# Patient Record
Sex: MALE | Race: BLACK OR AFRICAN AMERICAN | NOT HISPANIC OR LATINO | Employment: FULL TIME | URBAN - METROPOLITAN AREA
[De-identification: names, ages, dates, MRNs, and addresses within clinical notes are randomized per-mention and may not be internally consistent; named-entity substitution may affect disease eponyms.]

---

## 2023-09-19 ENCOUNTER — LAB (OUTPATIENT)
Dept: LAB | Facility: LAB | Age: 63
End: 2023-09-19
Payer: COMMERCIAL

## 2023-09-19 DIAGNOSIS — Z00.00 ANNUAL PHYSICAL EXAM: ICD-10-CM

## 2023-09-19 LAB
ALANINE AMINOTRANSFERASE (SGPT) (U/L) IN SER/PLAS: 10 U/L (ref 10–52)
ANION GAP IN SER/PLAS: 12 MMOL/L (ref 10–20)
CALCIDIOL (25 OH VITAMIN D3) (NG/ML) IN SER/PLAS: 12 NG/ML
CALCIUM (MG/DL) IN SER/PLAS: 9 MG/DL (ref 8.6–10.6)
CARBON DIOXIDE, TOTAL (MMOL/L) IN SER/PLAS: 25 MMOL/L (ref 21–32)
CHLORIDE (MMOL/L) IN SER/PLAS: 109 MMOL/L (ref 98–107)
CHOLESTEROL (MG/DL) IN SER/PLAS: 105 MG/DL (ref 0–199)
CHOLESTEROL IN HDL (MG/DL) IN SER/PLAS: 38.7 MG/DL
CHOLESTEROL/HDL RATIO: 2.7
COBALAMIN (VITAMIN B12) (PG/ML) IN SER/PLAS: 474 PG/ML (ref 211–911)
CREATININE (MG/DL) IN SER/PLAS: 1.05 MG/DL (ref 0.5–1.3)
ERYTHROCYTE DISTRIBUTION WIDTH (RATIO) BY AUTOMATED COUNT: 17.5 % (ref 11.5–14.5)
ERYTHROCYTE MEAN CORPUSCULAR HEMOGLOBIN CONCENTRATION (G/DL) BY AUTOMATED: 32.6 G/DL (ref 32–36)
ERYTHROCYTE MEAN CORPUSCULAR VOLUME (FL) BY AUTOMATED COUNT: 83 FL (ref 80–100)
ERYTHROCYTES (10*6/UL) IN BLOOD BY AUTOMATED COUNT: 2.77 X10E12/L (ref 4.5–5.9)
GFR MALE: 78 ML/MIN/1.73M2
GLUCOSE (MG/DL) IN SER/PLAS: 108 MG/DL (ref 74–99)
HEMATOCRIT (%) IN BLOOD BY AUTOMATED COUNT: 23 % (ref 41–52)
HEMOGLOBIN (G/DL) IN BLOOD: 7.5 G/DL (ref 13.5–17.5)
LDL: 56 MG/DL (ref 0–99)
LEUKOCYTES (10*3/UL) IN BLOOD BY AUTOMATED COUNT: 2.9 X10E9/L (ref 4.4–11.3)
NRBC (PER 100 WBCS) BY AUTOMATED COUNT: 6.9 /100 WBC (ref 0–0)
PLATELETS (10*3/UL) IN BLOOD AUTOMATED COUNT: 149 X10E9/L (ref 150–450)
POTASSIUM (MMOL/L) IN SER/PLAS: 3.8 MMOL/L (ref 3.5–5.3)
PROSTATE SPECIFIC AG (NG/ML) IN SER/PLAS: 6.34 NG/ML (ref 0–4)
SODIUM (MMOL/L) IN SER/PLAS: 142 MMOL/L (ref 136–145)
THYROTROPIN (MIU/L) IN SER/PLAS BY DETECTION LIMIT <= 0.05 MIU/L: 1.86 MIU/L (ref 0.44–3.98)
TRIGLYCERIDE (MG/DL) IN SER/PLAS: 53 MG/DL (ref 0–149)
UREA NITROGEN (MG/DL) IN SER/PLAS: 14 MG/DL (ref 6–23)
VLDL: 11 MG/DL (ref 0–40)

## 2023-09-19 PROCEDURE — 36415 COLL VENOUS BLD VENIPUNCTURE: CPT

## 2023-09-19 PROCEDURE — 81001 URINALYSIS AUTO W/SCOPE: CPT

## 2023-09-19 PROCEDURE — 84443 ASSAY THYROID STIM HORMONE: CPT

## 2023-09-19 PROCEDURE — 82607 VITAMIN B-12: CPT

## 2023-09-19 PROCEDURE — 80061 LIPID PANEL: CPT

## 2023-09-19 PROCEDURE — 84460 ALANINE AMINO (ALT) (SGPT): CPT

## 2023-09-19 PROCEDURE — 80048 BASIC METABOLIC PNL TOTAL CA: CPT

## 2023-09-19 PROCEDURE — 84153 ASSAY OF PSA TOTAL: CPT

## 2023-09-19 PROCEDURE — 85027 COMPLETE CBC AUTOMATED: CPT

## 2023-09-19 PROCEDURE — 82306 VITAMIN D 25 HYDROXY: CPT

## 2023-09-20 LAB
APPEARANCE, URINE: CLEAR
BILIRUBIN, URINE: NEGATIVE
BLOOD, URINE: ABNORMAL
COLOR, URINE: YELLOW
GLUCOSE, URINE: NEGATIVE MG/DL
KETONES, URINE: NEGATIVE MG/DL
LEUKOCYTE ESTERASE, URINE: NEGATIVE
MUCUS, URINE: NORMAL /LPF
NITRITE, URINE: NEGATIVE
PH, URINE: 5 (ref 5–8)
PROTEIN, URINE: NEGATIVE MG/DL
RBC, URINE: 2 /HPF (ref 0–5)
SPECIFIC GRAVITY, URINE: 1.02 (ref 1–1.03)
UROBILINOGEN, URINE: 2 MG/DL (ref 0–1.9)
WBC, URINE: <1 /HPF (ref 0–5)

## 2023-09-22 RX ORDER — NIFEDIPINE 60 MG/1
60 TABLET, EXTENDED RELEASE ORAL
COMMUNITY
Start: 2022-09-06 | End: 2023-09-25 | Stop reason: SDUPTHER

## 2023-09-22 RX ORDER — TAMSULOSIN HYDROCHLORIDE 0.4 MG/1
0.4 CAPSULE ORAL
COMMUNITY
Start: 2023-06-08 | End: 2024-04-23 | Stop reason: ALTCHOICE

## 2023-09-22 RX ORDER — LISINOPRIL 20 MG/1
1 TABLET ORAL DAILY
COMMUNITY
Start: 2020-11-17 | End: 2024-04-23 | Stop reason: ALTCHOICE

## 2023-09-25 ENCOUNTER — OFFICE VISIT (OUTPATIENT)
Dept: PRIMARY CARE | Facility: CLINIC | Age: 63
End: 2023-09-25
Payer: COMMERCIAL

## 2023-09-25 VITALS
HEART RATE: 82 BPM | SYSTOLIC BLOOD PRESSURE: 136 MMHG | HEIGHT: 66 IN | DIASTOLIC BLOOD PRESSURE: 80 MMHG | BODY MASS INDEX: 27.93 KG/M2 | OXYGEN SATURATION: 98 % | RESPIRATION RATE: 17 BRPM | TEMPERATURE: 98.2 F | WEIGHT: 173.8 LBS

## 2023-09-25 DIAGNOSIS — D64.9 ANEMIA, UNSPECIFIED TYPE: Primary | ICD-10-CM

## 2023-09-25 PROCEDURE — 1126F AMNT PAIN NOTED NONE PRSNT: CPT | Performed by: INTERNAL MEDICINE

## 2023-09-25 PROCEDURE — 99397 PER PM REEVAL EST PAT 65+ YR: CPT | Performed by: INTERNAL MEDICINE

## 2023-09-25 PROCEDURE — 1036F TOBACCO NON-USER: CPT | Performed by: INTERNAL MEDICINE

## 2023-09-25 RX ORDER — NIFEDIPINE 60 MG/1
60 TABLET, FILM COATED, EXTENDED RELEASE ORAL
Qty: 90 TABLET | Refills: 1 | Status: SHIPPED | OUTPATIENT
Start: 2023-09-25 | End: 2024-01-15 | Stop reason: SDUPTHER

## 2023-09-25 ASSESSMENT — ENCOUNTER SYMPTOMS
LOSS OF SENSATION IN FEET: 0
DEPRESSION: 0
OCCASIONAL FEELINGS OF UNSTEADINESS: 0

## 2023-09-25 ASSESSMENT — PAIN SCALES - GENERAL: PAINLEVEL: 0-NO PAIN

## 2023-09-25 NOTE — PROGRESS NOTES
"Subjective   Patient ID: Brandt Merritt is a 63 y.o. male who presents for Annual Exam and Med Refill.  Patient comes in for a physical examination, doing well over - all with no particular complaints.   Also in for laboratory review and health maintenance update.  Updating family history as well.      Med Refill        Review of Systems   All other systems reviewed and are negative.      Objective   Physical Exam  Constitutional:       Appearance: Normal appearance.   HENT:      Head: Normocephalic and atraumatic.      Nose: Nose normal.   Cardiovascular:      Rate and Rhythm: Normal rate and regular rhythm.   Abdominal:      General: Abdomen is flat.      Palpations: Abdomen is soft.   Musculoskeletal:         General: Normal range of motion.      Cervical back: Normal range of motion.   Skin:     General: Skin is warm and dry.   Neurological:      Mental Status: He is alert.       /80 (BP Location: Right arm)   Pulse 82   Temp 36.8 °C (98.2 °F)   Resp 17   Ht 1.676 m (5' 6\")   Wt 78.8 kg (173 lb 12.8 oz)   SpO2 98%   BMI 28.05 kg/m²         Assessment/Plan   Problem List Items Addressed This Visit    None  ASSESSMENT AND PLAN: Patient on examination is in fair health except for medical conditions discussed above, obtained screening blood tests to screen for high cholesterol, diabetes, thyroid, Ekg if above 50 years old or earlier if with cardiac history. Patient should be taking enough calcium in a balanced diet  Weight control especially if BMI is above ideal range. For Female Patients Only:  Mammogram yearly and PAP test with gynecology unless s/p Total hysterectomy  Bone density test at age 60 and above and every two years after that. Preventive Medicine: colon cancer screening by age 45 if no family history as well PSA @ 50 , balanced diet, and exercise as discussed. Seat belt use for injury prevention, living will. Substance use and /or tobacco use counseled when applicable. Alcohol use " discussed, use designated . Immunizations TD age 50 and every 10 years. Pneumovax and shingles vaccine counseled. Yearly flu vaccine unless contraindicated. More than 50% of office visit time spent counseling the patient, questions were answered. If problems arise, patient is to call or come back in. It is understood that the responsibility of healthcare is shared by the patient by following a healthy lifestyle and following the plan above as discussed. Advised complete physical examination every year. Pending additional results, may need to come for a return office visit for follow-up.       Anemia: Will refer Hematology.

## 2023-10-05 PROBLEM — E66.811 OBESITY, CLASS I, BMI 30-34.9: Status: ACTIVE | Noted: 2021-06-30

## 2023-10-05 PROBLEM — R94.31 ABNORMAL EKG: Status: ACTIVE | Noted: 2020-12-11

## 2023-10-05 PROBLEM — I10 HTN (HYPERTENSION): Status: ACTIVE | Noted: 2023-10-05

## 2023-10-05 PROBLEM — I21.3 STEMI (ST ELEVATION MYOCARDIAL INFARCTION) (MULTI): Status: ACTIVE | Noted: 2020-11-11

## 2023-10-05 PROBLEM — E87.6 HYPOKALEMIA: Status: ACTIVE | Noted: 2023-10-05

## 2023-10-05 PROBLEM — R07.9 CHEST PAIN: Status: ACTIVE | Noted: 2021-06-30

## 2023-10-05 PROBLEM — I42.9 MYOCARDIOPATHY (MULTI): Status: ACTIVE | Noted: 2020-12-11

## 2023-10-05 PROBLEM — R97.20 ELEVATED PSA: Status: ACTIVE | Noted: 2022-09-14

## 2023-10-05 PROBLEM — E66.9 OBESITY, CLASS I, BMI 30-34.9: Status: ACTIVE | Noted: 2021-06-30

## 2023-10-06 ENCOUNTER — LAB (OUTPATIENT)
Dept: LAB | Facility: LAB | Age: 63
End: 2023-10-06
Payer: COMMERCIAL

## 2023-10-06 ENCOUNTER — OFFICE VISIT (OUTPATIENT)
Dept: HEMATOLOGY/ONCOLOGY | Facility: CLINIC | Age: 63
End: 2023-10-06
Payer: COMMERCIAL

## 2023-10-06 VITALS
SYSTOLIC BLOOD PRESSURE: 167 MMHG | RESPIRATION RATE: 18 BRPM | DIASTOLIC BLOOD PRESSURE: 95 MMHG | HEART RATE: 87 BPM | WEIGHT: 170.64 LBS | BODY MASS INDEX: 27.54 KG/M2 | TEMPERATURE: 97.9 F

## 2023-10-06 DIAGNOSIS — D64.9 ANEMIA, UNSPECIFIED TYPE: ICD-10-CM

## 2023-10-06 DIAGNOSIS — E55.9 VITAMIN D3 DEFICIENCY: Primary | ICD-10-CM

## 2023-10-06 LAB
ALBUMIN SERPL BCP-MCNC: 4.4 G/DL (ref 3.4–5)
ALP SERPL-CCNC: 74 U/L (ref 33–136)
ALT SERPL W P-5'-P-CCNC: 10 U/L (ref 10–52)
ANION GAP SERPL CALC-SCNC: 13 MMOL/L (ref 10–20)
AST SERPL W P-5'-P-CCNC: 23 U/L (ref 9–39)
BASOPHILS # BLD AUTO: 0.03 X10*3/UL (ref 0–0.1)
BASOPHILS NFR BLD AUTO: 1 %
BILIRUB SERPL-MCNC: 2.3 MG/DL (ref 0–1.2)
BUN SERPL-MCNC: 12 MG/DL (ref 6–23)
BURR CELLS BLD QL SMEAR: NORMAL
CALCIUM SERPL-MCNC: 8.9 MG/DL (ref 8.6–10.6)
CHLORIDE SERPL-SCNC: 109 MMOL/L (ref 98–107)
CO2 SERPL-SCNC: 25 MMOL/L (ref 21–32)
CREAT SERPL-MCNC: 0.88 MG/DL (ref 0.5–1.3)
DACRYOCYTES BLD QL SMEAR: NORMAL
EOSINOPHIL # BLD AUTO: 0.02 X10*3/UL (ref 0–0.7)
EOSINOPHIL NFR BLD AUTO: 0.6 %
ERYTHROCYTE [DISTWIDTH] IN BLOOD BY AUTOMATED COUNT: 18.2 % (ref 11.5–14.5)
GFR SERPL CREATININE-BSD FRML MDRD: >90 ML/MIN/1.73M*2
GIANT PLATELETS BLD QL SMEAR: NORMAL
GLUCOSE SERPL-MCNC: 97 MG/DL (ref 74–99)
HCT VFR BLD AUTO: 22.9 % (ref 41–52)
HGB BLD-MCNC: 7.6 G/DL (ref 13.5–17.5)
HGB RETIC QN: 27 PG (ref 28–38)
HYPOCHROMIA BLD QL SMEAR: NORMAL
IMM GRANULOCYTES # BLD AUTO: 0.03 X10*3/UL (ref 0–0.7)
IMM GRANULOCYTES NFR BLD AUTO: 1 % (ref 0–0.9)
IMMATURE RETIC FRACTION: 47.3 %
LDH SERPL L TO P-CCNC: 257 U/L (ref 84–246)
LYMPHOCYTES # BLD AUTO: 1.78 X10*3/UL (ref 1.2–4.8)
LYMPHOCYTES NFR BLD AUTO: 57.2 %
MCH RBC QN AUTO: 27.5 PG (ref 26–34)
MCHC RBC AUTO-ENTMCNC: 33.2 G/DL (ref 32–36)
MCV RBC AUTO: 83 FL (ref 80–100)
MONOCYTES # BLD AUTO: 0.08 X10*3/UL (ref 0.1–1)
MONOCYTES NFR BLD AUTO: 2.6 %
NEUTROPHILS # BLD AUTO: 1.17 X10*3/UL (ref 1.2–7.7)
NEUTROPHILS NFR BLD AUTO: 37.6 %
NRBC BLD-RTO: ABNORMAL /100{WBCS}
PLATELET # BLD AUTO: 177 X10*3/UL (ref 150–450)
PMV BLD AUTO: 12.7 FL (ref 7.5–11.5)
POLYCHROMASIA BLD QL SMEAR: NORMAL
POTASSIUM SERPL-SCNC: 3.7 MMOL/L (ref 3.5–5.3)
PROT SERPL-MCNC: 7.4 G/DL (ref 6.4–8.2)
PROT SERPL-MCNC: 7.4 G/DL (ref 6.4–8.2)
RBC # BLD AUTO: 2.76 X10*6/UL (ref 4.5–5.9)
RBC MORPH BLD: NORMAL
RETICS #: 0.05 X10*6/UL (ref 0.02–0.12)
RETICS/RBC NFR AUTO: 1.8 % (ref 0.5–2)
SCHISTOCYTES BLD QL SMEAR: NORMAL
SODIUM SERPL-SCNC: 143 MMOL/L (ref 136–145)
TARGETS BLD QL SMEAR: NORMAL
WBC # BLD AUTO: 3.1 X10*3/UL (ref 4.4–11.3)

## 2023-10-06 PROCEDURE — 83521 IG LIGHT CHAINS FREE EACH: CPT

## 2023-10-06 PROCEDURE — 1159F MED LIST DOCD IN RCRD: CPT | Performed by: NURSE PRACTITIONER

## 2023-10-06 PROCEDURE — 3077F SYST BP >= 140 MM HG: CPT | Performed by: NURSE PRACTITIONER

## 2023-10-06 PROCEDURE — 83615 LACTATE (LD) (LDH) ENZYME: CPT

## 2023-10-06 PROCEDURE — 84165 PROTEIN E-PHORESIS SERUM: CPT | Performed by: NURSE PRACTITIONER

## 2023-10-06 PROCEDURE — 85025 COMPLETE CBC W/AUTO DIFF WBC: CPT

## 2023-10-06 PROCEDURE — 84165 PROTEIN E-PHORESIS SERUM: CPT

## 2023-10-06 PROCEDURE — 1036F TOBACCO NON-USER: CPT | Performed by: NURSE PRACTITIONER

## 2023-10-06 PROCEDURE — 80053 COMPREHEN METABOLIC PANEL: CPT

## 2023-10-06 PROCEDURE — 82232 ASSAY OF BETA-2 PROTEIN: CPT

## 2023-10-06 PROCEDURE — 3080F DIAST BP >= 90 MM HG: CPT | Performed by: NURSE PRACTITIONER

## 2023-10-06 PROCEDURE — 86334 IMMUNOFIX E-PHORESIS SERUM: CPT

## 2023-10-06 PROCEDURE — 36415 COLL VENOUS BLD VENIPUNCTURE: CPT

## 2023-10-06 PROCEDURE — 99205 OFFICE O/P NEW HI 60 MIN: CPT | Performed by: NURSE PRACTITIONER

## 2023-10-06 PROCEDURE — 1126F AMNT PAIN NOTED NONE PRSNT: CPT | Performed by: NURSE PRACTITIONER

## 2023-10-06 PROCEDURE — 99215 OFFICE O/P EST HI 40 MIN: CPT | Performed by: NURSE PRACTITIONER

## 2023-10-06 PROCEDURE — 86320 SERUM IMMUNOELECTROPHORESIS: CPT | Performed by: NURSE PRACTITIONER

## 2023-10-06 PROCEDURE — 85045 AUTOMATED RETICULOCYTE COUNT: CPT

## 2023-10-06 ASSESSMENT — ENCOUNTER SYMPTOMS
SHORTNESS OF BREATH: 1
FATIGUE: 1

## 2023-10-06 ASSESSMENT — PAIN SCALES - GENERAL: PAINLEVEL: 0-NO PAIN

## 2023-10-06 NOTE — PROGRESS NOTES
Patient ID: Brandt Merritt is a 63 y.o. male.  Referring Physician: Bill Richmond MD  [address] [facility], [address]  [place] [id number]  Primary Care Provider: Bill Richmond MD  Visit Type: Follow Up      Subjective    Location:  [facility]  Initial consult: 10/6/2023  Reason: Anemia/Pancytopenia    Patient is a 64 yo  man with a PMH of elevated PSA with negative biopsy, HTN, Cardiomyopathy, treated for Hep C with Harvoni, hypokalemia and abnormal EKG and was referred to benign hematology for consultation for pancytopenia.     Review of labs note a decrease in WBC, Hemoglobin, and Platelets that were normal last year. Please review labs noted below.     Today, patient presents for initial consultation. Denies abnormal weight loss, night sweats, fever. Patient endorses fatigue and PRICE. Denies chills, sob, cp, n/v/d, n/t. No PICA. Denies any abnormal bleeding or bruising. No recurrent infections or lymphadenopathy. No joint/body pain. No known blood disorders in family. No joint pain. Has had surgery in past w/o issue. Never had blood/blood products. Denies NSAID use.     PMHx:  Active Ambulatory Problems    Abnormal EKG         Date Noted: 2020      Chest pain         Date Noted: 2021      Chronic hepatitis C virus infection - treated with Harvoni (CMS/HCC)         Date Noted: 2005      Crushing injury of right hand         Date Noted: 2015      Elevated PSA         Date Noted: 2022      HTN (hypertension)         Date Noted: 10/05/2023      Hypokalemia         Date Noted: 10/05/2023      Myocardiopathy (CMS/HCC)         Date Noted: 2020  PSHx:  Past Surgical History:  2013: COLONOSCOPY      Comment:  Complete Colonoscopy  10/07/2015: OTHER SURGICAL HISTORY      Comment:  Surgery Testis Reduction Of Torsion Of Testis Right     FHx: Review of patient's family history indicates:  Mother  of a stroke at 69, Father living  at 88, brother living with prostate cancer, sister has some type of liver disease. 2 children both healthy    Social Hx: , lives with wife, works at [employer] and planning to retire soon, former smoker x 15 years, less that 1 ppd. Quit over 20 years ago.  He  reports current alcohol use. Also reports using marijuana 3 x per week on average.     Medications and allergies reviewed in EMR.    ROS:  10 point review of systems negative except as state in HPI.    Vitals & Statistics:  Objective  BSA: 1.9 meters squared  BP (!) 167/95   Pulse 87   Temp 36.6 °C (97.9 °F)   Resp 18   Wt 77.4 kg (170 lb 10.2 oz)   BMI 27.54 kg/m²     Results:  WBC       Date/Time                Value               Ref Range           Status                09/19/2023 04:46 PM      2.9 (L)             4.4 - 11.3 x10*     Final                 06/06/2020 08:27 AM      8.0                 4.4 - 11.3 x10*     Final                 04/16/2019 04:01 PM      8.0                 4.4 - 11.3 x10*     Final            ----------  RBC       Date                     Value               Ref Range           Status                09/19/2023               2.77 (L)            4.50 - 5.90 x1*     Final                 06/06/2020               5.08                4.50 - 5.90 x1*     Final                 04/16/2019               5.47                4.50 - 5.90 x1*     Final            ----------  Hemoglobin       Date                     Value               Ref Range           Status                09/19/2023               7.5 (L)             13.5 - 17.5 g/*     Final                 06/06/2020               13.8                13.5 - 17.5 g/*     Final                 04/16/2019               14.2                13.5 - 17.5 g/*     Final            ----------  Hematocrit       Date                     Value               Ref Range           Status                09/19/2023               23.0 (L)            41.0 - 52.0 %       Final                  "06/06/2020               39.6 (L)            41.0 - 52.0 %       Final                 04/16/2019               41.9                41.0 - 52.0 %       Final            ----------  MCV       Date/Time                Value               Ref Range           Status                09/19/2023 04:46 PM      83                  80 - 100 fL         Final                 06/06/2020 08:27 AM      78 (L)              80 - 100 fL         Final                 04/16/2019 04:01 PM      77 (L)              80 - 100 fL         Final            ----------  MCHC       Date/Time                Value               Ref Range           Status                09/19/2023 04:46 PM      32.6                32.0 - 36.0 g/*     Final                 06/06/2020 08:27 AM      34.8                32.0 - 36.0 g/*     Final                 04/16/2019 04:01 PM      33.9                32.0 - 36.0 g/*     Final            ----------  RDW       Date/Time                Value               Ref Range           Status                09/19/2023 04:46 PM      17.5 (H)            11.5 - 14.5 %       Final                 06/06/2020 08:27 AM      13.5                11.5 - 14.5 %       Final                 04/16/2019 04:01 PM      14.1                11.5 - 14.5 %       Final            ----------  Platelets       Date/Time                Value               Ref Range           Status                09/19/2023 04:46 PM      149 (L)             150 - 450 x10E*     Final                 06/06/2020 08:27 AM      421                 150 - 450 x10E*     Final                 04/16/2019 04:01 PM      435                 150 - 450 x10E*     Final            ----------  No results found for: \"MPV\"  Neutrophils Absolute       Date/Time                Value               Ref Range           Status                06/06/2020 08:27 AM      2.87                1.20 - 7.70 x1*     Final                 04/16/2019 04:01 PM      2.94                1.20 - 7.70 x1*     Final  " "               05/17/2018 04:01 PM      3.68                1.20 - 7.70 x1*     Final            ----------  No results found for: \"IGABSOL\"  Lymphocytes Absolute       Date/Time                Value               Ref Range           Status                06/06/2020 08:27 AM      3.84                1.20 - 4.80 x1*     Final                 04/16/2019 04:01 PM      4.03                1.20 - 4.80 x1*     Final                 05/17/2018 04:01 PM      2.47                1.20 - 4.80 x1*     Final            ----------  Monocytes Absolute       Date/Time                Value               Ref Range           Status                06/06/2020 08:27 AM      0.76                0.10 - 1.00 x1*     Final                 04/16/2019 04:01 PM      0.71                0.10 - 1.00 x1*     Final                 05/17/2018 04:01 PM      1.01 (H)            0.10 - 1.00 x1*     Final            ----------  Eosinophils Absolute       Date/Time                Value               Ref Range           Status                06/06/2020 08:27 AM      0.40                0.00 - 0.70 x1*     Final                 04/16/2019 04:01 PM      0.28                0.00 - 0.70 x1*     Final                 05/17/2018 04:01 PM      0.15                0.00 - 0.70 x1*     Final            ----------  Basophils Absolute       Date/Time                Value               Ref Range           Status                06/06/2020 08:27 AM      0.07                0.00 - 0.10 x1*     Final                 04/16/2019 04:01 PM      0.06                0.00 - 0.10 x1*     Final                 05/17/2018 04:01 PM      0.06                0.00 - 0.10 x1*     Final            ----------    Labs:  Lab Results       Component                Value               Date                       WBC                      2.9 (L)             09/19/2023                 RBC                      2.77 (L)            09/19/2023                 HGB                      7.5 (L)         " "    09/19/2023                 HCT                      23.0 (L)            09/19/2023                 MCV                      83                  09/19/2023                 PLT                      149 (L)             09/19/2023            No results found for: \"RETICCTPCT\"   Lab Results       Component                Value               Date                       CREATININE               1.05                09/19/2023                 BUN                      14                  09/19/2023                 NA                       142                 09/19/2023                 K                        3.8                 09/19/2023                 CL                       109 (H)             09/19/2023                 CO2                      25                  09/19/2023             Lab Results       Component                Value               Date                       ALT                      10                  09/19/2023             Lab Results       Component                Value               Date                       TSH                      1.86                09/19/2023            No results found for: \"IRON\", \"TIBC\", \"FERRITIN\"   Lab Results       Component                Value               Date                       [id number]               474                 09/19/2023               Assessment:      Rosanne M Casal, APRN-CNP            Review of Systems   Constitutional:  Positive for fatigue.   Respiratory:  Positive for shortness of breath.         Objective   BSA: 1.9 meters squared  BP (!) 167/95   Pulse 87   Temp 36.6 °C (97.9 °F)   Resp 18   Wt 77.4 kg (170 lb 10.2 oz)   BMI 27.54 kg/m²       Family History   Problem Relation Name Age of Onset    Other (diabetes mellitus) Mother       Oncology History    No history exists.       Brandt BRADLEY Merritt  reports that he has quit smoking. His smoking use included cigarettes. He has never used smokeless tobacco.  He  reports current alcohol " use.  He  has no history on file for drug use.    Physical Exam  HENT:      Head: Normocephalic.      Nose: Nose normal.      Mouth/Throat:      Mouth: Mucous membranes are moist.   Eyes:      Pupils: Pupils are equal, round, and reactive to light.   Cardiovascular:      Rate and Rhythm: Normal rate and regular rhythm.      Pulses: Normal pulses.      Heart sounds: Normal heart sounds.   Pulmonary:      Effort: Pulmonary effort is normal.      Breath sounds: Normal breath sounds.   Abdominal:      General: Bowel sounds are normal.      Palpations: Abdomen is soft.   Musculoskeletal:         General: Normal range of motion.   Skin:     General: Skin is warm and dry.   Neurological:      General: No focal deficit present.      Mental Status: He is alert and oriented to person, place, and time.   Psychiatric:         Mood and Affect: Mood normal.         Behavior: Behavior normal.         WBC   Date/Time Value Ref Range Status   09/19/2023 04:46 PM 2.9 (L) 4.4 - 11.3 x10E9/L Final   06/06/2020 08:27 AM 8.0 4.4 - 11.3 x10E9/L Final   04/16/2019 04:01 PM 8.0 4.4 - 11.3 x10E9/L Final     nRBC   Date Value Ref Range Status   09/19/2023 6.9 0.0 - 0.0 /100 WBC Final   06/06/2020 0.0 0.0 - 0.0 /100 WBC Final   04/16/2019 0.0 0.0 - 0.0 /100 WBC Final     RBC   Date Value Ref Range Status   09/19/2023 2.77 (L) 4.50 - 5.90 x10E12/L Final   06/06/2020 5.08 4.50 - 5.90 x10E12/L Final   04/16/2019 5.47 4.50 - 5.90 x10E12/L Final     Hemoglobin   Date Value Ref Range Status   09/19/2023 7.5 (L) 13.5 - 17.5 g/dL Final   06/06/2020 13.8 13.5 - 17.5 g/dL Final   04/16/2019 14.2 13.5 - 17.5 g/dL Final     Hematocrit   Date Value Ref Range Status   09/19/2023 23.0 (L) 41.0 - 52.0 % Final   06/06/2020 39.6 (L) 41.0 - 52.0 % Final   04/16/2019 41.9 41.0 - 52.0 % Final     MCV   Date/Time Value Ref Range Status   09/19/2023 04:46 PM 83 80 - 100 fL Final   06/06/2020 08:27 AM 78 (L) 80 - 100 fL Final   04/16/2019 04:01 PM 77 (L) 80 - 100 fL  "Final     No results found for: \"MCH\"  MCHC   Date/Time Value Ref Range Status   09/19/2023 04:46 PM 32.6 32.0 - 36.0 g/dL Final   06/06/2020 08:27 AM 34.8 32.0 - 36.0 g/dL Final   04/16/2019 04:01 PM 33.9 32.0 - 36.0 g/dL Final     RDW   Date/Time Value Ref Range Status   09/19/2023 04:46 PM 17.5 (H) 11.5 - 14.5 % Final   06/06/2020 08:27 AM 13.5 11.5 - 14.5 % Final   04/16/2019 04:01 PM 14.1 11.5 - 14.5 % Final     Platelets   Date/Time Value Ref Range Status   09/19/2023 04:46  (L) 150 - 450 x10E9/L Final   06/06/2020 08:27  150 - 450 x10E9/L Final   04/16/2019 04:01  150 - 450 x10E9/L Final     No results found for: \"MPV\"  Neutrophils %   Date/Time Value Ref Range Status   06/06/2020 08:27 AM 36.1 40.0 - 80.0 % Final   04/16/2019 04:01 PM 36.7 40.0 - 80.0 % Final   05/17/2018 04:01 PM 49.8 40.0 - 80.0 % Final     Immature Granulocytes %, Automated   Date/Time Value Ref Range Status   06/06/2020 08:27 AM 0.3 0.0 - 0.9 % Final     Comment:      Immature Granulocyte Count (IG) includes promyelocytes,    myelocytes and metamyelocytes but does not include bands.   Percent differential counts (%) should be interpreted in the   context of the absolute cell counts (cells/L).     04/16/2019 04:01 PM 0.2 0.0 - 0.9 % Final     Comment:      Percent differential counts (%) should be interpreted in the   context of the absolute cell counts (cells/L).     05/17/2018 04:01 PM 0.3 0.0 - 0.9 % Final     Comment:      Percent differential counts (%) should be interpreted in the   context of the absolute cell counts (cells/L).       Lymphocytes %   Date/Time Value Ref Range Status   06/06/2020 08:27 AM 48.2 13.0 - 44.0 % Final   04/16/2019 04:01 PM 50.1 13.0 - 44.0 % Final   05/17/2018 04:01 PM 33.4 13.0 - 44.0 % Final     Monocytes %   Date/Time Value Ref Range Status   06/06/2020 08:27 AM 9.5 2.0 - 10.0 % Final   04/16/2019 04:01 PM 8.8 2.0 - 10.0 % Final   05/17/2018 04:01 PM 13.7 (H) 2.0 - 10.0 % Final " "    Eosinophils %   Date/Time Value Ref Range Status   06/06/2020 08:27 AM 5.0 0.0 - 6.0 % Final   04/16/2019 04:01 PM 3.5 0.0 - 6.0 % Final   05/17/2018 04:01 PM 2.0 0.0 - 6.0 % Final     Basophils %   Date/Time Value Ref Range Status   06/06/2020 08:27 AM 0.9 0.0 - 2.0 % Final   04/16/2019 04:01 PM 0.7 0.0 - 2.0 % Final   05/17/2018 04:01 PM 0.8 0.0 - 2.0 % Final     Neutrophils Absolute   Date/Time Value Ref Range Status   06/06/2020 08:27 AM 2.87 1.20 - 7.70 x10E9/L Final   04/16/2019 04:01 PM 2.94 1.20 - 7.70 x10E9/L Final   05/17/2018 04:01 PM 3.68 1.20 - 7.70 x10E9/L Final     No results found for: \"IGABSOL\"  Lymphocytes Absolute   Date/Time Value Ref Range Status   06/06/2020 08:27 AM 3.84 1.20 - 4.80 x10E9/L Final   04/16/2019 04:01 PM 4.03 1.20 - 4.80 x10E9/L Final   05/17/2018 04:01 PM 2.47 1.20 - 4.80 x10E9/L Final     Monocytes Absolute   Date/Time Value Ref Range Status   06/06/2020 08:27 AM 0.76 0.10 - 1.00 x10E9/L Final   04/16/2019 04:01 PM 0.71 0.10 - 1.00 x10E9/L Final   05/17/2018 04:01 PM 1.01 (H) 0.10 - 1.00 x10E9/L Final     Eosinophils Absolute   Date/Time Value Ref Range Status   06/06/2020 08:27 AM 0.40 0.00 - 0.70 x10E9/L Final   04/16/2019 04:01 PM 0.28 0.00 - 0.70 x10E9/L Final   05/17/2018 04:01 PM 0.15 0.00 - 0.70 x10E9/L Final     Basophils Absolute   Date/Time Value Ref Range Status   06/06/2020 08:27 AM 0.07 0.00 - 0.10 x10E9/L Final   04/16/2019 04:01 PM 0.06 0.00 - 0.10 x10E9/L Final   05/17/2018 04:01 PM 0.06 0.00 - 0.10 x10E9/L Final       Assessment/Plan      62 yo  man with a PMH of elevated PSA with negative biopsy, HTN, Cardiomyopathy, treated for Hep C with Harvoni, hypokalemia and abnormal EKG and was referred to benign hematology for consultation for pancytopenia.     The patient is in agreement with the following plan   1. Labs today: CBC/diff, CMP, retics, iron studies, B12, folate, EPO level, TSH.  viral titers. MGUS labs and a few other labs.   2. Will " order vitamin D supplementation 50,000 units weekly x 8 weeks  3. GI consult for anemia workup (upper and lower GI)  2. We will schedule a phone visit in 1 week to discuss results and make further recommendations.   I had an extensive discussion with the patient regarding the diagnosis and discussed the plan of therapy, including general considerations regarding side effects and outcomes. Pt understood and gave appropriate teach back about the plan of care. All questions were answered to the patient's satisfaction. The patient is instructed to contact us at any time if questions or problems arise. Thank you for the opportunity to participate in the care of this very pleasant patient.        Rosanne M Casal, DNP. APN-BC, AOCNP

## 2023-10-07 LAB — B2 MICROGLOB SERPL-MCNC: 1.9 MG/L (ref 0.7–2.2)

## 2023-10-08 LAB
KAPPA LC SERPL-MCNC: 2.74 MG/DL (ref 0.33–1.94)
KAPPA LC/LAMBDA SER: 1.4 {RATIO} (ref 0.26–1.65)
LAMBDA LC SERPL-MCNC: 1.96 MG/DL (ref 0.57–2.63)

## 2023-10-12 ENCOUNTER — APPOINTMENT (OUTPATIENT)
Dept: HEMATOLOGY/ONCOLOGY | Facility: CLINIC | Age: 63
End: 2023-10-12
Payer: COMMERCIAL

## 2023-10-13 ENCOUNTER — OFFICE VISIT (OUTPATIENT)
Dept: GASTROENTEROLOGY | Facility: CLINIC | Age: 63
End: 2023-10-13
Payer: COMMERCIAL

## 2023-10-13 ENCOUNTER — LAB (OUTPATIENT)
Dept: LAB | Facility: LAB | Age: 63
End: 2023-10-13
Payer: COMMERCIAL

## 2023-10-13 VITALS
HEIGHT: 66 IN | BODY MASS INDEX: 27.48 KG/M2 | WEIGHT: 171 LBS | DIASTOLIC BLOOD PRESSURE: 88 MMHG | HEART RATE: 84 BPM | TEMPERATURE: 98.1 F | SYSTOLIC BLOOD PRESSURE: 146 MMHG

## 2023-10-13 DIAGNOSIS — D64.9 ANEMIA, UNSPECIFIED TYPE: Primary | ICD-10-CM

## 2023-10-13 DIAGNOSIS — D63.8 ANEMIA IN OTHER CHRONIC DISEASES CLASSIFIED ELSEWHERE: ICD-10-CM

## 2023-10-13 DIAGNOSIS — B18.2 CHRONIC HEPATITIS C WITHOUT HEPATIC COMA (MULTI): ICD-10-CM

## 2023-10-13 DIAGNOSIS — D64.9 ANEMIA, UNSPECIFIED TYPE: ICD-10-CM

## 2023-10-13 PROCEDURE — 86258 DGP ANTIBODY EACH IG CLASS: CPT

## 2023-10-13 PROCEDURE — 1159F MED LIST DOCD IN RCRD: CPT | Performed by: INTERNAL MEDICINE

## 2023-10-13 PROCEDURE — 3079F DIAST BP 80-89 MM HG: CPT | Performed by: INTERNAL MEDICINE

## 2023-10-13 PROCEDURE — 86364 TISS TRNSGLTMNASE EA IG CLAS: CPT

## 2023-10-13 PROCEDURE — 99213 OFFICE O/P EST LOW 20 MIN: CPT | Performed by: INTERNAL MEDICINE

## 2023-10-13 PROCEDURE — 1036F TOBACCO NON-USER: CPT | Performed by: INTERNAL MEDICINE

## 2023-10-13 PROCEDURE — 3077F SYST BP >= 140 MM HG: CPT | Performed by: INTERNAL MEDICINE

## 2023-10-13 PROCEDURE — 36415 COLL VENOUS BLD VENIPUNCTURE: CPT

## 2023-10-13 PROCEDURE — 1126F AMNT PAIN NOTED NONE PRSNT: CPT | Performed by: INTERNAL MEDICINE

## 2023-10-13 RX ORDER — CHOLECALCIFEROL (VITAMIN D3) 1250 MCG
50000 TABLET ORAL
Qty: 4 TABLET | Refills: 1 | Status: SHIPPED | OUTPATIENT
Start: 2023-10-13 | End: 2023-12-02

## 2023-10-13 RX ORDER — ERGOCALCIFEROL 1.25 MG/1
1.25 CAPSULE ORAL
COMMUNITY
End: 2024-04-23 | Stop reason: ALTCHOICE

## 2023-10-13 ASSESSMENT — PAIN SCALES - GENERAL: PAINLEVEL: 0-NO PAIN

## 2023-10-13 NOTE — PROGRESS NOTES
"Subjective   Patient ID: Brandt Merritt is a 63 y.o. male with past medical history of elevated PSA with negative biopsy, HTN, cardiomyopathy, treated for Hep C with Harvonia who presents for New Patient Visit (Patient is new) and Anemia.    /88 (BP Location: Left arm, Patient Position: Sitting)   Pulse 84   Temp 36.7 °C (98.1 °F)   Ht 1.676 m (5' 6\")   Wt 77.6 kg (171 lb)   BMI 27.60 kg/m²     Patient is here for initial visit due to normocytic anemia, most recent Hgb 7.6 (baseline appears to be 13-14). Was recently seen by Heme/Onc on 10/6/23 for panyctopenia. Iron studies were ordered as well as MGUS labs, workup still pending. Denies blood in stool or black stool, no hematemesis or coffee ground emesis. Denies abdominal pain. No history of recent or long term NSAID use. Patient denies any intolerance to wheat or wheat products. Last colonoscopy 2021 showed non bleeding internal hemorrhoids otherwise unremarkable, has not had previous EGD performed.  Denies family history of colon cancer.     Anemia      Review of Systems  12 Point ROS negative outside of symptoms stated above in HPI    Past Medical History:   Diagnosis Date    Personal history of other diseases of the circulatory system     History of hypertension       Past Surgical History:   Procedure Laterality Date    COLONOSCOPY  06/13/2013    Complete Colonoscopy    OTHER SURGICAL HISTORY  10/07/2015    Surgery Testis Reduction Of Torsion Of Testis Right       No relevant family history has been documented for this patient.     reports that he has quit smoking. His smoking use included cigarettes. He has never used smokeless tobacco. He reports current alcohol use.    Allergies   Allergen Reactions    Thiazides Other     Recurrent Significant hypokalemia            Objective       Current Outpatient Medications:     NIFEdipine ER (NIFEdipine XL) 60 mg 24 hr tablet, Take 1 tablet (60 mg) by mouth once daily., Disp: 90 tablet, Rfl: 1    " cholecalciferol (Vitamin D3) 1,250 mcg (50,000 unit) tablet, Take 1 tablet (50,000 Units) by mouth 1 (one) time per week for 8 doses., Disp: 4 tablet, Rfl: 1    ergocalciferol (Vitamin D-2) 1.25 MG (22106 UT) capsule, Take 1 capsule (1,250 mcg) by mouth 1 (one) time per week., Disp: , Rfl:     lisinopril 20 mg tablet, Take 1 tablet (20 mg) by mouth once daily., Disp: , Rfl:     tamsulosin (Flomax) 0.4 mg 24 hr capsule, Take 1 capsule (0.4 mg) by mouth., Disp: , Rfl:     Physical Exam  Constitutional:       Appearance: Normal appearance. He is normal weight.   Cardiovascular:      Rate and Rhythm: Normal rate and regular rhythm.   Pulmonary:      Effort: Pulmonary effort is normal.      Breath sounds: Normal breath sounds.   Abdominal:      General: Bowel sounds are normal. There is no distension.      Palpations: Abdomen is soft.      Tenderness: There is no abdominal tenderness.      Hernia: No hernia is present.      Comments: Liver palpable with insipiration    Skin:     General: Skin is warm and dry.   Neurological:      General: No focal deficit present.      Mental Status: He is alert and oriented to person, place, and time. Mental status is at baseline.   Psychiatric:         Mood and Affect: Mood normal.         Behavior: Behavior normal.         Assessment/Plan      Normocytic anemia, most recent Hgb 7.6 on 10/6/23 (baseline appears to be 13-14). Most recent colonoscopy by [facility] Dr. Adrián Mcdonough on 11/29/21 showed non-bleeding internal hemorrhoids otherwise unremarkable, recommended repeat in 10 years. No previous EGD.  Palpable liver on exam, history of previously treated Hep C   Rule out celiac disease, MGUS workup pending per Heme/Onc     Plan:   - EGD with biopsy to rule out upper GI bleeding   - Check celiac serology   - Liver US ordered d/t history of Hep C and palpable liver   - MGUS workup pending   - Continue to follow with Heme/Onc     Follow up in 2 months     Plan discussed with Dr. Rivera,  who is in agreement     Kandy Mo, DO   PGY-2 Internal Medicine

## 2023-10-13 NOTE — PATIENT INSTRUCTIONS
It was a pleasure meeting you today!     Please call to schedule the liver ultrasound. We ordered additional blood work to test for celiac disease, please stop by the lab at your earliest convenience. We will work on getting you scheduled for an EGD.     Follow up in 2 months

## 2023-10-13 NOTE — H&P (VIEW-ONLY)
"Subjective   Patient ID: Brandt Merritt is a 63 y.o. male with past medical history of elevated PSA with negative biopsy, HTN, cardiomyopathy, treated for Hep C with Harvonia who presents for New Patient Visit (Patient is new) and Anemia.    /88 (BP Location: Left arm, Patient Position: Sitting)   Pulse 84   Temp 36.7 °C (98.1 °F)   Ht 1.676 m (5' 6\")   Wt 77.6 kg (171 lb)   BMI 27.60 kg/m²     Patient is here for initial visit due to normocytic anemia, most recent Hgb 7.6 (baseline appears to be 13-14). Was recently seen by Heme/Onc on 10/6/23 for panyctopenia. Iron studies were ordered as well as MGUS labs, workup still pending. Denies blood in stool or black stool, no hematemesis or coffee ground emesis. Denies abdominal pain. No history of recent or long term NSAID use. Patient denies any intolerance to wheat or wheat products. Last colonoscopy 2021 showed non bleeding internal hemorrhoids otherwise unremarkable, has not had previous EGD performed.  Denies family history of colon cancer.     Anemia      Review of Systems  12 Point ROS negative outside of symptoms stated above in HPI    Past Medical History:   Diagnosis Date    Personal history of other diseases of the circulatory system     History of hypertension       Past Surgical History:   Procedure Laterality Date    COLONOSCOPY  06/13/2013    Complete Colonoscopy    OTHER SURGICAL HISTORY  10/07/2015    Surgery Testis Reduction Of Torsion Of Testis Right       No relevant family history has been documented for this patient.     reports that he has quit smoking. His smoking use included cigarettes. He has never used smokeless tobacco. He reports current alcohol use.    Allergies   Allergen Reactions    Thiazides Other     Recurrent Significant hypokalemia            Objective       Current Outpatient Medications:     NIFEdipine ER (NIFEdipine XL) 60 mg 24 hr tablet, Take 1 tablet (60 mg) by mouth once daily., Disp: 90 tablet, Rfl: 1    " cholecalciferol (Vitamin D3) 1,250 mcg (50,000 unit) tablet, Take 1 tablet (50,000 Units) by mouth 1 (one) time per week for 8 doses., Disp: 4 tablet, Rfl: 1    ergocalciferol (Vitamin D-2) 1.25 MG (54777 UT) capsule, Take 1 capsule (1,250 mcg) by mouth 1 (one) time per week., Disp: , Rfl:     lisinopril 20 mg tablet, Take 1 tablet (20 mg) by mouth once daily., Disp: , Rfl:     tamsulosin (Flomax) 0.4 mg 24 hr capsule, Take 1 capsule (0.4 mg) by mouth., Disp: , Rfl:     Physical Exam  Constitutional:       Appearance: Normal appearance. He is normal weight.   Cardiovascular:      Rate and Rhythm: Normal rate and regular rhythm.   Pulmonary:      Effort: Pulmonary effort is normal.      Breath sounds: Normal breath sounds.   Abdominal:      General: Bowel sounds are normal. There is no distension.      Palpations: Abdomen is soft.      Tenderness: There is no abdominal tenderness.      Hernia: No hernia is present.      Comments: Liver palpable with insipiration    Skin:     General: Skin is warm and dry.   Neurological:      General: No focal deficit present.      Mental Status: He is alert and oriented to person, place, and time. Mental status is at baseline.   Psychiatric:         Mood and Affect: Mood normal.         Behavior: Behavior normal.         Assessment/Plan      Normocytic anemia, most recent Hgb 7.6 on 10/6/23 (baseline appears to be 13-14). Most recent colonoscopy by [facility] Dr. Adrián Mcdonough on 11/29/21 showed non-bleeding internal hemorrhoids otherwise unremarkable, recommended repeat in 10 years. No previous EGD.  Palpable liver on exam, history of previously treated Hep C   Rule out celiac disease, MGUS workup pending per Heme/Onc     Plan:   - EGD with biopsy to rule out upper GI bleeding   - Check celiac serology   - Liver US ordered d/t history of Hep C and palpable liver   - MGUS workup pending   - Continue to follow with Heme/Onc     Follow up in 2 months     Plan discussed with Dr. Rivera,  who is in agreement     Kandy Mo, DO   PGY-2 Internal Medicine

## 2023-10-14 LAB
ALBUMIN: 4.2 G/DL (ref 3.4–5)
ALPHA 1 GLOBULIN: 0.3 G/DL (ref 0.2–0.6)
ALPHA 2 GLOBULIN: 0.7 G/DL (ref 0.4–1.1)
BETA GLOBULIN: 0.7 G/DL (ref 0.5–1.2)
GAMMA GLOBULIN: 1.5 G/DL (ref 0.5–1.4)
GLIADIN PEPTIDE IGA SER IA-ACNC: <1 U/ML
GLIADIN PEPTIDE IGG SER IA-ACNC: <1 U/ML
IMMUNOFIXATION COMMENT: ABNORMAL
PATH REVIEW - SERUM IMMUNOFIXATION: ABNORMAL
PATH REVIEW-SERUM PROTEIN ELECTROPHORESIS: ABNORMAL
PROTEIN ELECTROPHORESIS COMMENT: ABNORMAL
TTG IGA SER IA-ACNC: 1 U/ML
TTG IGG SER IA-ACNC: <1 U/ML

## 2023-10-15 NOTE — PROGRESS NOTES
I saw and evaluated the patient. I personally obtained the key and critical portions of the history and physical exam or was physically present for key and critical portions performed by the resident-Dr Gaitan . I reviewed the resident/fellow's documentation and discussed the patient with the resident/fellow. I agree with the resident/fellow's medical decision making as documented in the note.  On exam  Liver-palpable.  Normocytic anemia.  No evidence of GI bleeding.  No previous EGD.  Previous colonoscopy 2021.  History of chronic hepatitis C S/P treatment with Harvoni    will check celiac serology.  will schedule EGD  Liver sonogram  Lab to rule out MGUS as ordered by heme-onc    Sloan Rivera MD

## 2023-10-18 ENCOUNTER — HOSPITAL ENCOUNTER (OUTPATIENT)
Dept: RADIOLOGY | Facility: CLINIC | Age: 63
Discharge: HOME | End: 2023-10-18
Payer: COMMERCIAL

## 2023-10-18 DIAGNOSIS — B18.2 CHRONIC HEPATITIS C WITHOUT HEPATIC COMA (MULTI): ICD-10-CM

## 2023-10-18 PROCEDURE — 76705 ECHO EXAM OF ABDOMEN: CPT

## 2023-10-18 PROCEDURE — 76705 ECHO EXAM OF ABDOMEN: CPT | Performed by: RADIOLOGY

## 2023-10-31 ENCOUNTER — HOSPITAL ENCOUNTER (OUTPATIENT)
Dept: GASTROENTEROLOGY | Facility: HOSPITAL | Age: 63
Setting detail: OUTPATIENT SURGERY
Discharge: HOME | End: 2023-10-31
Payer: COMMERCIAL

## 2023-10-31 VITALS
WEIGHT: 175 LBS | TEMPERATURE: 99.3 F | DIASTOLIC BLOOD PRESSURE: 92 MMHG | RESPIRATION RATE: 15 BRPM | SYSTOLIC BLOOD PRESSURE: 132 MMHG | BODY MASS INDEX: 28.12 KG/M2 | HEIGHT: 66 IN | HEART RATE: 76 BPM | OXYGEN SATURATION: 97 %

## 2023-10-31 DIAGNOSIS — D64.9 ANEMIA, UNSPECIFIED TYPE: Primary | ICD-10-CM

## 2023-10-31 PROCEDURE — 7100000010 HC PHASE TWO TIME - EACH INCREMENTAL 1 MINUTE: Performed by: INTERNAL MEDICINE

## 2023-10-31 PROCEDURE — 88305 TISSUE EXAM BY PATHOLOGIST: CPT | Mod: TC,SUR | Performed by: INTERNAL MEDICINE

## 2023-10-31 PROCEDURE — 99152 MOD SED SAME PHYS/QHP 5/>YRS: CPT | Performed by: INTERNAL MEDICINE

## 2023-10-31 PROCEDURE — 43239 EGD BIOPSY SINGLE/MULTIPLE: CPT | Performed by: INTERNAL MEDICINE

## 2023-10-31 PROCEDURE — 3700000012 HC SEDATION LEVEL 5+ TIME - INITIAL 15 MINUTES 5/> YEARS: Performed by: INTERNAL MEDICINE

## 2023-10-31 PROCEDURE — 7100000009 HC PHASE TWO TIME - INITIAL BASE CHARGE: Performed by: INTERNAL MEDICINE

## 2023-10-31 PROCEDURE — 2500000004 HC RX 250 GENERAL PHARMACY W/ HCPCS (ALT 636 FOR OP/ED): Performed by: INTERNAL MEDICINE

## 2023-10-31 PROCEDURE — 88305 TISSUE EXAM BY PATHOLOGIST: CPT | Performed by: STUDENT IN AN ORGANIZED HEALTH CARE EDUCATION/TRAINING PROGRAM

## 2023-10-31 RX ORDER — SODIUM CHLORIDE, SODIUM LACTATE, POTASSIUM CHLORIDE, CALCIUM CHLORIDE 600; 310; 30; 20 MG/100ML; MG/100ML; MG/100ML; MG/100ML
20 INJECTION, SOLUTION INTRAVENOUS CONTINUOUS
Status: DISCONTINUED | OUTPATIENT
Start: 2023-10-31 | End: 2023-11-01 | Stop reason: HOSPADM

## 2023-10-31 RX ORDER — FENTANYL CITRATE 50 UG/ML
INJECTION, SOLUTION INTRAMUSCULAR; INTRAVENOUS AS NEEDED
Status: COMPLETED | OUTPATIENT
Start: 2023-10-31 | End: 2023-10-31

## 2023-10-31 RX ORDER — MIDAZOLAM HYDROCHLORIDE 1 MG/ML
INJECTION, SOLUTION INTRAMUSCULAR; INTRAVENOUS AS NEEDED
Status: COMPLETED | OUTPATIENT
Start: 2023-10-31 | End: 2023-10-31

## 2023-10-31 RX ADMIN — MIDAZOLAM HYDROCHLORIDE 2 MG: 1 INJECTION, SOLUTION INTRAMUSCULAR; INTRAVENOUS at 16:43

## 2023-10-31 RX ADMIN — FENTANYL CITRATE 50 MCG: 50 INJECTION, SOLUTION INTRAMUSCULAR; INTRAVENOUS at 16:43

## 2023-10-31 RX ADMIN — FENTANYL CITRATE 25 MCG: 50 INJECTION, SOLUTION INTRAMUSCULAR; INTRAVENOUS at 16:56

## 2023-10-31 RX ADMIN — MIDAZOLAM HYDROCHLORIDE 2 MG: 1 INJECTION, SOLUTION INTRAMUSCULAR; INTRAVENOUS at 16:41

## 2023-10-31 RX ADMIN — FENTANYL CITRATE 50 MCG: 50 INJECTION, SOLUTION INTRAMUSCULAR; INTRAVENOUS at 16:41

## 2023-10-31 RX ADMIN — MIDAZOLAM HYDROCHLORIDE 1 MG: 1 INJECTION, SOLUTION INTRAMUSCULAR; INTRAVENOUS at 16:47

## 2023-10-31 ASSESSMENT — PAIN SCALES - GENERAL
PAINLEVEL_OUTOF10: 0 - NO PAIN

## 2023-10-31 ASSESSMENT — PAIN - FUNCTIONAL ASSESSMENT
PAIN_FUNCTIONAL_ASSESSMENT: 0-10

## 2023-10-31 ASSESSMENT — COLUMBIA-SUICIDE SEVERITY RATING SCALE - C-SSRS
1. IN THE PAST MONTH, HAVE YOU WISHED YOU WERE DEAD OR WISHED YOU COULD GO TO SLEEP AND NOT WAKE UP?: NO
2. HAVE YOU ACTUALLY HAD ANY THOUGHTS OF KILLING YOURSELF?: NO

## 2023-10-31 NOTE — PRE-SEDATION DOCUMENTATION
Patient: Brandt Merritt  MRN: [id number]    Pre-sedation Evaluation:  Sedation necessary for: Analgesia  Requesting service: GI    History of Present Illness: Patient is a 64 yo M with new onset anemia. Hb 7.6 form baseline 12-14. Patient denies bleeding, melena, hematemesis, hematochezia. Last colonoscopy in 2021 without polyps.     Past Medical History:   Diagnosis Date    Hypertension     Personal history of other diseases of the circulatory system     History of hypertension       Principle problems:  Patient Active Problem List    Diagnosis Date Noted    HTN (hypertension) 10/05/2023    Hypokalemia 10/05/2023    Elevated PSA 09/14/2022    Chest pain 06/30/2021    Obesity, Class I, BMI 30-34.9 06/30/2021    Abnormal EKG 12/11/2020    Myocardiopathy (CMS/[facility]) 12/11/2020    STEMI (ST elevation myocardial infarction) (CMS/[facility]) 11/11/2020    Crushing injury of right hand 11/05/2015    Chronic hepatitis C virus infection (CMS/[facility]) 04/13/2005     Allergies:  Allergies   Allergen Reactions    Thiazides Other     Recurrent Significant hypokalemia     PTA/Current Medications:  (Not in a hospital admission)    Current Outpatient Medications   Medication Sig Dispense Refill    cholecalciferol (Vitamin D3) 1,250 mcg (50,000 unit) tablet Take 1 tablet (50,000 Units) by mouth 1 (one) time per week for 8 doses. 4 tablet 1    ergocalciferol (Vitamin D-2) 1.25 MG (47091 UT) capsule Take 1 capsule (1,250 mcg) by mouth 1 (one) time per week.      lisinopril 20 mg tablet Take 1 tablet (20 mg) by mouth once daily.      NIFEdipine ER (NIFEdipine XL) 60 mg 24 hr tablet Take 1 tablet (60 mg) by mouth once daily. 90 tablet 1    tamsulosin (Flomax) 0.4 mg 24 hr capsule Take 1 capsule (0.4 mg) by mouth.       No current facility-administered medications for this encounter.     Past Surgical History:   has a past surgical history that includes Colonoscopy (06/13/2013) and Other surgical history (10/07/2015).    Recent sedation/surgery (24  hours) No    Review of Systems:  Please check all that apply: No significant medical history        NPO guidelines met: Yes    Physical Exam    Airway  Mallampati: II     Cardiovascular - normal exam     Dental - normal exam     Pulmonary - normal exam         Plan    ASA 2     Moderate       Proceed with colonoscopy.

## 2023-11-10 LAB
LABORATORY COMMENT REPORT: NORMAL
PATH REPORT.FINAL DX SPEC: NORMAL
PATH REPORT.GROSS SPEC: NORMAL
PATH REPORT.TOTAL CANCER: NORMAL

## 2023-12-15 ENCOUNTER — LAB (OUTPATIENT)
Dept: LAB | Facility: LAB | Age: 63
End: 2023-12-15
Payer: COMMERCIAL

## 2023-12-15 ENCOUNTER — OFFICE VISIT (OUTPATIENT)
Dept: GASTROENTEROLOGY | Facility: CLINIC | Age: 63
End: 2023-12-15
Payer: COMMERCIAL

## 2023-12-15 VITALS
WEIGHT: 171 LBS | HEART RATE: 84 BPM | DIASTOLIC BLOOD PRESSURE: 84 MMHG | SYSTOLIC BLOOD PRESSURE: 137 MMHG | BODY MASS INDEX: 27.6 KG/M2

## 2023-12-15 DIAGNOSIS — D64.9 ANEMIA, NORMOCYTIC NORMOCHROMIC: ICD-10-CM

## 2023-12-15 DIAGNOSIS — D61.818 PANCYTOPENIA (MULTI): Primary | ICD-10-CM

## 2023-12-15 LAB
BASOPHILS # BLD MANUAL: 0 X10*3/UL (ref 0–0.1)
BASOPHILS NFR BLD MANUAL: 0 %
EOSINOPHIL # BLD MANUAL: 0 X10*3/UL (ref 0–0.7)
EOSINOPHIL NFR BLD MANUAL: 0 %
ERYTHROCYTE [DISTWIDTH] IN BLOOD BY AUTOMATED COUNT: 18.1 % (ref 11.5–14.5)
HCT VFR BLD AUTO: 23.4 % (ref 41–52)
HGB BLD-MCNC: 7.6 G/DL (ref 13.5–17.5)
HYPOCHROMIA BLD QL SMEAR: ABNORMAL
IMM GRANULOCYTES # BLD AUTO: 0.01 X10*3/UL (ref 0–0.7)
IMM GRANULOCYTES NFR BLD AUTO: 0.3 % (ref 0–0.9)
LYMPHOCYTES # BLD MANUAL: 1.73 X10*3/UL (ref 1.2–4.8)
LYMPHOCYTES NFR BLD MANUAL: 54.2 %
MCH RBC QN AUTO: 25.6 PG (ref 26–34)
MCHC RBC AUTO-ENTMCNC: 32.5 G/DL (ref 32–36)
MCV RBC AUTO: 79 FL (ref 80–100)
MONOCYTES # BLD MANUAL: 0.03 X10*3/UL (ref 0.1–1)
MONOCYTES NFR BLD MANUAL: 0.9 %
NEUTS SEG # BLD MANUAL: 1.14 X10*3/UL (ref 1.2–7)
NEUTS SEG NFR BLD MANUAL: 35.6 %
NRBC BLD-RTO: 5 /100 WBCS (ref 0–0)
PLATELET # BLD AUTO: 207 X10*3/UL (ref 150–450)
POLYCHROMASIA BLD QL SMEAR: ABNORMAL
RBC # BLD AUTO: 2.97 X10*6/UL (ref 4.5–5.9)
RBC MORPH BLD: ABNORMAL
TARGETS BLD QL SMEAR: ABNORMAL
TOTAL CELLS COUNTED BLD: 118
VARIANT LYMPHS # BLD MANUAL: 0.3 X10*3/UL (ref 0–0.5)
VARIANT LYMPHS NFR BLD: 9.3 %
WBC # BLD AUTO: 3.2 X10*3/UL (ref 4.4–11.3)

## 2023-12-15 PROCEDURE — 3075F SYST BP GE 130 - 139MM HG: CPT | Performed by: INTERNAL MEDICINE

## 2023-12-15 PROCEDURE — 85007 BL SMEAR W/DIFF WBC COUNT: CPT

## 2023-12-15 PROCEDURE — 99213 OFFICE O/P EST LOW 20 MIN: CPT | Performed by: INTERNAL MEDICINE

## 2023-12-15 PROCEDURE — 85027 COMPLETE CBC AUTOMATED: CPT

## 2023-12-15 PROCEDURE — 36415 COLL VENOUS BLD VENIPUNCTURE: CPT

## 2023-12-15 PROCEDURE — 1159F MED LIST DOCD IN RCRD: CPT | Performed by: INTERNAL MEDICINE

## 2023-12-15 PROCEDURE — 1126F AMNT PAIN NOTED NONE PRSNT: CPT | Performed by: INTERNAL MEDICINE

## 2023-12-15 PROCEDURE — 3079F DIAST BP 80-89 MM HG: CPT | Performed by: INTERNAL MEDICINE

## 2023-12-15 PROCEDURE — 1036F TOBACCO NON-USER: CPT | Performed by: INTERNAL MEDICINE

## 2023-12-15 ASSESSMENT — ENCOUNTER SYMPTOMS
CARDIOVASCULAR NEGATIVE: 1
RESPIRATORY NEGATIVE: 1
GASTROINTESTINAL NEGATIVE: 1
FATIGUE: 1

## 2023-12-15 NOTE — PATIENT INSTRUCTIONS
Will check CBC.  Follow-up with heme-onc.  Continue follow-up with PMD.  Follow with GI in 6-month

## 2023-12-15 NOTE — PROGRESS NOTES
Chief Complaint: Brandt Merritt is a 63 y.o. male who presents for Follow-up.  HPI  Presents to the clinic today for a follow up visit. He states that he has not had any new issues since he was last seen. He continues to deal with the chronic fatigue and exertional shortness of breath that he has previously dealt with.     Denies chest pain/pressure, abdominal pain, nausea, vomiting, fever, chills.     Review of Systems   Constitutional:  Positive for fatigue.   Respiratory: Negative.     Cardiovascular: Negative.    Gastrointestinal: Negative.      12 Point ROS negative outside of symptoms stated above in HPI    Past Medical History:   Diagnosis Date    Hypertension     Personal history of other diseases of the circulatory system     History of hypertension       Past Surgical History:   Procedure Laterality Date    COLONOSCOPY  06/13/2013    Complete Colonoscopy    OTHER SURGICAL HISTORY  10/07/2015    Surgery Testis Reduction Of Torsion Of Testis Right       No relevant family history has been documented for this patient.     reports that he has quit smoking. His smoking use included cigarettes. He has never used smokeless tobacco. He reports that he does not currently use alcohol. He reports current drug use. Drug: Marijuana.    Allergies   Allergen Reactions    Thiazides Other     Recurrent Significant hypokalemia       Imaging  No results found.      Laboratory  No results found for this or any previous visit (from the past 96 hour(s)).     Objective       Current Outpatient Medications:     ergocalciferol (Vitamin D-2) 1.25 MG (73023 UT) capsule, Take 1 capsule (1,250 mcg) by mouth 1 (one) time per week., Disp: , Rfl:     lisinopril 20 mg tablet, Take 1 tablet (20 mg) by mouth once daily., Disp: , Rfl:     NIFEdipine ER (NIFEdipine XL) 60 mg 24 hr tablet, Take 1 tablet (60 mg) by mouth once daily., Disp: 90 tablet, Rfl: 1    tamsulosin (Flomax) 0.4 mg 24 hr capsule, Take 1 capsule (0.4 mg) by mouth., Disp: ,  Rfl:     Last Recorded Vitals  There were no vitals taken for this visit.    Physical Exam  Cardiovascular:      Rate and Rhythm: Normal rate and regular rhythm.      Pulses: Normal pulses.      Heart sounds: Normal heart sounds.   Pulmonary:      Effort: Pulmonary effort is normal.      Breath sounds: Normal breath sounds.   Abdominal:      General: Abdomen is flat. Bowel sounds are normal.      Palpations: Abdomen is soft.      Comments: Possible lipoma in RLQ   Skin:     Capillary Refill: Capillary refill takes 2 to 3 seconds.   Neurological:      General: No focal deficit present.      Mental Status: He is alert and oriented to person, place, and time.   Psychiatric:         Mood and Affect: Mood normal.         Behavior: Behavior normal.         Assessment/Plan      # Normocytic Anemia  # Pancytopenia   - EGD w/ Biopsy: negative for any explanation of new bleed  - Colonoscopy (11/19/2021): non-bleeding internal hemorrhoids, otherwise unremarkable, repeat in 10 years (2031)  - Celiac Serology (-)  - Liver U/S: unremarkable  [ ] Repeat CBC   [ ] Follow up with Oncology for current workup in progress    I saw and evaluated the patient. I personally obtained the key and critical portions of the history and physical exam or was physically present for key and critical portions performed by the resident-Dr Acevedo. I reviewed the resident's documentation and discussed the patient with the resident. I agree with the resident's medical decision making as documented in the note.      Will check CBC.  Follow-up with heme-onc.  Continue follow-up with PMD.  Follow with GI in 6-month

## 2024-01-15 DIAGNOSIS — D64.9 ANEMIA, UNSPECIFIED TYPE: ICD-10-CM

## 2024-01-16 RX ORDER — NIFEDIPINE 60 MG/1
60 TABLET, FILM COATED, EXTENDED RELEASE ORAL
Qty: 90 TABLET | Refills: 1 | Status: SHIPPED | OUTPATIENT
Start: 2024-01-16 | End: 2024-02-05 | Stop reason: WASHOUT

## 2024-02-05 ENCOUNTER — OFFICE VISIT (OUTPATIENT)
Dept: HEMATOLOGY/ONCOLOGY | Facility: CLINIC | Age: 64
End: 2024-02-05
Payer: COMMERCIAL

## 2024-02-05 ENCOUNTER — LAB (OUTPATIENT)
Dept: LAB | Facility: CLINIC | Age: 64
End: 2024-02-05
Payer: COMMERCIAL

## 2024-02-05 VITALS
HEART RATE: 84 BPM | RESPIRATION RATE: 18 BRPM | TEMPERATURE: 97.5 F | DIASTOLIC BLOOD PRESSURE: 75 MMHG | WEIGHT: 164.68 LBS | BODY MASS INDEX: 26.58 KG/M2 | SYSTOLIC BLOOD PRESSURE: 127 MMHG | OXYGEN SATURATION: 97 %

## 2024-02-05 DIAGNOSIS — D72.819 LEUKOPENIA, UNSPECIFIED TYPE: ICD-10-CM

## 2024-02-05 DIAGNOSIS — D64.9 ANEMIA, UNSPECIFIED TYPE: Primary | ICD-10-CM

## 2024-02-05 DIAGNOSIS — D64.9 ANEMIA, UNSPECIFIED TYPE: ICD-10-CM

## 2024-02-05 LAB
ALBUMIN SERPL BCP-MCNC: 4.4 G/DL (ref 3.4–5)
ALP SERPL-CCNC: 63 U/L (ref 33–136)
ALT SERPL W P-5'-P-CCNC: 9 U/L (ref 10–52)
ANION GAP SERPL CALC-SCNC: 12 MMOL/L (ref 10–20)
AST SERPL W P-5'-P-CCNC: 22 U/L (ref 9–39)
BASOPHILS # BLD AUTO: 0.01 X10*3/UL (ref 0–0.1)
BASOPHILS NFR BLD AUTO: 0.3 %
BILIRUB SERPL-MCNC: 2.5 MG/DL (ref 0–1.2)
BUN SERPL-MCNC: 18 MG/DL (ref 6–23)
CALCIUM SERPL-MCNC: 9.1 MG/DL (ref 8.6–10.6)
CHLORIDE SERPL-SCNC: 107 MMOL/L (ref 98–107)
CO2 SERPL-SCNC: 26 MMOL/L (ref 21–32)
CREAT SERPL-MCNC: 1.06 MG/DL (ref 0.5–1.3)
CRP SERPL-MCNC: 0.15 MG/DL
DAT-POLYSPECIFIC: NORMAL
EGFRCR SERPLBLD CKD-EPI 2021: 77 ML/MIN/1.73M*2
EOSINOPHIL # BLD AUTO: 0.01 X10*3/UL (ref 0–0.7)
EOSINOPHIL NFR BLD AUTO: 0.3 %
ERYTHROCYTE [DISTWIDTH] IN BLOOD BY AUTOMATED COUNT: 20.4 % (ref 11.5–14.5)
ERYTHROCYTE [SEDIMENTATION RATE] IN BLOOD BY WESTERGREN METHOD: <1 MM/H (ref 0–20)
FERRITIN SERPL-MCNC: 131 NG/ML (ref 20–300)
GIANT PLATELETS BLD QL SMEAR: NORMAL
GLUCOSE SERPL-MCNC: 105 MG/DL (ref 74–99)
HBV CORE IGM SER QL: NONREACTIVE
HBV SURFACE AG SERPL QL IA: NONREACTIVE
HCT VFR BLD AUTO: 24.7 % (ref 41–52)
HCV AB SER QL: REACTIVE
HGB BLD-MCNC: 8 G/DL (ref 13.5–17.5)
HIV 1+2 AB+HIV1 P24 AG SERPL QL IA: NONREACTIVE
IMM GRANULOCYTES # BLD AUTO: 0.02 X10*3/UL (ref 0–0.7)
IMM GRANULOCYTES NFR BLD AUTO: 0.6 % (ref 0–0.9)
IRON SATN MFR SERPL: 66 % (ref 25–45)
IRON SERPL-MCNC: 189 UG/DL (ref 35–150)
LDH SERPL L TO P-CCNC: 276 U/L (ref 84–246)
LYMPHOCYTES # BLD AUTO: 1.95 X10*3/UL (ref 1.2–4.8)
LYMPHOCYTES NFR BLD AUTO: 62.9 %
MCH RBC QN AUTO: 26.2 PG (ref 26–34)
MCHC RBC AUTO-ENTMCNC: 32.4 G/DL (ref 32–36)
MCV RBC AUTO: 81 FL (ref 80–100)
MONOCYTES # BLD AUTO: 0.21 X10*3/UL (ref 0.1–1)
MONOCYTES NFR BLD AUTO: 6.8 %
NEUTROPHILS # BLD AUTO: 0.9 X10*3/UL (ref 1.2–7.7)
NEUTROPHILS NFR BLD AUTO: 29.1 %
NRBC BLD-RTO: ABNORMAL /100{WBCS}
OVALOCYTES BLD QL SMEAR: NORMAL
PLATELET # BLD AUTO: 195 X10*3/UL (ref 150–450)
POLYCHROMASIA BLD QL SMEAR: NORMAL
POTASSIUM SERPL-SCNC: 4 MMOL/L (ref 3.5–5.3)
PROT SERPL-MCNC: 7.4 G/DL (ref 6.4–8.2)
RBC # BLD AUTO: 3.05 X10*6/UL (ref 4.5–5.9)
RBC MORPH BLD: NORMAL
RHEUMATOID FACT SER NEPH-ACNC: <10 IU/ML (ref 0–15)
SCHISTOCYTES BLD QL SMEAR: NORMAL
SODIUM SERPL-SCNC: 141 MMOL/L (ref 136–145)
TARGETS BLD QL SMEAR: NORMAL
TIBC SERPL-MCNC: 285 UG/DL (ref 240–445)
TSH SERPL-ACNC: 1.83 MIU/L (ref 0.44–3.98)
UIBC SERPL-MCNC: 96 UG/DL (ref 110–370)
WBC # BLD AUTO: 3.1 X10*3/UL (ref 4.4–11.3)

## 2024-02-05 PROCEDURE — 86880 COOMBS TEST DIRECT: CPT

## 2024-02-05 PROCEDURE — 83615 LACTATE (LD) (LDH) ENZYME: CPT

## 2024-02-05 PROCEDURE — 1036F TOBACCO NON-USER: CPT | Performed by: PHYSICIAN ASSISTANT

## 2024-02-05 PROCEDURE — 99214 OFFICE O/P EST MOD 30 MIN: CPT | Mod: 25 | Performed by: PHYSICIAN ASSISTANT

## 2024-02-05 PROCEDURE — 99214 OFFICE O/P EST MOD 30 MIN: CPT | Performed by: PHYSICIAN ASSISTANT

## 2024-02-05 PROCEDURE — 87340 HEPATITIS B SURFACE AG IA: CPT

## 2024-02-05 PROCEDURE — 85025 COMPLETE CBC W/AUTO DIFF WBC: CPT

## 2024-02-05 PROCEDURE — 84075 ASSAY ALKALINE PHOSPHATASE: CPT

## 2024-02-05 PROCEDURE — 86140 C-REACTIVE PROTEIN: CPT

## 2024-02-05 PROCEDURE — 82525 ASSAY OF COPPER: CPT

## 2024-02-05 PROCEDURE — 83550 IRON BINDING TEST: CPT

## 2024-02-05 PROCEDURE — 82668 ASSAY OF ERYTHROPOIETIN: CPT

## 2024-02-05 PROCEDURE — 86803 HEPATITIS C AB TEST: CPT

## 2024-02-05 PROCEDURE — 36415 COLL VENOUS BLD VENIPUNCTURE: CPT

## 2024-02-05 PROCEDURE — 83010 ASSAY OF HAPTOGLOBIN QUANT: CPT

## 2024-02-05 PROCEDURE — 1126F AMNT PAIN NOTED NONE PRSNT: CPT | Performed by: PHYSICIAN ASSISTANT

## 2024-02-05 PROCEDURE — 3074F SYST BP LT 130 MM HG: CPT | Performed by: PHYSICIAN ASSISTANT

## 2024-02-05 PROCEDURE — 1159F MED LIST DOCD IN RCRD: CPT | Performed by: PHYSICIAN ASSISTANT

## 2024-02-05 PROCEDURE — 3078F DIAST BP <80 MM HG: CPT | Performed by: PHYSICIAN ASSISTANT

## 2024-02-05 PROCEDURE — 86705 HEP B CORE ANTIBODY IGM: CPT

## 2024-02-05 PROCEDURE — 82728 ASSAY OF FERRITIN: CPT

## 2024-02-05 PROCEDURE — 86038 ANTINUCLEAR ANTIBODIES: CPT

## 2024-02-05 PROCEDURE — 86235 NUCLEAR ANTIGEN ANTIBODY: CPT

## 2024-02-05 PROCEDURE — 87389 HIV-1 AG W/HIV-1&-2 AB AG IA: CPT

## 2024-02-05 PROCEDURE — 85652 RBC SED RATE AUTOMATED: CPT

## 2024-02-05 PROCEDURE — 87522 HEPATITIS C REVRS TRNSCRPJ: CPT

## 2024-02-05 PROCEDURE — 86431 RHEUMATOID FACTOR QUANT: CPT

## 2024-02-05 PROCEDURE — 1160F RVW MEDS BY RX/DR IN RCRD: CPT | Performed by: PHYSICIAN ASSISTANT

## 2024-02-05 PROCEDURE — 84443 ASSAY THYROID STIM HORMONE: CPT

## 2024-02-05 ASSESSMENT — ENCOUNTER SYMPTOMS
OCCASIONAL FEELINGS OF UNSTEADINESS: 0
LOSS OF SENSATION IN FEET: 0
DEPRESSION: 0

## 2024-02-05 ASSESSMENT — PAIN SCALES - GENERAL: PAINLEVEL: 0-NO PAIN

## 2024-02-05 NOTE — PROGRESS NOTES
Patient ID: Brandt Merritt is a 64 y.o. male.  Referring Physician: Abundio Rust PA-C  [address] [place] [address]  Primary Care Provider: Bill Richmond MD  Visit Type: Follow Up    Location: [facility]  Diagnosis/Reason: Anemia & Leukopenia    Interval Hx  2/5/24  Brandt Merritt is a 64 y.o. male following up today for anemia & leukopenia     NOTE:  2/5/24 - Patient is primarily followed by my colleague R. Casal, DNP but is following up w/ me today. His last visit w/ R. Casal was 10/6/23 where R. Casal ordered vitamin D supplementation at 50,000 units PO weekly x 8 weeks, consultation w/ GI for anemia workup . Patient subsequently has consultation w/ Dr. Khan in GI 10/13/23 and subsequently had EGD that was negative for new bleed, last colonoscopy 11/19/21 showed non-bleeding internal hemorrhoids  Patient has a medical hx significant for chronic hepatitis C infection    Today he c/o BRBPR, intermittent SOB w/ exertion, fatigue, intermittent fatigue, previously noted night sweats,     Patient denies weight loss, abnormal bruising and bleeding, hematuria, blood in stool, dark/black stools, epistaxis, oral/gingival bleeding, lymphadenopathy, recurrent infections, recurrent fevers, night sweats, early satiety, abdominal pain, bone pain, chest pain, palpitations, SOB, PRICE, fatigue, dizziness, lightheadedness, PICA.    Relevant Hx  10/6/23 - Last Visit w/ R. Casal, DNP  Location:  [facility]  Initial consult: 10/6/2023  Reason: Anemia/Pancytopenia     Patient is a 64 yo  man with a PMH of elevated PSA with negative biopsy, HTN, Cardiomyopathy, treated for Hep C with Harvoni, hypokalemia and abnormal EKG and was referred to benign hematology for consultation for pancytopenia.      Review of labs note a decrease in WBC, Hemoglobin, and Platelets that were normal last year. Please review labs noted below.      Today, patient presents for initial consultation. Denies  abnormal weight loss, night sweats, fever. Patient endorses fatigue and PRICE. Denies chills, sob, cp, n/v/d, n/t. No PICA. Denies any abnormal bleeding or bruising. No recurrent infections or lymphadenopathy. No joint/body pain. No known blood disorders in family. No joint pain. Has had surgery in past w/o issue. Never had blood/blood products. Denies NSAID use.     PMHx:  Active Ambulatory Problems     Diagnosis Date Noted    Abnormal EKG 2020    Chest pain 2021    Chronic hepatitis C virus infection (CMS/HCC) 2005    Crushing injury of right hand 2015    Elevated PSA 2022    HTN (hypertension) 10/05/2023    Hypokalemia 10/05/2023    Myocardiopathy (CMS/HCC) 2020    Obesity, Class I, BMI 30-34.9 2021    STEMI (ST elevation myocardial infarction) (CMS/[facility]) 2020     Resolved Ambulatory Problems     Diagnosis Date Noted    No Resolved Ambulatory Problems     Past Medical History:   Diagnosis Date    Hypertension     Personal history of other diseases of the circulatory system      PSHx:  Past Surgical History:   Procedure Laterality Date    COLONOSCOPY  2013    Complete Colonoscopy    OTHER SURGICAL HISTORY  10/07/2015    Surgery Testis Reduction Of Torsion Of Testis Right      Colonoscopy  Surgical reduction of torsion of testis    FHx:  Family History   Problem Relation Name Age of Onset    Other (diabetes mellitus) Mother        Mother:  2/2 CVA at age 69  Siblings: 1 brother w/ prostate CA, 1 sister w/ liver disease  Children: 2 children - Denies significant medical hx    Social Hx:  Brandt Merritt    reports that he has quit smoking. His smoking use included cigarettes. He has never used smokeless tobacco.  He  reports that he does not currently use alcohol.  He  reports current drug use. Drug: Marijuana.  Social History     Socioeconomic History    Marital status:      Spouse name: Not on file    Number of children: Not on file    Years of  education: Not on file    Highest education level: Not on file   Occupational History    Not on file   Tobacco Use    Smoking status: Former     Types: Cigarettes    Smokeless tobacco: Never   Substance and Sexual Activity    Alcohol use: Not Currently     Comment: occansionally    Drug use: Yes     Types: Marijuana     Comment: 3 weeks ago    Sexual activity: Not on file   Other Topics Concern    Not on file   Social History Narrative    Not on file     Social Determinants of Health     Financial Resource Strain: Not on file   Food Insecurity: Not on file   Transportation Needs: Not on file   Physical Activity: Not on file   Stress: Not on file   Social Connections: Not on file   Intimate Partner Violence: Not on file   Housing Stability: Not on file      Living Situation: Lives at home w/ wife  Occupation: Works at [employer] - Plans to retire soon  Marital Status:   Smoking: Former smoker - Smoked x 15 years at < 1 ppd, Quit 20+ years ago  Recreational Drug Use: Marijuana use 3x weekly    Cancer Screenings:  Upper EGD: 10/31/23  Colonoscopy: 11/2021   Prostate/PSA screenings: Currently being monitored for elevated PSA  Lung cancer screenings: Denies    Medications and allergies reviewed in EMR.    ROS:  Review of Systems - Oncology   10 point review of systems negative except as state in HPI.    Vitals & Statistics:  Objective   BSA: 1.87 meters squared  /75   Pulse 84   Temp 36.4 °C (97.5 °F) (Core)   Resp 18   Wt 74.7 kg (164 lb 10.9 oz)   SpO2 97%   BMI 26.58 kg/m²     Physical Exam:  Physical Exam  Vitals and nursing note reviewed.   Constitutional:       Appearance: Normal appearance.   HENT:      Head: Normocephalic and atraumatic.      Right Ear: External ear normal.      Left Ear: External ear normal.      Nose: Nose normal.      Mouth/Throat:      Mouth: Mucous membranes are moist.      Pharynx: Oropharynx is clear.   Eyes:      General: Lids are normal.      Extraocular Movements:  Extraocular movements intact.      Conjunctiva/sclera: Conjunctivae normal.      Pupils: Pupils are equal, round, and reactive to light.   Cardiovascular:      Rate and Rhythm: Normal rate and regular rhythm.      Pulses: Normal pulses.      Heart sounds: Normal heart sounds.   Pulmonary:      Effort: Pulmonary effort is normal.      Breath sounds: Normal breath sounds.   Abdominal:      General: Abdomen is flat. Bowel sounds are normal.      Palpations: Abdomen is soft.      Comments: No masses or organomegaly upon physical exam   Musculoskeletal:         General: Normal range of motion.      Cervical back: Normal range of motion.   Lymphadenopathy:      Comments: No lymphadenopathy palpable on physical exam   Skin:     General: Skin is warm and dry.      Capillary Refill: Capillary refill takes less than 2 seconds.   Neurological:      General: No focal deficit present.      Mental Status: He is alert and oriented to person, place, and time.   Psychiatric:         Mood and Affect: Mood normal.         Behavior: Behavior normal.         Thought Content: Thought content normal.         Judgment: Judgment normal.           Results:  Lab Results   Component Value Date    WBC 3.2 (L) 12/15/2023    NEUTROABS 1.17 (L) 10/06/2023    IGABSOL 0.01 12/15/2023    LYMPHSABS 1.78 10/06/2023    MONOSABS 0.08 (L) 10/06/2023    EOSABS 0.00 12/15/2023    BASOSABS 0.00 12/15/2023    RBC 2.97 (L) 12/15/2023    MCV 79 (L) 12/15/2023    MCHC 32.5 12/15/2023    HGB 7.6 (L) 12/15/2023    HCT 23.4 (L) 12/15/2023     12/15/2023     Lab Results   Component Value Date    RETICCTPCT 1.8 10/06/2023      Lab Results   Component Value Date    CREATININE 0.88 10/06/2023    BUN 12 10/06/2023    EGFR >90 10/06/2023     10/06/2023    K 3.7 10/06/2023     (H) 10/06/2023    CO2 25 10/06/2023      Lab Results   Component Value Date    ALT 10 10/06/2023    AST 23 10/06/2023    ALKPHOS 74 10/06/2023    BILITOT 2.3 (H) 10/06/2023     "  Lab Results   Component Value Date    TSH 1.86 2023     Lab Results   Component Value Date    TSH 1.86 2023     No results found for: \"IRON\", \"TIBC\", \"FERRITIN\"   Lab Results   Component Value Date    VHNYKRKP24 474 2023      No results found for: \"FOLATE\"  No results found for: \"PADDY\", \"RF\", \"SEDRATE\"   No results found for: \"CRP\"   No results found for: \"CODY\"  Lab Results   Component Value Date     (H) 10/06/2023     No results found for: \"HAPTOGLOBIN\"  Lab Results   Component Value Date    SPEP Increase in polyclonal gamma globulins.   10/06/2023     No results found for: \"IGG\", \"IGM\", \"IGA\"  No results found for: \"HEPATOT\", \"HEPAIGM\", \"HEPBCIGM\", \"HEPBCAB\", \"HEPBSAG\", \"HEPCAB\"  No results found for: \"HIV1X2\"    Assessment:  Brandt Merritt is a 64 y.o. male following up today for anemia & leukopenia     Today he c/o BRBPR, intermittent SOB w/ exertion, fatigue  BRBPR is likely 2/2 patient's known internal hemorrhoids and may result in iron deficiency which may be causing his anemia. Will assess iron levels. Patient currently being followed by GI  Patient's intermittent SOB w/ exertion, fatigue can be seen in patient's w/ anemia it is likely that these symptoms will improve/resolve w/ correction of anemia    Physical exam unremarkable    I reviewed patient's chart including but not limited to labs, imaging, surgical/procedure notes, pathology, hospital notes, doctor's notes.    Relevant historical labs/imagin23  Vitamin B12: WNL at 474    10/6/23  Leukopenia at 3.1 - Neutropenia at 1.17, Monocytopenia at 0.08  Normocytic, normochromic anemia w/ Hb at 7.6 - RBC count, Hct low, RDW elevated  Platelets WNL  Renal: BUN, Creatinine (0.88), GFR all WNL  Hepatic: T. Bili elevated, ALT, AST, ALKP all WNL    12/15/23 results:  Leukopenia at 3.2 - Neutropenia at 1.14, Monocytopenia at 0.03  Microcytic, normochromic anemia w/ Hb at 7.6 - RBC count low, RDW elevated, Hct low  Platelets " WNL    2/5/24  Leukopenia at 3.1 - Neutropenia at 0.90  Normocytic, normochromic anemia w/ Hb at 8.0 - RBC count low, Hct low, RDW elevated  Platelets WNL  Remaining results pending at time of writing    Plan:  1) Microcytic, Normochromic Anemia  2) Leukopenia - Neutropenia, Monocytopenia  Lab results pending - Will discuss at next visit unless urgent  Spleen U/S - R/O splenomegaly as cause for anemia & leukopenia  RTC in 2 weeks via virtual/telehealth visit - F/U sooner if needed/urgent  Discussed w/ patient and wife that if results of remaining parts of hematologic workup are negative, that patient will be referred for BMBx for anemia and monocytopenia. Patient verbalized understanding and agreement with plan.    I had an extensive discussion with the patient regarding the diagnosis and discussed the plan of therapy, including general considerations regarding side effects and outcomes. Pt understood and gave appropriate teach back about the plan of care. All questions were answered to the patient's satisfaction. The patient is instructed to contact us at any time if questions or problems arise. Thank you for the opportunity to participate in the care of this very pleasant patient.    Total time = 30 minutes. 50% or more of this time was spent in counseling and/or coordination of care including reviewing medical history/radiology/labs, examining patient, formulating outlined plan with team, and discussing plan with patient/family.      Abundio Rust PA-C

## 2024-02-06 LAB
ANA PATTERN: ABNORMAL
ANA SER QL HEP2 SUBST: POSITIVE
ANA TITR SER IF: ABNORMAL {TITER}
CENTROMERE B AB SER-ACNC: <0.2 AI
CHROMATIN AB SERPL-ACNC: <0.2 AI
DSDNA AB SER-ACNC: <1 IU/ML
ENA JO1 AB SER QL IA: <0.2 AI
ENA RNP AB SER IA-ACNC: <0.2 AI
ENA SCL70 AB SER QL IA: <0.2 AI
ENA SM AB SER IA-ACNC: <0.2 AI
ENA SM+RNP AB SER QL IA: <0.2 AI
ENA SS-A AB SER IA-ACNC: <0.2 AI
ENA SS-B AB SER IA-ACNC: <0.2 AI
EPO SERPL-ACNC: 31 MU/ML (ref 4–27)
HAPTOGLOB SERPL-MCNC: <10 MG/DL (ref 37–246)
HCV RNA SERPL NAA+PROBE-ACNC: NOT DETECTED K[IU]/ML
HCV RNA SERPL NAA+PROBE-LOG IU: NORMAL {LOG_IU}/ML
RIBOSOMAL P AB SER-ACNC: <0.2 AI

## 2024-02-07 ENCOUNTER — HOSPITAL ENCOUNTER (OUTPATIENT)
Dept: RADIOLOGY | Facility: CLINIC | Age: 64
Discharge: HOME | End: 2024-02-07
Payer: COMMERCIAL

## 2024-02-07 DIAGNOSIS — D72.819 LEUKOPENIA, UNSPECIFIED TYPE: ICD-10-CM

## 2024-02-07 DIAGNOSIS — D64.9 ANEMIA, UNSPECIFIED TYPE: ICD-10-CM

## 2024-02-07 LAB — COPPER SERPL-MCNC: 100.1 UG/DL (ref 70–140)

## 2024-02-07 PROCEDURE — 76705 ECHO EXAM OF ABDOMEN: CPT | Performed by: RADIOLOGY

## 2024-02-07 PROCEDURE — 76705 ECHO EXAM OF ABDOMEN: CPT

## 2024-02-13 ENCOUNTER — LAB (OUTPATIENT)
Dept: LAB | Facility: LAB | Age: 64
End: 2024-02-13
Payer: COMMERCIAL

## 2024-02-13 DIAGNOSIS — D64.9 ANEMIA, UNSPECIFIED TYPE: Primary | ICD-10-CM

## 2024-02-13 DIAGNOSIS — D64.9 ANEMIA, UNSPECIFIED TYPE: ICD-10-CM

## 2024-02-13 DIAGNOSIS — D72.819 LEUKOPENIA, UNSPECIFIED TYPE: Primary | ICD-10-CM

## 2024-02-13 LAB
ALBUMIN SERPL BCP-MCNC: 4.3 G/DL (ref 3.4–5)
ALP SERPL-CCNC: 61 U/L (ref 33–136)
ALT SERPL W P-5'-P-CCNC: 11 U/L (ref 10–52)
APTT PPP: 34 SECONDS (ref 27–38)
AST SERPL W P-5'-P-CCNC: 22 U/L (ref 9–39)
BILIRUB DIRECT SERPL-MCNC: 0.4 MG/DL (ref 0–0.3)
BILIRUB SERPL-MCNC: 2.5 MG/DL (ref 0–1.2)
INR PPP: 1.3 (ref 0.9–1.1)
PROT SERPL-MCNC: 7.3 G/DL (ref 6.4–8.2)
PROTHROMBIN TIME: 14.6 SECONDS (ref 9.8–12.8)

## 2024-02-13 PROCEDURE — 86157 COLD AGGLUTININ TITER: CPT

## 2024-02-13 PROCEDURE — 85610 PROTHROMBIN TIME: CPT

## 2024-02-13 PROCEDURE — 36415 COLL VENOUS BLD VENIPUNCTURE: CPT

## 2024-02-13 PROCEDURE — 85730 THROMBOPLASTIN TIME PARTIAL: CPT

## 2024-02-13 PROCEDURE — 80076 HEPATIC FUNCTION PANEL: CPT

## 2024-02-16 ENCOUNTER — TELEPHONE (OUTPATIENT)
Dept: HEMATOLOGY/ONCOLOGY | Facility: HOSPITAL | Age: 64
End: 2024-02-16
Payer: COMMERCIAL

## 2024-02-16 NOTE — TELEPHONE ENCOUNTER
RN talked to patient. Patient was referred by Abundio Rust for hemolytic anemia. Patient is aware of date, time and place. Patient given number to call for questions or concerns.

## 2024-02-18 LAB — COLD AGGLUTININ TITER: NORMAL

## 2024-02-19 ENCOUNTER — APPOINTMENT (OUTPATIENT)
Dept: HEMATOLOGY/ONCOLOGY | Facility: CLINIC | Age: 64
End: 2024-02-19
Payer: COMMERCIAL

## 2024-02-20 ENCOUNTER — LAB (OUTPATIENT)
Dept: LAB | Facility: HOSPITAL | Age: 64
End: 2024-02-20
Payer: COMMERCIAL

## 2024-02-20 ENCOUNTER — APPOINTMENT (OUTPATIENT)
Dept: HEMATOLOGY/ONCOLOGY | Facility: HOSPITAL | Age: 64
End: 2024-02-20
Payer: COMMERCIAL

## 2024-02-20 ENCOUNTER — OFFICE VISIT (OUTPATIENT)
Dept: HEMATOLOGY/ONCOLOGY | Facility: HOSPITAL | Age: 64
End: 2024-02-20
Payer: COMMERCIAL

## 2024-02-20 VITALS
RESPIRATION RATE: 18 BRPM | WEIGHT: 165.3 LBS | DIASTOLIC BLOOD PRESSURE: 78 MMHG | HEIGHT: 65 IN | OXYGEN SATURATION: 98 % | HEART RATE: 90 BPM | BODY MASS INDEX: 27.54 KG/M2 | SYSTOLIC BLOOD PRESSURE: 138 MMHG | TEMPERATURE: 97.9 F

## 2024-02-20 DIAGNOSIS — D61.818 PANCYTOPENIA (MULTI): ICD-10-CM

## 2024-02-20 DIAGNOSIS — D61.818 PANCYTOPENIA (MULTI): Primary | ICD-10-CM

## 2024-02-20 DIAGNOSIS — D59.9 ACQUIRED HEMOLYTIC ANEMIA (MULTI): ICD-10-CM

## 2024-02-20 LAB
BASOPHILS # BLD AUTO: 0 X10*3/UL (ref 0–0.1)
BASOPHILS NFR BLD AUTO: 0 %
BILIRUB DIRECT SERPL-MCNC: 0.4 MG/DL (ref 0–0.3)
DACRYOCYTES BLD QL SMEAR: NORMAL
EOSINOPHIL # BLD AUTO: 0 X10*3/UL (ref 0–0.7)
EOSINOPHIL NFR BLD AUTO: 0 %
ERYTHROCYTE [DISTWIDTH] IN BLOOD BY AUTOMATED COUNT: 20.4 % (ref 11.5–14.5)
GGT SERPL-CCNC: 13 U/L (ref 5–64)
HCT VFR BLD AUTO: 22.9 % (ref 41–52)
HGB BLD-MCNC: 7.8 G/DL (ref 13.5–17.5)
HGB RETIC QN: 30 PG (ref 28–38)
IMM GRANULOCYTES # BLD AUTO: 0.02 X10*3/UL (ref 0–0.7)
IMM GRANULOCYTES NFR BLD AUTO: 0.7 % (ref 0–0.9)
IMMATURE RETIC FRACTION: 46.1 %
LDH SERPL L TO P-CCNC: 273 U/L (ref 84–246)
LYMPHOCYTES # BLD AUTO: 1.62 X10*3/UL (ref 1.2–4.8)
LYMPHOCYTES NFR BLD AUTO: 54.2 %
MCH RBC QN AUTO: 27.2 PG (ref 26–34)
MCHC RBC AUTO-ENTMCNC: 34.1 G/DL (ref 32–36)
MCV RBC AUTO: 80 FL (ref 80–100)
MONOCYTES # BLD AUTO: 0.23 X10*3/UL (ref 0.1–1)
MONOCYTES NFR BLD AUTO: 7.7 %
NEUTROPHILS # BLD AUTO: 1.12 X10*3/UL (ref 1.2–7.7)
NEUTROPHILS NFR BLD AUTO: 37.4 %
NRBC BLD-RTO: 7.4 /100 WBCS (ref 0–0)
OVALOCYTES BLD QL SMEAR: NORMAL
PLATELET # BLD AUTO: 193 X10*3/UL (ref 150–450)
POLYCHROMASIA BLD QL SMEAR: NORMAL
RBC # BLD AUTO: 2.87 X10*6/UL (ref 4.5–5.9)
RBC MORPH BLD: NORMAL
RETICS #: 0.18 X10*6/UL (ref 0.02–0.11)
RETICS/RBC NFR AUTO: 6.2 % (ref 0.5–2)
SCHISTOCYTES BLD QL SMEAR: NORMAL
TARGETS BLD QL SMEAR: NORMAL
WBC # BLD AUTO: 3 X10*3/UL (ref 4.4–11.3)

## 2024-02-20 PROCEDURE — 88187 FLOWCYTOMETRY/READ 2-8: CPT | Performed by: INTERNAL MEDICINE

## 2024-02-20 PROCEDURE — 83010 ASSAY OF HAPTOGLOBIN QUANT: CPT

## 2024-02-20 PROCEDURE — 36415 COLL VENOUS BLD VENIPUNCTURE: CPT

## 2024-02-20 PROCEDURE — 82977 ASSAY OF GGT: CPT

## 2024-02-20 PROCEDURE — 1036F TOBACCO NON-USER: CPT | Performed by: INTERNAL MEDICINE

## 2024-02-20 PROCEDURE — 1160F RVW MEDS BY RX/DR IN RCRD: CPT | Performed by: INTERNAL MEDICINE

## 2024-02-20 PROCEDURE — 99214 OFFICE O/P EST MOD 30 MIN: CPT | Performed by: INTERNAL MEDICINE

## 2024-02-20 PROCEDURE — 1159F MED LIST DOCD IN RCRD: CPT | Performed by: INTERNAL MEDICINE

## 2024-02-20 PROCEDURE — 85045 AUTOMATED RETICULOCYTE COUNT: CPT

## 2024-02-20 PROCEDURE — 3075F SYST BP GE 130 - 139MM HG: CPT | Performed by: INTERNAL MEDICINE

## 2024-02-20 PROCEDURE — 83615 LACTATE (LD) (LDH) ENZYME: CPT

## 2024-02-20 PROCEDURE — 83070 ASSAY OF HEMOSIDERIN QUAL: CPT

## 2024-02-20 PROCEDURE — 85025 COMPLETE CBC W/AUTO DIFF WBC: CPT

## 2024-02-20 PROCEDURE — 82248 BILIRUBIN DIRECT: CPT

## 2024-02-20 PROCEDURE — 1157F ADVNC CARE PLAN IN RCRD: CPT | Performed by: INTERNAL MEDICINE

## 2024-02-20 PROCEDURE — 1126F AMNT PAIN NOTED NONE PRSNT: CPT | Performed by: INTERNAL MEDICINE

## 2024-02-20 PROCEDURE — 3078F DIAST BP <80 MM HG: CPT | Performed by: INTERNAL MEDICINE

## 2024-02-20 PROCEDURE — 88185 FLOWCYTOMETRY/TC ADD-ON: CPT | Mod: TC | Performed by: INTERNAL MEDICINE

## 2024-02-20 ASSESSMENT — ENCOUNTER SYMPTOMS
BLOOD IN STOOL: 1
UNEXPECTED WEIGHT CHANGE: 0
SCLERAL ICTERUS: 0
OCCASIONAL FEELINGS OF UNSTEADINESS: 0
BRUISES/BLEEDS EASILY: 0
HEMATURIA: 0
FATIGUE: 1
FEVER: 0
NEUROLOGICAL NEGATIVE: 1
CARDIOVASCULAR NEGATIVE: 1
LOSS OF SENSATION IN FEET: 0
BACK PAIN: 0
ADENOPATHY: 0
DEPRESSION: 0
RESPIRATORY NEGATIVE: 1
APPETITE CHANGE: 0

## 2024-02-20 ASSESSMENT — PATIENT HEALTH QUESTIONNAIRE - PHQ9
1. LITTLE INTEREST OR PLEASURE IN DOING THINGS: NOT AT ALL
SUM OF ALL RESPONSES TO PHQ9 QUESTIONS 1 AND 2: 0
2. FEELING DOWN, DEPRESSED OR HOPELESS: NOT AT ALL

## 2024-02-20 ASSESSMENT — COLUMBIA-SUICIDE SEVERITY RATING SCALE - C-SSRS
6. HAVE YOU EVER DONE ANYTHING, STARTED TO DO ANYTHING, OR PREPARED TO DO ANYTHING TO END YOUR LIFE?: NO
1. IN THE PAST MONTH, HAVE YOU WISHED YOU WERE DEAD OR WISHED YOU COULD GO TO SLEEP AND NOT WAKE UP?: NO
2. HAVE YOU ACTUALLY HAD ANY THOUGHTS OF KILLING YOURSELF?: NO

## 2024-02-20 ASSESSMENT — PAIN SCALES - GENERAL: PAINLEVEL: 0-NO PAIN

## 2024-02-20 NOTE — PROGRESS NOTES
Patient ID: Brandt Merritt is a 64 y.o. male.  Referring Physician: No referring provider defined for this encounter.  Primary Care Provider: Bill Richmond MD  Visit Type: Follow Up    Interval Hx  24  Brandt Merritt is a 64 y.o. male following up today for anemia & leukopenia . Seen by Abundio Rust recently and Rose Casal prior . He has had work up for pancytopenia and hemolytic process . Admits to having ongoing chronic fatigue that is unchanged . No new symptoms today. Denied any hemorrhoidal bleeding . Has had C Scope and EGD , treated Hep C  . Admits to drinking a beer 12 oz once  a week  . Asked if COVID vaccine may cause any of his blood conditions . Fully vaccinated and received all booster shots as well .     PMHx:  Active Ambulatory Problems     Diagnosis Date Noted    Abnormal EKG 2020    Chest pain 2021    Chronic hepatitis C virus infection (CMS/HCC) 2005    Crushing injury of right hand 2015    Elevated PSA 2022    HTN (hypertension) 10/05/2023    Hypokalemia 10/05/2023    Myocardiopathy (CMS/HCC) 2020    Obesity, Class I, BMI 30-34.9 2021    STEMI (ST elevation myocardial infarction) (CMS/HCC) 2020    Acquired hemolytic anemia (CMS/HCC) 2024    Pancytopenia (CMS/HCC) 2024     Resolved Ambulatory Problems     Diagnosis Date Noted    No Resolved Ambulatory Problems     Past Medical History:   Diagnosis Date    Hypertension     Personal history of other diseases of the circulatory system      PSHx:  Past Surgical History:   Procedure Laterality Date    COLONOSCOPY  2013    Complete Colonoscopy    OTHER SURGICAL HISTORY  10/07/2015    Surgery Testis Reduction Of Torsion Of Testis Right      Colonoscopy  Surgical reduction of torsion of testis    FHx:  Family History   Problem Relation Name Age of Onset    Other (diabetes mellitus) Mother        Mother:  2/2 CVA at age 69  Siblings: 1 brother w/ prostate CA, 1 sister w/  liver disease  Children: 2 children - Denies significant medical hx    Social Hx:  Brandt Merritt    reports that he has quit smoking. His smoking use included cigarettes. He has never used smokeless tobacco.  He  reports that he does not currently use alcohol.  He  reports current drug use. Drug: Marijuana.  Social History     Socioeconomic History    Marital status:      Spouse name: None    Number of children: None    Years of education: None    Highest education level: None   Occupational History    None   Tobacco Use    Smoking status: Former     Types: Cigarettes    Smokeless tobacco: Never   Substance and Sexual Activity    Alcohol use: Not Currently     Comment: occansionally    Drug use: Yes     Types: Marijuana     Comment: 3 weeks ago    Sexual activity: None   Other Topics Concern    None   Social History Narrative    None     Social Determinants of Health     Financial Resource Strain: Not on file   Food Insecurity: Not on file   Transportation Needs: Not on file   Physical Activity: Not on file   Stress: Not on file   Social Connections: Not on file   Intimate Partner Violence: Not on file   Housing Stability: Not on file      Living Situation: Lives at home w/ wife  Occupation: Works at [employer] - Plans to retire soon  Marital Status:   Smoking: Former smoker - Smoked x 15 years at < 1 ppd, Quit 20+ years ago  Recreational Drug Use: Marijuana use 3x weekly    Cancer Screenings:  Upper EGD: 10/31/23  Colonoscopy: 11/2021   Prostate/PSA screenings: Currently being monitored for elevated PSA  Lung cancer screenings: Denies    Medications and allergies reviewed in EMR.    ROS:  Review of Systems   Constitutional:  Positive for fatigue. Negative for appetite change, fever and unexpected weight change.   Eyes:  Negative for icterus.   Respiratory: Negative.     Cardiovascular: Negative.    Gastrointestinal:  Positive for blood in stool.   Genitourinary:  Negative for hematuria.   "  Musculoskeletal:  Negative for back pain.   Skin: Negative.    Neurological: Negative.    Hematological:  Negative for adenopathy. Does not bruise/bleed easily.      10 point review of systems negative except as state in HPI.    Vitals & Statistics:  Objective   BSA: 1.85 meters squared  /78 (BP Location: Left arm, Patient Position: Sitting, BP Cuff Size: Large adult)   Pulse 90   Temp 36.6 °C (97.9 °F)   Resp 18   Ht (S) 1.644 m (5' 4.72\")   Wt 75 kg (165 lb 4.8 oz)   SpO2 98%   BMI 27.74 kg/m²     Physical Exam:  Physical Exam  Vitals and nursing note reviewed.   Constitutional:       Appearance: Normal appearance.   HENT:      Head: Normocephalic and atraumatic.      Right Ear: External ear normal.      Left Ear: External ear normal.      Nose: Nose normal.   Eyes:      General: Lids are normal. No scleral icterus.     Conjunctiva/sclera: Conjunctivae normal.   Cardiovascular:      Rate and Rhythm: Normal rate and regular rhythm.      Pulses: Normal pulses.      Heart sounds: Normal heart sounds.   Pulmonary:      Effort: Pulmonary effort is normal.      Breath sounds: Normal breath sounds.   Abdominal:      General: Abdomen is flat. Bowel sounds are normal.      Palpations: Abdomen is soft.      Comments: No masses or organomegaly upon physical exam   Musculoskeletal:         General: Normal range of motion.      Cervical back: Normal range of motion.      Right lower leg: No edema.      Left lower leg: No edema.   Lymphadenopathy:      Comments: No lymphadenopathy palpable on physical exam   Skin:     General: Skin is warm and dry.      Capillary Refill: Capillary refill takes less than 2 seconds.      Findings: No rash.   Neurological:      General: No focal deficit present.      Mental Status: He is alert and oriented to person, place, and time.   Psychiatric:         Mood and Affect: Mood normal.         Behavior: Behavior normal.         Thought Content: Thought content normal.         " "Judgment: Judgment normal.         Results:  Lab Results   Component Value Date    WBC 3.1 (L) 02/05/2024    NEUTROABS 0.90 (L) 02/05/2024    IGABSOL 0.02 02/05/2024    LYMPHSABS 1.95 02/05/2024    MONOSABS 0.21 02/05/2024    EOSABS 0.01 02/05/2024    BASOSABS 0.01 02/05/2024    RBC 3.05 (L) 02/05/2024    MCV 81 02/05/2024    MCHC 32.4 02/05/2024    HGB 8.0 (L) 02/05/2024    HCT 24.7 (L) 02/05/2024     02/05/2024     Lab Results   Component Value Date    RETICCTPCT 1.8 10/06/2023      Lab Results   Component Value Date    CREATININE 1.06 02/05/2024    BUN 18 02/05/2024    EGFR 77 02/05/2024     02/05/2024    K 4.0 02/05/2024     02/05/2024    CO2 26 02/05/2024      Lab Results   Component Value Date    ALT 11 02/13/2024    AST 22 02/13/2024    ALKPHOS 61 02/13/2024    BILITOT 2.5 (H) 02/13/2024      Lab Results   Component Value Date    TSH 1.83 02/05/2024     Lab Results   Component Value Date    TSH 1.83 02/05/2024     Lab Results   Component Value Date    IRON 189 (H) 02/05/2024    TIBC 285 02/05/2024    FERRITIN 131 02/05/2024      Lab Results   Component Value Date    UGBMMFTR03 474 09/19/2023      No results found for: \"FOLATE\"  Lab Results   Component Value Date    PADDY Positive (A) 02/05/2024    RF <10 02/05/2024    SEDRATE <1 02/05/2024      Lab Results   Component Value Date    CRP 0.15 02/05/2024      No results found for: \"CODY\"  Lab Results   Component Value Date     (H) 02/05/2024     Lab Results   Component Value Date    HAPTOGLOBIN <10 (L) 02/05/2024     Lab Results   Component Value Date    SPEP Increase in polyclonal gamma globulins.   10/06/2023     No results found for: \"IGG\", \"IGM\", \"IGA\"  Lab Results   Component Value Date    HEPBCIGM Nonreactive 02/05/2024    HEPBSAG Nonreactive 02/05/2024    HEPCAB Reactive (A) 02/05/2024     Lab Results   Component Value Date    HIV1X2 Nonreactive 02/05/2024     Component  Ref Range & Units 7 d ago   Cold Agglutinin Titer  <1:4, 1:4, " 1:8, 1:16, 1:32, 1:64 <1:4     Component  Ref Range & Units 2 wk ago 4 mo ago   LDH  84 - 246 U/L 276 High  257 High      Component  Ref Range & Units 10:43 4 mo ago   Retic %  0.5 - 2.0 % 6.2 High  1.8   Retic Absolute  0.017 - 0.110 x10*6/uL 0.177 High  0.050 R   Reticulocyte Hemoglobin  28 - 38 pg 30 27 Low    Immature Retic fraction  <=16.0 % 46.1 High  47.3 High  CM     USG abdomen   02/05/24  FINDINGS:  The left kidney measures 9.3 cm in length. There is no hydronephrosis  or renal calculi noted.      The spleen is normal in size and appearance. The spleen measures 9.9  cm in length.      IMPRESSION:  Unremarkable limited ultrasound of the left kidney and spleen.      Assessment:  Brandt Merritt is a 64 y.o. male following up today for anemia & leukopenia and labs suggestive of ongoing hemolysis     Cayetano negative hemolytic anaemia   Possibilities includes HS, G6PD , PNH . Given associated cytopenias concern for PNH is high .   PLAN   CBCD, retic , LDH , D Bassem , Urine hemosiderin today    Peripheral smear review today showed RBC with anipoilocytosis , polychromasia , lots of targets , bite cells,  frags , tear drop cells and few NRBCs, WBC were reduced in number , occ larger atypical lymphs , platelets low normal with few giant forms . No blasts seen     Flow cytometry , PNH panel - pending     If no evidence of PNH , would have to consider a BM biopsy. May need a HB identification due to presence of targets cells     RTC in 3-4 weeks.      Seen and staffed with Dr. Munoz who is in agreement     Dr. Sonia Rouse MD FACP  PGY-6,  Hematology & Oncology Fellow   [facility]  [address] [place] [address]  [phone]

## 2024-02-21 LAB
HAPTOGLOB SERPL-MCNC: <10 MG/DL (ref 37–246)
HEMOSIDERIN UR QL MICRO: NEGATIVE

## 2024-02-23 LAB
FLOW CYTOMETRY SPECIALIST REVIEW: NORMAL
PATH REVIEW, PNH: NORMAL
PNH RESULTS: NEGATIVE

## 2024-02-29 ENCOUNTER — LAB (OUTPATIENT)
Dept: LAB | Facility: LAB | Age: 64
End: 2024-02-29
Payer: COMMERCIAL

## 2024-02-29 PROCEDURE — 88184 FLOWCYTOMETRY/ TC 1 MARKER: CPT

## 2024-02-29 PROCEDURE — 88185 FLOWCYTOMETRY/TC ADD-ON: CPT

## 2024-02-29 PROCEDURE — 36415 COLL VENOUS BLD VENIPUNCTURE: CPT

## 2024-02-29 PROCEDURE — 88187 FLOWCYTOMETRY/READ 2-8: CPT | Performed by: INTERNAL MEDICINE

## 2024-03-02 LAB
FLOW CYTOMETRY SPECIALIST REVIEW: NORMAL
PATH REVIEW, PNH: NORMAL
PNH RESULTS: NEGATIVE

## 2024-03-05 ENCOUNTER — APPOINTMENT (OUTPATIENT)
Dept: HEMATOLOGY/ONCOLOGY | Facility: HOSPITAL | Age: 64
End: 2024-03-05
Payer: COMMERCIAL

## 2024-03-07 DIAGNOSIS — I10 HYPERTENSION, UNSPECIFIED TYPE: Primary | ICD-10-CM

## 2024-03-07 RX ORDER — NIFEDIPINE 60 MG/1
60 TABLET, FILM COATED, EXTENDED RELEASE ORAL
COMMUNITY
End: 2024-03-07 | Stop reason: SDUPTHER

## 2024-03-08 RX ORDER — NIFEDIPINE 60 MG/1
60 TABLET, FILM COATED, EXTENDED RELEASE ORAL
Qty: 90 TABLET | Refills: 3 | Status: SHIPPED | OUTPATIENT
Start: 2024-03-08

## 2024-03-12 ENCOUNTER — OFFICE VISIT (OUTPATIENT)
Dept: HEMATOLOGY/ONCOLOGY | Facility: HOSPITAL | Age: 64
End: 2024-03-12
Payer: COMMERCIAL

## 2024-03-12 ENCOUNTER — LAB (OUTPATIENT)
Dept: LAB | Facility: HOSPITAL | Age: 64
End: 2024-03-12
Payer: COMMERCIAL

## 2024-03-12 VITALS
HEART RATE: 92 BPM | SYSTOLIC BLOOD PRESSURE: 166 MMHG | BODY MASS INDEX: 27.32 KG/M2 | HEIGHT: 65 IN | OXYGEN SATURATION: 94 % | WEIGHT: 164 LBS | RESPIRATION RATE: 18 BRPM | DIASTOLIC BLOOD PRESSURE: 92 MMHG | TEMPERATURE: 97.3 F

## 2024-03-12 DIAGNOSIS — D61.818 PANCYTOPENIA (MULTI): ICD-10-CM

## 2024-03-12 LAB
ALBUMIN SERPL BCP-MCNC: 4.2 G/DL (ref 3.4–5)
ALP SERPL-CCNC: 57 U/L (ref 33–136)
ALT SERPL W P-5'-P-CCNC: 8 U/L (ref 10–52)
ANION GAP SERPL CALC-SCNC: 11 MMOL/L (ref 10–20)
AST SERPL W P-5'-P-CCNC: 21 U/L (ref 9–39)
BASOPHILS # BLD MANUAL: 0 X10*3/UL (ref 0–0.1)
BASOPHILS NFR BLD MANUAL: 0 %
BILIRUB SERPL-MCNC: 2.4 MG/DL (ref 0–1.2)
BUN SERPL-MCNC: 13 MG/DL (ref 6–23)
CALCIUM SERPL-MCNC: 8.8 MG/DL (ref 8.6–10.3)
CHLORIDE SERPL-SCNC: 109 MMOL/L (ref 98–107)
CO2 SERPL-SCNC: 26 MMOL/L (ref 21–32)
CREAT SERPL-MCNC: 1.13 MG/DL (ref 0.5–1.3)
EGFRCR SERPLBLD CKD-EPI 2021: 72 ML/MIN/1.73M*2
EOSINOPHIL # BLD MANUAL: 0 X10*3/UL (ref 0–0.7)
EOSINOPHIL NFR BLD MANUAL: 0 %
ERYTHROCYTE [DISTWIDTH] IN BLOOD BY AUTOMATED COUNT: 19.9 % (ref 11.5–14.5)
GLUCOSE SERPL-MCNC: 103 MG/DL (ref 74–99)
HCT VFR BLD AUTO: 22.7 % (ref 41–52)
HGB BLD-MCNC: 7.5 G/DL (ref 13.5–17.5)
HGB RETIC QN: 29 PG (ref 28–38)
IMM GRANULOCYTES # BLD AUTO: 0.02 X10*3/UL (ref 0–0.7)
IMM GRANULOCYTES NFR BLD AUTO: 0.7 % (ref 0–0.9)
IMMATURE RETIC FRACTION: 48.5 %
LYMPHOCYTES # BLD MANUAL: 1.97 X10*3/UL (ref 1.2–4.8)
LYMPHOCYTES NFR BLD MANUAL: 68 %
MCH RBC QN AUTO: 26.8 PG (ref 26–34)
MCHC RBC AUTO-ENTMCNC: 33 G/DL (ref 32–36)
MCV RBC AUTO: 81 FL (ref 80–100)
MONOCYTES # BLD MANUAL: 0.12 X10*3/UL (ref 0.1–1)
MONOCYTES NFR BLD MANUAL: 4 %
NEUTS SEG # BLD MANUAL: 0.75 X10*3/UL (ref 1.2–7)
NEUTS SEG NFR BLD MANUAL: 26 %
NRBC BLD-RTO: 9.4 /100 WBCS (ref 0–0)
OVALOCYTES BLD QL SMEAR: ABNORMAL
PLATELET # BLD AUTO: 172 X10*3/UL (ref 150–450)
POLYCHROMASIA BLD QL SMEAR: ABNORMAL
POTASSIUM SERPL-SCNC: 3.9 MMOL/L (ref 3.5–5.3)
PROT SERPL-MCNC: 7.3 G/DL (ref 6.4–8.2)
RBC # BLD AUTO: 2.8 X10*6/UL (ref 4.5–5.9)
RBC MORPH BLD: ABNORMAL
RETICS #: 0.18 X10*6/UL (ref 0.02–0.11)
RETICS/RBC NFR AUTO: 6.4 % (ref 0.5–2)
SCHISTOCYTES BLD QL SMEAR: ABNORMAL
SODIUM SERPL-SCNC: 142 MMOL/L (ref 136–145)
TARGETS BLD QL SMEAR: ABNORMAL
TOTAL CELLS COUNTED BLD: 100
VARIANT LYMPHS # BLD MANUAL: 0.06 X10*3/UL (ref 0–0.5)
VARIANT LYMPHS NFR BLD: 2 %
WBC # BLD AUTO: 2.9 X10*3/UL (ref 4.4–11.3)

## 2024-03-12 PROCEDURE — 1159F MED LIST DOCD IN RCRD: CPT | Performed by: INTERNAL MEDICINE

## 2024-03-12 PROCEDURE — 80053 COMPREHEN METABOLIC PANEL: CPT

## 2024-03-12 PROCEDURE — 85007 BL SMEAR W/DIFF WBC COUNT: CPT

## 2024-03-12 PROCEDURE — 1036F TOBACCO NON-USER: CPT | Performed by: INTERNAL MEDICINE

## 2024-03-12 PROCEDURE — 82960 TEST FOR G6PD ENZYME: CPT

## 2024-03-12 PROCEDURE — 99214 OFFICE O/P EST MOD 30 MIN: CPT | Performed by: INTERNAL MEDICINE

## 2024-03-12 PROCEDURE — 85027 COMPLETE CBC AUTOMATED: CPT

## 2024-03-12 PROCEDURE — 85045 AUTOMATED RETICULOCYTE COUNT: CPT

## 2024-03-12 PROCEDURE — 83020 HEMOGLOBIN ELECTROPHORESIS: CPT

## 2024-03-12 PROCEDURE — 1157F ADVNC CARE PLAN IN RCRD: CPT | Performed by: INTERNAL MEDICINE

## 2024-03-12 PROCEDURE — 3077F SYST BP >= 140 MM HG: CPT | Performed by: INTERNAL MEDICINE

## 2024-03-12 PROCEDURE — 1126F AMNT PAIN NOTED NONE PRSNT: CPT | Performed by: INTERNAL MEDICINE

## 2024-03-12 PROCEDURE — 83021 HEMOGLOBIN CHROMOTOGRAPHY: CPT

## 2024-03-12 PROCEDURE — 36415 COLL VENOUS BLD VENIPUNCTURE: CPT

## 2024-03-12 PROCEDURE — 1160F RVW MEDS BY RX/DR IN RCRD: CPT | Performed by: INTERNAL MEDICINE

## 2024-03-12 PROCEDURE — 3080F DIAST BP >= 90 MM HG: CPT | Performed by: INTERNAL MEDICINE

## 2024-03-12 PROCEDURE — 83020 HEMOGLOBIN ELECTROPHORESIS: CPT | Performed by: PATHOLOGY

## 2024-03-12 RX ORDER — FOLIC ACID 1 MG/1
1 TABLET ORAL DAILY
Qty: 30 TABLET | Refills: 3 | Status: SHIPPED | OUTPATIENT
Start: 2024-03-12 | End: 2024-04-09 | Stop reason: SDUPTHER

## 2024-03-12 RX ORDER — FOLIC ACID 1 MG/1
1 TABLET ORAL DAILY
Qty: 90 TABLET | Refills: 3 | Status: SHIPPED | OUTPATIENT
Start: 2024-03-12 | End: 2024-03-12 | Stop reason: SDUPTHER

## 2024-03-12 ASSESSMENT — PATIENT HEALTH QUESTIONNAIRE - PHQ9
1. LITTLE INTEREST OR PLEASURE IN DOING THINGS: NOT AT ALL
2. FEELING DOWN, DEPRESSED OR HOPELESS: NOT AT ALL
SUM OF ALL RESPONSES TO PHQ9 QUESTIONS 1 AND 2: 0

## 2024-03-12 ASSESSMENT — PAIN SCALES - GENERAL: PAINLEVEL: 0-NO PAIN

## 2024-03-12 NOTE — PROGRESS NOTES
Patient ID: Brandt Merritt is a 64 y.o. male.  DATE: 3/12/24  Reason for visit: Anemia, concern for G6PD deficiency  Interval History:     The patient states he's doing well overall. He does report around a month of bleeding in his stool, which he believes he is due to hemorrhoids. He states he's had a colonoscopy before that showed hemorrhoids, and his bleeding only occurs after he strains for a bowel movement. It is not painful. He does not feel lightheaded or dizzy. He does have mild shortness of breath when going upstairs, however this is unchanged. He does report continued cramps in his legs at night, for which he takes potassium. He continues to drink around one beer per week.    Medications reviewed with patient:  Nifedipine 60 mg  MVI every other day  Potasium supplement    Past Medical History:   Active Ambulatory Problems     Diagnosis Date Noted    Abnormal EKG 12/11/2020    Chest pain 06/30/2021    Chronic hepatitis C virus infection (CMS/HCC) 04/13/2005    Crushing injury of right hand 11/05/2015    Elevated PSA 09/14/2022    HTN (hypertension) 10/05/2023    Hypokalemia 10/05/2023    Myocardiopathy (CMS/HCC) 12/11/2020    Obesity, Class I, BMI 30-34.9 06/30/2021    STEMI (ST elevation myocardial infarction) (CMS/[facility]) 11/11/2020    Acquired hemolytic anemia (CMS/[facility]) 02/20/2024    Pancytopenia (CMS/[facility]) 02/20/2024     Resolved Ambulatory Problems     Diagnosis Date Noted    No Resolved Ambulatory Problems     Past Medical History:   Diagnosis Date    Hypertension     Personal history of other diseases of the circulatory system       Surgical History:    Brandt has a past surgical history that includes Colonoscopy (06/13/2013) and Other surgical history (10/07/2015).  Social History:    Brandt Merritt  reports that he has quit smoking. His smoking use included cigarettes. He has never used smokeless tobacco.  He  reports that he does not currently use alcohol.  He  reports current drug use. Drug:  "Marijuana.  Family History:    Family History   Problem Relation Name Age of Onset    Other (diabetes mellitus) Mother       Surgical History:  Past Surgical History:   Procedure Laterality Date    COLONOSCOPY  06/13/2013    Complete Colonoscopy    OTHER SURGICAL HISTORY  10/07/2015    Surgery Testis Reduction Of Torsion Of Testis Right      Family Oncology History:    Cancer-related family history is not on file.    ROS    12-point review of systems completed and negative except as stated above.    Allergies  Allergies   Allergen Reactions    Thiazides Other     Recurrent Significant hypokalemia      Medications    Current Outpatient Medications:     ergocalciferol (Vitamin D-2) 1.25 MG (20120 UT) capsule, Take 1 capsule (1,250 mcg) by mouth 1 (one) time per week., Disp: , Rfl:     lisinopril 20 mg tablet, Take 1 tablet (20 mg) by mouth once daily., Disp: , Rfl:     NIFEdipine ER (Adalat CC) 60 mg 24 hr tablet, Take 1 tablet (60 mg) by mouth once daily in the morning. Take before meals. Do not crush, chew, or split., Disp: 90 tablet, Rfl: 3    tamsulosin (Flomax) 0.4 mg 24 hr capsule, Take 1 capsule (0.4 mg) by mouth., Disp: , Rfl:     VS:  BP (!) 166/92 (BP Location: Left arm, Patient Position: Sitting, BP Cuff Size: Large adult)   Pulse 92   Temp 36.3 °C (97.3 °F)   Resp 18   Ht 1.644 m (5' 4.72\")   Wt 74.4 kg (164 lb)   SpO2 94%   BMI 27.52 kg/m²   Weight:   Vitals:    03/12/24 1040   Weight: 74.4 kg (164 lb)       BSA: 1.84 meters squared      Physical Exam  Constitutional:       General: He is not in acute distress.     Appearance: Normal appearance.   HENT:      Head: Normocephalic and atraumatic.      Mouth/Throat:      Mouth: Mucous membranes are moist.   Eyes:      Pupils: Pupils are equal, round, and reactive to light.   Cardiovascular:      Rate and Rhythm: Normal rate and regular rhythm.      Heart sounds: No murmur heard.     No friction rub.   Pulmonary:      Effort: Pulmonary effort is normal. " No respiratory distress.      Breath sounds: Normal breath sounds. No wheezing, rhonchi or rales.   Abdominal:      General: There is no distension.      Palpations: Abdomen is soft. There is no mass.      Tenderness: There is no abdominal tenderness. There is no guarding or rebound.      Hernia: No hernia is present.   Musculoskeletal:         General: No swelling or deformity.   Skin:     General: Skin is warm and dry.      Findings: No rash.   Neurological:      General: No focal deficit present.      Mental Status: He is alert and oriented to person, place, and time.   Psychiatric:         Mood and Affect: Mood normal.         Behavior: Behavior normal.       Diagnostic Results     Labs:  Lab Results   Component Value Date    WBC 3.0 (L) 02/20/2024    HGB 7.8 (L) 02/20/2024    HCT 22.9 (L) 02/20/2024    MCV 80 02/20/2024     02/20/2024      Lab Results   Component Value Date    NEUTROABS 1.12 (L) 02/20/2024      Lab Results   Component Value Date    GLUCOSE 105 (H) 02/05/2024    CALCIUM 9.1 02/05/2024     02/05/2024    K 4.0 02/05/2024    CO2 26 02/05/2024     02/05/2024    BUN 18 02/05/2024    CREATININE 1.06 02/05/2024     Lab Results   Component Value Date    ALT 11 02/13/2024    AST 22 02/13/2024    GGT 13 02/20/2024    ALKPHOS 61 02/13/2024    BILITOT 2.5 (H) 02/13/2024            Assessment/Plan     Brandt Merritt is a 64-year-old man following up today for anemia & leukopenia, with labs suggestive of ongoing hemolysis. He is found to have ousmane negative hemolytic anemia. PNH negative, suspect G6PD deficiency given peripheral smear findings of bite cells.    Plan:  #Ousmane negative hemolytic anemia  - Repeat CBC & reticulocytes today reviewed, stable.  - Start folic acid 1 mg, script called in   - G6PD testing and Hb ID pending.  - Instructed to stop taking potassium, MVI.  - Repeat slide analysis.   - Repeat labs (CBC, reticulocytes) in 2 weeks, RTC 1 month.    smear 03/12:  RBC with  anipoilocytosis , polychromasia, prominent targets , occasional bite cells and tear drop cells and few NRBCs, WBC were reduced in number , occ larger atypical lymphs , platelets low normal with few giant forms . No blasts seen     Patient seen and discussed with Dr. Munoz.    Miguel Ellis MD

## 2024-03-12 NOTE — PROGRESS NOTES
LAB CONTACTED TODAY AFTER VISIT . CHECKING IF G6PD WAS BEING COMPLETED. LAB REASSURED ME IT WAS BEING PROCESSED.Brianne Stockton RN

## 2024-03-13 LAB — G6PD RBC QL: NORMAL

## 2024-03-19 LAB
HEMOGLOBIN A2: 1 % (ref 2–3.5)
HEMOGLOBIN A: 55.2 % (ref 95.8–98)
HEMOGLOBIN C: 40.7 %
HEMOGLOBIN F: 1 % (ref 0–2)
HEMOGLOBIN IDENTIFICATION INTERPRETATION: ABNORMAL
HEMOGLOBIN OTHER: 2.1 %
PATH REVIEW-HGB IDENTIFICATION: ABNORMAL

## 2024-04-09 ENCOUNTER — OFFICE VISIT (OUTPATIENT)
Dept: HEMATOLOGY/ONCOLOGY | Facility: HOSPITAL | Age: 64
End: 2024-04-09
Payer: COMMERCIAL

## 2024-04-09 ENCOUNTER — LAB (OUTPATIENT)
Dept: LAB | Facility: HOSPITAL | Age: 64
End: 2024-04-09
Payer: COMMERCIAL

## 2024-04-09 VITALS
OXYGEN SATURATION: 97 % | SYSTOLIC BLOOD PRESSURE: 145 MMHG | TEMPERATURE: 97.7 F | HEART RATE: 95 BPM | DIASTOLIC BLOOD PRESSURE: 67 MMHG | RESPIRATION RATE: 18 BRPM | WEIGHT: 165.1 LBS | BODY MASS INDEX: 27.71 KG/M2

## 2024-04-09 DIAGNOSIS — D64.9 ANEMIA, UNSPECIFIED TYPE: Primary | ICD-10-CM

## 2024-04-09 DIAGNOSIS — D64.9 ANEMIA, UNSPECIFIED TYPE: ICD-10-CM

## 2024-04-09 DIAGNOSIS — D61.818 PANCYTOPENIA (MULTI): ICD-10-CM

## 2024-04-09 LAB
BASOPHILS # BLD AUTO: 0 X10*3/UL (ref 0–0.1)
BASOPHILS NFR BLD AUTO: 0 %
EOSINOPHIL # BLD AUTO: 0 X10*3/UL (ref 0–0.7)
EOSINOPHIL NFR BLD AUTO: 0 %
ERYTHROCYTE [DISTWIDTH] IN BLOOD BY AUTOMATED COUNT: 19.1 % (ref 11.5–14.5)
HCT VFR BLD AUTO: 20.7 % (ref 41–52)
HGB BLD-MCNC: 6.9 G/DL (ref 13.5–17.5)
HGB RETIC QN: 28 PG (ref 28–38)
HYPOCHROMIA BLD QL SMEAR: NORMAL
IMM GRANULOCYTES # BLD AUTO: 0.02 X10*3/UL (ref 0–0.7)
IMM GRANULOCYTES NFR BLD AUTO: 0.8 % (ref 0–0.9)
IMMATURE RETIC FRACTION: 48.2 %
LYMPHOCYTES # BLD AUTO: 1.57 X10*3/UL (ref 1.2–4.8)
LYMPHOCYTES NFR BLD AUTO: 59 %
MCH RBC QN AUTO: 27.6 PG (ref 26–34)
MCHC RBC AUTO-ENTMCNC: 33.3 G/DL (ref 32–36)
MCV RBC AUTO: 83 FL (ref 80–100)
MONOCYTES # BLD AUTO: 0.21 X10*3/UL (ref 0.1–1)
MONOCYTES NFR BLD AUTO: 7.9 %
NEUTROPHILS # BLD AUTO: 0.86 X10*3/UL (ref 1.2–7.7)
NEUTROPHILS NFR BLD AUTO: 32.3 %
NRBC BLD-RTO: 10.2 /100 WBCS (ref 0–0)
OVALOCYTES BLD QL SMEAR: NORMAL
PLATELET # BLD AUTO: 162 X10*3/UL (ref 150–450)
POLYCHROMASIA BLD QL SMEAR: NORMAL
RBC # BLD AUTO: 2.5 X10*6/UL (ref 4.5–5.9)
RBC MORPH BLD: NORMAL
RETICS #: 0.15 X10*6/UL (ref 0.02–0.11)
RETICS/RBC NFR AUTO: 6.1 % (ref 0.5–2)
SCHISTOCYTES BLD QL SMEAR: NORMAL
TARGETS BLD QL SMEAR: NORMAL
WBC # BLD AUTO: 2.7 X10*3/UL (ref 4.4–11.3)

## 2024-04-09 PROCEDURE — 1160F RVW MEDS BY RX/DR IN RCRD: CPT | Performed by: INTERNAL MEDICINE

## 2024-04-09 PROCEDURE — 1157F ADVNC CARE PLAN IN RCRD: CPT | Performed by: INTERNAL MEDICINE

## 2024-04-09 PROCEDURE — 3078F DIAST BP <80 MM HG: CPT | Performed by: INTERNAL MEDICINE

## 2024-04-09 PROCEDURE — 1126F AMNT PAIN NOTED NONE PRSNT: CPT | Performed by: INTERNAL MEDICINE

## 2024-04-09 PROCEDURE — 3077F SYST BP >= 140 MM HG: CPT | Performed by: INTERNAL MEDICINE

## 2024-04-09 PROCEDURE — 1159F MED LIST DOCD IN RCRD: CPT | Performed by: INTERNAL MEDICINE

## 2024-04-09 PROCEDURE — 85045 AUTOMATED RETICULOCYTE COUNT: CPT

## 2024-04-09 PROCEDURE — 99214 OFFICE O/P EST MOD 30 MIN: CPT | Performed by: INTERNAL MEDICINE

## 2024-04-09 PROCEDURE — 1036F TOBACCO NON-USER: CPT | Performed by: INTERNAL MEDICINE

## 2024-04-09 PROCEDURE — 85025 COMPLETE CBC W/AUTO DIFF WBC: CPT

## 2024-04-09 PROCEDURE — 36415 COLL VENOUS BLD VENIPUNCTURE: CPT

## 2024-04-09 PROCEDURE — 82955 ASSAY OF G6PD ENZYME: CPT

## 2024-04-09 RX ORDER — FOLIC ACID 1 MG/1
1 TABLET ORAL DAILY
Qty: 30 TABLET | Refills: 3 | Status: SHIPPED | OUTPATIENT
Start: 2024-04-09 | End: 2024-08-07

## 2024-04-09 ASSESSMENT — COLUMBIA-SUICIDE SEVERITY RATING SCALE - C-SSRS
2. HAVE YOU ACTUALLY HAD ANY THOUGHTS OF KILLING YOURSELF?: NO
6. HAVE YOU EVER DONE ANYTHING, STARTED TO DO ANYTHING, OR PREPARED TO DO ANYTHING TO END YOUR LIFE?: NO
1. IN THE PAST MONTH, HAVE YOU WISHED YOU WERE DEAD OR WISHED YOU COULD GO TO SLEEP AND NOT WAKE UP?: NO

## 2024-04-09 ASSESSMENT — ENCOUNTER SYMPTOMS: DEPRESSION: 0

## 2024-04-09 ASSESSMENT — PAIN SCALES - GENERAL: PAINLEVEL: 0-NO PAIN

## 2024-04-09 NOTE — PROGRESS NOTES
Patient ID: Brandt Merritt is a 64 y.o. male.  DATE: 3/12/24  Reason for visit: Anemia, concern for G6PD deficiency  Interval History: 04/09/24    The patient states he's doing well overall.   Feels fatigued. Still goes to work as usual. He does not feel lightheaded or dizzy. He does have mild shortness of breath when going upstairs, however this is unchanged. He does report continued cramps in his legs at night, for which he takes potassium.  Did not start the folate tablets prescribed, some issue with the script but did not call us to inform us.  Still goes to work as usual.  Denies bleeding any site  12 point review of systems negative except as noted above      Medications reviewed with patient:  Nifedipine 60 mg  MVI every other day  Potasium supplement    Past Medical History:   HTN, chronic hepatitis C antibody positive but qPCR negative, elevated PSA, myocardiopathy    Surgical History:    Colonoscopy 6/2013    Social History:  quit tobacco, occasional etoh (beer weekly), occasional marijuana    Family History:    Mom with DM    medications and allergies reviewed in emr      VS:  /67 (BP Location: Left arm, Patient Position: Sitting, BP Cuff Size: Large adult)   Pulse 95   Temp 36.5 °C (97.7 °F)   Resp 18   Wt 74.9 kg (165 lb 1.6 oz)   SpO2 97%   BMI 27.71 kg/m²   Weight:   Vitals:    04/09/24 1023   Weight: 74.9 kg (165 lb 1.6 oz)       BSA: 1.85 meters squared      Physical Exam  Constitutional:       General: He is not in acute distress.     Appearance: Normal appearance.   HENT:      Head: Normocephalic and atraumatic.      Mouth/Throat:      Mouth: Mucous membranes are moist.   Eyes:      Pupils: Pupils are equal, round, and reactive to light.   Cardiovascular:      Rate and Rhythm: Normal rate and regular rhythm.      Heart sounds: No murmur heard.     No friction rub.   Pulmonary:      Effort: Pulmonary effort is normal. No respiratory distress.      Breath sounds: Normal breath sounds. No  wheezing, rhonchi or rales.   Abdominal:      General: There is no distension.      Palpations: Abdomen is soft. There is no mass.      Tenderness: There is no abdominal tenderness. There is no guarding or rebound.      Hernia: No hernia is present.   Musculoskeletal:         General: No swelling or deformity.   Skin:     General: Skin is warm and dry.      Findings: No rash.   Neurological:      General: No focal deficit present.      Mental Status: He is alert and oriented to person, place, and time.   Psychiatric:         Mood and Affect: Mood normal.         Behavior: Behavior normal.       Diagnostic Results             Component  Ref Range & Units 11:07  (4/9/24) 4 wk ago  (3/12/24) 1 mo ago  (2/20/24) 2 mo ago  (2/5/24) 3 mo ago  (12/15/23) 6 mo ago  (10/6/23) 6 mo ago  (9/19/23)   WBC  4.4 - 11.3 x10*3/uL 2.7 Low  2.9 Low  3.0 Low  3.1 Low  3.2 Low  3.1 Low  2.9 Low  R   nRBC  0.0 - 0.0 /100 WBCs 10.2 High  9.4 High  7.4 High  R, CM 5.0 High  R, CM 6.9 R   RBC  4.50 - 5.90 x10*6/uL 2.50 Low  2.80 Low  2.87 Low  3.05 Low  2.97 Low  2.76 Low  2.77 Low  R   Hemoglobin  13.5 - 17.5 g/dL 6.9 Low  7.5 Low  7.8 Low  8.0 Low  7.6 Low  7.6 Low  7.5 Low    Hematocrit  41.0 - 52.0 % 20.7 Low  22.7 Low  22.9 Low  24.7 Low  23.4 Low  22.9 Low  23.0 Low    MCV  80 - 100 fL 83 81 80 81 79 Low  83 83   MCH  26.0 - 34.0 pg 27.6 26.8 27.2 26.2 25.6 Low  27.5    MCHC  32.0 - 36.0 g/dL 33.3 33.0 34.1 32.4 32.5 33.2 32.6   RDW  11.5 - 14.5 % 19.1 High  19.9 High  20.4 High  20.4 High  18.1 High  18.2 High  17.5 High    Platelets  150 - 450 x10*3/uL 162 172 193 195 207                 Component  Ref Range & Units 11:07 4 wk ago 1 mo ago 6 mo ago   Retic %  0.5 - 2.0 % 6.1 High  6.4 High  6.2 High  1.8   Retic Absolute  0.017 - 0.110 x10*6/uL 0.152 High  0.180 High  0.177 High  0.050 R   Reticulocyte Hemoglobin  28 - 38 pg 28 29 30 27 Low    Immature Retic fraction  <=16.0 % 48.2 High  48.5 High  CM 46.1 High  CM            Assessment/Plan     Brandt Merritt is a 64-year-old man following up today for anemia & leukopenia, with labs suggestive of ongoing hemolysis. He is found to have ousmane negative hemolytic anemia. PNH negative, suspect G6PD deficiency given peripheral smear findings of bite cells. However repeat slide request did not show schistocytes and patient continues with anemia with increased bilirubin. But also with low white cell count which is concerning for primary bone marrow disorder. G6PD adequate, Hb ID showing Hb C trait. Renal function normal. In appropriate reticulocyte elevation, calculated RPI <2, suggesting inadequate bone marrow response.    Plan:  - anemia and leukopenia, MCV normal, discussed bone marrow examination for possible underlying bone marrow disorder  - start folate 1mg daily, script called in again today  - RTC in 2 weeks after bone marrow result      smear 03/12:  RBC with anipoilocytosis , polychromasia, prominent targets , occasional bite cells and tear drop cells and few NRBCs, WBC were reduced in number , occ larger atypical lymphs , platelets low normal with few giant forms . No blasts seen         Sadia Munoz MD

## 2024-04-10 ENCOUNTER — TELEPHONE (OUTPATIENT)
Dept: HEMATOLOGY/ONCOLOGY | Facility: HOSPITAL | Age: 64
End: 2024-04-10

## 2024-04-11 ENCOUNTER — PROCEDURE VISIT (OUTPATIENT)
Dept: OTHER | Facility: HOSPITAL | Age: 64
End: 2024-04-11
Payer: COMMERCIAL

## 2024-04-11 ENCOUNTER — LAB (OUTPATIENT)
Dept: LAB | Facility: HOSPITAL | Age: 64
End: 2024-04-11
Payer: COMMERCIAL

## 2024-04-11 VITALS
SYSTOLIC BLOOD PRESSURE: 147 MMHG | BODY MASS INDEX: 27.42 KG/M2 | WEIGHT: 163.36 LBS | HEART RATE: 84 BPM | OXYGEN SATURATION: 96 % | DIASTOLIC BLOOD PRESSURE: 83 MMHG | RESPIRATION RATE: 18 BRPM | TEMPERATURE: 98.4 F

## 2024-04-11 DIAGNOSIS — D64.9 ANEMIA, UNSPECIFIED TYPE: ICD-10-CM

## 2024-04-11 DIAGNOSIS — Z00.00 REGULAR CHECK-UP: Primary | ICD-10-CM

## 2024-04-11 DIAGNOSIS — D61.818 PANCYTOPENIA (MULTI): Primary | ICD-10-CM

## 2024-04-11 DIAGNOSIS — D61.818 PANCYTOPENIA (MULTI): ICD-10-CM

## 2024-04-11 LAB
BASOPHILS # BLD AUTO: 0 X10*3/UL (ref 0–0.1)
BASOPHILS NFR BLD AUTO: 0 %
DACRYOCYTES BLD QL SMEAR: NORMAL
EOSINOPHIL # BLD AUTO: 0 X10*3/UL (ref 0–0.7)
EOSINOPHIL NFR BLD AUTO: 0 %
ERYTHROCYTE [DISTWIDTH] IN BLOOD BY AUTOMATED COUNT: 19.2 % (ref 11.5–14.5)
FERRITIN SERPL-MCNC: 155 NG/ML (ref 20–300)
FOLATE SERPL-MCNC: 10.2 NG/ML
G6PD RBC-CCNT: 26.3 U/G HB (ref 9.9–16.6)
HCT VFR BLD AUTO: 20.9 % (ref 41–52)
HGB BLD-MCNC: 7.1 G/DL (ref 13.5–17.5)
HOLD SPECIMEN: NORMAL
HYPOCHROMIA BLD QL SMEAR: NORMAL
IMM GRANULOCYTES # BLD AUTO: 0.02 X10*3/UL (ref 0–0.7)
IMM GRANULOCYTES NFR BLD AUTO: 0.7 % (ref 0–0.9)
IRON SATN MFR SERPL: ABNORMAL %
IRON SERPL-MCNC: 220 UG/DL (ref 35–150)
LYMPHOCYTES # BLD AUTO: 2.03 X10*3/UL (ref 1.2–4.8)
LYMPHOCYTES NFR BLD AUTO: 66.8 %
MCH RBC QN AUTO: 28 PG (ref 26–34)
MCHC RBC AUTO-ENTMCNC: 34 G/DL (ref 32–36)
MCV RBC AUTO: 82 FL (ref 80–100)
MONOCYTES # BLD AUTO: 0.26 X10*3/UL (ref 0.1–1)
MONOCYTES NFR BLD AUTO: 8.6 %
NEUTROPHILS # BLD AUTO: 0.73 X10*3/UL (ref 1.2–7.7)
NEUTROPHILS NFR BLD AUTO: 23.9 %
NRBC BLD-RTO: 8.9 /100 WBCS (ref 0–0)
OVALOCYTES BLD QL SMEAR: NORMAL
PLATELET # BLD AUTO: 167 X10*3/UL (ref 150–450)
POLYCHROMASIA BLD QL SMEAR: NORMAL
RBC # BLD AUTO: 2.54 X10*6/UL (ref 4.5–5.9)
RBC MORPH BLD: NORMAL
SCHISTOCYTES BLD QL SMEAR: NORMAL
TARGETS BLD QL SMEAR: NORMAL
TIBC SERPL-MCNC: ABNORMAL UG/DL
UIBC SERPL-MCNC: <55 UG/DL (ref 110–370)
VIT B12 SERPL-MCNC: 1236 PG/ML (ref 211–911)
WBC # BLD AUTO: 3 X10*3/UL (ref 4.4–11.3)

## 2024-04-11 PROCEDURE — 88313 SPECIAL STAINS GROUP 2: CPT | Performed by: PATHOLOGY

## 2024-04-11 PROCEDURE — 36415 COLL VENOUS BLD VENIPUNCTURE: CPT

## 2024-04-11 PROCEDURE — 85097 BONE MARROW INTERPRETATION: CPT | Mod: TC | Performed by: INTERNAL MEDICINE

## 2024-04-11 PROCEDURE — 81450 HL NEO GSAP 5-50DNA/DNA&RNA: CPT | Performed by: INTERNAL MEDICINE

## 2024-04-11 PROCEDURE — 88264 CHROMOSOME ANALYSIS 20-25: CPT | Performed by: INTERNAL MEDICINE

## 2024-04-11 PROCEDURE — 88305 TISSUE EXAM BY PATHOLOGIST: CPT | Mod: TC,SUR | Performed by: INTERNAL MEDICINE

## 2024-04-11 PROCEDURE — 88185 FLOWCYTOMETRY/TC ADD-ON: CPT | Mod: TC | Performed by: INTERNAL MEDICINE

## 2024-04-11 PROCEDURE — 82607 VITAMIN B-12: CPT

## 2024-04-11 PROCEDURE — 82728 ASSAY OF FERRITIN: CPT

## 2024-04-11 PROCEDURE — G0452 MOLECULAR PATHOLOGY INTERPR: HCPCS | Performed by: INTERNAL MEDICINE

## 2024-04-11 PROCEDURE — 88291 CYTO/MOLECULAR REPORT: CPT | Performed by: INTERNAL MEDICINE

## 2024-04-11 PROCEDURE — 88271 CYTOGENETICS DNA PROBE: CPT | Mod: 91 | Performed by: INTERNAL MEDICINE

## 2024-04-11 PROCEDURE — 88311 DECALCIFY TISSUE: CPT | Performed by: PATHOLOGY

## 2024-04-11 PROCEDURE — 81229 CYTOG ALYS CHRML ABNR SNPCGH: CPT | Performed by: INTERNAL MEDICINE

## 2024-04-11 PROCEDURE — 88341 IMHCHEM/IMCYTCHM EA ADD ANTB: CPT | Performed by: PATHOLOGY

## 2024-04-11 PROCEDURE — 88305 TISSUE EXAM BY PATHOLOGIST: CPT | Performed by: PATHOLOGY

## 2024-04-11 PROCEDURE — 85025 COMPLETE CBC W/AUTO DIFF WBC: CPT | Performed by: INTERNAL MEDICINE

## 2024-04-11 PROCEDURE — 38222 DX BONE MARROW BX & ASPIR: CPT | Performed by: PHYSICIAN ASSISTANT

## 2024-04-11 PROCEDURE — 88342 IMHCHEM/IMCYTCHM 1ST ANTB: CPT | Performed by: PATHOLOGY

## 2024-04-11 PROCEDURE — 82746 ASSAY OF FOLIC ACID SERUM: CPT

## 2024-04-11 PROCEDURE — 83540 ASSAY OF IRON: CPT

## 2024-04-11 PROCEDURE — 88189 FLOWCYTOMETRY/READ 16 & >: CPT | Performed by: INTERNAL MEDICINE

## 2024-04-11 ASSESSMENT — PAIN SCALES - GENERAL: PAINLEVEL: 0-NO PAIN

## 2024-04-11 NOTE — PROGRESS NOTES
Patient ID: Brandt Merritt is a 64 y.o. male.    Biopsy    Date/Time: 4/11/2024 9:48 AM    Performed by: Olman Valdovinos PA-C  Authorized by: Olman Valdovinos PA-C    Consent:     Consent obtained:  Verbal    Consent given by:  Patient    Risks, benefits, and alternatives were discussed: yes      Risks discussed:  Bleeding, infection and pain    Alternatives discussed:  Observation  Universal protocol:     Procedure explained and questions answered to patient or proxy's satisfaction: yes      Relevant documents present and verified: yes      Test results available: yes      Site/side marked: yes      Immediately prior to procedure, a time out was called: yes      Patient identity confirmed:  Verbally with patient  Indications:     Indications:  Anemia and Leukopenia  Pre-procedure details:     Skin preparation:  Chlorhexidine    Preparation: Patient was prepped and draped in the usual sterile fashion    Sedation:     Sedation type:  None  Anesthesia:     Anesthesia method:  Local infiltration    Local anesthetic:  Lidocaine 1% w/o epi  Procedure specific details:      Informed consent was obtained and potential risks including bleeding, infection and pain were reviewed with the patient.     The patient was placed in the prone position, and the Left posterior iliac crest was prepped with chlorhexidine.     The skin, subcutaneous tissues, and periosteum were anesthetized with 5mL of 1% lidocaine and 10 mL of 2% lidocaine.    A small incision was made with a #15 scalpel, and the 8-gauge Jamshidi needle was advanced through the periosteum into the intramedullary space.    11 bone marrow was aspirated; the needle was then advanced further and a 1.5 cm core biopsy obtained. The specimen was sent for morphology, flow cytometry, cytogenetics, and FISH/molecular per Hematopathology.    The needle was removed and hemostasis achieved.     The procedure was tolerated well and there were no  complications.    Procedure completed by: Olman Valdovinos PA-C    Post-procedure details:     Procedure completion:  Tolerated

## 2024-04-11 NOTE — PROGRESS NOTES
Patient arrived to ASCT unit via self-ambulation for bone marrow biopsy, accompanied by spouse. He is alert and oriented x3, denies pain or any discomfort. Vital signs obtained. Blood drawn prior to arrival to unit. Olman Robledo PA-C performed procedure. Dressing is clean, dry and intact. Education provided and PI sheet given. No adverse reactions, no complaints and no assistance needed.

## 2024-04-17 LAB
ELECTRONICALLY SIGNED BY: NORMAL
MYELOID NGS RESULTS: NORMAL

## 2024-04-18 ENCOUNTER — TELEPHONE (OUTPATIENT)
Dept: HEMATOLOGY/ONCOLOGY | Facility: HOSPITAL | Age: 64
End: 2024-04-18
Payer: COMMERCIAL

## 2024-04-18 DIAGNOSIS — D46.9 MDS (MYELODYSPLASTIC SYNDROME) (MULTI): Primary | ICD-10-CM

## 2024-04-18 LAB
CELL COUNT (BLOOD): 5.05 X10*3/UL
CELL POPULATIONS: NORMAL
DIAGNOSIS: NORMAL
FLOW DIFFERENTIAL: NORMAL
FLOW TEST ORDERED: NORMAL
LAB TEST METHOD: NORMAL
NUMBER OF CELLS COLLECTED: NORMAL
PATH REPORT.TOTAL CANCER: NORMAL
SIGNATURE COMMENT: NORMAL
SPECIMEN VIABILITY: NORMAL

## 2024-04-18 NOTE — TELEPHONE ENCOUNTER
RN talked to patient and explained that Dr. Munoz wanted him to see a malignant hematology doctor Patient voiced understanding. Patient will see Dr. Villa on 4/30at [facility].

## 2024-04-18 NOTE — PROGRESS NOTES
RN received message from Dr. Munoz saying to schedule patient with malignant heme for MDS. Scheduling order entered.

## 2024-04-19 LAB
CELL COUNT (BLOOD): 3 X10*3/UL
CELL POPULATIONS: NORMAL
CHROM ANALY OVERALL INTERP-IMP: NORMAL
DIAGNOSIS: NORMAL
ELECTRONICALLY COSIGNED BY CYTOGENETICS: NORMAL
ELECTRONICALLY SIGNED BY CYTOGENETICS: NORMAL
FISH ISCN RESULTS: NORMAL
FLOW DIFFERENTIAL: NORMAL
FLOW TEST ORDERED: NORMAL
LAB TEST METHOD: NORMAL
NUMBER OF CELLS COLLECTED: NORMAL PER TUBE
PATH REPORT.TOTAL CANCER: NORMAL
SIGNATURE COMMENT: NORMAL
SPECIMEN VIABILITY: NORMAL

## 2024-04-19 PROCEDURE — 88189 FLOWCYTOMETRY/READ 16 & >: CPT | Performed by: PHYSICIAN ASSISTANT

## 2024-04-19 PROCEDURE — 88185 FLOWCYTOMETRY/TC ADD-ON: CPT | Mod: TC | Performed by: PHYSICIAN ASSISTANT

## 2024-04-22 ENCOUNTER — DOCUMENTATION (OUTPATIENT)
Dept: HEMATOLOGY/ONCOLOGY | Facility: HOSPITAL | Age: 64
End: 2024-04-22
Payer: COMMERCIAL

## 2024-04-22 NOTE — PROGRESS NOTES
4/22/24 1050  Patient presented to Dr. Munoz with pancytopenia with hgb in 7's. 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 Patient is scheduled for next week with Dr. Villa but we are trying to get him in sooner. I have spoken with patient's wife and awaiting return call. LEON Palmer

## 2024-04-22 NOTE — PROGRESS NOTES
4/22/24 1120  Received referral from Dr. Munoz for patient to see med onc for new MDS-EB2/AML on marrow after presenting with pancytopenia. Patient was initially scheduled for next week, but we are moving his appointment up to 4/23/24 with Dr. Newsome. I spoke with patient's wife to confirm and have texted appt details to patient. LEON Palmer

## 2024-04-23 ENCOUNTER — OFFICE VISIT (OUTPATIENT)
Dept: HEMATOLOGY/ONCOLOGY | Facility: CLINIC | Age: 64
End: 2024-04-23
Payer: COMMERCIAL

## 2024-04-23 ENCOUNTER — LAB (OUTPATIENT)
Dept: LAB | Facility: CLINIC | Age: 64
End: 2024-04-23
Payer: COMMERCIAL

## 2024-04-23 ENCOUNTER — APPOINTMENT (OUTPATIENT)
Dept: HEMATOLOGY/ONCOLOGY | Facility: HOSPITAL | Age: 64
End: 2024-04-23
Payer: COMMERCIAL

## 2024-04-23 VITALS
OXYGEN SATURATION: 95 % | HEART RATE: 89 BPM | RESPIRATION RATE: 18 BRPM | DIASTOLIC BLOOD PRESSURE: 86 MMHG | WEIGHT: 166.45 LBS | SYSTOLIC BLOOD PRESSURE: 157 MMHG | BODY MASS INDEX: 27.94 KG/M2 | TEMPERATURE: 97.9 F

## 2024-04-23 DIAGNOSIS — C92.00 ACUTE MYELOID LEUKEMIA NOT HAVING ACHIEVED REMISSION (MULTI): Primary | ICD-10-CM

## 2024-04-23 DIAGNOSIS — D46.9 MDS (MYELODYSPLASTIC SYNDROME) (MULTI): ICD-10-CM

## 2024-04-23 PROCEDURE — 1157F ADVNC CARE PLAN IN RCRD: CPT | Performed by: INTERNAL MEDICINE

## 2024-04-23 PROCEDURE — 3077F SYST BP >= 140 MM HG: CPT | Performed by: INTERNAL MEDICINE

## 2024-04-23 PROCEDURE — 1126F AMNT PAIN NOTED NONE PRSNT: CPT | Performed by: INTERNAL MEDICINE

## 2024-04-23 PROCEDURE — 99215 OFFICE O/P EST HI 40 MIN: CPT | Performed by: INTERNAL MEDICINE

## 2024-04-23 PROCEDURE — 1036F TOBACCO NON-USER: CPT | Performed by: INTERNAL MEDICINE

## 2024-04-23 PROCEDURE — 36415 COLL VENOUS BLD VENIPUNCTURE: CPT

## 2024-04-23 PROCEDURE — 81371 HLA I & II TYPE VERIFY LR: CPT

## 2024-04-23 PROCEDURE — 1160F RVW MEDS BY RX/DR IN RCRD: CPT | Performed by: INTERNAL MEDICINE

## 2024-04-23 PROCEDURE — 3079F DIAST BP 80-89 MM HG: CPT | Performed by: INTERNAL MEDICINE

## 2024-04-23 PROCEDURE — 1159F MED LIST DOCD IN RCRD: CPT | Performed by: INTERNAL MEDICINE

## 2024-04-23 RX ORDER — PROCHLORPERAZINE EDISYLATE 5 MG/ML
10 INJECTION INTRAMUSCULAR; INTRAVENOUS EVERY 6 HOURS PRN
Status: CANCELLED | OUTPATIENT
Start: 2024-05-15

## 2024-04-23 RX ORDER — FAMOTIDINE 10 MG/ML
20 INJECTION INTRAVENOUS ONCE AS NEEDED
Status: CANCELLED | OUTPATIENT
Start: 2024-05-15

## 2024-04-23 RX ORDER — PROCHLORPERAZINE MALEATE 10 MG
10 TABLET ORAL EVERY 6 HOURS PRN
Status: CANCELLED | OUTPATIENT
Start: 2024-05-15

## 2024-04-23 RX ORDER — ALBUTEROL SULFATE 0.83 MG/ML
3 SOLUTION RESPIRATORY (INHALATION) AS NEEDED
Status: CANCELLED | OUTPATIENT
Start: 2024-05-16

## 2024-04-23 RX ORDER — PROCHLORPERAZINE EDISYLATE 5 MG/ML
10 INJECTION INTRAMUSCULAR; INTRAVENOUS EVERY 6 HOURS PRN
Status: CANCELLED | OUTPATIENT
Start: 2024-05-13

## 2024-04-23 RX ORDER — EPINEPHRINE 0.3 MG/.3ML
0.3 INJECTION SUBCUTANEOUS EVERY 5 MIN PRN
Status: CANCELLED | OUTPATIENT
Start: 2024-05-15

## 2024-04-23 RX ORDER — DIPHENHYDRAMINE HYDROCHLORIDE 50 MG/ML
50 INJECTION INTRAMUSCULAR; INTRAVENOUS AS NEEDED
Status: CANCELLED | OUTPATIENT
Start: 2024-05-15

## 2024-04-23 RX ORDER — PROCHLORPERAZINE MALEATE 10 MG
10 TABLET ORAL EVERY 6 HOURS PRN
Status: CANCELLED | OUTPATIENT
Start: 2024-05-13

## 2024-04-23 RX ORDER — PROCHLORPERAZINE MALEATE 10 MG
10 TABLET ORAL EVERY 6 HOURS PRN
Status: CANCELLED | OUTPATIENT
Start: 2024-05-17

## 2024-04-23 RX ORDER — ALBUTEROL SULFATE 0.83 MG/ML
3 SOLUTION RESPIRATORY (INHALATION) AS NEEDED
Status: CANCELLED | OUTPATIENT
Start: 2024-05-17

## 2024-04-23 RX ORDER — DIPHENHYDRAMINE HYDROCHLORIDE 50 MG/ML
50 INJECTION INTRAMUSCULAR; INTRAVENOUS AS NEEDED
Status: CANCELLED | OUTPATIENT
Start: 2024-05-13

## 2024-04-23 RX ORDER — DIPHENHYDRAMINE HYDROCHLORIDE 50 MG/ML
50 INJECTION INTRAMUSCULAR; INTRAVENOUS AS NEEDED
Status: CANCELLED | OUTPATIENT
Start: 2024-05-16

## 2024-04-23 RX ORDER — ONDANSETRON HYDROCHLORIDE 8 MG/1
8 TABLET, FILM COATED ORAL EVERY 8 HOURS PRN
Qty: 30 TABLET | Refills: 5 | Status: SHIPPED | OUTPATIENT
Start: 2024-04-23

## 2024-04-23 RX ORDER — ALLOPURINOL 300 MG/1
300 TABLET ORAL DAILY
Qty: 14 TABLET | Refills: 0 | Status: SHIPPED | OUTPATIENT
Start: 2024-04-23 | End: 2024-05-07

## 2024-04-23 RX ORDER — FAMOTIDINE 10 MG/ML
20 INJECTION INTRAVENOUS ONCE AS NEEDED
Status: CANCELLED | OUTPATIENT
Start: 2024-05-16

## 2024-04-23 RX ORDER — PROCHLORPERAZINE MALEATE 10 MG
10 TABLET ORAL EVERY 6 HOURS PRN
Status: CANCELLED | OUTPATIENT
Start: 2024-05-14

## 2024-04-23 RX ORDER — PROCHLORPERAZINE EDISYLATE 5 MG/ML
10 INJECTION INTRAMUSCULAR; INTRAVENOUS EVERY 6 HOURS PRN
Status: CANCELLED | OUTPATIENT
Start: 2024-05-16

## 2024-04-23 RX ORDER — DIPHENHYDRAMINE HYDROCHLORIDE 50 MG/ML
50 INJECTION INTRAMUSCULAR; INTRAVENOUS AS NEEDED
Status: CANCELLED | OUTPATIENT
Start: 2024-05-14

## 2024-04-23 RX ORDER — EPINEPHRINE 0.3 MG/.3ML
0.3 INJECTION SUBCUTANEOUS EVERY 5 MIN PRN
Status: CANCELLED | OUTPATIENT
Start: 2024-05-16

## 2024-04-23 RX ORDER — FAMOTIDINE 10 MG/ML
20 INJECTION INTRAVENOUS ONCE AS NEEDED
Status: CANCELLED | OUTPATIENT
Start: 2024-05-14

## 2024-04-23 RX ORDER — PROCHLORPERAZINE EDISYLATE 5 MG/ML
10 INJECTION INTRAMUSCULAR; INTRAVENOUS EVERY 6 HOURS PRN
Status: CANCELLED | OUTPATIENT
Start: 2024-05-14

## 2024-04-23 RX ORDER — ALBUTEROL SULFATE 0.83 MG/ML
3 SOLUTION RESPIRATORY (INHALATION) AS NEEDED
Status: CANCELLED | OUTPATIENT
Start: 2024-05-13

## 2024-04-23 RX ORDER — PROCHLORPERAZINE EDISYLATE 5 MG/ML
10 INJECTION INTRAMUSCULAR; INTRAVENOUS EVERY 6 HOURS PRN
Status: CANCELLED | OUTPATIENT
Start: 2024-05-17

## 2024-04-23 RX ORDER — EPINEPHRINE 0.3 MG/.3ML
0.3 INJECTION SUBCUTANEOUS EVERY 5 MIN PRN
Status: CANCELLED | OUTPATIENT
Start: 2024-05-14

## 2024-04-23 RX ORDER — PROCHLORPERAZINE MALEATE 10 MG
10 TABLET ORAL EVERY 6 HOURS PRN
Status: CANCELLED | OUTPATIENT
Start: 2024-05-16

## 2024-04-23 RX ORDER — PROCHLORPERAZINE MALEATE 10 MG
10 TABLET ORAL EVERY 6 HOURS PRN
Qty: 30 TABLET | Refills: 5 | Status: SHIPPED | OUTPATIENT
Start: 2024-04-23

## 2024-04-23 RX ORDER — FAMOTIDINE 10 MG/ML
20 INJECTION INTRAVENOUS ONCE AS NEEDED
Status: CANCELLED | OUTPATIENT
Start: 2024-05-13

## 2024-04-23 RX ORDER — FAMOTIDINE 10 MG/ML
20 INJECTION INTRAVENOUS ONCE AS NEEDED
Status: CANCELLED | OUTPATIENT
Start: 2024-05-17

## 2024-04-23 RX ORDER — ALBUTEROL SULFATE 0.83 MG/ML
3 SOLUTION RESPIRATORY (INHALATION) AS NEEDED
Status: CANCELLED | OUTPATIENT
Start: 2024-05-14

## 2024-04-23 RX ORDER — EPINEPHRINE 0.3 MG/.3ML
0.3 INJECTION SUBCUTANEOUS EVERY 5 MIN PRN
Status: CANCELLED | OUTPATIENT
Start: 2024-05-17

## 2024-04-23 RX ORDER — EPINEPHRINE 0.3 MG/.3ML
0.3 INJECTION SUBCUTANEOUS EVERY 5 MIN PRN
Status: CANCELLED | OUTPATIENT
Start: 2024-05-13

## 2024-04-23 RX ORDER — ALBUTEROL SULFATE 0.83 MG/ML
3 SOLUTION RESPIRATORY (INHALATION) AS NEEDED
Status: CANCELLED | OUTPATIENT
Start: 2024-05-15

## 2024-04-23 RX ORDER — DIPHENHYDRAMINE HYDROCHLORIDE 50 MG/ML
50 INJECTION INTRAMUSCULAR; INTRAVENOUS AS NEEDED
Status: CANCELLED | OUTPATIENT
Start: 2024-05-17

## 2024-04-23 ASSESSMENT — PAIN SCALES - GENERAL: PAINLEVEL: 0-NO PAIN

## 2024-04-23 ASSESSMENT — ENCOUNTER SYMPTOMS
LOSS OF SENSATION IN FEET: 0
DEPRESSION: 0
OCCASIONAL FEELINGS OF UNSTEADINESS: 0

## 2024-04-24 DIAGNOSIS — C92.00 ACUTE MYELOID LEUKEMIA NOT HAVING ACHIEVED REMISSION (MULTI): Primary | ICD-10-CM

## 2024-04-25 ENCOUNTER — SPECIALTY PHARMACY (OUTPATIENT)
Dept: PHARMACY | Facility: CLINIC | Age: 64
End: 2024-04-25

## 2024-04-26 ENCOUNTER — LAB REQUISITION (OUTPATIENT)
Dept: LAB | Facility: CLINIC | Age: 64
End: 2024-04-26
Payer: COMMERCIAL

## 2024-04-26 DIAGNOSIS — C92.00 ACUTE MYELOBLASTIC LEUKEMIA, NOT HAVING ACHIEVED REMISSION (MULTI): ICD-10-CM

## 2024-04-29 ENCOUNTER — SPECIALTY PHARMACY (OUTPATIENT)
Dept: PHARMACY | Facility: CLINIC | Age: 64
End: 2024-04-29

## 2024-04-29 ENCOUNTER — TELEPHONE (OUTPATIENT)
Dept: HEMATOLOGY/ONCOLOGY | Facility: CLINIC | Age: 64
End: 2024-04-29
Payer: COMMERCIAL

## 2024-04-29 ENCOUNTER — HOSPITAL ENCOUNTER (OUTPATIENT)
Dept: CARDIOLOGY | Facility: CLINIC | Age: 64
Discharge: HOME | End: 2024-04-29
Payer: COMMERCIAL

## 2024-04-29 DIAGNOSIS — Z01.818 ENCOUNTER FOR OTHER PREPROCEDURAL EXAMINATION: ICD-10-CM

## 2024-04-29 DIAGNOSIS — D46.9 MDS (MYELODYSPLASTIC SYNDROME) (MULTI): ICD-10-CM

## 2024-04-29 LAB
AORTIC VALVE MEAN GRADIENT: 3.2 MMHG
AORTIC VALVE PEAK VELOCITY: 1.34 M/S
AV PEAK GRADIENT: 7.2 MMHG
AVA (PEAK VEL): 3.93 CM2
AVA (VTI): 4.48 CM2
EJECTION FRACTION APICAL 4 CHAMBER: 58.9
LEFT ATRIUM VOLUME AREA LENGTH INDEX BSA: 30.9 ML/M2
LEFT VENTRICLE INTERNAL DIMENSION DIASTOLE: 3.84 CM (ref 3.5–6)
LEFT VENTRICULAR OUTFLOW TRACT DIAMETER: 2.35 CM
LV EJECTION FRACTION BIPLANE: 68 %
MITRAL VALVE E/A RATIO: 1.35
MITRAL VALVE E/E' RATIO: 8.13
RIGHT VENTRICLE FREE WALL PEAK S': 20.83 CM/S
TRICUSPID ANNULAR PLANE SYSTOLIC EXCURSION: 3 CM

## 2024-04-29 PROCEDURE — 93306 TTE W/DOPPLER COMPLETE: CPT

## 2024-04-29 PROCEDURE — 93306 TTE W/DOPPLER COMPLETE: CPT | Performed by: INTERNAL MEDICINE

## 2024-04-29 PROCEDURE — RXMED WILLOW AMBULATORY MEDICATION CHARGE

## 2024-04-29 NOTE — TELEPHONE ENCOUNTER
Pt informed that [facility] paperwork would be signed upon Dr Newsome's return on 5.9.24.  He has appt that same day and we will give to him at that time.

## 2024-04-29 NOTE — PROGRESS NOTES
Specialty Pharmacy    Venclexta    Coverage has been approved for this patient's medication from 04.26.24-04.26.25.     This patient has scheduled their medication delivery with  Specialty Pharmacy.      They should receive their medication Tuesday, 04.30.24.    Thank you,    Afsaneh Garland, [facility]  Pharmacy Support Liaison - [facility]  Specialty Pharmacy  [address] [place] [address], [id number]  T: [phone]  F: [phone]  [email or web address]

## 2024-04-30 ENCOUNTER — PHARMACY VISIT (OUTPATIENT)
Dept: PHARMACY | Facility: CLINIC | Age: 64
End: 2024-04-30
Payer: COMMERCIAL

## 2024-04-30 ENCOUNTER — APPOINTMENT (OUTPATIENT)
Dept: HEMATOLOGY/ONCOLOGY | Facility: HOSPITAL | Age: 64
End: 2024-04-30
Payer: COMMERCIAL

## 2024-05-03 LAB
DNA CLASS I + II VERIFICATION TYPING: NORMAL
HLA RESULTS: NORMAL

## 2024-05-09 ENCOUNTER — DOCUMENTATION (OUTPATIENT)
Dept: OTHER | Facility: HOSPITAL | Age: 64
End: 2024-05-09
Payer: COMMERCIAL

## 2024-05-09 ENCOUNTER — LAB (OUTPATIENT)
Dept: LAB | Facility: HOSPITAL | Age: 64
End: 2024-05-09
Payer: COMMERCIAL

## 2024-05-09 ENCOUNTER — SPECIALTY PHARMACY (OUTPATIENT)
Dept: HEMATOLOGY/ONCOLOGY | Facility: HOSPITAL | Age: 64
End: 2024-05-09
Payer: COMMERCIAL

## 2024-05-09 ENCOUNTER — OFFICE VISIT (OUTPATIENT)
Dept: HEMATOLOGY/ONCOLOGY | Facility: HOSPITAL | Age: 64
End: 2024-05-09
Payer: COMMERCIAL

## 2024-05-09 VITALS
DIASTOLIC BLOOD PRESSURE: 78 MMHG | WEIGHT: 164.46 LBS | RESPIRATION RATE: 16 BRPM | BODY MASS INDEX: 27.6 KG/M2 | TEMPERATURE: 97.2 F | OXYGEN SATURATION: 98 % | HEART RATE: 82 BPM | SYSTOLIC BLOOD PRESSURE: 157 MMHG

## 2024-05-09 DIAGNOSIS — Z76.82 STEM CELL TRANSPLANT CANDIDATE: ICD-10-CM

## 2024-05-09 DIAGNOSIS — D46.9 MDS (MYELODYSPLASTIC SYNDROME) (MULTI): ICD-10-CM

## 2024-05-09 DIAGNOSIS — C92.00 ACUTE MYELOID LEUKEMIA NOT HAVING ACHIEVED REMISSION (MULTI): Primary | ICD-10-CM

## 2024-05-09 DIAGNOSIS — C92.00 ACUTE MYELOID LEUKEMIA NOT HAVING ACHIEVED REMISSION (MULTI): ICD-10-CM

## 2024-05-09 LAB
ABO GROUP (TYPE) IN BLOOD: NORMAL
ALBUMIN SERPL BCP-MCNC: 4.1 G/DL (ref 3.4–5)
ALP SERPL-CCNC: 56 U/L (ref 33–136)
ALT SERPL W P-5'-P-CCNC: 9 U/L (ref 10–52)
ANION GAP SERPL CALC-SCNC: 11 MMOL/L (ref 10–20)
ANION GAP SERPL CALC-SCNC: 11 MMOL/L (ref 10–20)
ANTIBODY SCREEN: NORMAL
AST SERPL W P-5'-P-CCNC: 27 U/L (ref 9–39)
BASOPHILS # BLD AUTO: 0 X10*3/UL (ref 0–0.1)
BASOPHILS NFR BLD AUTO: 0 %
BILIRUB SERPL-MCNC: 2.7 MG/DL (ref 0–1.2)
BUN SERPL-MCNC: 11 MG/DL (ref 6–23)
BUN SERPL-MCNC: 11 MG/DL (ref 6–23)
CALCIUM SERPL-MCNC: 9.1 MG/DL (ref 8.6–10.3)
CALCIUM SERPL-MCNC: 9.1 MG/DL (ref 8.6–10.3)
CHLORIDE SERPL-SCNC: 109 MMOL/L (ref 98–107)
CHLORIDE SERPL-SCNC: 109 MMOL/L (ref 98–107)
CO2 SERPL-SCNC: 26 MMOL/L (ref 21–32)
CO2 SERPL-SCNC: 26 MMOL/L (ref 21–32)
CREAT SERPL-MCNC: 1.06 MG/DL (ref 0.5–1.3)
CREAT SERPL-MCNC: 1.06 MG/DL (ref 0.5–1.3)
DACRYOCYTES BLD QL SMEAR: NORMAL
EGFRCR SERPLBLD CKD-EPI 2021: 77 ML/MIN/1.73M*2
EGFRCR SERPLBLD CKD-EPI 2021: 77 ML/MIN/1.73M*2
EOSINOPHIL # BLD AUTO: 0 X10*3/UL (ref 0–0.7)
EOSINOPHIL NFR BLD AUTO: 0 %
ERYTHROCYTE [DISTWIDTH] IN BLOOD BY AUTOMATED COUNT: 19.1 % (ref 11.5–14.5)
GIANT PLATELETS BLD QL SMEAR: NORMAL
GLUCOSE SERPL-MCNC: 97 MG/DL (ref 74–99)
GLUCOSE SERPL-MCNC: 97 MG/DL (ref 74–99)
HBV CORE AB SER QL: NONREACTIVE
HBV SURFACE AB SER-ACNC: <3.1 MIU/ML
HBV SURFACE AG SERPL QL IA: NONREACTIVE
HCT VFR BLD AUTO: 20.5 % (ref 41–52)
HGB BLD-MCNC: 6.9 G/DL (ref 13.5–17.5)
HYPOCHROMIA BLD QL SMEAR: NORMAL
IMM GRANULOCYTES # BLD AUTO: 0.03 X10*3/UL (ref 0–0.7)
IMM GRANULOCYTES NFR BLD AUTO: 0.9 % (ref 0–0.9)
LDH SERPL L TO P-CCNC: 326 U/L (ref 84–246)
LYMPHOCYTES # BLD AUTO: 2.18 X10*3/UL (ref 1.2–4.8)
LYMPHOCYTES NFR BLD AUTO: 67.9 %
MCH RBC QN AUTO: 28.4 PG (ref 26–34)
MCHC RBC AUTO-ENTMCNC: 33.7 G/DL (ref 32–36)
MCV RBC AUTO: 84 FL (ref 80–100)
MONOCYTES # BLD AUTO: 0.38 X10*3/UL (ref 0.1–1)
MONOCYTES NFR BLD AUTO: 11.8 %
NEUTROPHILS # BLD AUTO: 0.62 X10*3/UL (ref 1.2–7.7)
NEUTROPHILS NFR BLD AUTO: 19.4 %
NRBC BLD-RTO: 9 /100 WBCS (ref 0–0)
OVALOCYTES BLD QL SMEAR: NORMAL
PHOSPHATE SERPL-MCNC: 3.5 MG/DL (ref 2.5–4.9)
PLATELET # BLD AUTO: 209 X10*3/UL (ref 150–450)
POLYCHROMASIA BLD QL SMEAR: NORMAL
POTASSIUM SERPL-SCNC: 3.9 MMOL/L (ref 3.5–5.3)
POTASSIUM SERPL-SCNC: 3.9 MMOL/L (ref 3.5–5.3)
PROT SERPL-MCNC: 7.2 G/DL (ref 6.4–8.2)
RBC # BLD AUTO: 2.43 X10*6/UL (ref 4.5–5.9)
RBC MORPH BLD: NORMAL
RH FACTOR (ANTIGEN D): NORMAL
SCHISTOCYTES BLD QL SMEAR: NORMAL
SODIUM SERPL-SCNC: 142 MMOL/L (ref 136–145)
SODIUM SERPL-SCNC: 142 MMOL/L (ref 136–145)
TARGETS BLD QL SMEAR: NORMAL
URATE SERPL-MCNC: 5.3 MG/DL (ref 4–7.5)
WBC # BLD AUTO: 3.2 X10*3/UL (ref 4.4–11.3)

## 2024-05-09 PROCEDURE — 3078F DIAST BP <80 MM HG: CPT | Performed by: INTERNAL MEDICINE

## 2024-05-09 PROCEDURE — 1159F MED LIST DOCD IN RCRD: CPT | Performed by: INTERNAL MEDICINE

## 2024-05-09 PROCEDURE — 86832 HLA CLASS I HIGH DEFIN QUAL: CPT

## 2024-05-09 PROCEDURE — 83615 LACTATE (LD) (LDH) ENZYME: CPT

## 2024-05-09 PROCEDURE — 1157F ADVNC CARE PLAN IN RCRD: CPT | Performed by: INTERNAL MEDICINE

## 2024-05-09 PROCEDURE — 85025 COMPLETE CBC W/AUTO DIFF WBC: CPT

## 2024-05-09 PROCEDURE — 87340 HEPATITIS B SURFACE AG IA: CPT

## 2024-05-09 PROCEDURE — 84550 ASSAY OF BLOOD/URIC ACID: CPT

## 2024-05-09 PROCEDURE — 36415 COLL VENOUS BLD VENIPUNCTURE: CPT

## 2024-05-09 PROCEDURE — 3077F SYST BP >= 140 MM HG: CPT | Performed by: INTERNAL MEDICINE

## 2024-05-09 PROCEDURE — 86706 HEP B SURFACE ANTIBODY: CPT

## 2024-05-09 PROCEDURE — 86901 BLOOD TYPING SEROLOGIC RH(D): CPT

## 2024-05-09 PROCEDURE — 99215 OFFICE O/P EST HI 40 MIN: CPT | Performed by: INTERNAL MEDICINE

## 2024-05-09 PROCEDURE — 82248 BILIRUBIN DIRECT: CPT

## 2024-05-09 PROCEDURE — 86704 HEP B CORE ANTIBODY TOTAL: CPT

## 2024-05-09 PROCEDURE — 84100 ASSAY OF PHOSPHORUS: CPT

## 2024-05-09 PROCEDURE — 1126F AMNT PAIN NOTED NONE PRSNT: CPT | Performed by: INTERNAL MEDICINE

## 2024-05-09 PROCEDURE — 1160F RVW MEDS BY RX/DR IN RCRD: CPT | Performed by: INTERNAL MEDICINE

## 2024-05-09 PROCEDURE — 80053 COMPREHEN METABOLIC PANEL: CPT

## 2024-05-09 RX ORDER — EPINEPHRINE 0.3 MG/.3ML
0.3 INJECTION SUBCUTANEOUS EVERY 5 MIN PRN
OUTPATIENT
Start: 2024-06-13

## 2024-05-09 RX ORDER — ONDANSETRON HYDROCHLORIDE 8 MG/1
8 TABLET, FILM COATED ORAL ONCE
Status: CANCELLED | OUTPATIENT
Start: 2024-05-15

## 2024-05-09 RX ORDER — PROCHLORPERAZINE EDISYLATE 5 MG/ML
10 INJECTION INTRAMUSCULAR; INTRAVENOUS EVERY 6 HOURS PRN
OUTPATIENT
Start: 2024-06-14

## 2024-05-09 RX ORDER — ONDANSETRON HYDROCHLORIDE 8 MG/1
8 TABLET, FILM COATED ORAL ONCE
Status: CANCELLED | OUTPATIENT
Start: 2024-05-17

## 2024-05-09 RX ORDER — ONDANSETRON HYDROCHLORIDE 8 MG/1
8 TABLET, FILM COATED ORAL ONCE
OUTPATIENT
Start: 2024-06-13

## 2024-05-09 RX ORDER — PROCHLORPERAZINE MALEATE 10 MG
10 TABLET ORAL EVERY 6 HOURS PRN
OUTPATIENT
Start: 2024-06-15

## 2024-05-09 RX ORDER — PROCHLORPERAZINE EDISYLATE 5 MG/ML
10 INJECTION INTRAMUSCULAR; INTRAVENOUS EVERY 6 HOURS PRN
OUTPATIENT
Start: 2024-06-12

## 2024-05-09 RX ORDER — PROCHLORPERAZINE EDISYLATE 5 MG/ML
10 INJECTION INTRAMUSCULAR; INTRAVENOUS EVERY 6 HOURS PRN
OUTPATIENT
Start: 2024-06-13

## 2024-05-09 RX ORDER — PROCHLORPERAZINE MALEATE 10 MG
10 TABLET ORAL EVERY 6 HOURS PRN
Status: CANCELLED | OUTPATIENT
Start: 2024-06-11

## 2024-05-09 RX ORDER — ONDANSETRON HYDROCHLORIDE 8 MG/1
8 TABLET, FILM COATED ORAL ONCE
Status: CANCELLED | OUTPATIENT
Start: 2024-05-14

## 2024-05-09 RX ORDER — ACYCLOVIR 400 MG/1
400 TABLET ORAL 2 TIMES DAILY
Qty: 60 TABLET | Refills: 3 | Status: SHIPPED | OUTPATIENT
Start: 2024-05-09 | End: 2024-09-06

## 2024-05-09 RX ORDER — DIPHENHYDRAMINE HYDROCHLORIDE 50 MG/ML
50 INJECTION INTRAMUSCULAR; INTRAVENOUS AS NEEDED
Status: CANCELLED | OUTPATIENT
Start: 2024-06-11

## 2024-05-09 RX ORDER — ONDANSETRON HYDROCHLORIDE 8 MG/1
8 TABLET, FILM COATED ORAL ONCE
OUTPATIENT
Start: 2024-06-14

## 2024-05-09 RX ORDER — ONDANSETRON HYDROCHLORIDE 8 MG/1
8 TABLET, FILM COATED ORAL ONCE
Status: CANCELLED | OUTPATIENT
Start: 2024-06-11

## 2024-05-09 RX ORDER — DIPHENHYDRAMINE HYDROCHLORIDE 50 MG/ML
50 INJECTION INTRAMUSCULAR; INTRAVENOUS AS NEEDED
OUTPATIENT
Start: 2024-06-12

## 2024-05-09 RX ORDER — EPINEPHRINE 0.3 MG/.3ML
0.3 INJECTION SUBCUTANEOUS EVERY 5 MIN PRN
OUTPATIENT
Start: 2024-06-14

## 2024-05-09 RX ORDER — ONDANSETRON HYDROCHLORIDE 8 MG/1
8 TABLET, FILM COATED ORAL ONCE
Status: CANCELLED | OUTPATIENT
Start: 2024-05-13

## 2024-05-09 RX ORDER — PROCHLORPERAZINE MALEATE 10 MG
10 TABLET ORAL EVERY 6 HOURS PRN
OUTPATIENT
Start: 2024-06-12

## 2024-05-09 RX ORDER — PROCHLORPERAZINE EDISYLATE 5 MG/ML
10 INJECTION INTRAMUSCULAR; INTRAVENOUS EVERY 6 HOURS PRN
OUTPATIENT
Start: 2024-06-15

## 2024-05-09 RX ORDER — FLUCONAZOLE 200 MG/1
400 TABLET ORAL DAILY
Qty: 60 TABLET | Refills: 2 | Status: SHIPPED | OUTPATIENT
Start: 2024-05-09 | End: 2024-08-07

## 2024-05-09 RX ORDER — FAMOTIDINE 10 MG/ML
20 INJECTION INTRAVENOUS ONCE AS NEEDED
OUTPATIENT
Start: 2024-06-12

## 2024-05-09 RX ORDER — HEPARIN SODIUM,PORCINE/PF 10 UNIT/ML
50 SYRINGE (ML) INTRAVENOUS AS NEEDED
OUTPATIENT
Start: 2024-05-09

## 2024-05-09 RX ORDER — EPINEPHRINE 0.3 MG/.3ML
0.3 INJECTION SUBCUTANEOUS EVERY 5 MIN PRN
OUTPATIENT
Start: 2024-06-12

## 2024-05-09 RX ORDER — FAMOTIDINE 10 MG/ML
20 INJECTION INTRAVENOUS ONCE AS NEEDED
OUTPATIENT
Start: 2024-06-13

## 2024-05-09 RX ORDER — FAMOTIDINE 10 MG/ML
20 INJECTION INTRAVENOUS ONCE AS NEEDED
OUTPATIENT
Start: 2024-06-14

## 2024-05-09 RX ORDER — FAMOTIDINE 10 MG/ML
20 INJECTION INTRAVENOUS ONCE AS NEEDED
Status: CANCELLED | OUTPATIENT
Start: 2024-06-11

## 2024-05-09 RX ORDER — DIPHENHYDRAMINE HYDROCHLORIDE 50 MG/ML
50 INJECTION INTRAMUSCULAR; INTRAVENOUS AS NEEDED
OUTPATIENT
Start: 2024-06-15

## 2024-05-09 RX ORDER — PROCHLORPERAZINE MALEATE 10 MG
10 TABLET ORAL EVERY 6 HOURS PRN
OUTPATIENT
Start: 2024-06-14

## 2024-05-09 RX ORDER — ONDANSETRON HYDROCHLORIDE 8 MG/1
8 TABLET, FILM COATED ORAL ONCE
OUTPATIENT
Start: 2024-06-15

## 2024-05-09 RX ORDER — ALBUTEROL SULFATE 0.83 MG/ML
3 SOLUTION RESPIRATORY (INHALATION) AS NEEDED
Status: CANCELLED | OUTPATIENT
Start: 2024-06-11

## 2024-05-09 RX ORDER — ONDANSETRON HYDROCHLORIDE 8 MG/1
8 TABLET, FILM COATED ORAL ONCE
OUTPATIENT
Start: 2024-06-12

## 2024-05-09 RX ORDER — ONDANSETRON HYDROCHLORIDE 8 MG/1
8 TABLET, FILM COATED ORAL ONCE
Status: CANCELLED | OUTPATIENT
Start: 2024-05-16

## 2024-05-09 RX ORDER — ALBUTEROL SULFATE 0.83 MG/ML
3 SOLUTION RESPIRATORY (INHALATION) AS NEEDED
OUTPATIENT
Start: 2024-06-13

## 2024-05-09 RX ORDER — ALBUTEROL SULFATE 0.83 MG/ML
3 SOLUTION RESPIRATORY (INHALATION) AS NEEDED
OUTPATIENT
Start: 2024-06-14

## 2024-05-09 RX ORDER — EPINEPHRINE 0.3 MG/.3ML
0.3 INJECTION SUBCUTANEOUS EVERY 5 MIN PRN
OUTPATIENT
Start: 2024-06-15

## 2024-05-09 RX ORDER — EPINEPHRINE 0.3 MG/.3ML
0.3 INJECTION SUBCUTANEOUS EVERY 5 MIN PRN
Status: CANCELLED | OUTPATIENT
Start: 2024-06-11

## 2024-05-09 RX ORDER — DIPHENHYDRAMINE HYDROCHLORIDE 50 MG/ML
50 INJECTION INTRAMUSCULAR; INTRAVENOUS AS NEEDED
OUTPATIENT
Start: 2024-06-13

## 2024-05-09 RX ORDER — FAMOTIDINE 10 MG/ML
20 INJECTION INTRAVENOUS ONCE AS NEEDED
OUTPATIENT
Start: 2024-06-15

## 2024-05-09 RX ORDER — HEPARIN 100 UNIT/ML
500 SYRINGE INTRAVENOUS AS NEEDED
OUTPATIENT
Start: 2024-05-09

## 2024-05-09 RX ORDER — PROCHLORPERAZINE MALEATE 10 MG
10 TABLET ORAL EVERY 6 HOURS PRN
OUTPATIENT
Start: 2024-06-13

## 2024-05-09 RX ORDER — DIPHENHYDRAMINE HYDROCHLORIDE 50 MG/ML
50 INJECTION INTRAMUSCULAR; INTRAVENOUS AS NEEDED
OUTPATIENT
Start: 2024-06-14

## 2024-05-09 RX ORDER — ALBUTEROL SULFATE 0.83 MG/ML
3 SOLUTION RESPIRATORY (INHALATION) AS NEEDED
OUTPATIENT
Start: 2024-06-15

## 2024-05-09 RX ORDER — ALBUTEROL SULFATE 0.83 MG/ML
3 SOLUTION RESPIRATORY (INHALATION) AS NEEDED
OUTPATIENT
Start: 2024-06-12

## 2024-05-09 RX ORDER — PROCHLORPERAZINE EDISYLATE 5 MG/ML
10 INJECTION INTRAMUSCULAR; INTRAVENOUS EVERY 6 HOURS PRN
Status: CANCELLED | OUTPATIENT
Start: 2024-06-11

## 2024-05-09 ASSESSMENT — ENCOUNTER SYMPTOMS: SHORTNESS OF BREATH: 1

## 2024-05-09 ASSESSMENT — PAIN SCALES - GENERAL: PAINLEVEL: 0-NO PAIN

## 2024-05-09 NOTE — PATIENT INSTRUCTIONS
I am sending in two other prescriptions, one is for acyclovir to prevent shingles twice a day.   The other one is called fluconazole which will be 2 pills daily to prevent fungal infections.

## 2024-05-09 NOTE — PROGRESS NOTES
ONCOLOGY PHARMACY CHEMOTHERAPY EDUCATION NOTE     Diagnosis: AML    Prescriber: Dr. Newsome    Current Outpatient Medications on File Prior to Visit   Medication Sig Dispense Refill    acyclovir (Zovirax) 400 mg tablet Take 1 tablet (400 mg) by mouth 2 times a day. 60 tablet 3    [] allopurinol (Zyloprim) 300 mg tablet Take 1 tablet (300 mg) by mouth once daily for 14 days. 14 tablet 0    fluconazole (Diflucan) 200 mg tablet Take 2 tablets (400 mg) by mouth once daily. 60 tablet 2    folic acid (Folvite) 1 mg tablet Take 1 tablet (1 mg) by mouth once daily. 30 tablet 3    NIFEdipine ER (Adalat CC) 60 mg 24 hr tablet Take 1 tablet (60 mg) by mouth once daily in the morning. Take before meals. Do not crush, chew, or split. 90 tablet 3    ondansetron (Zofran) 8 mg tablet Take 1 tablet (8 mg) by mouth every 8 hours if needed for nausea or vomiting. 30 tablet 5    prochlorperazine (Compazine) 10 mg tablet Take 1 tablet (10 mg) by mouth every 6 hours if needed for nausea or vomiting. 30 tablet 5    [START ON 5/15/2024] venetoclax (Venclexta) 100 mg tablet Take 2 tablets (200 mg total) by mouth once daily.  Start on 5/15/24. Take with food. 120 tablet 1    [START ON 2024] venetoclax (Venclexta) 50 mg tablet Take 1 tablet (50 mg total) by mouth once daily for 1 day, THEN 2 tablets (100 mg total) once daily for 1 day. Take with food.. 3 tablet 0     No current facility-administered medications on file prior to visit.     Treatment Plan       None            Note:      Pharmacist consulted to provide education on Decitabine and Venetoclax  chemotherapy via In-Person  visit.      1.  Chemotherapy Education: The chemotherapy regimen Decitabine and Venetoclax was discussed with patient and wife including treatment schedule, potential side effects, and management of side effects.  Potential side effects include but are not limited to: chemotherapy side effects: neutropenia, infection risk, anemia, fatigue, weakness,  low energy, thrombocytopenia, bleeding/bruising, n/v, diarrhea, constipation, electrolyte changes, and tumor lysis syndrome.  Management techniques of these side effects were discussed, such as blood count checks, prophylactic anti-infective medicines, temperature checks, blood product transfusions, electrolyte monitoring, antiemetic use, loperamide use with max dose of 8 tabs per 24 hours, staying hydrated if having diarrhea, and hydration for TLS prevention.  Written materials were provided including drug-specific side effect handouts.  Prescriptions for supportive care/prophylaxis (if applicable) medications were also discussed in terms of use and potential side effects.      2.  Home Medications: Reviewed patients documented home medications. Drug interactions identified between chemotherapy and patient’s home medications:  Fluconazole- dose of venetoclax is being decreased by 50% due to this interaction *.  All questions were answered.  patient and wife verbalized understanding of the plan of care and management of side effects.  Spent approximately 30 minutes coordinating care and patient interaction.  Will follow-up as necessary.        3.[facility] Specialty Pharm[facility] Clinical Note    Brandt Merritt is a 64 y.o. male, who is on the specialty pharmacy service for management of: Oncology Core with status of: (Enrolled)     Brandt was contacted on 5/9/2024.    Refer to the encounter summary report for documentation details about patient counseling and education.      Medication Adherence  The importance of adherence was discussed with the patient and they were advised to take the medication as prescribed by their provider. Brandt was encouraged to call his physician's office if they have a question regarding a missed dose.       Patient advised to contact the pharmacy if there are any changes to her medication list, including prescriptions, OTC medications, herbal products, or supplements. Patient  was advised of [facility]’s dispensing process, refill timeline, contact information ([phone]), and patient management follow up. Patient confirmed understanding of education conducted during assessment. All patient questions and concerns were addressed to the best of my ability. Patient was encouraged to contact the specialty pharmacy with any questions or concerns.    Confirmed follow-up outreaches are properly scheduled. Reviewed goals of therapy in the program targets.    Thank you for consulting [facility] Oncology Pharmacy Team.   Please don’t hesitate to reach out for further questions regarding this patient.       Jon Dominguez, PharmD

## 2024-05-09 NOTE — PROGRESS NOTES
5/9/24 10:45AM    Met with patient at [facility] with Dr. Newsome. Provided patient with a Allogeneic SCT binder, JASMIN video instructions, and my contact information. A brief overview of the transplant process was provided, but I explained to patient that I will meet him in infusion next week to do formal transplant education. Patient and wife verbalized understanding.     Daxa Hurtado RN

## 2024-05-09 NOTE — PROGRESS NOTES
Patient ID: Brandt Merritt is a 64 y.o. male.    Diagnosis:   Myelodysplastic neoplasm/AML      Treatment:   Oncology History Overview Note   2024  Myelodysplastic neoplasm with increased blasts-2 ( WHO)  Myelodysplastic neoplasm/AML  (ICC  classification)    Presented in  10/2023 for pancytopenia, found to have hemolysis. Patient had normal CBC 2020. Denied any hemorrhoidal bleeding . Has had C Scope and EGD , treated Hep C  . 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      CBC 24 wbc 3.0, hgb 7.1, platelets 167K ANC 0.73    BM biopsy done on 24  as folllows:  BM histology  Myelodysplastic neoplasm with increased blasts-2 ( WHO)  Myelodysplastic neoplasm/AML  (ICC  classification)  Balsts 11% on aspirate smear and 15-20% based on CD 34 immunostaining approaching AML leukemia, hematooiesis is erythroid dominant with dysplasia in granulocytes and megakaryocytes.   FISH studies trisomy 8 17.2%  NGS panel DNMT3 aVAF 36%  U2AF1 VAF 32%       Acute myeloid leukemia not having achieved remission (Multi)   2024 Initial Diagnosis    Acute myeloid leukemia not having achieved remission (Multi)     2024 -  Chemotherapy    Venetoclax / Decitabine, 28 Day Cycles - Induction / Consolidation         Response:     Past Medical History:  HTN   STEMI 2020 after getting  a water pills.  Chronic hepatitis C infection treated in  treated  with Dr. Lloyd  Past Medical History:   Diagnosis Date    Hypertension     Personal history of other diseases of the circulatory system     History of hypertension       Surgical History:  Conosocopy 2021  Upper EGD 10/31/23  Surgical reduction torsion of testes   Past Surgical History:   Procedure Laterality Date    COLONOSCOPY  2013    Complete Colonoscopy    OTHER SURGICAL HISTORY  10/07/2015    Surgery Testis Reduction Of Torsion Of Testis Right        Family History:  CAD, DM in mother    Mother  of CVA at age 69  Brother with prostate  CA, 1 sister with liver disease  2 children healthy  Family History   Problem Relation Name Age of Onset    Other (diabetes mellitus) Mother         Social History:  Lives with wife of 30 years, works at [employer], 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  29 years, former smoker, smoked x 15 yrs quit 20+ years ago. Marijuana 3 x a week. Served in  in [place] and [facility],   Social History     Tobacco Use    Smoking status: Former     Types: Cigarettes    Smokeless tobacco: Never   Substance Use Topics    Alcohol use: Not Currently     Comment: occansionally    Drug use: Yes     Types: Marijuana     Comment: 3 weeks ago      -------------------------------------------------------------------------------------------------------  Subjective       HPI    Brandt Merritt is a 64 y.o. male following up today for anemia & leukopenia . Seen by Abundio Rust recently and Rose Casal initially 10/2023 for pancytopenia. . He has had work up for pancytopenia and hemolytic process . Admits to having ongoing chronic fatigue that is unchanged . Patient had normal CBC June 2020. Denied any hemorrhoidal bleeding . Has had C Scope and EGD , treated Hep C 2014 . 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      BM biopsy done on 4/11/24  as folllows:    Additional information 2021 PSA was high, biopsy 2021 and 2023 all benign.  CBC 4/11/24 wbc 3.0, hgb 7.1, platelets 167K ANC 0.73  BM histology  Myelodysplastic neoplasm with increased blasts-2 ( WHO)  Myelodysplastic neoplasm/AML  (ICC 2022 classification)  Balsts 11% on aspirate smear and 15-20% based on CD 34 immunostaining approaching AML leukemia, hematooiesis is erythroid dominant with dysplasia in granulocytes and megakaryocytes.   FISH studies trisomy 8 17.2%  NGS panel DNMT3 aVAF 36%  U2AF1 VAF 32%    Patient is here today with his wife to discuss start of venetoclax and decitabine on May 13 for his newly diagnosed AML and to discuss  upcoming treatment and supportive meds for upcoming treatment. He feels generally well,  he gets tired from cutting grass or coming up steps., but continues to work full time at [employer]. He has lost about 30 pounds in the past couple of year, but stable now. He has already done some reading about  his condition and the recommended allograft. He is somewhat reluctant to ask his children to be potential allogeneic donors. ECHO 4/29/24 shows a normal LVEF      Review of Systems   Respiratory:  Positive for shortness of breath.    All other systems reviewed and are negative.     -------------------------------------------------------------------------------------------------------  Objective   BSA: There is no height or weight on file to calculate BSA.  There were no vitals taken for this visit.    Physical Exam  Vitals reviewed.   Constitutional:       Comments: Well developed man looking as stated age, muscular, looks somewhat pale   HENT:      Head: Normocephalic and atraumatic.      Mouth/Throat:      Mouth: Mucous membranes are moist.   Eyes:      Extraocular Movements: Extraocular movements intact.      Conjunctiva/sclera: Conjunctivae normal.      Pupils: Pupils are equal, round, and reactive to light.   Cardiovascular:      Rate and Rhythm: Normal rate and regular rhythm.      Pulses: Normal pulses.   Pulmonary:      Effort: Pulmonary effort is normal.      Breath sounds: Normal breath sounds.   Abdominal:      General: Abdomen is flat. Bowel sounds are normal.      Palpations: Abdomen is soft. There is hepatomegaly.          Comments: Liver palpable 5 cm BCM. No splenomegaly    Musculoskeletal:         General: Normal range of motion.   Skin:     General: Skin is warm.   Neurological:      General: No focal deficit present.      Mental Status: He is alert.   Psychiatric:         Mood and Affect: Mood normal.         Behavior: Behavior normal.         Thought Content: Thought content normal.         Judgment:  Judgment normal.         Performance Status:  Asymptomatic  -------------------------------------------------------------------------------------------------------  Assessment/Plan    Myelodysplastic neoplasm/AML- Patient presents with a history of pancytopenia since 9/2023 and due to confusion picture of hemolysis with only mild leukopenia and neutropenia extensive workup done prior to bone marrow bx which shows MDS in transition to AML, with trisomy 8 and DNMT alpha and U2AF1 mutation which is adverse risk. Given that the patient has > 10% blasts in bone marrow I would favor induction chemotherapy with ultimate goal to pursue curative allogeneic stem cell  transplant.  The patients slow, indolent course is more consistent with MDS and I have recommended induction with decitabine and venetoclax which has about a 60% chance of achieving a complete clinical remission.  I reviewed the treatment schedule which includes decitabine intravenously for 5 days and venetoclax orally 21-28 days beginning on day 1 of decitabine.  Cycles are repeated every 28 days.  Side effects including nausea, increased risk of infection, potential need for transfusion support due to cytopenias and organ toxicities reviewed.  Chemo consent signed as well as blood transfusion consent and all questions answered.  Plan to start therapy on May 13,2024.     Echocardiogram show a normal LVEF,  HLA typing has been completed.  Type and cross and verification screening done today for transfusion early next week. Antiemetics, acyclovir and fluconazole, allopurinol provided for support in addition. Patient met with Paul Hurtado for initiating transplant education.     Chemotherapy teaching done including instructions to contact me or the covering physician for any temperature > 100.4 degrees.      2. 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     3. Hx of treated hepatitis  C-recent hep C RNA pcr negative, and recent liver ultrasound shows normal sized liver at 14.5 cm, however seems enlarged on exam. Patient agreeable to hepatology consult before consideration of allograft. Consider early start of ursodiol.  Chronic elevated bilirubin likely hemolysis due to hemoglobin C trait, ordered direct bilirubin.     All questions answered    RTC: Start of decitabine and venetoclax on May 13th. Plan weekly count checks and infusion appointments for blood products as needed and qo week appointments with malignant hematology. Cycle 2 and MD visit planned for June 10th, 2024          -------------------------------------------------------------------------------------------------------  Malaika Newsome MD

## 2024-05-10 LAB — BILIRUB DIRECT SERPL-MCNC: 0.5 MG/DL (ref 0–0.3)

## 2024-05-13 ENCOUNTER — INFUSION (OUTPATIENT)
Dept: HEMATOLOGY/ONCOLOGY | Facility: CLINIC | Age: 64
End: 2024-05-13
Payer: COMMERCIAL

## 2024-05-13 VITALS
OXYGEN SATURATION: 96 % | TEMPERATURE: 98.8 F | DIASTOLIC BLOOD PRESSURE: 70 MMHG | BODY MASS INDEX: 28.4 KG/M2 | WEIGHT: 169.2 LBS | HEART RATE: 88 BPM | RESPIRATION RATE: 18 BRPM | SYSTOLIC BLOOD PRESSURE: 130 MMHG

## 2024-05-13 DIAGNOSIS — C92.00 ACUTE MYELOID LEUKEMIA NOT HAVING ACHIEVED REMISSION (MULTI): ICD-10-CM

## 2024-05-13 LAB
HLA RESULTS: NORMAL
HLA-A+B+C AB NFR SER: NORMAL %
HLA-DP+DQ+DR AB NFR SER: NORMAL %

## 2024-05-13 PROCEDURE — 96413 CHEMO IV INFUSION 1 HR: CPT

## 2024-05-13 PROCEDURE — 2500000005 HC RX 250 GENERAL PHARMACY W/O HCPCS: Performed by: INTERNAL MEDICINE

## 2024-05-13 PROCEDURE — 2500000004 HC RX 250 GENERAL PHARMACY W/ HCPCS (ALT 636 FOR OP/ED): Performed by: INTERNAL MEDICINE

## 2024-05-13 RX ORDER — DIPHENHYDRAMINE HYDROCHLORIDE 50 MG/ML
50 INJECTION INTRAMUSCULAR; INTRAVENOUS AS NEEDED
Status: DISCONTINUED | OUTPATIENT
Start: 2024-05-13 | End: 2024-05-13 | Stop reason: HOSPADM

## 2024-05-13 RX ORDER — ONDANSETRON HYDROCHLORIDE 8 MG/1
8 TABLET, FILM COATED ORAL ONCE
Status: COMPLETED | OUTPATIENT
Start: 2024-05-13 | End: 2024-05-13

## 2024-05-13 RX ORDER — FAMOTIDINE 10 MG/ML
20 INJECTION INTRAVENOUS ONCE AS NEEDED
Status: DISCONTINUED | OUTPATIENT
Start: 2024-05-13 | End: 2024-05-13 | Stop reason: HOSPADM

## 2024-05-13 RX ORDER — PROCHLORPERAZINE MALEATE 10 MG
10 TABLET ORAL EVERY 6 HOURS PRN
Status: DISCONTINUED | OUTPATIENT
Start: 2024-05-13 | End: 2024-05-13 | Stop reason: HOSPADM

## 2024-05-13 RX ORDER — PROCHLORPERAZINE EDISYLATE 5 MG/ML
10 INJECTION INTRAMUSCULAR; INTRAVENOUS EVERY 6 HOURS PRN
Status: DISCONTINUED | OUTPATIENT
Start: 2024-05-13 | End: 2024-05-13 | Stop reason: HOSPADM

## 2024-05-13 RX ORDER — EPINEPHRINE 0.3 MG/.3ML
0.3 INJECTION SUBCUTANEOUS EVERY 5 MIN PRN
Status: DISCONTINUED | OUTPATIENT
Start: 2024-05-13 | End: 2024-05-13 | Stop reason: HOSPADM

## 2024-05-13 RX ORDER — ALBUTEROL SULFATE 0.83 MG/ML
3 SOLUTION RESPIRATORY (INHALATION) AS NEEDED
Status: DISCONTINUED | OUTPATIENT
Start: 2024-05-13 | End: 2024-05-13 | Stop reason: HOSPADM

## 2024-05-13 RX ADMIN — DECITABINE 37 MG: 50 INJECTION, POWDER, LYOPHILIZED, FOR SOLUTION INTRAVENOUS at 16:28

## 2024-05-13 RX ADMIN — ONDANSETRON HYDROCHLORIDE 8 MG: 8 TABLET, FILM COATED ORAL at 15:57

## 2024-05-13 ASSESSMENT — PAIN SCALES - GENERAL: PAINLEVEL: 0-NO PAIN

## 2024-05-13 NOTE — PROGRESS NOTES
Pt presenting within their normal limits, Pt tolerated first dose Decitabine today, no complaints or concerns at this time, pt assessed and educated on plan of care, side effects, safety precautions with treatment regimen, pt given support for future medical encounters, plan to return for K1Y3-C2 this week.

## 2024-05-14 ENCOUNTER — INFUSION (OUTPATIENT)
Dept: HEMATOLOGY/ONCOLOGY | Facility: CLINIC | Age: 64
End: 2024-05-14
Payer: COMMERCIAL

## 2024-05-14 VITALS
SYSTOLIC BLOOD PRESSURE: 117 MMHG | RESPIRATION RATE: 16 BRPM | HEART RATE: 85 BPM | WEIGHT: 172.62 LBS | DIASTOLIC BLOOD PRESSURE: 69 MMHG | BODY MASS INDEX: 28.97 KG/M2 | TEMPERATURE: 97 F

## 2024-05-14 DIAGNOSIS — C92.00 ACUTE MYELOID LEUKEMIA NOT HAVING ACHIEVED REMISSION (MULTI): ICD-10-CM

## 2024-05-14 LAB
ABO GROUP (TYPE) IN BLOOD: NORMAL
ANTIBODY SCREEN: NORMAL
RH FACTOR (ANTIGEN D): NORMAL

## 2024-05-14 PROCEDURE — 2500000004 HC RX 250 GENERAL PHARMACY W/ HCPCS (ALT 636 FOR OP/ED): Performed by: INTERNAL MEDICINE

## 2024-05-14 PROCEDURE — 86901 BLOOD TYPING SEROLOGIC RH(D): CPT

## 2024-05-14 PROCEDURE — 96413 CHEMO IV INFUSION 1 HR: CPT

## 2024-05-14 PROCEDURE — 2500000005 HC RX 250 GENERAL PHARMACY W/O HCPCS: Performed by: INTERNAL MEDICINE

## 2024-05-14 PROCEDURE — 86923 COMPATIBILITY TEST ELECTRIC: CPT

## 2024-05-14 RX ORDER — ALBUTEROL SULFATE 0.83 MG/ML
3 SOLUTION RESPIRATORY (INHALATION) AS NEEDED
Status: DISCONTINUED | OUTPATIENT
Start: 2024-05-14 | End: 2024-05-14 | Stop reason: HOSPADM

## 2024-05-14 RX ORDER — EPINEPHRINE 0.3 MG/.3ML
0.3 INJECTION SUBCUTANEOUS EVERY 5 MIN PRN
Status: DISCONTINUED | OUTPATIENT
Start: 2024-05-14 | End: 2024-05-14 | Stop reason: HOSPADM

## 2024-05-14 RX ORDER — EPINEPHRINE 0.3 MG/.3ML
0.3 INJECTION SUBCUTANEOUS EVERY 5 MIN PRN
Status: CANCELLED | OUTPATIENT
Start: 2024-05-14

## 2024-05-14 RX ORDER — ALBUTEROL SULFATE 0.83 MG/ML
3 SOLUTION RESPIRATORY (INHALATION) AS NEEDED
Status: CANCELLED | OUTPATIENT
Start: 2024-05-14

## 2024-05-14 RX ORDER — EPINEPHRINE 0.3 MG/.3ML
0.3 INJECTION SUBCUTANEOUS EVERY 5 MIN PRN
OUTPATIENT
Start: 2024-05-14

## 2024-05-14 RX ORDER — DIPHENHYDRAMINE HYDROCHLORIDE 50 MG/ML
50 INJECTION INTRAMUSCULAR; INTRAVENOUS AS NEEDED
Status: CANCELLED | OUTPATIENT
Start: 2024-05-14

## 2024-05-14 RX ORDER — DIPHENHYDRAMINE HYDROCHLORIDE 50 MG/ML
50 INJECTION INTRAMUSCULAR; INTRAVENOUS AS NEEDED
OUTPATIENT
Start: 2024-05-14

## 2024-05-14 RX ORDER — PROCHLORPERAZINE EDISYLATE 5 MG/ML
10 INJECTION INTRAMUSCULAR; INTRAVENOUS EVERY 6 HOURS PRN
Status: DISCONTINUED | OUTPATIENT
Start: 2024-05-14 | End: 2024-05-14 | Stop reason: HOSPADM

## 2024-05-14 RX ORDER — FAMOTIDINE 10 MG/ML
20 INJECTION INTRAVENOUS ONCE AS NEEDED
Status: CANCELLED | OUTPATIENT
Start: 2024-05-14

## 2024-05-14 RX ORDER — ALBUTEROL SULFATE 0.83 MG/ML
3 SOLUTION RESPIRATORY (INHALATION) AS NEEDED
OUTPATIENT
Start: 2024-05-14

## 2024-05-14 RX ORDER — PROCHLORPERAZINE MALEATE 10 MG
10 TABLET ORAL EVERY 6 HOURS PRN
Status: DISCONTINUED | OUTPATIENT
Start: 2024-05-14 | End: 2024-05-14 | Stop reason: HOSPADM

## 2024-05-14 RX ORDER — ONDANSETRON HYDROCHLORIDE 8 MG/1
8 TABLET, FILM COATED ORAL ONCE
Status: COMPLETED | OUTPATIENT
Start: 2024-05-14 | End: 2024-05-14

## 2024-05-14 RX ORDER — DIPHENHYDRAMINE HYDROCHLORIDE 50 MG/ML
50 INJECTION INTRAMUSCULAR; INTRAVENOUS AS NEEDED
Status: DISCONTINUED | OUTPATIENT
Start: 2024-05-14 | End: 2024-05-14 | Stop reason: HOSPADM

## 2024-05-14 RX ORDER — FAMOTIDINE 10 MG/ML
20 INJECTION INTRAVENOUS ONCE AS NEEDED
OUTPATIENT
Start: 2024-05-14

## 2024-05-14 RX ORDER — FAMOTIDINE 10 MG/ML
20 INJECTION INTRAVENOUS ONCE AS NEEDED
Status: DISCONTINUED | OUTPATIENT
Start: 2024-05-14 | End: 2024-05-14 | Stop reason: HOSPADM

## 2024-05-14 RX ADMIN — DECITABINE 37 MG: 50 INJECTION, POWDER, LYOPHILIZED, FOR SOLUTION INTRAVENOUS at 16:34

## 2024-05-14 RX ADMIN — ONDANSETRON HYDROCHLORIDE 8 MG: 8 TABLET, FILM COATED ORAL at 16:17

## 2024-05-14 ASSESSMENT — PAIN SCALES - GENERAL: PAINLEVEL: 0-NO PAIN

## 2024-05-15 ENCOUNTER — INFUSION (OUTPATIENT)
Dept: HEMATOLOGY/ONCOLOGY | Facility: CLINIC | Age: 64
End: 2024-05-15
Payer: COMMERCIAL

## 2024-05-15 VITALS
TEMPERATURE: 97.9 F | RESPIRATION RATE: 18 BRPM | BODY MASS INDEX: 29.16 KG/M2 | OXYGEN SATURATION: 97 % | DIASTOLIC BLOOD PRESSURE: 77 MMHG | SYSTOLIC BLOOD PRESSURE: 125 MMHG | HEART RATE: 83 BPM | WEIGHT: 173.72 LBS

## 2024-05-15 DIAGNOSIS — D61.818 PANCYTOPENIA (MULTI): ICD-10-CM

## 2024-05-15 DIAGNOSIS — C92.00 ACUTE MYELOID LEUKEMIA NOT HAVING ACHIEVED REMISSION (MULTI): ICD-10-CM

## 2024-05-15 LAB
BLOOD EXPIRATION DATE: NORMAL
DISPENSE STATUS: NORMAL
PRODUCT BLOOD TYPE: 6200
PRODUCT CODE: NORMAL
UNIT ABO: NORMAL
UNIT NUMBER: NORMAL
UNIT RH: NORMAL
UNIT VOLUME: 350
XM INTEP: NORMAL

## 2024-05-15 PROCEDURE — P9040 RBC LEUKOREDUCED IRRADIATED: HCPCS

## 2024-05-15 PROCEDURE — 2500000004 HC RX 250 GENERAL PHARMACY W/ HCPCS (ALT 636 FOR OP/ED): Performed by: INTERNAL MEDICINE

## 2024-05-15 PROCEDURE — 96413 CHEMO IV INFUSION 1 HR: CPT

## 2024-05-15 PROCEDURE — 36430 TRANSFUSION BLD/BLD COMPNT: CPT

## 2024-05-15 RX ORDER — EPINEPHRINE 0.3 MG/.3ML
0.3 INJECTION SUBCUTANEOUS EVERY 5 MIN PRN
Status: DISCONTINUED | OUTPATIENT
Start: 2024-05-15 | End: 2024-05-15 | Stop reason: HOSPADM

## 2024-05-15 RX ORDER — PROCHLORPERAZINE MALEATE 10 MG
10 TABLET ORAL EVERY 6 HOURS PRN
Status: DISCONTINUED | OUTPATIENT
Start: 2024-05-15 | End: 2024-05-15 | Stop reason: HOSPADM

## 2024-05-15 RX ORDER — ALBUTEROL SULFATE 0.83 MG/ML
3 SOLUTION RESPIRATORY (INHALATION) AS NEEDED
Status: DISCONTINUED | OUTPATIENT
Start: 2024-05-15 | End: 2024-05-15 | Stop reason: HOSPADM

## 2024-05-15 RX ORDER — ONDANSETRON HYDROCHLORIDE 8 MG/1
8 TABLET, FILM COATED ORAL ONCE
Status: DISCONTINUED | OUTPATIENT
Start: 2024-05-15 | End: 2024-05-15 | Stop reason: HOSPADM

## 2024-05-15 RX ORDER — DIPHENHYDRAMINE HYDROCHLORIDE 50 MG/ML
50 INJECTION INTRAMUSCULAR; INTRAVENOUS AS NEEDED
Status: DISCONTINUED | OUTPATIENT
Start: 2024-05-15 | End: 2024-05-15 | Stop reason: HOSPADM

## 2024-05-15 RX ORDER — FAMOTIDINE 10 MG/ML
20 INJECTION INTRAVENOUS ONCE AS NEEDED
Status: DISCONTINUED | OUTPATIENT
Start: 2024-05-15 | End: 2024-05-15 | Stop reason: HOSPADM

## 2024-05-15 RX ORDER — PROCHLORPERAZINE EDISYLATE 5 MG/ML
10 INJECTION INTRAMUSCULAR; INTRAVENOUS EVERY 6 HOURS PRN
Status: DISCONTINUED | OUTPATIENT
Start: 2024-05-15 | End: 2024-05-15 | Stop reason: HOSPADM

## 2024-05-15 RX ADMIN — DECITABINE 37 MG: 50 INJECTION, POWDER, LYOPHILIZED, FOR SOLUTION INTRAVENOUS at 15:14

## 2024-05-15 ASSESSMENT — PAIN SCALES - GENERAL: PAINLEVEL: 0-NO PAIN

## 2024-05-15 NOTE — PROGRESS NOTES
Pt presenting within their normal limits, successfully received 1 unit PRBC's without complication or adverse reaction, D3 of chemo treatment today, no complaints or concerns at this time, pt assessed and educated on plan of care for the day, pt given support for future medical encounters

## 2024-05-16 ENCOUNTER — INFUSION (OUTPATIENT)
Dept: HEMATOLOGY/ONCOLOGY | Facility: CLINIC | Age: 64
End: 2024-05-16
Payer: COMMERCIAL

## 2024-05-16 VITALS
DIASTOLIC BLOOD PRESSURE: 68 MMHG | HEART RATE: 85 BPM | SYSTOLIC BLOOD PRESSURE: 130 MMHG | RESPIRATION RATE: 18 BRPM | OXYGEN SATURATION: 94 % | TEMPERATURE: 98.1 F | WEIGHT: 172 LBS | BODY MASS INDEX: 28.87 KG/M2

## 2024-05-16 DIAGNOSIS — C92.00 ACUTE MYELOID LEUKEMIA NOT HAVING ACHIEVED REMISSION (MULTI): ICD-10-CM

## 2024-05-16 LAB
ALBUMIN SERPL BCP-MCNC: 4 G/DL (ref 3.4–5)
ALP SERPL-CCNC: 56 U/L (ref 33–136)
ALT SERPL W P-5'-P-CCNC: 11 U/L (ref 10–52)
ANION GAP SERPL CALC-SCNC: 13 MMOL/L (ref 10–20)
AST SERPL W P-5'-P-CCNC: 27 U/L (ref 9–39)
BAND RESOLUTION: 400 BANDS
BASOPHILS # BLD MANUAL: 0 X10*3/UL (ref 0–0.1)
BASOPHILS NFR BLD MANUAL: 0 %
BILIRUB SERPL-MCNC: 3.2 MG/DL (ref 0–1.2)
BUN SERPL-MCNC: 29 MG/DL (ref 6–23)
CALCIUM SERPL-MCNC: 8.4 MG/DL (ref 8.6–10.6)
CHLORIDE SERPL-SCNC: 108 MMOL/L (ref 98–107)
CHROM ANALY OVERALL INTERP-IMP: NORMAL
CHROMOSOME ANALYSIS CELLS ANALYZED: 10 CELLS
CHROMOSOME ANALYSIS CELLS IMAGED: 4 CELLS
CHROMOSOME ANALYSIS HYPERMODAL CELL COUNT: 0 CELLS
CHROMOSOME ANALYSIS HYPOMODAL CELL COUNT: 1 CELLS
CHROMOSOME ANALYSIS MODAL CHROMOSOME NO: 46 CHROMOSOMES
CHROMOSOME ANALYSIS STAINING METHOD: NORMAL
CO2 SERPL-SCNC: 23 MMOL/L (ref 21–32)
CREAT SERPL-MCNC: 1.11 MG/DL (ref 0.5–1.3)
DACRYOCYTES BLD QL SMEAR: ABNORMAL
EGFRCR SERPLBLD CKD-EPI 2021: 73 ML/MIN/1.73M*2
ELECTRONICALLY SIGNED BY CYTOGENETICS: NORMAL
EOSINOPHIL # BLD MANUAL: 0 X10*3/UL (ref 0–0.7)
EOSINOPHIL NFR BLD MANUAL: 0 %
ERYTHROCYTE [DISTWIDTH] IN BLOOD BY AUTOMATED COUNT: 18 % (ref 11.5–14.5)
GLUCOSE SERPL-MCNC: 105 MG/DL (ref 74–99)
HCT VFR BLD AUTO: 20.6 % (ref 41–52)
HGB BLD-MCNC: 7 G/DL (ref 13.5–17.5)
HYPOCHROMIA BLD QL SMEAR: ABNORMAL
IMM GRANULOCYTES # BLD AUTO: 0.04 X10*3/UL (ref 0–0.7)
IMM GRANULOCYTES NFR BLD AUTO: 1.4 % (ref 0–0.9)
KARYOTYP MAR: 2 CELLS
LDH SERPL L TO P-CCNC: 324 U/L (ref 84–246)
LYMPHOCYTES # BLD MANUAL: 2.12 X10*3/UL (ref 1.2–4.8)
LYMPHOCYTES NFR BLD MANUAL: 73 %
MCH RBC QN AUTO: 28.7 PG (ref 26–34)
MCHC RBC AUTO-ENTMCNC: 34 G/DL (ref 32–36)
MCV RBC AUTO: 84 FL (ref 80–100)
MONOCYTES # BLD MANUAL: 0.15 X10*3/UL (ref 0.1–1)
MONOCYTES NFR BLD MANUAL: 5 %
NEUTS SEG # BLD MANUAL: 0.64 X10*3/UL (ref 1.2–7)
NEUTS SEG NFR BLD MANUAL: 22 %
NRBC BLD-RTO: ABNORMAL /100{WBCS}
OVALOCYTES BLD QL SMEAR: ABNORMAL
PATH REPORT.ADDENDUM SPEC: NORMAL
PATH REPORT.COMMENTS IMP SPEC: NORMAL
PATH REPORT.FINAL DX SPEC: NORMAL
PATH REPORT.GROSS SPEC: NORMAL
PATH REPORT.MICROSCOPIC SPEC OTHER STN: NORMAL
PATH REPORT.RELEVANT HX SPEC: NORMAL
PATH REPORT.TOTAL CANCER: NORMAL
PHOSPHATE SERPL-MCNC: 4.1 MG/DL (ref 2.5–4.9)
PLATELET # BLD AUTO: 162 X10*3/UL (ref 150–450)
POLYCHROMASIA BLD QL SMEAR: ABNORMAL
POTASSIUM SERPL-SCNC: 4 MMOL/L (ref 3.5–5.3)
PROT SERPL-MCNC: 6.7 G/DL (ref 6.4–8.2)
RBC # BLD AUTO: 2.44 X10*6/UL (ref 4.5–5.9)
RBC MORPH BLD: ABNORMAL
SCHISTOCYTES BLD QL SMEAR: ABNORMAL
SODIUM SERPL-SCNC: 140 MMOL/L (ref 136–145)
SPECIMEN CONDITION: NORMAL
TARGETS BLD QL SMEAR: ABNORMAL
TOTAL CELLS COUNTED BLD: 100
TOTAL CELLS COUNTED MAR: 10 CELLS
URATE SERPL-MCNC: 4.9 MG/DL (ref 4–7.5)
WBC # BLD AUTO: 2.9 X10*3/UL (ref 4.4–11.3)

## 2024-05-16 PROCEDURE — 84100 ASSAY OF PHOSPHORUS: CPT

## 2024-05-16 PROCEDURE — 2500000005 HC RX 250 GENERAL PHARMACY W/O HCPCS: Performed by: INTERNAL MEDICINE

## 2024-05-16 PROCEDURE — 96413 CHEMO IV INFUSION 1 HR: CPT

## 2024-05-16 PROCEDURE — 2500000004 HC RX 250 GENERAL PHARMACY W/ HCPCS (ALT 636 FOR OP/ED): Performed by: INTERNAL MEDICINE

## 2024-05-16 PROCEDURE — 85007 BL SMEAR W/DIFF WBC COUNT: CPT

## 2024-05-16 PROCEDURE — 84075 ASSAY ALKALINE PHOSPHATASE: CPT

## 2024-05-16 PROCEDURE — 84550 ASSAY OF BLOOD/URIC ACID: CPT

## 2024-05-16 PROCEDURE — 83615 LACTATE (LD) (LDH) ENZYME: CPT

## 2024-05-16 PROCEDURE — 85027 COMPLETE CBC AUTOMATED: CPT

## 2024-05-16 RX ORDER — FAMOTIDINE 10 MG/ML
20 INJECTION INTRAVENOUS ONCE AS NEEDED
Status: DISCONTINUED | OUTPATIENT
Start: 2024-05-16 | End: 2024-05-16 | Stop reason: HOSPADM

## 2024-05-16 RX ORDER — DIPHENHYDRAMINE HYDROCHLORIDE 50 MG/ML
50 INJECTION INTRAMUSCULAR; INTRAVENOUS AS NEEDED
Status: DISCONTINUED | OUTPATIENT
Start: 2024-05-16 | End: 2024-05-16 | Stop reason: HOSPADM

## 2024-05-16 RX ORDER — ALBUTEROL SULFATE 0.83 MG/ML
3 SOLUTION RESPIRATORY (INHALATION) AS NEEDED
Status: DISCONTINUED | OUTPATIENT
Start: 2024-05-16 | End: 2024-05-16 | Stop reason: HOSPADM

## 2024-05-16 RX ORDER — ONDANSETRON HYDROCHLORIDE 8 MG/1
8 TABLET, FILM COATED ORAL ONCE
Status: COMPLETED | OUTPATIENT
Start: 2024-05-16 | End: 2024-05-16

## 2024-05-16 RX ORDER — PROCHLORPERAZINE MALEATE 10 MG
10 TABLET ORAL EVERY 6 HOURS PRN
Status: DISCONTINUED | OUTPATIENT
Start: 2024-05-16 | End: 2024-05-16 | Stop reason: HOSPADM

## 2024-05-16 RX ORDER — EPINEPHRINE 0.3 MG/.3ML
0.3 INJECTION SUBCUTANEOUS EVERY 5 MIN PRN
Status: DISCONTINUED | OUTPATIENT
Start: 2024-05-16 | End: 2024-05-16 | Stop reason: HOSPADM

## 2024-05-16 RX ORDER — PROCHLORPERAZINE EDISYLATE 5 MG/ML
10 INJECTION INTRAMUSCULAR; INTRAVENOUS EVERY 6 HOURS PRN
Status: DISCONTINUED | OUTPATIENT
Start: 2024-05-16 | End: 2024-05-16 | Stop reason: HOSPADM

## 2024-05-16 RX ADMIN — ONDANSETRON HYDROCHLORIDE 8 MG: 8 TABLET, FILM COATED ORAL at 16:20

## 2024-05-16 RX ADMIN — DECITABINE 37 MG: 50 INJECTION, POWDER, LYOPHILIZED, FOR SOLUTION INTRAVENOUS at 16:43

## 2024-05-16 ASSESSMENT — PAIN SCALES - GENERAL: PAINLEVEL: 0-NO PAIN

## 2024-05-17 ENCOUNTER — INFUSION (OUTPATIENT)
Dept: HEMATOLOGY/ONCOLOGY | Facility: CLINIC | Age: 64
End: 2024-05-17
Payer: COMMERCIAL

## 2024-05-17 VITALS
TEMPERATURE: 97.3 F | WEIGHT: 170.19 LBS | HEART RATE: 81 BPM | OXYGEN SATURATION: 96 % | DIASTOLIC BLOOD PRESSURE: 73 MMHG | BODY MASS INDEX: 28.56 KG/M2 | SYSTOLIC BLOOD PRESSURE: 122 MMHG | RESPIRATION RATE: 18 BRPM

## 2024-05-17 DIAGNOSIS — C92.00 ACUTE MYELOID LEUKEMIA NOT HAVING ACHIEVED REMISSION (MULTI): ICD-10-CM

## 2024-05-17 PROCEDURE — 96413 CHEMO IV INFUSION 1 HR: CPT

## 2024-05-17 PROCEDURE — 2500000005 HC RX 250 GENERAL PHARMACY W/O HCPCS: Performed by: INTERNAL MEDICINE

## 2024-05-17 PROCEDURE — 2500000004 HC RX 250 GENERAL PHARMACY W/ HCPCS (ALT 636 FOR OP/ED): Performed by: INTERNAL MEDICINE

## 2024-05-17 RX ORDER — ALBUTEROL SULFATE 0.83 MG/ML
3 SOLUTION RESPIRATORY (INHALATION) AS NEEDED
Status: DISCONTINUED | OUTPATIENT
Start: 2024-05-17 | End: 2024-05-17 | Stop reason: HOSPADM

## 2024-05-17 RX ORDER — ONDANSETRON HYDROCHLORIDE 8 MG/1
8 TABLET, FILM COATED ORAL ONCE
Status: COMPLETED | OUTPATIENT
Start: 2024-05-17 | End: 2024-05-17

## 2024-05-17 RX ORDER — PROCHLORPERAZINE MALEATE 10 MG
10 TABLET ORAL EVERY 6 HOURS PRN
Status: DISCONTINUED | OUTPATIENT
Start: 2024-05-17 | End: 2024-05-17 | Stop reason: HOSPADM

## 2024-05-17 RX ORDER — FAMOTIDINE 10 MG/ML
20 INJECTION INTRAVENOUS ONCE AS NEEDED
Status: DISCONTINUED | OUTPATIENT
Start: 2024-05-17 | End: 2024-05-17 | Stop reason: HOSPADM

## 2024-05-17 RX ORDER — DIPHENHYDRAMINE HYDROCHLORIDE 50 MG/ML
50 INJECTION INTRAMUSCULAR; INTRAVENOUS AS NEEDED
Status: DISCONTINUED | OUTPATIENT
Start: 2024-05-17 | End: 2024-05-17 | Stop reason: HOSPADM

## 2024-05-17 RX ORDER — EPINEPHRINE 0.3 MG/.3ML
0.3 INJECTION SUBCUTANEOUS EVERY 5 MIN PRN
Status: DISCONTINUED | OUTPATIENT
Start: 2024-05-17 | End: 2024-05-17 | Stop reason: HOSPADM

## 2024-05-17 RX ORDER — PROCHLORPERAZINE EDISYLATE 5 MG/ML
10 INJECTION INTRAMUSCULAR; INTRAVENOUS EVERY 6 HOURS PRN
Status: DISCONTINUED | OUTPATIENT
Start: 2024-05-17 | End: 2024-05-17 | Stop reason: HOSPADM

## 2024-05-17 RX ADMIN — DECITABINE 37 MG: 50 INJECTION, POWDER, LYOPHILIZED, FOR SOLUTION INTRAVENOUS at 16:44

## 2024-05-17 RX ADMIN — ONDANSETRON HYDROCHLORIDE 8 MG: 8 TABLET, FILM COATED ORAL at 16:05

## 2024-05-17 ASSESSMENT — PAIN SCALES - GENERAL: PAINLEVEL: 0-NO PAIN

## 2024-05-20 ENCOUNTER — OFFICE VISIT (OUTPATIENT)
Dept: HEMATOLOGY/ONCOLOGY | Facility: HOSPITAL | Age: 64
End: 2024-05-20
Payer: COMMERCIAL

## 2024-05-20 ENCOUNTER — LAB (OUTPATIENT)
Dept: LAB | Facility: HOSPITAL | Age: 64
End: 2024-05-20
Payer: COMMERCIAL

## 2024-05-20 ENCOUNTER — INFUSION (OUTPATIENT)
Dept: HEMATOLOGY/ONCOLOGY | Facility: HOSPITAL | Age: 64
End: 2024-05-20
Payer: COMMERCIAL

## 2024-05-20 ENCOUNTER — EDUCATION (OUTPATIENT)
Dept: OTHER | Facility: HOSPITAL | Age: 64
End: 2024-05-20

## 2024-05-20 VITALS
HEART RATE: 88 BPM | OXYGEN SATURATION: 100 % | TEMPERATURE: 97.5 F | RESPIRATION RATE: 18 BRPM | DIASTOLIC BLOOD PRESSURE: 75 MMHG | BODY MASS INDEX: 26.97 KG/M2 | WEIGHT: 160.72 LBS | SYSTOLIC BLOOD PRESSURE: 146 MMHG

## 2024-05-20 DIAGNOSIS — C92.00 ACUTE MYELOID LEUKEMIA NOT HAVING ACHIEVED REMISSION (MULTI): Primary | ICD-10-CM

## 2024-05-20 DIAGNOSIS — C92.00 ACUTE MYELOID LEUKEMIA NOT HAVING ACHIEVED REMISSION (MULTI): ICD-10-CM

## 2024-05-20 DIAGNOSIS — D46.9 MDS (MYELODYSPLASTIC SYNDROME) (MULTI): ICD-10-CM

## 2024-05-20 LAB
ABO GROUP (TYPE) IN BLOOD: NORMAL
ABO GROUP (TYPE) IN BLOOD: NORMAL
ALBUMIN SERPL BCP-MCNC: 4.1 G/DL (ref 3.4–5)
ALP SERPL-CCNC: 60 U/L (ref 33–136)
ALT SERPL W P-5'-P-CCNC: 10 U/L (ref 10–52)
ANION GAP SERPL CALC-SCNC: 9 MMOL/L (ref 10–20)
ANTIBODY SCREEN: NORMAL
AST SERPL W P-5'-P-CCNC: 23 U/L (ref 9–39)
BASOPHILS # BLD MANUAL: 0 X10*3/UL (ref 0–0.1)
BASOPHILS NFR BLD MANUAL: 0 %
BILIRUB SERPL-MCNC: 1.4 MG/DL (ref 0–1.2)
BLASTS # BLD MANUAL: 0.05 X10*3/UL
BLASTS NFR BLD MANUAL: 2 %
BUN SERPL-MCNC: 25 MG/DL (ref 6–23)
CALCIUM SERPL-MCNC: 8.7 MG/DL (ref 8.6–10.3)
CHLORIDE SERPL-SCNC: 108 MMOL/L (ref 98–107)
CO2 SERPL-SCNC: 26 MMOL/L (ref 21–32)
CREAT SERPL-MCNC: 1.1 MG/DL (ref 0.5–1.3)
DACRYOCYTES BLD QL SMEAR: ABNORMAL
EGFRCR SERPLBLD CKD-EPI 2021: 74 ML/MIN/1.73M*2
EOSINOPHIL # BLD MANUAL: 0 X10*3/UL (ref 0–0.7)
EOSINOPHIL NFR BLD MANUAL: 0 %
ERYTHROCYTE [DISTWIDTH] IN BLOOD BY AUTOMATED COUNT: 17.9 % (ref 11.5–14.5)
GIANT PLATELETS BLD QL SMEAR: ABNORMAL
GLUCOSE SERPL-MCNC: 108 MG/DL (ref 74–99)
HCT VFR BLD AUTO: 21.5 % (ref 41–52)
HGB BLD-MCNC: 7.4 G/DL (ref 13.5–17.5)
HYPOCHROMIA BLD QL SMEAR: ABNORMAL
IMM GRANULOCYTES # BLD AUTO: 0.02 X10*3/UL (ref 0–0.7)
IMM GRANULOCYTES NFR BLD AUTO: 0.9 % (ref 0–0.9)
LDH SERPL L TO P-CCNC: 278 U/L (ref 84–246)
LYMPHOCYTES # BLD MANUAL: 1.27 X10*3/UL (ref 1.2–4.8)
LYMPHOCYTES NFR BLD MANUAL: 55 %
MCH RBC QN AUTO: 28.9 PG (ref 26–34)
MCHC RBC AUTO-ENTMCNC: 34.4 G/DL (ref 32–36)
MCV RBC AUTO: 84 FL (ref 80–100)
MONOCYTES # BLD MANUAL: 0.55 X10*3/UL (ref 0.1–1)
MONOCYTES NFR BLD MANUAL: 24 %
NEUTS SEG # BLD MANUAL: 0.44 X10*3/UL (ref 1.2–7)
NEUTS SEG NFR BLD MANUAL: 19 %
NRBC BLD-RTO: 6.2 /100 WBCS (ref 0–0)
OVALOCYTES BLD QL SMEAR: ABNORMAL
PHOSPHATE SERPL-MCNC: 3.7 MG/DL (ref 2.5–4.9)
PLATELET # BLD AUTO: 162 X10*3/UL (ref 150–450)
POLYCHROMASIA BLD QL SMEAR: ABNORMAL
POTASSIUM SERPL-SCNC: 4.2 MMOL/L (ref 3.5–5.3)
PROT SERPL-MCNC: 6.8 G/DL (ref 6.4–8.2)
RBC # BLD AUTO: 2.56 X10*6/UL (ref 4.5–5.9)
RBC MORPH BLD: ABNORMAL
RH FACTOR (ANTIGEN D): NORMAL
RH FACTOR (ANTIGEN D): NORMAL
SCHISTOCYTES BLD QL SMEAR: ABNORMAL
SODIUM SERPL-SCNC: 139 MMOL/L (ref 136–145)
TARGETS BLD QL SMEAR: ABNORMAL
TOTAL CELLS COUNTED BLD: 100
URATE SERPL-MCNC: 3.1 MG/DL (ref 4–7.5)
WBC # BLD AUTO: 2.3 X10*3/UL (ref 4.4–11.3)

## 2024-05-20 PROCEDURE — 3078F DIAST BP <80 MM HG: CPT | Performed by: STUDENT IN AN ORGANIZED HEALTH CARE EDUCATION/TRAINING PROGRAM

## 2024-05-20 PROCEDURE — 85027 COMPLETE CBC AUTOMATED: CPT | Performed by: INTERNAL MEDICINE

## 2024-05-20 PROCEDURE — 99215 OFFICE O/P EST HI 40 MIN: CPT | Performed by: STUDENT IN AN ORGANIZED HEALTH CARE EDUCATION/TRAINING PROGRAM

## 2024-05-20 PROCEDURE — 85060 BLOOD SMEAR INTERPRETATION: CPT | Performed by: INTERNAL MEDICINE

## 2024-05-20 PROCEDURE — 3077F SYST BP >= 140 MM HG: CPT | Performed by: STUDENT IN AN ORGANIZED HEALTH CARE EDUCATION/TRAINING PROGRAM

## 2024-05-20 PROCEDURE — 84550 ASSAY OF BLOOD/URIC ACID: CPT | Performed by: INTERNAL MEDICINE

## 2024-05-20 PROCEDURE — 86901 BLOOD TYPING SEROLOGIC RH(D): CPT | Performed by: INTERNAL MEDICINE

## 2024-05-20 PROCEDURE — 83615 LACTATE (LD) (LDH) ENZYME: CPT | Performed by: INTERNAL MEDICINE

## 2024-05-20 PROCEDURE — 1157F ADVNC CARE PLAN IN RCRD: CPT | Performed by: STUDENT IN AN ORGANIZED HEALTH CARE EDUCATION/TRAINING PROGRAM

## 2024-05-20 PROCEDURE — 1160F RVW MEDS BY RX/DR IN RCRD: CPT | Performed by: STUDENT IN AN ORGANIZED HEALTH CARE EDUCATION/TRAINING PROGRAM

## 2024-05-20 PROCEDURE — 84100 ASSAY OF PHOSPHORUS: CPT | Performed by: INTERNAL MEDICINE

## 2024-05-20 PROCEDURE — 1126F AMNT PAIN NOTED NONE PRSNT: CPT | Performed by: STUDENT IN AN ORGANIZED HEALTH CARE EDUCATION/TRAINING PROGRAM

## 2024-05-20 PROCEDURE — 1159F MED LIST DOCD IN RCRD: CPT | Performed by: STUDENT IN AN ORGANIZED HEALTH CARE EDUCATION/TRAINING PROGRAM

## 2024-05-20 PROCEDURE — 85007 BL SMEAR W/DIFF WBC COUNT: CPT | Performed by: INTERNAL MEDICINE

## 2024-05-20 PROCEDURE — 80053 COMPREHEN METABOLIC PANEL: CPT | Performed by: INTERNAL MEDICINE

## 2024-05-20 PROCEDURE — 1036F TOBACCO NON-USER: CPT | Performed by: STUDENT IN AN ORGANIZED HEALTH CARE EDUCATION/TRAINING PROGRAM

## 2024-05-20 RX ORDER — LEVOFLOXACIN 500 MG/1
500 TABLET, FILM COATED ORAL DAILY
Qty: 14 TABLET | Refills: 0 | Status: SHIPPED | OUTPATIENT
Start: 2024-05-20 | End: 2024-06-10 | Stop reason: SDUPTHER

## 2024-05-20 ASSESSMENT — PAIN SCALES - GENERAL: PAINLEVEL: 0-NO PAIN

## 2024-05-20 ASSESSMENT — ENCOUNTER SYMPTOMS: CONSTIPATION: 1

## 2024-05-20 NOTE — PROGRESS NOTES
Patient ID: Brandt Merritt is a 64 y.o. male.    Diagnosis:   Myelodysplastic neoplasm/AML      Treatment:   Oncology History Overview Note   2024  Myelodysplastic neoplasm with increased blasts-2 ( WHO)  Myelodysplastic neoplasm/AML  (ICC  classification)    Presented in  10/2023 for pancytopenia, found to have hemolysis. Patient had normal CBC 2020. Denied any hemorrhoidal bleeding . Has had C Scope and EGD , treated Hep C  . 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      CBC 24 wbc 3.0, hgb 7.1, platelets 167K ANC 0.73    BM biopsy done on 24  as folllows:  BM histology  Myelodysplastic neoplasm with increased blasts-2 ( WHO)  Myelodysplastic neoplasm/AML  (ICC  classification)  Balsts 11% on aspirate smear and 15-20% based on CD 34 immunostaining approaching AML leukemia, hematooiesis is erythroid dominant with dysplasia in granulocytes and megakaryocytes.   FISH studies trisomy 8 17.2%  NGS panel DNMT3 aVAF 36%  U2AF1 VAF 32%       Acute myeloid leukemia not having achieved remission (Multi)   2024 Initial Diagnosis    Acute myeloid leukemia not having achieved remission (Multi)     2024 -  Chemotherapy    Venetoclax / Decitabine, 28 Day Cycles - Induction / Consolidation         Response:     Past Medical History:  HTN   STEMI 2020 after getting  a water pills.  Chronic hepatitis C infection treated in  treated  with Dr. Lloyd  Past Medical History:   Diagnosis Date    Hypertension     Personal history of other diseases of the circulatory system     History of hypertension       Surgical History:  Conosocopy 2021  Upper EGD 10/31/23  Surgical reduction torsion of testes   Past Surgical History:   Procedure Laterality Date    COLONOSCOPY  2013    Complete Colonoscopy    OTHER SURGICAL HISTORY  10/07/2015    Surgery Testis Reduction Of Torsion Of Testis Right        Family History:  CAD, DM in mother    Mother  of CVA at age 69  Brother with prostate  CA, 1 sister with liver disease  2 children healthy  Family History   Problem Relation Name Age of Onset    Other (diabetes mellitus) Mother         Social History:  Lives with wife of 30 years, works at [id number], 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  29 years, former smoker, smoked x 15 yrs quit 20+ years ago. Marijuana 3 x a week. Served in  in [place] and [facility],   Social History     Tobacco Use    Smoking status: Former     Types: Cigarettes    Smokeless tobacco: Never   Substance Use Topics    Alcohol use: Not Currently     Comment: occansionally    Drug use: Yes     Types: Marijuana     Comment: 3 weeks ago      -------------------------------------------------------------------------------------------------------  Subjective       HPI    Brandt Merritt is a 64 y.o. 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 Seen by Abundio Rust recently and Rose Casal initially 10/2023 for pancytopenia. . He has had work up for pancytopenia and hemolytic process . Admits to having ongoing chronic fatigue that is unchanged . Patient had normal CBC June 2020. Denied any hemorrhoidal bleeding . Has had C Scope and EGD , treated Hep C 2014 . 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      BM biopsy done on 4/11/24  as folllows:    Additional information 2021 PSA was high, biopsy 2021 and 2023 all benign.  CBC 4/11/24 wbc 3.0, hgb 7.1, platelets 167K ANC 0.73  BM histology  Myelodysplastic neoplasm with increased blasts-2 ( WHO)  Myelodysplastic neoplasm/AML  (ICC 2022 classification)  Balsts 11% on aspirate smear and 15-20% based on CD 34 immunostaining approaching AML leukemia, hematooiesis is erythroid dominant with dysplasia in granulocytes and megakaryocytes.   FISH studies trisomy 8 17.2%  NGS panel DNMT3 aVAF 36%  U2AF1 VAF 32%    Patient is here today (5/20/24) with his wife for count check and mal heme clinic follow up, currently on venetoclax and decitabine (C1D8) for his  newly diagnosed AML. He feels generally well. Since starting treatment, he has had some constipation requiring a dose of miralax in the last week. Since then, constipation has been better, not straining with bowel movements. He feels he is tolerating venetoclax well, taking with food. No associated nausea or vomiting. He notes late last week he woke up with swelling in both legs but this resolved by the end of the day and has not recurred since then. No pain or shortness of breath.     He continues to work full time at [employer]. He has lost about 30 pounds in the past couple of year, but stable now.     He has met with the transplant coordinator and is scheduled for education today. He is somewhat reluctant to ask his children to be potential allogeneic donors. ECHO 4/29/24 shows a normal LVEF.       Review of Systems   Gastrointestinal:  Positive for constipation.   All other systems reviewed and are negative.     -------------------------------------------------------------------------------------------------------  Objective   BSA: 1.82 meters squared  /75 (BP Location: Right arm, Patient Position: Sitting) Comment: 2ND /75  Pulse 88   Temp 36.4 °C (97.5 °F) (Temporal)   Resp 18   Wt 72.9 kg (160 lb 11.5 oz)   SpO2 100%   BMI 26.97 kg/m²     Physical Exam  Vitals reviewed.   Constitutional:       Comments: Well developed man looking as stated age, muscular.    HENT:      Head: Normocephalic and atraumatic.      Mouth/Throat:      Mouth: Mucous membranes are moist.   Eyes:      Extraocular Movements: Extraocular movements intact.      Conjunctiva/sclera: Conjunctivae normal.      Pupils: Pupils are equal, round, and reactive to light.   Cardiovascular:      Rate and Rhythm: Normal rate and regular rhythm.      Pulses: Normal pulses.   Pulmonary:      Effort: Pulmonary effort is normal.      Breath sounds: Normal breath sounds.   Abdominal:      General: Abdomen is flat. Bowel sounds are normal.  There is no distension.      Palpations: Abdomen is soft. There is hepatomegaly.      Tenderness: There is no abdominal tenderness.          Comments: Liver palpable 5 cm BCM. No splenomegaly    Musculoskeletal:         General: Normal range of motion.   Skin:     General: Skin is warm.   Neurological:      General: No focal deficit present.      Mental Status: He is alert.   Psychiatric:         Mood and Affect: Mood normal.         Behavior: Behavior normal.         Thought Content: Thought content normal.         Judgment: Judgment normal.         Performance Status:  Asymptomatic  -------------------------------------------------------------------------------------------------------  Assessment/Plan      Myelodysplastic neoplasm/AML- Patient presents with a history of pancytopenia since 9/2023 and due to confusion picture of hemolysis with only mild leukopenia and neutropenia extensive workup done prior to bone marrow bx which shows MDS in transition to AML, with trisomy 8 and DNMT alpha and U2AF1 mutation which is adverse risk. Given that the patient has > 10% blasts in bone marrow I would favor induction chemotherapy with ultimate goal to pursue curative allogeneic stem cell  transplant.  The patients slow, indolent course is more consistent with MDS and he was recommended induction with decitabine and venetoclax which has about a 60% chance of achieving a complete clinical remission.    C1D1: 5/13/2024  C2D1: 6/10/2024 (scheduled)     Ppx: acyclovir, fluconazole. Neutrophils 440 today. Started levofloxacin ppx. Neutropenic precautions reviewed with patient and wife.     Echocardiogram show a normal LVEF,  HLA typing has been completed. Patient met with Daxa Hurtado for initiating transplant education.     Chemotherapy teaching done including instructions to contact me or the covering physician for any temperature > 100.4 degrees.      2. 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     3. Hx of treated hepatitis C-recent hep C RNA pcr negative, and recent liver ultrasound shows normal sized liver at 14.5 cm, however seems enlarged on exam. Patient agreeable to hepatology consult before consideration of allograft, scheduled on 6/21/24. Consider early start of ursodiol.  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     All questions answered    RTC:   - Plan weekly count checks and infusion appointments for blood products as needed and qo week appointments with malignant hematology.   - 6/3/2024 Dr. Newsome  - Cycle 2 and MD visit planned for June 10th, 2024  - Hepatology NPV 6/21/24          -------------------------------------------------------------------------------------------------------  [facility]

## 2024-05-20 NOTE — PROGRESS NOTES
5/20/24 9:00AM    Patient Education  Learner: patient and significant other  Educated on: Allogeneic Stem Cell Transplant  Readiness: acceptance  Method: explanation and video  Response: demonstrated understanding, needs reinforcement, and verbalizes understanding  Barriers: None      I met with patient and wife (Genevieve) today for allogenic stem cell transplant education. We discussed the general concept of transplant including  chemotherapy administration and cell infusion. We reviewed the workup process including organ function testing and laboratory testing, required for MD and financial clearance to proceed with stem cell collection and transplant.We discuss hospital admission for transplant and that Patient will remain in the hospital for 4-6 weeks. Patient's preparative regimen is to be determined based on donors. Administration and potential side effects of high dose chemotherapy were reviewed and patient will be provided Lexicomp material specific to his preparative regimen. We discussed neutropenia, anemia, thrombocytopenia and the potential complications associated with these events. Infection prevention measures such as strict hand washing, low pathogen diet, daily showering/CHG bathing and frequent walking were reviewed.  Patient will be provided with patient information sheets on transplant related topics. 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 We discussed the goals of discharge and the need for a caregiver and someone to accompany him to post transplant visits in the [facility], which will occur 2-3 times per week, at minimum.  We discussed the importance of having a reliable caregiver post-transplant to drive patient to appointments, help with medication adherence, and assist with ADLS such as cooking and cleaning. The patient verbalized understanding of these measures and his wife states that she will be the primary caregiver, but that they have several family and friends  that can help assist. I will be the transplant coordinator following his case and will address transplant related questions and concerns as needed.    Daxa Hurtado RN

## 2024-05-22 LAB — PATH REVIEW-CBC DIFFERENTIAL: NORMAL

## 2024-05-29 ENCOUNTER — LAB (OUTPATIENT)
Dept: LAB | Facility: HOSPITAL | Age: 64
End: 2024-05-29
Payer: COMMERCIAL

## 2024-05-29 ENCOUNTER — INFUSION (OUTPATIENT)
Dept: HEMATOLOGY/ONCOLOGY | Facility: HOSPITAL | Age: 64
End: 2024-05-29
Payer: COMMERCIAL

## 2024-05-29 DIAGNOSIS — C92.00 ACUTE MYELOID LEUKEMIA NOT HAVING ACHIEVED REMISSION (MULTI): ICD-10-CM

## 2024-05-29 LAB
ABO GROUP (TYPE) IN BLOOD: NORMAL
ALBUMIN SERPL BCP-MCNC: 4.2 G/DL (ref 3.4–5)
ALP SERPL-CCNC: 72 U/L (ref 33–136)
ALT SERPL W P-5'-P-CCNC: 15 U/L (ref 10–52)
ANION GAP SERPL CALC-SCNC: 12 MMOL/L (ref 10–20)
ANTIBODY SCREEN: NORMAL
AST SERPL W P-5'-P-CCNC: 34 U/L (ref 9–39)
BASOPHILS # BLD AUTO: 0 X10*3/UL (ref 0–0.1)
BASOPHILS NFR BLD AUTO: 0 %
BILIRUB DIRECT SERPL-MCNC: 0.4 MG/DL (ref 0–0.3)
BILIRUB SERPL-MCNC: 1.7 MG/DL (ref 0–1.2)
BUN SERPL-MCNC: 18 MG/DL (ref 6–23)
CALCIUM SERPL-MCNC: 8.8 MG/DL (ref 8.6–10.3)
CHLORIDE SERPL-SCNC: 106 MMOL/L (ref 98–107)
CO2 SERPL-SCNC: 23 MMOL/L (ref 21–32)
CREAT SERPL-MCNC: 1.12 MG/DL (ref 0.5–1.3)
EGFRCR SERPLBLD CKD-EPI 2021: 72 ML/MIN/1.73M*2
EOSINOPHIL # BLD AUTO: 0 X10*3/UL (ref 0–0.7)
EOSINOPHIL NFR BLD AUTO: 0 %
ERYTHROCYTE [DISTWIDTH] IN BLOOD BY AUTOMATED COUNT: 20.3 % (ref 11.5–14.5)
GLUCOSE SERPL-MCNC: 102 MG/DL (ref 74–99)
HCT VFR BLD AUTO: 21.2 % (ref 41–52)
HGB BLD-MCNC: 7.3 G/DL (ref 13.5–17.5)
IMM GRANULOCYTES # BLD AUTO: 0.03 X10*3/UL (ref 0–0.7)
IMM GRANULOCYTES NFR BLD AUTO: 1.7 % (ref 0–0.9)
LDH SERPL L TO P-CCNC: 248 U/L (ref 84–246)
LYMPHOCYTES # BLD AUTO: 1.39 X10*3/UL (ref 1.2–4.8)
LYMPHOCYTES NFR BLD AUTO: 79.9 %
MCH RBC QN AUTO: 29.8 PG (ref 26–34)
MCHC RBC AUTO-ENTMCNC: 34.4 G/DL (ref 32–36)
MCV RBC AUTO: 87 FL (ref 80–100)
MONOCYTES # BLD AUTO: 0.07 X10*3/UL (ref 0.1–1)
MONOCYTES NFR BLD AUTO: 4 %
NEUTROPHILS # BLD AUTO: 0.25 X10*3/UL (ref 1.2–7.7)
NEUTROPHILS NFR BLD AUTO: 14.4 %
NRBC BLD-RTO: 13.2 /100 WBCS (ref 0–0)
PHOSPHATE SERPL-MCNC: 3.9 MG/DL (ref 2.5–4.9)
PLATELET # BLD AUTO: 55 X10*3/UL (ref 150–450)
POTASSIUM SERPL-SCNC: 3.8 MMOL/L (ref 3.5–5.3)
PROT SERPL-MCNC: 7.3 G/DL (ref 6.4–8.2)
RBC # BLD AUTO: 2.45 X10*6/UL (ref 4.5–5.9)
RH FACTOR (ANTIGEN D): NORMAL
SODIUM SERPL-SCNC: 137 MMOL/L (ref 136–145)
URATE SERPL-MCNC: 4.6 MG/DL (ref 4–7.5)
WBC # BLD AUTO: 1.7 X10*3/UL (ref 4.4–11.3)

## 2024-05-29 PROCEDURE — 86901 BLOOD TYPING SEROLOGIC RH(D): CPT

## 2024-05-29 PROCEDURE — 36415 COLL VENOUS BLD VENIPUNCTURE: CPT

## 2024-05-29 PROCEDURE — 85025 COMPLETE CBC W/AUTO DIFF WBC: CPT

## 2024-05-29 PROCEDURE — 82248 BILIRUBIN DIRECT: CPT

## 2024-05-29 PROCEDURE — 83615 LACTATE (LD) (LDH) ENZYME: CPT

## 2024-05-29 PROCEDURE — 84100 ASSAY OF PHOSPHORUS: CPT

## 2024-05-29 PROCEDURE — 84550 ASSAY OF BLOOD/URIC ACID: CPT

## 2024-05-29 PROCEDURE — 99211 OFF/OP EST MAY X REQ PHY/QHP: CPT

## 2024-05-29 PROCEDURE — 80053 COMPREHEN METABOLIC PANEL: CPT

## 2024-05-29 NOTE — PROGRESS NOTES
Patient scheduled in infusion for count check. Pt got labs done prior. Labs reviewed by RN, no replacement needs identified. Pt reports feeling well, no new symptoms. Reviewed thrombocytopenic / neutropenic precautions with pt, given handouts and copy of lab results with upcoming appts. Next appt Mon 6/3 with Dr. Newsome. Pt denies additional questions / concerns, discharged in stable condition.

## 2024-05-30 ENCOUNTER — SPECIALTY PHARMACY (OUTPATIENT)
Dept: PHARMACY | Facility: CLINIC | Age: 64
End: 2024-05-30

## 2024-05-30 PROCEDURE — RXMED WILLOW AMBULATORY MEDICATION CHARGE

## 2024-05-31 ENCOUNTER — PHARMACY VISIT (OUTPATIENT)
Dept: PHARMACY | Facility: CLINIC | Age: 64
End: 2024-05-31
Payer: COMMERCIAL

## 2024-06-03 ENCOUNTER — LAB (OUTPATIENT)
Dept: LAB | Facility: HOSPITAL | Age: 64
End: 2024-06-03
Payer: COMMERCIAL

## 2024-06-03 ENCOUNTER — DOCUMENTATION (OUTPATIENT)
Dept: HEMATOLOGY/ONCOLOGY | Facility: HOSPITAL | Age: 64
End: 2024-06-03
Payer: COMMERCIAL

## 2024-06-03 ENCOUNTER — APPOINTMENT (OUTPATIENT)
Dept: HEMATOLOGY/ONCOLOGY | Facility: HOSPITAL | Age: 64
End: 2024-06-03
Payer: COMMERCIAL

## 2024-06-03 ENCOUNTER — OFFICE VISIT (OUTPATIENT)
Dept: HEMATOLOGY/ONCOLOGY | Facility: HOSPITAL | Age: 64
End: 2024-06-03
Payer: COMMERCIAL

## 2024-06-03 VITALS
SYSTOLIC BLOOD PRESSURE: 144 MMHG | BODY MASS INDEX: 26.16 KG/M2 | OXYGEN SATURATION: 100 % | WEIGHT: 155.87 LBS | DIASTOLIC BLOOD PRESSURE: 82 MMHG | RESPIRATION RATE: 19 BRPM | TEMPERATURE: 98.1 F | HEART RATE: 91 BPM

## 2024-06-03 DIAGNOSIS — C92.00 ACUTE MYELOID LEUKEMIA NOT HAVING ACHIEVED REMISSION (MULTI): ICD-10-CM

## 2024-06-03 LAB
ABO GROUP (TYPE) IN BLOOD: NORMAL
ALBUMIN SERPL BCP-MCNC: 4.2 G/DL (ref 3.4–5)
ALP SERPL-CCNC: 72 U/L (ref 33–136)
ALT SERPL W P-5'-P-CCNC: 10 U/L (ref 10–52)
ANION GAP SERPL CALC-SCNC: 14 MMOL/L (ref 10–20)
ANTIBODY SCREEN: NORMAL
AST SERPL W P-5'-P-CCNC: 21 U/L (ref 9–39)
BASOPHILS # BLD AUTO: 0 X10*3/UL (ref 0–0.1)
BASOPHILS NFR BLD AUTO: 0 %
BILIRUB DIRECT SERPL-MCNC: 0.4 MG/DL (ref 0–0.3)
BILIRUB SERPL-MCNC: 1.6 MG/DL (ref 0–1.2)
BUN SERPL-MCNC: 21 MG/DL (ref 6–23)
CALCIUM SERPL-MCNC: 8.8 MG/DL (ref 8.6–10.3)
CHLORIDE SERPL-SCNC: 104 MMOL/L (ref 98–107)
CO2 SERPL-SCNC: 24 MMOL/L (ref 21–32)
CREAT SERPL-MCNC: 1.34 MG/DL (ref 0.5–1.3)
DACRYOCYTES BLD QL SMEAR: NORMAL
EGFRCR SERPLBLD CKD-EPI 2021: 58 ML/MIN/1.73M*2
EOSINOPHIL # BLD AUTO: 0 X10*3/UL (ref 0–0.7)
EOSINOPHIL NFR BLD AUTO: 0 %
ERYTHROCYTE [DISTWIDTH] IN BLOOD BY AUTOMATED COUNT: 20 % (ref 11.5–14.5)
GLUCOSE SERPL-MCNC: 101 MG/DL (ref 74–99)
HCT VFR BLD AUTO: 24.7 % (ref 41–52)
HGB BLD-MCNC: 8.3 G/DL (ref 13.5–17.5)
HYPOCHROMIA BLD QL SMEAR: NORMAL
IMM GRANULOCYTES # BLD AUTO: 0 X10*3/UL (ref 0–0.7)
IMM GRANULOCYTES NFR BLD AUTO: 0 % (ref 0–0.9)
LYMPHOCYTES # BLD AUTO: 1.59 X10*3/UL (ref 1.2–4.8)
LYMPHOCYTES NFR BLD AUTO: 91.4 %
MCH RBC QN AUTO: 29.6 PG (ref 26–34)
MCHC RBC AUTO-ENTMCNC: 33.6 G/DL (ref 32–36)
MCV RBC AUTO: 88 FL (ref 80–100)
MONOCYTES # BLD AUTO: 0.05 X10*3/UL (ref 0.1–1)
MONOCYTES NFR BLD AUTO: 2.9 %
NEUTROPHILS # BLD AUTO: 0.1 X10*3/UL (ref 1.2–7.7)
NEUTROPHILS NFR BLD AUTO: 5.7 %
NRBC BLD-RTO: 14.4 /100 WBCS (ref 0–0)
PLATELET # BLD AUTO: 28 X10*3/UL (ref 150–450)
POLYCHROMASIA BLD QL SMEAR: NORMAL
POTASSIUM SERPL-SCNC: 4 MMOL/L (ref 3.5–5.3)
PROT SERPL-MCNC: 7.3 G/DL (ref 6.4–8.2)
RBC # BLD AUTO: 2.8 X10*6/UL (ref 4.5–5.9)
RBC MORPH BLD: NORMAL
RH FACTOR (ANTIGEN D): NORMAL
SCHISTOCYTES BLD QL SMEAR: NORMAL
SODIUM SERPL-SCNC: 138 MMOL/L (ref 136–145)
TARGETS BLD QL SMEAR: NORMAL
WBC # BLD AUTO: 1.7 X10*3/UL (ref 4.4–11.3)

## 2024-06-03 PROCEDURE — 85025 COMPLETE CBC W/AUTO DIFF WBC: CPT

## 2024-06-03 PROCEDURE — 99215 OFFICE O/P EST HI 40 MIN: CPT | Performed by: INTERNAL MEDICINE

## 2024-06-03 PROCEDURE — 1159F MED LIST DOCD IN RCRD: CPT | Performed by: INTERNAL MEDICINE

## 2024-06-03 PROCEDURE — 80053 COMPREHEN METABOLIC PANEL: CPT

## 2024-06-03 PROCEDURE — 3077F SYST BP >= 140 MM HG: CPT | Performed by: INTERNAL MEDICINE

## 2024-06-03 PROCEDURE — 82248 BILIRUBIN DIRECT: CPT

## 2024-06-03 PROCEDURE — 1157F ADVNC CARE PLAN IN RCRD: CPT | Performed by: INTERNAL MEDICINE

## 2024-06-03 PROCEDURE — 3079F DIAST BP 80-89 MM HG: CPT | Performed by: INTERNAL MEDICINE

## 2024-06-03 PROCEDURE — 36415 COLL VENOUS BLD VENIPUNCTURE: CPT

## 2024-06-03 PROCEDURE — 1126F AMNT PAIN NOTED NONE PRSNT: CPT | Performed by: INTERNAL MEDICINE

## 2024-06-03 PROCEDURE — 86901 BLOOD TYPING SEROLOGIC RH(D): CPT

## 2024-06-03 RX ORDER — URSODIOL 300 MG/1
300 CAPSULE ORAL 3 TIMES DAILY
Qty: 90 CAPSULE | Refills: 11 | Status: SHIPPED | OUTPATIENT
Start: 2024-06-03 | End: 2024-06-03 | Stop reason: SDUPTHER

## 2024-06-03 RX ORDER — URSODIOL 300 MG/1
300 CAPSULE ORAL 3 TIMES DAILY
Qty: 90 CAPSULE | Refills: 11 | Status: SHIPPED | OUTPATIENT
Start: 2024-06-03 | End: 2025-06-03

## 2024-06-03 ASSESSMENT — PAIN SCALES - GENERAL: PAINLEVEL: 0-NO PAIN

## 2024-06-04 ENCOUNTER — TELEPHONE (OUTPATIENT)
Dept: ADMISSION | Facility: HOSPITAL | Age: 64
End: 2024-06-04
Payer: COMMERCIAL

## 2024-06-04 ASSESSMENT — ENCOUNTER SYMPTOMS: CONSTIPATION: 0

## 2024-06-04 NOTE — TELEPHONE ENCOUNTER
Patient made aware that his follow up and schedule will remain the same, aware that the bmbx being scheduled for 6/17 is ok. Pt appreciative of the follow up, denies any further questions

## 2024-06-04 NOTE — TELEPHONE ENCOUNTER
The patient states that he was advised to saul a BMBX done prior to his apt on 6/10 however the soonest that they could schedule him was 6/17. Wants to know what he should do or if 6/17 is OK. Please advise

## 2024-06-06 ENCOUNTER — LAB (OUTPATIENT)
Dept: LAB | Facility: CLINIC | Age: 64
End: 2024-06-06
Payer: COMMERCIAL

## 2024-06-06 DIAGNOSIS — C92.00 ACUTE MYELOID LEUKEMIA NOT HAVING ACHIEVED REMISSION (MULTI): ICD-10-CM

## 2024-06-06 LAB
ABO GROUP (TYPE) IN BLOOD: NORMAL
ANTIBODY SCREEN: NORMAL
BASOPHILS # BLD AUTO: 0 X10*3/UL (ref 0–0.1)
BASOPHILS NFR BLD AUTO: 0 %
DACRYOCYTES BLD QL SMEAR: NORMAL
EOSINOPHIL # BLD AUTO: 0 X10*3/UL (ref 0–0.7)
EOSINOPHIL NFR BLD AUTO: 0 %
ERYTHROCYTE [DISTWIDTH] IN BLOOD BY AUTOMATED COUNT: 18.7 % (ref 11.5–14.5)
HCT VFR BLD AUTO: 24.1 % (ref 41–52)
HGB BLD-MCNC: 7.7 G/DL (ref 13.5–17.5)
IMM GRANULOCYTES # BLD AUTO: 0.01 X10*3/UL (ref 0–0.7)
IMM GRANULOCYTES NFR BLD AUTO: 0.4 % (ref 0–0.9)
LYMPHOCYTES # BLD AUTO: 2.11 X10*3/UL (ref 1.2–4.8)
LYMPHOCYTES NFR BLD AUTO: 93 %
MCH RBC QN AUTO: 28.7 PG (ref 26–34)
MCHC RBC AUTO-ENTMCNC: 32 G/DL (ref 32–36)
MCV RBC AUTO: 90 FL (ref 80–100)
MONOCYTES # BLD AUTO: 0.04 X10*3/UL (ref 0.1–1)
MONOCYTES NFR BLD AUTO: 1.8 %
NEUTROPHILS # BLD AUTO: 0.11 X10*3/UL (ref 1.2–7.7)
NEUTROPHILS NFR BLD AUTO: 4.8 %
NRBC BLD-RTO: ABNORMAL /100{WBCS}
OVALOCYTES BLD QL SMEAR: NORMAL
PLATELET # BLD AUTO: 36 X10*3/UL (ref 150–450)
POLYCHROMASIA BLD QL SMEAR: NORMAL
RBC # BLD AUTO: 2.68 X10*6/UL (ref 4.5–5.9)
RBC MORPH BLD: NORMAL
RH FACTOR (ANTIGEN D): NORMAL
SCHISTOCYTES BLD QL SMEAR: NORMAL
TARGETS BLD QL SMEAR: NORMAL
WBC # BLD AUTO: 2.3 X10*3/UL (ref 4.4–11.3)

## 2024-06-06 PROCEDURE — 36415 COLL VENOUS BLD VENIPUNCTURE: CPT

## 2024-06-06 PROCEDURE — 85025 COMPLETE CBC W/AUTO DIFF WBC: CPT

## 2024-06-06 PROCEDURE — 86901 BLOOD TYPING SEROLOGIC RH(D): CPT

## 2024-06-09 ASSESSMENT — ENCOUNTER SYMPTOMS: CONSTIPATION: 0

## 2024-06-10 ENCOUNTER — LAB (OUTPATIENT)
Dept: LAB | Facility: HOSPITAL | Age: 64
End: 2024-06-10
Payer: COMMERCIAL

## 2024-06-10 ENCOUNTER — OFFICE VISIT (OUTPATIENT)
Dept: HEMATOLOGY/ONCOLOGY | Facility: HOSPITAL | Age: 64
End: 2024-06-10
Payer: COMMERCIAL

## 2024-06-10 VITALS
HEART RATE: 94 BPM | WEIGHT: 154.98 LBS | RESPIRATION RATE: 16 BRPM | OXYGEN SATURATION: 100 % | TEMPERATURE: 97.2 F | BODY MASS INDEX: 26.01 KG/M2 | DIASTOLIC BLOOD PRESSURE: 77 MMHG | SYSTOLIC BLOOD PRESSURE: 125 MMHG

## 2024-06-10 DIAGNOSIS — C92.00 ACUTE MYELOID LEUKEMIA NOT HAVING ACHIEVED REMISSION (MULTI): ICD-10-CM

## 2024-06-10 LAB
ALBUMIN SERPL BCP-MCNC: 4 G/DL (ref 3.4–5)
ALP SERPL-CCNC: 77 U/L (ref 33–136)
ALT SERPL W P-5'-P-CCNC: 7 U/L (ref 10–52)
ANION GAP SERPL CALC-SCNC: 12 MMOL/L (ref 10–20)
AST SERPL W P-5'-P-CCNC: 18 U/L (ref 9–39)
BASOPHILS # BLD MANUAL: 0 X10*3/UL (ref 0–0.1)
BASOPHILS NFR BLD MANUAL: 0 %
BILIRUB SERPL-MCNC: 1.5 MG/DL (ref 0–1.2)
BUN SERPL-MCNC: 16 MG/DL (ref 6–23)
CALCIUM SERPL-MCNC: 9 MG/DL (ref 8.6–10.6)
CHLORIDE SERPL-SCNC: 104 MMOL/L (ref 98–107)
CO2 SERPL-SCNC: 26 MMOL/L (ref 21–32)
CREAT SERPL-MCNC: 0.99 MG/DL (ref 0.5–1.3)
DACRYOCYTES BLD QL SMEAR: ABNORMAL
EGFRCR SERPLBLD CKD-EPI 2021: 84 ML/MIN/1.73M*2
ELECTRONICALLY SIGNED BY CYTOGENETICS: NORMAL
EOSINOPHIL # BLD MANUAL: 0 X10*3/UL (ref 0–0.7)
EOSINOPHIL NFR BLD MANUAL: 0 %
ERYTHROCYTE [DISTWIDTH] IN BLOOD BY AUTOMATED COUNT: 18.7 % (ref 11.5–14.5)
GLUCOSE SERPL-MCNC: 90 MG/DL (ref 74–99)
HCT VFR BLD AUTO: 24.2 % (ref 41–52)
HGB BLD-MCNC: 7.9 G/DL (ref 13.5–17.5)
HYPOCHROMIA BLD QL SMEAR: ABNORMAL
IMM GRANULOCYTES # BLD AUTO: 0.01 X10*3/UL (ref 0–0.7)
IMM GRANULOCYTES NFR BLD AUTO: 0.5 % (ref 0–0.9)
LYMPHOCYTES # BLD MANUAL: 1.22 X10*3/UL (ref 1.2–4.8)
LYMPHOCYTES NFR BLD MANUAL: 64 %
MCH RBC QN AUTO: 28.2 PG (ref 26–34)
MCHC RBC AUTO-ENTMCNC: 32.6 G/DL (ref 32–36)
MCV RBC AUTO: 86 FL (ref 80–100)
MICROARRAY PLATFORM: NORMAL
MONOCYTES # BLD MANUAL: 0.02 X10*3/UL (ref 0.1–1)
MONOCYTES NFR BLD MANUAL: 1 %
NEUTROPHILS # BLD MANUAL: 0.65 X10*3/UL (ref 1.2–7.7)
NEUTS BAND # BLD MANUAL: 0.04 X10*3/UL (ref 0–0.7)
NEUTS BAND NFR BLD MANUAL: 2 %
NEUTS SEG # BLD MANUAL: 0.61 X10*3/UL (ref 1.2–7)
NEUTS SEG NFR BLD MANUAL: 32 %
NRBC BLD-RTO: 14.6 /100 WBCS (ref 0–0)
OVALOCYTES BLD QL SMEAR: ABNORMAL
PLATELET # BLD AUTO: 117 X10*3/UL (ref 150–450)
POLYCHROMASIA BLD QL SMEAR: ABNORMAL
POTASSIUM SERPL-SCNC: 3.7 MMOL/L (ref 3.5–5.3)
PROT SERPL-MCNC: 7.2 G/DL (ref 6.4–8.2)
RBC # BLD AUTO: 2.8 X10*6/UL (ref 4.5–5.9)
RBC MORPH BLD: ABNORMAL
SCHISTOCYTES BLD QL SMEAR: ABNORMAL
SODIUM SERPL-SCNC: 138 MMOL/L (ref 136–145)
TARGETS BLD QL SMEAR: ABNORMAL
TOTAL CELLS COUNTED BLD: 100
VARIANT LYMPHS # BLD MANUAL: 0.02 X10*3/UL (ref 0–0.5)
VARIANT LYMPHS NFR BLD: 1 %
WBC # BLD AUTO: 1.9 X10*3/UL (ref 4.4–11.3)

## 2024-06-10 PROCEDURE — 99215 OFFICE O/P EST HI 40 MIN: CPT | Performed by: INTERNAL MEDICINE

## 2024-06-10 PROCEDURE — 1157F ADVNC CARE PLAN IN RCRD: CPT | Performed by: INTERNAL MEDICINE

## 2024-06-10 PROCEDURE — 1126F AMNT PAIN NOTED NONE PRSNT: CPT | Performed by: INTERNAL MEDICINE

## 2024-06-10 PROCEDURE — 85027 COMPLETE CBC AUTOMATED: CPT

## 2024-06-10 PROCEDURE — 85007 BL SMEAR W/DIFF WBC COUNT: CPT

## 2024-06-10 PROCEDURE — 36415 COLL VENOUS BLD VENIPUNCTURE: CPT

## 2024-06-10 PROCEDURE — 80053 COMPREHEN METABOLIC PANEL: CPT

## 2024-06-10 PROCEDURE — 1159F MED LIST DOCD IN RCRD: CPT | Performed by: INTERNAL MEDICINE

## 2024-06-10 PROCEDURE — 3074F SYST BP LT 130 MM HG: CPT | Performed by: INTERNAL MEDICINE

## 2024-06-10 PROCEDURE — 1036F TOBACCO NON-USER: CPT | Performed by: INTERNAL MEDICINE

## 2024-06-10 PROCEDURE — 3078F DIAST BP <80 MM HG: CPT | Performed by: INTERNAL MEDICINE

## 2024-06-10 RX ORDER — LEVOFLOXACIN 500 MG/1
500 TABLET, FILM COATED ORAL DAILY
Qty: 30 TABLET | Refills: 0 | Status: SHIPPED | OUTPATIENT
Start: 2024-06-10 | End: 2024-06-11 | Stop reason: SDUPTHER

## 2024-06-10 ASSESSMENT — PAIN SCALES - GENERAL: PAINLEVEL: 0-NO PAIN

## 2024-06-10 NOTE — PROGRESS NOTES
Patient ID: Brandt Merritt is a 64 y.o. male.    Diagnosis:   Myelodysplastic neoplasm/AML      Treatment:   Oncology History Overview Note   2024  Myelodysplastic neoplasm with increased blasts-2 ( WHO)  Myelodysplastic neoplasm/AML  (ICC  classification)    Presented in  10/2023 for pancytopenia, found to have hemolysis. Patient had normal CBC 2020. Denied any hemorrhoidal bleeding . Has had C Scope and EGD , treated Hep C  . 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      CBC 24 wbc 3.0, hgb 7.1, platelets 167K ANC 0.73    BM biopsy done on 24  as folllows:  BM histology  Myelodysplastic neoplasm with increased blasts-2 ( WHO)  Myelodysplastic neoplasm/AML  (ICC  classification)  Balsts 11% on aspirate smear and 15-20% based on CD 34 immunostaining approaching AML leukemia, hematooiesis is erythroid dominant with dysplasia in granulocytes and megakaryocytes.   FISH studies trisomy 8 17.2%  NGS panel DNMT3 aVAF 36%  U2AF1 VAF 32%       Acute myeloid leukemia not having achieved remission (Multi)   2024 Initial Diagnosis    Acute myeloid leukemia not having achieved remission (Multi)     2024 -  Chemotherapy    Venetoclax / Decitabine, 28 Day Cycles - Induction / Consolidation         Response:     Past Medical History:  HTN   STEMI 2020 after getting  a water pills.  Chronic hepatitis C infection treated in  treated  with Dr. Lloyd  Past Medical History:   Diagnosis Date    Hypertension     Personal history of other diseases of the circulatory system     History of hypertension       Surgical History:  Conosocopy 2021  Upper EGD 10/31/23  Surgical reduction torsion of testes   Past Surgical History:   Procedure Laterality Date    COLONOSCOPY  2013    Complete Colonoscopy    OTHER SURGICAL HISTORY  10/07/2015    Surgery Testis Reduction Of Torsion Of Testis Right        Family History:  CAD, DM in mother    Mother  of CVA at age 69  Brother with prostate  CA, 1 sister with liver disease  2 children healthy  Family History   Problem Relation Name Age of Onset    Other (diabetes mellitus) Mother         Social History:  Lives with wife of 30 years, works at [employer], 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  29 years, former smoker, smoked x 15 yrs quit 20+ years ago. Marijuana 3 x a week. Served in  in [place] and [facility],   Social History     Tobacco Use    Smoking status: Former     Types: Cigarettes    Smokeless tobacco: Never   Substance Use Topics    Alcohol use: Not Currently     Comment: occansionally    Drug use: Yes     Types: Marijuana     Comment: 3 weeks ago      -------------------------------------------------------------------------------------------------------  Subjective       HPI    Brandt Merritt is a 64 y.o. 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 Seen by Abundio Rust recently and Rose Casal initially 10/2023 for pancytopenia. . He has had work up for pancytopenia and hemolytic process . Admits to having ongoing chronic fatigue that is unchanged . Patient had normal CBC June 2020. Denied any hemorrhoidal bleeding . Has had C Scope and EGD , treated Hep C 2014 . 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      BM biopsy done on 4/11/24  as folllows:    Additional information 2021 PSA was high, biopsy 2021 and 2023 all benign.  CBC 4/11/24 wbc 3.0, hgb 7.1, platelets 167K ANC 0.73  BM histology  Myelodysplastic neoplasm with increased blasts-2 ( WHO)  Myelodysplastic neoplasm/AML  (ICC 2022 classification)  Balsts 11% on aspirate smear and 15-20% based on CD 34 immunostaining approaching AML leukemia, hematooiesis is erythroid dominant with dysplasia in granulocytes and megakaryocytes.   FISH studies trisomy 8 17.2%  NGS panel DNMT3 aVAF 36%  U2AF1 VAF 32%    Patient is here today (6/10/24) with his wife for count check and to start cycle 2 of decitabine venetoclax for his newly diagnosed AML. He feels  generally well.denies fevers, sweats, bruising or bleeding. We are planning to proceed to an MUD allograft in first CR.  He has been continuing to lose weight, and is skipping lunch many days when he is at work.  He denies fevers, sweats,bruising or bleeding. Unfortunately bone marrow biopsy not able to be scheduled until June 17th    He continues to work full time at [employer].  Planning to retire after his transplant.          Review of Systems   Gastrointestinal:  Negative for constipation.   All other systems reviewed and are negative.     -------------------------------------------------------------------------------------------------------  Objective   BSA: There is no height or weight on file to calculate BSA.  There were no vitals taken for this visit.    Physical Exam  Vitals reviewed.   Constitutional:       Comments: Well developed man looking as stated age, muscular. thin   HENT:      Head: Normocephalic and atraumatic.      Mouth/Throat:      Mouth: Mucous membranes are moist.   Eyes:      Extraocular Movements: Extraocular movements intact.      Conjunctiva/sclera: Conjunctivae normal.      Pupils: Pupils are equal, round, and reactive to light.   Cardiovascular:      Rate and Rhythm: Normal rate and regular rhythm.      Pulses: Normal pulses.   Pulmonary:      Effort: Pulmonary effort is normal.      Breath sounds: Normal breath sounds.   Abdominal:      General: Abdomen is flat. Bowel sounds are normal. There is no distension.      Palpations: Abdomen is soft. There is hepatomegaly.      Tenderness: There is no abdominal tenderness.          Comments: Liver palpable 5 cm BCM. No splenomegaly    Musculoskeletal:         General: Normal range of motion.   Skin:     General: Skin is warm.   Neurological:      General: No focal deficit present.      Mental Status: He is alert.   Psychiatric:         Mood and Affect: Mood normal.         Behavior: Behavior normal.         Thought Content: Thought content  normal.         Judgment: Judgment normal.         Performance Status:  Asymptomatic  -------------------------------------------------------------------------------------------------------  Assessment/Plan      Myelodysplastic neoplasm/AML- Patient presents with a history of pancytopenia since 9/2023 and due to confusion picture of hemolysis with only mild leukopenia and neutropenia extensive workup done prior to bone marrow bx which shows MDS in transition to AML, with trisomy 8 and DNMT alpha and U2AF1 mutation which is adverse risk. Given that the patient has > 10% blasts in bone marrow I would favor induction chemotherapy with ultimate goal to pursue curative allogeneic stem cell  transplant.  The patients slow, indolent course is more consistent with MDS and he was recommended induction with decitabine and venetoclax which has about a 60% chance of achieving a complete clinical remission.    C1D1: 5/13/2024  C2D1: 6/11/2024 , will change venetoclax to 200 mg 21/28 days, check blood counts weekly, anticipate going to allogeneic transplant after cycle 2.     Ppx: acyclovir, fluconazole, levofloxacin ppx. Neutropenic precautions reviewed with patient and wife.     Echocardiogram show a normal LVEF,  HLA typing has been completed. Patient met with Daxa Hurtado for initiating transplant education.     Chemotherapy teaching done including instructions to contact me or the covering physician for any temperature > 100.4 degrees.      2. 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     3. Hx of treated hepatitis C-recent hep C RNA pcr negative, and recent liver ultrasound shows normal sized liver at 14.5 cm, however seems enlarged on exam. Patient agreeable to hepatology consult before consideration of allograft, scheduled on 6/21/24. Ursodiol started.   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     - Hepatology NPV 6/21/24, patient unaware of this visit, will check with BMT nurse coordinator. Ursodiol starting for SOS protection, patient agreeable           -------------------------------------------------------------------------------------------------------  Malaika Newsome MD

## 2024-06-11 ENCOUNTER — INFUSION (OUTPATIENT)
Dept: HEMATOLOGY/ONCOLOGY | Facility: CLINIC | Age: 64
End: 2024-06-11
Payer: COMMERCIAL

## 2024-06-11 VITALS
OXYGEN SATURATION: 98 % | DIASTOLIC BLOOD PRESSURE: 71 MMHG | BODY MASS INDEX: 27.1 KG/M2 | RESPIRATION RATE: 18 BRPM | HEART RATE: 89 BPM | TEMPERATURE: 98.2 F | SYSTOLIC BLOOD PRESSURE: 127 MMHG | WEIGHT: 161.49 LBS

## 2024-06-11 DIAGNOSIS — C92.00 ACUTE MYELOID LEUKEMIA NOT HAVING ACHIEVED REMISSION (MULTI): ICD-10-CM

## 2024-06-11 PROCEDURE — 2500000001 HC RX 250 WO HCPCS SELF ADMINISTERED DRUGS (ALT 637 FOR MEDICARE OP): Performed by: INTERNAL MEDICINE

## 2024-06-11 PROCEDURE — 96413 CHEMO IV INFUSION 1 HR: CPT

## 2024-06-11 PROCEDURE — 2500000004 HC RX 250 GENERAL PHARMACY W/ HCPCS (ALT 636 FOR OP/ED): Performed by: INTERNAL MEDICINE

## 2024-06-11 RX ORDER — LEVOFLOXACIN 500 MG/1
500 TABLET, FILM COATED ORAL DAILY
Qty: 30 TABLET | Refills: 0 | Status: SHIPPED | OUTPATIENT
Start: 2024-06-11 | End: 2024-07-11

## 2024-06-11 RX ORDER — PROCHLORPERAZINE MALEATE 10 MG
10 TABLET ORAL EVERY 6 HOURS PRN
Status: DISCONTINUED | OUTPATIENT
Start: 2024-06-11 | End: 2024-06-11 | Stop reason: HOSPADM

## 2024-06-11 RX ORDER — PROCHLORPERAZINE EDISYLATE 5 MG/ML
10 INJECTION INTRAMUSCULAR; INTRAVENOUS EVERY 6 HOURS PRN
Status: DISCONTINUED | OUTPATIENT
Start: 2024-06-11 | End: 2024-06-11 | Stop reason: HOSPADM

## 2024-06-11 RX ORDER — DIPHENHYDRAMINE HYDROCHLORIDE 50 MG/ML
50 INJECTION INTRAMUSCULAR; INTRAVENOUS AS NEEDED
Status: DISCONTINUED | OUTPATIENT
Start: 2024-06-11 | End: 2024-06-11 | Stop reason: HOSPADM

## 2024-06-11 RX ORDER — EPINEPHRINE 0.3 MG/.3ML
0.3 INJECTION SUBCUTANEOUS EVERY 5 MIN PRN
Status: DISCONTINUED | OUTPATIENT
Start: 2024-06-11 | End: 2024-06-11 | Stop reason: HOSPADM

## 2024-06-11 RX ORDER — FAMOTIDINE 10 MG/ML
20 INJECTION INTRAVENOUS ONCE AS NEEDED
Status: DISCONTINUED | OUTPATIENT
Start: 2024-06-11 | End: 2024-06-11 | Stop reason: HOSPADM

## 2024-06-11 RX ORDER — ALBUTEROL SULFATE 0.83 MG/ML
3 SOLUTION RESPIRATORY (INHALATION) AS NEEDED
Status: DISCONTINUED | OUTPATIENT
Start: 2024-06-11 | End: 2024-06-11 | Stop reason: HOSPADM

## 2024-06-11 RX ORDER — ONDANSETRON HYDROCHLORIDE 8 MG/1
8 TABLET, FILM COATED ORAL ONCE
Status: DISCONTINUED | OUTPATIENT
Start: 2024-06-11 | End: 2024-06-11 | Stop reason: HOSPADM

## 2024-06-11 RX ADMIN — DECITABINE 36 MG: 50 INJECTION, POWDER, LYOPHILIZED, FOR SOLUTION INTRAVENOUS at 16:02

## 2024-06-11 RX ADMIN — PROCHLORPERAZINE MALEATE 10 MG: 10 TABLET ORAL at 15:42

## 2024-06-11 ASSESSMENT — PAIN SCALES - GENERAL: PAINLEVEL: 0-NO PAIN

## 2024-06-11 NOTE — SIGNIFICANT EVENT
06/11/24 1521   Prechemo Checklist   Has the patient been in the hospital, ED, or urgent care since last date of service No   Chemo/Immuno Consent Completed and Signed Yes   Protocol/Indications Verified Yes   Confirmed to previous date/time of medication Yes   Compared to previous dose Yes   All medications are dated accurately Yes   Pregnancy Test Negative Not applicable   Parameters Met Yes  (com order treat regardless of parameters)   BSA/Weight-Height Verified Yes   Dose Calculations Verified (current, total, cumulative) Yes

## 2024-06-12 ENCOUNTER — INFUSION (OUTPATIENT)
Dept: HEMATOLOGY/ONCOLOGY | Facility: CLINIC | Age: 64
End: 2024-06-12
Payer: COMMERCIAL

## 2024-06-12 VITALS
RESPIRATION RATE: 16 BRPM | BODY MASS INDEX: 27.23 KG/M2 | DIASTOLIC BLOOD PRESSURE: 76 MMHG | WEIGHT: 162.26 LBS | HEART RATE: 83 BPM | SYSTOLIC BLOOD PRESSURE: 123 MMHG | TEMPERATURE: 96.8 F

## 2024-06-12 DIAGNOSIS — C92.00 ACUTE MYELOID LEUKEMIA NOT HAVING ACHIEVED REMISSION (MULTI): ICD-10-CM

## 2024-06-12 PROCEDURE — 2500000005 HC RX 250 GENERAL PHARMACY W/O HCPCS: Performed by: INTERNAL MEDICINE

## 2024-06-12 PROCEDURE — 2500000004 HC RX 250 GENERAL PHARMACY W/ HCPCS (ALT 636 FOR OP/ED): Performed by: INTERNAL MEDICINE

## 2024-06-12 PROCEDURE — 96413 CHEMO IV INFUSION 1 HR: CPT

## 2024-06-12 RX ORDER — ALBUTEROL SULFATE 0.83 MG/ML
3 SOLUTION RESPIRATORY (INHALATION) AS NEEDED
Status: DISCONTINUED | OUTPATIENT
Start: 2024-06-12 | End: 2024-06-12 | Stop reason: HOSPADM

## 2024-06-12 RX ORDER — ONDANSETRON HYDROCHLORIDE 8 MG/1
8 TABLET, FILM COATED ORAL ONCE
Status: COMPLETED | OUTPATIENT
Start: 2024-06-12 | End: 2024-06-12

## 2024-06-12 RX ORDER — EPINEPHRINE 0.3 MG/.3ML
0.3 INJECTION SUBCUTANEOUS EVERY 5 MIN PRN
Status: DISCONTINUED | OUTPATIENT
Start: 2024-06-12 | End: 2024-06-12 | Stop reason: HOSPADM

## 2024-06-12 RX ORDER — FAMOTIDINE 10 MG/ML
20 INJECTION INTRAVENOUS ONCE AS NEEDED
Status: DISCONTINUED | OUTPATIENT
Start: 2024-06-12 | End: 2024-06-12 | Stop reason: HOSPADM

## 2024-06-12 RX ORDER — PROCHLORPERAZINE EDISYLATE 5 MG/ML
10 INJECTION INTRAMUSCULAR; INTRAVENOUS EVERY 6 HOURS PRN
Status: DISCONTINUED | OUTPATIENT
Start: 2024-06-12 | End: 2024-06-12 | Stop reason: HOSPADM

## 2024-06-12 RX ORDER — DIPHENHYDRAMINE HYDROCHLORIDE 50 MG/ML
50 INJECTION INTRAMUSCULAR; INTRAVENOUS AS NEEDED
Status: DISCONTINUED | OUTPATIENT
Start: 2024-06-12 | End: 2024-06-12 | Stop reason: HOSPADM

## 2024-06-12 RX ORDER — PROCHLORPERAZINE MALEATE 10 MG
10 TABLET ORAL EVERY 6 HOURS PRN
Status: DISCONTINUED | OUTPATIENT
Start: 2024-06-12 | End: 2024-06-12 | Stop reason: HOSPADM

## 2024-06-12 ASSESSMENT — PAIN SCALES - GENERAL: PAINLEVEL: 0-NO PAIN

## 2024-06-13 ENCOUNTER — TELEPHONE (OUTPATIENT)
Dept: HEMATOLOGY/ONCOLOGY | Facility: HOSPITAL | Age: 64
End: 2024-06-13
Payer: COMMERCIAL

## 2024-06-13 ENCOUNTER — INFUSION (OUTPATIENT)
Dept: HEMATOLOGY/ONCOLOGY | Facility: CLINIC | Age: 64
End: 2024-06-13
Payer: COMMERCIAL

## 2024-06-13 VITALS
SYSTOLIC BLOOD PRESSURE: 122 MMHG | OXYGEN SATURATION: 95 % | WEIGHT: 162.15 LBS | RESPIRATION RATE: 18 BRPM | HEART RATE: 84 BPM | DIASTOLIC BLOOD PRESSURE: 75 MMHG | BODY MASS INDEX: 27.21 KG/M2 | TEMPERATURE: 97.7 F

## 2024-06-13 DIAGNOSIS — C92.00 ACUTE MYELOID LEUKEMIA NOT HAVING ACHIEVED REMISSION (MULTI): ICD-10-CM

## 2024-06-13 PROCEDURE — 2500000005 HC RX 250 GENERAL PHARMACY W/O HCPCS: Performed by: INTERNAL MEDICINE

## 2024-06-13 PROCEDURE — 96413 CHEMO IV INFUSION 1 HR: CPT

## 2024-06-13 PROCEDURE — 2500000004 HC RX 250 GENERAL PHARMACY W/ HCPCS (ALT 636 FOR OP/ED): Performed by: INTERNAL MEDICINE

## 2024-06-13 RX ORDER — EPINEPHRINE 0.3 MG/.3ML
0.3 INJECTION SUBCUTANEOUS EVERY 5 MIN PRN
Status: DISCONTINUED | OUTPATIENT
Start: 2024-06-13 | End: 2024-06-13 | Stop reason: HOSPADM

## 2024-06-13 RX ORDER — ONDANSETRON HYDROCHLORIDE 8 MG/1
8 TABLET, FILM COATED ORAL ONCE
Status: COMPLETED | OUTPATIENT
Start: 2024-06-13 | End: 2024-06-13

## 2024-06-13 RX ORDER — PROCHLORPERAZINE MALEATE 10 MG
10 TABLET ORAL EVERY 6 HOURS PRN
Status: DISCONTINUED | OUTPATIENT
Start: 2024-06-13 | End: 2024-06-13 | Stop reason: HOSPADM

## 2024-06-13 RX ORDER — DIPHENHYDRAMINE HYDROCHLORIDE 50 MG/ML
50 INJECTION INTRAMUSCULAR; INTRAVENOUS AS NEEDED
Status: DISCONTINUED | OUTPATIENT
Start: 2024-06-13 | End: 2024-06-13 | Stop reason: HOSPADM

## 2024-06-13 RX ORDER — ALBUTEROL SULFATE 0.83 MG/ML
3 SOLUTION RESPIRATORY (INHALATION) AS NEEDED
Status: DISCONTINUED | OUTPATIENT
Start: 2024-06-13 | End: 2024-06-13 | Stop reason: HOSPADM

## 2024-06-13 RX ORDER — FAMOTIDINE 10 MG/ML
20 INJECTION INTRAVENOUS ONCE AS NEEDED
Status: DISCONTINUED | OUTPATIENT
Start: 2024-06-13 | End: 2024-06-13 | Stop reason: HOSPADM

## 2024-06-13 RX ORDER — PROCHLORPERAZINE EDISYLATE 5 MG/ML
10 INJECTION INTRAMUSCULAR; INTRAVENOUS EVERY 6 HOURS PRN
Status: DISCONTINUED | OUTPATIENT
Start: 2024-06-13 | End: 2024-06-13 | Stop reason: HOSPADM

## 2024-06-13 ASSESSMENT — PAIN SCALES - GENERAL: PAINLEVEL: 0-NO PAIN

## 2024-06-13 NOTE — SIGNIFICANT EVENT
06/13/24 1509   Prechemo Checklist   Has the patient been in the hospital, ED, or urgent care since last date of service No   Chemo/Immuno Consent Completed and Signed Yes   Protocol/Indications Verified Yes   Confirmed to previous date/time of medication Yes   All medications are dated accurately Yes   Pregnancy Test Negative Not applicable   Parameters Met Yes   BSA/Weight-Height Verified Yes   Dose Calculations Verified (current, total, cumulative) Yes

## 2024-06-13 NOTE — TELEPHONE ENCOUNTER
Pt called to inform team that GI had to cancel his 6/21 follow up with Dr. Rivera.  He rescheduled for next available which is not until 9/6/24.

## 2024-06-14 ENCOUNTER — INFUSION (OUTPATIENT)
Dept: HEMATOLOGY/ONCOLOGY | Facility: CLINIC | Age: 64
End: 2024-06-14
Payer: COMMERCIAL

## 2024-06-14 ENCOUNTER — TELEPHONE (OUTPATIENT)
Dept: HEMATOLOGY/ONCOLOGY | Facility: HOSPITAL | Age: 64
End: 2024-06-14

## 2024-06-14 VITALS
SYSTOLIC BLOOD PRESSURE: 135 MMHG | RESPIRATION RATE: 17 BRPM | DIASTOLIC BLOOD PRESSURE: 80 MMHG | WEIGHT: 163.58 LBS | BODY MASS INDEX: 27.45 KG/M2 | OXYGEN SATURATION: 97 % | HEART RATE: 88 BPM | TEMPERATURE: 99 F

## 2024-06-14 DIAGNOSIS — C92.00 ACUTE MYELOID LEUKEMIA NOT HAVING ACHIEVED REMISSION (MULTI): ICD-10-CM

## 2024-06-14 PROCEDURE — 2500000004 HC RX 250 GENERAL PHARMACY W/ HCPCS (ALT 636 FOR OP/ED): Performed by: INTERNAL MEDICINE

## 2024-06-14 PROCEDURE — 96413 CHEMO IV INFUSION 1 HR: CPT

## 2024-06-14 PROCEDURE — 2500000005 HC RX 250 GENERAL PHARMACY W/O HCPCS: Performed by: INTERNAL MEDICINE

## 2024-06-14 RX ORDER — FAMOTIDINE 10 MG/ML
20 INJECTION INTRAVENOUS ONCE AS NEEDED
Status: DISCONTINUED | OUTPATIENT
Start: 2024-06-14 | End: 2024-06-14 | Stop reason: HOSPADM

## 2024-06-14 RX ORDER — ONDANSETRON HYDROCHLORIDE 8 MG/1
8 TABLET, FILM COATED ORAL ONCE
Status: CANCELLED | OUTPATIENT
Start: 2024-06-15

## 2024-06-14 RX ORDER — EPINEPHRINE 0.3 MG/.3ML
0.3 INJECTION SUBCUTANEOUS EVERY 5 MIN PRN
Status: CANCELLED | OUTPATIENT
Start: 2024-06-15

## 2024-06-14 RX ORDER — PROCHLORPERAZINE EDISYLATE 5 MG/ML
10 INJECTION INTRAMUSCULAR; INTRAVENOUS EVERY 6 HOURS PRN
Status: DISCONTINUED | OUTPATIENT
Start: 2024-06-14 | End: 2024-06-14 | Stop reason: HOSPADM

## 2024-06-14 RX ORDER — ALBUTEROL SULFATE 0.83 MG/ML
3 SOLUTION RESPIRATORY (INHALATION) AS NEEDED
Status: CANCELLED | OUTPATIENT
Start: 2024-06-15

## 2024-06-14 RX ORDER — EPINEPHRINE 0.3 MG/.3ML
0.3 INJECTION SUBCUTANEOUS EVERY 5 MIN PRN
Status: DISCONTINUED | OUTPATIENT
Start: 2024-06-14 | End: 2024-06-14 | Stop reason: HOSPADM

## 2024-06-14 RX ORDER — ONDANSETRON HYDROCHLORIDE 8 MG/1
8 TABLET, FILM COATED ORAL ONCE
Status: COMPLETED | OUTPATIENT
Start: 2024-06-14 | End: 2024-06-14

## 2024-06-14 RX ORDER — PROCHLORPERAZINE MALEATE 10 MG
10 TABLET ORAL EVERY 6 HOURS PRN
Status: CANCELLED | OUTPATIENT
Start: 2024-06-15

## 2024-06-14 RX ORDER — PROCHLORPERAZINE EDISYLATE 5 MG/ML
10 INJECTION INTRAMUSCULAR; INTRAVENOUS EVERY 6 HOURS PRN
Status: CANCELLED | OUTPATIENT
Start: 2024-06-15

## 2024-06-14 RX ORDER — PROCHLORPERAZINE MALEATE 10 MG
10 TABLET ORAL EVERY 6 HOURS PRN
Status: DISCONTINUED | OUTPATIENT
Start: 2024-06-14 | End: 2024-06-14 | Stop reason: HOSPADM

## 2024-06-14 RX ORDER — FAMOTIDINE 10 MG/ML
20 INJECTION INTRAVENOUS ONCE AS NEEDED
Status: CANCELLED | OUTPATIENT
Start: 2024-06-15

## 2024-06-14 RX ORDER — DIPHENHYDRAMINE HYDROCHLORIDE 50 MG/ML
50 INJECTION INTRAMUSCULAR; INTRAVENOUS AS NEEDED
Status: CANCELLED | OUTPATIENT
Start: 2024-06-15

## 2024-06-14 RX ORDER — DIPHENHYDRAMINE HYDROCHLORIDE 50 MG/ML
50 INJECTION INTRAMUSCULAR; INTRAVENOUS AS NEEDED
Status: DISCONTINUED | OUTPATIENT
Start: 2024-06-14 | End: 2024-06-14 | Stop reason: HOSPADM

## 2024-06-14 RX ORDER — ALBUTEROL SULFATE 0.83 MG/ML
3 SOLUTION RESPIRATORY (INHALATION) AS NEEDED
Status: DISCONTINUED | OUTPATIENT
Start: 2024-06-14 | End: 2024-06-14 | Stop reason: HOSPADM

## 2024-06-14 ASSESSMENT — PAIN SCALES - GENERAL: PAINLEVEL: 0-NO PAIN

## 2024-06-15 ENCOUNTER — INFUSION (OUTPATIENT)
Dept: HEMATOLOGY/ONCOLOGY | Facility: HOSPITAL | Age: 64
End: 2024-06-15
Payer: COMMERCIAL

## 2024-06-15 VITALS
HEART RATE: 94 BPM | BODY MASS INDEX: 26.38 KG/M2 | WEIGHT: 157.19 LBS | SYSTOLIC BLOOD PRESSURE: 139 MMHG | OXYGEN SATURATION: 99 % | DIASTOLIC BLOOD PRESSURE: 75 MMHG | RESPIRATION RATE: 20 BRPM | TEMPERATURE: 97.7 F

## 2024-06-15 DIAGNOSIS — C92.00 ACUTE MYELOID LEUKEMIA NOT HAVING ACHIEVED REMISSION (MULTI): ICD-10-CM

## 2024-06-15 PROCEDURE — 2500000004 HC RX 250 GENERAL PHARMACY W/ HCPCS (ALT 636 FOR OP/ED): Performed by: INTERNAL MEDICINE

## 2024-06-15 PROCEDURE — 96413 CHEMO IV INFUSION 1 HR: CPT

## 2024-06-15 PROCEDURE — 2500000005 HC RX 250 GENERAL PHARMACY W/O HCPCS: Performed by: INTERNAL MEDICINE

## 2024-06-15 RX ORDER — FAMOTIDINE 10 MG/ML
20 INJECTION INTRAVENOUS ONCE AS NEEDED
Status: DISCONTINUED | OUTPATIENT
Start: 2024-06-15 | End: 2024-06-15 | Stop reason: HOSPADM

## 2024-06-15 RX ORDER — PROCHLORPERAZINE EDISYLATE 5 MG/ML
10 INJECTION INTRAMUSCULAR; INTRAVENOUS EVERY 6 HOURS PRN
Status: DISCONTINUED | OUTPATIENT
Start: 2024-06-15 | End: 2024-06-15 | Stop reason: HOSPADM

## 2024-06-15 RX ORDER — HEPARIN SODIUM,PORCINE/PF 10 UNIT/ML
50 SYRINGE (ML) INTRAVENOUS AS NEEDED
OUTPATIENT
Start: 2024-06-15

## 2024-06-15 RX ORDER — ALBUTEROL SULFATE 0.83 MG/ML
3 SOLUTION RESPIRATORY (INHALATION) AS NEEDED
Status: DISCONTINUED | OUTPATIENT
Start: 2024-06-15 | End: 2024-06-15 | Stop reason: HOSPADM

## 2024-06-15 RX ORDER — EPINEPHRINE 0.3 MG/.3ML
0.3 INJECTION SUBCUTANEOUS EVERY 5 MIN PRN
Status: DISCONTINUED | OUTPATIENT
Start: 2024-06-15 | End: 2024-06-15 | Stop reason: HOSPADM

## 2024-06-15 RX ORDER — ONDANSETRON HYDROCHLORIDE 8 MG/1
8 TABLET, FILM COATED ORAL ONCE
Status: COMPLETED | OUTPATIENT
Start: 2024-06-15 | End: 2024-06-15

## 2024-06-15 RX ORDER — DIPHENHYDRAMINE HYDROCHLORIDE 50 MG/ML
50 INJECTION INTRAMUSCULAR; INTRAVENOUS AS NEEDED
Status: DISCONTINUED | OUTPATIENT
Start: 2024-06-15 | End: 2024-06-15 | Stop reason: HOSPADM

## 2024-06-15 RX ORDER — PROCHLORPERAZINE MALEATE 10 MG
10 TABLET ORAL EVERY 6 HOURS PRN
Status: DISCONTINUED | OUTPATIENT
Start: 2024-06-15 | End: 2024-06-15 | Stop reason: HOSPADM

## 2024-06-15 RX ORDER — HEPARIN 100 UNIT/ML
500 SYRINGE INTRAVENOUS AS NEEDED
OUTPATIENT
Start: 2024-06-15

## 2024-06-15 ASSESSMENT — PAIN SCALES - GENERAL: PAINLEVEL: 0-NO PAIN

## 2024-06-15 NOTE — SIGNIFICANT EVENT
06/15/24 0855   Prechemo Checklist   Has the patient been in the hospital, ED, or urgent care since last date of service No   Chemo/Immuno Consent Completed and Signed Yes   Protocol/Indications Verified Yes   Confirmed to previous date/time of medication Yes   Compared to previous dose Yes   All medications are dated accurately Yes   Pregnancy Test Negative Not applicable   BSA/Weight-Height Verified Yes   Dose Calculations Verified (current, total, cumulative) Yes

## 2024-06-17 ENCOUNTER — TELEPHONE (OUTPATIENT)
Dept: HEMATOLOGY/ONCOLOGY | Facility: HOSPITAL | Age: 64
End: 2024-06-17

## 2024-06-17 ENCOUNTER — HOSPITAL ENCOUNTER (OUTPATIENT)
Dept: RADIOLOGY | Facility: HOSPITAL | Age: 64
Discharge: HOME | End: 2024-06-17
Payer: COMMERCIAL

## 2024-06-17 VITALS
BODY MASS INDEX: 25.23 KG/M2 | WEIGHT: 157 LBS | TEMPERATURE: 98.4 F | DIASTOLIC BLOOD PRESSURE: 68 MMHG | SYSTOLIC BLOOD PRESSURE: 115 MMHG | RESPIRATION RATE: 18 BRPM | HEIGHT: 66 IN | HEART RATE: 83 BPM | OXYGEN SATURATION: 98 %

## 2024-06-17 DIAGNOSIS — C92.00 ACUTE MYELOID LEUKEMIA NOT HAVING ACHIEVED REMISSION (MULTI): ICD-10-CM

## 2024-06-17 DIAGNOSIS — C92.00 ACUTE MYELOID LEUKEMIA NOT HAVING ACHIEVED REMISSION (MULTI): Primary | ICD-10-CM

## 2024-06-17 PROCEDURE — 77012 CT SCAN FOR NEEDLE BIOPSY: CPT

## 2024-06-17 PROCEDURE — 36415 COLL VENOUS BLD VENIPUNCTURE: CPT | Performed by: INTERNAL MEDICINE

## 2024-06-17 PROCEDURE — 81379 HLA I TYPING COMPLETE HR: CPT | Mod: OUT | Performed by: INTERNAL MEDICINE

## 2024-06-17 PROCEDURE — 99152 MOD SED SAME PHYS/QHP 5/>YRS: CPT

## 2024-06-17 PROCEDURE — 88185 FLOWCYTOMETRY/TC ADD-ON: CPT | Mod: TC | Performed by: INTERNAL MEDICINE

## 2024-06-17 PROCEDURE — 85097 BONE MARROW INTERPRETATION: CPT | Mod: TC | Performed by: INTERNAL MEDICINE

## 2024-06-17 PROCEDURE — 81382 HLA II TYPING 1 LOC HR: CPT | Mod: OUT,59 | Performed by: INTERNAL MEDICINE

## 2024-06-17 PROCEDURE — 99152 MOD SED SAME PHYS/QHP 5/>YRS: CPT | Performed by: RADIOLOGY

## 2024-06-17 PROCEDURE — 7100000009 HC PHASE TWO TIME - INITIAL BASE CHARGE

## 2024-06-17 PROCEDURE — C1830 POWER BONE MARROW BX NEEDLE: HCPCS

## 2024-06-17 PROCEDURE — 7100000010 HC PHASE TWO TIME - EACH INCREMENTAL 1 MINUTE

## 2024-06-17 PROCEDURE — 2720000007 HC OR 272 NO HCPCS

## 2024-06-17 PROCEDURE — 2500000004 HC RX 250 GENERAL PHARMACY W/ HCPCS (ALT 636 FOR OP/ED): Performed by: RADIOLOGY

## 2024-06-17 RX ORDER — MIDAZOLAM HYDROCHLORIDE 1 MG/ML
INJECTION INTRAMUSCULAR; INTRAVENOUS
Status: COMPLETED | OUTPATIENT
Start: 2024-06-17 | End: 2024-06-17

## 2024-06-17 RX ORDER — FENTANYL CITRATE 50 UG/ML
INJECTION, SOLUTION INTRAMUSCULAR; INTRAVENOUS
Status: COMPLETED | OUTPATIENT
Start: 2024-06-17 | End: 2024-06-17

## 2024-06-17 ASSESSMENT — PAIN SCALES - GENERAL
PAINLEVEL_OUTOF10: 0 - NO PAIN

## 2024-06-17 ASSESSMENT — PAIN - FUNCTIONAL ASSESSMENT
PAIN_FUNCTIONAL_ASSESSMENT: 0-10

## 2024-06-17 NOTE — DISCHARGE INSTRUCTIONS
Follow instructions on Patient Info Sheet #926    You received moderate sedation:  - Do not drive a car, or operate any machinery or power tools of any kind.  - Do not drink any alcoholic drinks.  - Do not take any over the counter medications that may cause drowsiness.  - Do not make any important decisions or sign any legal documents.  - You need to have a responsible adult accompany you home.  - You may resume your normal diet.  - We strongly suggest that a responsible adult be with you for the rest of the day and also during the night. This is for your protection and safety.     For questions related to your procedure:  Please call [phone] between the hours of 7:00am-5:00pm Monday through Friday.  Please call [phone] after 5:00pm and on weekends and holidays.     In the event of an emergency call [phone] or go to your nearest emergency room.

## 2024-06-17 NOTE — POST-PROCEDURE NOTE
Interventional Radiology Brief Postprocedure Note    Attending: Dr. Jose Denise    Assistant: Dr. Ronen Tavera    Diagnosis: Acute Myelogenous Leukemia    Description of procedure: Informed consent obtained. Patient taken to procedure room and placed prone on scanner table. Time-out performed. Access site in left low back marked, cleaned with chlorhexidine, and draped in a sterile fashion. Local and IV anesthesia given. Intermittent CT guidance used to advance an 11g Bard bone biopsy needle into the left iliac bone. 12cc bone marrow aspirate obtained. 1 core biopsy sample obtained. Needles removed and 2x2 gauze and tegaderm applied.      Anesthesia:  Moderate conscious sedation with 1mg IV Versed and 50mcg IV Fentanyl     Complications: None    Estimated Blood Loss: minimal    Medications (Filter: Administrations occurring from 1312 to 1332 on 06/17/24) As of 06/17/24 1332      fentaNYL PF (Sublimaze) injection (mcg) Total dose:  50 mcg      Date/Time Rate/Dose/Volume Action       06/17/24  1320 50 mcg Given               midazolam (Versed) injection (mg) Total dose:  1 mg      Date/Time Rate/Dose/Volume Action       06/17/24  1320 1 mg Given                   No specimens collected      See detailed result report with images in PACS.    The patient tolerated the procedure well without incident or complication and is in stable condition.

## 2024-06-17 NOTE — Clinical Note
Bone marrow bx complete. 12 mL aspirated and 1 core sample taken. Pt received 1 mg versed and 50 mcg fentanyl IVP. 2x2 and tegaderm placed. Dressing clean, dry, and intact. No hematoma. Pt received 1 mg versed and 50 mcg fentanyl IVP. Pt transferred to CU, RPCU RN received report.

## 2024-06-17 NOTE — PRE-PROCEDURE NOTE
Interventional Radiology Preprocedure Note    Indication for procedure: The encounter diagnosis was Acute myeloid leukemia not having achieved remission (Multi).    Relevant review of systems: NA    Relevant Labs:   Lab Results   Component Value Date    CREATININE 0.99 06/10/2024    EGFR 84 06/10/2024    INR 1.3 (H) 02/13/2024    PROTIME 14.6 (H) 02/13/2024       Planned Sedation/Anesthesia: Moderate    Airway assessment: normal    Directed physical examination:    Sitting up comfortably in bed. No distress. Breathing comfortably on room air.     Mallampati: II (hard and soft palate, upper portion of tonsils and uvula visible)    ASA Score: ASA 2 - Patient with mild systemic disease with no functional limitations    Benefits, risks and alternatives of procedure and planned sedation have been discussed with the patient and/or their representative. All questions answered and they agree to proceed.

## 2024-06-19 LAB
CELL COUNT (BLOOD): 2.63 X10*3/UL
CELL POPULATIONS: NORMAL
DIAGNOSIS: NORMAL
FLOW DIFFERENTIAL: NORMAL
FLOW TEST ORDERED: NORMAL
LAB TEST METHOD: NORMAL
NUMBER OF CELLS COLLECTED: NORMAL
PATH REPORT.TOTAL CANCER: NORMAL
SIGNATURE COMMENT: NORMAL
SPECIMEN VIABILITY: NORMAL

## 2024-06-20 DIAGNOSIS — Z76.82 STEM CELL TRANSPLANT CANDIDATE: ICD-10-CM

## 2024-06-20 DIAGNOSIS — C92.00 ACUTE MYELOID LEUKEMIA NOT HAVING ACHIEVED REMISSION (MULTI): ICD-10-CM

## 2024-06-20 LAB
PATH REPORT.COMMENTS IMP SPEC: NORMAL
PATH REPORT.FINAL DX SPEC: NORMAL
PATH REPORT.GROSS SPEC: NORMAL
PATH REPORT.MICROSCOPIC SPEC OTHER STN: NORMAL
PATH REPORT.RELEVANT HX SPEC: NORMAL
PATH REPORT.TOTAL CANCER: NORMAL

## 2024-06-21 ENCOUNTER — APPOINTMENT (OUTPATIENT)
Dept: GASTROENTEROLOGY | Facility: CLINIC | Age: 64
End: 2024-06-21
Payer: COMMERCIAL

## 2024-06-23 ASSESSMENT — ENCOUNTER SYMPTOMS: CONSTIPATION: 0

## 2024-06-23 NOTE — PROGRESS NOTES
Patient ID: Brandt Merritt is a 64 y.o. male.    Diagnosis:   Myelodysplastic neoplasm/AML      Treatment:   Oncology History Overview Note   2024  Myelodysplastic neoplasm with increased blasts-2 ( WHO)  Myelodysplastic neoplasm/AML  (ICC  classification)    Presented in  10/2023 for pancytopenia, found to have hemolysis. Patient had normal CBC 2020. Denied any hemorrhoidal bleeding . Has had C Scope and EGD , treated Hep C  . 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      CBC 24 wbc 3.0, hgb 7.1, platelets 167K ANC 0.73    BM biopsy done on 24  as folllows:  BM histology  Myelodysplastic neoplasm with increased blasts-2 ( WHO)  Myelodysplastic neoplasm/AML  (ICC  classification)  Balsts 11% on aspirate smear and 15-20% based on CD 34 immunostaining approaching AML leukemia, hematooiesis is erythroid dominant with dysplasia in granulocytes and megakaryocytes.   FISH studies trisomy 8 17.2%  NGS panel DNMT3 aVAF 36%  U2AF1 VAF 32%       Acute myeloid leukemia not having achieved remission (Multi)   2024 Initial Diagnosis    Acute myeloid leukemia not having achieved remission (Multi)     2024 -  Chemotherapy    Venetoclax / Decitabine, 28 Day Cycles - Induction / Consolidation         Response:     Past Medical History:  HTN   STEMI 2020 after getting  a water pills.  Chronic hepatitis C infection treated in  treated  with Dr. Lloyd  Past Medical History:   Diagnosis Date    Hypertension     Personal history of other diseases of the circulatory system     History of hypertension       Surgical History:  Conosocopy 2021  Upper EGD 10/31/23  Surgical reduction torsion of testes   Past Surgical History:   Procedure Laterality Date    COLONOSCOPY  2013    Complete Colonoscopy    OTHER SURGICAL HISTORY  10/07/2015    Surgery Testis Reduction Of Torsion Of Testis Right        Family History:  CAD, DM in mother    Mother  of CVA at age 69  Brother with prostate  CA, 1 sister with liver disease  2 children healthy  Family History   Problem Relation Name Age of Onset    Other (diabetes mellitus) Mother         Social History:  Lives with wife of 30 years, works at [employer], 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  29 years, former smoker, smoked x 15 yrs quit 20+ years ago. Marijuana 3 x a week. Served in  in [place] and [employer],   Social History     Tobacco Use    Smoking status: Former     Types: Cigarettes    Smokeless tobacco: Never   Substance Use Topics    Alcohol use: Not Currently     Comment: occansionally    Drug use: Yes     Types: Marijuana     Comment: 3 weeks ago      -------------------------------------------------------------------------------------------------------  Subjective       HPI    Brandt Merritt is a 64 y.o. 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 Seen by Abundio Rust recently and Rose Casal initially 10/2023 for pancytopenia. . He has had work up for pancytopenia and hemolytic process . Admits to having ongoing chronic fatigue that is unchanged . Patient had normal CBC June 2020. Denied any hemorrhoidal bleeding . Has had C Scope and EGD , treated Hep C 2014 . 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      BM biopsy done on 4/11/24  as folllows:    Additional information 2021 PSA was high, biopsy 2021 and 2023 all benign.  CBC 4/11/24 wbc 3.0, hgb 7.1, platelets 167K ANC 0.73  BM histology  Myelodysplastic neoplasm with increased blasts-2 ( WHO)  Myelodysplastic neoplasm/AML  (ICC 2022 classification)  Balsts 11% on aspirate smear and 15-20% based on CD 34 immunostaining approaching AML leukemia, hematooiesis is erythroid dominant with dysplasia in granulocytes and megakaryocytes.   FISH studies trisomy 8 17.2%  NGS panel DNMT3 aVAF 36%  U2AF1 VAF 32%    Patient is here today (6/24/24) with his wife for count check and to review bone marrow biopsy results from 6/17/24 which shows no blasts, but as  expected ongoing myelodysplasia and for planning of his allogeneic stem cell transplant .  He started cycle 2 of decitabine venetoclax  on 6/10/24. He feels generally well.denies fevers, sweats, bruising or bleeding and is working at [employer] full time. Still losing some weight though less so, continues to have a paltry lunch most days while at work. Unfortunatel 11/12 MUD donor no longer available, and patient remains reluctant to approach his daughters for haplo donation    He continues to work full time at [employer].  Planning to retire after his transplant.      Review of Systems   Gastrointestinal:  Negative for constipation.   All other systems reviewed and are negative.     -------------------------------------------------------------------------------------------------------  Objective   BSA: There is no height or weight on file to calculate BSA.  There were no vitals taken for this visit.    Physical Exam  Vitals reviewed.   Constitutional:       Comments: Well developed man looking as stated age, muscular. thin   HENT:      Head: Normocephalic and atraumatic.      Mouth/Throat:      Mouth: Mucous membranes are moist.   Eyes:      Extraocular Movements: Extraocular movements intact.      Conjunctiva/sclera: Conjunctivae normal.      Pupils: Pupils are equal, round, and reactive to light.   Cardiovascular:      Rate and Rhythm: Normal rate and regular rhythm.      Pulses: Normal pulses.   Pulmonary:      Effort: Pulmonary effort is normal.      Breath sounds: Normal breath sounds.   Abdominal:      General: Abdomen is flat. Bowel sounds are normal. There is no distension.      Palpations: Abdomen is soft. There is hepatomegaly.      Tenderness: There is no abdominal tenderness.   Musculoskeletal:         General: Normal range of motion.   Skin:     General: Skin is warm.   Neurological:      General: No focal deficit present.      Mental Status: He is alert.   Psychiatric:         Mood and Affect: Mood  normal.         Behavior: Behavior normal.         Thought Content: Thought content normal.         Judgment: Judgment normal.         Performance Status:  Asymptomatic  -------------------------------------------------------------------------------------------------------  Assessment/Plan      Myelodysplastic neoplasm/AML- Patient presents with a history of pancytopenia since 9/2023 and due to confusion picture of hemolysis with only mild leukopenia and neutropenia extensive workup done prior to bone marrow bx which shows MDS in transition to AML, with trisomy 8 and DNMT alpha and U2AF1 mutation which is adverse risk. Given that the patient has > 10% blasts in bone marrow I would favor induction chemotherapy with ultimate goal to pursue curative allogeneic stem cell  transplant.  The patients slow, indolent course is more consistent with MDS and he was recommended induction with decitabine and venetoclax which has about a 60% chance of achieving a complete clinical remission.    C1D1: 5/13/2024  C2D1: 6/11/2024 , will change venetoclax to 200 mg 21/28 days, check blood counts weekly, anticipate going to allogeneic transplant after cycle 2.     Unfortunately MUD donor no longer available, will explore ucb options, discussed pros and cons of ucb transplant vs MUD, explained sometimes we do haplo or haplo cord as graft source, continue to encourage patient to speak with his grown daughters.    BM biopsy 6/17/24 blasts cleared, waiting trisomy 8, still MDS changes    Ppx: acyclovir, fluconazole, levofloxacin ppx.     Echocardiogram show a normal LVEF,  HLA typing has been completed. Patient met with Daxa Hurtado for initiating transplant education.     Chemotherapy teaching done including instructions to contact me or the covering physician for any temperature > 100.4 degrees.      2. 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     3. Hx of  treated hepatitis C-recent hep C RNA pcr negative, and recent liver ultrasound shows normal sized liver at 14.5 cm, however seems enlarged on exam. Patient agreeable to hepatology consult before consideration of allograft, scheduled on 7/17/24Ursodiol started.   Chronic elevated bilirubin likely hemolysis due to hemoglobin C trait, Direct bilirubin 0.4, await hepatology consult,     RTC: Weekly blood count checks, cycle 3 if appropriate on 7/8 or so based on donor availability    - Hepatology NPV 7/17/24 patient unaware of this visit, on ursodiol starting for SOS protection, patient agreeable           -------------------------------------------------------------------------------------------------------  Malaika Newsome MD

## 2024-06-24 ENCOUNTER — LAB (OUTPATIENT)
Dept: LAB | Facility: HOSPITAL | Age: 64
End: 2024-06-24
Payer: COMMERCIAL

## 2024-06-24 ENCOUNTER — SOCIAL WORK (OUTPATIENT)
Dept: HEMATOLOGY/ONCOLOGY | Facility: HOSPITAL | Age: 64
End: 2024-06-24

## 2024-06-24 ENCOUNTER — OFFICE VISIT (OUTPATIENT)
Dept: HEMATOLOGY/ONCOLOGY | Facility: HOSPITAL | Age: 64
End: 2024-06-24
Payer: COMMERCIAL

## 2024-06-24 VITALS
HEART RATE: 92 BPM | DIASTOLIC BLOOD PRESSURE: 73 MMHG | WEIGHT: 156.6 LBS | OXYGEN SATURATION: 98 % | BODY MASS INDEX: 25.28 KG/M2 | RESPIRATION RATE: 16 BRPM | SYSTOLIC BLOOD PRESSURE: 142 MMHG | TEMPERATURE: 96.6 F

## 2024-06-24 DIAGNOSIS — C92.00 ACUTE MYELOID LEUKEMIA NOT HAVING ACHIEVED REMISSION (MULTI): ICD-10-CM

## 2024-06-24 LAB
ABO GROUP (TYPE) IN BLOOD: NORMAL
ALBUMIN SERPL BCP-MCNC: 3.7 G/DL (ref 3.4–5)
ALP SERPL-CCNC: 85 U/L (ref 33–136)
ALT SERPL W P-5'-P-CCNC: 7 U/L (ref 10–52)
ANION GAP SERPL CALC-SCNC: 12 MMOL/L (ref 10–20)
ANTIBODY SCREEN: NORMAL
AST SERPL W P-5'-P-CCNC: 20 U/L (ref 9–39)
BASOPHILS # BLD AUTO: 0.01 X10*3/UL (ref 0–0.1)
BASOPHILS NFR BLD AUTO: 0.5 %
BILIRUB SERPL-MCNC: 0.9 MG/DL (ref 0–1.2)
BUN SERPL-MCNC: 16 MG/DL (ref 6–23)
CALCIUM SERPL-MCNC: 8.4 MG/DL (ref 8.6–10.3)
CHLORIDE SERPL-SCNC: 107 MMOL/L (ref 98–107)
CHROM ANALY OVERALL INTERP-IMP: NORMAL
CO2 SERPL-SCNC: 24 MMOL/L (ref 21–32)
CREAT SERPL-MCNC: 1 MG/DL (ref 0.5–1.3)
DACRYOCYTES BLD QL SMEAR: NORMAL
EGFRCR SERPLBLD CKD-EPI 2021: 83 ML/MIN/1.73M*2
ELECTRONICALLY COSIGNED BY CYTOGENETICS: NORMAL
ELECTRONICALLY SIGNED BY CYTOGENETICS: NORMAL
EOSINOPHIL # BLD AUTO: 0.01 X10*3/UL (ref 0–0.7)
EOSINOPHIL NFR BLD AUTO: 0.5 %
ERYTHROCYTE [DISTWIDTH] IN BLOOD BY AUTOMATED COUNT: 20.5 % (ref 11.5–14.5)
FISH ISCN RESULTS: NORMAL
GIANT PLATELETS BLD QL SMEAR: NORMAL
GLUCOSE SERPL-MCNC: 90 MG/DL (ref 74–99)
HCT VFR BLD AUTO: 22.2 % (ref 41–52)
HGB BLD-MCNC: 7.4 G/DL (ref 13.5–17.5)
HOWELL-JOLLY BOD BLD QL SMEAR: PRESENT
HYPOCHROMIA BLD QL SMEAR: NORMAL
IMM GRANULOCYTES # BLD AUTO: 0.02 X10*3/UL (ref 0–0.7)
IMM GRANULOCYTES NFR BLD AUTO: 1 % (ref 0–0.9)
LYMPHOCYTES # BLD AUTO: 1.23 X10*3/UL (ref 1.2–4.8)
LYMPHOCYTES NFR BLD AUTO: 62.4 %
MCH RBC QN AUTO: 27.4 PG (ref 26–34)
MCHC RBC AUTO-ENTMCNC: 33.3 G/DL (ref 32–36)
MCV RBC AUTO: 82 FL (ref 80–100)
MONOCYTES # BLD AUTO: 0.1 X10*3/UL (ref 0.1–1)
MONOCYTES NFR BLD AUTO: 5.1 %
NEUTROPHILS # BLD AUTO: 0.6 X10*3/UL (ref 1.2–7.7)
NEUTROPHILS NFR BLD AUTO: 30.5 %
NRBC BLD-RTO: 15.7 /100 WBCS (ref 0–0)
PLATELET # BLD AUTO: 182 X10*3/UL (ref 150–450)
POLYCHROMASIA BLD QL SMEAR: NORMAL
POTASSIUM SERPL-SCNC: 3.7 MMOL/L (ref 3.5–5.3)
PROT SERPL-MCNC: 6.8 G/DL (ref 6.4–8.2)
RBC # BLD AUTO: 2.7 X10*6/UL (ref 4.5–5.9)
RBC MORPH BLD: NORMAL
RH FACTOR (ANTIGEN D): NORMAL
SCHISTOCYTES BLD QL SMEAR: NORMAL
SODIUM SERPL-SCNC: 139 MMOL/L (ref 136–145)
SPHEROCYTES BLD QL SMEAR: NORMAL
TARGETS BLD QL SMEAR: NORMAL
WBC # BLD AUTO: 2 X10*3/UL (ref 4.4–11.3)

## 2024-06-24 PROCEDURE — 1159F MED LIST DOCD IN RCRD: CPT | Performed by: INTERNAL MEDICINE

## 2024-06-24 PROCEDURE — 99215 OFFICE O/P EST HI 40 MIN: CPT | Performed by: INTERNAL MEDICINE

## 2024-06-24 PROCEDURE — 80053 COMPREHEN METABOLIC PANEL: CPT

## 2024-06-24 PROCEDURE — 3077F SYST BP >= 140 MM HG: CPT | Performed by: INTERNAL MEDICINE

## 2024-06-24 PROCEDURE — 86901 BLOOD TYPING SEROLOGIC RH(D): CPT

## 2024-06-24 PROCEDURE — 3078F DIAST BP <80 MM HG: CPT | Performed by: INTERNAL MEDICINE

## 2024-06-24 PROCEDURE — 1126F AMNT PAIN NOTED NONE PRSNT: CPT | Performed by: INTERNAL MEDICINE

## 2024-06-24 PROCEDURE — 1157F ADVNC CARE PLAN IN RCRD: CPT | Performed by: INTERNAL MEDICINE

## 2024-06-24 PROCEDURE — 85025 COMPLETE CBC W/AUTO DIFF WBC: CPT

## 2024-06-24 PROCEDURE — 36415 COLL VENOUS BLD VENIPUNCTURE: CPT

## 2024-06-24 ASSESSMENT — PAIN SCALES - GENERAL: PAINLEVEL_OUTOF10: 0-NO PAIN

## 2024-06-25 NOTE — PROGRESS NOTES
PSYCHOSOCIAL ASSESSMENT     Demographic Information  Brandt Merritt  1960  [id number]  Transplant Type: Allogeneic  Assessment Type:  BMT Pre-Transplant Assessment  Date of assessment: 6/24/24  Provider(s): Malaika Newsome  Diagnosis: AML  Person(s) present during assessment: spouse and patient  Primary language: English   Interpretive services used: No  County: [facility]                  Living Environment/Support Systems  Partner Status:   Children: yes, but lives out of state. Will not be involved in caregiver role.  Support systems: spouse who is retired; father; step father.  24/7 Primary caregiver: spouse  Secondary caregiver: None identified  Employment Status Caregiver: retired  Caregiver concerns: none  Current Living Situation: House Own  Resides with: spouse  Concerns with Housing Environment: None  Comments: NA    Lodging  Distance from transplant center: under 1 hour from [facility]  Lodging Needed? : No  Comments: NA     Safety  Safety at Home: denies feeling unsafe  History of Domestic Violence: None disclosed     Functional Status   Functional status: Independent  Patient currently ambulates: Independently  Patient has following equipment: None  Other physical health issues that the patient is experiencing: NA  What supports are in place to assist the patient: None  Transportation:  Friend/Family, spouse intends to drive patient to follow up appointments  Comments: NA        Finances/Insurance  Insurance: [facility]  Does the patient have any pending insurance applications: No   Hospital Financial Assistance: None  Patient's income source:  Short Term Disability and residential (from spouse)  Work History: Patient has plans to retire in the coming months, but is currently accessing FMLA and STD throughout the transplant course   History: No  Background  Food Insecurity: No   Patient financial plans during transplant: STD, spouse FDC income  Does the patient have any financial  "concerns: No   Any difficulties affording medications? No   Applicable [place]: Yes, LLS Urgent Need, and LLS Patient Aid (applied on 6/25/24)  Comments: Patient and spouse state they are managing finances fine at this time, but are interested in any available grants. They were provided with information on LLS and [facility] criss     Advance Directives  Has Advance Directives on file  Health Care Agent Status:Not Activated  Health Care Agent, When applicable: on file  Comments: NA    Legal Involvement  Relevant current or previous legal concerns: None     Holiness or Spiritual Identity  Comments: States he is [place], and states \"his plan is to leave it in God's hands\"    Mental Health  Active SI/HI: No  Mental Health Diagnosis, if applicable: None  Family History of Mental Health? No   Mood: \"good\"  Hobbies/Interests? reading  Patient identified coping skills:   Patient identified coping skills related to admission: offered spiritual care/ art therapy/music therapy. Declined referrals at this time.  Motivation for treatment? Yes  Learning Preferences: Spouse to gather and retain information on his behalf, while he also listens to information  Understanding of Diagnosis and Transplant? Yes  Cognitive Comments: None  Relevant Providers: None  Comments: No relevant reported medical history    Substance Use  Use of Tobacco Products? Yes, Product Type: cigarettes Last Use: 15 years ago  Alcohol Use? Yes, Type? unknown, How many drinks in a week? Unknown, last used 2 years ago  Other Substance Use? Denies  Concerns being able to stop any substance use for treatment? Denies  Family History of Substance Use? No   Comments: NA    Assessment  SIPAT Total Score: 11  Patient is a Good Candidate for transplant from a psychosocial perspective.  Additional Comments:     Potential Barriers to Care: None Identified  Patient Strengths: Healthy Coping Skills and Motivated for Treatment    Plan " "  Referrals:N/A  Applications:[place]  Other: N/A    Narrative: Patient is a 64 year old male  male diagnosed with AML. Dr. Newsome. Allo transplant candidate.    Patient lives with his spouse, lives within 1 hour of [facility] [address]; can return home. Has children, but do not live locally and will not be available in caregiver role. Spouse is identified 24/7 primary caregiver. Patient identifies father and step father as intermittent back up caregivers. He states at baseline he is completes ADLs independently. No in home DME or services.    Patient and spouse state that they are \"doing okay financially\", but are open to any available grants. Have applied for [facility] grants on his behalf. Also provided information on [facility] criss; awaiting patient's feedback on income/expense amounts.    Patient identifies are Congregational, and sends his trust \"to God's hands\". He wilfred by placing trust in his care team and in god. He denies a significant/relevant mental health history, and denies having seen a counselor/therapist/mental health professional in the past. States his mood has been \"good\", and denies need for emotional support at this time.    "

## 2024-06-26 ENCOUNTER — LAB REQUISITION (OUTPATIENT)
Dept: LAB | Facility: CLINIC | Age: 64
End: 2024-06-26
Payer: COMMERCIAL

## 2024-06-26 DIAGNOSIS — C92.00 ACUTE MYELOBLASTIC LEUKEMIA, NOT HAVING ACHIEVED REMISSION (MULTI): ICD-10-CM

## 2024-06-26 LAB
HLA CLS I TYP PNL BLD/T DONR HIGH RES: NORMAL
HLA CLS I TYP PNL BLD/T DONR HIGH RES: NORMAL
HLA RESULTS: NORMAL
HLA RESULTS: NORMAL
HLA-DP2 QL: NORMAL
HLA-DP2 QL: NORMAL
HLA-DQB1 HIGH RES: NORMAL
HLA-DQB1 HIGH RES: NORMAL
HLA-DRB1 HIGH RES: NORMAL
HLA-DRB1 HIGH RES: NORMAL

## 2024-06-26 PROCEDURE — RXMED WILLOW AMBULATORY MEDICATION CHARGE

## 2024-06-27 ENCOUNTER — PHARMACY VISIT (OUTPATIENT)
Dept: PHARMACY | Facility: CLINIC | Age: 64
End: 2024-06-27
Payer: COMMERCIAL

## 2024-06-28 ENCOUNTER — LAB REQUISITION (OUTPATIENT)
Dept: LAB | Facility: CLINIC | Age: 64
End: 2024-06-28
Payer: COMMERCIAL

## 2024-06-28 DIAGNOSIS — C92.00 ACUTE MYELOBLASTIC LEUKEMIA, NOT HAVING ACHIEVED REMISSION (MULTI): ICD-10-CM

## 2024-06-28 LAB
HLA CLS I TYP PNL BLD/T DONR HIGH RES: NORMAL
HLA RESULTS: NORMAL
HLA-DP2 QL: NORMAL
HLA-DQB1 HIGH RES: NORMAL
HLA-DRB1 HIGH RES: NORMAL

## 2024-06-28 PROCEDURE — 81379 HLA I TYPING COMPLETE HR: CPT | Mod: OUT | Performed by: INTERNAL MEDICINE

## 2024-06-28 PROCEDURE — 81382 HLA II TYPING 1 LOC HR: CPT | Mod: OUT,59 | Performed by: INTERNAL MEDICINE

## 2024-07-07 ASSESSMENT — ENCOUNTER SYMPTOMS: CONSTIPATION: 0

## 2024-07-08 ENCOUNTER — OFFICE VISIT (OUTPATIENT)
Dept: HEMATOLOGY/ONCOLOGY | Facility: HOSPITAL | Age: 64
End: 2024-07-08
Payer: COMMERCIAL

## 2024-07-08 ENCOUNTER — LAB (OUTPATIENT)
Dept: LAB | Facility: HOSPITAL | Age: 64
End: 2024-07-08
Payer: COMMERCIAL

## 2024-07-08 ENCOUNTER — TELEPHONE (OUTPATIENT)
Dept: HEMATOLOGY/ONCOLOGY | Facility: HOSPITAL | Age: 64
End: 2024-07-08

## 2024-07-08 VITALS
RESPIRATION RATE: 18 BRPM | OXYGEN SATURATION: 99 % | TEMPERATURE: 98.4 F | HEART RATE: 90 BPM | DIASTOLIC BLOOD PRESSURE: 80 MMHG | BODY MASS INDEX: 25.73 KG/M2 | SYSTOLIC BLOOD PRESSURE: 131 MMHG | WEIGHT: 159.39 LBS

## 2024-07-08 DIAGNOSIS — C92.00 ACUTE MYELOID LEUKEMIA NOT HAVING ACHIEVED REMISSION (MULTI): ICD-10-CM

## 2024-07-08 DIAGNOSIS — K59.00 CONSTIPATION, UNSPECIFIED CONSTIPATION TYPE: Primary | ICD-10-CM

## 2024-07-08 DIAGNOSIS — D61.818 PANCYTOPENIA (MULTI): ICD-10-CM

## 2024-07-08 DIAGNOSIS — D64.9 ANEMIA, UNSPECIFIED TYPE: ICD-10-CM

## 2024-07-08 LAB
ABO GROUP (TYPE) IN BLOOD: NORMAL
ALBUMIN SERPL BCP-MCNC: 3.9 G/DL (ref 3.4–5)
ALP SERPL-CCNC: 94 U/L (ref 33–136)
ALT SERPL W P-5'-P-CCNC: 7 U/L (ref 10–52)
ANION GAP SERPL CALC-SCNC: 14 MMOL/L (ref 10–20)
ANTIBODY SCREEN: NORMAL
AST SERPL W P-5'-P-CCNC: 17 U/L (ref 9–39)
BASOPHILS # BLD AUTO: 0 X10*3/UL (ref 0–0.1)
BASOPHILS NFR BLD AUTO: 0 %
BILIRUB SERPL-MCNC: 0.7 MG/DL (ref 0–1.2)
BUN SERPL-MCNC: 17 MG/DL (ref 6–23)
CALCIUM SERPL-MCNC: 8.9 MG/DL (ref 8.6–10.3)
CHLORIDE SERPL-SCNC: 104 MMOL/L (ref 98–107)
CO2 SERPL-SCNC: 23 MMOL/L (ref 21–32)
CREAT SERPL-MCNC: 1.07 MG/DL (ref 0.5–1.3)
DACRYOCYTES BLD QL SMEAR: NORMAL
EGFRCR SERPLBLD CKD-EPI 2021: 77 ML/MIN/1.73M*2
EOSINOPHIL # BLD AUTO: 0 X10*3/UL (ref 0–0.7)
EOSINOPHIL NFR BLD AUTO: 0 %
ERYTHROCYTE [DISTWIDTH] IN BLOOD BY AUTOMATED COUNT: 23.1 % (ref 11.5–14.5)
GLUCOSE SERPL-MCNC: 110 MG/DL (ref 74–99)
HCT VFR BLD AUTO: 24.2 % (ref 41–52)
HGB BLD-MCNC: 8.2 G/DL (ref 13.5–17.5)
HOWELL-JOLLY BOD BLD QL SMEAR: PRESENT
HYPOCHROMIA BLD QL SMEAR: NORMAL
IMM GRANULOCYTES # BLD AUTO: 0.01 X10*3/UL (ref 0–0.7)
IMM GRANULOCYTES NFR BLD AUTO: 0.7 % (ref 0–0.9)
LYMPHOCYTES # BLD AUTO: 1.16 X10*3/UL (ref 1.2–4.8)
LYMPHOCYTES NFR BLD AUTO: 80.6 %
MCH RBC QN AUTO: 28.2 PG (ref 26–34)
MCHC RBC AUTO-ENTMCNC: 33.9 G/DL (ref 32–36)
MCV RBC AUTO: 83 FL (ref 80–100)
MONOCYTES # BLD AUTO: 0.01 X10*3/UL (ref 0.1–1)
MONOCYTES NFR BLD AUTO: 0.7 %
NEUTROPHILS # BLD AUTO: 0.26 X10*3/UL (ref 1.2–7.7)
NEUTROPHILS NFR BLD AUTO: 18 %
NRBC BLD-RTO: 4.2 /100 WBCS (ref 0–0)
PLATELET # BLD AUTO: 103 X10*3/UL (ref 150–450)
POLYCHROMASIA BLD QL SMEAR: NORMAL
POTASSIUM SERPL-SCNC: 3.9 MMOL/L (ref 3.5–5.3)
PROT SERPL-MCNC: 7.3 G/DL (ref 6.4–8.2)
RBC # BLD AUTO: 2.91 X10*6/UL (ref 4.5–5.9)
RBC MORPH BLD: NORMAL
RH FACTOR (ANTIGEN D): NORMAL
SCHISTOCYTES BLD QL SMEAR: NORMAL
SODIUM SERPL-SCNC: 137 MMOL/L (ref 136–145)
SPHEROCYTES BLD QL SMEAR: NORMAL
TARGETS BLD QL SMEAR: NORMAL
WBC # BLD AUTO: 1.4 X10*3/UL (ref 4.4–11.3)

## 2024-07-08 PROCEDURE — 85025 COMPLETE CBC W/AUTO DIFF WBC: CPT

## 2024-07-08 PROCEDURE — 3079F DIAST BP 80-89 MM HG: CPT | Performed by: INTERNAL MEDICINE

## 2024-07-08 PROCEDURE — 1126F AMNT PAIN NOTED NONE PRSNT: CPT | Performed by: INTERNAL MEDICINE

## 2024-07-08 PROCEDURE — 99214 OFFICE O/P EST MOD 30 MIN: CPT | Performed by: INTERNAL MEDICINE

## 2024-07-08 PROCEDURE — 36415 COLL VENOUS BLD VENIPUNCTURE: CPT

## 2024-07-08 PROCEDURE — 1157F ADVNC CARE PLAN IN RCRD: CPT | Performed by: INTERNAL MEDICINE

## 2024-07-08 PROCEDURE — 3075F SYST BP GE 130 - 139MM HG: CPT | Performed by: INTERNAL MEDICINE

## 2024-07-08 PROCEDURE — 86901 BLOOD TYPING SEROLOGIC RH(D): CPT

## 2024-07-08 PROCEDURE — 84075 ASSAY ALKALINE PHOSPHATASE: CPT

## 2024-07-08 RX ORDER — PROCHLORPERAZINE EDISYLATE 5 MG/ML
10 INJECTION INTRAMUSCULAR; INTRAVENOUS EVERY 6 HOURS PRN
OUTPATIENT
Start: 2024-07-16

## 2024-07-08 RX ORDER — DIPHENHYDRAMINE HYDROCHLORIDE 50 MG/ML
50 INJECTION INTRAMUSCULAR; INTRAVENOUS AS NEEDED
OUTPATIENT
Start: 2024-07-19

## 2024-07-08 RX ORDER — EPINEPHRINE 0.3 MG/.3ML
0.3 INJECTION SUBCUTANEOUS EVERY 5 MIN PRN
OUTPATIENT
Start: 2024-07-17

## 2024-07-08 RX ORDER — FAMOTIDINE 10 MG/ML
20 INJECTION INTRAVENOUS ONCE AS NEEDED
OUTPATIENT
Start: 2024-07-18

## 2024-07-08 RX ORDER — ALBUTEROL SULFATE 0.83 MG/ML
3 SOLUTION RESPIRATORY (INHALATION) AS NEEDED
OUTPATIENT
Start: 2024-07-15

## 2024-07-08 RX ORDER — FAMOTIDINE 10 MG/ML
20 INJECTION INTRAVENOUS ONCE AS NEEDED
OUTPATIENT
Start: 2024-07-17

## 2024-07-08 RX ORDER — EPINEPHRINE 0.3 MG/.3ML
0.3 INJECTION SUBCUTANEOUS EVERY 5 MIN PRN
OUTPATIENT
Start: 2024-07-19

## 2024-07-08 RX ORDER — PROCHLORPERAZINE EDISYLATE 5 MG/ML
10 INJECTION INTRAMUSCULAR; INTRAVENOUS EVERY 6 HOURS PRN
OUTPATIENT
Start: 2024-07-18

## 2024-07-08 RX ORDER — DIPHENHYDRAMINE HYDROCHLORIDE 50 MG/ML
50 INJECTION INTRAMUSCULAR; INTRAVENOUS AS NEEDED
OUTPATIENT
Start: 2024-07-18

## 2024-07-08 RX ORDER — EPINEPHRINE 0.3 MG/.3ML
0.3 INJECTION SUBCUTANEOUS EVERY 5 MIN PRN
OUTPATIENT
Start: 2024-07-15

## 2024-07-08 RX ORDER — PROCHLORPERAZINE MALEATE 10 MG
10 TABLET ORAL EVERY 6 HOURS PRN
OUTPATIENT
Start: 2024-07-18

## 2024-07-08 RX ORDER — FAMOTIDINE 10 MG/ML
20 INJECTION INTRAVENOUS ONCE AS NEEDED
OUTPATIENT
Start: 2024-07-15

## 2024-07-08 RX ORDER — ALBUTEROL SULFATE 0.83 MG/ML
3 SOLUTION RESPIRATORY (INHALATION) AS NEEDED
OUTPATIENT
Start: 2024-07-19

## 2024-07-08 RX ORDER — EPINEPHRINE 0.3 MG/.3ML
0.3 INJECTION SUBCUTANEOUS EVERY 5 MIN PRN
OUTPATIENT
Start: 2024-07-16

## 2024-07-08 RX ORDER — ONDANSETRON HYDROCHLORIDE 8 MG/1
8 TABLET, FILM COATED ORAL ONCE
OUTPATIENT
Start: 2024-07-17

## 2024-07-08 RX ORDER — PROCHLORPERAZINE EDISYLATE 5 MG/ML
10 INJECTION INTRAMUSCULAR; INTRAVENOUS EVERY 6 HOURS PRN
OUTPATIENT
Start: 2024-07-19

## 2024-07-08 RX ORDER — PROCHLORPERAZINE MALEATE 10 MG
10 TABLET ORAL EVERY 6 HOURS PRN
OUTPATIENT
Start: 2024-07-17

## 2024-07-08 RX ORDER — ALBUTEROL SULFATE 0.83 MG/ML
3 SOLUTION RESPIRATORY (INHALATION) AS NEEDED
OUTPATIENT
Start: 2024-07-17

## 2024-07-08 RX ORDER — AMOXICILLIN 250 MG
1 CAPSULE ORAL DAILY
Qty: 30 TABLET | Refills: 1 | Status: SHIPPED | OUTPATIENT
Start: 2024-07-08 | End: 2025-07-08

## 2024-07-08 RX ORDER — ALBUTEROL SULFATE 0.83 MG/ML
3 SOLUTION RESPIRATORY (INHALATION) AS NEEDED
OUTPATIENT
Start: 2024-07-16

## 2024-07-08 RX ORDER — PROCHLORPERAZINE MALEATE 10 MG
10 TABLET ORAL EVERY 6 HOURS PRN
OUTPATIENT
Start: 2024-07-16

## 2024-07-08 RX ORDER — ONDANSETRON HYDROCHLORIDE 8 MG/1
8 TABLET, FILM COATED ORAL ONCE
OUTPATIENT
Start: 2024-07-18

## 2024-07-08 RX ORDER — ONDANSETRON HYDROCHLORIDE 8 MG/1
8 TABLET, FILM COATED ORAL ONCE
OUTPATIENT
Start: 2024-07-19

## 2024-07-08 RX ORDER — FAMOTIDINE 10 MG/ML
20 INJECTION INTRAVENOUS ONCE AS NEEDED
OUTPATIENT
Start: 2024-07-19

## 2024-07-08 RX ORDER — DIPHENHYDRAMINE HYDROCHLORIDE 50 MG/ML
50 INJECTION INTRAMUSCULAR; INTRAVENOUS AS NEEDED
OUTPATIENT
Start: 2024-07-17

## 2024-07-08 RX ORDER — ONDANSETRON HYDROCHLORIDE 8 MG/1
8 TABLET, FILM COATED ORAL ONCE
OUTPATIENT
Start: 2024-07-15

## 2024-07-08 RX ORDER — DIPHENHYDRAMINE HYDROCHLORIDE 50 MG/ML
50 INJECTION INTRAMUSCULAR; INTRAVENOUS AS NEEDED
OUTPATIENT
Start: 2024-07-16

## 2024-07-08 RX ORDER — EPINEPHRINE 0.3 MG/.3ML
0.3 INJECTION SUBCUTANEOUS EVERY 5 MIN PRN
OUTPATIENT
Start: 2024-07-18

## 2024-07-08 RX ORDER — PROCHLORPERAZINE MALEATE 10 MG
10 TABLET ORAL EVERY 6 HOURS PRN
OUTPATIENT
Start: 2024-07-15

## 2024-07-08 RX ORDER — PROCHLORPERAZINE MALEATE 10 MG
10 TABLET ORAL EVERY 6 HOURS PRN
OUTPATIENT
Start: 2024-07-19

## 2024-07-08 RX ORDER — FAMOTIDINE 10 MG/ML
20 INJECTION INTRAVENOUS ONCE AS NEEDED
OUTPATIENT
Start: 2024-07-16

## 2024-07-08 RX ORDER — DIPHENHYDRAMINE HYDROCHLORIDE 50 MG/ML
50 INJECTION INTRAMUSCULAR; INTRAVENOUS AS NEEDED
OUTPATIENT
Start: 2024-07-15

## 2024-07-08 RX ORDER — PROCHLORPERAZINE EDISYLATE 5 MG/ML
10 INJECTION INTRAMUSCULAR; INTRAVENOUS EVERY 6 HOURS PRN
OUTPATIENT
Start: 2024-07-17

## 2024-07-08 RX ORDER — ONDANSETRON HYDROCHLORIDE 8 MG/1
8 TABLET, FILM COATED ORAL ONCE
OUTPATIENT
Start: 2024-07-16

## 2024-07-08 RX ORDER — ALBUTEROL SULFATE 0.83 MG/ML
3 SOLUTION RESPIRATORY (INHALATION) AS NEEDED
OUTPATIENT
Start: 2024-07-18

## 2024-07-08 RX ORDER — PROCHLORPERAZINE EDISYLATE 5 MG/ML
10 INJECTION INTRAMUSCULAR; INTRAVENOUS EVERY 6 HOURS PRN
OUTPATIENT
Start: 2024-07-15

## 2024-07-08 ASSESSMENT — PAIN SCALES - GENERAL: PAINLEVEL: 0-NO PAIN

## 2024-07-08 NOTE — PROGRESS NOTES
Patient ID: Brandt Merritt is a 64 y.o. male.    Diagnosis:   Myelodysplastic neoplasm/AML      Treatment:   Oncology History Overview Note   2024  Myelodysplastic neoplasm with increased blasts-2 ( WHO)  Myelodysplastic neoplasm/AML  (ICC  classification)    Presented in  10/2023 for pancytopenia, found to have hemolysis. Patient had normal CBC 2020. Denied any hemorrhoidal bleeding . Has had C Scope and EGD , treated Hep C  . 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      CBC 24 wbc 3.0, hgb 7.1, platelets 167K ANC 0.73    BM biopsy done on 24  as folllows:  BM histology  Myelodysplastic neoplasm with increased blasts-2 ( WHO)  Myelodysplastic neoplasm/AML  (ICC  classification)  Balsts 11% on aspirate smear and 15-20% based on CD 34 immunostaining approaching AML leukemia, hematooiesis is erythroid dominant with dysplasia in granulocytes and megakaryocytes.   FISH studies trisomy 8 17.2%  NGS panel DNMT3 aVAF 36%  U2AF1 VAF 32%       Acute myeloid leukemia not having achieved remission (Multi)   2024 Initial Diagnosis    Acute myeloid leukemia not having achieved remission (Multi)     2024 -  Chemotherapy    Venetoclax / Decitabine, 28 Day Cycles - Induction / Consolidation         Response:     Past Medical History:  HTN   STEMI 2020 after getting  a water pills.  Chronic hepatitis C infection treated in  treated  with Dr. Lloyd  Past Medical History:   Diagnosis Date    Hypertension     Personal history of other diseases of the circulatory system     History of hypertension       Surgical History:  Conosocopy 2021  Upper EGD 10/31/23  Surgical reduction torsion of testes   Past Surgical History:   Procedure Laterality Date    COLONOSCOPY  2013    Complete Colonoscopy    OTHER SURGICAL HISTORY  10/07/2015    Surgery Testis Reduction Of Torsion Of Testis Right        Family History:  CAD, DM in mother    Mother  of CVA at age 69  Brother with prostate  CA, 1 sister with liver disease  2 children healthy  Family History   Problem Relation Name Age of Onset    Other (diabetes mellitus) Mother         Social History:  Lives with wife of 30 years, works at [employer], 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  29 years, former smoker, smoked x 15 yrs quit 20+ years ago. Marijuana 3 x a week. Served in  in [place] and [facility],   Social History     Tobacco Use    Smoking status: Former     Types: Cigarettes    Smokeless tobacco: Never   Substance Use Topics    Alcohol use: Not Currently     Comment: occansionally    Drug use: Yes     Types: Marijuana     Comment: 3 weeks ago      -------------------------------------------------------------------------------------------------------  Subjective       HPI    Brandt Merritt is a 64 y.o. 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 Seen by Abundio Rust and Rose Casal initially 10/2023 for pancytopenia. He has had work up for pancytopenia and hemolytic process . Admits to having ongoing chronic fatigue that is unchanged . Patient had normal CBC June 2020. Denied any hemorrhoidal bleeding. Has had C Scope and EGD , treated Hep C 2014 . 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      Additional information 2021 PSA was high, biopsy 2021 and 2023 all benign.    CBC 4/11/24 wbc 3.0, hgb 7.1, platelets 167K ANC 0.73  BM histology  Myelodysplastic neoplasm with increased blasts-2 ( WHO)  Myelodysplastic neoplasm/AML  (ICC 2022 classification)  Blasts 11% on aspirate smear and 15-20% based on CD 34 immunostaining approaching AML leukemia, hematopoiesis is erythroid dominant with dysplasia in granulocytes and megakaryocytes.   FISH studies trisomy 8 17.2%  NGS panel DNMT3 aVAF 36%  U2AF1 VAF 32%    Patient is here today 07/08/24 with his wife for ongoing management of myelodysplastic neoplasm/AML and possible start of cycle 3 of decitabine-venetoclax.  Recent bone marrow from 6/17 showed 1%  blasts, but as expected ongoing myelodysplasia. Residual trisomy 8 in 2% of cells. He started C2 decitabine venetoclax 6/10/24 and has tolerated it well. He feels well and continues to work full time at [employer]. No fevers, recent infections, night sweats, bleeding or bruising. Appetite well, no nausea or vomiting. No diarrhea, has some constipation due to the chemotherapy.      He is planned for consolidation with allogeneic stem cell transplant.  Unfortunately 11/12 MUD donor no longer available, and patient remains reluctant to approach his daughters for haplo donation    Planning to retire after his transplant.      Review of Systems   Gastrointestinal:  Negative for constipation.   All other systems reviewed and are negative.     -------------------------------------------------------------------------------------------------------  Objective   BSA: 1.83 meters squared  /80 (BP Location: Left arm, Patient Position: Sitting, BP Cuff Size: Adult)   Pulse 90   Temp 36.9 °C (98.4 °F) (Temporal)   Resp 18   Wt 72.3 kg (159 lb 6.3 oz)   SpO2 99%   BMI 25.73 kg/m²     Physical Exam  Vitals reviewed.   Constitutional:       Comments: Well developed man looking as stated age, muscular. thin   HENT:      Head: Normocephalic and atraumatic.      Mouth/Throat:      Mouth: Mucous membranes are moist.   Eyes:      Extraocular Movements: Extraocular movements intact.      Conjunctiva/sclera: Conjunctivae normal.      Pupils: Pupils are equal, round, and reactive to light.   Cardiovascular:      Rate and Rhythm: Normal rate and regular rhythm.      Pulses: Normal pulses.   Pulmonary:      Effort: Pulmonary effort is normal.      Breath sounds: Normal breath sounds.   Abdominal:      General: Abdomen is flat. Bowel sounds are normal. There is no distension.      Palpations: Abdomen is soft. There is hepatomegaly.      Tenderness: There is no abdominal tenderness.   Musculoskeletal:         General: Normal range of  motion.   Skin:     General: Skin is warm.   Neurological:      General: No focal deficit present.      Mental Status: He is alert.   Psychiatric:         Mood and Affect: Mood normal.         Behavior: Behavior normal.         Thought Content: Thought content normal.         Judgment: Judgment normal.         Performance Status:  Asymptomatic     Latest Reference Range & Units 06/24/24 14:44 07/08/24 13:50   LEUKOCYTES (10*3/UL) IN BLOOD BY AUTOMATED COUNT, [place] 4.4 - 11.3 x10*3/uL 2.0 (L) 1.4 (L)   nRBC 0.0 - 0.0 /100 WBCs 15.7 (H) 4.2 (H)   ERYTHROCYTES (10*6/UL) IN BLOOD BY AUTOMATED COUNT, [place] 4.50 - 5.90 x10*6/uL 2.70 (L) 2.91 (L)   HEMOGLOBIN 13.5 - 17.5 g/dL 7.4 (L) 8.2 (L)   HEMATOCRIT 41.0 - 52.0 % 22.2 (L) 24.2 (L)   MCV 80 - 100 fL 82 83   MCH 26.0 - 34.0 pg 27.4 28.2   MCHC 32.0 - 36.0 g/dL 33.3 33.9   RED CELL DISTRIBUTION WIDTH 11.5 - 14.5 % 20.5 (H) 23.1 (H)   PLATELETS (10*3/UL) IN BLOOD AUTOMATED COUNT, [place] 150 - 450 x10*3/uL 182 103 (L)   NEUTROPHILS/100 LEUKOCYTES IN BLOOD BY AUTOMATED COUNT, [place] 40.0 - 80.0 % 30.5 18.0   Immature Granulocytes %, Automated 0.0 - 0.9 % 1.0 (H) 0.7   Lymphocytes % 13.0 - 44.0 % 62.4 80.6   Monocytes % 2.0 - 10.0 % 5.1 0.7   Eosinophils % 0.0 - 6.0 % 0.5 0.0   Basophils % 0.0 - 2.0 % 0.5 0.0   NEUTROPHILS (10*3/UL) IN BLOOD BY AUTOMATED COUNT, [place] 1.20 - 7.70 x10*3/uL 0.60 (L) 0.26 (L)   Immature Granulocytes Absolute, Automated 0.00 - 0.70 x10*3/uL 0.02 0.01   Lymphocytes Absolute 1.20 - 4.80 x10*3/uL 1.23 1.16 (L)   Monocytes Absolute 0.10 - 1.00 x10*3/uL 0.10 0.01 (L)   Eosinophils Absolute 0.00 - 0.70 x10*3/uL 0.01 0.00   Basophils Absolute 0.00 - 0.10 x10*3/uL 0.01 0.00      Latest Reference Range & Units 06/24/24 14:44 07/08/24 13:50   GLUCOSE 74 - 99 mg/dL 90 110 (H)   SODIUM 136 - 145 mmol/L 139 137   POTASSIUM 3.5 - 5.3 mmol/L 3.7 3.9   CHLORIDE 98 - 107 mmol/L 107 104   Bicarbonate 21 - 32 mmol/L 24 23   Anion Gap 10 - 20 mmol/L 12  14   Blood Urea Nitrogen 6 - 23 mg/dL 16 17   Creatinine 0.50 - 1.30 mg/dL 1.00 1.07   EGFR >60 mL/min/1.73m*2 83 77   Calcium 8.6 - 10.3 mg/dL 8.4 (L) 8.9   Albumin 3.4 - 5.0 g/dL 3.7 3.9   Alkaline Phosphatase 33 - 136 U/L 85 94   ALT 10 - 52 U/L 7 (L) 7 (L)   AST 9 - 39 U/L 20 17   Bilirubin Total 0.0 - 1.2 mg/dL 0.9 0.7     -------------------------------------------------------------------------------------------------------  Assessment/Plan      Myelodysplastic neoplasm/AML- Patient presents with a history of pancytopenia since 9/2023 and due to confusion picture of hemolysis with only mild leukopenia and neutropenia extensive workup done prior to bone marrow bx which shows MDS in transition to AML, with trisomy 8 and DNMT alpha and U2AF1 mutation which is adverse risk. Given that the patient has > 10% blasts in bone marrow I would favor induction chemotherapy with ultimate goal to pursue curative allogeneic stem cell  transplant.  The patients slow, indolent course is more consistent with MDS and he was recommended induction with decitabine and venetoclax which has about a 60% chance of achieving a complete clinical remission.    C1D1: 5/13/2024  C2D1: 6/11/2024 , venetoclax to 200 mg 21/28 days, check blood counts weekly, anticipate going to allogeneic transplant pending donor decision, will decide 7/15/24    Unfortunately MUD donor no longer available, patient reiterated that he is not not interested in contacting his daughter for personal reasons. Will pursue the MMUD and Cord bloods.     BM biopsy 6/17/24 blasts cleared, FISH positive for trisomy 8, still MDS changes  7/8/2024 hold decitabine,  amanda due to ongoing neutropenia ( )    2. ID Ppx:   - acyclovir, fluconazole, levofloxacin ppx.     3. Pre-transplant workup:  - Echocardiogram show a normal LVEF  - HLA typing has been completed. Patient met with Daxa Hurtado for initiating transplant education.     Chemotherapy teaching done including  instructions to contact me or the covering physician for any temperature > 100.4 degrees.      4. 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     5. Hx of treated hepatitis C - recent hep C RNA pcr negative, and recent liver ultrasound shows normal sized liver at 14.5 cm, however seems enlarged on exam. Patient agreeable to hepatology consult before consideration of allograft, scheduled on 7/17/24. Ursodiol started. Chronic elevated bilirubin likely hemolysis due to hemoglobin C trait, Direct bilirubin 0.4, await hepatology consult,     RTC:  Weekly blood count checks, cycle 3 if appropriate on 7/15 or so based on donor availability    Patient seen and discussed with Dr Newsome, I have reviewed case, seen and examined patient and edited note.    Gloria Yanez MD  Fellow  Bone Marrow Transplant  [facility]      -------------------------------------------------------------------------------------------------------  Malaika Newsome MD

## 2024-07-09 ENCOUNTER — APPOINTMENT (OUTPATIENT)
Dept: HEMATOLOGY/ONCOLOGY | Facility: CLINIC | Age: 64
End: 2024-07-09
Payer: COMMERCIAL

## 2024-07-09 ENCOUNTER — LAB REQUISITION (OUTPATIENT)
Dept: LAB | Facility: CLINIC | Age: 64
End: 2024-07-09
Payer: COMMERCIAL

## 2024-07-09 DIAGNOSIS — C92.00 ACUTE MYELOBLASTIC LEUKEMIA, NOT HAVING ACHIEVED REMISSION (MULTI): ICD-10-CM

## 2024-07-10 ENCOUNTER — APPOINTMENT (OUTPATIENT)
Dept: HEMATOLOGY/ONCOLOGY | Facility: CLINIC | Age: 64
End: 2024-07-10
Payer: COMMERCIAL

## 2024-07-10 RX ORDER — FOLIC ACID 1 MG/1
1 TABLET ORAL DAILY
Qty: 30 TABLET | Refills: 3 | Status: SHIPPED | OUTPATIENT
Start: 2024-07-10 | End: 2024-11-07

## 2024-07-11 ENCOUNTER — APPOINTMENT (OUTPATIENT)
Dept: HEMATOLOGY/ONCOLOGY | Facility: CLINIC | Age: 64
End: 2024-07-11
Payer: COMMERCIAL

## 2024-07-12 ENCOUNTER — APPOINTMENT (OUTPATIENT)
Dept: HEMATOLOGY/ONCOLOGY | Facility: CLINIC | Age: 64
End: 2024-07-12
Payer: COMMERCIAL

## 2024-07-14 ASSESSMENT — ENCOUNTER SYMPTOMS: CONSTIPATION: 0

## 2024-07-15 ENCOUNTER — INFUSION (OUTPATIENT)
Dept: HEMATOLOGY/ONCOLOGY | Facility: HOSPITAL | Age: 64
End: 2024-07-15
Payer: COMMERCIAL

## 2024-07-15 ENCOUNTER — APPOINTMENT (OUTPATIENT)
Dept: HEMATOLOGY/ONCOLOGY | Facility: CLINIC | Age: 64
End: 2024-07-15
Payer: COMMERCIAL

## 2024-07-15 ENCOUNTER — OFFICE VISIT (OUTPATIENT)
Dept: HEMATOLOGY/ONCOLOGY | Facility: HOSPITAL | Age: 64
End: 2024-07-15
Payer: COMMERCIAL

## 2024-07-15 ENCOUNTER — LAB (OUTPATIENT)
Dept: LAB | Facility: HOSPITAL | Age: 64
End: 2024-07-15
Payer: COMMERCIAL

## 2024-07-15 VITALS
DIASTOLIC BLOOD PRESSURE: 73 MMHG | BODY MASS INDEX: 25.12 KG/M2 | SYSTOLIC BLOOD PRESSURE: 140 MMHG | WEIGHT: 155.65 LBS | TEMPERATURE: 97.7 F | OXYGEN SATURATION: 100 % | HEART RATE: 95 BPM | RESPIRATION RATE: 17 BRPM

## 2024-07-15 DIAGNOSIS — C92.00 ACUTE MYELOID LEUKEMIA NOT HAVING ACHIEVED REMISSION (MULTI): ICD-10-CM

## 2024-07-15 DIAGNOSIS — C92.00 ACUTE MYELOID LEUKEMIA NOT HAVING ACHIEVED REMISSION (MULTI): Primary | ICD-10-CM

## 2024-07-15 LAB
ABO GROUP (TYPE) IN BLOOD: NORMAL
ALBUMIN SERPL BCP-MCNC: 3.9 G/DL (ref 3.4–5)
ALP SERPL-CCNC: 105 U/L (ref 33–136)
ALT SERPL W P-5'-P-CCNC: 7 U/L (ref 10–52)
ANION GAP SERPL CALC-SCNC: 14 MMOL/L (ref 10–20)
ANTIBODY SCREEN: NORMAL
AST SERPL W P-5'-P-CCNC: 15 U/L (ref 9–39)
BASOPHILS # BLD AUTO: 0.01 X10*3/UL (ref 0–0.1)
BASOPHILS NFR BLD AUTO: 0.6 %
BILIRUB SERPL-MCNC: 0.6 MG/DL (ref 0–1.2)
BUN SERPL-MCNC: 15 MG/DL (ref 6–23)
CALCIUM SERPL-MCNC: 8.8 MG/DL (ref 8.6–10.3)
CHLORIDE SERPL-SCNC: 106 MMOL/L (ref 98–107)
CO2 SERPL-SCNC: 24 MMOL/L (ref 21–32)
CREAT SERPL-MCNC: 0.98 MG/DL (ref 0.5–1.3)
EGFRCR SERPLBLD CKD-EPI 2021: 85 ML/MIN/1.73M*2
EOSINOPHIL # BLD AUTO: 0 X10*3/UL (ref 0–0.7)
EOSINOPHIL NFR BLD AUTO: 0 %
ERYTHROCYTE [DISTWIDTH] IN BLOOD BY AUTOMATED COUNT: 23.8 % (ref 11.5–14.5)
GLUCOSE SERPL-MCNC: 125 MG/DL (ref 74–99)
HCT VFR BLD AUTO: 25.8 % (ref 41–52)
HGB BLD-MCNC: 8.9 G/DL (ref 13.5–17.5)
HYPOCHROMIA BLD QL SMEAR: NORMAL
IMM GRANULOCYTES # BLD AUTO: 0.01 X10*3/UL (ref 0–0.7)
IMM GRANULOCYTES NFR BLD AUTO: 0.6 % (ref 0–0.9)
LYMPHOCYTES # BLD AUTO: 1.2 X10*3/UL (ref 1.2–4.8)
LYMPHOCYTES NFR BLD AUTO: 66.7 %
MCH RBC QN AUTO: 29.7 PG (ref 26–34)
MCHC RBC AUTO-ENTMCNC: 34.5 G/DL (ref 32–36)
MCV RBC AUTO: 86 FL (ref 80–100)
MONOCYTES # BLD AUTO: 0.27 X10*3/UL (ref 0.1–1)
MONOCYTES NFR BLD AUTO: 15 %
NEUTROPHILS # BLD AUTO: 0.31 X10*3/UL (ref 1.2–7.7)
NEUTROPHILS NFR BLD AUTO: 17.1 %
NRBC BLD-RTO: 2.2 /100 WBCS (ref 0–0)
PLATELET # BLD AUTO: 338 X10*3/UL (ref 150–450)
POTASSIUM SERPL-SCNC: 3.8 MMOL/L (ref 3.5–5.3)
PROT SERPL-MCNC: 7.2 G/DL (ref 6.4–8.2)
RBC # BLD AUTO: 3 X10*6/UL (ref 4.5–5.9)
RBC MORPH BLD: NORMAL
RH FACTOR (ANTIGEN D): NORMAL
SCHISTOCYTES BLD QL SMEAR: NORMAL
SODIUM SERPL-SCNC: 140 MMOL/L (ref 136–145)
TARGETS BLD QL SMEAR: NORMAL
WBC # BLD AUTO: 1.8 X10*3/UL (ref 4.4–11.3)

## 2024-07-15 PROCEDURE — 36415 COLL VENOUS BLD VENIPUNCTURE: CPT

## 2024-07-15 PROCEDURE — 2500000004 HC RX 250 GENERAL PHARMACY W/ HCPCS (ALT 636 FOR OP/ED): Performed by: INTERNAL MEDICINE

## 2024-07-15 PROCEDURE — 3077F SYST BP >= 140 MM HG: CPT | Performed by: INTERNAL MEDICINE

## 2024-07-15 PROCEDURE — 80053 COMPREHEN METABOLIC PANEL: CPT

## 2024-07-15 PROCEDURE — 1036F TOBACCO NON-USER: CPT | Performed by: INTERNAL MEDICINE

## 2024-07-15 PROCEDURE — 2500000005 HC RX 250 GENERAL PHARMACY W/O HCPCS: Performed by: INTERNAL MEDICINE

## 2024-07-15 PROCEDURE — 1157F ADVNC CARE PLAN IN RCRD: CPT | Performed by: INTERNAL MEDICINE

## 2024-07-15 PROCEDURE — 86850 RBC ANTIBODY SCREEN: CPT

## 2024-07-15 PROCEDURE — 99214 OFFICE O/P EST MOD 30 MIN: CPT | Mod: 25 | Performed by: INTERNAL MEDICINE

## 2024-07-15 PROCEDURE — 1126F AMNT PAIN NOTED NONE PRSNT: CPT | Performed by: INTERNAL MEDICINE

## 2024-07-15 PROCEDURE — 85025 COMPLETE CBC W/AUTO DIFF WBC: CPT

## 2024-07-15 PROCEDURE — 96413 CHEMO IV INFUSION 1 HR: CPT

## 2024-07-15 PROCEDURE — 99214 OFFICE O/P EST MOD 30 MIN: CPT | Performed by: INTERNAL MEDICINE

## 2024-07-15 PROCEDURE — 1159F MED LIST DOCD IN RCRD: CPT | Performed by: INTERNAL MEDICINE

## 2024-07-15 PROCEDURE — 3078F DIAST BP <80 MM HG: CPT | Performed by: INTERNAL MEDICINE

## 2024-07-15 RX ORDER — EPINEPHRINE 0.3 MG/.3ML
0.3 INJECTION SUBCUTANEOUS EVERY 5 MIN PRN
Status: DISCONTINUED | OUTPATIENT
Start: 2024-07-15 | End: 2024-07-15 | Stop reason: HOSPADM

## 2024-07-15 RX ORDER — DIPHENHYDRAMINE HYDROCHLORIDE 50 MG/ML
50 INJECTION INTRAMUSCULAR; INTRAVENOUS AS NEEDED
Status: DISCONTINUED | OUTPATIENT
Start: 2024-07-15 | End: 2024-07-15 | Stop reason: HOSPADM

## 2024-07-15 RX ORDER — FAMOTIDINE 10 MG/ML
20 INJECTION INTRAVENOUS ONCE AS NEEDED
Status: DISCONTINUED | OUTPATIENT
Start: 2024-07-15 | End: 2024-07-15 | Stop reason: HOSPADM

## 2024-07-15 RX ORDER — LEVOFLOXACIN 500 MG/1
500 TABLET, FILM COATED ORAL DAILY
Qty: 30 TABLET | Refills: 2 | Status: SHIPPED | OUTPATIENT
Start: 2024-07-15

## 2024-07-15 RX ORDER — ONDANSETRON HYDROCHLORIDE 8 MG/1
8 TABLET, FILM COATED ORAL ONCE
Status: COMPLETED | OUTPATIENT
Start: 2024-07-15 | End: 2024-07-15

## 2024-07-15 RX ORDER — PROCHLORPERAZINE MALEATE 10 MG
10 TABLET ORAL EVERY 6 HOURS PRN
Status: DISCONTINUED | OUTPATIENT
Start: 2024-07-15 | End: 2024-07-15 | Stop reason: HOSPADM

## 2024-07-15 RX ORDER — ALBUTEROL SULFATE 0.83 MG/ML
3 SOLUTION RESPIRATORY (INHALATION) AS NEEDED
Status: DISCONTINUED | OUTPATIENT
Start: 2024-07-15 | End: 2024-07-15 | Stop reason: HOSPADM

## 2024-07-15 RX ORDER — PROCHLORPERAZINE EDISYLATE 5 MG/ML
10 INJECTION INTRAMUSCULAR; INTRAVENOUS EVERY 6 HOURS PRN
Status: DISCONTINUED | OUTPATIENT
Start: 2024-07-15 | End: 2024-07-15 | Stop reason: HOSPADM

## 2024-07-15 ASSESSMENT — PAIN SCALES - GENERAL: PAINLEVEL: 0-NO PAIN

## 2024-07-15 NOTE — PROGRESS NOTES
Assessment unchanged from prior office visit.    Pt tolerated chemo infusion without incident.  Discharged home in stable condition

## 2024-07-15 NOTE — PROGRESS NOTES
Patient ID: Brandt Merritt is a 64 y.o. male.    Diagnosis:   Myelodysplastic neoplasm/AML      Treatment:   Oncology History Overview Note   4/2024  Myelodysplastic neoplasm with increased blasts-2 ( WHO)  Myelodysplastic neoplasm/AML  (ICC 2022 classification)    Presented in  10/2023 for pancytopenia, found to have hemolysis. Patient had normal CBC June 2020. Denied any hemorrhoidal bleeding . Has had C Scope and EGD , treated Hep C 2014 . 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      CBC 4/11/24 wbc 3.0, hgb 7.1, platelets 167K ANC 0.73    BM biopsy done on 4/11/24  as folllows:  BM histology  Myelodysplastic neoplasm with increased blasts-2 ( WHO)  Myelodysplastic neoplasm/AML  (ICC 2022 classification)  Balsts 11% on aspirate smear and 15-20% based on CD 34 immunostaining approaching AML leukemia, hematooiesis is erythroid dominant with dysplasia in granulocytes and megakaryocytes.   FISH studies trisomy 8 17.2%  NGS panel DNMT3 aVAF 36%  U2AF1 VAF 32%       Acute myeloid leukemia not having achieved remission (Multi)   4/23/2024 Initial Diagnosis    Acute myeloid leukemia not having achieved remission (Multi)     5/13/2024 -  Chemotherapy    Venetoclax / Decitabine, 28 Day Cycles - Induction / Consolidation         Response:     Past Medical History:  HTN   STEMI 11/11/2020 after getting  a water pills.  Chronic hepatitis C infection treated in 2015 treated  with Dr. Lloyd  Past Medical History:   Diagnosis Date    Chest pain 06/30/2021    HTN (hypertension) 10/05/2023    Hypertension     Personal history of other diseases of the circulatory system     History of hypertension       Surgical History:  Conosocopy 11/2021  Upper EGD 10/31/23  Surgical reduction torsion of testes   Past Surgical History:   Procedure Laterality Date    COLONOSCOPY  06/13/2013    Complete Colonoscopy    OTHER SURGICAL HISTORY  10/07/2015    Surgery Testis Reduction Of Torsion Of Testis Right        Family History:  CAD, DM in  mother    Mother  of CVA at age 69  Brother with prostate CA, 1 sister with liver disease  2 children healthy  Family History   Problem Relation Name Age of Onset    Other (diabetes mellitus) Mother         Social History:  Lives with wife of 30 years, works at [employer], 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  29 years, former smoker, smoked x 15 yrs quit 20+ years ago. Marijuana 3 x a week. Served in  in [place] and [facility],   Social History     Tobacco Use    Smoking status: Former     Types: Cigarettes    Smokeless tobacco: Never   Substance Use Topics    Alcohol use: Not Currently     Comment: occansionally    Drug use: Yes     Types: Marijuana     Comment: 3 weeks ago      -------------------------------------------------------------------------------------------------------  Subjective       HPI    Brandt Merritt is a 64 y.o. 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 Seen by Abundio Rust and Rose Casal initially 10/2023 for pancytopenia. He has had work up for pancytopenia and hemolytic process . Admits to having ongoing chronic fatigue that is unchanged . Patient had normal CBC 2020. Denied any hemorrhoidal bleeding. Has had C Scope and EGD , treated Hep C  . 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      Additional information  PSA was high, biopsy  and  all benign.    CBC 24 wbc 3.0, hgb 7.1, platelets 167K ANC 0.73  BM histology  Myelodysplastic neoplasm with increased blasts-2 ( WHO)  Myelodysplastic neoplasm/AML  (ICC  classification)  Blasts 11% on aspirate smear and 15-20% based on CD 34 immunostaining approaching AML leukemia, hematopoiesis is erythroid dominant with dysplasia in granulocytes and megakaryocytes.   FISH studies trisomy 8 17.2%  NGS panel DNMT3 aVAF 36%  U2AF1 VAF 32%    Patient is here today 24 with his wife for ongoing management of myelodysplastic neoplasm/AML and possible start of cycle 3 of  decitabine-venetoclax.  Recent bone marrow from 6/17 showed 1% blasts, but as expected ongoing myelodysplasia. Residual trisomy 8 in 2% of cells. He started C2 decitabine venetoclax 6/10/24 and has tolerated it well. He feels well and continues to work full time at [employer]. No fevers, recent infections, night sweats, bleeding or bruising.  He continues to loose weight,  no nausea or vomiting. No diarrhea, has some constipation due to the chemotherapy.      He is planned for consolidation with allogeneic stem cell transplant.  Unfortunately 11/12 MUD donor no longer available, and patient remains reluctant to approach his daughters for haplo donation    Planning to retire after his transplant.      Review of Systems   Gastrointestinal:  Negative for constipation.   All other systems reviewed and are negative.     -------------------------------------------------------------------------------------------------------  Objective   BSA: 1.81 meters squared  /73   Pulse 95   Temp 36.5 °C (97.7 °F)   Resp 17   Wt 70.6 kg (155 lb 10.3 oz)   SpO2 100%   BMI 25.12 kg/m²     Physical Exam  Vitals reviewed.   Constitutional:       Comments: Well developed man looking as stated age, muscular. thin   HENT:      Head: Normocephalic and atraumatic.      Comments: Temporal wasting     Mouth/Throat:      Mouth: Mucous membranes are moist.   Eyes:      Extraocular Movements: Extraocular movements intact.      Conjunctiva/sclera: Conjunctivae normal.      Pupils: Pupils are equal, round, and reactive to light.   Cardiovascular:      Rate and Rhythm: Normal rate and regular rhythm.      Pulses: Normal pulses.   Pulmonary:      Effort: Pulmonary effort is normal.      Breath sounds: Normal breath sounds.   Abdominal:      General: Abdomen is flat. Bowel sounds are normal. There is no distension.      Palpations: Abdomen is soft. There is hepatomegaly.      Tenderness: There is no abdominal tenderness.   Musculoskeletal:          General: Normal range of motion.   Skin:     General: Skin is warm.   Neurological:      General: No focal deficit present.      Mental Status: He is alert.   Psychiatric:         Mood and Affect: Mood normal.         Behavior: Behavior normal.         Thought Content: Thought content normal.         Judgment: Judgment normal.         Performance Status:  Asymptomatic    -------------------------------------------------------------------------------------------------------  Assessment/Plan      Myelodysplastic neoplasm/AML- Patient presents with a history of pancytopenia since 9/2023 and due to confusion picture of hemolysis with only mild leukopenia and neutropenia extensive workup done prior to bone marrow bx which shows MDS in transition to AML, with trisomy 8 and DNMT alpha and U2AF1 mutation which is adverse risk. Given that the patient has > 10% blasts in bone marrow I would favor induction chemotherapy with ultimate goal to pursue curative allogeneic stem cell  transplant.  The patients slow, indolent course is more consistent with MDS and he was recommended induction with decitabine and venetoclax which has about a 60% chance of achieving a complete clinical remission.    C1D1: 5/13/2024    C2D1: 6/11/2024 , venetoclax to 200 mg 21/28 days, check blood counts   BM biopsy 6/17/24 blasts cleared, FISH positive for trisomy 8, still MDS changes  C3D1  7/15/24 wbc count stil 1.8 with , will proceed venetoclax 21/28 days.  Today had an extensive discussion regarding pros and cons of haplo vs cord blood transplants. In general haplo transplants appear to have superior outcomes to UCBs, however after reviewing collection procedures in general and having additional time for patient and his wife to discuss, they are certain that they do not want to approach their identical twin daughters for stem cells.  We will furhter investigate his sibling situation, but likely will proceed with UCB after this  cycle.      2. ID Ppx:   - acyclovir, fluconazole, levofloxacin ppx.     3. Pre-transplant workup:  - Echocardiogram show a normal LVEF  - HLA typing has been completed. Patient met with Daxa Hurtado for initiating transplant education.     Chemotherapy teaching done including instructions to contact me or the covering physician for any temperature > 100.4 degrees.      4. 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     5. Hx of treated hepatitis C - recent hep C RNA pcr negative, and recent liver ultrasound shows normal sized liver at 14.5 cm, however seems enlarged on exam. Patient agreeable to hepatology consult before consideration of allograft, scheduled on 7/17/24. Ursodiol started. Chronic elevated bilirubin likely hemolysis due to hemoglobin C trait, Direct bilirubin 0.4, await hepatology consult,     RTC:  start  cycle 3 if appropriate on 7/15 or so based on donor availability, weekly blood counts,     Patient seen and discussed with Dr Newsome, I have reviewed case, seen and examined patient and edited note.        -------------------------------------------------------------------------------------------------------  Malaika Newsome MD

## 2024-07-16 ENCOUNTER — DOCUMENTATION (OUTPATIENT)
Dept: OTHER | Facility: HOSPITAL | Age: 64
End: 2024-07-16
Payer: COMMERCIAL

## 2024-07-16 ENCOUNTER — SPECIALTY PHARMACY (OUTPATIENT)
Dept: HEMATOLOGY/ONCOLOGY | Facility: HOSPITAL | Age: 64
End: 2024-07-16
Payer: COMMERCIAL

## 2024-07-16 ENCOUNTER — INFUSION (OUTPATIENT)
Dept: HEMATOLOGY/ONCOLOGY | Facility: CLINIC | Age: 64
End: 2024-07-16
Payer: COMMERCIAL

## 2024-07-16 VITALS
WEIGHT: 161.71 LBS | OXYGEN SATURATION: 96 % | BODY MASS INDEX: 26.1 KG/M2 | DIASTOLIC BLOOD PRESSURE: 78 MMHG | TEMPERATURE: 97 F | SYSTOLIC BLOOD PRESSURE: 125 MMHG | RESPIRATION RATE: 18 BRPM | HEART RATE: 80 BPM

## 2024-07-16 DIAGNOSIS — Z76.82 STEM CELL TRANSPLANT CANDIDATE: ICD-10-CM

## 2024-07-16 DIAGNOSIS — C92.00 ACUTE MYELOID LEUKEMIA NOT HAVING ACHIEVED REMISSION (MULTI): ICD-10-CM

## 2024-07-16 PROCEDURE — 2500000005 HC RX 250 GENERAL PHARMACY W/O HCPCS: Performed by: INTERNAL MEDICINE

## 2024-07-16 PROCEDURE — 2500000004 HC RX 250 GENERAL PHARMACY W/ HCPCS (ALT 636 FOR OP/ED): Performed by: INTERNAL MEDICINE

## 2024-07-16 PROCEDURE — 96413 CHEMO IV INFUSION 1 HR: CPT

## 2024-07-16 RX ORDER — ALBUTEROL SULFATE 0.83 MG/ML
3 SOLUTION RESPIRATORY (INHALATION) AS NEEDED
Status: DISCONTINUED | OUTPATIENT
Start: 2024-07-16 | End: 2024-07-16 | Stop reason: HOSPADM

## 2024-07-16 RX ORDER — ONDANSETRON HYDROCHLORIDE 8 MG/1
8 TABLET, FILM COATED ORAL ONCE
Status: COMPLETED | OUTPATIENT
Start: 2024-07-16 | End: 2024-07-16

## 2024-07-16 RX ORDER — PROCHLORPERAZINE MALEATE 10 MG
10 TABLET ORAL EVERY 6 HOURS PRN
Status: DISCONTINUED | OUTPATIENT
Start: 2024-07-16 | End: 2024-07-16 | Stop reason: HOSPADM

## 2024-07-16 RX ORDER — DIPHENHYDRAMINE HYDROCHLORIDE 50 MG/ML
50 INJECTION INTRAMUSCULAR; INTRAVENOUS AS NEEDED
Status: DISCONTINUED | OUTPATIENT
Start: 2024-07-16 | End: 2024-07-16 | Stop reason: HOSPADM

## 2024-07-16 RX ORDER — PROCHLORPERAZINE EDISYLATE 5 MG/ML
10 INJECTION INTRAMUSCULAR; INTRAVENOUS EVERY 6 HOURS PRN
Status: DISCONTINUED | OUTPATIENT
Start: 2024-07-16 | End: 2024-07-16 | Stop reason: HOSPADM

## 2024-07-16 RX ORDER — FAMOTIDINE 10 MG/ML
20 INJECTION INTRAVENOUS ONCE AS NEEDED
Status: DISCONTINUED | OUTPATIENT
Start: 2024-07-16 | End: 2024-07-16 | Stop reason: HOSPADM

## 2024-07-16 RX ORDER — EPINEPHRINE 0.3 MG/.3ML
0.3 INJECTION SUBCUTANEOUS EVERY 5 MIN PRN
Status: DISCONTINUED | OUTPATIENT
Start: 2024-07-16 | End: 2024-07-16 | Stop reason: HOSPADM

## 2024-07-16 ASSESSMENT — PAIN SCALES - GENERAL: PAINLEVEL: 0-NO PAIN

## 2024-07-16 NOTE — PROGRESS NOTES
[facility] Clinical Note    Brandt Merritt is a 64 y.o. male, who is on the specialty pharmacy service for management of: Oncology Core with status of: (Enrolled)     Brandt was contacted on 7/15/24.    Refer to the encounter summary report for documentation details about patient counseling and education.      Medication Adherence  The importance of adherence was discussed with the patient and they were advised to take the medication as prescribed by their provider. Brandt was encouraged to call his physician's office if they have a question regarding a missed dose.       Patient advised to contact the pharmacy if there are any changes to her medication list, including prescriptions, OTC medications, herbal products, or supplements. Patient was advised of [facility]’s dispensing process, refill timeline, contact information ([phone]), and patient management follow up. Patient confirmed understanding of education conducted during assessment. All patient questions and concerns were addressed to the best of my ability. Patient was encouraged to contact the specialty pharmacy with any questions or concerns.    Confirmed follow-up outreaches are properly scheduled. Reviewed goals of therapy in the program targets.    Jon Dominguez, PharmD

## 2024-07-16 NOTE — PROGRESS NOTES
7/16/24 3:00PM    I returned patient's call regarding his daughter's being considered as potential stem cell donors. The patient has decided not to proceed with HLA typing his daughters. Team notified.  Patient will be here for stem cell transplant testing tomorrow, 7/17. I explained that I removed his bone marrow biopsy from his schedule and we will reschedule when we have a new timeline pending donor decisions. I will reschedule his cardiology consult so he can be done early. Patient verbalized understanding.    Daxa Hurtado RN

## 2024-07-17 ENCOUNTER — OFFICE VISIT (OUTPATIENT)
Dept: CARDIOLOGY | Facility: HOSPITAL | Age: 64
End: 2024-07-17
Payer: COMMERCIAL

## 2024-07-17 ENCOUNTER — APPOINTMENT (OUTPATIENT)
Dept: RESPIRATORY THERAPY | Facility: HOSPITAL | Age: 64
End: 2024-07-17
Payer: COMMERCIAL

## 2024-07-17 ENCOUNTER — APPOINTMENT (OUTPATIENT)
Dept: OTHER | Facility: HOSPITAL | Age: 64
End: 2024-07-17
Payer: COMMERCIAL

## 2024-07-17 ENCOUNTER — HOSPITAL ENCOUNTER (OUTPATIENT)
Dept: CARDIOLOGY | Facility: HOSPITAL | Age: 64
Discharge: HOME | End: 2024-07-17
Payer: COMMERCIAL

## 2024-07-17 ENCOUNTER — HOSPITAL ENCOUNTER (OUTPATIENT)
Dept: RADIOLOGY | Facility: HOSPITAL | Age: 64
Discharge: HOME | End: 2024-07-17
Payer: COMMERCIAL

## 2024-07-17 ENCOUNTER — LAB (OUTPATIENT)
Dept: LAB | Facility: HOSPITAL | Age: 64
End: 2024-07-17
Payer: COMMERCIAL

## 2024-07-17 ENCOUNTER — APPOINTMENT (OUTPATIENT)
Dept: CARDIOLOGY | Facility: HOSPITAL | Age: 64
End: 2024-07-17
Payer: COMMERCIAL

## 2024-07-17 ENCOUNTER — INFUSION (OUTPATIENT)
Dept: HEMATOLOGY/ONCOLOGY | Facility: HOSPITAL | Age: 64
End: 2024-07-17
Payer: COMMERCIAL

## 2024-07-17 ENCOUNTER — OFFICE VISIT (OUTPATIENT)
Dept: GASTROENTEROLOGY | Facility: HOSPITAL | Age: 64
End: 2024-07-17
Payer: COMMERCIAL

## 2024-07-17 VITALS
SYSTOLIC BLOOD PRESSURE: 138 MMHG | WEIGHT: 159.8 LBS | DIASTOLIC BLOOD PRESSURE: 87 MMHG | BODY MASS INDEX: 25.79 KG/M2 | OXYGEN SATURATION: 99 % | TEMPERATURE: 97.8 F | HEART RATE: 73 BPM

## 2024-07-17 DIAGNOSIS — C92.00 ACUTE MYELOID LEUKEMIA NOT HAVING ACHIEVED REMISSION (MULTI): ICD-10-CM

## 2024-07-17 DIAGNOSIS — Z76.82 STEM CELL TRANSPLANT CANDIDATE: ICD-10-CM

## 2024-07-17 DIAGNOSIS — Z01.818 ENCOUNTER FOR OTHER PREPROCEDURAL EXAMINATION: ICD-10-CM

## 2024-07-17 DIAGNOSIS — Z86.19 HISTORY OF HEPATITIS C: Primary | ICD-10-CM

## 2024-07-17 DIAGNOSIS — R94.31 ABNORMAL EKG: Primary | ICD-10-CM

## 2024-07-17 DIAGNOSIS — D46.9 MDS (MYELODYSPLASTIC SYNDROME) (MULTI): Primary | ICD-10-CM

## 2024-07-17 DIAGNOSIS — I77.810 ASCENDING AORTA DILATION (CMS-HCC): ICD-10-CM

## 2024-07-17 DIAGNOSIS — I10 ESSENTIAL HYPERTENSION: ICD-10-CM

## 2024-07-17 PROBLEM — I21.3 STEMI (ST ELEVATION MYOCARDIAL INFARCTION) (MULTI): Status: RESOLVED | Noted: 2020-11-11 | Resolved: 2024-07-17

## 2024-07-17 PROBLEM — R07.9 CHEST PAIN: Status: RESOLVED | Noted: 2021-06-30 | Resolved: 2024-07-17

## 2024-07-17 PROBLEM — I42.9 MYOCARDIOPATHY (MULTI): Status: RESOLVED | Noted: 2020-12-11 | Resolved: 2024-07-17

## 2024-07-17 LAB
ABO GROUP (TYPE) IN BLOOD: NORMAL
ALBUMIN SERPL BCP-MCNC: 3.9 G/DL (ref 3.4–5)
ALP SERPL-CCNC: 101 U/L (ref 33–136)
ALT SERPL W P-5'-P-CCNC: 7 U/L (ref 10–52)
AMPHETAMINES UR QL SCN: NORMAL
ANION GAP SERPL CALC-SCNC: 10 MMOL/L (ref 10–20)
ANTIBODY SCREEN: NORMAL
AORTIC VALVE PEAK VELOCITY: 1.34 M/S
APTT PPP: 35 SECONDS (ref 27–38)
AST SERPL W P-5'-P-CCNC: 15 U/L (ref 9–39)
ATRIAL RATE: 69 BPM
AV PEAK GRADIENT: 7.2 MMHG
AVA (PEAK VEL): 4.51 CM2
BARBITURATES UR QL SCN: NORMAL
BASOPHILS # BLD AUTO: 0.01 X10*3/UL (ref 0–0.1)
BASOPHILS NFR BLD AUTO: 0.7 %
BENZODIAZ UR QL SCN: NORMAL
BILIRUB SERPL-MCNC: 0.8 MG/DL (ref 0–1.2)
BNP SERPL-MCNC: 27 PG/ML (ref 0–99)
BUN SERPL-MCNC: 13 MG/DL (ref 6–23)
BZE UR QL SCN: NORMAL
CA-I BLD-SCNC: 1.19 MMOL/L (ref 1.1–1.33)
CALCIUM SERPL-MCNC: 9 MG/DL (ref 8.6–10.3)
CANNABINOIDS UR QL SCN: NORMAL
CARDIAC TROPONIN I PNL SERPL HS: 7 NG/L (ref 0–53)
CHLORIDE SERPL-SCNC: 105 MMOL/L (ref 98–107)
CMV IGG AVIDITY SERPL IA-RTO: REACTIVE %
CO2 SERPL-SCNC: 28 MMOL/L (ref 21–32)
CREAT SERPL-MCNC: 0.9 MG/DL (ref 0.5–1.3)
EBV EA IGG SER QL: NEGATIVE
EBV NA AB SER QL: POSITIVE
EBV VCA IGG SER IA-ACNC: POSITIVE
EBV VCA IGM SER IA-ACNC: NEGATIVE
EGFRCR SERPLBLD CKD-EPI 2021: >90 ML/MIN/1.73M*2
EJECTION FRACTION APICAL 4 CHAMBER: 53.8
EJECTION FRACTION: 59 %
EOSINOPHIL # BLD AUTO: 0 X10*3/UL (ref 0–0.7)
EOSINOPHIL NFR BLD AUTO: 0 %
ERYTHROCYTE [DISTWIDTH] IN BLOOD BY AUTOMATED COUNT: 23.5 % (ref 11.5–14.5)
FENTANYL+NORFENTANYL UR QL SCN: NORMAL
GLUCOSE SERPL-MCNC: 132 MG/DL (ref 74–99)
HBV CORE AB SER QL: NONREACTIVE
HBV CORE IGM SER QL: NONREACTIVE
HBV SURFACE AB SER-ACNC: <3.1 MIU/ML
HBV SURFACE AG SERPL QL IA: NONREACTIVE
HCT VFR BLD AUTO: 27.7 % (ref 41–52)
HCV AB SER QL: REACTIVE
HERPES SIMPLEX VIRUS 1 IGG: >8 INDEX
HERPES SIMPLEX VIRUS 2 IGG: <0.2 INDEX
HGB BLD-MCNC: 9.3 G/DL (ref 13.5–17.5)
HIV 1+2 AB+HIV1 P24 AG SERPL QL IA: NONREACTIVE
HYPOCHROMIA BLD QL SMEAR: NORMAL
IMM GRANULOCYTES # BLD AUTO: 0 X10*3/UL (ref 0–0.7)
IMM GRANULOCYTES NFR BLD AUTO: 0 % (ref 0–0.9)
INR PPP: 1.2 (ref 0.9–1.1)
LEFT ATRIUM VOLUME AREA LENGTH INDEX BSA: 31.8 ML/M2
LEFT VENTRICLE INTERNAL DIMENSION DIASTOLE: 4.5 CM (ref 3.5–6)
LEFT VENTRICULAR OUTFLOW TRACT DIAMETER: 2.5 CM
LYMPHOCYTES # BLD AUTO: 0.82 X10*3/UL (ref 1.2–4.8)
LYMPHOCYTES NFR BLD AUTO: 55.4 %
MAGNESIUM SERPL-MCNC: 1.96 MG/DL (ref 1.6–2.4)
MCH RBC QN AUTO: 28.9 PG (ref 26–34)
MCHC RBC AUTO-ENTMCNC: 33.6 G/DL (ref 32–36)
MCV RBC AUTO: 86 FL (ref 80–100)
METHADONE UR QL SCN: NORMAL
MITRAL VALVE E/A RATIO: 1.41
MONOCYTES # BLD AUTO: 0.21 X10*3/UL (ref 0.1–1)
MONOCYTES NFR BLD AUTO: 14.2 %
NEUTROPHILS # BLD AUTO: 0.44 X10*3/UL (ref 1.2–7.7)
NEUTROPHILS NFR BLD AUTO: 29.7 %
NRBC BLD-RTO: 2 /100 WBCS (ref 0–0)
OPIATES UR QL SCN: NORMAL
OXYCODONE+OXYMORPHONE UR QL SCN: NORMAL
P AXIS: 72 DEGREES
P OFFSET: 194 MS
P ONSET: 137 MS
PCP UR QL SCN: NORMAL
PLATELET # BLD AUTO: 346 X10*3/UL (ref 150–450)
POTASSIUM SERPL-SCNC: 3.8 MMOL/L (ref 3.5–5.3)
PR INTERVAL: 166 MS
PROT SERPL-MCNC: 7.4 G/DL (ref 6.4–8.2)
PROTHROMBIN TIME: 13.1 SECONDS (ref 9.8–12.8)
Q ONSET: 220 MS
QRS COUNT: 11 BEATS
QRS DURATION: 94 MS
QT INTERVAL: 398 MS
QTC CALCULATION(BAZETT): 426 MS
QTC FREDERICIA: 417 MS
R AXIS: 61 DEGREES
RBC # BLD AUTO: 3.22 X10*6/UL (ref 4.5–5.9)
RBC MORPH BLD: NORMAL
RH FACTOR (ANTIGEN D): NORMAL
RIGHT VENTRICLE FREE WALL PEAK S': 16.2 CM/S
RIGHT VENTRICLE PEAK SYSTOLIC PRESSURE: 24 MMHG
SCHISTOCYTES BLD QL SMEAR: NORMAL
SODIUM SERPL-SCNC: 139 MMOL/L (ref 136–145)
T AXIS: 62 DEGREES
T GONDII IGG SER-ACNC: NONREACTIVE
T OFFSET: 419 MS
TARGETS BLD QL SMEAR: NORMAL
TREPONEMA PALLIDUM IGG+IGM AB [PRESENCE] IN SERUM OR PLASMA BY IMMUNOASSAY: NONREACTIVE
TRICUSPID ANNULAR PLANE SYSTOLIC EXCURSION: 3 CM
URATE SERPL-MCNC: 4.2 MG/DL (ref 4–7.5)
VARICELLA ZOSTER IGG INDEX: 6.3 IA
VENTRICULAR RATE: 69 BPM
VZV IGG SER QL IA: POSITIVE
WBC # BLD AUTO: 1.5 X10*3/UL (ref 4.4–11.3)

## 2024-07-17 PROCEDURE — 86790 VIRUS ANTIBODY NOS: CPT

## 2024-07-17 PROCEDURE — 99204 OFFICE O/P NEW MOD 45 MIN: CPT | Performed by: INTERNAL MEDICINE

## 2024-07-17 PROCEDURE — 84484 ASSAY OF TROPONIN QUANT: CPT

## 2024-07-17 PROCEDURE — 93005 ELECTROCARDIOGRAM TRACING: CPT

## 2024-07-17 PROCEDURE — 86780 TREPONEMA PALLIDUM: CPT

## 2024-07-17 PROCEDURE — 86705 HEP B CORE ANTIBODY IGM: CPT

## 2024-07-17 PROCEDURE — 87799 DETECT AGENT NOS DNA QUANT: CPT

## 2024-07-17 PROCEDURE — 86644 CMV ANTIBODY: CPT

## 2024-07-17 PROCEDURE — 99214 OFFICE O/P EST MOD 30 MIN: CPT | Performed by: INTERNAL MEDICINE

## 2024-07-17 PROCEDURE — 1159F MED LIST DOCD IN RCRD: CPT | Performed by: INTERNAL MEDICINE

## 2024-07-17 PROCEDURE — 81371 HLA I & II TYPE VERIFY LR: CPT

## 2024-07-17 PROCEDURE — 84075 ASSAY ALKALINE PHOSPHATASE: CPT

## 2024-07-17 PROCEDURE — 87340 HEPATITIS B SURFACE AG IA: CPT

## 2024-07-17 PROCEDURE — 1157F ADVNC CARE PLAN IN RCRD: CPT | Performed by: INTERNAL MEDICINE

## 2024-07-17 PROCEDURE — 87389 HIV-1 AG W/HIV-1&-2 AB AG IA: CPT

## 2024-07-17 PROCEDURE — 99214 OFFICE O/P EST MOD 30 MIN: CPT | Mod: 25 | Performed by: INTERNAL MEDICINE

## 2024-07-17 PROCEDURE — 71250 CT THORAX DX C-: CPT

## 2024-07-17 PROCEDURE — 85025 COMPLETE CBC W/AUTO DIFF WBC: CPT

## 2024-07-17 PROCEDURE — 83735 ASSAY OF MAGNESIUM: CPT

## 2024-07-17 PROCEDURE — 86663 EPSTEIN-BARR ANTIBODY: CPT

## 2024-07-17 PROCEDURE — 3075F SYST BP GE 130 - 139MM HG: CPT | Performed by: INTERNAL MEDICINE

## 2024-07-17 PROCEDURE — 83880 ASSAY OF NATRIURETIC PEPTIDE: CPT

## 2024-07-17 PROCEDURE — 86704 HEP B CORE ANTIBODY TOTAL: CPT

## 2024-07-17 PROCEDURE — 1036F TOBACCO NON-USER: CPT | Performed by: INTERNAL MEDICINE

## 2024-07-17 PROCEDURE — 93306 TTE W/DOPPLER COMPLETE: CPT

## 2024-07-17 PROCEDURE — 86777 TOXOPLASMA ANTIBODY: CPT

## 2024-07-17 PROCEDURE — 1126F AMNT PAIN NOTED NONE PRSNT: CPT | Performed by: INTERNAL MEDICINE

## 2024-07-17 PROCEDURE — 86803 HEPATITIS C AB TEST: CPT

## 2024-07-17 PROCEDURE — 86695 HERPES SIMPLEX TYPE 1 TEST: CPT

## 2024-07-17 PROCEDURE — 86901 BLOOD TYPING SEROLOGIC RH(D): CPT

## 2024-07-17 PROCEDURE — 3079F DIAST BP 80-89 MM HG: CPT | Performed by: INTERNAL MEDICINE

## 2024-07-17 PROCEDURE — 86832 HLA CLASS I HIGH DEFIN QUAL: CPT

## 2024-07-17 PROCEDURE — 86787 VARICELLA-ZOSTER ANTIBODY: CPT

## 2024-07-17 PROCEDURE — 86706 HEP B SURFACE ANTIBODY: CPT

## 2024-07-17 PROCEDURE — 85610 PROTHROMBIN TIME: CPT

## 2024-07-17 PROCEDURE — 1160F RVW MEDS BY RX/DR IN RCRD: CPT | Performed by: INTERNAL MEDICINE

## 2024-07-17 PROCEDURE — 87522 HEPATITIS C REVRS TRNSCRPJ: CPT

## 2024-07-17 PROCEDURE — 2500000005 HC RX 250 GENERAL PHARMACY W/O HCPCS: Performed by: INTERNAL MEDICINE

## 2024-07-17 PROCEDURE — 71250 CT THORAX DX C-: CPT | Performed by: RADIOLOGY

## 2024-07-17 PROCEDURE — 82330 ASSAY OF CALCIUM: CPT

## 2024-07-17 PROCEDURE — 86645 CMV ANTIBODY IGM: CPT

## 2024-07-17 PROCEDURE — 84550 ASSAY OF BLOOD/URIC ACID: CPT

## 2024-07-17 PROCEDURE — 80307 DRUG TEST PRSMV CHEM ANLYZR: CPT

## 2024-07-17 PROCEDURE — 96413 CHEMO IV INFUSION 1 HR: CPT

## 2024-07-17 PROCEDURE — 2500000004 HC RX 250 GENERAL PHARMACY W/ HCPCS (ALT 636 FOR OP/ED): Performed by: INTERNAL MEDICINE

## 2024-07-17 PROCEDURE — 93306 TTE W/DOPPLER COMPLETE: CPT | Performed by: INTERNAL MEDICINE

## 2024-07-17 PROCEDURE — 36415 COLL VENOUS BLD VENIPUNCTURE: CPT

## 2024-07-17 RX ORDER — HEPARIN SODIUM,PORCINE/PF 10 UNIT/ML
50 SYRINGE (ML) INTRAVENOUS AS NEEDED
OUTPATIENT
Start: 2024-07-17

## 2024-07-17 RX ORDER — HEPARIN 100 UNIT/ML
500 SYRINGE INTRAVENOUS AS NEEDED
OUTPATIENT
Start: 2024-07-17

## 2024-07-17 RX ORDER — PROCHLORPERAZINE MALEATE 10 MG
10 TABLET ORAL EVERY 6 HOURS PRN
Status: DISCONTINUED | OUTPATIENT
Start: 2024-07-17 | End: 2024-07-17 | Stop reason: HOSPADM

## 2024-07-17 RX ORDER — DIPHENHYDRAMINE HYDROCHLORIDE 50 MG/ML
50 INJECTION INTRAMUSCULAR; INTRAVENOUS AS NEEDED
Status: DISCONTINUED | OUTPATIENT
Start: 2024-07-17 | End: 2024-07-17 | Stop reason: HOSPADM

## 2024-07-17 RX ORDER — ONDANSETRON HYDROCHLORIDE 8 MG/1
8 TABLET, FILM COATED ORAL ONCE
Status: COMPLETED | OUTPATIENT
Start: 2024-07-17 | End: 2024-07-17

## 2024-07-17 RX ORDER — EPINEPHRINE 0.3 MG/.3ML
0.3 INJECTION SUBCUTANEOUS EVERY 5 MIN PRN
Status: DISCONTINUED | OUTPATIENT
Start: 2024-07-17 | End: 2024-07-17 | Stop reason: HOSPADM

## 2024-07-17 RX ORDER — PROCHLORPERAZINE EDISYLATE 5 MG/ML
10 INJECTION INTRAMUSCULAR; INTRAVENOUS EVERY 6 HOURS PRN
Status: DISCONTINUED | OUTPATIENT
Start: 2024-07-17 | End: 2024-07-17 | Stop reason: HOSPADM

## 2024-07-17 RX ORDER — FAMOTIDINE 10 MG/ML
20 INJECTION INTRAVENOUS ONCE AS NEEDED
Status: DISCONTINUED | OUTPATIENT
Start: 2024-07-17 | End: 2024-07-17 | Stop reason: HOSPADM

## 2024-07-17 RX ORDER — ALBUTEROL SULFATE 0.83 MG/ML
3 SOLUTION RESPIRATORY (INHALATION) AS NEEDED
Status: DISCONTINUED | OUTPATIENT
Start: 2024-07-17 | End: 2024-07-17 | Stop reason: HOSPADM

## 2024-07-17 SDOH — ECONOMIC STABILITY: FOOD INSECURITY: WITHIN THE PAST 12 MONTHS, YOU WORRIED THAT YOUR FOOD WOULD RUN OUT BEFORE YOU GOT MONEY TO BUY MORE.: NEVER TRUE

## 2024-07-17 SDOH — ECONOMIC STABILITY: FOOD INSECURITY: WITHIN THE PAST 12 MONTHS, THE FOOD YOU BOUGHT JUST DIDN'T LAST AND YOU DIDN'T HAVE MONEY TO GET MORE.: NEVER TRUE

## 2024-07-17 ASSESSMENT — ENCOUNTER SYMPTOMS
CONSTITUTIONAL NEGATIVE: 1
DIFFICULTY URINATING: 1
HEMATOLOGIC/LYMPHATIC NEGATIVE: 1
NEUROLOGICAL NEGATIVE: 1
RESPIRATORY NEGATIVE: 1
CARDIOVASCULAR NEGATIVE: 1
DYSURIA: 0
PSYCHIATRIC NEGATIVE: 1
ARTHRALGIAS: 1
GASTROINTESTINAL NEGATIVE: 1

## 2024-07-17 ASSESSMENT — LIFESTYLE VARIABLES
HOW MANY STANDARD DRINKS CONTAINING ALCOHOL DO YOU HAVE ON A TYPICAL DAY: PATIENT DOES NOT DRINK
HOW OFTEN DO YOU HAVE SIX OR MORE DRINKS ON ONE OCCASION: NEVER
HOW OFTEN DO YOU HAVE A DRINK CONTAINING ALCOHOL: NEVER
SKIP TO QUESTIONS 9-10: 1
AUDIT-C TOTAL SCORE: 0

## 2024-07-17 ASSESSMENT — PATIENT HEALTH QUESTIONNAIRE - PHQ9
SUM OF ALL RESPONSES TO PHQ9 QUESTIONS 1 & 2: 0
1. LITTLE INTEREST OR PLEASURE IN DOING THINGS: NOT AT ALL
2. FEELING DOWN, DEPRESSED OR HOPELESS: NOT AT ALL

## 2024-07-17 ASSESSMENT — PAIN SCALES - GENERAL: PAINLEVEL: 0-NO PAIN

## 2024-07-17 NOTE — PROGRESS NOTES
PCP: Bill Richmond MD    Reason for Visit/Chief Complaint:     History of Present Illness  Brandt Merritt is a 64 y.o. male with myelodysplastic neoplasm who was referred to hepatology clinic for Hx of treated hepatitis C - recent hep C RNA pcr negative, and recent liver ultrasound shows normal sized liver at 14.5 cm, however seems enlarged on exam. Patient agreeable to hepatology consult before consideration of allograft, scheduled on 7/17/24.    He has been started on Ursodiol. 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 This has normalized on most recent labs.    He is feeling well. He denies jaundice, pruritus, fatigue, nausea/ vomiting, abdominal pain/ distention.    Pt denies any recent history of ascites, encephalopathy. No recent hospitalizations 2/2 cirrhosis.     Review of Systems  Positives as noted in HPI. All other systems were reviewed and negative.     Medical, Surgical, Family, and Social Histories  Past Medical History:   Diagnosis Date    Hypertension     Personal history of other diseases of the circulatory system     History of hypertension       Past Surgical History:   Procedure Laterality Date    COLONOSCOPY  06/13/2013    Complete Colonoscopy    OTHER SURGICAL HISTORY  10/07/2015    Surgery Testis Reduction Of Torsion Of Testis Right       Family History   Problem Relation Name Age of Onset    Other (diabetes mellitus) Mother         Family history:   History of liver disease: ####  History of HCC: ####  History of malignancy: ####    Social History:  Alcohol use: ####  Illicit Drug Use: ####  Smoking: ####    Allergies and Current Medications  Allergies   Allergen Reactions    Thiazides Other     Recurrent Significant hypokalemia     Current Outpatient Medications   Medication Sig Dispense Refill    acyclovir (Zovirax) 400 mg tablet Take 1 tablet (400 mg) by mouth 2 times a day. 60 tablet 3    fluconazole (Diflucan) 200 mg tablet Take 2  tablets (400 mg) by mouth once daily. 60 tablet 2    folic acid (Folvite) 1 mg tablet Take 1 tablet (1 mg) by mouth once daily. 30 tablet 3    levoFLOXacin (Levaquin) 500 mg tablet Take 1 tablet (500 mg) by mouth once daily. 30 tablet 2    NIFEdipine ER (Adalat CC) 60 mg 24 hr tablet Take 1 tablet (60 mg) by mouth once daily in the morning. Take before meals. Do not crush, chew, or split. 90 tablet 3    ondansetron (Zofran) 8 mg tablet Take 1 tablet (8 mg) by mouth every 8 hours if needed for nausea or vomiting. 30 tablet 5    prochlorperazine (Compazine) 10 mg tablet Take 1 tablet (10 mg) by mouth every 6 hours if needed for nausea or vomiting. 30 tablet 5    sennosides-docusate sodium (Karissa-Colace) 8.6-50 mg tablet Take 1 tablet by mouth once daily. 30 tablet 1    ursodiol (Actigall) 300 mg capsule Take 1 capsule (300 mg) by mouth 3 times a day. 90 capsule 11    venetoclax (Venclexta) 100 mg tablet Take 2 tablets (200 mg total) by mouth once daily.  Start on 5/15/24. Take with food. 120 tablet 1     No current facility-administered medications for this visit.        Physical Exam  There were no vitals taken for this visit.    Gen:  NAD  HEENT:  No scleral icterus, moist mucous membranes  Lungs:  No increased WOB, nonlabored breathing on room air  CVS:  RRR  Abd:  Soft, NT/ND, nl BS  Ext:  No edema, full ROM  Skin:  WWP  Neuro:  No asterixis, AOx3      Labs  Lab Results   Component Value Date    WBC 1.8 (L) 07/15/2024    HGB 8.9 (L) 07/15/2024    HCT 25.8 (L) 07/15/2024    MCV 86 07/15/2024     07/15/2024      Lab Results   Component Value Date    GLUCOSE 125 (H) 07/15/2024    CALCIUM 8.8 07/15/2024     07/15/2024    K 3.8 07/15/2024    CO2 24 07/15/2024     07/15/2024    BUN 15 07/15/2024    CREATININE 0.98 07/15/2024     Lab Results   Component Value Date    ALT 7 (L) 07/15/2024    AST 15 07/15/2024    GGT 13 02/20/2024    ALKPHOS 105 07/15/2024    BILITOT 0.6 07/15/2024      INR  pending    Imaging  === 02/07/24 ===  US ABDOMEN LIMITED SPLEEN  - Impression -  Unremarkable limited ultrasound of the left kidney and spleen.    GI Procedures  EGD 10/31/23  Findings  Regular Z-line 40 cm from the incisors  The esophagus appeared normal.  The stomach appeared normal.  Performed forceps biopsies to rule out H. pylori. Specimens placed into Jar 1.  The duodenal bulb, 1st part of the duodenum and 2nd part of the duodenum appeared normal.    Colonoscopy none available for review    ASSESSMENT AND PLAN  Patient is a 65 yo with AML pending BMT and previously treated Hep C with confirmed SVR who presents for evaluation of hepatomegaly prior to bone marrow transplant. At this time, there is no indication that patient has advanced liver disease nor evidence of persistent Hep C. It is possible that he has some chronic liver damage (fibrosis) from the Hep C. If this is the case, he may be at higher risk of liver related complications from bone marrow transplant. Fibroscan could be obtained to risk stratify.    Recommendation:  -Fibroscan ordered today  -Agree with close monitoring of liver function and repeating hepatitis serologies.    Return to clinic as needed.  The patient indicates understanding of these issues and agrees with the plan.    Discussed with Attending Dr. Lloyd.     Lisa Mccauley MD

## 2024-07-17 NOTE — PROGRESS NOTES
"CARDIOLOGY NEW PATIENT OFFICE VISIT    Patient:  Brandt Merritt  YOB: 1960  MRN: [id number]       Chief Complaint/Active Symptoms:       Brandt Merritt is a 64 y.o. male who is being seen today at the request of Dr. Newsome for evaluation of Stem cell transplant.    No chief complaint on file.      History of Present Illness:   HPI    Patient here for cardiac evaluation prior to stem cell transplant. Has a history of an abnormal EKG first seen in late 2020 at the [facility]. Diagnosed with STEMI but EKG was just a Brugada \"type\" EKG with inc RBBB and associated ST elevation. Cardiac enzymes were negative and he was DC without cardiac cath. Has history of normal stress test in 12/2020.  Was referred to EP / Dr. Silva who did cardiac MRI which had no ARVD and no hypertrophy (echo had intracardiac gradient). No fibrosis seen. Had arrhythmias which were not delineated and eventually attributed to hypokalemia.     Has no chest pain or discomfort. No shortness of breath, orthopnea or PND. No palpitations, syncope or near syncope. Has occasional ankle edema with his infusions. States he is listed as having an allergy to diuretics because of the hypokalemia he developed while on them and subsequent EKG abn and palpitations/arrhythmia. Has not followed with Cardiology since last seen by EP.     No family history of SCD/arrhythmia. Has a maternal aunt with valvular heart disease and surgery - details unknown.     Cancer Diagnosis: AML not in remission  Oncologist: Jerod  Treatment: Venetoclax, decitabine    Risk factors: age, HTN  SH: nonsmoker  FH: Valvular heart disease.     Testing:  EKG - SR, inc RBBB, ST elevation V2/V3  Echo prelim - normal LV function, mildly dilated ascending aorta, Aortic valve thickening without stenosis, trace regurgitation.   Echo 4/2024 - normal LVEF 60-65%    Allergies:     Allergies   Allergen Reactions    Thiazides Other     Recurrent Significant hypokalemia        Outpatient " Medications:     Current Outpatient Medications   Medication Instructions    acyclovir (ZOVIRAX) 400 mg, oral, 2 times daily    fluconazole (DIFLUCAN) 400 mg, oral, Daily    folic acid (FOLVITE) 1 mg, oral, Daily    levoFLOXacin (LEVAQUIN) 500 mg, oral, Daily    NIFEdipine ER (ADALAT CC) 60 mg, oral, Daily before breakfast, Do not crush, chew, or split.    ondansetron (ZOFRAN) 8 mg, oral, Every 8 hours PRN    prochlorperazine (COMPAZINE) 10 mg, oral, Every 6 hours PRN    sennosides-docusate sodium (Karissa-Colace) 8.6-50 mg tablet 1 tablet, oral, Daily    ursodiol (ACTIGALL) 300 mg, oral, 3 times daily    Venclexta 200 mg, oral, Daily, Start on 5/15/24. Take with food.        Past Medical History:     Past Medical History:   Diagnosis Date    Hypertension     Personal history of other diseases of the circulatory system     History of hypertension       Social History:     Social History     Socioeconomic History    Marital status:    Tobacco Use    Smoking status: Former     Types: Cigarettes    Smokeless tobacco: Never   Substance and Sexual Activity    Alcohol use: Not Currently     Comment: occansionally    Drug use: Yes     Types: Marijuana     Comment: 3 weeks ago     Social Determinants of Health     Financial Resource Strain: Low Risk  (11/12/2020)    Received from [facility]    Overall Financial Resource Strain (CARDIA)     Difficulty of Paying Living Expenses: Not hard at all   Food Insecurity: No Food Insecurity (7/17/2024)    Hunger Vital Sign     Worried About Running Out of Food in the Last Year: Never true     Ran Out of Food in the Last Year: Never true   Transportation Needs: No Transportation Needs (11/12/2020)    Received from [facility]    PRAPARE - Transportation     Lack of Transportation (Medical): No     Lack of Transportation (Non-Medical): No       Family History:        Family History   Problem Relation Name Age of Onset    Other (diabetes  mellitus) Mother         Review of Systems:     Review of Systems   Constitutional: Negative.    HENT: Negative.     Respiratory: Negative.     Cardiovascular: Negative.    Gastrointestinal: Negative.    Genitourinary:  Positive for difficulty urinating. Negative for dysuria.   Musculoskeletal:  Positive for arthralgias.   Neurological: Negative.    Hematological: Negative.    Psychiatric/Behavioral: Negative.     All other systems reviewed and are negative.      Objective:     /87, pulse 73, T 36.6      Physical Examination:   GENERAL:  Well developed, well nourished, in no acute distress.  HEENT: NC AT EOMI with anicteric sclera  NECK:  Supple, no JVD, no bruit.  CHEST:  Symmetric and nontender.  LUNGS:  Normal respiratory effort. Bilateral breath sounds clear to auscultation.   HEART:  PMI nondisplaced. Rate and rhythm regular with no evident murmur, no gallop appreciated. There are no rubs, clicks or heaves.  PERIPHERAL VASCULAR:  Pulses present and equally palpable; 2+ throughout.  ABDOMEN: Soft, NT, ND without HSM or palpable organomegaly, no bruits, normoactive bowel sounds  MUSCULOSKELETAL: No significant joint or limb deformity  EXTREMITIES:  Warm with good color, no clubbing or cyanosis.  There is no edema noted.  NEURO/PSYCH:  Alert and oriented times three with approppriate behavior and responses.  LYMPH: no significant palpable lymphadenopathy  SKIN: No rashes on exposed skin, no reported skin lesions.     Lab:     CBC:   Lab Results   Component Value Date    WBC 1.8 (L) 07/15/2024    RBC 3.00 (L) 07/15/2024    HGB 8.9 (L) 07/15/2024    HCT 25.8 (L) 07/15/2024     07/15/2024      Lab Results   Component Value Date    WBC 1.8 (L) 07/15/2024    WBC 1.4 (L) 07/08/2024    WBC 2.0 (L) 06/24/2024    RBC 3.00 (L) 07/15/2024    RBC 2.91 (L) 07/08/2024    RBC 2.70 (L) 06/24/2024    HGB 8.9 (L) 07/15/2024    HGB 8.2 (L) 07/08/2024    HGB 7.4 (L) 06/24/2024    HCT 25.8 (L) 07/15/2024    HCT 24.2 (L)  "07/08/2024    HCT 22.2 (L) 06/24/2024    MCV 86 07/15/2024    MCV 83 07/08/2024    MCV 82 06/24/2024    MCH 29.7 07/15/2024    MCH 28.2 07/08/2024    MCH 27.4 06/24/2024    MCHC 34.5 07/15/2024    MCHC 33.9 07/08/2024    MCHC 33.3 06/24/2024    RDW 23.8 (H) 07/15/2024    RDW 23.1 (H) 07/08/2024    RDW 20.5 (H) 06/24/2024     07/15/2024     (L) 07/08/2024     06/24/2024    MPV 12.7 (H) 10/06/2023       CMP:    Lab Results   Component Value Date     07/15/2024    K 3.8 07/15/2024     07/15/2024    CO2 24 07/15/2024    BUN 15 07/15/2024    CREATININE 0.98 07/15/2024    GLUCOSE 125 (H) 07/15/2024    CALCIUM 8.8 07/15/2024     Lab Results   Component Value Date     07/15/2024     07/08/2024     06/24/2024    K 3.8 07/15/2024    K 3.9 07/08/2024    K 3.7 06/24/2024     07/15/2024     07/08/2024     06/24/2024    CO2 24 07/15/2024    CO2 23 07/08/2024    CO2 24 06/24/2024    BUN 15 07/15/2024    BUN 17 07/08/2024    BUN 16 06/24/2024    CREATININE 0.98 07/15/2024    CREATININE 1.07 07/08/2024    CREATININE 1.00 06/24/2024     Lab Results   Component Value Date    ALKPHOS 105 07/15/2024    ALT 7 (L) 07/15/2024    AST 15 07/15/2024    PROT 7.2 07/15/2024    BILITOT 0.6 07/15/2024    BILIDIR 0.4 (H) 06/03/2024       Magnesium:    No results found for: \"MG\"    Lipid Profile:    Lab Results   Component Value Date    TRIG 53 09/19/2023    HDL 38.7 (A) 09/19/2023       TSH:    Lab Results   Component Value Date    TSH 1.83 02/05/2024       BNP:   No results found for: \"BNP\"     PT/INR:    Lab Results   Component Value Date    PROTIME 14.6 (H) 02/13/2024    INR 1.3 (H) 02/13/2024       HgBA1c:    Lab Results   Component Value Date    HGBA1C 6.1 (H) 06/30/2021       Cardiac Enzymes:    No results found for: \"TROPHS\"      Diagnostic Studies:     Transthoracic echo (TTE) complete    Result Date: 4/29/2024   [facility], [address] [address], [place] " [id number]              Tel [phone] and Fax [phone] TRANSTHORACIC ECHOCARDIOGRAM REPORT  Patient Name:      DANNY SOTO       Reading Physician:    [id number] Adrián Lagos MD Study Date:        4/29/2024            Ordering Provider:    [id number] ESTEFANIA LIPSCOMB MRN/PID:           [id number]             Fellow: Accession#:        [id number]         Nurse: Date of Birth/Age: 1960 / 64 years Sonographer:          Jayna Avalos                                                               RDCS Gender:            M                    Additional Staff: Height:            165.10 cm            Admit Date: Weight:            75.30 kg             Admission Status:     Outpatient BSA / BMI:         1.83 m2 / 27.62      Encounter#:           [id number]                    kg/m2                                         Department Location:  [facility] Blood Pressure: 139 /70 mmHg Study Type:    TRANSTHORACIC ECHO (TTE) COMPLETE Diagnosis/ICD: Encounter for other preprocedural examination-Z01.818 Indication:    Pre chemotherapy, Myelodysplastic syndrome CPT Code:      Echo Complete w Full Doppler-[id number] Patient History: Smoker:            Former. Pertinent History: HTN; Myelodysplastic neoplasm/ AML; Hx of hep C; Marijuana                    use. Study Detail: The following Echo studies were performed: 2D, M-Mode, Doppler and               color flow.  PHYSICIAN INTERPRETATION: Left Ventricle: The left ventricular systolic function is normal, with an estimated ejection fraction of 60-65%. The calculated ejection fraction is normal at 68 % using the Mcintyre's Bi-plane MOD calculation. There are no regional wall motion abnormalities. The left ventricular cavity size is normal. Spectral Doppler shows a normal pattern of left ventricular diastolic filling. Left Atrium: The left atrium is normal in size.  Right Ventricle: The right ventricle is normal in size. There is normal right ventriclar wall thickness. There is normal right ventricular global systolic function. Right Atrium: The right atrium is normal in size. Aortic Valve: The aortic valve appears structurally normal. The aortic valve appears tricuspid and non-restricted. There is no evidence of aortic valve regurgitation. The peak instantaneous gradient of the aortic valve is 7.2 mmHg. The mean gradient of the aortic valve is 3.2 mmHg. Mitral Valve: The mitral valve is normal in structure. There is normal mitral valve leaflet mobility. There is no evidence of mitral valve regurgitation. Tricuspid Valve: The tricuspid valve is structurally normal. There is normal tricuspid valve leaflet mobility. There is trace tricuspid regurgitation. Pulmonic Valve: The pulmonic valve is structurally normal. There is trace to mild pulmonic valve regurgitation. Pericardium: There is no pericardial effusion noted. Aorta: The aortic root is normal. The Ao Sinus is 3.90 cm. The Asc Ao is 3.50 cm. There is upper limits of normal dilatation of the ascending aorta. The aortic root is at the upper limits of normal size. Systemic Veins: The inferior vena cava appears to be of normal size. There is IVC inspiratory collapse greater than 50%.  CONCLUSIONS:  1. Left ventricular systolic function is normal with a 60-65% estimated ejection fraction. QUANTITATIVE DATA SUMMARY: 2D MEASUREMENTS:                          Normal Ranges: IVSd:          0.88 cm   (0.6-1.1cm) LVPWd:         0.94 cm   (0.6-1.1cm) LVIDd:         3.84 cm   (3.9-5.9cm) LVIDs:         2.58 cm LV Mass Index: 56.7 g/m2 LV % FS        32.7 % LA VOLUME:                               Normal Ranges: LA Vol A4C:        54.0 ml    (22+/-6mL/m2) LA Vol A2C:        54.7 ml LA Vol BP:         56.4 ml LA Vol Index A4C:  29.5 ml/m2 LA Vol Index A2C:  29.9 ml/m2 LA Vol Index BP:   30.9 ml/m2 LA Area A4C:       18.0 cm2 LA Area A2C:        18.8 cm2 LA Major Axis A4C: 5.1 cm LA Major Axis A2C: 5.5 cm LA Vol A4C:        47.3 ml LA Vol A2C:        85.6 ml RA VOLUME BY A/L METHOD:                               Normal Ranges: RA Vol A4C:        42.6 ml    (8.3-19.5ml) RA Vol Index A4C:  23.3 ml/m2 RA Area A4C:       16.6 cm2 RA Major Axis A4C: 5.5 cm M-MODE MEASUREMENTS:                  Normal Ranges: Ao Root: 3.70 cm (2.0-3.7cm) LAs:     3.83 cm (2.7-4.0cm) AORTA MEASUREMENTS:                      Normal Ranges: Ao Sinus, d: 3.90 cm (2.1-3.5cm) Asc Ao, d:   3.50 cm (2.1-3.4cm) LV SYSTOLIC FUNCTION BY 2D PLANIMETRY (MOD):                     Normal Ranges: EF-A4C View: 58.9 % (>=55%) EF-A2C View: 75.0 % EF-Biplane:  67.6 % LV DIASTOLIC FUNCTION:                          Normal Ranges: MV Peak E:    0.89 m/s   (0.7-1.2 m/s) MV Peak A:    0.66 m/s   (0.42-0.7 m/s) E/A Ratio:    1.35       (1.0-2.2) MV e'         0.11 m/s   (>8.0) MV lateral e' 0.11 m/s MV medial e'  0.11 m/s MV A Dur:     99.90 msec E/e' Ratio:   8.13       (<8.0) MITRAL VALVE:                 Normal Ranges: MV DT: 163 msec (150-240msec) AORTIC VALVE:                                   Normal Ranges: AoV Vmax:                1.34 m/s (<=1.7m/s) AoV Peak P.2 mmHg (<20mmHg) AoV Mean PG:             3.2 mmHg (1.7-11.5mmHg) LVOT Max Wyatt:            1.21 m/s (<=1.1m/s) AoV VTI:                 21.97 cm (18-25cm) LVOT VTI:                22.69 cm LVOT Diameter:           2.35 cm  (1.8-2.4cm) AoV Area, VTI:           4.48 cm2 (2.5-5.5cm2) AoV Area,Vmax:           3.93 cm2 (2.5-4.5cm2) AoV Dimensionless Index: 1.03  RIGHT VENTRICLE: RV Basal 3.70 cm RV Mid   2.80 cm RV Major 7.9 cm TAPSE:   29.7 mm RV s'    0.21 m/s TRICUSPID VALVE/RVSP:                   Normal Ranges: IVC Diam: 1.70 cm PULMONIC VALVE:                         Normal Ranges: PV Accel Time: 111 msec (>120ms) PV Max Wyatt:    0.9 m/s  (0.6-0.9m/s) PV Max PG:     3.6 mmHg  [id number] Adrián Lagos MD Electronically signed  on 4/29/2024 at 6:19:09 PM  ** Final **      No nuclear medicine results found for the past 12 months    No valid procedures specified.    Radiology:     No valid procedures specified.    Assessment:     Problem List Items Addressed This Visit       Abnormal EKG - Primary    Essential hypertension    Acute myeloid leukemia not having achieved remission (Multi)    Stem cell transplant candidate    Ascending aorta dilation (CMS-HCC)       Plan:   Acute myeloid leukemia - not in remission. Stem cell transplant candidate. LV function normal by echo and no signs/symptoms of angina/CHF. LVEF normal by echo. No cardiac contraindications to planned transplant. Would see post discharge per protocol. Call if any problems or questions.   Hypertension. BP borderline. Long-term will want better BP control. Will not adjust pretransplant.  Ascending aorta dilation. 3.8-4 cm by echo. Will want optimal BP control for this reason. Long term should follow with cardiology on an annual basis. As long as periodic CT of chest being done for monitoring for his cancer status would not get CTA - once stopped can evaluate by CTA.     Will see back post-DC after an echo. Thank you for this referral.

## 2024-07-17 NOTE — PROGRESS NOTES
Pt arrived ambulatory to infusion for treatment of decitabine.  Denies any new or worsening symptoms. Tolerated infusion without issue. Discharged in stable condition.

## 2024-07-18 ENCOUNTER — INFUSION (OUTPATIENT)
Dept: HEMATOLOGY/ONCOLOGY | Facility: CLINIC | Age: 64
End: 2024-07-18
Payer: COMMERCIAL

## 2024-07-18 VITALS
HEART RATE: 76 BPM | TEMPERATURE: 97.2 F | RESPIRATION RATE: 18 BRPM | SYSTOLIC BLOOD PRESSURE: 114 MMHG | WEIGHT: 162 LBS | OXYGEN SATURATION: 97 % | DIASTOLIC BLOOD PRESSURE: 70 MMHG | BODY MASS INDEX: 26.15 KG/M2

## 2024-07-18 DIAGNOSIS — C92.00 ACUTE MYELOID LEUKEMIA NOT HAVING ACHIEVED REMISSION (MULTI): ICD-10-CM

## 2024-07-18 LAB
ADENOVIRUS QPCR,PLASMA, VIRC: NOT DETECTED COPIES/ML
CMV DNA SERPL NAA+PROBE-LOG IU: NORMAL {LOG_IU}/ML
CMV IGM SERPL-ACNC: <8 AU/ML
EBV DNA SPEC NAA+PROBE-LOG#: NORMAL {LOG_COPIES}/ML
HCV RNA SERPL NAA+PROBE-ACNC: NOT DETECTED K[IU]/ML
HCV RNA SERPL NAA+PROBE-LOG IU: NORMAL {LOG_IU}/ML
HTLV I+II AB SER QL IA: NEGATIVE
LABORATORY COMMENT REPORT: NOT DETECTED
LABORATORY COMMENT REPORT: NOT DETECTED

## 2024-07-18 PROCEDURE — 96413 CHEMO IV INFUSION 1 HR: CPT

## 2024-07-18 PROCEDURE — 2500000004 HC RX 250 GENERAL PHARMACY W/ HCPCS (ALT 636 FOR OP/ED): Performed by: INTERNAL MEDICINE

## 2024-07-18 PROCEDURE — 2500000005 HC RX 250 GENERAL PHARMACY W/O HCPCS: Performed by: INTERNAL MEDICINE

## 2024-07-18 RX ORDER — PROCHLORPERAZINE MALEATE 10 MG
10 TABLET ORAL EVERY 6 HOURS PRN
Status: DISCONTINUED | OUTPATIENT
Start: 2024-07-18 | End: 2024-07-18 | Stop reason: HOSPADM

## 2024-07-18 RX ORDER — DIPHENHYDRAMINE HYDROCHLORIDE 50 MG/ML
50 INJECTION INTRAMUSCULAR; INTRAVENOUS AS NEEDED
Status: DISCONTINUED | OUTPATIENT
Start: 2024-07-18 | End: 2024-07-18 | Stop reason: HOSPADM

## 2024-07-18 RX ORDER — ONDANSETRON HYDROCHLORIDE 8 MG/1
8 TABLET, FILM COATED ORAL ONCE
Status: COMPLETED | OUTPATIENT
Start: 2024-07-18 | End: 2024-07-18

## 2024-07-18 RX ORDER — PROCHLORPERAZINE EDISYLATE 5 MG/ML
10 INJECTION INTRAMUSCULAR; INTRAVENOUS EVERY 6 HOURS PRN
Status: DISCONTINUED | OUTPATIENT
Start: 2024-07-18 | End: 2024-07-18 | Stop reason: HOSPADM

## 2024-07-18 RX ORDER — FAMOTIDINE 10 MG/ML
20 INJECTION INTRAVENOUS ONCE AS NEEDED
Status: DISCONTINUED | OUTPATIENT
Start: 2024-07-18 | End: 2024-07-18 | Stop reason: HOSPADM

## 2024-07-18 RX ORDER — ALBUTEROL SULFATE 0.83 MG/ML
3 SOLUTION RESPIRATORY (INHALATION) AS NEEDED
Status: DISCONTINUED | OUTPATIENT
Start: 2024-07-18 | End: 2024-07-18 | Stop reason: HOSPADM

## 2024-07-18 RX ORDER — EPINEPHRINE 0.3 MG/.3ML
0.3 INJECTION SUBCUTANEOUS EVERY 5 MIN PRN
Status: DISCONTINUED | OUTPATIENT
Start: 2024-07-18 | End: 2024-07-18 | Stop reason: HOSPADM

## 2024-07-18 ASSESSMENT — PAIN SCALES - GENERAL: PAINLEVEL: 0-NO PAIN

## 2024-07-19 ENCOUNTER — INFUSION (OUTPATIENT)
Dept: HEMATOLOGY/ONCOLOGY | Facility: CLINIC | Age: 64
End: 2024-07-19
Payer: COMMERCIAL

## 2024-07-19 ENCOUNTER — NUTRITION (OUTPATIENT)
Dept: HEMATOLOGY/ONCOLOGY | Facility: CLINIC | Age: 64
End: 2024-07-19
Payer: COMMERCIAL

## 2024-07-19 VITALS
SYSTOLIC BLOOD PRESSURE: 115 MMHG | WEIGHT: 163.47 LBS | OXYGEN SATURATION: 96 % | TEMPERATURE: 97.7 F | DIASTOLIC BLOOD PRESSURE: 66 MMHG | BODY MASS INDEX: 26.38 KG/M2 | HEART RATE: 78 BPM | RESPIRATION RATE: 18 BRPM

## 2024-07-19 VITALS — HEIGHT: 66 IN | BODY MASS INDEX: 26.27 KG/M2 | WEIGHT: 163.47 LBS

## 2024-07-19 DIAGNOSIS — C92.00 ACUTE MYELOID LEUKEMIA NOT HAVING ACHIEVED REMISSION (MULTI): ICD-10-CM

## 2024-07-19 PROCEDURE — 2500000004 HC RX 250 GENERAL PHARMACY W/ HCPCS (ALT 636 FOR OP/ED): Performed by: INTERNAL MEDICINE

## 2024-07-19 PROCEDURE — 96413 CHEMO IV INFUSION 1 HR: CPT

## 2024-07-19 PROCEDURE — 2500000005 HC RX 250 GENERAL PHARMACY W/O HCPCS: Performed by: INTERNAL MEDICINE

## 2024-07-19 RX ORDER — DIPHENHYDRAMINE HYDROCHLORIDE 50 MG/ML
50 INJECTION INTRAMUSCULAR; INTRAVENOUS AS NEEDED
Status: DISCONTINUED | OUTPATIENT
Start: 2024-07-19 | End: 2024-07-19 | Stop reason: HOSPADM

## 2024-07-19 RX ORDER — ALBUTEROL SULFATE 0.83 MG/ML
3 SOLUTION RESPIRATORY (INHALATION) AS NEEDED
Status: DISCONTINUED | OUTPATIENT
Start: 2024-07-19 | End: 2024-07-19 | Stop reason: HOSPADM

## 2024-07-19 RX ORDER — PROCHLORPERAZINE MALEATE 10 MG
10 TABLET ORAL EVERY 6 HOURS PRN
Status: DISCONTINUED | OUTPATIENT
Start: 2024-07-19 | End: 2024-07-19 | Stop reason: HOSPADM

## 2024-07-19 RX ORDER — PROCHLORPERAZINE EDISYLATE 5 MG/ML
10 INJECTION INTRAMUSCULAR; INTRAVENOUS EVERY 6 HOURS PRN
Status: DISCONTINUED | OUTPATIENT
Start: 2024-07-19 | End: 2024-07-19 | Stop reason: HOSPADM

## 2024-07-19 RX ORDER — EPINEPHRINE 0.3 MG/.3ML
0.3 INJECTION SUBCUTANEOUS EVERY 5 MIN PRN
Status: DISCONTINUED | OUTPATIENT
Start: 2024-07-19 | End: 2024-07-19 | Stop reason: HOSPADM

## 2024-07-19 RX ORDER — ONDANSETRON HYDROCHLORIDE 8 MG/1
8 TABLET, FILM COATED ORAL ONCE
Status: COMPLETED | OUTPATIENT
Start: 2024-07-19 | End: 2024-07-19

## 2024-07-19 RX ORDER — FAMOTIDINE 10 MG/ML
20 INJECTION INTRAVENOUS ONCE AS NEEDED
Status: DISCONTINUED | OUTPATIENT
Start: 2024-07-19 | End: 2024-07-19 | Stop reason: HOSPADM

## 2024-07-19 ASSESSMENT — PAIN SCALES - GENERAL: PAINLEVEL: 0-NO PAIN

## 2024-07-19 NOTE — PROGRESS NOTES
"NUTRITION Assessment NOTE    Nutrition Assessment     Reason for Visit:  Brandt Merritt is a 64 y.o. male with Myelodysplastic neoplasm/AML     5/13/2024 -  Chemotherapy     Venetoclax / Decitabine, 28 Day Cycles - Induction / Consolidation   He is planned for consolidation with allogeneic stem cell transplant.     Referred to this service for wt loss and assessed today during infusion.    Past Medical History:  HTN   STEMI 11/11/2020 after getting  a water pills.  Chronic hepatitis C infection treated in 2015     Lab Results   Component Value Date/Time    GLUCOSE 132 (H) 07/17/2024 1208     07/17/2024 1208    K 3.8 07/17/2024 1208     07/17/2024 1208    CO2 28 07/17/2024 1208    ANIONGAP 10 07/17/2024 1208    BUN 13 07/17/2024 1208    CREATININE 0.90 07/17/2024 1208    EGFR >90 07/17/2024 1208    CALCIUM 9.0 07/17/2024 1208    ALBUMIN 3.9 07/17/2024 1208    ALKPHOS 101 07/17/2024 1208    PROT 7.4 07/17/2024 1208    AST 15 07/17/2024 1208    BILITOT 0.8 07/17/2024 1208    ALT 7 (L) 07/17/2024 1208         Anthropometrics:  Anthropometrics  Height: 167.6 cm (5' 5.98\")  Weight: 74.2 kg (163 lb 7.5 oz)  BMI (Calculated): 26.4  IBW/kg (Dietitian Calculated): 64.5 kg  Percent of IBW: 115 %  Weight Change  Weight History / % Weight Change: Now with wt gain trend  Significant Weight Loss: Yes (recent h/o)  Significant Weight Gain: Non-fluid related  UBW: 97.7 kg (215#) Pt notes he worked out lifting wts and had a lot of muscle mass.  He has lost a lot of muscle unfortunately.  %UBW: 76 with a 24% loss from baseline in an indeterminate amount of time.    Wt has fluctuated greatly over the past 5 months.  Pt states that he has trouble with constipation and when he is bloated he does not eat as much and his wt goes down.  Wt Readings    07/19/24 74.2 kg (163 lb 7.5 oz)   07/18/24 73.5 kg (162 lb)   07/17/24 72.5 kg (159 lb 12.8 oz)   07/16/24 73.4 kg (161 lb 11.3 oz)   07/15/24 70.6 kg (155 lb 10.3 oz)   07/08/24 " 72.3 kg (159 lb 6.3 oz)   06/24/24 71 kg (156 lb 9.6 oz)   06/17/24 71.2 kg (157 lb)   06/15/24 71.3 kg (157 lb 3 oz)   06/14/24 74.2 kg (163 lb 9.3 oz)   06/13/24 73.5 kg (162 lb 2.4 oz)   06/12/24 73.6 kg (162 lb 4.1 oz)   06/11/24 73.2 kg (161 lb 7.8 oz)   06/10/24 70.3 kg (154 lb 15.7 oz)- loss of 6.9 kg (8.9%) in < 4 weeks- significant   06/03/24 70.7 kg (155 lb 13.8 oz)   05/20/24 72.9 kg (160 lb 11.5 oz)   05/17/24 77.2 kg (170 lb 3.1 oz)   05/16/24 78 kg (172 lb)   05/15/24 78.8 kg (173 lb 11.6 oz)   05/14/24 78.3 kg (172 lb 9.9 oz)   05/13/24 76.8 kg (169 lb 3.3 oz)   05/09/24 74.6 kg (164 lb 7.4 oz)   04/23/24 75.5 kg (166 lb 7.2 oz)   04/11/24 74.1 kg (163 lb 5.8 oz)   2/5/24:  74.7 kg (164 lb 10.9 oz)     12/15/23:         77.6 kg (171 lb)  10/13/23: 77.6 kg (171 lb)  4/13/23:  82.1 kg (181 lb).                Food And Nutrient Intake:  Food and Nutrient History  Food and Nutrient History: Pt has a good appetite.  States he would be eating all of the time if was not intermittently bloated from constipation issues.  He cannot eat when he feels this way.  Energy Intake: Fair 50-75 %  Fluid Intake: Adequate. States he drinks all of the time- even at work where he has access to water, gatorade, iced tea  Food Intolerance: Lactose  GI Symptoms: constipation  GI Symptoms greater than 2 weeks: intermittent   Pt states that the nausea meds given at tx give him constipation- he does not need any anti-N meds at home. He has been taking bisacodyl and Miralax and that seems to help him.  He has moved his bowels the last 4 days after taking this regimen. He notes that Senna did not work for him.                                                 Food Supplement Intake  Oral Nutrition Supplements: Ensure Plus (Got coupons from the waiting room- tolerates well)           Nutrition Focused Physical Exam Findings:      Subcutaneous Fat Loss  Orbital Fat Pads: Mild-Moderate (slight dark circles and slight hollowing)  "(mild)  Buccal Fat Pads: Well nourished (full, rounded cheeks)  Triceps: Defer  Ribs: Defer    Muscle Wasting  Temporalis: Mild-Moderate (slight depression) (mild)  Pectoralis (Clavicular Region): Defer  Deltoid/Trapezius: Defer  Interosseous: Defer  Trapezius/Infraspinatus/Supraspinatus (Scapular Region): Defer  Quadriceps: Defer  Gastrocnemius: Defer              Energy Needs  Calculated Energy Needs Using Equations  Height: 167.6 cm (5' 5.98\")  Estimated Energy Needs  Total Energy Estimated Needs (kCal): 2300 kCal  Total Estimated Energy Need per Day (kCal/kg): 31 kCal/kg  Estimated Fluid Needs  Total Fluid Estimated Needs (mL): 2300 mL  Total Fluid Estimated Needs (mL/kg): 31 mL/kg  Estimated Protein Needs  Total Protein Estimated Needs (g): 97 g  Total Protein Estimated Needs (g/kg): 1.3 g/kg        Nutrition Diagnosis   Malnutrition Diagnosis  Patient has Malnutrition Diagnosis: Yes  Diagnosis Status: New  Malnutrition Diagnosis: Moderate malnutrition related to chronic disease or condition  As Evidenced by: intake of < 75% of  estimated energy  requirement for  > 1 month in the setting of significant wt loss with mild depeltion of adipose and muscle stores per limited NFPE  Additional Assessment Information: Pt may benefit from Ensure Enlive product with HMB to assist with maintaining muscle mass on chemo tx         Nutrition Interventions/Recommendations   Nutrition Prescription  Individualized Nutrition Prescription Provided for : Regular CODY    Food and Nutrition Delivery  Food and Nutrition Delivery  Meals & Snacks: Other, specify: (Manage lactose intolerance)  Goals: Trial of Lactaid pills with lactose containing foods (Trial of lactose free Fairlife milk with 13 g of protein per serving)  Medical Food Supplement: Ensure Enlive  Goals: 1 1/2- 2 each daily to provide 700 calories, 40 g of protein and HMB for muscle maintenance  Other:: Laxative protocol  Goals: BM every day or every other and by the 3rd " day    Nutrition Education       Coordination of Care       Patient Instructions   Lactose intolerance (AND)    Nutrition Monitoring and Evaluation   Food/Nutrient Related History Monitoring  Monitoring and Evaluation Plan: Fluid intake, Amount of food  Fluid Intake: Estimated fluid intake  Criteria: Continue to meet daily hydration needs  Amount of Food: Estimated amout of food  Criteria: Meet energy and protein needs  Body Composition/Growth/Weight History  Monitoring and Evaluation Plan: Weight  Weight: Weight change  Criteria: Maintenance and slow gain/repletion of muscle  Biochemical Data, Medical Tests and Procedures  Monitoring and Evaluation Plan: Electrolyte/renal panel  Criteria: WNL  Nutrition Focused Physical Findings  Monitoring and Evaluation Plan: Adipose, Digestive System, Muscles  Adipose: Loss of subcutaneous fat  Criteria: No further loss  Digestive System: Constipation  Criteria: bisacodyl and miralax today with anti-nausea meds and then PRN  Muscles: Muscle atrophy  Criteria: Slow repletion          Time Spent  Prep time on day of patient encounter: 5 minutes  Time spent directly with patient, family or caregiver: 30 minutes  Additional Time Spent on Patient Care Activities: 5 minutes  Documentation Time: 15 minutes  Other Time Spent: 0 minutes  Total: 55 minutes

## 2024-07-19 NOTE — PROGRESS NOTES
Pt here for decitabine infusion, day 5. Tolerated well. Discharged in stable condition, no further needs at this time. Has schedule for follow up.

## 2024-07-22 ENCOUNTER — ANCILLARY PROCEDURE (OUTPATIENT)
Dept: RESPIRATORY THERAPY | Facility: CLINIC | Age: 64
End: 2024-07-22
Payer: COMMERCIAL

## 2024-07-22 ENCOUNTER — SPECIALTY PHARMACY (OUTPATIENT)
Dept: PHARMACY | Facility: CLINIC | Age: 64
End: 2024-07-22

## 2024-07-22 DIAGNOSIS — C92.00 ACUTE MYELOID LEUKEMIA NOT HAVING ACHIEVED REMISSION (MULTI): ICD-10-CM

## 2024-07-22 DIAGNOSIS — Z76.82 STEM CELL TRANSPLANT CANDIDATE: ICD-10-CM

## 2024-07-22 LAB
ATRIAL RATE: 69 BPM
HLA RESULTS: NORMAL
HLA-A+B+C AB NFR SER: NORMAL %
HLA-DP+DQ+DR AB NFR SER: NORMAL %
P AXIS: 72 DEGREES
P OFFSET: 194 MS
P ONSET: 137 MS
PR INTERVAL: 166 MS
Q ONSET: 220 MS
QRS COUNT: 11 BEATS
QRS DURATION: 94 MS
QT INTERVAL: 398 MS
QTC CALCULATION(BAZETT): 426 MS
QTC FREDERICIA: 417 MS
R AXIS: 61 DEGREES
T AXIS: 62 DEGREES
T OFFSET: 419 MS
VENTRICULAR RATE: 69 BPM

## 2024-07-22 PROCEDURE — 94010 BREATHING CAPACITY TEST: CPT | Performed by: NURSE PRACTITIONER

## 2024-07-22 PROCEDURE — RXMED WILLOW AMBULATORY MEDICATION CHARGE

## 2024-07-22 PROCEDURE — 94729 DIFFUSING CAPACITY: CPT

## 2024-07-22 PROCEDURE — 94010 BREATHING CAPACITY TEST: CPT

## 2024-07-22 PROCEDURE — 94727 GAS DIL/WSHOT DETER LNG VOL: CPT | Performed by: NURSE PRACTITIONER

## 2024-07-22 PROCEDURE — 94727 GAS DIL/WSHOT DETER LNG VOL: CPT

## 2024-07-23 ENCOUNTER — PHARMACY VISIT (OUTPATIENT)
Dept: PHARMACY | Facility: CLINIC | Age: 64
End: 2024-07-23
Payer: COMMERCIAL

## 2024-07-24 ENCOUNTER — CLINICAL SUPPORT (OUTPATIENT)
Dept: GASTROENTEROLOGY | Facility: HOSPITAL | Age: 64
End: 2024-07-24
Payer: COMMERCIAL

## 2024-07-24 DIAGNOSIS — Z86.19 HISTORY OF HEPATITIS C: ICD-10-CM

## 2024-07-25 ENCOUNTER — LAB REQUISITION (OUTPATIENT)
Dept: LAB | Facility: CLINIC | Age: 64
End: 2024-07-25
Payer: COMMERCIAL

## 2024-07-25 DIAGNOSIS — C92.00 ACUTE MYELOBLASTIC LEUKEMIA, NOT HAVING ACHIEVED REMISSION (MULTI): ICD-10-CM

## 2024-07-25 LAB
SPECIMEN HANDLING: NORMAL

## 2024-07-25 PROCEDURE — 99001 SPECIMEN HANDLING PT-LAB: CPT | Mod: OUT | Performed by: INTERNAL MEDICINE

## 2024-07-26 LAB
DNA CLASS I + II VERIFICATION TYPING: NORMAL
HLA RESULTS: NORMAL

## 2024-07-28 NOTE — PROGRESS NOTES
Patient ID: Brandt Merritt is a 64 y.o. male.    Diagnosis:   Myelodysplastic neoplasm/AML      Treatment:   Oncology History Overview Note   4/2024  Myelodysplastic neoplasm with increased blasts-2 ( WHO)  Myelodysplastic neoplasm/AML  (ICC 2022 classification)    Presented in  10/2023 for pancytopenia, found to have hemolysis. Patient had normal CBC June 2020. Denied any hemorrhoidal bleeding . Has had C Scope and EGD , treated Hep C 2014 . 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      CBC 4/11/24 wbc 3.0, hgb 7.1, platelets 167K ANC 0.73    BM biopsy done on 4/11/24  as folllows:  BM histology  Myelodysplastic neoplasm with increased blasts-2 ( WHO)  Myelodysplastic neoplasm/AML  (ICC 2022 classification)  Balsts 11% on aspirate smear and 15-20% based on CD 34 immunostaining approaching AML leukemia, hematooiesis is erythroid dominant with dysplasia in granulocytes and megakaryocytes.   FISH studies trisomy 8 17.2%  NGS panel DNMT3 aVAF 36%  U2AF1 VAF 32%       Acute myeloid leukemia not having achieved remission (Multi)   4/23/2024 Initial Diagnosis    Acute myeloid leukemia not having achieved remission (Multi)     5/13/2024 -  Chemotherapy    Venetoclax / Decitabine, 28 Day Cycles - Induction / Consolidation         Response:     Past Medical History:  HTN   STEMI 11/11/2020 after getting  a water pills.  Chronic hepatitis C infection treated in 2015 treated  with Dr. Lloyd  Past Medical History:   Diagnosis Date    Chest pain 06/30/2021    HTN (hypertension) 10/05/2023    Hypertension     Personal history of other diseases of the circulatory system     History of hypertension       Surgical History:  Conosocopy 11/2021  Upper EGD 10/31/23  Surgical reduction torsion of testes   Past Surgical History:   Procedure Laterality Date    COLONOSCOPY  06/13/2013    Complete Colonoscopy    OTHER SURGICAL HISTORY  10/07/2015    Surgery Testis Reduction Of Torsion Of Testis Right        Family History:  CAD, DM in  mother    Mother  of CVA at age 69  Brother with prostate CA, 1 sister with liver disease  2 children healthy  Family History   Problem Relation Name Age of Onset    Other (diabetes mellitus) Mother         Social History:  Lives with wife of 30 years, works at [employer], 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  29 years, former smoker, smoked x 15 yrs quit 20+ years ago. Marijuana 3 x a week. Served in  in [place] [facility] [employer],   Social History     Tobacco Use    Smoking status: Former     Types: Cigarettes    Smokeless tobacco: Never   Substance Use Topics    Alcohol use: Not Currently     Comment: occansionally    Drug use: Yes     Types: Marijuana     Comment: 3 weeks ago      -------------------------------------------------------------------------------------------------------  Subjective       HPI    Brandt Merritt is a 64 y.o. 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 Seen by Abundio Rust and Rose Casal initially 10/2023 for pancytopenia. He has had work up for pancytopenia and hemolytic process . Admits to having ongoing chronic fatigue that is unchanged . Patient had normal CBC 2020. Denied any hemorrhoidal bleeding. Has had C Scope and EGD , treated Hep C  . 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      Additional information  PSA was high, biopsy  and  all benign.    CBC 24 wbc 3.0, hgb 7.1, platelets 167K ANC 0.73  BM histology  Myelodysplastic neoplasm with increased blasts-2 ( WHO)  Myelodysplastic neoplasm/AML  (ICC  classification)  Blasts 11% on aspirate smear and 15-20% based on CD 34 immunostaining approaching AML leukemia, hematopoiesis is erythroid dominant with dysplasia in granulocytes and megakaryocytes.   FISH studies trisomy 8 17.2%  NGS panel DNMT3 aVAF 36%  U2AF1 VAF 32%    Recent bone marrow from  showed 1% blasts, but as expected ongoing myelodysplasia. Residual trisomy 8 in 2% of cells.     Brandt  presents to the clinic today (07/29/24) with his wife Nichole for ongoing management of myelodysplastic neoplasm/AML.  He received C3 decitabine on 7/15/24 and remains on venetoclax 200 mg days 1-21 out of 28 day cycle.  He is currently on day 15 of venetoclax today.  Reports he is receiving venetoclax okay from pharmacy.      He is planned for consolidation with allogeneic stem cell transplant.  Unfortunately 11/12 MUD donor no longer available, and patient remains reluctant to approach his daughters for haplo donation.  He is okay to have brother and sister get matched for possible donor.  Explained that his bother was treated for prostate cancer treated about 5 years ago and and his one sister has liver disease and his other sister has MS.  On 7/17/24, he underwent pretransplant cardiac evaluation, no interventions at this time although long term would want better control of blood pressure, also had aortic aneurysm of borderline size which will require ongoing followup.  He was also seen by hepatology and fibroscan performed on 7/24/24, results pending.    Energy level is good.  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  Planning to retire after his transplant.  Appetite is good.  Eating about 3 meals per day, and no early satiety.  Lost about 7 pounds in the last 10 days.  Intermittent night sweats that occur a couple days per week.  Constipation has resolved after starting miralax about every other day.      Review of Systems   Constitutional:  Positive for diaphoresis and unexpected weight change. Negative for appetite change, chills, fatigue and fever.   Respiratory: Negative.     Cardiovascular: Negative.    Gastrointestinal: Negative.    Genitourinary: Negative.     Musculoskeletal: Negative.    Skin: Negative.    Neurological: Negative.    Hematological: Negative.    All other systems reviewed and are negative.      -------------------------------------------------------------------------------------------------------  Objective   BSA: 1.8 meters squared  /70   Pulse 84   Temp 36.2 °C (97.2 °F)   Resp 17   Wt 69.6 kg (153 lb 7 oz) Comment: reweighed without shoes  SpO2 100%   BMI 24.78 kg/m²     Physical Exam  Vitals reviewed.   Constitutional:       Comments: Well developed man looking as stated age, muscular. thin   HENT:      Head: Normocephalic and atraumatic.      Mouth/Throat:      Mouth: Mucous membranes are moist.   Eyes:      Extraocular Movements: Extraocular movements intact.      Conjunctiva/sclera: Conjunctivae normal.      Pupils: Pupils are equal, round, and reactive to light.   Cardiovascular:      Rate and Rhythm: Normal rate and regular rhythm.      Pulses: Normal pulses.      Heart sounds: Normal heart sounds.   Pulmonary:      Effort: Pulmonary effort is normal.      Breath sounds: Normal breath sounds.   Abdominal:      General: Abdomen is flat. Bowel sounds are normal. There is no distension.      Palpations: Abdomen is soft. There is hepatomegaly.      Tenderness: There is no abdominal tenderness.   Musculoskeletal:         General: No swelling. Normal range of motion.   Lymphadenopathy:      Comments: No lymphadenopathy   Skin:     General: Skin is warm.   Neurological:      General: No focal deficit present.      Mental Status: He is alert and oriented to person, place, and time.   Psychiatric:         Mood and Affect: Mood normal.         Behavior: Behavior normal.         Thought Content: Thought content normal.         Judgment: Judgment normal.     Performance Status:  Asymptomatic  -------------------------------------------------------------------------------------------------------  Assessment/Plan      Myelodysplastic neoplasm/AML- Patient presents with a history of pancytopenia since 9/2023 and due to confusion picture of hemolysis with only mild leukopenia and neutropenia  extensive workup done prior to bone marrow bx which shows MDS in transition to AML, with trisomy 8 and DNMT alpha and U2AF1 mutation which is adverse risk. Given that the patient has > 10% blasts in bone marrow I would favor induction chemotherapy with ultimate goal to pursue curative allogeneic stem cell  transplant.  The patients slow, indolent course is more consistent with MDS and he was recommended induction with decitabine and venetoclax which has about a 60% chance of achieving a complete clinical remission.    C1D1: 5/13/2024  C2D1: 6/11/2024 , venetoclax to 200 mg 21/28 days, check blood counts   BM biopsy 6/17/24 blasts cleared, FISH positive for trisomy 8, still MDS changes  C3D1:  7/15/24 wbc count stil 1.8 with , will proceed venetoclax 21/28 days.  Today had an extensive discussion regarding pros and cons of haplo vs cord blood transplants. In general haplo transplants appear to have superior outcomes to UCBs, however after reviewing collection procedures in general and having additional time for patient and his wife to discuss, they are certain that they do not want to approach their identical twin daughters for stem cells.  We will furhter investigate his sibling situation, but likely will proceed with UCB after this cycle.    7/29/24:  Blood counts stable with improvement of hgb and WBC.  Received C3D1 decitabine on 7/15/24.  Continue venetoclax 200 mg on days 1-21 out of 28 day cycle.  Dr. Jerod dallas to not have Brandt obtain blood work next week.  Obtain blood work in 2 weeks prior to visit.  Follow up visit 8/12/24.      Hemoglobin   Date Value Ref Range Status   07/29/2024 10.7 (L) 13.5 - 17.5 g/dL Final   07/17/2024 9.3 (L) 13.5 - 17.5 g/dL Final   07/15/2024 8.9 (L) 13.5 - 17.5 g/dL Final   07/08/2024 8.2 (L) 13.5 - 17.5 g/dL Final   06/24/2024 7.4 (L) 13.5 - 17.5 g/dL Final     Platelets   Date Value Ref Range Status   07/29/2024 298 150 - 450 x10*3/uL Final   07/17/2024 346 150 -  450 x10*3/uL Final   07/15/2024 338 150 - 450 x10*3/uL Final   07/08/2024 103 (L) 150 - 450 x10*3/uL Final   06/24/2024 182 150 - 450 x10*3/uL Final     WBC   Date Value Ref Range Status   07/29/2024 3.4 (L) 4.4 - 11.3 x10*3/uL Final   07/17/2024 1.5 (L) 4.4 - 11.3 x10*3/uL Final   07/15/2024 1.8 (L) 4.4 - 11.3 x10*3/uL Final   07/08/2024 1.4 (L) 4.4 - 11.3 x10*3/uL Final   06/24/2024 2.0 (L) 4.4 - 11.3 x10*3/uL Final     2. ID Ppx:   - acyclovir, fluconazole, levofloxacin ppx.     3. Pre-transplant workup:  - Echocardiogram show a normal LVEF  - HLA typing has been completed. Patient met with Daxa Hurtado for initiating transplant education.   7/29/24: Daxa Hurtado reported that HLA typing kits have been sent to patient's brother and sister.      Chemotherapy teaching done including instructions to contact me or the covering physician for any temperature > 100.4 degrees.      4. 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     5. Hx of treated hepatitis C - recent hep C RNA pcr negative, and recent liver ultrasound shows normal sized liver at 14.5 cm, however seems enlarged on exam. Patient agreeable to hepatology consult before consideration of allograft, scheduled on 7/17/24. Ursodiol started. 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  Seen by GI Dr. Mccauley on 7/18/24, fibroscan obtained 7/24/24-in process.  Advised for close monitoring of liver functioning and repeating hepatitis serologies.      Weight Loss:  7/29/24:  Weight 69.6 kg today, was 74.2 kg (7/19/24) at last visit.  Reports good appetite and is eating about 3 meals per day.  Discussed ways to obtain more calories.  Monitoring.      RTC:    8/12/24:  Follow up visit with provider and C4 decitabine and venetoclax.      Patient seen and discussed with Dr Newsome, I have reviewed case, seen and examined  patient and edited note.  -------------------------------------------------------------------------------------------------------  HAYLEY Vallejo-CNP

## 2024-07-29 ENCOUNTER — LAB (OUTPATIENT)
Dept: LAB | Facility: HOSPITAL | Age: 64
End: 2024-07-29
Payer: COMMERCIAL

## 2024-07-29 ENCOUNTER — OFFICE VISIT (OUTPATIENT)
Dept: HEMATOLOGY/ONCOLOGY | Facility: HOSPITAL | Age: 64
End: 2024-07-29
Payer: COMMERCIAL

## 2024-07-29 VITALS
SYSTOLIC BLOOD PRESSURE: 120 MMHG | BODY MASS INDEX: 24.78 KG/M2 | HEART RATE: 84 BPM | RESPIRATION RATE: 17 BRPM | OXYGEN SATURATION: 100 % | DIASTOLIC BLOOD PRESSURE: 70 MMHG | TEMPERATURE: 97.2 F | WEIGHT: 153.44 LBS

## 2024-07-29 DIAGNOSIS — Z76.82 STEM CELL TRANSPLANT CANDIDATE: ICD-10-CM

## 2024-07-29 DIAGNOSIS — C92.00 ACUTE MYELOID LEUKEMIA NOT HAVING ACHIEVED REMISSION (MULTI): ICD-10-CM

## 2024-07-29 DIAGNOSIS — D84.9 IMMUNOCOMPROMISED (MULTI): ICD-10-CM

## 2024-07-29 DIAGNOSIS — R63.4 WEIGHT LOSS: ICD-10-CM

## 2024-07-29 DIAGNOSIS — C92.00 ACUTE MYELOID LEUKEMIA NOT HAVING ACHIEVED REMISSION (MULTI): Primary | ICD-10-CM

## 2024-07-29 LAB
ABO GROUP (TYPE) IN BLOOD: NORMAL
ALBUMIN SERPL BCP-MCNC: 4.3 G/DL (ref 3.4–5)
ALP SERPL-CCNC: 107 U/L (ref 33–136)
ALT SERPL W P-5'-P-CCNC: 7 U/L (ref 10–52)
ANION GAP SERPL CALC-SCNC: 11 MMOL/L (ref 10–20)
ANTIBODY SCREEN: NORMAL
AST SERPL W P-5'-P-CCNC: 14 U/L (ref 9–39)
BASOPHILS # BLD AUTO: 0.02 X10*3/UL (ref 0–0.1)
BASOPHILS NFR BLD AUTO: 0.6 %
BILIRUB SERPL-MCNC: 0.5 MG/DL (ref 0–1.2)
BUN SERPL-MCNC: 18 MG/DL (ref 6–23)
CALCIUM SERPL-MCNC: 9.4 MG/DL (ref 8.6–10.3)
CHLORIDE SERPL-SCNC: 104 MMOL/L (ref 98–107)
CO2 SERPL-SCNC: 26 MMOL/L (ref 21–32)
CREAT SERPL-MCNC: 0.95 MG/DL (ref 0.5–1.3)
EGFRCR SERPLBLD CKD-EPI 2021: 88 ML/MIN/1.73M*2
EOSINOPHIL # BLD AUTO: 0.02 X10*3/UL (ref 0–0.7)
EOSINOPHIL NFR BLD AUTO: 0.6 %
ERYTHROCYTE [DISTWIDTH] IN BLOOD BY AUTOMATED COUNT: 22.6 % (ref 11.5–14.5)
GLUCOSE SERPL-MCNC: 104 MG/DL (ref 74–99)
HCT VFR BLD AUTO: 31.1 % (ref 41–52)
HGB BLD-MCNC: 10.7 G/DL (ref 13.5–17.5)
HYPOCHROMIA BLD QL SMEAR: NORMAL
IMM GRANULOCYTES # BLD AUTO: 0.05 X10*3/UL (ref 0–0.7)
IMM GRANULOCYTES NFR BLD AUTO: 1.5 % (ref 0–0.9)
LDH SERPL L TO P-CCNC: 126 U/L (ref 84–246)
LYMPHOCYTES # BLD AUTO: 0.92 X10*3/UL (ref 1.2–4.8)
LYMPHOCYTES NFR BLD AUTO: 27.1 %
MCH RBC QN AUTO: 28.8 PG (ref 26–34)
MCHC RBC AUTO-ENTMCNC: 34.4 G/DL (ref 32–36)
MCV RBC AUTO: 84 FL (ref 80–100)
MONOCYTES # BLD AUTO: 0.29 X10*3/UL (ref 0.1–1)
MONOCYTES NFR BLD AUTO: 8.5 %
NEUTROPHILS # BLD AUTO: 2.1 X10*3/UL (ref 1.2–7.7)
NEUTROPHILS NFR BLD AUTO: 61.7 %
NRBC BLD-RTO: 0.6 /100 WBCS (ref 0–0)
PLATELET # BLD AUTO: 298 X10*3/UL (ref 150–450)
POTASSIUM SERPL-SCNC: 3.9 MMOL/L (ref 3.5–5.3)
PROT SERPL-MCNC: 7.7 G/DL (ref 6.4–8.2)
RBC # BLD AUTO: 3.72 X10*6/UL (ref 4.5–5.9)
RBC MORPH BLD: NORMAL
RH FACTOR (ANTIGEN D): NORMAL
SCHISTOCYTES BLD QL SMEAR: NORMAL
SODIUM SERPL-SCNC: 137 MMOL/L (ref 136–145)
TARGETS BLD QL SMEAR: NORMAL
WBC # BLD AUTO: 3.4 X10*3/UL (ref 4.4–11.3)

## 2024-07-29 PROCEDURE — 84075 ASSAY ALKALINE PHOSPHATASE: CPT

## 2024-07-29 PROCEDURE — 1159F MED LIST DOCD IN RCRD: CPT | Performed by: INTERNAL MEDICINE

## 2024-07-29 PROCEDURE — 99214 OFFICE O/P EST MOD 30 MIN: CPT | Performed by: INTERNAL MEDICINE

## 2024-07-29 PROCEDURE — 83615 LACTATE (LD) (LDH) ENZYME: CPT

## 2024-07-29 PROCEDURE — 3074F SYST BP LT 130 MM HG: CPT | Performed by: INTERNAL MEDICINE

## 2024-07-29 PROCEDURE — 3078F DIAST BP <80 MM HG: CPT | Performed by: INTERNAL MEDICINE

## 2024-07-29 PROCEDURE — 1157F ADVNC CARE PLAN IN RCRD: CPT | Performed by: INTERNAL MEDICINE

## 2024-07-29 PROCEDURE — 86901 BLOOD TYPING SEROLOGIC RH(D): CPT

## 2024-07-29 PROCEDURE — 1160F RVW MEDS BY RX/DR IN RCRD: CPT | Performed by: INTERNAL MEDICINE

## 2024-07-29 PROCEDURE — 85025 COMPLETE CBC W/AUTO DIFF WBC: CPT

## 2024-07-29 PROCEDURE — 36415 COLL VENOUS BLD VENIPUNCTURE: CPT

## 2024-07-29 PROCEDURE — 1126F AMNT PAIN NOTED NONE PRSNT: CPT | Performed by: INTERNAL MEDICINE

## 2024-07-29 RX ORDER — DIPHENHYDRAMINE HYDROCHLORIDE 50 MG/ML
50 INJECTION INTRAMUSCULAR; INTRAVENOUS AS NEEDED
OUTPATIENT
Start: 2024-08-13

## 2024-07-29 RX ORDER — ONDANSETRON HYDROCHLORIDE 8 MG/1
8 TABLET, FILM COATED ORAL ONCE
OUTPATIENT
Start: 2024-08-13

## 2024-07-29 RX ORDER — FLUCONAZOLE 200 MG/1
400 TABLET ORAL DAILY
Qty: 60 TABLET | Refills: 2 | Status: SHIPPED | OUTPATIENT
Start: 2024-07-29 | End: 2024-10-27

## 2024-07-29 RX ORDER — ALBUTEROL SULFATE 0.83 MG/ML
3 SOLUTION RESPIRATORY (INHALATION) AS NEEDED
OUTPATIENT
Start: 2024-08-12

## 2024-07-29 RX ORDER — FAMOTIDINE 10 MG/ML
20 INJECTION INTRAVENOUS ONCE AS NEEDED
OUTPATIENT
Start: 2024-08-16

## 2024-07-29 RX ORDER — PROCHLORPERAZINE MALEATE 10 MG
10 TABLET ORAL EVERY 6 HOURS PRN
OUTPATIENT
Start: 2024-08-12

## 2024-07-29 RX ORDER — PROCHLORPERAZINE EDISYLATE 5 MG/ML
10 INJECTION INTRAMUSCULAR; INTRAVENOUS EVERY 6 HOURS PRN
OUTPATIENT
Start: 2024-08-14

## 2024-07-29 RX ORDER — EPINEPHRINE 0.3 MG/.3ML
0.3 INJECTION SUBCUTANEOUS EVERY 5 MIN PRN
OUTPATIENT
Start: 2024-08-12

## 2024-07-29 RX ORDER — PROCHLORPERAZINE MALEATE 10 MG
10 TABLET ORAL EVERY 6 HOURS PRN
OUTPATIENT
Start: 2024-08-16

## 2024-07-29 RX ORDER — FAMOTIDINE 10 MG/ML
20 INJECTION INTRAVENOUS ONCE AS NEEDED
OUTPATIENT
Start: 2024-08-14

## 2024-07-29 RX ORDER — PROCHLORPERAZINE EDISYLATE 5 MG/ML
10 INJECTION INTRAMUSCULAR; INTRAVENOUS EVERY 6 HOURS PRN
OUTPATIENT
Start: 2024-08-15

## 2024-07-29 RX ORDER — FAMOTIDINE 10 MG/ML
20 INJECTION INTRAVENOUS ONCE AS NEEDED
OUTPATIENT
Start: 2024-08-15

## 2024-07-29 RX ORDER — PROCHLORPERAZINE MALEATE 10 MG
10 TABLET ORAL EVERY 6 HOURS PRN
OUTPATIENT
Start: 2024-08-13

## 2024-07-29 RX ORDER — ONDANSETRON HYDROCHLORIDE 8 MG/1
8 TABLET, FILM COATED ORAL ONCE
OUTPATIENT
Start: 2024-08-16

## 2024-07-29 RX ORDER — PROCHLORPERAZINE MALEATE 10 MG
10 TABLET ORAL EVERY 6 HOURS PRN
OUTPATIENT
Start: 2024-08-15

## 2024-07-29 RX ORDER — EPINEPHRINE 0.3 MG/.3ML
0.3 INJECTION SUBCUTANEOUS EVERY 5 MIN PRN
OUTPATIENT
Start: 2024-08-16

## 2024-07-29 RX ORDER — DIPHENHYDRAMINE HYDROCHLORIDE 50 MG/ML
50 INJECTION INTRAMUSCULAR; INTRAVENOUS AS NEEDED
OUTPATIENT
Start: 2024-08-12

## 2024-07-29 RX ORDER — PROCHLORPERAZINE EDISYLATE 5 MG/ML
10 INJECTION INTRAMUSCULAR; INTRAVENOUS EVERY 6 HOURS PRN
OUTPATIENT
Start: 2024-08-12

## 2024-07-29 RX ORDER — DIPHENHYDRAMINE HYDROCHLORIDE 50 MG/ML
50 INJECTION INTRAMUSCULAR; INTRAVENOUS AS NEEDED
OUTPATIENT
Start: 2024-08-14

## 2024-07-29 RX ORDER — ONDANSETRON HYDROCHLORIDE 8 MG/1
8 TABLET, FILM COATED ORAL ONCE
OUTPATIENT
Start: 2024-08-12

## 2024-07-29 RX ORDER — DIPHENHYDRAMINE HYDROCHLORIDE 50 MG/ML
50 INJECTION INTRAMUSCULAR; INTRAVENOUS AS NEEDED
OUTPATIENT
Start: 2024-08-16

## 2024-07-29 RX ORDER — ALBUTEROL SULFATE 0.83 MG/ML
3 SOLUTION RESPIRATORY (INHALATION) AS NEEDED
OUTPATIENT
Start: 2024-08-15

## 2024-07-29 RX ORDER — ALBUTEROL SULFATE 0.83 MG/ML
3 SOLUTION RESPIRATORY (INHALATION) AS NEEDED
OUTPATIENT
Start: 2024-08-16

## 2024-07-29 RX ORDER — EPINEPHRINE 0.3 MG/.3ML
0.3 INJECTION SUBCUTANEOUS EVERY 5 MIN PRN
OUTPATIENT
Start: 2024-08-14

## 2024-07-29 RX ORDER — DIPHENHYDRAMINE HYDROCHLORIDE 50 MG/ML
50 INJECTION INTRAMUSCULAR; INTRAVENOUS AS NEEDED
OUTPATIENT
Start: 2024-08-15

## 2024-07-29 RX ORDER — PROCHLORPERAZINE EDISYLATE 5 MG/ML
10 INJECTION INTRAMUSCULAR; INTRAVENOUS EVERY 6 HOURS PRN
OUTPATIENT
Start: 2024-08-13

## 2024-07-29 RX ORDER — ALBUTEROL SULFATE 0.83 MG/ML
3 SOLUTION RESPIRATORY (INHALATION) AS NEEDED
OUTPATIENT
Start: 2024-08-14

## 2024-07-29 RX ORDER — FAMOTIDINE 10 MG/ML
20 INJECTION INTRAVENOUS ONCE AS NEEDED
OUTPATIENT
Start: 2024-08-13

## 2024-07-29 RX ORDER — PROCHLORPERAZINE MALEATE 10 MG
10 TABLET ORAL EVERY 6 HOURS PRN
OUTPATIENT
Start: 2024-08-14

## 2024-07-29 RX ORDER — ALBUTEROL SULFATE 0.83 MG/ML
3 SOLUTION RESPIRATORY (INHALATION) AS NEEDED
OUTPATIENT
Start: 2024-08-13

## 2024-07-29 RX ORDER — EPINEPHRINE 0.3 MG/.3ML
0.3 INJECTION SUBCUTANEOUS EVERY 5 MIN PRN
OUTPATIENT
Start: 2024-08-13

## 2024-07-29 RX ORDER — ONDANSETRON HYDROCHLORIDE 8 MG/1
8 TABLET, FILM COATED ORAL ONCE
OUTPATIENT
Start: 2024-08-15

## 2024-07-29 RX ORDER — PROCHLORPERAZINE EDISYLATE 5 MG/ML
10 INJECTION INTRAMUSCULAR; INTRAVENOUS EVERY 6 HOURS PRN
OUTPATIENT
Start: 2024-08-16

## 2024-07-29 RX ORDER — EPINEPHRINE 0.3 MG/.3ML
0.3 INJECTION SUBCUTANEOUS EVERY 5 MIN PRN
OUTPATIENT
Start: 2024-08-15

## 2024-07-29 RX ORDER — ONDANSETRON HYDROCHLORIDE 8 MG/1
8 TABLET, FILM COATED ORAL ONCE
OUTPATIENT
Start: 2024-08-14

## 2024-07-29 RX ORDER — FAMOTIDINE 10 MG/ML
20 INJECTION INTRAVENOUS ONCE AS NEEDED
OUTPATIENT
Start: 2024-08-12

## 2024-07-29 ASSESSMENT — ENCOUNTER SYMPTOMS
APPETITE CHANGE: 0
NEUROLOGICAL NEGATIVE: 1
MUSCULOSKELETAL NEGATIVE: 1
CHILLS: 0
DIAPHORESIS: 1
UNEXPECTED WEIGHT CHANGE: 1
GASTROINTESTINAL NEGATIVE: 1
RESPIRATORY NEGATIVE: 1
FEVER: 0
HEMATOLOGIC/LYMPHATIC NEGATIVE: 1
CARDIOVASCULAR NEGATIVE: 1
FATIGUE: 0

## 2024-07-29 ASSESSMENT — PAIN SCALES - GENERAL: PAINLEVEL: 0-NO PAIN

## 2024-07-29 NOTE — PATIENT INSTRUCTIONS
Reviewed need for optimal calorie and protein intake for meals secondary to continued weight loss (7+ lbs in last 10 days).  Provided information for various protein dense supplements; can add to milkshakes and frequent, small meals to optimize intake.  Attentive, verbalized understanding.  Needs frequent reinforcement as this information has been provided by both Onc-Dietician as well as this RN.

## 2024-08-09 ENCOUNTER — LAB REQUISITION (OUTPATIENT)
Dept: LAB | Facility: CLINIC | Age: 64
End: 2024-08-09
Payer: COMMERCIAL

## 2024-08-09 DIAGNOSIS — C92.00 ACUTE MYELOBLASTIC LEUKEMIA, NOT HAVING ACHIEVED REMISSION (MULTI): ICD-10-CM

## 2024-08-12 ENCOUNTER — APPOINTMENT (OUTPATIENT)
Dept: HEMATOLOGY/ONCOLOGY | Facility: CLINIC | Age: 64
End: 2024-08-12
Payer: COMMERCIAL

## 2024-08-12 ENCOUNTER — OFFICE VISIT (OUTPATIENT)
Dept: HEMATOLOGY/ONCOLOGY | Facility: HOSPITAL | Age: 64
End: 2024-08-12
Payer: COMMERCIAL

## 2024-08-12 ENCOUNTER — LAB (OUTPATIENT)
Dept: LAB | Facility: HOSPITAL | Age: 64
End: 2024-08-12
Payer: COMMERCIAL

## 2024-08-12 ENCOUNTER — INFUSION (OUTPATIENT)
Dept: HEMATOLOGY/ONCOLOGY | Facility: HOSPITAL | Age: 64
End: 2024-08-12
Payer: COMMERCIAL

## 2024-08-12 ENCOUNTER — APPOINTMENT (OUTPATIENT)
Dept: HEMATOLOGY/ONCOLOGY | Facility: HOSPITAL | Age: 64
End: 2024-08-12
Payer: COMMERCIAL

## 2024-08-12 VITALS
OXYGEN SATURATION: 100 % | RESPIRATION RATE: 18 BRPM | BODY MASS INDEX: 25.53 KG/M2 | TEMPERATURE: 96.6 F | SYSTOLIC BLOOD PRESSURE: 146 MMHG | WEIGHT: 158.07 LBS | HEART RATE: 92 BPM | DIASTOLIC BLOOD PRESSURE: 88 MMHG

## 2024-08-12 DIAGNOSIS — C92.00 ACUTE MYELOID LEUKEMIA NOT HAVING ACHIEVED REMISSION (MULTI): ICD-10-CM

## 2024-08-12 DIAGNOSIS — C92.00 ACUTE MYELOID LEUKEMIA NOT HAVING ACHIEVED REMISSION (MULTI): Primary | ICD-10-CM

## 2024-08-12 LAB
ABO GROUP (TYPE) IN BLOOD: NORMAL
ALBUMIN SERPL BCP-MCNC: 4.3 G/DL (ref 3.4–5)
ALP SERPL-CCNC: 117 U/L (ref 33–136)
ALT SERPL W P-5'-P-CCNC: 7 U/L (ref 10–52)
ANION GAP SERPL CALC-SCNC: 12 MMOL/L (ref 10–20)
ANTIBODY SCREEN: NORMAL
AST SERPL W P-5'-P-CCNC: 14 U/L (ref 9–39)
BASOPHILS # BLD AUTO: 0.07 X10*3/UL (ref 0–0.1)
BASOPHILS NFR BLD AUTO: 2.8 %
BILIRUB SERPL-MCNC: 0.5 MG/DL (ref 0–1.2)
BUN SERPL-MCNC: 18 MG/DL (ref 6–23)
CALCIUM SERPL-MCNC: 9.4 MG/DL (ref 8.6–10.3)
CHLORIDE SERPL-SCNC: 105 MMOL/L (ref 98–107)
CO2 SERPL-SCNC: 26 MMOL/L (ref 21–32)
CREAT SERPL-MCNC: 0.89 MG/DL (ref 0.5–1.3)
EGFRCR SERPLBLD CKD-EPI 2021: >90 ML/MIN/1.73M*2
EOSINOPHIL # BLD AUTO: 0.05 X10*3/UL (ref 0–0.7)
EOSINOPHIL NFR BLD AUTO: 2 %
ERYTHROCYTE [DISTWIDTH] IN BLOOD BY AUTOMATED COUNT: 21.7 % (ref 11.5–14.5)
GLUCOSE SERPL-MCNC: 98 MG/DL (ref 74–99)
HCT VFR BLD AUTO: 36.8 % (ref 41–52)
HGB BLD-MCNC: 12.8 G/DL (ref 13.5–17.5)
IMM GRANULOCYTES # BLD AUTO: 0 X10*3/UL (ref 0–0.7)
IMM GRANULOCYTES NFR BLD AUTO: 0 % (ref 0–0.9)
LYMPHOCYTES # BLD AUTO: 1.41 X10*3/UL (ref 1.2–4.8)
LYMPHOCYTES NFR BLD AUTO: 56.2 %
MCH RBC QN AUTO: 28.4 PG (ref 26–34)
MCHC RBC AUTO-ENTMCNC: 34.8 G/DL (ref 32–36)
MCV RBC AUTO: 82 FL (ref 80–100)
MONOCYTES # BLD AUTO: 0.25 X10*3/UL (ref 0.1–1)
MONOCYTES NFR BLD AUTO: 10 %
NEUTROPHILS # BLD AUTO: 0.73 X10*3/UL (ref 1.2–7.7)
NEUTROPHILS NFR BLD AUTO: 29 %
NRBC BLD-RTO: 0 /100 WBCS (ref 0–0)
OVALOCYTES BLD QL SMEAR: NORMAL
PLATELET # BLD AUTO: 519 X10*3/UL (ref 150–450)
POTASSIUM SERPL-SCNC: 4.1 MMOL/L (ref 3.5–5.3)
PROT SERPL-MCNC: 8.1 G/DL (ref 6.4–8.2)
RBC # BLD AUTO: 4.51 X10*6/UL (ref 4.5–5.9)
RBC MORPH BLD: NORMAL
RH FACTOR (ANTIGEN D): NORMAL
SCHISTOCYTES BLD QL SMEAR: NORMAL
SODIUM SERPL-SCNC: 139 MMOL/L (ref 136–145)
TARGETS BLD QL SMEAR: NORMAL
TSH SERPL-ACNC: 2.04 MIU/L (ref 0.44–3.98)
WBC # BLD AUTO: 2.5 X10*3/UL (ref 4.4–11.3)

## 2024-08-12 PROCEDURE — 99215 OFFICE O/P EST HI 40 MIN: CPT | Mod: 25 | Performed by: NURSE PRACTITIONER

## 2024-08-12 PROCEDURE — 3079F DIAST BP 80-89 MM HG: CPT | Performed by: NURSE PRACTITIONER

## 2024-08-12 PROCEDURE — 96413 CHEMO IV INFUSION 1 HR: CPT

## 2024-08-12 PROCEDURE — 80053 COMPREHEN METABOLIC PANEL: CPT

## 2024-08-12 PROCEDURE — 1157F ADVNC CARE PLAN IN RCRD: CPT | Performed by: NURSE PRACTITIONER

## 2024-08-12 PROCEDURE — 85025 COMPLETE CBC W/AUTO DIFF WBC: CPT

## 2024-08-12 PROCEDURE — 3077F SYST BP >= 140 MM HG: CPT | Performed by: NURSE PRACTITIONER

## 2024-08-12 PROCEDURE — 99215 OFFICE O/P EST HI 40 MIN: CPT | Performed by: NURSE PRACTITIONER

## 2024-08-12 PROCEDURE — 2500000005 HC RX 250 GENERAL PHARMACY W/O HCPCS: Performed by: INTERNAL MEDICINE

## 2024-08-12 PROCEDURE — 2500000004 HC RX 250 GENERAL PHARMACY W/ HCPCS (ALT 636 FOR OP/ED): Performed by: INTERNAL MEDICINE

## 2024-08-12 PROCEDURE — 86900 BLOOD TYPING SEROLOGIC ABO: CPT

## 2024-08-12 PROCEDURE — 84443 ASSAY THYROID STIM HORMONE: CPT

## 2024-08-12 RX ORDER — PROCHLORPERAZINE MALEATE 10 MG
10 TABLET ORAL EVERY 6 HOURS PRN
Status: DISCONTINUED | OUTPATIENT
Start: 2024-08-12 | End: 2024-08-12 | Stop reason: HOSPADM

## 2024-08-12 RX ORDER — ALBUTEROL SULFATE 0.83 MG/ML
3 SOLUTION RESPIRATORY (INHALATION) AS NEEDED
Status: DISCONTINUED | OUTPATIENT
Start: 2024-08-12 | End: 2024-08-12 | Stop reason: HOSPADM

## 2024-08-12 RX ORDER — ONDANSETRON HYDROCHLORIDE 8 MG/1
8 TABLET, FILM COATED ORAL ONCE
Status: COMPLETED | OUTPATIENT
Start: 2024-08-12 | End: 2024-08-12

## 2024-08-12 RX ORDER — DIPHENHYDRAMINE HYDROCHLORIDE 50 MG/ML
50 INJECTION INTRAMUSCULAR; INTRAVENOUS AS NEEDED
Status: DISCONTINUED | OUTPATIENT
Start: 2024-08-12 | End: 2024-08-12 | Stop reason: HOSPADM

## 2024-08-12 RX ORDER — PROCHLORPERAZINE EDISYLATE 5 MG/ML
10 INJECTION INTRAMUSCULAR; INTRAVENOUS EVERY 6 HOURS PRN
Status: DISCONTINUED | OUTPATIENT
Start: 2024-08-12 | End: 2024-08-12 | Stop reason: HOSPADM

## 2024-08-12 RX ORDER — EPINEPHRINE 0.3 MG/.3ML
0.3 INJECTION SUBCUTANEOUS EVERY 5 MIN PRN
Status: DISCONTINUED | OUTPATIENT
Start: 2024-08-12 | End: 2024-08-12 | Stop reason: HOSPADM

## 2024-08-12 RX ORDER — FAMOTIDINE 10 MG/ML
20 INJECTION INTRAVENOUS ONCE AS NEEDED
Status: DISCONTINUED | OUTPATIENT
Start: 2024-08-12 | End: 2024-08-12 | Stop reason: HOSPADM

## 2024-08-12 NOTE — PROGRESS NOTES
Pt here for D1C4 decitabine. Assessment completed. Pt's ANC 0.73. Dr. Newsome and ALBERT Bailey notified. ALBERT Bailey to see pt chair side. Parameters for treatment were changed.  Pt tolerated infusion without issue. Pt was updated on treatment schedule and was discharged in stable condition.

## 2024-08-13 ENCOUNTER — INFUSION (OUTPATIENT)
Dept: HEMATOLOGY/ONCOLOGY | Facility: CLINIC | Age: 64
End: 2024-08-13
Payer: COMMERCIAL

## 2024-08-13 VITALS
RESPIRATION RATE: 18 BRPM | BODY MASS INDEX: 26.32 KG/M2 | WEIGHT: 163 LBS | TEMPERATURE: 97.3 F | OXYGEN SATURATION: 97 % | DIASTOLIC BLOOD PRESSURE: 75 MMHG | SYSTOLIC BLOOD PRESSURE: 151 MMHG | HEART RATE: 96 BPM

## 2024-08-13 DIAGNOSIS — C92.00 ACUTE MYELOID LEUKEMIA NOT HAVING ACHIEVED REMISSION (MULTI): ICD-10-CM

## 2024-08-13 PROCEDURE — 2500000005 HC RX 250 GENERAL PHARMACY W/O HCPCS: Performed by: INTERNAL MEDICINE

## 2024-08-13 PROCEDURE — 2500000004 HC RX 250 GENERAL PHARMACY W/ HCPCS (ALT 636 FOR OP/ED): Performed by: INTERNAL MEDICINE

## 2024-08-13 PROCEDURE — 96413 CHEMO IV INFUSION 1 HR: CPT

## 2024-08-13 RX ORDER — PROCHLORPERAZINE MALEATE 10 MG
10 TABLET ORAL EVERY 6 HOURS PRN
Status: DISCONTINUED | OUTPATIENT
Start: 2024-08-13 | End: 2024-08-13 | Stop reason: HOSPADM

## 2024-08-13 RX ORDER — ONDANSETRON HYDROCHLORIDE 8 MG/1
8 TABLET, FILM COATED ORAL ONCE
Status: COMPLETED | OUTPATIENT
Start: 2024-08-13 | End: 2024-08-13

## 2024-08-13 RX ORDER — DIPHENHYDRAMINE HYDROCHLORIDE 50 MG/ML
50 INJECTION INTRAMUSCULAR; INTRAVENOUS AS NEEDED
Status: DISCONTINUED | OUTPATIENT
Start: 2024-08-13 | End: 2024-08-13 | Stop reason: HOSPADM

## 2024-08-13 RX ORDER — HEPARIN 100 UNIT/ML
500 SYRINGE INTRAVENOUS AS NEEDED
Status: CANCELLED | OUTPATIENT
Start: 2024-08-13

## 2024-08-13 RX ORDER — PROCHLORPERAZINE EDISYLATE 5 MG/ML
10 INJECTION INTRAMUSCULAR; INTRAVENOUS EVERY 6 HOURS PRN
Status: DISCONTINUED | OUTPATIENT
Start: 2024-08-13 | End: 2024-08-13 | Stop reason: HOSPADM

## 2024-08-13 RX ORDER — HEPARIN SODIUM,PORCINE/PF 10 UNIT/ML
50 SYRINGE (ML) INTRAVENOUS AS NEEDED
Status: CANCELLED | OUTPATIENT
Start: 2024-08-13

## 2024-08-13 RX ORDER — EPINEPHRINE 0.3 MG/.3ML
0.3 INJECTION SUBCUTANEOUS EVERY 5 MIN PRN
Status: DISCONTINUED | OUTPATIENT
Start: 2024-08-13 | End: 2024-08-13 | Stop reason: HOSPADM

## 2024-08-13 RX ORDER — ALBUTEROL SULFATE 0.83 MG/ML
3 SOLUTION RESPIRATORY (INHALATION) AS NEEDED
Status: DISCONTINUED | OUTPATIENT
Start: 2024-08-13 | End: 2024-08-13 | Stop reason: HOSPADM

## 2024-08-13 RX ORDER — FAMOTIDINE 10 MG/ML
20 INJECTION INTRAVENOUS ONCE AS NEEDED
Status: DISCONTINUED | OUTPATIENT
Start: 2024-08-13 | End: 2024-08-13 | Stop reason: HOSPADM

## 2024-08-13 ASSESSMENT — ENCOUNTER SYMPTOMS: APPETITE CHANGE: 1

## 2024-08-13 ASSESSMENT — PAIN SCALES - GENERAL: PAINLEVEL: 0-NO PAIN

## 2024-08-13 NOTE — PROGRESS NOTES
Patient ID: Brandt Merritt is a 64 y.o. male.    Subjective    HPI  Brandt Merritt is a 64 y.o. 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 Seen by Abundio Rust and Rose Casal initially 10/2023 for pancytopenia. He has had work up for pancytopenia and hemolytic process . Admits to having ongoing chronic fatigue that is unchanged . Patient had normal CBC June 2020. Denied any hemorrhoidal bleeding. Has had C Scope and EGD , treated Hep C 2014 . 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       Additional information 2021 PSA was high, biopsy 2021 and 2023 all benign.     CBC 4/11/24 wbc 3.0, hgb 7.1, platelets 167K ANC 0.73  BM histology  Myelodysplastic neoplasm with increased blasts-2 ( WHO)  Myelodysplastic neoplasm/AML  (ICC 2022 classification)  Blasts 11% on aspirate smear and 15-20% based on CD 34 immunostaining approaching AML leukemia, hematopoiesis is erythroid dominant with dysplasia in granulocytes and megakaryocytes.   FISH studies trisomy 8 17.2%  NGS panel DNMT3 aVAF 36%  U2AF1 VAF 32%     Recent bone marrow from 6/17 showed 1% blasts, but as expected ongoing myelodysplasia. Residual trisomy 8 in 2% of cells.      Brandt presents to the clinic today (07/29/24) with his wife Nichole for ongoing management of myelodysplastic neoplasm/AML.  He received C3 decitabine on 7/15/24 and remains on venetoclax 200 mg days 1-21 out of 28 day cycle.  He is currently on day 15 of venetoclax today.  Reports he is receiving venetoclax okay from pharmacy.       He is planned for consolidation with allogeneic stem cell transplant.  Unfortunately 11/12 MUD donor no longer available, and patient remains reluctant to approach his daughters for haplo donation.  He is okay to have brother and sister get matched for possible donor.  Explained that his bother was treated for prostate cancer treated about 5 years ago and and his one sister has liver disease and his other sister has MS.  On 7/17/24, he underwent pretransplant cardiac  evaluation, no interventions at this time although long term would want better control of blood pressure, also had aortic aneurysm of borderline size which will require ongoing followup.  He was also seen by hepatology and fibroscan performed on 7/24/24, results pending.     Presents today for C4 Decitabine/Venetoclax and routine follow up visit, accompanied by his wife. Energy level is good.  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  Planning to retire after his transplant.  Appetite is good.  Eating about 3 meals per day, and no early satiety.  Has gained 2 lbs since last visit, weight loss and nutrition still a major concern. He is a little bit frustrated today and wondering the timeline of transplant.    Review of Systems   Constitutional:  Positive for appetite change.   All other systems reviewed and are negative.      Objective    BSA: 1.83 meters squared  /88 (BP Location: Left arm, Patient Position: Sitting, BP Cuff Size: Adult)   Pulse 92   Temp 35.9 °C (96.6 °F) (Temporal)   Resp 18   Wt 71.7 kg (158 lb 1.1 oz)   SpO2 100%   BMI 25.53 kg/m²   Vital signs reviewed today.      Physical Exam  Constitutional:       Appearance: Normal appearance.   HENT:      Head: Normocephalic.      Nose: Nose normal.      Mouth/Throat:      Mouth: Mucous membranes are moist.      Pharynx: Oropharynx is clear.   Eyes:      Extraocular Movements: Extraocular movements intact.      Conjunctiva/sclera: Conjunctivae normal.      Pupils: Pupils are equal, round, and reactive to light.   Cardiovascular:      Rate and Rhythm: Normal rate and regular rhythm.      Pulses: Normal pulses.      Heart sounds: Normal heart sounds.   Pulmonary:      Effort: Pulmonary effort is normal.      Breath sounds: Normal breath sounds.   Abdominal:      General: Abdomen is flat. Bowel sounds are normal.      Palpations: Abdomen is soft.   Musculoskeletal:         General: Normal range of motion.      Cervical back: Normal  range of motion.   Skin:     General: Skin is warm and dry.      Capillary Refill: Capillary refill takes less than 2 seconds.   Neurological:      General: No focal deficit present.      Mental Status: He is alert and oriented to person, place, and time. Mental status is at baseline.   Psychiatric:         Mood and Affect: Mood normal.         Performance Status:  Karnofsky Score: 80 - Normal activity with effort; some signs or symptoms of disease      Assessment/Plan        Oncology History Overview Note   4/2024  Myelodysplastic neoplasm with increased blasts-2 ( WHO)  Myelodysplastic neoplasm/AML  (ICC 2022 classification)    Presented in  10/2023 for pancytopenia, found to have hemolysis. Patient had normal CBC June 2020. Denied any hemorrhoidal bleeding . Has had C Scope and EGD , treated Hep C 2014 . 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      CBC 4/11/24 wbc 3.0, hgb 7.1, platelets 167K ANC 0.73    BM biopsy done on 4/11/24  as folllows:  BM histology  Myelodysplastic neoplasm with increased blasts-2 ( WHO)  Myelodysplastic neoplasm/AML  (ICC 2022 classification)  Balsts 11% on aspirate smear and 15-20% based on CD 34 immunostaining approaching AML leukemia, hematooiesis is erythroid dominant with dysplasia in granulocytes and megakaryocytes.   FISH studies trisomy 8 17.2%  NGS panel DNMT3 aVAF 36%  U2AF1 VAF 32%       Acute myeloid leukemia not having achieved remission (Multi)   4/23/2024 Initial Diagnosis    Acute myeloid leukemia not having achieved remission (Multi)     5/13/2024 -  Chemotherapy    Venetoclax / Decitabine, 28 Day Cycles - Induction / Consolidation        1. Myelodysplastic neoplasm/AML- Patient presents with a history of pancytopenia since 9/2023 and due to confusion picture of hemolysis with only mild leukopenia and neutropenia extensive workup done prior to bone marrow bx which shows MDS in transition to AML, with trisomy 8 and DNMT alpha and U2AF1 mutation which is adverse risk.  Given that the patient has > 10% blasts in bone marrow I would favor induction chemotherapy with ultimate goal to pursue curative allogeneic stem cell  transplant.  The patients slow, indolent course is more consistent with MDS and he was recommended induction with decitabine and venetoclax which has about a 60% chance of achieving a complete clinical remission.    C1D1: 5/13/2024  C2D1: 6/11/2024 , venetoclax to 200 mg 21/28 days, check blood counts   BM biopsy 6/17/24 blasts cleared, FISH positive for trisomy 8, still MDS changes  C3D1:  7/15/24 wbc count stil 1.8 with , will proceed venetoclax 21/28 days.  Today had an extensive discussion regarding pros and cons of haplo vs cord blood transplants. In general haplo transplants appear to have superior outcomes to UCBs, however after reviewing collection procedures in general and having additional time for patient and his wife to discuss, they are certain that they do not want to approach their identical twin daughters for stem cells.  We will furhter investigate his sibling situation, but likely will proceed with UCB after this cycle.     8/12/24:  , improvement of hgb and platelets. Discussed with Dr. Newsome and changed treatment parameter to . Will move forward with C4D1 today. Continue venetoclax 200 mg on days 1-21 out of 28 day cycle.  Weekly lab checks at [facility]. [facility] in 3 weeks. Asked transplant coordinator to call him with update on donor search.      2. ID Ppx:   - acyclovir, fluconazole, levofloxacin ppx.      3. Pre-transplant workup:  - Echocardiogram show a normal LVEF  - HLA typing has been completed. Patient met with Daxa Hurtado for initiating transplant education.   7/29/24: Daxa Hurtado reported that HLA typing kits have been sent to patient's brother and sister.       Chemotherapy teaching done including instructions to contact me or the covering physician for any temperature > 100.4 degrees.      4. Hemoglobin C  carrier-normal hemoglobin until 2020, generally asymptomatic, should pose no peritransplant issues.      5. Hx of treated hepatitis C - recent hep C RNA pcr negative, and recent liver ultrasound shows normal sized liver at 14.5 cm, however seems enlarged on exam. Patient agreeable to hepatology consult before consideration of allograft, scheduled on 7/17/24. Ursodiol started. 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  Seen by GI Dr. Mccauley on 7/18/24, fibroscan obtained 7/24/24-in process.  Advised for close monitoring of liver functioning and repeating hepatitis serologies.       Weight Loss:  8/12: Weight up 2 lb since last visit.  Reports good appetite and is eating about 3 meals per day.  Discussed ways to obtain more calories.      RTC  Weekly Lab Check at [facility]  9/5 [facility]  9/12 Dr. Jerod Denton, APRN-CNP  [facility]

## 2024-08-14 ENCOUNTER — INFUSION (OUTPATIENT)
Dept: HEMATOLOGY/ONCOLOGY | Facility: CLINIC | Age: 64
End: 2024-08-14
Payer: COMMERCIAL

## 2024-08-14 VITALS
BODY MASS INDEX: 26.84 KG/M2 | WEIGHT: 166.23 LBS | TEMPERATURE: 97.7 F | HEART RATE: 81 BPM | SYSTOLIC BLOOD PRESSURE: 132 MMHG | DIASTOLIC BLOOD PRESSURE: 77 MMHG | RESPIRATION RATE: 18 BRPM | OXYGEN SATURATION: 97 %

## 2024-08-14 DIAGNOSIS — C92.00 ACUTE MYELOID LEUKEMIA NOT HAVING ACHIEVED REMISSION (MULTI): ICD-10-CM

## 2024-08-14 PROCEDURE — 2500000005 HC RX 250 GENERAL PHARMACY W/O HCPCS: Performed by: INTERNAL MEDICINE

## 2024-08-14 PROCEDURE — 96413 CHEMO IV INFUSION 1 HR: CPT

## 2024-08-14 PROCEDURE — 2500000004 HC RX 250 GENERAL PHARMACY W/ HCPCS (ALT 636 FOR OP/ED): Performed by: INTERNAL MEDICINE

## 2024-08-14 RX ORDER — FAMOTIDINE 10 MG/ML
20 INJECTION INTRAVENOUS ONCE AS NEEDED
Status: DISCONTINUED | OUTPATIENT
Start: 2024-08-14 | End: 2024-08-14 | Stop reason: HOSPADM

## 2024-08-14 RX ORDER — ONDANSETRON HYDROCHLORIDE 8 MG/1
8 TABLET, FILM COATED ORAL ONCE
Status: COMPLETED | OUTPATIENT
Start: 2024-08-14 | End: 2024-08-14

## 2024-08-14 RX ORDER — DIPHENHYDRAMINE HYDROCHLORIDE 50 MG/ML
50 INJECTION INTRAMUSCULAR; INTRAVENOUS AS NEEDED
Status: DISCONTINUED | OUTPATIENT
Start: 2024-08-14 | End: 2024-08-14 | Stop reason: HOSPADM

## 2024-08-14 RX ORDER — ALBUTEROL SULFATE 0.83 MG/ML
3 SOLUTION RESPIRATORY (INHALATION) AS NEEDED
Status: DISCONTINUED | OUTPATIENT
Start: 2024-08-14 | End: 2024-08-14 | Stop reason: HOSPADM

## 2024-08-14 RX ORDER — PROCHLORPERAZINE MALEATE 10 MG
10 TABLET ORAL EVERY 6 HOURS PRN
Status: DISCONTINUED | OUTPATIENT
Start: 2024-08-14 | End: 2024-08-14 | Stop reason: HOSPADM

## 2024-08-14 RX ORDER — PROCHLORPERAZINE EDISYLATE 5 MG/ML
10 INJECTION INTRAMUSCULAR; INTRAVENOUS EVERY 6 HOURS PRN
Status: DISCONTINUED | OUTPATIENT
Start: 2024-08-14 | End: 2024-08-14 | Stop reason: HOSPADM

## 2024-08-14 RX ORDER — EPINEPHRINE 0.3 MG/.3ML
0.3 INJECTION SUBCUTANEOUS EVERY 5 MIN PRN
Status: DISCONTINUED | OUTPATIENT
Start: 2024-08-14 | End: 2024-08-14 | Stop reason: HOSPADM

## 2024-08-14 ASSESSMENT — PAIN SCALES - GENERAL: PAINLEVEL_OUTOF10: 0-NO PAIN

## 2024-08-15 ENCOUNTER — INFUSION (OUTPATIENT)
Dept: HEMATOLOGY/ONCOLOGY | Facility: CLINIC | Age: 64
End: 2024-08-15
Payer: COMMERCIAL

## 2024-08-15 VITALS
HEART RATE: 93 BPM | TEMPERATURE: 97.2 F | BODY MASS INDEX: 27.2 KG/M2 | RESPIRATION RATE: 16 BRPM | DIASTOLIC BLOOD PRESSURE: 88 MMHG | SYSTOLIC BLOOD PRESSURE: 153 MMHG | WEIGHT: 168.43 LBS

## 2024-08-15 DIAGNOSIS — C92.00 ACUTE MYELOID LEUKEMIA NOT HAVING ACHIEVED REMISSION (MULTI): ICD-10-CM

## 2024-08-15 PROCEDURE — 2500000005 HC RX 250 GENERAL PHARMACY W/O HCPCS: Performed by: INTERNAL MEDICINE

## 2024-08-15 PROCEDURE — 96413 CHEMO IV INFUSION 1 HR: CPT

## 2024-08-15 PROCEDURE — 2500000004 HC RX 250 GENERAL PHARMACY W/ HCPCS (ALT 636 FOR OP/ED): Performed by: INTERNAL MEDICINE

## 2024-08-15 RX ORDER — ONDANSETRON HYDROCHLORIDE 8 MG/1
8 TABLET, FILM COATED ORAL ONCE
Status: COMPLETED | OUTPATIENT
Start: 2024-08-15 | End: 2024-08-15

## 2024-08-15 RX ORDER — DIPHENHYDRAMINE HYDROCHLORIDE 50 MG/ML
50 INJECTION INTRAMUSCULAR; INTRAVENOUS AS NEEDED
Status: DISCONTINUED | OUTPATIENT
Start: 2024-08-15 | End: 2024-08-15 | Stop reason: HOSPADM

## 2024-08-15 RX ORDER — FAMOTIDINE 10 MG/ML
20 INJECTION INTRAVENOUS ONCE AS NEEDED
Status: DISCONTINUED | OUTPATIENT
Start: 2024-08-15 | End: 2024-08-15 | Stop reason: HOSPADM

## 2024-08-15 RX ORDER — HEPARIN SODIUM,PORCINE/PF 10 UNIT/ML
50 SYRINGE (ML) INTRAVENOUS AS NEEDED
Status: DISCONTINUED | OUTPATIENT
Start: 2024-08-15 | End: 2024-08-15 | Stop reason: HOSPADM

## 2024-08-15 RX ORDER — EPINEPHRINE 0.3 MG/.3ML
0.3 INJECTION SUBCUTANEOUS EVERY 5 MIN PRN
Status: DISCONTINUED | OUTPATIENT
Start: 2024-08-15 | End: 2024-08-15 | Stop reason: HOSPADM

## 2024-08-15 RX ORDER — HEPARIN 100 UNIT/ML
500 SYRINGE INTRAVENOUS AS NEEDED
Status: CANCELLED | OUTPATIENT
Start: 2024-08-15

## 2024-08-15 RX ORDER — HEPARIN SODIUM,PORCINE/PF 10 UNIT/ML
50 SYRINGE (ML) INTRAVENOUS AS NEEDED
Status: CANCELLED | OUTPATIENT
Start: 2024-08-15

## 2024-08-15 RX ORDER — ALBUTEROL SULFATE 0.83 MG/ML
3 SOLUTION RESPIRATORY (INHALATION) AS NEEDED
Status: DISCONTINUED | OUTPATIENT
Start: 2024-08-15 | End: 2024-08-15 | Stop reason: HOSPADM

## 2024-08-15 RX ORDER — HEPARIN 100 UNIT/ML
500 SYRINGE INTRAVENOUS AS NEEDED
Status: DISCONTINUED | OUTPATIENT
Start: 2024-08-15 | End: 2024-08-15 | Stop reason: HOSPADM

## 2024-08-15 RX ORDER — PROCHLORPERAZINE MALEATE 10 MG
10 TABLET ORAL EVERY 6 HOURS PRN
Status: DISCONTINUED | OUTPATIENT
Start: 2024-08-15 | End: 2024-08-15 | Stop reason: HOSPADM

## 2024-08-15 RX ORDER — PROCHLORPERAZINE EDISYLATE 5 MG/ML
10 INJECTION INTRAMUSCULAR; INTRAVENOUS EVERY 6 HOURS PRN
Status: DISCONTINUED | OUTPATIENT
Start: 2024-08-15 | End: 2024-08-15 | Stop reason: HOSPADM

## 2024-08-15 ASSESSMENT — PAIN SCALES - GENERAL: PAINLEVEL: 0-NO PAIN

## 2024-08-15 NOTE — SIGNIFICANT EVENT
08/15/24 1417   Prechemo Checklist   Has the patient been in the hospital, ED, or urgent care since last date of service No   Chemo/Immuno Consent Completed and Signed Yes   Confirmed to previous date/time of medication Yes   Compared to previous dose Yes   All medications are dated accurately Yes   Pregnancy Test Negative Not applicable   Parameters Met Yes   BSA/Weight-Height Verified Yes   Dose Calculations Verified (current, total, cumulative) Yes

## 2024-08-16 ENCOUNTER — INFUSION (OUTPATIENT)
Dept: HEMATOLOGY/ONCOLOGY | Facility: CLINIC | Age: 64
End: 2024-08-16
Payer: COMMERCIAL

## 2024-08-16 VITALS
RESPIRATION RATE: 16 BRPM | BODY MASS INDEX: 27.66 KG/M2 | HEIGHT: 65 IN | DIASTOLIC BLOOD PRESSURE: 68 MMHG | SYSTOLIC BLOOD PRESSURE: 165 MMHG | HEART RATE: 92 BPM | TEMPERATURE: 97 F | WEIGHT: 166.01 LBS

## 2024-08-16 DIAGNOSIS — C92.00 ACUTE MYELOID LEUKEMIA NOT HAVING ACHIEVED REMISSION (MULTI): ICD-10-CM

## 2024-08-16 PROCEDURE — 2500000005 HC RX 250 GENERAL PHARMACY W/O HCPCS: Performed by: INTERNAL MEDICINE

## 2024-08-16 PROCEDURE — 96413 CHEMO IV INFUSION 1 HR: CPT

## 2024-08-16 PROCEDURE — 2500000004 HC RX 250 GENERAL PHARMACY W/ HCPCS (ALT 636 FOR OP/ED): Performed by: INTERNAL MEDICINE

## 2024-08-16 RX ORDER — PROCHLORPERAZINE EDISYLATE 5 MG/ML
10 INJECTION INTRAMUSCULAR; INTRAVENOUS EVERY 6 HOURS PRN
Status: DISCONTINUED | OUTPATIENT
Start: 2024-08-16 | End: 2024-08-16 | Stop reason: HOSPADM

## 2024-08-16 RX ORDER — DIPHENHYDRAMINE HYDROCHLORIDE 50 MG/ML
50 INJECTION INTRAMUSCULAR; INTRAVENOUS AS NEEDED
Status: DISCONTINUED | OUTPATIENT
Start: 2024-08-16 | End: 2024-08-16 | Stop reason: HOSPADM

## 2024-08-16 RX ORDER — ALBUTEROL SULFATE 0.83 MG/ML
3 SOLUTION RESPIRATORY (INHALATION) AS NEEDED
Status: DISCONTINUED | OUTPATIENT
Start: 2024-08-16 | End: 2024-08-16 | Stop reason: HOSPADM

## 2024-08-16 RX ORDER — PROCHLORPERAZINE MALEATE 10 MG
10 TABLET ORAL EVERY 6 HOURS PRN
Status: DISCONTINUED | OUTPATIENT
Start: 2024-08-16 | End: 2024-08-16 | Stop reason: HOSPADM

## 2024-08-16 RX ORDER — HEPARIN SODIUM,PORCINE/PF 10 UNIT/ML
50 SYRINGE (ML) INTRAVENOUS AS NEEDED
OUTPATIENT
Start: 2024-08-16

## 2024-08-16 RX ORDER — FAMOTIDINE 10 MG/ML
20 INJECTION INTRAVENOUS ONCE AS NEEDED
Status: DISCONTINUED | OUTPATIENT
Start: 2024-08-16 | End: 2024-08-16 | Stop reason: HOSPADM

## 2024-08-16 RX ORDER — HEPARIN 100 UNIT/ML
500 SYRINGE INTRAVENOUS AS NEEDED
Status: DISCONTINUED | OUTPATIENT
Start: 2024-08-16 | End: 2024-08-16 | Stop reason: HOSPADM

## 2024-08-16 RX ORDER — HEPARIN 100 UNIT/ML
500 SYRINGE INTRAVENOUS AS NEEDED
OUTPATIENT
Start: 2024-08-16

## 2024-08-16 RX ORDER — ONDANSETRON HYDROCHLORIDE 8 MG/1
8 TABLET, FILM COATED ORAL ONCE
Status: COMPLETED | OUTPATIENT
Start: 2024-08-16 | End: 2024-08-16

## 2024-08-16 RX ORDER — EPINEPHRINE 0.3 MG/.3ML
0.3 INJECTION SUBCUTANEOUS EVERY 5 MIN PRN
Status: DISCONTINUED | OUTPATIENT
Start: 2024-08-16 | End: 2024-08-16 | Stop reason: HOSPADM

## 2024-08-16 RX ORDER — HEPARIN SODIUM,PORCINE/PF 10 UNIT/ML
50 SYRINGE (ML) INTRAVENOUS AS NEEDED
Status: DISCONTINUED | OUTPATIENT
Start: 2024-08-16 | End: 2024-08-16 | Stop reason: HOSPADM

## 2024-08-16 ASSESSMENT — PAIN SCALES - GENERAL: PAINLEVEL: 0-NO PAIN

## 2024-08-16 NOTE — SIGNIFICANT EVENT
08/16/24 1424   Prechemo Checklist   Has the patient been in the hospital, ED, or urgent care since last date of service No   Chemo/Immuno Consent Completed and Signed Yes   Protocol/Indications Verified Yes   Confirmed to previous date/time of medication Yes   Compared to previous dose Yes   All medications are dated accurately Yes   Pregnancy Test Negative Not applicable   Parameters Met Yes   BSA/Weight-Height Verified Yes   Dose Calculations Verified (current, total, cumulative) Yes

## 2024-08-22 ENCOUNTER — LAB (OUTPATIENT)
Dept: LAB | Facility: CLINIC | Age: 64
End: 2024-08-22
Payer: COMMERCIAL

## 2024-08-22 DIAGNOSIS — C92.00 ACUTE MYELOID LEUKEMIA NOT HAVING ACHIEVED REMISSION (MULTI): ICD-10-CM

## 2024-08-22 LAB
BASOPHILS # BLD AUTO: 0.02 X10*3/UL (ref 0–0.1)
BASOPHILS NFR BLD AUTO: 0.5 %
EOSINOPHIL # BLD AUTO: 0.07 X10*3/UL (ref 0–0.7)
EOSINOPHIL NFR BLD AUTO: 1.6 %
ERYTHROCYTE [DISTWIDTH] IN BLOOD BY AUTOMATED COUNT: 21.1 % (ref 11.5–14.5)
HCT VFR BLD AUTO: 34.5 % (ref 41–52)
HGB BLD-MCNC: 11.8 G/DL (ref 13.5–17.5)
IMM GRANULOCYTES # BLD AUTO: 0.02 X10*3/UL (ref 0–0.7)
IMM GRANULOCYTES NFR BLD AUTO: 0.5 % (ref 0–0.9)
LYMPHOCYTES # BLD AUTO: 1.25 X10*3/UL (ref 1.2–4.8)
LYMPHOCYTES NFR BLD AUTO: 28.4 %
MCH RBC QN AUTO: 28 PG (ref 26–34)
MCHC RBC AUTO-ENTMCNC: 34.2 G/DL (ref 32–36)
MCV RBC AUTO: 82 FL (ref 80–100)
MONOCYTES # BLD AUTO: 0.42 X10*3/UL (ref 0.1–1)
MONOCYTES NFR BLD AUTO: 9.5 %
NEUTROPHILS # BLD AUTO: 2.62 X10*3/UL (ref 1.2–7.7)
NEUTROPHILS NFR BLD AUTO: 59.5 %
NRBC BLD-RTO: ABNORMAL /100{WBCS}
OVALOCYTES BLD QL SMEAR: NORMAL
PLATELET # BLD AUTO: 352 X10*3/UL (ref 150–450)
POLYCHROMASIA BLD QL SMEAR: NORMAL
RBC # BLD AUTO: 4.22 X10*6/UL (ref 4.5–5.9)
RBC MORPH BLD: NORMAL
SCHISTOCYTES BLD QL SMEAR: NORMAL
TARGETS BLD QL SMEAR: NORMAL
WBC # BLD AUTO: 4.4 X10*3/UL (ref 4.4–11.3)

## 2024-08-22 PROCEDURE — 86901 BLOOD TYPING SEROLOGIC RH(D): CPT

## 2024-08-22 PROCEDURE — 85025 COMPLETE CBC W/AUTO DIFF WBC: CPT

## 2024-08-22 PROCEDURE — 36415 COLL VENOUS BLD VENIPUNCTURE: CPT

## 2024-08-22 PROCEDURE — 80053 COMPREHEN METABOLIC PANEL: CPT

## 2024-08-23 LAB
ABO GROUP (TYPE) IN BLOOD: NORMAL
ALBUMIN SERPL BCP-MCNC: 4.2 G/DL (ref 3.4–5)
ALP SERPL-CCNC: 109 U/L (ref 33–136)
ALT SERPL W P-5'-P-CCNC: 11 U/L (ref 10–52)
ANION GAP SERPL CALC-SCNC: 15 MMOL/L (ref 10–20)
ANTIBODY SCREEN: NORMAL
AST SERPL W P-5'-P-CCNC: 16 U/L (ref 9–39)
BILIRUB SERPL-MCNC: 0.6 MG/DL (ref 0–1.2)
BUN SERPL-MCNC: 24 MG/DL (ref 6–23)
CALCIUM SERPL-MCNC: 9.3 MG/DL (ref 8.6–10.6)
CHLORIDE SERPL-SCNC: 104 MMOL/L (ref 98–107)
CO2 SERPL-SCNC: 23 MMOL/L (ref 21–32)
CREAT SERPL-MCNC: 1.03 MG/DL (ref 0.5–1.3)
EGFRCR SERPLBLD CKD-EPI 2021: 80 ML/MIN/1.73M*2
GLUCOSE SERPL-MCNC: 92 MG/DL (ref 74–99)
POTASSIUM SERPL-SCNC: 4.1 MMOL/L (ref 3.5–5.3)
PROT SERPL-MCNC: 7.6 G/DL (ref 6.4–8.2)
RH FACTOR (ANTIGEN D): NORMAL
SODIUM SERPL-SCNC: 138 MMOL/L (ref 136–145)

## 2024-08-26 ENCOUNTER — SPECIALTY PHARMACY (OUTPATIENT)
Dept: PHARMACY | Facility: CLINIC | Age: 64
End: 2024-08-26

## 2024-08-26 ENCOUNTER — PHARMACY VISIT (OUTPATIENT)
Dept: PHARMACY | Facility: CLINIC | Age: 64
End: 2024-08-26
Payer: COMMERCIAL

## 2024-08-26 DIAGNOSIS — D59.9 ACQUIRED HEMOLYTIC ANEMIA (MULTI): Primary | ICD-10-CM

## 2024-08-26 DIAGNOSIS — C92.00 ACUTE MYELOID LEUKEMIA NOT HAVING ACHIEVED REMISSION (MULTI): ICD-10-CM

## 2024-08-26 PROBLEM — B27.00 EBV (EPSTEIN-BARR VIRUS) VIREMIA: Status: ACTIVE | Noted: 2024-08-26

## 2024-08-26 PROCEDURE — RXMED WILLOW AMBULATORY MEDICATION CHARGE

## 2024-08-27 ENCOUNTER — DOCUMENTATION (OUTPATIENT)
Dept: OTHER | Facility: HOSPITAL | Age: 64
End: 2024-08-27
Payer: COMMERCIAL

## 2024-08-27 NOTE — PROGRESS NOTES
8/27/24 1:30PM    Called and discussed with patient tentative admission dates and upcoming appointments for transplant. Patient verbalized understanding. I sent patient a follow-up email with transplant calendar attached, and I will see him 8/29 at his [facility] with Dr. Newsome to sign CBMT [id number].    Daxa Hurtado RN

## 2024-08-28 ENCOUNTER — LAB (OUTPATIENT)
Dept: LAB | Facility: CLINIC | Age: 64
End: 2024-08-28
Payer: COMMERCIAL

## 2024-08-28 DIAGNOSIS — C92.00 ACUTE MYELOID LEUKEMIA NOT HAVING ACHIEVED REMISSION (MULTI): ICD-10-CM

## 2024-08-28 LAB
BASOPHILS # BLD AUTO: 0.01 X10*3/UL (ref 0–0.1)
BASOPHILS NFR BLD AUTO: 0.3 %
DACRYOCYTES BLD QL SMEAR: NORMAL
EOSINOPHIL # BLD AUTO: 0.04 X10*3/UL (ref 0–0.7)
EOSINOPHIL NFR BLD AUTO: 1.3 %
ERYTHROCYTE [DISTWIDTH] IN BLOOD BY AUTOMATED COUNT: 20.7 % (ref 11.5–14.5)
HCT VFR BLD AUTO: 33.4 % (ref 41–52)
HGB BLD-MCNC: 11.6 G/DL (ref 13.5–17.5)
IMM GRANULOCYTES # BLD AUTO: 0.01 X10*3/UL (ref 0–0.7)
IMM GRANULOCYTES NFR BLD AUTO: 0.3 % (ref 0–0.9)
LYMPHOCYTES # BLD AUTO: 0.96 X10*3/UL (ref 1.2–4.8)
LYMPHOCYTES NFR BLD AUTO: 31.6 %
MCH RBC QN AUTO: 28.2 PG (ref 26–34)
MCHC RBC AUTO-ENTMCNC: 34.7 G/DL (ref 32–36)
MCV RBC AUTO: 81 FL (ref 80–100)
MONOCYTES # BLD AUTO: 0.18 X10*3/UL (ref 0.1–1)
MONOCYTES NFR BLD AUTO: 5.9 %
NEUTROPHILS # BLD AUTO: 1.84 X10*3/UL (ref 1.2–7.7)
NEUTROPHILS NFR BLD AUTO: 60.6 %
NRBC BLD-RTO: ABNORMAL /100{WBCS}
OVALOCYTES BLD QL SMEAR: NORMAL
PLATELET # BLD AUTO: 238 X10*3/UL (ref 150–450)
POLYCHROMASIA BLD QL SMEAR: NORMAL
RBC # BLD AUTO: 4.12 X10*6/UL (ref 4.5–5.9)
RBC MORPH BLD: NORMAL
SCHISTOCYTES BLD QL SMEAR: NORMAL
TARGETS BLD QL SMEAR: NORMAL
WBC # BLD AUTO: 3 X10*3/UL (ref 4.4–11.3)

## 2024-08-28 PROCEDURE — 36415 COLL VENOUS BLD VENIPUNCTURE: CPT

## 2024-08-28 PROCEDURE — 86901 BLOOD TYPING SEROLOGIC RH(D): CPT

## 2024-08-28 PROCEDURE — 85025 COMPLETE CBC W/AUTO DIFF WBC: CPT

## 2024-08-28 PROCEDURE — 80053 COMPREHEN METABOLIC PANEL: CPT

## 2024-08-29 ENCOUNTER — OFFICE VISIT (OUTPATIENT)
Dept: HEMATOLOGY/ONCOLOGY | Facility: HOSPITAL | Age: 64
End: 2024-08-29
Payer: COMMERCIAL

## 2024-08-29 VITALS
WEIGHT: 164.02 LBS | BODY MASS INDEX: 27.66 KG/M2 | RESPIRATION RATE: 16 BRPM | TEMPERATURE: 97 F | DIASTOLIC BLOOD PRESSURE: 81 MMHG | SYSTOLIC BLOOD PRESSURE: 131 MMHG | OXYGEN SATURATION: 100 % | HEART RATE: 78 BPM

## 2024-08-29 DIAGNOSIS — C92.01 ACUTE MYELOID LEUKEMIA IN REMISSION (MULTI): Primary | ICD-10-CM

## 2024-08-29 LAB
ABO GROUP (TYPE) IN BLOOD: NORMAL
ALBUMIN SERPL BCP-MCNC: 4 G/DL (ref 3.4–5)
ALP SERPL-CCNC: 106 U/L (ref 33–136)
ALT SERPL W P-5'-P-CCNC: 9 U/L (ref 10–52)
ANION GAP SERPL CALC-SCNC: 14 MMOL/L (ref 10–20)
ANTIBODY SCREEN: NORMAL
AST SERPL W P-5'-P-CCNC: 14 U/L (ref 9–39)
BILIRUB SERPL-MCNC: 0.4 MG/DL (ref 0–1.2)
BUN SERPL-MCNC: 20 MG/DL (ref 6–23)
CALCIUM SERPL-MCNC: 9.1 MG/DL (ref 8.6–10.6)
CHLORIDE SERPL-SCNC: 106 MMOL/L (ref 98–107)
CO2 SERPL-SCNC: 23 MMOL/L (ref 21–32)
CREAT SERPL-MCNC: 0.9 MG/DL (ref 0.5–1.3)
EGFRCR SERPLBLD CKD-EPI 2021: >90 ML/MIN/1.73M*2
GLUCOSE SERPL-MCNC: 122 MG/DL (ref 74–99)
POTASSIUM SERPL-SCNC: 3.7 MMOL/L (ref 3.5–5.3)
PROT SERPL-MCNC: 7.3 G/DL (ref 6.4–8.2)
RH FACTOR (ANTIGEN D): NORMAL
SODIUM SERPL-SCNC: 139 MMOL/L (ref 136–145)

## 2024-08-29 PROCEDURE — 1159F MED LIST DOCD IN RCRD: CPT | Performed by: INTERNAL MEDICINE

## 2024-08-29 PROCEDURE — 99214 OFFICE O/P EST MOD 30 MIN: CPT | Performed by: INTERNAL MEDICINE

## 2024-08-29 PROCEDURE — 3079F DIAST BP 80-89 MM HG: CPT | Performed by: INTERNAL MEDICINE

## 2024-08-29 PROCEDURE — 3075F SYST BP GE 130 - 139MM HG: CPT | Performed by: INTERNAL MEDICINE

## 2024-08-29 PROCEDURE — 1126F AMNT PAIN NOTED NONE PRSNT: CPT | Performed by: INTERNAL MEDICINE

## 2024-08-29 PROCEDURE — 1157F ADVNC CARE PLAN IN RCRD: CPT | Performed by: INTERNAL MEDICINE

## 2024-08-29 ASSESSMENT — PAIN SCALES - GENERAL: PAINLEVEL: 0-NO PAIN

## 2024-08-29 NOTE — PROGRESS NOTES
Patient ID: Brandt Merritt is a 64 y.o. male.    Subjective    HPI  Brandt Merritt is a 64 y.o. 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 Seen by Abundio Rust and Rose Casal initially 10/2023 for pancytopenia. He has had work up for pancytopenia and hemolytic process . Admits to having ongoing chronic fatigue that is unchanged . Patient had normal CBC June 2020. Denied any hemorrhoidal bleeding. Has had C Scope and EGD , treated Hep C 2014 . 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       Additional information 2021 PSA was high, biopsy 2021 and 2023 all benign.     CBC 4/11/24 wbc 3.0, hgb 7.1, platelets 167K ANC 0.73  BM histology  Myelodysplastic neoplasm with increased blasts-2 ( WHO)  Myelodysplastic neoplasm/AML  (ICC 2022 classification)  Blasts 11% on aspirate smear and 15-20% based on CD 34 immunostaining approaching AML leukemia, hematopoiesis is erythroid dominant with dysplasia in granulocytes and megakaryocytes.   FISH studies trisomy 8 17.2%  NGS panel DNMT3 aVAF 36%  U2AF1 VAF 32%     Recent bone marrow from 6/17 showed 1% blasts, but as expected ongoing myelodysplasia. Residual trisomy 8 in 2% of cells.      Brandt presents to the clinic today (08/29/24) with his wife Nichole for ongoing management of myelodysplastic neoplasm/AML and to sign unlicensed umbilical cord blood consent.   He received C4 decitabine on 8/12/24 and remains on venetoclax 200 mg days 1-21 out of 28 day cycle.  He is currently on day 15 of venetoclax today.       He is planned for consolidation with allogeneic stem cell transplant.  Unfortunately 11/12 MUD donor no longer available, and patients haploidentical sister is not a suitable donor so we are planning a single umbilical cord  blood transplant.  We have selected  UCB ID [id number] which is a 6/8 match with a mismatch at A and DRB1, loci. Cord unit contains TNC dose of 3.45x 107 TNC /kg and 3.42 x105/kg CD 34 cells and is A+.  HLA antibody screen of patient reveals no  relevant antibodies.   On 7/17/24, he underwent pretransplant cardiac evaluation, no interventions at this time although long term would want better control of blood pressure, also had aortic aneurysm of borderline size which will require ongoing followup.  He was also seen by hepatology and fibroscan performed on 7/24/24 show a score of 0.    Patient has regained several pounds since last visit and looks well.      Presents today for C4 Decitabine/Venetoclax and routine follow up visit, accompanied by his wife. Energy level is good.  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  Appetite is good.  Eating about 3 meals per day, and no early satiety.  Has gained 2 lbs since last visit, weight loss and nutrition still a major concern. He is a little bit frustrated today and wondering the timeline of transplant.    Review of Systems   Constitutional:  Negative for appetite change.   All other systems reviewed and are negative.      Objective    BSA: 1.84 meters squared  /81   Pulse 78   Temp 36.1 °C (97 °F)   Resp 16   Wt 74.4 kg (164 lb 0.4 oz)   SpO2 100%   BMI 27.66 kg/m²   Vital signs reviewed today.      Physical Exam  Constitutional:       Appearance: Normal appearance.   HENT:      Head: Normocephalic.      Nose: Nose normal.      Mouth/Throat:      Mouth: Mucous membranes are moist.      Pharynx: Oropharynx is clear.   Eyes:      Extraocular Movements: Extraocular movements intact.      Conjunctiva/sclera: Conjunctivae normal.      Pupils: Pupils are equal, round, and reactive to light.   Cardiovascular:      Rate and Rhythm: Normal rate and regular rhythm.      Pulses: Normal pulses.      Heart sounds: Normal heart sounds.   Pulmonary:      Effort: Pulmonary effort is normal.      Breath sounds: Normal breath sounds.   Abdominal:      General: Abdomen is flat. Bowel sounds are normal.      Palpations: Abdomen is soft.   Musculoskeletal:          General: Normal range of motion.      Cervical back: Normal range of motion.   Skin:     General: Skin is warm and dry.      Capillary Refill: Capillary refill takes less than 2 seconds.   Neurological:      General: No focal deficit present.      Mental Status: He is alert and oriented to person, place, and time. Mental status is at baseline.   Psychiatric:         Mood and Affect: Mood normal.         Performance Status:  Karnofsky Score: 80 - Normal activity with effort; some signs or symptoms of disease      Assessment/Plan        Oncology History Overview Note   4/2024  Myelodysplastic neoplasm with increased blasts-2 ( WHO)  Myelodysplastic neoplasm/AML  (ICC 2022 classification)    Presented in  10/2023 for pancytopenia, found to have hemolysis. Patient had normal CBC June 2020. Denied any hemorrhoidal bleeding . Has had C Scope and EGD , treated Hep C 2014 . 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      CBC 4/11/24 wbc 3.0, hgb 7.1, platelets 167K ANC 0.73    BM biopsy done on 4/11/24  as folllows:  BM histology  Myelodysplastic neoplasm with increased blasts-2 ( WHO)  Myelodysplastic neoplasm/AML  (ICC 2022 classification)  Balsts 11% on aspirate smear and 15-20% based on CD 34 immunostaining approaching AML leukemia, hematooiesis is erythroid dominant with dysplasia in granulocytes and megakaryocytes.   FISH studies trisomy 8 17.2%  NGS panel DNMT3 aVAF 36%  U2AF1 VAF 32%       Acute myeloid leukemia not having achieved remission (Multi)   4/23/2024 Initial Diagnosis    Acute myeloid leukemia not having achieved remission (Multi)     5/13/2024 -  Chemotherapy    Venetoclax / Decitabine, 28 Day Cycles - Induction / Consolidation        1. Myelodysplastic neoplasm/AML- Patient presents with a history of pancytopenia since 9/2023 and due to confusion picture of hemolysis with only mild leukopenia and neutropenia extensive workup done prior to bone marrow bx which shows MDS in transition to AML, with  trisomy 8 and DNMT alpha and U2AF1 mutation which is adverse risk. Given that the patient has > 10% blasts in bone marrow I would favor induction chemotherapy with ultimate goal to pursue curative allogeneic stem cell  transplant.  The patients slow, indolent course is more consistent with MDS and he was recommended induction with decitabine and venetoclax which has about a 60% chance of achieving a complete clinical remission.    C1D1: 5/13/2024  C2D1: 6/11/2024 , venetoclax to 200 mg 21/28 days, check blood counts   BM biopsy 6/17/24 blasts cleared, FISH still positive for trisomy 8, still MDS changes  C4D1:  8/12/24.     8/29/24:  Patient is overall doing very well and is being prepared for his upcoming single umbilical cord blood transplant. . We have selected  UCB unit  ID [id number] which is a 6/8 match with a mismatch at A and DRB1, loci. Cord unit contains TNC dose of 3.45x 107 TNC /kg and 3.42 x105/kg CD 34 cells and is A+.  HLA antibody screen of patient reveals no relevant antibodies.   On 7/17/24, he underwent pretransplant cardiac evaluation, no interventions at this time although long term would want better control of blood pressure, also had aortic aneurysm of borderline size which will require ongoing followup.  He was also seen by hepatology and fibroscan performed on 7/24/24 show a score of 0.  BM biopsy planned for 9/5/24.  Today I reviewed the unlicensed ucb consent and protocol, patient has carefully reviewed document, had the opportunity to  ask questions and signed chemo consent.  Plan preparative regimen of flu-rianna-thiotepa and tacro and MMF for GVHD prophylaxis.     2. ID Ppx:   - acyclovir, fluconazole,       3. Pre-transplant workup:  - Echocardiogram show a normal LVEF  - await pfts, fibroscan no fibrosis      4. 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-normal hemoglobin level until 2020, generally asymptomatic, should pose no peritransplant issues.      5. Hx of treated hepatitis C - recent hep C RNA pcr  negative, and recent liver ultrasound shows normal sized liver at 14.5 cm, however seems enlarged on exam. Patient agreeable to hepatology consult before consideration of allograft, scheduled on 7/17/24. Ursodiol started. 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  Seen by GI Dr. Mccauley on 7/18/24, fibroscan obtained 7/24/24-shows 4.8 KPa indicating F0 or minimal fibrosis  Advised for close monitoring of liver functioning and repeating hepatitis serologies.       Weight Loss:  8/12: Weight up 2 lb since last visit.  Reports good appetite and is eating about 3 meals per day.  Discussed ways to obtain more calories.      RTC  9/5 [facility]  9/12 Dr. Jerod Newsome MD  [facility]

## 2024-08-30 DIAGNOSIS — C92.01 ACUTE MYELOID LEUKEMIA IN REMISSION (MULTI): ICD-10-CM

## 2024-08-30 DIAGNOSIS — Z76.82 STEM CELL TRANSPLANT CANDIDATE: ICD-10-CM

## 2024-08-30 ASSESSMENT — ENCOUNTER SYMPTOMS: APPETITE CHANGE: 0

## 2024-09-04 DIAGNOSIS — C92.00 ACUTE MYELOID LEUKEMIA NOT HAVING ACHIEVED REMISSION (MULTI): ICD-10-CM

## 2024-09-04 RX ORDER — ACYCLOVIR 400 MG/1
400 TABLET ORAL 2 TIMES DAILY
Qty: 60 TABLET | Refills: 0 | Status: SHIPPED | OUTPATIENT
Start: 2024-09-04 | End: 2024-10-04

## 2024-09-05 ENCOUNTER — LAB (OUTPATIENT)
Dept: LAB | Facility: HOSPITAL | Age: 64
End: 2024-09-05
Payer: COMMERCIAL

## 2024-09-05 ENCOUNTER — DOCUMENTATION (OUTPATIENT)
Dept: OTHER | Facility: HOSPITAL | Age: 64
End: 2024-09-05
Payer: COMMERCIAL

## 2024-09-05 ENCOUNTER — OFFICE VISIT (OUTPATIENT)
Dept: HEMATOLOGY/ONCOLOGY | Facility: HOSPITAL | Age: 64
End: 2024-09-05
Payer: COMMERCIAL

## 2024-09-05 ENCOUNTER — PROCEDURE VISIT (OUTPATIENT)
Dept: HEMATOLOGY/ONCOLOGY | Facility: HOSPITAL | Age: 64
End: 2024-09-05
Payer: COMMERCIAL

## 2024-09-05 VITALS
HEART RATE: 89 BPM | HEIGHT: 65 IN | WEIGHT: 162.26 LBS | OXYGEN SATURATION: 98 % | TEMPERATURE: 97.2 F | RESPIRATION RATE: 16 BRPM | BODY MASS INDEX: 27.03 KG/M2 | SYSTOLIC BLOOD PRESSURE: 138 MMHG | DIASTOLIC BLOOD PRESSURE: 80 MMHG

## 2024-09-05 DIAGNOSIS — C92.00 ACUTE MYELOID LEUKEMIA NOT HAVING ACHIEVED REMISSION (MULTI): ICD-10-CM

## 2024-09-05 LAB
ABO GROUP (TYPE) IN BLOOD: NORMAL
ALBUMIN SERPL BCP-MCNC: 4 G/DL (ref 3.4–5)
ALP SERPL-CCNC: 121 U/L (ref 33–136)
ALT SERPL W P-5'-P-CCNC: 8 U/L (ref 10–52)
ANION GAP SERPL CALC-SCNC: 15 MMOL/L (ref 10–20)
ANTIBODY SCREEN: NORMAL
AST SERPL W P-5'-P-CCNC: 13 U/L (ref 9–39)
BASOPHILS # BLD MANUAL: 0.02 X10*3/UL (ref 0–0.1)
BASOPHILS NFR BLD MANUAL: 1 %
BILIRUB SERPL-MCNC: 0.5 MG/DL (ref 0–1.2)
BUN SERPL-MCNC: 15 MG/DL (ref 6–23)
CALCIUM SERPL-MCNC: 9.3 MG/DL (ref 8.6–10.3)
CHLORIDE SERPL-SCNC: 103 MMOL/L (ref 98–107)
CO2 SERPL-SCNC: 25 MMOL/L (ref 21–32)
CREAT SERPL-MCNC: 0.98 MG/DL (ref 0.5–1.3)
DACRYOCYTES BLD QL SMEAR: ABNORMAL
EGFRCR SERPLBLD CKD-EPI 2021: 85 ML/MIN/1.73M*2
EOSINOPHIL # BLD MANUAL: 0.07 X10*3/UL (ref 0–0.7)
EOSINOPHIL NFR BLD MANUAL: 3 %
ERYTHROCYTE [DISTWIDTH] IN BLOOD BY AUTOMATED COUNT: 20.4 % (ref 11.5–14.5)
GLUCOSE SERPL-MCNC: 101 MG/DL (ref 74–99)
HCT VFR BLD AUTO: 37.6 % (ref 41–52)
HGB BLD-MCNC: 13.2 G/DL (ref 13.5–17.5)
IMM GRANULOCYTES # BLD AUTO: 0 X10*3/UL (ref 0–0.7)
IMM GRANULOCYTES NFR BLD AUTO: 0 % (ref 0–0.9)
LYMPHOCYTES # BLD MANUAL: 1.29 X10*3/UL (ref 1.2–4.8)
LYMPHOCYTES NFR BLD MANUAL: 56 %
MCH RBC QN AUTO: 28.1 PG (ref 26–34)
MCHC RBC AUTO-ENTMCNC: 35.1 G/DL (ref 32–36)
MCV RBC AUTO: 80 FL (ref 80–100)
MONOCYTES # BLD MANUAL: 0.12 X10*3/UL (ref 0.1–1)
MONOCYTES NFR BLD MANUAL: 5 %
NEUTS SEG # BLD MANUAL: 0.81 X10*3/UL (ref 1.2–7)
NEUTS SEG NFR BLD MANUAL: 35 %
NRBC BLD-RTO: 0 /100 WBCS (ref 0–0)
OVALOCYTES BLD QL SMEAR: ABNORMAL
PLATELET # BLD AUTO: 228 X10*3/UL (ref 150–450)
PLATELET CLUMP BLD QL SMEAR: PRESENT
POTASSIUM SERPL-SCNC: 3.9 MMOL/L (ref 3.5–5.3)
PROT SERPL-MCNC: 7.9 G/DL (ref 6.4–8.2)
RBC # BLD AUTO: 4.69 X10*6/UL (ref 4.5–5.9)
RBC MORPH BLD: ABNORMAL
RH FACTOR (ANTIGEN D): NORMAL
SCHISTOCYTES BLD QL SMEAR: ABNORMAL
SODIUM SERPL-SCNC: 139 MMOL/L (ref 136–145)
TARGETS BLD QL SMEAR: ABNORMAL
TOTAL CELLS COUNTED BLD: 100
TSH SERPL-ACNC: 1.98 MIU/L (ref 0.44–3.98)
WBC # BLD AUTO: 2.3 X10*3/UL (ref 4.4–11.3)

## 2024-09-05 PROCEDURE — 80053 COMPREHEN METABOLIC PANEL: CPT

## 2024-09-05 PROCEDURE — 88185 FLOWCYTOMETRY/TC ADD-ON: CPT | Mod: TC | Performed by: PHYSICIAN ASSISTANT

## 2024-09-05 PROCEDURE — 85027 COMPLETE CBC AUTOMATED: CPT

## 2024-09-05 PROCEDURE — 85097 BONE MARROW INTERPRETATION: CPT | Performed by: PHYSICIAN ASSISTANT

## 2024-09-05 PROCEDURE — 84443 ASSAY THYROID STIM HORMONE: CPT

## 2024-09-05 PROCEDURE — 86901 BLOOD TYPING SEROLOGIC RH(D): CPT

## 2024-09-05 PROCEDURE — 85007 BL SMEAR W/DIFF WBC COUNT: CPT

## 2024-09-05 PROCEDURE — 38222 DX BONE MARROW BX & ASPIR: CPT

## 2024-09-05 RX ORDER — TACROLIMUS 0.5 MG/1
0.5 CAPSULE ORAL EVERY 12 HOURS
Qty: 60 CAPSULE | Refills: 2 | Status: SHIPPED | OUTPATIENT
Start: 2024-09-05

## 2024-09-05 RX ORDER — SULFAMETHOXAZOLE AND TRIMETHOPRIM 800; 160 MG/1; MG/1
1 TABLET ORAL 3 TIMES WEEKLY
Qty: 12 TABLET | Refills: 2 | Status: SHIPPED | OUTPATIENT
Start: 2024-09-06

## 2024-09-05 RX ORDER — POSACONAZOLE 100 MG/1
300 TABLET, DELAYED RELEASE ORAL DAILY
Qty: 90 TABLET | Refills: 2 | Status: SHIPPED | OUTPATIENT
Start: 2024-09-05

## 2024-09-05 RX ORDER — TACROLIMUS 1 MG/1
2 CAPSULE ORAL EVERY 12 HOURS
Qty: 120 CAPSULE | Refills: 2 | Status: SHIPPED | OUTPATIENT
Start: 2024-09-05

## 2024-09-05 RX ORDER — MYCOPHENOLATE MOFETIL 250 MG/1
1000 CAPSULE ORAL 3 TIMES DAILY
Qty: 360 CAPSULE | Refills: 2 | Status: SHIPPED | OUTPATIENT
Start: 2024-09-05

## 2024-09-05 RX ORDER — MAGNESIUM CHLORIDE 64 MG
128 TABLET, DELAYED RELEASE (ENTERIC COATED) ORAL 3 TIMES DAILY
Qty: 180 TABLET | Refills: 1 | Status: SHIPPED | OUTPATIENT
Start: 2024-09-05

## 2024-09-05 RX ORDER — URSODIOL 300 MG/1
300 CAPSULE ORAL 3 TIMES DAILY
Qty: 90 CAPSULE | Refills: 2 | Status: SHIPPED | OUTPATIENT
Start: 2024-09-05

## 2024-09-05 NOTE — PROGRESS NOTES
PRE-ALLOGENEIC STEM CELL TRANSPLANT ONCOLOGY PHARMACY EDUCATION NOTE   Brandt Merritt is a 64 y.o. male with a diagnosis of AML / MDS scheduled to undergo an cord blood allogeneic stem cell transplant on 9/19/2024. The patient will receive conditioning chemotherapy with fludarabine 30 mg/m2 IV D-5, D-4, D-3, melphalan 140 mg/m2 IV D-2, Total Body Irradiation (TBI) 200 cGy D-1 and GVHD prophylaxis with tacrolimus 0.03 mg/kg every 12 hours starting T-3, and mycophenolate 1000 mg three times daily starting T+0. The patient's primary transplant oncologist is Dr. Newsome. Patient presents to clinic today for evaluation. Pharmacist was consulted to provide education regarding conditioning chemotherapy, which will be initiated on 9/14/2024 and assist with transitions of care.    Chemotherapy Education: The chemotherapy regimen was discussed with the patient/family including treatment schedule, potential side effects, and management of side effects. Potential side effects discussed include: myelosuppression, infection, nausea/vomiting, diarrhea, alopecia, fatigue, hepatotoxicity, hemorrhagic cystitis, cardiotoxicity, mucositis, taste changes, and GVHD. Management techniques of these side effects were discussed. Supportive care medications were briefly discussed in terms of use and potential side effects. Patient has ondansetron and prochlorperazine available at home.    Home Medications: Reviewed medication list. Drug interactions identified include: none. Patient currently does not take herbal supplements. Discussed that herbal supplements are not regulated by the FDA and thus have not been well studied to assess drug-drug, drug-food, drug-disease interactions.   - Stopped levofloxacin and fluconazole since ANC > 500    Anti-Infective Prophylaxis: The patient will require the following anti-infective medications. The necessary medications were sent to [facility] to assist with hospital discharge planning and  medication access. A pharmacy support liaison will assist with managing co-pays and prior authorizations. Letermovir must be filled at [facility] specialty pharmacy.    HSV CMV Fungal Bacterial C Difficile  PJP   Acyclovir 400 mg tablets: take 1 tablet by mouth every 12 hours Letermovir 480 mg tablets: take 1 tablet by mouth once daily Posaconazole 100 mg tablets: take 3 tablets by mouth once daily Levofloxacin 500 mg tablets: take 1 tablet by mouth once daily Vancomycin 125 mg capsules: take 1 capsule by mouth once daily Bactrim /160 mg tablets: take 1 tablet by mouth on Mondays, Wednesdays, and Fridays   Start on Day 0 and continue for at least 15 months after transplant  (Pt already on) Start on Day +14 and continue until at least Day +200 (Pt CMV IgG+) Start on Day +1 and continue until off immunosuppression Start once neutropenic and continue until engraftment Start upon admission and continue until discharge Start on day +30 and continue for at least 12 months after transplant     GVHD Prophylaxis: The following GVHD prophylactic medications were sent to  [facility] to assist with hospital discharge planning and medication access. A pharmacy support liaison will assist with managing co-pays and prior authorizations.  Tacrolimus   0.5 mg capsules Tacrolimus   1.0 mg capsules Mycophenolate Mofetil   250 mg capsules     Sinusoidal Obstruction Syndrome (SOS) Prophylaxis: Ursodiol 300 mg capsules were sent to [facility] to assist with hospital discharge planning and medication access. A pharmacy support liaison will assist with managing co-pays and prior authorizations.     All questions were answered. Patient/family verbalized understanding of the plan of care and information provided. Will follow as necessary. Time spent with patient/family and/or coordinating care: 120 minutes.      Franci Noe, PharmD  Ambulatory Stem Cell Transplant Transplant Pharmacist    Current Outpatient Medications    Medication Instructions    acyclovir (ZOVIRAX) 400 mg, oral, 2 times daily    fluconazole (DIFLUCAN) 400 mg, oral, Daily    folic acid (FOLVITE) 1 mg, oral, Daily    letermovir (PREVYMIS) 480 mg, oral, Daily    levoFLOXacin (LEVAQUIN) 500 mg, oral, Daily    magnesium chloride (MAGDELAY) 128 mg, oral, 3 times daily, Or as instructed on your medication list.    mycophenolate (CELLCEPT) 1,000 mg, oral, 3 times daily    NIFEdipine ER (ADALAT CC) 60 mg, oral, Daily before breakfast, Do not crush, chew, or split.    ondansetron (ZOFRAN) 8 mg, oral, Every 8 hours PRN    posaconazole (NOXAFIL) 300 mg, oral, Daily, Do not crush, chew, or split. This is to prevent fungal infections.    prochlorperazine (COMPAZINE) 10 mg, oral, Every 6 hours PRN    sennosides-docusate sodium (Karissa-Colace) 8.6-50 mg tablet 1 tablet, oral, Daily    [START ON 9/6/2024] sulfamethoxazole-trimethoprim (Bactrim DS) 800-160 mg tablet 1 tablet, oral, 3 times weekly, Take on Mondays, Wednesdays, and Fridays. Do not start until instructed.    tacrolimus (PROGRAF) 0.5 mg, oral, Every 12 hours, Or as instructed on your medication list.    tacrolimus (PROGRAF) 2 mg, oral, Every 12 hours, Or as instructed on your medication list.    ursodiol (ACTIGALL) 300 mg, oral, 3 times daily

## 2024-09-05 NOTE — PROGRESS NOTES
9/5/24 9:45AM    Met with patient in infusion prior to to scheduled BMBx. Updated patient and wife that we are proceeding to Allo SCT with a single UCD. Patient and wife verbalized understanding. I provided patient and wife with updated calendar with appointments for next week and transplant details. I reinforced Allo SCT education. Patient and wife are in agreement with plan and have no further questions at this time. I will meet with them on 9/9 at pre-admission visit with Dr. Newsome to sign consents.     Daxa Hurtado RN

## 2024-09-05 NOTE — PROGRESS NOTES
PRE-ALLOGENEIC STEM CELL TRANSPLANT ONCOLOGY PHARMACY EDUCATION NOTE   Brandt Merritt is a 64 y.o. male with a diagnosis of AML /MDS scheduled to undergo an cord blood allogeneic stem cell transplant on 9/19/2024. The patient will receive conditioning chemotherapy with fludarabine 30 mg/m2 IV D-5, D-4, D-3, D-2, melphalan 140 mg/m2 IV D-2, Total Body Irradiation (TBI) 200 cGy D-1 and GVHD prophylaxis with cyclophosphamide 50 mg/kg IV D+3, D+4, tacrolimus 0.03 mg/kg every 12 hours starting on D+5, and mycophenolate 1000 mg three times daily starting on D+5. The patient's primary transplant oncologist is Dr. Newsome. Patient presents to clinic today for evaluation. Pharmacist was consulted to provide education regarding conditioning chemotherapy, which will be initiated on 9/14/2024 and assist with transitions of care.    Chemotherapy Education: The chemotherapy regimen was discussed with the patient/family including treatment schedule, potential side effects, and management of side effects. Potential side effects discussed include: myelosuppression, infection, nausea/vomiting, diarrhea, alopecia, fatigue, hepatotoxicity, hemorrhagic cystitis, cardiotoxicity, mucositis, taste changes, and GVHD. Management techniques of these side effects were discussed. Supportive care medications were briefly discussed in terms of use and potential side effects. Patient has ondansetron and prochlorperazine available at home.    Home Medications: Reviewed medication list. Drug interactions identified include: fluconazole (duplicate antifungal orders), posaconazole x tacrolimus (Posaconazole may increase the serum concentration of Tacrolimus). Patient currently does not take herbal supplements. Discussed that herbal supplements are not regulated by the FDA and thus have not been well studied to assess drug-drug, drug-food, drug-disease interactions.     Anti-Infective Prophylaxis: The patient will require the following anti-infective  medications. The necessary medications were sent to [facility] to assist with hospital discharge planning and medication access. A pharmacy support liaison will assist with managing co-pays and prior authorizations.    HSV CMV Fungal Bacterial C Difficile  PJP   Acyclovir 400 mg tablets: take 1 tablet by mouth every 12 hours Letermovir 480 mg tablets: take 1 tablet by mouth once daily Posaconazole 100 mg tablets: take 3 tablets by mouth once daily Levofloxacin 500 mg tablets: take 1 tablet by mouth once daily Vancomycin 125 mg capsules: take 1 capsule by mouth once daily Bactrim /160 mg tablets: take 1 tablet by mouth on Mondays, Wednesdays, and Fridays   Start on Day 0 and continue for at least 15 months after transplant  (Pt already on) Start on Day +14 and continue until at least Day +200 (Pt CMV IgG+) Start on Day +1 and continue until off immunosuppression Start once neutropenic and continue until engraftment Start upon admission and continue until discharge Start on day +30 and continue for at least 12 months after transplant     GVHD Prophylaxis: The following GVHD prophylactic medications were sent to [facility] to assist with hospital discharge planning and medication access. A pharmacy support liaison will assist with managing co-pays and prior authorizations.  Tacrolimus   0.5 mg capsules Tacrolimus   1.0 mg capsules Mycophenolate Mofetil   250 mg capsules     Sinusoidal Obstruction Syndrome (SOS) Prophylaxis: Ursodiol 300 mg capsules were sent to [facility] to assist with hospital discharge planning and medication access. A pharmacy support liaison will assist with managing co-pays and prior authorizations.     All questions were answered. Patient/family verbalized understanding of the plan of care and information provided. Will follow as necessary. Time spent with patient/family and/or coordinating care: 50 minutes.      Franci Noe, PharmD  Ambulatory Stem Cell Transplant  Transplant Pharmacist    Current Outpatient Medications   Medication Instructions    acyclovir (ZOVIRAX) 400 mg, oral, 2 times daily    fluconazole (DIFLUCAN) 400 mg, oral, Daily    folic acid (FOLVITE) 1 mg, oral, Daily    letermovir (PREVYMIS) 480 mg, oral, Daily    levoFLOXacin (LEVAQUIN) 500 mg, oral, Daily    magnesium chloride (MAGDELAY) 128 mg, oral, 3 times daily, Or as instructed on your medication list.    mycophenolate (CELLCEPT) 1,000 mg, oral, 3 times daily    NIFEdipine ER (ADALAT CC) 60 mg, oral, Daily before breakfast, Do not crush, chew, or split.    ondansetron (ZOFRAN) 8 mg, oral, Every 8 hours PRN    posaconazole (NOXAFIL) 300 mg, oral, Daily, Do not crush, chew, or split. This is to prevent fungal infections.    prochlorperazine (COMPAZINE) 10 mg, oral, Every 6 hours PRN    sennosides-docusate sodium (Karissa-Colace) 8.6-50 mg tablet 1 tablet, oral, Daily    [START ON 9/6/2024] sulfamethoxazole-trimethoprim (Bactrim DS) 800-160 mg tablet 1 tablet, oral, 3 times weekly, Take on Mondays, Wednesdays, and Fridays. Do not start until instructed.    tacrolimus (PROGRAF) 0.5 mg, oral, Every 12 hours, Or as instructed on your medication list.    tacrolimus (PROGRAF) 2 mg, oral, Every 12 hours, Or as instructed on your medication list.    ursodiol (ACTIGALL) 300 mg, oral, 3 times daily

## 2024-09-05 NOTE — PROGRESS NOTES
Bone Marrow Biopsy and Aspiration     Date: 9/5/2024  Performed by: Abby Mitchell CNP    Consent:   Consent obtained:  Written  Consent given by:  Patient  Risks, benefits, and alternatives were discussed: yes   Risks discussed:  Bleeding, infection and pain    Indications: Pre-Transplant (Allo)    Universal protocol:   Procedure explained and questions answered to patient or proxy's satisfaction: yes    Relevant documents present and verified: yes    Test results available: yes   Site/side marked: yes    Immediately prior to procedure, a time out was called: yes    Patient identity confirmed:  Verbally with patient and provided demographic data    Pre-procedure details:   Skin preparation:  Chlorhexidine  Preparation: Patient was prepped and draped in the usual sterile fashion      Sedation:   Sedation type:  None    Anesthesia:   Anesthesia method:  Local infiltration  Local anesthetic:  15 mL of 1% Lidocaine     Procedure specific details:   Patient lay in the prone position and the left iliac crest was accessed. A total of 15 ml of lidocaine was utilized for the procedure. Aspirate samples were obtained without difficulty. Core obtained.     Post-procedure details:   Procedure completion:  Tolerated well, no immediate complications    HAYLEY Turner-ELDER

## 2024-09-06 ENCOUNTER — TELEPHONE (OUTPATIENT)
Dept: HEMATOLOGY/ONCOLOGY | Facility: HOSPITAL | Age: 64
End: 2024-09-06

## 2024-09-06 ENCOUNTER — APPOINTMENT (OUTPATIENT)
Dept: GASTROENTEROLOGY | Facility: CLINIC | Age: 64
End: 2024-09-06
Payer: COMMERCIAL

## 2024-09-07 LAB
CELL COUNT (BLOOD): 2.06 X10*3/UL
CELL POPULATIONS: NORMAL
DIAGNOSIS: NORMAL
FLOW DIFFERENTIAL: NORMAL
FLOW TEST ORDERED: NORMAL
LAB TEST METHOD: NORMAL
NUMBER OF CELLS COLLECTED: NORMAL
PATH REPORT.TOTAL CANCER: NORMAL
SIGNATURE COMMENT: NORMAL
SPECIMEN VIABILITY: NORMAL

## 2024-09-08 ASSESSMENT — ENCOUNTER SYMPTOMS: APPETITE CHANGE: 0

## 2024-09-08 NOTE — PROGRESS NOTES
Patient ID: Brandt Merritt is a 64 y.o. male.    Subjective    HPI  Brandt Merritt is a 64 y.o. 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 Seen by Abundio Rust and Rose Casal initially 10/2023 for pancytopenia. He has had work up for pancytopenia and hemolytic process . Admits to having ongoing chronic fatigue that is unchanged . Patient had normal CBC June 2020. Denied any hemorrhoidal bleeding. Has had C Scope and EGD , treated Hep C 2014 . 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       Additional information 2021 PSA was high, biopsy 2021 and 2023 all benign.     CBC 4/11/24 wbc 3.0, hgb 7.1, platelets 167K ANC 0.73  BM histology  Myelodysplastic neoplasm with increased blasts-2 ( WHO)  Myelodysplastic neoplasm/AML  (ICC 2022 classification)  Blasts 11% on aspirate smear and 15-20% based on CD 34 immunostaining approaching AML leukemia, hematopoiesis is erythroid dominant with dysplasia in granulocytes and megakaryocytes.   FISH studies trisomy 8 17.2%  NGS panel DNMT3 aVAF 36%  U2AF1 VAF 32%     Recent bone marrow from 6/17 showed 1% blasts, but as expected ongoing myelodysplasia. Residual trisomy 8 in 2% of cells.      Brandt presents to the clinic today (09/9/24)) with his wife Nichole for ongoing management of myelodysplastic neoplasm/AML and to sign allogeneic transplant consent.  He feels very well and he regained his weight.  He received C4 decitabine on 8/12/24 and remains on venetoclax 200 mg days 1-21 out of 28 day cycle.  He is currently on day 28 of the cycle. BM biopsy done 9/5/24 showed ongoing mild dyserythorpoieis, no blasts on flow cytometry, trisomy 8 analysis is pending.      We have selected  UCB ID [id number] which is a 6/8 match with a mismatch at A and DRB1, loci. Cord unit contains TNC dose of 3.45x 107 TNC /kg and 3.42 x105/kg CD 34 cells and is A+.  HLA antibody screen of patient reveals no relevant antibodies.       Additional pretransplant testing:  On 7/17/24, he underwent  pretransplant  # cardiac evaluation LVEF 59%, no interventions at this time although long term would want better control of blood pressure, also had aortic aneurysm of borderline size which will require ongoing followup  #EKG normal   #PFTs  FEV1 112%, % ,FEV1/FVC 96%, DLCO %,   #CT chest. No evidence of acute process in the chest, ectatic ascending thoracic aorta measuring 3.9 cm.   #He was also seen by hepatology and fibroscan performed on 24 show a score of 0.     Past Medical History:  HTN   STEMI 2020 after getting  a water pills.  Chronic hepatitis C infection treated in  treated  with Dr. Lloyd  Medical History        Past Medical History:   Diagnosis Date    Hypertension      Personal history of other diseases of the circulatory system       History of hypertension            Surgical History:  Colonosocopy 2021  Upper EGD 10/31/23  Surgical reduction torsion of testes      FH:  CAD, DM in mother       Mother  of CVA at age 69  Brother with prostate CA, 1 sister with liver disease  2 children healthy      Review of Systems   Constitutional:  Negative for appetite change.   All other systems reviewed and are negative.      Objective    BSA: 1.84 meters squared  /86   Pulse 101   Temp 36.1 °C (97 °F)   Resp 17   Wt 74.2 kg (163 lb 9.3 oz)   SpO2 100%   BMI 27.59 kg/m²   Vital signs reviewed today.      Physical Exam  Vitals reviewed.   Constitutional:       Appearance: Normal appearance.      Comments: Looks fit and well, younger than stated age   HENT:      Head: Normocephalic and atraumatic.      Nose: Nose normal.      Mouth/Throat:      Mouth: Mucous membranes are moist.      Pharynx: Oropharynx is clear.   Eyes:      Extraocular Movements: Extraocular movements intact.      Conjunctiva/sclera: Conjunctivae normal.      Pupils: Pupils are equal, round, and reactive to light.   Cardiovascular:      Rate and Rhythm: Normal rate and regular rhythm.      Pulses:  Normal pulses.      Heart sounds: Normal heart sounds.   Pulmonary:      Effort: Pulmonary effort is normal.      Breath sounds: Normal breath sounds.   Abdominal:      General: Abdomen is flat. Bowel sounds are normal.      Palpations: Abdomen is soft.   Musculoskeletal:         General: Normal range of motion.      Cervical back: Normal range of motion.   Skin:     General: Skin is warm and dry.      Capillary Refill: Capillary refill takes less than 2 seconds.   Neurological:      General: No focal deficit present.      Mental Status: He is alert and oriented to person, place, and time. Mental status is at baseline.   Psychiatric:         Mood and Affect: Mood normal.         Behavior: Behavior normal.         Thought Content: Thought content normal.         Judgment: Judgment normal.         Performance Status:  Karnofsky Score: 80 - Normal activity with effort; some signs or symptoms of disease      Assessment/Plan        Oncology History Overview Note   4/2024  Myelodysplastic neoplasm with increased blasts-2 ( WHO)  Myelodysplastic neoplasm/AML  (ICC 2022 classification)    Presented in  10/2023 for pancytopenia, found to have hemolysis. Patient had normal CBC June 2020. Denied any hemorrhoidal bleeding . Has had C Scope and EGD , treated Hep C 2014 . 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      CBC 4/11/24 wbc 3.0, hgb 7.1, platelets 167K ANC 0.73    BM biopsy done on 4/11/24  as folllows:  BM histology  Myelodysplastic neoplasm with increased blasts-2 ( WHO)  Myelodysplastic neoplasm/AML  (ICC 2022 classification)  Balsts 11% on aspirate smear and 15-20% based on CD 34 immunostaining approaching AML leukemia, hematooiesis is erythroid dominant with dysplasia in granulocytes and megakaryocytes.   FISH studies trisomy 8 17.2%  NGS panel DNMT3 aVAF 36%  U2AF1 VAF 32%       Acute myeloid leukemia not having achieved remission (Multi)   4/23/2024 Initial Diagnosis    Acute myeloid leukemia not having  achieved remission (Multi)     5/13/2024 - 8/16/2024 Chemotherapy    Venetoclax / Decitabine, 28 Day Cycles - Induction / Consolidation     9/14/2024 -  Bone Marrow Transplant          1. Myelodysplastic neoplasm/AML- Patient presents with a history of pancytopenia since 9/2023 and due to confusion picture of hemolysis with only mild leukopenia and neutropenia extensive workup done prior to bone marrow bx which shows MDS in transition to AML, with trisomy 8 and DNMT alpha and U2AF1 mutation which is adverse risk. Given that the patient has > 10% blasts in bone marrow I would favor induction chemotherapy with ultimate goal to pursue curative allogeneic stem cell  transplant.  The patients slow, indolent course is more consistent with MDS and he was recommended induction with decitabine and venetoclax which has about a 60% chance of achieving a complete clinical remission.    C1D1: 5/13/2024  C2D1: 6/11/2024 , venetoclax to 200 mg 21/28 days, check blood counts   BM biopsy 6/17/24 blasts cleared, FISH still positive for trisomy 8, still MDS changes  C4D1:  8/12/24.     9/9/24:  Patient is overall doing very well and is being prepared for his upcoming single umbilical cord blood transplant. . BM biopsy no  blasts. Additional organ function testing on 7/17/24    # cardiac evaluation LVEF 59%, no interventions at this time although long term would want better control of blood pressure, also had aortic aneurysm of borderline size which will require ongoing followup  #EKG normal   #PFTs  FEV1 112%, % ,FEV1/FVC 96%, DLCO %,   #CT chest. No evidence of acute process in the chest, ectatic ascending thoracic aorta measuring 3.9 cm.   #He was also seen by hepatology and fibroscan performed on 7/24/24 show a score of 0.    We have selected  UCB unit  ID [id number] which is a 6/8 match with a mismatch at A and DRB1, loci. Cord unit contains TNC dose of 3.45x 107 TNC /kg and 3.42 x105/kg CD 34 cells and is A+.  HLA  antibody screen of patient reveals no relevant antibodies.  Patient is CMV seropositive and toxo negative.    Today we reviewed the allogeneic transplant procedure, planned preparative regimen is fludarabine 30 mg/m2 x3, rianna 140mg/m2 and TBI t-1,   T0 will be 9/19/24.. GVHD prophylaxis will be tacrolimus day T-3 and MMF for 35 days starting on T0. Potential toxicities including acute GVHD, consequences of cytopenias including infections which could be life threatening, need for transfusions, GI toxicities, and serious organ dysfuction any of which could result in need for intensive unit support or even death. Importance of remaining active, fastidious attention to personal hygiene and alerting the inpatient team of any concerning symptoms were encouraged. Transplant consent and data base and tumor repository consents signed by the patient and all questions answered.  We discussed potential need for long term GVHD medsand anti-infectives. Chemo orders reviewed and placed in epic system Will start letermovir on t+14.    CMI 2: 1 each for age and hepatitis.     2. ID Ppx:   - acyclovir, fluconazole,  needs letermovir T+14     3. Pre-transplant workup:  - Echocardiogram show a normal LVEF  - await pfts, fibroscan no fibrosis      4. 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-normal hemoglobin level until 2020, generally asymptomatic, should pose no peritransplant issues.      5. Hx of treated hepatitis C - recent hep C RNA pcr negative, and recent liver ultrasound shows normal sized liver at 14.5 cm, however seems enlarged on exam. Patient agreeable to hepatology consult before consideration of allograft, scheduled on 7/17/24. Ursodiol started early in course. 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  Seen by SANCHEZ Mccauley on 7/18/24, fibroscan obtained 7/24/24-shows 4.8 KPa indicating F0 or minimal fibrosis  Advised for close monitoring of liver functioning and  repeating hepatitis serologies.       Weight Loss:   Reports good appetite and is eating about 3 meals per day.  Discussed ways to obtain more calories.      RTC  Admission on 9/13/24    Malaika Newsome MD  [facility]

## 2024-09-09 ENCOUNTER — LAB (OUTPATIENT)
Dept: LAB | Facility: HOSPITAL | Age: 64
End: 2024-09-09
Payer: COMMERCIAL

## 2024-09-09 ENCOUNTER — OFFICE VISIT (OUTPATIENT)
Dept: HEMATOLOGY/ONCOLOGY | Facility: HOSPITAL | Age: 64
End: 2024-09-09
Payer: COMMERCIAL

## 2024-09-09 ENCOUNTER — DOCUMENTATION (OUTPATIENT)
Dept: OTHER | Facility: HOSPITAL | Age: 64
End: 2024-09-09
Payer: COMMERCIAL

## 2024-09-09 VITALS
WEIGHT: 163.58 LBS | DIASTOLIC BLOOD PRESSURE: 86 MMHG | BODY MASS INDEX: 27.59 KG/M2 | OXYGEN SATURATION: 100 % | SYSTOLIC BLOOD PRESSURE: 132 MMHG | RESPIRATION RATE: 17 BRPM | TEMPERATURE: 97 F | HEART RATE: 101 BPM

## 2024-09-09 DIAGNOSIS — C92.01 ACUTE MYELOID LEUKEMIA IN REMISSION (MULTI): ICD-10-CM

## 2024-09-09 DIAGNOSIS — Z76.82 STEM CELL TRANSPLANT CANDIDATE: ICD-10-CM

## 2024-09-09 DIAGNOSIS — C92.00 ACUTE MYELOID LEUKEMIA NOT HAVING ACHIEVED REMISSION (MULTI): ICD-10-CM

## 2024-09-09 PROBLEM — C92.02 AML (ACUTE MYELOID LEUKEMIA) IN RELAPSE (MULTI): Status: ACTIVE | Noted: 2024-09-09

## 2024-09-09 LAB
ABO GROUP (TYPE) IN BLOOD: NORMAL
ALBUMIN SERPL BCP-MCNC: 3.9 G/DL (ref 3.4–5)
ALP SERPL-CCNC: 116 U/L (ref 33–136)
ALT SERPL W P-5'-P-CCNC: 8 U/L (ref 10–52)
ANION GAP SERPL CALC-SCNC: 12 MMOL/L (ref 10–20)
ANTIBODY SCREEN: NORMAL
AST SERPL W P-5'-P-CCNC: 13 U/L (ref 9–39)
BASOPHILS # BLD AUTO: 0.06 X10*3/UL (ref 0–0.1)
BASOPHILS NFR BLD AUTO: 2.9 %
BILIRUB SERPL-MCNC: 0.3 MG/DL (ref 0–1.2)
BUN SERPL-MCNC: 19 MG/DL (ref 6–23)
CALCIUM SERPL-MCNC: 9.3 MG/DL (ref 8.6–10.3)
CHLORIDE SERPL-SCNC: 105 MMOL/L (ref 98–107)
CO2 SERPL-SCNC: 28 MMOL/L (ref 21–32)
CREAT SERPL-MCNC: 1.02 MG/DL (ref 0.5–1.3)
DACRYOCYTES BLD QL SMEAR: NORMAL
EGFRCR SERPLBLD CKD-EPI 2021: 81 ML/MIN/1.73M*2
EOSINOPHIL # BLD AUTO: 0.07 X10*3/UL (ref 0–0.7)
EOSINOPHIL NFR BLD AUTO: 3.3 %
ERYTHROCYTE [DISTWIDTH] IN BLOOD BY AUTOMATED COUNT: 20.4 % (ref 11.5–14.5)
GLUCOSE SERPL-MCNC: 100 MG/DL (ref 74–99)
HCT VFR BLD AUTO: 37.5 % (ref 41–52)
HGB BLD-MCNC: 13.4 G/DL (ref 13.5–17.5)
IMM GRANULOCYTES # BLD AUTO: 0 X10*3/UL (ref 0–0.7)
IMM GRANULOCYTES NFR BLD AUTO: 0 % (ref 0–0.9)
LYMPHOCYTES # BLD AUTO: 1.31 X10*3/UL (ref 1.2–4.8)
LYMPHOCYTES NFR BLD AUTO: 62.4 %
MAGNESIUM SERPL-MCNC: 1.92 MG/DL (ref 1.6–2.4)
MCH RBC QN AUTO: 28.5 PG (ref 26–34)
MCHC RBC AUTO-ENTMCNC: 35.7 G/DL (ref 32–36)
MCV RBC AUTO: 80 FL (ref 80–100)
MONOCYTES # BLD AUTO: 0.14 X10*3/UL (ref 0.1–1)
MONOCYTES NFR BLD AUTO: 6.7 %
NEUTROPHILS # BLD AUTO: 0.52 X10*3/UL (ref 1.2–7.7)
NEUTROPHILS NFR BLD AUTO: 24.7 %
NRBC BLD-RTO: 0 /100 WBCS (ref 0–0)
OVALOCYTES BLD QL SMEAR: NORMAL
PLATELET # BLD AUTO: 323 X10*3/UL (ref 150–450)
PLATELET CLUMP BLD QL SMEAR: PRESENT
POTASSIUM SERPL-SCNC: 3.8 MMOL/L (ref 3.5–5.3)
PROT SERPL-MCNC: 7.6 G/DL (ref 6.4–8.2)
RBC # BLD AUTO: 4.7 X10*6/UL (ref 4.5–5.9)
RBC MORPH BLD: NORMAL
RH FACTOR (ANTIGEN D): NORMAL
SARS-COV-2 RNA RESP QL NAA+PROBE: NOT DETECTED
SCHISTOCYTES BLD QL SMEAR: NORMAL
SODIUM SERPL-SCNC: 141 MMOL/L (ref 136–145)
TARGETS BLD QL SMEAR: NORMAL
URATE SERPL-MCNC: 4.8 MG/DL (ref 4–7.5)
WBC # BLD AUTO: 2.1 X10*3/UL (ref 4.4–11.3)

## 2024-09-09 PROCEDURE — 84075 ASSAY ALKALINE PHOSPHATASE: CPT

## 2024-09-09 PROCEDURE — 87635 SARS-COV-2 COVID-19 AMP PRB: CPT | Performed by: INTERNAL MEDICINE

## 2024-09-09 PROCEDURE — 36415 COLL VENOUS BLD VENIPUNCTURE: CPT

## 2024-09-09 PROCEDURE — 86901 BLOOD TYPING SEROLOGIC RH(D): CPT

## 2024-09-09 PROCEDURE — 84550 ASSAY OF BLOOD/URIC ACID: CPT

## 2024-09-09 PROCEDURE — 99215 OFFICE O/P EST HI 40 MIN: CPT | Performed by: INTERNAL MEDICINE

## 2024-09-09 PROCEDURE — 85025 COMPLETE CBC W/AUTO DIFF WBC: CPT

## 2024-09-09 PROCEDURE — 83735 ASSAY OF MAGNESIUM: CPT

## 2024-09-09 RX ORDER — DIPHENHYDRAMINE HCL 25 MG
25 CAPSULE ORAL ONCE
OUTPATIENT
Start: 2024-09-19 | End: 2024-09-19

## 2024-09-09 RX ORDER — ONDANSETRON HYDROCHLORIDE 2 MG/ML
8 INJECTION, SOLUTION INTRAVENOUS ONCE
OUTPATIENT
Start: 2024-09-19 | End: 2024-09-19

## 2024-09-09 RX ORDER — PALONOSETRON 0.05 MG/ML
0.25 INJECTION, SOLUTION INTRAVENOUS ONCE
OUTPATIENT
Start: 2024-09-17 | End: 2024-09-17

## 2024-09-09 RX ORDER — LORAZEPAM 1 MG/1
1 TABLET ORAL ONCE AS NEEDED
OUTPATIENT
Start: 2024-09-19 | End: 2024-09-19

## 2024-09-09 RX ORDER — PROCHLORPERAZINE EDISYLATE 5 MG/ML
10 INJECTION INTRAMUSCULAR; INTRAVENOUS EVERY 6 HOURS PRN
Status: CANCELLED | OUTPATIENT
Start: 2024-09-14

## 2024-09-09 RX ORDER — ALBUTEROL SULFATE 0.83 MG/ML
3 SOLUTION RESPIRATORY (INHALATION) AS NEEDED
Status: CANCELLED | OUTPATIENT
Start: 2024-09-14

## 2024-09-09 RX ORDER — MELPHALAN HCL 50 MG
140 VIAL (EA) INTRAVENOUS ONCE
OUTPATIENT
Start: 2024-09-17 | End: 2024-09-17

## 2024-09-09 RX ORDER — DIPHENHYDRAMINE HYDROCHLORIDE 50 MG/ML
50 INJECTION INTRAMUSCULAR; INTRAVENOUS AS NEEDED
Status: CANCELLED | OUTPATIENT
Start: 2024-09-14

## 2024-09-09 RX ORDER — EPINEPHRINE 1 MG/ML
0.3 INJECTION INTRAMUSCULAR; INTRAVENOUS; SUBCUTANEOUS EVERY 5 MIN PRN
Status: CANCELLED | OUTPATIENT
Start: 2024-09-14

## 2024-09-09 RX ORDER — VANCOMYCIN HYDROCHLORIDE 125 MG/1
125 CAPSULE ORAL DAILY
Status: CANCELLED | OUTPATIENT
Start: 2024-09-14

## 2024-09-09 RX ORDER — TACROLIMUS 1 MG/1
0.03 CAPSULE ORAL EVERY 12 HOURS
OUTPATIENT
Start: 2024-09-16

## 2024-09-09 RX ORDER — POSACONAZOLE 100 MG/1
300 TABLET, DELAYED RELEASE ORAL EVERY 24 HOURS
OUTPATIENT
Start: 2024-09-21

## 2024-09-09 RX ORDER — PROCHLORPERAZINE MALEATE 10 MG
10 TABLET ORAL EVERY 6 HOURS PRN
Status: CANCELLED | OUTPATIENT
Start: 2024-09-14

## 2024-09-09 RX ORDER — URSODIOL 300 MG/1
300 CAPSULE ORAL 3 TIMES DAILY
Status: CANCELLED | OUTPATIENT
Start: 2024-09-14

## 2024-09-09 RX ORDER — POSACONAZOLE 100 MG/1
300 TABLET, DELAYED RELEASE ORAL EVERY 12 HOURS
OUTPATIENT
Start: 2024-09-20

## 2024-09-09 RX ORDER — MYCOPHENOLATE MOFETIL 250 MG/1
1000 CAPSULE ORAL 3 TIMES DAILY
OUTPATIENT
Start: 2024-09-19

## 2024-09-09 RX ORDER — FAMOTIDINE 10 MG/ML
20 INJECTION INTRAVENOUS AS NEEDED
Status: CANCELLED | OUTPATIENT
Start: 2024-09-14

## 2024-09-09 RX ORDER — SODIUM CHLORIDE 9 MG/ML
100 INJECTION, SOLUTION INTRAVENOUS CONTINUOUS
Status: CANCELLED | OUTPATIENT
Start: 2024-09-14

## 2024-09-09 RX ORDER — ACYCLOVIR 400 MG/1
400 TABLET ORAL EVERY 12 HOURS
OUTPATIENT
Start: 2024-09-19

## 2024-09-09 ASSESSMENT — PAIN SCALES - GENERAL: PAINLEVEL: 0-NO PAIN

## 2024-09-09 NOTE — PROGRESS NOTES
9/9/24 10:00AM    Met with patient at pre-admission visit with Dr. Newsome. Patient was provided a calendar and I reviewed it with him and his wife. Allogeneic SCT education was reinforced. Consents (Transplant, NMDP 2Z02, NMDP 3Z02) were reviewed and signed with Dr. Newsome. All questions were answered thoroughly by both myself and Dr. Newsome. Patient has my contact information.    Daxa Hurtado RN

## 2024-09-11 ENCOUNTER — TELEPHONE (OUTPATIENT)
Dept: RADIATION ONCOLOGY | Facility: HOSPITAL | Age: 64
End: 2024-09-11
Payer: COMMERCIAL

## 2024-09-11 NOTE — PROGRESS NOTES
Radiation Oncology Outpatient Consult    Patient Name:  Brandt Merritt  MRN:  [id number]  :  1960    Referring Provider: Malaika Newsome MD  Primary Care Provider: Bill Richmond MD  Care Team: Patient Care Team:  Bill Richmond MD as PCP - General (Internal Medicine)  Rosanne M Casal, APRN-CNP, DNP as PCP - [facility] PCP  Sadia CHAUDHARY MD as Consulting Physician (Hematology and Oncology)  Malaika Newsome MD as Consulting Physician (Hematology and Oncology)  Katherine Tomlin RN as Registered Nurse (Hematology and Oncology)    Date of Service: 2024     History of Present Illness:  Brandt Merritt is a 64 y.o. male who was referred by No ref. provider found, for a consultation to the [facility] Department of Radiation Oncology.  He is presenting for evaluation and management of Myelodysplastic neoplasm / AML.     His oncological work up and treatment history is as below:    10/2023: Presented with pancytopenia  2024: BM biopsy, L Iliac Crest. Showed MDS/AML  2024: C1D1 - decitabine and venetoclax  2024: C2D1 - decitabine and venetoclax  2024: BM biopsy, L Iliac Crest. Persistent Myelodysplastic neoplasm  2024: C4D1 - decitabine and venetoclax  2024: BM biopsy, L Iliac Crest. No blasts, mild dyserythropoiesis, which could be secondary to marrow recovery.  Allogenic transplant anticipated 2024 with TBI scheduled for , 2Gy x 1 fx    Pathology Review:  The pertinent pathology results were reviewed from EMR and discussed with the patient.       Imaging:  The pertinent imaging results were reviewed from EMR/PACS with key results discussed with the patient.    Review of Systems: See separately signed RN note.    RADIATION SCREENING QUESTIONS:  Prior radiation therapy: No  Pacemaker: No  Other implantable devices: No  Connective tissue disease: No    Current Systemic Treatment:  No     Past Medical History:    Past Medical History:    Diagnosis Date    Chest pain 2021    HTN (hypertension) 10/05/2023    Hypertension     Personal history of other diseases of the circulatory system     History of hypertension     Past Surgical History:    Past Surgical History:   Procedure Laterality Date    COLONOSCOPY  2013    Complete Colonoscopy    OTHER SURGICAL HISTORY  10/07/2015    Surgery Testis Reduction Of Torsion Of Testis Right      Family History:  Cancer-related family history includes Prostate cancer in his brother.  Social History:    Social History     Tobacco Use    Smoking status: Former     Current packs/day: 0.00     Average packs/day: 0.3 packs/day for 28.0 years (7.0 ttl pk-yrs)     Types: Cigarettes     Start date: 1988     Quit date: 2016     Years since quittin.7    Smokeless tobacco: Never   Vaping Use    Vaping status: Never Used   Substance Use Topics    Alcohol use: Not Currently     Comment: occansionally    Drug use: Yes     Types: Marijuana     Comment: quit when diagnosed     Allergies:    Allergies   Allergen Reactions    Thiazides Other     Recurrent significant hypokalemia     Medications:  No current facility-administered medications for this encounter.  No current outpatient medications on file.    Facility-Administered Medications Ordered in Other Encounters:     acyclovir (Zovirax) tablet 400 mg, 400 mg, oral, BID, Lee Manzano PA-C    alteplase (Cathflo Activase) injection 2 mg, 2 mg, intra-catheter, PRN, Lee Manzano PA-C    folic acid (Folvite) tablet 1 mg, 1 mg, oral, Daily, Lee Manzano PA-C    [START ON 2024] NIFEdipine ER (Adalat CC) 24 hr tablet 60 mg, 60 mg, oral, Daily before breakfast, Lee Manzano PA-C    ondansetron (Zofran) tablet 8 mg, 8 mg, oral, q8h PRN, Lee Manzano PA-C    polyethylene glycol (Glycolax, Miralax) packet 17 g, 17 g, oral, Daily PRN, Lee Manzano PA-C    prochlorperazine (Compazine) tablet 10 mg, 10 mg, oral, q6h PRN, Lee Manzano PA-C    sennosides-docusate  sodium (Karissa-Colace) 8.6-50 mg per tablet 1 tablet, 1 tablet, oral, Daily, Lee Manzano PA-C    ursodiol (Actigall) capsule 300 mg, 300 mg, oral, TID, Lee Manzano PA-C      Performance Status:  The Karnofsky performance scale today is 100, Fully active, able to carry on all pre-disease performed without restriction (ECOG equivalent 0).     OBJECTIVE  Physical Exam:  /88   Pulse 87   Temp 36.3 °C (97.3 °F) (Skin)   Resp 18   Wt 74.3 kg (163 lb 12.8 oz)   SpO2 98%   BMI 27.62 kg/m²    Physical Exam  Constitutional:       General: He is not in acute distress.     Appearance: He is normal weight.   HENT:      Head: Normocephalic.   Cardiovascular:      Rate and Rhythm: Normal rate and regular rhythm.      Heart sounds: No murmur heard.  Pulmonary:      Effort: Pulmonary effort is normal. No respiratory distress.      Breath sounds: Normal breath sounds. No wheezing.   Musculoskeletal:      Right lower leg: No edema.      Left lower leg: No edema.   Neurological:      Mental Status: He is alert and oriented to person, place, and time.        Laboratory Review:  The pertinent lab results were reviewed and discussed with the patient.      ASSESSMENT:  Brandt Merritt is a 64 y.o. male with Myelodysplastic neoplasm/AML s/p 4 cycles of Decitabine and venetoclax. He has been recommended Allogenic Stem Cell Transplant and will receive total body irradiation 200 cGy/ single fraction as part of the conditioning regimen and presents to our clinic for evaluation and planning.  Details as below:  Type of Transplant: Allogeneic  Donor type: Umbilical Cord  Admit date: 9/13/24  Prep Regimen: Flux3, [id number], TBI 200cGy single fraction  TBI date: 9/18/24  T=0 date: 9/19/24    PLAN:    Mr. Merritt's pertinent history, exam, imaging and pathology details were reviewed.   Accompanied by his wife.    Treatment recommendations/Alternatives:  NCCN Guidelines were applicable to guide this patients treatment plan.   We reviewed the  standard of care management for prophylaxis for GVHD with TBI.     Today, we reviewed the role of total body irradiation as part of the conditioning regimen with the goal to eradicate circulating tumor cells. Per the treatment schema communicated to us by the transplant team, radiation will be given as a single treatment to a total dose of 200 cGy delivered. Because of the low dose, no Lung and Kidney blocks will be used.     Radiation treatment logistics:  We did review the logistics for planning TBI including the need for a measurement session. We reviewed our TBI technique which involves treatment in supine position with lateral fields.     We then reviewed possible side effects (Acute and long-term). Common and uncommon side effects with risks as relevant for his case were discussed.     Following this discussion, the patient elected to undergo TBI in our department. Informed consent was updated and measurements for TBI treatment planning were obtained.      TBI treatments will be coordinated with the floor.     Post-TBI, we will not schedule follow up with the patient as he will be following closely with the transplant team and medical oncologist, however, we encouraged him to contact us should he develop any concerns or questions regarding the radiation treatment.     Following this, any and all questions were answered to the patient's satisfaction.      Thank you again for referring this patient to our care.     The patient was provided my contact information.    Orders placed:  1) Radon intent to treat: TBI    Network location: The patient will be treated at the [facility] location. Simulation will be done at the [facility] location.    Jon Antonio MD  Resident Physician, PGY-2   Department of Radiation Oncology, [facility]   For  April Reynolds MD, MMM  Senior Attending Physician, [facility]  Professor, [employer]   Our [place]: “To  Heal, To Teach, To Discover.”  RN partner: [phone]/ [email or web address]    Phone (scheduling): [phone]/ [email or web address]  Proton Therapy (scheduling): [phone]/ [email or web address]  Phone (after hours): [phone]    ATTENDING ADDENDUM:  I saw and evaluated the patient with the resident. I personally obtained the key and critical portions of the history and physical exam and directly counseled the patient of the treatment plan. I reviewed the resident documentation and discussed the patient with the resident. I agree with the resident's medical decision making as documented in the note.

## 2024-09-12 ENCOUNTER — APPOINTMENT (OUTPATIENT)
Dept: HEMATOLOGY/ONCOLOGY | Facility: HOSPITAL | Age: 64
End: 2024-09-12
Payer: COMMERCIAL

## 2024-09-12 ENCOUNTER — LAB (OUTPATIENT)
Dept: LAB | Facility: HOSPITAL | Age: 64
End: 2024-09-12
Payer: COMMERCIAL

## 2024-09-12 ENCOUNTER — APPOINTMENT (OUTPATIENT)
Dept: RADIATION ONCOLOGY | Facility: HOSPITAL | Age: 64
End: 2024-09-12
Payer: COMMERCIAL

## 2024-09-12 ENCOUNTER — HOSPITAL ENCOUNTER (OUTPATIENT)
Dept: RADIATION ONCOLOGY | Facility: HOSPITAL | Age: 64
Setting detail: RADIATION/ONCOLOGY SERIES
Discharge: HOME | End: 2024-09-12
Payer: COMMERCIAL

## 2024-09-12 ENCOUNTER — SOCIAL WORK (OUTPATIENT)
Dept: HEMATOLOGY/ONCOLOGY | Facility: HOSPITAL | Age: 64
End: 2024-09-12
Payer: COMMERCIAL

## 2024-09-12 VITALS
WEIGHT: 163.8 LBS | SYSTOLIC BLOOD PRESSURE: 146 MMHG | BODY MASS INDEX: 27.62 KG/M2 | RESPIRATION RATE: 18 BRPM | DIASTOLIC BLOOD PRESSURE: 88 MMHG | HEART RATE: 87 BPM | OXYGEN SATURATION: 98 % | TEMPERATURE: 97.3 F

## 2024-09-12 DIAGNOSIS — C92.01 ACUTE MYELOID LEUKEMIA IN REMISSION (MULTI): ICD-10-CM

## 2024-09-12 DIAGNOSIS — Z76.82 STEM CELL TRANSPLANT CANDIDATE: ICD-10-CM

## 2024-09-12 DIAGNOSIS — C92.00 ACUTE MYELOID LEUKEMIA NOT HAVING ACHIEVED REMISSION (MULTI): Primary | ICD-10-CM

## 2024-09-12 DIAGNOSIS — C92.00 ACUTE MYELOID LEUKEMIA NOT HAVING ACHIEVED REMISSION (MULTI): ICD-10-CM

## 2024-09-12 LAB
ABO GROUP (TYPE) IN BLOOD: NORMAL
ALBUMIN SERPL BCP-MCNC: 4 G/DL (ref 3.4–5)
ALP SERPL-CCNC: 106 U/L (ref 33–136)
ALT SERPL W P-5'-P-CCNC: 7 U/L (ref 10–52)
ANION GAP SERPL CALC-SCNC: 10 MMOL/L (ref 10–20)
ANTIBODY SCREEN: NORMAL
AST SERPL W P-5'-P-CCNC: 14 U/L (ref 9–39)
BASOPHILS # BLD AUTO: 0.07 X10*3/UL (ref 0–0.1)
BASOPHILS NFR BLD AUTO: 3 %
BILIRUB SERPL-MCNC: 0.3 MG/DL (ref 0–1.2)
BUN SERPL-MCNC: 12 MG/DL (ref 6–23)
CALCIUM SERPL-MCNC: 9.3 MG/DL (ref 8.6–10.3)
CHLORIDE SERPL-SCNC: 108 MMOL/L (ref 98–107)
CMV IGG AVIDITY SERPL IA-RTO: REACTIVE %
CO2 SERPL-SCNC: 27 MMOL/L (ref 21–32)
CREAT SERPL-MCNC: 0.98 MG/DL (ref 0.5–1.3)
DACRYOCYTES BLD QL SMEAR: NORMAL
EBV EA IGG SER QL: NEGATIVE
EBV NA AB SER QL: POSITIVE
EBV VCA IGG SER IA-ACNC: POSITIVE
EBV VCA IGM SER IA-ACNC: NEGATIVE
EGFRCR SERPLBLD CKD-EPI 2021: 85 ML/MIN/1.73M*2
EOSINOPHIL # BLD AUTO: 0.05 X10*3/UL (ref 0–0.7)
EOSINOPHIL NFR BLD AUTO: 2.1 %
ERYTHROCYTE [DISTWIDTH] IN BLOOD BY AUTOMATED COUNT: 20.1 % (ref 11.5–14.5)
GLUCOSE SERPL-MCNC: 96 MG/DL (ref 74–99)
HBV CORE AB SER QL: NONREACTIVE
HBV CORE IGM SER QL: NONREACTIVE
HBV SURFACE AB SER-ACNC: <3.1 MIU/ML
HBV SURFACE AG SERPL QL IA: NONREACTIVE
HCT VFR BLD AUTO: 36.3 % (ref 41–52)
HCV AB SER QL: REACTIVE
HERPES SIMPLEX VIRUS 1 IGG: >8 INDEX
HERPES SIMPLEX VIRUS 2 IGG: <0.2 INDEX
HGB BLD-MCNC: 12.6 G/DL (ref 13.5–17.5)
HIV 1+2 AB+HIV1 P24 AG SERPL QL IA: NONREACTIVE
HYPOCHROMIA BLD QL SMEAR: NORMAL
IMM GRANULOCYTES # BLD AUTO: 0.01 X10*3/UL (ref 0–0.7)
IMM GRANULOCYTES NFR BLD AUTO: 0.4 % (ref 0–0.9)
LYMPHOCYTES # BLD AUTO: 1.3 X10*3/UL (ref 1.2–4.8)
LYMPHOCYTES NFR BLD AUTO: 54.9 %
MCH RBC QN AUTO: 27.6 PG (ref 26–34)
MCHC RBC AUTO-ENTMCNC: 34.7 G/DL (ref 32–36)
MCV RBC AUTO: 79 FL (ref 80–100)
MONOCYTES # BLD AUTO: 0.39 X10*3/UL (ref 0.1–1)
MONOCYTES NFR BLD AUTO: 16.5 %
NEUTROPHILS # BLD AUTO: 0.55 X10*3/UL (ref 1.2–7.7)
NEUTROPHILS NFR BLD AUTO: 23.1 %
NRBC BLD-RTO: 0 /100 WBCS (ref 0–0)
PLATELET # BLD AUTO: 365 X10*3/UL (ref 150–450)
POTASSIUM SERPL-SCNC: 3.8 MMOL/L (ref 3.5–5.3)
PROT SERPL-MCNC: 7.4 G/DL (ref 6.4–8.2)
RBC # BLD AUTO: 4.57 X10*6/UL (ref 4.5–5.9)
RBC MORPH BLD: NORMAL
RH FACTOR (ANTIGEN D): NORMAL
SCHISTOCYTES BLD QL SMEAR: NORMAL
SODIUM SERPL-SCNC: 141 MMOL/L (ref 136–145)
T GONDII IGG SER-ACNC: NONREACTIVE
TARGETS BLD QL SMEAR: NORMAL
TREPONEMA PALLIDUM IGG+IGM AB [PRESENCE] IN SERUM OR PLASMA BY IMMUNOASSAY: NONREACTIVE
VARICELLA ZOSTER IGG INDEX: 6.4 IA
VZV IGG SER QL IA: POSITIVE
WBC # BLD AUTO: 2.4 X10*3/UL (ref 4.4–11.3)

## 2024-09-12 PROCEDURE — 87389 HIV-1 AG W/HIV-1&-2 AB AG IA: CPT

## 2024-09-12 PROCEDURE — 86695 HERPES SIMPLEX TYPE 1 TEST: CPT

## 2024-09-12 PROCEDURE — 80053 COMPREHEN METABOLIC PANEL: CPT

## 2024-09-12 PROCEDURE — 99204 OFFICE O/P NEW MOD 45 MIN: CPT | Performed by: RADIOLOGY

## 2024-09-12 PROCEDURE — 85025 COMPLETE CBC W/AUTO DIFF WBC: CPT

## 2024-09-12 PROCEDURE — 86644 CMV ANTIBODY: CPT

## 2024-09-12 PROCEDURE — 86663 EPSTEIN-BARR ANTIBODY: CPT

## 2024-09-12 PROCEDURE — 86803 HEPATITIS C AB TEST: CPT

## 2024-09-12 PROCEDURE — 86645 CMV ANTIBODY IGM: CPT

## 2024-09-12 PROCEDURE — 86780 TREPONEMA PALLIDUM: CPT

## 2024-09-12 PROCEDURE — 86704 HEP B CORE ANTIBODY TOTAL: CPT

## 2024-09-12 PROCEDURE — 77470 SPECIAL RADIATION TREATMENT: CPT | Performed by: RADIOLOGY

## 2024-09-12 PROCEDURE — 87340 HEPATITIS B SURFACE AG IA: CPT

## 2024-09-12 PROCEDURE — 86790 VIRUS ANTIBODY NOS: CPT

## 2024-09-12 PROCEDURE — 86705 HEP B CORE ANTIBODY IGM: CPT

## 2024-09-12 PROCEDURE — 77290 THER RAD SIMULAJ FIELD CPLX: CPT | Performed by: RADIOLOGY

## 2024-09-12 PROCEDURE — 86901 BLOOD TYPING SEROLOGIC RH(D): CPT

## 2024-09-12 PROCEDURE — 36415 COLL VENOUS BLD VENIPUNCTURE: CPT

## 2024-09-12 PROCEDURE — 86787 VARICELLA-ZOSTER ANTIBODY: CPT

## 2024-09-12 PROCEDURE — 87522 HEPATITIS C REVRS TRNSCRPJ: CPT

## 2024-09-12 PROCEDURE — 99214 OFFICE O/P EST MOD 30 MIN: CPT | Mod: GC | Performed by: RADIOLOGY

## 2024-09-12 PROCEDURE — 86777 TOXOPLASMA ANTIBODY: CPT

## 2024-09-12 PROCEDURE — 86706 HEP B SURFACE ANTIBODY: CPT

## 2024-09-12 ASSESSMENT — ENCOUNTER SYMPTOMS
EYES NEGATIVE: 1
HEMATOLOGIC/LYMPHATIC NEGATIVE: 1
CONSTITUTIONAL NEGATIVE: 1
GASTROINTESTINAL NEGATIVE: 1
CARDIOVASCULAR NEGATIVE: 1
ENDOCRINE NEGATIVE: 1
NEUROLOGICAL NEGATIVE: 1
PSYCHIATRIC NEGATIVE: 1
RESPIRATORY NEGATIVE: 1
MUSCULOSKELETAL NEGATIVE: 1

## 2024-09-12 ASSESSMENT — PATIENT HEALTH QUESTIONNAIRE - PHQ9
SUM OF ALL RESPONSES TO PHQ9 QUESTIONS 1 AND 2: 0
1. LITTLE INTEREST OR PLEASURE IN DOING THINGS: NOT AT ALL
2. FEELING DOWN, DEPRESSED OR HOPELESS: NOT AT ALL

## 2024-09-12 ASSESSMENT — PAIN SCALES - GENERAL: PAINLEVEL: 0-NO PAIN

## 2024-09-12 NOTE — PROGRESS NOTES
Patient's psychosocial plan remains unchanged, from initial SW Assessment.  Patient remains an appropriate candidate for transplant from a psychosocial perspective.

## 2024-09-12 NOTE — PROGRESS NOTES
Radiation Oncology Nursing Note    Prior Radiotherapy:  No  No radiation treatments to show. (Treatments may have been administered in another system.)     Current Systemic Treatment:  No     Presence of Pacemaker or ICD:  No    History of Autoimmune or Connective Tissue Disorders:  No    Pain: The patient's current pain level was assessed.  They report currently having a pain of 0 out of 10.  They feel their pain is under control without the use of pain medications.    Review of Systems:  Review of Systems   Constitutional: Negative.    HENT:  Negative.     Eyes: Negative.    Respiratory: Negative.     Cardiovascular: Negative.    Gastrointestinal: Negative.    Endocrine: Negative.    Genitourinary: Negative.     Musculoskeletal: Negative.    Skin: Negative.    Neurological: Negative.    Hematological: Negative.    Psychiatric/Behavioral: Negative.

## 2024-09-13 ENCOUNTER — HOSPITAL ENCOUNTER (OUTPATIENT)
Dept: RADIOLOGY | Facility: HOSPITAL | Age: 64
Discharge: HOME | End: 2024-09-13
Payer: COMMERCIAL

## 2024-09-13 ENCOUNTER — HOSPITAL ENCOUNTER (INPATIENT)
Facility: HOSPITAL | Age: 64
End: 2024-09-13
Attending: INTERNAL MEDICINE
Payer: COMMERCIAL

## 2024-09-13 VITALS
TEMPERATURE: 98.2 F | HEART RATE: 68 BPM | SYSTOLIC BLOOD PRESSURE: 147 MMHG | RESPIRATION RATE: 15 BRPM | OXYGEN SATURATION: 99 % | DIASTOLIC BLOOD PRESSURE: 82 MMHG

## 2024-09-13 DIAGNOSIS — C92.00 ACUTE MYELOID LEUKEMIA NOT HAVING ACHIEVED REMISSION (MULTI): ICD-10-CM

## 2024-09-13 DIAGNOSIS — C92.01 ACUTE MYELOID LEUKEMIA IN REMISSION (MULTI): ICD-10-CM

## 2024-09-13 DIAGNOSIS — D64.9 ANEMIA, UNSPECIFIED TYPE: ICD-10-CM

## 2024-09-13 DIAGNOSIS — D61.818 PANCYTOPENIA: ICD-10-CM

## 2024-09-13 DIAGNOSIS — D46.9 MDS (MYELODYSPLASTIC SYNDROME) (MULTI): Primary | ICD-10-CM

## 2024-09-13 DIAGNOSIS — Z94.84 STEM CELLS TRANSPLANT STATUS (MULTI): ICD-10-CM

## 2024-09-13 DIAGNOSIS — Z76.82 STEM CELL TRANSPLANT CANDIDATE: ICD-10-CM

## 2024-09-13 LAB
ALBUMIN SERPL BCP-MCNC: 4 G/DL (ref 3.4–5)
ALP SERPL-CCNC: 102 U/L (ref 33–136)
ALT SERPL W P-5'-P-CCNC: 9 U/L (ref 10–52)
ANION GAP SERPL CALC-SCNC: 10 MMOL/L (ref 10–20)
APTT PPP: 35 SECONDS (ref 27–38)
AST SERPL W P-5'-P-CCNC: 15 U/L (ref 9–39)
BASOPHILS # BLD AUTO: 0.04 X10*3/UL (ref 0–0.1)
BASOPHILS NFR BLD AUTO: 1.6 %
BILIRUB DIRECT SERPL-MCNC: 0.1 MG/DL (ref 0–0.3)
BILIRUB SERPL-MCNC: 0.4 MG/DL (ref 0–1.2)
BUN SERPL-MCNC: 13 MG/DL (ref 6–23)
CALCIUM SERPL-MCNC: 9 MG/DL (ref 8.6–10.6)
CHLORIDE SERPL-SCNC: 103 MMOL/L (ref 98–107)
CO2 SERPL-SCNC: 30 MMOL/L (ref 21–32)
CREAT SERPL-MCNC: 1.03 MG/DL (ref 0.5–1.3)
EGFRCR SERPLBLD CKD-EPI 2021: 80 ML/MIN/1.73M*2
EOSINOPHIL # BLD AUTO: 0.05 X10*3/UL (ref 0–0.7)
EOSINOPHIL NFR BLD AUTO: 2 %
ERYTHROCYTE [DISTWIDTH] IN BLOOD BY AUTOMATED COUNT: 19.9 % (ref 11.5–14.5)
GLUCOSE SERPL-MCNC: 145 MG/DL (ref 74–99)
HCT VFR BLD AUTO: 35.3 % (ref 41–52)
HCV RNA SERPL NAA+PROBE-ACNC: NOT DETECTED K[IU]/ML
HCV RNA SERPL NAA+PROBE-LOG IU: NORMAL {LOG_IU}/ML
HGB BLD-MCNC: 12.1 G/DL (ref 13.5–17.5)
IMM GRANULOCYTES # BLD AUTO: 0 X10*3/UL (ref 0–0.7)
IMM GRANULOCYTES NFR BLD AUTO: 0 % (ref 0–0.9)
INR PPP: 1.1 (ref 0.9–1.1)
LDH SERPL L TO P-CCNC: 128 U/L (ref 84–246)
LYMPHOCYTES # BLD AUTO: 1.21 X10*3/UL (ref 1.2–4.8)
LYMPHOCYTES NFR BLD AUTO: 48.8 %
MAGNESIUM SERPL-MCNC: 2.09 MG/DL (ref 1.6–2.4)
MCH RBC QN AUTO: 27.6 PG (ref 26–34)
MCHC RBC AUTO-ENTMCNC: 34.3 G/DL (ref 32–36)
MCV RBC AUTO: 80 FL (ref 80–100)
MONOCYTES # BLD AUTO: 0.4 X10*3/UL (ref 0.1–1)
MONOCYTES NFR BLD AUTO: 16.1 %
NEUTROPHILS # BLD AUTO: 0.78 X10*3/UL (ref 1.2–7.7)
NEUTROPHILS NFR BLD AUTO: 31.5 %
NRBC BLD-RTO: 0 /100 WBCS (ref 0–0)
PHOSPHATE SERPL-MCNC: 3 MG/DL (ref 2.5–4.9)
PLATELET # BLD AUTO: 382 X10*3/UL (ref 150–450)
POTASSIUM SERPL-SCNC: 3.5 MMOL/L (ref 3.5–5.3)
PROT SERPL-MCNC: 7.1 G/DL (ref 6.4–8.2)
PROTHROMBIN TIME: 12 SECONDS (ref 9.8–12.8)
RBC # BLD AUTO: 4.39 X10*6/UL (ref 4.5–5.9)
SODIUM SERPL-SCNC: 139 MMOL/L (ref 136–145)
WBC # BLD AUTO: 2.5 X10*3/UL (ref 4.4–11.3)

## 2024-09-13 PROCEDURE — 76942 ECHO GUIDE FOR BIOPSY: CPT | Performed by: RADIOLOGY

## 2024-09-13 PROCEDURE — 83615 LACTATE (LD) (LDH) ENZYME: CPT

## 2024-09-13 PROCEDURE — 36556 INSERT NON-TUNNEL CV CATH: CPT | Performed by: RADIOLOGY

## 2024-09-13 PROCEDURE — C1769 GUIDE WIRE: HCPCS

## 2024-09-13 PROCEDURE — 80053 COMPREHEN METABOLIC PANEL: CPT

## 2024-09-13 PROCEDURE — 02HV33Z INSERTION OF INFUSION DEVICE INTO SUPERIOR VENA CAVA, PERCUTANEOUS APPROACH: ICD-10-PCS | Performed by: INTERNAL MEDICINE

## 2024-09-13 PROCEDURE — 2500000001 HC RX 250 WO HCPCS SELF ADMINISTERED DRUGS (ALT 637 FOR MEDICARE OP)

## 2024-09-13 PROCEDURE — 1170000001 HC PRIVATE ONCOLOGY ROOM DAILY

## 2024-09-13 PROCEDURE — 83735 ASSAY OF MAGNESIUM: CPT

## 2024-09-13 PROCEDURE — 7100000010 HC PHASE TWO TIME - EACH INCREMENTAL 1 MINUTE

## 2024-09-13 PROCEDURE — 85025 COMPLETE CBC W/AUTO DIFF WBC: CPT

## 2024-09-13 PROCEDURE — 2500000004 HC RX 250 GENERAL PHARMACY W/ HCPCS (ALT 636 FOR OP/ED): Performed by: RADIOLOGY

## 2024-09-13 PROCEDURE — 84075 ASSAY ALKALINE PHOSPHATASE: CPT

## 2024-09-13 PROCEDURE — 2720000007 HC OR 272 NO HCPCS

## 2024-09-13 PROCEDURE — 85610 PROTHROMBIN TIME: CPT

## 2024-09-13 PROCEDURE — 2500000002 HC RX 250 W HCPCS SELF ADMINISTERED DRUGS (ALT 637 FOR MEDICARE OP, ALT 636 FOR OP/ED)

## 2024-09-13 PROCEDURE — 7100000009 HC PHASE TWO TIME - INITIAL BASE CHARGE

## 2024-09-13 PROCEDURE — 99223 1ST HOSP IP/OBS HIGH 75: CPT | Performed by: INTERNAL MEDICINE

## 2024-09-13 PROCEDURE — 84100 ASSAY OF PHOSPHORUS: CPT

## 2024-09-13 RX ORDER — FOLIC ACID 1 MG/1
1 TABLET ORAL DAILY
Status: DISCONTINUED | OUTPATIENT
Start: 2024-09-13 | End: 2024-11-21 | Stop reason: HOSPADM

## 2024-09-13 RX ORDER — URSODIOL 300 MG/1
300 CAPSULE ORAL 3 TIMES DAILY
Status: DISCONTINUED | OUTPATIENT
Start: 2024-09-13 | End: 2024-09-14

## 2024-09-13 RX ORDER — AMOXICILLIN 250 MG
1 CAPSULE ORAL DAILY
Status: DISCONTINUED | OUTPATIENT
Start: 2024-09-13 | End: 2024-09-21

## 2024-09-13 RX ORDER — MIDAZOLAM HYDROCHLORIDE 1 MG/ML
INJECTION INTRAMUSCULAR; INTRAVENOUS
Status: COMPLETED | OUTPATIENT
Start: 2024-09-13 | End: 2024-09-13

## 2024-09-13 RX ORDER — PROCHLORPERAZINE MALEATE 10 MG
10 TABLET ORAL EVERY 6 HOURS PRN
Status: DISCONTINUED | OUTPATIENT
Start: 2024-09-13 | End: 2024-10-14

## 2024-09-13 RX ORDER — NIFEDIPINE 60 MG/1
60 TABLET, FILM COATED, EXTENDED RELEASE ORAL
Status: DISCONTINUED | OUTPATIENT
Start: 2024-09-14 | End: 2024-09-15

## 2024-09-13 RX ORDER — POLYETHYLENE GLYCOL 3350 17 G/17G
17 POWDER, FOR SOLUTION ORAL DAILY PRN
Status: DISCONTINUED | OUTPATIENT
Start: 2024-09-13 | End: 2024-09-21

## 2024-09-13 RX ORDER — FENTANYL CITRATE 50 UG/ML
INJECTION, SOLUTION INTRAMUSCULAR; INTRAVENOUS
Status: COMPLETED | OUTPATIENT
Start: 2024-09-13 | End: 2024-09-13

## 2024-09-13 RX ORDER — ONDANSETRON HYDROCHLORIDE 8 MG/1
8 TABLET, FILM COATED ORAL EVERY 8 HOURS PRN
Status: DISCONTINUED | OUTPATIENT
Start: 2024-09-13 | End: 2024-11-21 | Stop reason: HOSPADM

## 2024-09-13 RX ORDER — ACYCLOVIR 400 MG/1
400 TABLET ORAL 2 TIMES DAILY
Status: DISCONTINUED | OUTPATIENT
Start: 2024-09-13 | End: 2024-09-19 | Stop reason: SDUPTHER

## 2024-09-13 RX ADMIN — ACYCLOVIR 400 MG: 400 TABLET ORAL at 20:59

## 2024-09-13 RX ADMIN — URSODIOL 300 MG: 300 CAPSULE ORAL at 20:59

## 2024-09-13 SDOH — SOCIAL STABILITY: SOCIAL INSECURITY: HAVE YOU HAD THOUGHTS OF HARMING ANYONE ELSE?: NO

## 2024-09-13 SDOH — SOCIAL STABILITY: SOCIAL INSECURITY: DO YOU FEEL UNSAFE GOING BACK TO THE PLACE WHERE YOU ARE LIVING?: NO

## 2024-09-13 SDOH — SOCIAL STABILITY: SOCIAL INSECURITY: DOES ANYONE TRY TO KEEP YOU FROM HAVING/CONTACTING OTHER FRIENDS OR DOING THINGS OUTSIDE YOUR HOME?: NO

## 2024-09-13 SDOH — SOCIAL STABILITY: SOCIAL INSECURITY: HAS ANYONE EVER THREATENED TO HURT YOUR FAMILY OR YOUR PETS?: NO

## 2024-09-13 SDOH — SOCIAL STABILITY: SOCIAL INSECURITY: DO YOU FEEL ANYONE HAS EXPLOITED OR TAKEN ADVANTAGE OF YOU FINANCIALLY OR OF YOUR PERSONAL PROPERTY?: NO

## 2024-09-13 SDOH — SOCIAL STABILITY: SOCIAL INSECURITY: ABUSE: ADULT

## 2024-09-13 SDOH — SOCIAL STABILITY: SOCIAL INSECURITY: WERE YOU ABLE TO COMPLETE ALL THE BEHAVIORAL HEALTH SCREENINGS?: YES

## 2024-09-13 SDOH — SOCIAL STABILITY: SOCIAL INSECURITY: ARE YOU OR HAVE YOU BEEN THREATENED OR ABUSED PHYSICALLY, EMOTIONALLY, OR SEXUALLY BY ANYONE?: NO

## 2024-09-13 SDOH — SOCIAL STABILITY: SOCIAL INSECURITY: ARE THERE ANY APPARENT SIGNS OF INJURIES/BEHAVIORS THAT COULD BE RELATED TO ABUSE/NEGLECT?: NO

## 2024-09-13 SDOH — SOCIAL STABILITY: SOCIAL INSECURITY: HAVE YOU HAD ANY THOUGHTS OF HARMING ANYONE ELSE?: NO

## 2024-09-13 ASSESSMENT — ENCOUNTER SYMPTOMS
CONSTIPATION: 0
FEVER: 0
CHEST TIGHTNESS: 0
MYALGIAS: 0
CONSTITUTIONAL NEGATIVE: 1
ABDOMINAL DISTENTION: 0
CHILLS: 0
SORE THROAT: 0
RHINORRHEA: 0
FEVER: 0
CHILLS: 0
ABDOMINAL DISTENTION: 0
NAUSEA: 0
PALPITATIONS: 0
WEAKNESS: 0
SHORTNESS OF BREATH: 0
VOMITING: 0
ARTHRALGIAS: 0
LIGHT-HEADEDNESS: 0
ABDOMINAL PAIN: 0
NEUROLOGICAL NEGATIVE: 1
DIZZINESS: 0
FATIGUE: 0
HEADACHES: 0
ACTIVITY CHANGE: 0
DYSURIA: 0
SHORTNESS OF BREATH: 0
NUMBNESS: 0
RESPIRATORY NEGATIVE: 1

## 2024-09-13 ASSESSMENT — COGNITIVE AND FUNCTIONAL STATUS - GENERAL
PATIENT BASELINE BEDBOUND: NO
DAILY ACTIVITIY SCORE: 24
MOBILITY SCORE: 24

## 2024-09-13 ASSESSMENT — LIFESTYLE VARIABLES
HOW MANY STANDARD DRINKS CONTAINING ALCOHOL DO YOU HAVE ON A TYPICAL DAY: PATIENT DOES NOT DRINK
AUDIT-C TOTAL SCORE: 0
HOW OFTEN DO YOU HAVE 6 OR MORE DRINKS ON ONE OCCASION: NEVER
HOW OFTEN DO YOU HAVE A DRINK CONTAINING ALCOHOL: NEVER
SKIP TO QUESTIONS 9-10: 1
AUDIT-C TOTAL SCORE: 0

## 2024-09-13 ASSESSMENT — PAIN - FUNCTIONAL ASSESSMENT
PAIN_FUNCTIONAL_ASSESSMENT: 0-10

## 2024-09-13 ASSESSMENT — COLUMBIA-SUICIDE SEVERITY RATING SCALE - C-SSRS
2. HAVE YOU ACTUALLY HAD ANY THOUGHTS OF KILLING YOURSELF?: NO
1. IN THE PAST MONTH, HAVE YOU WISHED YOU WERE DEAD OR WISHED YOU COULD GO TO SLEEP AND NOT WAKE UP?: NO
6. HAVE YOU EVER DONE ANYTHING, STARTED TO DO ANYTHING, OR PREPARED TO DO ANYTHING TO END YOUR LIFE?: NO

## 2024-09-13 ASSESSMENT — ACTIVITIES OF DAILY LIVING (ADL)
ADEQUATE_TO_COMPLETE_ADL: YES
JUDGMENT_ADEQUATE_SAFELY_COMPLETE_DAILY_ACTIVITIES: YES
LACK_OF_TRANSPORTATION: NO
HEARING - RIGHT EAR: FUNCTIONAL
FEEDING YOURSELF: INDEPENDENT
PATIENT'S MEMORY ADEQUATE TO SAFELY COMPLETE DAILY ACTIVITIES?: YES
WALKS IN HOME: INDEPENDENT
GROOMING: INDEPENDENT
HEARING - LEFT EAR: FUNCTIONAL
BATHING: INDEPENDENT
DRESSING YOURSELF: INDEPENDENT
TOILETING: INDEPENDENT

## 2024-09-13 ASSESSMENT — PAIN SCALES - GENERAL
PAINLEVEL_OUTOF10: 0 - NO PAIN

## 2024-09-13 ASSESSMENT — PATIENT HEALTH QUESTIONNAIRE - PHQ9
SUM OF ALL RESPONSES TO PHQ9 QUESTIONS 1 & 2: 0
2. FEELING DOWN, DEPRESSED OR HOPELESS: NOT AT ALL
1. LITTLE INTEREST OR PLEASURE IN DOING THINGS: NOT AT ALL

## 2024-09-13 NOTE — H&P
History Of Present Illness  Brandt Merritt is a 64 y.o. male with PMHx of MDS with transformation to AML, HTN, Hemoglobin C, and hx of treated Hep C who presents to [facility] for allogenic MUD single cord transplant.    Mr. merritt is sitting comfortably at the bedside. He is in good spirits and reports since his last chemotherapy session he has been feeling the best he has in awhile. He reports being able to walk and play with his three pitbulls and tolerate walking up and downstairs in his home. He denies any acute complaints but does admit he was surprised to get the trialysis catheter. He denies any complaints. Denies any fevers or prodromal symptoms at home. Denies any sick contacts. Denies any changes in medication.    He wishes to be full code. .     Past Medical History  He has a past medical history of Chest pain (06/30/2021), HTN (hypertension) (10/05/2023), Hypertension, and Personal history of other diseases of the circulatory system.    Surgical History  He has a past surgical history that includes Colonoscopy (06/13/2013) and Other surgical history (10/07/2015).    Oncology History Overview Note   4/2024  Myelodysplastic neoplasm with increased blasts-2 ( WHO)  Myelodysplastic neoplasm/AML  (ICC 2022 classification)    Presented in  10/2023 for pancytopenia, found to have hemolysis. Patient had normal CBC June 2020. Denied any hemorrhoidal bleeding . Has had C Scope and EGD , treated Hep C 2014 . 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      CBC 4/11/24 wbc 3.0, hgb 7.1, platelets 167K ANC 0.73    BM biopsy done on 4/11/24  as folllows:  BM histology  Myelodysplastic neoplasm with increased blasts-2 ( WHO)  Myelodysplastic neoplasm/AML  (ICC 2022 classification)  Balsts 11% on aspirate smear and 15-20% based on CD 34 immunostaining approaching AML leukemia, hematooiesis is erythroid dominant with dysplasia in granulocytes and megakaryocytes.   FISH studies trisomy 8 17.2%  NGS panel DNMT3 aVAF 36%  U2AF1  VAF 32%       Acute myeloid leukemia not having achieved remission (Multi)   4/23/2024 Initial Diagnosis    Acute myeloid leukemia not having achieved remission (Multi)     5/13/2024 - 8/16/2024 Chemotherapy    Venetoclax / Decitabine, 28 Day Cycles - Induction / Consolidation     9/14/2024 -  Bone Marrow Transplant            Social History  He reports that he quit smoking about 8 years ago. His smoking use included cigarettes. He started smoking about 36 years ago. He has a 7 pack-year smoking history. He has never used smokeless tobacco. He reports that he does not currently use alcohol. He reports current drug use. Drug: Marijuana.     Allergies  Thiazides    Review of Systems   Constitutional: Negative.  Negative for activity change, chills, fatigue and fever.   HENT: Negative.  Negative for mouth sores, rhinorrhea and sore throat.    Eyes:  Negative for visual disturbance.   Respiratory: Negative.  Negative for chest tightness and shortness of breath.    Cardiovascular:  Negative for chest pain and palpitations.   Gastrointestinal:  Negative for abdominal distention, abdominal pain, constipation, nausea and vomiting.   Genitourinary: Negative.  Negative for dysuria.   Musculoskeletal:  Negative for arthralgias and myalgias.   Neurological: Negative.  Negative for weakness, light-headedness and numbness.        Physical Exam  Constitutional:       General: He is not in acute distress.     Appearance: Normal appearance. He is normal weight. He is not ill-appearing.   HENT:      Head: Normocephalic.      Nose: Nose normal.      Mouth/Throat:      Mouth: Mucous membranes are moist.      Pharynx: Oropharynx is clear. No oropharyngeal exudate or posterior oropharyngeal erythema.   Eyes:      General: No scleral icterus.     Extraocular Movements: Extraocular movements intact.      Conjunctiva/sclera: Conjunctivae normal.   Cardiovascular:      Rate and Rhythm: Normal rate and regular rhythm.      Pulses: Normal  "pulses.      Heart sounds: Normal heart sounds.   Pulmonary:      Effort: Pulmonary effort is normal. No respiratory distress.      Breath sounds: Normal breath sounds. No wheezing, rhonchi or rales.   Abdominal:      General: Abdomen is flat. There is no distension.      Palpations: Abdomen is soft. There is no mass.      Tenderness: There is no abdominal tenderness. There is no guarding.   Musculoskeletal:         General: Normal range of motion.   Skin:     General: Skin is warm and dry.   Neurological:      General: No focal deficit present.      Mental Status: He is alert and oriented to person, place, and time. Mental status is at baseline.          Last Recorded Vitals  Blood pressure 161/88, pulse 76, temperature 36.1 °C (97 °F), temperature source Temporal, resp. rate 16, height (S) 1.664 m (5' 5.51\"), weight (S) 75.6 kg (166 lb 10.7 oz), SpO2 97%.    Relevant Results        Results for orders placed or performed during the hospital encounter of 09/13/24 (from the past 24 hour(s))   Basic Metabolic Panel   Result Value Ref Range    Glucose 145 (H) 74 - 99 mg/dL    Sodium 139 136 - 145 mmol/L    Potassium 3.5 3.5 - 5.3 mmol/L    Chloride 103 98 - 107 mmol/L    Bicarbonate 30 21 - 32 mmol/L    Anion Gap 10 10 - 20 mmol/L    Urea Nitrogen 13 6 - 23 mg/dL    Creatinine 1.03 0.50 - 1.30 mg/dL    eGFR 80 >60 mL/min/1.73m*2    Calcium 9.0 8.6 - 10.6 mg/dL   CBC and Auto Differential   Result Value Ref Range    WBC 2.5 (L) 4.4 - 11.3 x10*3/uL    nRBC 0.0 0.0 - 0.0 /100 WBCs    RBC 4.39 (L) 4.50 - 5.90 x10*6/uL    Hemoglobin 12.1 (L) 13.5 - 17.5 g/dL    Hematocrit 35.3 (L) 41.0 - 52.0 %    MCV 80 80 - 100 fL    MCH 27.6 26.0 - 34.0 pg    MCHC 34.3 32.0 - 36.0 g/dL    RDW 19.9 (H) 11.5 - 14.5 %    Platelets 382 150 - 450 x10*3/uL    Neutrophils % 31.5 40.0 - 80.0 %    Immature Granulocytes %, Automated 0.0 0.0 - 0.9 %    Lymphocytes % 48.8 13.0 - 44.0 %    Monocytes % 16.1 2.0 - 10.0 %    Eosinophils % 2.0 0.0 - 6.0 " %    Basophils % 1.6 0.0 - 2.0 %    Neutrophils Absolute 0.78 (L) 1.20 - 7.70 x10*3/uL    Immature Granulocytes Absolute, Automated 0.00 0.00 - 0.70 x10*3/uL    Lymphocytes Absolute 1.21 1.20 - 4.80 x10*3/uL    Monocytes Absolute 0.40 0.10 - 1.00 x10*3/uL    Eosinophils Absolute 0.05 0.00 - 0.70 x10*3/uL    Basophils Absolute 0.04 0.00 - 0.10 x10*3/uL   Coagulation Screen   Result Value Ref Range    Protime 12.0 9.8 - 12.8 seconds    INR 1.1 0.9 - 1.1    aPTT 35 27 - 38 seconds   Lactate Dehydrogenase   Result Value Ref Range     84 - 246 U/L   Magnesium   Result Value Ref Range    Magnesium 2.09 1.60 - 2.40 mg/dL   Phosphorus   Result Value Ref Range    Phosphorus 3.0 2.5 - 4.9 mg/dL          Assessment/Plan   Assessment & Plan  AML (acute myeloid leukemia) in relapse (Multi)    MDS (myelodysplastic syndrome) (Multi)      Brandt Merritt is a 63 yo male with PMHx of MDS with transformation to AML, HTN, Hemoglobin C, and hx of treated Hep C who presents to [facility] for allogenic MUD single cord transplant.    T-6 Allogenic MUD Single Cord transplant with Flu/Mariana/TBI conditioning (Haplo T=0 9/19/24)      ONC  #MDS/AML  -Diagnosed in 9/2023  - BMBx showing MDS in transition to AML (>10% blasts), w/ trisomy 8, DNMT alpha, and U2AF1 mutation indicating adverse risk  - s/p 4 cycles of Decitabine/Venetoclax (C4D1 on 8/12/24  - BMBx 6/17/24: Blasts cleared, FISH still positive for trisomy 8, still MDS changes  - BMBx 9/5/24:  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 and no increase in blasts    Chemo:  - Fludarabine 30 mg/m2 IV D-5, D-4, D-3, D-2   - Melphalan 140 mg/m2 IV D-2   - Total Body Irradiation (TBI) 200 cGy x2 on D-1     GVHD Prophy:  - Tacro level: pending   - Current tacro dose: 1.5mg q12h   - MMF 1000 mg TID to begin T+2 to T+35     TRANSPLANT:   - HLA matched unrelated donor 6/8 match, mismatch at A and DRB1, loci.  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   - Plan for posaconazole (t+1), (letermovir (T+14), vancomycin (T-5) and Bactrim (T+30) prophylaxis per chemo orders.  - Plan for levofloxacin when neutropenic    CV  #Hx of STEMI  - STEMI 11/11/2020   - TTE 7/19/24: EF 59%, otherwise unremarkable.   -Cleared by cardiology for transplant  #Hx of HTN  - Continue home nifedipine  # Ascending aorta dilation.   - Echo showing 3.8-4 cm dilation.   - Optimal BP control while inpatient.   -Follow w/ cardiology on an annual basis     FEN/GI/RENAL  - Admit wt: 74.3 kg  - Monitor electrolytes, replace prn  - Antiemetics and Bowel regimen per chemo orders  - Continue folate supplement  - Continue ursodiol for SOS prophylaxis  #Hx of treated Hep C  - treated in 2015  - recent liver ultrasound shows normal sized liver at 14.5 cm, however seems enlarged on exam  - seen by hepatology prior to transplant.  - Fibroscan 7/24/24: 4.8 KPa indicating minimal fibrosis  - recommend close monitoring of liver function and repeating hepatitis serologies  - starting Urosdiol earlier in transplant for SOS prophylaxis  #Hx of recurrent hyperbilirubinemia  - 2/2 to Hemoglobin C hemolysis  - Tbili 0.3 on 9/5 pre-transplant apt.     DISPO  - Full Code  - Primary Onc: Dr. Newsome  - RADHA tunneled line placed 9/13       I spent 60 minutes in the professional and overall care of this patient.      Lee Manzano PA-C

## 2024-09-13 NOTE — PRE-PROCEDURE NOTE
Interventional Radiology Preprocedure Note    Indication for procedure: Diagnoses of Stem cell transplant candidate and Acute myeloid leukemia in remission (Multi) were pertinent to this visit.    Relevant review of systems: Review of Systems   Constitutional:  Negative for chills and fever.   Respiratory:  Negative for shortness of breath.    Cardiovascular:  Negative for chest pain.   Gastrointestinal:  Negative for abdominal distention.   Skin: Negative.    Neurological:  Negative for dizziness, syncope and headaches.        Relevant Labs:   Lab Results   Component Value Date    CREATININE 0.98 09/12/2024    EGFR 85 09/12/2024    INR 1.2 (H) 07/17/2024    PROTIME 13.1 (H) 07/17/2024       Planned Sedation/Anesthesia: Moderate    Airway assessment: normal    Directed physical examination:    Physical Exam  Constitutional:       General: He is not in acute distress.  Cardiovascular:      Rate and Rhythm: Normal rate.      Pulses: Normal pulses.   Pulmonary:      Effort: Pulmonary effort is normal.   Skin:     General: Skin is warm and dry.   Neurological:      Mental Status: He is oriented to person, place, and time. Mental status is at baseline.          Mallampati: II (hard and soft palate, upper portion of tonsils and uvula visible)    ASA Score: ASA 2 - Patient with mild systemic disease with no functional limitations    Benefits, risks and alternatives of procedure and planned sedation have been discussed with the patient and/or their representative. All questions answered and they agree to proceed.

## 2024-09-13 NOTE — POST-PROCEDURE NOTE
Interventional Radiology Brief Postprocedure Note    Attending: Dr. Anish Arias MD    Assistant: Abundio Oreilly DO, PGY-3    Diagnosis: Acute Myeloid Leukemia    Description of procedure: Successful placement of  double blue arrowguard non-tunneled CVC as per request of ordering provider. (8 FR x 16 cm, right internal jugular approach). Please see dedicated procedure note on PACS for further details regarding procedure.     Anesthesia:  Local / fentanyl / versed    Complications: None    Estimated Blood Loss: none    Medications (Filter: Administrations occurring from 1245 to 1310 on 09/13/24) As of 09/13/24 1310      midazolam (Versed) injection (mg) Total dose:  1 mg      Date/Time Rate/Dose/Volume Action       09/13/24  1301 1 mg Given               fentaNYL PF (Sublimaze) injection (mcg) Total dose:  50 mcg      Date/Time Rate/Dose/Volume Action       09/13/24  1301 50 mcg Given                   No specimens collected      See detailed result report with images in PACS.    The patient tolerated the procedure well without incident or complication and is in stable condition.

## 2024-09-13 NOTE — Clinical Note
R internal jugular 8F DL Blue Arrowguard placed, sutured. All ports aspirated, flushed, locked, and capped. Dressing CDI. Total 1mg versed, 50mcg fentanyl given ivp. VSS t/o procedure.

## 2024-09-14 PROBLEM — C92.01 ACUTE MYELOID LEUKEMIA IN REMISSION (MULTI): Status: ACTIVE | Noted: 2024-04-23

## 2024-09-14 PROBLEM — C92.02 AML (ACUTE MYELOID LEUKEMIA) IN RELAPSE (MULTI): Status: RESOLVED | Noted: 2024-09-09 | Resolved: 2024-09-14

## 2024-09-14 PROBLEM — D46.9 MDS (MYELODYSPLASTIC SYNDROME) (MULTI): Status: RESOLVED | Noted: 2024-09-13 | Resolved: 2024-09-14

## 2024-09-14 LAB
ALBUMIN SERPL BCP-MCNC: 3.6 G/DL (ref 3.4–5)
ALP SERPL-CCNC: 104 U/L (ref 33–136)
ALT SERPL W P-5'-P-CCNC: 9 U/L (ref 10–52)
ANION GAP SERPL CALC-SCNC: 12 MMOL/L (ref 10–20)
APTT PPP: 34 SECONDS (ref 27–38)
AST SERPL W P-5'-P-CCNC: 14 U/L (ref 9–39)
BASOPHILS # BLD AUTO: 0.04 X10*3/UL (ref 0–0.1)
BASOPHILS NFR BLD AUTO: 1.7 %
BILIRUB SERPL-MCNC: 0.4 MG/DL (ref 0–1.2)
BUN SERPL-MCNC: 13 MG/DL (ref 6–23)
CALCIUM SERPL-MCNC: 8.9 MG/DL (ref 8.6–10.6)
CHLORIDE SERPL-SCNC: 105 MMOL/L (ref 98–107)
CMV IGM SERPL-ACNC: <8 AU/ML
CO2 SERPL-SCNC: 27 MMOL/L (ref 21–32)
CREAT SERPL-MCNC: 0.95 MG/DL (ref 0.5–1.3)
CRP SERPL-MCNC: 0.13 MG/DL
EGFRCR SERPLBLD CKD-EPI 2021: 88 ML/MIN/1.73M*2
EOSINOPHIL # BLD AUTO: 0.05 X10*3/UL (ref 0–0.7)
EOSINOPHIL NFR BLD AUTO: 2.1 %
ERYTHROCYTE [DISTWIDTH] IN BLOOD BY AUTOMATED COUNT: 19.6 % (ref 11.5–14.5)
FERRITIN SERPL-MCNC: 145 NG/ML (ref 20–300)
FIBRINOGEN PPP-MCNC: 361 MG/DL (ref 200–400)
GLUCOSE SERPL-MCNC: 89 MG/DL (ref 74–99)
HCT VFR BLD AUTO: 34.4 % (ref 41–52)
HGB BLD-MCNC: 11.9 G/DL (ref 13.5–17.5)
HTLV I+II AB SER QL IA: NEGATIVE
HYPOCHROMIA BLD QL SMEAR: NORMAL
IMM GRANULOCYTES # BLD AUTO: 0.01 X10*3/UL (ref 0–0.7)
IMM GRANULOCYTES NFR BLD AUTO: 0.4 % (ref 0–0.9)
INR PPP: 1 (ref 0.9–1.1)
LDH SERPL L TO P-CCNC: 130 U/L (ref 84–246)
LYMPHOCYTES # BLD AUTO: 1.31 X10*3/UL (ref 1.2–4.8)
LYMPHOCYTES NFR BLD AUTO: 55.5 %
MAGNESIUM SERPL-MCNC: 2 MG/DL (ref 1.6–2.4)
MCH RBC QN AUTO: 27.4 PG (ref 26–34)
MCHC RBC AUTO-ENTMCNC: 34.6 G/DL (ref 32–36)
MCV RBC AUTO: 79 FL (ref 80–100)
MONOCYTES # BLD AUTO: 0.43 X10*3/UL (ref 0.1–1)
MONOCYTES NFR BLD AUTO: 18.2 %
NEUTROPHILS # BLD AUTO: 0.52 X10*3/UL (ref 1.2–7.7)
NEUTROPHILS NFR BLD AUTO: 22.1 %
NRBC BLD-RTO: 0 /100 WBCS (ref 0–0)
PHOSPHATE SERPL-MCNC: 3.8 MG/DL (ref 2.5–4.9)
PLATELET # BLD AUTO: 367 X10*3/UL (ref 150–450)
POLYCHROMASIA BLD QL SMEAR: NORMAL
POTASSIUM SERPL-SCNC: 3.9 MMOL/L (ref 3.5–5.3)
PROT SERPL-MCNC: 6.6 G/DL (ref 6.4–8.2)
PROTHROMBIN TIME: 11.5 SECONDS (ref 9.8–12.8)
RBC # BLD AUTO: 4.34 X10*6/UL (ref 4.5–5.9)
RBC MORPH BLD: NORMAL
SCHISTOCYTES BLD QL SMEAR: NORMAL
SODIUM SERPL-SCNC: 140 MMOL/L (ref 136–145)
TARGETS BLD QL SMEAR: NORMAL
URATE SERPL-MCNC: 4.6 MG/DL (ref 4–7.5)
WBC # BLD AUTO: 2.4 X10*3/UL (ref 4.4–11.3)

## 2024-09-14 PROCEDURE — 2500000001 HC RX 250 WO HCPCS SELF ADMINISTERED DRUGS (ALT 637 FOR MEDICARE OP)

## 2024-09-14 PROCEDURE — 2500000002 HC RX 250 W HCPCS SELF ADMINISTERED DRUGS (ALT 637 FOR MEDICARE OP, ALT 636 FOR OP/ED)

## 2024-09-14 PROCEDURE — 80053 COMPREHEN METABOLIC PANEL: CPT

## 2024-09-14 PROCEDURE — 1170000001 HC PRIVATE ONCOLOGY ROOM DAILY

## 2024-09-14 PROCEDURE — 84100 ASSAY OF PHOSPHORUS: CPT

## 2024-09-14 PROCEDURE — 2500000002 HC RX 250 W HCPCS SELF ADMINISTERED DRUGS (ALT 637 FOR MEDICARE OP, ALT 636 FOR OP/ED): Performed by: INTERNAL MEDICINE

## 2024-09-14 PROCEDURE — 82728 ASSAY OF FERRITIN: CPT

## 2024-09-14 PROCEDURE — 85384 FIBRINOGEN ACTIVITY: CPT

## 2024-09-14 PROCEDURE — 83615 LACTATE (LD) (LDH) ENZYME: CPT

## 2024-09-14 PROCEDURE — 83735 ASSAY OF MAGNESIUM: CPT

## 2024-09-14 PROCEDURE — 2500000004 HC RX 250 GENERAL PHARMACY W/ HCPCS (ALT 636 FOR OP/ED): Performed by: INTERNAL MEDICINE

## 2024-09-14 PROCEDURE — 84550 ASSAY OF BLOOD/URIC ACID: CPT

## 2024-09-14 PROCEDURE — 85025 COMPLETE CBC W/AUTO DIFF WBC: CPT

## 2024-09-14 PROCEDURE — 85610 PROTHROMBIN TIME: CPT

## 2024-09-14 PROCEDURE — 99232 SBSQ HOSP IP/OBS MODERATE 35: CPT | Performed by: INTERNAL MEDICINE

## 2024-09-14 PROCEDURE — 86140 C-REACTIVE PROTEIN: CPT

## 2024-09-14 RX ORDER — ALBUTEROL SULFATE 0.83 MG/ML
3 SOLUTION RESPIRATORY (INHALATION) AS NEEDED
Status: DISCONTINUED | OUTPATIENT
Start: 2024-09-14 | End: 2024-10-31

## 2024-09-14 RX ORDER — PROCHLORPERAZINE MALEATE 10 MG
10 TABLET ORAL EVERY 6 HOURS PRN
Status: DISCONTINUED | OUTPATIENT
Start: 2024-09-14 | End: 2024-09-21

## 2024-09-14 RX ORDER — FAMOTIDINE 10 MG/ML
20 INJECTION INTRAVENOUS AS NEEDED
Status: DISCONTINUED | OUTPATIENT
Start: 2024-09-14 | End: 2024-10-31

## 2024-09-14 RX ORDER — EPINEPHRINE 1 MG/ML
0.3 INJECTION, SOLUTION, CONCENTRATE INTRAVENOUS EVERY 5 MIN PRN
Status: DISCONTINUED | OUTPATIENT
Start: 2024-09-14 | End: 2024-10-31

## 2024-09-14 RX ORDER — VANCOMYCIN HYDROCHLORIDE 125 MG/1
125 CAPSULE ORAL DAILY
Status: DISCONTINUED | OUTPATIENT
Start: 2024-09-14 | End: 2024-11-21 | Stop reason: HOSPADM

## 2024-09-14 RX ORDER — DIPHENHYDRAMINE HYDROCHLORIDE 50 MG/ML
50 INJECTION INTRAMUSCULAR; INTRAVENOUS AS NEEDED
Status: DISCONTINUED | OUTPATIENT
Start: 2024-09-14 | End: 2024-10-31

## 2024-09-14 RX ORDER — URSODIOL 300 MG/1
300 CAPSULE ORAL 3 TIMES DAILY
Status: DISCONTINUED | OUTPATIENT
Start: 2024-09-14 | End: 2024-11-21 | Stop reason: HOSPADM

## 2024-09-14 RX ORDER — SODIUM CHLORIDE 9 MG/ML
100 INJECTION, SOLUTION INTRAVENOUS CONTINUOUS
Status: DISCONTINUED | OUTPATIENT
Start: 2024-09-14 | End: 2024-09-18

## 2024-09-14 RX ORDER — PROCHLORPERAZINE EDISYLATE 5 MG/ML
10 INJECTION INTRAMUSCULAR; INTRAVENOUS EVERY 6 HOURS PRN
Status: DISCONTINUED | OUTPATIENT
Start: 2024-09-14 | End: 2024-10-14

## 2024-09-14 RX ADMIN — URSODIOL 300 MG: 300 CAPSULE ORAL at 14:32

## 2024-09-14 RX ADMIN — ACYCLOVIR 400 MG: 400 TABLET ORAL at 20:21

## 2024-09-14 RX ADMIN — ONDANSETRON 16 MG: 2 INJECTION INTRAMUSCULAR; INTRAVENOUS at 08:01

## 2024-09-14 RX ADMIN — ACYCLOVIR 400 MG: 400 TABLET ORAL at 08:01

## 2024-09-14 RX ADMIN — URSODIOL 300 MG: 300 CAPSULE ORAL at 08:01

## 2024-09-14 RX ADMIN — FOLIC ACID TAB 400 MCG 1 MG: 400 TAB at 08:01

## 2024-09-14 RX ADMIN — FLUDARABINE PHOSPHATE 55.25 MG: 25 INJECTION, SOLUTION INTRAVENOUS at 09:36

## 2024-09-14 RX ADMIN — VANCOMYCIN HYDROCHLORIDE 125 MG: 125 CAPSULE ORAL at 08:01

## 2024-09-14 RX ADMIN — SODIUM CHLORIDE 100 ML/HR: 9 INJECTION, SOLUTION INTRAVENOUS at 08:01

## 2024-09-14 RX ADMIN — NIFEDIPINE 60 MG: 60 TABLET, FILM COATED, EXTENDED RELEASE ORAL at 04:31

## 2024-09-14 RX ADMIN — DEXAMETHASONE SODIUM PHOSPHATE 12 MG: 10 INJECTION, SOLUTION INTRAMUSCULAR; INTRAVENOUS at 08:01

## 2024-09-14 RX ADMIN — URSODIOL 300 MG: 300 CAPSULE ORAL at 20:21

## 2024-09-14 RX ADMIN — SENNOSIDES AND DOCUSATE SODIUM 1 TABLET: 50; 8.6 TABLET ORAL at 08:01

## 2024-09-14 ASSESSMENT — ENCOUNTER SYMPTOMS
DYSURIA: 0
VOMITING: 0
PALPITATIONS: 0
NAUSEA: 0
DIARRHEA: 0
FATIGUE: 0
DIZZINESS: 0
DIFFICULTY URINATING: 0
ARTHRALGIAS: 0
SORE THROAT: 0
HEADACHES: 0
CONSTIPATION: 0
EYE PROBLEMS: 0
CHILLS: 0
FEVER: 0
CHEST TIGHTNESS: 0
DIAPHORESIS: 0
SHORTNESS OF BREATH: 0
APPETITE CHANGE: 0
FREQUENCY: 0
ABDOMINAL PAIN: 0
COUGH: 0
NUMBNESS: 0

## 2024-09-14 ASSESSMENT — COGNITIVE AND FUNCTIONAL STATUS - GENERAL
MOBILITY SCORE: 24
DAILY ACTIVITIY SCORE: 24
DAILY ACTIVITIY SCORE: 24
MOBILITY SCORE: 24

## 2024-09-14 ASSESSMENT — PAIN SCALES - GENERAL
PAINLEVEL_OUTOF10: 0 - NO PAIN

## 2024-09-14 ASSESSMENT — PAIN - FUNCTIONAL ASSESSMENT: PAIN_FUNCTIONAL_ASSESSMENT: 0-10

## 2024-09-14 NOTE — CONSULTS
"Nutrition Initial Assessment:   Nutrition Assessment    --->Reason for Assessment: Admission nursing screening    Patient is a 64 y.o. 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    Nutrition History:  This service met with pt earlier today, was sitting up on edge of bed at time of visit with him.    Tells appetite/PO intakes at home are good, usually eats ~4 times/day, gets various sources of protein throughout the day, including various types of meats and eggs. Also drinks ~2 bottles of Ensure Plus/day.    Does tend to have issues with constipation, when he is receiving chemo. No n/v/d or trouble chewing/swallowing.    Since admit, no GI issues, thus far. Appetite is good, ordered a chicken sandwich + sides for lunch meal--preparing to eat during visit with him.    --Vitamin/Herbal Supplement Use: none  --Food Allergies/Intolerances: none       Anthropometrics:  Height: (S) 166.4 cm (5' 5.51\")   Weight: (S) 75.6 kg (166 lb 10.7 oz)   BMI (Calculated): 27.3  IBW/kg (Dietitian Calculated): 61.8 kg  Percent of IBW: 122 %       Weight History:     Wt Readings per review of EMR:   09/13/24 75.6 kg (166 lb 10.7 oz) (current wt)   09/12/24 74.3 kg (163 lb 12.8 oz)   09/09/24 74.2 kg (163 lb 9.3 oz)   09/05/24 73.6 kg (162 lb 4.1 oz)   08/29/24 74.4 kg (164 lb 0.4 oz)   08/16/24 75.3 kg (166 lb 0.1 oz)   08/15/24 76.4 kg (168 lb 6.9 oz)--->1% wt loss from this date to present (not significant)   08/14/24 75.4 kg (166 lb 3.6 oz)   08/13/24 73.9 kg (163 lb)   08/12/24 71.7 kg (158 lb 1.1 oz)   07/29/24 69.6 kg (153 lb 7 oz)   07/19/24 74.2 kg (163 lb 7.5 oz)   07/19/24 74.2 kg (163 lb 7.5 oz)   07/18/24 73.5 kg (162 lb)   07/17/24 72.5 kg (159 lb 12.8 oz)   07/16/24 73.4 kg (161 lb 11.3 oz)   07/15/24 70.6 kg (155 lb 10.3 oz)   07/08/24 72.3 kg (159 lb 6.3 oz)   06/24/24 71 kg (156 lb 9.6 oz)   06/17/24 71.2 kg (157 lb)   06/15/24 71.3 kg (157 lb 3 oz)   06/14/24 " 74.2 kg (163 lb 9.3 oz)   06/13/24 73.5 kg (162 lb 2.4 oz)   06/12/24 73.6 kg (162 lb 4.1 oz)   06/11/24 73.2 kg (161 lb 7.8 oz)   06/10/24 70.3 kg (154 lb 15.7 oz)   06/03/24 70.7 kg (155 lb 13.8 oz)   05/20/24 72.9 kg (160 lb 11.5 oz)   05/17/24 77.2 kg (170 lb 3.1 oz)   05/16/24 78 kg (172 lb)--->3% wt loss from this date to present (not significant)   05/15/24 78.8 kg (173 lb 11.6 oz)--->4% wt loss from this date to present (not significant)   05/14/24 78.3 kg (172 lb 9.9 oz)   05/13/24 76.8 kg (169 lb 3.3 oz)   05/09/24 74.6 kg (164 lb 7.4 oz)   04/23/24 75.5 kg (166 lb 7.2 oz)   04/11/24 74.1 kg (163 lb 5.8 oz)   04/09/24 74.9 kg (165 lb 1.6 oz)   03/12/24 74.4 kg (164 lb)   02/20/24 75 kg (165 lb 4.8 oz)   02/05/24 74.7 kg (164 lb 10.9 oz)   12/15/23 77.6 kg (171 lb)   10/31/23 79.4 kg (175 lb)--->5% wt loss from this date to present (not significant)   10/13/23 77.6 kg (171 lb)   10/06/23 77.4 kg (170 lb 10.2 oz)   09/25/23 78.8 kg (173 lb 12.8 oz)   11/17/20 82.6 kg (182 lb)   06/22/20 86.6 kg (191 lb)     Nutrition Focused Physical Exam Findings: some mild losses noted--likely age-related  Subcutaneous Fat Loss:   Orbital Fat Pads: Mild-Moderate (slight dark circles and slight hollowing)  Buccal Fat Pads: Mild-Moderate (flat cheeks, minimal bounce)  Triceps: Well nourished (ample fat tissue)  Ribs: Defer  Muscle Wasting:  Temporalis: Mild-Moderate (slight depression)  Pectoralis (Clavicular Region): Well nourished (clavicle not visible)  Deltoid/Trapezius: Well nourished (rounded appearance at arm, shoulder, neck)  Interosseous: Well nourished (muscle bulges)  Trapezius/Infraspinatus/Supraspinatus (Scapular Region): Well nourished (bones not prominent, muscle taut)  Quadriceps: Well nourished (well developed, well rounded)  Gastrocnemius: Well nourished (well developed bulbous muscle)  Edema:  Edema: none  Physical Findings:  Hair: Negative  Eyes: Negative  Nails: Negative  Skin: Negative    Nutrition  Significant Labs:  CBC Trend:   Results from last 7 days   Lab Units 09/14/24 0343 09/13/24 1637 09/12/24  1416 09/09/24  0936   WBC AUTO x10*3/uL 2.4* 2.5* 2.4* 2.1*   RBC AUTO x10*6/uL 4.34* 4.39* 4.57 4.70   HEMOGLOBIN g/dL 11.9* 12.1* 12.6* 13.4*   HEMATOCRIT % 34.4* 35.3* 36.3* 37.5*   MCV fL 79* 80 79* 80   PLATELETS AUTO x10*3/uL 367 382 365 323   BMP Trend:   Results from last 7 days   Lab Units 09/14/24 0343 09/13/24 1637 09/12/24 1416 09/09/24  0936   GLUCOSE mg/dL 89 145* 96 100*   CALCIUM mg/dL 8.9 9.0 9.3 9.3   SODIUM mmol/L 140 139 141 141   POTASSIUM mmol/L 3.9 3.5 3.8 3.8   CO2 mmol/L 27 30 27 28   CHLORIDE mmol/L 105 103 108* 105   BUN mg/dL 13 13 12 19   CREATININE mg/dL 0.95 1.03 0.98 1.02   A1C:  Lab Results   Component Value Date    HGBA1C 6.1 (H) 06/30/2021   Liver Function Trend:   Results from last 7 days   Lab Units 09/14/24 0343 09/13/24 1637 09/12/24  1416 09/09/24  0936   ALK PHOS U/L 104 102 106 116   AST U/L 14 15 14 13   ALT U/L 9* 9* 7* 8*   BILIRUBIN TOTAL mg/dL 0.4 0.4 0.3 0.3   Renal Lab Trend:   Results from last 7 days   Lab Units 09/14/24 0343 09/13/24 1637 09/12/24  1416 09/12/24  1416 09/09/24  0936   POTASSIUM mmol/L 3.9 3.5  --  3.8 3.8   PHOSPHORUS mg/dL 3.8 3.0   < >  --   --    SODIUM mmol/L 140 139  --  141 141   MAGNESIUM mg/dL 2.00 2.09  --   --  1.92   EGFR mL/min/1.73m*2 88 80  --  85 81   BUN mg/dL 13 13  --  12 19   CREATININE mg/dL 0.95 1.03  --  0.98 1.02    < > = values in this interval not displayed.   Vit D:   Lab Results   Component Value Date    VITD25 12 (A) 09/19/2023   Vit B12:   Lab Results   Component Value Date    KIDYHORD35 1,236 (H) 04/11/2024   Folate:   Lab Results   Component Value Date    FOLATE 10.2 04/11/2024      Medications:  Scheduled medications  acyclovir, 400 mg, oral, BID  folic acid, 1 mg, oral, Daily  NIFEdipine ER, 60 mg, oral, Daily before breakfast  sennosides-docusate sodium, 1 tablet, oral, Daily  ursodiol, 300 mg, oral,  TID  vancomycin, 125 mg, oral, Daily    Continuous medications  sodium chloride 0.9%, 100 mL/hr, Last Rate: 100 mL/hr (09/14/24 1235)    PRN medications  PRN medications: albuterol, alteplase, dextrose, diphenhydrAMINE, EPINEPHrine HCl, famotidine, methylPREDNISolone sodium succinate (PF), ondansetron, polyethylene glycol, prochlorperazine, prochlorperazine, prochlorperazine, sodium chloride    I/O:   Last BM Date: 09/14/24    Dietary Orders (From admission, onward)       Start     Ordered    09/14/24 1258  Oral nutritional supplements  Until discontinued        Comments: chocolate or strawberry Ensure Plus---may have more than 2 times/day, if requested   Question Answer Comment   Deliver with Breakfast    Deliver with Dinner    Select supplement: Ensure Plus        09/14/24 1257    09/14/24 1258  Snacks  Until discontinued        Comments: pt may order from Extended Stay Menu + [facility] Snack List, if requested    09/14/24 1258    09/13/24 1427  Adult diet Low microbial  Diet effective now        Question:  Diet type  Answer:  Low microbial    09/13/24 1428                Estimated Needs:   Total Energy Estimated Needs (kCal): 2100+  Method for Estimating Needs: MSJ (1476) x ~1.4+  Total Protein Estimated Needs (g): 95+  Method for Estimating Needs: 62 (IBW) x ~1.5g/kg+  Total Fluid Estimated Needs (mL): per MD/team        Nutrition Diagnosis   Malnutrition Diagnosis  Patient has Malnutrition Diagnosis: No    Nutrition Diagnosis  Patient has Nutrition Diagnosis: Yes  Diagnosis Status (1): New  Nutrition Diagnosis 1: Increased nutrient needs  Related to (1): increased metabolic demand  As Evidenced by (1): pt with AML, admitted for transplant    Additional Assessment Information: Considering hypermetabolic state, do feel pt would benefit from ongoing use of oral nutrition supplements in-house; discussed with him--agreeable to Ensure Plus BID--order placed by this writer.       Nutrition Interventions/Recommendations      1. Continue Low Microbial Diet, only as tolerated  --RDN placed order for Ensure Plus BID  --RDN provided Extended Stay Menu + [facility] Snack List to allow pt more options when ordering        Nutrition Prescription for Oral Nutrition: 2100+ kcals/day, 95+ g pro/day        Nutrition Interventions:   Interventions: Meals and snacks, Medical food supplement  Meals and Snacks: General healthful diet  Goal: Low Microbial Diet  Medical Food Supplement: Commercial beverage  Goal: Ensure Plus BID (350 kcals, 13g pro each)    Nutrition Education:   Education Documentation  Low Microbial Diet/Food Safety Guidelines, taught by Samantha Dooley RDN, LD at 9/14/2024  1:42 PM.  Learner: Patient  Readiness: Acceptance  Method: Explanation, Handout  Response: Verbalizes Understanding  Comment: Provided Nat's handout on the Low Microbial Diet + Food Safety Guidelines.           Nutrition Monitoring and Evaluation   Food/Nutrient Related History Monitoring  Monitoring and Evaluation Plan: Amount of food  Amount of Food: Estimated amout of food, Medical food intake  Criteria: consume 75% or more of meals/snacks/supplements    Body Composition/Growth/Weight History  Monitoring and Evaluation Plan: Weight  Weight: Measured weight  Criteria: maintain stable wt    Biochemical Data, Medical Tests and Procedures  Monitoring and Evaluation Plan: Electrolyte/renal panel  Electrolyte and Renal Panel: Magnesium, Phosphorus, Potassium, Sodium  Criteria: WNL            Time Spent (min): 45 minutes

## 2024-09-14 NOTE — HOSPITAL COURSE
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     His hospital course was complicated by hypertension, febrile neutropenia, primary engraftment failure requiring haploidentical stem-cell rescue, ***    After initial conditioning with Flu-Mariana-TBI, a single cord stem cell transplant on 10/16/24 resulted in primary engraftment failure, confirmed by bone marrow biopsy on 10/15 showing 1% donor chimerism and hypocellularity without myeloid neoplasm. Despite tacrolimus and MMF for GVHD prophylaxis, there were no signs of acute GVHD, and MMF was discontinued on 10/11 due to myelosuppression concerns. Tacrolimus was stopped on 10/29 in preparation for a planned haploidentical transplant from the patient’s sister, with cell collection completed on 10/30. He was conditioned with Flu/Cy/TBI and aGVHD prophylaxis with post-transplant cyclophosphamide, tacrolimus, and mycophenolate. T=0, 11/6/24. ABO Donor/Recipient: O+, A+. CMV donor/recipient: both positive. Started Tacro and MMF on T+5 (11/11).       The patient experienced episodes of neutropenic fever on 9/25 and 10/12, initially treated with meropenem until a rash prompted a switch to cefepime from 9/27 to 10/10. Diarrhea was the only finding on CT chest/abdomen/pelvis, and blood cultures on 10/22 were negative, leading to a second cefepime course from 10/22 to 10/26.    He was also noted to have EBV viremia, with level up to 375 U/L on 10/28 and was treated despite low levels due to hisupcoming second SCT; he received rituximab 600 mg x 1 (10/31/24).    Tacrolimus level on 11/20 8.9 ng/mL home dose will be Tacrolimus 1.5mg/BID  Cellcept at 1000mg/TID continues at discharge   Instructed not to start Bactrim  until informed     ACCESS: DL tunneled line placed 9/13  PPX: Acyclovir, Posaconazole, Letermovir, Bactrim & Ursodiol   PRIMARY ONC: Dr. Malaika Newsome   Post Transplant follow up 3x/week starting 11/23

## 2024-09-14 NOTE — CARE PLAN
Problem: Pain - Adult  Goal: Verbalizes/displays adequate comfort level or baseline comfort level  Outcome: Progressing     Problem: Safety - Adult  Goal: Free from fall injury  Outcome: Progressing     Problem: Nutrition  Goal: Oral intake greater 75%  Outcome: Progressing  Goal: Adequate PO fluid intake  Outcome: Progressing  Goal: Maintain stable weight  Outcome: Progressing   The patient's goals for the shift include

## 2024-09-14 NOTE — PROGRESS NOTES
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     Subjective   Patient is doing well today. He is sitting comfortably in his rooms. Reports no problem with ambulation. Reports no change in appetite. Is staying well hydrated. He received his first dose of Fludarabine today and reports feeling well. Denies B symptoms, SOB cough, CP, abd pain, changes in bowels/bladder. Has no new concerns today.      Review of Systems   Constitutional:  Negative for appetite change, chills, diaphoresis, fatigue and fever.   HENT:   Negative for hearing loss, mouth sores, nosebleeds and sore throat.    Eyes:  Negative for eye problems.   Respiratory:  Negative for chest tightness, cough and shortness of breath.    Cardiovascular:  Negative for chest pain and palpitations.   Gastrointestinal:  Negative for abdominal pain, constipation, diarrhea, nausea and vomiting.   Genitourinary:  Negative for difficulty urinating, dysuria and frequency.    Musculoskeletal:  Negative for arthralgias.   Skin:  Negative for itching and rash.   Neurological:  Negative for dizziness, headaches and numbness.     Objective     Physical Exam  Constitutional:       General: He is not in acute distress.     Appearance: He is not ill-appearing, toxic-appearing or diaphoretic.   HENT:      Head: Normocephalic and atraumatic.      Right Ear: External ear normal.      Left Ear: External ear normal.      Nose: Nose normal.      Mouth/Throat:      Mouth: Mucous membranes are moist.   Eyes:      General: No scleral icterus.     Extraocular Movements: Extraocular movements intact.      Pupils: Pupils are equal, round, and reactive to light.   Cardiovascular:      Rate and Rhythm: Normal rate and regular rhythm.      Heart sounds: Normal heart sounds. No murmur heard.     No friction rub. No gallop.   Pulmonary:      Effort: Pulmonary effort is  normal.      Breath sounds: Normal breath sounds. No stridor. No wheezing, rhonchi or rales.   Abdominal:      General: Bowel sounds are normal. There is no distension.      Tenderness: There is no abdominal tenderness. There is no guarding.   Musculoskeletal:         General: No swelling. Normal range of motion.      Cervical back: No tenderness.      Right lower leg: No edema.      Left lower leg: No edema.   Lymphadenopathy:      Cervical: No cervical adenopathy.   Skin:     General: Skin is warm.      Findings: No bruising or erythema.   Neurological:      General: No focal deficit present.      Mental Status: He is alert and oriented to person, place, and time.   Psychiatric:         Mood and Affect: Mood normal.         Behavior: Behavior normal.       Assessment & Plan  Acute myeloid leukemia in remission (Multi)    Brandt Merritt is a 64 y.o male with a 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     T-5 Single-unit cord transplant     Update (09/14/24):  - started Fludarabine today  PPX: acyclovir, vancomycin (started 9/14), ursodiol     ONC  # MDS/AML   - Diagnosed in 9/2023  - BMBx showing MDS in transition to AML (>10% jaime) w/ trisomy 8, DNMT alpha, and U2AF1 mutation indication adverse risk   - s/p 4 cycles of Decitabine/Venetoclax (C4D1 on 8/12/24)  - BMBx 6/17/24: Blasts cleared, FISH still positive for trisomy 8, still MDS changes   - BMBx 9/5/24: 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 and no increase in blast   # Transplant   - HLA MUD 6/8 match, mismatch at A and DRB1, loci  - ABO donor: A+, CMV negative   - ABO recipient: A+, CMV positive   CHEMO:   - Fludarabine 30 mg/m2 IV D-5, D-4, D-3, D-2  - Melphalan 140 mg/m2 IV D-2  - Total Body Irradiation (TBI) 200 cGy x2 on D-1  GVHD PPX  - Tacrolimus 1.5 mg q12h to begin T-3  - MMF 1000 mg TID to begin T+2 to T+35     HEME  # Pancytopenia ::  secondary to disease   # 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   - transfuse for Hbg <7 & PLT <10   # VTE Prophylaxis: SCDs & Ambulation    ID  - Afebrile on admission   - Covid: negative   # Prophylaxis: acyclovir, vancomycin, & ursodiol   - Plan for posaconazole  (T+1), letermovir (T+14), and bactrim (T+30) prophylaxis per chemo orders  - Plan for levofloxacin when neutropenic     CARDIO/PULM  # Hx of STEMI (11/11/2020)  - TTE 7/19/24: EF 59%, otherwise unremarkable   - cleared by cardiology for transplant   # Hx of HTN   - Continue home nifedipine   # Ascending aorta dilation   - Echo showing 3.8-4 cm dilation   - optimal BP control   - Follow w/ cardiology on an annual basis     FEN/RENAL  - Admission weight 75.6 kg (9/13/24)  - monitor electrolytes, replace PRN   - antiemetics & bowel regimen per chemo orders   - continue folate supplement   - continue ursodiol for SOS ppx  #Hx of treated Hep C   - treated in 2015   - recent liver US shows normal sized liver at 14.5 cm, however seems enlarged on exam   - seen by hepatology prior to transplant   - Fibroscan 7/24/24: 4.8 KPa indicating minimal fibrosis   - recommend close monitoring of liver function & repeating hepatitis serologies   #Hx of recurrent hyperbilirubinemia   - 2/2 to Hemoglobin C hemolysis   - Tbili 0.3 on 9/5 pre-transplant apt.     ACCESS/SUPPORT/DISPO  FULL CODE  Primary Onc: Dr. Newsome   ACCESS: DL tunneled line placed 9/13    Patient seen and examined with Dr. Hugo.    Jeannie Mars  BMT (Allogeneic SCT/Acute Leukemia) Team     I was present with the physician assistant student who participated in the documentation of this note. I have personally seen and examined the patient and performed the medical decision-making components. I have reviewed the medical student documentation and verified the findings in the note as written with additions or exceptions as stated in the body of the note, made by me personally.    Olman Valdovinos PA-C  BMT  (Allogeneic SCT/Acute Leukemia) Team

## 2024-09-15 PROBLEM — Z51.11 CONDITIONING CHEMOTHERAPY PRIOR TO PERIPHERAL BLOOD STEM CELL TRANSPLANT: Status: ACTIVE | Noted: 2024-09-15

## 2024-09-15 LAB
ALBUMIN SERPL BCP-MCNC: 3.9 G/DL (ref 3.4–5)
ALP SERPL-CCNC: 100 U/L (ref 33–136)
ALT SERPL W P-5'-P-CCNC: 9 U/L (ref 10–52)
ANION GAP SERPL CALC-SCNC: 13 MMOL/L (ref 10–20)
APTT PPP: 31 SECONDS (ref 27–38)
AST SERPL W P-5'-P-CCNC: 13 U/L (ref 9–39)
BASOPHILS # BLD AUTO: 0.01 X10*3/UL (ref 0–0.1)
BASOPHILS NFR BLD AUTO: 0.2 %
BILIRUB SERPL-MCNC: 0.4 MG/DL (ref 0–1.2)
BUN SERPL-MCNC: 13 MG/DL (ref 6–23)
CALCIUM SERPL-MCNC: 9.1 MG/DL (ref 8.6–10.6)
CHLORIDE SERPL-SCNC: 105 MMOL/L (ref 98–107)
CO2 SERPL-SCNC: 26 MMOL/L (ref 21–32)
CREAT SERPL-MCNC: 0.88 MG/DL (ref 0.5–1.3)
CRP SERPL-MCNC: 0.16 MG/DL
EGFRCR SERPLBLD CKD-EPI 2021: >90 ML/MIN/1.73M*2
EOSINOPHIL # BLD AUTO: 0 X10*3/UL (ref 0–0.7)
EOSINOPHIL NFR BLD AUTO: 0 %
ERYTHROCYTE [DISTWIDTH] IN BLOOD BY AUTOMATED COUNT: 19.4 % (ref 11.5–14.5)
FERRITIN SERPL-MCNC: 149 NG/ML (ref 20–300)
FIBRINOGEN PPP-MCNC: 366 MG/DL (ref 200–400)
GLUCOSE SERPL-MCNC: 100 MG/DL (ref 74–99)
HCT VFR BLD AUTO: 34 % (ref 41–52)
HGB BLD-MCNC: 12 G/DL (ref 13.5–17.5)
IMM GRANULOCYTES # BLD AUTO: 0.02 X10*3/UL (ref 0–0.7)
IMM GRANULOCYTES NFR BLD AUTO: 0.4 % (ref 0–0.9)
INR PPP: 1.1 (ref 0.9–1.1)
LDH SERPL L TO P-CCNC: 132 U/L (ref 84–246)
LYMPHOCYTES # BLD AUTO: 1.21 X10*3/UL (ref 1.2–4.8)
LYMPHOCYTES NFR BLD AUTO: 23.6 %
MAGNESIUM SERPL-MCNC: 1.99 MG/DL (ref 1.6–2.4)
MCH RBC QN AUTO: 27.8 PG (ref 26–34)
MCHC RBC AUTO-ENTMCNC: 35.3 G/DL (ref 32–36)
MCV RBC AUTO: 79 FL (ref 80–100)
MONOCYTES # BLD AUTO: 0.74 X10*3/UL (ref 0.1–1)
MONOCYTES NFR BLD AUTO: 14.4 %
NEUTROPHILS # BLD AUTO: 3.15 X10*3/UL (ref 1.2–7.7)
NEUTROPHILS NFR BLD AUTO: 61.4 %
NRBC BLD-RTO: 0 /100 WBCS (ref 0–0)
PHOSPHATE SERPL-MCNC: 3.2 MG/DL (ref 2.5–4.9)
PLATELET # BLD AUTO: 370 X10*3/UL (ref 150–450)
POTASSIUM SERPL-SCNC: 4 MMOL/L (ref 3.5–5.3)
PROT SERPL-MCNC: 7 G/DL (ref 6.4–8.2)
PROTHROMBIN TIME: 12 SECONDS (ref 9.8–12.8)
RBC # BLD AUTO: 4.32 X10*6/UL (ref 4.5–5.9)
SODIUM SERPL-SCNC: 140 MMOL/L (ref 136–145)
URATE SERPL-MCNC: 3.6 MG/DL (ref 4–7.5)
WBC # BLD AUTO: 5.1 X10*3/UL (ref 4.4–11.3)

## 2024-09-15 PROCEDURE — 84550 ASSAY OF BLOOD/URIC ACID: CPT

## 2024-09-15 PROCEDURE — 2500000002 HC RX 250 W HCPCS SELF ADMINISTERED DRUGS (ALT 637 FOR MEDICARE OP, ALT 636 FOR OP/ED)

## 2024-09-15 PROCEDURE — 83735 ASSAY OF MAGNESIUM: CPT

## 2024-09-15 PROCEDURE — 85730 THROMBOPLASTIN TIME PARTIAL: CPT

## 2024-09-15 PROCEDURE — 85384 FIBRINOGEN ACTIVITY: CPT

## 2024-09-15 PROCEDURE — 84100 ASSAY OF PHOSPHORUS: CPT

## 2024-09-15 PROCEDURE — 82728 ASSAY OF FERRITIN: CPT

## 2024-09-15 PROCEDURE — 86140 C-REACTIVE PROTEIN: CPT

## 2024-09-15 PROCEDURE — 83615 LACTATE (LD) (LDH) ENZYME: CPT

## 2024-09-15 PROCEDURE — 80053 COMPREHEN METABOLIC PANEL: CPT

## 2024-09-15 PROCEDURE — 2500000001 HC RX 250 WO HCPCS SELF ADMINISTERED DRUGS (ALT 637 FOR MEDICARE OP)

## 2024-09-15 PROCEDURE — 2500000004 HC RX 250 GENERAL PHARMACY W/ HCPCS (ALT 636 FOR OP/ED): Performed by: INTERNAL MEDICINE

## 2024-09-15 PROCEDURE — 99232 SBSQ HOSP IP/OBS MODERATE 35: CPT | Performed by: INTERNAL MEDICINE

## 2024-09-15 PROCEDURE — 2500000002 HC RX 250 W HCPCS SELF ADMINISTERED DRUGS (ALT 637 FOR MEDICARE OP, ALT 636 FOR OP/ED): Performed by: INTERNAL MEDICINE

## 2024-09-15 PROCEDURE — 1170000001 HC PRIVATE ONCOLOGY ROOM DAILY

## 2024-09-15 PROCEDURE — 85025 COMPLETE CBC W/AUTO DIFF WBC: CPT

## 2024-09-15 PROCEDURE — 2500000004 HC RX 250 GENERAL PHARMACY W/ HCPCS (ALT 636 FOR OP/ED)

## 2024-09-15 RX ORDER — NIFEDIPINE 90 MG/1
90 TABLET, EXTENDED RELEASE ORAL
Status: DISCONTINUED | OUTPATIENT
Start: 2024-09-16 | End: 2024-11-10

## 2024-09-15 RX ADMIN — URSODIOL 300 MG: 300 CAPSULE ORAL at 08:03

## 2024-09-15 RX ADMIN — NIFEDIPINE 60 MG: 60 TABLET, FILM COATED, EXTENDED RELEASE ORAL at 03:36

## 2024-09-15 RX ADMIN — URSODIOL 300 MG: 300 CAPSULE ORAL at 15:42

## 2024-09-15 RX ADMIN — SENNOSIDES AND DOCUSATE SODIUM 1 TABLET: 50; 8.6 TABLET ORAL at 08:03

## 2024-09-15 RX ADMIN — SODIUM CHLORIDE 50 ML/HR: 9 INJECTION, SOLUTION INTRAVENOUS at 03:36

## 2024-09-15 RX ADMIN — FLUDARABINE PHOSPHATE 55.25 MG: 25 INJECTION, SOLUTION INTRAVENOUS at 09:28

## 2024-09-15 RX ADMIN — FOLIC ACID TAB 400 MCG 1 MG: 400 TAB at 08:03

## 2024-09-15 RX ADMIN — VANCOMYCIN HYDROCHLORIDE 125 MG: 125 CAPSULE ORAL at 08:03

## 2024-09-15 RX ADMIN — ACYCLOVIR 400 MG: 400 TABLET ORAL at 08:03

## 2024-09-15 RX ADMIN — SODIUM CHLORIDE 50 ML/HR: 9 INJECTION, SOLUTION INTRAVENOUS at 20:33

## 2024-09-15 RX ADMIN — ACYCLOVIR 400 MG: 400 TABLET ORAL at 20:33

## 2024-09-15 RX ADMIN — ONDANSETRON 16 MG: 2 INJECTION INTRAMUSCULAR; INTRAVENOUS at 08:04

## 2024-09-15 RX ADMIN — URSODIOL 300 MG: 300 CAPSULE ORAL at 20:33

## 2024-09-15 RX ADMIN — DEXAMETHASONE SODIUM PHOSPHATE 12 MG: 10 INJECTION, SOLUTION INTRAMUSCULAR; INTRAVENOUS at 08:04

## 2024-09-15 ASSESSMENT — ENCOUNTER SYMPTOMS
SORE THROAT: 0
PALPITATIONS: 0
SHORTNESS OF BREATH: 0
DIZZINESS: 0
CHILLS: 0
EYE PROBLEMS: 0
NAUSEA: 0
HEMATURIA: 0
COUGH: 0
FREQUENCY: 1
CONSTIPATION: 0
CHEST TIGHTNESS: 0
DIARRHEA: 0
FATIGUE: 0
FEVER: 0
DYSURIA: 0
VOMITING: 0
HEADACHES: 0
ARTHRALGIAS: 0
NUMBNESS: 0
DIAPHORESIS: 0
APPETITE CHANGE: 0
ABDOMINAL PAIN: 0
DIFFICULTY URINATING: 0

## 2024-09-15 ASSESSMENT — PAIN SCALES - GENERAL
PAINLEVEL_OUTOF10: 0 - NO PAIN
PAINLEVEL_OUTOF10: 0 - NO PAIN

## 2024-09-15 ASSESSMENT — COGNITIVE AND FUNCTIONAL STATUS - GENERAL
MOBILITY SCORE: 24
DAILY ACTIVITIY SCORE: 24

## 2024-09-15 ASSESSMENT — PAIN - FUNCTIONAL ASSESSMENT
PAIN_FUNCTIONAL_ASSESSMENT: 0-10
PAIN_FUNCTIONAL_ASSESSMENT: 0-10

## 2024-09-15 NOTE — PROGRESS NOTES
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     Subjective   Patient is doing well today. He is sitting comfortably in his room. He reports not sleeping well due to urinary frequency he believes is related to his IV fluids. Endorses need to use the restroom every 30 minutes throughout the night. Denies dysuria, hematuria, and difficulty urinating. Reports previously taking of Flomax during an episode of urinary obstruction/retention in April of 2023 post transperineal template prostate biopsy. He reports eating well and maintaining hydration. Denies a change in his appetite. He received his 2nd dose of Fludarabine this morning. Has no other concerns today. Denies B symptoms, SOB cough, CP, abd pain, changes in bowels/bladder. Has no new concerns today.      Review of Systems   Constitutional:  Negative for appetite change, chills, diaphoresis, fatigue and fever.   HENT:   Negative for hearing loss, mouth sores, nosebleeds and sore throat.    Eyes:  Negative for eye problems.   Respiratory:  Negative for chest tightness, cough and shortness of breath.    Cardiovascular:  Negative for chest pain and palpitations.   Gastrointestinal:  Negative for abdominal pain, constipation, diarrhea, nausea and vomiting.   Genitourinary:  Positive for frequency. Negative for difficulty urinating, dysuria and hematuria.    Musculoskeletal:  Negative for arthralgias.   Skin:  Negative for itching and rash.   Neurological:  Negative for dizziness, headaches and numbness.     Objective     Physical Exam  Constitutional:       General: He is not in acute distress.     Appearance: He is not ill-appearing, toxic-appearing or diaphoretic.   HENT:      Head: Normocephalic and atraumatic.      Right Ear: External ear normal.      Left Ear: External ear normal.      Nose: Nose normal.   Eyes:      General: No scleral  icterus.     Extraocular Movements: Extraocular movements intact.      Pupils: Pupils are equal, round, and reactive to light.   Cardiovascular:      Rate and Rhythm: Normal rate and regular rhythm.      Heart sounds: Normal heart sounds. No murmur heard.     No friction rub. No gallop.   Pulmonary:      Effort: Pulmonary effort is normal.      Breath sounds: Normal breath sounds. No stridor. No wheezing, rhonchi or rales.   Abdominal:      General: Bowel sounds are normal. There is no distension.      Tenderness: There is no abdominal tenderness. There is no guarding.   Musculoskeletal:         General: No swelling. Normal range of motion.      Cervical back: No tenderness.      Right lower leg: No edema.      Left lower leg: No edema.   Lymphadenopathy:      Cervical: No cervical adenopathy.   Skin:     General: Skin is warm.      Findings: No bruising or erythema.   Neurological:      General: No focal deficit present.      Mental Status: He is alert and oriented to person, place, and time.   Psychiatric:         Mood and Affect: Mood normal.         Behavior: Behavior normal.     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 (>10% jaime) w/ trisomy 8, DNMT alpha, and U2AF1 mutation indication adverse risk   - s/p 4 cycles of Decitabine/Venetoclax (C4D1 on 8/12/24)  - BMBx 6/17/24: Blasts cleared, FISH still positive for trisomy 8, still MDS changes   - BMBx 9/5/24: 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 and no increase in blast   # Transplant   - HLA MUD 6/8 match, mismatch at A and DRB1, loci  - ABO donor: A+, CMV negative   - ABO recipient: A+, CMV positive   CHEMO:   - Fludarabine 30 mg/m2 IV D-5, D-4, D-3, D-2  - Melphalan 140 mg/m2 IV D-2  - Total Body Irradiation (TBI) 200 cGy x2 on D-1  GVHD PPX  - Tacrolimus 1.5 mg q12h to begin T-3  - MMF 1000 mg TID to begin T+2 to T+35     HEME  # Pancytopenia :: secondary to disease   # 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   - transfuse for Hbg <7 & PLT <10   # VTE Prophylaxis: SCDs & Ambulation    ID  - Afebrile on admission   - Covid: negative   # Prophylaxis: acyclovir, vancomycin, & ursodiol   - Plan for posaconazole  (T+1), letermovir (T+14), and bactrim (T+30) prophylaxis per chemo orders  - Plan for levofloxacin when neutropenic     CARDIO/PULM  # Hx of STEMI (11/11/2020)  - TTE 7/19/24: EF 59%, otherwise unremarkable   - cleared by cardiology for transplant   # Hx of HTN   - increased home nifedipine: 90 mEq ER 24 hr tablet    *Will consider additional HTN medication if needed  # Ascending aorta dilation   - Echo showing 3.8-4 cm dilation   - optimal BP control   - Follow w/ cardiology on an annual basis     FEN/RENAL  - Admission weight 75.6 kg (9/13/24)  - monitor electrolytes, replace PRN   - antiemetics & bowel regimen per chemo orders   - continue folate supplement   - continue ursodiol for SOS ppx  #Urinary frequency :: IV fluid intake   - hx of BPH and urinary retentions (2023)   *acutely tx in 2023 with cipro x1 and Tamsulosin 0.4 for 30 days   - Hx elevated PSA    *7/28/21 Bx: atypical small acinar  proliferation    *4/7/23 Bx: Benign   - currently on continuous 50 ml/hr IV NaCl   - discussed need of continuous IV fluid with the patient due to chemo prep regimen for SCT   - post void residual volume: 0 mL, not consistent with urinary retention due to BPH   #Hx of treated Hep C   - treated in 2015   - recent liver US shows normal sized liver at 14.5 cm, however seems enlarged on exam   - seen by hepatology prior to transplant   - Fibroscan 7/24/24: 4.8 KPa indicating minimal fibrosis   - recommend close monitoring of liver function & repeating hepatitis serologies   #Hx of recurrent hyperbilirubinemia   - 2/2 to Hemoglobin C hemolysis   - Tbili 0.3 on 9/5 pre-transplant apt.   #Hx of Elevated PSA     ACCESS/SUPPORT/DISPO  FULL CODE  Primary Onc: Dr. Newsome   ACCESS: DL tunneled line placed 9/13    Patient seen and examined with Dr. Hugo.    Jeannie LAWS    I was present with the physician assistant student who participated in the documentation of this note. I have personally seen and examined the patient and performed the medical decision-making components. I have reviewed the medical student documentation and verified the findings in the note as written with additions or exceptions as stated in the body of the note, made by me personally.    Olman Valdovinos PA-C  BMT (Allogeneic SCT/Acute Leukemia) Team

## 2024-09-15 NOTE — SIGNIFICANT EVENT
09/15/24 0209   Prechemo Checklist   Has the patient been in the hospital, ED, or urgent care since last date of service Yes   Chemo/Immuno Consent Completed and Signed Yes   Protocol/Indications Verified Yes   Confirmed to previous date/time of medication Yes   Compared to previous dose Yes   All medications are dated accurately Yes   Pregnancy Test Negative Not applicable   Parameters Met Yes   Provider Name Malaika Newsome   BSA/Weight-Height Verified Yes   Dose Calculations Verified (current, total, cumulative) Yes

## 2024-09-15 NOTE — CARE PLAN
Problem: Pain - Adult  Goal: Verbalizes/displays adequate comfort level or baseline comfort level  Outcome: Progressing     Problem: Safety - Adult  Goal: Free from fall injury  Outcome: Progressing     Problem: Discharge Planning  Goal: Discharge to home or other facility with appropriate resources  Outcome: Progressing     Problem: Chronic Conditions and Co-morbidities  Goal: Patient's chronic conditions and co-morbidity symptoms are monitored and maintained or improved  Outcome: Progressing   The patient's goals for the shift include      The clinical goals for the shift include Pt will remain HDS through EOS

## 2024-09-15 NOTE — CARE PLAN
Problem: Pain - Adult  Goal: Verbalizes/displays adequate comfort level or baseline comfort level  Outcome: Progressing     Problem: Safety - Adult  Goal: Free from fall injury  Outcome: Progressing     Problem: Discharge Planning  Goal: Discharge to home or other facility with appropriate resources  Outcome: Progressing     Problem: Chronic Conditions and Co-morbidities  Goal: Patient's chronic conditions and co-morbidity symptoms are monitored and maintained or improved  Outcome: Progressing    The clinical goals for the shift include pt will remain safe and free of injury through EOS

## 2024-09-16 ENCOUNTER — APPOINTMENT (OUTPATIENT)
Dept: RADIATION ONCOLOGY | Facility: HOSPITAL | Age: 64
End: 2024-09-16
Payer: COMMERCIAL

## 2024-09-16 VITALS
RESPIRATION RATE: 18 BRPM | SYSTOLIC BLOOD PRESSURE: 163 MMHG | WEIGHT: 166.67 LBS | BODY MASS INDEX: 26.79 KG/M2 | TEMPERATURE: 97.3 F | HEART RATE: 72 BPM | HEIGHT: 66 IN | DIASTOLIC BLOOD PRESSURE: 90 MMHG | OXYGEN SATURATION: 96 %

## 2024-09-16 LAB
ALBUMIN SERPL BCP-MCNC: 3.5 G/DL (ref 3.4–5)
ALP SERPL-CCNC: 85 U/L (ref 33–136)
ALT SERPL W P-5'-P-CCNC: 7 U/L (ref 10–52)
ANION GAP SERPL CALC-SCNC: 11 MMOL/L (ref 10–20)
APTT PPP: 30 SECONDS (ref 27–38)
AST SERPL W P-5'-P-CCNC: 11 U/L (ref 9–39)
BASOPHILS # BLD AUTO: 0.01 X10*3/UL (ref 0–0.1)
BASOPHILS NFR BLD AUTO: 0.2 %
BILIRUB SERPL-MCNC: 0.3 MG/DL (ref 0–1.2)
BUN SERPL-MCNC: 17 MG/DL (ref 6–23)
CALCIUM SERPL-MCNC: 8.4 MG/DL (ref 8.6–10.6)
CHLORIDE SERPL-SCNC: 105 MMOL/L (ref 98–107)
CO2 SERPL-SCNC: 29 MMOL/L (ref 21–32)
CREAT SERPL-MCNC: 0.95 MG/DL (ref 0.5–1.3)
CRP SERPL-MCNC: <0.1 MG/DL
EGFRCR SERPLBLD CKD-EPI 2021: 88 ML/MIN/1.73M*2
EOSINOPHIL # BLD AUTO: 0 X10*3/UL (ref 0–0.7)
EOSINOPHIL NFR BLD AUTO: 0 %
ERYTHROCYTE [DISTWIDTH] IN BLOOD BY AUTOMATED COUNT: 19.4 % (ref 11.5–14.5)
FERRITIN SERPL-MCNC: 123 NG/ML (ref 20–300)
FIBRINOGEN PPP-MCNC: 318 MG/DL (ref 200–400)
GLUCOSE SERPL-MCNC: 133 MG/DL (ref 74–99)
HCT VFR BLD AUTO: 29.5 % (ref 41–52)
HGB BLD-MCNC: 10 G/DL (ref 13.5–17.5)
IMM GRANULOCYTES # BLD AUTO: 0.03 X10*3/UL (ref 0–0.7)
IMM GRANULOCYTES NFR BLD AUTO: 0.6 % (ref 0–0.9)
INR PPP: 1 (ref 0.9–1.1)
LDH SERPL L TO P-CCNC: 116 U/L (ref 84–246)
LYMPHOCYTES # BLD AUTO: 0.85 X10*3/UL (ref 1.2–4.8)
LYMPHOCYTES NFR BLD AUTO: 17.1 %
MAGNESIUM SERPL-MCNC: 2.03 MG/DL (ref 1.6–2.4)
MCH RBC QN AUTO: 26.7 PG (ref 26–34)
MCHC RBC AUTO-ENTMCNC: 33.9 G/DL (ref 32–36)
MCV RBC AUTO: 79 FL (ref 80–100)
MONOCYTES # BLD AUTO: 0.53 X10*3/UL (ref 0.1–1)
MONOCYTES NFR BLD AUTO: 10.6 %
NEUTROPHILS # BLD AUTO: 3.56 X10*3/UL (ref 1.2–7.7)
NEUTROPHILS NFR BLD AUTO: 71.5 %
NRBC BLD-RTO: 0 /100 WBCS (ref 0–0)
PHOSPHATE SERPL-MCNC: 3.7 MG/DL (ref 2.5–4.9)
PLATELET # BLD AUTO: 302 X10*3/UL (ref 150–450)
POTASSIUM SERPL-SCNC: 4 MMOL/L (ref 3.5–5.3)
PROT SERPL-MCNC: 6.1 G/DL (ref 6.4–8.2)
PROTHROMBIN TIME: 11.2 SECONDS (ref 9.8–12.8)
RBC # BLD AUTO: 3.75 X10*6/UL (ref 4.5–5.9)
SODIUM SERPL-SCNC: 141 MMOL/L (ref 136–145)
URATE SERPL-MCNC: 3.6 MG/DL (ref 4–7.5)
WBC # BLD AUTO: 5 X10*3/UL (ref 4.4–11.3)

## 2024-09-16 PROCEDURE — 85384 FIBRINOGEN ACTIVITY: CPT

## 2024-09-16 PROCEDURE — 85610 PROTHROMBIN TIME: CPT

## 2024-09-16 PROCEDURE — 2500000002 HC RX 250 W HCPCS SELF ADMINISTERED DRUGS (ALT 637 FOR MEDICARE OP, ALT 636 FOR OP/ED): Performed by: INTERNAL MEDICINE

## 2024-09-16 PROCEDURE — 84550 ASSAY OF BLOOD/URIC ACID: CPT

## 2024-09-16 PROCEDURE — 85025 COMPLETE CBC W/AUTO DIFF WBC: CPT

## 2024-09-16 PROCEDURE — 77334 RADIATION TREATMENT AID(S): CPT | Performed by: RADIOLOGY

## 2024-09-16 PROCEDURE — 2500000004 HC RX 250 GENERAL PHARMACY W/ HCPCS (ALT 636 FOR OP/ED)

## 2024-09-16 PROCEDURE — 2500000004 HC RX 250 GENERAL PHARMACY W/ HCPCS (ALT 636 FOR OP/ED): Performed by: INTERNAL MEDICINE

## 2024-09-16 PROCEDURE — 84100 ASSAY OF PHOSPHORUS: CPT

## 2024-09-16 PROCEDURE — 77300 RADIATION THERAPY DOSE PLAN: CPT | Performed by: RADIOLOGY

## 2024-09-16 PROCEDURE — 86140 C-REACTIVE PROTEIN: CPT

## 2024-09-16 PROCEDURE — 2500000001 HC RX 250 WO HCPCS SELF ADMINISTERED DRUGS (ALT 637 FOR MEDICARE OP)

## 2024-09-16 PROCEDURE — 80053 COMPREHEN METABOLIC PANEL: CPT

## 2024-09-16 PROCEDURE — 82728 ASSAY OF FERRITIN: CPT

## 2024-09-16 PROCEDURE — 83615 LACTATE (LD) (LDH) ENZYME: CPT

## 2024-09-16 PROCEDURE — 2500000002 HC RX 250 W HCPCS SELF ADMINISTERED DRUGS (ALT 637 FOR MEDICARE OP, ALT 636 FOR OP/ED): Performed by: PHYSICIAN ASSISTANT

## 2024-09-16 PROCEDURE — 83735 ASSAY OF MAGNESIUM: CPT

## 2024-09-16 PROCEDURE — 1170000001 HC PRIVATE ONCOLOGY ROOM DAILY

## 2024-09-16 PROCEDURE — 99232 SBSQ HOSP IP/OBS MODERATE 35: CPT | Performed by: INTERNAL MEDICINE

## 2024-09-16 RX ORDER — TACROLIMUS 1 MG/1
2 CAPSULE ORAL EVERY 12 HOURS
Status: DISCONTINUED | OUTPATIENT
Start: 2024-09-16 | End: 2024-09-20

## 2024-09-16 RX ADMIN — SODIUM CHLORIDE 50 ML/HR: 9 INJECTION, SOLUTION INTRAVENOUS at 18:19

## 2024-09-16 RX ADMIN — ONDANSETRON 16 MG: 2 INJECTION INTRAMUSCULAR; INTRAVENOUS at 09:38

## 2024-09-16 RX ADMIN — URSODIOL 300 MG: 300 CAPSULE ORAL at 09:03

## 2024-09-16 RX ADMIN — ACYCLOVIR 400 MG: 400 TABLET ORAL at 09:03

## 2024-09-16 RX ADMIN — ACYCLOVIR 400 MG: 400 TABLET ORAL at 20:06

## 2024-09-16 RX ADMIN — VANCOMYCIN HYDROCHLORIDE 125 MG: 125 CAPSULE ORAL at 09:03

## 2024-09-16 RX ADMIN — SENNOSIDES AND DOCUSATE SODIUM 1 TABLET: 50; 8.6 TABLET ORAL at 09:04

## 2024-09-16 RX ADMIN — NIFEDIPINE 90 MG: 90 TABLET, FILM COATED, EXTENDED RELEASE ORAL at 04:22

## 2024-09-16 RX ADMIN — TACROLIMUS 2 MG: 1 CAPSULE ORAL at 06:55

## 2024-09-16 RX ADMIN — URSODIOL 300 MG: 300 CAPSULE ORAL at 14:49

## 2024-09-16 RX ADMIN — FOLIC ACID TAB 400 MCG 1 MG: 400 TAB at 09:03

## 2024-09-16 RX ADMIN — FLUDARABINE PHOSPHATE 55.25 MG: 25 INJECTION, SOLUTION INTRAVENOUS at 10:07

## 2024-09-16 RX ADMIN — URSODIOL 300 MG: 300 CAPSULE ORAL at 20:06

## 2024-09-16 RX ADMIN — DEXAMETHASONE SODIUM PHOSPHATE 12 MG: 10 INJECTION, SOLUTION INTRAMUSCULAR; INTRAVENOUS at 09:03

## 2024-09-16 RX ADMIN — TACROLIMUS 2 MG: 1 CAPSULE ORAL at 18:19

## 2024-09-16 ASSESSMENT — COGNITIVE AND FUNCTIONAL STATUS - GENERAL
MOBILITY SCORE: 24
DAILY ACTIVITIY SCORE: 24

## 2024-09-16 ASSESSMENT — ENCOUNTER SYMPTOMS
CONSTIPATION: 0
NUMBNESS: 0
COUGH: 0
EYE PROBLEMS: 0
CHEST TIGHTNESS: 0
FEVER: 0
CHILLS: 0
DIARRHEA: 0
DIAPHORESIS: 0
HEADACHES: 0
FREQUENCY: 1
ABDOMINAL PAIN: 0
HEMATURIA: 0
VOMITING: 0
DIFFICULTY URINATING: 0
PALPITATIONS: 0
NAUSEA: 0
APPETITE CHANGE: 0
SORE THROAT: 0
FATIGUE: 0
DYSURIA: 0
DIZZINESS: 0
ARTHRALGIAS: 0
SHORTNESS OF BREATH: 0

## 2024-09-16 ASSESSMENT — PAIN SCALES - GENERAL
PAINLEVEL_OUTOF10: 0 - NO PAIN

## 2024-09-16 ASSESSMENT — PAIN - FUNCTIONAL ASSESSMENT
PAIN_FUNCTIONAL_ASSESSMENT: 0-10
PAIN_FUNCTIONAL_ASSESSMENT: 0-10

## 2024-09-16 ASSESSMENT — ACTIVITIES OF DAILY LIVING (ADL): LACK_OF_TRANSPORTATION: NO

## 2024-09-16 NOTE — PROGRESS NOTES
Pharmacy Medication History Review    Brandt Merritt is a 64 y.o. male admitted for AML (acute myeloid leukemia) in relapse ([facility]). Pharmacy reviewed the patient's tsrba-xu-[id number] medications and allergies for accuracy.    Medications ADDED:  none  Medications CHANGED:  none  Medications REMOVED:   none    The list below reflects the updated PTA list.   Prior to Admission Medications   Prescriptions Last Dose Informant   NIFEdipine ER (Adalat CC) 60 mg 24 hr tablet     Sig: Take 1 tablet (60 mg) by mouth once daily in the morning. Take before meals. Do not crush, chew, or split.   acyclovir (Zovirax) 400 mg tablet     Sig: Take 1 tablet (400 mg) by mouth 2 times a day.   folic acid (Folvite) 1 mg tablet     Sig: Take 1 tablet (1 mg) by mouth once daily.   letermovir (Prevymis) 480 mg tablet     Sig: Take 1 tablet (480 mg) by mouth once daily.   magnesium chloride (MagDelay) 64 mg EC tablet     Sig: Take 2 tablets (128 mg) by mouth 3 times a day. Or as instructed on your medication list.   mycophenolate (Cellcept) 250 mg capsule     Sig: Take 4 capsules (1,000 mg) by mouth 3 times a day.   ondansetron (Zofran) 8 mg tablet     Sig: Take 1 tablet (8 mg) by mouth every 8 hours if needed for nausea or vomiting.   posaconazole (Noxafil) 100 mg DR tablet     Sig: Take 3 tablets (300 mg) by mouth once daily. Do not crush, chew, or split. This is to prevent fungal infections.   prochlorperazine (Compazine) 10 mg tablet     Sig: Take 1 tablet (10 mg) by mouth every 6 hours if needed for nausea or vomiting.   sennosides-docusate sodium (Karissa-Colace) 8.6-50 mg tablet     Sig: Take 1 tablet by mouth once daily.   sulfamethoxazole-trimethoprim (Bactrim DS) 800-160 mg tablet     Sig: Take 1 tablet by mouth 3 times a week. Take on Mondays, Wednesdays, and Fridays. Do not start until instructed.   tacrolimus (Prograf) 0.5 mg capsule     Sig: Take 1 capsule (0.5 mg) by mouth every 12 hours. Or as instructed on your medication  "list.   tacrolimus (Prograf) 1 mg capsule     Sig: Take 2 capsules (2 mg) by mouth every 12 hours. Or as instructed on your medication list.   ursodiol (Actigall) 300 mg capsule     Sig: Take 1 capsule (300 mg) by mouth 3 times a day.      Facility-Administered Medications: None        The list below reflects the updated allergy list. Please review each documented allergy for additional clarification and justification.  Allergies  Reviewed by Jason Reece on 9/16/2024        Severity Reactions Comments    Thiazides Low Other Recurrent significant hypokalemia            Patient accepts M2B at discharge.     Sources:   Moderate Historian interview patient  Dispense History   OARRS     Additional Comments:  Patient was taking acyclovir, Folic acid, and NIFEdipine before he was admitted most of medications on list will be started in this hospital admission.      JASON REECE  Pharmacy Technician  09/16/24     Secure Chat preferred   If no response call [id number] or [facility] \"Med Rec\"    "

## 2024-09-16 NOTE — PROGRESS NOTES
09/16/24 1534   Discharge Planning   Living Arrangements Spouse/significant other   Support Systems Spouse/significant other   Assistance Needed none   Type of Residence Private residence   Home or Post Acute Services None   Expected Discharge Disposition Home   Does the patient need discharge transport arranged? No   Financial Resource Strain   How hard is it for you to pay for the very basics like food, housing, medical care, and heating? Not very   Housing Stability   In the last 12 months, was there a time when you were not able to pay the mortgage or rent on time? N   In the past 12 months, how many times have you moved where you were living? 0   At any time in the past 12 months, were you homeless or living in a shelter (including now)? N   Transportation Needs   In the past 12 months, has lack of transportation kept you from medical appointments or from getting medications? no   In the past 12 months, has lack of transportation kept you from meetings, work, or from getting things needed for daily living? No     Pt with MDS/AML was admitted for an UCBSCT, T=0 on 9/19/24.  Met with the pt and his wife to discuss his home going plan.  He will return to his home, where he lives with his wife, who will be his caregiver.  The pt is still independent and doesn't require any home care or DME.  He normally works but is currently on short term disability.  He has a DL Arrowguard catheter which will need to be removed prior to discharge and will likely have a PICC placed. Music/Art therapy were offered and pt is agreeable- will place consult orders.   His MD is Dr. Malaika Newsome.  Will continue to follow to assist with home going needs.  Radha Contreras RN, TCC

## 2024-09-16 NOTE — PROGRESS NOTES
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     Subjective   Patient is doing well today. He is sitting comfortably in his room. e reports eating well and maintaining hydration. Denies a change in his appetite.  He reports less nocturia (4-5x)  since reducing his IVF rate.  Denies dysuria, hematuria, and difficulty urinating. Reports previously taking of Flomax during an episode of urinary obstruction/retention in April of 2023 post transperineal template prostate biopsy. He received his 3rd dose of Fludarabine this morning. Has no other concerns today. Denies B symptoms, SOB cough, CP, abd pain, changes in bowels/bladder. Has no new concerns today.      Review of Systems   Constitutional:  Negative for appetite change, chills, diaphoresis, fatigue and fever.   HENT:   Negative for hearing loss, mouth sores, nosebleeds and sore throat.    Eyes:  Negative for eye problems.   Respiratory:  Negative for chest tightness, cough and shortness of breath.    Cardiovascular:  Negative for chest pain and palpitations.   Gastrointestinal:  Negative for abdominal pain, constipation, diarrhea, nausea and vomiting.   Genitourinary:  Positive for frequency. Negative for difficulty urinating, dysuria and hematuria.    Musculoskeletal:  Negative for arthralgias.   Skin:  Negative for itching and rash.   Neurological:  Negative for dizziness, headaches and numbness.     Objective     Vitals:    09/15/24 2035 09/16/24 0000 09/16/24 0358 09/16/24 0843   BP: 134/83 163/90 (!) 158/91 149/87   BP Location: Right arm Right arm Right arm Right arm   Patient Position: Sitting Lying Lying Lying   Pulse: 84 72 70 82   Resp: 18 18 18 18   Temp: 36.3 °C (97.3 °F) 36.3 °C (97.3 °F) 36.1 °C (97 °F) 36.1 °C (97 °F)   TempSrc: Temporal Temporal Temporal Temporal   SpO2: 98% 96% 98% 95%   Weight:       Height:             Physical Exam  Constitutional:       General: He is not in acute distress.     Appearance: He is not ill-appearing, toxic-appearing or diaphoretic.   HENT:      Head: Normocephalic and atraumatic.      Right Ear: External ear normal.      Left Ear: External ear normal.      Nose: Nose normal.   Eyes:      General: No scleral icterus.     Extraocular Movements: Extraocular movements intact.      Pupils: Pupils are equal, round, and reactive to light.   Cardiovascular:      Rate and Rhythm: Normal rate and regular rhythm.      Heart sounds: Normal heart sounds. No murmur heard.     No friction rub. No gallop.   Pulmonary:      Effort: Pulmonary effort is normal.      Breath sounds: Normal breath sounds. No stridor. No wheezing, rhonchi or rales.   Abdominal:      General: Bowel sounds are normal. There is no distension.      Tenderness: There is no abdominal tenderness. There is no guarding.   Musculoskeletal:         General: No swelling. Normal range of motion.      Cervical back: No tenderness.      Right lower leg: No edema.      Left lower leg: No edema.   Lymphadenopathy:      Cervical: No cervical adenopathy.   Skin:     General: Skin is warm.      Findings: No bruising or erythema.   Neurological:      General: No focal deficit present.      Mental Status: He is alert and oriented to person, place, and time.   Psychiatric:         Mood and Affect: Mood normal.         Behavior: Behavior normal.     Scheduled medications  acyclovir, 400 mg, oral, BID  folic acid, 1 mg, oral, Daily  NIFEdipine ER, 90 mg, oral, Daily before breakfast  sennosides-docusate sodium, 1 tablet, oral, Daily  tacrolimus, 2 mg, oral, q12h  ursodiol, 300 mg, oral, TID  vancomycin, 125 mg, oral, Daily    Continuous medications  sodium chloride 0.9%, 50 mL/hr, Last Rate: 50 mL/hr (09/16/24 1003)    PRN medications  PRN medications: albuterol, alteplase, dextrose, diphenhydrAMINE, EPINEPHrine HCl, famotidine, methylPREDNISolone sodium  succinate (PF), ondansetron, polyethylene glycol, prochlorperazine, prochlorperazine, prochlorperazine, sodium chloride    Results from last 7 days   Lab Units 09/16/24  0213 09/15/24  0348 09/14/24  0343   WBC AUTO x10*3/uL 5.0 5.1 2.4*   HEMOGLOBIN g/dL 10.0* 12.0* 11.9*   HEMATOCRIT % 29.5* 34.0* 34.4*   PLATELETS AUTO x10*3/uL 302 370 367   NEUTROS PCT AUTO % 71.5 61.4 22.1   LYMPHS PCT AUTO % 17.1 23.6 55.5   MONOS PCT AUTO % 10.6 14.4 18.2   EOS PCT AUTO % 0.0 0.0 2.1     Results from last 7 days   Lab Units 09/16/24  0213 09/15/24  0348 09/14/24  0343   SODIUM mmol/L 141 140 140   POTASSIUM mmol/L 4.0 4.0 3.9   CHLORIDE mmol/L 105 105 105   CO2 mmol/L 29 26 27   BUN mg/dL 17 13 13   CREATININE mg/dL 0.95 0.88 0.95   CALCIUM mg/dL 8.4* 9.1 8.9   PROTEIN TOTAL g/dL 6.1* 7.0 6.6   BILIRUBIN TOTAL mg/dL 0.3 0.4 0.4   ALK PHOS U/L 85 100 104   ALT U/L 7* 9* 9*   AST U/L 11 13 14   GLUCOSE mg/dL 133* 100* 89     Results from last 7 days   Lab Units 09/16/24  0213 09/15/24  0348 09/14/24  0343   MAGNESIUM mg/dL 2.03 1.99 2.00     Assessment & Plan  Acute myeloid leukemia in remission (Multi)    Stem cell transplant candidate    Immunocompromised (Multi)    Conditioning chemotherapy prior to peripheral blood stem cell transplant    Brandt Merritt is a 64 y.o male with a 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 (>10% jaime) w/ trisomy 8, DNMT alpha, and U2AF1 mutation indication adverse risk   - s/p 4 cycles of Decitabine/Venetoclax (C4D1 on 8/12/24)  - BMBx 6/17/24: Blasts cleared, FISH still positive for trisomy 8, still MDS changes   - BMBx 9/5/24: 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 and no increase in blast   # Transplant   - HLA MUD 6/8 match, mismatch at A and DRB1, loci  - ABO donor: A+, CMV negative   - ABO recipient: A+, CMV positive   CHEMO:   - Fludarabine 30 mg/m2 IV D-5, D-4, D-3, D-2  - Melphalan 140 mg/m2 IV D-2 (9/17)  - Total Body Irradiation (TBI) 200 cGy x2 on D-1 (9/18)  GVHD PPX  - Tacrolimus 1.5 mg q12h to begin T-3 (9/16)  - MMF 1000 mg TID to begin T+2 to T+35     HEME  # Pancytopenia :: secondary to disease   # 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   - transfuse for Hbg <7 & PLT <10   # VTE Prophylaxis: SCDs & Ambulation    ID  - Afebrile on admission   - Covid: negative   # Prophylaxis: acyclovir, vancomycin, & ursodiol   - Plan for posaconazole  (T+1), letermovir (T+14), and bactrim (T+30) prophylaxis per chemo orders  - Plan for levofloxacin when neutropenic     CARDIO/PULM  # Hx of STEMI (11/11/2020)  - TTE 7/19/24: EF 59%, otherwise unremarkable   - cleared by cardiology for transplant   # Hx of HTN   - increased home nifedipine: 90 mEq ER 24 hr tablet    *Will consider additional HTN medication if needed  # Ascending aorta dilation   - Echo showing 3.8-4 cm dilation   - optimal BP control   - Follow w/ cardiology on an annual basis     FEN/RENAL  - Admission weight 75.6 kg (9/13/24)  - monitor electrolytes, replace PRN   - antiemetics & bowel regimen per chemo orders   - continue folate supplement   - continue ursodiol for SOS ppx  #Urinary frequency :: IV fluid intake   - hx of BPH and urinary retentions (2023)   *acutely tx in 2023 with cipro x1 and Tamsulosin 0.4 for 30 days   - Hx elevated PSA    *7/28/21 Bx:  atypical small acinar proliferation    *4/7/23 Bx: Benign   - currently on continuous 50 ml/hr IV NaCl   - discussed need of continuous IV fluid with the patient due to chemo prep regimen for SCT   - post void residual volume: 0 mL, not consistent with urinary retention due to BPH   #Hx of treated Hep C   - treated in 2015   - recent liver US shows normal sized liver at 14.5 cm, however seems enlarged on exam   - seen by hepatology prior to transplant   - Fibroscan 7/24/24: 4.8 KPa indicating minimal fibrosis   - recommend close monitoring of liver function & repeating hepatitis serologies (9/12) Hep C RNA - not detected   #Hx of recurrent hyperbilirubinemia   - 2/2 to Hemoglobin C hemolysis   - Tbili 0.3 on 9/5 pre-transplant apt.     ACCESS/SUPPORT/DISPO  FULL CODE  Primary Onc: Dr. Newsome   ACCESS: DL tunneled line placed 9/13    Patient seen and examined with Dr. Hugo.    Olman Black, PAMikeyC  BMT (Allogeneic SCT/Acute Leukemia) Team

## 2024-09-16 NOTE — CARE PLAN
Problem: Pain - Adult  Goal: Verbalizes/displays adequate comfort level or baseline comfort level  Outcome: Progressing     Problem: Safety - Adult  Goal: Free from fall injury  Outcome: Progressing     Problem: Discharge Planning  Goal: Discharge to home or other facility with appropriate resources  Outcome: Progressing     Problem: Chronic Conditions and Co-morbidities  Goal: Patient's chronic conditions and co-morbidity symptoms are monitored and maintained or improved  Outcome: Progressing     The clinical goals for the shift include pt will remain HDS through EOS

## 2024-09-17 ENCOUNTER — HOSPITAL ENCOUNTER (OUTPATIENT)
Dept: RADIATION ONCOLOGY | Facility: HOSPITAL | Age: 64
Setting detail: RADIATION/ONCOLOGY SERIES
Discharge: HOME | End: 2024-09-17
Payer: COMMERCIAL

## 2024-09-17 LAB
ALBUMIN SERPL BCP-MCNC: 3.8 G/DL (ref 3.4–5)
ALP SERPL-CCNC: 91 U/L (ref 33–136)
ALT SERPL W P-5'-P-CCNC: 9 U/L (ref 10–52)
ANION GAP SERPL CALC-SCNC: 11 MMOL/L (ref 10–20)
APTT PPP: 28 SECONDS (ref 27–38)
AST SERPL W P-5'-P-CCNC: 12 U/L (ref 9–39)
BASOPHILS # BLD AUTO: 0.01 X10*3/UL (ref 0–0.1)
BASOPHILS NFR BLD AUTO: 0.2 %
BILIRUB SERPL-MCNC: 0.3 MG/DL (ref 0–1.2)
BUN SERPL-MCNC: 15 MG/DL (ref 6–23)
CALCIUM SERPL-MCNC: 9.1 MG/DL (ref 8.6–10.6)
CHLORIDE SERPL-SCNC: 105 MMOL/L (ref 98–107)
CO2 SERPL-SCNC: 29 MMOL/L (ref 21–32)
CREAT SERPL-MCNC: 0.88 MG/DL (ref 0.5–1.3)
CRP SERPL-MCNC: <0.1 MG/DL
EGFRCR SERPLBLD CKD-EPI 2021: >90 ML/MIN/1.73M*2
EOSINOPHIL # BLD AUTO: 0 X10*3/UL (ref 0–0.7)
EOSINOPHIL NFR BLD AUTO: 0 %
ERYTHROCYTE [DISTWIDTH] IN BLOOD BY AUTOMATED COUNT: 19.4 % (ref 11.5–14.5)
FERRITIN SERPL-MCNC: 125 NG/ML (ref 20–300)
FIBRINOGEN PPP-MCNC: 340 MG/DL (ref 200–400)
GLUCOSE SERPL-MCNC: 106 MG/DL (ref 74–99)
HCT VFR BLD AUTO: 32.6 % (ref 41–52)
HGB BLD-MCNC: 11.5 G/DL (ref 13.5–17.5)
IMM GRANULOCYTES # BLD AUTO: 0.05 X10*3/UL (ref 0–0.7)
IMM GRANULOCYTES NFR BLD AUTO: 0.8 % (ref 0–0.9)
INR PPP: 0.9 (ref 0.9–1.1)
LDH SERPL L TO P-CCNC: 130 U/L (ref 84–246)
LYMPHOCYTES # BLD AUTO: 0.47 X10*3/UL (ref 1.2–4.8)
LYMPHOCYTES NFR BLD AUTO: 7.4 %
MAGNESIUM SERPL-MCNC: 2.03 MG/DL (ref 1.6–2.4)
MCH RBC QN AUTO: 27.6 PG (ref 26–34)
MCHC RBC AUTO-ENTMCNC: 35.3 G/DL (ref 32–36)
MCV RBC AUTO: 78 FL (ref 80–100)
MONOCYTES # BLD AUTO: 0.4 X10*3/UL (ref 0.1–1)
MONOCYTES NFR BLD AUTO: 6.3 %
NEUTROPHILS # BLD AUTO: 5.42 X10*3/UL (ref 1.2–7.7)
NEUTROPHILS NFR BLD AUTO: 85.3 %
NRBC BLD-RTO: 0 /100 WBCS (ref 0–0)
PHOSPHATE SERPL-MCNC: 3.9 MG/DL (ref 2.5–4.9)
PLATELET # BLD AUTO: 309 X10*3/UL (ref 150–450)
POTASSIUM SERPL-SCNC: 4 MMOL/L (ref 3.5–5.3)
PROT SERPL-MCNC: 6.9 G/DL (ref 6.4–8.2)
PROTHROMBIN TIME: 10.5 SECONDS (ref 9.8–12.8)
RBC # BLD AUTO: 4.17 X10*6/UL (ref 4.5–5.9)
SODIUM SERPL-SCNC: 141 MMOL/L (ref 136–145)
TACROLIMUS BLD-MCNC: <2 NG/ML
URATE SERPL-MCNC: 2.9 MG/DL (ref 4–7.5)
WBC # BLD AUTO: 6.4 X10*3/UL (ref 4.4–11.3)

## 2024-09-17 PROCEDURE — 85025 COMPLETE CBC W/AUTO DIFF WBC: CPT

## 2024-09-17 PROCEDURE — 82728 ASSAY OF FERRITIN: CPT

## 2024-09-17 PROCEDURE — 2500000002 HC RX 250 W HCPCS SELF ADMINISTERED DRUGS (ALT 637 FOR MEDICARE OP, ALT 636 FOR OP/ED): Performed by: PHYSICIAN ASSISTANT

## 2024-09-17 PROCEDURE — 85384 FIBRINOGEN ACTIVITY: CPT

## 2024-09-17 PROCEDURE — 2500000004 HC RX 250 GENERAL PHARMACY W/ HCPCS (ALT 636 FOR OP/ED): Performed by: PHYSICIAN ASSISTANT

## 2024-09-17 PROCEDURE — 80053 COMPREHEN METABOLIC PANEL: CPT

## 2024-09-17 PROCEDURE — 2500000001 HC RX 250 WO HCPCS SELF ADMINISTERED DRUGS (ALT 637 FOR MEDICARE OP)

## 2024-09-17 PROCEDURE — 84550 ASSAY OF BLOOD/URIC ACID: CPT

## 2024-09-17 PROCEDURE — 99233 SBSQ HOSP IP/OBS HIGH 50: CPT | Performed by: INTERNAL MEDICINE

## 2024-09-17 PROCEDURE — 83735 ASSAY OF MAGNESIUM: CPT

## 2024-09-17 PROCEDURE — 80197 ASSAY OF TACROLIMUS: CPT | Performed by: INTERNAL MEDICINE

## 2024-09-17 PROCEDURE — 84100 ASSAY OF PHOSPHORUS: CPT

## 2024-09-17 PROCEDURE — 85730 THROMBOPLASTIN TIME PARTIAL: CPT

## 2024-09-17 PROCEDURE — 83615 LACTATE (LD) (LDH) ENZYME: CPT

## 2024-09-17 PROCEDURE — 3E04305 INTRODUCTION OF OTHER ANTINEOPLASTIC INTO CENTRAL VEIN, PERCUTANEOUS APPROACH: ICD-10-PCS | Performed by: INTERNAL MEDICINE

## 2024-09-17 PROCEDURE — 2500000002 HC RX 250 W HCPCS SELF ADMINISTERED DRUGS (ALT 637 FOR MEDICARE OP, ALT 636 FOR OP/ED): Performed by: INTERNAL MEDICINE

## 2024-09-17 PROCEDURE — 77370 RADIATION PHYSICS CONSULT: CPT | Performed by: RADIOLOGY

## 2024-09-17 PROCEDURE — 1170000001 HC PRIVATE ONCOLOGY ROOM DAILY

## 2024-09-17 PROCEDURE — 86140 C-REACTIVE PROTEIN: CPT

## 2024-09-17 PROCEDURE — 2500000004 HC RX 250 GENERAL PHARMACY W/ HCPCS (ALT 636 FOR OP/ED): Mod: JZ | Performed by: INTERNAL MEDICINE

## 2024-09-17 RX ORDER — PALONOSETRON 0.05 MG/ML
0.25 INJECTION, SOLUTION INTRAVENOUS ONCE
Status: COMPLETED | OUTPATIENT
Start: 2024-09-17 | End: 2024-09-17

## 2024-09-17 RX ORDER — MELPHALAN HCL 50 MG
140 VIAL (EA) INTRAVENOUS ONCE
Status: COMPLETED | OUTPATIENT
Start: 2024-09-17 | End: 2024-09-17

## 2024-09-17 RX ADMIN — URSODIOL 300 MG: 300 CAPSULE ORAL at 15:07

## 2024-09-17 RX ADMIN — PALONOSETRON HYDROCHLORIDE 250 MCG: 0.25 INJECTION INTRAVENOUS at 09:03

## 2024-09-17 RX ADMIN — FOSAPREPITANT DIMEGLUMINE 150 MG: 150 INJECTION, POWDER, LYOPHILIZED, FOR SOLUTION INTRAVENOUS at 09:03

## 2024-09-17 RX ADMIN — FOLIC ACID TAB 400 MCG 1 MG: 400 TAB at 09:03

## 2024-09-17 RX ADMIN — SENNOSIDES AND DOCUSATE SODIUM 1 TABLET: 50; 8.6 TABLET ORAL at 09:03

## 2024-09-17 RX ADMIN — URSODIOL 300 MG: 300 CAPSULE ORAL at 09:03

## 2024-09-17 RX ADMIN — TACROLIMUS 2 MG: 1 CAPSULE ORAL at 18:23

## 2024-09-17 RX ADMIN — Medication 257.6 MG: at 10:10

## 2024-09-17 RX ADMIN — NIFEDIPINE 90 MG: 90 TABLET, FILM COATED, EXTENDED RELEASE ORAL at 04:15

## 2024-09-17 RX ADMIN — VANCOMYCIN HYDROCHLORIDE 125 MG: 125 CAPSULE ORAL at 09:03

## 2024-09-17 RX ADMIN — TACROLIMUS 2 MG: 1 CAPSULE ORAL at 06:25

## 2024-09-17 RX ADMIN — ACYCLOVIR 400 MG: 400 TABLET ORAL at 20:33

## 2024-09-17 RX ADMIN — SODIUM CHLORIDE 100 ML/HR: 9 INJECTION, SOLUTION INTRAVENOUS at 10:35

## 2024-09-17 RX ADMIN — URSODIOL 300 MG: 300 CAPSULE ORAL at 20:33

## 2024-09-17 RX ADMIN — ACYCLOVIR 400 MG: 400 TABLET ORAL at 09:03

## 2024-09-17 ASSESSMENT — ENCOUNTER SYMPTOMS
DIZZINESS: 0
HEADACHES: 0
HEMATURIA: 0
FATIGUE: 0
NUMBNESS: 0
COUGH: 0
DIARRHEA: 0
SHORTNESS OF BREATH: 0
DIAPHORESIS: 0
FEVER: 0
SORE THROAT: 0
CHILLS: 0
EYE PROBLEMS: 0
DIFFICULTY URINATING: 0
FREQUENCY: 1
CHEST TIGHTNESS: 0
DYSURIA: 0
CONSTIPATION: 0
PALPITATIONS: 0
ABDOMINAL PAIN: 0
NAUSEA: 0
VOMITING: 0
ARTHRALGIAS: 0
APPETITE CHANGE: 0

## 2024-09-17 ASSESSMENT — PAIN SCALES - GENERAL
PAINLEVEL_OUTOF10: 0 - NO PAIN

## 2024-09-17 ASSESSMENT — PAIN - FUNCTIONAL ASSESSMENT: PAIN_FUNCTIONAL_ASSESSMENT: 0-10

## 2024-09-17 NOTE — SIGNIFICANT EVENT
09/17/24 0202   Prechemo Checklist   Has the patient been in the hospital, ED, or urgent care since last date of service N/A   Chemo/Immuno Consent Completed and Signed Yes   Protocol/Indications Verified Yes   Confirmed to previous date/time of medication Yes   Compared to previous dose Yes   All medications are dated accurately Yes   Pregnancy Test Negative Not applicable   Parameters Met Yes   BSA/Weight-Height Verified Yes   Dose Calculations Verified (current, total, cumulative) Yes

## 2024-09-17 NOTE — CARE PLAN
The patient's goals for the shift include      The clinical goals for the shift include pt will remain HDS through EOS      Problem: Pain - Adult  Goal: Verbalizes/displays adequate comfort level or baseline comfort level  Outcome: Progressing     Problem: Safety - Adult  Goal: Free from fall injury  Outcome: Progressing     Problem: Discharge Planning  Goal: Discharge to home or other facility with appropriate resources  Outcome: Progressing     Problem: Chronic Conditions and Co-morbidities  Goal: Patient's chronic conditions and co-morbidity symptoms are monitored and maintained or improved  Outcome: Progressing     Problem: Nutrition  Goal: Oral intake greater 75%  Outcome: Progressing  Goal: Adequate PO fluid intake  Outcome: Progressing  Goal: Maintain stable weight  Outcome: Progressing

## 2024-09-17 NOTE — NURSING NOTE
Dose # 1 of 1 Melphalen chemotherapy 257.6 mg in 51.52mL given over 5min via 2 syringes in brown catheter.  Dose up at 1015 and down at 1020.  Pre-medicated with Emend and Aloxi.  Patient, dose and rate verified with second RN, Leni Gonzales.  + BBR obtained via syringe aspiration before and after administration.  Patient with no side effects.

## 2024-09-17 NOTE — PROGRESS NOTES
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     Subjective   Patient is doing well today. He is sitting comfortably in his room. e reports eating well and maintaining hydration. Denies a change in his appetite.  He reports less nocturia (4-5x)  since reducing his IVF rate.  Denies dysuria, hematuria, and difficulty urinating.  He received his 4th (last dose) of Fludarabine & Melphaln this morning. Has no other concerns today. Denies B symptoms, SOB cough, CP, abd pain, changes in bowels/bladder. Has no new concerns today.      Review of Systems   Constitutional:  Negative for appetite change, chills, diaphoresis, fatigue and fever.   HENT:   Negative for hearing loss, mouth sores, nosebleeds and sore throat.    Eyes:  Negative for eye problems.   Respiratory:  Negative for chest tightness, cough and shortness of breath.    Cardiovascular:  Negative for chest pain and palpitations.   Gastrointestinal:  Negative for abdominal pain, constipation, diarrhea, nausea and vomiting.   Genitourinary:  Positive for frequency. Negative for difficulty urinating, dysuria and hematuria.    Musculoskeletal:  Negative for arthralgias.   Skin:  Negative for itching and rash.   Neurological:  Negative for dizziness, headaches and numbness.     Objective     Vitals:    09/16/24 2342 09/17/24 0444 09/17/24 0700 09/17/24 0752   BP: 125/70 (!) 162/96 (!) 164/104 (!) 164/104   BP Location: Left arm Left arm  Right arm   Patient Position: Lying Sitting  Lying   Pulse: 75 82 82 82   Resp: 18 18  18   Temp: 36.6 °C (97.9 °F) 36.6 °C (97.9 °F)  35.9 °C (96.6 °F)   TempSrc: Temporal Temporal  Temporal   SpO2: 97% 97%     Weight:   77.7 kg (171 lb 4.8 oz)    Height:          Physical Exam  Constitutional:       General: He is not in acute distress.     Appearance: He is not ill-appearing, toxic-appearing or diaphoretic.    HENT:      Head: Normocephalic and atraumatic.      Right Ear: External ear normal.      Left Ear: External ear normal.      Nose: Nose normal.   Eyes:      General: No scleral icterus.     Extraocular Movements: Extraocular movements intact.      Pupils: Pupils are equal, round, and reactive to light.   Cardiovascular:      Rate and Rhythm: Normal rate and regular rhythm.      Heart sounds: Normal heart sounds. No murmur heard.     No friction rub. No gallop.   Pulmonary:      Effort: Pulmonary effort is normal.      Breath sounds: Normal breath sounds. No stridor. No wheezing, rhonchi or rales.   Abdominal:      General: Bowel sounds are normal. There is no distension.      Tenderness: There is no abdominal tenderness. There is no guarding.   Musculoskeletal:         General: No swelling. Normal range of motion.      Cervical back: No tenderness.      Right lower leg: No edema.      Left lower leg: No edema.   Lymphadenopathy:      Cervical: No cervical adenopathy.   Skin:     General: Skin is warm.      Findings: No bruising or erythema.   Neurological:      General: No focal deficit present.      Mental Status: He is alert and oriented to person, place, and time.   Psychiatric:         Mood and Affect: Mood normal.         Behavior: Behavior normal.     Scheduled medications  acyclovir, 400 mg, oral, BID  folic acid, 1 mg, oral, Daily  NIFEdipine ER, 90 mg, oral, Daily before breakfast  sennosides-docusate sodium, 1 tablet, oral, Daily  tacrolimus, 2 mg, oral, q12h  ursodiol, 300 mg, oral, TID  vancomycin, 125 mg, oral, Daily    Continuous medications  sodium chloride 0.9%, 100 mL/hr, Last Rate: 100 mL/hr (09/17/24 1035)    PRN medications  PRN medications: albuterol, alteplase, dextrose, diphenhydrAMINE, EPINEPHrine HCl, famotidine, methylPREDNISolone sodium succinate (PF), ondansetron, polyethylene glycol, prochlorperazine, prochlorperazine, prochlorperazine, sodium chloride     Results from last 7 days    Lab Units 09/17/24  0410 09/16/24  0213 09/15/24  0348   WBC AUTO x10*3/uL 6.4 5.0 5.1   HEMOGLOBIN g/dL 11.5* 10.0* 12.0*   HEMATOCRIT % 32.6* 29.5* 34.0*   PLATELETS AUTO x10*3/uL 309 302 370   NEUTROS PCT AUTO % 85.3 71.5 61.4   LYMPHS PCT AUTO % 7.4 17.1 23.6   MONOS PCT AUTO % 6.3 10.6 14.4   EOS PCT AUTO % 0.0 0.0 0.0     Results from last 7 days   Lab Units 09/17/24  0410 09/16/24  0213 09/15/24  0348   SODIUM mmol/L 141 141 140   POTASSIUM mmol/L 4.0 4.0 4.0   CHLORIDE mmol/L 105 105 105   CO2 mmol/L 29 29 26   BUN mg/dL 15 17 13   CREATININE mg/dL 0.88 0.95 0.88   CALCIUM mg/dL 9.1 8.4* 9.1   PROTEIN TOTAL g/dL 6.9 6.1* 7.0   BILIRUBIN TOTAL mg/dL 0.3 0.3 0.4   ALK PHOS U/L 91 85 100   ALT U/L 9* 7* 9*   AST U/L 12 11 13   GLUCOSE mg/dL 106* 133* 100*     Results from last 7 days   Lab Units 09/17/24 0410 09/16/24  0213 09/15/24  0348   MAGNESIUM mg/dL 2.03 2.03 1.99     Assessment & Plan  Acute myeloid leukemia in remission (Multi)    Stem cell transplant candidate    Immunocompromised (Multi)    Conditioning chemotherapy prior to peripheral blood stem cell transplant    Brandt Merritt is a 64 y.o male with a 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     T-2 Single-unit cord transplant (T0=9/19/24)    Update (09/17/24):  #MDS/AML  - Fludarabine 30 mg/m2 IV  D-2 (9/17)  - Melphalan 140 mg/m2 IV D-2 (9/17)  PPX: acyclovir, vancomycin, ursodiol   #HTN   - increased Nifedipine dose to 90 mg  #Urinary frequency :: improved w reduced IVF rate  - currently on continuous 50 ml/hr IV NaCl, will increase rate back up during Melphalan infusion on 9/17  - discussed need of continuous IV fluid with the patient due to chemo prep regimen for SCT   - post void residual volume 0 mL (9*/15)    ONC  # MDS/AML   - Symptoms began 9/2023; diagnosed 4/2024  - BMBx showing MDS in transition to AML (>10% jaime) w/ trisomy 8, DNMT alpha, and  U2AF1 mutation indication adverse risk   - s/p 4 cycles of Decitabine/Venetoclax (C4D1 on 8/12/24)  - BMBx 6/17/24: Blasts cleared, FISH still positive for trisomy 8, still MDS changes   - BMBx 9/5/24: 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 and no increase in blast   # Transplant   - HLA MUD 6/8 match, mismatch at A and DRB1, loci  - ABO donor: A+, CMV negative   - ABO recipient: A+, CMV positive   CHEMO:   - Fludarabine 30 mg/m2 IV D-5, D-4, D-3, D-2 (9/17)  - Melphalan 140 mg/m2 IV D-2 (9/17)  - Total Body Irradiation (TBI) 200 cGy x2 on D-1 (9/18)     GVHD PPX  - Tacrolimus 1.5 mg q12h to begin T-3 (9/16), Tacro level :         Will need to adjust Tacro dose when Posaconazole starts (9/20)  - MMF 1000 mg TID to begin T+2 to T+35     HEME  # Pancytopenia :: secondary to disease   # 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   - transfuse for Hbg <7 & PLT <10   # VTE Prophylaxis: SCDs & Ambulation    ID  - Afebrile on admission   - Covid: negative   # Prophylaxis: acyclovir, vancomycin, & ursodiol   - Plan for posaconazole  (T+1), letermovir (T+14), and bactrim (T+30) prophylaxis per chemo orders  - Plan for levofloxacin when neutropenic     CARDIO/PULM  # Hx of STEMI (11/11/2020)  - TTE 7/19/24: EF 59%, otherwise unremarkable   - cleared by cardiology for transplant   # Hx of HTN   - increased home nifedipine: 90 mEq ER 24 hr tablet    *Will consider additional HTN medication if needed  # Ascending aorta dilation   - Echo showing 3.8-4 cm dilation   - optimal BP control   - Follow w/ cardiology on an annual basis     FEN/RENAL  - Admission weight 75.6 kg (9/13/24)  - monitor electrolytes, replace PRN   - antiemetics & bowel regimen per chemo orders   - continue folate supplement   - continue ursodiol for SOS ppx  #Urinary frequency :: IV fluid intake   - hx of BPH and urinary retentions (2023)   *acutely tx in 2023 with cipro x1 and Tamsulosin 0.4 for 30 days   - Hx elevated PSA    *7/28/21 Bx: atypical small  acinar proliferation    *4/7/23 Bx: Benign   - currently on continuous 50 ml/hr IV NaCl   - discussed need of continuous IV fluid with the patient due to chemo prep regimen for SCT   - post void residual volume: 0 mL, not consistent with urinary retention due to BPH   #Hx of treated Hep C   - treated in 2015   - recent liver US shows normal sized liver at 14.5 cm, however seems enlarged on exam   - seen by hepatology prior to transplant   - Fibroscan 7/24/24: 4.8 KPa indicating minimal fibrosis   - recommend close monitoring of liver function & repeating hepatitis serologies (9/12) Hep C RNA - not detected   #Hx of recurrent hyperbilirubinemia   - 2/2 to Hemoglobin C hemolysis   - Tbili 0.3 on 9/5 pre-transplant apt.     ACCESS/SUPPORT/DISPO  FULL CODE  Primary Onc: Dr. Newsome   ACCESS: DL tunneled line placed 9/13    Patient seen and examined with Dr. Hugo.    Olman Black, PAMikeyC  BMT (Allogeneic SCT/Acute Leukemia) Team

## 2024-09-18 ENCOUNTER — RADIATION ONCOLOGY OTV (OUTPATIENT)
Dept: RADIATION ONCOLOGY | Facility: HOSPITAL | Age: 64
End: 2024-09-18
Payer: COMMERCIAL

## 2024-09-18 ENCOUNTER — HOSPITAL ENCOUNTER (OUTPATIENT)
Dept: RADIATION ONCOLOGY | Facility: HOSPITAL | Age: 64
Setting detail: RADIATION/ONCOLOGY SERIES
Discharge: HOME | End: 2024-09-18
Payer: COMMERCIAL

## 2024-09-18 DIAGNOSIS — C92.00 ACUTE MYELOBLASTIC LEUKEMIA, NOT HAVING ACHIEVED REMISSION (MULTI): ICD-10-CM

## 2024-09-18 DIAGNOSIS — Z51.0 ENCOUNTER FOR ANTINEOPLASTIC RADIATION THERAPY: ICD-10-CM

## 2024-09-18 LAB
ALBUMIN SERPL BCP-MCNC: 3.6 G/DL (ref 3.4–5)
ALP SERPL-CCNC: 88 U/L (ref 33–136)
ALT SERPL W P-5'-P-CCNC: 9 U/L (ref 10–52)
ANION GAP SERPL CALC-SCNC: 12 MMOL/L (ref 10–20)
APTT PPP: 28 SECONDS (ref 27–38)
AST SERPL W P-5'-P-CCNC: 13 U/L (ref 9–39)
BASOPHILS # BLD AUTO: 0.03 X10*3/UL (ref 0–0.1)
BASOPHILS NFR BLD AUTO: 1.2 %
BILIRUB SERPL-MCNC: 0.5 MG/DL (ref 0–1.2)
BUN SERPL-MCNC: 12 MG/DL (ref 6–23)
CALCIUM SERPL-MCNC: 8.5 MG/DL (ref 8.6–10.6)
CHLORIDE SERPL-SCNC: 102 MMOL/L (ref 98–107)
CHROM ANALY OVERALL INTERP-IMP: NORMAL
CO2 SERPL-SCNC: 29 MMOL/L (ref 21–32)
CREAT SERPL-MCNC: 0.77 MG/DL (ref 0.5–1.3)
CRP SERPL-MCNC: <0.1 MG/DL
EGFRCR SERPLBLD CKD-EPI 2021: >90 ML/MIN/1.73M*2
ELECTRONICALLY COSIGNED BY CYTOGENETICS: NORMAL
ELECTRONICALLY SIGNED BY CYTOGENETICS: NORMAL
EOSINOPHIL # BLD AUTO: 0.05 X10*3/UL (ref 0–0.7)
EOSINOPHIL NFR BLD AUTO: 2 %
ERYTHROCYTE [DISTWIDTH] IN BLOOD BY AUTOMATED COUNT: 19.5 % (ref 11.5–14.5)
FERRITIN SERPL-MCNC: 132 NG/ML (ref 20–300)
FIBRINOGEN PPP-MCNC: 310 MG/DL (ref 200–400)
FISH ISCN RESULTS: NORMAL
GLUCOSE SERPL-MCNC: 82 MG/DL (ref 74–99)
HCT VFR BLD AUTO: 32.9 % (ref 41–52)
HGB BLD-MCNC: 11.5 G/DL (ref 13.5–17.5)
IMM GRANULOCYTES # BLD AUTO: 0.08 X10*3/UL (ref 0–0.7)
IMM GRANULOCYTES NFR BLD AUTO: 3.2 % (ref 0–0.9)
INR PPP: 1 (ref 0.9–1.1)
LDH SERPL L TO P-CCNC: 123 U/L (ref 84–246)
LYMPHOCYTES # BLD AUTO: 0.18 X10*3/UL (ref 1.2–4.8)
LYMPHOCYTES NFR BLD AUTO: 7.3 %
MAGNESIUM SERPL-MCNC: 1.85 MG/DL (ref 1.6–2.4)
MCH RBC QN AUTO: 27.6 PG (ref 26–34)
MCHC RBC AUTO-ENTMCNC: 35 G/DL (ref 32–36)
MCV RBC AUTO: 79 FL (ref 80–100)
MONOCYTES # BLD AUTO: 0.2 X10*3/UL (ref 0.1–1)
MONOCYTES NFR BLD AUTO: 8.1 %
NEUTROPHILS # BLD AUTO: 1.94 X10*3/UL (ref 1.2–7.7)
NEUTROPHILS NFR BLD AUTO: 78.2 %
NRBC BLD-RTO: 1.2 /100 WBCS (ref 0–0)
PHOSPHATE SERPL-MCNC: 4.2 MG/DL (ref 2.5–4.9)
PLATELET # BLD AUTO: 256 X10*3/UL (ref 150–450)
POTASSIUM SERPL-SCNC: 3.9 MMOL/L (ref 3.5–5.3)
PROT SERPL-MCNC: 6.2 G/DL (ref 6.4–8.2)
PROTHROMBIN TIME: 11.3 SECONDS (ref 9.8–12.8)
RAD ONC MSQ ACTUAL FRACTIONS DELIVERED: 1
RAD ONC MSQ ACTUAL SESSION DELIVERED DOSE: 200 CGRAY
RAD ONC MSQ ACTUAL TOTAL DOSE: 200 CGRAY
RAD ONC MSQ ELAPSED DAYS: 0
RAD ONC MSQ LAST DATE: NORMAL
RAD ONC MSQ PRESCRIBED FRACTIONAL DOSE: 200 CGRAY
RAD ONC MSQ PRESCRIBED NUMBER OF FRACTIONS: 1
RAD ONC MSQ PRESCRIBED TECHNIQUE: NORMAL
RAD ONC MSQ PRESCRIBED TOTAL DOSE: 200 CGRAY
RAD ONC MSQ PRESCRIPTION PATTERN COMMENT: NORMAL
RAD ONC MSQ START DATE: NORMAL
RAD ONC MSQ TREATMENT COURSE NUMBER: 1
RAD ONC MSQ TREATMENT SITE: NORMAL
RBC # BLD AUTO: 4.16 X10*6/UL (ref 4.5–5.9)
SODIUM SERPL-SCNC: 139 MMOL/L (ref 136–145)
TACROLIMUS BLD-MCNC: 3 NG/ML
URATE SERPL-MCNC: 4.2 MG/DL (ref 4–7.5)
WBC # BLD AUTO: 2.5 X10*3/UL (ref 4.4–11.3)

## 2024-09-18 PROCEDURE — 77412 RADIATION TX DELIVERY LVL 3: CPT | Performed by: RADIOLOGY

## 2024-09-18 PROCEDURE — 2500000005 HC RX 250 GENERAL PHARMACY W/O HCPCS

## 2024-09-18 PROCEDURE — 2500000002 HC RX 250 W HCPCS SELF ADMINISTERED DRUGS (ALT 637 FOR MEDICARE OP, ALT 636 FOR OP/ED): Performed by: PHYSICIAN ASSISTANT

## 2024-09-18 PROCEDURE — 84550 ASSAY OF BLOOD/URIC ACID: CPT

## 2024-09-18 PROCEDURE — 85730 THROMBOPLASTIN TIME PARTIAL: CPT

## 2024-09-18 PROCEDURE — 2500000001 HC RX 250 WO HCPCS SELF ADMINISTERED DRUGS (ALT 637 FOR MEDICARE OP)

## 2024-09-18 PROCEDURE — 82728 ASSAY OF FERRITIN: CPT

## 2024-09-18 PROCEDURE — 83735 ASSAY OF MAGNESIUM: CPT

## 2024-09-18 PROCEDURE — 99233 SBSQ HOSP IP/OBS HIGH 50: CPT | Performed by: INTERNAL MEDICINE

## 2024-09-18 PROCEDURE — 84100 ASSAY OF PHOSPHORUS: CPT

## 2024-09-18 PROCEDURE — 80053 COMPREHEN METABOLIC PANEL: CPT

## 2024-09-18 PROCEDURE — 2500000002 HC RX 250 W HCPCS SELF ADMINISTERED DRUGS (ALT 637 FOR MEDICARE OP, ALT 636 FOR OP/ED): Performed by: INTERNAL MEDICINE

## 2024-09-18 PROCEDURE — 2500000004 HC RX 250 GENERAL PHARMACY W/ HCPCS (ALT 636 FOR OP/ED): Performed by: INTERNAL MEDICINE

## 2024-09-18 PROCEDURE — 1170000001 HC PRIVATE ONCOLOGY ROOM DAILY

## 2024-09-18 PROCEDURE — 83615 LACTATE (LD) (LDH) ENZYME: CPT

## 2024-09-18 PROCEDURE — 85025 COMPLETE CBC W/AUTO DIFF WBC: CPT

## 2024-09-18 PROCEDURE — 85384 FIBRINOGEN ACTIVITY: CPT

## 2024-09-18 PROCEDURE — 80197 ASSAY OF TACROLIMUS: CPT | Performed by: INTERNAL MEDICINE

## 2024-09-18 PROCEDURE — 86140 C-REACTIVE PROTEIN: CPT

## 2024-09-18 RX ADMIN — FOLIC ACID TAB 400 MCG 1 MG: 400 TAB at 07:52

## 2024-09-18 RX ADMIN — URSODIOL 300 MG: 300 CAPSULE ORAL at 08:00

## 2024-09-18 RX ADMIN — TACROLIMUS 2 MG: 1 CAPSULE ORAL at 05:44

## 2024-09-18 RX ADMIN — ONDANSETRON HYDROCHLORIDE 8 MG: 8 TABLET, FILM COATED ORAL at 12:06

## 2024-09-18 RX ADMIN — ACYCLOVIR 400 MG: 400 TABLET ORAL at 07:52

## 2024-09-18 RX ADMIN — PROCHLORPERAZINE EDISYLATE 10 MG: 5 INJECTION INTRAMUSCULAR; INTRAVENOUS at 07:52

## 2024-09-18 RX ADMIN — URSODIOL 300 MG: 300 CAPSULE ORAL at 20:24

## 2024-09-18 RX ADMIN — SENNOSIDES AND DOCUSATE SODIUM 1 TABLET: 50; 8.6 TABLET ORAL at 07:52

## 2024-09-18 RX ADMIN — URSODIOL 300 MG: 300 CAPSULE ORAL at 14:50

## 2024-09-18 RX ADMIN — NIFEDIPINE 90 MG: 90 TABLET, FILM COATED, EXTENDED RELEASE ORAL at 03:55

## 2024-09-18 RX ADMIN — ACYCLOVIR 400 MG: 400 TABLET ORAL at 20:24

## 2024-09-18 RX ADMIN — VANCOMYCIN HYDROCHLORIDE 125 MG: 125 CAPSULE ORAL at 08:00

## 2024-09-18 RX ADMIN — TACROLIMUS 2 MG: 1 CAPSULE ORAL at 17:31

## 2024-09-18 RX ADMIN — PROCHLORPERAZINE EDISYLATE 10 MG: 5 INJECTION INTRAMUSCULAR; INTRAVENOUS at 14:50

## 2024-09-18 ASSESSMENT — ENCOUNTER SYMPTOMS
DIARRHEA: 0
CONSTIPATION: 0
HEADACHES: 0
EYE PROBLEMS: 0
CHEST TIGHTNESS: 0
SORE THROAT: 0
FREQUENCY: 1
DIZZINESS: 0
APPETITE CHANGE: 0
ABDOMINAL PAIN: 0
DIFFICULTY URINATING: 0
DIAPHORESIS: 0
FATIGUE: 0
CHILLS: 0
DYSURIA: 0
FEVER: 0
PALPITATIONS: 0
VOMITING: 0
NUMBNESS: 0
NAUSEA: 0
ARTHRALGIAS: 0
HEMATURIA: 0
SHORTNESS OF BREATH: 0
COUGH: 0

## 2024-09-18 ASSESSMENT — COGNITIVE AND FUNCTIONAL STATUS - GENERAL
DAILY ACTIVITIY SCORE: 24
MOBILITY SCORE: 24

## 2024-09-18 ASSESSMENT — PAIN SCALES - GENERAL
PAINLEVEL_OUTOF10: 0 - NO PAIN
PAINLEVEL_OUTOF10: 0 - NO PAIN

## 2024-09-18 ASSESSMENT — PAIN - FUNCTIONAL ASSESSMENT: PAIN_FUNCTIONAL_ASSESSMENT: 0-10

## 2024-09-18 NOTE — PROGRESS NOTES
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     Subjective   Patient reports felling well today. He is sitting comfortably in his room. reports eating well and maintaining hydration. Denies any change to his appetite, abdominal pain, and change in bowels. He is still experiencing nocturia. He states that the nocturia has gotten better since reducing his IVF fluids. Denies dysuria, hematuria, difficulty urination. Today he is receiving his TBI. Patient reports no other concerns. Denies B symptoms, SOB, cough, CP, and palpitations.      Review of Systems   Constitutional:  Negative for appetite change, chills, diaphoresis, fatigue and fever.   HENT:   Negative for hearing loss, mouth sores, nosebleeds and sore throat.    Eyes:  Negative for eye problems.   Respiratory:  Negative for chest tightness, cough and shortness of breath.    Cardiovascular:  Negative for chest pain and palpitations.   Gastrointestinal:  Negative for abdominal pain, constipation, diarrhea, nausea and vomiting.   Genitourinary:  Positive for frequency. Negative for difficulty urinating, dysuria and hematuria.    Musculoskeletal:  Negative for arthralgias.   Skin:  Negative for itching and rash.   Neurological:  Negative for dizziness, headaches and numbness.     Objective     Vitals:    09/18/24 0936   BP: 119/76   Pulse: 90   Resp: 16   Temp: 36.2 °C (97.2 °F)   SpO2: 93%        Physical Exam  Constitutional:       General: He is not in acute distress.     Appearance: He is not ill-appearing, toxic-appearing or diaphoretic.   HENT:      Head: Normocephalic and atraumatic.      Right Ear: External ear normal.      Left Ear: External ear normal.      Nose: Nose normal.      Mouth/Throat:      Mouth: Mucous membranes are moist.      Pharynx: Oropharynx is clear. No oropharyngeal exudate or posterior oropharyngeal erythema.    Eyes:      General: No scleral icterus.     Extraocular Movements: Extraocular movements intact.      Pupils: Pupils are equal, round, and reactive to light.   Cardiovascular:      Rate and Rhythm: Normal rate and regular rhythm.      Heart sounds: Normal heart sounds. No murmur heard.     No friction rub. No gallop.   Pulmonary:      Effort: Pulmonary effort is normal.      Breath sounds: Normal breath sounds. No stridor. No wheezing, rhonchi or rales.   Abdominal:      General: Bowel sounds are normal. There is no distension.      Tenderness: There is no abdominal tenderness. There is no guarding.   Musculoskeletal:         General: No swelling. Normal range of motion.      Cervical back: No tenderness.      Right lower leg: No edema.      Left lower leg: No edema.   Lymphadenopathy:      Cervical: No cervical adenopathy.   Skin:     General: Skin is warm.      Findings: No bruising or erythema.   Neurological:      General: No focal deficit present.      Mental Status: He is alert and oriented to person, place, and time.   Psychiatric:         Mood and Affect: Mood normal.         Behavior: Behavior normal.     Scheduled medications  acyclovir, 400 mg, oral, BID  folic acid, 1 mg, oral, Daily  NIFEdipine ER, 90 mg, oral, Daily before breakfast  sennosides-docusate sodium, 1 tablet, oral, Daily  tacrolimus, 2 mg, oral, q12h  ursodiol, 300 mg, oral, TID  vancomycin, 125 mg, oral, Daily    Continuous medications  sodium chloride 0.9%, 100 mL/hr, Last Rate: 100 mL/hr (09/17/24 1035)    PRN medications  PRN medications: albuterol, alteplase, dextrose, diphenhydrAMINE, EPINEPHrine HCl, famotidine, methylPREDNISolone sodium succinate (PF), ondansetron, polyethylene glycol, prochlorperazine, prochlorperazine, prochlorperazine, sodium chloride     Results for orders placed or performed during the hospital encounter of 09/13/24 (from the past 24 hour(s))   CBC and Auto Differential   Result Value Ref Range    WBC 2.5 (L)  4.4 - 11.3 x10*3/uL    nRBC 1.2 (H) 0.0 - 0.0 /100 WBCs    RBC 4.16 (L) 4.50 - 5.90 x10*6/uL    Hemoglobin 11.5 (L) 13.5 - 17.5 g/dL    Hematocrit 32.9 (L) 41.0 - 52.0 %    MCV 79 (L) 80 - 100 fL    MCH 27.6 26.0 - 34.0 pg    MCHC 35.0 32.0 - 36.0 g/dL    RDW 19.5 (H) 11.5 - 14.5 %    Platelets 256 150 - 450 x10*3/uL    Neutrophils % 78.2 40.0 - 80.0 %    Immature Granulocytes %, Automated 3.2 (H) 0.0 - 0.9 %    Lymphocytes % 7.3 13.0 - 44.0 %    Monocytes % 8.1 2.0 - 10.0 %    Eosinophils % 2.0 0.0 - 6.0 %    Basophils % 1.2 0.0 - 2.0 %    Neutrophils Absolute 1.94 1.20 - 7.70 x10*3/uL    Immature Granulocytes Absolute, Automated 0.08 0.00 - 0.70 x10*3/uL    Lymphocytes Absolute 0.18 (L) 1.20 - 4.80 x10*3/uL    Monocytes Absolute 0.20 0.10 - 1.00 x10*3/uL    Eosinophils Absolute 0.05 0.00 - 0.70 x10*3/uL    Basophils Absolute 0.03 0.00 - 0.10 x10*3/uL   Coagulation Screen   Result Value Ref Range    Protime 11.3 9.8 - 12.8 seconds    INR 1.0 0.9 - 1.1    aPTT 28 27 - 38 seconds   Comprehensive Metabolic Panel   Result Value Ref Range    Glucose 82 74 - 99 mg/dL    Sodium 139 136 - 145 mmol/L    Potassium 3.9 3.5 - 5.3 mmol/L    Chloride 102 98 - 107 mmol/L    Bicarbonate 29 21 - 32 mmol/L    Anion Gap 12 10 - 20 mmol/L    Urea Nitrogen 12 6 - 23 mg/dL    Creatinine 0.77 0.50 - 1.30 mg/dL    eGFR >90 >60 mL/min/1.73m*2    Calcium 8.5 (L) 8.6 - 10.6 mg/dL    Albumin 3.6 3.4 - 5.0 g/dL    Alkaline Phosphatase 88 33 - 136 U/L    Total Protein 6.2 (L) 6.4 - 8.2 g/dL    AST 13 9 - 39 U/L    Bilirubin, Total 0.5 0.0 - 1.2 mg/dL    ALT 9 (L) 10 - 52 U/L   Ferritin   Result Value Ref Range    Ferritin 132 20 - 300 ng/mL   Fibrinogen   Result Value Ref Range    Fibrinogen 310 200 - 400 mg/dL   C-Reactive Protein   Result Value Ref Range    C-Reactive Protein <0.10 <1.00 mg/dL   Lactate Dehydrogenase   Result Value Ref Range     84 - 246 U/L   Magnesium   Result Value Ref Range    Magnesium 1.85 1.60 - 2.40 mg/dL    Phosphorus   Result Value Ref Range    Phosphorus 4.2 2.5 - 4.9 mg/dL   Uric Acid   Result Value Ref Range    Uric Acid 4.2 4.0 - 7.5 mg/dL   Tacrolimus level   Result Value Ref Range    Tacrolimus  3.0 <=15.0 ng/mL     Assessment & Plan  Acute myeloid leukemia in remission (Multi)    Stem cell transplant candidate    Immunocompromised (Multi)    Conditioning chemotherapy prior to peripheral blood stem cell transplant    Brandt Merritt is a 64 y.o male with a 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     T-1 Single-unit cord transplant (T0=9/19/24)    Update (09/18/24):  #MDS/AML  - Flu/Mariana completed on 9/17  - TBI received today   PPX: acyclovir, vancomycin, ursodiol, tacrolimus   #HTN   - increased Nifedipine dose to 90 mg  #Urinary frequency :: improved w reduced IVF rate  - continuous fluids stopped today; will be restarted around time of SCT     ONC  # MDS/AML   - Symptoms began 9/2023; diagnosed 4/2024  - BMBx showing MDS in transition to AML (>10% jaime) w/ trisomy 8, DNMT alpha, and U2AF1 mutation indication adverse risk   - s/p 4 cycles of Decitabine/Venetoclax (C4D1 on 8/12/24)  - BMBx 6/17/24: Blasts cleared, FISH still positive for trisomy 8, still MDS changes   - BMBx 9/5/24: 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 and no increase in blast   # Transplant   - HLA MUD 6/8 match, mismatch at A and DRB1, loci  - ABO donor: A+, CMV negative   - ABO recipient: A+, CMV positive   CHEMO:   - Fludarabine 30 mg/m2 IV D-5, D-4, D-3, D-2 (9/17)  - Melphalan 140 mg/m2 IV D-2 (9/17)  - Total Body Irradiation (TBI) 200 cGy x2 on D-1 (9/18)  GVHD PPX:  - Tacrolimus 1.5 mg q12h started T-3 (9/16)  *Tacro level: 3.0 (9/18)  *Will need to adjust Tacro dose when Posaconazole starts T+3   - MMF 1000 mg TID to begin T+2 to T+35     HEME  # Pancytopenia :: secondary to disease   # 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   - transfuse  for Hbg <7 & PLT <10   # VTE Prophylaxis: SCDs & Ambulation    ID  - Afebrile on admission   - Covid: negative   # Prophylaxis: acyclovir, vancomycin, & ursodiol   - Plan for posaconazole  (T+1), letermovir (T+14), and bactrim (T+30) prophylaxis per chemo orders  - Plan for levofloxacin when neutropenic     CARDIO/PULM  # Hx of STEMI (11/11/2020)  - TTE 7/19/24: EF 59%, otherwise unremarkable   - cleared by cardiology for transplant   # Hx of HTN   - increased home nifedipine: 90 mEq ER 24 hr tablet    *Will consider additional HTN medication if needed  # Ascending aorta dilation   - Echo showing 3.8-4 cm dilation   - optimal BP control   - Follow w/ cardiology on an annual basis     FEN/RENAL  - Admission weight 75.6 kg (9/13/24); most recent weight 7.7 kg  - monitor electrolytes, replace PRN   - antiemetics & bowel regimen per chemo orders   - continue folate supplement   - continue ursodiol for SOS ppx  #Urinary frequency :: IV fluid intake   - hx of BPH and urinary retentions (2023)   *acutely tx in 2023 with cipro x1 and Tamsulosin 0.4 for 30 days   - Hx elevated PSA    *7/28/21 Bx: atypical small acinar proliferation    *4/7/23 Bx: Benign   - continuous fluids stopped 9/18; will be restarted around time of SCT  - post void residual volume: 0 mL, not consistent with urinary retention due to BPH   #Hx of treated Hep C   - treated in 2015   - recent liver US shows normal sized liver at 14.5 cm, however seems enlarged on exam   - seen by hepatology prior to transplant   - Fibroscan 7/24/24: 4.8 KPa indicating minimal fibrosis   - recommend close monitoring of liver function & repeating hepatitis serologies (9/12) Hep C RNA - not detected   #Hx of recurrent hyperbilirubinemia   - 2/2 to Hemoglobin C hemolysis   - Tbili 0.3 on 9/5 pre-transplant apt.     ACCESS/SUPPORT/DISPO  FULL CODE  Primary Onc: Dr. Newsome   ACCESS: DL tunneled line placed 9/13    Patient seen and examined with Dr. Hugo.    Jeannie  Jerad LAWS    I was present with the physician assistant student who participated in the documentation of this note. I have personally seen and examined the patient and performed the medical decision-making components. I have reviewed the medical student documentation and verified the findings in the note as written with additions or exceptions as stated in the body of the note, made by me personally.    Olman Valdovinos PA-C  BMT (Allogeneic SCT/Acute Leukemia) Team

## 2024-09-18 NOTE — CARE PLAN
The clinical goals for the shift include pt will remain HDS through EOS      Problem: Pain - Adult  Goal: Verbalizes/displays adequate comfort level or baseline comfort level  Outcome: Progressing     Problem: Safety - Adult  Goal: Free from fall injury  Outcome: Progressing     Problem: Discharge Planning  Goal: Discharge to home or other facility with appropriate resources  Outcome: Progressing     Problem: Chronic Conditions and Co-morbidities  Goal: Patient's chronic conditions and co-morbidity symptoms are monitored and maintained or improved  Outcome: Progressing     Problem: Nutrition  Goal: Oral intake greater 75%  Outcome: Progressing  Goal: Adequate PO fluid intake  Outcome: Progressing  Goal: Maintain stable weight  Outcome: Progressing

## 2024-09-18 NOTE — CARE PLAN
Problem: Pain - Adult  Goal: Verbalizes/displays adequate comfort level or baseline comfort level  Outcome: Progressing     Problem: Safety - Adult  Goal: Free from fall injury  Outcome: Progressing     Problem: Discharge Planning  Goal: Discharge to home or other facility with appropriate resources  Outcome: Progressing     Problem: Chronic Conditions and Co-morbidities  Goal: Patient's chronic conditions and co-morbidity symptoms are monitored and maintained or improved  Outcome: Progressing     Problem: Nutrition  Goal: Oral intake greater 75%  Outcome: Progressing  Goal: Adequate PO fluid intake  Outcome: Progressing  Goal: Maintain stable weight  Outcome: Progressing       The clinical goals for the shift include Patient will remain HDS and VSS throughout shift

## 2024-09-18 NOTE — PROGRESS NOTES
Radiation Oncology On Treatment Visit    Patient Name:  Brandt Merritt  MRN:  [id number]  :  1960    Primary Care Provider: Bill Richmond MD  Care Team: Patient Care Team:  Bill Richmond MD as PCP - General (Internal Medicine)  Rosanne M Casal, APRN-CNP, DNP as PCP - Raffy SUGGS PCP  Sadia CHAUDHARY MD as Consulting Physician (Hematology and Oncology)  Malaika Newsome MD as Consulting Physician (Hematology and Oncology)  Katherine Tomlin RN as Registered Nurse (Hematology and Oncology)    Date of Service: 2024     Diagnosis:   Specialty Problems          Radiation Oncology Problems    Acute myeloid leukemia in remission (Multi)         Treatment Summary:  Electron Beam: Not Applicable Total body, Not Applicable Total body    Treatment Period Technique Fraction Dose Fractions Total Dose   Course 1 2024-2024  (days elapsed: 0)         TBI 2024-2024 Opposed Laterals 200 / 200 cGy  200 / 200 cGy     SUBJECTIVE: Feels well and without any symptoms. No mucositis, diarrhea. No breathing difficulty.      OBJECTIVE:   Vital Signs:  There were no vitals taken for this visit.    Other Pertinent Findings: Lying comfortably in hospital stretcher. Alert, oriented. Breathing well on room air.    Toxicity Assessment          2024    16:59   Toxicity Assessment   Adverse Events Reviewed (WDL) No (Exceptions to WDL)   Treatment Site General   Anorexia Grade 0   Anxiety Grade 0   Dehydration Grade 0   Diarrhea Grade 0   Fatigue Grade 0   Nausea Grade 0   Pain Grade 0   Vomiting Grade 0        Assessment / Plan:  The patient is tolerating radiation therapy as anticipated.  As he received only one fraction, we discussed that he may later on develop symptoms including nausea later on in the day. We advised to take anti emetics if this occurs.    PRN follow up in radiation oncology.      April Reynolds MD, MMM  Senior Attending Physician, [facility]  Professor, [facility]  [employer]   Our Commerce: “To Heal, To Teach, To Discover.”  RN partner: [phone]/ [email or web address]    Phone (scheduling): [phone]/ [email or web address]  Proton Therapy (scheduling): [phone]/ [email or web address]  Phone (after hours): [phone]

## 2024-09-19 ENCOUNTER — LAB REQUISITION (OUTPATIENT)
Dept: LAB | Facility: HOSPITAL | Age: 64
End: 2024-09-19
Payer: COMMERCIAL

## 2024-09-19 DIAGNOSIS — C92.01 ACUTE MYELOBLASTIC LEUKEMIA, IN REMISSION (MULTI): ICD-10-CM

## 2024-09-19 LAB
ALBUMIN SERPL BCP-MCNC: 3.5 G/DL (ref 3.4–5)
ALP SERPL-CCNC: 104 U/L (ref 33–136)
ALT SERPL W P-5'-P-CCNC: 13 U/L (ref 10–52)
ANION GAP SERPL CALC-SCNC: 13 MMOL/L (ref 10–20)
APTT PPP: 30 SECONDS (ref 27–38)
AST SERPL W P-5'-P-CCNC: 17 U/L (ref 9–39)
BASOPHILS # BLD AUTO: 0.01 X10*3/UL (ref 0–0.1)
BASOPHILS NFR BLD AUTO: 0.7 %
BILIRUB SERPL-MCNC: 0.8 MG/DL (ref 0–1.2)
BUN SERPL-MCNC: 16 MG/DL (ref 6–23)
CALCIUM SERPL-MCNC: 8.8 MG/DL (ref 8.6–10.6)
CD3 CELLS NFR BLD: 11 %
CD34 CELLS # SPEC: 64.21 VIABLE CD34+/UL
CHLORIDE SERPL-SCNC: 102 MMOL/L (ref 98–107)
CO2 SERPL-SCNC: 28 MMOL/L (ref 21–32)
CREAT SERPL-MCNC: 0.85 MG/DL (ref 0.5–1.3)
CRP SERPL-MCNC: 0.51 MG/DL
EGFRCR SERPLBLD CKD-EPI 2021: >90 ML/MIN/1.73M*2
EOSINOPHIL # BLD AUTO: 0.01 X10*3/UL (ref 0–0.7)
EOSINOPHIL NFR BLD AUTO: 0.7 %
ERYTHROCYTE [DISTWIDTH] IN BLOOD BY AUTOMATED COUNT: 19.7 % (ref 11.5–14.5)
FERRITIN SERPL-MCNC: 187 NG/ML (ref 20–300)
FIBRINOGEN PPP-MCNC: 361 MG/DL (ref 200–400)
GLUCOSE SERPL-MCNC: 92 MG/DL (ref 74–99)
HCT VFR BLD AUTO: 33 % (ref 41–52)
HGB BLD-MCNC: 11.9 G/DL (ref 13.5–17.5)
IMM GRANULOCYTES # BLD AUTO: 0.01 X10*3/UL (ref 0–0.7)
IMM GRANULOCYTES NFR BLD AUTO: 0.7 % (ref 0–0.9)
INR PPP: 1.1 (ref 0.9–1.1)
LDH SERPL L TO P-CCNC: 125 U/L (ref 84–246)
LYMPHOCYTES # BLD AUTO: 0.08 X10*3/UL (ref 1.2–4.8)
LYMPHOCYTES NFR BLD AUTO: 5.3 %
MAGNESIUM SERPL-MCNC: 2 MG/DL (ref 1.6–2.4)
MCH RBC QN AUTO: 27.3 PG (ref 26–34)
MCHC RBC AUTO-ENTMCNC: 36.1 G/DL (ref 32–36)
MCV RBC AUTO: 76 FL (ref 80–100)
MONOCYTES # BLD AUTO: 0.06 X10*3/UL (ref 0.1–1)
MONOCYTES NFR BLD AUTO: 4 %
NEUTROPHILS # BLD AUTO: 1.33 X10*3/UL (ref 1.2–7.7)
NEUTROPHILS NFR BLD AUTO: 88.6 %
NRBC BLD-RTO: 1.3 /100 WBCS (ref 0–0)
PHOSPHATE SERPL-MCNC: 5 MG/DL (ref 2.5–4.9)
PLATELET # BLD AUTO: 256 X10*3/UL (ref 150–450)
POTASSIUM SERPL-SCNC: 4 MMOL/L (ref 3.5–5.3)
PROT SERPL-MCNC: 6.3 G/DL (ref 6.4–8.2)
PROTHROMBIN TIME: 12.5 SECONDS (ref 9.8–12.8)
RBC # BLD AUTO: 4.36 X10*6/UL (ref 4.5–5.9)
RBC MORPH BLD: NORMAL
SODIUM SERPL-SCNC: 139 MMOL/L (ref 136–145)
TACROLIMUS BLD-MCNC: 5 NG/ML
TARGETS BLD QL SMEAR: NORMAL
URATE SERPL-MCNC: 6.2 MG/DL (ref 4–7.5)
VIABLE CELLS NFR SPEC: 61.4 %
WBC # BLD AUTO: 1.5 X10*3/UL (ref 4.4–11.3)

## 2024-09-19 PROCEDURE — 2500000001 HC RX 250 WO HCPCS SELF ADMINISTERED DRUGS (ALT 637 FOR MEDICARE OP): Performed by: INTERNAL MEDICINE

## 2024-09-19 PROCEDURE — 99233 SBSQ HOSP IP/OBS HIGH 50: CPT | Performed by: INTERNAL MEDICINE

## 2024-09-19 PROCEDURE — 38240 TRANSPLT ALLO HCT/DONOR: CPT | Performed by: INTERNAL MEDICINE

## 2024-09-19 PROCEDURE — 30243Y3 TRANSFUSION OF ALLOGENEIC UNRELATED HEMATOPOIETIC STEM CELLS INTO CENTRAL VEIN, PERCUTANEOUS APPROACH: ICD-10-PCS | Performed by: INTERNAL MEDICINE

## 2024-09-19 PROCEDURE — 84100 ASSAY OF PHOSPHORUS: CPT

## 2024-09-19 PROCEDURE — 8150000001 HC CORD BLOOD PROCUREMENT

## 2024-09-19 PROCEDURE — 84550 ASSAY OF BLOOD/URIC ACID: CPT

## 2024-09-19 PROCEDURE — 85384 FIBRINOGEN ACTIVITY: CPT

## 2024-09-19 PROCEDURE — 87070 CULTURE OTHR SPECIMN AEROBIC: CPT

## 2024-09-19 PROCEDURE — 86140 C-REACTIVE PROTEIN: CPT

## 2024-09-19 PROCEDURE — 2500000002 HC RX 250 W HCPCS SELF ADMINISTERED DRUGS (ALT 637 FOR MEDICARE OP, ALT 636 FOR OP/ED): Performed by: INTERNAL MEDICINE

## 2024-09-19 PROCEDURE — 80053 COMPREHEN METABOLIC PANEL: CPT

## 2024-09-19 PROCEDURE — 2500000004 HC RX 250 GENERAL PHARMACY W/ HCPCS (ALT 636 FOR OP/ED): Performed by: INTERNAL MEDICINE

## 2024-09-19 PROCEDURE — 2500000002 HC RX 250 W HCPCS SELF ADMINISTERED DRUGS (ALT 637 FOR MEDICARE OP, ALT 636 FOR OP/ED): Performed by: PHYSICIAN ASSISTANT

## 2024-09-19 PROCEDURE — 80197 ASSAY OF TACROLIMUS: CPT | Performed by: INTERNAL MEDICINE

## 2024-09-19 PROCEDURE — 2500000001 HC RX 250 WO HCPCS SELF ADMINISTERED DRUGS (ALT 637 FOR MEDICARE OP)

## 2024-09-19 PROCEDURE — 38208 THAW PRESERVED STEM CELLS: CPT

## 2024-09-19 PROCEDURE — 83735 ASSAY OF MAGNESIUM: CPT

## 2024-09-19 PROCEDURE — 87075 CULTR BACTERIA EXCEPT BLOOD: CPT

## 2024-09-19 PROCEDURE — 82728 ASSAY OF FERRITIN: CPT

## 2024-09-19 PROCEDURE — 85025 COMPLETE CBC W/AUTO DIFF WBC: CPT

## 2024-09-19 PROCEDURE — 1170000001 HC PRIVATE ONCOLOGY ROOM DAILY

## 2024-09-19 PROCEDURE — 83615 LACTATE (LD) (LDH) ENZYME: CPT

## 2024-09-19 PROCEDURE — 85610 PROTHROMBIN TIME: CPT

## 2024-09-19 RX ORDER — LORAZEPAM 1 MG/1
1 TABLET ORAL ONCE AS NEEDED
Status: ACTIVE | OUTPATIENT
Start: 2024-09-19 | End: 2024-09-19

## 2024-09-19 RX ORDER — DIPHENHYDRAMINE HCL 25 MG
25 CAPSULE ORAL ONCE
Status: COMPLETED | OUTPATIENT
Start: 2024-09-19 | End: 2024-09-19

## 2024-09-19 RX ORDER — MYCOPHENOLATE MOFETIL 250 MG/1
1000 CAPSULE ORAL 3 TIMES DAILY
Status: DISCONTINUED | OUTPATIENT
Start: 2024-09-19 | End: 2024-10-07

## 2024-09-19 RX ORDER — POSACONAZOLE 100 MG/1
300 TABLET, DELAYED RELEASE ORAL EVERY 12 HOURS
Status: COMPLETED | OUTPATIENT
Start: 2024-09-20 | End: 2024-09-20

## 2024-09-19 RX ORDER — POSACONAZOLE 100 MG/1
300 TABLET, DELAYED RELEASE ORAL EVERY 24 HOURS
Status: DISCONTINUED | OUTPATIENT
Start: 2024-09-21 | End: 2024-11-21 | Stop reason: HOSPADM

## 2024-09-19 RX ORDER — ONDANSETRON HYDROCHLORIDE 2 MG/ML
8 INJECTION, SOLUTION INTRAVENOUS ONCE
Status: COMPLETED | OUTPATIENT
Start: 2024-09-19 | End: 2024-09-19

## 2024-09-19 RX ORDER — ACYCLOVIR 400 MG/1
400 TABLET ORAL EVERY 12 HOURS
Status: DISCONTINUED | OUTPATIENT
Start: 2024-09-19 | End: 2024-11-21 | Stop reason: HOSPADM

## 2024-09-19 RX ADMIN — MYCOPHENOLATE MOFETIL 1000 MG: 250 CAPSULE ORAL at 08:26

## 2024-09-19 RX ADMIN — MYCOPHENOLATE MOFETIL 1000 MG: 250 CAPSULE ORAL at 20:48

## 2024-09-19 RX ADMIN — DIPHENHYDRAMINE HYDROCHLORIDE 25 MG: 25 CAPSULE ORAL at 10:16

## 2024-09-19 RX ADMIN — TACROLIMUS 2 MG: 1 CAPSULE ORAL at 06:09

## 2024-09-19 RX ADMIN — TACROLIMUS 2 MG: 1 CAPSULE ORAL at 18:01

## 2024-09-19 RX ADMIN — URSODIOL 300 MG: 300 CAPSULE ORAL at 15:26

## 2024-09-19 RX ADMIN — FOLIC ACID TAB 400 MCG 1 MG: 400 TAB at 08:32

## 2024-09-19 RX ADMIN — URSODIOL 300 MG: 300 CAPSULE ORAL at 08:26

## 2024-09-19 RX ADMIN — VANCOMYCIN HYDROCHLORIDE 125 MG: 125 CAPSULE ORAL at 08:26

## 2024-09-19 RX ADMIN — MYCOPHENOLATE MOFETIL 1000 MG: 250 CAPSULE ORAL at 15:26

## 2024-09-19 RX ADMIN — ACYCLOVIR 400 MG: 400 TABLET ORAL at 20:48

## 2024-09-19 RX ADMIN — NIFEDIPINE 90 MG: 90 TABLET, FILM COATED, EXTENDED RELEASE ORAL at 04:04

## 2024-09-19 RX ADMIN — ONDANSETRON 8 MG: 2 INJECTION INTRAMUSCULAR; INTRAVENOUS at 10:15

## 2024-09-19 RX ADMIN — ACYCLOVIR 400 MG: 400 TABLET ORAL at 08:26

## 2024-09-19 RX ADMIN — SENNOSIDES AND DOCUSATE SODIUM 1 TABLET: 50; 8.6 TABLET ORAL at 08:26

## 2024-09-19 RX ADMIN — HYDROCORTISONE SODIUM SUCCINATE 100 MG: 100 INJECTION, POWDER, FOR SOLUTION INTRAMUSCULAR; INTRAVENOUS at 10:16

## 2024-09-19 RX ADMIN — URSODIOL 300 MG: 300 CAPSULE ORAL at 20:48

## 2024-09-19 ASSESSMENT — PAIN SCALES - GENERAL: PAINLEVEL_OUTOF10: 0 - NO PAIN

## 2024-09-19 NOTE — ASSESSMENT & PLAN NOTE
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      T=0 today. Single-unit cord transplant (T0=9/19/24). Infusion well tolerated.    ONC  # MDS/AML   - Symptoms began 9/2023; diagnosed 4/2024  - BMBx showing MDS in transition to AML (>10% jaime) w/ trisomy 8, DNMT alpha, and U2AF1 mutation indication adverse risk   - s/p 4 cycles of Decitabine/Venetoclax (C4D1 on 8/12/24)  - BMBx 6/17/24: Blasts cleared, FISH still positive for trisomy 8, still MDS changes   - BMBx 9/5/24: 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 and no increase in blast   # Transplant   - HLA MUD 6/8 match, mismatch at A and DRB1, loci  - ABO donor: A+, CMV negative   - ABO recipient: A+, CMV positive   CHEMO:   - Fludarabine 30 mg/m2 IV D-5, D-4, D-3, D-2 (9/17)  - Melphalan 140 mg/m2 IV D-2 (9/17)  - Total Body Irradiation (TBI) 200 cGy x2 on D-1 (9/18)  GVHD PPX:  - Tacrolimus 1.5 mg q12h started T-3 (9/16)  *Tacro level: 3.0 (9/18), 5.0 (9/19)  *Will need to adjust Tacro dose when Posaconazole starts T+3   - MMF 1000 mg TID to begin T+2 to T+35   - Viral surveillance should begin T+14     HEME  # Pancytopenia :: secondary to disease   # 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   - Transfuse for Hgb <7 & PLT <10   # VTE Prophylaxis: SCDs & Ambulation     ID  - Afebrile on admission   - Covid: negative   # Prophylaxis: Acyclovir, Vancomycin & Ursodiol   - Plan for Posaconazole  (T+1), Letermovir (T+14), and Bactrim (T+30) prophylaxis per chemo orders  - Plan for Levofloxacin when neutropenic      CARDIO/PULM  # Hx of STEMI (11/11/2020)  - TTE 7/19/24: EF 59%, otherwise unremarkable   - Cleared by Cardiology for transplant   # Hx of HTN   - Increased home Nifedipine: 90 mEq ER 24 hr tablet               *Will consider additional HTN medication if needed  # Ascending aorta dilation   - Echo showing 3.8-4 cm dilation   -  Optimal BP control   - Follow w/ cardiology on an annual basis      FEN/RENAL  - Admit wt: 75.6 kg (9/13/24); Most recent wt: 75.1 kg (9/19)  Allergic to Lasix and will not take this med (extreme hypotension, extreme hypokalemia)  - monitor electrolytes, replace PRN   - antiemetics & bowel regimen per chemo orders   - continue Folate supplement   - continue Ursodiol for SOS ppx  #Urinary frequency :: IV fluid intake   - hx of BPH and urinary retention (2023)              *acutely tx in 2023 with Cipro x1 and Tamsulosin 0.4 for 30 days   - Hx elevated PSA               *7/28/21 Bx: atypical small acinar proliferation               *4/7/23 Bx: Benign   - continuous fluids stopped 9/18; will be restarted around time of SCT  - post void residual volume: 0 mL, not consistent with urinary retention due to BPH   #Hx of treated Hep C   - treated in 2015   - recent liver US shows normal sized liver at 14.5 cm, however seems enlarged on exam   - seen by hepatology prior to transplant   - Fibroscan 7/24/24: 4.8 KPa indicating minimal fibrosis   - recommend close monitoring of liver function & repeating hepatitis serologies (9/12) Hep C RNA - not detected   #Hx of recurrent hyperbilirubinemia   - 2/2 to Hemoglobin C hemolysis   - Tbili 0.3 on 9/5 pre-transplant apt.      ACCESS/SUPPORT/DISPO  FULL CODE  Primary Onc: Dr. Newsome   ACCESS: DL tunneled line placed 9/13     Patient seen and examined with Dr. Hugo.

## 2024-09-19 NOTE — CARE PLAN
Problem: Pain - Adult  Goal: Verbalizes/displays adequate comfort level or baseline comfort level  Outcome: Progressing     Problem: Safety - Adult  Goal: Free from fall injury  Outcome: Progressing     Problem: Discharge Planning  Goal: Discharge to home or other facility with appropriate resources  Outcome: Progressing     Problem: Chronic Conditions and Co-morbidities  Goal: Patient's chronic conditions and co-morbidity symptoms are monitored and maintained or improved  Outcome: Progressing     Problem: Nutrition  Goal: Oral intake greater 75%  Outcome: Progressing  Goal: Adequate PO fluid intake  Outcome: Progressing  Goal: Maintain stable weight  Outcome: Progressing

## 2024-09-19 NOTE — PROGRESS NOTES
"Brandt Merritt is a 64 y.o. male on day 6 of admission presenting with AML (acute myeloid leukemia) in relapse (Multi).    Subjective   Feels well. ROS unremarkable.     Objective     Physical Exam  Constitutional:       Appearance: Normal appearance.   HENT:      Head: Normocephalic and atraumatic.      Nose: Nose normal.      Mouth/Throat:      Mouth: Mucous membranes are dry.   Eyes:      Conjunctiva/sclera: Conjunctivae normal.      Pupils: Pupils are equal, round, and reactive to light.   Cardiovascular:      Rate and Rhythm: Normal rate and regular rhythm.      Pulses: Normal pulses.      Heart sounds: Normal heart sounds.   Pulmonary:      Effort: Pulmonary effort is normal.      Breath sounds: Normal breath sounds.   Abdominal:      General: Bowel sounds are normal.      Palpations: Abdomen is soft.   Musculoskeletal:         General: Normal range of motion.      Cervical back: Normal range of motion and neck supple.   Skin:     General: Skin is warm and dry.      Capillary Refill: Capillary refill takes 2 to 3 seconds.      Comments: Central line entry site c/d/i   Neurological:      General: No focal deficit present.      Mental Status: He is alert and oriented to person, place, and time.   Psychiatric:         Mood and Affect: Mood normal.         Behavior: Behavior normal.         Last Recorded Vitals  Blood pressure 128/81, pulse 94, temperature 36.2 °C (97.2 °F), temperature source Temporal, resp. rate 16, height (S) 1.664 m (5' 5.51\"), weight 77.7 kg (171 lb 4.8 oz), SpO2 97%.  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      T=0 today. Single-unit cord transplant (T0=9/19/24). Infusion well tolerated.    ONC  # MDS/AML   - Symptoms began 9/2023; diagnosed 4/2024  - BMBx showing MDS in transition to AML (>10% jaime) w/ trisomy 8, DNMT alpha, and U2AF1 mutation indication adverse risk   - s/p 4 cycles of Decitabine/Venetoclax (C4D1 on 8/12/24)  - BMBx 6/17/24: Blasts cleared, FISH still positive for trisomy 8, still MDS changes   - BMBx 9/5/24: 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 and no increase in blast   # Transplant   - HLA MUD 6/8 match, mismatch at A and DRB1, loci  - ABO donor: A+, CMV negative   - ABO recipient: A+, CMV positive   CHEMO:   - Fludarabine 30 mg/m2 IV D-5, D-4, D-3, D-2 (9/17)  - Melphalan 140 mg/m2 IV D-2 (9/17)  - Total Body Irradiation (TBI) 200 cGy x2 on D-1 (9/18)  GVHD PPX:  - Tacrolimus 1.5 mg q12h started T-3 (9/16)  *Tacro level: 3.0 (9/18), 5.0 (9/19)  *Will need to adjust Tacro dose when Posaconazole starts T+3   - MMF 1000 mg TID to begin T+2 to T+35   - Viral surveillance should begin T+14     HEME  # Pancytopenia :: secondary to disease   # 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   - Transfuse for Hgb <7 & PLT <10   # VTE Prophylaxis: SCDs & Ambulation     ID  - Afebrile on admission   - Covid: negative   # Prophylaxis: Acyclovir, Vancomycin & Ursodiol   - Plan for Posaconazole  (T+1), Letermovir (T+14), and Bactrim (T+30) prophylaxis per chemo orders  - Plan for Levofloxacin when neutropenic      CARDIO/PULM  # Hx of STEMI (11/11/2020)  - TTE 7/19/24: EF 59%, otherwise unremarkable   - Cleared by Cardiology for transplant   # Hx of HTN   - Increased home Nifedipine: 90 mEq ER 24 hr tablet               *Will consider additional HTN medication if needed  # Ascending aorta dilation   - Echo showing 3.8-4 cm dilation   - Optimal BP control   -  Follow w/ cardiology on an annual basis      FEN/RENAL  - Admit wt: 75.6 kg (9/13/24); Most recent wt: 75.1 kg (9/19)  Allergic to Lasix and will not take this med (extreme hypotension, extreme hypokalemia)  - monitor electrolytes, replace PRN   - antiemetics & bowel regimen per chemo orders   - continue Folate supplement   - continue Ursodiol for SOS ppx  #Urinary frequency :: IV fluid intake   - hx of BPH and urinary retention (2023)              *acutely tx in 2023 with Cipro x1 and Tamsulosin 0.4 for 30 days   - Hx elevated PSA               *7/28/21 Bx: atypical small acinar proliferation               *4/7/23 Bx: Benign   - continuous fluids stopped 9/18; will be restarted around time of SCT  - post void residual volume: 0 mL, not consistent with urinary retention due to BPH   #Hx of treated Hep C   - treated in 2015   - recent liver US shows normal sized liver at 14.5 cm, however seems enlarged on exam   - seen by hepatology prior to transplant   - Fibroscan 7/24/24: 4.8 KPa indicating minimal fibrosis   - recommend close monitoring of liver function & repeating hepatitis serologies (9/12) Hep C RNA - not detected   #Hx of recurrent hyperbilirubinemia   - 2/2 to Hemoglobin C hemolysis   - Tbili 0.3 on 9/5 pre-transplant apt.      ACCESS/SUPPORT/DISPO  FULL CODE  Primary Onc: Dr. Newsome   ACCESS: DL tunneled line placed 9/13     Patient seen and examined with Dr. Hugo.        I spent 30 minutes in the professional and overall care of this patient.      HAYLEY Wyman-CNP

## 2024-09-20 LAB
ALBUMIN SERPL BCP-MCNC: 3.5 G/DL (ref 3.4–5)
ALP SERPL-CCNC: 99 U/L (ref 33–136)
ALT SERPL W P-5'-P-CCNC: 15 U/L (ref 10–52)
ANION GAP SERPL CALC-SCNC: 7 MMOL/L (ref 10–20)
APTT PPP: 29 SECONDS (ref 27–38)
AST SERPL W P-5'-P-CCNC: 24 U/L (ref 9–39)
BASOPHILS # BLD MANUAL: 0 X10*3/UL (ref 0–0.1)
BASOPHILS NFR BLD MANUAL: 0 %
BILIRUB DIRECT SERPL-MCNC: 0.2 MG/DL (ref 0–0.3)
BILIRUB SERPL-MCNC: 1.3 MG/DL (ref 0–1.2)
BUN SERPL-MCNC: 17 MG/DL (ref 6–23)
CALCIUM SERPL-MCNC: 8.9 MG/DL (ref 8.6–10.6)
CHLORIDE SERPL-SCNC: 105 MMOL/L (ref 98–107)
CO2 SERPL-SCNC: 31 MMOL/L (ref 21–32)
CREAT SERPL-MCNC: 0.82 MG/DL (ref 0.5–1.3)
CRP SERPL-MCNC: 0.44 MG/DL
EGFRCR SERPLBLD CKD-EPI 2021: >90 ML/MIN/1.73M*2
EOSINOPHIL # BLD MANUAL: 0.02 X10*3/UL (ref 0–0.7)
EOSINOPHIL NFR BLD MANUAL: 0.9 %
ERYTHROCYTE [DISTWIDTH] IN BLOOD BY AUTOMATED COUNT: 19.4 % (ref 11.5–14.5)
FERRITIN SERPL-MCNC: 249 NG/ML (ref 20–300)
FIBRINOGEN PPP-MCNC: 349 MG/DL (ref 200–400)
GLUCOSE SERPL-MCNC: 92 MG/DL (ref 74–99)
HCT VFR BLD AUTO: 32.3 % (ref 41–52)
HGB BLD-MCNC: 11.1 G/DL (ref 13.5–17.5)
HYPOCHROMIA BLD QL SMEAR: ABNORMAL
IMM GRANULOCYTES # BLD AUTO: 0.01 X10*3/UL (ref 0–0.7)
IMM GRANULOCYTES NFR BLD AUTO: 0.5 % (ref 0–0.9)
INR PPP: 1 (ref 0.9–1.1)
LDH SERPL L TO P-CCNC: 155 U/L (ref 84–246)
LYMPHOCYTES # BLD MANUAL: 0.13 X10*3/UL (ref 1.2–4.8)
LYMPHOCYTES NFR BLD MANUAL: 6.9 %
MAGNESIUM SERPL-MCNC: 1.94 MG/DL (ref 1.6–2.4)
MCH RBC QN AUTO: 27.3 PG (ref 26–34)
MCHC RBC AUTO-ENTMCNC: 34.4 G/DL (ref 32–36)
MCV RBC AUTO: 80 FL (ref 80–100)
MONOCYTES # BLD MANUAL: 0 X10*3/UL (ref 0.1–1)
MONOCYTES NFR BLD MANUAL: 0 %
NEUTS SEG # BLD MANUAL: 1.75 X10*3/UL (ref 1.2–7)
NEUTS SEG NFR BLD MANUAL: 92.2 %
NRBC BLD-RTO: 0 /100 WBCS (ref 0–0)
PHOSPHATE SERPL-MCNC: 3.6 MG/DL (ref 2.5–4.9)
PLATELET # BLD AUTO: 219 X10*3/UL (ref 150–450)
POTASSIUM SERPL-SCNC: 4.3 MMOL/L (ref 3.5–5.3)
PROT SERPL-MCNC: 6 G/DL (ref 6.4–8.2)
PROTHROMBIN TIME: 11.4 SECONDS (ref 9.8–12.8)
RBC # BLD AUTO: 4.06 X10*6/UL (ref 4.5–5.9)
RBC MORPH BLD: ABNORMAL
SODIUM SERPL-SCNC: 139 MMOL/L (ref 136–145)
TACROLIMUS BLD-MCNC: 4.5 NG/ML
TARGETS BLD QL SMEAR: ABNORMAL
TOTAL CELLS COUNTED BLD: 116
URATE SERPL-MCNC: 4.5 MG/DL (ref 4–7.5)
WBC # BLD AUTO: 1.9 X10*3/UL (ref 4.4–11.3)

## 2024-09-20 PROCEDURE — 80197 ASSAY OF TACROLIMUS: CPT | Performed by: INTERNAL MEDICINE

## 2024-09-20 PROCEDURE — 1170000001 HC PRIVATE ONCOLOGY ROOM DAILY

## 2024-09-20 PROCEDURE — 84100 ASSAY OF PHOSPHORUS: CPT

## 2024-09-20 PROCEDURE — 2500000002 HC RX 250 W HCPCS SELF ADMINISTERED DRUGS (ALT 637 FOR MEDICARE OP, ALT 636 FOR OP/ED): Performed by: PHYSICIAN ASSISTANT

## 2024-09-20 PROCEDURE — 85610 PROTHROMBIN TIME: CPT

## 2024-09-20 PROCEDURE — 80053 COMPREHEN METABOLIC PANEL: CPT

## 2024-09-20 PROCEDURE — 83735 ASSAY OF MAGNESIUM: CPT

## 2024-09-20 PROCEDURE — 83615 LACTATE (LD) (LDH) ENZYME: CPT

## 2024-09-20 PROCEDURE — 2500000004 HC RX 250 GENERAL PHARMACY W/ HCPCS (ALT 636 FOR OP/ED): Performed by: NURSE PRACTITIONER

## 2024-09-20 PROCEDURE — 2500000001 HC RX 250 WO HCPCS SELF ADMINISTERED DRUGS (ALT 637 FOR MEDICARE OP)

## 2024-09-20 PROCEDURE — 85027 COMPLETE CBC AUTOMATED: CPT

## 2024-09-20 PROCEDURE — 99232 SBSQ HOSP IP/OBS MODERATE 35: CPT | Performed by: INTERNAL MEDICINE

## 2024-09-20 PROCEDURE — 2500000004 HC RX 250 GENERAL PHARMACY W/ HCPCS (ALT 636 FOR OP/ED): Performed by: INTERNAL MEDICINE

## 2024-09-20 PROCEDURE — 84550 ASSAY OF BLOOD/URIC ACID: CPT

## 2024-09-20 PROCEDURE — 85384 FIBRINOGEN ACTIVITY: CPT

## 2024-09-20 PROCEDURE — 2500000001 HC RX 250 WO HCPCS SELF ADMINISTERED DRUGS (ALT 637 FOR MEDICARE OP): Performed by: INTERNAL MEDICINE

## 2024-09-20 PROCEDURE — 82728 ASSAY OF FERRITIN: CPT

## 2024-09-20 PROCEDURE — 2500000002 HC RX 250 W HCPCS SELF ADMINISTERED DRUGS (ALT 637 FOR MEDICARE OP, ALT 636 FOR OP/ED): Performed by: INTERNAL MEDICINE

## 2024-09-20 PROCEDURE — 85007 BL SMEAR W/DIFF WBC COUNT: CPT

## 2024-09-20 PROCEDURE — 86140 C-REACTIVE PROTEIN: CPT

## 2024-09-20 PROCEDURE — 82248 BILIRUBIN DIRECT: CPT

## 2024-09-20 RX ORDER — TACROLIMUS 1 MG/1
1 CAPSULE ORAL EVERY 12 HOURS
Status: DISCONTINUED | OUTPATIENT
Start: 2024-09-20 | End: 2024-09-23

## 2024-09-20 RX ADMIN — POSACONAZOLE 300 MG: 100 TABLET, DELAYED RELEASE ORAL at 08:48

## 2024-09-20 RX ADMIN — POSACONAZOLE 300 MG: 100 TABLET, DELAYED RELEASE ORAL at 20:32

## 2024-09-20 RX ADMIN — ACYCLOVIR 400 MG: 400 TABLET ORAL at 20:33

## 2024-09-20 RX ADMIN — URSODIOL 300 MG: 300 CAPSULE ORAL at 14:41

## 2024-09-20 RX ADMIN — URSODIOL 300 MG: 300 CAPSULE ORAL at 20:33

## 2024-09-20 RX ADMIN — ACYCLOVIR 400 MG: 400 TABLET ORAL at 08:48

## 2024-09-20 RX ADMIN — MYCOPHENOLATE MOFETIL 1000 MG: 250 CAPSULE ORAL at 14:41

## 2024-09-20 RX ADMIN — MYCOPHENOLATE MOFETIL 1000 MG: 250 CAPSULE ORAL at 20:33

## 2024-09-20 RX ADMIN — URSODIOL 300 MG: 300 CAPSULE ORAL at 08:48

## 2024-09-20 RX ADMIN — VANCOMYCIN HYDROCHLORIDE 125 MG: 125 CAPSULE ORAL at 08:48

## 2024-09-20 RX ADMIN — MYCOPHENOLATE MOFETIL 1000 MG: 250 CAPSULE ORAL at 08:48

## 2024-09-20 RX ADMIN — FOLIC ACID TAB 400 MCG 1 MG: 400 TAB at 08:48

## 2024-09-20 RX ADMIN — TACROLIMUS 1 MG: 1 CAPSULE ORAL at 18:23

## 2024-09-20 RX ADMIN — SENNOSIDES AND DOCUSATE SODIUM 1 TABLET: 50; 8.6 TABLET ORAL at 08:48

## 2024-09-20 RX ADMIN — TACROLIMUS 2 MG: 1 CAPSULE ORAL at 05:51

## 2024-09-20 RX ADMIN — NIFEDIPINE 90 MG: 90 TABLET, FILM COATED, EXTENDED RELEASE ORAL at 04:03

## 2024-09-20 ASSESSMENT — COGNITIVE AND FUNCTIONAL STATUS - GENERAL
DAILY ACTIVITIY SCORE: 24
DAILY ACTIVITIY SCORE: 24
MOBILITY SCORE: 24
MOBILITY SCORE: 24

## 2024-09-20 ASSESSMENT — PAIN SCALES - GENERAL
PAINLEVEL_OUTOF10: 0 - NO PAIN

## 2024-09-20 NOTE — ASSESSMENT & PLAN NOTE
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      T+1 Single-unit cord transplant (T0=9/19/24)    ONC  # MDS/AML   - Symptoms began 9/2023; diagnosed 4/2024  - BMBx showing MDS in transition to AML (>10% jaime) w/ trisomy 8, DNMT alpha, and U2AF1 mutation indication adverse risk   - s/p 4 cycles of Decitabine/Venetoclax (C4D1 on 8/12/24)  - BMBx 6/17/24: Blasts cleared, FISH still positive for trisomy 8, still MDS changes   - BMBx 9/5/24: 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 and no increase in blast    # Transplant  CONDITIONING Fludarabine, melphalan, and TBI   DONOR Single umbilical cord   MATCH GRADE 6/8   SEX MATCH Mismatched   ABO DONOR A pos   ABO RECIPIENT A pos   GVHD PROPHYLAXIS Tacrolimus and Mycophenolate   GRAFT SOURCE Single umbilical cord   CMV DONOR negative   CMV RECIPIENT ppsitive   GVHD PPX:  - Tacrolimus 1.5 mg q12h started T-3 (9/16)  *Tacro level: 3.0 (9/18), 5.0 (9/19), 4.5 (9/20)  *Decreased Tacrolimus to 1mg BID d/t Posaconazole  - MMF 1000 mg TID to begin T+2 to T+35   - Viral surveillance should begin T+14     HEME  # Pancytopenia: secondary to disease and chemotherapy  # 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   - Transfuse for Hgb <7 & PLT <10   - Neupogen 300mcg to begin T+5  # VTE Prophylaxis: SCDs & Ambulation     ID  # Prophylaxis: Acyclovir, Vancomycin, Posaconazole, Letermovir (T+14), and Bactrim (T+30)  - Plan for Levofloxacin when neutropenic      CARDIO/PULM  # Hx of STEMI (11/11/2020)  - TTE 7/19/24: EF 59%, otherwise unremarkable   - Cleared by Cardiology for transplant   # Hx of HTN   - Increased home Nifedipine: 90 mEq ER 24 hr tablet   # Ascending aorta dilation   - Echo showing 3.8-4 cm dilation   - Optimal BP control   - Follow w/ cardiology on an annual basis      FEN/RENAL  - Admit wt: 75.6 kg (9/13/24); Most recent wt: 75.1 kg  Allergic to  Lasix and will not take this med (extreme hypotension, extreme hypokalemia)  - continue Folate supplement   - continue Ursodiol for SOS ppx  #hx of BPH and urinary retention (2023)              *tx in 2023 with Cipro x1 and Tamsulosin 0.4 for 30 days   - Hx elevated PSA               *7/28/21 Bx: atypical small acinar proliferation               *4/7/23 Bx: Benign  #Hx of treated Hep C in 2015  - recent liver US shows normal sized liver at 14.5 cm  - seen by hepatology prior to transplant   - Fibroscan 7/24/24: 4.8 KPa indicating minimal fibrosis   - (9/12) Hep C RNA - not detected   #Hx of recurrent hyperbilirubinemia   - 2/2 to Hemoglobin C hemolysis   - Tbili 0.3 on 9/5 pre-transplant apt    ACCESS/SUPPORT/DISPO  FULL CODE  Primary Onc: Dr. Newsome   ACCESS: DL tunneled line placed 9/13     Patient seen and examined with Dr. Hugo

## 2024-09-20 NOTE — PROGRESS NOTES
"Brandt Merritt is a 64 y.o. male on day 7 of admission presenting with AML (acute myeloid leukemia) in relapse (Multi).    Subjective   Feels well. Reports taste is a bit off but continues to have good PO intake. ROS unremarkable.    Objective   Physical Exam  Constitutional:       Appearance: Normal appearance.   HENT:      Head: Normocephalic and atraumatic.      Nose: Nose normal.      Mouth/Throat:      Mouth: Mucous membranes are dry.   Eyes:      Conjunctiva/sclera: Conjunctivae normal.      Pupils: Pupils are equal, round, and reactive to light.   Cardiovascular:      Rate and Rhythm: Normal rate and regular rhythm.      Pulses: Normal pulses.      Heart sounds: Normal heart sounds.   Pulmonary:      Effort: Pulmonary effort is normal.      Breath sounds: Normal breath sounds.   Abdominal:      General: Bowel sounds are normal.      Palpations: Abdomen is soft.   Musculoskeletal:         General: Normal range of motion.      Cervical back: Normal range of motion and neck supple.   Skin:     General: Skin is warm and dry.      Capillary Refill: Capillary refill takes 2 to 3 seconds.      Comments: Central line entry site c/d/i   Neurological:      General: No focal deficit present.      Mental Status: He is alert and oriented to person, place, and time.   Psychiatric:         Mood and Affect: Mood normal.         Behavior: Behavior normal.     Last Recorded Vitals  Blood pressure 133/83, pulse 91, temperature 36.2 °C (97.2 °F), temperature source Temporal, resp. rate 16, height (S) 1.664 m (5' 5.51\"), weight 75.1 kg (165 lb 9.1 oz), SpO2 94%.  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      T+1 Single-unit cord transplant (T0=9/19/24)    ONC  # MDS/AML   - Symptoms began 9/2023; diagnosed 4/2024  - BMBx showing MDS in transition to AML (>10% jaime) w/ trisomy 8, DNMT alpha, and U2AF1 mutation indication adverse risk   - s/p 4 cycles of Decitabine/Venetoclax (C4D1 on 8/12/24)  - BMBx 6/17/24: Blasts cleared, FISH still positive for trisomy 8, still MDS changes   - BMBx 9/5/24: 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 and no increase in blast    # Transplant  CONDITIONING Fludarabine, melphalan, and TBI   DONOR Single umbilical cord   MATCH GRADE 6/8   SEX MATCH Mismatched   ABO DONOR A pos   ABO RECIPIENT A pos   GVHD PROPHYLAXIS Tacrolimus and Mycophenolate   GRAFT SOURCE Single umbilical cord   CMV DONOR negative   CMV RECIPIENT ppsitive   GVHD PPX:  - Tacrolimus 1.5 mg q12h started T-3 (9/16)  *Tacro level: 3.0 (9/18), 5.0 (9/19), 4.5 (9/20)  *Decreased Tacrolimus to 1mg BID d/t Posaconazole  - MMF 1000 mg TID to begin T+2 to T+35   - Viral surveillance should begin T+14     HEME  # Pancytopenia: secondary to disease and chemotherapy  # 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   - Transfuse for Hgb <7 & PLT <10   - Neupogen 300mcg to begin T+5  # VTE Prophylaxis: SCDs & Ambulation     ID  # Prophylaxis: Acyclovir, Vancomycin, Posaconazole, Letermovir (T+14), and Bactrim (T+30)  - Plan for Levofloxacin when neutropenic      CARDIO/PULM  # Hx of STEMI (11/11/2020)  - TTE 7/19/24: EF 59%, otherwise unremarkable   - Cleared by Cardiology for transplant   # Hx of HTN   - Increased home Nifedipine: 90 mEq ER 24 hr tablet   # Ascending aorta dilation   - Echo showing 3.8-4 cm dilation   - Optimal BP control   - Follow w/ cardiology on an annual basis      FEN/RENAL  - Admit wt: 75.6 kg (9/13/24); Most recent wt: 75.1 kg  Allergic to Lasix and  will not take this med (extreme hypotension, extreme hypokalemia)  - continue Folate supplement   - continue Ursodiol for SOS ppx  #hx of BPH and urinary retention (2023)              *tx in 2023 with Cipro x1 and Tamsulosin 0.4 for 30 days   - Hx elevated PSA               *7/28/21 Bx: atypical small acinar proliferation               *4/7/23 Bx: Benign  #Hx of treated Hep C in 2015  - recent liver US shows normal sized liver at 14.5 cm  - seen by hepatology prior to transplant   - Fibroscan 7/24/24: 4.8 KPa indicating minimal fibrosis   - (9/12) Hep C RNA - not detected   #Hx of recurrent hyperbilirubinemia   - 2/2 to Hemoglobin C hemolysis   - Tbili 0.3 on 9/5 pre-transplant apt    ACCESS/SUPPORT/DISPO  FULL CODE  Primary Onc: Dr. Newsome   ACCESS: DL tunneled line placed 9/13     Patient seen and examined with Dr. Hugo

## 2024-09-20 NOTE — CARE PLAN
Problem: Safety - Adult  Goal: Free from fall injury  Outcome: Progressing     Problem: Chronic Conditions and Co-morbidities  Goal: Patient's chronic conditions and co-morbidity symptoms are monitored and maintained or improved  Outcome: Progressing   \

## 2024-09-20 NOTE — NURSING NOTE
Allogeneic HSCT product [id number] delivered by Cellular Therapy personnel and verified at bedside with Corinne Ramsey .Patient premedicated per orders. Patient identification verified against protocol with second RN Adrianna Zapien, using name, MRN, and . Product infused via Alaris pump at a rate of 250 ml/hr through Right CVC internal jugular purple red lumen. +BBR obtained prior to and following infusion. Infusion started at 1115 and completed at 1212. Pt. received a total of 150 ml and a total dose of 0.27 x10^8/kg TNC per WKS 0002. Patient monitored throughout and vitals remained stable throughout infusion. No complications noted. Patient instructed not to ambulate without supervision until cleared by medical team. Patient verbalized understanding of this safety measure. Patient to be monitored 1 hour following infusion. Tolerated infusion well.

## 2024-09-20 NOTE — CARE PLAN
The clinical goals for the shift include pt will remain HDS throughout shift      Problem: Pain - Adult  Goal: Verbalizes/displays adequate comfort level or baseline comfort level  Outcome: Progressing     Problem: Safety - Adult  Goal: Free from fall injury  Outcome: Progressing     Problem: Discharge Planning  Goal: Discharge to home or other facility with appropriate resources  Outcome: Progressing     Problem: Chronic Conditions and Co-morbidities  Goal: Patient's chronic conditions and co-morbidity symptoms are monitored and maintained or improved  Outcome: Progressing     Problem: Nutrition  Goal: Oral intake greater 75%  Outcome: Progressing  Goal: Adequate PO fluid intake  Outcome: Progressing  Goal: Maintain stable weight  Outcome: Progressing

## 2024-09-21 LAB
ALBUMIN SERPL BCP-MCNC: 3.6 G/DL (ref 3.4–5)
ALP SERPL-CCNC: 115 U/L (ref 33–136)
ALT SERPL W P-5'-P-CCNC: 12 U/L (ref 10–52)
ANION GAP SERPL CALC-SCNC: 12 MMOL/L (ref 10–20)
APTT PPP: 30 SECONDS (ref 27–38)
AST SERPL W P-5'-P-CCNC: 19 U/L (ref 9–39)
BASOPHILS # BLD AUTO: 0.01 X10*3/UL (ref 0–0.1)
BASOPHILS NFR BLD AUTO: 0.3 %
BILIRUB SERPL-MCNC: 1.2 MG/DL (ref 0–1.2)
BUN SERPL-MCNC: 18 MG/DL (ref 6–23)
CALCIUM SERPL-MCNC: 8.8 MG/DL (ref 8.6–10.6)
CHLORIDE SERPL-SCNC: 104 MMOL/L (ref 98–107)
CO2 SERPL-SCNC: 28 MMOL/L (ref 21–32)
CREAT SERPL-MCNC: 0.84 MG/DL (ref 0.5–1.3)
CRP SERPL-MCNC: 0.25 MG/DL
EGFRCR SERPLBLD CKD-EPI 2021: >90 ML/MIN/1.73M*2
EOSINOPHIL # BLD AUTO: 0.05 X10*3/UL (ref 0–0.7)
EOSINOPHIL NFR BLD AUTO: 1.6 %
ERYTHROCYTE [DISTWIDTH] IN BLOOD BY AUTOMATED COUNT: 19.5 % (ref 11.5–14.5)
FERRITIN SERPL-MCNC: 322 NG/ML (ref 20–300)
FIBRINOGEN PPP-MCNC: 361 MG/DL (ref 200–400)
GLUCOSE SERPL-MCNC: 84 MG/DL (ref 74–99)
HCT VFR BLD AUTO: 32.9 % (ref 41–52)
HGB BLD-MCNC: 11.5 G/DL (ref 13.5–17.5)
IMM GRANULOCYTES # BLD AUTO: 0.05 X10*3/UL (ref 0–0.7)
IMM GRANULOCYTES NFR BLD AUTO: 1.6 % (ref 0–0.9)
INR PPP: 1.1 (ref 0.9–1.1)
LDH SERPL L TO P-CCNC: 135 U/L (ref 84–246)
LYMPHOCYTES # BLD AUTO: 0.07 X10*3/UL (ref 1.2–4.8)
LYMPHOCYTES NFR BLD AUTO: 2.3 %
MAGNESIUM SERPL-MCNC: 1.85 MG/DL (ref 1.6–2.4)
MCH RBC QN AUTO: 27.9 PG (ref 26–34)
MCHC RBC AUTO-ENTMCNC: 35 G/DL (ref 32–36)
MCV RBC AUTO: 80 FL (ref 80–100)
MONOCYTES # BLD AUTO: 0 X10*3/UL (ref 0.1–1)
MONOCYTES NFR BLD AUTO: 0 %
NEUTROPHILS # BLD AUTO: 2.89 X10*3/UL (ref 1.2–7.7)
NEUTROPHILS NFR BLD AUTO: 94.2 %
NRBC BLD-RTO: 0 /100 WBCS (ref 0–0)
PHOSPHATE SERPL-MCNC: 3.6 MG/DL (ref 2.5–4.9)
PLATELET # BLD AUTO: 195 X10*3/UL (ref 150–450)
POTASSIUM SERPL-SCNC: 4.3 MMOL/L (ref 3.5–5.3)
PROT SERPL-MCNC: 6.3 G/DL (ref 6.4–8.2)
PROTHROMBIN TIME: 12.5 SECONDS (ref 9.8–12.8)
RBC # BLD AUTO: 4.12 X10*6/UL (ref 4.5–5.9)
SODIUM SERPL-SCNC: 140 MMOL/L (ref 136–145)
TACROLIMUS BLD-MCNC: 4.7 NG/ML
URATE SERPL-MCNC: 4.1 MG/DL (ref 4–7.5)
WBC # BLD AUTO: 3.1 X10*3/UL (ref 4.4–11.3)

## 2024-09-21 PROCEDURE — 84100 ASSAY OF PHOSPHORUS: CPT

## 2024-09-21 PROCEDURE — 2500000002 HC RX 250 W HCPCS SELF ADMINISTERED DRUGS (ALT 637 FOR MEDICARE OP, ALT 636 FOR OP/ED): Performed by: INTERNAL MEDICINE

## 2024-09-21 PROCEDURE — 82728 ASSAY OF FERRITIN: CPT

## 2024-09-21 PROCEDURE — 85730 THROMBOPLASTIN TIME PARTIAL: CPT

## 2024-09-21 PROCEDURE — 2500000002 HC RX 250 W HCPCS SELF ADMINISTERED DRUGS (ALT 637 FOR MEDICARE OP, ALT 636 FOR OP/ED): Performed by: PHYSICIAN ASSISTANT

## 2024-09-21 PROCEDURE — 80053 COMPREHEN METABOLIC PANEL: CPT

## 2024-09-21 PROCEDURE — 85025 COMPLETE CBC W/AUTO DIFF WBC: CPT

## 2024-09-21 PROCEDURE — 83735 ASSAY OF MAGNESIUM: CPT

## 2024-09-21 PROCEDURE — 85384 FIBRINOGEN ACTIVITY: CPT

## 2024-09-21 PROCEDURE — 86140 C-REACTIVE PROTEIN: CPT

## 2024-09-21 PROCEDURE — 1170000001 HC PRIVATE ONCOLOGY ROOM DAILY

## 2024-09-21 PROCEDURE — 83615 LACTATE (LD) (LDH) ENZYME: CPT

## 2024-09-21 PROCEDURE — 99232 SBSQ HOSP IP/OBS MODERATE 35: CPT | Performed by: INTERNAL MEDICINE

## 2024-09-21 PROCEDURE — 2500000004 HC RX 250 GENERAL PHARMACY W/ HCPCS (ALT 636 FOR OP/ED): Performed by: INTERNAL MEDICINE

## 2024-09-21 PROCEDURE — 2500000001 HC RX 250 WO HCPCS SELF ADMINISTERED DRUGS (ALT 637 FOR MEDICARE OP): Performed by: INTERNAL MEDICINE

## 2024-09-21 PROCEDURE — 2500000001 HC RX 250 WO HCPCS SELF ADMINISTERED DRUGS (ALT 637 FOR MEDICARE OP)

## 2024-09-21 PROCEDURE — 80197 ASSAY OF TACROLIMUS: CPT | Performed by: INTERNAL MEDICINE

## 2024-09-21 PROCEDURE — 2500000004 HC RX 250 GENERAL PHARMACY W/ HCPCS (ALT 636 FOR OP/ED): Performed by: NURSE PRACTITIONER

## 2024-09-21 PROCEDURE — 84550 ASSAY OF BLOOD/URIC ACID: CPT

## 2024-09-21 RX ADMIN — URSODIOL 300 MG: 300 CAPSULE ORAL at 21:34

## 2024-09-21 RX ADMIN — ACYCLOVIR 400 MG: 400 TABLET ORAL at 21:33

## 2024-09-21 RX ADMIN — MYCOPHENOLATE MOFETIL 1000 MG: 250 CAPSULE ORAL at 21:33

## 2024-09-21 RX ADMIN — MYCOPHENOLATE MOFETIL 1000 MG: 250 CAPSULE ORAL at 14:42

## 2024-09-21 RX ADMIN — NIFEDIPINE 90 MG: 90 TABLET, FILM COATED, EXTENDED RELEASE ORAL at 03:59

## 2024-09-21 RX ADMIN — URSODIOL 300 MG: 300 CAPSULE ORAL at 08:12

## 2024-09-21 RX ADMIN — VANCOMYCIN HYDROCHLORIDE 125 MG: 125 CAPSULE ORAL at 08:13

## 2024-09-21 RX ADMIN — TACROLIMUS 1 MG: 1 CAPSULE ORAL at 18:19

## 2024-09-21 RX ADMIN — FOLIC ACID TAB 400 MCG 1 MG: 400 TAB at 08:12

## 2024-09-21 RX ADMIN — POSACONAZOLE 300 MG: 100 TABLET, DELAYED RELEASE ORAL at 08:12

## 2024-09-21 RX ADMIN — MYCOPHENOLATE MOFETIL 1000 MG: 250 CAPSULE ORAL at 08:13

## 2024-09-21 RX ADMIN — URSODIOL 300 MG: 300 CAPSULE ORAL at 14:42

## 2024-09-21 RX ADMIN — ACYCLOVIR 400 MG: 400 TABLET ORAL at 08:12

## 2024-09-21 RX ADMIN — TACROLIMUS 1 MG: 1 CAPSULE ORAL at 06:26

## 2024-09-21 ASSESSMENT — COGNITIVE AND FUNCTIONAL STATUS - GENERAL
MOBILITY SCORE: 24
DAILY ACTIVITIY SCORE: 24

## 2024-09-21 ASSESSMENT — PAIN SCALES - GENERAL
PAINLEVEL_OUTOF10: 0 - NO PAIN
PAINLEVEL_OUTOF10: 0 - NO PAIN

## 2024-09-21 ASSESSMENT — PAIN - FUNCTIONAL ASSESSMENT: PAIN_FUNCTIONAL_ASSESSMENT: 0-10

## 2024-09-21 NOTE — ASSESSMENT & PLAN NOTE
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      T+2 Single-unit cord transplant (T0=9/19/24)    ONC  # MDS/AML   - Symptoms began 9/2023; diagnosed 4/2024  - BMBx showing MDS in transition to AML (>10% jaime) w/ trisomy 8, DNMT alpha, and U2AF1 mutation indication adverse risk   - s/p 4 cycles of Decitabine/Venetoclax (C4D1 on 8/12/24)  - BMBx 6/17/24: Blasts cleared, FISH still positive for trisomy 8, still MDS changes   - BMBx 9/5/24: 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 and no increase in blast    # Transplant  CONDITIONING Fludarabine, melphalan, and TBI   DONOR Single umbilical cord   MATCH GRADE 6/8   SEX MATCH Mismatched   ABO DONOR A pos   ABO RECIPIENT A pos   GVHD PROPHYLAXIS Tacrolimus and Mycophenolate   GRAFT SOURCE Single umbilical cord   CMV DONOR negative   CMV RECIPIENT ppsitive   GVHD PPX:  - Tacrolimus 1.5 mg q12h started T-3 (9/16)  *Tacro level: 3.0 (9/18), 5.0 (9/19), 4.5 (9/20), 4/7 (9/21)  *Decreased Tacrolimus to 1mg BID d/t Posaconazole  - MMF 1000 mg TID to begin T+2 to T+35   - Viral surveillance should begin T+14 (ordered)     HEME  # Pancytopenia: secondary to disease and chemotherapy  # 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   - Transfuse for Hgb <7 & PLT <10   - Neupogen 300mcg to begin T+5  # VTE Prophylaxis: SCDs & Ambulation     ID  # Prophylaxis: Acyclovir, Vancomycin, Posaconazole, Letermovir (T+14), and Bactrim (T+30)  - Plan for Levofloxacin when neutropenic      CARDIO/PULM  # Hx of STEMI (11/11/2020)  - TTE 7/19/24: EF 59%, otherwise unremarkable   - Cleared by Cardiology for transplant   # Hx of HTN   - Increased home Nifedipine: 90 mEq ER 24 hr tablet   # Ascending aorta dilation   - Echo showing 3.8-4 cm dilation   - Optimal BP control   - Follow w/ cardiology on an annual basis      FEN/RENAL  - Admit wt: 75.6 kg (9/13/24); Most recent wt:  75.1 kg  Allergic to Lasix and will not take this med (extreme hypotension, extreme hypokalemia)  - continue Folate supplement   - continue Ursodiol for SOS ppx  #hx of BPH and urinary retention (2023)              *tx in 2023 with Cipro x1 and Tamsulosin 0.4 for 30 days   - Hx elevated PSA               *7/28/21 Bx: atypical small acinar proliferation               *4/7/23 Bx: Benign  #Hx of treated Hep C in 2015  - recent liver US shows normal sized liver at 14.5 cm  - seen by hepatology prior to transplant   - Fibroscan 7/24/24: 4.8 KPa indicating minimal fibrosis   - (9/12) Hep C RNA - not detected   #Hx of recurrent hyperbilirubinemia   - 2/2 to Hemoglobin C hemolysis   - Tbili 0.3 on 9/5 pre-transplant apt    ACCESS/SUPPORT/DISPO  FULL CODE  Primary Onc: Dr. Newsome   ACCESS: DL tunneled line placed 9/13     Patient seen and examined with Dr. Hugo

## 2024-09-21 NOTE — CARE PLAN
Problem: Pain - Adult  Goal: Verbalizes/displays adequate comfort level or baseline comfort level  Outcome: Progressing     Problem: Nutrition  Goal: Oral intake greater 75%  Outcome: Progressing  Goal: Adequate PO fluid intake  Outcome: Progressing  Goal: Maintain stable weight  Outcome: Progressing

## 2024-09-21 NOTE — PROGRESS NOTES
"Brandt Merritt is a 64 y.o. male on day 8 of admission presenting with AML (acute myeloid leukemia) in relapse (Multi).    Subjective   Feels well. Reports taste is a bit off but continues to have good PO intake. Softer stools today. ROS unremarkable.    Objective   Physical Exam  Constitutional:       Appearance: Normal appearance.   HENT:      Head: Normocephalic and atraumatic.      Nose: Nose normal.      Mouth/Throat:      Mouth: Mucous membranes are dry.   Eyes:      Conjunctiva/sclera: Conjunctivae normal.      Pupils: Pupils are equal, round, and reactive to light.   Cardiovascular:      Rate and Rhythm: Normal rate and regular rhythm.      Pulses: Normal pulses.      Heart sounds: Normal heart sounds.   Pulmonary:      Effort: Pulmonary effort is normal.      Breath sounds: Normal breath sounds.   Abdominal:      General: Bowel sounds are normal.      Palpations: Abdomen is soft.   Musculoskeletal:         General: Normal range of motion.      Cervical back: Normal range of motion and neck supple.   Skin:     General: Skin is warm and dry.      Capillary Refill: Capillary refill takes 2 to 3 seconds.      Comments: Central line entry site c/d/i   Neurological:      General: No focal deficit present.      Mental Status: He is alert and oriented to person, place, and time.   Psychiatric:         Mood and Affect: Mood normal.         Behavior: Behavior normal.   Last Recorded Vitals  Blood pressure 110/69, pulse 97, temperature 36.7 °C (98.1 °F), temperature source Temporal, resp. rate 16, height (S) 1.664 m (5' 5.51\"), weight 75.1 kg (165 lb 9.1 oz), SpO2 98%.  Intake/Output last 3 Shifts:  No intake/output data recorded.    Assessment/Plan   Assessment & Plan  Acute myeloid leukemia in remission (Multi)    Stem cell transplant candidate    Immunocompromised (Multi)    Conditioning chemotherapy prior to peripheral blood stem cell transplant    Brandt Merritt is a 64 y.o male with a PMH of MDS with " transformation to AML, HTN, Hemoglobin C, and hx of treated Hep C presenting to  for single-unit cord transplant.      T+2 Single-unit cord transplant (T0=9/19/24)    ONC  # MDS/AML   - Symptoms began 9/2023; diagnosed 4/2024  - BMBx showing MDS in transition to AML (>10% jaime) w/ trisomy 8, DNMT alpha, and U2AF1 mutation indication adverse risk   - s/p 4 cycles of Decitabine/Venetoclax (C4D1 on 8/12/24)  - BMBx 6/17/24: Blasts cleared, FISH still positive for trisomy 8, still MDS changes   - BMBx 9/5/24: 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 and no increase in blast    # Transplant  CONDITIONING Fludarabine, melphalan, and TBI   DONOR Single umbilical cord   MATCH GRADE 6/8   SEX MATCH Mismatched   ABO DONOR A pos   ABO RECIPIENT A pos   GVHD PROPHYLAXIS Tacrolimus and Mycophenolate   GRAFT SOURCE Single umbilical cord   CMV DONOR negative   CMV RECIPIENT ppsitive   GVHD PPX:  - Tacrolimus 1.5 mg q12h started T-3 (9/16)  *Tacro level: 3.0 (9/18), 5.0 (9/19), 4.5 (9/20), 4/7 (9/21)  *Decreased Tacrolimus to 1mg BID d/t Posaconazole  - MMF 1000 mg TID to begin T+2 to T+35   - Viral surveillance should begin T+14 (ordered)     HEME  # Pancytopenia: secondary to disease and chemotherapy  # 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   - Transfuse for Hgb <7 & PLT <10   - Neupogen 300mcg to begin T+5  # VTE Prophylaxis: SCDs & Ambulation     ID  # Prophylaxis: Acyclovir, Vancomycin, Posaconazole, Letermovir (T+14), and Bactrim (T+30)  - Plan for Levofloxacin when neutropenic      CARDIO/PULM  # Hx of STEMI (11/11/2020)  - TTE 7/19/24: EF 59%, otherwise unremarkable   - Cleared by Cardiology for transplant   # Hx of HTN   - Increased home Nifedipine: 90 mEq ER 24 hr tablet   # Ascending aorta dilation   - Echo showing 3.8-4 cm dilation   - Optimal BP control   - Follow w/ cardiology on an annual basis      FEN/RENAL  - Admit wt: 75.6 kg (9/13/24); Most recent wt: 75.1 kg  Allergic to Lasix and will not take this med  (extreme hypotension, extreme hypokalemia)  - continue Folate supplement   - continue Ursodiol for SOS ppx  #hx of BPH and urinary retention (2023)              *tx in 2023 with Cipro x1 and Tamsulosin 0.4 for 30 days   - Hx elevated PSA               *7/28/21 Bx: atypical small acinar proliferation               *4/7/23 Bx: Benign  #Hx of treated Hep C in 2015  - recent liver US shows normal sized liver at 14.5 cm  - seen by hepatology prior to transplant   - Fibroscan 7/24/24: 4.8 KPa indicating minimal fibrosis   - (9/12) Hep C RNA - not detected   #Hx of recurrent hyperbilirubinemia   - 2/2 to Hemoglobin C hemolysis   - Tbili 0.3 on 9/5 pre-transplant apt    ACCESS/SUPPORT/DISPO  FULL CODE  Primary Onc: Dr. Newsome   ACCESS: DL tunneled line placed 9/13     Patient seen and examined with Dr. Hugo

## 2024-09-21 NOTE — CARE PLAN
The clinical goals for the shift include patient will remain free from falls and injury this shift      Problem: Pain - Adult  Goal: Verbalizes/displays adequate comfort level or baseline comfort level  Outcome: Progressing     Problem: Safety - Adult  Goal: Free from fall injury  Outcome: Progressing     Problem: Discharge Planning  Goal: Discharge to home or other facility with appropriate resources  Outcome: Progressing     Problem: Chronic Conditions and Co-morbidities  Goal: Patient's chronic conditions and co-morbidity symptoms are monitored and maintained or improved  Outcome: Progressing     Problem: Nutrition  Goal: Oral intake greater 75%  Outcome: Progressing  Goal: Adequate PO fluid intake  Outcome: Progressing  Goal: Maintain stable weight  Outcome: Progressing

## 2024-09-22 VITALS
HEIGHT: 66 IN | HEART RATE: 84 BPM | SYSTOLIC BLOOD PRESSURE: 132 MMHG | TEMPERATURE: 97.2 F | DIASTOLIC BLOOD PRESSURE: 78 MMHG | BODY MASS INDEX: 26.61 KG/M2 | OXYGEN SATURATION: 98 % | WEIGHT: 165.57 LBS | RESPIRATION RATE: 16 BRPM

## 2024-09-22 PROBLEM — Z94.84 STEM CELLS TRANSPLANT STATUS (MULTI): Status: ACTIVE | Noted: 2024-07-17

## 2024-09-22 LAB
ALBUMIN SERPL BCP-MCNC: 3.6 G/DL (ref 3.4–5)
ALP SERPL-CCNC: 115 U/L (ref 33–136)
ALT SERPL W P-5'-P-CCNC: 11 U/L (ref 10–52)
ANION GAP SERPL CALC-SCNC: 11 MMOL/L (ref 10–20)
APTT PPP: 31 SECONDS (ref 27–38)
AST SERPL W P-5'-P-CCNC: 17 U/L (ref 9–39)
BACTERIA BPU CULT: NORMAL
BASOPHILS # BLD MANUAL: 0 X10*3/UL (ref 0–0.1)
BASOPHILS NFR BLD MANUAL: 0 %
BILIRUB SERPL-MCNC: 1 MG/DL (ref 0–1.2)
BUN SERPL-MCNC: 15 MG/DL (ref 6–23)
C DIF TOX TCDA+TCDB STL QL NAA+PROBE: NOT DETECTED
CALCIUM SERPL-MCNC: 8.7 MG/DL (ref 8.6–10.6)
CHLORIDE SERPL-SCNC: 105 MMOL/L (ref 98–107)
CO2 SERPL-SCNC: 27 MMOL/L (ref 21–32)
CREAT SERPL-MCNC: 0.77 MG/DL (ref 0.5–1.3)
EGFRCR SERPLBLD CKD-EPI 2021: >90 ML/MIN/1.73M*2
EOSINOPHIL # BLD MANUAL: 0.01 X10*3/UL (ref 0–0.7)
EOSINOPHIL NFR BLD MANUAL: 0.9 %
ERYTHROCYTE [DISTWIDTH] IN BLOOD BY AUTOMATED COUNT: 19 % (ref 11.5–14.5)
FIBRINOGEN PPP-MCNC: 398 MG/DL (ref 200–400)
GLUCOSE SERPL-MCNC: 87 MG/DL (ref 74–99)
HCT VFR BLD AUTO: 34 % (ref 41–52)
HGB BLD-MCNC: 11.7 G/DL (ref 13.5–17.5)
IMM GRANULOCYTES # BLD AUTO: 0.03 X10*3/UL (ref 0–0.7)
IMM GRANULOCYTES NFR BLD AUTO: 2.3 % (ref 0–0.9)
INR PPP: 1.1 (ref 0.9–1.1)
LDH SERPL L TO P-CCNC: 119 U/L (ref 84–246)
LYMPHOCYTES # BLD MANUAL: 0.08 X10*3/UL (ref 1.2–4.8)
LYMPHOCYTES NFR BLD MANUAL: 6.1 %
MAGNESIUM SERPL-MCNC: 1.76 MG/DL (ref 1.6–2.4)
MCH RBC QN AUTO: 27.1 PG (ref 26–34)
MCHC RBC AUTO-ENTMCNC: 34.4 G/DL (ref 32–36)
MCV RBC AUTO: 79 FL (ref 80–100)
MONOCYTES # BLD MANUAL: 0 X10*3/UL (ref 0.1–1)
MONOCYTES NFR BLD MANUAL: 0 %
NEUTS SEG # BLD MANUAL: 1.21 X10*3/UL (ref 1.2–7)
NEUTS SEG NFR BLD MANUAL: 93 %
NRBC BLD-RTO: 0 /100 WBCS (ref 0–0)
PHOSPHATE SERPL-MCNC: 3.2 MG/DL (ref 2.5–4.9)
PLATELET # BLD AUTO: 177 X10*3/UL (ref 150–450)
POTASSIUM SERPL-SCNC: 4.1 MMOL/L (ref 3.5–5.3)
PROT SERPL-MCNC: 6.3 G/DL (ref 6.4–8.2)
PROTHROMBIN TIME: 12.2 SECONDS (ref 9.8–12.8)
RBC # BLD AUTO: 4.32 X10*6/UL (ref 4.5–5.9)
RBC MORPH BLD: ABNORMAL
SODIUM SERPL-SCNC: 139 MMOL/L (ref 136–145)
TACROLIMUS BLD-MCNC: 5.7 NG/ML
TARGETS BLD QL SMEAR: ABNORMAL
TOTAL CELLS COUNTED BLD: 114
URATE SERPL-MCNC: 3.6 MG/DL (ref 4–7.5)
WBC # BLD AUTO: 1.3 X10*3/UL (ref 4.4–11.3)

## 2024-09-22 PROCEDURE — 85384 FIBRINOGEN ACTIVITY: CPT

## 2024-09-22 PROCEDURE — 2500000001 HC RX 250 WO HCPCS SELF ADMINISTERED DRUGS (ALT 637 FOR MEDICARE OP): Performed by: NURSE PRACTITIONER

## 2024-09-22 PROCEDURE — 84550 ASSAY OF BLOOD/URIC ACID: CPT

## 2024-09-22 PROCEDURE — 85007 BL SMEAR W/DIFF WBC COUNT: CPT

## 2024-09-22 PROCEDURE — 2500000001 HC RX 250 WO HCPCS SELF ADMINISTERED DRUGS (ALT 637 FOR MEDICARE OP): Performed by: INTERNAL MEDICINE

## 2024-09-22 PROCEDURE — 80197 ASSAY OF TACROLIMUS: CPT | Performed by: INTERNAL MEDICINE

## 2024-09-22 PROCEDURE — 2500000001 HC RX 250 WO HCPCS SELF ADMINISTERED DRUGS (ALT 637 FOR MEDICARE OP)

## 2024-09-22 PROCEDURE — 2500000002 HC RX 250 W HCPCS SELF ADMINISTERED DRUGS (ALT 637 FOR MEDICARE OP, ALT 636 FOR OP/ED): Performed by: PHYSICIAN ASSISTANT

## 2024-09-22 PROCEDURE — 80053 COMPREHEN METABOLIC PANEL: CPT

## 2024-09-22 PROCEDURE — 2500000004 HC RX 250 GENERAL PHARMACY W/ HCPCS (ALT 636 FOR OP/ED): Performed by: NURSE PRACTITIONER

## 2024-09-22 PROCEDURE — 2500000002 HC RX 250 W HCPCS SELF ADMINISTERED DRUGS (ALT 637 FOR MEDICARE OP, ALT 636 FOR OP/ED): Performed by: INTERNAL MEDICINE

## 2024-09-22 PROCEDURE — 87493 C DIFF AMPLIFIED PROBE: CPT | Performed by: NURSE PRACTITIONER

## 2024-09-22 PROCEDURE — 2500000004 HC RX 250 GENERAL PHARMACY W/ HCPCS (ALT 636 FOR OP/ED): Performed by: INTERNAL MEDICINE

## 2024-09-22 PROCEDURE — 1170000001 HC PRIVATE ONCOLOGY ROOM DAILY

## 2024-09-22 PROCEDURE — 83735 ASSAY OF MAGNESIUM: CPT

## 2024-09-22 PROCEDURE — 85610 PROTHROMBIN TIME: CPT

## 2024-09-22 PROCEDURE — 85027 COMPLETE CBC AUTOMATED: CPT

## 2024-09-22 PROCEDURE — 84100 ASSAY OF PHOSPHORUS: CPT

## 2024-09-22 PROCEDURE — 83615 LACTATE (LD) (LDH) ENZYME: CPT

## 2024-09-22 PROCEDURE — 99232 SBSQ HOSP IP/OBS MODERATE 35: CPT | Performed by: INTERNAL MEDICINE

## 2024-09-22 RX ORDER — LOPERAMIDE HYDROCHLORIDE 2 MG/1
2 CAPSULE ORAL 4 TIMES DAILY PRN
Status: DISCONTINUED | OUTPATIENT
Start: 2024-09-22 | End: 2024-09-24

## 2024-09-22 RX ADMIN — URSODIOL 300 MG: 300 CAPSULE ORAL at 20:31

## 2024-09-22 RX ADMIN — ACYCLOVIR 400 MG: 400 TABLET ORAL at 08:31

## 2024-09-22 RX ADMIN — MYCOPHENOLATE MOFETIL 1000 MG: 250 CAPSULE ORAL at 20:31

## 2024-09-22 RX ADMIN — VANCOMYCIN HYDROCHLORIDE 125 MG: 125 CAPSULE ORAL at 08:31

## 2024-09-22 RX ADMIN — URSODIOL 300 MG: 300 CAPSULE ORAL at 15:11

## 2024-09-22 RX ADMIN — ACYCLOVIR 400 MG: 400 TABLET ORAL at 20:31

## 2024-09-22 RX ADMIN — URSODIOL 300 MG: 300 CAPSULE ORAL at 08:31

## 2024-09-22 RX ADMIN — NIFEDIPINE 90 MG: 90 TABLET, FILM COATED, EXTENDED RELEASE ORAL at 04:02

## 2024-09-22 RX ADMIN — POSACONAZOLE 300 MG: 100 TABLET, DELAYED RELEASE ORAL at 08:31

## 2024-09-22 RX ADMIN — TACROLIMUS 1 MG: 1 CAPSULE ORAL at 06:48

## 2024-09-22 RX ADMIN — MYCOPHENOLATE MOFETIL 1000 MG: 250 CAPSULE ORAL at 08:31

## 2024-09-22 RX ADMIN — LOPERAMIDE HYDROCHLORIDE 2 MG: 2 CAPSULE ORAL at 20:31

## 2024-09-22 RX ADMIN — MYCOPHENOLATE MOFETIL 1000 MG: 250 CAPSULE ORAL at 15:11

## 2024-09-22 RX ADMIN — TACROLIMUS 1 MG: 1 CAPSULE ORAL at 18:31

## 2024-09-22 RX ADMIN — FOLIC ACID TAB 400 MCG 1 MG: 400 TAB at 08:31

## 2024-09-22 ASSESSMENT — COGNITIVE AND FUNCTIONAL STATUS - GENERAL
MOBILITY SCORE: 24
DAILY ACTIVITIY SCORE: 24

## 2024-09-22 ASSESSMENT — PAIN SCALES - GENERAL
PAINLEVEL_OUTOF10: 0 - NO PAIN
PAINLEVEL_OUTOF10: 0 - NO PAIN

## 2024-09-22 ASSESSMENT — PAIN - FUNCTIONAL ASSESSMENT: PAIN_FUNCTIONAL_ASSESSMENT: 0-10

## 2024-09-22 NOTE — ASSESSMENT & PLAN NOTE
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 and no increase in blast    # Transplant  CONDITIONING Fludarabine, melphalan, and TBI   DONOR Single umbilical cord   MATCH GRADE 6/8   SEX MATCH Mismatched   ABO DONOR A pos   ABO RECIPIENT A pos   GVHD PROPHYLAXIS Tacrolimus and Mycophenolate   GRAFT SOURCE Single umbilical cord   CMV DONOR negative   CMV RECIPIENT ppsitive   GVHD PPX:  - Tacrolimus 1.5 mg q12h started T-3 (9/16)  *Tacro level: 4.5 (9/20), 4/7 (9/21), 5.7 (9/22)  *Decreased Tacrolimus to 1mg BID d/t Posaconazole  - MMF 1000 mg TID to begin T+2 to T+35   - Viral surveillance should begin T+14 (ordered)     HEME  # Anemia and Leukopenia: secondary to disease and chemotherapy  # 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   - Transfuse for Hgb <7 & PLT <10   - Neupogen 300mcg to begin T+5  # VTE Prophylaxis: SCDs & Ambulation     ID  # Prophylaxis: Acyclovir, Vancomycin, Posaconazole, Letermovir (T+14), and Bactrim (T+30)  - Plan for Levofloxacin when neutropenic      CARDIO/PULM  # Hx of STEMI (11/11/2020)  - TTE 7/19/24: EF 59%, otherwise unremarkable   - Cleared by Cardiology for transplant   # Hx of HTN   - Nifedipine: 90 mEq ER 24 hr tablet   # Ascending aorta dilation   - Echo showing 3.8-4 cm dilation      FEN/RENAL  - Admit wt: 75.6 kg (9/13/24); Most recent wt: 75.1 kg  Allergic to Lasix, will not take med (extreme hypotension, extreme  hypokalemia)  #Diarrhea, c.diff pending 9/22  #SOS prophylaxis: Actigall   #hx of BPH and urinary retention (2023)  - Hx elevated PSA               *7/28/21 Bx: atypical small acinar proliferation               *4/7/23 Bx: Benign  #Hx of treated Hep C in 2015  - (9/12) Hep C RNA - not detected   #Hx of recurrent hyperbilirubinemia   - 2/2 to Hemoglobin C hemolysis     ACCESS/SUPPORT/DISPO  FULL CODE  Primary Onc: Dr. Newsome   ACCESS: DL tunneled line placed 9/13     Patient seen and examined with Dr. Hugo

## 2024-09-22 NOTE — PROGRESS NOTES
"Brandt Merritt is a 64 y.o. male on day 9 of admission presenting with AML (acute myeloid leukemia) in relapse (Multi).    Subjective   Feels well. Reports taste is a bit off but continues to have good PO intake. +Diarrhea, no abdominal pain or cramping. ROS unremarkable.    Objective   Physical Exam  Constitutional:       Appearance: Normal appearance.   HENT:      Head: Normocephalic and atraumatic.      Nose: Nose normal.      Mouth/Throat:      Mouth: Mucous membranes are dry.   Eyes:      Conjunctiva/sclera: Conjunctivae normal.      Pupils: Pupils are equal, round, and reactive to light.   Cardiovascular:      Rate and Rhythm: Normal rate and regular rhythm.      Pulses: Normal pulses.      Heart sounds: Normal heart sounds.   Pulmonary:      Effort: Pulmonary effort is normal.      Breath sounds: Normal breath sounds.   Abdominal:      General: Bowel sounds are normal.      Palpations: Abdomen is soft.   Musculoskeletal:         General: Normal range of motion.      Cervical back: Normal range of motion and neck supple.   Skin:     General: Skin is warm and dry.      Capillary Refill: Capillary refill takes 2 to 3 seconds.      Comments: Central line entry site c/d/i   Neurological:      General: No focal deficit present.      Mental Status: He is alert and oriented to person, place, and time.   Psychiatric:         Mood and Affect: Mood normal.         Behavior: Behavior normal.   Last Recorded Vitals  Blood pressure 122/82, pulse 79, temperature 36.7 °C (98.1 °F), temperature source Temporal, resp. rate 16, height (S) 1.664 m (5' 5.51\"), weight 75.1 kg (165 lb 9.1 oz), SpO2 97%.  Intake/Output last 3 Shifts:  No intake/output data recorded.    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 and no increase in blast    # Transplant  CONDITIONING Fludarabine, melphalan, and TBI   DONOR Single umbilical cord   MATCH GRADE 6/8   SEX MATCH Mismatched   ABO DONOR A pos   ABO RECIPIENT A pos   GVHD PROPHYLAXIS Tacrolimus and Mycophenolate   GRAFT SOURCE Single umbilical cord   CMV DONOR negative   CMV RECIPIENT ppsitive   GVHD PPX:  - Tacrolimus 1.5 mg q12h started T-3 (9/16)  *Tacro level: 4.5 (9/20), 4/7 (9/21), 5.7 (9/22)  *Decreased Tacrolimus to 1mg BID d/t Posaconazole  - MMF 1000 mg TID to begin T+2 to T+35   - Viral surveillance should begin T+14 (ordered)     HEME  # Anemia and Leukopenia: secondary to disease and chemotherapy  # 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   - Transfuse for Hgb <7 & PLT <10   - Neupogen 300mcg to begin T+5  # VTE Prophylaxis: SCDs & Ambulation     ID  # Prophylaxis: Acyclovir, Vancomycin, Posaconazole, Letermovir (T+14), and Bactrim (T+30)  - Plan for Levofloxacin when neutropenic      CARDIO/PULM  # Hx of STEMI (11/11/2020)  - TTE 7/19/24: EF 59%, otherwise unremarkable   - Cleared by Cardiology for transplant   # Hx of HTN   - Nifedipine: 90 mEq ER 24 hr tablet   # Ascending aorta dilation   - Echo showing 3.8-4 cm dilation      FEN/RENAL  - Admit wt: 75.6 kg (9/13/24); Most recent wt: 75.1 kg  Allergic to Lasix, will not take med (extreme hypotension, extreme hypokalemia)  #Diarrhea, c.diff pending 9/22  #SOS  prophylaxis: Actigall   #hx of BPH and urinary retention (2023)  - Hx elevated PSA               *7/28/21 Bx: atypical small acinar proliferation               *4/7/23 Bx: Benign  #Hx of treated Hep C in 2015  - (9/12) Hep C RNA - not detected   #Hx of recurrent hyperbilirubinemia   - 2/2 to Hemoglobin C hemolysis     ACCESS/SUPPORT/DISPO  FULL CODE  Primary Onc: Dr. Newsome   ACCESS: DL tunneled line placed 9/13     Patient seen and examined with Dr. Hugo

## 2024-09-22 NOTE — CARE PLAN
The clinical goals for the shift include pt shira remain HDS throughout shift      Problem: Pain - Adult  Goal: Verbalizes/displays adequate comfort level or baseline comfort level  Outcome: Progressing     Problem: Safety - Adult  Goal: Free from fall injury  Outcome: Progressing     Problem: Discharge Planning  Goal: Discharge to home or other facility with appropriate resources  Outcome: Progressing     Problem: Chronic Conditions and Co-morbidities  Goal: Patient's chronic conditions and co-morbidity symptoms are monitored and maintained or improved  Outcome: Progressing     Problem: Nutrition  Goal: Oral intake greater 75%  Outcome: Progressing  Goal: Adequate PO fluid intake  Outcome: Progressing  Goal: Maintain stable weight  Outcome: Progressing

## 2024-09-22 NOTE — CARE PLAN
The patient's goals for the shift include        Problem: Pain - Adult  Goal: Verbalizes/displays adequate comfort level or baseline comfort level  Outcome: Progressing     Problem: Safety - Adult  Goal: Free from fall injury  Outcome: Progressing     Problem: Discharge Planning  Goal: Discharge to home or other facility with appropriate resources  Outcome: Progressing     Problem: Chronic Conditions and Co-morbidities  Goal: Patient's chronic conditions and co-morbidity symptoms are monitored and maintained or improved  Outcome: Progressing     Problem: Nutrition  Goal: Oral intake greater 75%  Outcome: Progressing  Goal: Adequate PO fluid intake  Outcome: Progressing  Goal: Maintain stable weight  Outcome: Progressing       The clinical goals for the shift include Remain HDS

## 2024-09-23 LAB
ALBUMIN SERPL BCP-MCNC: 3.5 G/DL (ref 3.4–5)
ALP SERPL-CCNC: 103 U/L (ref 33–136)
ALT SERPL W P-5'-P-CCNC: 9 U/L (ref 10–52)
ANION GAP SERPL CALC-SCNC: 12 MMOL/L (ref 10–20)
APTT PPP: 30 SECONDS (ref 27–38)
AST SERPL W P-5'-P-CCNC: 12 U/L (ref 9–39)
BASOPHILS # BLD MANUAL: 0 X10*3/UL (ref 0–0.1)
BASOPHILS NFR BLD MANUAL: 0 %
BILIRUB SERPL-MCNC: 0.5 MG/DL (ref 0–1.2)
BUN SERPL-MCNC: 13 MG/DL (ref 6–23)
CALCIUM SERPL-MCNC: 8.5 MG/DL (ref 8.6–10.6)
CHLORIDE SERPL-SCNC: 105 MMOL/L (ref 98–107)
CO2 SERPL-SCNC: 25 MMOL/L (ref 21–32)
CREAT SERPL-MCNC: 0.84 MG/DL (ref 0.5–1.3)
EGFRCR SERPLBLD CKD-EPI 2021: >90 ML/MIN/1.73M*2
EOSINOPHIL # BLD MANUAL: 0.03 X10*3/UL (ref 0–0.7)
EOSINOPHIL NFR BLD MANUAL: 5.5 %
ERYTHROCYTE [DISTWIDTH] IN BLOOD BY AUTOMATED COUNT: 19 % (ref 11.5–14.5)
FIBRINOGEN PPP-MCNC: 459 MG/DL (ref 200–400)
GLUCOSE SERPL-MCNC: 79 MG/DL (ref 74–99)
HCT VFR BLD AUTO: 31.6 % (ref 41–52)
HGB BLD-MCNC: 11.1 G/DL (ref 13.5–17.5)
HYPOCHROMIA BLD QL SMEAR: ABNORMAL
IMM GRANULOCYTES # BLD AUTO: 0.06 X10*3/UL (ref 0–0.7)
IMM GRANULOCYTES NFR BLD AUTO: 9.4 % (ref 0–0.9)
INR PPP: 1 (ref 0.9–1.1)
LDH SERPL L TO P-CCNC: 117 U/L (ref 84–246)
LYMPHOCYTES # BLD MANUAL: 0.02 X10*3/UL (ref 1.2–4.8)
LYMPHOCYTES NFR BLD MANUAL: 3.6 %
MAGNESIUM SERPL-MCNC: 1.67 MG/DL (ref 1.6–2.4)
MCH RBC QN AUTO: 27.3 PG (ref 26–34)
MCHC RBC AUTO-ENTMCNC: 35.1 G/DL (ref 32–36)
MCV RBC AUTO: 78 FL (ref 80–100)
MONOCYTES # BLD MANUAL: 0 X10*3/UL (ref 0.1–1)
MONOCYTES NFR BLD MANUAL: 0 %
NEUTS SEG # BLD MANUAL: 0.55 X10*3/UL (ref 1.2–7)
NEUTS SEG NFR BLD MANUAL: 90.9 %
NRBC BLD-RTO: 0 /100 WBCS (ref 0–0)
PHOSPHATE SERPL-MCNC: 3.3 MG/DL (ref 2.5–4.9)
PLATELET # BLD AUTO: 130 X10*3/UL (ref 150–450)
POTASSIUM SERPL-SCNC: 4.1 MMOL/L (ref 3.5–5.3)
PROT SERPL-MCNC: 6.2 G/DL (ref 6.4–8.2)
PROTHROMBIN TIME: 11.4 SECONDS (ref 9.8–12.8)
RBC # BLD AUTO: 4.07 X10*6/UL (ref 4.5–5.9)
RBC MORPH BLD: ABNORMAL
SODIUM SERPL-SCNC: 138 MMOL/L (ref 136–145)
TACROLIMUS BLD-MCNC: 5.6 NG/ML
TARGETS BLD QL SMEAR: ABNORMAL
TOTAL CELLS COUNTED BLD: 110
URATE SERPL-MCNC: 3.1 MG/DL (ref 4–7.5)
WBC # BLD AUTO: 0.6 X10*3/UL (ref 4.4–11.3)

## 2024-09-23 PROCEDURE — 84100 ASSAY OF PHOSPHORUS: CPT

## 2024-09-23 PROCEDURE — 2500000004 HC RX 250 GENERAL PHARMACY W/ HCPCS (ALT 636 FOR OP/ED): Performed by: INTERNAL MEDICINE

## 2024-09-23 PROCEDURE — 85384 FIBRINOGEN ACTIVITY: CPT

## 2024-09-23 PROCEDURE — 84550 ASSAY OF BLOOD/URIC ACID: CPT

## 2024-09-23 PROCEDURE — 99233 SBSQ HOSP IP/OBS HIGH 50: CPT

## 2024-09-23 PROCEDURE — 85007 BL SMEAR W/DIFF WBC COUNT: CPT

## 2024-09-23 PROCEDURE — 2500000004 HC RX 250 GENERAL PHARMACY W/ HCPCS (ALT 636 FOR OP/ED): Performed by: NURSE PRACTITIONER

## 2024-09-23 PROCEDURE — 2500000001 HC RX 250 WO HCPCS SELF ADMINISTERED DRUGS (ALT 637 FOR MEDICARE OP)

## 2024-09-23 PROCEDURE — 1170000001 HC PRIVATE ONCOLOGY ROOM DAILY

## 2024-09-23 PROCEDURE — 2500000002 HC RX 250 W HCPCS SELF ADMINISTERED DRUGS (ALT 637 FOR MEDICARE OP, ALT 636 FOR OP/ED): Performed by: INTERNAL MEDICINE

## 2024-09-23 PROCEDURE — 2500000001 HC RX 250 WO HCPCS SELF ADMINISTERED DRUGS (ALT 637 FOR MEDICARE OP): Performed by: INTERNAL MEDICINE

## 2024-09-23 PROCEDURE — 85027 COMPLETE CBC AUTOMATED: CPT

## 2024-09-23 PROCEDURE — 2500000002 HC RX 250 W HCPCS SELF ADMINISTERED DRUGS (ALT 637 FOR MEDICARE OP, ALT 636 FOR OP/ED): Performed by: PHYSICIAN ASSISTANT

## 2024-09-23 PROCEDURE — 85610 PROTHROMBIN TIME: CPT

## 2024-09-23 PROCEDURE — 83735 ASSAY OF MAGNESIUM: CPT

## 2024-09-23 PROCEDURE — 80197 ASSAY OF TACROLIMUS: CPT | Performed by: INTERNAL MEDICINE

## 2024-09-23 PROCEDURE — 80053 COMPREHEN METABOLIC PANEL: CPT

## 2024-09-23 PROCEDURE — 2500000004 HC RX 250 GENERAL PHARMACY W/ HCPCS (ALT 636 FOR OP/ED)

## 2024-09-23 PROCEDURE — 83615 LACTATE (LD) (LDH) ENZYME: CPT

## 2024-09-23 RX ORDER — TACROLIMUS 1 MG/1
1 CAPSULE ORAL NIGHTLY
Status: DISCONTINUED | OUTPATIENT
Start: 2024-09-23 | End: 2024-10-03

## 2024-09-23 RX ORDER — MAGNESIUM SULFATE HEPTAHYDRATE 40 MG/ML
2 INJECTION, SOLUTION INTRAVENOUS ONCE
Status: COMPLETED | OUTPATIENT
Start: 2024-09-23 | End: 2024-09-23

## 2024-09-23 RX ORDER — LEVOFLOXACIN 500 MG/1
500 TABLET, FILM COATED ORAL
Status: DISCONTINUED | OUTPATIENT
Start: 2024-09-23 | End: 2024-09-26

## 2024-09-23 ASSESSMENT — COGNITIVE AND FUNCTIONAL STATUS - GENERAL
MOBILITY SCORE: 24
MOBILITY SCORE: 24
DAILY ACTIVITIY SCORE: 24
DAILY ACTIVITIY SCORE: 24

## 2024-09-23 ASSESSMENT — PAIN - FUNCTIONAL ASSESSMENT: PAIN_FUNCTIONAL_ASSESSMENT: 0-10

## 2024-09-23 ASSESSMENT — PAIN SCALES - GENERAL
PAINLEVEL_OUTOF10: 0 - NO PAIN

## 2024-09-23 NOTE — CARE PLAN
Problem: Pain - Adult  Goal: Verbalizes/displays adequate comfort level or baseline comfort level  Outcome: Progressing     Problem: Safety - Adult  Goal: Free from fall injury  Outcome: Progressing     Problem: Discharge Planning  Goal: Discharge to home or other facility with appropriate resources  Outcome: Progressing     Problem: Chronic Conditions and Co-morbidities  Goal: Patient's chronic conditions and co-morbidity symptoms are monitored and maintained or improved  Outcome: Progressing     Problem: Nutrition  Goal: Oral intake greater 75%  Outcome: Progressing  Goal: Adequate PO fluid intake  Outcome: Progressing  Goal: Maintain stable weight  Outcome: Progressing   The clinical goals for the shift include Remain HDS

## 2024-09-23 NOTE — CARE PLAN
Problem: Pain - Adult  Goal: Verbalizes/displays adequate comfort level or baseline comfort level  Outcome: Progressing     Problem: Safety - Adult  Goal: Free from fall injury  Outcome: Progressing     Problem: Discharge Planning  Goal: Discharge to home or other facility with appropriate resources  Outcome: Progressing     Problem: Chronic Conditions and Co-morbidities  Goal: Patient's chronic conditions and co-morbidity symptoms are monitored and maintained or improved  Outcome: Progressing     Problem: Nutrition  Goal: Oral intake greater 75%  Outcome: Progressing  Goal: Adequate PO fluid intake  Outcome: Progressing  Goal: Maintain stable weight  Outcome: Progressing       The clinical goals for the shift include pt will remain HDS through 09/24/2024 at 0830

## 2024-09-23 NOTE — PROGRESS NOTES
Brandt Merritt is a 64 y.o. 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    Subjective   Pt reports feeling well. He is disconnected from the IV and happy to be mobile, able to shower. He reports having several (3) episodes of diarrhea yesterday, but none this morning. He has been receiving loperamide which is starting to help. No fever, chill, chest pan, SOB, bleeding, bruising.        Objective     Physical Exam  Constitutional:       General: He is not in acute distress.     Appearance: Normal appearance. He is normal weight. He is not ill-appearing, toxic-appearing or diaphoretic.   HENT:      Head: Normocephalic and atraumatic.      Right Ear: External ear normal.      Left Ear: External ear normal.      Nose: Nose normal. No congestion or rhinorrhea.      Mouth/Throat:      Mouth: Mucous membranes are moist.      Pharynx: Oropharynx is clear. No oropharyngeal exudate or posterior oropharyngeal erythema.   Eyes:      General: No scleral icterus.        Right eye: No discharge.         Left eye: No discharge.      Extraocular Movements: Extraocular movements intact.      Conjunctiva/sclera: Conjunctivae normal.   Cardiovascular:      Rate and Rhythm: Normal rate and regular rhythm.      Heart sounds: Normal heart sounds. No murmur heard.     No friction rub. No gallop.   Pulmonary:      Effort: Pulmonary effort is normal. No respiratory distress.      Breath sounds: No stridor. No wheezing, rhonchi or rales.   Abdominal:      General: Abdomen is flat. There is no distension.      Palpations: Abdomen is soft. There is no mass.      Tenderness: There is no abdominal tenderness. There is no guarding or rebound.      Hernia: No hernia is present.   Musculoskeletal:         General: No swelling, tenderness, deformity or signs of injury. Normal range of motion.      Cervical back: Normal range of motion.      Right lower leg: No edema.      Left lower leg: No edema.  "  Neurological:      Mental Status: He is alert.       Last Recorded Vitals  Blood pressure 122/76, pulse 72, temperature 36.6 °C (97.9 °F), resp. rate 18, height (S) 1.664 m (5' 5.51\"), weight 72.5 kg (159 lb 13.3 oz), SpO2 97%.  Intake/Output last 3 Shifts:  No intake/output data recorded.    Relevant Results             Scheduled medications  acyclovir, 400 mg, oral, q12h  [START ON 9/24/2024] filgrastim or biosimilar, 300 mcg, subcutaneous, q24h  folic acid, 1 mg, oral, Daily  mycophenolate, 1,000 mg, oral, TID  NIFEdipine ER, 90 mg, oral, Daily before breakfast  posaconazole, 300 mg, oral, q24h  tacrolimus, 1 mg, oral, q12h  ursodiol, 300 mg, oral, TID  vancomycin, 125 mg, oral, Daily      Continuous medications     PRN medications  PRN medications: albuterol, alteplase, dextrose, diphenhydrAMINE, EPINEPHrine HCl, famotidine, loperamide, methylPREDNISolone sodium succinate (PF), ondansetron, prochlorperazine, prochlorperazine, sodium chloride    This patient has a central line   Reason for the central line remaining today? 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      T+4 Single-unit cord transplant (T0=9/19/24)    Updates (9/23/24):  -start levaquin 500mg daily  -2g IV mag  -nutrition consult for appetite     ONC  # MDS/AML   - Symptoms began 9/2023; diagnosed 4/2024  - BMBx showing MDS in transition to AML (>10% jaime) w/ trisomy 8, DNMT alpha, and U2AF1 mutation indication adverse risk   - s/p 4 cycles of Decitabine/Venetoclax (C4D1 on 8/12/24)  - BMBx 6/17/24: Blasts cleared, FISH still positive for trisomy 8, still MDS " changes   - BMBx 9/5/24: 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 and no increase in blast     # Transplant  CONDITIONING Fludarabine, melphalan, and TBI   DONOR Single umbilical cord   MATCH GRADE 6/8   SEX MATCH Mismatched   ABO DONOR A pos   ABO RECIPIENT A pos   GVHD PROPHYLAXIS Tacrolimus and Mycophenolate   GRAFT SOURCE Single umbilical cord   CMV DONOR negative   CMV RECIPIENT ppsitive   GVHD PPX:  - Tacrolimus 1.5 mg q12h started T-3 (9/16)  *Tacro level: 4.5 (9/20), 4/7 (9/21), 5.7 (9/22)  *Decreased Tacrolimus to 1mg BID d/t Posaconazole  - MMF 1000 mg TID to begin T+2 to T+35   - Viral surveillance should begin T+14 (ordered)  - will target closer to 10 for tacro level when pt starts engraftment     HEME  # Anemia and Leukopenia: secondary to disease and chemotherapy  # 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   - Transfuse for Hgb <7 & PLT <10   - Neupogen 300mcg to begin T+5  # VTE Prophylaxis: SCDs & Ambulation     ID  # Prophylaxis: Acyclovir, Vancomycin, Posaconazole, Letermovir (T+14), and Bactrim (T+30)  - start levofloxacin 500mg daily today     CARDIO/PULM  # Hx of STEMI (11/11/2020)  - TTE 7/19/24: EF 59%, otherwise unremarkable   - Cleared by Cardiology for transplant   # Hx of HTN   - Nifedipine: 90 mEq ER 24 hr tablet   # Ascending aorta dilation   - Echo showing 3.8-4 cm dilation      FEN/RENAL  - Admit wt: 75.6 kg (9/13/24); Most recent wt: 75.1 kg  Allergic to Lasix, will not take med (extreme hypotension, extreme hypokalemia)    #Diarrhea, c.diff neg  -loperamide 2mg q2h PRN for diarrhea  -replete Mg 2g today IV    #SOS prophylaxis: Actigall     #hx of BPH and urinary retention (2023)  - Hx elevated PSA               *7/28/21 Bx: atypical small acinar proliferation               *4/7/23 Bx: Benign    #Hx of treated Hep C in 2015  - (9/12) Hep C RNA - not detected     #Hx of recurrent hyperbilirubinemia   - 2/2 to Hemoglobin C hemolysis      F: prn  E: mg today  N: low microbial  A: picc  line    DVT: ambulation, scd  GI; PPI  NOK: spouse Genevieve ([phone])  Code: FULL    Oncologist: Jerod  Patient seen and examined with Dr. Ignacio Deras MD PhD

## 2024-09-23 NOTE — CONSULTS
"Nutrition Follow Up Assessment:   Nutrition Assessment    --->Reason for Re-Consult: Provider consult order    Today is T+4 s/p Single-Unit Cord Transplant (T=0 on 09/19/24).    Nutrition History:  This service met with pt earlier today, was sitting up on edge of bed at time of visit with him.    Tells appetite/PO intakes are declining with c/o dysgeusia. Also reports he is tired of the same food options at this facility. Family does bring him in foods from home. Feels he has to force himself to eat, though is easier to eat when family brings him in foods. Tries to eat 2-3 times/day. Yesterday family brought in a cheeseburger and fries--ate 100% of that meal. This am consumed moy and fruit from this facility. Wife brought him in a pork chop, mashed potatoes, and green beans--ate all of that for lunch meal.    Is drinking the Ensure Plus, usually ~2 cartons/day on a regular basis.    Extended Stay Menu + [employer] Snack List was provided to pt by this writer at initial nutrition visit, though it seems pt may have forgot he had them. Tells RN brought him another Extended Stay Menu today, feels there are things on there he can eat, was going to maybe order the salmon or steak tonight for dinner. This writer provided another copy of the snack list to pt--plans on keeping snacks in his room to make eating easier throughout the day.    Denied any n/v or trouble chewing/swallowing. Was having diarrhea yesterday--was given Imodium, diarrhea better today.    Anthropometrics:  Height: (S) 166.4 cm (5' 5.51\")   Weight: 72.5 kg (159 lb 13.3 oz)   BMI (Calculated): 26.18  IBW/kg (Dietitian Calculated): 64.5 kg  Percent of IBW: 112 %       Weights (since admit):  09/13-75.6 kg (admit wt and wt at first nutrition visit on 09/14)  09/23-72.5 kg (current wt)--total of 4% wt loss from admit to present (significant)    Nutrition Focused Physical Exam Findings:  defer: completed at first nutrition visit with pt  Edema:  Edema: +1 " trace  Edema Location: BLE  Physical Findings:  Skin: Negative    Nutrition Significant Labs:  CBC Trend:   Results from last 7 days   Lab Units 09/23/24  0630 09/22/24  0649 09/21/24  0626 09/20/24  0551   WBC AUTO x10*3/uL 0.6* 1.3* 3.1* 1.9*   RBC AUTO x10*6/uL 4.07* 4.32* 4.12* 4.06*   HEMOGLOBIN g/dL 11.1* 11.7* 11.5* 11.1*   HEMATOCRIT % 31.6* 34.0* 32.9* 32.3*   MCV fL 78* 79* 80 80   PLATELETS AUTO x10*3/uL 130* 177 195 219   BMP Trend:   Results from last 7 days   Lab Units 09/23/24 0630 09/22/24 0649 09/21/24 0626 09/20/24  0551   GLUCOSE mg/dL 79 87 84 92   CALCIUM mg/dL 8.5* 8.7 8.8 8.9   SODIUM mmol/L 138 139 140 139   POTASSIUM mmol/L 4.1 4.1 4.3 4.3   CO2 mmol/L 25 27 28 31   CHLORIDE mmol/L 105 105 104 105   BUN mg/dL 13 15 18 17   CREATININE mg/dL 0.84 0.77 0.84 0.82   Liver Function Trend:   Results from last 7 days   Lab Units 09/23/24 0630 09/22/24  0649 09/21/24 0626 09/20/24  0551   ALK PHOS U/L 103 115 115 99   AST U/L 12 17 19 24   ALT U/L 9* 11 12 15   BILIRUBIN TOTAL mg/dL 0.5 1.0 1.2 1.3*   Renal Lab Trend:   Results from last 7 days   Lab Units 09/23/24  0630 09/22/24  0649 09/21/24  0626 09/20/24  0551   POTASSIUM mmol/L 4.1 4.1 4.3 4.3   PHOSPHORUS mg/dL 3.3 3.2 3.6 3.6   SODIUM mmol/L 138 139 140 139   MAGNESIUM mg/dL 1.67 1.76 1.85 1.94   EGFR mL/min/1.73m*2 >90 >90 >90 >90   BUN mg/dL 13 15 18 17   CREATININE mg/dL 0.84 0.77 0.84 0.82      Medications:  Scheduled medications  acyclovir, 400 mg, oral, q12h  [START ON 9/24/2024] filgrastim or biosimilar, 300 mcg, subcutaneous, q24h  folic acid, 1 mg, oral, Daily  levoFLOXacin, 500 mg, oral, q24h JALYN  magnesium sulfate, 2 g, intravenous, Once  mycophenolate, 1,000 mg, oral, TID  NIFEdipine ER, 90 mg, oral, Daily before breakfast  posaconazole, 300 mg, oral, q24h  tacrolimus, 1 mg, oral, Nightly  [START ON 9/24/2024] tacrolimus, 1.5 mg, oral, Daily before breakfast  ursodiol, 300 mg, oral, TID  vancomycin, 125 mg, oral, Daily    PRN  medications  PRN medications: albuterol, alteplase, dextrose, diphenhydrAMINE, EPINEPHrine HCl, famotidine, loperamide, methylPREDNISolone sodium succinate (PF), ondansetron, prochlorperazine, prochlorperazine, sodium chloride    I/O:   --Last BM earlier today (09/23); Stool Appearance: Loose (09/23/24 0924)    Dietary Orders (From admission, onward)       Start     Ordered    09/14/24 1258  Oral nutritional supplements  Until discontinued        Comments: chocolate or strawberry Ensure Plus---may have more than 2 times/day, if requested   Question Answer Comment   Deliver with Breakfast    Deliver with Dinner    Select supplement: Ensure Plus        09/14/24 1257    09/14/24 1258  Snacks  Until discontinued        Comments: pt may order from Extended Stay Menu + [employer] Snack List, if requested    09/14/24 1258    09/13/24 1427  Adult diet Low microbial  Diet effective now        Question:  Diet type  Answer:  Low microbial    09/13/24 1428                Estimated Needs:   Total Energy Estimated Needs (kCal): 2100+  Method for Estimating Needs: MSJ (1476) x ~1.4+  Total Protein Estimated Needs (g): 95+  Method for Estimating Needs: 62 (IBW) x ~1.5g/kg+  Total Fluid Estimated Needs (mL): per MD/team        Nutrition Diagnosis   Malnutrition Diagnosis  Patient has Malnutrition Diagnosis: No (Is at risk for malnutrition, pending ongoing PO and wt status in-house.)    Nutrition Diagnosis  Patient has Nutrition Diagnosis: Yes  Diagnosis Status (1): Ongoing  Nutrition Diagnosis 1: Increased nutrient needs  Related to (1): increased metabolic demand  As Evidenced by (1): pt with AML, admitted for transplant    Additional Assessment Information: Considering ongoing hypermetabolic state with report of declining PO, do feel pt would benefit from ongoing use of oral nutrition supplements for added nutrition--agreeable to continue order for Ensure Plus. Did also provide lemon drop candies d/t c/o dsygeusia.       Nutrition  Interventions/Recommendations     1. Continue Low Microbial Diet, only as tolerated  --RDN to continue order for Ensure Plus BID for added nutrition  --RDN provided lemon drop candies d/t c/o dysgeusia  --Pt has copies of Extended Stay Menu + [employer] Snack List to allow him more options when ordering        Nutrition Prescription for Oral Nutrition: 2100+ kcals/day, 95+ g pro/day        Nutrition Interventions:   Interventions: Meals and snacks, Medical food supplement  Meals and Snacks: General healthful diet  Goal: Low Microbial Diet  Medical Food Supplement: Commercial beverage  Goal: Ensure Plus BID (350 kcals, 13g pro each)    Nutrition Education: Encouraged ongoing PO + protein + oral nutrition supplements. Made suggestions, as I could, for possible other foods/meals pt could order from this facility's menu he may like. Pt verbalized understanding, denied any particular questions for RDN at this time.       Nutrition Monitoring and Evaluation   Food/Nutrient Related History Monitoring  Monitoring and Evaluation Plan: Amount of food  Amount of Food: Estimated amout of food, Medical food intake  Criteria: consume >50% of meals/snacks/supplements    Body Composition/Growth/Weight History  Monitoring and Evaluation Plan: Weight  Weight: Measured weight  Criteria: maintain stable wt    Biochemical Data, Medical Tests and Procedures  Monitoring and Evaluation Plan: Electrolyte/renal panel  Electrolyte and Renal Panel: Magnesium, Phosphorus, Potassium, Sodium  Criteria: WNL          Time Spent (min): 45 minutes

## 2024-09-24 LAB
ALBUMIN SERPL BCP-MCNC: 3.6 G/DL (ref 3.4–5)
ALP SERPL-CCNC: 96 U/L (ref 33–136)
ALT SERPL W P-5'-P-CCNC: 9 U/L (ref 10–52)
ANION GAP SERPL CALC-SCNC: 13 MMOL/L (ref 10–20)
APTT PPP: 30 SECONDS (ref 27–38)
AST SERPL W P-5'-P-CCNC: 10 U/L (ref 9–39)
BASOPHILS # BLD MANUAL: 0 X10*3/UL (ref 0–0.1)
BASOPHILS NFR BLD MANUAL: 0 %
BILIRUB SERPL-MCNC: 0.5 MG/DL (ref 0–1.2)
BUN SERPL-MCNC: 14 MG/DL (ref 6–23)
CALCIUM SERPL-MCNC: 8.4 MG/DL (ref 8.6–10.6)
CHLORIDE SERPL-SCNC: 105 MMOL/L (ref 98–107)
CO2 SERPL-SCNC: 23 MMOL/L (ref 21–32)
CREAT SERPL-MCNC: 0.83 MG/DL (ref 0.5–1.3)
EGFRCR SERPLBLD CKD-EPI 2021: >90 ML/MIN/1.73M*2
EOSINOPHIL # BLD MANUAL: 0.02 X10*3/UL (ref 0–0.7)
EOSINOPHIL NFR BLD MANUAL: 9.1 %
ERYTHROCYTE [DISTWIDTH] IN BLOOD BY AUTOMATED COUNT: 18.8 % (ref 11.5–14.5)
FIBRINOGEN PPP-MCNC: 413 MG/DL (ref 200–400)
GLUCOSE SERPL-MCNC: 90 MG/DL (ref 74–99)
HCT VFR BLD AUTO: 32.2 % (ref 41–52)
HGB BLD-MCNC: 11.2 G/DL (ref 13.5–17.5)
HYPOCHROMIA BLD QL SMEAR: ABNORMAL
IMM GRANULOCYTES # BLD AUTO: 0.03 X10*3/UL (ref 0–0.7)
IMM GRANULOCYTES NFR BLD AUTO: 15.8 % (ref 0–0.9)
INR PPP: 1.1 (ref 0.9–1.1)
LDH SERPL L TO P-CCNC: 118 U/L (ref 84–246)
LYMPHOCYTES # BLD MANUAL: 0.02 X10*3/UL (ref 1.2–4.8)
LYMPHOCYTES NFR BLD MANUAL: 9.1 %
MAGNESIUM SERPL-MCNC: 1.81 MG/DL (ref 1.6–2.4)
MCH RBC QN AUTO: 27.4 PG (ref 26–34)
MCHC RBC AUTO-ENTMCNC: 34.8 G/DL (ref 32–36)
MCV RBC AUTO: 79 FL (ref 80–100)
MONOCYTES # BLD MANUAL: 0 X10*3/UL (ref 0.1–1)
MONOCYTES NFR BLD MANUAL: 0 %
NEUTS SEG # BLD MANUAL: 0.16 X10*3/UL (ref 1.2–7)
NEUTS SEG NFR BLD MANUAL: 81.8 %
NRBC BLD-RTO: 0 /100 WBCS (ref 0–0)
PHOSPHATE SERPL-MCNC: 3.2 MG/DL (ref 2.5–4.9)
PLATELET # BLD AUTO: 109 X10*3/UL (ref 150–450)
POTASSIUM SERPL-SCNC: 4.4 MMOL/L (ref 3.5–5.3)
PROT SERPL-MCNC: 6.3 G/DL (ref 6.4–8.2)
PROTHROMBIN TIME: 12.4 SECONDS (ref 9.8–12.8)
RBC # BLD AUTO: 4.09 X10*6/UL (ref 4.5–5.9)
RBC MORPH BLD: ABNORMAL
SODIUM SERPL-SCNC: 137 MMOL/L (ref 136–145)
TACROLIMUS BLD-MCNC: 5.5 NG/ML
TARGETS BLD QL SMEAR: ABNORMAL
TOTAL CELLS COUNTED BLD: 22
URATE SERPL-MCNC: 2.9 MG/DL (ref 4–7.5)
WBC # BLD AUTO: 0.2 X10*3/UL (ref 4.4–11.3)

## 2024-09-24 PROCEDURE — 97161 PT EVAL LOW COMPLEX 20 MIN: CPT | Mod: GP

## 2024-09-24 PROCEDURE — 99233 SBSQ HOSP IP/OBS HIGH 50: CPT

## 2024-09-24 PROCEDURE — 2500000001 HC RX 250 WO HCPCS SELF ADMINISTERED DRUGS (ALT 637 FOR MEDICARE OP): Performed by: INTERNAL MEDICINE

## 2024-09-24 PROCEDURE — 2500000004 HC RX 250 GENERAL PHARMACY W/ HCPCS (ALT 636 FOR OP/ED)

## 2024-09-24 PROCEDURE — 85384 FIBRINOGEN ACTIVITY: CPT

## 2024-09-24 PROCEDURE — 80197 ASSAY OF TACROLIMUS: CPT | Performed by: INTERNAL MEDICINE

## 2024-09-24 PROCEDURE — 2500000002 HC RX 250 W HCPCS SELF ADMINISTERED DRUGS (ALT 637 FOR MEDICARE OP, ALT 636 FOR OP/ED): Performed by: INTERNAL MEDICINE

## 2024-09-24 PROCEDURE — 85007 BL SMEAR W/DIFF WBC COUNT: CPT

## 2024-09-24 PROCEDURE — 2500000001 HC RX 250 WO HCPCS SELF ADMINISTERED DRUGS (ALT 637 FOR MEDICARE OP)

## 2024-09-24 PROCEDURE — 85610 PROTHROMBIN TIME: CPT

## 2024-09-24 PROCEDURE — 83615 LACTATE (LD) (LDH) ENZYME: CPT

## 2024-09-24 PROCEDURE — 2500000002 HC RX 250 W HCPCS SELF ADMINISTERED DRUGS (ALT 637 FOR MEDICARE OP, ALT 636 FOR OP/ED): Performed by: PHYSICIAN ASSISTANT

## 2024-09-24 PROCEDURE — 84550 ASSAY OF BLOOD/URIC ACID: CPT

## 2024-09-24 PROCEDURE — 80053 COMPREHEN METABOLIC PANEL: CPT

## 2024-09-24 PROCEDURE — 1170000001 HC PRIVATE ONCOLOGY ROOM DAILY

## 2024-09-24 PROCEDURE — 83735 ASSAY OF MAGNESIUM: CPT

## 2024-09-24 PROCEDURE — 84100 ASSAY OF PHOSPHORUS: CPT

## 2024-09-24 PROCEDURE — 85027 COMPLETE CBC AUTOMATED: CPT

## 2024-09-24 PROCEDURE — 2500000004 HC RX 250 GENERAL PHARMACY W/ HCPCS (ALT 636 FOR OP/ED): Mod: JZ | Performed by: INTERNAL MEDICINE

## 2024-09-24 RX ORDER — POLYETHYLENE GLYCOL 3350 17 G/17G
17 POWDER, FOR SOLUTION ORAL DAILY PRN
Status: DISCONTINUED | OUTPATIENT
Start: 2024-09-24 | End: 2024-10-04

## 2024-09-24 ASSESSMENT — COGNITIVE AND FUNCTIONAL STATUS - GENERAL
WALKING IN HOSPITAL ROOM: A LITTLE
CLIMB 3 TO 5 STEPS WITH RAILING: A LITTLE
MOBILITY SCORE: 22

## 2024-09-24 ASSESSMENT — PAIN SCALES - GENERAL
PAINLEVEL_OUTOF10: 0 - NO PAIN

## 2024-09-24 ASSESSMENT — ACTIVITIES OF DAILY LIVING (ADL): ADL_ASSISTANCE: INDEPENDENT

## 2024-09-24 ASSESSMENT — PAIN - FUNCTIONAL ASSESSMENT
PAIN_FUNCTIONAL_ASSESSMENT: 0-10

## 2024-09-24 NOTE — PROGRESS NOTES
Physical Therapy    Physical Therapy Evaluation    Patient Name: Brandt Merritt  MRN: [id number]  Department: [facility]  Room: [facility]-A  Today's Date: 9/24/2024   Time Calculation  Start Time: 0951  Stop Time: 1005  Time Calculation (min): 14 min    Assessment/Plan   PT Assessment  PT Assessment Results:  (No deficits noted at this time. Will continue to monitor for any mobility deficits)  Rehab Prognosis: Good  Evaluation/Treatment Tolerance: Patient tolerated treatment well  Medical Staff Made Aware: Yes  Strengths: Ability to acquire knowledge, Access to adaptive/assistive products, Attitude of self, Capable of completing ADLs semi/independent, Housing layout, Leisure activity, Living arrangement secure, Premorbid level of function, Support and attitude of living partners  Barriers to Participation: Comorbidities  End of Session Communication: Bedside nurse  Assessment Comment: Pt is a 65 y/o male who was admitted for SCT (T=0, 9/19/24) to treat AML. Prior to admission, pt was ind with ADLs and ambulation. Pt required SBA for STS and gait. Pt will benefit from PT monitoring in case of decline in mobility.  End of Session Patient Position: Up in chair, Alarm off, not on at start of session  IP OR SWING BED PT PLAN  Inpatient or Swing Bed: Inpatient  PT Plan  Treatment/Interventions: Bed mobility, Transfer training, Gait training, Stair training, Balance training, Neuromuscular re-education, Strengthening, Endurance training, Range of motion, Therapeutic exercise, Therapeutic activity, Home exercise program, Positioning  PT Plan: Ongoing PT  PT Frequency: 1 time per week  PT Discharge Recommendations: No PT needed after discharge  PT Recommended Transfer Status: Independent  PT - OK to Discharge: Yes (DC rec made)      Subjective   General Visit Information:  General  Reason for Referral: AML receiving SCT (T +5)  Past Medical History Relevant to Rehab: 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  Home Living:  Home Living  Type of Home: House  Lives With: Spouse  Home Adaptive Equipment: None  Home Layout: One level  Home Access: No concerns  Bathroom Shower/Tub: Tub/shower unit  Bathroom Toilet: Standard  Bathroom Equipment: Grab bars in shower  Prior Level of Function:  Prior Function Per Pt/Caregiver Report  Level of 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)  Leisure: Playing with dogs  Prior Function Comments: + driving, - falls  Precautions:  Precautions  Medical Precautions: Fall precautions  Precautions Comment: Protective (Lab Values- H/H: 11.2/32.2, WBC: 0.2, Platelets: 109)              Objective   Pain:  Pain Assessment  0-10 (Numeric) Pain Score: 0 - No pain  Cognition:  Cognition  Overall Cognitive Status: Within Functional Limits  Orientation Level: Oriented X4    General Assessments:    Activity Tolerance  Endurance: Tolerates 30 min exercise with multiple rests    Sensation  Light Touch: No apparent deficits    Perception  Inattention/Neglect: Appears intact    Coordination  Movements are Fluid and Coordinated: Yes    Postural Control  Postural Control: Within Functional Limits    Static Sitting Balance  Static Sitting-Balance Support: Feet supported, No upper extremity supported  Static Sitting-Level of Assistance: Distant supervision  Dynamic Sitting Balance  Dynamic Sitting-Balance Support: Feet supported, No upper extremity supported  Dynamic Sitting-Level of Assistance: Distant supervision  Dynamic Sitting-Balance: Trunk control  activities    Static Standing Balance  Static Standing-Balance Support: No upper extremity supported  Static Standing-Level of Assistance: Close supervision  Dynamic Standing Balance  Dynamic Standing-Balance Support: No upper extremity supported  Dynamic Standing-Level of Assistance:  (SBA)  Dynamic Standing-Balance:  (Forward walking, turning)  Functional Assessments:  Bed Mobility  Bed Mobility: No (Pt sitting in chair upon arrival)    Transfers  Transfer: Yes  Transfer 1  Transfer From 1: Sit to, Stand to  Transfer to 1: Stand, Sit  Technique 1: Stand to sit, Sit to stand  Transfer Device 1:  (no device)  Transfer Level of Assistance 1:  (SBA)  Trials/Comments 1: Pt completed 13 STS    Ambulation/Gait Training  Ambulation/Gait Training Performed: Yes  Ambulation/Gait Training 1  Surface 1: Level tile  Device 1:  (no device)  Assistance 1:  (SBA)  Quality of Gait 1:  (No gait deficits)  Comments/Distance (ft) 1: Pt amb for 1,630ft from chair in room and in hallway    Stairs  Stairs: No  Extremity/Trunk Assessments:  RLE   RLE : Within Functional Limits  Strength RLE  RLE Overall Strength: Greater than or equal to 3/5 as evidenced by functional mobility  LLE   LLE : Within Functional Limits  Strength LLE  LLE Overall Strength: Greater than or equal to 3/5 as evidenced by functional mobility  Outcome Measures:  [facility] Basic Mobility  Turning from your back to your side while in a flat bed without using bedrails: None  Moving from lying on your back to sitting on the side of a flat bed without using bedrails: None  Moving to and from bed to chair (including a wheelchair): None  Standing up from a chair using your arms (e.g. wheelchair or bedside chair): None  To walk in hospital room: A little  Climbing 3-5 steps with railing: A little  Basic Mobility - Total Score: 22    Tinetti  Sitting Balance: Steady, safe  Arises: Able without using arms  Attempts to Arise: Able to arise, one attempt  Immediate Standing Balance  (First 5 Seconds): Steady without walker or other support  Standing Balance: Narrow stance without support  Nudged: Steady without walker or other support  Eyes Closed: Steady  Turned 360 Degrees: Steadiness: Steady  Turned 360 Degrees: Continuity of Steps: Continuous  Sitting Down: Safe, smooth motion  Balance Score: 16  Initiation of Gait: No hesitancy  Step Height: R Swing Foot: Right foot complete clears floor  Step Length: R Swing Foot: Passes left stance foot  Step Height: L Swing Foot: Left foot complete clears floor  Step Length: L Swing Foot: Passes right stance foot  Step Symmetry: Right and left step appear equal  Step Continuity: Steps appear continuous  Path: Straight without walking aid  Trunk: No sway, no flexion, no use of arms, no walking aid  Walking Time: Heels almost touching while walking  Gait Score: 12  Total Score: 28    Other Measures  6 min Walk Test: 1610  30 Second Sit to Stand: 11    Encounter Problems       Encounter Problems (Active)       Balance       STG - Maintains dynamic standing balance without upper extremity support independently       Start:  09/24/24    Expected End:  10/15/24            Pt will maintain score of >/= 24/28 on the Tinetti to reduce fall risk       Start:  09/24/24    Expected End:  10/15/24               Mobility       STG - Patient will ambulate >/= 1,610ft independently       Start:  09/24/24    Expected End:  10/15/24            Pt will ambulate >/= 1,610ft on 6MWT to reduce fall risk       Start:  09/24/24    Expected End:  10/15/24               PT Transfers       STG - Patient will perform bed mobility independently       Start:  09/24/24    Expected End:  10/15/24            Pt will complete >/= 14 STS to reduce fall risk       Start:  09/24/24    Expected End:  10/15/24                     Education Documentation  Handouts, taught by TR CintronPT at 9/24/2024 12:30 PM.  Learner: Patient  Readiness: Acceptance  Method: Explanation  Response:  Verbalizes Understanding    Body Mechanics, taught by SARY Nieto at 9/24/2024 12:30 PM.  Learner: Patient  Readiness: Acceptance  Method: Explanation  Response: Verbalizes Understanding    Mobility Training, taught by SARY Nieto at 9/24/2024 12:30 PM.  Learner: Patient  Readiness: Acceptance  Method: Explanation  Response: Verbalizes Understanding    Education Comments  No comments found.      09/24/24 at 2:19 PM - SARY NIETO

## 2024-09-24 NOTE — PROGRESS NOTES
Brandt Merritt is a 64 y.o. male on day 11 of admission presenting with Acute myeloid leukemia in remission (Multi).    Subjective   Pt reports feeling quite well. He still has not had a BM since yesterday, after he took a single dose of loperamide. His appetite has not been great either, and he explains his omelette tasted lousy this morning, and ensures are not appealing either. He has, however, been eating potato chips and cookies. His wife will be bringing in some food that he likes for lunch.        Objective     Physical Exam  Constitutional:       General: He is not in acute distress.     Appearance: Normal appearance. He is normal weight. He is not ill-appearing, toxic-appearing or diaphoretic.   HENT:      Head: Normocephalic and atraumatic.      Right Ear: External ear normal.      Left Ear: External ear normal.      Nose: Nose normal. No congestion or rhinorrhea.      Mouth/Throat:      Mouth: Mucous membranes are moist.      Pharynx: Oropharynx is clear. No oropharyngeal exudate or posterior oropharyngeal erythema.   Eyes:      General:         Right eye: No discharge.         Left eye: No discharge.      Conjunctiva/sclera: Conjunctivae normal.   Cardiovascular:      Rate and Rhythm: Normal rate and regular rhythm.      Pulses: Normal pulses.      Heart sounds: Normal heart sounds. No murmur heard.     No friction rub. No gallop.   Pulmonary:      Effort: Pulmonary effort is normal. No respiratory distress.      Breath sounds: No stridor. No wheezing, rhonchi or rales.   Abdominal:      General: Abdomen is flat. There is no distension.      Palpations: There is no mass.      Tenderness: There is no abdominal tenderness. There is no guarding or rebound.      Hernia: No hernia is present.      Comments: Hypoactive bowel sounds   Musculoskeletal:         General: No swelling, tenderness, deformity or signs of injury. Normal range of motion.      Cervical back: Normal range of motion.      Right lower leg:  "No edema.      Left lower leg: No edema.   Skin:     General: Skin is warm and dry.      Coloration: Skin is not jaundiced or pale.      Findings: No bruising, erythema, lesion or rash.   Neurological:      General: No focal deficit present.      Mental Status: He is alert and oriented to person, place, and time. Mental status is at baseline.   Psychiatric:         Mood and Affect: Mood normal.         Behavior: Behavior normal.         Thought Content: Thought content normal.         Judgment: Judgment normal.         Last Recorded Vitals  Blood pressure 123/83, pulse 91, temperature 36.6 °C (97.9 °F), temperature source Temporal, resp. rate 16, height (S) 1.664 m (5' 5.51\"), weight 73.4 kg (161 lb 13.1 oz), SpO2 99%.  Intake/Output last 3 Shifts:  I/O last 3 completed shifts:  In: 44 (0.6 mL/kg) [I.V.:44 (0.6 mL/kg)]  Out: - (0 mL/kg)   Weight: 72.5 kg     Relevant Results             Scheduled medications  acyclovir, 400 mg, oral, q12h  filgrastim or biosimilar, 300 mcg, subcutaneous, q24h  folic acid, 1 mg, oral, Daily  levoFLOXacin, 500 mg, oral, q24h JALYN  mycophenolate, 1,000 mg, oral, TID  NIFEdipine ER, 90 mg, oral, Daily before breakfast  posaconazole, 300 mg, oral, q24h  tacrolimus, 1 mg, oral, Nightly  tacrolimus, 1.5 mg, oral, Daily before breakfast  ursodiol, 300 mg, oral, TID  vancomycin, 125 mg, oral, Daily      Continuous medications     PRN medications  PRN medications: albuterol, alteplase, dextrose, diphenhydrAMINE, EPINEPHrine HCl, famotidine, loperamide, methylPREDNISolone sodium succinate (PF), ondansetron, polyethylene glycol, prochlorperazine, prochlorperazine, sodium chloride  Results for orders placed or performed during the hospital encounter of 09/13/24 (from the past 24 hour(s))   CBC and Auto Differential   Result Value Ref Range    WBC 0.2 (LL) 4.4 - 11.3 x10*3/uL    nRBC 0.0 0.0 - 0.0 /100 WBCs    RBC 4.09 (L) 4.50 - 5.90 x10*6/uL    Hemoglobin 11.2 (L) 13.5 - 17.5 g/dL    Hematocrit " 32.2 (L) 41.0 - 52.0 %    MCV 79 (L) 80 - 100 fL    MCH 27.4 26.0 - 34.0 pg    MCHC 34.8 32.0 - 36.0 g/dL    RDW 18.8 (H) 11.5 - 14.5 %    Platelets 109 (L) 150 - 450 x10*3/uL    Immature Granulocytes %, Automated 15.8 (H) 0.0 - 0.9 %    Immature Granulocytes Absolute, Automated 0.03 0.00 - 0.70 x10*3/uL   Coagulation Screen   Result Value Ref Range    Protime 12.4 9.8 - 12.8 seconds    INR 1.1 0.9 - 1.1    aPTT 30 27 - 38 seconds   Comprehensive Metabolic Panel   Result Value Ref Range    Glucose 90 74 - 99 mg/dL    Sodium 137 136 - 145 mmol/L    Potassium 4.4 3.5 - 5.3 mmol/L    Chloride 105 98 - 107 mmol/L    Bicarbonate 23 21 - 32 mmol/L    Anion Gap 13 10 - 20 mmol/L    Urea Nitrogen 14 6 - 23 mg/dL    Creatinine 0.83 0.50 - 1.30 mg/dL    eGFR >90 >60 mL/min/1.73m*2    Calcium 8.4 (L) 8.6 - 10.6 mg/dL    Albumin 3.6 3.4 - 5.0 g/dL    Alkaline Phosphatase 96 33 - 136 U/L    Total Protein 6.3 (L) 6.4 - 8.2 g/dL    AST 10 9 - 39 U/L    Bilirubin, Total 0.5 0.0 - 1.2 mg/dL    ALT 9 (L) 10 - 52 U/L   Fibrinogen   Result Value Ref Range    Fibrinogen 413 (H) 200 - 400 mg/dL   Lactate Dehydrogenase   Result Value Ref Range     84 - 246 U/L   Magnesium   Result Value Ref Range    Magnesium 1.81 1.60 - 2.40 mg/dL   Phosphorus   Result Value Ref Range    Phosphorus 3.2 2.5 - 4.9 mg/dL   Uric Acid   Result Value Ref Range    Uric Acid 2.9 (L) 4.0 - 7.5 mg/dL   Tacrolimus level   Result Value Ref Range    Tacrolimus  5.5 <=15.0 ng/mL   Manual Differential   Result Value Ref Range    Neutrophils %, Manual 81.8 40.0 - 80.0 %    Lymphocytes %, Manual 9.1 13.0 - 44.0 %    Monocytes %, Manual 0.0 2.0 - 10.0 %    Eosinophils %, Manual 9.1 0.0 - 6.0 %    Basophils %, Manual 0.0 0.0 - 2.0 %    Seg Neutrophils Absolute, Manual 0.16 (L) 1.20 - 7.00 x10*3/uL    Lymphocytes Absolute, Manual 0.02 (L) 1.20 - 4.80 x10*3/uL    Monocytes Absolute, Manual 0.00 (L) 0.10 - 1.00 x10*3/uL    Eosinophils Absolute, Manual 0.02 0.00 - 0.70  x10*3/uL    Basophils Absolute, Manual 0.00 0.00 - 0.10 x10*3/uL    Total Cells Counted 22     RBC Morphology See Below     Hypochromia Mild     Target Cells Few      No results found.       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 and no increase in blast     # Transplant  CONDITIONING Fludarabine, melphalan, and TBI   DONOR Single umbilical cord   MATCH GRADE 6/8   SEX MATCH Mismatched   ABO DONOR A pos   ABO RECIPIENT A pos   GVHD PROPHYLAXIS Tacrolimus and Mycophenolate   GRAFT SOURCE Single umbilical cord   CMV DONOR negative   CMV RECIPIENT ppsitive   GVHD PPX:  - Tacrolimus 1.5 mg q12h started T-3 (9/16)  *Tacro level: 4.5 (9/20), 4/7 (9/21), 5.7 (9/22)  *Decreased Tacrolimus to 1mg BID d/t Posaconazole  - MMF 1000 mg TID to begin T+2 to T+35   - Viral surveillance should begin T+14 (ordered)  - increase tacrolimus to 1.5mg alternating with 1mg     HEME  # Anemia and Leukopenia: secondary to disease and chemotherapy  # 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   - Transfuse for Hgb <7 & PLT <10   -  Neupogen 300mcg to begin T+5  # VTE Prophylaxis: SCDs & Ambulation     ID  # Prophylaxis: Acyclovir, Vancomycin, Posaconazole, Letermovir (T+14), and Bactrim (T+30)  - continue levofloxacin 500mg daily (started 9/23)     CARDIO/PULM  # Hx of STEMI (11/11/2020)  - TTE 7/19/24: EF 59%, otherwise unremarkable   - Cleared by Cardiology for transplant     # Hx of HTN   - Nifedipine: 90 mEq ER 24 hr tablet     # Ascending aorta dilation   - Echo showing 3.8-4 cm dilation      FEN/RENAL  - Admit wt: 75.6 kg (9/13/24); Most recent wt: 75.1 kg  Allergic to Lasix, will not take med (extreme hypotension, extreme hypokalemia)     #Diarrhea, c.diff neg, resolved  -hold loperamide    #Constipation  -start miralax daily PRN     #SOS prophylaxis: Actigall      #hx of BPH and urinary retention (2023)  - Hx elevated PSA               *7/28/21 Bx: atypical small acinar proliferation               *4/7/23 Bx: Benign     #Hx of treated Hep C in 2015  - (9/12) Hep C RNA - not detected      #Hx of recurrent hyperbilirubinemia   - 2/2 to Hemoglobin C hemolysis      F: prn  E: prn  N: low microbial, ensure, sour lemon candy  A: picc line     DVT: ambulation, scd  GI: PPI  NOK: spouse Genevieve ([phone])  Code: FULL     Oncologist: Jerod  Patient seen and examined with Dr. Ignacio Deras MD PhD

## 2024-09-25 ENCOUNTER — APPOINTMENT (OUTPATIENT)
Dept: RADIOLOGY | Facility: HOSPITAL | Age: 64
DRG: 014 | End: 2024-09-25
Payer: COMMERCIAL

## 2024-09-25 LAB
ALBUMIN SERPL BCP-MCNC: 3.6 G/DL (ref 3.4–5)
ALP SERPL-CCNC: 100 U/L (ref 33–136)
ALT SERPL W P-5'-P-CCNC: 9 U/L (ref 10–52)
ANION GAP SERPL CALC-SCNC: 11 MMOL/L (ref 10–20)
APPEARANCE UR: CLEAR
APTT PPP: 28 SECONDS (ref 27–38)
AST SERPL W P-5'-P-CCNC: 10 U/L (ref 9–39)
BASOPHILS # BLD AUTO: 0 X10*3/UL (ref 0–0.1)
BASOPHILS NFR BLD AUTO: 0 %
BILIRUB SERPL-MCNC: 0.5 MG/DL (ref 0–1.2)
BILIRUB UR STRIP.AUTO-MCNC: NEGATIVE MG/DL
BUN SERPL-MCNC: 11 MG/DL (ref 6–23)
CALCIUM SERPL-MCNC: 8.5 MG/DL (ref 8.6–10.6)
CHIMERISM INTERPRETATION: NORMAL
CHLORIDE SERPL-SCNC: 105 MMOL/L (ref 98–107)
CO2 SERPL-SCNC: 23 MMOL/L (ref 21–32)
COLOR UR: ABNORMAL
CREAT SERPL-MCNC: 0.85 MG/DL (ref 0.5–1.3)
EGFRCR SERPLBLD CKD-EPI 2021: >90 ML/MIN/1.73M*2
ELECTRONICALLY SIGNED BY: NORMAL
EOSINOPHIL # BLD AUTO: 0 X10*3/UL (ref 0–0.7)
EOSINOPHIL NFR BLD AUTO: 0 %
ERYTHROCYTE [DISTWIDTH] IN BLOOD BY AUTOMATED COUNT: 18.9 % (ref 11.5–14.5)
FIBRINOGEN PPP-MCNC: 471 MG/DL (ref 200–400)
GLUCOSE SERPL-MCNC: 88 MG/DL (ref 74–99)
GLUCOSE UR STRIP.AUTO-MCNC: NORMAL MG/DL
HCT VFR BLD AUTO: 33.2 % (ref 41–52)
HGB BLD-MCNC: 11.4 G/DL (ref 13.5–17.5)
IMM GRANULOCYTES # BLD AUTO: 0.01 X10*3/UL (ref 0–0.7)
IMM GRANULOCYTES NFR BLD AUTO: 12.5 % (ref 0–0.9)
INR PPP: 1.1 (ref 0.9–1.1)
KETONES UR STRIP.AUTO-MCNC: ABNORMAL MG/DL
LDH SERPL L TO P-CCNC: 143 U/L (ref 84–246)
LEUKOCYTE ESTERASE UR QL STRIP.AUTO: NEGATIVE
LYMPHOCYTES # BLD AUTO: 0.02 X10*3/UL (ref 1.2–4.8)
LYMPHOCYTES NFR BLD AUTO: 25 %
MAGNESIUM SERPL-MCNC: 1.69 MG/DL (ref 1.6–2.4)
MCH RBC QN AUTO: 27.3 PG (ref 26–34)
MCHC RBC AUTO-ENTMCNC: 34.3 G/DL (ref 32–36)
MCV RBC AUTO: 79 FL (ref 80–100)
MONOCYTES # BLD AUTO: 0 X10*3/UL (ref 0.1–1)
MONOCYTES NFR BLD AUTO: 0 %
MUCOUS THREADS #/AREA URNS AUTO: ABNORMAL /LPF
NEUTROPHILS # BLD AUTO: 0.05 X10*3/UL (ref 1.2–7.7)
NEUTROPHILS NFR BLD AUTO: 62.5 %
NITRITE UR QL STRIP.AUTO: NEGATIVE
NRBC BLD-RTO: 0 /100 WBCS (ref 0–0)
PH UR STRIP.AUTO: 6 [PH]
PHOSPHATE SERPL-MCNC: 3.2 MG/DL (ref 2.5–4.9)
PLATELET # BLD AUTO: 63 X10*3/UL (ref 150–450)
POTASSIUM SERPL-SCNC: 4.3 MMOL/L (ref 3.5–5.3)
PROT SERPL-MCNC: 6.2 G/DL (ref 6.4–8.2)
PROT UR STRIP.AUTO-MCNC: ABNORMAL MG/DL
PROTHROMBIN TIME: 12.8 SECONDS (ref 9.8–12.8)
RBC # BLD AUTO: 4.18 X10*6/UL (ref 4.5–5.9)
RBC # UR STRIP.AUTO: ABNORMAL /UL
RBC #/AREA URNS AUTO: >20 /HPF
SODIUM SERPL-SCNC: 135 MMOL/L (ref 136–145)
SP GR UR STRIP.AUTO: 1.02
TACROLIMUS BLD-MCNC: 6 NG/ML
URATE SERPL-MCNC: 2.5 MG/DL (ref 4–7.5)
UROBILINOGEN UR STRIP.AUTO-MCNC: NORMAL MG/DL
WBC # BLD AUTO: 0.1 X10*3/UL (ref 4.4–11.3)
WBC #/AREA URNS AUTO: ABNORMAL /HPF

## 2024-09-25 PROCEDURE — 80053 COMPREHEN METABOLIC PANEL: CPT

## 2024-09-25 PROCEDURE — 81001 URINALYSIS AUTO W/SCOPE: CPT

## 2024-09-25 PROCEDURE — 87040 BLOOD CULTURE FOR BACTERIA: CPT

## 2024-09-25 PROCEDURE — 2500000002 HC RX 250 W HCPCS SELF ADMINISTERED DRUGS (ALT 637 FOR MEDICARE OP, ALT 636 FOR OP/ED): Performed by: PHYSICIAN ASSISTANT

## 2024-09-25 PROCEDURE — 36415 COLL VENOUS BLD VENIPUNCTURE: CPT

## 2024-09-25 PROCEDURE — 2500000001 HC RX 250 WO HCPCS SELF ADMINISTERED DRUGS (ALT 637 FOR MEDICARE OP)

## 2024-09-25 PROCEDURE — 2500000004 HC RX 250 GENERAL PHARMACY W/ HCPCS (ALT 636 FOR OP/ED)

## 2024-09-25 PROCEDURE — 80197 ASSAY OF TACROLIMUS: CPT | Performed by: INTERNAL MEDICINE

## 2024-09-25 PROCEDURE — 71045 X-RAY EXAM CHEST 1 VIEW: CPT

## 2024-09-25 PROCEDURE — 1170000001 HC PRIVATE ONCOLOGY ROOM DAILY

## 2024-09-25 PROCEDURE — 85384 FIBRINOGEN ACTIVITY: CPT

## 2024-09-25 PROCEDURE — 99233 SBSQ HOSP IP/OBS HIGH 50: CPT

## 2024-09-25 PROCEDURE — 2500000001 HC RX 250 WO HCPCS SELF ADMINISTERED DRUGS (ALT 637 FOR MEDICARE OP): Performed by: INTERNAL MEDICINE

## 2024-09-25 PROCEDURE — 83735 ASSAY OF MAGNESIUM: CPT

## 2024-09-25 PROCEDURE — 84100 ASSAY OF PHOSPHORUS: CPT

## 2024-09-25 PROCEDURE — 2500000002 HC RX 250 W HCPCS SELF ADMINISTERED DRUGS (ALT 637 FOR MEDICARE OP, ALT 636 FOR OP/ED): Performed by: INTERNAL MEDICINE

## 2024-09-25 PROCEDURE — 85730 THROMBOPLASTIN TIME PARTIAL: CPT

## 2024-09-25 PROCEDURE — 87086 URINE CULTURE/COLONY COUNT: CPT

## 2024-09-25 PROCEDURE — 84550 ASSAY OF BLOOD/URIC ACID: CPT

## 2024-09-25 PROCEDURE — 85025 COMPLETE CBC W/AUTO DIFF WBC: CPT

## 2024-09-25 PROCEDURE — 2500000004 HC RX 250 GENERAL PHARMACY W/ HCPCS (ALT 636 FOR OP/ED): Mod: JZ | Performed by: INTERNAL MEDICINE

## 2024-09-25 PROCEDURE — 83615 LACTATE (LD) (LDH) ENZYME: CPT

## 2024-09-25 RX ORDER — ACETAMINOPHEN 325 MG/1
650 TABLET ORAL ONCE
Status: COMPLETED | OUTPATIENT
Start: 2024-09-25 | End: 2024-09-25

## 2024-09-25 ASSESSMENT — COGNITIVE AND FUNCTIONAL STATUS - GENERAL
DAILY ACTIVITIY SCORE: 24
MOBILITY SCORE: 24
MOBILITY SCORE: 24
DAILY ACTIVITIY SCORE: 24
DAILY ACTIVITIY SCORE: 24
MOBILITY SCORE: 24

## 2024-09-25 ASSESSMENT — PAIN SCALES - GENERAL
PAINLEVEL_OUTOF10: 0 - NO PAIN
PAINLEVEL_OUTOF10: 0 - NO PAIN

## 2024-09-25 NOTE — PROGRESS NOTES
"Brandt Merritt is a 64 y.o. male on day 12 of admission presenting with Acute myeloid leukemia in remission (Multi).    Subjective   Pt reports feeling well. He is no longer constipated. Has no acute complaints or overnight issues. Claims food still has \"no taste\", even when he had a hot dog and fried chicken, some of his favorites. Therefore, he is resorting to eating a lot of healthy foods while he can't taste the difference. He is still able to taste sour.      Objective     Physical Exam  Constitutional:       General: He is not in acute distress.     Appearance: Normal appearance. He is normal weight. He is not ill-appearing or toxic-appearing.   HENT:      Head: Normocephalic and atraumatic.      Right Ear: External ear normal.      Left Ear: External ear normal.      Nose: Nose normal. No congestion or rhinorrhea.      Mouth/Throat:      Mouth: Mucous membranes are moist.      Pharynx: Oropharynx is clear. No oropharyngeal exudate or posterior oropharyngeal erythema.   Eyes:      General: No scleral icterus.        Right eye: No discharge.         Left eye: No discharge.      Conjunctiva/sclera: Conjunctivae normal.      Pupils: Pupils are equal, round, and reactive to light.   Cardiovascular:      Rate and Rhythm: Normal rate and regular rhythm.      Heart sounds: Normal heart sounds. No murmur heard.     No friction rub. No gallop.   Pulmonary:      Effort: Pulmonary effort is normal. No respiratory distress.      Breath sounds: Normal breath sounds. No stridor. No wheezing, rhonchi or rales.   Abdominal:      General: Abdomen is flat. Bowel sounds are normal. There is no distension.      Palpations: Abdomen is soft. There is no mass.      Tenderness: There is no abdominal tenderness. There is no guarding or rebound.      Hernia: No hernia is present.   Musculoskeletal:         General: Normal range of motion.      Cervical back: Normal range of motion.   Skin:     General: Skin is warm and dry.      " "Coloration: Skin is not jaundiced or pale.      Findings: No bruising, erythema, lesion or rash.   Neurological:      General: No focal deficit present.      Mental Status: He is alert and oriented to person, place, and time. Mental status is at baseline.   Psychiatric:         Mood and Affect: Mood normal.         Behavior: Behavior normal.         Thought Content: Thought content normal.         Judgment: Judgment normal.         Last Recorded Vitals  Blood pressure 116/77, pulse 98, temperature 36.8 °C (98.2 °F), temperature source Temporal, resp. rate 16, height (S) 1.664 m (5' 5.51\"), weight 73.4 kg (161 lb 13.1 oz), SpO2 100%.  Intake/Output last 3 Shifts:  No intake/output data recorded.    Relevant Results             Scheduled medications  acyclovir, 400 mg, oral, q12h  filgrastim or biosimilar, 300 mcg, subcutaneous, q24h  folic acid, 1 mg, oral, Daily  levoFLOXacin, 500 mg, oral, q24h JALYN  mycophenolate, 1,000 mg, oral, TID  NIFEdipine ER, 90 mg, oral, Daily before breakfast  posaconazole, 300 mg, oral, q24h  tacrolimus, 1 mg, oral, Nightly  tacrolimus, 1.5 mg, oral, Daily before breakfast  ursodiol, 300 mg, oral, TID  vancomycin, 125 mg, oral, Daily      Continuous medications     PRN medications  PRN medications: albuterol, alteplase, dextrose, diphenhydrAMINE, EPINEPHrine HCl, famotidine, methylPREDNISolone sodium succinate (PF), ondansetron, polyethylene glycol, prochlorperazine, prochlorperazine, sodium chloride  Results for orders placed or performed during the hospital encounter of 09/13/24 (from the past 24 hour(s))   CBC and Auto Differential   Result Value Ref Range    WBC 0.1 (LL) 4.4 - 11.3 x10*3/uL    nRBC 0.0 0.0 - 0.0 /100 WBCs    RBC 4.18 (L) 4.50 - 5.90 x10*6/uL    Hemoglobin 11.4 (L) 13.5 - 17.5 g/dL    Hematocrit 33.2 (L) 41.0 - 52.0 %    MCV 79 (L) 80 - 100 fL    MCH 27.3 26.0 - 34.0 pg    MCHC 34.3 32.0 - 36.0 g/dL    RDW 18.9 (H) 11.5 - 14.5 %    Platelets 63 (L) 150 - 450 x10*3/uL "    Neutrophils % 62.5 40.0 - 80.0 %    Immature Granulocytes %, Automated 12.5 (H) 0.0 - 0.9 %    Lymphocytes % 25.0 13.0 - 44.0 %    Monocytes % 0.0 2.0 - 10.0 %    Eosinophils % 0.0 0.0 - 6.0 %    Basophils % 0.0 0.0 - 2.0 %    Neutrophils Absolute 0.05 (L) 1.20 - 7.70 x10*3/uL    Immature Granulocytes Absolute, Automated 0.01 0.00 - 0.70 x10*3/uL    Lymphocytes Absolute 0.02 (L) 1.20 - 4.80 x10*3/uL    Monocytes Absolute 0.00 (L) 0.10 - 1.00 x10*3/uL    Eosinophils Absolute 0.00 0.00 - 0.70 x10*3/uL    Basophils Absolute 0.00 0.00 - 0.10 x10*3/uL   Coagulation Screen   Result Value Ref Range    Protime 12.8 9.8 - 12.8 seconds    INR 1.1 0.9 - 1.1    aPTT 28 27 - 38 seconds   Comprehensive Metabolic Panel   Result Value Ref Range    Glucose 88 74 - 99 mg/dL    Sodium 135 (L) 136 - 145 mmol/L    Potassium 4.3 3.5 - 5.3 mmol/L    Chloride 105 98 - 107 mmol/L    Bicarbonate 23 21 - 32 mmol/L    Anion Gap 11 10 - 20 mmol/L    Urea Nitrogen 11 6 - 23 mg/dL    Creatinine 0.85 0.50 - 1.30 mg/dL    eGFR >90 >60 mL/min/1.73m*2    Calcium 8.5 (L) 8.6 - 10.6 mg/dL    Albumin 3.6 3.4 - 5.0 g/dL    Alkaline Phosphatase 100 33 - 136 U/L    Total Protein 6.2 (L) 6.4 - 8.2 g/dL    AST 10 9 - 39 U/L    Bilirubin, Total 0.5 0.0 - 1.2 mg/dL    ALT 9 (L) 10 - 52 U/L   Fibrinogen   Result Value Ref Range    Fibrinogen 471 (H) 200 - 400 mg/dL   Lactate Dehydrogenase   Result Value Ref Range     84 - 246 U/L   Magnesium   Result Value Ref Range    Magnesium 1.69 1.60 - 2.40 mg/dL   Phosphorus   Result Value Ref Range    Phosphorus 3.2 2.5 - 4.9 mg/dL   Uric Acid   Result Value Ref Range    Uric Acid 2.5 (L) 4.0 - 7.5 mg/dL   Tacrolimus level   Result Value Ref Range    Tacrolimus  6.0 <=15.0 ng/mL       This patient has a central line   Reason for the central line remaining today? 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      T+4 Single-unit cord transplant (T0=9/19/24)     Updates (9/25/24):  -none     ONC  # MDS/AML   - Symptoms began 9/2023; diagnosed 4/2024  - BMBx showing MDS in transition to AML (>10% jaime) w/ trisomy 8, DNMT alpha, and U2AF1 mutation indication adverse risk   - s/p 4 cycles of Decitabine/Venetoclax (C4D1 on 8/12/24)  - BMBx 6/17/24: Blasts cleared, FISH still positive for trisomy 8, still MDS changes   - BMBx 9/5/24: 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 and no increase in blast     # Transplant  CONDITIONING Fludarabine, melphalan, and TBI   DONOR Single umbilical cord   MATCH GRADE 6/8   SEX MATCH Mismatched   ABO DONOR A pos   ABO RECIPIENT A pos   GVHD PROPHYLAXIS Tacrolimus and Mycophenolate   GRAFT SOURCE Single umbilical cord   CMV DONOR negative   CMV RECIPIENT ppsitive   GVHD PPX:  - Tacrolimus 1.5 mg q12h started T-3 (9/16)  *Tacro level: 4.5 (9/20), 4/7 (9/21), 5.7 (9/22)  *Decreased Tacrolimus to 1mg BID d/t Posaconazole  - MMF 1000 mg TID to begin T+2 to T+35   - Viral surveillance should begin T+14 (ordered)  - continue tacrolimus 1.5mg alternating with 1mg     HEME  # Anemia and Leukopenia: secondary to disease and chemotherapy  # 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   - Transfuse for Hgb <7 & PLT <10   - Neupogen 300mcg to begin T+5    # VTE Prophylaxis: SCDs & Ambulation     ID  # Prophylaxis: Acyclovir, Vancomycin, Posaconazole, Letermovir (T+14), and Bactrim (T+30)  - continue levofloxacin 500mg daily (started 9/23)     CARDIO/PULM  # Hx of STEMI (11/11/2020)  - TTE 7/19/24: EF 59%, otherwise unremarkable   - Cleared by Cardiology for transplant      # Hx of HTN   - Nifedipine: 90 mEq ER 24 hr tablet      #  Ascending aorta dilation   - Echo showing 3.8-4 cm dilation      FEN/RENAL  - Admit wt: 75.6 kg (9/13/24); Most recent wt: 75.1 kg  Allergic to Lasix, will not take med (extreme hypotension, extreme hypokalemia)     #Diarrhea, c.diff neg, resolved  -hold loperamide     #Constipation  -miralax daily PRN     #SOS prophylaxis: Actigall      #hx of BPH and urinary retention (2023)  - Hx elevated PSA               *7/28/21 Bx: atypical small acinar proliferation               *4/7/23 Bx: Benign     #Hx of treated Hep C in 2015  - (9/12) Hep C RNA - not detected      #Hx of recurrent hyperbilirubinemia   - 2/2 to Hemoglobin C hemolysis      F: prn  E: prn  N: low microbial, ensure, sour lemon candy  A: picc line     DVT: ambulation, scd  GI: PPI  NOK: spouse Genevieve ([phone])  Code: FULL     Oncologist: Jerod  Patient seen and examined with Dr. Ignacio Deras MD PhD

## 2024-09-25 NOTE — CARE PLAN
Problem: Pain - Adult  Goal: Verbalizes/displays adequate comfort level or baseline comfort level  Outcome: Progressing     Problem: Safety - Adult  Goal: Free from fall injury  Outcome: Progressing     Problem: Nutrition  Goal: Oral intake greater 75%  Outcome: Progressing  Goal: Adequate PO fluid intake  Outcome: Progressing  Goal: Maintain stable weight  Outcome: Progressing

## 2024-09-26 LAB
ALBUMIN SERPL BCP-MCNC: 3.6 G/DL (ref 3.4–5)
ALP SERPL-CCNC: 95 U/L (ref 33–136)
ALT SERPL W P-5'-P-CCNC: 8 U/L (ref 10–52)
ANION GAP SERPL CALC-SCNC: 13 MMOL/L (ref 10–20)
ANION GAP SERPL CALC-SCNC: 14 MMOL/L (ref 10–20)
APTT PPP: 30 SECONDS (ref 27–38)
AST SERPL W P-5'-P-CCNC: 8 U/L (ref 9–39)
BACTERIA UR CULT: NO GROWTH
BASOPHILS # BLD AUTO: 0 X10*3/UL (ref 0–0.1)
BASOPHILS NFR BLD AUTO: 0 %
BILIRUB SERPL-MCNC: 0.7 MG/DL (ref 0–1.2)
BUN SERPL-MCNC: 12 MG/DL (ref 6–23)
BUN SERPL-MCNC: 14 MG/DL (ref 6–23)
CALCIUM SERPL-MCNC: 8.2 MG/DL (ref 8.6–10.6)
CALCIUM SERPL-MCNC: 8.6 MG/DL (ref 8.6–10.6)
CHLORIDE SERPL-SCNC: 102 MMOL/L (ref 98–107)
CHLORIDE SERPL-SCNC: 102 MMOL/L (ref 98–107)
CO2 SERPL-SCNC: 23 MMOL/L (ref 21–32)
CO2 SERPL-SCNC: 23 MMOL/L (ref 21–32)
CREAT SERPL-MCNC: 0.88 MG/DL (ref 0.5–1.3)
CREAT SERPL-MCNC: 0.93 MG/DL (ref 0.5–1.3)
EGFRCR SERPLBLD CKD-EPI 2021: >90 ML/MIN/1.73M*2
EGFRCR SERPLBLD CKD-EPI 2021: >90 ML/MIN/1.73M*2
EOSINOPHIL # BLD AUTO: 0 X10*3/UL (ref 0–0.7)
EOSINOPHIL NFR BLD AUTO: 0 %
ERYTHROCYTE [DISTWIDTH] IN BLOOD BY AUTOMATED COUNT: 18.6 % (ref 11.5–14.5)
FIBRINOGEN PPP-MCNC: 525 MG/DL (ref 200–400)
GLUCOSE SERPL-MCNC: 101 MG/DL (ref 74–99)
GLUCOSE SERPL-MCNC: 98 MG/DL (ref 74–99)
HCT VFR BLD AUTO: 32.3 % (ref 41–52)
HGB BLD-MCNC: 11.7 G/DL (ref 13.5–17.5)
IMM GRANULOCYTES # BLD AUTO: 0 X10*3/UL (ref 0–0.7)
IMM GRANULOCYTES NFR BLD AUTO: 0 % (ref 0–0.9)
INR PPP: 1.3 (ref 0.9–1.1)
LDH SERPL L TO P-CCNC: 92 U/L (ref 84–246)
LYMPHOCYTES # BLD AUTO: 0.01 X10*3/UL (ref 1.2–4.8)
LYMPHOCYTES NFR BLD AUTO: 25 %
MAGNESIUM SERPL-MCNC: 1.48 MG/DL (ref 1.6–2.4)
MAGNESIUM SERPL-MCNC: 1.86 MG/DL (ref 1.6–2.4)
MCH RBC QN AUTO: 27.8 PG (ref 26–34)
MCHC RBC AUTO-ENTMCNC: 36.2 G/DL (ref 32–36)
MCV RBC AUTO: 77 FL (ref 80–100)
MONOCYTES # BLD AUTO: 0 X10*3/UL (ref 0.1–1)
MONOCYTES NFR BLD AUTO: 0 %
NEUTROPHILS # BLD AUTO: 0.03 X10*3/UL (ref 1.2–7.7)
NEUTROPHILS NFR BLD AUTO: 75 %
NRBC BLD-RTO: 0 /100 WBCS (ref 0–0)
PHOSPHATE SERPL-MCNC: 3.5 MG/DL (ref 2.5–4.9)
PLATELET # BLD AUTO: 41 X10*3/UL (ref 150–450)
POTASSIUM SERPL-SCNC: 3.7 MMOL/L (ref 3.5–5.3)
POTASSIUM SERPL-SCNC: 3.7 MMOL/L (ref 3.5–5.3)
PROT SERPL-MCNC: 6.3 G/DL (ref 6.4–8.2)
PROTHROMBIN TIME: 14.9 SECONDS (ref 9.8–12.8)
RBC # BLD AUTO: 4.21 X10*6/UL (ref 4.5–5.9)
SODIUM SERPL-SCNC: 134 MMOL/L (ref 136–145)
SODIUM SERPL-SCNC: 135 MMOL/L (ref 136–145)
TACROLIMUS BLD-MCNC: 6.4 NG/ML
URATE SERPL-MCNC: 2.1 MG/DL (ref 4–7.5)
WBC # BLD AUTO: <0.1 X10*3/UL (ref 4.4–11.3)

## 2024-09-26 PROCEDURE — 83615 LACTATE (LD) (LDH) ENZYME: CPT

## 2024-09-26 PROCEDURE — 2500000002 HC RX 250 W HCPCS SELF ADMINISTERED DRUGS (ALT 637 FOR MEDICARE OP, ALT 636 FOR OP/ED): Performed by: INTERNAL MEDICINE

## 2024-09-26 PROCEDURE — 83735 ASSAY OF MAGNESIUM: CPT

## 2024-09-26 PROCEDURE — 80048 BASIC METABOLIC PNL TOTAL CA: CPT | Mod: CCI

## 2024-09-26 PROCEDURE — 80197 ASSAY OF TACROLIMUS: CPT | Performed by: INTERNAL MEDICINE

## 2024-09-26 PROCEDURE — 2500000001 HC RX 250 WO HCPCS SELF ADMINISTERED DRUGS (ALT 637 FOR MEDICARE OP)

## 2024-09-26 PROCEDURE — 85025 COMPLETE CBC W/AUTO DIFF WBC: CPT

## 2024-09-26 PROCEDURE — 84100 ASSAY OF PHOSPHORUS: CPT

## 2024-09-26 PROCEDURE — 80053 COMPREHEN METABOLIC PANEL: CPT

## 2024-09-26 PROCEDURE — 99233 SBSQ HOSP IP/OBS HIGH 50: CPT

## 2024-09-26 PROCEDURE — 84550 ASSAY OF BLOOD/URIC ACID: CPT

## 2024-09-26 PROCEDURE — 2500000002 HC RX 250 W HCPCS SELF ADMINISTERED DRUGS (ALT 637 FOR MEDICARE OP, ALT 636 FOR OP/ED): Performed by: PHYSICIAN ASSISTANT

## 2024-09-26 PROCEDURE — 2500000004 HC RX 250 GENERAL PHARMACY W/ HCPCS (ALT 636 FOR OP/ED): Performed by: INTERNAL MEDICINE

## 2024-09-26 PROCEDURE — 1170000001 HC PRIVATE ONCOLOGY ROOM DAILY

## 2024-09-26 PROCEDURE — 85610 PROTHROMBIN TIME: CPT

## 2024-09-26 PROCEDURE — 85384 FIBRINOGEN ACTIVITY: CPT

## 2024-09-26 PROCEDURE — 2500000004 HC RX 250 GENERAL PHARMACY W/ HCPCS (ALT 636 FOR OP/ED)

## 2024-09-26 PROCEDURE — 2500000001 HC RX 250 WO HCPCS SELF ADMINISTERED DRUGS (ALT 637 FOR MEDICARE OP): Performed by: INTERNAL MEDICINE

## 2024-09-26 RX ORDER — LOPERAMIDE HYDROCHLORIDE 2 MG/1
2 CAPSULE ORAL 4 TIMES DAILY PRN
Status: DISCONTINUED | OUTPATIENT
Start: 2024-09-26 | End: 2024-11-21 | Stop reason: HOSPADM

## 2024-09-26 RX ORDER — ACETAMINOPHEN 325 MG/1
650 TABLET ORAL ONCE
Status: COMPLETED | OUTPATIENT
Start: 2024-09-26 | End: 2024-09-26

## 2024-09-26 RX ORDER — MAGNESIUM SULFATE HEPTAHYDRATE 40 MG/ML
2 INJECTION, SOLUTION INTRAVENOUS ONCE
Status: COMPLETED | OUTPATIENT
Start: 2024-09-26 | End: 2024-09-26

## 2024-09-26 ASSESSMENT — COGNITIVE AND FUNCTIONAL STATUS - GENERAL
MOBILITY SCORE: 24
DAILY ACTIVITIY SCORE: 24
DAILY ACTIVITIY SCORE: 24
MOBILITY SCORE: 24

## 2024-09-26 ASSESSMENT — PAIN - FUNCTIONAL ASSESSMENT
PAIN_FUNCTIONAL_ASSESSMENT: 0-10
PAIN_FUNCTIONAL_ASSESSMENT: 0-10

## 2024-09-26 ASSESSMENT — PAIN SCALES - GENERAL
PAINLEVEL_OUTOF10: 0 - NO PAIN

## 2024-09-26 NOTE — CARE PLAN
The clinical goals for the shift include Patient will remain afebrile this shift 9/26/24 till 1900    Patient was not febrile. Patient reported nausea and weakness.     Problem: Pain - Adult  Goal: Verbalizes/displays adequate comfort level or baseline comfort level  Outcome: Progressing     Problem: Safety - Adult  Goal: Free from fall injury  Outcome: Progressing     Problem: Discharge Planning  Goal: Discharge to home or other facility with appropriate resources  Outcome: Progressing     Problem: Chronic Conditions and Co-morbidities  Goal: Patient's chronic conditions and co-morbidity symptoms are monitored and maintained or improved  Outcome: Progressing     Problem: Nutrition  Goal: Oral intake greater 75%  Outcome: Progressing  Goal: Adequate PO fluid intake  Outcome: Progressing  Goal: Maintain stable weight  Outcome: Progressing

## 2024-09-26 NOTE — PROGRESS NOTES
Brandt Merritt is a 64 y.o. male on day 13 of admission presenting with Acute myeloid leukemia in remission (Multi).    Subjective   Patient had a single fever reading overnight, during which he felt chills, had sweats, and felt uncomfortable. He also started having diarrhea again, and has had 3 episodes since then. The stools are loose, not watery, and non-bloody. Otherwise no cough, headache, sore throat, SOB, chest pain, dysuria, bleeding, bruising. He still has poor appetite but is doing his best to eat nonetheless.        Objective     Physical Exam  Constitutional:       General: He is not in acute distress.     Appearance: Normal appearance. He is normal weight. He is not ill-appearing, toxic-appearing or diaphoretic.   HENT:      Head: Normocephalic and atraumatic.      Right Ear: External ear normal.      Left Ear: External ear normal.      Nose: Nose normal. No congestion or rhinorrhea.      Mouth/Throat:      Mouth: Mucous membranes are moist.      Pharynx: Oropharynx is clear. No oropharyngeal exudate or posterior oropharyngeal erythema.   Eyes:      General:         Right eye: No discharge.         Left eye: No discharge.      Extraocular Movements: Extraocular movements intact.      Conjunctiva/sclera: Conjunctivae normal.   Cardiovascular:      Rate and Rhythm: Normal rate and regular rhythm.      Heart sounds: Normal heart sounds. No murmur heard.     No friction rub. No gallop.   Pulmonary:      Effort: Pulmonary effort is normal. No respiratory distress.      Breath sounds: No stridor. No wheezing, rhonchi or rales.   Abdominal:      General: Abdomen is flat. Bowel sounds are normal. There is no distension.      Palpations: Abdomen is soft. There is no mass.      Tenderness: There is no abdominal tenderness. There is no guarding or rebound.      Hernia: No hernia is present.   Musculoskeletal:         General: No swelling, tenderness, deformity or signs of injury. Normal range of motion.       "Cervical back: Normal range of motion.      Right lower leg: No edema.      Left lower leg: No edema.   Skin:     General: Skin is warm and dry.      Coloration: Skin is not jaundiced or pale.      Findings: No bruising, erythema, lesion or rash.   Neurological:      General: No focal deficit present.      Mental Status: He is alert and oriented to person, place, and time.   Psychiatric:         Mood and Affect: Mood normal.         Behavior: Behavior normal.         Thought Content: Thought content normal.         Judgment: Judgment normal.         Last Recorded Vitals  Blood pressure 111/69, pulse 92, temperature 37.2 °C (99 °F), temperature source Temporal, resp. rate 18, height (S) 1.664 m (5' 5.51\"), weight 73.4 kg (161 lb 13.1 oz), SpO2 99%.  Intake/Output last 3 Shifts:  I/O last 3 completed shifts:  In: - (0 mL/kg)   Out: 100 (1.4 mL/kg) [Urine:100 (0 mL/kg/hr)]  Weight: 73.4 kg     Relevant Results             Scheduled medications  acyclovir, 400 mg, oral, q12h  filgrastim or biosimilar, 300 mcg, subcutaneous, q24h  folic acid, 1 mg, oral, Daily  levoFLOXacin, 500 mg, oral, q24h JALYN  mycophenolate, 1,000 mg, oral, TID  NIFEdipine ER, 90 mg, oral, Daily before breakfast  piperacillin-tazobactam, 3.375 g, intravenous, q6h  posaconazole, 300 mg, oral, q24h  tacrolimus, 1 mg, oral, Nightly  tacrolimus, 1.5 mg, oral, Daily before breakfast  ursodiol, 300 mg, oral, TID  vancomycin, 125 mg, oral, Daily      Continuous medications     PRN medications  PRN medications: albuterol, alteplase, dextrose, diphenhydrAMINE, EPINEPHrine HCl, famotidine, methylPREDNISolone sodium succinate (PF), ondansetron, polyethylene glycol, prochlorperazine, prochlorperazine, sodium chloride    Results for orders placed or performed during the hospital encounter of 09/13/24 (from the past 24 hour(s))   Blood Culture    Specimen: Peripheral Venipuncture; Blood culture   Result Value Ref Range    Blood Culture Loaded on Instrument - " Culture in progress    Blood Culture    Specimen: Peripheral Venipuncture; Blood culture   Result Value Ref Range    Blood Culture Loaded on Instrument - Culture in progress    Urinalysis with Reflex Microscopic   Result Value Ref Range    Color, Urine Light-Yellow Light-Yellow, Yellow, Dark-Yellow    Appearance, Urine Clear Clear    Specific Gravity, Urine 1.020 1.005 - 1.035    pH, Urine 6.0 5.0, 5.5, 6.0, 6.5, 7.0, 7.5, 8.0    Protein, Urine 10 (TRACE) NEGATIVE, 10 (TRACE), 20 (TRACE) mg/dL    Glucose, Urine Normal Normal mg/dL    Blood, Urine 0.2 (2+) (A) NEGATIVE    Ketones, Urine TRACE (A) NEGATIVE mg/dL    Bilirubin, Urine NEGATIVE NEGATIVE    Urobilinogen, Urine Normal Normal mg/dL    Nitrite, Urine NEGATIVE NEGATIVE    Leukocyte Esterase, Urine NEGATIVE NEGATIVE   Microscopic Only, Urine   Result Value Ref Range    WBC, Urine 1-5 1-5, NONE /HPF    RBC, Urine >20 (A) NONE, 1-2, 3-5 /HPF    Mucus, Urine FEW Reference range not established. /LPF   CBC and Auto Differential   Result Value Ref Range    WBC <0.1 (LL) 4.4 - 11.3 x10*3/uL    nRBC 0.0 0.0 - 0.0 /100 WBCs    RBC 4.21 (L) 4.50 - 5.90 x10*6/uL    Hemoglobin 11.7 (L) 13.5 - 17.5 g/dL    Hematocrit 32.3 (L) 41.0 - 52.0 %    MCV 77 (L) 80 - 100 fL    MCH 27.8 26.0 - 34.0 pg    MCHC 36.2 (H) 32.0 - 36.0 g/dL    RDW 18.6 (H) 11.5 - 14.5 %    Platelets 41 (L) 150 - 450 x10*3/uL    Neutrophils % 75.0 40.0 - 80.0 %    Immature Granulocytes %, Automated 0.0 0.0 - 0.9 %    Lymphocytes % 25.0 13.0 - 44.0 %    Monocytes % 0.0 2.0 - 10.0 %    Eosinophils % 0.0 0.0 - 6.0 %    Basophils % 0.0 0.0 - 2.0 %    Neutrophils Absolute 0.03 (L) 1.20 - 7.70 x10*3/uL    Immature Granulocytes Absolute, Automated 0.00 0.00 - 0.70 x10*3/uL    Lymphocytes Absolute 0.01 (L) 1.20 - 4.80 x10*3/uL    Monocytes Absolute 0.00 (L) 0.10 - 1.00 x10*3/uL    Eosinophils Absolute 0.00 0.00 - 0.70 x10*3/uL    Basophils Absolute 0.00 0.00 - 0.10 x10*3/uL   Coagulation Screen   Result Value Ref  Range    Protime 14.9 (H) 9.8 - 12.8 seconds    INR 1.3 (H) 0.9 - 1.1    aPTT 30 27 - 38 seconds   Comprehensive Metabolic Panel   Result Value Ref Range    Glucose 101 (H) 74 - 99 mg/dL    Sodium 135 (L) 136 - 145 mmol/L    Potassium 3.7 3.5 - 5.3 mmol/L    Chloride 102 98 - 107 mmol/L    Bicarbonate 23 21 - 32 mmol/L    Anion Gap 14 10 - 20 mmol/L    Urea Nitrogen 12 6 - 23 mg/dL    Creatinine 0.93 0.50 - 1.30 mg/dL    eGFR >90 >60 mL/min/1.73m*2    Calcium 8.6 8.6 - 10.6 mg/dL    Albumin 3.6 3.4 - 5.0 g/dL    Alkaline Phosphatase 95 33 - 136 U/L    Total Protein 6.3 (L) 6.4 - 8.2 g/dL    AST 8 (L) 9 - 39 U/L    Bilirubin, Total 0.7 0.0 - 1.2 mg/dL    ALT 8 (L) 10 - 52 U/L   Fibrinogen   Result Value Ref Range    Fibrinogen 525 (H) 200 - 400 mg/dL   Lactate Dehydrogenase   Result Value Ref Range    LDH 92 84 - 246 U/L   Magnesium   Result Value Ref Range    Magnesium 1.48 (L) 1.60 - 2.40 mg/dL   Phosphorus   Result Value Ref Range    Phosphorus 3.5 2.5 - 4.9 mg/dL   Uric Acid   Result Value Ref Range    Uric Acid 2.1 (L) 4.0 - 7.5 mg/dL   Tacrolimus level   Result Value Ref Range    Tacrolimus  6.4 <=15.0 ng/mL     XR chest 1 view    Result Date: 9/26/2024  Interpreted By:  Tuan Reid  and Luis Fitzgerald STUDY: XR CHEST 1 VIEW;  9/25/2024 8:50 pm   INDICATION: Signs/Symptoms:neutropenic fever.   COMPARISON: CT chest 07/17/2024   ACCESSION NUMBER(S): [id number]   ORDERING CLINICIAN: LAVELL TORRES   FINDINGS: AP radiograph of the chest was provided.   Right internal jugular approach central venous catheter tip overlies the upper superior vena cava. Overlying external component of the catheter mildly limits evaluation of the underlying lung tissue.   CARDIOMEDIASTINAL SILHOUETTE: The cardiomediastinal silhouette is normal in size and configuration.   LUNGS: No abnormal airspace opacity, effusion, or pneumothorax.   ABDOMEN: No remarkable upper abdominal findings.   BONES: No acute osseous abnormality.        1. No acute abnormality of the chest.   I personally reviewed the image(s)/study and resident interpretation as stated by Dr. Lia Smith MD. I agree with the findings as stated. This study was interpreted at [facility], [place], [place].   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      T+4 Single-unit cord transplant (T0=9/19/24)     Updates (9/26/24):  -continue zosyn  -stop levofloxacin  -hold miralax  -loperamide 2mg q6h PRN diarrhea; OK to give half pill if patient requests  -2g magnesium sulfate IV today  -repeat BMP and Mg on PM draw     ONC  # MDS/AML   - Symptoms began 9/2023; diagnosed 4/2024  - BMBx showing MDS in transition to AML (>10% jaime) w/ trisomy 8, DNMT alpha, and U2AF1 mutation indication adverse risk   - s/p 4 cycles of Decitabine/Venetoclax (C4D1 on 8/12/24)  - BMBx 6/17/24: Blasts cleared, FISH still positive for trisomy 8, still MDS changes   - BMBx 9/5/24: 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 and no increase in blast     # Transplant  CONDITIONING Fludarabine, melphalan, and TBI   DONOR Single umbilical cord   MATCH GRADE 6/8   SEX MATCH Mismatched   ABO DONOR A pos   ABO RECIPIENT A pos   GVHD PROPHYLAXIS Tacrolimus and Mycophenolate   GRAFT SOURCE Single umbilical cord   CMV DONOR negative   CMV RECIPIENT ppsitive   GVHD PPX:  - Tacrolimus 1.5 mg q12h started T-3 (9/16)  *Tacro level: 4.5 (9/20), 4/7 (9/21), 5.7 (9/22)  *Decreased Tacrolimus to 1mg BID d/t Posaconazole  - MMF  1000 mg TID to begin T+2 to T+35   - Viral surveillance should begin T+14 (ordered)  - continue tacrolimus 1.5mg alternating with 1mg     HEME  # Anemia and Leukopenia: secondary to disease and chemotherapy  # 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   - Transfuse for Hgb <7 & PLT <10   - Neupogen 300mcg to begin T+5     # VTE Prophylaxis: SCDs & Ambulation     ID  #Neutropenic fever  # Prophylaxis: Acyclovir, Vancomycin, Posaconazole, Letermovir (T+14), and Bactrim (T+30)  - fever noted overnight 9/25 at 38C, along with diarrhea, no other focal symptoms  - UA with 2+ blood, trace ketones only, CXR clear  - blood cultures NGTD  - stop levofloxacin, start zosyn  - etiology: suspect gut bacterial translocation/enteritis related to melphalan     CARDIO/PULM  # Hx of STEMI (11/11/2020)  - TTE 7/19/24: EF 59%, otherwise unremarkable   - Cleared by Cardiology for transplant      # Hx of HTN   - Nifedipine: 90 mEq ER 24 hr tablet      # Ascending aorta dilation   - Echo showing 3.8-4 cm dilation      FEN/RENAL  - Admit wt: 75.6 kg (9/13/24); Most recent wt: 75.1 kg  Allergic to Lasix, will not take med (extreme hypotension, extreme hypokalemia)     #Diarrhea, c.diff neg  -diarrhea returned 9/26 AM, along with fever  -etiology: likely melphalan-related mucositis/enteritis  -C diff negative 9/22, pt on suppressive PO vancomycin  -loperamide 2mg q6h PRN, can give 1mg as per discussion with patient  -replete 2g magnesium sulfate today  -repeat BMP, magnesium on PM draw     #Constipation, resolved  -stop miralax     #SOS prophylaxis: Actigall      #hx of BPH and urinary retention (2023)  - Hx elevated PSA               *7/28/21 Bx: atypical small acinar proliferation               *4/7/23 Bx: Benign     #Hx of treated Hep C in 2015  - (9/12) Hep C RNA - not detected      #Hx of recurrent hyperbilirubinemia   - 2/2 to Hemoglobin C hemolysis      F: prn  E: replete Mg today  N: low microbial, ensure, sour lemon candy  A: picc line     DVT:  ambulation, scd  GI: PPI  NOK: spouse Genevieve ([phone])  Code: FULL     Oncologist: Jerod  Patient seen and examined with Dr. Ignacio Deras MD PhD

## 2024-09-27 LAB
ALBUMIN SERPL BCP-MCNC: 3.5 G/DL (ref 3.4–5)
ALP SERPL-CCNC: 84 U/L (ref 33–136)
ALT SERPL W P-5'-P-CCNC: 6 U/L (ref 10–52)
ANION GAP SERPL CALC-SCNC: 14 MMOL/L (ref 10–20)
ANION GAP SERPL CALC-SCNC: 15 MMOL/L (ref 10–20)
APTT PPP: 30 SECONDS (ref 27–38)
AST SERPL W P-5'-P-CCNC: 8 U/L (ref 9–39)
BASOPHILS # BLD AUTO: 0 X10*3/UL (ref 0–0.1)
BASOPHILS NFR BLD AUTO: 0 %
BILIRUB SERPL-MCNC: 0.9 MG/DL (ref 0–1.2)
BUN SERPL-MCNC: 16 MG/DL (ref 6–23)
BUN SERPL-MCNC: 18 MG/DL (ref 6–23)
CALCIUM SERPL-MCNC: 8.2 MG/DL (ref 8.6–10.6)
CALCIUM SERPL-MCNC: 8.4 MG/DL (ref 8.6–10.6)
CHLORIDE SERPL-SCNC: 102 MMOL/L (ref 98–107)
CHLORIDE SERPL-SCNC: 104 MMOL/L (ref 98–107)
CO2 SERPL-SCNC: 22 MMOL/L (ref 21–32)
CO2 SERPL-SCNC: 23 MMOL/L (ref 21–32)
CREAT SERPL-MCNC: 1.12 MG/DL (ref 0.5–1.3)
CREAT SERPL-MCNC: 1.15 MG/DL (ref 0.5–1.3)
EGFRCR SERPLBLD CKD-EPI 2021: 70 ML/MIN/1.73M*2
EGFRCR SERPLBLD CKD-EPI 2021: 72 ML/MIN/1.73M*2
EOSINOPHIL # BLD AUTO: 0 X10*3/UL (ref 0–0.7)
EOSINOPHIL NFR BLD AUTO: 0 %
ERYTHROCYTE [DISTWIDTH] IN BLOOD BY AUTOMATED COUNT: 18.6 % (ref 11.5–14.5)
FIBRINOGEN PPP-MCNC: 632 MG/DL (ref 200–400)
GLUCOSE SERPL-MCNC: 115 MG/DL (ref 74–99)
GLUCOSE SERPL-MCNC: 116 MG/DL (ref 74–99)
HCT VFR BLD AUTO: 31.9 % (ref 41–52)
HGB BLD-MCNC: 11.6 G/DL (ref 13.5–17.5)
IMM GRANULOCYTES # BLD AUTO: 0 X10*3/UL (ref 0–0.7)
IMM GRANULOCYTES NFR BLD AUTO: 0 % (ref 0–0.9)
INR PPP: 1.3 (ref 0.9–1.1)
LDH SERPL L TO P-CCNC: 90 U/L (ref 84–246)
LYMPHOCYTES # BLD AUTO: 0.02 X10*3/UL (ref 1.2–4.8)
LYMPHOCYTES NFR BLD AUTO: 50 %
MAGNESIUM SERPL-MCNC: 1.55 MG/DL (ref 1.6–2.4)
MAGNESIUM SERPL-MCNC: 1.61 MG/DL (ref 1.6–2.4)
MCH RBC QN AUTO: 27.4 PG (ref 26–34)
MCHC RBC AUTO-ENTMCNC: 36.4 G/DL (ref 32–36)
MCV RBC AUTO: 75 FL (ref 80–100)
MONOCYTES # BLD AUTO: 0.01 X10*3/UL (ref 0.1–1)
MONOCYTES NFR BLD AUTO: 25 %
NEUTROPHILS # BLD AUTO: 0.01 X10*3/UL (ref 1.2–7.7)
NEUTROPHILS NFR BLD AUTO: 25 %
NRBC BLD-RTO: 0 /100 WBCS (ref 0–0)
PHOSPHATE SERPL-MCNC: 4.2 MG/DL (ref 2.5–4.9)
PLATELET # BLD AUTO: 19 X10*3/UL (ref 150–450)
POTASSIUM SERPL-SCNC: 3.1 MMOL/L (ref 3.5–5.3)
POTASSIUM SERPL-SCNC: 3.3 MMOL/L (ref 3.5–5.3)
PROT SERPL-MCNC: 6.3 G/DL (ref 6.4–8.2)
PROTHROMBIN TIME: 14.4 SECONDS (ref 9.8–12.8)
RBC # BLD AUTO: 4.23 X10*6/UL (ref 4.5–5.9)
SODIUM SERPL-SCNC: 136 MMOL/L (ref 136–145)
SODIUM SERPL-SCNC: 138 MMOL/L (ref 136–145)
TACROLIMUS BLD-MCNC: 8.1 NG/ML
URATE SERPL-MCNC: 1.8 MG/DL (ref 4–7.5)
WBC # BLD AUTO: <0.1 X10*3/UL (ref 4.4–11.3)

## 2024-09-27 PROCEDURE — 80197 ASSAY OF TACROLIMUS: CPT | Performed by: INTERNAL MEDICINE

## 2024-09-27 PROCEDURE — 80053 COMPREHEN METABOLIC PANEL: CPT

## 2024-09-27 PROCEDURE — 1170000001 HC PRIVATE ONCOLOGY ROOM DAILY

## 2024-09-27 PROCEDURE — 2500000002 HC RX 250 W HCPCS SELF ADMINISTERED DRUGS (ALT 637 FOR MEDICARE OP, ALT 636 FOR OP/ED): Performed by: PHYSICIAN ASSISTANT

## 2024-09-27 PROCEDURE — 2500000001 HC RX 250 WO HCPCS SELF ADMINISTERED DRUGS (ALT 637 FOR MEDICARE OP): Performed by: INTERNAL MEDICINE

## 2024-09-27 PROCEDURE — 2500000004 HC RX 250 GENERAL PHARMACY W/ HCPCS (ALT 636 FOR OP/ED): Performed by: INTERNAL MEDICINE

## 2024-09-27 PROCEDURE — 2500000002 HC RX 250 W HCPCS SELF ADMINISTERED DRUGS (ALT 637 FOR MEDICARE OP, ALT 636 FOR OP/ED): Performed by: INTERNAL MEDICINE

## 2024-09-27 PROCEDURE — 83735 ASSAY OF MAGNESIUM: CPT

## 2024-09-27 PROCEDURE — 99233 SBSQ HOSP IP/OBS HIGH 50: CPT

## 2024-09-27 PROCEDURE — 85610 PROTHROMBIN TIME: CPT

## 2024-09-27 PROCEDURE — 2500000004 HC RX 250 GENERAL PHARMACY W/ HCPCS (ALT 636 FOR OP/ED)

## 2024-09-27 PROCEDURE — 83615 LACTATE (LD) (LDH) ENZYME: CPT

## 2024-09-27 PROCEDURE — 85025 COMPLETE CBC W/AUTO DIFF WBC: CPT

## 2024-09-27 PROCEDURE — 2500000001 HC RX 250 WO HCPCS SELF ADMINISTERED DRUGS (ALT 637 FOR MEDICARE OP): Performed by: PHYSICIAN ASSISTANT

## 2024-09-27 PROCEDURE — 84100 ASSAY OF PHOSPHORUS: CPT

## 2024-09-27 PROCEDURE — 85384 FIBRINOGEN ACTIVITY: CPT

## 2024-09-27 PROCEDURE — 2500000001 HC RX 250 WO HCPCS SELF ADMINISTERED DRUGS (ALT 637 FOR MEDICARE OP)

## 2024-09-27 PROCEDURE — 84550 ASSAY OF BLOOD/URIC ACID: CPT

## 2024-09-27 PROCEDURE — 87506 IADNA-DNA/RNA PROBE TQ 6-11: CPT

## 2024-09-27 PROCEDURE — 80048 BASIC METABOLIC PNL TOTAL CA: CPT | Mod: CCI

## 2024-09-27 PROCEDURE — 2500000004 HC RX 250 GENERAL PHARMACY W/ HCPCS (ALT 636 FOR OP/ED): Mod: JZ

## 2024-09-27 RX ORDER — ACETAMINOPHEN 325 MG/1
650 TABLET ORAL ONCE
Status: COMPLETED | OUTPATIENT
Start: 2024-09-27 | End: 2024-09-27

## 2024-09-27 RX ORDER — ACETAMINOPHEN 325 MG/1
650 TABLET ORAL 2 TIMES DAILY PRN
Status: DISCONTINUED | OUTPATIENT
Start: 2024-09-27 | End: 2024-09-28

## 2024-09-27 RX ORDER — MEROPENEM 1 G/1
1 INJECTION, POWDER, FOR SOLUTION INTRAVENOUS EVERY 8 HOURS
Status: DISCONTINUED | OUTPATIENT
Start: 2024-09-27 | End: 2024-09-29

## 2024-09-27 RX ORDER — POTASSIUM CHLORIDE 29.8 MG/ML
40 INJECTION INTRAVENOUS ONCE
Status: COMPLETED | OUTPATIENT
Start: 2024-09-27 | End: 2024-09-27

## 2024-09-27 RX ORDER — CEFEPIME 1 G/50ML
2 INJECTION, SOLUTION INTRAVENOUS EVERY 12 HOURS
Status: DISCONTINUED | OUTPATIENT
Start: 2024-09-27 | End: 2024-10-08

## 2024-09-27 ASSESSMENT — COGNITIVE AND FUNCTIONAL STATUS - GENERAL: MOBILITY SCORE: 24

## 2024-09-27 ASSESSMENT — PAIN - FUNCTIONAL ASSESSMENT
PAIN_FUNCTIONAL_ASSESSMENT: 0-10

## 2024-09-27 ASSESSMENT — PAIN SCALES - GENERAL
PAINLEVEL_OUTOF10: 0 - NO PAIN

## 2024-09-27 NOTE — PROGRESS NOTES
Brandt Merritt is a 64 y.o. male on day 14 of admission presenting with Acute myeloid leukemia in remission (Multi).    Subjective   Overnight, pt had persistent fevers and felt poorly. He continued to have active diarrhea and has not taken the loperamide, as nursing has been unable to split the pill in half. No cough, SOB, chest pain, dysuria, skin lesions or breakdown. No bruising, bleeding. This morning he continues to feel poorly.      Objective     Physical Exam  Constitutional:       General: He is not in acute distress.     Appearance: Normal appearance. He is normal weight. He is not ill-appearing, toxic-appearing or diaphoretic.   HENT:      Head: Normocephalic and atraumatic.      Right Ear: External ear normal.      Left Ear: External ear normal.      Nose: Nose normal. No congestion or rhinorrhea.      Mouth/Throat:      Mouth: Mucous membranes are moist.      Pharynx: Oropharynx is clear. No oropharyngeal exudate or posterior oropharyngeal erythema.   Eyes:      General: No scleral icterus.        Right eye: No discharge.         Left eye: No discharge.      Extraocular Movements: Extraocular movements intact.      Conjunctiva/sclera: Conjunctivae normal.   Cardiovascular:      Rate and Rhythm: Normal rate and regular rhythm.      Heart sounds: Normal heart sounds. No murmur heard.     No friction rub. No gallop.   Pulmonary:      Effort: Pulmonary effort is normal. No respiratory distress.      Breath sounds: No stridor. No wheezing, rhonchi or rales.   Abdominal:      General: Abdomen is flat. Bowel sounds are normal. There is no distension.      Palpations: There is no mass.      Tenderness: There is no abdominal tenderness. There is no guarding or rebound.      Hernia: No hernia is present.   Musculoskeletal:         General: No swelling, tenderness, deformity or signs of injury. Normal range of motion.      Cervical back: Normal range of motion.      Right lower leg: No edema.      Left lower leg:  "No edema.   Skin:     General: Skin is warm and dry.      Coloration: Skin is not jaundiced or pale.      Findings: No bruising, erythema, lesion or rash.   Neurological:      General: No focal deficit present.      Mental Status: He is alert and oriented to person, place, and time. Mental status is at baseline.   Psychiatric:         Mood and Affect: Mood normal.         Behavior: Behavior normal.         Thought Content: Thought content normal.         Judgment: Judgment normal.         Last Recorded Vitals  Blood pressure 128/83, pulse 88, temperature 37.6 °C (99.7 °F), temperature source Temporal, resp. rate 18, height (S) 1.664 m (5' 5.51\"), weight 73.4 kg (161 lb 13.1 oz), SpO2 97%.  Intake/Output last 3 Shifts:  I/O last 3 completed shifts:  In: 480 (6.5 mL/kg) [P.O.:480]  Out: 100 (1.4 mL/kg) [Urine:100 (0 mL/kg/hr)]  Weight: 73.4 kg     Relevant Results             Scheduled medications  acyclovir, 400 mg, oral, q12h  filgrastim or biosimilar, 300 mcg, subcutaneous, q24h  folic acid, 1 mg, oral, Daily  mycophenolate, 1,000 mg, oral, TID  NIFEdipine ER, 90 mg, oral, Daily before breakfast  piperacillin-tazobactam, 3.375 g, intravenous, q6h  posaconazole, 300 mg, oral, q24h  tacrolimus, 1 mg, oral, Nightly  tacrolimus, 1.5 mg, oral, Daily before breakfast  ursodiol, 300 mg, oral, TID  vancomycin, 125 mg, oral, Daily      Continuous medications     PRN medications  PRN medications: albuterol, alteplase, dextrose, diphenhydrAMINE, EPINEPHrine HCl, famotidine, loperamide, methylPREDNISolone sodium succinate (PF), ondansetron, [Held by provider] polyethylene glycol, prochlorperazine, prochlorperazine, sodium chloride    Results for orders placed or performed during the hospital encounter of 09/13/24 (from the past 24 hour(s))   Basic metabolic panel   Result Value Ref Range    Glucose 98 74 - 99 mg/dL    Sodium 134 (L) 136 - 145 mmol/L    Potassium 3.7 3.5 - 5.3 mmol/L    Chloride 102 98 - 107 mmol/L    " Bicarbonate 23 21 - 32 mmol/L    Anion Gap 13 10 - 20 mmol/L    Urea Nitrogen 14 6 - 23 mg/dL    Creatinine 0.88 0.50 - 1.30 mg/dL    eGFR >90 >60 mL/min/1.73m*2    Calcium 8.2 (L) 8.6 - 10.6 mg/dL   Magnesium   Result Value Ref Range    Magnesium 1.86 1.60 - 2.40 mg/dL   CBC and Auto Differential   Result Value Ref Range    WBC <0.1 (LL) 4.4 - 11.3 x10*3/uL    nRBC 0.0 0.0 - 0.0 /100 WBCs    RBC 4.23 (L) 4.50 - 5.90 x10*6/uL    Hemoglobin 11.6 (L) 13.5 - 17.5 g/dL    Hematocrit 31.9 (L) 41.0 - 52.0 %    MCV 75 (L) 80 - 100 fL    MCH 27.4 26.0 - 34.0 pg    MCHC 36.4 (H) 32.0 - 36.0 g/dL    RDW 18.6 (H) 11.5 - 14.5 %    Platelets 19 (LL) 150 - 450 x10*3/uL    Neutrophils % 25.0 40.0 - 80.0 %    Immature Granulocytes %, Automated 0.0 0.0 - 0.9 %    Lymphocytes % 50.0 13.0 - 44.0 %    Monocytes % 25.0 2.0 - 10.0 %    Eosinophils % 0.0 0.0 - 6.0 %    Basophils % 0.0 0.0 - 2.0 %    Neutrophils Absolute 0.01 (L) 1.20 - 7.70 x10*3/uL    Immature Granulocytes Absolute, Automated 0.00 0.00 - 0.70 x10*3/uL    Lymphocytes Absolute 0.02 (L) 1.20 - 4.80 x10*3/uL    Monocytes Absolute 0.01 (L) 0.10 - 1.00 x10*3/uL    Eosinophils Absolute 0.00 0.00 - 0.70 x10*3/uL    Basophils Absolute 0.00 0.00 - 0.10 x10*3/uL   Coagulation Screen   Result Value Ref Range    Protime 14.4 (H) 9.8 - 12.8 seconds    INR 1.3 (H) 0.9 - 1.1    aPTT 30 27 - 38 seconds   Comprehensive Metabolic Panel   Result Value Ref Range    Glucose 115 (H) 74 - 99 mg/dL    Sodium 136 136 - 145 mmol/L    Potassium 3.1 (L) 3.5 - 5.3 mmol/L    Chloride 102 98 - 107 mmol/L    Bicarbonate 22 21 - 32 mmol/L    Anion Gap 15 10 - 20 mmol/L    Urea Nitrogen 16 6 - 23 mg/dL    Creatinine 1.15 0.50 - 1.30 mg/dL    eGFR 70 >60 mL/min/1.73m*2    Calcium 8.2 (L) 8.6 - 10.6 mg/dL    Albumin 3.5 3.4 - 5.0 g/dL    Alkaline Phosphatase 84 33 - 136 U/L    Total Protein 6.3 (L) 6.4 - 8.2 g/dL    AST 8 (L) 9 - 39 U/L    Bilirubin, Total 0.9 0.0 - 1.2 mg/dL    ALT 6 (L) 10 - 52 U/L    Fibrinogen   Result Value Ref Range    Fibrinogen 632 (H) 200 - 400 mg/dL   Lactate Dehydrogenase   Result Value Ref Range    LDH 90 84 - 246 U/L   Magnesium   Result Value Ref Range    Magnesium 1.61 1.60 - 2.40 mg/dL   Phosphorus   Result Value Ref Range    Phosphorus 4.2 2.5 - 4.9 mg/dL   Uric Acid   Result Value Ref Range    Uric Acid 1.8 (L) 4.0 - 7.5 mg/dL   Tacrolimus level   Result Value Ref Range    Tacrolimus  8.1 <=15.0 ng/mL     XR chest 1 view    Result Date: 9/26/2024  Interpreted By:  Tuan Reid and Summerville Lesley STUDY: XR CHEST 1 VIEW;  9/25/2024 8:50 pm   INDICATION: Signs/Symptoms:neutropenic fever.   COMPARISON: CT chest 07/17/2024   ACCESSION NUMBER(S): [id number]   ORDERING CLINICIAN: LAVELL TORRES   FINDINGS: AP radiograph of the chest was provided.   Right internal jugular approach central venous catheter tip overlies the upper superior vena cava. Overlying external component of the catheter mildly limits evaluation of the underlying lung tissue.   CARDIOMEDIASTINAL SILHOUETTE: The cardiomediastinal silhouette is normal in size and configuration.   LUNGS: No abnormal airspace opacity, effusion, or pneumothorax.   ABDOMEN: No remarkable upper abdominal findings.   BONES: No acute osseous abnormality.       1. No acute abnormality of the chest.   I personally reviewed the image(s)/study and resident interpretation as stated by Dr. Lia Smith MD. I agree with the findings as stated. This study was interpreted at [facility], [place], [place].   MACRO: None   Signed by: Tuan Reid 9/26/2024 6:09 AM Dictation workstation:   [id number]     This patient has a central line   Reason for the central line remaining today? 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      T+4 Single-unit cord transplant (T0=9/19/24)     Updates (9/27/24):  -stop zosyn, start meropenem  -stool pathogen panel  -40mEq Kcl IV  -repeat BMP and Mg in PM     ONC  # MDS/AML   - Symptoms began 9/2023; diagnosed 4/2024  - BMBx showing MDS in transition to AML (>10% jaime) w/ trisomy 8, DNMT alpha, and U2AF1 mutation indication adverse risk   - s/p 4 cycles of Decitabine/Venetoclax (C4D1 on 8/12/24)  - BMBx 6/17/24: Blasts cleared, FISH still positive for trisomy 8, still MDS changes   - BMBx 9/5/24: 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 and no increase in blast     # Transplant  CONDITIONING Fludarabine, melphalan, and TBI   DONOR Single umbilical cord   MATCH GRADE 6/8   SEX MATCH Mismatched   ABO DONOR A pos   ABO RECIPIENT A pos   GVHD PROPHYLAXIS Tacrolimus and Mycophenolate   GRAFT SOURCE Single umbilical cord   CMV DONOR negative   CMV RECIPIENT ppsitive   GVHD PPX:  - Tacrolimus 1.5 mg q12h started T-3 (9/16)  *Tacro level: 4.5 (9/20), 4/7 (9/21), 5.7 (9/22)  *Decreased Tacrolimus to 1mg BID d/t Posaconazole  - MMF 1000 mg TID to begin T+2 to T+35   - Viral surveillance should begin T+14 (ordered)  - continue tacrolimus 1.5mg alternating with 1mg (9/27 tacro 8.1)     HEME  # Anemia and Leukopenia: secondary to disease and chemotherapy  # 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   - Transfuse for Hgb <7 & PLT <10   - Neupogen 300mcg to begin T+5     # VTE Prophylaxis: SCDs & Ambulation     ID  #Neutropenic fever  # Prophylaxis: Acyclovir, Vancomycin, Posaconazole, Letermovir (T+14), and Bactrim (T+30)  - fever noted overnight 9/25 at 38C, along with diarrhea, no other focal symptoms  - UA with 2+ blood, trace ketones only, CXR clear  - blood cultures NGTD  - stop zosyn, broaden to meropenem  - etiology:  suspect gut bacterial translocation/enteritis related to melphalan  - if persistent fevers > 72hr, consider broadening posa -> micafungin     CARDIO/PULM  # Hx of STEMI (11/11/2020)  - TTE 7/19/24: EF 59%, otherwise unremarkable   - Cleared by Cardiology for transplant      # Hx of HTN   - Nifedipine: 90 mEq ER 24 hr tablet      # Ascending aorta dilation   - Echo showing 3.8-4 cm dilation      FEN/RENAL  - Admit wt: 75.6 kg (9/13/24); Most recent wt: 75.1 kg  Allergic to Lasix, will not take med (extreme hypotension, extreme hypokalemia)     #Diarrhea, c.diff neg  -diarrhea returned 9/26 AM, along with fever  -etiology: likely melphalan-related mucositis/enteritis  -C diff negative 9/22, pt on suppressive PO vancomycin  -loperamide 2mg q6h PRN  -replete 40mEq Kcl IV today  -repeat BMP, magnesium on PM draw  -stool pathogen panel     #Constipation, resolved  -stop miralax     #SOS prophylaxis: Actigall      #hx of BPH and urinary retention (2023)  - Hx elevated PSA               *7/28/21 Bx: atypical small acinar proliferation               *4/7/23 Bx: Benign     #Hx of treated Hep C in 2015  - (9/12) Hep C RNA - not detected      #Hx of recurrent hyperbilirubinemia   - 2/2 to Hemoglobin C hemolysis      F: prn  E: replete K today  N: low microbial, ensure, sour lemon candy  A: picc line     DVT: ambulation, scd  GI: PPI  NOK: spouse Genevieve ([phone])  Code: FULL     Oncologist: Jerod  Patient discussed with Dr. Daisy Deras MD PhD

## 2024-09-28 ENCOUNTER — APPOINTMENT (OUTPATIENT)
Dept: RADIOLOGY | Facility: HOSPITAL | Age: 64
DRG: 014 | End: 2024-09-28
Payer: COMMERCIAL

## 2024-09-28 LAB
ALBUMIN SERPL BCP-MCNC: 3.3 G/DL (ref 3.4–5)
ALP SERPL-CCNC: 75 U/L (ref 33–136)
ALT SERPL W P-5'-P-CCNC: 8 U/L (ref 10–52)
ANION GAP SERPL CALC-SCNC: 12 MMOL/L (ref 10–20)
APTT PPP: 29 SECONDS (ref 27–38)
AST SERPL W P-5'-P-CCNC: 7 U/L (ref 9–39)
BASOPHILS # BLD AUTO: 0 X10*3/UL (ref 0–0.1)
BASOPHILS NFR BLD AUTO: 0 %
BILIRUB SERPL-MCNC: 0.7 MG/DL (ref 0–1.2)
BUN SERPL-MCNC: 18 MG/DL (ref 6–23)
C COLI+JEJ+UPSA DNA STL QL NAA+PROBE: NOT DETECTED
CALCIUM SERPL-MCNC: 8 MG/DL (ref 8.6–10.6)
CHLORIDE SERPL-SCNC: 102 MMOL/L (ref 98–107)
CO2 SERPL-SCNC: 22 MMOL/L (ref 21–32)
CREAT SERPL-MCNC: 1.2 MG/DL (ref 0.5–1.3)
EC STX1 GENE STL QL NAA+PROBE: NOT DETECTED
EC STX2 GENE STL QL NAA+PROBE: NOT DETECTED
EGFRCR SERPLBLD CKD-EPI 2021: 67 ML/MIN/1.73M*2
EOSINOPHIL # BLD AUTO: 0 X10*3/UL (ref 0–0.7)
EOSINOPHIL NFR BLD AUTO: 0 %
ERYTHROCYTE [DISTWIDTH] IN BLOOD BY AUTOMATED COUNT: 18.5 % (ref 11.5–14.5)
FIBRINOGEN PPP-MCNC: 615 MG/DL (ref 200–400)
GLUCOSE SERPL-MCNC: 115 MG/DL (ref 74–99)
HCT VFR BLD AUTO: 29.7 % (ref 41–52)
HGB BLD-MCNC: 10.6 G/DL (ref 13.5–17.5)
IMM GRANULOCYTES # BLD AUTO: 0 X10*3/UL (ref 0–0.7)
IMM GRANULOCYTES NFR BLD AUTO: 0 % (ref 0–0.9)
INR PPP: 1.4 (ref 0.9–1.1)
LDH SERPL L TO P-CCNC: 99 U/L (ref 84–246)
LYMPHOCYTES # BLD AUTO: 0.02 X10*3/UL (ref 1.2–4.8)
LYMPHOCYTES NFR BLD AUTO: 33.3 %
MAGNESIUM SERPL-MCNC: 1.56 MG/DL (ref 1.6–2.4)
MCH RBC QN AUTO: 27.5 PG (ref 26–34)
MCHC RBC AUTO-ENTMCNC: 35.7 G/DL (ref 32–36)
MCV RBC AUTO: 77 FL (ref 80–100)
MONOCYTES # BLD AUTO: 0.03 X10*3/UL (ref 0.1–1)
MONOCYTES NFR BLD AUTO: 50 %
NEUTROPHILS # BLD AUTO: 0.01 X10*3/UL (ref 1.2–7.7)
NEUTROPHILS NFR BLD AUTO: 16.7 %
NOROVIRUS GI + GII RNA STL NAA+PROBE: NOT DETECTED
NRBC BLD-RTO: 0 /100 WBCS (ref 0–0)
PHOSPHATE SERPL-MCNC: 3 MG/DL (ref 2.5–4.9)
PLATELET # BLD AUTO: 6 X10*3/UL (ref 150–450)
POTASSIUM SERPL-SCNC: 3 MMOL/L (ref 3.5–5.3)
PROT SERPL-MCNC: 5.8 G/DL (ref 6.4–8.2)
PROTHROMBIN TIME: 15.9 SECONDS (ref 9.8–12.8)
RBC # BLD AUTO: 3.86 X10*6/UL (ref 4.5–5.9)
RV RNA STL NAA+PROBE: NOT DETECTED
SALMONELLA DNA STL QL NAA+PROBE: NOT DETECTED
SHIGELLA DNA SPEC QL NAA+PROBE: NOT DETECTED
SODIUM SERPL-SCNC: 133 MMOL/L (ref 136–145)
TACROLIMUS BLD-MCNC: 5.8 NG/ML
URATE SERPL-MCNC: 2.7 MG/DL (ref 4–7.5)
V CHOLERAE DNA STL QL NAA+PROBE: NOT DETECTED
WBC # BLD AUTO: 0.1 X10*3/UL (ref 4.4–11.3)
Y ENTEROCOL DNA STL QL NAA+PROBE: NOT DETECTED

## 2024-09-28 PROCEDURE — 2500000002 HC RX 250 W HCPCS SELF ADMINISTERED DRUGS (ALT 637 FOR MEDICARE OP, ALT 636 FOR OP/ED): Performed by: INTERNAL MEDICINE

## 2024-09-28 PROCEDURE — 99233 SBSQ HOSP IP/OBS HIGH 50: CPT

## 2024-09-28 PROCEDURE — 71045 X-RAY EXAM CHEST 1 VIEW: CPT

## 2024-09-28 PROCEDURE — 84075 ASSAY ALKALINE PHOSPHATASE: CPT

## 2024-09-28 PROCEDURE — 84100 ASSAY OF PHOSPHORUS: CPT

## 2024-09-28 PROCEDURE — 1170000001 HC PRIVATE ONCOLOGY ROOM DAILY

## 2024-09-28 PROCEDURE — 87040 BLOOD CULTURE FOR BACTERIA: CPT

## 2024-09-28 PROCEDURE — 2500000001 HC RX 250 WO HCPCS SELF ADMINISTERED DRUGS (ALT 637 FOR MEDICARE OP)

## 2024-09-28 PROCEDURE — 85025 COMPLETE CBC W/AUTO DIFF WBC: CPT

## 2024-09-28 PROCEDURE — 2500000004 HC RX 250 GENERAL PHARMACY W/ HCPCS (ALT 636 FOR OP/ED)

## 2024-09-28 PROCEDURE — 80197 ASSAY OF TACROLIMUS: CPT | Performed by: INTERNAL MEDICINE

## 2024-09-28 PROCEDURE — 36415 COLL VENOUS BLD VENIPUNCTURE: CPT

## 2024-09-28 PROCEDURE — 36430 TRANSFUSION BLD/BLD COMPNT: CPT

## 2024-09-28 PROCEDURE — 83735 ASSAY OF MAGNESIUM: CPT

## 2024-09-28 PROCEDURE — P9073 PLATELETS PHERESIS PATH REDU: HCPCS

## 2024-09-28 PROCEDURE — 2500000004 HC RX 250 GENERAL PHARMACY W/ HCPCS (ALT 636 FOR OP/ED): Mod: JZ | Performed by: INTERNAL MEDICINE

## 2024-09-28 PROCEDURE — 85610 PROTHROMBIN TIME: CPT

## 2024-09-28 PROCEDURE — 2500000002 HC RX 250 W HCPCS SELF ADMINISTERED DRUGS (ALT 637 FOR MEDICARE OP, ALT 636 FOR OP/ED): Performed by: PHYSICIAN ASSISTANT

## 2024-09-28 PROCEDURE — 2500000004 HC RX 250 GENERAL PHARMACY W/ HCPCS (ALT 636 FOR OP/ED): Mod: JZ

## 2024-09-28 PROCEDURE — 85384 FIBRINOGEN ACTIVITY: CPT

## 2024-09-28 PROCEDURE — 83615 LACTATE (LD) (LDH) ENZYME: CPT

## 2024-09-28 PROCEDURE — 84550 ASSAY OF BLOOD/URIC ACID: CPT

## 2024-09-28 PROCEDURE — 2500000001 HC RX 250 WO HCPCS SELF ADMINISTERED DRUGS (ALT 637 FOR MEDICARE OP): Performed by: INTERNAL MEDICINE

## 2024-09-28 RX ORDER — ACETAMINOPHEN 325 MG/1
650 TABLET ORAL ONCE
Status: DISCONTINUED | OUTPATIENT
Start: 2024-09-28 | End: 2024-10-02

## 2024-09-28 RX ORDER — POTASSIUM CHLORIDE 14.9 MG/ML
20 INJECTION INTRAVENOUS ONCE
Status: COMPLETED | OUTPATIENT
Start: 2024-09-28 | End: 2024-09-28

## 2024-09-28 RX ORDER — ACETAMINOPHEN 325 MG/1
650 TABLET ORAL EVERY 6 HOURS PRN
Status: DISCONTINUED | OUTPATIENT
Start: 2024-09-28 | End: 2024-11-21 | Stop reason: HOSPADM

## 2024-09-28 RX ORDER — MAGNESIUM SULFATE HEPTAHYDRATE 40 MG/ML
4 INJECTION, SOLUTION INTRAVENOUS ONCE
Status: COMPLETED | OUTPATIENT
Start: 2024-09-28 | End: 2024-09-28

## 2024-09-28 RX ORDER — POTASSIUM CHLORIDE 1.5 G/1.58G
40 POWDER, FOR SOLUTION ORAL ONCE
Status: COMPLETED | OUTPATIENT
Start: 2024-09-28 | End: 2024-09-28

## 2024-09-28 RX ORDER — ACETAMINOPHEN 325 MG/1
975 TABLET ORAL ONCE
Status: COMPLETED | OUTPATIENT
Start: 2024-09-28 | End: 2024-09-28

## 2024-09-28 RX ORDER — ACETAMINOPHEN 325 MG/1
650 TABLET ORAL ONCE
Status: COMPLETED | OUTPATIENT
Start: 2024-09-28 | End: 2024-09-28

## 2024-09-28 ASSESSMENT — COGNITIVE AND FUNCTIONAL STATUS - GENERAL
MOBILITY SCORE: 24
DAILY ACTIVITIY SCORE: 24

## 2024-09-28 ASSESSMENT — PAIN SCALES - GENERAL
PAINLEVEL_OUTOF10: 0 - NO PAIN

## 2024-09-28 ASSESSMENT — PAIN - FUNCTIONAL ASSESSMENT: PAIN_FUNCTIONAL_ASSESSMENT: 0-10

## 2024-09-28 NOTE — CARE PLAN
The clinical goals for the shift include patient will be free of falls and injury throughout shift.    Over the shift, the patient did make progress toward the following goals.    Problem: Pain - Adult  Goal: Verbalizes/displays adequate comfort level or baseline comfort level  Outcome: Progressing     Problem: Safety - Adult  Goal: Free from fall injury  Outcome: Progressing     Problem: Discharge Planning  Goal: Discharge to home or other facility with appropriate resources  Outcome: Progressing     Problem: Chronic Conditions and Co-morbidities  Goal: Patient's chronic conditions and co-morbidity symptoms are monitored and maintained or improved  Outcome: Progressing     Problem: Nutrition  Goal: Oral intake greater 75%  Outcome: Progressing  Goal: Adequate PO fluid intake  Outcome: Progressing  Goal: Maintain stable weight  Outcome: Progressing

## 2024-09-28 NOTE — PROGRESS NOTES
Barndt Merritt is a 64 y.o. 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    Subjective   Pt had continued fevers, chills, and fatigue yesterday. At one point, he was noted to have a diffuse rash and alerted nursing staff. His rash involved his arms, chest, and neck, is non-pruritic and non-painful. He otherwise continues to have diarrhea, 3x this morning. No other symptoms, including cough, SOB, chest pain, dysuria, bleeding, bruising.       Objective     Physical Exam  Constitutional:       General: He is not in acute distress.     Appearance: Normal appearance. He is normal weight. He is ill-appearing. He is not toxic-appearing or diaphoretic.   HENT:      Head: Normocephalic and atraumatic.      Right Ear: External ear normal.      Left Ear: External ear normal.      Nose: Nose normal. No congestion or rhinorrhea.      Mouth/Throat:      Mouth: Mucous membranes are moist.      Pharynx: Oropharynx is clear. No oropharyngeal exudate or posterior oropharyngeal erythema.   Eyes:      General: No scleral icterus.        Right eye: No discharge.         Left eye: No discharge.      Extraocular Movements: Extraocular movements intact.      Conjunctiva/sclera: Conjunctivae normal.   Cardiovascular:      Rate and Rhythm: Normal rate and regular rhythm.      Heart sounds: Normal heart sounds. No murmur heard.     No friction rub. No gallop.   Pulmonary:      Effort: Pulmonary effort is normal. No respiratory distress.      Breath sounds: Normal breath sounds. No stridor. No wheezing, rhonchi or rales.   Chest:      Chest wall: No tenderness.   Abdominal:      General: Abdomen is flat. Bowel sounds are normal. There is no distension.      Palpations: There is no mass.      Tenderness: There is no abdominal tenderness. There is no guarding or rebound.      Hernia: No hernia is present.   Musculoskeletal:         General: No swelling, tenderness, deformity or signs of injury. Normal  "range of motion.      Cervical back: Normal range of motion.      Right lower leg: No edema.      Left lower leg: No edema.   Skin:     General: Skin is warm and dry.      Coloration: Skin is not jaundiced or pale.      Findings: Rash (maculopapular, blanching, erythematous on BL arms, chest, back) present. No bruising, erythema or lesion.   Neurological:      Mental Status: He is alert and oriented to person, place, and time. Mental status is at baseline.   Psychiatric:         Mood and Affect: Mood normal.         Behavior: Behavior normal.         Thought Content: Thought content normal.         Judgment: Judgment normal.         Last Recorded Vitals  Blood pressure 119/73, pulse 102, temperature (!) 38.5 °C (101.3 °F), temperature source Temporal, resp. rate 16, height (S) 1.664 m (5' 5.51\"), weight 69.5 kg (153 lb 3.5 oz), SpO2 98%.  Intake/Output last 3 Shifts:  I/O last 3 completed shifts:  In: 154.6 (2.1 mL/kg) [I.V.:54.6 (0.7 mL/kg); IV Piggyback:100]  Out: - (0 mL/kg)   Weight: 73.4 kg     Relevant Results            Scheduled medications  acyclovir, 400 mg, oral, q12h  cefepime, 2 g, intravenous, q12h  filgrastim or biosimilar, 300 mcg, subcutaneous, q24h  folic acid, 1 mg, oral, Daily  [Held by provider] meropenem, 1 g, intravenous, q8h  mycophenolate, 1,000 mg, oral, TID  NIFEdipine ER, 90 mg, oral, Daily before breakfast  posaconazole, 300 mg, oral, q24h  tacrolimus, 1 mg, oral, Nightly  tacrolimus, 1.5 mg, oral, Daily before breakfast  ursodiol, 300 mg, oral, TID  vancomycin, 125 mg, oral, Daily      Continuous medications     PRN medications  PRN medications: acetaminophen, albuterol, alteplase, dextrose, diphenhydrAMINE, EPINEPHrine HCl, famotidine, loperamide, methylPREDNISolone sodium succinate (PF), ondansetron, [Held by provider] polyethylene glycol, prochlorperazine, prochlorperazine, sodium chloride    Results for orders placed or performed during the hospital encounter of 09/13/24 (from the " past 24 hour(s))   CBC and Auto Differential   Result Value Ref Range    WBC 0.1 (LL) 4.4 - 11.3 x10*3/uL    nRBC 0.0 0.0 - 0.0 /100 WBCs    RBC 3.86 (L) 4.50 - 5.90 x10*6/uL    Hemoglobin 10.6 (L) 13.5 - 17.5 g/dL    Hematocrit 29.7 (L) 41.0 - 52.0 %    MCV 77 (L) 80 - 100 fL    MCH 27.5 26.0 - 34.0 pg    MCHC 35.7 32.0 - 36.0 g/dL    RDW 18.5 (H) 11.5 - 14.5 %    Platelets 6 (LL) 150 - 450 x10*3/uL    Neutrophils % 16.7 40.0 - 80.0 %    Immature Granulocytes %, Automated 0.0 0.0 - 0.9 %    Lymphocytes % 33.3 13.0 - 44.0 %    Monocytes % 50.0 2.0 - 10.0 %    Eosinophils % 0.0 0.0 - 6.0 %    Basophils % 0.0 0.0 - 2.0 %    Neutrophils Absolute 0.01 (L) 1.20 - 7.70 x10*3/uL    Immature Granulocytes Absolute, Automated 0.00 0.00 - 0.70 x10*3/uL    Lymphocytes Absolute 0.02 (L) 1.20 - 4.80 x10*3/uL    Monocytes Absolute 0.03 (L) 0.10 - 1.00 x10*3/uL    Eosinophils Absolute 0.00 0.00 - 0.70 x10*3/uL    Basophils Absolute 0.00 0.00 - 0.10 x10*3/uL   Coagulation Screen   Result Value Ref Range    Protime 15.9 (H) 9.8 - 12.8 seconds    INR 1.4 (H) 0.9 - 1.1    aPTT 29 27 - 38 seconds   Comprehensive Metabolic Panel   Result Value Ref Range    Glucose 115 (H) 74 - 99 mg/dL    Sodium 133 (L) 136 - 145 mmol/L    Potassium 3.0 (L) 3.5 - 5.3 mmol/L    Chloride 102 98 - 107 mmol/L    Bicarbonate 22 21 - 32 mmol/L    Anion Gap 12 10 - 20 mmol/L    Urea Nitrogen 18 6 - 23 mg/dL    Creatinine 1.20 0.50 - 1.30 mg/dL    eGFR 67 >60 mL/min/1.73m*2    Calcium 8.0 (L) 8.6 - 10.6 mg/dL    Albumin 3.3 (L) 3.4 - 5.0 g/dL    Alkaline Phosphatase 75 33 - 136 U/L    Total Protein 5.8 (L) 6.4 - 8.2 g/dL    AST 7 (L) 9 - 39 U/L    Bilirubin, Total 0.7 0.0 - 1.2 mg/dL    ALT 8 (L) 10 - 52 U/L   Fibrinogen   Result Value Ref Range    Fibrinogen 615 (H) 200 - 400 mg/dL   Lactate Dehydrogenase   Result Value Ref Range    LDH 99 84 - 246 U/L   Magnesium   Result Value Ref Range    Magnesium 1.56 (L) 1.60 - 2.40 mg/dL   Phosphorus   Result Value Ref  Range    Phosphorus 3.0 2.5 - 4.9 mg/dL   Uric Acid   Result Value Ref Range    Uric Acid 2.7 (L) 4.0 - 7.5 mg/dL   Tacrolimus level   Result Value Ref Range    Tacrolimus  5.8 <=15.0 ng/mL   Prepare Platelets: 1 Units, Irradiated   Result Value Ref Range    PRODUCT CODE H3490Y03     Unit Number [id number]     Unit ABO O     Unit RH POS     Dispense Status IS     Blood Expiration Date 9/29/2024 11:59:00 PM EDT     PRODUCT BLOOD TYPE 5100     UNIT VOLUME 342      No results found.          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      T+4 Single-unit cord transplant (T0=9/19/24)     Updates (9/28/24):  -continue cefepime  -replete magnesium sulfate 4g  -replete Kcl 60mEq  -transfuse PLTs    ONC  # MDS/AML   - Symptoms began 9/2023; diagnosed 4/2024  - BMBx showing MDS in transition to AML (>10% jaime) w/ trisomy 8, DNMT alpha, and U2AF1 mutation indication adverse risk   - s/p 4 cycles of Decitabine/Venetoclax (C4D1 on 8/12/24)  - BMBx 6/17/24: Blasts cleared, FISH still positive for trisomy 8, still MDS changes   - BMBx 9/5/24: 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 and no increase in blast     # Transplant  CONDITIONING Fludarabine, melphalan, and TBI   DONOR Single umbilical cord   MATCH GRADE 6/8   SEX MATCH Mismatched   ABO DONOR A pos   ABO RECIPIENT A pos   GVHD PROPHYLAXIS Tacrolimus and Mycophenolate   GRAFT SOURCE Single umbilical cord   CMV DONOR negative   CMV RECIPIENT ppsitive   GVHD PPX:  - Tacrolimus 1.5 mg q12h started T-3 (9/16)  *Tacro level: 4.5 (9/20), 4/7 (9/21), 5.7 (9/22)  *Decreased Tacrolimus to 1mg BID d/t  Posaconazole  - MMF 1000 mg TID to begin T+2 to T+35   - Viral surveillance should begin T+14 (ordered)  - continue tacrolimus 1.5mg alternating with 1mg (9/28 tacro 5.8)     HEME  # Anemia and Leukopenia: secondary to disease and chemotherapy  # 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   - Transfuse for Hgb <7 & PLT <10   - Neupogen 300mcg to begin T+5     # VTE Prophylaxis: SCDs & Ambulation     ID  #Neutropenic fever  # Prophylaxis: Acyclovir, Vancomycin, Posaconazole, Letermovir (T+14), and Bactrim (T+30)  - fever noted overnight 9/25 at 38C, along with diarrhea, no other focal symptoms  - UA with 2+ blood, trace ketones only, CXR clear  - blood cultures NGTD  - likely intolerant to meropenem, switched to cefepime  - etiology: suspect gut bacterial translocation/enteritis related to melphalan vs drug fever 2/2 G-CSF  - if persistent fevers > 72hr, consider broadening posa -> micafungin  - will follow cultures, NGTD    #Maculopapular rash  -developed 9/27 PM after first dose of meropenem  -appearance highly suggestive of meropenem reaction  -stop meropenem  -continue to monitor, alert primary team if worsening or having new symptoms     CARDIO/PULM  # Hx of STEMI (11/11/2020)  - TTE 7/19/24: EF 59%, otherwise unremarkable   - Cleared by Cardiology for transplant      # Hx of HTN   - Nifedipine: 90 mEq ER 24 hr tablet      # Ascending aorta dilation   - Echo showing 3.8-4 cm dilation      FEN/RENAL  - Admit wt: 75.6 kg (9/13/24); Most recent wt: 75.1 kg  Allergic to Lasix, will not take med (extreme hypotension, extreme hypokalemia)     #Diarrhea, c.diff neg  -diarrhea returned 9/26 AM, along with fever  -etiology: likely melphalan-related mucositis/enteritis  -C diff negative 9/22, pt on suppressive PO vancomycin  -loperamide 2mg q6h PRN  -replete 60mEq Kcl and 4g magnesium sulfate today  -repeat BMP, magnesium on PM draw  -stool pathogen panel pending     #Constipation, resolved  -stop miralax     #SOS prophylaxis: Actigall       #hx of BPH and urinary retention (2023)  - Hx elevated PSA               *7/28/21 Bx: atypical small acinar proliferation               *4/7/23 Bx: Benign     #Hx of treated Hep C in 2015  - (9/12) Hep C RNA - not detected      #Hx of recurrent hyperbilirubinemia   - 2/2 to Hemoglobin C hemolysis      F: prn  E: replete K, Mg today  N: low microbial, ensure, sour lemon candy  A: picc line     DVT: ambulation, scd  GI: PPI  NOK: spouse Geenvieve ([phone])  Code: FULL     Oncologist: Jerod  Patient examined and discussed with Dr. Ignacio Deras MD PhD

## 2024-09-28 NOTE — CARE PLAN
Problem: Safety - Adult  Goal: Free from fall injury  Outcome: Progressing     Problem: Chronic Conditions and Co-morbidities  Goal: Patient's chronic conditions and co-morbidity symptoms are monitored and maintained or improved  Outcome: Progressing     Problem: Nutrition  Goal: Oral intake greater 75%  Outcome: Progressing  Goal: Adequate PO fluid intake  Outcome: Progressing  Goal: Maintain stable weight  Outcome: Progressing

## 2024-09-29 ENCOUNTER — APPOINTMENT (OUTPATIENT)
Dept: RADIOLOGY | Facility: HOSPITAL | Age: 64
End: 2024-09-29
Payer: COMMERCIAL

## 2024-09-29 VITALS
TEMPERATURE: 100.2 F | DIASTOLIC BLOOD PRESSURE: 74 MMHG | RESPIRATION RATE: 16 BRPM | SYSTOLIC BLOOD PRESSURE: 122 MMHG | HEIGHT: 66 IN | WEIGHT: 153.22 LBS | BODY MASS INDEX: 24.62 KG/M2 | HEART RATE: 97 BPM | OXYGEN SATURATION: 94 %

## 2024-09-29 LAB
ABO GROUP (TYPE) IN BLOOD: NORMAL
ALBUMIN SERPL BCP-MCNC: 3.1 G/DL (ref 3.4–5)
ALP SERPL-CCNC: 67 U/L (ref 33–136)
ALT SERPL W P-5'-P-CCNC: 7 U/L (ref 10–52)
ANION GAP SERPL CALC-SCNC: 11 MMOL/L (ref 10–20)
ANION GAP SERPL CALC-SCNC: 11 MMOL/L (ref 10–20)
ANTIBODY SCREEN: NORMAL
APPEARANCE UR: CLEAR
APTT PPP: 28 SECONDS (ref 27–38)
AST SERPL W P-5'-P-CCNC: 8 U/L (ref 9–39)
BACTERIA BLD CULT: NORMAL
BACTERIA BPU CULT: NORMAL
BASOPHILS # BLD AUTO: 0 X10*3/UL (ref 0–0.1)
BASOPHILS NFR BLD AUTO: 0 %
BILIRUB SERPL-MCNC: 0.6 MG/DL (ref 0–1.2)
BILIRUB UR STRIP.AUTO-MCNC: NEGATIVE MG/DL
BLOOD EXPIRATION DATE: NORMAL
BUN SERPL-MCNC: 17 MG/DL (ref 6–23)
BUN SERPL-MCNC: 18 MG/DL (ref 6–23)
CALCIUM SERPL-MCNC: 7.8 MG/DL (ref 8.6–10.6)
CALCIUM SERPL-MCNC: 8.2 MG/DL (ref 8.6–10.6)
CHLORIDE SERPL-SCNC: 100 MMOL/L (ref 98–107)
CHLORIDE SERPL-SCNC: 102 MMOL/L (ref 98–107)
CO2 SERPL-SCNC: 23 MMOL/L (ref 21–32)
CO2 SERPL-SCNC: 23 MMOL/L (ref 21–32)
COLOR UR: ABNORMAL
CREAT SERPL-MCNC: 0.89 MG/DL (ref 0.5–1.3)
CREAT SERPL-MCNC: 0.96 MG/DL (ref 0.5–1.3)
DISPENSE STATUS: NORMAL
EGFRCR SERPLBLD CKD-EPI 2021: 87 ML/MIN/1.73M*2
EGFRCR SERPLBLD CKD-EPI 2021: >90 ML/MIN/1.73M*2
EOSINOPHIL # BLD AUTO: 0 X10*3/UL (ref 0–0.7)
EOSINOPHIL NFR BLD AUTO: 0 %
ERYTHROCYTE [DISTWIDTH] IN BLOOD BY AUTOMATED COUNT: 18.5 % (ref 11.5–14.5)
FIBRINOGEN PPP-MCNC: 600 MG/DL (ref 200–400)
GLUCOSE SERPL-MCNC: 101 MG/DL (ref 74–99)
GLUCOSE SERPL-MCNC: 116 MG/DL (ref 74–99)
GLUCOSE UR STRIP.AUTO-MCNC: NORMAL MG/DL
HCT VFR BLD AUTO: 26.8 % (ref 41–52)
HGB BLD-MCNC: 9.5 G/DL (ref 13.5–17.5)
IMM GRANULOCYTES # BLD AUTO: 0.02 X10*3/UL (ref 0–0.7)
IMM GRANULOCYTES NFR BLD AUTO: 40 % (ref 0–0.9)
INR PPP: 1.3 (ref 0.9–1.1)
KETONES UR STRIP.AUTO-MCNC: NEGATIVE MG/DL
LDH SERPL L TO P-CCNC: 129 U/L (ref 84–246)
LEUKOCYTE ESTERASE UR QL STRIP.AUTO: NEGATIVE
LYMPHOCYTES # BLD AUTO: 0.01 X10*3/UL (ref 1.2–4.8)
LYMPHOCYTES NFR BLD AUTO: 20 %
MAGNESIUM SERPL-MCNC: 1.91 MG/DL (ref 1.6–2.4)
MAGNESIUM SERPL-MCNC: 2.02 MG/DL (ref 1.6–2.4)
MCH RBC QN AUTO: 27 PG (ref 26–34)
MCHC RBC AUTO-ENTMCNC: 35.4 G/DL (ref 32–36)
MCV RBC AUTO: 76 FL (ref 80–100)
MONOCYTES # BLD AUTO: 0.01 X10*3/UL (ref 0.1–1)
MONOCYTES NFR BLD AUTO: 20 %
MUCOUS THREADS #/AREA URNS AUTO: ABNORMAL /LPF
NEUTROPHILS # BLD AUTO: 0.01 X10*3/UL (ref 1.2–7.7)
NEUTROPHILS NFR BLD AUTO: 20 %
NITRITE UR QL STRIP.AUTO: NEGATIVE
NRBC BLD-RTO: 0 /100 WBCS (ref 0–0)
PH UR STRIP.AUTO: 6 [PH]
PHOSPHATE SERPL-MCNC: 2.9 MG/DL (ref 2.5–4.9)
PLATELET # BLD AUTO: 27 X10*3/UL (ref 150–450)
POTASSIUM SERPL-SCNC: 3.3 MMOL/L (ref 3.5–5.3)
POTASSIUM SERPL-SCNC: 3.4 MMOL/L (ref 3.5–5.3)
PRODUCT BLOOD TYPE: 5100
PRODUCT CODE: NORMAL
PROT SERPL-MCNC: 5.7 G/DL (ref 6.4–8.2)
PROT UR STRIP.AUTO-MCNC: ABNORMAL MG/DL
PROTHROMBIN TIME: 14.6 SECONDS (ref 9.8–12.8)
RBC # BLD AUTO: 3.52 X10*6/UL (ref 4.5–5.9)
RBC # UR STRIP.AUTO: ABNORMAL /UL
RBC #/AREA URNS AUTO: ABNORMAL /HPF
RH FACTOR (ANTIGEN D): NORMAL
SODIUM SERPL-SCNC: 131 MMOL/L (ref 136–145)
SODIUM SERPL-SCNC: 133 MMOL/L (ref 136–145)
SP GR UR STRIP.AUTO: 1.02
TACROLIMUS BLD-MCNC: 7 NG/ML
UNIT ABO: NORMAL
UNIT NUMBER: NORMAL
UNIT RH: NORMAL
UNIT VOLUME: 342
URATE SERPL-MCNC: 3.3 MG/DL (ref 4–7.5)
UROBILINOGEN UR STRIP.AUTO-MCNC: NORMAL MG/DL
WBC # BLD AUTO: 0.1 X10*3/UL (ref 4.4–11.3)
WBC #/AREA URNS AUTO: ABNORMAL /HPF

## 2024-09-29 PROCEDURE — 2500000002 HC RX 250 W HCPCS SELF ADMINISTERED DRUGS (ALT 637 FOR MEDICARE OP, ALT 636 FOR OP/ED): Performed by: PHYSICIAN ASSISTANT

## 2024-09-29 PROCEDURE — 83615 LACTATE (LD) (LDH) ENZYME: CPT

## 2024-09-29 PROCEDURE — 2500000001 HC RX 250 WO HCPCS SELF ADMINISTERED DRUGS (ALT 637 FOR MEDICARE OP)

## 2024-09-29 PROCEDURE — 2500000002 HC RX 250 W HCPCS SELF ADMINISTERED DRUGS (ALT 637 FOR MEDICARE OP, ALT 636 FOR OP/ED): Performed by: INTERNAL MEDICINE

## 2024-09-29 PROCEDURE — 2500000001 HC RX 250 WO HCPCS SELF ADMINISTERED DRUGS (ALT 637 FOR MEDICARE OP): Performed by: INTERNAL MEDICINE

## 2024-09-29 PROCEDURE — 2550000001 HC RX 255 CONTRASTS: Performed by: STUDENT IN AN ORGANIZED HEALTH CARE EDUCATION/TRAINING PROGRAM

## 2024-09-29 PROCEDURE — 84100 ASSAY OF PHOSPHORUS: CPT

## 2024-09-29 PROCEDURE — 80053 COMPREHEN METABOLIC PANEL: CPT

## 2024-09-29 PROCEDURE — 74177 CT ABD & PELVIS W/CONTRAST: CPT | Performed by: RADIOLOGY

## 2024-09-29 PROCEDURE — 85610 PROTHROMBIN TIME: CPT

## 2024-09-29 PROCEDURE — 82374 ASSAY BLOOD CARBON DIOXIDE: CPT

## 2024-09-29 PROCEDURE — 85384 FIBRINOGEN ACTIVITY: CPT

## 2024-09-29 PROCEDURE — 86901 BLOOD TYPING SEROLOGIC RH(D): CPT

## 2024-09-29 PROCEDURE — 1170000001 HC PRIVATE ONCOLOGY ROOM DAILY

## 2024-09-29 PROCEDURE — 2500000004 HC RX 250 GENERAL PHARMACY W/ HCPCS (ALT 636 FOR OP/ED): Mod: JZ

## 2024-09-29 PROCEDURE — 81001 URINALYSIS AUTO W/SCOPE: CPT

## 2024-09-29 PROCEDURE — 71260 CT THORAX DX C+: CPT

## 2024-09-29 PROCEDURE — 80197 ASSAY OF TACROLIMUS: CPT | Performed by: INTERNAL MEDICINE

## 2024-09-29 PROCEDURE — 74177 CT ABD & PELVIS W/CONTRAST: CPT

## 2024-09-29 PROCEDURE — 83735 ASSAY OF MAGNESIUM: CPT

## 2024-09-29 PROCEDURE — 85025 COMPLETE CBC W/AUTO DIFF WBC: CPT

## 2024-09-29 PROCEDURE — 84550 ASSAY OF BLOOD/URIC ACID: CPT

## 2024-09-29 PROCEDURE — 99233 SBSQ HOSP IP/OBS HIGH 50: CPT

## 2024-09-29 PROCEDURE — 71260 CT THORAX DX C+: CPT | Performed by: RADIOLOGY

## 2024-09-29 PROCEDURE — 2500000004 HC RX 250 GENERAL PHARMACY W/ HCPCS (ALT 636 FOR OP/ED): Performed by: INTERNAL MEDICINE

## 2024-09-29 PROCEDURE — 2500000004 HC RX 250 GENERAL PHARMACY W/ HCPCS (ALT 636 FOR OP/ED)

## 2024-09-29 RX ORDER — POTASSIUM CHLORIDE 1.5 G/1.58G
40 POWDER, FOR SOLUTION ORAL ONCE
Status: COMPLETED | OUTPATIENT
Start: 2024-09-29 | End: 2024-09-29

## 2024-09-29 SDOH — SOCIAL STABILITY: SOCIAL INSECURITY
WITHIN THE LAST YEAR, HAVE YOU BEEN RAPED OR FORCED TO HAVE ANY KIND OF SEXUAL ACTIVITY BY YOUR PARTNER OR EX-PARTNER?: NO

## 2024-09-29 SDOH — SOCIAL STABILITY: SOCIAL INSECURITY: WITHIN THE LAST YEAR, HAVE YOU BEEN HUMILIATED OR EMOTIONALLY ABUSED IN OTHER WAYS BY YOUR PARTNER OR EX-PARTNER?: NO

## 2024-09-29 SDOH — ECONOMIC STABILITY: HOUSING INSECURITY: IN THE PAST 12 MONTHS, HOW MANY TIMES HAVE YOU MOVED WHERE YOU WERE LIVING?: 0

## 2024-09-29 SDOH — SOCIAL STABILITY: SOCIAL INSECURITY
WITHIN THE LAST YEAR, HAVE YOU BEEN KICKED, HIT, SLAPPED, OR OTHERWISE PHYSICALLY HURT BY YOUR PARTNER OR EX-PARTNER?: NO

## 2024-09-29 SDOH — ECONOMIC STABILITY: HOUSING INSECURITY: IN THE LAST 12 MONTHS, WAS THERE A TIME WHEN YOU WERE NOT ABLE TO PAY THE MORTGAGE OR RENT ON TIME?: PATIENT DECLINED

## 2024-09-29 SDOH — ECONOMIC STABILITY: TRANSPORTATION INSECURITY
IN THE PAST 12 MONTHS, HAS LACK OF TRANSPORTATION KEPT YOU FROM MEETINGS, WORK, OR FROM GETTING THINGS NEEDED FOR DAILY LIVING?: PATIENT DECLINED

## 2024-09-29 SDOH — ECONOMIC STABILITY: HOUSING INSECURITY: AT ANY TIME IN THE PAST 12 MONTHS, WERE YOU HOMELESS OR LIVING IN A SHELTER (INCLUDING NOW)?: PATIENT DECLINED

## 2024-09-29 SDOH — ECONOMIC STABILITY: INCOME INSECURITY
IN THE PAST 12 MONTHS HAS THE ELECTRIC, GAS, OIL, OR WATER COMPANY THREATENED TO SHUT OFF SERVICES IN YOUR HOME?: PATIENT DECLINED

## 2024-09-29 SDOH — SOCIAL STABILITY: SOCIAL INSECURITY
WITHIN THE LAST YEAR, HAVE TO BEEN RAPED OR FORCED TO HAVE ANY KIND OF SEXUAL ACTIVITY BY YOUR PARTNER OR EX-PARTNER?: NO

## 2024-09-29 SDOH — ECONOMIC STABILITY: FOOD INSECURITY: WITHIN THE PAST 12 MONTHS, THE FOOD YOU BOUGHT JUST DIDN'T LAST AND YOU DIDN'T HAVE MONEY TO GET MORE.: PATIENT DECLINED

## 2024-09-29 SDOH — SOCIAL STABILITY: SOCIAL INSECURITY: WITHIN THE LAST YEAR, HAVE YOU BEEN AFRAID OF YOUR PARTNER OR EX-PARTNER?: NO

## 2024-09-29 SDOH — ECONOMIC STABILITY: TRANSPORTATION INSECURITY
IN THE PAST 12 MONTHS, HAS LACK OF TRANSPORTATION KEPT YOU FROM MEDICAL APPOINTMENTS OR FROM GETTING MEDICATIONS?: PATIENT DECLINED

## 2024-09-29 SDOH — ECONOMIC STABILITY: INCOME INSECURITY: IN THE LAST 12 MONTHS, WAS THERE A TIME WHEN YOU WERE NOT ABLE TO PAY THE MORTGAGE OR RENT ON TIME?: PATIENT DECLINED

## 2024-09-29 SDOH — ECONOMIC STABILITY: FOOD INSECURITY: HOW HARD IS IT FOR YOU TO PAY FOR THE VERY BASICS LIKE FOOD, HOUSING, MEDICAL CARE, AND HEATING?: PATIENT DECLINED

## 2024-09-29 SDOH — ECONOMIC STABILITY: INCOME INSECURITY
IN THE PAST 12 MONTHS, HAS THE ELECTRIC, GAS, OIL, OR WATER COMPANY THREATENED TO SHUT OFF SERVICE IN YOUR HOME?: PATIENT DECLINED

## 2024-09-29 SDOH — ECONOMIC STABILITY: INCOME INSECURITY: HOW HARD IS IT FOR YOU TO PAY FOR THE VERY BASICS LIKE FOOD, HOUSING, MEDICAL CARE, AND HEATING?: PATIENT DECLINED

## 2024-09-29 SDOH — ECONOMIC STABILITY: FOOD INSECURITY: WITHIN THE PAST 12 MONTHS, YOU WORRIED THAT YOUR FOOD WOULD RUN OUT BEFORE YOU GOT MONEY TO BUY MORE.: PATIENT DECLINED

## 2024-09-29 SDOH — ECONOMIC STABILITY: TRANSPORTATION INSECURITY
IN THE PAST 12 MONTHS, HAS THE LACK OF TRANSPORTATION KEPT YOU FROM MEDICAL APPOINTMENTS OR FROM GETTING MEDICATIONS?: PATIENT DECLINED

## 2024-09-29 SDOH — ECONOMIC STABILITY: FOOD INSECURITY
WITHIN THE PAST 12 MONTHS, YOU WORRIED THAT YOUR FOOD WOULD RUN OUT BEFORE YOU GOT THE MONEY TO BUY MORE.: PATIENT DECLINED

## 2024-09-29 ASSESSMENT — PAIN SCALES - GENERAL
PAINLEVEL_OUTOF10: 0 - NO PAIN
PAINLEVEL_OUTOF10: 0 - NO PAIN

## 2024-09-29 ASSESSMENT — COGNITIVE AND FUNCTIONAL STATUS - GENERAL
MOBILITY SCORE: 24
DAILY ACTIVITIY SCORE: 24

## 2024-09-29 ASSESSMENT — PAIN - FUNCTIONAL ASSESSMENT: PAIN_FUNCTIONAL_ASSESSMENT: 0-10

## 2024-09-29 ASSESSMENT — ACTIVITIES OF DAILY LIVING (ADL): LACK_OF_TRANSPORTATION: PATIENT DECLINED

## 2024-09-29 NOTE — CARE PLAN
Problem: Pain - Adult  Goal: Verbalizes/displays adequate comfort level or baseline comfort level  Outcome: Progressing     Problem: Safety - Adult  Goal: Free from fall injury  Outcome: Progressing     Problem: Discharge Planning  Goal: Discharge to home or other facility with appropriate resources  Outcome: Progressing     Problem: Chronic Conditions and Co-morbidities  Goal: Patient's chronic conditions and co-morbidity symptoms are monitored and maintained or improved  Outcome: Progressing     Problem: Nutrition  Goal: Oral intake greater 75%  Outcome: Progressing  Goal: Adequate PO fluid intake  Outcome: Progressing  Goal: Maintain stable weight  Outcome: Progressing   The patient's goals for the shift include      The clinical goals for the shift include Pt fever will diminish by end of shift today, 9/29/24 1900

## 2024-09-29 NOTE — PROGRESS NOTES
Brandt Merritt is a 64 y.o. 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    Subjective   Pt continued to have fevers last night. He reports feeling unwell, with fevers, chills throughout the night. He did not sleep well until this morning, and is resting during rounds. Having slept a few hours, he reports feeling better. He was re-cultured and has been receiving acetaminophen. No bleeding, bruising, diarrhea, SOB, cough, skin wounds, dysuria. Rash is reportedly fading, still not itchy.       Objective     Physical Exam  Constitutional:       General: He is not in acute distress.     Appearance: Normal appearance. He is normal weight. He is ill-appearing and toxic-appearing. He is not diaphoretic.   HENT:      Head: Normocephalic and atraumatic.      Right Ear: External ear normal.      Left Ear: External ear normal.      Nose: Nose normal. No congestion or rhinorrhea.      Mouth/Throat:      Mouth: Mucous membranes are moist.      Pharynx: Oropharynx is clear. No oropharyngeal exudate or posterior oropharyngeal erythema.   Eyes:      General: No scleral icterus.        Right eye: No discharge.         Left eye: No discharge.      Extraocular Movements: Extraocular movements intact.      Conjunctiva/sclera: Conjunctivae normal.   Cardiovascular:      Rate and Rhythm: Normal rate and regular rhythm.      Heart sounds: Normal heart sounds. No murmur heard.     No friction rub. No gallop.   Pulmonary:      Effort: Pulmonary effort is normal. No respiratory distress.      Breath sounds: No stridor. No wheezing, rhonchi or rales.   Abdominal:      General: Abdomen is flat. Bowel sounds are normal. There is no distension.      Palpations: Abdomen is soft. There is no mass.      Tenderness: There is no abdominal tenderness. There is no guarding or rebound.      Hernia: No hernia is present.   Musculoskeletal:         General: No swelling, tenderness, deformity or signs of injury.  "Normal range of motion.      Cervical back: Normal range of motion.      Right lower leg: No edema.      Left lower leg: No edema.   Skin:     General: Skin is warm and dry.      Coloration: Skin is not jaundiced or pale.      Findings: Rash (fading) present. No bruising, erythema or lesion.   Neurological:      General: No focal deficit present.      Mental Status: He is alert and oriented to person, place, and time. Mental status is at baseline.   Psychiatric:         Mood and Affect: Mood normal.         Behavior: Behavior normal.         Thought Content: Thought content normal.         Judgment: Judgment normal.         Last Recorded Vitals  Blood pressure 122/64, pulse 102, temperature 37 °C (98.6 °F), temperature source Temporal, resp. rate 16, height (S) 1.664 m (5' 5.51\"), weight 69.5 kg (153 lb 3.5 oz), SpO2 98%.  Intake/Output last 3 Shifts:  I/O last 3 completed shifts:  In: 700 (10.1 mL/kg) [I.V.:200 (2.9 mL/kg); Blood:100; IV Piggyback:400]  Out: - (0 mL/kg)   Weight: 69.5 kg     Relevant Results             Scheduled medications  acetaminophen, 650 mg, oral, Once  acyclovir, 400 mg, oral, q12h  cefepime, 2 g, intravenous, q12h  filgrastim or biosimilar, 300 mcg, subcutaneous, q24h  folic acid, 1 mg, oral, Daily  mycophenolate, 1,000 mg, oral, TID  NIFEdipine ER, 90 mg, oral, Daily before breakfast  posaconazole, 300 mg, oral, q24h  tacrolimus, 1 mg, oral, Nightly  tacrolimus, 1.5 mg, oral, Daily before breakfast  ursodiol, 300 mg, oral, TID  vancomycin, 125 mg, oral, Daily      Continuous medications     PRN medications  PRN medications: acetaminophen, albuterol, alteplase, dextrose, diphenhydrAMINE, EPINEPHrine HCl, famotidine, loperamide, methylPREDNISolone sodium succinate (PF), ondansetron, [Held by provider] polyethylene glycol, prochlorperazine, prochlorperazine, sodium chloride    Results for orders placed or performed during the hospital encounter of 09/13/24 (from the past 24 hour(s))   Blood " Culture    Specimen: Peripheral Venipuncture; Blood culture   Result Value Ref Range    Blood Culture Loaded on Instrument - Culture in progress    Blood Culture    Specimen: Peripheral Venipuncture; Blood culture   Result Value Ref Range    Blood Culture Loaded on Instrument - Culture in progress    Urinalysis with Reflex Microscopic   Result Value Ref Range    Color, Urine Light-Yellow Light-Yellow, Yellow, Dark-Yellow    Appearance, Urine Clear Clear    Specific Gravity, Urine 1.025 1.005 - 1.035    pH, Urine 6.0 5.0, 5.5, 6.0, 6.5, 7.0, 7.5, 8.0    Protein, Urine 50 (1+) (A) NEGATIVE, 10 (TRACE), 20 (TRACE) mg/dL    Glucose, Urine Normal Normal mg/dL    Blood, Urine 0.1 (1+) (A) NEGATIVE    Ketones, Urine NEGATIVE NEGATIVE mg/dL    Bilirubin, Urine NEGATIVE NEGATIVE    Urobilinogen, Urine Normal Normal mg/dL    Nitrite, Urine NEGATIVE NEGATIVE    Leukocyte Esterase, Urine NEGATIVE NEGATIVE   Microscopic Only, Urine   Result Value Ref Range    WBC, Urine 1-5 1-5, NONE /HPF    RBC, Urine 11-20 (A) NONE, 1-2, 3-5 /HPF    Mucus, Urine FEW Reference range not established. /LPF   Type and screen   Result Value Ref Range    ABO TYPE A     Rh TYPE POS     ANTIBODY SCREEN NEG    CBC and Auto Differential   Result Value Ref Range    WBC 0.1 (LL) 4.4 - 11.3 x10*3/uL    nRBC 0.0 0.0 - 0.0 /100 WBCs    RBC 3.52 (L) 4.50 - 5.90 x10*6/uL    Hemoglobin 9.5 (L) 13.5 - 17.5 g/dL    Hematocrit 26.8 (L) 41.0 - 52.0 %    MCV 76 (L) 80 - 100 fL    MCH 27.0 26.0 - 34.0 pg    MCHC 35.4 32.0 - 36.0 g/dL    RDW 18.5 (H) 11.5 - 14.5 %    Platelets 27 (LL) 150 - 450 x10*3/uL    Neutrophils % 20.0 40.0 - 80.0 %    Immature Granulocytes %, Automated 40.0 (H) 0.0 - 0.9 %    Lymphocytes % 20.0 13.0 - 44.0 %    Monocytes % 20.0 2.0 - 10.0 %    Eosinophils % 0.0 0.0 - 6.0 %    Basophils % 0.0 0.0 - 2.0 %    Neutrophils Absolute 0.01 (L) 1.20 - 7.70 x10*3/uL    Immature Granulocytes Absolute, Automated 0.02 0.00 - 0.70 x10*3/uL    Lymphocytes  Absolute 0.01 (L) 1.20 - 4.80 x10*3/uL    Monocytes Absolute 0.01 (L) 0.10 - 1.00 x10*3/uL    Eosinophils Absolute 0.00 0.00 - 0.70 x10*3/uL    Basophils Absolute 0.00 0.00 - 0.10 x10*3/uL   Coagulation Screen   Result Value Ref Range    Protime 14.6 (H) 9.8 - 12.8 seconds    INR 1.3 (H) 0.9 - 1.1    aPTT 28 27 - 38 seconds   Comprehensive Metabolic Panel   Result Value Ref Range    Glucose 101 (H) 74 - 99 mg/dL    Sodium 133 (L) 136 - 145 mmol/L    Potassium 3.3 (L) 3.5 - 5.3 mmol/L    Chloride 102 98 - 107 mmol/L    Bicarbonate 23 21 - 32 mmol/L    Anion Gap 11 10 - 20 mmol/L    Urea Nitrogen 18 6 - 23 mg/dL    Creatinine 0.89 0.50 - 1.30 mg/dL    eGFR >90 >60 mL/min/1.73m*2    Calcium 7.8 (L) 8.6 - 10.6 mg/dL    Albumin 3.1 (L) 3.4 - 5.0 g/dL    Alkaline Phosphatase 67 33 - 136 U/L    Total Protein 5.7 (L) 6.4 - 8.2 g/dL    AST 8 (L) 9 - 39 U/L    Bilirubin, Total 0.6 0.0 - 1.2 mg/dL    ALT 7 (L) 10 - 52 U/L   Fibrinogen   Result Value Ref Range    Fibrinogen 600 (H) 200 - 400 mg/dL   Lactate Dehydrogenase   Result Value Ref Range     84 - 246 U/L   Magnesium   Result Value Ref Range    Magnesium 2.02 1.60 - 2.40 mg/dL   Phosphorus   Result Value Ref Range    Phosphorus 2.9 2.5 - 4.9 mg/dL   Uric Acid   Result Value Ref Range    Uric Acid 3.3 (L) 4.0 - 7.5 mg/dL   Tacrolimus level   Result Value Ref Range    Tacrolimus  7.0 <=15.0 ng/mL     XR chest 1 view    Result Date: 9/29/2024  Interpreted By:  Tuan Reid  and Ole Jenkins STUDY: XR CHEST 1 VIEW;  9/28/2024 10:47 pm   INDICATION: Signs/Symptoms:fever.     COMPARISON: Chest x-ray 09/25/2024   ACCESSION NUMBER(S): [id number]   ORDERING CLINICIAN: MAGUI GALAN   FINDINGS: AP radiograph of the chest was provided.   Right IJ approach central venous catheter with the tip in the mid SVC.   CARDIOMEDIASTINAL SILHOUETTE: Cardiomediastinal silhouette is normal in size and configuration.   LUNGS: No focal consolidation, sizeable pleural effusion or  pneumothorax.   ABDOMEN: No remarkable upper abdominal findings.   BONES: No acute osseous changes.       1.  No evidence of acute cardiopulmonary process.   I personally reviewed the images/study and I agree with the findings as stated by resident physician Dr. Gregory Oliveira . This study was interpreted at [facility], [place], [place].   MACRO: None   Signed by: Tuan Reid 9/29/2024 11:24 AM Dictation workstation:   [id number]     This patient has a central line   Reason for the central line remaining today? 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 Persistent neutropenic fevers with diarrhea, with negative cultures, C diff, and negative stool pathogen panel. Also with cutaneous reaction to meropenem, now improving.      T+4 Single-unit cord transplant (T0=9/19/24)     Updates (9/29/24):  -CT chest/abdo/pelvis today with IV contrast  -40mEq Kcl oral  -BID BMP + magnesium     ONC  # MDS/AML   - Symptoms began 9/2023; diagnosed 4/2024  - BMBx showing MDS in transition to AML (>10% jaime) w/ trisomy 8, DNMT alpha, and U2AF1 mutation indication adverse risk   - s/p 4 cycles of Decitabine/Venetoclax (C4D1 on 8/12/24)  - BMBx 6/17/24: Blasts cleared, FISH still positive for trisomy 8, still MDS changes   - BMBx 9/5/24: 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 and no increase in blast     # Transplant  CONDITIONING Fludarabine, melphalan, and TBI   DONOR Single umbilical cord   MATCH GRADE 6/8   SEX MATCH Mismatched   ABO DONOR A pos   ABO RECIPIENT A pos   GVHD PROPHYLAXIS  Tacrolimus and Mycophenolate   GRAFT SOURCE Single umbilical cord   CMV DONOR negative   CMV RECIPIENT ppsitive   GVHD PPX:  - Tacrolimus 1.5 mg q12h started T-3 (9/16)  *Tacro level: 4.5 (9/20), 4/7 (9/21), 5.7 (9/22)  *Decreased Tacrolimus to 1mg BID d/t Posaconazole  - MMF 1000 mg TID to begin T+2 to T+35   - Viral surveillance should begin T+14 (ordered)  - continue tacrolimus 1.5mg alternating with 1mg (9/29 tacro 7.0)     HEME  # Anemia and Leukopenia: secondary to disease and chemotherapy  # 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   - Transfuse for Hgb <7 & PLT <10   - Neupogen 300mcg to begin T+5     # VTE Prophylaxis: SCDs & Ambulation     ID  #Neutropenic fever  # Prophylaxis: Acyclovir, Vancomycin, Posaconazole, Letermovir (T+14), and Bactrim (T+30)  - fever noted overnight 9/25 at 38C, along with diarrhea, no other focal symptoms  - UA with 2+ blood, trace ketones only, CXR clear  - blood cultures NGTD  - likely intolerant to meropenem, switched to cefepime  - etiology: suspect gut bacterial translocation/enteritis related to melphalan vs drug fever 2/2 G-CSF  - if persistent fevers tonight, broaden posaconazole -> micafungin  - will follow cultures, NGTD  - will obtain CT chest/abdo/pelvis w IV contrast today to rule out need for source control     #Maculopapular rash  -developed 9/27 PM after first dose of meropenem  -appearance highly suggestive of meropenem reaction  -stop meropenem  -improving on exam 9/29     CARDIO/PULM  # Hx of STEMI (11/11/2020)  - TTE 7/19/24: EF 59%, otherwise unremarkable   - Cleared by Cardiology for transplant      # Hx of HTN   - Nifedipine: 90 mEq ER 24 hr tablet      # Ascending aorta dilation   - Echo showing 3.8-4 cm dilation      FEN/RENAL  - Admit wt: 75.6 kg (9/13/24); Most recent wt: 75.1 kg  Allergic to Lasix, will not take med (extreme hypotension, extreme hypokalemia)     #Diarrhea, c.diff neg  -diarrhea returned 9/26 AM, along with fever  -etiology: likely melphalan-related  mucositis/enteritis  -C diff negative 9/22, pt on suppressive PO vancomycin  -loperamide 2mg q6h PRN  -replete 40mEq Kcl today  -repeat BMP, magnesium on PM draw  -stool pathogen panel negative  -resolving with loperamide     #Constipation, resolved  -stop miralax     #SOS prophylaxis: Actigall      #hx of BPH and urinary retention (2023)  - Hx elevated PSA               *7/28/21 Bx: atypical small acinar proliferation               *4/7/23 Bx: Benign     #Hx of treated Hep C in 2015  - (9/12) Hep C RNA - not detected      #Hx of recurrent hyperbilirubinemia   - 2/2 to Hemoglobin C hemolysis      F: prn  E: replete K today  N: low microbial, ensure, sour lemon candy  A: picc line     DVT: ambulation, scd  GI: PPI  NOK: spouse Genevieve ([phone])  Code: FULL     Oncologist: Jerod  Patient examined and discussed with Dr. Ignacio Deras MD PhD

## 2024-09-30 LAB
ALBUMIN SERPL BCP-MCNC: 3.2 G/DL (ref 3.4–5)
ALP SERPL-CCNC: 68 U/L (ref 33–136)
ALT SERPL W P-5'-P-CCNC: 9 U/L (ref 10–52)
ANION GAP SERPL CALC-SCNC: 12 MMOL/L (ref 10–20)
ANION GAP SERPL CALC-SCNC: 14 MMOL/L (ref 10–20)
APPEARANCE UR: CLEAR
APTT PPP: 29 SECONDS (ref 27–38)
AST SERPL W P-5'-P-CCNC: 9 U/L (ref 9–39)
BASOPHILS # BLD AUTO: 0 X10*3/UL (ref 0–0.1)
BASOPHILS NFR BLD AUTO: 0 %
BILIRUB SERPL-MCNC: 0.6 MG/DL (ref 0–1.2)
BILIRUB UR STRIP.AUTO-MCNC: NEGATIVE MG/DL
BUN SERPL-MCNC: 13 MG/DL (ref 6–23)
BUN SERPL-MCNC: 14 MG/DL (ref 6–23)
CALCIUM SERPL-MCNC: 8.2 MG/DL (ref 8.6–10.6)
CALCIUM SERPL-MCNC: 8.3 MG/DL (ref 8.6–10.6)
CHLORIDE SERPL-SCNC: 101 MMOL/L (ref 98–107)
CHLORIDE SERPL-SCNC: 102 MMOL/L (ref 98–107)
CO2 SERPL-SCNC: 24 MMOL/L (ref 21–32)
CO2 SERPL-SCNC: 25 MMOL/L (ref 21–32)
COLOR UR: ABNORMAL
CREAT SERPL-MCNC: 0.8 MG/DL (ref 0.5–1.3)
CREAT SERPL-MCNC: 0.8 MG/DL (ref 0.5–1.3)
CREAT UR-MCNC: 119 MG/DL (ref 20–370)
EGFRCR SERPLBLD CKD-EPI 2021: >90 ML/MIN/1.73M*2
EGFRCR SERPLBLD CKD-EPI 2021: >90 ML/MIN/1.73M*2
EOSINOPHIL # BLD AUTO: 0 X10*3/UL (ref 0–0.7)
EOSINOPHIL NFR BLD AUTO: 0 %
ERYTHROCYTE [DISTWIDTH] IN BLOOD BY AUTOMATED COUNT: 18.2 % (ref 11.5–14.5)
FIBRINOGEN PPP-MCNC: 532 MG/DL (ref 200–400)
GLUCOSE SERPL-MCNC: 103 MG/DL (ref 74–99)
GLUCOSE SERPL-MCNC: 99 MG/DL (ref 74–99)
GLUCOSE UR STRIP.AUTO-MCNC: NORMAL MG/DL
HAPTOGLOB SERPL NEPH-MCNC: 304 MG/DL (ref 30–200)
HCT VFR BLD AUTO: 27.2 % (ref 41–52)
HGB BLD-MCNC: 9.7 G/DL (ref 13.5–17.5)
IGG SERPL-MCNC: 1020 MG/DL (ref 700–1600)
IMM GRANULOCYTES # BLD AUTO: 0.01 X10*3/UL (ref 0–0.7)
IMM GRANULOCYTES NFR BLD AUTO: 14.3 % (ref 0–0.9)
INR PPP: 1.2 (ref 0.9–1.1)
KETONES UR STRIP.AUTO-MCNC: NEGATIVE MG/DL
LDH SERPL L TO P-CCNC: 113 U/L (ref 84–246)
LEUKOCYTE ESTERASE UR QL STRIP.AUTO: NEGATIVE
LYMPHOCYTES # BLD AUTO: 0.04 X10*3/UL (ref 1.2–4.8)
LYMPHOCYTES NFR BLD AUTO: 57.1 %
MAGNESIUM SERPL-MCNC: 1.63 MG/DL (ref 1.6–2.4)
MAGNESIUM SERPL-MCNC: 1.7 MG/DL (ref 1.6–2.4)
MCH RBC QN AUTO: 27 PG (ref 26–34)
MCHC RBC AUTO-ENTMCNC: 35.7 G/DL (ref 32–36)
MCV RBC AUTO: 76 FL (ref 80–100)
MONOCYTES # BLD AUTO: 0.02 X10*3/UL (ref 0.1–1)
MONOCYTES NFR BLD AUTO: 28.6 %
MUCOUS THREADS #/AREA URNS AUTO: ABNORMAL /LPF
NEUTROPHILS # BLD AUTO: 0 X10*3/UL (ref 1.2–7.7)
NEUTROPHILS NFR BLD AUTO: 0 %
NITRITE UR QL STRIP.AUTO: NEGATIVE
NRBC BLD-RTO: 0 /100 WBCS (ref 0–0)
PH UR STRIP.AUTO: 6 [PH]
PHOSPHATE SERPL-MCNC: 3 MG/DL (ref 2.5–4.9)
PLATELET # BLD AUTO: 18 X10*3/UL (ref 150–450)
POTASSIUM SERPL-SCNC: 3.2 MMOL/L (ref 3.5–5.3)
POTASSIUM SERPL-SCNC: 3.5 MMOL/L (ref 3.5–5.3)
PROT SERPL-MCNC: 5.9 G/DL (ref 6.4–8.2)
PROT UR STRIP.AUTO-MCNC: ABNORMAL MG/DL
PROT UR-ACNC: 42 MG/DL (ref 5–25)
PROT/CREAT UR: 0.35 MG/MG CREAT (ref 0–0.17)
PROTHROMBIN TIME: 14 SECONDS (ref 9.8–12.8)
RBC # BLD AUTO: 3.59 X10*6/UL (ref 4.5–5.9)
RBC # UR STRIP.AUTO: ABNORMAL /UL
RBC #/AREA URNS AUTO: ABNORMAL /HPF
SODIUM SERPL-SCNC: 135 MMOL/L (ref 136–145)
SODIUM SERPL-SCNC: 136 MMOL/L (ref 136–145)
SP GR UR STRIP.AUTO: 1.02
SQUAMOUS #/AREA URNS AUTO: ABNORMAL /HPF
TACROLIMUS BLD-MCNC: 7.5 NG/ML
URATE SERPL-MCNC: 2.9 MG/DL (ref 4–7.5)
UROBILINOGEN UR STRIP.AUTO-MCNC: NORMAL MG/DL
WBC # BLD AUTO: 0.1 X10*3/UL (ref 4.4–11.3)
WBC #/AREA URNS AUTO: ABNORMAL /HPF

## 2024-09-30 PROCEDURE — 80053 COMPREHEN METABOLIC PANEL: CPT

## 2024-09-30 PROCEDURE — 85384 FIBRINOGEN ACTIVITY: CPT

## 2024-09-30 PROCEDURE — 2500000001 HC RX 250 WO HCPCS SELF ADMINISTERED DRUGS (ALT 637 FOR MEDICARE OP)

## 2024-09-30 PROCEDURE — 83010 ASSAY OF HAPTOGLOBIN QUANT: CPT | Performed by: NURSE PRACTITIONER

## 2024-09-30 PROCEDURE — 80197 ASSAY OF TACROLIMUS: CPT | Performed by: INTERNAL MEDICINE

## 2024-09-30 PROCEDURE — 80048 BASIC METABOLIC PNL TOTAL CA: CPT | Mod: CCI

## 2024-09-30 PROCEDURE — 85610 PROTHROMBIN TIME: CPT

## 2024-09-30 PROCEDURE — 84550 ASSAY OF BLOOD/URIC ACID: CPT

## 2024-09-30 PROCEDURE — 2500000004 HC RX 250 GENERAL PHARMACY W/ HCPCS (ALT 636 FOR OP/ED): Mod: JZ

## 2024-09-30 PROCEDURE — 83735 ASSAY OF MAGNESIUM: CPT

## 2024-09-30 PROCEDURE — 81001 URINALYSIS AUTO W/SCOPE: CPT | Performed by: NURSE PRACTITIONER

## 2024-09-30 PROCEDURE — 87799 DETECT AGENT NOS DNA QUANT: CPT | Performed by: NURSE PRACTITIONER

## 2024-09-30 PROCEDURE — 2500000001 HC RX 250 WO HCPCS SELF ADMINISTERED DRUGS (ALT 637 FOR MEDICARE OP): Performed by: INTERNAL MEDICINE

## 2024-09-30 PROCEDURE — 99233 SBSQ HOSP IP/OBS HIGH 50: CPT

## 2024-09-30 PROCEDURE — 87533 HHV-6 DNA QUANT: CPT | Performed by: NURSE PRACTITIONER

## 2024-09-30 PROCEDURE — 1170000001 HC PRIVATE ONCOLOGY ROOM DAILY

## 2024-09-30 PROCEDURE — 84100 ASSAY OF PHOSPHORUS: CPT

## 2024-09-30 PROCEDURE — 2500000004 HC RX 250 GENERAL PHARMACY W/ HCPCS (ALT 636 FOR OP/ED): Mod: JZ | Performed by: INTERNAL MEDICINE

## 2024-09-30 PROCEDURE — 82784 ASSAY IGA/IGD/IGG/IGM EACH: CPT | Performed by: NURSE PRACTITIONER

## 2024-09-30 PROCEDURE — 2500000002 HC RX 250 W HCPCS SELF ADMINISTERED DRUGS (ALT 637 FOR MEDICARE OP, ALT 636 FOR OP/ED): Performed by: INTERNAL MEDICINE

## 2024-09-30 PROCEDURE — 85025 COMPLETE CBC W/AUTO DIFF WBC: CPT

## 2024-09-30 PROCEDURE — 83615 LACTATE (LD) (LDH) ENZYME: CPT

## 2024-09-30 PROCEDURE — 82570 ASSAY OF URINE CREATININE: CPT | Performed by: NURSE PRACTITIONER

## 2024-09-30 PROCEDURE — 2500000004 HC RX 250 GENERAL PHARMACY W/ HCPCS (ALT 636 FOR OP/ED)

## 2024-09-30 PROCEDURE — 2500000002 HC RX 250 W HCPCS SELF ADMINISTERED DRUGS (ALT 637 FOR MEDICARE OP, ALT 636 FOR OP/ED): Performed by: PHYSICIAN ASSISTANT

## 2024-09-30 RX ORDER — POTASSIUM CHLORIDE 1.5 G/1.58G
40 POWDER, FOR SOLUTION ORAL ONCE
Status: COMPLETED | OUTPATIENT
Start: 2024-09-30 | End: 2024-09-30

## 2024-09-30 RX ORDER — SODIUM CHLORIDE, SODIUM LACTATE, POTASSIUM CHLORIDE, CALCIUM CHLORIDE 600; 310; 30; 20 MG/100ML; MG/100ML; MG/100ML; MG/100ML
50 INJECTION, SOLUTION INTRAVENOUS CONTINUOUS
Status: DISCONTINUED | OUTPATIENT
Start: 2024-09-30 | End: 2024-10-02

## 2024-09-30 ASSESSMENT — PAIN SCALES - GENERAL
PAINLEVEL_OUTOF10: 0 - NO PAIN

## 2024-09-30 ASSESSMENT — PAIN - FUNCTIONAL ASSESSMENT
PAIN_FUNCTIONAL_ASSESSMENT: 0-10
PAIN_FUNCTIONAL_ASSESSMENT: 0-10

## 2024-09-30 NOTE — PROGRESS NOTES
Brandt Merritt is a 64 y.o. 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    Subjective   Pt reports feeling significantly better today. He woke up last night and noticed he was no longer having chills, which was a positive change. Today he feels stronger. His rash is receding. No fevers since last evening. Continues to have diarrhea episodes, 3 times this morning, but stools are becoming more formed. No SOB, cough, chest pain, abdominal pain, fevers, chills, bleeding, bruising.        Objective     Physical Exam  Constitutional:       General: He is not in acute distress.     Appearance: Normal appearance. He is normal weight. He is not ill-appearing, toxic-appearing or diaphoretic.   HENT:      Head: Normocephalic and atraumatic.      Right Ear: External ear normal.      Left Ear: External ear normal.      Nose: Nose normal. No congestion or rhinorrhea.      Mouth/Throat:      Mouth: Mucous membranes are moist.      Pharynx: Oropharynx is clear. No oropharyngeal exudate or posterior oropharyngeal erythema.   Eyes:      General: No scleral icterus.        Right eye: No discharge.         Left eye: No discharge.      Conjunctiva/sclera: Conjunctivae normal.      Pupils: Pupils are equal, round, and reactive to light.   Cardiovascular:      Rate and Rhythm: Normal rate and regular rhythm.      Heart sounds: Normal heart sounds. No murmur heard.     No friction rub. No gallop.   Pulmonary:      Effort: Pulmonary effort is normal. No respiratory distress.      Breath sounds: No stridor. No wheezing, rhonchi or rales.   Chest:      Chest wall: No tenderness.   Abdominal:      General: Abdomen is flat. Bowel sounds are normal. There is no distension.      Palpations: Abdomen is soft. There is no mass.      Tenderness: There is no abdominal tenderness. There is no guarding or rebound.      Hernia: No hernia is present.   Musculoskeletal:         General: No swelling, tenderness,  "deformity or signs of injury. Normal range of motion.      Cervical back: Normal range of motion.      Right lower leg: No edema.      Left lower leg: No edema.   Skin:     General: Skin is warm and dry.      Coloration: Skin is not jaundiced or pale.      Findings: No bruising, erythema, lesion or rash.   Neurological:      General: No focal deficit present.      Mental Status: He is alert and oriented to person, place, and time. Mental status is at baseline.   Psychiatric:         Mood and Affect: Mood normal.         Behavior: Behavior normal.         Thought Content: Thought content normal.         Judgment: Judgment normal.         Last Recorded Vitals  Blood pressure 108/70, pulse 94, temperature 36.7 °C (98.1 °F), temperature source Temporal, resp. rate 18, height (S) 1.664 m (5' 5.51\"), weight 69.8 kg (153 lb 14.1 oz), SpO2 99%.  Intake/Output last 3 Shifts:  I/O last 3 completed shifts:  In: 1320 (19 mL/kg) [P.O.:620; IV Piggyback:700]  Out: - (0 mL/kg)   Weight: 69.5 kg     Relevant Results               Scheduled medications  acetaminophen, 650 mg, oral, Once  acyclovir, 400 mg, oral, q12h  cefepime, 2 g, intravenous, q12h  filgrastim or biosimilar, 300 mcg, subcutaneous, q24h  folic acid, 1 mg, oral, Daily  mycophenolate, 1,000 mg, oral, TID  NIFEdipine ER, 90 mg, oral, Daily before breakfast  posaconazole, 300 mg, oral, q24h  tacrolimus, 1 mg, oral, Nightly  tacrolimus, 1.5 mg, oral, Daily before breakfast  ursodiol, 300 mg, oral, TID  vancomycin, 125 mg, oral, Daily      Continuous medications  lactated Ringer's, 50 mL/hr, Last Rate: 50 mL/hr (09/30/24 1351)      PRN medications  PRN medications: acetaminophen, albuterol, alteplase, dextrose, diphenhydrAMINE, EPINEPHrine HCl, famotidine, loperamide, methylPREDNISolone sodium succinate (PF), ondansetron, [Held by provider] polyethylene glycol, prochlorperazine, prochlorperazine, sodium chloride    Results for orders placed or performed during the " hospital encounter of 09/13/24 (from the past 24 hour(s))   Basic metabolic panel   Result Value Ref Range    Glucose 116 (H) 74 - 99 mg/dL    Sodium 131 (L) 136 - 145 mmol/L    Potassium 3.4 (L) 3.5 - 5.3 mmol/L    Chloride 100 98 - 107 mmol/L    Bicarbonate 23 21 - 32 mmol/L    Anion Gap 11 10 - 20 mmol/L    Urea Nitrogen 17 6 - 23 mg/dL    Creatinine 0.96 0.50 - 1.30 mg/dL    eGFR 87 >60 mL/min/1.73m*2    Calcium 8.2 (L) 8.6 - 10.6 mg/dL   Magnesium   Result Value Ref Range    Magnesium 1.91 1.60 - 2.40 mg/dL   CBC and Auto Differential   Result Value Ref Range    WBC 0.1 (LL) 4.4 - 11.3 x10*3/uL    nRBC 0.0 0.0 - 0.0 /100 WBCs    RBC 3.59 (L) 4.50 - 5.90 x10*6/uL    Hemoglobin 9.7 (L) 13.5 - 17.5 g/dL    Hematocrit 27.2 (L) 41.0 - 52.0 %    MCV 76 (L) 80 - 100 fL    MCH 27.0 26.0 - 34.0 pg    MCHC 35.7 32.0 - 36.0 g/dL    RDW 18.2 (H) 11.5 - 14.5 %    Platelets 18 (LL) 150 - 450 x10*3/uL    Neutrophils % 0.0 40.0 - 80.0 %    Immature Granulocytes %, Automated 14.3 (H) 0.0 - 0.9 %    Lymphocytes % 57.1 13.0 - 44.0 %    Monocytes % 28.6 2.0 - 10.0 %    Eosinophils % 0.0 0.0 - 6.0 %    Basophils % 0.0 0.0 - 2.0 %    Neutrophils Absolute 0.00 (L) 1.20 - 7.70 x10*3/uL    Immature Granulocytes Absolute, Automated 0.01 0.00 - 0.70 x10*3/uL    Lymphocytes Absolute 0.04 (L) 1.20 - 4.80 x10*3/uL    Monocytes Absolute 0.02 (L) 0.10 - 1.00 x10*3/uL    Eosinophils Absolute 0.00 0.00 - 0.70 x10*3/uL    Basophils Absolute 0.00 0.00 - 0.10 x10*3/uL   Coagulation Screen   Result Value Ref Range    Protime 14.0 (H) 9.8 - 12.8 seconds    INR 1.2 (H) 0.9 - 1.1    aPTT 29 27 - 38 seconds   Comprehensive Metabolic Panel   Result Value Ref Range    Glucose 99 74 - 99 mg/dL    Sodium 135 (L) 136 - 145 mmol/L    Potassium 3.2 (L) 3.5 - 5.3 mmol/L    Chloride 101 98 - 107 mmol/L    Bicarbonate 25 21 - 32 mmol/L    Anion Gap 12 10 - 20 mmol/L    Urea Nitrogen 13 6 - 23 mg/dL    Creatinine 0.80 0.50 - 1.30 mg/dL    eGFR >90 >60  mL/min/1.73m*2    Calcium 8.2 (L) 8.6 - 10.6 mg/dL    Albumin 3.2 (L) 3.4 - 5.0 g/dL    Alkaline Phosphatase 68 33 - 136 U/L    Total Protein 5.9 (L) 6.4 - 8.2 g/dL    AST 9 9 - 39 U/L    Bilirubin, Total 0.6 0.0 - 1.2 mg/dL    ALT 9 (L) 10 - 52 U/L   Fibrinogen   Result Value Ref Range    Fibrinogen 532 (H) 200 - 400 mg/dL   Lactate Dehydrogenase   Result Value Ref Range     84 - 246 U/L   Magnesium   Result Value Ref Range    Magnesium 1.70 1.60 - 2.40 mg/dL   Phosphorus   Result Value Ref Range    Phosphorus 3.0 2.5 - 4.9 mg/dL   Uric Acid   Result Value Ref Range    Uric Acid 2.9 (L) 4.0 - 7.5 mg/dL   Tacrolimus level   Result Value Ref Range    Tacrolimus  7.5 <=15.0 ng/mL   Haptoglobin   Result Value Ref Range    Haptoglobin 304 (H) 30 - 200 mg/dL   Protein, Urine Random   Result Value Ref Range    Total Protein, Urine Random 42 (H) 5 - 25 mg/dL    Creatinine, Urine Random 119.0 20.0 - 370.0 mg/dL    T. Protein/Creatinine Ratio 0.35 (H) 0.00 - 0.17 mg/mg Creat   Urinalysis with Reflex Microscopic   Result Value Ref Range    Color, Urine Light-Yellow Light-Yellow, Yellow, Dark-Yellow    Appearance, Urine Clear Clear    Specific Gravity, Urine 1.020 1.005 - 1.035    pH, Urine 6.0 5.0, 5.5, 6.0, 6.5, 7.0, 7.5, 8.0    Protein, Urine 30 (1+) (A) NEGATIVE, 10 (TRACE), 20 (TRACE) mg/dL    Glucose, Urine Normal Normal mg/dL    Blood, Urine 0.1 (1+) (A) NEGATIVE    Ketones, Urine NEGATIVE NEGATIVE mg/dL    Bilirubin, Urine NEGATIVE NEGATIVE    Urobilinogen, Urine Normal Normal mg/dL    Nitrite, Urine NEGATIVE NEGATIVE    Leukocyte Esterase, Urine NEGATIVE NEGATIVE   Microscopic Only, Urine   Result Value Ref Range    WBC, Urine 1-5 1-5, NONE /HPF    RBC, Urine 11-20 (A) NONE, 1-2, 3-5 /HPF    Squamous Epithelial Cells, Urine 1-9 (SPARSE) Reference range not established. /HPF    Mucus, Urine FEW Reference range not established. /LPF     CT chest abdomen pelvis w IV contrast    Result Date: 9/29/2024  Interpreted  By:  Shilo Nolasco, STUDY: CT CHEST ABDOMEN PELVIS W IV CONTRAST;  9/29/2024 1:58 pm   INDICATION: Signs/Symptoms:neutropenic fever, persistent diarrhea; rule out diverticulitis, abscess, etc..   COMPARISON: CT chest 07/17/2024.   ACCESSION NUMBER(S): [id number]   ORDERING CLINICIAN: JANE PORTILLO   TECHNIQUE: CT of the chest, abdomen, and pelvis was performed.  Contiguous axial images were obtained at 3 mm slice thickness through the chest, abdomen and pelvis. Coronal and sagittal reconstructions at 3 mm slice thickness were performed. 75 ml of contrast Omnipaque 350 were administered intravenously without immediate complication.   FINDINGS: CHEST:   LUNG/PLEURA/LARGE AIRWAYS: Linear atelectasis noted in the bilateral lower lobes. No pulmonary nodules, masses or consolidation. Trachea and right and left main bronchi are patent. No pleural effusion or pneumothorax.   VESSELS: Right jugular central venous line terminates in the distal SVC. Aorta and main pulmonary artery are normal in caliber. Mild atherosclerotic changes are noted of the aorta and branching vessels.  No coronary artery calcifications are present.   HEART: Heart is normal in size. No pericardial effusion.   MEDIASTINUM AND JEZ: No significant lymphadenopathy in the chest. Esophagus is within normal limits. There is again a small outpouching arising from the distal esophagus with the food residue in the lumen likely representing a diverticulum.   CHEST WALL AND LOWER NECK: No soft tissue masses in the chest wall. Thyroid gland is within normal limits.   ABDOMEN:   LIVER: Liver is normal in size. No focal liver lesion is seen.   BILE DUCTS: No intra or extrahepatic bile duct dilatation.   GALLBLADDER: No calcified gallstones.   PANCREAS: The pancreas appears unremarkable without evidence of ductal dilatation or masses.   SPLEEN: Spleen is normal in size. No focal splenic lesion.   ADRENAL GLANDS: No adrenal nodule or thickening.   KIDNEYS  AND URETERS: The kidneys are normal in size and enhance symmetrically.  No hydroureteronephrosis or nephroureterolithiasis is identified.   PELVIS:   BLADDER: The urinary bladder appears normal without abnormal wall thickening.   REPRODUCTIVE ORGANS: Prostate gland is mildly enlarged.   BOWEL: Stomach and duodenum are within normal limits. Small bowel loops and cecum/ascending colon are fluid-filled without significant wall thickening or surrounding stranding. No evidence of a diverticulitis or colitis. Rest of the colon is unremarkable. Normal appendix.     VESSELS: Mild atherosclerotic changes noted in the abdominal aorta. No aortic aneurysm. IVC is within normal limits. Portal vein, splenic vein and SMV are patent.   PERITONEUM/RETROPERITONEUM/LYMPH NODES: No ascites or fluid collection in the abdomen and pelvis. No significant lymphadenopathy in the abdomen and pelvis.   BONE AND SOFT TISSUE: No suspicious osseous lesions are identified. Degenerative discogenic disease is noted in the lower thoracic and lumbar spine.  The abdominal wall soft tissues appear normal.       1. Fluid-filled small bowel and right colon likely related to diarrhea. No evidence of bowel wall thickening or surrounding stranding to suggest enteritis or colitis. Evidence of a diverticulitis. 2. No other evidence of infectious process in the chest, abdomen and pelvis. 3. Chronic and incidental findings as described above.   MACRO: None   Signed by: Shilo Nolasco 9/29/2024 6:14 PM Dictation workstation:   [id number]    XR chest 1 view    Result Date: 9/29/2024  Interpreted By:  Tuan Reid and Afshari Mirak Sohrab STUDY: XR CHEST 1 VIEW;  9/28/2024 10:47 pm   INDICATION: Signs/Symptoms:fever.     COMPARISON: Chest x-ray 09/25/2024   ACCESSION NUMBER(S): [id number]   ORDERING CLINICIAN: MAGUI GALAN   FINDINGS: AP radiograph of the chest was provided.   Right IJ approach central venous catheter with the tip in the mid SVC.    CARDIOMEDIASTINAL SILHOUETTE: Cardiomediastinal silhouette is normal in size and configuration.   LUNGS: No focal consolidation, sizeable pleural effusion or pneumothorax.   ABDOMEN: No remarkable upper abdominal findings.   BONES: No acute osseous changes.       1.  No evidence of acute cardiopulmonary process.   I personally reviewed the images/study and I agree with the findings as stated by resident physician Dr. Gregory Oliveira . This study was interpreted at [facility], [place], [place].   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 Persistent neutropenic fevers with diarrhea, with negative cultures, C diff, and negative stool pathogen panel. Also with cutaneous reaction to meropenem, now improving.      T+4 Single-unit cord transplant (T0=9/19/24)     Updates (9/30/24):  -start maintenance LR at 50cc/hr  -replete potassium chloride 40mEq  -will consider steroids if pt has fever again     ONC  # MDS/AML   - Symptoms began 9/2023; diagnosed 4/2024  - BMBx showing MDS in transition to AML (>10% jaime) w/ trisomy 8, DNMT alpha, and U2AF1 mutation indication adverse risk   - s/p 4 cycles of Decitabine/Venetoclax (C4D1 on 8/12/24)  - BMBx 6/17/24: Blasts cleared, FISH still positive for trisomy 8, still MDS changes   - BMBx 9/5/24: 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 and no increase in blast     # Transplant  CONDITIONING Fludarabine, melphalan, and TBI   DONOR Single umbilical cord   MATCH GRADE 6/8   SEX  MATCH Mismatched   ABO DONOR A pos   ABO RECIPIENT A pos   GVHD PROPHYLAXIS Tacrolimus and Mycophenolate   GRAFT SOURCE Single umbilical cord   CMV DONOR negative   CMV RECIPIENT ppsitive   GVHD PPX:  - Tacrolimus 1.5 mg q12h started T-3 (9/16)  *Tacro level: 4.5 (9/20), 4/7 (9/21), 5.7 (9/22)  *Decreased Tacrolimus to 1mg BID d/t Posaconazole  - MMF 1000 mg TID to begin T+2 to T+35   - Viral surveillance should begin T+14 (ordered)  - continue tacrolimus 1.5mg alternating with 1mg (9/30 tacro 7.5)     HEME  # Anemia and Leukopenia: secondary to disease and chemotherapy  # 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   - Transfuse for Hgb <7 & PLT <10   - Neupogen 300mcg to begin T+5     # VTE Prophylaxis: SCDs & Ambulation     ID  #Neutropenic fever  # Prophylaxis: Acyclovir, Vancomycin, Posaconazole, Letermovir (T+14), and Bactrim (T+30)  - fever noted overnight 9/25 at 38C, along with diarrhea, no other focal symptoms  - UA with 2+ blood, trace ketones only, CXR clear  - blood cultures NGTD  - likely intolerant to meropenem, switched to cefepime  - etiology: suspect gut bacterial translocation/enteritis related to melphalan vs drug fever 2/2 G-CSF vs pre-engraftment syndrome  - if persistent fevers tonight, broaden posaconazole -> micafungin  - will follow cultures, NGTD  - will obtain CT chest/abdo/pelvis w IV contrast today to rule out need for source control     #Maculopapular rash  -developed 9/27 PM after first dose of meropenem  -appearance highly suggestive of meropenem reaction vs pre-engraftment syndrome  -stop meropenem  -improving on exam 9/29, 9/30     CARDIO/PULM  # Hx of STEMI (11/11/2020)  - TTE 7/19/24: EF 59%, otherwise unremarkable   - Cleared by Cardiology for transplant      # Hx of HTN   - Nifedipine: 90 mEq ER 24 hr tablet      # Ascending aorta dilation   - Echo showing 3.8-4 cm dilation      FEN/RENAL  - Admit wt: 75.6 kg (9/13/24); Most recent wt: 75.1 kg  Allergic to Lasix, will not take med (extreme  hypotension, extreme hypokalemia)     #Diarrhea, c.diff neg  -diarrhea returned 9/26 AM, along with fever  -etiology: likely melphalan-related mucositis/enteritis  -C diff negative 9/22, pt on suppressive PO vancomycin  -loperamide 2mg q6h PRN  -replete 40mEq Kcl today  -repeat BMP, magnesium on PM draw  -stool pathogen panel negative  -resolving with loperamide  -restart maintenance fluid with LR at 50cc/hr     #Constipation, resolved  -stop miralax     #SOS prophylaxis: Actigall      #hx of BPH and urinary retention (2023)  - Hx elevated PSA               *7/28/21 Bx: atypical small acinar proliferation               *4/7/23 Bx: Benign     #Hx of treated Hep C in 2015  - (9/12) Hep C RNA - not detected      #Hx of recurrent hyperbilirubinemia   - 2/2 to Hemoglobin C hemolysis      F: prn  E: replete K today  N: low microbial, ensure, sour lemon candy  A: picc line     DVT: ambulation, scd  GI: PPI  NOK: spouse Genevieve ([phone])  Code: FULL     Oncologist: Jerod  Patient examined and discussed with Dr. Ignacio Deras MD PhD

## 2024-09-30 NOTE — PROGRESS NOTES
"Nutrition Follow Up Assessment  Nutrition Assessment      Today is T+4 s/p Single-Unit Cord Transplant (T=0 on 09/19/24).    Nutrition History:  This service met with pt earlier today, was sitting up on couch at time of visit with him.    Tells had been running persistent fevers x ~the last 3 days, PO very poor, did not feel like eating or drinking much of anything other than water. Not drinking Ensure Plus over the last ~3 days.    Denied n/v. Did have c/o diarrhea and ongoing issues with taste changes. Did try the lemon drop candies and really liked them, has been using them PRN.    Today, woke up feeling much better. \"I actually want to eat something.\" Wife is bringing in burritos for him later today, was excited to try to eat those.    Anthropometrics:  Height: (S) 166.4 cm (5' 5.51\")   Weight: 69.8 kg (153 lb 14.1 oz)   BMI (Calculated): 25.21  IBW/kg (Dietitian Calculated): 64.5 kg  Percent of IBW: 108 %       Admission Weight Trend:  09/13-75.6 kg (admit wt)  09/23-72.5 kg (wt at last nutrition visit)  09/30-69.8 kg (current wt)--total of 7.7% wt loss from admit to present (significant)--some wt losses may be d/t fluid shifts though d/t extended hospitalization + poor PO, do feel pt also with loss of LBM    Nutrition Focused Physical Exam Findings:  defer: completed at a prior nutrition visit  Edema:  Edema: non-pitting  Edema Location: BLE  Physical Findings:  Skin: Negative    Nutrition Significant Labs:  CBC Trend:   Results from last 7 days   Lab Units 09/30/24  0543 09/29/24  0630 09/28/24  0555 09/27/24  0454   WBC AUTO x10*3/uL 0.1* 0.1* 0.1* <0.1*   RBC AUTO x10*6/uL 3.59* 3.52* 3.86* 4.23*   HEMOGLOBIN g/dL 9.7* 9.5* 10.6* 11.6*   HEMATOCRIT % 27.2* 26.8* 29.7* 31.9*   MCV fL 76* 76* 77* 75*   PLATELETS AUTO x10*3/uL 18* 27* 6* 19*   BMP Trend:   Results from last 7 days   Lab Units 09/30/24  0543 09/29/24  1418 09/29/24  0630 09/28/24  0555   GLUCOSE mg/dL 99 116* 101* 115*   CALCIUM mg/dL 8.2* 8.2* " 7.8* 8.0*   SODIUM mmol/L 135* 131* 133* 133*   POTASSIUM mmol/L 3.2* 3.4* 3.3* 3.0*   CO2 mmol/L 25 23 23 22   CHLORIDE mmol/L 101 100 102 102   BUN mg/dL 13 17 18 18   CREATININE mg/dL 0.80 0.96 0.89 1.20   Liver Function Trend:   Results from last 7 days   Lab Units 09/30/24  0543 09/29/24  0630 09/28/24  0555 09/27/24  0454   ALK PHOS U/L 68 67 75 84   AST U/L 9 8* 7* 8*   ALT U/L 9* 7* 8* 6*   BILIRUBIN TOTAL mg/dL 0.6 0.6 0.7 0.9   Renal Lab Trend:   Results from last 7 days   Lab Units 09/30/24  0543 09/29/24  1418 09/29/24  0630 09/28/24  0555   POTASSIUM mmol/L 3.2* 3.4* 3.3* 3.0*   PHOSPHORUS mg/dL 3.0  --  2.9 3.0   SODIUM mmol/L 135* 131* 133* 133*   MAGNESIUM mg/dL 1.70 1.91 2.02 1.56*   EGFR mL/min/1.73m*2 >90 87 >90 67   BUN mg/dL 13 17 18 18   CREATININE mg/dL 0.80 0.96 0.89 1.20      Medications:  Scheduled medications  acetaminophen, 650 mg, oral, Once  acyclovir, 400 mg, oral, q12h  cefepime, 2 g, intravenous, q12h  filgrastim or biosimilar, 300 mcg, subcutaneous, q24h  folic acid, 1 mg, oral, Daily  mycophenolate, 1,000 mg, oral, TID  NIFEdipine ER, 90 mg, oral, Daily before breakfast  posaconazole, 300 mg, oral, q24h  tacrolimus, 1 mg, oral, Nightly  tacrolimus, 1.5 mg, oral, Daily before breakfast  ursodiol, 300 mg, oral, TID  vancomycin, 125 mg, oral, Daily    Continuous medications  lactated Ringer's, 50 mL/hr, Last Rate: 50 mL/hr (09/30/24 1351)    PRN medications  PRN medications: acetaminophen, albuterol, alteplase, dextrose, diphenhydrAMINE, EPINEPHrine HCl, famotidine, loperamide, methylPREDNISolone sodium succinate (PF), ondansetron, [Held by provider] polyethylene glycol, prochlorperazine, prochlorperazine, sodium chloride    I/O:   Last BM Date: 09/30/24; Stool Appearance: Loose (09/30/24 0839)    Dietary Orders (From admission, onward)       Start     Ordered    09/14/24 1258  Oral nutritional supplements  Until discontinued        Comments: chocolate or strawberry Ensure Plus---may  have more than 2 times/day, if requested   Question Answer Comment   Deliver with Breakfast    Deliver with Dinner    Select supplement: Ensure Plus        09/14/24 1257    09/14/24 1258  Snacks  Until discontinued        Comments: pt may order from Extended Stay Menu + [employer] Snack List, if requested    09/14/24 1258    09/13/24 1427  Adult diet Low microbial  Diet effective now        Question:  Diet type  Answer:  Low microbial    09/13/24 1428                Estimated Needs:   Total Energy Estimated Needs (kCal): 2100+  Method for Estimating Needs: MSJ (1476) x ~1.4+  Total Protein Estimated Needs (g): 95+  Method for Estimating Needs: 62 (IBW) x ~1.5g/kg+  Total Fluid Estimated Needs (mL): per MD/team        Nutrition Diagnosis   Malnutrition Diagnosis  Patient has Malnutrition Diagnosis: Yes  Diagnosis Status: New  Malnutrition Diagnosis: Moderate malnutrition related to acute disease or injury (s/p recent transplant with treatment related side effects)  As Evidenced by: pt consuming <75% estimated energy needs x >7 days with 7.7% wt loss since admit (<1 month)    Nutrition Diagnosis  Patient has Nutrition Diagnosis: Yes  Diagnosis Status (1): Ongoing  Nutrition Diagnosis 1: Increased nutrient needs  Related to (1): increased metabolic demand  As Evidenced by (1): pt with AML, admitted for transplant    Additional Assessment Information: Low threshold for further decline in overall nutritional status, pending ongoing PO and wt status in-house.     Do feel pt would benefit from ongoing use of oral nutrition supplements, considering malnutrition with hypermetabolic state; agreeable to continue order for Ensure Plus BID--is going to try to go back to drinking them on a consistent basis, now that he is starting to feel better.       Nutrition Interventions/Recommendations     1. Continue Low Microbial Diet, only as tolerated  --RDN to continue order for Ensure Plus BID for added nutrition  --RDN provided  additional lemon drop candies to pt d/t ongoing c/o dysgeusia        Nutrition Prescription for Oral Nutrition: 2100+ kcals/day, 95+ g pro/day        Nutrition Interventions:   Food and/or Nutrient Delivery Interventions  Interventions: Meals and snacks, Medical food supplement  Meals and Snacks: General healthful diet  Goal: Low Microbial Diet  Medical Food Supplement: Commercial beverage  Goal: Ensure Plus BID (350 kcals, 13g pro each)    Nutrition Education: Encouraged ongoing PO + protein. Pt agreeable. Denied any questions for RDN at this time.       Nutrition Monitoring and Evaluation   Food/Nutrient Related History Monitoring  Monitoring and Evaluation Plan: Amount of food  Amount of Food: Estimated amout of food, Medical food intake  Criteria: consume >50% of meals/snacks/supplements    Body Composition/Growth/Weight History  Monitoring and Evaluation Plan: Weight  Weight: Measured weight  Criteria: maintain stable wt    Biochemical Data, Medical Tests and Procedures  Monitoring and Evaluation Plan: Electrolyte/renal panel  Electrolyte and Renal Panel: Magnesium, Phosphorus, Potassium, Sodium  Criteria: WNL            Time Spent (min): 30 minutes

## 2024-10-01 LAB
ADENOVIRUS QPCR,PLASMA, VIRC: NOT DETECTED COPIES/ML
ALBUMIN SERPL BCP-MCNC: 3 G/DL (ref 3.4–5)
ALP SERPL-CCNC: 65 U/L (ref 33–136)
ALT SERPL W P-5'-P-CCNC: 9 U/L (ref 10–52)
ANION GAP SERPL CALC-SCNC: 12 MMOL/L (ref 10–20)
ANION GAP SERPL CALC-SCNC: 14 MMOL/L (ref 10–20)
AST SERPL W P-5'-P-CCNC: 10 U/L (ref 9–39)
BASOPHILS # BLD MANUAL: 0 X10*3/UL (ref 0–0.1)
BASOPHILS NFR BLD MANUAL: 0 %
BILIRUB SERPL-MCNC: 0.5 MG/DL (ref 0–1.2)
BUN SERPL-MCNC: 10 MG/DL (ref 6–23)
BUN SERPL-MCNC: 9 MG/DL (ref 6–23)
BURR CELLS BLD QL SMEAR: ABNORMAL
CALCIUM SERPL-MCNC: 8.1 MG/DL (ref 8.6–10.6)
CALCIUM SERPL-MCNC: 8.2 MG/DL (ref 8.6–10.6)
CHLORIDE SERPL-SCNC: 104 MMOL/L (ref 98–107)
CHLORIDE SERPL-SCNC: 105 MMOL/L (ref 98–107)
CMV DNA SERPL NAA+PROBE-LOG IU: NORMAL {LOG_IU}/ML
CMV DNA SERPL NAA+PROBE-LOG IU: NORMAL {LOG_IU}/ML
CO2 SERPL-SCNC: 25 MMOL/L (ref 21–32)
CO2 SERPL-SCNC: 26 MMOL/L (ref 21–32)
CREAT SERPL-MCNC: 0.67 MG/DL (ref 0.5–1.3)
CREAT SERPL-MCNC: 0.72 MG/DL (ref 0.5–1.3)
EBV DNA SPEC NAA+PROBE-LOG#: NORMAL {LOG_COPIES}/ML
EGFRCR SERPLBLD CKD-EPI 2021: >90 ML/MIN/1.73M*2
EGFRCR SERPLBLD CKD-EPI 2021: >90 ML/MIN/1.73M*2
EOSINOPHIL # BLD MANUAL: 0 X10*3/UL (ref 0–0.7)
EOSINOPHIL NFR BLD MANUAL: 0 %
ERYTHROCYTE [DISTWIDTH] IN BLOOD BY AUTOMATED COUNT: 18.9 % (ref 11.5–14.5)
GLUCOSE SERPL-MCNC: 109 MG/DL (ref 74–99)
GLUCOSE SERPL-MCNC: 122 MG/DL (ref 74–99)
HCT VFR BLD AUTO: 28.1 % (ref 41–52)
HGB BLD-MCNC: 9.3 G/DL (ref 13.5–17.5)
HUMAN HERPESVIRUS-6 PCR PLASMA: NOT DETECTED COPIES/ML
IMM GRANULOCYTES # BLD AUTO: 0 X10*3/UL (ref 0–0.7)
IMM GRANULOCYTES NFR BLD AUTO: 0 % (ref 0–0.9)
LABORATORY COMMENT REPORT: NOT DETECTED
LDH SERPL L TO P-CCNC: 113 U/L (ref 84–246)
LYMPHOCYTES # BLD MANUAL: 0.17 X10*3/UL (ref 1.2–4.8)
LYMPHOCYTES NFR BLD MANUAL: 83.3 %
MAGNESIUM SERPL-MCNC: 1.41 MG/DL (ref 1.6–2.4)
MAGNESIUM SERPL-MCNC: 2.16 MG/DL (ref 1.6–2.4)
MCH RBC QN AUTO: 27 PG (ref 26–34)
MCHC RBC AUTO-ENTMCNC: 33.1 G/DL (ref 32–36)
MCV RBC AUTO: 82 FL (ref 80–100)
MONOCYTES # BLD MANUAL: 0.03 X10*3/UL (ref 0.1–1)
MONOCYTES NFR BLD MANUAL: 16.7 %
NEUTS SEG # BLD MANUAL: 0 X10*3/UL (ref 1.2–7)
NEUTS SEG NFR BLD MANUAL: 0 %
NRBC BLD-RTO: 0 /100 WBCS (ref 0–0)
PHOSPHATE SERPL-MCNC: 2.8 MG/DL (ref 2.5–4.9)
PLATELET # BLD AUTO: 24 X10*3/UL (ref 150–450)
POTASSIUM SERPL-SCNC: 3.4 MMOL/L (ref 3.5–5.3)
POTASSIUM SERPL-SCNC: 3.9 MMOL/L (ref 3.5–5.3)
PROT SERPL-MCNC: 5.7 G/DL (ref 6.4–8.2)
RBC # BLD AUTO: 3.44 X10*6/UL (ref 4.5–5.9)
RBC MORPH BLD: ABNORMAL
SODIUM SERPL-SCNC: 138 MMOL/L (ref 136–145)
SODIUM SERPL-SCNC: 141 MMOL/L (ref 136–145)
TACROLIMUS BLD-MCNC: 6.9 NG/ML
TOTAL CELLS COUNTED BLD: 18
URATE SERPL-MCNC: 2.2 MG/DL (ref 4–7.5)
WBC # BLD AUTO: 0.2 X10*3/UL (ref 4.4–11.3)

## 2024-10-01 PROCEDURE — 2500000002 HC RX 250 W HCPCS SELF ADMINISTERED DRUGS (ALT 637 FOR MEDICARE OP, ALT 636 FOR OP/ED): Performed by: INTERNAL MEDICINE

## 2024-10-01 PROCEDURE — 80048 BASIC METABOLIC PNL TOTAL CA: CPT | Mod: CCI

## 2024-10-01 PROCEDURE — 83735 ASSAY OF MAGNESIUM: CPT

## 2024-10-01 PROCEDURE — 84550 ASSAY OF BLOOD/URIC ACID: CPT

## 2024-10-01 PROCEDURE — 2500000004 HC RX 250 GENERAL PHARMACY W/ HCPCS (ALT 636 FOR OP/ED): Performed by: INTERNAL MEDICINE

## 2024-10-01 PROCEDURE — 99233 SBSQ HOSP IP/OBS HIGH 50: CPT | Performed by: STUDENT IN AN ORGANIZED HEALTH CARE EDUCATION/TRAINING PROGRAM

## 2024-10-01 PROCEDURE — 84100 ASSAY OF PHOSPHORUS: CPT

## 2024-10-01 PROCEDURE — 2500000002 HC RX 250 W HCPCS SELF ADMINISTERED DRUGS (ALT 637 FOR MEDICARE OP, ALT 636 FOR OP/ED): Performed by: PHYSICIAN ASSISTANT

## 2024-10-01 PROCEDURE — 1170000001 HC PRIVATE ONCOLOGY ROOM DAILY

## 2024-10-01 PROCEDURE — 2500000001 HC RX 250 WO HCPCS SELF ADMINISTERED DRUGS (ALT 637 FOR MEDICARE OP)

## 2024-10-01 PROCEDURE — 80197 ASSAY OF TACROLIMUS: CPT | Performed by: INTERNAL MEDICINE

## 2024-10-01 PROCEDURE — 85007 BL SMEAR W/DIFF WBC COUNT: CPT

## 2024-10-01 PROCEDURE — 2500000004 HC RX 250 GENERAL PHARMACY W/ HCPCS (ALT 636 FOR OP/ED): Mod: JZ

## 2024-10-01 PROCEDURE — 2500000004 HC RX 250 GENERAL PHARMACY W/ HCPCS (ALT 636 FOR OP/ED)

## 2024-10-01 PROCEDURE — 80053 COMPREHEN METABOLIC PANEL: CPT

## 2024-10-01 PROCEDURE — 85027 COMPLETE CBC AUTOMATED: CPT

## 2024-10-01 PROCEDURE — 83615 LACTATE (LD) (LDH) ENZYME: CPT

## 2024-10-01 PROCEDURE — 2500000001 HC RX 250 WO HCPCS SELF ADMINISTERED DRUGS (ALT 637 FOR MEDICARE OP): Performed by: INTERNAL MEDICINE

## 2024-10-01 RX ORDER — POTASSIUM CHLORIDE 1.5 G/1.58G
40 POWDER, FOR SOLUTION ORAL ONCE
Status: COMPLETED | OUTPATIENT
Start: 2024-10-01 | End: 2024-10-01

## 2024-10-01 RX ORDER — MAGNESIUM SULFATE HEPTAHYDRATE 40 MG/ML
4 INJECTION, SOLUTION INTRAVENOUS ONCE
Status: COMPLETED | OUTPATIENT
Start: 2024-10-01 | End: 2024-10-01

## 2024-10-01 ASSESSMENT — COGNITIVE AND FUNCTIONAL STATUS - GENERAL
DAILY ACTIVITIY SCORE: 24
MOBILITY SCORE: 24

## 2024-10-01 ASSESSMENT — PAIN SCALES - GENERAL
PAINLEVEL_OUTOF10: 0 - NO PAIN
PAINLEVEL_OUTOF10: 0 - NO PAIN

## 2024-10-01 ASSESSMENT — PAIN - FUNCTIONAL ASSESSMENT: PAIN_FUNCTIONAL_ASSESSMENT: 0-10

## 2024-10-01 NOTE — PROGRESS NOTES
Brandt Merritt is a 64 y.o. male on day 18 of admission presenting with Acute myeloid leukemia in remission (Multi).    Subjective   Pt reports feeling good today. No fevers, chills, bruising, bleeding, abdominal pain, or other concerns. He feels strong and has some return of his appetite. Continues to have frequent bowel movements, 3 times this morning, but it is loose not liquid.        Objective     Physical Exam  Constitutional:       General: He is not in acute distress.     Appearance: He is normal weight. He is not ill-appearing, toxic-appearing or diaphoretic.   HENT:      Head: Normocephalic and atraumatic.      Right Ear: External ear normal.      Left Ear: External ear normal.      Nose: Nose normal. No congestion or rhinorrhea.      Mouth/Throat:      Mouth: Mucous membranes are moist.      Pharynx: Oropharynx is clear. No oropharyngeal exudate or posterior oropharyngeal erythema.   Eyes:      General: No scleral icterus.        Right eye: No discharge.         Left eye: No discharge.      Conjunctiva/sclera: Conjunctivae normal.   Cardiovascular:      Rate and Rhythm: Normal rate and regular rhythm.      Heart sounds: Normal heart sounds. No murmur heard.     No friction rub. No gallop.   Pulmonary:      Effort: Pulmonary effort is normal. No respiratory distress.      Breath sounds: No stridor. No wheezing, rhonchi or rales.   Chest:      Chest wall: No tenderness.   Abdominal:      General: Abdomen is flat. Bowel sounds are normal. There is no distension.      Palpations: There is no mass.      Tenderness: There is no abdominal tenderness. There is no guarding or rebound.      Hernia: No hernia is present.   Musculoskeletal:         General: No swelling, tenderness, deformity or signs of injury. Normal range of motion.      Cervical back: Normal range of motion.      Right lower leg: No edema.      Left lower leg: No edema.   Skin:     General: Skin is warm and dry.      Coloration: Skin is not  "jaundiced or pale.      Findings: No bruising, erythema, lesion or rash.   Neurological:      General: No focal deficit present.      Mental Status: He is alert. Mental status is at baseline.   Psychiatric:         Mood and Affect: Mood normal.         Behavior: Behavior normal.         Thought Content: Thought content normal.         Judgment: Judgment normal.         Last Recorded Vitals  Blood pressure 108/75, pulse 89, temperature 36.3 °C (97.3 °F), temperature source Temporal, resp. rate 18, height (S) 1.664 m (5' 5.51\"), weight 70.1 kg (154 lb 8.7 oz), SpO2 99%.  Intake/Output last 3 Shifts:  I/O last 3 completed shifts:  In: 1107.5 (15.9 mL/kg) [I.V.:807.5 (11.6 mL/kg); IV Piggyback:300]  Out: - (0 mL/kg)   Weight: 69.8 kg     Relevant Results             Scheduled medications  acetaminophen, 650 mg, oral, Once  acyclovir, 400 mg, oral, q12h  cefepime, 2 g, intravenous, q12h  filgrastim or biosimilar, 300 mcg, subcutaneous, q24h  folic acid, 1 mg, oral, Daily  magnesium sulfate, 4 g, intravenous, Once  mycophenolate, 1,000 mg, oral, TID  NIFEdipine ER, 90 mg, oral, Daily before breakfast  posaconazole, 300 mg, oral, q24h  tacrolimus, 1 mg, oral, Nightly  tacrolimus, 1.5 mg, oral, Daily before breakfast  ursodiol, 300 mg, oral, TID  vancomycin, 125 mg, oral, Daily      Continuous medications  lactated Ringer's, 50 mL/hr, Last Rate: 50 mL/hr (09/30/24 1351)      PRN medications  PRN medications: acetaminophen, albuterol, alteplase, dextrose, diphenhydrAMINE, EPINEPHrine HCl, famotidine, loperamide, methylPREDNISolone sodium succinate (PF), ondansetron, [Held by provider] polyethylene glycol, prochlorperazine, prochlorperazine, sodium chloride    Results for orders placed or performed during the hospital encounter of 09/13/24 (from the past 24 hour(s))   Basic metabolic panel   Result Value Ref Range    Glucose 103 (H) 74 - 99 mg/dL    Sodium 136 136 - 145 mmol/L    Potassium 3.5 3.5 - 5.3 mmol/L    Chloride 102 " 98 - 107 mmol/L    Bicarbonate 24 21 - 32 mmol/L    Anion Gap 14 10 - 20 mmol/L    Urea Nitrogen 14 6 - 23 mg/dL    Creatinine 0.80 0.50 - 1.30 mg/dL    eGFR >90 >60 mL/min/1.73m*2    Calcium 8.3 (L) 8.6 - 10.6 mg/dL   Magnesium   Result Value Ref Range    Magnesium 1.63 1.60 - 2.40 mg/dL   CBC and Auto Differential   Result Value Ref Range    WBC 0.2 (LL) 4.4 - 11.3 x10*3/uL    nRBC 0.0 0.0 - 0.0 /100 WBCs    RBC 3.44 (L) 4.50 - 5.90 x10*6/uL    Hemoglobin 9.3 (L) 13.5 - 17.5 g/dL    Hematocrit 28.1 (L) 41.0 - 52.0 %    MCV 82 80 - 100 fL    MCH 27.0 26.0 - 34.0 pg    MCHC 33.1 32.0 - 36.0 g/dL    RDW 18.9 (H) 11.5 - 14.5 %    Platelets 24 (LL) 150 - 450 x10*3/uL    Immature Granulocytes %, Automated 0.0 0.0 - 0.9 %    Immature Granulocytes Absolute, Automated 0.00 0.00 - 0.70 x10*3/uL   Tacrolimus level   Result Value Ref Range    Tacrolimus  6.9 <=15.0 ng/mL   Manual Differential   Result Value Ref Range    Neutrophils %, Manual 0.0 40.0 - 80.0 %    Lymphocytes %, Manual 83.3 13.0 - 44.0 %    Monocytes %, Manual 16.7 2.0 - 10.0 %    Eosinophils %, Manual 0.0 0.0 - 6.0 %    Basophils %, Manual 0.0 0.0 - 2.0 %    Seg Neutrophils Absolute, Manual 0.00 (L) 1.20 - 7.00 x10*3/uL    Lymphocytes Absolute, Manual 0.17 (L) 1.20 - 4.80 x10*3/uL    Monocytes Absolute, Manual 0.03 (L) 0.10 - 1.00 x10*3/uL    Eosinophils Absolute, Manual 0.00 0.00 - 0.70 x10*3/uL    Basophils Absolute, Manual 0.00 0.00 - 0.10 x10*3/uL    Total Cells Counted 18     RBC Morphology See Below     Suri Cells Few    Comprehensive Metabolic Panel   Result Value Ref Range    Glucose 109 (H) 74 - 99 mg/dL    Sodium 138 136 - 145 mmol/L    Potassium 3.4 (L) 3.5 - 5.3 mmol/L    Chloride 104 98 - 107 mmol/L    Bicarbonate 25 21 - 32 mmol/L    Anion Gap 12 10 - 20 mmol/L    Urea Nitrogen 10 6 - 23 mg/dL    Creatinine 0.67 0.50 - 1.30 mg/dL    eGFR >90 >60 mL/min/1.73m*2    Calcium 8.1 (L) 8.6 - 10.6 mg/dL    Albumin 3.0 (L) 3.4 - 5.0 g/dL    Alkaline  Phosphatase 65 33 - 136 U/L    Total Protein 5.7 (L) 6.4 - 8.2 g/dL    AST 10 9 - 39 U/L    Bilirubin, Total 0.5 0.0 - 1.2 mg/dL    ALT 9 (L) 10 - 52 U/L   Lactate Dehydrogenase   Result Value Ref Range     84 - 246 U/L   Magnesium   Result Value Ref Range    Magnesium 1.41 (L) 1.60 - 2.40 mg/dL   Phosphorus   Result Value Ref Range    Phosphorus 2.8 2.5 - 4.9 mg/dL   Uric Acid   Result Value Ref Range    Uric Acid 2.2 (L) 4.0 - 7.5 mg/dL     CT chest abdomen pelvis w IV contrast    Result Date: 9/29/2024  Interpreted By:  Shilo Nolasco, STUDY: CT CHEST ABDOMEN PELVIS W IV CONTRAST;  9/29/2024 1:58 pm   INDICATION: Signs/Symptoms:neutropenic fever, persistent diarrhea; rule out diverticulitis, abscess, etc..   COMPARISON: CT chest 07/17/2024.   ACCESSION NUMBER(S): [id number]   ORDERING CLINICIAN: JANE PORTILLO   TECHNIQUE: CT of the chest, abdomen, and pelvis was performed.  Contiguous axial images were obtained at 3 mm slice thickness through the chest, abdomen and pelvis. Coronal and sagittal reconstructions at 3 mm slice thickness were performed. 75 ml of contrast Omnipaque 350 were administered intravenously without immediate complication.   FINDINGS: CHEST:   LUNG/PLEURA/LARGE AIRWAYS: Linear atelectasis noted in the bilateral lower lobes. No pulmonary nodules, masses or consolidation. Trachea and right and left main bronchi are patent. No pleural effusion or pneumothorax.   VESSELS: Right jugular central venous line terminates in the distal SVC. Aorta and main pulmonary artery are normal in caliber. Mild atherosclerotic changes are noted of the aorta and branching vessels.  No coronary artery calcifications are present.   HEART: Heart is normal in size. No pericardial effusion.   MEDIASTINUM AND JEZ: No significant lymphadenopathy in the chest. Esophagus is within normal limits. There is again a small outpouching arising from the distal esophagus with the food residue in the lumen likely  representing a diverticulum.   CHEST WALL AND LOWER NECK: No soft tissue masses in the chest wall. Thyroid gland is within normal limits.   ABDOMEN:   LIVER: Liver is normal in size. No focal liver lesion is seen.   BILE DUCTS: No intra or extrahepatic bile duct dilatation.   GALLBLADDER: No calcified gallstones.   PANCREAS: The pancreas appears unremarkable without evidence of ductal dilatation or masses.   SPLEEN: Spleen is normal in size. No focal splenic lesion.   ADRENAL GLANDS: No adrenal nodule or thickening.   KIDNEYS AND URETERS: The kidneys are normal in size and enhance symmetrically.  No hydroureteronephrosis or nephroureterolithiasis is identified.   PELVIS:   BLADDER: The urinary bladder appears normal without abnormal wall thickening.   REPRODUCTIVE ORGANS: Prostate gland is mildly enlarged.   BOWEL: Stomach and duodenum are within normal limits. Small bowel loops and cecum/ascending colon are fluid-filled without significant wall thickening or surrounding stranding. No evidence of a diverticulitis or colitis. Rest of the colon is unremarkable. Normal appendix.     VESSELS: Mild atherosclerotic changes noted in the abdominal aorta. No aortic aneurysm. IVC is within normal limits. Portal vein, splenic vein and SMV are patent.   PERITONEUM/RETROPERITONEUM/LYMPH NODES: No ascites or fluid collection in the abdomen and pelvis. No significant lymphadenopathy in the abdomen and pelvis.   BONE AND SOFT TISSUE: No suspicious osseous lesions are identified. Degenerative discogenic disease is noted in the lower thoracic and lumbar spine.  The abdominal wall soft tissues appear normal.       1. Fluid-filled small bowel and right colon likely related to diarrhea. No evidence of bowel wall thickening or surrounding stranding to suggest enteritis or colitis. Evidence of a diverticulitis. 2. No other evidence of infectious process in the chest, abdomen and pelvis. 3. Chronic and incidental findings as described  above.   MACRO: None   Signed by: Shilo Nolasco 9/29/2024 6:14 PM Dictation workstation:   [id number]     This patient has a central line   Reason for the central line remaining today? Parenteral medication            Malnutrition Diagnosis Status: New  Malnutrition Diagnosis: Moderate malnutrition related to acute disease or injury (s/p recent transplant with treatment related side effects)  As Evidenced by: pt consuming <75% estimated energy needs x >7 days with 7.7% wt loss since admit (<1 month)  I agree with the dietitian's malnutrition diagnosis.      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 Persistent neutropenic fevers with diarrhea, with negative cultures, C diff, and negative stool pathogen panel. Also with cutaneous reaction to meropenem, now improving.      T+4 Single-unit cord transplant (T0=9/19/24)     Updates (10/1/24):  -replete 40mEq Kcl PO  -replete 4G MgSo4  -BID BMP and Mg     ONC  # MDS/AML   - Symptoms began 9/2023; diagnosed 4/2024  - BMBx showing MDS in transition to AML (>10% jaime) w/ trisomy 8, DNMT alpha, and U2AF1 mutation indication adverse risk   - s/p 4 cycles of Decitabine/Venetoclax (C4D1 on 8/12/24)  - BMBx 6/17/24: Blasts cleared, FISH still positive for trisomy 8, still MDS changes   - BMBx 9/5/24: 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 and no increase in blast     # Transplant  CONDITIONING Fludarabine, melphalan, and TBI   DONOR Single umbilical cord   MATCH GRADE 6/8   SEX MATCH Mismatched   ABO DONOR A pos   ABO RECIPIENT A pos   GVHD PROPHYLAXIS Tacrolimus and Mycophenolate   GRAFT SOURCE Single  umbilical cord   CMV DONOR negative   CMV RECIPIENT ppsitive   GVHD PPX:  - Tacrolimus 1.5 mg q12h started T-3 (9/16)  *Tacro level: 4.5 (9/20), 4/7 (9/21), 5.7 (9/22)  *Decreased Tacrolimus to 1mg BID d/t Posaconazole  - MMF 1000 mg TID to begin T+2 to T+35   - Viral surveillance should begin T+14 (ordered)  - continue tacrolimus 1.5mg alternating with 1mg (9/30 tacro 7.5)     HEME  # Anemia and Leukopenia: secondary to disease and chemotherapy  # 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   - Transfuse for Hgb <7 & PLT <10   - Neupogen 300mcg to begin T+5     # VTE Prophylaxis: SCDs & Ambulation     ID  #Neutropenic fever, resolved  # Prophylaxis: Acyclovir, Vancomycin, Posaconazole, Letermovir (T+14), and Bactrim (T+30)  - fever noted overnight 9/25 at 38C, along with diarrhea, no other focal symptoms  - UA with 2+ blood, trace ketones only, CXR clear  - blood cultures NGTD  - likely intolerant to meropenem, switched to cefepime  - etiology: suspect gut bacterial translocation/enteritis related to melphalan vs drug fever 2/2 G-CSF vs pre-engraftment syndrome  - cultures all negative  - CT chest/abdo/pelvis only with diarrhea, no other findings  - continue cefepime until ANC > 500 and no fever >48h     #Maculopapular rash, resolved  -developed 9/27 PM after first dose of meropenem  -appearance highly suggestive of meropenem reaction vs pre-engraftment syndrome  -stop meropenem  -resolved by 10/1     CARDIO/PULM  # Hx of STEMI (11/11/2020)  - TTE 7/19/24: EF 59%, otherwise unremarkable   - Cleared by Cardiology for transplant      # Hx of HTN   - Nifedipine: 90 mEq ER 24 hr tablet      # Ascending aorta dilation   - Echo showing 3.8-4 cm dilation      FEN/RENAL  - Admit wt: 75.6 kg (9/13/24); Most recent wt: 75.1 kg  Allergic to Lasix, will not take med (extreme hypotension, extreme hypokalemia)     #Diarrhea, c.diff neg  -diarrhea returned 9/26 AM, along with fever  -etiology: likely melphalan-related mucositis/enteritis  -C diff  negative 9/22, pt on suppressive PO vancomycin  -loperamide 2mg q6h PRN  -replete 40mEq Kcl, 4G magnesium sulfate today  -repeat BMP, magnesium on PM draw  -stool pathogen panel negative  -continue loperamide PRN   -restart maintenance fluid with LR at 50cc/hr     #Constipation, resolved  -stop miralax     #SOS prophylaxis: Actigall      #hx of BPH and urinary retention (2023)  - Hx elevated PSA               *7/28/21 Bx: atypical small acinar proliferation               *4/7/23 Bx: Benign     #Hx of treated Hep C in 2015  - (9/12) Hep C RNA - not detected      #Hx of recurrent hyperbilirubinemia   - 2/2 to Hemoglobin C hemolysis      F: 50cc/hr LR  E: replete K Mg today  N: low microbial, ensure, sour lemon candy  A: picc line     DVT: ambulation, scd  GI: PPI  NOK: spouse Genevieve ([phone])  Code: FULL     Oncologist: Jerod  Patient examined and discussed with Dr. Ignacio Deras MD PhD

## 2024-10-01 NOTE — CARE PLAN
The patient's goals for the shift include 6-8 hrs of rest/sleep during shift and pain controlled throughout shift.      The clinical goals for the shift include Pt to remain free of harm/falls, pain controlled  and continue to participate in plan of care.      Problem: Safety - Adult  Goal: Free from fall injury  Outcome: Progressing  Call light within reach at all times  Encourage patient to ring call light before ambulating or transferring OOB.  Bed in lowest position and locked        Problem: Pain - Adult  Goal: Verbalizes/displays adequate comfort level or baseline comfort level  Outcome: Progressing  Administer Prn pain medications as ordered  Assess pain/Reassess

## 2024-10-02 LAB
ALBUMIN SERPL BCP-MCNC: 3.1 G/DL (ref 3.4–5)
ALP SERPL-CCNC: 63 U/L (ref 33–136)
ALT SERPL W P-5'-P-CCNC: 13 U/L (ref 10–52)
ANION GAP SERPL CALC-SCNC: 11 MMOL/L (ref 10–20)
ANION GAP SERPL CALC-SCNC: 9 MMOL/L (ref 10–20)
APTT PPP: 30 SECONDS (ref 27–38)
AST SERPL W P-5'-P-CCNC: 13 U/L (ref 9–39)
BASOPHILS # BLD MANUAL: 0 X10*3/UL (ref 0–0.1)
BASOPHILS NFR BLD MANUAL: 0 %
BILIRUB SERPL-MCNC: 0.5 MG/DL (ref 0–1.2)
BLOOD EXPIRATION DATE: NORMAL
BUN SERPL-MCNC: 7 MG/DL (ref 6–23)
BUN SERPL-MCNC: 9 MG/DL (ref 6–23)
CALCIUM SERPL-MCNC: 8.1 MG/DL (ref 8.6–10.6)
CALCIUM SERPL-MCNC: 8.2 MG/DL (ref 8.6–10.6)
CHLORIDE SERPL-SCNC: 104 MMOL/L (ref 98–107)
CHLORIDE SERPL-SCNC: 104 MMOL/L (ref 98–107)
CO2 SERPL-SCNC: 28 MMOL/L (ref 21–32)
CO2 SERPL-SCNC: 30 MMOL/L (ref 21–32)
CREAT SERPL-MCNC: 0.6 MG/DL (ref 0.5–1.3)
CREAT SERPL-MCNC: 0.67 MG/DL (ref 0.5–1.3)
DISPENSE STATUS: NORMAL
EGFRCR SERPLBLD CKD-EPI 2021: >90 ML/MIN/1.73M*2
EGFRCR SERPLBLD CKD-EPI 2021: >90 ML/MIN/1.73M*2
EOSINOPHIL # BLD MANUAL: 0 X10*3/UL (ref 0–0.7)
EOSINOPHIL NFR BLD MANUAL: 0 %
ERYTHROCYTE [DISTWIDTH] IN BLOOD BY AUTOMATED COUNT: 17.9 % (ref 11.5–14.5)
FIBRINOGEN PPP-MCNC: 373 MG/DL (ref 200–400)
GLUCOSE SERPL-MCNC: 93 MG/DL (ref 74–99)
GLUCOSE SERPL-MCNC: 94 MG/DL (ref 74–99)
HCT VFR BLD AUTO: 25.8 % (ref 41–52)
HGB BLD-MCNC: 9.4 G/DL (ref 13.5–17.5)
IMM GRANULOCYTES # BLD AUTO: 0 X10*3/UL (ref 0–0.7)
IMM GRANULOCYTES NFR BLD AUTO: 0 % (ref 0–0.9)
INR PPP: 1.1 (ref 0.9–1.1)
LDH SERPL L TO P-CCNC: 106 U/L (ref 84–246)
LYMPHOCYTES # BLD MANUAL: 0.33 X10*3/UL (ref 1.2–4.8)
LYMPHOCYTES NFR BLD MANUAL: 65.4 %
MAGNESIUM SERPL-MCNC: 1.44 MG/DL (ref 1.6–2.4)
MAGNESIUM SERPL-MCNC: 1.52 MG/DL (ref 1.6–2.4)
MCH RBC QN AUTO: 26.7 PG (ref 26–34)
MCHC RBC AUTO-ENTMCNC: 36.4 G/DL (ref 32–36)
MCV RBC AUTO: 73 FL (ref 80–100)
MONOCYTES # BLD MANUAL: 0.13 X10*3/UL (ref 0.1–1)
MONOCYTES NFR BLD MANUAL: 26.9 %
NEUTS SEG # BLD MANUAL: 0 X10*3/UL (ref 1.2–7)
NEUTS SEG NFR BLD MANUAL: 0 %
NRBC BLD-RTO: 0 /100 WBCS (ref 0–0)
PHOSPHATE SERPL-MCNC: 3 MG/DL (ref 2.5–4.9)
PLATELET # BLD AUTO: 4 X10*3/UL (ref 150–450)
POTASSIUM SERPL-SCNC: 3.6 MMOL/L (ref 3.5–5.3)
POTASSIUM SERPL-SCNC: 3.8 MMOL/L (ref 3.5–5.3)
PRODUCT BLOOD TYPE: 6200
PRODUCT CODE: NORMAL
PROT SERPL-MCNC: 5.9 G/DL (ref 6.4–8.2)
PROTHROMBIN TIME: 12.7 SECONDS (ref 9.8–12.8)
RBC # BLD AUTO: 3.52 X10*6/UL (ref 4.5–5.9)
RBC MORPH BLD: ABNORMAL
SODIUM SERPL-SCNC: 139 MMOL/L (ref 136–145)
SODIUM SERPL-SCNC: 139 MMOL/L (ref 136–145)
TACROLIMUS BLD-MCNC: 6.5 NG/ML
TARGETS BLD QL SMEAR: ABNORMAL
TOTAL CELLS COUNTED BLD: 52
UNIT ABO: NORMAL
UNIT NUMBER: NORMAL
UNIT RH: NORMAL
UNIT VOLUME: 200
URATE SERPL-MCNC: 1.7 MG/DL (ref 4–7.5)
VARIANT LYMPHS # BLD MANUAL: 0.04 X10*3/UL (ref 0–0.5)
VARIANT LYMPHS NFR BLD: 7.7 %
WBC # BLD AUTO: 0.5 X10*3/UL (ref 4.4–11.3)

## 2024-10-02 PROCEDURE — 80053 COMPREHEN METABOLIC PANEL: CPT

## 2024-10-02 PROCEDURE — 85027 COMPLETE CBC AUTOMATED: CPT

## 2024-10-02 PROCEDURE — 83735 ASSAY OF MAGNESIUM: CPT

## 2024-10-02 PROCEDURE — 2500000004 HC RX 250 GENERAL PHARMACY W/ HCPCS (ALT 636 FOR OP/ED): Performed by: INTERNAL MEDICINE

## 2024-10-02 PROCEDURE — 99233 SBSQ HOSP IP/OBS HIGH 50: CPT | Performed by: STUDENT IN AN ORGANIZED HEALTH CARE EDUCATION/TRAINING PROGRAM

## 2024-10-02 PROCEDURE — 1170000001 HC PRIVATE ONCOLOGY ROOM DAILY

## 2024-10-02 PROCEDURE — 80197 ASSAY OF TACROLIMUS: CPT | Performed by: INTERNAL MEDICINE

## 2024-10-02 PROCEDURE — 85384 FIBRINOGEN ACTIVITY: CPT

## 2024-10-02 PROCEDURE — 85610 PROTHROMBIN TIME: CPT

## 2024-10-02 PROCEDURE — 85007 BL SMEAR W/DIFF WBC COUNT: CPT

## 2024-10-02 PROCEDURE — 2500000001 HC RX 250 WO HCPCS SELF ADMINISTERED DRUGS (ALT 637 FOR MEDICARE OP)

## 2024-10-02 PROCEDURE — 36430 TRANSFUSION BLD/BLD COMPNT: CPT

## 2024-10-02 PROCEDURE — 2500000004 HC RX 250 GENERAL PHARMACY W/ HCPCS (ALT 636 FOR OP/ED): Mod: JZ

## 2024-10-02 PROCEDURE — 83615 LACTATE (LD) (LDH) ENZYME: CPT

## 2024-10-02 PROCEDURE — 84550 ASSAY OF BLOOD/URIC ACID: CPT

## 2024-10-02 PROCEDURE — 2500000004 HC RX 250 GENERAL PHARMACY W/ HCPCS (ALT 636 FOR OP/ED)

## 2024-10-02 PROCEDURE — 2500000002 HC RX 250 W HCPCS SELF ADMINISTERED DRUGS (ALT 637 FOR MEDICARE OP, ALT 636 FOR OP/ED): Performed by: INTERNAL MEDICINE

## 2024-10-02 PROCEDURE — P9073 PLATELETS PHERESIS PATH REDU: HCPCS

## 2024-10-02 PROCEDURE — 2500000001 HC RX 250 WO HCPCS SELF ADMINISTERED DRUGS (ALT 637 FOR MEDICARE OP): Performed by: INTERNAL MEDICINE

## 2024-10-02 PROCEDURE — 2500000002 HC RX 250 W HCPCS SELF ADMINISTERED DRUGS (ALT 637 FOR MEDICARE OP, ALT 636 FOR OP/ED): Performed by: PHYSICIAN ASSISTANT

## 2024-10-02 PROCEDURE — 84100 ASSAY OF PHOSPHORUS: CPT

## 2024-10-02 PROCEDURE — 80048 BASIC METABOLIC PNL TOTAL CA: CPT | Mod: CCI

## 2024-10-02 RX ORDER — MAGNESIUM SULFATE HEPTAHYDRATE 40 MG/ML
4 INJECTION, SOLUTION INTRAVENOUS ONCE
Status: COMPLETED | OUTPATIENT
Start: 2024-10-02 | End: 2024-10-03

## 2024-10-02 ASSESSMENT — COGNITIVE AND FUNCTIONAL STATUS - GENERAL
MOBILITY SCORE: 24
DAILY ACTIVITIY SCORE: 24
MOBILITY SCORE: 24

## 2024-10-02 ASSESSMENT — PAIN SCALES - GENERAL
PAINLEVEL_OUTOF10: 0 - NO PAIN

## 2024-10-02 ASSESSMENT — PAIN - FUNCTIONAL ASSESSMENT: PAIN_FUNCTIONAL_ASSESSMENT: 0-10

## 2024-10-02 NOTE — PROGRESS NOTES
Brandt Merritt is a 64 y.o. male on day 19 of admission presenting with Acute myeloid leukemia in remission (Multi).    Subjective   Pt reports feeling good today. No fevers, chills, bruising, bleeding, abdominal pain, or other concerns. He feels strong and has some return of his appetite. Continues to have frequent bowel movements, 3 times this morning, but it is loose not liquid.   New rash on legs, likely engraftment related. Not bothering him.        Objective     Physical Exam  Constitutional:       General: He is not in acute distress.     Appearance: He is normal weight. He is not ill-appearing, toxic-appearing or diaphoretic.   HENT:      Head: Normocephalic and atraumatic.      Right Ear: External ear normal.      Left Ear: External ear normal.      Nose: Nose normal. No congestion or rhinorrhea.      Mouth/Throat:      Mouth: Mucous membranes are moist.      Pharynx: Oropharynx is clear. No oropharyngeal exudate or posterior oropharyngeal erythema.   Eyes:      General: No scleral icterus.        Right eye: No discharge.         Left eye: No discharge.      Conjunctiva/sclera: Conjunctivae normal.   Cardiovascular:      Rate and Rhythm: Normal rate and regular rhythm.      Heart sounds: Normal heart sounds. No murmur heard.     No friction rub. No gallop.   Pulmonary:      Effort: Pulmonary effort is normal. No respiratory distress.      Breath sounds: No stridor. No wheezing, rhonchi or rales.   Chest:      Chest wall: No tenderness.   Abdominal:      General: Abdomen is flat. Bowel sounds are normal. There is no distension.      Palpations: There is no mass.      Tenderness: There is no abdominal tenderness. There is no guarding or rebound.      Hernia: No hernia is present.   Musculoskeletal:         General: No swelling, tenderness, deformity or signs of injury. Normal range of motion.      Cervical back: Normal range of motion.      Right lower leg: No edema.      Left lower leg: No edema.   Skin:      "General: Skin is warm and dry.      Coloration: Skin is not jaundiced or pale.      Findings: Rash (both thighs) present. No bruising, erythema or lesion.   Neurological:      General: No focal deficit present.      Mental Status: He is alert. Mental status is at baseline.   Psychiatric:         Mood and Affect: Mood normal.         Behavior: Behavior normal.         Thought Content: Thought content normal.         Judgment: Judgment normal.         Last Recorded Vitals  Blood pressure 148/89, pulse 90, temperature 36.7 °C (98.1 °F), temperature source Temporal, resp. rate 18, height (S) 1.664 m (5' 5.51\"), weight 70.3 kg (154 lb 15.7 oz), SpO2 99%.  Intake/Output last 3 Shifts:  I/O last 3 completed shifts:  In: 800 (11.4 mL/kg) [I.V.:600 (8.6 mL/kg); IV Piggyback:200]  Out: - (0 mL/kg)   Weight: 70.1 kg     Relevant Results      Scheduled medications  acyclovir, 400 mg, oral, q12h  cefepime, 2 g, intravenous, q12h  filgrastim or biosimilar, 300 mcg, subcutaneous, q24h  folic acid, 1 mg, oral, Daily  mycophenolate, 1,000 mg, oral, TID  NIFEdipine ER, 90 mg, oral, Daily before breakfast  posaconazole, 300 mg, oral, q24h  tacrolimus, 1 mg, oral, Nightly  tacrolimus, 1.5 mg, oral, Daily before breakfast  ursodiol, 300 mg, oral, TID  vancomycin, 125 mg, oral, Daily      Continuous medications  lactated Ringer's, 50 mL/hr, Last Rate: 50 mL/hr (10/01/24 6294)      PRN medications  PRN medications: acetaminophen, albuterol, alteplase, dextrose, diphenhydrAMINE, EPINEPHrine HCl, famotidine, loperamide, methylPREDNISolone sodium succinate (PF), ondansetron, [Held by provider] polyethylene glycol, prochlorperazine, prochlorperazine, sodium chloride    Results for orders placed or performed during the hospital encounter of 09/13/24 (from the past 24 hour(s))   Basic metabolic panel   Result Value Ref Range    Glucose 122 (H) 74 - 99 mg/dL    Sodium 141 136 - 145 mmol/L    Potassium 3.9 3.5 - 5.3 mmol/L    Chloride 105 98 - " 107 mmol/L    Bicarbonate 26 21 - 32 mmol/L    Anion Gap 14 10 - 20 mmol/L    Urea Nitrogen 9 6 - 23 mg/dL    Creatinine 0.72 0.50 - 1.30 mg/dL    eGFR >90 >60 mL/min/1.73m*2    Calcium 8.2 (L) 8.6 - 10.6 mg/dL   Magnesium   Result Value Ref Range    Magnesium 2.16 1.60 - 2.40 mg/dL   CBC and Auto Differential   Result Value Ref Range    WBC 0.5 (LL) 4.4 - 11.3 x10*3/uL    nRBC 0.0 0.0 - 0.0 /100 WBCs    RBC 3.52 (L) 4.50 - 5.90 x10*6/uL    Hemoglobin 9.4 (L) 13.5 - 17.5 g/dL    Hematocrit 25.8 (L) 41.0 - 52.0 %    MCV 73 (L) 80 - 100 fL    MCH 26.7 26.0 - 34.0 pg    MCHC 36.4 (H) 32.0 - 36.0 g/dL    RDW 17.9 (H) 11.5 - 14.5 %    Platelets 4 (LL) 150 - 450 x10*3/uL    Neutrophils %      Immature Granulocytes %, Automated      Lymphocytes %      Monocytes %      Eosinophils %      Basophils %      Neutrophils Absolute      Lymphocytes Absolute      Monocytes Absolute      Eosinophils Absolute      Basophils Absolute     Comprehensive Metabolic Panel   Result Value Ref Range    Glucose 93 74 - 99 mg/dL    Sodium 139 136 - 145 mmol/L    Potassium 3.8 3.5 - 5.3 mmol/L    Chloride 104 98 - 107 mmol/L    Bicarbonate 28 21 - 32 mmol/L    Anion Gap 11 10 - 20 mmol/L    Urea Nitrogen 7 6 - 23 mg/dL    Creatinine 0.67 0.50 - 1.30 mg/dL    eGFR >90 >60 mL/min/1.73m*2    Calcium 8.2 (L) 8.6 - 10.6 mg/dL    Albumin 3.1 (L) 3.4 - 5.0 g/dL    Alkaline Phosphatase 63 33 - 136 U/L    Total Protein 5.9 (L) 6.4 - 8.2 g/dL    AST 13 9 - 39 U/L    Bilirubin, Total 0.5 0.0 - 1.2 mg/dL    ALT 13 10 - 52 U/L   Lactate Dehydrogenase   Result Value Ref Range     84 - 246 U/L   Magnesium   Result Value Ref Range    Magnesium 1.52 (L) 1.60 - 2.40 mg/dL   Phosphorus   Result Value Ref Range    Phosphorus 3.0 2.5 - 4.9 mg/dL   Uric Acid   Result Value Ref Range    Uric Acid 1.7 (L) 4.0 - 7.5 mg/dL     No results found.    This patient has a central line   Reason for the central line remaining today? Parenteral medication              Malnutrition Diagnosis Status: New  Malnutrition Diagnosis: Moderate malnutrition related to acute disease or injury (s/p recent transplant with treatment related side effects)  As Evidenced by: pt consuming <75% estimated energy needs x >7 days with 7.7% wt loss since admit (<1 month)  I agree with the dietitian's malnutrition diagnosis.      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 Persistent neutropenic fevers with diarrhea, with negative cultures, C diff, and negative stool pathogen panel. Also with cutaneous reaction to meropenem, now improving.      T+13 Single-unit cord transplant (T0=9/19/24)     Updates (10/2/24):  - platelets today  - new rash - continue to monitor     ONC  # MDS/AML   - Symptoms began 9/2023; diagnosed 4/2024  - BMBx showing MDS in transition to AML (>10% jaime) w/ trisomy 8, DNMT alpha, and U2AF1 mutation indication adverse risk   - s/p 4 cycles of Decitabine/Venetoclax (C4D1 on 8/12/24)  - BMBx 6/17/24: Blasts cleared, FISH still positive for trisomy 8, still MDS changes   - BMBx 9/5/24: 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 and no increase in blast     # Transplant  CONDITIONING Fludarabine, melphalan, and TBI   DONOR Single umbilical cord   MATCH GRADE 6/8   SEX MATCH Mismatched   ABO DONOR A pos   ABO RECIPIENT A pos   GVHD PROPHYLAXIS Tacrolimus and Mycophenolate   GRAFT SOURCE Single umbilical cord   CMV DONOR negative   CMV RECIPIENT ppsitive   GVHD PPX:  - Tacrolimus 1.5 mg q12h started T-3 (9/16)  *Tacro level: 4.5 (9/20), 4/7 (9/21), 5.7 (9/22)  - MMF 1000 mg TID to begin T+2 to T+35   - Viral surveillance  should begin T+14 (ordered)  - continue tacrolimus 1.5mg alternating with 1mg (9/30 tacro 7.5)     HEME  # Anemia and Leukopenia: secondary to disease and chemotherapy  # 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   - Transfuse for Hgb <7 & PLT <10   - Neupogen 300mcg to begin T+5     # VTE Prophylaxis: SCDs & Ambulation     ID  #Neutropenic fever, resolved  # Prophylaxis: Acyclovir, Vancomycin, Posaconazole, Letermovir (T+14), and Bactrim (T+30)  - fever noted overnight 9/25 at 38C, along with diarrhea, no other focal symptoms  - UA with 2+ blood, trace ketones only, CXR clear  - blood cultures NGTD  - likely intolerant to meropenem, switched to cefepime  - etiology: suspect gut bacterial translocation/enteritis related to melphalan vs drug fever 2/2 G-CSF vs pre-engraftment syndrome  - cultures all negative  - CT chest/abdo/pelvis only with diarrhea, no other findings  - continue cefepime until ANC > 500 and no fever >48h     #Maculopapular rash, resolved  -developed 9/27 PM after first dose of meropenem  -appearance highly suggestive of meropenem reaction vs pre-engraftment syndrome  -stop meropenem  -resolved by 10/1     CARDIO/PULM  # Hx of STEMI (11/11/2020)  - TTE 7/19/24: EF 59%, otherwise unremarkable   - Cleared by Cardiology for transplant      # Hx of HTN   - Nifedipine: 90 mEq ER 24 hr tablet      # Ascending aorta dilation   - Echo showing 3.8-4 cm dilation      FEN/RENAL  - Admit wt: 75.6 kg (9/13/24); Most recent wt: 75.1 kg  Allergic to Lasix, will not take med (extreme hypotension, extreme hypokalemia)     #Diarrhea, c.diff neg  -diarrhea returned 9/26 AM, along with fever  -etiology: likely melphalan-related mucositis/enteritis  -C diff negative 9/22, pt on suppressive PO vancomycin  -loperamide 2mg q6h PRN  -replete 40mEq Kcl, 4G magnesium sulfate today  -repeat BMP, magnesium on PM draw  -stool pathogen panel negative  -continue loperamide PRN   -restart maintenance fluid with LR at 50cc/hr     #Constipation,  resolved  -stop miralax     #SOS prophylaxis: Actigall      #hx of BPH and urinary retention (2023)  - Hx elevated PSA               *7/28/21 Bx: atypical small acinar proliferation               *4/7/23 Bx: Benign     #Hx of treated Hep C in 2015  - (9/12) Hep C RNA - not detected      #Hx of recurrent hyperbilirubinemia   - 2/2 to Hemoglobin C hemolysis      F: 50cc/hr LR  E: replete K Mg today  N: low microbial, ensure, sour lemon candy  A: picc line     DVT: ambulation, scd  GI: PPI  NOK: spouse Genevieve ([phone])  Code: FULL     Oncologist: eJrod  Patient examined and discussed with Dr. Ignacio Yanez MD       Mohs Method Verbiage: An incision at a 45 degree angle following the standard Mohs approach was done and the specimen was harvested as a microscopic controlled layer.

## 2024-10-03 LAB
ALBUMIN SERPL BCP-MCNC: 3.1 G/DL (ref 3.4–5)
ALP SERPL-CCNC: 56 U/L (ref 33–136)
ALT SERPL W P-5'-P-CCNC: 16 U/L (ref 10–52)
ANION GAP SERPL CALC-SCNC: 10 MMOL/L (ref 10–20)
ANION GAP SERPL CALC-SCNC: 9 MMOL/L (ref 10–20)
APTT PPP: 30 SECONDS (ref 27–38)
AST SERPL W P-5'-P-CCNC: 11 U/L (ref 9–39)
BACTERIA BLD CULT: NORMAL
BACTERIA BLD CULT: NORMAL
BACTERIA BPU CULT: NORMAL
BASOPHILS # BLD MANUAL: 0 X10*3/UL (ref 0–0.1)
BASOPHILS NFR BLD MANUAL: 0 %
BILIRUB SERPL-MCNC: 0.4 MG/DL (ref 0–1.2)
BUN SERPL-MCNC: 10 MG/DL (ref 6–23)
BUN SERPL-MCNC: 11 MG/DL (ref 6–23)
CALCIUM SERPL-MCNC: 7.9 MG/DL (ref 8.6–10.6)
CALCIUM SERPL-MCNC: 8.4 MG/DL (ref 8.6–10.6)
CHLORIDE SERPL-SCNC: 103 MMOL/L (ref 98–107)
CHLORIDE SERPL-SCNC: 104 MMOL/L (ref 98–107)
CMV DNA SERPL NAA+PROBE-LOG IU: NORMAL {LOG_IU}/ML
CO2 SERPL-SCNC: 28 MMOL/L (ref 21–32)
CO2 SERPL-SCNC: 29 MMOL/L (ref 21–32)
CREAT SERPL-MCNC: 0.6 MG/DL (ref 0.5–1.3)
CREAT SERPL-MCNC: 0.75 MG/DL (ref 0.5–1.3)
EGFRCR SERPLBLD CKD-EPI 2021: >90 ML/MIN/1.73M*2
EGFRCR SERPLBLD CKD-EPI 2021: >90 ML/MIN/1.73M*2
EOSINOPHIL # BLD MANUAL: 0 X10*3/UL (ref 0–0.7)
EOSINOPHIL NFR BLD MANUAL: 0 %
ERYTHROCYTE [DISTWIDTH] IN BLOOD BY AUTOMATED COUNT: 17.7 % (ref 11.5–14.5)
FIBRINOGEN PPP-MCNC: 349 MG/DL (ref 200–400)
GLUCOSE SERPL-MCNC: 97 MG/DL (ref 74–99)
GLUCOSE SERPL-MCNC: 98 MG/DL (ref 74–99)
HCT VFR BLD AUTO: 24.3 % (ref 41–52)
HGB BLD-MCNC: 8.6 G/DL (ref 13.5–17.5)
IMM GRANULOCYTES # BLD AUTO: 0 X10*3/UL (ref 0–0.7)
IMM GRANULOCYTES NFR BLD AUTO: 0 % (ref 0–0.9)
INR PPP: 1.1 (ref 0.9–1.1)
LABORATORY COMMENT REPORT: NOT DETECTED
LDH SERPL L TO P-CCNC: 96 U/L (ref 84–246)
LYMPHOCYTES # BLD MANUAL: 0.41 X10*3/UL (ref 1.2–4.8)
LYMPHOCYTES NFR BLD MANUAL: 81.8 %
MAGNESIUM SERPL-MCNC: 1.67 MG/DL (ref 1.6–2.4)
MAGNESIUM SERPL-MCNC: 1.85 MG/DL (ref 1.6–2.4)
MCH RBC QN AUTO: 26.6 PG (ref 26–34)
MCHC RBC AUTO-ENTMCNC: 35.4 G/DL (ref 32–36)
MCV RBC AUTO: 75 FL (ref 80–100)
MONOCYTES # BLD MANUAL: 0.05 X10*3/UL (ref 0.1–1)
MONOCYTES NFR BLD MANUAL: 9.1 %
NEUTS SEG # BLD MANUAL: 0 X10*3/UL (ref 1.2–7)
NEUTS SEG NFR BLD MANUAL: 0 %
NRBC BLD-RTO: 0 /100 WBCS (ref 0–0)
PHOSPHATE SERPL-MCNC: 3 MG/DL (ref 2.5–4.9)
PLATELET # BLD AUTO: 27 X10*3/UL (ref 150–450)
POTASSIUM SERPL-SCNC: 3.8 MMOL/L (ref 3.5–5.3)
POTASSIUM SERPL-SCNC: 3.8 MMOL/L (ref 3.5–5.3)
PROT SERPL-MCNC: 5.6 G/DL (ref 6.4–8.2)
PROTHROMBIN TIME: 12 SECONDS (ref 9.8–12.8)
RBC # BLD AUTO: 3.23 X10*6/UL (ref 4.5–5.9)
RBC MORPH BLD: ABNORMAL
SODIUM SERPL-SCNC: 137 MMOL/L (ref 136–145)
SODIUM SERPL-SCNC: 138 MMOL/L (ref 136–145)
TACROLIMUS BLD-MCNC: 5.3 NG/ML
TARGETS BLD QL SMEAR: ABNORMAL
TOTAL CELLS COUNTED BLD: 33
URATE SERPL-MCNC: 1.7 MG/DL (ref 4–7.5)
VARIANT LYMPHS # BLD MANUAL: 0.05 X10*3/UL (ref 0–0.5)
VARIANT LYMPHS NFR BLD: 9.1 %
WBC # BLD AUTO: 0.5 X10*3/UL (ref 4.4–11.3)

## 2024-10-03 PROCEDURE — 1170000001 HC PRIVATE ONCOLOGY ROOM DAILY

## 2024-10-03 PROCEDURE — 80053 COMPREHEN METABOLIC PANEL: CPT

## 2024-10-03 PROCEDURE — 84100 ASSAY OF PHOSPHORUS: CPT

## 2024-10-03 PROCEDURE — 80197 ASSAY OF TACROLIMUS: CPT | Performed by: INTERNAL MEDICINE

## 2024-10-03 PROCEDURE — 83735 ASSAY OF MAGNESIUM: CPT

## 2024-10-03 PROCEDURE — 2500000002 HC RX 250 W HCPCS SELF ADMINISTERED DRUGS (ALT 637 FOR MEDICARE OP, ALT 636 FOR OP/ED): Performed by: PHYSICIAN ASSISTANT

## 2024-10-03 PROCEDURE — 2500000004 HC RX 250 GENERAL PHARMACY W/ HCPCS (ALT 636 FOR OP/ED): Performed by: STUDENT IN AN ORGANIZED HEALTH CARE EDUCATION/TRAINING PROGRAM

## 2024-10-03 PROCEDURE — 2500000001 HC RX 250 WO HCPCS SELF ADMINISTERED DRUGS (ALT 637 FOR MEDICARE OP): Performed by: INTERNAL MEDICINE

## 2024-10-03 PROCEDURE — 2500000001 HC RX 250 WO HCPCS SELF ADMINISTERED DRUGS (ALT 637 FOR MEDICARE OP)

## 2024-10-03 PROCEDURE — 80048 BASIC METABOLIC PNL TOTAL CA: CPT | Mod: CCI

## 2024-10-03 PROCEDURE — 99233 SBSQ HOSP IP/OBS HIGH 50: CPT

## 2024-10-03 PROCEDURE — 2500000004 HC RX 250 GENERAL PHARMACY W/ HCPCS (ALT 636 FOR OP/ED): Mod: JZ

## 2024-10-03 PROCEDURE — 83615 LACTATE (LD) (LDH) ENZYME: CPT

## 2024-10-03 PROCEDURE — 2500000002 HC RX 250 W HCPCS SELF ADMINISTERED DRUGS (ALT 637 FOR MEDICARE OP, ALT 636 FOR OP/ED): Performed by: INTERNAL MEDICINE

## 2024-10-03 PROCEDURE — 85610 PROTHROMBIN TIME: CPT

## 2024-10-03 PROCEDURE — 2500000004 HC RX 250 GENERAL PHARMACY W/ HCPCS (ALT 636 FOR OP/ED)

## 2024-10-03 PROCEDURE — 85384 FIBRINOGEN ACTIVITY: CPT

## 2024-10-03 PROCEDURE — 85007 BL SMEAR W/DIFF WBC COUNT: CPT

## 2024-10-03 PROCEDURE — 85027 COMPLETE CBC AUTOMATED: CPT

## 2024-10-03 PROCEDURE — 84550 ASSAY OF BLOOD/URIC ACID: CPT

## 2024-10-03 PROCEDURE — 2500000004 HC RX 250 GENERAL PHARMACY W/ HCPCS (ALT 636 FOR OP/ED): Mod: JZ | Performed by: INTERNAL MEDICINE

## 2024-10-03 RX ORDER — TACROLIMUS 0.5 MG/1
0.5 CAPSULE ORAL ONCE
Status: COMPLETED | OUTPATIENT
Start: 2024-10-03 | End: 2024-10-03

## 2024-10-03 ASSESSMENT — COGNITIVE AND FUNCTIONAL STATUS - GENERAL
DAILY ACTIVITIY SCORE: 24
MOBILITY SCORE: 24

## 2024-10-03 ASSESSMENT — PAIN SCALES - GENERAL
PAINLEVEL_OUTOF10: 0 - NO PAIN
PAINLEVEL_OUTOF10: 0 - NO PAIN

## 2024-10-03 NOTE — PROGRESS NOTES
Brandt Merritt is a 64 y.o. male on day 20 of admission presenting with Acute myeloid leukemia in remission (Multi).    Subjective   Pt reports feeling well today. No chills, fevers, chest pain, SOB, abdominal pain, bleeding, or bruising. He continues to have loose bowel movements, but it is much less frequent than before. He has developed a new rash on his thighs, which is not itchy or painful. Otherwise his previous rash is nearly completely gone.        Objective     Physical Exam  Constitutional:       General: He is not in acute distress.     Appearance: Normal appearance. He is not ill-appearing, toxic-appearing or diaphoretic.   HENT:      Head: Normocephalic and atraumatic.      Right Ear: External ear normal.      Left Ear: External ear normal.      Nose: Nose normal. No congestion or rhinorrhea.      Mouth/Throat:      Mouth: Mucous membranes are moist.      Pharynx: Oropharynx is clear. No oropharyngeal exudate or posterior oropharyngeal erythema.   Eyes:      General: No scleral icterus.        Right eye: No discharge.         Left eye: No discharge.      Extraocular Movements: Extraocular movements intact.      Conjunctiva/sclera: Conjunctivae normal.   Cardiovascular:      Rate and Rhythm: Normal rate and regular rhythm.      Pulses: Normal pulses.      Heart sounds: No murmur heard.     No friction rub. No gallop.   Pulmonary:      Effort: Pulmonary effort is normal. No respiratory distress.      Breath sounds: No stridor. No wheezing, rhonchi or rales.   Chest:      Chest wall: No tenderness.   Abdominal:      General: Abdomen is flat. Bowel sounds are normal. There is no distension.      Palpations: Abdomen is soft. There is no mass.      Tenderness: There is no abdominal tenderness. There is no guarding or rebound.      Hernia: No hernia is present.   Musculoskeletal:         General: No swelling, tenderness, deformity or signs of injury. Normal range of motion.      Right lower leg: No edema.       "Left lower leg: No edema.   Skin:     General: Skin is warm and dry.      Coloration: Skin is not jaundiced or pale.      Findings: Rash present. No bruising, erythema or lesion.   Neurological:      General: No focal deficit present.      Mental Status: He is alert and oriented to person, place, and time. Mental status is at baseline.   Psychiatric:         Mood and Affect: Mood normal.         Behavior: Behavior normal.         Thought Content: Thought content normal.         Judgment: Judgment normal.         Last Recorded Vitals  Blood pressure 119/79, pulse 88, temperature 36.1 °C (97 °F), temperature source Temporal, resp. rate 18, height (S) 1.664 m (5' 5.51\"), weight 70.9 kg (156 lb 4.9 oz), SpO2 99%.  Intake/Output last 3 Shifts:  I/O last 3 completed shifts:  In: 300 (4.3 mL/kg) [Blood:200; IV Piggyback:100]  Out: - (0 mL/kg)   Weight: 70.3 kg     Relevant Results             Scheduled medications  acyclovir, 400 mg, oral, q12h  cefepime, 2 g, intravenous, q12h  filgrastim or biosimilar, 300 mcg, subcutaneous, q24h  folic acid, 1 mg, oral, Daily  mycophenolate, 1,000 mg, oral, TID  NIFEdipine ER, 90 mg, oral, Daily before breakfast  posaconazole, 300 mg, oral, q24h  tacrolimus, 1.5 mg, oral, q12h JALYN  ursodiol, 300 mg, oral, TID  vancomycin, 125 mg, oral, Daily      Continuous medications     PRN medications  PRN medications: acetaminophen, albuterol, alteplase, dextrose, diphenhydrAMINE, EPINEPHrine HCl, famotidine, loperamide, methylPREDNISolone sodium succinate (PF), ondansetron, [Held by provider] polyethylene glycol, prochlorperazine, prochlorperazine, sodium chloride    Results for orders placed or performed during the hospital encounter of 09/13/24 (from the past 24 hour(s))   Basic metabolic panel   Result Value Ref Range    Glucose 94 74 - 99 mg/dL    Sodium 139 136 - 145 mmol/L    Potassium 3.6 3.5 - 5.3 mmol/L    Chloride 104 98 - 107 mmol/L    Bicarbonate 30 21 - 32 mmol/L    Anion Gap 9 (L) 10 " - 20 mmol/L    Urea Nitrogen 9 6 - 23 mg/dL    Creatinine 0.60 0.50 - 1.30 mg/dL    eGFR >90 >60 mL/min/1.73m*2    Calcium 8.1 (L) 8.6 - 10.6 mg/dL   Magnesium   Result Value Ref Range    Magnesium 1.44 (L) 1.60 - 2.40 mg/dL   CBC and Auto Differential   Result Value Ref Range    WBC 0.5 (LL) 4.4 - 11.3 x10*3/uL    nRBC 0.0 0.0 - 0.0 /100 WBCs    RBC 3.23 (L) 4.50 - 5.90 x10*6/uL    Hemoglobin 8.6 (L) 13.5 - 17.5 g/dL    Hematocrit 24.3 (L) 41.0 - 52.0 %    MCV 75 (L) 80 - 100 fL    MCH 26.6 26.0 - 34.0 pg    MCHC 35.4 32.0 - 36.0 g/dL    RDW 17.7 (H) 11.5 - 14.5 %    Platelets 27 (LL) 150 - 450 x10*3/uL    Immature Granulocytes %, Automated 0.0 0.0 - 0.9 %    Immature Granulocytes Absolute, Automated 0.00 0.00 - 0.70 x10*3/uL   Coagulation Screen   Result Value Ref Range    Protime 12.0 9.8 - 12.8 seconds    INR 1.1 0.9 - 1.1    aPTT 30 27 - 38 seconds   Comprehensive Metabolic Panel   Result Value Ref Range    Glucose 98 74 - 99 mg/dL    Sodium 138 136 - 145 mmol/L    Potassium 3.8 3.5 - 5.3 mmol/L    Chloride 104 98 - 107 mmol/L    Bicarbonate 29 21 - 32 mmol/L    Anion Gap 9 (L) 10 - 20 mmol/L    Urea Nitrogen 10 6 - 23 mg/dL    Creatinine 0.60 0.50 - 1.30 mg/dL    eGFR >90 >60 mL/min/1.73m*2    Calcium 7.9 (L) 8.6 - 10.6 mg/dL    Albumin 3.1 (L) 3.4 - 5.0 g/dL    Alkaline Phosphatase 56 33 - 136 U/L    Total Protein 5.6 (L) 6.4 - 8.2 g/dL    AST 11 9 - 39 U/L    Bilirubin, Total 0.4 0.0 - 1.2 mg/dL    ALT 16 10 - 52 U/L   Fibrinogen   Result Value Ref Range    Fibrinogen 349 200 - 400 mg/dL   Lactate Dehydrogenase   Result Value Ref Range    LDH 96 84 - 246 U/L   Magnesium   Result Value Ref Range    Magnesium 1.85 1.60 - 2.40 mg/dL   Phosphorus   Result Value Ref Range    Phosphorus 3.0 2.5 - 4.9 mg/dL   Uric Acid   Result Value Ref Range    Uric Acid 1.7 (L) 4.0 - 7.5 mg/dL   Tacrolimus level   Result Value Ref Range    Tacrolimus  5.3 <=15.0 ng/mL   Manual Differential   Result Value Ref Range    Neutrophils  %, Manual 0.0 40.0 - 80.0 %    Lymphocytes %, Manual 81.8 13.0 - 44.0 %    Monocytes %, Manual 9.1 2.0 - 10.0 %    Eosinophils %, Manual 0.0 0.0 - 6.0 %    Basophils %, Manual 0.0 0.0 - 2.0 %    Atypical Lymphocytes %, Manual 9.1 0.0 - 2.0 %    Seg Neutrophils Absolute, Manual 0.00 (L) 1.20 - 7.00 x10*3/uL    Lymphocytes Absolute, Manual 0.41 (L) 1.20 - 4.80 x10*3/uL    Monocytes Absolute, Manual 0.05 (L) 0.10 - 1.00 x10*3/uL    Eosinophils Absolute, Manual 0.00 0.00 - 0.70 x10*3/uL    Basophils Absolute, Manual 0.00 0.00 - 0.10 x10*3/uL    Atypical Lymphs Absolute, Manual 0.05 0.00 - 0.50 x10*3/uL    Total Cells Counted 33     RBC Morphology See Below     Target Cells Few      No results found.       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 Persistent neutropenic fevers with diarrhea, with negative cultures, C diff, and negative stool pathogen panel, resolved. Also with cutaneous reaction to meropenem, now improving.      T+14 Single-unit cord transplant (T0=9/19/24)     Updates (10/3/24):  - will continue to monitor rash, may consult derm tomorrow if worsening     ONC  # MDS/AML   - Symptoms began 9/2023; diagnosed 4/2024  - BMBx showing MDS in transition to AML (>10% jaime) w/ trisomy 8, DNMT alpha, and U2AF1 mutation indication adverse risk   - s/p 4 cycles of Decitabine/Venetoclax (C4D1 on 8/12/24)  - BMBx 6/17/24: Blasts cleared, FISH still positive for trisomy 8, still MDS changes   - BMBx 9/5/24: 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 and no increase in blast     # Transplant  CONDITIONING Fludarabine, melphalan, and TBI   DONOR Single  umbilical cord   MATCH GRADE 6/8   SEX MATCH Mismatched   ABO DONOR A pos   ABO RECIPIENT A pos   GVHD PROPHYLAXIS Tacrolimus and Mycophenolate   GRAFT SOURCE Single umbilical cord   CMV DONOR negative   CMV RECIPIENT ppsitive   GVHD PPX:  - Tacrolimus 1.5 mg q12h started T-3 (9/16)  *Tacro level: 4.5 (9/20), 4/7 (9/21), 5.7 (9/22)  - MMF 1000 mg TID to begin T+2 to T+35   - Viral surveillance T+14 all negative (EBV, CMV, adeno, HHV6)  - continue tacrolimus 1.5mg BID (tacro level 10/3 5.3)     HEME  # Anemia and Leukopenia: secondary to disease and chemotherapy  # 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   - Transfuse for Hgb <7 & PLT <10   - Neupogen 300mcg to begin T+5     # VTE Prophylaxis: SCDs & Ambulation     ID  #Neutropenic fever, resolved  # Prophylaxis: Acyclovir, Vancomycin, Posaconazole, Letermovir (T+14), and Bactrim (T+30)  - fever noted overnight 9/25 at 38C, along with diarrhea, no other focal symptoms  - UA with 2+ blood, trace ketones only, CXR clear  - blood cultures NGTD  - likely intolerant to meropenem, switched to cefepime  - etiology: suspect gut bacterial translocation/enteritis related to melphalan vs drug fever 2/2 G-CSF vs pre-engraftment syndrome  - cultures all negative  - CT chest/abdo/pelvis only with diarrhea, no other findings  - continue cefepime until ANC > 500 and no fever >48h     #Maculopapular rash, recurrent  -developed 9/27 PM after first dose of meropenem  -appearance highly suggestive of meropenem reaction vs pre-engraftment syndrome  -stop meropenem  -resolved by 10/1, returned 10/3 on legs  -maculopapular on inner thighs, non-itching, non-painful, blanching  -will monitor in coming days     CARDIO/PULM  # Hx of STEMI (11/11/2020)  - TTE 7/19/24: EF 59%, otherwise unremarkable   - Cleared by Cardiology for transplant      # Hx of HTN   - Nifedipine: 90 mEq ER 24 hr tablet      # Ascending aorta dilation   - Echo showing 3.8-4 cm dilation      FEN/RENAL  - Admit wt: 75.6 kg (9/13/24); Most  recent wt: 75.1 kg  Allergic to Lasix, will not take med (extreme hypotension, extreme hypokalemia)     #Diarrhea, c.diff neg  -diarrhea returned 9/26 AM, along with fever  -etiology: likely melphalan-related mucositis/enteritis  -C diff negative 9/22, pt on suppressive PO vancomycin  -loperamide 2mg q6h PRN  -stool pathogen panel negative  -continue loperamide PRN      #Constipation, resolved  -stop miralax     #SOS prophylaxis: Actigall      #hx of BPH and urinary retention (2023)  - Hx elevated PSA               *7/28/21 Bx: atypical small acinar proliferation               *4/7/23 Bx: Benign     #Hx of treated Hep C in 2015  - (9/12) Hep C RNA - not detected      #Hx of recurrent hyperbilirubinemia   - 2/2 to Hemoglobin C hemolysis      F: none  E: PRN  N: low microbial, ensure, sour lemon candy  A: picc line     DVT: ambulation, scd  GI: PPI  NOK: spouse Genevieve ([phone])  Code: FULL     Oncologist: Jerod  Patient examined and discussed with Dr. Ignacio Deras MD PhD

## 2024-10-03 NOTE — CARE PLAN
The clinical goals for the shift include patient will remain HDS throughout shift    Problem: Pain - Adult  Goal: Verbalizes/displays adequate comfort level or baseline comfort level  Outcome: Progressing     Problem: Safety - Adult  Goal: Free from fall injury  Outcome: Progressing     Problem: Discharge Planning  Goal: Discharge to home or other facility with appropriate resources  Outcome: Progressing     Problem: Chronic Conditions and Co-morbidities  Goal: Patient's chronic conditions and co-morbidity symptoms are monitored and maintained or improved  Outcome: Progressing     Problem: Nutrition  Goal: Oral intake greater 75%  Outcome: Progressing  Goal: Adequate PO fluid intake  Outcome: Progressing  Goal: Maintain stable weight  Outcome: Progressing

## 2024-10-03 NOTE — CARE PLAN
The patient's goals for the shift include      The clinical goals for the shift include patient will remain HDS throughout shift    Problem: Pain - Adult  Goal: Verbalizes/displays adequate comfort level or baseline comfort level  Outcome: Progressing     Problem: Safety - Adult  Goal: Free from fall injury  Outcome: Progressing     Problem: Discharge Planning  Goal: Discharge to home or other facility with appropriate resources  Outcome: Progressing     Problem: Chronic Conditions and Co-morbidities  Goal: Patient's chronic conditions and co-morbidity symptoms are monitored and maintained or improved  Outcome: Progressing     Problem: Nutrition  Goal: Oral intake greater 75%  Outcome: Progressing  Goal: Adequate PO fluid intake  Outcome: Progressing  Goal: Maintain stable weight  Outcome: Progressing

## 2024-10-04 LAB
ALBUMIN SERPL BCP-MCNC: 3.1 G/DL (ref 3.4–5)
ALP SERPL-CCNC: 64 U/L (ref 33–136)
ALT SERPL W P-5'-P-CCNC: 16 U/L (ref 10–52)
ANION GAP SERPL CALC-SCNC: 10 MMOL/L (ref 10–20)
APTT PPP: 32 SECONDS (ref 27–38)
AST SERPL W P-5'-P-CCNC: 13 U/L (ref 9–39)
BASOPHILS # BLD MANUAL: 0 X10*3/UL (ref 0–0.1)
BASOPHILS NFR BLD MANUAL: 0 %
BILIRUB SERPL-MCNC: 0.5 MG/DL (ref 0–1.2)
BUN SERPL-MCNC: 11 MG/DL (ref 6–23)
CALCIUM SERPL-MCNC: 8.6 MG/DL (ref 8.6–10.6)
CHLORIDE SERPL-SCNC: 105 MMOL/L (ref 98–107)
CO2 SERPL-SCNC: 28 MMOL/L (ref 21–32)
CREAT SERPL-MCNC: 0.71 MG/DL (ref 0.5–1.3)
DACRYOCYTES BLD QL SMEAR: ABNORMAL
EGFRCR SERPLBLD CKD-EPI 2021: >90 ML/MIN/1.73M*2
EOSINOPHIL # BLD MANUAL: 0 X10*3/UL (ref 0–0.7)
EOSINOPHIL NFR BLD MANUAL: 0 %
ERYTHROCYTE [DISTWIDTH] IN BLOOD BY AUTOMATED COUNT: 17.8 % (ref 11.5–14.5)
FIBRINOGEN PPP-MCNC: 355 MG/DL (ref 200–400)
GLUCOSE SERPL-MCNC: 95 MG/DL (ref 74–99)
HCT VFR BLD AUTO: 26.2 % (ref 41–52)
HGB BLD-MCNC: 9.2 G/DL (ref 13.5–17.5)
IMM GRANULOCYTES # BLD AUTO: 0 X10*3/UL (ref 0–0.7)
IMM GRANULOCYTES NFR BLD AUTO: 0 % (ref 0–0.9)
INR PPP: 1.1 (ref 0.9–1.1)
LDH SERPL L TO P-CCNC: 103 U/L (ref 84–246)
LYMPHOCYTES # BLD MANUAL: 0.57 X10*3/UL (ref 1.2–4.8)
LYMPHOCYTES NFR BLD MANUAL: 95.3 %
MAGNESIUM SERPL-MCNC: 1.49 MG/DL (ref 1.6–2.4)
MCH RBC QN AUTO: 26.5 PG (ref 26–34)
MCHC RBC AUTO-ENTMCNC: 35.1 G/DL (ref 32–36)
MCV RBC AUTO: 76 FL (ref 80–100)
MONOCYTES # BLD MANUAL: 0.03 X10*3/UL (ref 0.1–1)
MONOCYTES NFR BLD MANUAL: 4.7 %
NEUTS SEG # BLD MANUAL: 0 X10*3/UL (ref 1.2–7)
NEUTS SEG NFR BLD MANUAL: 0 %
NRBC BLD-RTO: 0 /100 WBCS (ref 0–0)
OVALOCYTES BLD QL SMEAR: ABNORMAL
PHOSPHATE SERPL-MCNC: 3.4 MG/DL (ref 2.5–4.9)
PLATELET # BLD AUTO: 22 X10*3/UL (ref 150–450)
POTASSIUM SERPL-SCNC: 4 MMOL/L (ref 3.5–5.3)
PROT SERPL-MCNC: 6.1 G/DL (ref 6.4–8.2)
PROTHROMBIN TIME: 12.4 SECONDS (ref 9.8–12.8)
RBC # BLD AUTO: 3.47 X10*6/UL (ref 4.5–5.9)
RBC MORPH BLD: ABNORMAL
SCHISTOCYTES BLD QL SMEAR: ABNORMAL
SODIUM SERPL-SCNC: 139 MMOL/L (ref 136–145)
TACROLIMUS BLD-MCNC: 6.9 NG/ML
TARGETS BLD QL SMEAR: ABNORMAL
TOTAL CELLS COUNTED BLD: 64
URATE SERPL-MCNC: 1.9 MG/DL (ref 4–7.5)
WBC # BLD AUTO: 0.6 X10*3/UL (ref 4.4–11.3)

## 2024-10-04 PROCEDURE — 84100 ASSAY OF PHOSPHORUS: CPT

## 2024-10-04 PROCEDURE — 85610 PROTHROMBIN TIME: CPT

## 2024-10-04 PROCEDURE — 99233 SBSQ HOSP IP/OBS HIGH 50: CPT

## 2024-10-04 PROCEDURE — 2500000004 HC RX 250 GENERAL PHARMACY W/ HCPCS (ALT 636 FOR OP/ED): Performed by: STUDENT IN AN ORGANIZED HEALTH CARE EDUCATION/TRAINING PROGRAM

## 2024-10-04 PROCEDURE — 2500000004 HC RX 250 GENERAL PHARMACY W/ HCPCS (ALT 636 FOR OP/ED): Performed by: NURSE PRACTITIONER

## 2024-10-04 PROCEDURE — 2500000004 HC RX 250 GENERAL PHARMACY W/ HCPCS (ALT 636 FOR OP/ED): Performed by: INTERNAL MEDICINE

## 2024-10-04 PROCEDURE — 83735 ASSAY OF MAGNESIUM: CPT

## 2024-10-04 PROCEDURE — 2500000002 HC RX 250 W HCPCS SELF ADMINISTERED DRUGS (ALT 637 FOR MEDICARE OP, ALT 636 FOR OP/ED): Performed by: INTERNAL MEDICINE

## 2024-10-04 PROCEDURE — 83615 LACTATE (LD) (LDH) ENZYME: CPT

## 2024-10-04 PROCEDURE — 85027 COMPLETE CBC AUTOMATED: CPT

## 2024-10-04 PROCEDURE — 85007 BL SMEAR W/DIFF WBC COUNT: CPT

## 2024-10-04 PROCEDURE — 2500000001 HC RX 250 WO HCPCS SELF ADMINISTERED DRUGS (ALT 637 FOR MEDICARE OP)

## 2024-10-04 PROCEDURE — 80053 COMPREHEN METABOLIC PANEL: CPT

## 2024-10-04 PROCEDURE — 1170000001 HC PRIVATE ONCOLOGY ROOM DAILY

## 2024-10-04 PROCEDURE — 80197 ASSAY OF TACROLIMUS: CPT | Performed by: INTERNAL MEDICINE

## 2024-10-04 PROCEDURE — 2500000001 HC RX 250 WO HCPCS SELF ADMINISTERED DRUGS (ALT 637 FOR MEDICARE OP): Performed by: INTERNAL MEDICINE

## 2024-10-04 PROCEDURE — 85384 FIBRINOGEN ACTIVITY: CPT

## 2024-10-04 PROCEDURE — 2500000004 HC RX 250 GENERAL PHARMACY W/ HCPCS (ALT 636 FOR OP/ED): Mod: JZ

## 2024-10-04 PROCEDURE — 2500000002 HC RX 250 W HCPCS SELF ADMINISTERED DRUGS (ALT 637 FOR MEDICARE OP, ALT 636 FOR OP/ED): Performed by: PHYSICIAN ASSISTANT

## 2024-10-04 PROCEDURE — 84550 ASSAY OF BLOOD/URIC ACID: CPT

## 2024-10-04 RX ORDER — MAGNESIUM SULFATE HEPTAHYDRATE 40 MG/ML
4 INJECTION, SOLUTION INTRAVENOUS ONCE
Status: COMPLETED | OUTPATIENT
Start: 2024-10-04 | End: 2024-10-04

## 2024-10-04 ASSESSMENT — COGNITIVE AND FUNCTIONAL STATUS - GENERAL
DAILY ACTIVITIY SCORE: 24
MOBILITY SCORE: 24
DAILY ACTIVITIY SCORE: 24
MOBILITY SCORE: 24

## 2024-10-04 ASSESSMENT — PAIN SCALES - GENERAL
PAINLEVEL_OUTOF10: 0 - NO PAIN

## 2024-10-04 ASSESSMENT — PAIN - FUNCTIONAL ASSESSMENT
PAIN_FUNCTIONAL_ASSESSMENT: 0-10

## 2024-10-04 NOTE — CARE PLAN
The patient's goals for the shift include      Problem: Pain - Adult  Goal: Verbalizes/displays adequate comfort level or baseline comfort level  Outcome: Progressing     Problem: Safety - Adult  Goal: Free from fall injury  Outcome: Progressing     Problem: Discharge Planning  Goal: Discharge to home or other facility with appropriate resources  Outcome: Progressing     Problem: Chronic Conditions and Co-morbidities  Goal: Patient's chronic conditions and co-morbidity symptoms are monitored and maintained or improved  Outcome: Progressing     Problem: Nutrition  Goal: Oral intake greater 75%  Outcome: Progressing  Goal: Adequate PO fluid intake  Outcome: Progressing  Goal: Maintain stable weight  Outcome: Progressing       The clinical goals for the shift include Pt. will remain HDs this shift    O

## 2024-10-04 NOTE — PROGRESS NOTES
Brandt Merritt is a 64 y.o. 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    Subjective   Pt has no complaints. He has been eating, drinking, having Bms and urinating. His Bms continue to be diarrhea, but he states that it doesn't bother him much, more loose than liquid. He has gone 2 times today by 12pm. No fevers, chills, bruising, bleeding. His rash from yesterday is unchanged but not botherin ghim.        Objective     Physical Exam  Constitutional:       General: He is not in acute distress.     Appearance: Normal appearance. He is normal weight. He is not ill-appearing, toxic-appearing or diaphoretic.   HENT:      Head: Normocephalic and atraumatic.      Right Ear: External ear normal.      Left Ear: External ear normal.      Nose: Nose normal. No congestion or rhinorrhea.      Mouth/Throat:      Mouth: Mucous membranes are moist.      Pharynx: Oropharynx is clear. No oropharyngeal exudate or posterior oropharyngeal erythema.   Eyes:      General: No scleral icterus.        Right eye: No discharge.         Left eye: No discharge.      Extraocular Movements: Extraocular movements intact.      Conjunctiva/sclera: Conjunctivae normal.   Cardiovascular:      Rate and Rhythm: Normal rate and regular rhythm.      Heart sounds: Normal heart sounds. No murmur heard.     No friction rub. No gallop.   Pulmonary:      Effort: Pulmonary effort is normal. No respiratory distress.      Breath sounds: No stridor. No wheezing, rhonchi or rales.   Abdominal:      General: Abdomen is flat. Bowel sounds are normal. There is no distension.      Palpations: Abdomen is soft. There is no mass.      Tenderness: There is no abdominal tenderness. There is no guarding or rebound.      Hernia: No hernia is present.   Musculoskeletal:         General: No swelling, tenderness, deformity or signs of injury. Normal range of motion.      Cervical back: Normal range of motion.      Right lower leg: No  "edema.      Left lower leg: No edema.   Skin:     General: Skin is warm and dry.      Coloration: Skin is not jaundiced or pale.      Findings: Rash present. No bruising, erythema or lesion.   Neurological:      General: No focal deficit present.      Mental Status: He is alert and oriented to person, place, and time. Mental status is at baseline.   Psychiatric:         Mood and Affect: Mood normal.         Behavior: Behavior normal.         Thought Content: Thought content normal.         Judgment: Judgment normal.         Last Recorded Vitals  Blood pressure 113/73, pulse 86, temperature 36.8 °C (98.2 °F), temperature source Temporal, resp. rate 18, height (S) 1.664 m (5' 5.51\"), weight 70.9 kg (156 lb 4.9 oz), SpO2 98%.  Intake/Output last 3 Shifts:  No intake/output data recorded.    Relevant Results               This patient has a central line   Reason for the central line remaining today? 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 Persistent neutropenic fevers with diarrhea, with negative cultures, C diff, and negative stool pathogen panel, resolved. Also with cutaneous reaction to meropenem, now improving.      T+15 Single-unit cord transplant (T0=9/19/24)     Updates (10/4/24):  - replete magnesium sulfate 4g IV  - reduce labs to daily only     ONC  # MDS/AML   - Symptoms began 9/2023; diagnosed 4/2024  - BMBx showing MDS in transition to AML (>10% jaime) w/ trisomy 8, DNMT alpha, and U2AF1 mutation indication adverse risk   - s/p 4 cycles of Decitabine/Venetoclax (C4D1 on 8/12/24)  - BMBx 6/17/24: Blasts cleared, FISH still " positive for trisomy 8, still MDS changes   - BMBx 9/5/24: 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 and no increase in blast     # Transplant  CONDITIONING Fludarabine, melphalan, and TBI   DONOR Single umbilical cord   MATCH GRADE 6/8   SEX MATCH Mismatched   ABO DONOR A pos   ABO RECIPIENT A pos   GVHD PROPHYLAXIS Tacrolimus and Mycophenolate   GRAFT SOURCE Single umbilical cord   CMV DONOR negative   CMV RECIPIENT ppsitive   GVHD PPX:  - Tacrolimus 1.5 mg q12h started T-3 (9/16)  *Tacro level: 4.5 (9/20), 4/7 (9/21), 5.7 (9/22)  - MMF 1000 mg TID to begin T+2 to T+35   - Viral surveillance T+14 all negative (EBV, CMV, adeno, HHV6)  - continue tacrolimus 1.5mg BID (tacro level 10/4) 6.9     HEME  # Anemia and Leukopenia: secondary to disease and chemotherapy  # 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   - Transfuse for Hgb <7 & PLT <10   - Neupogen 300mcg to begin T+5     # VTE Prophylaxis: SCDs & Ambulation     ID  #Neutropenic fever, resolved  # Prophylaxis: Acyclovir, Vancomycin, Posaconazole, Letermovir (T+14), and Bactrim (T+30)  - fever noted overnight 9/25 at 38C, along with diarrhea, no other focal symptoms  - UA with 2+ blood, trace ketones only, CXR clear  - blood cultures NGTD  - likely intolerant to meropenem, switched to cefepime  - etiology: suspect gut bacterial translocation/enteritis related to melphalan vs drug fever 2/2 G-CSF vs pre-engraftment syndrome  - cultures all negative  - CT chest/abdo/pelvis only with diarrhea, no other findings  - continue cefepime until ANC > 500 and no fever >48h     #Maculopapular rash, recurrent  -developed 9/27 PM after first dose of meropenem  -appearance highly suggestive of meropenem reaction vs pre-engraftment syndrome  -stop meropenem  -resolved by 10/1, returned 10/3 on legs  -maculopapular on inner thighs, non-itching, non-painful, blanching  -will monitor in coming days     CARDIO/PULM  # Hx of STEMI (11/11/2020)  - TTE 7/19/24: EF 59%, otherwise  unremarkable   - Cleared by Cardiology for transplant      # Hx of HTN   - Nifedipine: 90 mEq ER 24 hr tablet      # Ascending aorta dilation   - Echo showing 3.8-4 cm dilation      FEN/RENAL  - Admit wt: 75.6 kg (9/13/24); Most recent wt: 75.1 kg  Allergic to Lasix, will not take med (extreme hypotension, extreme hypokalemia)     #Diarrhea, c.diff neg  -diarrhea returned 9/26 AM, along with fever  -etiology: likely melphalan-related mucositis/enteritis  -C diff negative 9/22, pt on suppressive PO vancomycin  -loperamide 2mg q6h PRN  -stool pathogen panel negative  -continue loperamide PRN   - reduce labs to daily only     #Constipation, resolved  -stop miralax     #SOS prophylaxis: Actigall      #hx of BPH and urinary retention (2023)  - Hx elevated PSA               *7/28/21 Bx: atypical small acinar proliferation               *4/7/23 Bx: Benign     #Hx of treated Hep C in 2015  - (9/12) Hep C RNA - not detected      #Hx of recurrent hyperbilirubinemia   - 2/2 to Hemoglobin C hemolysis      F: none  E: PRN  N: low microbial, ensure, sour lemon candy  A: picc line     DVT: ambulation, scd  GI: PPI  NOK: spouse Genevieve ([phone])  Code: FULL     Oncologist: Jerod  Patient examined and discussed with Dr. Ignacio Deras MD PhD

## 2024-10-05 LAB
ALBUMIN SERPL BCP-MCNC: 3.2 G/DL (ref 3.4–5)
ALP SERPL-CCNC: 61 U/L (ref 33–136)
ALT SERPL W P-5'-P-CCNC: 15 U/L (ref 10–52)
ANION GAP SERPL CALC-SCNC: 10 MMOL/L (ref 10–20)
ANION GAP SERPL CALC-SCNC: 12 MMOL/L (ref 10–20)
APTT PPP: 32 SECONDS (ref 27–38)
AST SERPL W P-5'-P-CCNC: 11 U/L (ref 9–39)
BASOPHILS # BLD AUTO: 0 X10*3/UL (ref 0–0.1)
BASOPHILS NFR BLD AUTO: 0 %
BILIRUB SERPL-MCNC: 0.5 MG/DL (ref 0–1.2)
BUN SERPL-MCNC: 10 MG/DL (ref 6–23)
BUN SERPL-MCNC: 11 MG/DL (ref 6–23)
CALCIUM SERPL-MCNC: 8.1 MG/DL (ref 8.6–10.6)
CALCIUM SERPL-MCNC: 8.3 MG/DL (ref 8.6–10.6)
CHLORIDE SERPL-SCNC: 102 MMOL/L (ref 98–107)
CHLORIDE SERPL-SCNC: 104 MMOL/L (ref 98–107)
CO2 SERPL-SCNC: 23 MMOL/L (ref 21–32)
CO2 SERPL-SCNC: 27 MMOL/L (ref 21–32)
CREAT SERPL-MCNC: 0.75 MG/DL (ref 0.5–1.3)
CREAT SERPL-MCNC: 0.83 MG/DL (ref 0.5–1.3)
EGFRCR SERPLBLD CKD-EPI 2021: >90 ML/MIN/1.73M*2
EGFRCR SERPLBLD CKD-EPI 2021: >90 ML/MIN/1.73M*2
EOSINOPHIL # BLD AUTO: 0 X10*3/UL (ref 0–0.7)
EOSINOPHIL NFR BLD AUTO: 0 %
ERYTHROCYTE [DISTWIDTH] IN BLOOD BY AUTOMATED COUNT: 17.7 % (ref 11.5–14.5)
FIBRINOGEN PPP-MCNC: 366 MG/DL (ref 200–400)
GLUCOSE SERPL-MCNC: 145 MG/DL (ref 74–99)
GLUCOSE SERPL-MCNC: 95 MG/DL (ref 74–99)
HCT VFR BLD AUTO: 24.5 % (ref 41–52)
HGB BLD-MCNC: 8.6 G/DL (ref 13.5–17.5)
IMM GRANULOCYTES # BLD AUTO: 0 X10*3/UL (ref 0–0.7)
IMM GRANULOCYTES NFR BLD AUTO: 0 % (ref 0–0.9)
INR PPP: 1.1 (ref 0.9–1.1)
LDH SERPL L TO P-CCNC: 96 U/L (ref 84–246)
LYMPHOCYTES # BLD AUTO: 0.3 X10*3/UL (ref 1.2–4.8)
LYMPHOCYTES NFR BLD AUTO: 96.8 %
MAGNESIUM SERPL-MCNC: 1.65 MG/DL (ref 1.6–2.4)
MAGNESIUM SERPL-MCNC: 2.47 MG/DL (ref 1.6–2.4)
MCH RBC QN AUTO: 26.1 PG (ref 26–34)
MCHC RBC AUTO-ENTMCNC: 35.1 G/DL (ref 32–36)
MCV RBC AUTO: 75 FL (ref 80–100)
MONOCYTES # BLD AUTO: 0.01 X10*3/UL (ref 0.1–1)
MONOCYTES NFR BLD AUTO: 3.2 %
NEUTROPHILS # BLD AUTO: 0 X10*3/UL (ref 1.2–7.7)
NEUTROPHILS NFR BLD AUTO: 0 %
NRBC BLD-RTO: 0 /100 WBCS (ref 0–0)
PHOSPHATE SERPL-MCNC: 3.3 MG/DL (ref 2.5–4.9)
PLATELET # BLD AUTO: 16 X10*3/UL (ref 150–450)
POTASSIUM SERPL-SCNC: 3.4 MMOL/L (ref 3.5–5.3)
POTASSIUM SERPL-SCNC: 3.7 MMOL/L (ref 3.5–5.3)
PROT SERPL-MCNC: 5.8 G/DL (ref 6.4–8.2)
PROTHROMBIN TIME: 12.3 SECONDS (ref 9.8–12.8)
RBC # BLD AUTO: 3.29 X10*6/UL (ref 4.5–5.9)
RBC MORPH BLD: NORMAL
SODIUM SERPL-SCNC: 134 MMOL/L (ref 136–145)
SODIUM SERPL-SCNC: 137 MMOL/L (ref 136–145)
TACROLIMUS BLD-MCNC: 6.7 NG/ML
TARGETS BLD QL SMEAR: NORMAL
URATE SERPL-MCNC: 2.1 MG/DL (ref 4–7.5)
WBC # BLD AUTO: 0.3 X10*3/UL (ref 4.4–11.3)

## 2024-10-05 PROCEDURE — 80197 ASSAY OF TACROLIMUS: CPT | Performed by: INTERNAL MEDICINE

## 2024-10-05 PROCEDURE — 85384 FIBRINOGEN ACTIVITY: CPT

## 2024-10-05 PROCEDURE — 84550 ASSAY OF BLOOD/URIC ACID: CPT

## 2024-10-05 PROCEDURE — 2500000001 HC RX 250 WO HCPCS SELF ADMINISTERED DRUGS (ALT 637 FOR MEDICARE OP): Performed by: INTERNAL MEDICINE

## 2024-10-05 PROCEDURE — 80048 BASIC METABOLIC PNL TOTAL CA: CPT | Mod: CCI

## 2024-10-05 PROCEDURE — 2500000001 HC RX 250 WO HCPCS SELF ADMINISTERED DRUGS (ALT 637 FOR MEDICARE OP)

## 2024-10-05 PROCEDURE — 83735 ASSAY OF MAGNESIUM: CPT

## 2024-10-05 PROCEDURE — 2500000004 HC RX 250 GENERAL PHARMACY W/ HCPCS (ALT 636 FOR OP/ED): Performed by: STUDENT IN AN ORGANIZED HEALTH CARE EDUCATION/TRAINING PROGRAM

## 2024-10-05 PROCEDURE — 2500000004 HC RX 250 GENERAL PHARMACY W/ HCPCS (ALT 636 FOR OP/ED): Performed by: INTERNAL MEDICINE

## 2024-10-05 PROCEDURE — 85025 COMPLETE CBC W/AUTO DIFF WBC: CPT

## 2024-10-05 PROCEDURE — 2500000004 HC RX 250 GENERAL PHARMACY W/ HCPCS (ALT 636 FOR OP/ED): Mod: JZ

## 2024-10-05 PROCEDURE — 99233 SBSQ HOSP IP/OBS HIGH 50: CPT | Performed by: STUDENT IN AN ORGANIZED HEALTH CARE EDUCATION/TRAINING PROGRAM

## 2024-10-05 PROCEDURE — 84100 ASSAY OF PHOSPHORUS: CPT

## 2024-10-05 PROCEDURE — 2500000002 HC RX 250 W HCPCS SELF ADMINISTERED DRUGS (ALT 637 FOR MEDICARE OP, ALT 636 FOR OP/ED): Performed by: INTERNAL MEDICINE

## 2024-10-05 PROCEDURE — 2500000004 HC RX 250 GENERAL PHARMACY W/ HCPCS (ALT 636 FOR OP/ED): Performed by: NURSE PRACTITIONER

## 2024-10-05 PROCEDURE — 83615 LACTATE (LD) (LDH) ENZYME: CPT

## 2024-10-05 PROCEDURE — 2500000002 HC RX 250 W HCPCS SELF ADMINISTERED DRUGS (ALT 637 FOR MEDICARE OP, ALT 636 FOR OP/ED): Performed by: NURSE PRACTITIONER

## 2024-10-05 PROCEDURE — 85730 THROMBOPLASTIN TIME PARTIAL: CPT

## 2024-10-05 PROCEDURE — 2500000002 HC RX 250 W HCPCS SELF ADMINISTERED DRUGS (ALT 637 FOR MEDICARE OP, ALT 636 FOR OP/ED): Performed by: PHYSICIAN ASSISTANT

## 2024-10-05 PROCEDURE — 1170000001 HC PRIVATE ONCOLOGY ROOM DAILY

## 2024-10-05 PROCEDURE — 80053 COMPREHEN METABOLIC PANEL: CPT

## 2024-10-05 RX ORDER — MAGNESIUM SULFATE HEPTAHYDRATE 40 MG/ML
4 INJECTION, SOLUTION INTRAVENOUS ONCE
Status: COMPLETED | OUTPATIENT
Start: 2024-10-05 | End: 2024-10-05

## 2024-10-05 RX ORDER — POTASSIUM CHLORIDE 750 MG/1
10 TABLET, FILM COATED, EXTENDED RELEASE ORAL 2 TIMES DAILY
Status: COMPLETED | OUTPATIENT
Start: 2024-10-05 | End: 2024-10-05

## 2024-10-05 RX ORDER — TACROLIMUS 1 MG/1
2 CAPSULE ORAL
Status: DISCONTINUED | OUTPATIENT
Start: 2024-10-05 | End: 2024-10-16

## 2024-10-05 ASSESSMENT — PAIN SCALES - GENERAL
PAINLEVEL_OUTOF10: 0 - NO PAIN

## 2024-10-05 ASSESSMENT — COGNITIVE AND FUNCTIONAL STATUS - GENERAL
MOBILITY SCORE: 24
DAILY ACTIVITIY SCORE: 24

## 2024-10-05 ASSESSMENT — PAIN - FUNCTIONAL ASSESSMENT
PAIN_FUNCTIONAL_ASSESSMENT: 0-10

## 2024-10-05 NOTE — CARE PLAN
The patient's goals for the shift include      The clinical goals for the shift include patient shall remain safe and free from falls    Over the shift, the patient did not make progress toward the following goals. Barriers to progression include n/a. Recommendations to address these barriers include n/a.      Problem: Pain - Adult  Goal: Verbalizes/displays adequate comfort level or baseline comfort level  Outcome: Progressing     Problem: Safety - Adult  Goal: Free from fall injury  Outcome: Progressing     Problem: Discharge Planning  Goal: Discharge to home or other facility with appropriate resources  Outcome: Progressing     Problem: Chronic Conditions and Co-morbidities  Goal: Patient's chronic conditions and co-morbidity symptoms are monitored and maintained or improved  Outcome: Progressing     Problem: Nutrition  Goal: Oral intake greater 75%  Outcome: Progressing  Goal: Adequate PO fluid intake  Outcome: Progressing  Goal: Maintain stable weight  Outcome: Progressing

## 2024-10-05 NOTE — PROGRESS NOTES
Brandt Merritt is a 64 y.o. male on day 22 of admission presenting with Acute myeloid leukemia in remission (Multi).    Subjective   Pt reporting no complaints today. Feels like PO intake has gotten better. UAL in room. Awaiting count recovery. Increasing Tacro dose.    Denies SOB, HA, CP, chills, n/v/d/c, abdom pain, dysuria, tarry stools. ROS otherwise neg.       Objective     Physical Exam  Constitutional:       General: He is not in acute distress.     Appearance: Normal appearance. He is normal weight. He is not ill-appearing, toxic-appearing or diaphoretic.   HENT:      Head: Normocephalic and atraumatic.      Right Ear: External ear normal.      Left Ear: External ear normal.      Nose: Nose normal. No congestion or rhinorrhea.      Mouth/Throat:      Mouth: Mucous membranes are moist.      Pharynx: Oropharynx is clear. No oropharyngeal exudate or posterior oropharyngeal erythema.   Eyes:      General: No scleral icterus.        Right eye: No discharge.         Left eye: No discharge.      Extraocular Movements: Extraocular movements intact.      Conjunctiva/sclera: Conjunctivae normal.   Cardiovascular:      Rate and Rhythm: Normal rate and regular rhythm.      Heart sounds: Normal heart sounds. No murmur heard.     No friction rub. No gallop.   Pulmonary:      Effort: Pulmonary effort is normal. No respiratory distress.      Breath sounds: No stridor. No wheezing, rhonchi or rales.   Abdominal:      General: Abdomen is flat. Bowel sounds are normal. There is no distension.      Palpations: Abdomen is soft. There is no mass.      Tenderness: There is no abdominal tenderness. There is no guarding or rebound.      Hernia: No hernia is present.   Musculoskeletal:         General: No swelling, tenderness, deformity or signs of injury. Normal range of motion.      Cervical back: Normal range of motion.      Right lower leg: No edema.      Left lower leg: No edema.   Skin:     General: Skin is warm and dry.       "Coloration: Skin is not jaundiced or pale.      Findings: Rash present. No bruising, erythema or lesion.   Neurological:      General: No focal deficit present.      Mental Status: He is alert and oriented to person, place, and time. Mental status is at baseline.   Psychiatric:         Mood and Affect: Mood normal.         Behavior: Behavior normal.         Thought Content: Thought content normal.         Judgment: Judgment normal.         Last Recorded Vitals  Blood pressure 112/74, pulse 86, temperature 36.9 °C (98.4 °F), temperature source Temporal, resp. rate 16, height (S) 1.664 m (5' 5.51\"), weight 70.9 kg (156 lb 4.9 oz), SpO2 100%.  Intake/Output last 3 Shifts:  No intake/output data recorded.    Relevant Results    Scheduled medications  acyclovir, 400 mg, oral, q12h  cefepime, 2 g, intravenous, q12h  filgrastim or biosimilar, 300 mcg, subcutaneous, q24h  folic acid, 1 mg, oral, Daily  magnesium sulfate, 4 g, intravenous, Once  mycophenolate, 1,000 mg, oral, TID  NIFEdipine ER, 90 mg, oral, Daily before breakfast  posaconazole, 300 mg, oral, q24h  potassium chloride CR, 10 mEq, oral, BID  tacrolimus, 2 mg, oral, q12h JALYN  ursodiol, 300 mg, oral, TID  vancomycin, 125 mg, oral, Daily      Continuous medications     PRN medications  PRN medications: acetaminophen, albuterol, alteplase, dextrose, diphenhydrAMINE, EPINEPHrine HCl, famotidine, loperamide, methylPREDNISolone sodium succinate (PF), ondansetron, prochlorperazine, prochlorperazine, sodium chloride    Results for orders placed or performed during the hospital encounter of 09/13/24 (from the past 24 hour(s))   Magnesium   Result Value Ref Range    Magnesium 1.65 1.60 - 2.40 mg/dL   CBC and Auto Differential   Result Value Ref Range    WBC 0.3 (LL) 4.4 - 11.3 x10*3/uL    nRBC 0.0 0.0 - 0.0 /100 WBCs    RBC 3.29 (L) 4.50 - 5.90 x10*6/uL    Hemoglobin 8.6 (L) 13.5 - 17.5 g/dL    Hematocrit 24.5 (L) 41.0 - 52.0 %    MCV 75 (L) 80 - 100 fL    MCH 26.1 26.0 " - 34.0 pg    MCHC 35.1 32.0 - 36.0 g/dL    RDW 17.7 (H) 11.5 - 14.5 %    Platelets 16 (LL) 150 - 450 x10*3/uL    Neutrophils % 0.0 40.0 - 80.0 %    Immature Granulocytes %, Automated 0.0 0.0 - 0.9 %    Lymphocytes % 96.8 13.0 - 44.0 %    Monocytes % 3.2 2.0 - 10.0 %    Eosinophils % 0.0 0.0 - 6.0 %    Basophils % 0.0 0.0 - 2.0 %    Neutrophils Absolute 0.00 (L) 1.20 - 7.70 x10*3/uL    Immature Granulocytes Absolute, Automated 0.00 0.00 - 0.70 x10*3/uL    Lymphocytes Absolute 0.30 (L) 1.20 - 4.80 x10*3/uL    Monocytes Absolute 0.01 (L) 0.10 - 1.00 x10*3/uL    Eosinophils Absolute 0.00 0.00 - 0.70 x10*3/uL    Basophils Absolute 0.00 0.00 - 0.10 x10*3/uL   Coagulation Screen   Result Value Ref Range    Protime 12.3 9.8 - 12.8 seconds    INR 1.1 0.9 - 1.1    aPTT 32 27 - 38 seconds   Comprehensive Metabolic Panel   Result Value Ref Range    Glucose 95 74 - 99 mg/dL    Sodium 137 136 - 145 mmol/L    Potassium 3.7 3.5 - 5.3 mmol/L    Chloride 104 98 - 107 mmol/L    Bicarbonate 27 21 - 32 mmol/L    Anion Gap 10 10 - 20 mmol/L    Urea Nitrogen 11 6 - 23 mg/dL    Creatinine 0.75 0.50 - 1.30 mg/dL    eGFR >90 >60 mL/min/1.73m*2    Calcium 8.3 (L) 8.6 - 10.6 mg/dL    Albumin 3.2 (L) 3.4 - 5.0 g/dL    Alkaline Phosphatase 61 33 - 136 U/L    Total Protein 5.8 (L) 6.4 - 8.2 g/dL    AST 11 9 - 39 U/L    Bilirubin, Total 0.5 0.0 - 1.2 mg/dL    ALT 15 10 - 52 U/L   Fibrinogen   Result Value Ref Range    Fibrinogen 366 200 - 400 mg/dL   Lactate Dehydrogenase   Result Value Ref Range    LDH 96 84 - 246 U/L   Phosphorus   Result Value Ref Range    Phosphorus 3.3 2.5 - 4.9 mg/dL   Uric Acid   Result Value Ref Range    Uric Acid 2.1 (L) 4.0 - 7.5 mg/dL   Tacrolimus level   Result Value Ref Range    Tacrolimus  6.7 <=15.0 ng/mL   Morphology   Result Value Ref Range    RBC Morphology See Below     Target Cells Few        Assessment/Plan   Assessment & Plan  Acute myeloid leukemia in remission (Multi)    Stem cells transplant status  (Multi)    Immunocompromised    Conditioning chemotherapy prior to peripheral blood stem cell transplant        Brandt Merritt is a 64 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 PMHx htn, Hgb C, Hep C (treated).     T+16   Single-umbilical cord transplant (T0=9/19/24)     ONC  # MDS/AML   - Symptoms began 9/2023; diagnosed 4/2024  - BMBx showing MDS in transition to AML (>10% jaime) w/ trisomy 8, DNMT alpha, and U2AF1 mutation indication adverse risk   - s/p 4 cycles of Decitabine/Venetoclax (C4D1 on 8/12/24)  - BMBx 6/17/24: Blasts cleared, FISH still positive for trisomy 8, still MDS changes   - BMBx 9/5/24: 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 and no increase in blast     # Transplant  CONDITIONING Fludarabine, melphalan, and TBI   DONOR Single umbilical cord   MATCH GRADE 6/8   SEX MATCH Mismatched   ABO DONOR A pos   ABO RECIPIENT A pos   GVHD PROPHYLAXIS Tacrolimus and Mycophenolate   GRAFT SOURCE Single umbilical cord   CMV DONOR negative   CMV RECIPIENT ppsitive     # GVHD prophy  - Tacro level (10/5): 6.7  - Inc Tacro to 2 mg BID  - Checking levels daily  - MMF 1000 mg TID to begin T+2 to T+35   - Viral surveillance T+14 all negative (EBV, CMV, adeno, HHV6)     HEME  # Anemia and Leukopenia: secondary to disease and chemotherapy  # 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   - Transfuse for Hgb <7 & PLT <10   - Neupogen 300 mcg to begin T+5  # VTE Prophylaxis: SCDs & Ambulation     ID  # Neutropenic fever (9/25)  - Fever 38.0C w/ diarrhea, workup neg, treated with Josefina initially with rash, switched to Cefepime (9/26)  - CT chest/abdo/pelvis only with diarrhea, no other findings  - Continue cefepime until ANC > 500 and no fever >48h  # Prophylaxis: Acyclovir, Vancomycin, Posaconazole, Letermovir (T+14), and Bactrim (T+30), Actigall     # Maculopapular rash (9/27), now resolved, likely d/t Josefina  - appearance highly suggestive of  meropenem reaction vs pre-engraftment syndrome  - resolved by 10/1, returned 10/3 on legs  - maculopapular on inner thighs, non-itching, non-painful, blanching  - will monitor in coming days     CARDIO/PULM:  # Hx of STEMI (11/11/2020)  - TTE 7/19/24: EF 59%, otherwise unremarkable   - Cleared by Cardiology for transplant   # Hx of HTN   - Nifedipine: 90 mEq ER 24 hr tablet   # Ascending aorta dilation   - Echo showing 3.8-4 cm dilation      FEN/RENAL  - Admit wt: 75.6 kg (9/13/24), Most recent wt: 70.9 kg (10/3)  - Allergic to Lasix, will not take med (extreme hypotension, extreme hypokalemia)   # Chemo induced Diarrhea  - Cdiff & stool path neg (9/22)  - diarrhea returned 9/26 AM, along with fever  - etiology: likely melphalan-related mucositis/enteritis  - Cont loperamide 2mg q6h PRN  # Hx of treated Hep C in 2015  - (9/12) Hep C RNA - not detected   # Hx of recurrent hyperbilirubinemia   - 2/2 to Hemoglobin C hemolysis      :  # hx of BPH and urinary retention (2023)  - Hx elevated PSA    - 7/28/21 Bx: atypical small acinar proliferation    - 4/7/23 Bx: Benign    DISPO:  - Full Code  - NOK: spouse Genevieve ([phone])  - PICC  - Oncologist: Jerod    Pt seen, examined, and discussed w/ Dr. Mccauley.     HAYLEY Dukes-CNP

## 2024-10-06 VITALS
RESPIRATION RATE: 18 BRPM | BODY MASS INDEX: 25.26 KG/M2 | SYSTOLIC BLOOD PRESSURE: 114 MMHG | WEIGHT: 157.19 LBS | TEMPERATURE: 99.3 F | DIASTOLIC BLOOD PRESSURE: 75 MMHG | HEIGHT: 66 IN | OXYGEN SATURATION: 98 % | HEART RATE: 88 BPM

## 2024-10-06 LAB
ABO GROUP (TYPE) IN BLOOD: NORMAL
ALBUMIN SERPL BCP-MCNC: 3.1 G/DL (ref 3.4–5)
ALP SERPL-CCNC: 60 U/L (ref 33–136)
ALT SERPL W P-5'-P-CCNC: 13 U/L (ref 10–52)
ANION GAP SERPL CALC-SCNC: 9 MMOL/L (ref 10–20)
ANTIBODY SCREEN: NORMAL
APTT PPP: 32 SECONDS (ref 27–38)
AST SERPL W P-5'-P-CCNC: 10 U/L (ref 9–39)
BASOPHILS # BLD AUTO: 0 X10*3/UL (ref 0–0.1)
BASOPHILS NFR BLD AUTO: 0 %
BILIRUB SERPL-MCNC: 0.5 MG/DL (ref 0–1.2)
BLOOD EXPIRATION DATE: NORMAL
BUN SERPL-MCNC: 10 MG/DL (ref 6–23)
CALCIUM SERPL-MCNC: 8.1 MG/DL (ref 8.6–10.6)
CHLORIDE SERPL-SCNC: 107 MMOL/L (ref 98–107)
CO2 SERPL-SCNC: 27 MMOL/L (ref 21–32)
CREAT SERPL-MCNC: 0.72 MG/DL (ref 0.5–1.3)
DISPENSE STATUS: NORMAL
EGFRCR SERPLBLD CKD-EPI 2021: >90 ML/MIN/1.73M*2
EOSINOPHIL # BLD AUTO: 0 X10*3/UL (ref 0–0.7)
EOSINOPHIL NFR BLD AUTO: 0 %
ERYTHROCYTE [DISTWIDTH] IN BLOOD BY AUTOMATED COUNT: 17.8 % (ref 11.5–14.5)
FIBRINOGEN PPP-MCNC: 348 MG/DL (ref 200–400)
GLUCOSE SERPL-MCNC: 93 MG/DL (ref 74–99)
HCT VFR BLD AUTO: 23.1 % (ref 41–52)
HGB BLD-MCNC: 8.2 G/DL (ref 13.5–17.5)
IMM GRANULOCYTES # BLD AUTO: 0 X10*3/UL (ref 0–0.7)
IMM GRANULOCYTES NFR BLD AUTO: 0 % (ref 0–0.9)
INR PPP: 1.2 (ref 0.9–1.1)
LDH SERPL L TO P-CCNC: 94 U/L (ref 84–246)
LYMPHOCYTES # BLD AUTO: 0.22 X10*3/UL (ref 1.2–4.8)
LYMPHOCYTES NFR BLD AUTO: 100 %
MAGNESIUM SERPL-MCNC: 1.56 MG/DL (ref 1.6–2.4)
MCH RBC QN AUTO: 26.5 PG (ref 26–34)
MCHC RBC AUTO-ENTMCNC: 35.5 G/DL (ref 32–36)
MCV RBC AUTO: 75 FL (ref 80–100)
MONOCYTES # BLD AUTO: 0 X10*3/UL (ref 0.1–1)
MONOCYTES NFR BLD AUTO: 0 %
NEUTROPHILS # BLD AUTO: 0 X10*3/UL (ref 1.2–7.7)
NEUTROPHILS NFR BLD AUTO: 0 %
NRBC BLD-RTO: 0 /100 WBCS (ref 0–0)
PHOSPHATE SERPL-MCNC: 3.2 MG/DL (ref 2.5–4.9)
PLATELET # BLD AUTO: 10 X10*3/UL (ref 150–450)
POTASSIUM SERPL-SCNC: 3.9 MMOL/L (ref 3.5–5.3)
PRODUCT BLOOD TYPE: 6200
PRODUCT CODE: NORMAL
PROT SERPL-MCNC: 5.8 G/DL (ref 6.4–8.2)
PROTHROMBIN TIME: 13.2 SECONDS (ref 9.8–12.8)
RBC # BLD AUTO: 3.1 X10*6/UL (ref 4.5–5.9)
RBC MORPH BLD: NORMAL
RH FACTOR (ANTIGEN D): NORMAL
SODIUM SERPL-SCNC: 139 MMOL/L (ref 136–145)
TACROLIMUS BLD-MCNC: 6.3 NG/ML
TARGETS BLD QL SMEAR: NORMAL
UNIT ABO: NORMAL
UNIT NUMBER: NORMAL
UNIT RH: NORMAL
UNIT VOLUME: 277
URATE SERPL-MCNC: 2.2 MG/DL (ref 4–7.5)
WBC # BLD AUTO: 0.2 X10*3/UL (ref 4.4–11.3)

## 2024-10-06 PROCEDURE — 85025 COMPLETE CBC W/AUTO DIFF WBC: CPT

## 2024-10-06 PROCEDURE — 1170000001 HC PRIVATE ONCOLOGY ROOM DAILY

## 2024-10-06 PROCEDURE — 2500000001 HC RX 250 WO HCPCS SELF ADMINISTERED DRUGS (ALT 637 FOR MEDICARE OP): Performed by: INTERNAL MEDICINE

## 2024-10-06 PROCEDURE — 2500000002 HC RX 250 W HCPCS SELF ADMINISTERED DRUGS (ALT 637 FOR MEDICARE OP, ALT 636 FOR OP/ED): Performed by: PHYSICIAN ASSISTANT

## 2024-10-06 PROCEDURE — 80197 ASSAY OF TACROLIMUS: CPT | Performed by: INTERNAL MEDICINE

## 2024-10-06 PROCEDURE — P9037 PLATE PHERES LEUKOREDU IRRAD: HCPCS

## 2024-10-06 PROCEDURE — 2500000001 HC RX 250 WO HCPCS SELF ADMINISTERED DRUGS (ALT 637 FOR MEDICARE OP)

## 2024-10-06 PROCEDURE — 2500000004 HC RX 250 GENERAL PHARMACY W/ HCPCS (ALT 636 FOR OP/ED): Mod: JZ | Performed by: INTERNAL MEDICINE

## 2024-10-06 PROCEDURE — 2500000002 HC RX 250 W HCPCS SELF ADMINISTERED DRUGS (ALT 637 FOR MEDICARE OP, ALT 636 FOR OP/ED): Performed by: INTERNAL MEDICINE

## 2024-10-06 PROCEDURE — 2500000004 HC RX 250 GENERAL PHARMACY W/ HCPCS (ALT 636 FOR OP/ED): Mod: JZ

## 2024-10-06 PROCEDURE — 86901 BLOOD TYPING SEROLOGIC RH(D): CPT | Performed by: NURSE PRACTITIONER

## 2024-10-06 PROCEDURE — 99233 SBSQ HOSP IP/OBS HIGH 50: CPT | Performed by: STUDENT IN AN ORGANIZED HEALTH CARE EDUCATION/TRAINING PROGRAM

## 2024-10-06 PROCEDURE — 83615 LACTATE (LD) (LDH) ENZYME: CPT

## 2024-10-06 PROCEDURE — 36430 TRANSFUSION BLD/BLD COMPNT: CPT

## 2024-10-06 PROCEDURE — 84550 ASSAY OF BLOOD/URIC ACID: CPT

## 2024-10-06 PROCEDURE — 83735 ASSAY OF MAGNESIUM: CPT

## 2024-10-06 PROCEDURE — 85610 PROTHROMBIN TIME: CPT

## 2024-10-06 PROCEDURE — 84100 ASSAY OF PHOSPHORUS: CPT

## 2024-10-06 PROCEDURE — 2500000004 HC RX 250 GENERAL PHARMACY W/ HCPCS (ALT 636 FOR OP/ED): Performed by: NURSE PRACTITIONER

## 2024-10-06 PROCEDURE — 85384 FIBRINOGEN ACTIVITY: CPT

## 2024-10-06 PROCEDURE — 80053 COMPREHEN METABOLIC PANEL: CPT

## 2024-10-06 RX ORDER — MAGNESIUM SULFATE HEPTAHYDRATE 40 MG/ML
4 INJECTION, SOLUTION INTRAVENOUS ONCE
Status: COMPLETED | OUTPATIENT
Start: 2024-10-06 | End: 2024-10-06

## 2024-10-06 ASSESSMENT — COGNITIVE AND FUNCTIONAL STATUS - GENERAL
DAILY ACTIVITIY SCORE: 24
MOBILITY SCORE: 24

## 2024-10-06 ASSESSMENT — PAIN - FUNCTIONAL ASSESSMENT: PAIN_FUNCTIONAL_ASSESSMENT: 0-10

## 2024-10-06 ASSESSMENT — PAIN SCALES - GENERAL
PAINLEVEL_OUTOF10: 0 - NO PAIN

## 2024-10-06 NOTE — PROGRESS NOTES
Brandt Merritt is a 64 y.o. male on day 23 of admission presenting with Acute myeloid leukemia in remission (Multi).    Subjective   No acute events over night. Afebrile. Doing well today. Reports to the RN that he has a slight tremor in his left hand, noticed yesterday. Eating and drinking well. Tacro dose remains the same today.  Denies SOB, HA, CP, chills, n/v/d/c, abdom pain, dysuria, tarry stools. ROS otherwise neg.       Objective     Physical Exam  Constitutional:       General: He is not in acute distress.     Appearance: Normal appearance. He is normal weight. He is not ill-appearing, toxic-appearing or diaphoretic.   HENT:      Head: Normocephalic and atraumatic.      Right Ear: External ear normal.      Left Ear: External ear normal.      Nose: Nose normal. No congestion or rhinorrhea.      Mouth/Throat:      Mouth: Mucous membranes are moist.      Pharynx: Oropharynx is clear. No oropharyngeal exudate or posterior oropharyngeal erythema.   Eyes:      General: No scleral icterus.        Right eye: No discharge.         Left eye: No discharge.      Extraocular Movements: Extraocular movements intact.      Conjunctiva/sclera: Conjunctivae normal.   Cardiovascular:      Rate and Rhythm: Normal rate and regular rhythm.      Heart sounds: Normal heart sounds. No murmur heard.     No friction rub. No gallop.   Pulmonary:      Effort: Pulmonary effort is normal. No respiratory distress.      Breath sounds: No stridor. No wheezing, rhonchi or rales.   Abdominal:      General: Abdomen is flat. Bowel sounds are normal. There is no distension.      Palpations: Abdomen is soft. There is no mass.      Tenderness: There is no abdominal tenderness. There is no guarding or rebound.      Hernia: No hernia is present.   Musculoskeletal:         General: No swelling, tenderness, deformity or signs of injury. Normal range of motion.      Cervical back: Normal range of motion.      Right lower leg: No edema.      Left lower  "leg: No edema.   Skin:     General: Skin is warm and dry.      Coloration: Skin is not jaundiced or pale.      Findings: Rash present. No bruising, erythema or lesion.   Neurological:      General: No focal deficit present.      Mental Status: He is alert and oriented to person, place, and time. Mental status is at baseline.   Psychiatric:         Mood and Affect: Mood normal.         Behavior: Behavior normal.         Thought Content: Thought content normal.         Judgment: Judgment normal.         Last Recorded Vitals  Blood pressure 115/69, pulse 92, temperature 37.4 °C (99.3 °F), temperature source Temporal, resp. rate 18, height (S) 1.664 m (5' 5.51\"), weight 71.3 kg (157 lb 3 oz), SpO2 97%.  Intake/Output last 3 Shifts:  I/O last 3 completed shifts:  In: 700 (9.9 mL/kg) [IV Piggyback:700]  Out: - (0 mL/kg)   Weight: 70.9 kg     Relevant Results    Scheduled medications  acyclovir, 400 mg, oral, q12h  cefepime, 2 g, intravenous, q12h  filgrastim or biosimilar, 300 mcg, subcutaneous, q24h  folic acid, 1 mg, oral, Daily  mycophenolate, 1,000 mg, oral, TID  NIFEdipine ER, 90 mg, oral, Daily before breakfast  posaconazole, 300 mg, oral, q24h  tacrolimus, 2 mg, oral, q12h JALYN  ursodiol, 300 mg, oral, TID  vancomycin, 125 mg, oral, Daily      Continuous medications     PRN medications  PRN medications: acetaminophen, albuterol, alteplase, dextrose, diphenhydrAMINE, EPINEPHrine HCl, famotidine, loperamide, methylPREDNISolone sodium succinate (PF), ondansetron, prochlorperazine, prochlorperazine, sodium chloride    Results for orders placed or performed during the hospital encounter of 09/13/24 (from the past 24 hour(s))   CBC and Auto Differential   Result Value Ref Range    WBC 0.2 (LL) 4.4 - 11.3 x10*3/uL    nRBC 0.0 0.0 - 0.0 /100 WBCs    RBC 3.10 (L) 4.50 - 5.90 x10*6/uL    Hemoglobin 8.2 (L) 13.5 - 17.5 g/dL    Hematocrit 23.1 (L) 41.0 - 52.0 %    MCV 75 (L) 80 - 100 fL    MCH 26.5 26.0 - 34.0 pg    MCHC 35.5 " 32.0 - 36.0 g/dL    RDW 17.8 (H) 11.5 - 14.5 %    Platelets 10 (LL) 150 - 450 x10*3/uL    Neutrophils % 0.0 40.0 - 80.0 %    Immature Granulocytes %, Automated 0.0 0.0 - 0.9 %    Lymphocytes % 100.0 13.0 - 44.0 %    Monocytes % 0.0 2.0 - 10.0 %    Eosinophils % 0.0 0.0 - 6.0 %    Basophils % 0.0 0.0 - 2.0 %    Neutrophils Absolute 0.00 (L) 1.20 - 7.70 x10*3/uL    Immature Granulocytes Absolute, Automated 0.00 0.00 - 0.70 x10*3/uL    Lymphocytes Absolute 0.22 (L) 1.20 - 4.80 x10*3/uL    Monocytes Absolute 0.00 (L) 0.10 - 1.00 x10*3/uL    Eosinophils Absolute 0.00 0.00 - 0.70 x10*3/uL    Basophils Absolute 0.00 0.00 - 0.10 x10*3/uL   Coagulation Screen   Result Value Ref Range    Protime 13.2 (H) 9.8 - 12.8 seconds    INR 1.2 (H) 0.9 - 1.1    aPTT 32 27 - 38 seconds   Comprehensive Metabolic Panel   Result Value Ref Range    Glucose 93 74 - 99 mg/dL    Sodium 139 136 - 145 mmol/L    Potassium 3.9 3.5 - 5.3 mmol/L    Chloride 107 98 - 107 mmol/L    Bicarbonate 27 21 - 32 mmol/L    Anion Gap 9 (L) 10 - 20 mmol/L    Urea Nitrogen 10 6 - 23 mg/dL    Creatinine 0.72 0.50 - 1.30 mg/dL    eGFR >90 >60 mL/min/1.73m*2    Calcium 8.1 (L) 8.6 - 10.6 mg/dL    Albumin 3.1 (L) 3.4 - 5.0 g/dL    Alkaline Phosphatase 60 33 - 136 U/L    Total Protein 5.8 (L) 6.4 - 8.2 g/dL    AST 10 9 - 39 U/L    Bilirubin, Total 0.5 0.0 - 1.2 mg/dL    ALT 13 10 - 52 U/L   Fibrinogen   Result Value Ref Range    Fibrinogen 348 200 - 400 mg/dL   Lactate Dehydrogenase   Result Value Ref Range    LDH 94 84 - 246 U/L   Magnesium   Result Value Ref Range    Magnesium 1.56 (L) 1.60 - 2.40 mg/dL   Phosphorus   Result Value Ref Range    Phosphorus 3.2 2.5 - 4.9 mg/dL   Uric Acid   Result Value Ref Range    Uric Acid 2.2 (L) 4.0 - 7.5 mg/dL   Tacrolimus level   Result Value Ref Range    Tacrolimus  6.3 <=15.0 ng/mL   Morphology   Result Value Ref Range    RBC Morphology See Below     Target Cells Few    Prepare Platelets: 1 Units, Irradiated, Leukocytes Reduced  (CMV reduced risk)   Result Value Ref Range    PRODUCT CODE E5750N19     Unit Number [id number]     Unit ABO A     Unit RH POS     Dispense Status IS     Blood Expiration Date 10/6/2024 11:59:00 PM EDT     PRODUCT BLOOD TYPE 6200     UNIT VOLUME 277    Type and screen   Result Value Ref Range    ABO TYPE A     Rh TYPE POS     ANTIBODY SCREEN NEG        Assessment/Plan   Assessment & Plan  Acute myeloid leukemia in remission (Multi)    Stem cells transplant status (Multi)    Immunocompromised    Conditioning chemotherapy prior to peripheral blood stem cell transplant        Brandt Merritt is a 64 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 PMHx htn, Hgb C, Hep C (treated).     T+17   Single-umbilical cord transplant (T0=9/19/24)     ONC  # MDS/AML   - Symptoms began 9/2023; diagnosed 4/2024  - BMBx showing MDS in transition to AML (>10% jaime) w/ trisomy 8, DNMT alpha, and U2AF1 mutation indication adverse risk   - s/p 4 cycles of Decitabine/Venetoclax (C4D1 on 8/12/24)  - BMBx 6/17/24: Blasts cleared, FISH still positive for trisomy 8, still MDS changes   - BMBx 9/5/24: 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 and no increase in blast     # Transplant  CONDITIONING Fludarabine, melphalan, and TBI   DONOR Single umbilical cord   MATCH GRADE 6/8   SEX MATCH Mismatched   ABO DONOR A pos   ABO RECIPIENT A pos   GVHD PROPHYLAXIS Tacrolimus and Mycophenolate   GRAFT SOURCE Single umbilical cord   CMV DONOR negative   CMV RECIPIENT ppsitive     # GVHD prophy  - Tacro level (10/6): 6.3  - Cont Tacro 2 mg BID  - Checking levels daily  - MMF 1000 mg TID to begin T+2 to T+35   - Viral surveillance T+14 all negative (EBV, CMV, adeno, HHV6)     HEME  # Anemia and Leukopenia: secondary to disease and chemotherapy  # 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   - Transfuse for Hgb <7 & PLT <10   - 1u plt 10/6  - Neupogen 300 mcg to begin T+5  # VTE  Prophylaxis: SCDs & Ambulation     ID  # Neutropenic fever (9/25)  - Fever 38.0C w/ diarrhea, workup neg, treated with Josefina initially with rash, switched to Cefepime (9/26)  - CT chest/abdo/pelvis only with diarrhea, no other findings  - Continue cefepime until ANC > 500 and no fever >48h  # Prophylaxis: Acyclovir, Vancomycin, Posaconazole, Letermovir (T+14), and Bactrim (T+30), Actigall     # Maculopapular rash (9/27), now resolved, likely d/t Josefina  - appearance highly suggestive of meropenem reaction vs pre-engraftment syndrome  - resolved by 10/1, returned 10/3 on legs  - maculopapular on inner thighs, non-itching, non-painful, blanching  - will monitor in coming days     CARDIO/PULM:  # Hx of STEMI (11/11/2020)  - TTE 7/19/24: EF 59%, otherwise unremarkable   - Cleared by Cardiology for transplant   # Hx of HTN   - Nifedipine: 90 mEq ER 24 hr tablet   # Ascending aorta dilation   - Echo showing 3.8-4 cm dilation      FEN/RENAL  - Admit wt: 75.6 kg (9/13/24), Most recent wt: 71.3 kg (10/6)  - Allergic to Lasix, will not take med (extreme hypotension, extreme hypokalemia)   # Chemo induced Diarrhea  - Cdiff & stool path neg (9/22)  - diarrhea returned 9/26 AM, along with fever  - etiology: likely melphalan-related mucositis/enteritis  - Cont loperamide 2mg q6h PRN  # Hx of treated Hep C in 2015  - (9/12) Hep C RNA - not detected   # Hx of recurrent hyperbilirubinemia   - 2/2 to Hemoglobin C hemolysis      :  # hx of BPH and urinary retention (2023)  - Hx elevated PSA    - 7/28/21 Bx: atypical small acinar proliferation    - 4/7/23 Bx: Benign    DISPO:  - Full Code  - NOK: spouse Genevieve ([phone])  - PICC  - Oncologist: Jerod Watson seen, examined, and discussed w/ Dr. Mccauley.     Tianna San, APRN-CNP

## 2024-10-07 LAB
ALBUMIN SERPL BCP-MCNC: 3.2 G/DL (ref 3.4–5)
ALP SERPL-CCNC: 61 U/L (ref 33–136)
ALT SERPL W P-5'-P-CCNC: 16 U/L (ref 10–52)
ANION GAP SERPL CALC-SCNC: 10 MMOL/L (ref 10–20)
APTT PPP: 32 SECONDS (ref 27–38)
AST SERPL W P-5'-P-CCNC: 10 U/L (ref 9–39)
BASOPHILS # BLD MANUAL: 0 X10*3/UL (ref 0–0.1)
BASOPHILS NFR BLD MANUAL: 0 %
BILIRUB SERPL-MCNC: 0.5 MG/DL (ref 0–1.2)
BUN SERPL-MCNC: 10 MG/DL (ref 6–23)
CALCIUM SERPL-MCNC: 8.3 MG/DL (ref 8.6–10.6)
CHLORIDE SERPL-SCNC: 107 MMOL/L (ref 98–107)
CO2 SERPL-SCNC: 27 MMOL/L (ref 21–32)
CREAT SERPL-MCNC: 0.7 MG/DL (ref 0.5–1.3)
EGFRCR SERPLBLD CKD-EPI 2021: >90 ML/MIN/1.73M*2
EOSINOPHIL # BLD MANUAL: 0 X10*3/UL (ref 0–0.7)
EOSINOPHIL NFR BLD MANUAL: 0 %
ERYTHROCYTE [DISTWIDTH] IN BLOOD BY AUTOMATED COUNT: 17.8 % (ref 11.5–14.5)
FIBRINOGEN PPP-MCNC: 331 MG/DL (ref 200–400)
GLUCOSE SERPL-MCNC: 93 MG/DL (ref 74–99)
HAPTOGLOB SERPL NEPH-MCNC: 141 MG/DL (ref 30–200)
HCT VFR BLD AUTO: 22.4 % (ref 41–52)
HGB BLD-MCNC: 7.9 G/DL (ref 13.5–17.5)
IGG SERPL-MCNC: 1210 MG/DL (ref 700–1600)
IMM GRANULOCYTES # BLD AUTO: 0 X10*3/UL (ref 0–0.7)
IMM GRANULOCYTES NFR BLD AUTO: 0 % (ref 0–0.9)
INR PPP: 1.1 (ref 0.9–1.1)
LDH SERPL L TO P-CCNC: 88 U/L (ref 84–246)
LYMPHOCYTES # BLD MANUAL: 0.2 X10*3/UL (ref 1.2–4.8)
LYMPHOCYTES NFR BLD MANUAL: 100 %
MAGNESIUM SERPL-MCNC: 1.65 MG/DL (ref 1.6–2.4)
MCH RBC QN AUTO: 26.4 PG (ref 26–34)
MCHC RBC AUTO-ENTMCNC: 35.3 G/DL (ref 32–36)
MCV RBC AUTO: 75 FL (ref 80–100)
MONOCYTES # BLD MANUAL: 0 X10*3/UL (ref 0.1–1)
MONOCYTES NFR BLD MANUAL: 0 %
NEUTS SEG # BLD MANUAL: 0 X10*3/UL (ref 1.2–7)
NEUTS SEG NFR BLD MANUAL: 0 %
NRBC BLD-RTO: 0 /100 WBCS (ref 0–0)
OVALOCYTES BLD QL SMEAR: ABNORMAL
PHOSPHATE SERPL-MCNC: 3.2 MG/DL (ref 2.5–4.9)
PLATELET # BLD AUTO: 54 X10*3/UL (ref 150–450)
POTASSIUM SERPL-SCNC: 4.1 MMOL/L (ref 3.5–5.3)
PROT SERPL-MCNC: 6 G/DL (ref 6.4–8.2)
PROTHROMBIN TIME: 12.3 SECONDS (ref 9.8–12.8)
RBC # BLD AUTO: 2.99 X10*6/UL (ref 4.5–5.9)
RBC MORPH BLD: ABNORMAL
SODIUM SERPL-SCNC: 140 MMOL/L (ref 136–145)
TACROLIMUS BLD-MCNC: 7.7 NG/ML
TARGETS BLD QL SMEAR: ABNORMAL
TOTAL CELLS COUNTED BLD: 17
URATE SERPL-MCNC: 1.9 MG/DL (ref 4–7.5)
WBC # BLD AUTO: 0.2 X10*3/UL (ref 4.4–11.3)

## 2024-10-07 PROCEDURE — 2500000004 HC RX 250 GENERAL PHARMACY W/ HCPCS (ALT 636 FOR OP/ED): Performed by: NURSE PRACTITIONER

## 2024-10-07 PROCEDURE — 2500000001 HC RX 250 WO HCPCS SELF ADMINISTERED DRUGS (ALT 637 FOR MEDICARE OP): Performed by: INTERNAL MEDICINE

## 2024-10-07 PROCEDURE — 83615 LACTATE (LD) (LDH) ENZYME: CPT

## 2024-10-07 PROCEDURE — 84550 ASSAY OF BLOOD/URIC ACID: CPT

## 2024-10-07 PROCEDURE — 85007 BL SMEAR W/DIFF WBC COUNT: CPT

## 2024-10-07 PROCEDURE — 80197 ASSAY OF TACROLIMUS: CPT | Performed by: INTERNAL MEDICINE

## 2024-10-07 PROCEDURE — 2500000004 HC RX 250 GENERAL PHARMACY W/ HCPCS (ALT 636 FOR OP/ED): Mod: JZ | Performed by: INTERNAL MEDICINE

## 2024-10-07 PROCEDURE — 2500000004 HC RX 250 GENERAL PHARMACY W/ HCPCS (ALT 636 FOR OP/ED)

## 2024-10-07 PROCEDURE — 85610 PROTHROMBIN TIME: CPT

## 2024-10-07 PROCEDURE — 99233 SBSQ HOSP IP/OBS HIGH 50: CPT | Performed by: STUDENT IN AN ORGANIZED HEALTH CARE EDUCATION/TRAINING PROGRAM

## 2024-10-07 PROCEDURE — 2500000002 HC RX 250 W HCPCS SELF ADMINISTERED DRUGS (ALT 637 FOR MEDICARE OP, ALT 636 FOR OP/ED): Performed by: INTERNAL MEDICINE

## 2024-10-07 PROCEDURE — 80053 COMPREHEN METABOLIC PANEL: CPT

## 2024-10-07 PROCEDURE — 2500000002 HC RX 250 W HCPCS SELF ADMINISTERED DRUGS (ALT 637 FOR MEDICARE OP, ALT 636 FOR OP/ED): Performed by: PHYSICIAN ASSISTANT

## 2024-10-07 PROCEDURE — 83010 ASSAY OF HAPTOGLOBIN QUANT: CPT | Performed by: NURSE PRACTITIONER

## 2024-10-07 PROCEDURE — 87533 HHV-6 DNA QUANT: CPT | Performed by: NURSE PRACTITIONER

## 2024-10-07 PROCEDURE — 1170000001 HC PRIVATE ONCOLOGY ROOM DAILY

## 2024-10-07 PROCEDURE — 82784 ASSAY IGA/IGD/IGG/IGM EACH: CPT | Performed by: NURSE PRACTITIONER

## 2024-10-07 PROCEDURE — 87799 DETECT AGENT NOS DNA QUANT: CPT | Performed by: NURSE PRACTITIONER

## 2024-10-07 PROCEDURE — 2500000004 HC RX 250 GENERAL PHARMACY W/ HCPCS (ALT 636 FOR OP/ED): Mod: JZ

## 2024-10-07 PROCEDURE — 2500000001 HC RX 250 WO HCPCS SELF ADMINISTERED DRUGS (ALT 637 FOR MEDICARE OP)

## 2024-10-07 PROCEDURE — 83735 ASSAY OF MAGNESIUM: CPT

## 2024-10-07 PROCEDURE — 85384 FIBRINOGEN ACTIVITY: CPT

## 2024-10-07 PROCEDURE — 84100 ASSAY OF PHOSPHORUS: CPT

## 2024-10-07 PROCEDURE — 85027 COMPLETE CBC AUTOMATED: CPT

## 2024-10-07 RX ORDER — MYCOPHENOLATE MOFETIL 250 MG/1
750 CAPSULE ORAL 3 TIMES DAILY
Status: DISCONTINUED | OUTPATIENT
Start: 2024-10-07 | End: 2024-10-09

## 2024-10-07 ASSESSMENT — PAIN SCALES - GENERAL
PAINLEVEL_OUTOF10: 0 - NO PAIN

## 2024-10-07 ASSESSMENT — COGNITIVE AND FUNCTIONAL STATUS - GENERAL
MOBILITY SCORE: 24
DAILY ACTIVITIY SCORE: 24

## 2024-10-07 ASSESSMENT — PAIN - FUNCTIONAL ASSESSMENT
PAIN_FUNCTIONAL_ASSESSMENT: 0-10

## 2024-10-07 NOTE — PROGRESS NOTES
Spiritual Care Visit        attempted to visit patient Brandt Merritt per consult. Patient was asleep and  did not disturb him.  will attempt to follow up at another time and Spiritual Care remains available as needed/requested.    Rev. Sarah Chapman, MDiv, [facility]

## 2024-10-07 NOTE — PROGRESS NOTES
Brandt Merritt is a 64 y.o. 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    Subjective   No acute overnight events. Doing well today  Reports having mild nausea, vomited once yesterday. States he still has 1-2 loose watery stools but diarrhea in general is much better. He endorses having the non-pruritic rash on b/l LE, which is better than in the last couple days.  Denies SOB, headache, chest pain, chills, abdominal pain, dysuria, tarry stools.     Objective     Physical Exam  Constitutional:       Appearance: Normal appearance.   HENT:      Head: Normocephalic and atraumatic.      Right Ear: External ear normal.      Left Ear: External ear normal.      Nose: Nose normal.      Mouth/Throat:      Mouth: Mucous membranes are moist.   Eyes:      Extraocular Movements: Extraocular movements intact.      Conjunctiva/sclera: Conjunctivae normal.      Pupils: Pupils are equal, round, and reactive to light.   Cardiovascular:      Rate and Rhythm: Normal rate and regular rhythm.      Pulses: Normal pulses.      Heart sounds: Normal heart sounds.   Pulmonary:      Effort: Pulmonary effort is normal.      Breath sounds: Normal breath sounds.   Abdominal:      General: Bowel sounds are normal.      Palpations: Abdomen is soft.   Musculoskeletal:         General: Normal range of motion.      Cervical back: Normal range of motion and neck supple.   Skin:     General: Skin is warm.      Findings: Rash (nonpruritic maculopapular mildly erythematous rash on b/l thighs) present.   Neurological:      General: No focal deficit present.      Mental Status: He is alert and oriented to person, place, and time.   Psychiatric:         Mood and Affect: Mood normal.         Behavior: Behavior normal.         Thought Content: Thought content normal.         Judgment: Judgment normal.         Last Recorded Vitals  Blood pressure 110/68, pulse 85, temperature 36.8 °C (98.2 °F), temperature source  "Temporal, resp. rate 16, height (S) 1.664 m (5' 5.51\"), weight 71.3 kg (157 lb 3 oz), SpO2 97%.  Intake/Output last 3 Shifts:  I/O last 3 completed shifts:  In: 350 (4.9 mL/kg) [Blood:350]  Out: - (0 mL/kg)   Weight: 71.3 kg     Relevant Results  Scheduled medications  acyclovir, 400 mg, oral, q12h  cefepime, 2 g, intravenous, q12h  filgrastim or biosimilar, 300 mcg, subcutaneous, q24h  folic acid, 1 mg, oral, Daily  mycophenolate, 1,000 mg, oral, TID  NIFEdipine ER, 90 mg, oral, Daily before breakfast  posaconazole, 300 mg, oral, q24h  tacrolimus, 2 mg, oral, q12h JALYN  ursodiol, 300 mg, oral, TID  vancomycin, 125 mg, oral, Daily      Continuous medications     PRN medications  PRN medications: acetaminophen, albuterol, alteplase, dextrose, diphenhydrAMINE, EPINEPHrine HCl, famotidine, loperamide, methylPREDNISolone sodium succinate (PF), ondansetron, prochlorperazine, prochlorperazine, sodium chloride    Results for orders placed or performed during the hospital encounter of 09/13/24 (from the past 24 hour(s))   Prepare Platelets: 1 Units, Irradiated, Leukocytes Reduced (CMV reduced risk)   Result Value Ref Range    PRODUCT CODE H0010F92     Unit Number [id number]     Unit ABO A     Unit RH POS     Dispense Status TR     Blood Expiration Date 10/6/2024 11:59:00 PM EDT     PRODUCT BLOOD TYPE 6200     UNIT VOLUME 277    Type and screen   Result Value Ref Range    ABO TYPE A     Rh TYPE POS     ANTIBODY SCREEN NEG    CBC and Auto Differential   Result Value Ref Range    WBC 0.2 (LL) 4.4 - 11.3 x10*3/uL    nRBC 0.0 0.0 - 0.0 /100 WBCs    RBC 2.99 (L) 4.50 - 5.90 x10*6/uL    Hemoglobin 7.9 (L) 13.5 - 17.5 g/dL    Hematocrit 22.4 (L) 41.0 - 52.0 %    MCV 75 (L) 80 - 100 fL    MCH 26.4 26.0 - 34.0 pg    MCHC 35.3 32.0 - 36.0 g/dL    RDW 17.8 (H) 11.5 - 14.5 %    Platelets 54 (L) 150 - 450 x10*3/uL    Immature Granulocytes %, Automated 0.0 0.0 - 0.9 %    Immature Granulocytes Absolute, Automated 0.00 0.00 - 0.70 " x10*3/uL   Coagulation Screen   Result Value Ref Range    Protime 12.3 9.8 - 12.8 seconds    INR 1.1 0.9 - 1.1    aPTT 32 27 - 38 seconds   Comprehensive Metabolic Panel   Result Value Ref Range    Glucose 93 74 - 99 mg/dL    Sodium 140 136 - 145 mmol/L    Potassium 4.1 3.5 - 5.3 mmol/L    Chloride 107 98 - 107 mmol/L    Bicarbonate 27 21 - 32 mmol/L    Anion Gap 10 10 - 20 mmol/L    Urea Nitrogen 10 6 - 23 mg/dL    Creatinine 0.70 0.50 - 1.30 mg/dL    eGFR >90 >60 mL/min/1.73m*2    Calcium 8.3 (L) 8.6 - 10.6 mg/dL    Albumin 3.2 (L) 3.4 - 5.0 g/dL    Alkaline Phosphatase 61 33 - 136 U/L    Total Protein 6.0 (L) 6.4 - 8.2 g/dL    AST 10 9 - 39 U/L    Bilirubin, Total 0.5 0.0 - 1.2 mg/dL    ALT 16 10 - 52 U/L   Fibrinogen   Result Value Ref Range    Fibrinogen 331 200 - 400 mg/dL   Lactate Dehydrogenase   Result Value Ref Range    LDH 88 84 - 246 U/L   Magnesium   Result Value Ref Range    Magnesium 1.65 1.60 - 2.40 mg/dL   Phosphorus   Result Value Ref Range    Phosphorus 3.2 2.5 - 4.9 mg/dL   Uric Acid   Result Value Ref Range    Uric Acid 1.9 (L) 4.0 - 7.5 mg/dL   Haptoglobin   Result Value Ref Range    Haptoglobin 141 30 - 200 mg/dL   Manual Differential   Result Value Ref Range    Neutrophils %, Manual 0.0 40.0 - 80.0 %    Lymphocytes %, Manual 100.0 13.0 - 44.0 %    Monocytes %, Manual 0.0 2.0 - 10.0 %    Eosinophils %, Manual 0.0 0.0 - 6.0 %    Basophils %, Manual 0.0 0.0 - 2.0 %    Seg Neutrophils Absolute, Manual 0.00 (L) 1.20 - 7.00 x10*3/uL    Lymphocytes Absolute, Manual 0.20 (L) 1.20 - 4.80 x10*3/uL    Monocytes Absolute, Manual 0.00 (L) 0.10 - 1.00 x10*3/uL    Eosinophils Absolute, Manual 0.00 0.00 - 0.70 x10*3/uL    Basophils Absolute, Manual 0.00 0.00 - 0.10 x10*3/uL    Total Cells Counted 17     RBC Morphology See Below     Target Cells Few     Ovalocytes Few         CT chest abdomen pelvis w IV contrast    Result Date: 9/29/2024  1. Fluid-filled small bowel and right colon likely related to  diarrhea. No evidence of bowel wall thickening or surrounding stranding to suggest enteritis or colitis. Evidence of a diverticulitis. 2. No other evidence of infectious process in the chest, abdomen and pelvis. 3. Chronic and incidental findings as described above.   MACRO: None   Signed by: Shilo Nolasco 9/29/2024 6:14 PM Dictation workstation:   [id number]    XR chest 1 view    Result Date: 9/29/2024  1.  No evidence of acute cardiopulmonary process.   I personally reviewed the images/study and I agree with the findings as stated by resident physician Dr. Gregory Oliveira . This study was interpreted at [place], [place].   MACRO: None   Signed by: Tuan Reid 9/29/2024 11:24 AM Dictation workstation:   [id number]    XR chest 1 view    Result Date: 9/26/2024  1. No acute abnormality of the chest.   I personally reviewed the image(s)/study and resident interpretation as stated by Dr. Lia Smith MD. I agree with the findings as stated. This study was interpreted at [facility], [place], [place].   MACRO: None   Signed by: Tuan Reid 9/26/2024 6:09 AM Dictation workstation:   [id number]    IR CVC nontunneled    Result Date: 9/14/2024  1. Technically successful and uncomplicated placement of a right internal jugular temporary double-lumen arrowguard catheter under direct ultrasound and fluoroscopic guidance - optimal catheter tip position at the right atrial superior vena caval junction and the catheter is ready for immediate use.   I was present for and/or performed the critical portions of the procedure and immediately available throughout the entire procedure.   I personally reviewed the image(s) / study and resident interpretation. I agree with the findings as stated.   Performed and dictated at [facility].   MACRO: None.   Signed by: Anish Arias 9/14/2024 6:14 AM Dictation  workstation:   [id number]         This patient has a central line   Reason for the central line remaining today? Parenteral medication                 Assessment/Plan   Assessment & Plan  Acute myeloid leukemia in remission (Multi)    Stem cells transplant status (Multi)    Immunocompromised    Conditioning chemotherapy prior to peripheral blood stem cell transplant          Brandt Merritt is a 64 y.o male with PMHx htn, Hgb C, Hep C (treated), MDS which transitioned to AML, admitted for single-unit cord transplant prepped with Flu/Mariana/TBI, GVHD prophy with Tacro/MMF.      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) w/ trisomy 8, DNMT alpha, and U2AF1 mutation indication adverse risk   - s/p 4 cycles of Decitabine/Venetoclax (C4D1 on 8/12/24)  - BMBx 6/17/24: Blasts cleared, FISH still positive for trisomy 8, still MDS changes   - BMBx 9/5/24: 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 and no increase in blast     # Transplant  CONDITIONING Fludarabine, melphalan, and TBI   DONOR Single umbilical cord   MATCH GRADE 6/8   SEX MATCH Mismatched   ABO DONOR A pos   ABO RECIPIENT A pos   GVHD PROPHYLAXIS Tacrolimus and Mycophenolate   GRAFT SOURCE Single umbilical cord   CMV DONOR negative   CMV RECIPIENT ppsitive      # GVHD prophy  - Tacro level (10/6): 6.3  - Cont Tacro 2 mg BID  - Checking levels daily  - Was planned for MMF 1000 mg TID to begin T+2 to T+35 but dose decreased to 750 mg TID on T+18 given persistent cytopenias  - Viral surveillance T+14 all negative (EBV, CMV, adeno, HHV6)     HEME  # Anemia and Leukopenia: secondary to disease and chemotherapy  # 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   - Transfuse for Hgb <7 & PLT <10   - 1u plt 10/6  - Neupogen 300 mcg to begin T+5  # VTE Prophylaxis: SCDs & Ambulation     ID  # Neutropenic fever (9/25)  - Fever 38.0C w/ diarrhea, workup neg,  treated with Josefina initially with rash, switched to Cefepime (9/26)  - CT chest/abdo/pelvis only with diarrhea, no other findings  - Continue cefepime until ANC > 500 and no fever >48h  # Prophylaxis: Acyclovir, Vancomycin, Posaconazole, Letermovir (T+14), and Bactrim (T+30), Actigall     # Maculopapular rash (9/27), now resolved, likely d/t Josefina  - appearance highly suggestive of meropenem reaction vs pre-engraftment syndrome  - resolved by 10/1, returned 10/3 on legs, currently improving  - maculopapular on inner thighs, non-itching, non-painful, blanching  - will continue to monitor      CARDIO/PULM:  # Hx of STEMI (11/11/2020)  - TTE 7/19/24: EF 59%, otherwise unremarkable   - Cleared by Cardiology for transplant   # Hx of HTN   - Nifedipine: 90 mEq ER 24 hr tablet   # Ascending aorta dilation   - Echo showing 3.8-4 cm dilation      FEN/RENAL  - Admit wt: 75.6 kg (9/13/24), Most recent wt: 71.3 kg (10/6)  - Allergic to Lasix, will not take med (extreme hypotension, extreme hypokalemia)   # Chemo induced Diarrhea  - Cdiff & stool path neg (9/22)  - diarrhea returned 9/26 AM, along with fever  - etiology: likely melphalan-related mucositis/enteritis  - Cont loperamide 2mg q6h PRN  # Hx of treated Hep C in 2015  - (9/12) Hep C RNA - not detected   # Hx of recurrent hyperbilirubinemia   - 2/2 to Hemoglobin C hemolysis      :  # Hx of BPH and urinary retention (2023)  - Hx elevated PSA    - 7/28/21 Bx: atypical small acinar proliferation    - 4/7/23 Bx: Benign     DISPO:  - Full Code  - NOK: spouse Genevieve ([phone])  - PICC  - Oncologist: Jerod Watson seen, examined, and discussed w/ Dr. Kesha Frederick MD  PGY-3 internal medicine resident

## 2024-10-07 NOTE — CARE PLAN
The patient's goals for the shift include      The clinical goals for the shift include Patient will be hemodynamically stable    Problem: Pain - Adult  Goal: Verbalizes/displays adequate comfort level or baseline comfort level  Outcome: Progressing     Problem: Safety - Adult  Goal: Free from fall injury  Outcome: Progressing     Problem: Discharge Planning  Goal: Discharge to home or other facility with appropriate resources  Outcome: Progressing     Problem: Chronic Conditions and Co-morbidities  Goal: Patient's chronic conditions and co-morbidity symptoms are monitored and maintained or improved  Outcome: Progressing     Problem: Nutrition  Goal: Oral intake greater 75%  Outcome: Progressing  Goal: Adequate PO fluid intake  Outcome: Progressing  Goal: Maintain stable weight  Outcome: Progressing     Problem: Nutrition  Goal: Oral intake greater 75%  Outcome: Progressing  Goal: Adequate PO fluid intake  Outcome: Progressing  Goal: Maintain stable weight  Outcome: Progressing     Problem: Nutrition  Goal: Oral intake greater 75%  Outcome: Progressing  Goal: Adequate PO fluid intake  Outcome: Progressing  Goal: Maintain stable weight  Outcome: Progressing     Problem: Chronic Conditions and Co-morbidities  Goal: Patient's chronic conditions and co-morbidity symptoms are monitored and maintained or improved  Outcome: Progressing     Problem: Nutrition  Goal: Oral intake greater 75%  Outcome: Progressing  Goal: Adequate PO fluid intake  Outcome: Progressing  Goal: Maintain stable weight  Outcome: Progressing

## 2024-10-08 LAB
ADENOVIRUS QPCR,PLASMA, VIRC: NOT DETECTED COPIES/ML
ALBUMIN SERPL BCP-MCNC: 3.2 G/DL (ref 3.4–5)
ALP SERPL-CCNC: 59 U/L (ref 33–136)
ALT SERPL W P-5'-P-CCNC: 13 U/L (ref 10–52)
ANION GAP SERPL CALC-SCNC: 13 MMOL/L (ref 10–20)
APTT PPP: 31 SECONDS (ref 27–38)
AST SERPL W P-5'-P-CCNC: 10 U/L (ref 9–39)
BASOPHILS # BLD AUTO: 0 X10*3/UL (ref 0–0.1)
BASOPHILS NFR BLD AUTO: 0 %
BILIRUB SERPL-MCNC: 0.4 MG/DL (ref 0–1.2)
BUN SERPL-MCNC: 12 MG/DL (ref 6–23)
CALCIUM SERPL-MCNC: 8.1 MG/DL (ref 8.6–10.6)
CHLORIDE SERPL-SCNC: 107 MMOL/L (ref 98–107)
CMV DNA SERPL NAA+PROBE-LOG IU: NORMAL {LOG_IU}/ML
CO2 SERPL-SCNC: 24 MMOL/L (ref 21–32)
CREAT SERPL-MCNC: 0.74 MG/DL (ref 0.5–1.3)
EBV DNA SPEC NAA+PROBE-LOG#: ABNORMAL {LOG_COPIES}/ML
EGFRCR SERPLBLD CKD-EPI 2021: >90 ML/MIN/1.73M*2
EOSINOPHIL # BLD AUTO: 0 X10*3/UL (ref 0–0.7)
EOSINOPHIL NFR BLD AUTO: 0 %
ERYTHROCYTE [DISTWIDTH] IN BLOOD BY AUTOMATED COUNT: 17.8 % (ref 11.5–14.5)
FIBRINOGEN PPP-MCNC: 385 MG/DL (ref 200–400)
GLUCOSE SERPL-MCNC: 100 MG/DL (ref 74–99)
HCT VFR BLD AUTO: 22 % (ref 41–52)
HGB BLD-MCNC: 7.5 G/DL (ref 13.5–17.5)
HUMAN HERPESVIRUS-6 PCR PLASMA: NOT DETECTED COPIES/ML
IMM GRANULOCYTES # BLD AUTO: 0 X10*3/UL (ref 0–0.7)
IMM GRANULOCYTES NFR BLD AUTO: 0 % (ref 0–0.9)
INR PPP: 1.1 (ref 0.9–1.1)
LABORATORY COMMENT REPORT: ABNORMAL
LABORATORY COMMENT REPORT: NOT DETECTED
LDH SERPL L TO P-CCNC: 85 U/L (ref 84–246)
LYMPHOCYTES # BLD AUTO: 0.14 X10*3/UL (ref 1.2–4.8)
LYMPHOCYTES NFR BLD AUTO: 93.3 %
MAGNESIUM SERPL-MCNC: 1.27 MG/DL (ref 1.6–2.4)
MCH RBC QN AUTO: 26 PG (ref 26–34)
MCHC RBC AUTO-ENTMCNC: 34.1 G/DL (ref 32–36)
MCV RBC AUTO: 76 FL (ref 80–100)
MONOCYTES # BLD AUTO: 0 X10*3/UL (ref 0.1–1)
MONOCYTES NFR BLD AUTO: 0 %
NEUTROPHILS # BLD AUTO: 0.01 X10*3/UL (ref 1.2–7.7)
NEUTROPHILS NFR BLD AUTO: 6.7 %
NRBC BLD-RTO: 0 /100 WBCS (ref 0–0)
PHOSPHATE SERPL-MCNC: 3.7 MG/DL (ref 2.5–4.9)
PLATELET # BLD AUTO: 39 X10*3/UL (ref 150–450)
POTASSIUM SERPL-SCNC: 3.7 MMOL/L (ref 3.5–5.3)
PROT SERPL-MCNC: 5.9 G/DL (ref 6.4–8.2)
PROTHROMBIN TIME: 12.8 SECONDS (ref 9.8–12.8)
RBC # BLD AUTO: 2.89 X10*6/UL (ref 4.5–5.9)
SODIUM SERPL-SCNC: 140 MMOL/L (ref 136–145)
TACROLIMUS BLD-MCNC: 10.3 NG/ML
URATE SERPL-MCNC: 2.2 MG/DL (ref 4–7.5)
WBC # BLD AUTO: 0.2 X10*3/UL (ref 4.4–11.3)

## 2024-10-08 PROCEDURE — 2500000001 HC RX 250 WO HCPCS SELF ADMINISTERED DRUGS (ALT 637 FOR MEDICARE OP)

## 2024-10-08 PROCEDURE — 80197 ASSAY OF TACROLIMUS: CPT | Performed by: INTERNAL MEDICINE

## 2024-10-08 PROCEDURE — 1170000001 HC PRIVATE ONCOLOGY ROOM DAILY

## 2024-10-08 PROCEDURE — 83735 ASSAY OF MAGNESIUM: CPT

## 2024-10-08 PROCEDURE — 85610 PROTHROMBIN TIME: CPT

## 2024-10-08 PROCEDURE — 2500000004 HC RX 250 GENERAL PHARMACY W/ HCPCS (ALT 636 FOR OP/ED): Mod: JZ

## 2024-10-08 PROCEDURE — 2500000002 HC RX 250 W HCPCS SELF ADMINISTERED DRUGS (ALT 637 FOR MEDICARE OP, ALT 636 FOR OP/ED): Performed by: INTERNAL MEDICINE

## 2024-10-08 PROCEDURE — 2500000004 HC RX 250 GENERAL PHARMACY W/ HCPCS (ALT 636 FOR OP/ED)

## 2024-10-08 PROCEDURE — 85025 COMPLETE CBC W/AUTO DIFF WBC: CPT

## 2024-10-08 PROCEDURE — 84550 ASSAY OF BLOOD/URIC ACID: CPT

## 2024-10-08 PROCEDURE — 83615 LACTATE (LD) (LDH) ENZYME: CPT

## 2024-10-08 PROCEDURE — 2500000004 HC RX 250 GENERAL PHARMACY W/ HCPCS (ALT 636 FOR OP/ED): Performed by: NURSE PRACTITIONER

## 2024-10-08 PROCEDURE — 2500000001 HC RX 250 WO HCPCS SELF ADMINISTERED DRUGS (ALT 637 FOR MEDICARE OP): Performed by: INTERNAL MEDICINE

## 2024-10-08 PROCEDURE — 2500000002 HC RX 250 W HCPCS SELF ADMINISTERED DRUGS (ALT 637 FOR MEDICARE OP, ALT 636 FOR OP/ED): Performed by: PHYSICIAN ASSISTANT

## 2024-10-08 PROCEDURE — 2500000002 HC RX 250 W HCPCS SELF ADMINISTERED DRUGS (ALT 637 FOR MEDICARE OP, ALT 636 FOR OP/ED): Performed by: STUDENT IN AN ORGANIZED HEALTH CARE EDUCATION/TRAINING PROGRAM

## 2024-10-08 PROCEDURE — 85384 FIBRINOGEN ACTIVITY: CPT

## 2024-10-08 PROCEDURE — 99233 SBSQ HOSP IP/OBS HIGH 50: CPT | Performed by: STUDENT IN AN ORGANIZED HEALTH CARE EDUCATION/TRAINING PROGRAM

## 2024-10-08 PROCEDURE — 84100 ASSAY OF PHOSPHORUS: CPT

## 2024-10-08 PROCEDURE — 80053 COMPREHEN METABOLIC PANEL: CPT

## 2024-10-08 PROCEDURE — 2500000004 HC RX 250 GENERAL PHARMACY W/ HCPCS (ALT 636 FOR OP/ED): Mod: JZ | Performed by: INTERNAL MEDICINE

## 2024-10-08 RX ORDER — MAGNESIUM SULFATE HEPTAHYDRATE 40 MG/ML
2 INJECTION, SOLUTION INTRAVENOUS ONCE
Status: COMPLETED | OUTPATIENT
Start: 2024-10-08 | End: 2024-10-08

## 2024-10-08 RX ORDER — CEFEPIME 1 G/50ML
2 INJECTION, SOLUTION INTRAVENOUS EVERY 8 HOURS
Status: COMPLETED | OUTPATIENT
Start: 2024-10-08 | End: 2024-10-10

## 2024-10-08 ASSESSMENT — PAIN SCALES - GENERAL
PAINLEVEL_OUTOF10: 0 - NO PAIN

## 2024-10-08 ASSESSMENT — PAIN - FUNCTIONAL ASSESSMENT
PAIN_FUNCTIONAL_ASSESSMENT: 0-10
PAIN_FUNCTIONAL_ASSESSMENT: 0-10

## 2024-10-08 NOTE — ASSESSMENT & PLAN NOTE
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 and no increase in blast  - Not yet engrafted, but slight improvement in Neutrophil % at 6.7 [was 0 until 10/7] and ANC % today is 0.01 [was 0.0 until 10/7]  - Can repeat a BMbx after a month if counts do not improve and then consider repeating another cord transplant if required     # Transplant  CONDITIONING Fludarabine, melphalan, and TBI   DONOR Single umbilical cord   MATCH GRADE 6/8   SEX MATCH Mismatched   ABO DONOR A pos   ABO RECIPIENT A pos   GVHD PROPHYLAXIS Tacrolimus and Mycophenolate   GRAFT SOURCE Single umbilical cord   CMV DONOR negative   CMV RECIPIENT positive      # GVHD prophy  - Tacro level (10/8): 10 [was drawn around 3 am, 3 hrs earlier than usual draw]  - Cont Tacro 2 mg BID  - Checking levels daily  - Was planned for MMF 1000 mg TID to begin T+2 to T+35 but dose decreased to 750 mg TID on T+18 given persistent cytopenias  - Viral surveillance T+14 all negative (EBV, CMV, adeno, HHV6)     HEME  # Anemia and Leukopenia: secondary to disease and chemotherapy  # 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   - Transfuse for Hgb <7 & PLT <10   - 1u plt 10/6  - Neupogen 300 mcg started on T+5  # VTE Prophylaxis: SCDs & Ambulation     ID  # Neutropenic fever (9/25)  - Fever 38C w/ diarrhea, workup neg, treated with Josefina initially with rash, switched to Cefepime (9/26)  - CT  chest/abdo/pelvis only with diarrhea, no other findings  - Will continue cefepime for 14 days total and then consider switching to oral levo  # Prophylaxis: Acyclovir, Vancomycin, Posaconazole, Letermovir (started T+14), and Bactrim (to start T+30), Ursodiol      # Maculopapular rash (9/27), now resolved, likely d/t Josefina  - appearance highly suggestive of meropenem reaction vs pre-engraftment syndrome  - resolved by 10/1, returned 10/3 on legs, currently improving  - maculopapular on inner thighs, non-itching, non-painful, blanching  - will continue to monitor      CARDIO/PULM:  # Hx of STEMI (11/11/2020)  - TTE 7/19/24: EF 59%, otherwise unremarkable   - Was cleared by Cardiology for transplant   # Hx of HTN   - Nifedipine: 90 mEq ER 24 hr tablet   # Ascending aorta dilation   - Echo showing 3.8-4 cm dilation      FEN/RENAL  - Admit wt: 75.6 kg (9/13/24), Most recent wt: 71.3 kg (10/6)  - Allergic to Lasix, will not take med (extreme hypotension, extreme hypokalemia)   # Chemo induced Diarrhea  - Cdiff & stool path neg (9/22)  - diarrhea returned 9/26 AM, along with fever  - etiology: likely melphalan-related mucositis/enteritis  - Cont loperamide 2mg q6h PRN  # Hx of treated Hep C in 2015  - (9/12) Hep C RNA - not detected   # Hx of recurrent hyperbilirubinemia   - 2/2 to Hemoglobin C hemolysis      :  # Hx of BPH and urinary retention (2023)  - Hx elevated PSA    - 7/28/21 Bx: atypical small acinar proliferation    - 4/7/23 Bx: Benign     DISPO:  - Full Code  - NOK: spouse Genevieve ([phone])  - PICC  - Oncologist: Jerod Watson seen, examined, and discussed w/ Dr. Kesha Frederick MD  PGY-3 internal medicine resident

## 2024-10-08 NOTE — PROGRESS NOTES
"Brandt Merritt is a 64 y.o. 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    Subjective   No acute overnight events. Doing well today  Reports 2 loose watery stools yesterday and one loose stool in the morning today. He endorses that the non-pruritic rash on b/l LE, is much better today.  He was worried that the \"transplant did not work\" and \"wanted to know what the backup plan is\". Mentioned to him that it can take upto 21-28 days for the engraftment and it's a little bit early to say it did not work. Did talk about the possibility of repeating a BMbx after a month if counts do not improve and then consider repeating another cord transplant, to which he agreed.  Denies SOB, headache, chest pain, chills, abdominal pain, dysuria, tarry stools.        Objective     Physical Exam  Constitutional:       Appearance: Normal appearance.   HENT:      Head: Normocephalic and atraumatic.      Right Ear: External ear normal.      Left Ear: External ear normal.      Nose: Nose normal.      Mouth/Throat:      Mouth: Mucous membranes are moist.   Eyes:      Extraocular Movements: Extraocular movements intact.      Conjunctiva/sclera: Conjunctivae normal.      Pupils: Pupils are equal, round, and reactive to light.   Cardiovascular:      Rate and Rhythm: Normal rate and regular rhythm.      Pulses: Normal pulses.      Heart sounds: Normal heart sounds.   Pulmonary:      Effort: Pulmonary effort is normal.      Breath sounds: Normal breath sounds.   Abdominal:      General: Bowel sounds are normal.      Palpations: Abdomen is soft.   Musculoskeletal:         General: Normal range of motion.      Cervical back: Normal range of motion and neck supple.   Skin:     General: Skin is warm.      Findings: Rash, improving (nonpruritic maculopapular mildly erythematous rash on b/l thighs) present.   Neurological:      General: No focal deficit present.      Mental Status: He is alert and oriented " "to person, place, and time.   Psychiatric:         Mood and Affect: Mood normal.         Behavior: Behavior normal.         Thought Content: Thought content normal.         Judgment: Judgment normal.     Last Recorded Vitals  Blood pressure 119/77, pulse 87, temperature 36.5 °C (97.7 °F), temperature source Temporal, resp. rate 18, height (S) 1.664 m (5' 5.51\"), weight 71.3 kg (157 lb 3 oz), SpO2 100%.  Intake/Output last 3 Shifts:  I/O last 3 completed shifts:  In: 1350 (18.9 mL/kg) [P.O.:900; Blood:350; IV Piggyback:100]  Out: - (0 mL/kg)   Weight: 71.3 kg     Relevant Results  Scheduled medications  acyclovir, 400 mg, oral, q12h  cefepime, 2 g, intravenous, q12h  filgrastim or biosimilar, 300 mcg, subcutaneous, q24h  folic acid, 1 mg, oral, Daily  mycophenolate, 750 mg, oral, TID  NIFEdipine ER, 90 mg, oral, Daily before breakfast  posaconazole, 300 mg, oral, q24h  tacrolimus, 2 mg, oral, q12h JALYN  ursodiol, 300 mg, oral, TID  vancomycin, 125 mg, oral, Daily      Continuous medications     PRN medications  PRN medications: acetaminophen, albuterol, alteplase, dextrose, diphenhydrAMINE, EPINEPHrine HCl, famotidine, loperamide, methylPREDNISolone sodium succinate (PF), ondansetron, prochlorperazine, prochlorperazine, sodium chloride    Results for orders placed or performed during the hospital encounter of 09/13/24 (from the past 24 hour(s))   CBC and Auto Differential   Result Value Ref Range    WBC 0.2 (LL) 4.4 - 11.3 x10*3/uL    nRBC 0.0 0.0 - 0.0 /100 WBCs    RBC 2.89 (L) 4.50 - 5.90 x10*6/uL    Hemoglobin 7.5 (L) 13.5 - 17.5 g/dL    Hematocrit 22.0 (L) 41.0 - 52.0 %    MCV 76 (L) 80 - 100 fL    MCH 26.0 26.0 - 34.0 pg    MCHC 34.1 32.0 - 36.0 g/dL    RDW 17.8 (H) 11.5 - 14.5 %    Platelets 39 (LL) 150 - 450 x10*3/uL    Neutrophils % 6.7 40.0 - 80.0 %    Immature Granulocytes %, Automated 0.0 0.0 - 0.9 %    Lymphocytes % 93.3 13.0 - 44.0 %    Monocytes % 0.0 2.0 - 10.0 %    Eosinophils % 0.0 0.0 - 6.0 %    " Basophils % 0.0 0.0 - 2.0 %    Neutrophils Absolute 0.01 (L) 1.20 - 7.70 x10*3/uL    Immature Granulocytes Absolute, Automated 0.00 0.00 - 0.70 x10*3/uL    Lymphocytes Absolute 0.14 (L) 1.20 - 4.80 x10*3/uL    Monocytes Absolute 0.00 (L) 0.10 - 1.00 x10*3/uL    Eosinophils Absolute 0.00 0.00 - 0.70 x10*3/uL    Basophils Absolute 0.00 0.00 - 0.10 x10*3/uL   Coagulation Screen   Result Value Ref Range    Protime 12.8 9.8 - 12.8 seconds    INR 1.1 0.9 - 1.1    aPTT 31 27 - 38 seconds   Comprehensive Metabolic Panel   Result Value Ref Range    Glucose 100 (H) 74 - 99 mg/dL    Sodium 140 136 - 145 mmol/L    Potassium 3.7 3.5 - 5.3 mmol/L    Chloride 107 98 - 107 mmol/L    Bicarbonate 24 21 - 32 mmol/L    Anion Gap 13 10 - 20 mmol/L    Urea Nitrogen 12 6 - 23 mg/dL    Creatinine 0.74 0.50 - 1.30 mg/dL    eGFR >90 >60 mL/min/1.73m*2    Calcium 8.1 (L) 8.6 - 10.6 mg/dL    Albumin 3.2 (L) 3.4 - 5.0 g/dL    Alkaline Phosphatase 59 33 - 136 U/L    Total Protein 5.9 (L) 6.4 - 8.2 g/dL    AST 10 9 - 39 U/L    Bilirubin, Total 0.4 0.0 - 1.2 mg/dL    ALT 13 10 - 52 U/L   Fibrinogen   Result Value Ref Range    Fibrinogen 385 200 - 400 mg/dL   Lactate Dehydrogenase   Result Value Ref Range    LDH 85 84 - 246 U/L   Magnesium   Result Value Ref Range    Magnesium 1.27 (L) 1.60 - 2.40 mg/dL   Phosphorus   Result Value Ref Range    Phosphorus 3.7 2.5 - 4.9 mg/dL   Uric Acid   Result Value Ref Range    Uric Acid 2.2 (L) 4.0 - 7.5 mg/dL      CT chest abdomen pelvis w IV contrast    Result Date: 9/29/2024  1. Fluid-filled small bowel and right colon likely related to diarrhea. No evidence of bowel wall thickening or surrounding stranding to suggest enteritis or colitis. Evidence of a diverticulitis. 2. No other evidence of infectious process in the chest, abdomen and pelvis. 3. Chronic and incidental findings as described above.   MACRO: None   Signed by: Shilo Nolasco 9/29/2024 6:14 PM Dictation workstation:   [id number]    XR  chest 1 view    Result Date: 9/29/2024  1.  No evidence of acute cardiopulmonary process.   I personally reviewed the images/study and I agree with the findings as stated by resident physician Dr. Gregory Oliveira . This study was interpreted at [place], [place].   MACRO: None   Signed by: Tuan Reid 9/29/2024 11:24 AM Dictation workstation:   [id number]    XR chest 1 view    Result Date: 9/26/2024  1. No acute abnormality of the chest.   I personally reviewed the image(s)/study and resident interpretation as stated by Dr. Lia Smith MD. I agree with the findings as stated. This study was interpreted at [place], [place].   MACRO: None   Signed by: Tuan Reid 9/26/2024 6:09 AM Dictation workstation:   [id number]    IR CVC nontunneled    Result Date: 9/14/2024  1. Technically successful and uncomplicated placement of a right internal jugular temporary double-lumen arrowguard catheter under direct ultrasound and fluoroscopic guidance - optimal catheter tip position at the right atrial superior vena caval junction and the catheter is ready for immediate use.   I was present for and/or performed the critical portions of the procedure and immediately available throughout the entire procedure.   I personally reviewed the image(s) / study and resident interpretation. I agree with the findings as stated.   Performed and dictated at [facility].   MACRO: None.   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 and no increase in blast  - Not yet engrafted, but slight improvement in Neutrophil % at 6.7 [was 0 until 10/7] and ANC % today is 0.01 [was 0.0 until 10/7]     # Transplant  CONDITIONING Fludarabine, melphalan, and TBI   DONOR Single umbilical cord   MATCH GRADE 6/8   SEX MATCH Mismatched   ABO DONOR A pos   ABO RECIPIENT A pos   GVHD PROPHYLAXIS Tacrolimus and Mycophenolate   GRAFT SOURCE Single umbilical cord   CMV DONOR negative   CMV RECIPIENT positive      # GVHD prophy  - Tacro level (10/8): 10 [was drawn around 3 am, 3 hrs earlier than usual draw]  - Cont Tacro 2 mg BID  - Checking levels daily  - Was planned for MMF 1000 mg TID to begin T+2 to T+35 but dose decreased to 750 mg TID on T+18 given persistent cytopenias  - Viral surveillance T+14 all negative (EBV, CMV, adeno, HHV6)     HEME  # Anemia and Leukopenia: secondary to disease and chemotherapy  # 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   - Transfuse for Hgb <7 & PLT <10   - 1u plt 10/6  - Neupogen 300 mcg started on T+5  # VTE Prophylaxis: SCDs & Ambulation     ID  # Neutropenic fever (9/25)  - Fever 38C w/ diarrhea, workup neg, treated with Josefina initially with rash, switched to Cefepime (9/26)  - CT chest/abdo/pelvis only with diarrhea, no other findings  - Will continue cefepime for 14 days total and then consider switching to oral levo  # Prophylaxis: Acyclovir, Vancomycin, Posaconazole, Letermovir (started T+14), and Bactrim (to start T+30),  Ursodiol      # Maculopapular rash (9/27), now resolved, likely d/t Josefina  - appearance highly suggestive of meropenem reaction vs pre-engraftment syndrome  - resolved by 10/1, returned 10/3 on legs, currently improving  - maculopapular on inner thighs, non-itching, non-painful, blanching  - will continue to monitor      CARDIO/PULM:  # Hx of STEMI (11/11/2020)  - TTE 7/19/24: EF 59%, otherwise unremarkable   - Was cleared by Cardiology for transplant   # Hx of HTN   - Nifedipine: 90 mEq ER 24 hr tablet   # Ascending aorta dilation   - Echo showing 3.8-4 cm dilation      FEN/RENAL  - Admit wt: 75.6 kg (9/13/24), Most recent wt: 71.3 kg (10/6)  - Allergic to Lasix, will not take med (extreme hypotension, extreme hypokalemia)   # Chemo induced Diarrhea  - Cdiff & stool path neg (9/22)  - diarrhea returned 9/26 AM, along with fever  - etiology: likely melphalan-related mucositis/enteritis  - Cont loperamide 2mg q6h PRN  # Hx of treated Hep C in 2015  - (9/12) Hep C RNA - not detected   # Hx of recurrent hyperbilirubinemia   - 2/2 to Hemoglobin C hemolysis      :  # Hx of BPH and urinary retention (2023)  - Hx elevated PSA    - 7/28/21 Bx: atypical small acinar proliferation    - 4/7/23 Bx: Benign     DISPO:  - Full Code  - NOK: spouse Genevieve ([phone])  - PICC  - Oncologist: Jerod Watson seen, examined, and discussed w/ Dr. Kesha Frederick MD  PGY-3 internal medicine resident        Asmita Frederick MD

## 2024-10-08 NOTE — PROGRESS NOTES
"Brandt Merritt is a 64 y.o. 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    Subjective   No acute overnight events. Doing well today  Reports 2 loose watery stools yesterday and one loose stool in the morning today. He endorses that the non-pruritic rash on b/l LE, is much better today.  He was worried that the \"transplant did not work\" and \"wanted to know what the backup plan is\". Mentioned to him that it can take upto 21-28 days for the engraftment and it's a little bit early to say it did not work. Did talk about the possibility of repeating a BMbx after a month if counts do not improve and then consider repeating another cord transplant, to which he agreed.  Denies SOB, headache, chest pain, chills, abdominal pain, dysuria, tarry stools.        Objective     Physical Exam  Constitutional:       Appearance: Normal appearance.   HENT:      Head: Normocephalic and atraumatic.      Right Ear: External ear normal.      Left Ear: External ear normal.      Nose: Nose normal.      Mouth/Throat:      Mouth: Mucous membranes are moist.   Eyes:      Extraocular Movements: Extraocular movements intact.      Conjunctiva/sclera: Conjunctivae normal.      Pupils: Pupils are equal, round, and reactive to light.   Cardiovascular:      Rate and Rhythm: Normal rate and regular rhythm.      Pulses: Normal pulses.      Heart sounds: Normal heart sounds.   Pulmonary:      Effort: Pulmonary effort is normal.      Breath sounds: Normal breath sounds.   Abdominal:      General: Bowel sounds are normal.      Palpations: Abdomen is soft.   Musculoskeletal:         General: Normal range of motion.      Cervical back: Normal range of motion and neck supple.   Skin:     General: Skin is warm.      Findings: Rash, improving (nonpruritic maculopapular mildly erythematous rash on b/l thighs) present.   Neurological:      General: No focal deficit present.      Mental Status: He is alert and oriented " "to person, place, and time.   Psychiatric:         Mood and Affect: Mood normal.         Behavior: Behavior normal.         Thought Content: Thought content normal.         Judgment: Judgment normal.     Last Recorded Vitals  Blood pressure 112/67, pulse 88, temperature 36.3 °C (97.3 °F), temperature source Temporal, resp. rate 18, height (S) 1.664 m (5' 5.51\"), weight 71.3 kg (157 lb 3 oz), SpO2 99%.  Intake/Output last 3 Shifts:  I/O last 3 completed shifts:  In: 1350 (18.9 mL/kg) [P.O.:900; Blood:350; IV Piggyback:100]  Out: - (0 mL/kg)   Weight: 71.3 kg     Relevant Results  Scheduled medications  acyclovir, 400 mg, oral, q12h  cefepime, 2 g, intravenous, q12h  filgrastim or biosimilar, 300 mcg, subcutaneous, q24h  folic acid, 1 mg, oral, Daily  letermovir, 480 mg, oral, Daily  mycophenolate, 750 mg, oral, TID  NIFEdipine ER, 90 mg, oral, Daily before breakfast  posaconazole, 300 mg, oral, q24h  tacrolimus, 2 mg, oral, q12h JALYN  ursodiol, 300 mg, oral, TID  vancomycin, 125 mg, oral, Daily      Continuous medications     PRN medications  PRN medications: acetaminophen, albuterol, alteplase, dextrose, diphenhydrAMINE, EPINEPHrine HCl, famotidine, loperamide, methylPREDNISolone sodium succinate (PF), ondansetron, prochlorperazine, prochlorperazine, sodium chloride    Results for orders placed or performed during the hospital encounter of 09/13/24 (from the past 24 hour(s))   CBC and Auto Differential   Result Value Ref Range    WBC 0.2 (LL) 4.4 - 11.3 x10*3/uL    nRBC 0.0 0.0 - 0.0 /100 WBCs    RBC 2.89 (L) 4.50 - 5.90 x10*6/uL    Hemoglobin 7.5 (L) 13.5 - 17.5 g/dL    Hematocrit 22.0 (L) 41.0 - 52.0 %    MCV 76 (L) 80 - 100 fL    MCH 26.0 26.0 - 34.0 pg    MCHC 34.1 32.0 - 36.0 g/dL    RDW 17.8 (H) 11.5 - 14.5 %    Platelets 39 (LL) 150 - 450 x10*3/uL    Neutrophils % 6.7 40.0 - 80.0 %    Immature Granulocytes %, Automated 0.0 0.0 - 0.9 %    Lymphocytes % 93.3 13.0 - 44.0 %    Monocytes % 0.0 2.0 - 10.0 %    " Eosinophils % 0.0 0.0 - 6.0 %    Basophils % 0.0 0.0 - 2.0 %    Neutrophils Absolute 0.01 (L) 1.20 - 7.70 x10*3/uL    Immature Granulocytes Absolute, Automated 0.00 0.00 - 0.70 x10*3/uL    Lymphocytes Absolute 0.14 (L) 1.20 - 4.80 x10*3/uL    Monocytes Absolute 0.00 (L) 0.10 - 1.00 x10*3/uL    Eosinophils Absolute 0.00 0.00 - 0.70 x10*3/uL    Basophils Absolute 0.00 0.00 - 0.10 x10*3/uL   Coagulation Screen   Result Value Ref Range    Protime 12.8 9.8 - 12.8 seconds    INR 1.1 0.9 - 1.1    aPTT 31 27 - 38 seconds   Comprehensive Metabolic Panel   Result Value Ref Range    Glucose 100 (H) 74 - 99 mg/dL    Sodium 140 136 - 145 mmol/L    Potassium 3.7 3.5 - 5.3 mmol/L    Chloride 107 98 - 107 mmol/L    Bicarbonate 24 21 - 32 mmol/L    Anion Gap 13 10 - 20 mmol/L    Urea Nitrogen 12 6 - 23 mg/dL    Creatinine 0.74 0.50 - 1.30 mg/dL    eGFR >90 >60 mL/min/1.73m*2    Calcium 8.1 (L) 8.6 - 10.6 mg/dL    Albumin 3.2 (L) 3.4 - 5.0 g/dL    Alkaline Phosphatase 59 33 - 136 U/L    Total Protein 5.9 (L) 6.4 - 8.2 g/dL    AST 10 9 - 39 U/L    Bilirubin, Total 0.4 0.0 - 1.2 mg/dL    ALT 13 10 - 52 U/L   Fibrinogen   Result Value Ref Range    Fibrinogen 385 200 - 400 mg/dL   Lactate Dehydrogenase   Result Value Ref Range    LDH 85 84 - 246 U/L   Magnesium   Result Value Ref Range    Magnesium 1.27 (L) 1.60 - 2.40 mg/dL   Phosphorus   Result Value Ref Range    Phosphorus 3.7 2.5 - 4.9 mg/dL   Uric Acid   Result Value Ref Range    Uric Acid 2.2 (L) 4.0 - 7.5 mg/dL   Tacrolimus level   Result Value Ref Range    Tacrolimus  10.3 <=15.0 ng/mL      CT chest abdomen pelvis w IV contrast    Result Date: 9/29/2024  1. Fluid-filled small bowel and right colon likely related to diarrhea. No evidence of bowel wall thickening or surrounding stranding to suggest enteritis or colitis. Evidence of a diverticulitis. 2. No other evidence of infectious process in the chest, abdomen and pelvis. 3. Chronic and incidental findings as described above.    MACRO: None   Signed by: Shilo Nolasco 9/29/2024 6:14 PM Dictation workstation:   [id number]    XR chest 1 view    Result Date: 9/29/2024  1.  No evidence of acute cardiopulmonary process.   I personally reviewed the images/study and I agree with the findings as stated by resident physician Dr. Gregory Oliveira . This study was interpreted at [place], [place].   MACRO: None   Signed by: Tuan Reid 9/29/2024 11:24 AM Dictation workstation:   [id number]    XR chest 1 view    Result Date: 9/26/2024  1. No acute abnormality of the chest.   I personally reviewed the image(s)/study and resident interpretation as stated by Dr. Lia Smith MD. I agree with the findings as stated. This study was interpreted at [place], [place].   MACRO: None   Signed by: Tuan Reid 9/26/2024 6:09 AM Dictation workstation:   [id number]    IR CVC nontunneled    Result Date: 9/14/2024  1. Technically successful and uncomplicated placement of a right internal jugular temporary double-lumen arrowguard catheter under direct ultrasound and fluoroscopic guidance - optimal catheter tip position at the right atrial superior vena caval junction and the catheter is ready for immediate use.   I was present for and/or performed the critical portions of the procedure and immediately available throughout the entire procedure.   I personally reviewed the image(s) / study and resident interpretation. I agree with the findings as stated.   Performed and dictated at [facility].   MACRO: None.   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 and no increase in blast  - Not yet engrafted, but slight improvement in Neutrophil % at 6.7 [was 0 until 10/7] and ANC % today is 0.01 [was 0.0 until 10/7]  - Can repeat a BMbx after a month if counts do not improve and then consider repeating another cord transplant if required     # Transplant  CONDITIONING Fludarabine, melphalan, and TBI   DONOR Single umbilical cord   MATCH GRADE 6/8   SEX MATCH Mismatched   ABO DONOR A pos   ABO RECIPIENT A pos   GVHD PROPHYLAXIS Tacrolimus and Mycophenolate   GRAFT SOURCE Single umbilical cord   CMV DONOR negative   CMV RECIPIENT positive      # GVHD prophy  - Tacro level (10/8): 10 [was drawn around 3 am, 3 hrs earlier than usual draw]  - Cont Tacro 2 mg BID  - Checking levels daily  - Was planned for MMF 1000 mg TID to begin T+2 to T+35 but dose decreased to 750 mg TID on T+18 given persistent cytopenias  - Viral surveillance T+14 all negative (EBV, CMV, adeno, HHV6)     HEME  # Anemia and Leukopenia: secondary to disease and chemotherapy  # 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   - Transfuse for Hgb <7 & PLT <10   - 1u plt 10/6  - Neupogen 300 mcg started on T+5  # VTE Prophylaxis: SCDs & Ambulation     ID  # Neutropenic fever (9/25)  - Fever 38C w/ diarrhea, workup neg, treated with Josefina initially with rash, switched to Cefepime (9/26)  - CT chest/abdo/pelvis  only with diarrhea, no other findings  - Will continue cefepime for 14 days total and then consider switching to oral levo  # Prophylaxis: Acyclovir, Vancomycin, Posaconazole, Letermovir (started T+14), and Bactrim (to start T+30), Ursodiol      # Maculopapular rash (9/27), now resolved, likely d/t Josefina  - appearance highly suggestive of meropenem reaction vs pre-engraftment syndrome  - resolved by 10/1, returned 10/3 on legs, currently improving  - maculopapular on inner thighs, non-itching, non-painful, blanching  - will continue to monitor      CARDIO/PULM:  # Hx of STEMI (11/11/2020)  - TTE 7/19/24: EF 59%, otherwise unremarkable   - Was cleared by Cardiology for transplant   # Hx of HTN   - Nifedipine: 90 mEq ER 24 hr tablet   # Ascending aorta dilation   - Echo showing 3.8-4 cm dilation      FEN/RENAL  - Admit wt: 75.6 kg (9/13/24), Most recent wt: 71.3 kg (10/6)  - Allergic to Lasix, will not take med (extreme hypotension, extreme hypokalemia)   # Chemo induced Diarrhea  - Cdiff & stool path neg (9/22)  - diarrhea returned 9/26 AM, along with fever  - etiology: likely melphalan-related mucositis/enteritis  - Cont loperamide 2mg q6h PRN  # Hx of treated Hep C in 2015  - (9/12) Hep C RNA - not detected   # Hx of recurrent hyperbilirubinemia   - 2/2 to Hemoglobin C hemolysis      :  # Hx of BPH and urinary retention (2023)  - Hx elevated PSA    - 7/28/21 Bx: atypical small acinar proliferation    - 4/7/23 Bx: Benign     DISPO:  - Full Code  - NOK: spouse Genevieve ([phone])  - PICC  - Oncologist: Jerod     Pt seen, examined, and discussed w/ Dr. Kesha Frederick MD  PGY-3 internal medicine resident

## 2024-10-08 NOTE — CARE PLAN
The patient's goals for the shift include      The clinical goals for the shift include pt will remain afebrile through 10/08/2024 at 0830      Problem: Pain - Adult  Goal: Verbalizes/displays adequate comfort level or baseline comfort level  Outcome: Progressing     Problem: Safety - Adult  Goal: Free from fall injury  Outcome: Progressing     Problem: Discharge Planning  Goal: Discharge to home or other facility with appropriate resources  Outcome: Progressing     Problem: Chronic Conditions and Co-morbidities  Goal: Patient's chronic conditions and co-morbidity symptoms are monitored and maintained or improved  Outcome: Progressing     Problem: Nutrition  Goal: Oral intake greater 75%  Outcome: Progressing  Goal: Adequate PO fluid intake  Outcome: Progressing  Goal: Maintain stable weight  Outcome: Progressing

## 2024-10-08 NOTE — CARE PLAN
The clinical goals for the shift include pt will remain HDS througout shift      Problem: Pain - Adult  Goal: Verbalizes/displays adequate comfort level or baseline comfort level  Outcome: Progressing     Problem: Safety - Adult  Goal: Free from fall injury  Outcome: Progressing     Problem: Discharge Planning  Goal: Discharge to home or other facility with appropriate resources  Outcome: Progressing     Problem: Chronic Conditions and Co-morbidities  Goal: Patient's chronic conditions and co-morbidity symptoms are monitored and maintained or improved  Outcome: Progressing     Problem: Nutrition  Goal: Oral intake greater 75%  Outcome: Progressing  Goal: Adequate PO fluid intake  Outcome: Progressing  Goal: Maintain stable weight  Outcome: Progressing

## 2024-10-08 NOTE — ASSESSMENT & PLAN NOTE
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 and no increase in blast  - Not yet engrafted, but slight improvement in Neutrophil % at 6.7 [was 0 until 10/7] and ANC % today is 0.01 [was 0.0 until 10/7]     # Transplant  CONDITIONING Fludarabine, melphalan, and TBI   DONOR Single umbilical cord   MATCH GRADE 6/8   SEX MATCH Mismatched   ABO DONOR A pos   ABO RECIPIENT A pos   GVHD PROPHYLAXIS Tacrolimus and Mycophenolate   GRAFT SOURCE Single umbilical cord   CMV DONOR negative   CMV RECIPIENT positive      # GVHD prophy  - Tacro level (10/8): 10 [was drawn around 3 am, 3 hrs earlier than usual draw]  - Cont Tacro 2 mg BID  - Checking levels daily  - Was planned for MMF 1000 mg TID to begin T+2 to T+35 but dose decreased to 750 mg TID on T+18 given persistent cytopenias  - Viral surveillance T+14 all negative (EBV, CMV, adeno, HHV6)     HEME  # Anemia and Leukopenia: secondary to disease and chemotherapy  # 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   - Transfuse for Hgb <7 & PLT <10   - 1u plt 10/6  - Neupogen 300 mcg started on T+5  # VTE Prophylaxis: SCDs & Ambulation     ID  # Neutropenic fever (9/25)  - Fever 38C w/ diarrhea, workup neg, treated with Josefina initially with rash, switched to Cefepime (9/26)  - CT chest/abdo/pelvis only with diarrhea, no other findings  - Will continue cefepime for 14 days total and then consider  switching to oral levo  # Prophylaxis: Acyclovir, Vancomycin, Posaconazole, Letermovir (started T+14), and Bactrim (to start T+30), Ursodiol      # Maculopapular rash (9/27), now resolved, likely d/t Josefina  - appearance highly suggestive of meropenem reaction vs pre-engraftment syndrome  - resolved by 10/1, returned 10/3 on legs, currently improving  - maculopapular on inner thighs, non-itching, non-painful, blanching  - will continue to monitor      CARDIO/PULM:  # Hx of STEMI (11/11/2020)  - TTE 7/19/24: EF 59%, otherwise unremarkable   - Was cleared by Cardiology for transplant   # Hx of HTN   - Nifedipine: 90 mEq ER 24 hr tablet   # Ascending aorta dilation   - Echo showing 3.8-4 cm dilation      FEN/RENAL  - Admit wt: 75.6 kg (9/13/24), Most recent wt: 71.3 kg (10/6)  - Allergic to Lasix, will not take med (extreme hypotension, extreme hypokalemia)   # Chemo induced Diarrhea  - Cdiff & stool path neg (9/22)  - diarrhea returned 9/26 AM, along with fever  - etiology: likely melphalan-related mucositis/enteritis  - Cont loperamide 2mg q6h PRN  # Hx of treated Hep C in 2015  - (9/12) Hep C RNA - not detected   # Hx of recurrent hyperbilirubinemia   - 2/2 to Hemoglobin C hemolysis      :  # Hx of BPH and urinary retention (2023)  - Hx elevated PSA    - 7/28/21 Bx: atypical small acinar proliferation    - 4/7/23 Bx: Benign     DISPO:  - Full Code  - NOK: spouse Genevieve ([phone])  - PICC  - Oncologist: Jerod Watson seen, examined, and discussed w/ Dr. Kesha Frederick MD  PGY-3 internal medicine resident

## 2024-10-09 LAB
ALBUMIN SERPL BCP-MCNC: 3.2 G/DL (ref 3.4–5)
ALP SERPL-CCNC: 60 U/L (ref 33–136)
ALT SERPL W P-5'-P-CCNC: 12 U/L (ref 10–52)
ANION GAP SERPL CALC-SCNC: 11 MMOL/L (ref 10–20)
APTT PPP: 32 SECONDS (ref 27–38)
AST SERPL W P-5'-P-CCNC: 10 U/L (ref 9–39)
BASOPHILS # BLD AUTO: 0 X10*3/UL (ref 0–0.1)
BASOPHILS NFR BLD AUTO: 0 %
BILIRUB SERPL-MCNC: 0.7 MG/DL (ref 0–1.2)
BUN SERPL-MCNC: 7 MG/DL (ref 6–23)
CALCIUM SERPL-MCNC: 8.2 MG/DL (ref 8.6–10.6)
CHLORIDE SERPL-SCNC: 108 MMOL/L (ref 98–107)
CO2 SERPL-SCNC: 24 MMOL/L (ref 21–32)
CREAT SERPL-MCNC: 0.65 MG/DL (ref 0.5–1.3)
EGFRCR SERPLBLD CKD-EPI 2021: >90 ML/MIN/1.73M*2
EOSINOPHIL # BLD AUTO: 0 X10*3/UL (ref 0–0.7)
EOSINOPHIL NFR BLD AUTO: 0 %
ERYTHROCYTE [DISTWIDTH] IN BLOOD BY AUTOMATED COUNT: 17.5 % (ref 11.5–14.5)
FIBRINOGEN PPP-MCNC: 405 MG/DL (ref 200–400)
GLUCOSE SERPL-MCNC: 103 MG/DL (ref 74–99)
HCT VFR BLD AUTO: 21.1 % (ref 41–52)
HGB BLD-MCNC: 7.5 G/DL (ref 13.5–17.5)
IMM GRANULOCYTES # BLD AUTO: 0 X10*3/UL (ref 0–0.7)
IMM GRANULOCYTES NFR BLD AUTO: 0 % (ref 0–0.9)
INR PPP: 1.1 (ref 0.9–1.1)
LDH SERPL L TO P-CCNC: 86 U/L (ref 84–246)
LYMPHOCYTES # BLD AUTO: 0.14 X10*3/UL (ref 1.2–4.8)
LYMPHOCYTES NFR BLD AUTO: 93.3 %
MAGNESIUM SERPL-MCNC: 1.24 MG/DL (ref 1.6–2.4)
MCH RBC QN AUTO: 26.3 PG (ref 26–34)
MCHC RBC AUTO-ENTMCNC: 35.5 G/DL (ref 32–36)
MCV RBC AUTO: 74 FL (ref 80–100)
MONOCYTES # BLD AUTO: 0 X10*3/UL (ref 0.1–1)
MONOCYTES NFR BLD AUTO: 0 %
NEUTROPHILS # BLD AUTO: 0.01 X10*3/UL (ref 1.2–7.7)
NEUTROPHILS NFR BLD AUTO: 6.7 %
NRBC BLD-RTO: 0 /100 WBCS (ref 0–0)
PHOSPHATE SERPL-MCNC: 3 MG/DL (ref 2.5–4.9)
PLATELET # BLD AUTO: 21 X10*3/UL (ref 150–450)
POTASSIUM SERPL-SCNC: 3.5 MMOL/L (ref 3.5–5.3)
PROT SERPL-MCNC: 6 G/DL (ref 6.4–8.2)
PROTHROMBIN TIME: 12.8 SECONDS (ref 9.8–12.8)
RBC # BLD AUTO: 2.85 X10*6/UL (ref 4.5–5.9)
SODIUM SERPL-SCNC: 139 MMOL/L (ref 136–145)
TACROLIMUS BLD-MCNC: 7.9 NG/ML
URATE SERPL-MCNC: 1.8 MG/DL (ref 4–7.5)
WBC # BLD AUTO: 0.2 X10*3/UL (ref 4.4–11.3)

## 2024-10-09 PROCEDURE — 85610 PROTHROMBIN TIME: CPT

## 2024-10-09 PROCEDURE — 2500000001 HC RX 250 WO HCPCS SELF ADMINISTERED DRUGS (ALT 637 FOR MEDICARE OP)

## 2024-10-09 PROCEDURE — 84550 ASSAY OF BLOOD/URIC ACID: CPT

## 2024-10-09 PROCEDURE — 2500000002 HC RX 250 W HCPCS SELF ADMINISTERED DRUGS (ALT 637 FOR MEDICARE OP, ALT 636 FOR OP/ED): Performed by: STUDENT IN AN ORGANIZED HEALTH CARE EDUCATION/TRAINING PROGRAM

## 2024-10-09 PROCEDURE — 2500000004 HC RX 250 GENERAL PHARMACY W/ HCPCS (ALT 636 FOR OP/ED): Mod: JZ | Performed by: INTERNAL MEDICINE

## 2024-10-09 PROCEDURE — 83735 ASSAY OF MAGNESIUM: CPT

## 2024-10-09 PROCEDURE — 2500000002 HC RX 250 W HCPCS SELF ADMINISTERED DRUGS (ALT 637 FOR MEDICARE OP, ALT 636 FOR OP/ED): Performed by: INTERNAL MEDICINE

## 2024-10-09 PROCEDURE — 84100 ASSAY OF PHOSPHORUS: CPT

## 2024-10-09 PROCEDURE — 85025 COMPLETE CBC W/AUTO DIFF WBC: CPT

## 2024-10-09 PROCEDURE — 85384 FIBRINOGEN ACTIVITY: CPT

## 2024-10-09 PROCEDURE — 83615 LACTATE (LD) (LDH) ENZYME: CPT

## 2024-10-09 PROCEDURE — 80053 COMPREHEN METABOLIC PANEL: CPT

## 2024-10-09 PROCEDURE — 80197 ASSAY OF TACROLIMUS: CPT | Performed by: INTERNAL MEDICINE

## 2024-10-09 PROCEDURE — 2500000002 HC RX 250 W HCPCS SELF ADMINISTERED DRUGS (ALT 637 FOR MEDICARE OP, ALT 636 FOR OP/ED): Performed by: PHYSICIAN ASSISTANT

## 2024-10-09 PROCEDURE — 99233 SBSQ HOSP IP/OBS HIGH 50: CPT | Performed by: STUDENT IN AN ORGANIZED HEALTH CARE EDUCATION/TRAINING PROGRAM

## 2024-10-09 PROCEDURE — 1170000001 HC PRIVATE ONCOLOGY ROOM DAILY

## 2024-10-09 PROCEDURE — 2500000004 HC RX 250 GENERAL PHARMACY W/ HCPCS (ALT 636 FOR OP/ED): Mod: JZ

## 2024-10-09 PROCEDURE — 2500000004 HC RX 250 GENERAL PHARMACY W/ HCPCS (ALT 636 FOR OP/ED): Performed by: STUDENT IN AN ORGANIZED HEALTH CARE EDUCATION/TRAINING PROGRAM

## 2024-10-09 PROCEDURE — 2500000004 HC RX 250 GENERAL PHARMACY W/ HCPCS (ALT 636 FOR OP/ED): Performed by: NURSE PRACTITIONER

## 2024-10-09 PROCEDURE — 2500000001 HC RX 250 WO HCPCS SELF ADMINISTERED DRUGS (ALT 637 FOR MEDICARE OP): Performed by: INTERNAL MEDICINE

## 2024-10-09 RX ORDER — MYCOPHENOLATE MOFETIL 250 MG/1
500 CAPSULE ORAL 3 TIMES DAILY
Status: DISCONTINUED | OUTPATIENT
Start: 2024-10-09 | End: 2024-10-11

## 2024-10-09 RX ORDER — MAGNESIUM SULFATE HEPTAHYDRATE 40 MG/ML
4 INJECTION, SOLUTION INTRAVENOUS ONCE
Status: COMPLETED | OUTPATIENT
Start: 2024-10-09 | End: 2024-10-09

## 2024-10-09 ASSESSMENT — PAIN SCALES - GENERAL
PAINLEVEL_OUTOF10: 0 - NO PAIN

## 2024-10-09 NOTE — PROGRESS NOTES
"Brandt Merritt is a 64 y.o. male on day 26 of admission presenting with Acute myeloid leukemia in remission (Multi).    Subjective   No acute overnight events. Doing well today  Reports 2 loose watery stools yesterday and one loose stool in the morning today. He endorses that the non-pruritic rash is resolving.  Denies SOB, headache, chest pain, chills, abdominal pain, dysuria, tarry stools.        Objective     Physical Exam  Constitutional:       Appearance: Normal appearance.   HENT:      Head: Normocephalic and atraumatic.      Right Ear: External ear normal.      Left Ear: External ear normal.      Nose: Nose normal.      Mouth/Throat:      Mouth: Mucous membranes are moist.   Eyes:      Extraocular Movements: Extraocular movements intact.      Conjunctiva/sclera: Conjunctivae normal.      Pupils: Pupils are equal, round, and reactive to light.   Cardiovascular:      Rate and Rhythm: Normal rate and regular rhythm.      Pulses: Normal pulses.      Heart sounds: Normal heart sounds.   Pulmonary:      Effort: Pulmonary effort is normal.      Breath sounds: Normal breath sounds.   Abdominal:      General: Bowel sounds are normal.      Palpations: Abdomen is soft.   Musculoskeletal:         General: Normal range of motion.      Cervical back: Normal range of motion and neck supple.   Skin:     General: Skin is warm.      Findings: Rash, resolving (nonpruritic maculopapular mildly erythematous rash on b/l thighs) present.   Neurological:      General: No focal deficit present.      Mental Status: He is alert and oriented to person, place, and time.   Psychiatric:         Mood and Affect: Mood normal.         Behavior: Behavior normal.         Thought Content: Thought content normal.         Judgment: Judgment normal.     Last Recorded Vitals  Blood pressure 111/72, pulse 90, temperature 36.5 °C (97.7 °F), temperature source Temporal, resp. rate 18, height (S) 1.664 m (5' 5.51\"), weight 71.3 kg (157 lb 3 oz), SpO2 " 98%.  Intake/Output last 3 Shifts:  I/O last 3 completed shifts:  In: 300 (4.2 mL/kg) [IV Piggyback:300]  Out: - (0 mL/kg)   Weight: 71.3 kg     Relevant Results  Scheduled medications  acyclovir, 400 mg, oral, q12h  cefepime, 2 g, intravenous, q8h  filgrastim or biosimilar, 300 mcg, subcutaneous, q24h  folic acid, 1 mg, oral, Daily  letermovir, 480 mg, oral, Daily  magnesium sulfate, 4 g, intravenous, Once  mycophenolate, 500 mg, oral, TID  NIFEdipine ER, 90 mg, oral, Daily before breakfast  posaconazole, 300 mg, oral, q24h  tacrolimus, 2 mg, oral, q12h JALYN  ursodiol, 300 mg, oral, TID  vancomycin, 125 mg, oral, Daily      Continuous medications     PRN medications  PRN medications: acetaminophen, albuterol, alteplase, dextrose, diphenhydrAMINE, EPINEPHrine HCl, famotidine, loperamide, methylPREDNISolone sodium succinate (PF), ondansetron, prochlorperazine, prochlorperazine, sodium chloride    Results for orders placed or performed during the hospital encounter of 09/13/24 (from the past 24 hour(s))   CBC and Auto Differential   Result Value Ref Range    WBC 0.2 (LL) 4.4 - 11.3 x10*3/uL    nRBC 0.0 0.0 - 0.0 /100 WBCs    RBC 2.85 (L) 4.50 - 5.90 x10*6/uL    Hemoglobin 7.5 (L) 13.5 - 17.5 g/dL    Hematocrit 21.1 (L) 41.0 - 52.0 %    MCV 74 (L) 80 - 100 fL    MCH 26.3 26.0 - 34.0 pg    MCHC 35.5 32.0 - 36.0 g/dL    RDW 17.5 (H) 11.5 - 14.5 %    Platelets 21 (LL) 150 - 450 x10*3/uL    Neutrophils % 6.7 40.0 - 80.0 %    Immature Granulocytes %, Automated 0.0 0.0 - 0.9 %    Lymphocytes % 93.3 13.0 - 44.0 %    Monocytes % 0.0 2.0 - 10.0 %    Eosinophils % 0.0 0.0 - 6.0 %    Basophils % 0.0 0.0 - 2.0 %    Neutrophils Absolute 0.01 (L) 1.20 - 7.70 x10*3/uL    Immature Granulocytes Absolute, Automated 0.00 0.00 - 0.70 x10*3/uL    Lymphocytes Absolute 0.14 (L) 1.20 - 4.80 x10*3/uL    Monocytes Absolute 0.00 (L) 0.10 - 1.00 x10*3/uL    Eosinophils Absolute 0.00 0.00 - 0.70 x10*3/uL    Basophils Absolute 0.00 0.00 - 0.10  x10*3/uL   Coagulation Screen   Result Value Ref Range    Protime 12.8 9.8 - 12.8 seconds    INR 1.1 0.9 - 1.1    aPTT 32 27 - 38 seconds   Comprehensive Metabolic Panel   Result Value Ref Range    Glucose 103 (H) 74 - 99 mg/dL    Sodium 139 136 - 145 mmol/L    Potassium 3.5 3.5 - 5.3 mmol/L    Chloride 108 (H) 98 - 107 mmol/L    Bicarbonate 24 21 - 32 mmol/L    Anion Gap 11 10 - 20 mmol/L    Urea Nitrogen 7 6 - 23 mg/dL    Creatinine 0.65 0.50 - 1.30 mg/dL    eGFR >90 >60 mL/min/1.73m*2    Calcium 8.2 (L) 8.6 - 10.6 mg/dL    Albumin 3.2 (L) 3.4 - 5.0 g/dL    Alkaline Phosphatase 60 33 - 136 U/L    Total Protein 6.0 (L) 6.4 - 8.2 g/dL    AST 10 9 - 39 U/L    Bilirubin, Total 0.7 0.0 - 1.2 mg/dL    ALT 12 10 - 52 U/L   Fibrinogen   Result Value Ref Range    Fibrinogen 405 (H) 200 - 400 mg/dL   Lactate Dehydrogenase   Result Value Ref Range    LDH 86 84 - 246 U/L   Magnesium   Result Value Ref Range    Magnesium 1.24 (L) 1.60 - 2.40 mg/dL   Phosphorus   Result Value Ref Range    Phosphorus 3.0 2.5 - 4.9 mg/dL   Uric Acid   Result Value Ref Range    Uric Acid 1.8 (L) 4.0 - 7.5 mg/dL      CT chest abdomen pelvis w IV contrast    Result Date: 9/29/2024  1. Fluid-filled small bowel and right colon likely related to diarrhea. No evidence of bowel wall thickening or surrounding stranding to suggest enteritis or colitis. Evidence of a diverticulitis. 2. No other evidence of infectious process in the chest, abdomen and pelvis. 3. Chronic and incidental findings as described above.   MACRO: None   Signed by: Shilo Nolasco 9/29/2024 6:14 PM Dictation workstation:   [id number]    XR chest 1 view    Result Date: 9/29/2024  1.  No evidence of acute cardiopulmonary process.   I personally reviewed the images/study and I agree with the findings as stated by resident physician Dr. Gregory Oliveira . This study was interpreted at [facility], [place], [place].   MACRO: None   Signed by:  Tuan Reid 9/29/2024 11:24 AM Dictation workstation:   [id number]    XR chest 1 view    Result Date: 9/26/2024  1. No acute abnormality of the chest.   I personally reviewed the image(s)/study and resident interpretation as stated by Dr. Lia Smith MD. I agree with the findings as stated. This study was interpreted at [facility], [place], [place].   MACRO: None   Signed by: Tuan Reid 9/26/2024 6:09 AM Dictation workstation:   [id number]    IR CVC nontunneled    Result Date: 9/14/2024  1. Technically successful and uncomplicated placement of a right internal jugular temporary double-lumen arrowguard catheter under direct ultrasound and fluoroscopic guidance - optimal catheter tip position at the right atrial superior vena caval junction and the catheter is ready for immediate use.   I was present for and/or performed the critical portions of the procedure and immediately available throughout the entire procedure.   I personally reviewed the image(s) / study and resident interpretation. I agree with the findings as stated.   Performed and dictated at [facility].   MACRO: None.   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      T+20  Single-umbilical cord transplant (T0=9/19/24)     ONC  # MDS/AML   - Symptoms began 9/2023; diagnosed 4/2024  - BMBx showing MDS in transition to AML (>10% jaime) w/ trisomy 8, DNMT alpha, and U2AF1 mutation indication adverse risk   - s/p 4 cycles of  Decitabine/Venetoclax (C4D1 on 8/12/24)  - BMBx 6/17/24: Blasts cleared, FISH still positive for trisomy 8, still MDS changes   - BMBx 9/5/24: 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 and no increase in blast  - Not yet engrafted, but slight improvement in Neutrophil % at 6.7 [was 0 until 10/7] and ANC % today is 0.01 [was 0.0 until 10/7]  - Can repeat a BMbx after a month if counts do not improve and then consider repeating another cord transplant if required     # Transplant  CONDITIONING Fludarabine, melphalan, and TBI   DONOR Single umbilical cord   MATCH GRADE 6/8   SEX MATCH Mismatched   ABO DONOR A pos   ABO RECIPIENT A pos   GVHD PROPHYLAXIS Tacrolimus and Mycophenolate   GRAFT SOURCE Single umbilical cord   CMV DONOR negative   CMV RECIPIENT positive      # GVHD prophy  - Tacro level (10/8): 10 [was drawn around 3 am, 3 hrs earlier than usual draw]  - Cont Tacro 2 mg BID  - Checking levels daily  - Was planned for MMF 1000 mg TID to begin T+2 to T+35 but dose decreased to 750 mg TID on T+18 given persistent cytopenias. Decreased the dose to 500 TID on T+20.  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   - Transfuse for Hgb <7 & PLT <10   - 1u plt 10/6  - Neupogen 300 mcg started on T+5  # VTE Prophylaxis: SCDs & Ambulation     ID  # Neutropenic fever (9/25)  - Fever 38C w/ diarrhea, workup neg, treated with Josefina initially with rash, switched to Cefepime (9/26)  - CT chest/abdo/pelvis only with diarrhea, no other findings  - Will continue cefepime for 14 days total and then consider switching to oral levo  # Prophylaxis: Acyclovir, Vancomycin, Posaconazole, Letermovir (started T+14), and Bactrim (to start T+30), Ursodiol      # Maculopapular rash (9/27), now resolved, likely d/t Josefina  - appearance highly suggestive of meropenem reaction vs  pre-engraftment syndrome  - resolved by 10/1, returned 10/3 on legs, currently improving  - maculopapular on inner thighs, non-itching, non-painful, blanching  - will continue to monitor      CARDIO/PULM:  # Hx of STEMI (11/11/2020)  - TTE 7/19/24: EF 59%, otherwise unremarkable   - Was cleared by Cardiology for transplant   # Hx of HTN   - Nifedipine: 90 mEq ER 24 hr tablet   # Ascending aorta dilation   - Echo showing 3.8-4 cm dilation      FEN/RENAL  - Admit wt: 75.6 kg (9/13/24), Most recent wt: 71.3 kg (10/6). Needs daily weights  - Allergic to Lasix, will not take med (extreme hypotension, extreme hypokalemia)   # Chemo induced Diarrhea  - Cdiff & stool path neg (9/22)  - diarrhea returned 9/26 AM, along with fever  - etiology: likely melphalan-related mucositis/enteritis  - Cont loperamide 2mg q6h PRN  # Hx of treated Hep C in 2015  - (9/12) Hep C RNA - not detected   # Hx of recurrent hyperbilirubinemia   - 2/2 to Hemoglobin C hemolysis      :  # Hx of BPH and urinary retention (2023)  - Hx elevated PSA    - 7/28/21 Bx: atypical small acinar proliferation    - 4/7/23 Bx: Benign     DISPO:  - Full Code  - NOK: spouse Genevieve ([phone])  - PICC  - Oncologist: Jerod Watson seen, examined, and discussed w/ Dr. Kesha Frederick MD  PGY-3 internal medicine resident

## 2024-10-09 NOTE — CARE PLAN
Hypernatremia The clinical goals for the shift include pt will remain HDS throughout shift      Problem: Pain - Adult  Goal: Verbalizes/displays adequate comfort level or baseline comfort level  Outcome: Progressing     Problem: Safety - Adult  Goal: Free from fall injury  Outcome: Progressing     Problem: Discharge Planning  Goal: Discharge to home or other facility with appropriate resources  Outcome: Progressing     Problem: Chronic Conditions and Co-morbidities  Goal: Patient's chronic conditions and co-morbidity symptoms are monitored and maintained or improved  Outcome: Progressing     Problem: Nutrition  Goal: Oral intake greater 75%  Outcome: Progressing  Goal: Adequate PO fluid intake  Outcome: Progressing  Goal: Maintain stable weight  Outcome: Progressing        2019 novel coronavirus disease (COVID-19) 2019 novel coronavirus disease (COVID-19) Hypernatremia Hypernatremia 2019 novel coronavirus disease (COVID-19) Anemia Hypernatremia Anemia due to acute blood loss 2019 novel coronavirus disease (COVID-19) Hypernatremia Hypernatremia Hypernatremia Hypernatremia 2019 novel coronavirus disease (COVID-19) Hypernatremia Hypernatremia Hypernatremia 2019 novel coronavirus disease (COVID-19)

## 2024-10-09 NOTE — CARE PLAN
The patient's goals for the shift include    Problem: Pain - Adult  Goal: Verbalizes/displays adequate comfort level or baseline comfort level  Outcome: Progressing     Problem: Safety - Adult  Goal: Free from fall injury  Outcome: Progressing     Problem: Discharge Planning  Goal: Discharge to home or other facility with appropriate resources  Outcome: Progressing     Problem: Chronic Conditions and Co-morbidities  Goal: Patient's chronic conditions and co-morbidity symptoms are monitored and maintained or improved  Outcome: Progressing     Problem: Nutrition  Goal: Oral intake greater 75%  Outcome: Progressing  Goal: Adequate PO fluid intake  Outcome: Progressing  Goal: Maintain stable weight  Outcome: Progressing       The clinical goals for the shift include pt will remain HDS through shift

## 2024-10-09 NOTE — ASSESSMENT & PLAN NOTE
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      T+20  Single-umbilical cord transplant (T0=9/19/24)     ONC  # MDS/AML   - Symptoms began 9/2023; diagnosed 4/2024  - BMBx showing MDS in transition to AML (>10% jaime) w/ trisomy 8, DNMT alpha, and U2AF1 mutation indication adverse risk   - s/p 4 cycles of Decitabine/Venetoclax (C4D1 on 8/12/24)  - BMBx 6/17/24: Blasts cleared, FISH still positive for trisomy 8, still MDS changes   - BMBx 9/5/24: 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 and no increase in blast  - Not yet engrafted, but slight improvement in Neutrophil % at 6.7 [was 0 until 10/7] and ANC % today is 0.01 [was 0.0 until 10/7]  - Can repeat a BMbx after a month if counts do not improve and then consider repeating another cord transplant if required     # Transplant  CONDITIONING Fludarabine, melphalan, and TBI   DONOR Single umbilical cord   MATCH GRADE 6/8   SEX MATCH Mismatched   ABO DONOR A pos   ABO RECIPIENT A pos   GVHD PROPHYLAXIS Tacrolimus and Mycophenolate   GRAFT SOURCE Single umbilical cord   CMV DONOR negative   CMV RECIPIENT positive      # GVHD prophy  - Tacro level (10/8): 10 [was drawn around 3 am, 3 hrs earlier than usual draw]  - Cont Tacro 2 mg BID  - Checking levels daily  - Was planned for MMF 1000 mg TID to begin T+2 to T+35 but dose decreased to 750 mg TID on T+18 given persistent cytopenias. Decreased the dose to 500 TID on T+20.  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   - Transfuse for Hgb <7 & PLT <10   - 1u plt 10/6  - Neupogen 300 mcg started on T+5  # VTE Prophylaxis: SCDs & Ambulation     ID  # Neutropenic fever (9/25)  - Fever 38C w/ diarrhea, workup neg, treated  with Josefina initially with rash, switched to Cefepime (9/26)  - CT chest/abdo/pelvis only with diarrhea, no other findings  - Will continue cefepime for 14 days total and then consider switching to oral levo  # Prophylaxis: Acyclovir, Vancomycin, Posaconazole, Letermovir (started T+14), and Bactrim (to start T+30), Ursodiol      # Maculopapular rash (9/27), now resolved, likely d/t Josefina  - appearance highly suggestive of meropenem reaction vs pre-engraftment syndrome  - resolved by 10/1, returned 10/3 on legs, currently improving  - maculopapular on inner thighs, non-itching, non-painful, blanching  - will continue to monitor      CARDIO/PULM:  # Hx of STEMI (11/11/2020)  - TTE 7/19/24: EF 59%, otherwise unremarkable   - Was cleared by Cardiology for transplant   # Hx of HTN   - Nifedipine: 90 mEq ER 24 hr tablet   # Ascending aorta dilation   - Echo showing 3.8-4 cm dilation      FEN/RENAL  - Admit wt: 75.6 kg (9/13/24), Most recent wt: 71.3 kg (10/6). Needs daily weights  - Allergic to Lasix, will not take med (extreme hypotension, extreme hypokalemia)   # Chemo induced Diarrhea  - Cdiff & stool path neg (9/22)  - diarrhea returned 9/26 AM, along with fever  - etiology: likely melphalan-related mucositis/enteritis  - Cont loperamide 2mg q6h PRN  # Hx of treated Hep C in 2015  - (9/12) Hep C RNA - not detected   # Hx of recurrent hyperbilirubinemia   - 2/2 to Hemoglobin C hemolysis      :  # Hx of BPH and urinary retention (2023)  - Hx elevated PSA    - 7/28/21 Bx: atypical small acinar proliferation    - 4/7/23 Bx: Benign     DISPO:  - Full Code  - NOK: spouse Genevieve ([phone])  - PICC  - Oncologist: Jerod Watson seen, examined, and discussed w/ Dr. Kesha Frederick MD  PGY-3 internal medicine resident

## 2024-10-10 LAB
ALBUMIN SERPL BCP-MCNC: 3.1 G/DL (ref 3.4–5)
ALP SERPL-CCNC: 62 U/L (ref 33–136)
ALT SERPL W P-5'-P-CCNC: 11 U/L (ref 10–52)
ANION GAP SERPL CALC-SCNC: 12 MMOL/L (ref 10–20)
APTT PPP: 31 SECONDS (ref 27–38)
AST SERPL W P-5'-P-CCNC: 10 U/L (ref 9–39)
BASOPHILS # BLD AUTO: 0 X10*3/UL (ref 0–0.1)
BASOPHILS NFR BLD AUTO: 0 %
BILIRUB SERPL-MCNC: 0.7 MG/DL (ref 0–1.2)
BUN SERPL-MCNC: 8 MG/DL (ref 6–23)
CALCIUM SERPL-MCNC: 8.3 MG/DL (ref 8.6–10.6)
CHLORIDE SERPL-SCNC: 106 MMOL/L (ref 98–107)
CO2 SERPL-SCNC: 25 MMOL/L (ref 21–32)
CREAT SERPL-MCNC: 0.74 MG/DL (ref 0.5–1.3)
EGFRCR SERPLBLD CKD-EPI 2021: >90 ML/MIN/1.73M*2
EOSINOPHIL # BLD AUTO: 0 X10*3/UL (ref 0–0.7)
EOSINOPHIL NFR BLD AUTO: 0 %
ERYTHROCYTE [DISTWIDTH] IN BLOOD BY AUTOMATED COUNT: 17.3 % (ref 11.5–14.5)
FIBRINOGEN PPP-MCNC: 405 MG/DL (ref 200–400)
GLUCOSE SERPL-MCNC: 104 MG/DL (ref 74–99)
HCT VFR BLD AUTO: 20.6 % (ref 41–52)
HGB BLD-MCNC: 7.2 G/DL (ref 13.5–17.5)
IMM GRANULOCYTES # BLD AUTO: 0 X10*3/UL (ref 0–0.7)
IMM GRANULOCYTES NFR BLD AUTO: 0 % (ref 0–0.9)
INR PPP: 1.2 (ref 0.9–1.1)
LYMPHOCYTES # BLD AUTO: 0.13 X10*3/UL (ref 1.2–4.8)
LYMPHOCYTES NFR BLD AUTO: 92.9 %
MAGNESIUM SERPL-MCNC: 1.43 MG/DL (ref 1.6–2.4)
MCH RBC QN AUTO: 25.9 PG (ref 26–34)
MCHC RBC AUTO-ENTMCNC: 35 G/DL (ref 32–36)
MCV RBC AUTO: 74 FL (ref 80–100)
MONOCYTES # BLD AUTO: 0 X10*3/UL (ref 0.1–1)
MONOCYTES NFR BLD AUTO: 0 %
NEUTROPHILS # BLD AUTO: 0.01 X10*3/UL (ref 1.2–7.7)
NEUTROPHILS NFR BLD AUTO: 7.1 %
NRBC BLD-RTO: 0 /100 WBCS (ref 0–0)
PHOSPHATE SERPL-MCNC: 3.3 MG/DL (ref 2.5–4.9)
PLATELET # BLD AUTO: 19 X10*3/UL (ref 150–450)
POTASSIUM SERPL-SCNC: 3.5 MMOL/L (ref 3.5–5.3)
PROT SERPL-MCNC: 6.2 G/DL (ref 6.4–8.2)
PROTHROMBIN TIME: 13.1 SECONDS (ref 9.8–12.8)
RBC # BLD AUTO: 2.78 X10*6/UL (ref 4.5–5.9)
SODIUM SERPL-SCNC: 139 MMOL/L (ref 136–145)
TACROLIMUS BLD-MCNC: 10 NG/ML
WBC # BLD AUTO: 0.1 X10*3/UL (ref 4.4–11.3)

## 2024-10-10 PROCEDURE — 80197 ASSAY OF TACROLIMUS: CPT | Performed by: INTERNAL MEDICINE

## 2024-10-10 PROCEDURE — 85384 FIBRINOGEN ACTIVITY: CPT

## 2024-10-10 PROCEDURE — 2500000001 HC RX 250 WO HCPCS SELF ADMINISTERED DRUGS (ALT 637 FOR MEDICARE OP)

## 2024-10-10 PROCEDURE — 2500000004 HC RX 250 GENERAL PHARMACY W/ HCPCS (ALT 636 FOR OP/ED)

## 2024-10-10 PROCEDURE — 1170000001 HC PRIVATE ONCOLOGY ROOM DAILY

## 2024-10-10 PROCEDURE — 2500000004 HC RX 250 GENERAL PHARMACY W/ HCPCS (ALT 636 FOR OP/ED): Performed by: NURSE PRACTITIONER

## 2024-10-10 PROCEDURE — 2500000001 HC RX 250 WO HCPCS SELF ADMINISTERED DRUGS (ALT 637 FOR MEDICARE OP): Performed by: INTERNAL MEDICINE

## 2024-10-10 PROCEDURE — 99233 SBSQ HOSP IP/OBS HIGH 50: CPT | Performed by: STUDENT IN AN ORGANIZED HEALTH CARE EDUCATION/TRAINING PROGRAM

## 2024-10-10 PROCEDURE — 2500000004 HC RX 250 GENERAL PHARMACY W/ HCPCS (ALT 636 FOR OP/ED): Performed by: STUDENT IN AN ORGANIZED HEALTH CARE EDUCATION/TRAINING PROGRAM

## 2024-10-10 PROCEDURE — 2500000004 HC RX 250 GENERAL PHARMACY W/ HCPCS (ALT 636 FOR OP/ED): Mod: JZ

## 2024-10-10 PROCEDURE — 84100 ASSAY OF PHOSPHORUS: CPT

## 2024-10-10 PROCEDURE — 2500000002 HC RX 250 W HCPCS SELF ADMINISTERED DRUGS (ALT 637 FOR MEDICARE OP, ALT 636 FOR OP/ED): Performed by: STUDENT IN AN ORGANIZED HEALTH CARE EDUCATION/TRAINING PROGRAM

## 2024-10-10 PROCEDURE — 85730 THROMBOPLASTIN TIME PARTIAL: CPT

## 2024-10-10 PROCEDURE — 2500000002 HC RX 250 W HCPCS SELF ADMINISTERED DRUGS (ALT 637 FOR MEDICARE OP, ALT 636 FOR OP/ED): Performed by: INTERNAL MEDICINE

## 2024-10-10 PROCEDURE — 85025 COMPLETE CBC W/AUTO DIFF WBC: CPT

## 2024-10-10 PROCEDURE — 2500000002 HC RX 250 W HCPCS SELF ADMINISTERED DRUGS (ALT 637 FOR MEDICARE OP, ALT 636 FOR OP/ED): Performed by: PHYSICIAN ASSISTANT

## 2024-10-10 PROCEDURE — 83735 ASSAY OF MAGNESIUM: CPT

## 2024-10-10 PROCEDURE — 2500000004 HC RX 250 GENERAL PHARMACY W/ HCPCS (ALT 636 FOR OP/ED): Mod: JZ | Performed by: INTERNAL MEDICINE

## 2024-10-10 PROCEDURE — 80053 COMPREHEN METABOLIC PANEL: CPT

## 2024-10-10 RX ORDER — LEVOFLOXACIN 500 MG/1
500 TABLET, FILM COATED ORAL
Status: DISCONTINUED | OUTPATIENT
Start: 2024-10-11 | End: 2024-10-22

## 2024-10-10 RX ORDER — MAGNESIUM SULFATE HEPTAHYDRATE 40 MG/ML
4 INJECTION, SOLUTION INTRAVENOUS ONCE
Status: COMPLETED | OUTPATIENT
Start: 2024-10-10 | End: 2024-10-10

## 2024-10-10 ASSESSMENT — COGNITIVE AND FUNCTIONAL STATUS - GENERAL
DAILY ACTIVITIY SCORE: 24
MOBILITY SCORE: 24

## 2024-10-10 ASSESSMENT — PAIN SCALES - GENERAL
PAINLEVEL_OUTOF10: 0 - NO PAIN
PAINLEVEL_OUTOF10: 0 - NO PAIN

## 2024-10-10 NOTE — PROGRESS NOTES
"Nutrition Follow Up Assessment  Nutrition Assessment      Today is T+21 s/p single-umbilical cord transplant (T=0 on 09/19/24).     Nutrition History:  This service met with pt earlier this afternoon, was laying in bed at time of visit with him.    Tells appetite is starting to return, though remains decreased from baseline. Usually eats ~2-3 meals/day. Most of the food he is consuming for lunch and dinner meals is from home his wife brings in for him. Does usually order something for breakfast meal from this facility. Today, so far, had toast and moy for breakfast meal then had rice/beans and chicken for lunch meal, has BBQ chicken for dinner meal from home he is going to heat up later.     Not drinking the Ensure Plus as regularly anymore, as he feels he is starting to eat more.    C/o intermittent nausea, for which Zofran does work. No vomiting. Also with c/o ongoing loose stools/diarrhea and dysgeusia. Continues to enjoy the lemon drop candies as a way to help his taste. No trouble chewing/swallowing.    Anthropometrics:  Height: (S) 166.4 cm (5' 5.51\")   Weight: 71.3 kg (157 lb 3 oz)   BMI (Calculated): 25.75  IBW/kg (Dietitian Calculated): 64.5 kg  Percent of IBW: 111 %       Admission Weight Trend:  09/13-75.6 kg (admit wt)  09/30-69.8 kg (wt at last nutrition follow-up note)--total of 7.7% wt loss from admit to this date  10/06-71.3 kg (current/most recent wt)--wt gains since last nutrition visit possibly d/t fluids?; total of 5.7% wt loss from admit to present (significant)    Nutrition Focused Physical Exam Findings:  defer: completed at a prior nutrition visit  Edema:  Edema: none  Physical Findings:  Skin: Negative    Nutrition Significant Labs:  CBC Trend:   Results from last 7 days   Lab Units 10/10/24  0438 10/09/24  0603 10/08/24  0349 10/07/24  0504   WBC AUTO x10*3/uL 0.1* 0.2* 0.2* 0.2*   RBC AUTO x10*6/uL 2.78* 2.85* 2.89* 2.99*   HEMOGLOBIN g/dL 7.2* 7.5* 7.5* 7.9*   HEMATOCRIT % 20.6* 21.1* " 22.0* 22.4*   MCV fL 74* 74* 76* 75*   PLATELETS AUTO x10*3/uL 19* 21* 39* 54*   BMP Trend:   Results from last 7 days   Lab Units 10/10/24  0438 10/09/24  0603 10/08/24  0349 10/07/24  0504   GLUCOSE mg/dL 104* 103* 100* 93   CALCIUM mg/dL 8.3* 8.2* 8.1* 8.3*   SODIUM mmol/L 139 139 140 140   POTASSIUM mmol/L 3.5 3.5 3.7 4.1   CO2 mmol/L 25 24 24 27   CHLORIDE mmol/L 106 108* 107 107   BUN mg/dL 8 7 12 10   CREATININE mg/dL 0.74 0.65 0.74 0.70   Liver Function Trend:   Results from last 7 days   Lab Units 10/10/24  0438 10/09/24  0603 10/08/24  0349 10/07/24  0504   ALK PHOS U/L 62 60 59 61   AST U/L 10 10 10 10   ALT U/L 11 12 13 16   BILIRUBIN TOTAL mg/dL 0.7 0.7 0.4 0.5   Renal Lab Trend:   Results from last 7 days   Lab Units 10/10/24  0438 10/09/24  0603 10/08/24  0349 10/07/24  0504   POTASSIUM mmol/L 3.5 3.5 3.7 4.1   PHOSPHORUS mg/dL 3.3 3.0 3.7 3.2   SODIUM mmol/L 139 139 140 140   MAGNESIUM mg/dL 1.43* 1.24* 1.27* 1.65   EGFR mL/min/1.73m*2 >90 >90 >90 >90   BUN mg/dL 8 7 12 10   CREATININE mg/dL 0.74 0.65 0.74 0.70      Medications:  Scheduled medications  acyclovir, 400 mg, oral, q12h  cefepime, 2 g, intravenous, q8h  filgrastim or biosimilar, 300 mcg, subcutaneous, q24h  folic acid, 1 mg, oral, Daily  letermovir, 480 mg, oral, Daily  [START ON 10/11/2024] levoFLOXacin, 500 mg, oral, q24h JALYN  mycophenolate, 500 mg, oral, TID  NIFEdipine ER, 90 mg, oral, Daily before breakfast  posaconazole, 300 mg, oral, q24h  tacrolimus, 2 mg, oral, q12h JALYN  ursodiol, 300 mg, oral, TID  vancomycin, 125 mg, oral, Daily    PRN medications  PRN medications: acetaminophen, albuterol, alteplase, dextrose, diphenhydrAMINE, EPINEPHrine HCl, famotidine, loperamide, methylPREDNISolone sodium succinate (PF), ondansetron, prochlorperazine, prochlorperazine, sodium chloride    I/O:   Last BM Date: 10/10/24; Stool Appearance: Loose (10/10/24 0912)    Dietary Orders (From admission, onward)       Start     Ordered    10/10/24 7439   Oral nutritional supplements  Until discontinued        Comments: please only provide Ensure Plus if pt requests it--do not automatically send to him--may have as many times/day as desired   Question:  Select supplement:  Answer:  Ensure Plus    10/10/24 1558    09/14/24 1258  Snacks  Until discontinued        Comments: pt may order from Extended Stay Menu + [employer] Snack List, if requested    09/14/24 1258    09/13/24 1427  Adult diet Low microbial  Diet effective now        Question:  Diet type  Answer:  Low microbial    09/13/24 1428                Estimated Needs:   Total Energy Estimated Needs (kCal): 2100+  Method for Estimating Needs: MSJ (1476) x ~1.4+  Total Protein Estimated Needs (g): 95+  Method for Estimating Needs: 62 (IBW) x ~1.5g/kg+  Total Fluid Estimated Needs (mL): per MD/team        Nutrition Diagnosis   Malnutrition Diagnosis  Patient has Malnutrition Diagnosis: Yes  Diagnosis Status: Ongoing  Malnutrition Diagnosis: Moderate malnutrition related to acute disease or injury (s/p recent transplant with treatment related side effects)  As Evidenced by: pt consuming <75% estimated energy needs x >7 days with significant wt lossses of ~6-8% since admit (<1 month)    Nutrition Diagnosis  Patient has Nutrition Diagnosis: Yes  Diagnosis Status (1): Ongoing  Nutrition Diagnosis 1: Increased nutrient needs  Related to (1): increased metabolic demand  As Evidenced by (1): pt with AML, admitted for transplant    Additional Assessment Information: Do feel oral nutrition supplements remain of benefit to pt, considering hypermetabolic state with malnutrition, though pt requests to automatically stop receiving them for now, was agreeable to order for PRN--to order as desired. Did also provide additional lemon drop candies to pt d/t ongoing issues with dysgeusia.       Nutrition Interventions/Recommendations     1. Continue Low Microbial Diet, only as tolerated  --RDN adjusted order for Ensure Plus, per pt's  request (went from BID to PRN)  --RDN provided additional lemon drop candies to pt d/t ongoing issues with dysgeusia        Nutrition Prescription for Oral Nutrition: 2100+ kcals/day, 95+ g pro/day        Nutrition Interventions:   Food and/or Nutrient Delivery Interventions  Interventions: Meals and snacks, Medical food supplement  Meals and Snacks: General healthful diet  Goal: Low Microbial Diet  Medical Food Supplement: Commercial beverage  Goal: Ensure Plus PRN (350 kcals, 13g pro each)    Nutrition Education: Pt denied any questions for RDN at this time.       Nutrition Monitoring and Evaluation   Food/Nutrient Related History Monitoring  Monitoring and Evaluation Plan: Amount of food  Amount of Food: Estimated amout of food, Medical food intake  Criteria: consume >50% of meals/snacks/supplements    Body Composition/Growth/Weight History  Monitoring and Evaluation Plan: Weight  Weight: Measured weight  Criteria: maintain stable wt    Biochemical Data, Medical Tests and Procedures  Monitoring and Evaluation Plan: Electrolyte/renal panel  Electrolyte and Renal Panel: Magnesium, Phosphorus, Potassium, Sodium  Criteria: WNL          Time Spent (min): 30 minutes

## 2024-10-10 NOTE — CARE PLAN
Problem: Pain - Adult  Goal: Verbalizes/displays adequate comfort level or baseline comfort level  Outcome: Progressing     Problem: Safety - Adult  Goal: Free from fall injury  Outcome: Progressing     Problem: Discharge Planning  Goal: Discharge to home or other facility with appropriate resources  Outcome: Progressing     Problem: Chronic Conditions and Co-morbidities  Goal: Patient's chronic conditions and co-morbidity symptoms are monitored and maintained or improved  Outcome: Progressing     Problem: Nutrition  Goal: Oral intake greater 75%  Outcome: Progressing  Goal: Adequate PO fluid intake  Outcome: Progressing  Goal: Maintain stable weight  Outcome: Progressing      The clinical goals for the shift include patient will remain HDS throughout shift

## 2024-10-10 NOTE — PROGRESS NOTES
"Brandt Merritt is a 64 y.o. male on day 27 of admission presenting with Acute myeloid leukemia in remission (Multi).    Subjective   No acute overnight events. Doing well today  Reports 1 large loose watery stool yesterday. Stated he had nausea which got better with medication. He endorses that the non-pruritic rash is resolving.  Denies SOB, headache, chest pain, chills, abdominal pain, dysuria, tarry stools.        Objective     Physical Exam  Constitutional:       Appearance: Normal appearance.   HENT:      Head: Normocephalic and atraumatic.      Right Ear: External ear normal.      Left Ear: External ear normal.      Nose: Nose normal.      Mouth/Throat:      Mouth: Mucous membranes are moist.   Eyes:      Extraocular Movements: Extraocular movements intact.      Conjunctiva/sclera: Conjunctivae normal.      Pupils: Pupils are equal, round, and reactive to light.   Cardiovascular:      Rate and Rhythm: Normal rate and regular rhythm.      Pulses: Normal pulses.      Heart sounds: Normal heart sounds.   Pulmonary:      Effort: Pulmonary effort is normal.      Breath sounds: Normal breath sounds.   Abdominal:      General: Bowel sounds are normal.      Palpations: Abdomen is soft.   Musculoskeletal:         General: Normal range of motion.      Cervical back: Normal range of motion and neck supple.   Skin:     General: Skin is warm.      Findings: Rash, resolving (nonpruritic maculopapular mildly erythematous rash on b/l thighs) present.   Neurological:      General: No focal deficit present.      Mental Status: He is alert and oriented to person, place, and time.   Psychiatric:         Mood and Affect: Mood normal.         Behavior: Behavior normal.         Thought Content: Thought content normal.         Judgment: Judgment normal.     Last Recorded Vitals  Blood pressure 137/79, pulse 89, temperature 37.1 °C (98.8 °F), temperature source Temporal, resp. rate 18, height (S) 1.664 m (5' 5.51\"), weight 71.3 kg " (157 lb 3 oz), SpO2 96%.  Intake/Output last 3 Shifts:  I/O last 3 completed shifts:  In: 200 (2.8 mL/kg) [IV Piggyback:200]  Out: - (0 mL/kg)   Weight: 71.3 kg     Relevant Results  Scheduled medications  acyclovir, 400 mg, oral, q12h  cefepime, 2 g, intravenous, q8h  filgrastim or biosimilar, 300 mcg, subcutaneous, q24h  folic acid, 1 mg, oral, Daily  letermovir, 480 mg, oral, Daily  magnesium sulfate, 4 g, intravenous, Once  mycophenolate, 500 mg, oral, TID  NIFEdipine ER, 90 mg, oral, Daily before breakfast  posaconazole, 300 mg, oral, q24h  tacrolimus, 2 mg, oral, q12h JALYN  ursodiol, 300 mg, oral, TID  vancomycin, 125 mg, oral, Daily      Continuous medications     PRN medications  PRN medications: acetaminophen, albuterol, alteplase, dextrose, diphenhydrAMINE, EPINEPHrine HCl, famotidine, loperamide, methylPREDNISolone sodium succinate (PF), ondansetron, prochlorperazine, prochlorperazine, sodium chloride      Results for orders placed or performed during the hospital encounter of 09/13/24 (from the past 24 hour(s))   CBC and Auto Differential   Result Value Ref Range    WBC 0.1 (LL) 4.4 - 11.3 x10*3/uL    nRBC 0.0 0.0 - 0.0 /100 WBCs    RBC 2.78 (L) 4.50 - 5.90 x10*6/uL    Hemoglobin 7.2 (L) 13.5 - 17.5 g/dL    Hematocrit 20.6 (L) 41.0 - 52.0 %    MCV 74 (L) 80 - 100 fL    MCH 25.9 (L) 26.0 - 34.0 pg    MCHC 35.0 32.0 - 36.0 g/dL    RDW 17.3 (H) 11.5 - 14.5 %    Platelets 19 (LL) 150 - 450 x10*3/uL    Neutrophils % 7.1 40.0 - 80.0 %    Immature Granulocytes %, Automated 0.0 0.0 - 0.9 %    Lymphocytes % 92.9 13.0 - 44.0 %    Monocytes % 0.0 2.0 - 10.0 %    Eosinophils % 0.0 0.0 - 6.0 %    Basophils % 0.0 0.0 - 2.0 %    Neutrophils Absolute 0.01 (L) 1.20 - 7.70 x10*3/uL    Immature Granulocytes Absolute, Automated 0.00 0.00 - 0.70 x10*3/uL    Lymphocytes Absolute 0.13 (L) 1.20 - 4.80 x10*3/uL    Monocytes Absolute 0.00 (L) 0.10 - 1.00 x10*3/uL    Eosinophils Absolute 0.00 0.00 - 0.70 x10*3/uL    Basophils  Absolute 0.00 0.00 - 0.10 x10*3/uL   Coagulation Screen   Result Value Ref Range    Protime 13.1 (H) 9.8 - 12.8 seconds    INR 1.2 (H) 0.9 - 1.1    aPTT 31 27 - 38 seconds   Comprehensive Metabolic Panel   Result Value Ref Range    Glucose 104 (H) 74 - 99 mg/dL    Sodium 139 136 - 145 mmol/L    Potassium 3.5 3.5 - 5.3 mmol/L    Chloride 106 98 - 107 mmol/L    Bicarbonate 25 21 - 32 mmol/L    Anion Gap 12 10 - 20 mmol/L    Urea Nitrogen 8 6 - 23 mg/dL    Creatinine 0.74 0.50 - 1.30 mg/dL    eGFR >90 >60 mL/min/1.73m*2    Calcium 8.3 (L) 8.6 - 10.6 mg/dL    Albumin 3.1 (L) 3.4 - 5.0 g/dL    Alkaline Phosphatase 62 33 - 136 U/L    Total Protein 6.2 (L) 6.4 - 8.2 g/dL    AST 10 9 - 39 U/L    Bilirubin, Total 0.7 0.0 - 1.2 mg/dL    ALT 11 10 - 52 U/L   Fibrinogen   Result Value Ref Range    Fibrinogen 405 (H) 200 - 400 mg/dL   Magnesium   Result Value Ref Range    Magnesium 1.43 (L) 1.60 - 2.40 mg/dL   Phosphorus   Result Value Ref Range    Phosphorus 3.3 2.5 - 4.9 mg/dL     CT chest abdomen pelvis w IV contrast    Result Date: 9/29/2024  1. Fluid-filled small bowel and right colon likely related to diarrhea. No evidence of bowel wall thickening or surrounding stranding to suggest enteritis or colitis. Evidence of a diverticulitis. 2. No other evidence of infectious process in the chest, abdomen and pelvis. 3. Chronic and incidental findings as described above.   MACRO: None   Signed by: Shilo Nolasco 9/29/2024 6:14 PM Dictation workstation:   [id number]    XR chest 1 view    Result Date: 9/29/2024  1.  No evidence of acute cardiopulmonary process.   I personally reviewed the images/study and I agree with the findings as stated by resident physician Dr. Gregory Oliveira . This study was interpreted at [place], [place].   MACRO: None   Signed by: Tuan Reid 9/29/2024 11:24 AM Dictation workstation:   [id number]    XR chest 1 view    Result Date: 9/26/2024  1. No  acute abnormality of the chest.   I personally reviewed the image(s)/study and resident interpretation as stated by Dr. Lia Smith MD. I agree with the findings as stated. This study was interpreted at [facility], [place], [place].   MACRO: None   Signed by: Tuan Reid 9/26/2024 6:09 AM Dictation workstation:   [id number]    IR CVC nontunneled    Result Date: 9/14/2024  1. Technically successful and uncomplicated placement of a right internal jugular temporary double-lumen arrowguard catheter under direct ultrasound and fluoroscopic guidance - optimal catheter tip position at the right atrial superior vena caval junction and the catheter is ready for immediate use.   I was present for and/or performed the critical portions of the procedure and immediately available throughout the entire procedure.   I personally reviewed the image(s) / study and resident interpretation. I agree with the findings as stated.   Performed and dictated at [facility].   MACRO: None.   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, DNMT alpha, and U2AF1 mutation indication adverse risk   - s/p 4 cycles of Decitabine/Venetoclax (C4D1 on 8/12/24)  - BMBx 6/17/24: Blasts cleared, FISH still positive for trisomy 8, still MDS changes   -  BMBx 9/5/24: 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 and no increase in blast  - Not yet engrafted, WBC 0.1, Neutrophil % at 7.1 [was 0 until 10/7] and ANC % today is 0.01 [was 0.0 until 10/7]  - Can repeat a BMbx after a month if counts do not improve and then consider repeating another cord transplant if required     # Transplant  CONDITIONING Fludarabine, melphalan, and TBI   DONOR Single umbilical cord   MATCH GRADE 6/8   SEX MATCH Mismatched   ABO DONOR A pos   ABO RECIPIENT A pos   GVHD PROPHYLAXIS Tacrolimus and Mycophenolate   GRAFT SOURCE Single umbilical cord   CMV DONOR negative   CMV RECIPIENT positive      # GVHD prophy  - Tacro level (10/8): 10 [was drawn around 3 am, 3 hrs earlier than usual draw]  - Cont Tacro 2 mg BID  - Checking levels daily  - Was planned for MMF 1000 mg TID to begin T+2 to T+35 but dose decreased to 750 mg TID on T+18 given non engraftment and persistent cytopenias. Decreased the dose to 500 TID on T+20.  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 Will stop cefepime and  start him on oral levaquin  # Prophylaxis: Acyclovir, Vancomycin, Posaconazole, Letermovir (started T+14), and Bactrim (to start T+30), Ursodiol      # Maculopapular rash (9/27), now resolved, likely d/t Josefina  - appearance highly suggestive of meropenem reaction vs pre-engraftment syndrome  - resolved by 10/1, returned 10/3 on legs, currently improving  - maculopapular on inner thighs, non-itching,  non-painful, blanching  - will continue to monitor      CARDIO/PULM:  # Hx of STEMI (11/11/2020)  - TTE 7/19/24: EF 59%, otherwise unremarkable   - Was cleared by Cardiology for transplant   # Hx of HTN   - Nifedipine: 90 mEq ER 24 hr tablet   # Ascending aorta dilation   - Echo showing 3.8-4 cm dilation      FEN/RENAL  - Admit wt: 75.6 kg (9/13/24), Most recent wt: 71.3 kg (10/6). Needs daily weights  - Allergic to Lasix, will not take med (extreme hypotension, extreme hypokalemia)   # Chemo induced Diarrhea  - Cdiff & stool path neg (9/22)  - diarrhea returned 9/26 AM, along with fever  - etiology: likely melphalan-related mucositis/enteritis  - Cont loperamide 2mg q6h PRN  # Hx of treated Hep C in 2015  - (9/12) Hep C RNA - not detected   # Hx of recurrent hyperbilirubinemia   - 2/2 to Hemoglobin C hemolysis      :  # Hx of BPH and urinary retention (2023)  - Hx elevated PSA    - 7/28/21 Bx: atypical small acinar proliferation    - 4/7/23 Bx: Benign     DISPO:  - Full Code  - NOK: spouse Genevieve ([phone])  - PICC  - Oncologist: Jerod Watson seen, examined, and discussed w/ Dr. Kesha Frederick MD  PGY-3 internal medicine resident

## 2024-10-10 NOTE — ASSESSMENT & PLAN NOTE
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, DNMT alpha, and U2AF1 mutation indication adverse risk   - s/p 4 cycles of Decitabine/Venetoclax (C4D1 on 8/12/24)  - BMBx 6/17/24: Blasts cleared, FISH still positive for trisomy 8, still MDS changes   - BMBx 9/5/24: 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 and no increase in blast  - Not yet engrafted, WBC 0.1, Neutrophil % at 7.1 [was 0 until 10/7] and ANC % today is 0.01 [was 0.0 until 10/7]  - Can repeat a BMbx after a month if counts do not improve and then consider repeating another cord transplant if required     # Transplant  CONDITIONING Fludarabine, melphalan, and TBI   DONOR Single umbilical cord   MATCH GRADE 6/8   SEX MATCH Mismatched   ABO DONOR A pos   ABO RECIPIENT A pos   GVHD PROPHYLAXIS Tacrolimus and Mycophenolate   GRAFT SOURCE Single umbilical cord   CMV DONOR negative   CMV RECIPIENT positive      # GVHD prophy  - Tacro level (10/8): 10 [was drawn around 3 am, 3 hrs earlier than usual draw]  - Cont Tacro 2 mg BID  - Checking levels daily  - Was planned for MMF 1000 mg TID to begin T+2 to T+35 but dose decreased to 750 mg TID on T+18 given non engraftment and persistent cytopenias. Decreased the dose to 500 TID on T+20.  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 Will stop cefepime and  start him on oral levaquin  # Prophylaxis: Acyclovir, Vancomycin, Posaconazole, Letermovir (started T+14), and Bactrim (to start T+30), Ursodiol      # Maculopapular rash (9/27), now resolved, likely d/t Josefina  - appearance highly suggestive of meropenem reaction vs pre-engraftment syndrome  - resolved by 10/1, returned 10/3 on legs, currently improving  - maculopapular on inner thighs, non-itching, non-painful, blanching  - will continue to monitor      CARDIO/PULM:  # Hx of STEMI (11/11/2020)  - TTE 7/19/24: EF 59%, otherwise unremarkable   - Was cleared by Cardiology for transplant   # Hx of HTN   - Nifedipine: 90 mEq ER 24 hr tablet   # Ascending aorta dilation   - Echo showing 3.8-4 cm dilation      FEN/RENAL  - Admit wt: 75.6 kg (9/13/24), Most recent wt: 71.3 kg (10/6). Needs daily weights  - Allergic to Lasix, will not take med (extreme hypotension, extreme hypokalemia)   # Chemo induced Diarrhea  - Cdiff & stool path neg (9/22)  - diarrhea returned 9/26 AM, along with fever  - etiology: likely melphalan-related mucositis/enteritis  - Cont loperamide 2mg q6h PRN  # Hx of treated Hep C in 2015  - (9/12) Hep C RNA - not detected   # Hx of recurrent hyperbilirubinemia   - 2/2 to Hemoglobin C hemolysis      :  # Hx of BPH and urinary retention (2023)  - Hx elevated PSA    - 7/28/21 Bx: atypical small acinar proliferation    - 4/7/23 Bx: Benign     DISPO:  - Full Code  - NOK: spouse Genevieve ([phone])  - PICC  - Oncologist: Jerod Watson seen, examined, and discussed w/ Dr. Kesha Frederick MD  PGY-3 internal medicine resident

## 2024-10-11 LAB
ABO GROUP (TYPE) IN BLOOD: NORMAL
ALBUMIN SERPL BCP-MCNC: 3.3 G/DL (ref 3.4–5)
ALP SERPL-CCNC: 61 U/L (ref 33–136)
ALT SERPL W P-5'-P-CCNC: 9 U/L (ref 10–52)
ANION GAP SERPL CALC-SCNC: 7 MMOL/L (ref 10–20)
ANTIBODY SCREEN: NORMAL
APTT PPP: 32 SECONDS (ref 27–38)
AST SERPL W P-5'-P-CCNC: 11 U/L (ref 9–39)
BASOPHILS # BLD AUTO: 0 X10*3/UL (ref 0–0.1)
BASOPHILS NFR BLD AUTO: 0 %
BILIRUB SERPL-MCNC: 0.8 MG/DL (ref 0–1.2)
BLOOD EXPIRATION DATE: NORMAL
BLOOD EXPIRATION DATE: NORMAL
BUN SERPL-MCNC: 6 MG/DL (ref 6–23)
CALCIUM SERPL-MCNC: 8.3 MG/DL (ref 8.6–10.6)
CHLORIDE SERPL-SCNC: 106 MMOL/L (ref 98–107)
CMV DNA SERPL NAA+PROBE-LOG IU: NORMAL {LOG_IU}/ML
CO2 SERPL-SCNC: 29 MMOL/L (ref 21–32)
CREAT SERPL-MCNC: 0.73 MG/DL (ref 0.5–1.3)
DISPENSE STATUS: NORMAL
DISPENSE STATUS: NORMAL
EGFRCR SERPLBLD CKD-EPI 2021: >90 ML/MIN/1.73M*2
EOSINOPHIL # BLD AUTO: 0 X10*3/UL (ref 0–0.7)
EOSINOPHIL NFR BLD AUTO: 0 %
ERYTHROCYTE [DISTWIDTH] IN BLOOD BY AUTOMATED COUNT: 17.3 % (ref 11.5–14.5)
FIBRINOGEN PPP-MCNC: 398 MG/DL (ref 200–400)
GLUCOSE SERPL-MCNC: 97 MG/DL (ref 74–99)
HCT VFR BLD AUTO: 19.7 % (ref 41–52)
HGB BLD-MCNC: 7 G/DL (ref 13.5–17.5)
IMM GRANULOCYTES # BLD AUTO: 0 X10*3/UL (ref 0–0.7)
IMM GRANULOCYTES NFR BLD AUTO: 0 % (ref 0–0.9)
INR PPP: 1.1 (ref 0.9–1.1)
LABORATORY COMMENT REPORT: NOT DETECTED
LYMPHOCYTES # BLD AUTO: 0.13 X10*3/UL (ref 1.2–4.8)
LYMPHOCYTES NFR BLD AUTO: 100 %
MAGNESIUM SERPL-MCNC: 1.36 MG/DL (ref 1.6–2.4)
MCH RBC QN AUTO: 25.8 PG (ref 26–34)
MCHC RBC AUTO-ENTMCNC: 35.5 G/DL (ref 32–36)
MCV RBC AUTO: 73 FL (ref 80–100)
MONOCYTES # BLD AUTO: 0 X10*3/UL (ref 0.1–1)
MONOCYTES NFR BLD AUTO: 0 %
NEUTROPHILS # BLD AUTO: 0 X10*3/UL (ref 1.2–7.7)
NEUTROPHILS NFR BLD AUTO: 0 %
NRBC BLD-RTO: 0 /100 WBCS (ref 0–0)
PHOSPHATE SERPL-MCNC: 3.6 MG/DL (ref 2.5–4.9)
PLATELET # BLD AUTO: 9 X10*3/UL (ref 150–450)
POTASSIUM SERPL-SCNC: 3.4 MMOL/L (ref 3.5–5.3)
PRODUCT BLOOD TYPE: 6200
PRODUCT BLOOD TYPE: 6200
PRODUCT CODE: NORMAL
PRODUCT CODE: NORMAL
PROT SERPL-MCNC: 6.3 G/DL (ref 6.4–8.2)
PROTHROMBIN TIME: 12.9 SECONDS (ref 9.8–12.8)
RBC # BLD AUTO: 2.71 X10*6/UL (ref 4.5–5.9)
RH FACTOR (ANTIGEN D): NORMAL
SODIUM SERPL-SCNC: 139 MMOL/L (ref 136–145)
TACROLIMUS BLD-MCNC: 7.7 NG/ML
UNIT ABO: NORMAL
UNIT ABO: NORMAL
UNIT NUMBER: NORMAL
UNIT NUMBER: NORMAL
UNIT RH: NORMAL
UNIT RH: NORMAL
UNIT VOLUME: 314
UNIT VOLUME: 350
WBC # BLD AUTO: 0.1 X10*3/UL (ref 4.4–11.3)
XM INTEP: NORMAL

## 2024-10-11 PROCEDURE — P9040 RBC LEUKOREDUCED IRRADIATED: HCPCS

## 2024-10-11 PROCEDURE — 2500000001 HC RX 250 WO HCPCS SELF ADMINISTERED DRUGS (ALT 637 FOR MEDICARE OP)

## 2024-10-11 PROCEDURE — 85610 PROTHROMBIN TIME: CPT

## 2024-10-11 PROCEDURE — 80197 ASSAY OF TACROLIMUS: CPT | Performed by: INTERNAL MEDICINE

## 2024-10-11 PROCEDURE — 2500000004 HC RX 250 GENERAL PHARMACY W/ HCPCS (ALT 636 FOR OP/ED)

## 2024-10-11 PROCEDURE — 86901 BLOOD TYPING SEROLOGIC RH(D): CPT

## 2024-10-11 PROCEDURE — 2500000004 HC RX 250 GENERAL PHARMACY W/ HCPCS (ALT 636 FOR OP/ED): Performed by: STUDENT IN AN ORGANIZED HEALTH CARE EDUCATION/TRAINING PROGRAM

## 2024-10-11 PROCEDURE — 86923 COMPATIBILITY TEST ELECTRIC: CPT

## 2024-10-11 PROCEDURE — 85384 FIBRINOGEN ACTIVITY: CPT

## 2024-10-11 PROCEDURE — 99233 SBSQ HOSP IP/OBS HIGH 50: CPT

## 2024-10-11 PROCEDURE — 1170000001 HC PRIVATE ONCOLOGY ROOM DAILY

## 2024-10-11 PROCEDURE — 2500000001 HC RX 250 WO HCPCS SELF ADMINISTERED DRUGS (ALT 637 FOR MEDICARE OP): Performed by: INTERNAL MEDICINE

## 2024-10-11 PROCEDURE — 80053 COMPREHEN METABOLIC PANEL: CPT

## 2024-10-11 PROCEDURE — 2500000002 HC RX 250 W HCPCS SELF ADMINISTERED DRUGS (ALT 637 FOR MEDICARE OP, ALT 636 FOR OP/ED): Performed by: STUDENT IN AN ORGANIZED HEALTH CARE EDUCATION/TRAINING PROGRAM

## 2024-10-11 PROCEDURE — 2500000004 HC RX 250 GENERAL PHARMACY W/ HCPCS (ALT 636 FOR OP/ED): Performed by: NURSE PRACTITIONER

## 2024-10-11 PROCEDURE — 85025 COMPLETE CBC W/AUTO DIFF WBC: CPT

## 2024-10-11 PROCEDURE — 83735 ASSAY OF MAGNESIUM: CPT

## 2024-10-11 PROCEDURE — 2500000004 HC RX 250 GENERAL PHARMACY W/ HCPCS (ALT 636 FOR OP/ED): Mod: JZ | Performed by: INTERNAL MEDICINE

## 2024-10-11 PROCEDURE — P9037 PLATE PHERES LEUKOREDU IRRAD: HCPCS

## 2024-10-11 PROCEDURE — 84100 ASSAY OF PHOSPHORUS: CPT

## 2024-10-11 PROCEDURE — 83520 IMMUNOASSAY QUANT NOS NONAB: CPT

## 2024-10-11 PROCEDURE — 36430 TRANSFUSION BLD/BLD COMPNT: CPT

## 2024-10-11 PROCEDURE — 2500000005 HC RX 250 GENERAL PHARMACY W/O HCPCS

## 2024-10-11 PROCEDURE — 2500000002 HC RX 250 W HCPCS SELF ADMINISTERED DRUGS (ALT 637 FOR MEDICARE OP, ALT 636 FOR OP/ED): Performed by: INTERNAL MEDICINE

## 2024-10-11 PROCEDURE — 2500000002 HC RX 250 W HCPCS SELF ADMINISTERED DRUGS (ALT 637 FOR MEDICARE OP, ALT 636 FOR OP/ED): Performed by: PHYSICIAN ASSISTANT

## 2024-10-11 RX ORDER — POTASSIUM CHLORIDE 29.8 MG/ML
40 INJECTION INTRAVENOUS ONCE
Status: COMPLETED | OUTPATIENT
Start: 2024-10-11 | End: 2024-10-11

## 2024-10-11 RX ORDER — MAGNESIUM SULFATE HEPTAHYDRATE 40 MG/ML
4 INJECTION, SOLUTION INTRAVENOUS ONCE
Status: COMPLETED | OUTPATIENT
Start: 2024-10-11 | End: 2024-10-11

## 2024-10-11 ASSESSMENT — COGNITIVE AND FUNCTIONAL STATUS - GENERAL
MOBILITY SCORE: 24
DAILY ACTIVITIY SCORE: 24

## 2024-10-11 ASSESSMENT — PAIN SCALES - GENERAL
PAINLEVEL_OUTOF10: 0 - NO PAIN

## 2024-10-11 ASSESSMENT — PAIN - FUNCTIONAL ASSESSMENT
PAIN_FUNCTIONAL_ASSESSMENT: 0-10
PAIN_FUNCTIONAL_ASSESSMENT: 0-10

## 2024-10-11 NOTE — PROGRESS NOTES
Brandt Merritt is a 64 y.o. male on day 28 of admission presenting with Acute myeloid leukemia in remission (Multi).    Subjective   Pt has no acute complaints today. No fevers, but he has occasional chills. He continues to have a tremor today, felt in his hands bilaterally. Sometimes he feels it when lying in bed and there is some associated muscle soreness. No bleeding, bruising. No episodes of diarrhea yet today.        Objective     Physical Exam  Constitutional:       General: He is not in acute distress.     Appearance: Normal appearance. He is normal weight. He is not ill-appearing, toxic-appearing or diaphoretic.   HENT:      Head: Normocephalic and atraumatic.      Right Ear: External ear normal.      Left Ear: External ear normal.      Nose: Nose normal. No congestion or rhinorrhea.      Mouth/Throat:      Mouth: Mucous membranes are moist.      Pharynx: Oropharynx is clear. No oropharyngeal exudate or posterior oropharyngeal erythema.   Eyes:      General: No scleral icterus.        Right eye: No discharge.         Left eye: No discharge.      Extraocular Movements: Extraocular movements intact.      Conjunctiva/sclera: Conjunctivae normal.   Cardiovascular:      Rate and Rhythm: Normal rate and regular rhythm.      Heart sounds: Normal heart sounds. No murmur heard.     No friction rub. No gallop.   Pulmonary:      Effort: Pulmonary effort is normal. No respiratory distress.      Breath sounds: No stridor. No wheezing, rhonchi or rales.   Chest:      Chest wall: No tenderness.   Abdominal:      General: Abdomen is flat. Bowel sounds are normal. There is no distension.      Palpations: Abdomen is soft. There is no mass.      Tenderness: There is no abdominal tenderness. There is no guarding or rebound.      Hernia: No hernia is present.   Musculoskeletal:         General: No swelling, tenderness, deformity or signs of injury. Normal range of motion.      Cervical back: Normal range of motion.      Right  "lower leg: No edema.      Left lower leg: No edema.   Skin:     General: Skin is warm and dry.      Coloration: Skin is not jaundiced or pale.      Findings: No bruising, erythema, lesion or rash.   Neurological:      General: No focal deficit present.      Mental Status: He is alert and oriented to person, place, and time.      Sensory: Sensation is intact.      Motor: Tremor present. No weakness.      Coordination: Coordination is intact.      Gait: Gait is intact.      Deep Tendon Reflexes:      Reflex Scores:       Brachioradialis reflexes are 2+ on the right side and 2+ on the left side.       Patellar reflexes are 2+ on the right side and 2+ on the left side.     Comments: Tremor vs muscle fasciculations in bilateral arms, jaw, and back, exacerbated by effort.    Psychiatric:         Mood and Affect: Mood normal.         Behavior: Behavior normal.         Thought Content: Thought content normal.         Judgment: Judgment normal.         Last Recorded Vitals  Blood pressure 111/69, pulse 90, temperature 37 °C (98.6 °F), temperature source Temporal, resp. rate 20, height (S) 1.664 m (5' 5.51\"), weight 71.3 kg (157 lb 3 oz), SpO2 97%.  Intake/Output last 3 Shifts:  No intake/output data recorded.    Relevant Results             Scheduled medications  acyclovir, 400 mg, oral, q12h  filgrastim or biosimilar, 300 mcg, subcutaneous, q24h  folic acid, 1 mg, oral, Daily  letermovir, 480 mg, oral, Daily  levoFLOXacin, 500 mg, oral, q24h JALYN  NIFEdipine ER, 90 mg, oral, Daily before breakfast  posaconazole, 300 mg, oral, q24h  tacrolimus, 2 mg, oral, q12h JALYN  ursodiol, 300 mg, oral, TID  vancomycin, 125 mg, oral, Daily      Continuous medications     PRN medications  PRN medications: acetaminophen, albuterol, alteplase, dextrose, diphenhydrAMINE, EPINEPHrine HCl, famotidine, loperamide, methylPREDNISolone sodium succinate (PF), ondansetron, prochlorperazine, prochlorperazine, sodium chloride    Results for orders " placed or performed during the hospital encounter of 09/13/24 (from the past 24 hour(s))   CBC and Auto Differential   Result Value Ref Range    WBC 0.1 (LL) 4.4 - 11.3 x10*3/uL    nRBC 0.0 0.0 - 0.0 /100 WBCs    RBC 2.71 (L) 4.50 - 5.90 x10*6/uL    Hemoglobin 7.0 (L) 13.5 - 17.5 g/dL    Hematocrit 19.7 (L) 41.0 - 52.0 %    MCV 73 (L) 80 - 100 fL    MCH 25.8 (L) 26.0 - 34.0 pg    MCHC 35.5 32.0 - 36.0 g/dL    RDW 17.3 (H) 11.5 - 14.5 %    Platelets 9 (LL) 150 - 450 x10*3/uL    Neutrophils % 0.0 40.0 - 80.0 %    Immature Granulocytes %, Automated 0.0 0.0 - 0.9 %    Lymphocytes % 100.0 13.0 - 44.0 %    Monocytes % 0.0 2.0 - 10.0 %    Eosinophils % 0.0 0.0 - 6.0 %    Basophils % 0.0 0.0 - 2.0 %    Neutrophils Absolute 0.00 (L) 1.20 - 7.70 x10*3/uL    Immature Granulocytes Absolute, Automated 0.00 0.00 - 0.70 x10*3/uL    Lymphocytes Absolute 0.13 (L) 1.20 - 4.80 x10*3/uL    Monocytes Absolute 0.00 (L) 0.10 - 1.00 x10*3/uL    Eosinophils Absolute 0.00 0.00 - 0.70 x10*3/uL    Basophils Absolute 0.00 0.00 - 0.10 x10*3/uL   Coagulation Screen   Result Value Ref Range    Protime 12.9 (H) 9.8 - 12.8 seconds    INR 1.1 0.9 - 1.1    aPTT 32 27 - 38 seconds   Comprehensive Metabolic Panel   Result Value Ref Range    Glucose 97 74 - 99 mg/dL    Sodium 139 136 - 145 mmol/L    Potassium 3.4 (L) 3.5 - 5.3 mmol/L    Chloride 106 98 - 107 mmol/L    Bicarbonate 29 21 - 32 mmol/L    Anion Gap 7 (L) 10 - 20 mmol/L    Urea Nitrogen 6 6 - 23 mg/dL    Creatinine 0.73 0.50 - 1.30 mg/dL    eGFR >90 >60 mL/min/1.73m*2    Calcium 8.3 (L) 8.6 - 10.6 mg/dL    Albumin 3.3 (L) 3.4 - 5.0 g/dL    Alkaline Phosphatase 61 33 - 136 U/L    Total Protein 6.3 (L) 6.4 - 8.2 g/dL    AST 11 9 - 39 U/L    Bilirubin, Total 0.8 0.0 - 1.2 mg/dL    ALT 9 (L) 10 - 52 U/L   Fibrinogen   Result Value Ref Range    Fibrinogen 398 200 - 400 mg/dL   Magnesium   Result Value Ref Range    Magnesium 1.36 (L) 1.60 - 2.40 mg/dL   Phosphorus   Result Value Ref Range     Phosphorus 3.6 2.5 - 4.9 mg/dL   Tacrolimus level   Result Value Ref Range    Tacrolimus  7.7 <=15.0 ng/mL   Prepare Platelets: 1 Units, Irradiated, Leukocytes Reduced (CMV reduced risk)   Result Value Ref Range    PRODUCT CODE C3913S38     Unit Number [id number]     Unit ABO A     Unit RH POS     Dispense Status IS     Blood Expiration Date 10/11/2024 11:59:00 PM EDT     PRODUCT BLOOD TYPE 6200     UNIT VOLUME 314    Type and screen   Result Value Ref Range    ABO TYPE A     Rh TYPE POS     ANTIBODY SCREEN NEG      No results found.    This patient has a central line   Reason for the central line remaining today? 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      T+22  Single-umbilical cord transplant (T0=9/19/24)    Updates (10/11):  -send plasma CXCL9 as send-out to [facility]  -stop mycophenolate  -stop cefepime, start 500mg levofloxacin PO daily  -40mEq IV Kcl  -1 x RBCs  -1 x PLTs  -8G magnesium sulfate IV, recheck on evening lab  -evening ionized calcium, will replete if low  -hold compazine     ONC  # MDS/AML   - Symptoms began 9/2023; diagnosed 4/2024  - BMBx showing MDS in transition to AML (>10% jaime) w/ trisomy 8, DNMT alpha, and U2AF1 mutation indication adverse risk   - s/p 4 cycles of Decitabine/Venetoclax (C4D1 on 8/12/24)  - BMBx 6/17/24: Blasts cleared, FISH still positive for trisomy 8, still MDS changes   - BMBx 9/5/24: 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 and no increase in blast  - persistent lack of engraftment, now past expected median engraftment date for single cord  (d21)  - will send out plasma CXCL9 level to [place] to assess for immunologic graft rejection  - Can repeat a BMbx after a month if counts do not improve and then consider repeating another cord transplant if required     # Transplant  CONDITIONING Fludarabine, melphalan, and TBI   DONOR Single umbilical cord   MATCH GRADE 6/8   SEX MATCH Mismatched   ABO DONOR A pos   ABO RECIPIENT A pos   GVHD PROPHYLAXIS Tacrolimus and Mycophenolate   GRAFT SOURCE Single umbilical cord   CMV DONOR negative   CMV RECIPIENT positive      # GVHD prophy  - Tacro level (10/8): 10 [was drawn around 3 am, 3 hrs earlier than usual draw]  - Cont Tacro 2 mg BID  - Checking levels daily  - stop all mycophenolate today for concern of myelosuppression  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 Switched to PO levaquin 10/11  # Prophylaxis: Acyclovir, Vancomycin, Posaconazole, Letermovir (started T+14), and Bactrim (to start T+30), Ursodiol      # Maculopapular rash (9/27), now resolved, likely d/t Josefina  - appearance highly suggestive of meropenem reaction vs pre-engraftment syndrome  - resolved by 10/1, returned 10/3 on legs, currently improving  - maculopapular on inner thighs, non-itching, non-painful, blanching  - will continue to monitor      CARDIO/PULM:  # Hx of STEMI (11/11/2020)  - TTE 7/19/24: EF 59%, otherwise unremarkable   - Was cleared by Cardiology for transplant   # Hx of HTN   - Nifedipine: 90 mEq ER 24 hr tablet   #  Ascending aorta dilation   - Echo showing 3.8-4 cm dilation      FEN/RENAL  - Admit wt: 75.6 kg (9/13/24), Most recent wt: 71.3 kg (10/6). Needs daily weights  - Allergic to Lasix, will not take med (extreme hypotension, extreme hypokalemia)     # Chemo induced Diarrhea  - Cdiff & stool path neg (9/22)  - diarrhea returned 9/26 AM, along with fever  - etiology: likely melphalan-related mucositis/enteritis  - Cont loperamide 2mg q6h PRN  - 40mEq KCL IV and 4g IV mag today    # Hx of treated Hep C in 2015  - (9/12) Hep C RNA - not detected      :  # Hx of BPH and urinary retention (2023)  - Hx elevated PSA    - 7/28/21 Bx: atypical small acinar proliferation    - 4/7/23 Bx: Benign    NEURO/PSYCH:  #Tremor vs fasciculation  -since 10/10, pt reported tremor that was not present prior to admission  -on exam, fine, high-frequency (7+ Hz) tremor worst in the hands, exacerbated by effort, associated with significant fasciculations of the shoulder and back muscles; also fasciculations in jaw  -strength normal, reflexes 2+  -etiology: hypocalcemia, hypomagnesemia, drug side effect (tacro, cefepime, compazine), primary neurologic disease, essential tremor  -cefepime stopped 10/10  -tacrolimus level wnl, unlikely  -hold compazine for now  -ionized calcium on PM draw  -replete magnesium to >2  -if not improving by 10/12, consider neurology consult     DISPO:  - Full Code  - NOK: spouse Genevieve ([phone])  - PICC  - Oncologist: Jerod     Pt discussed w/ Dr. Kesha Deras MD PhD

## 2024-10-11 NOTE — ASSESSMENT & PLAN NOTE
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% jaime) w/ trisomy 8, DNMT alpha, and U2AF1 mutation indication adverse risk   - s/p 4 cycles of Decitabine/Venetoclax (C4D1 on 8/12/24)  - BMBx 6/17/24: Blasts cleared, FISH still positive for trisomy 8, still MDS changes   - BMBx 9/5/24: 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 and no increase in blast  - persistent lack of engraftment, now past expected median engraftment date for single cord (d21)  - will send out plasma CXCL9 level to [facility] to assess for immunologic graft rejection  - Can repeat a BMbx after a month if counts do not improve and then consider repeating another cord transplant if required     # Transplant  CONDITIONING Fludarabine, melphalan, and TBI   DONOR Single umbilical cord   MATCH GRADE 6/8   SEX MATCH Mismatched   ABO DONOR A pos   ABO RECIPIENT A pos   GVHD PROPHYLAXIS Tacrolimus and Mycophenolate   GRAFT SOURCE Single umbilical cord   CMV DONOR negative   CMV RECIPIENT positive      # GVHD prophy  - Tacro level (10/8): 10 [was drawn around 3 am, 3 hrs earlier than usual draw]  - Cont Tacro 2 mg BID  - Checking levels daily  - stop all mycophenolate today for concern of myelosuppression  - Viral surveillance qMondays --> T+14 and T+20 all negative (EBV, CMV, adeno, HHV6),      HEME  # Anemia and Leukopenia: secondary to disease  and chemotherapy  # 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 Switched to PO levaquin 10/11  # Prophylaxis: Acyclovir, Vancomycin, Posaconazole, Letermovir (started T+14), and Bactrim (to start T+30), Ursodiol      # Maculopapular rash (9/27), now resolved, likely d/t Josefina  - appearance highly suggestive of meropenem reaction vs pre-engraftment syndrome  - resolved by 10/1, returned 10/3 on legs, currently improving  - maculopapular on inner thighs, non-itching, non-painful, blanching  - will continue to monitor      CARDIO/PULM:  # Hx of STEMI (11/11/2020)  - TTE 7/19/24: EF 59%, otherwise unremarkable   - Was cleared by Cardiology for transplant   # Hx of HTN   - Nifedipine: 90 mEq ER 24 hr tablet   # Ascending aorta dilation   - Echo showing 3.8-4 cm dilation      FEN/RENAL  - Admit wt: 75.6 kg (9/13/24), Most recent wt: 71.3 kg (10/6). Needs daily weights  - Allergic to Lasix, will not take med (extreme hypotension, extreme hypokalemia)     # Chemo induced Diarrhea  - Cdiff & stool path neg (9/22)  - diarrhea returned 9/26 AM, along with fever  - etiology: likely melphalan-related mucositis/enteritis  - Cont loperamide 2mg q6h PRN  - 40mEq KCL IV and 4g IV mag today    # Hx of treated Hep C in 2015  - (9/12) Hep C RNA - not detected      :  # Hx of BPH and urinary retention (2023)  - Hx elevated PSA    - 7/28/21 Bx: atypical small acinar proliferation    - 4/7/23 Bx: Benign    NEURO/PSYCH:  #Tremor vs fasciculation  -since 10/10, pt reported tremor that was not present prior to admission  -on exam, fine, high-frequency (7+ Hz) tremor worst in the hands, exacerbated by  effort, associated with significant fasciculations of the shoulder and back muscles; also fasciculations in jaw  -strength normal, reflexes 2+  -etiology: hypocalcemia, hypomagnesemia, drug side effect (tacro, cefepime, compazine), primary neurologic disease, essential tremor  -cefepime stopped 10/10  -tacrolimus level wnl, unlikely  -hold compazine for now  -ionized calcium on PM draw  -replete magnesium to >2  -if not improving by 10/12, consider neurology consult     DISPO:  - Full Code  - NOK: spouse Genevieve ([phone])  - PICC  - Oncologist: Jerod     Pt discussed w/ Dr. Rebolledo

## 2024-10-11 NOTE — CARE PLAN
Problem: Pain - Adult  Goal: Verbalizes/displays adequate comfort level or baseline comfort level  Outcome: Progressing     Problem: Safety - Adult  Goal: Free from fall injury  Outcome: Progressing     Problem: Discharge Planning  Goal: Discharge to home or other facility with appropriate resources  Outcome: Progressing     Problem: Chronic Conditions and Co-morbidities  Goal: Patient's chronic conditions and co-morbidity symptoms are monitored and maintained or improved  Outcome: Progressing     Problem: Nutrition  Goal: Oral intake greater 75%  Outcome: Progressing  Goal: Adequate PO fluid intake  Outcome: Progressing  Goal: Maintain stable weight  Outcome: Progressing     Problem: Nutrition  Goal: Oral intake greater 75%  Outcome: Progressing  Goal: Adequate PO fluid intake  Outcome: Progressing  Goal: Maintain stable weight  Outcome: Progressing   The patient's goals for the shift include      The clinical goals for the shift include Patient will be hemodynamically stable

## 2024-10-12 LAB
ALBUMIN SERPL BCP-MCNC: 3.2 G/DL (ref 3.4–5)
ALP SERPL-CCNC: 65 U/L (ref 33–136)
ALT SERPL W P-5'-P-CCNC: 11 U/L (ref 10–52)
ANION GAP SERPL CALC-SCNC: 12 MMOL/L (ref 10–20)
APTT PPP: 31 SECONDS (ref 27–38)
AST SERPL W P-5'-P-CCNC: 10 U/L (ref 9–39)
BASOPHILS # BLD MANUAL: 0 X10*3/UL (ref 0–0.1)
BASOPHILS NFR BLD MANUAL: 0 %
BILIRUB SERPL-MCNC: 0.8 MG/DL (ref 0–1.2)
BUN SERPL-MCNC: 7 MG/DL (ref 6–23)
CA-I BLD-SCNC: 1.11 MMOL/L (ref 1.1–1.33)
CALCIUM SERPL-MCNC: 8.3 MG/DL (ref 8.6–10.6)
CHLORIDE SERPL-SCNC: 103 MMOL/L (ref 98–107)
CO2 SERPL-SCNC: 26 MMOL/L (ref 21–32)
CREAT SERPL-MCNC: 0.72 MG/DL (ref 0.5–1.3)
EGFRCR SERPLBLD CKD-EPI 2021: >90 ML/MIN/1.73M*2
EOSINOPHIL # BLD MANUAL: 0 X10*3/UL (ref 0–0.7)
EOSINOPHIL NFR BLD MANUAL: 0 %
ERYTHROCYTE [DISTWIDTH] IN BLOOD BY AUTOMATED COUNT: 16.7 % (ref 11.5–14.5)
FIBRINOGEN PPP-MCNC: 361 MG/DL (ref 200–400)
GLUCOSE SERPL-MCNC: 95 MG/DL (ref 74–99)
HCT VFR BLD AUTO: 21 % (ref 41–52)
HGB BLD-MCNC: 7.5 G/DL (ref 13.5–17.5)
IMM GRANULOCYTES # BLD AUTO: 0 X10*3/UL (ref 0–0.7)
IMM GRANULOCYTES NFR BLD AUTO: 0 % (ref 0–0.9)
INR PPP: 1.1 (ref 0.9–1.1)
LYMPHOCYTES # BLD MANUAL: 0.1 X10*3/UL (ref 1.2–4.8)
LYMPHOCYTES NFR BLD MANUAL: 100 %
MAGNESIUM SERPL-MCNC: 1.91 MG/DL (ref 1.6–2.4)
MCH RBC QN AUTO: 26.5 PG (ref 26–34)
MCHC RBC AUTO-ENTMCNC: 35.7 G/DL (ref 32–36)
MCV RBC AUTO: 74 FL (ref 80–100)
MONOCYTES # BLD MANUAL: 0 X10*3/UL (ref 0.1–1)
MONOCYTES NFR BLD MANUAL: 0 %
NEUTS SEG # BLD MANUAL: 0 X10*3/UL (ref 1.2–7)
NEUTS SEG NFR BLD MANUAL: 0 %
NRBC BLD-RTO: 0 /100 WBCS (ref 0–0)
PHOSPHATE SERPL-MCNC: 3.7 MG/DL (ref 2.5–4.9)
PLATELET # BLD AUTO: 41 X10*3/UL (ref 150–450)
POTASSIUM SERPL-SCNC: 3.5 MMOL/L (ref 3.5–5.3)
PROT SERPL-MCNC: 5.9 G/DL (ref 6.4–8.2)
PROTHROMBIN TIME: 12.7 SECONDS (ref 9.8–12.8)
RBC # BLD AUTO: 2.83 X10*6/UL (ref 4.5–5.9)
RBC MORPH BLD: ABNORMAL
SCHISTOCYTES BLD QL SMEAR: ABNORMAL
SODIUM SERPL-SCNC: 137 MMOL/L (ref 136–145)
TACROLIMUS BLD-MCNC: 9.5 NG/ML
TARGETS BLD QL SMEAR: ABNORMAL
TOTAL CELLS COUNTED BLD: 11
WBC # BLD AUTO: 0.1 X10*3/UL (ref 4.4–11.3)

## 2024-10-12 PROCEDURE — 80053 COMPREHEN METABOLIC PANEL: CPT

## 2024-10-12 PROCEDURE — 82330 ASSAY OF CALCIUM: CPT

## 2024-10-12 PROCEDURE — 2500000001 HC RX 250 WO HCPCS SELF ADMINISTERED DRUGS (ALT 637 FOR MEDICARE OP): Performed by: INTERNAL MEDICINE

## 2024-10-12 PROCEDURE — 85384 FIBRINOGEN ACTIVITY: CPT

## 2024-10-12 PROCEDURE — 2500000001 HC RX 250 WO HCPCS SELF ADMINISTERED DRUGS (ALT 637 FOR MEDICARE OP)

## 2024-10-12 PROCEDURE — 2500000004 HC RX 250 GENERAL PHARMACY W/ HCPCS (ALT 636 FOR OP/ED): Mod: JZ | Performed by: INTERNAL MEDICINE

## 2024-10-12 PROCEDURE — 83735 ASSAY OF MAGNESIUM: CPT

## 2024-10-12 PROCEDURE — 84100 ASSAY OF PHOSPHORUS: CPT

## 2024-10-12 PROCEDURE — 85027 COMPLETE CBC AUTOMATED: CPT

## 2024-10-12 PROCEDURE — 99232 SBSQ HOSP IP/OBS MODERATE 35: CPT | Performed by: STUDENT IN AN ORGANIZED HEALTH CARE EDUCATION/TRAINING PROGRAM

## 2024-10-12 PROCEDURE — 85007 BL SMEAR W/DIFF WBC COUNT: CPT

## 2024-10-12 PROCEDURE — 2500000004 HC RX 250 GENERAL PHARMACY W/ HCPCS (ALT 636 FOR OP/ED): Performed by: NURSE PRACTITIONER

## 2024-10-12 PROCEDURE — 2500000002 HC RX 250 W HCPCS SELF ADMINISTERED DRUGS (ALT 637 FOR MEDICARE OP, ALT 636 FOR OP/ED): Performed by: STUDENT IN AN ORGANIZED HEALTH CARE EDUCATION/TRAINING PROGRAM

## 2024-10-12 PROCEDURE — 2500000002 HC RX 250 W HCPCS SELF ADMINISTERED DRUGS (ALT 637 FOR MEDICARE OP, ALT 636 FOR OP/ED): Performed by: PHYSICIAN ASSISTANT

## 2024-10-12 PROCEDURE — 1170000001 HC PRIVATE ONCOLOGY ROOM DAILY

## 2024-10-12 PROCEDURE — 85610 PROTHROMBIN TIME: CPT

## 2024-10-12 PROCEDURE — 80197 ASSAY OF TACROLIMUS: CPT | Performed by: INTERNAL MEDICINE

## 2024-10-12 PROCEDURE — 2500000002 HC RX 250 W HCPCS SELF ADMINISTERED DRUGS (ALT 637 FOR MEDICARE OP, ALT 636 FOR OP/ED): Performed by: INTERNAL MEDICINE

## 2024-10-12 RX ORDER — POTASSIUM CHLORIDE 20 MEQ/1
20 TABLET, EXTENDED RELEASE ORAL ONCE
Status: COMPLETED | OUTPATIENT
Start: 2024-10-12 | End: 2024-10-12

## 2024-10-12 ASSESSMENT — PAIN - FUNCTIONAL ASSESSMENT
PAIN_FUNCTIONAL_ASSESSMENT: 0-10

## 2024-10-12 ASSESSMENT — COGNITIVE AND FUNCTIONAL STATUS - GENERAL
DAILY ACTIVITIY SCORE: 24
MOBILITY SCORE: 24
MOBILITY SCORE: 24
DAILY ACTIVITIY SCORE: 24

## 2024-10-12 ASSESSMENT — PAIN SCALES - GENERAL
PAINLEVEL_OUTOF10: 0 - NO PAIN

## 2024-10-12 NOTE — ASSESSMENT & PLAN NOTE
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      T+23  Single-umbilical cord transplant (T0=9/19/24)    Updates (10/12):  - waiting for plasma CXCL9 as send-out to [place]  - Consider repeat BMBx next week if no count recovery     ONC  # MDS/AML   - Symptoms began 9/2023; diagnosed 4/2024  - BMBx showing MDS in transition to AML (>10% jaime) w/ trisomy 8, DNMT alpha, and U2AF1 mutation indication adverse risk   - s/p 4 cycles of Decitabine/Venetoclax (C4D1 on 8/12/24)  - BMBx 6/17/24: Blasts cleared, FISH still positive for trisomy 8, still MDS changes   - BMBx 9/5/24: 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 and no increase in blast  - persistent lack of engraftment, now past expected median engraftment date for single cord (d21)  - will send out plasma CXCL9 level to [place] to assess for immunologic graft rejection  - Can repeat a BMbx after a month if counts do not improve and then consider repeating another cord transplant if required     # Transplant  CONDITIONING Fludarabine, melphalan, and TBI   DONOR Single umbilical cord   MATCH GRADE 6/8   SEX MATCH Mismatched   ABO DONOR A pos   ABO RECIPIENT A pos   GVHD PROPHYLAXIS Tacrolimus and Mycophenolate   GRAFT SOURCE Single umbilical cord   CMV DONOR negative   CMV RECIPIENT positive      # GVHD prophy  - Tacro level (10/12): 9.5 --> cont current dose  - Cont Tacro 2 mg BID  - Check levels daily  - stopped mycophenolate d/t concern for myelosuppression (10/11)  - Viral surveillance qMondays --> T+14 and T+20 all negative (EBV, CMV, adeno, HHV6)     HEME  # Pancytopenia: secondary to chemotherapy  # 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 Switched to PO levaquin 10/11  # Prophylaxis: Acyclovir, Vancomycin, Posaconazole, Letermovir (started T+14), and Bactrim (to start T+30), Ursodiol      # Maculopapular rash (9/27), now resolved, likely d/t Meropenem  - appearance highly suggestive of meropenem reaction vs pre-engraftment syndrome  - maculopapular on inner thighs, non-itching, non-painful, blanching--resolved  - will continue to monitor      CARDIO/PULM:  # Hx of STEMI (11/11/2020)  - TTE 7/19/24: EF 59%, otherwise unremarkable   - Was cleared by Cardiology for transplant   # Hx of HTN   - Nifedipine: 90 mEq ER 24 hr tablet   # Ascending aorta dilation   - Echo showing 3.8-4 cm dilation      FEN/RENAL  - Admit wt: 75.6 kg (9/13/24), Most recent wt: 71.3 kg (10/6). Needs daily weights  - Allergic to Lasix, will not take med (extreme hypotension, extreme hypokalemia)     # Chemo induced Diarrhea  - Cdiff & stool path neg (9/22)  - etiology: likely melphalan-related mucositis/enteritis  - Cont loperamide 2mg q6h PRN  # Monitor electrolytes and replace prn  # Hx of treated Hep C in 2015  - (9/12) Hep C RNA - not detected      :  # Hx of BPH and urinary retention (2023)  - Hx elevated PSA    - 7/28/21 Bx: atypical small acinar proliferation    - 4/7/23 Bx: Benign    NEURO/PSYCH:  #Tremor vs fasciculation  -since 10/10, pt reported tremor that was not present prior to admission  -on exam, fine, high-frequency (7+ Hz) tremor worst in the hands, exacerbated by effort, associated with significant fasciculations of the shoulder and back muscles; also fasciculations in jaw  -strength normal, reflexes 2+  -etiology: hypocalcemia, hypomagnesemia, drug side effect (tacro, cefepime, compazine), primary neurologic disease, essential tremor  -cefepime stopped 10/10  -tacrolimus  level wnl, unlikely  -hold compazine for now  -replete magnesium to >2  -if not improving, consider neurology consult     DISPO:  - Full Code  - NOK: spouse Genevieve ([phone])  - PICC  - Oncologist: Jerod     Pt discussed w/ Dr. Hernan Rebolledo

## 2024-10-12 NOTE — CARE PLAN
Problem: Pain - Adult  Goal: Verbalizes/displays adequate comfort level or baseline comfort level  Outcome: Progressing     Problem: Safety - Adult  Goal: Free from fall injury  Outcome: Progressing     Problem: Discharge Planning  Goal: Discharge to home or other facility with appropriate resources  Outcome: Progressing     Problem: Chronic Conditions and Co-morbidities  Goal: Patient's chronic conditions and co-morbidity symptoms are monitored and maintained or improved  Outcome: Progressing     Problem: Nutrition  Goal: Oral intake greater 75%  Outcome: Progressing  Goal: Adequate PO fluid intake  Outcome: Progressing  Goal: Maintain stable weight  Outcome: Progressing       The clinical goals for the shift include Patient will be hemodynamically stable

## 2024-10-12 NOTE — CARE PLAN
Problem: Pain - Adult  Goal: Verbalizes/displays adequate comfort level or baseline comfort level  Outcome: Progressing     Problem: Safety - Adult  Goal: Free from fall injury  Outcome: Progressing     Problem: Discharge Planning  Goal: Discharge to home or other facility with appropriate resources  Outcome: Progressing     Problem: Chronic Conditions and Co-morbidities  Goal: Patient's chronic conditions and co-morbidity symptoms are monitored and maintained or improved  Outcome: Progressing     Problem: Nutrition  Goal: Oral intake greater 75%  Outcome: Progressing  Goal: Adequate PO fluid intake  Outcome: Progressing  Goal: Maintain stable weight  Outcome: Progressing   The patient's goals for the shift include      The clinical goals for the shift include Patient will be hemodynamically stable

## 2024-10-12 NOTE — PROGRESS NOTES
Brandt Merritt is a 64 y.o. male on day 29 of admission presenting with Acute myeloid leukemia in remission (Multi).    Subjective   Afebrile. No acute issues overnight. Denies HA, dizziness, CP, palpitations, SOB, abd pain, N/V/D or bleeding. Appetite fair. ROS otherwise unremarkable.       Objective     Physical Exam  Constitutional:       General: He is not in acute distress.     Appearance: Normal appearance. He is normal weight. He is not ill-appearing, toxic-appearing or diaphoretic.   HENT:      Head: Normocephalic and atraumatic.      Right Ear: External ear normal.      Left Ear: External ear normal.      Nose: Nose normal. No congestion or rhinorrhea.      Mouth/Throat:      Mouth: Mucous membranes are moist.      Pharynx: Oropharynx is clear. No oropharyngeal exudate or posterior oropharyngeal erythema.   Eyes:      General: No scleral icterus.        Right eye: No discharge.         Left eye: No discharge.      Extraocular Movements: Extraocular movements intact.      Conjunctiva/sclera: Conjunctivae normal.   Cardiovascular:      Rate and Rhythm: Normal rate and regular rhythm.      Heart sounds: Normal heart sounds. No murmur heard.     No friction rub. No gallop.   Pulmonary:      Effort: Pulmonary effort is normal. No respiratory distress.      Breath sounds: No stridor. No wheezing, rhonchi or rales.   Chest:      Chest wall: No tenderness.   Abdominal:      General: Abdomen is flat. Bowel sounds are normal. There is no distension.      Palpations: Abdomen is soft. There is no mass.      Tenderness: There is no abdominal tenderness. There is no guarding or rebound.      Hernia: No hernia is present.   Musculoskeletal:         General: No swelling, tenderness, deformity or signs of injury. Normal range of motion.      Cervical back: Normal range of motion.      Right lower leg: No edema.      Left lower leg: No edema.   Skin:     General: Skin is warm and dry.      Coloration: Skin is not jaundiced  "or pale.      Findings: No bruising, erythema, lesion or rash.   Neurological:      General: No focal deficit present.      Mental Status: He is alert and oriented to person, place, and time.      Sensory: Sensation is intact.      Motor: Tremor present. No weakness.      Coordination: Coordination is intact.      Gait: Gait is intact.      Deep Tendon Reflexes:      Reflex Scores:       Brachioradialis reflexes are 2+ on the right side and 2+ on the left side.       Patellar reflexes are 2+ on the right side and 2+ on the left side.     Comments: Tremor vs muscle fasciculations in bilateral arms, jaw, and back, exacerbated by effort.    Psychiatric:         Mood and Affect: Mood normal.         Behavior: Behavior normal.         Thought Content: Thought content normal.         Judgment: Judgment normal.       Last Recorded Vitals  Blood pressure 116/71, pulse 91, temperature 36.4 °C (97.5 °F), temperature source Temporal, resp. rate 18, height (S) 1.664 m (5' 5.51\"), weight 71.3 kg (157 lb 3 oz), SpO2 99%.  Intake/Output last 3 Shifts:  I/O last 3 completed shifts:  In: 1304 (18.3 mL/kg) [P.O.:540; Blood:664; IV Piggyback:100]  Out: - (0 mL/kg)   Weight: 71.3 kg     Relevant Results      Scheduled medications  acyclovir, 400 mg, oral, q12h  filgrastim or biosimilar, 300 mcg, subcutaneous, q24h  folic acid, 1 mg, oral, Daily  letermovir, 480 mg, oral, Daily  levoFLOXacin, 500 mg, oral, q24h JALYN  NIFEdipine ER, 90 mg, oral, Daily before breakfast  posaconazole, 300 mg, oral, q24h  tacrolimus, 2 mg, oral, q12h JALYN  ursodiol, 300 mg, oral, TID  vancomycin, 125 mg, oral, Daily      Continuous medications     PRN medications  PRN medications: acetaminophen, albuterol, alteplase, dextrose, diphenhydrAMINE, EPINEPHrine HCl, famotidine, loperamide, methylPREDNISolone sodium succinate (PF), ondansetron, [Held by provider] prochlorperazine, [Held by provider] prochlorperazine, sodium chloride      This patient has a central " line   Reason for the central line remaining today? 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      T+23  Single-umbilical cord transplant (T0=9/19/24)    Updates (10/12):  - waiting for plasma CXCL9 as send-out to [facility]  - Consider repeat BMBx next week if no count recovery     ONC  # MDS/AML   - Symptoms began 9/2023; diagnosed 4/2024  - BMBx showing MDS in transition to AML (>10% jaime) w/ trisomy 8, DNMT alpha, and U2AF1 mutation indication adverse risk   - s/p 4 cycles of Decitabine/Venetoclax (C4D1 on 8/12/24)  - BMBx 6/17/24: Blasts cleared, FISH still positive for trisomy 8, still MDS changes   - BMBx 9/5/24: 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 and no increase in blast  - persistent lack of engraftment, now past expected median engraftment date for single cord (d21)  - will send out plasma CXCL9 level to [facility] to assess for immunologic graft rejection  - Can repeat a BMbx after a month if counts do not improve and then consider repeating another cord transplant if required     # Transplant  CONDITIONING Fludarabine, melphalan, and TBI   DONOR Single umbilical cord   MATCH GRADE 6/8   SEX MATCH Mismatched   ABO DONOR A pos   ABO RECIPIENT A pos   GVHD PROPHYLAXIS Tacrolimus and Mycophenolate   GRAFT SOURCE Single umbilical cord   CMV DONOR negative   CMV RECIPIENT positive      # GVHD prophy  - Tacro level (10/12): 9.5 --> cont current dose  - Cont Tacro 2 mg BID  - Check levels daily  - stopped mycophenolate d/t concern for myelosuppression (10/11)  - Viral surveillance qMondays --> T+14  and T+20 all negative (EBV, CMV, adeno, HHV6)     HEME  # Pancytopenia: secondary to chemotherapy  # 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 Switched to PO levaquin 10/11  # Prophylaxis: Acyclovir, Vancomycin, Posaconazole, Letermovir (started T+14), and Bactrim (to start T+30), Ursodiol      # Maculopapular rash (9/27), now resolved, likely d/t Meropenem  - appearance highly suggestive of meropenem reaction vs pre-engraftment syndrome  - maculopapular on inner thighs, non-itching, non-painful, blanching--resolved  - will continue to monitor      CARDIO/PULM:  # Hx of STEMI (11/11/2020)  - TTE 7/19/24: EF 59%, otherwise unremarkable   - Was cleared by Cardiology for transplant   # Hx of HTN   - Nifedipine: 90 mEq ER 24 hr tablet   # Ascending aorta dilation   - Echo showing 3.8-4 cm dilation      FEN/RENAL  - Admit wt: 75.6 kg (9/13/24), Most recent wt: 71.3 kg (10/6). Needs daily weights  - Allergic to Lasix, will not take med (extreme hypotension, extreme hypokalemia)     # Chemo induced Diarrhea  - Cdiff & stool path neg (9/22)  - etiology: likely melphalan-related mucositis/enteritis  - Cont loperamide 2mg q6h PRN  # Monitor electrolytes and replace prn  # Hx of treated Hep C in 2015  - (9/12) Hep C RNA - not detected      :  # Hx of BPH and urinary retention (2023)  - Hx elevated PSA    - 7/28/21 Bx: atypical small acinar proliferation    - 4/7/23 Bx: Benign    NEURO/PSYCH:  #Tremor vs fasciculation  -since 10/10, pt reported tremor that was not present prior to admission  -on exam, fine, high-frequency (7+ Hz) tremor worst in the hands, exacerbated by effort, associated with  significant fasciculations of the shoulder and back muscles; also fasciculations in jaw  -strength normal, reflexes 2+  -etiology: hypocalcemia, hypomagnesemia, drug side effect (tacro, cefepime, compazine), primary neurologic disease, essential tremor  -cefepime stopped 10/10  -tacrolimus level wnl, unlikely  -hold compazine for now  -replete magnesium to >2  -if not improving, consider neurology consult     DISPO:  - Full Code  - NOK: spouse Genevieve ([phone])  - PICC  - Oncologist: Jerod     Pt discussed w/ Dr. Hernan Bobby PA-C

## 2024-10-12 NOTE — CARE PLAN
The patient's goals for the shift include finish IV infusions    The clinical goals for the shift include Patient will be hemodynamically stable      Problem: Pain - Adult  Goal: Verbalizes/displays adequate comfort level or baseline comfort level  Outcome: Progressing     Problem: Safety - Adult  Goal: Free from fall injury  Outcome: Progressing     Problem: Discharge Planning  Goal: Discharge to home or other facility with appropriate resources  Outcome: Progressing     Problem: Chronic Conditions and Co-morbidities  Goal: Patient's chronic conditions and co-morbidity symptoms are monitored and maintained or improved  Outcome: Progressing     Problem: Nutrition  Goal: Oral intake greater 75%  Outcome: Progressing  Goal: Adequate PO fluid intake  Outcome: Progressing  Goal: Maintain stable weight  Outcome: Progressing

## 2024-10-12 NOTE — NURSING NOTE
Nursing Note  Patient remains afebrile, continue to receive PO antibiotics. Vital signs stable . Denies any pain , nausea, vomiting and diarrhea during this shift. Patient transfused with one unit of platelets and one unit of  PRBCs. Tolerated platelets and PRBCs transfusion well. No signs and symptoms of allergic reaction present.PO intake fair. Voids clear yellow urine adequate amount. Ambulating in room .Will continue to  monitor patient and will notify MD of acute changes.

## 2024-10-13 VITALS
WEIGHT: 155.2 LBS | TEMPERATURE: 99.5 F | RESPIRATION RATE: 18 BRPM | DIASTOLIC BLOOD PRESSURE: 75 MMHG | HEIGHT: 66 IN | SYSTOLIC BLOOD PRESSURE: 122 MMHG | OXYGEN SATURATION: 96 % | BODY MASS INDEX: 24.94 KG/M2 | HEART RATE: 85 BPM

## 2024-10-13 LAB
ALBUMIN SERPL BCP-MCNC: 3.4 G/DL (ref 3.4–5)
ALP SERPL-CCNC: 68 U/L (ref 33–136)
ALT SERPL W P-5'-P-CCNC: 10 U/L (ref 10–52)
ANION GAP SERPL CALC-SCNC: 11 MMOL/L (ref 10–20)
APTT PPP: 31 SECONDS (ref 27–38)
AST SERPL W P-5'-P-CCNC: 11 U/L (ref 9–39)
BASOPHILS # BLD AUTO: 0 X10*3/UL (ref 0–0.1)
BASOPHILS NFR BLD AUTO: 0 %
BILIRUB SERPL-MCNC: 0.8 MG/DL (ref 0–1.2)
BUN SERPL-MCNC: 9 MG/DL (ref 6–23)
CALCIUM SERPL-MCNC: 8.5 MG/DL (ref 8.6–10.6)
CHLORIDE SERPL-SCNC: 104 MMOL/L (ref 98–107)
CO2 SERPL-SCNC: 27 MMOL/L (ref 21–32)
CREAT SERPL-MCNC: 0.87 MG/DL (ref 0.5–1.3)
EGFRCR SERPLBLD CKD-EPI 2021: >90 ML/MIN/1.73M*2
EOSINOPHIL # BLD AUTO: 0 X10*3/UL (ref 0–0.7)
EOSINOPHIL NFR BLD AUTO: 0 %
ERYTHROCYTE [DISTWIDTH] IN BLOOD BY AUTOMATED COUNT: 16.5 % (ref 11.5–14.5)
FIBRINOGEN PPP-MCNC: 403 MG/DL (ref 200–400)
GLUCOSE SERPL-MCNC: 90 MG/DL (ref 74–99)
HCT VFR BLD AUTO: 20.7 % (ref 41–52)
HGB BLD-MCNC: 7.4 G/DL (ref 13.5–17.5)
IMM GRANULOCYTES # BLD AUTO: 0 X10*3/UL (ref 0–0.7)
IMM GRANULOCYTES NFR BLD AUTO: 0 % (ref 0–0.9)
INR PPP: 1.1 (ref 0.9–1.1)
LYMPHOCYTES # BLD AUTO: 0.1 X10*3/UL (ref 1.2–4.8)
LYMPHOCYTES NFR BLD AUTO: 83.3 %
MAGNESIUM SERPL-MCNC: 1.29 MG/DL (ref 1.6–2.4)
MCH RBC QN AUTO: 26.3 PG (ref 26–34)
MCHC RBC AUTO-ENTMCNC: 35.7 G/DL (ref 32–36)
MCV RBC AUTO: 74 FL (ref 80–100)
MONOCYTES # BLD AUTO: 0.01 X10*3/UL (ref 0.1–1)
MONOCYTES NFR BLD AUTO: 8.3 %
NEUTROPHILS # BLD AUTO: 0.01 X10*3/UL (ref 1.2–7.7)
NEUTROPHILS NFR BLD AUTO: 8.4 %
NRBC BLD-RTO: 0 /100 WBCS (ref 0–0)
OVALOCYTES BLD QL SMEAR: NORMAL
PHOSPHATE SERPL-MCNC: 3.7 MG/DL (ref 2.5–4.9)
PLATELET # BLD AUTO: 30 X10*3/UL (ref 150–450)
POTASSIUM SERPL-SCNC: 3.4 MMOL/L (ref 3.5–5.3)
PROT SERPL-MCNC: 6.1 G/DL (ref 6.4–8.2)
PROTHROMBIN TIME: 12.7 SECONDS (ref 9.8–12.8)
RBC # BLD AUTO: 2.81 X10*6/UL (ref 4.5–5.9)
RBC MORPH BLD: NORMAL
SODIUM SERPL-SCNC: 139 MMOL/L (ref 136–145)
TACROLIMUS BLD-MCNC: 10.1 NG/ML
TARGETS BLD QL SMEAR: NORMAL
WBC # BLD AUTO: 0.1 X10*3/UL (ref 4.4–11.3)

## 2024-10-13 PROCEDURE — 2500000002 HC RX 250 W HCPCS SELF ADMINISTERED DRUGS (ALT 637 FOR MEDICARE OP, ALT 636 FOR OP/ED): Performed by: PHYSICIAN ASSISTANT

## 2024-10-13 PROCEDURE — 80197 ASSAY OF TACROLIMUS: CPT | Performed by: INTERNAL MEDICINE

## 2024-10-13 PROCEDURE — 83735 ASSAY OF MAGNESIUM: CPT

## 2024-10-13 PROCEDURE — 2500000001 HC RX 250 WO HCPCS SELF ADMINISTERED DRUGS (ALT 637 FOR MEDICARE OP)

## 2024-10-13 PROCEDURE — 2500000005 HC RX 250 GENERAL PHARMACY W/O HCPCS

## 2024-10-13 PROCEDURE — 2500000004 HC RX 250 GENERAL PHARMACY W/ HCPCS (ALT 636 FOR OP/ED): Performed by: PHYSICIAN ASSISTANT

## 2024-10-13 PROCEDURE — 85384 FIBRINOGEN ACTIVITY: CPT

## 2024-10-13 PROCEDURE — 85610 PROTHROMBIN TIME: CPT

## 2024-10-13 PROCEDURE — 85025 COMPLETE CBC W/AUTO DIFF WBC: CPT

## 2024-10-13 PROCEDURE — 2500000001 HC RX 250 WO HCPCS SELF ADMINISTERED DRUGS (ALT 637 FOR MEDICARE OP): Performed by: INTERNAL MEDICINE

## 2024-10-13 PROCEDURE — 2500000002 HC RX 250 W HCPCS SELF ADMINISTERED DRUGS (ALT 637 FOR MEDICARE OP, ALT 636 FOR OP/ED): Performed by: INTERNAL MEDICINE

## 2024-10-13 PROCEDURE — 2500000004 HC RX 250 GENERAL PHARMACY W/ HCPCS (ALT 636 FOR OP/ED): Performed by: NURSE PRACTITIONER

## 2024-10-13 PROCEDURE — 80053 COMPREHEN METABOLIC PANEL: CPT

## 2024-10-13 PROCEDURE — 99233 SBSQ HOSP IP/OBS HIGH 50: CPT | Performed by: STUDENT IN AN ORGANIZED HEALTH CARE EDUCATION/TRAINING PROGRAM

## 2024-10-13 PROCEDURE — 1170000001 HC PRIVATE ONCOLOGY ROOM DAILY

## 2024-10-13 PROCEDURE — 2500000004 HC RX 250 GENERAL PHARMACY W/ HCPCS (ALT 636 FOR OP/ED): Mod: JZ | Performed by: INTERNAL MEDICINE

## 2024-10-13 PROCEDURE — 2500000002 HC RX 250 W HCPCS SELF ADMINISTERED DRUGS (ALT 637 FOR MEDICARE OP, ALT 636 FOR OP/ED): Performed by: STUDENT IN AN ORGANIZED HEALTH CARE EDUCATION/TRAINING PROGRAM

## 2024-10-13 PROCEDURE — 84100 ASSAY OF PHOSPHORUS: CPT

## 2024-10-13 RX ORDER — MAGNESIUM SULFATE HEPTAHYDRATE 40 MG/ML
4 INJECTION, SOLUTION INTRAVENOUS ONCE
Status: COMPLETED | OUTPATIENT
Start: 2024-10-13 | End: 2024-10-13

## 2024-10-13 RX ORDER — POTASSIUM CHLORIDE 20 MEQ/1
40 TABLET, EXTENDED RELEASE ORAL ONCE
Status: COMPLETED | OUTPATIENT
Start: 2024-10-13 | End: 2024-10-13

## 2024-10-13 ASSESSMENT — COGNITIVE AND FUNCTIONAL STATUS - GENERAL
DAILY ACTIVITIY SCORE: 24
MOBILITY SCORE: 24
MOBILITY SCORE: 24
DAILY ACTIVITIY SCORE: 24

## 2024-10-13 ASSESSMENT — PAIN SCALES - GENERAL
PAINLEVEL_OUTOF10: 0 - NO PAIN

## 2024-10-13 ASSESSMENT — PAIN - FUNCTIONAL ASSESSMENT: PAIN_FUNCTIONAL_ASSESSMENT: 0-10

## 2024-10-13 NOTE — ASSESSMENT & PLAN NOTE
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      T+24  Single-umbilical cord transplant (T0=9/19/24)    Updates (10/12):  - waiting for plasma CXCL9 as send-out to [facility]  - Consider repeat BMBx next week if no count recovery     ONC  # MDS/AML   - Symptoms began 9/2023; diagnosed 4/2024  - BMBx showing MDS in transition to AML (>10% jaime) w/ trisomy 8, DNMT alpha, and U2AF1 mutation indication adverse risk   - s/p 4 cycles of Decitabine/Venetoclax (C4D1 on 8/12/24)  - BMBx 6/17/24: Blasts cleared, FISH still positive for trisomy 8, still MDS changes   - BMBx 9/5/24: 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 and no increase in blast  - persistent lack of engraftment, now past expected median engraftment date for single cord (d21)  - will send out plasma CXCL9 level to [facility] to assess for immunologic graft rejection  - Can repeat a BMbx after a month if counts do not improve and then consider repeating another cord transplant if required     # Transplant  CONDITIONING Fludarabine, melphalan, and TBI   DONOR Single umbilical cord   MATCH GRADE 6/8   SEX MATCH Mismatched   ABO DONOR A pos   ABO RECIPIENT A pos   GVHD PROPHYLAXIS Tacrolimus and Mycophenolate   GRAFT SOURCE Single umbilical cord   CMV DONOR negative   CMV RECIPIENT positive      # GVHD prophy  - Tacro level (10/13): 10.1 --> cont current dose  - Cont Tacro 2 mg BID  - Check levels daily  - stopped mycophenolate d/t concern for myelosuppression (10/11)  - Viral surveillance qMondays --> T+14 and T+20 all negative (EBV, CMV, adeno, HHV6)     HEME  # Pancytopenia: secondary to chemotherapy  # 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 Switched to PO levaquin 10/11  # Prophylaxis: Acyclovir, Vancomycin, Posaconazole, Letermovir (started T+14), and Bactrim (to start T+30), Ursodiol      # Maculopapular rash (9/27), now resolved, likely d/t Meropenem  - appearance highly suggestive of meropenem reaction vs pre-engraftment syndrome  - maculopapular on inner thighs, non-itching, non-painful, blanching--resolved  - will continue to monitor      CARDIO/PULM:  # Hx of STEMI (11/11/2020)  - TTE 7/19/24: EF 59%, otherwise unremarkable   - Was cleared by Cardiology for transplant   # Hx of HTN   - Nifedipine: 90 mEq ER 24 hr tablet   # Ascending aorta dilation   - Echo showing 3.8-4 cm dilation      FEN/RENAL  - Admit wt: 75.6 kg (9/13/24), Most recent wt: 70.4 kg (10/13).   - Allergic to Lasix, will not take med (extreme hypotension, extreme hypokalemia)     # Chemo induced Diarrhea  - Cdiff & stool path neg (9/22)  - etiology: likely melphalan-related mucositis/enteritis  - Cont loperamide 2mg q6h PRN  # Monitor electrolytes and replace prn  # Hx of treated Hep C in 2015  - (9/12) Hep C RNA - not detected      :  # Hx of BPH and urinary retention (2023)  - Hx elevated PSA    - 7/28/21 Bx: atypical small acinar proliferation    - 4/7/23 Bx: Benign    NEURO/PSYCH:  #Tremor vs fasciculation  -since 10/10, pt reported tremor that was not present prior to admission  -on exam, fine, high-frequency (7+ Hz) tremor worst in the hands, exacerbated by effort, associated with significant fasciculations of the shoulder and back muscles; also fasciculations in jaw  -strength normal, reflexes 2+  -etiology: hypocalcemia, hypomagnesemia, drug side effect (tacro, cefepime, compazine), primary neurologic disease, essential tremor  -cefepime stopped 10/10  -tacrolimus level wnl,  unlikely  -hold compazine for now  -replete magnesium to >2  -if not improving, consider neurology consult     DISPO:  - Full Code  - NOK: spouse Genevieve ([phone])  - PICC  - Oncologist: Jerod     Pt discussed w/ Dr. Hernan Rebolledo

## 2024-10-13 NOTE — PROGRESS NOTES
Spiritual Care Visit    Clinical Encounter Type  Visited With: Patient, Patient and family together  Routine Visit: Introduction  Continue Visiting: Yes  Referral From: Patient  Referral To:      Pt visiting with family, asked visit latter.  P[rovided referral to [facility] spiritual care team

## 2024-10-13 NOTE — CARE PLAN
The patient's goals for the shift include      The clinical goals for the shift include pt will remain afebrile through 10/13/2024 at 0830      Problem: Pain - Adult  Goal: Verbalizes/displays adequate comfort level or baseline comfort level  10/13/2024 0057 by Kitty Albarran RN  Outcome: Progressing  10/13/2024 0057 by Kitty Albarran RN  Outcome: Progressing     Problem: Safety - Adult  Goal: Free from fall injury  10/13/2024 0057 by Kitty Albarran RN  Outcome: Progressing  10/13/2024 0057 by Kitty Albarran RN  Outcome: Progressing     Problem: Discharge Planning  Goal: Discharge to home or other facility with appropriate resources  10/13/2024 0057 by Kitty Albarran RN  Outcome: Progressing  10/13/2024 0057 by Kitty Albarran RN  Outcome: Progressing     Problem: Chronic Conditions and Co-morbidities  Goal: Patient's chronic conditions and co-morbidity symptoms are monitored and maintained or improved  10/13/2024 0057 by Kitty Albarran RN  Outcome: Progressing  10/13/2024 0057 by Kitty Albarran RN  Outcome: Progressing     Problem: Nutrition  Goal: Oral intake greater 75%  10/13/2024 0057 by Kitty Albarran RN  Outcome: Progressing  10/13/2024 0057 by Kitty Albarran RN  Outcome: Progressing  Goal: Adequate PO fluid intake  10/13/2024 0057 by Kitty Albarran RN  Outcome: Progressing  10/13/2024 0057 by Kitty Albarran RN  Outcome: Progressing  Goal: Maintain stable weight  10/13/2024 0057 by Ktity Albarran RN  Outcome: Progressing  10/13/2024 0057 by Kitty Albarran RN  Outcome: Progressing

## 2024-10-13 NOTE — PROGRESS NOTES
Brandt Merritt is a 64 y.o. male on day 30 of admission presenting with Acute myeloid leukemia in remission (Multi).    Subjective   Afebrile. No acute issues overnight. Denies HA, dizziness, CP, palpitations, SOB, abd pain, N/V/D or bleeding. Appetite fair. ROS otherwise unremarkable.       Objective     Physical Exam  Constitutional:       General: He is not in acute distress.     Appearance: Normal appearance. He is normal weight. He is not ill-appearing, toxic-appearing or diaphoretic.   HENT:      Head: Normocephalic and atraumatic.      Right Ear: External ear normal.      Left Ear: External ear normal.      Nose: Nose normal. No congestion or rhinorrhea.      Mouth/Throat:      Mouth: Mucous membranes are moist.      Pharynx: Oropharynx is clear. No oropharyngeal exudate or posterior oropharyngeal erythema.   Eyes:      General: No scleral icterus.        Right eye: No discharge.         Left eye: No discharge.      Extraocular Movements: Extraocular movements intact.      Conjunctiva/sclera: Conjunctivae normal.   Cardiovascular:      Rate and Rhythm: Normal rate and regular rhythm.      Heart sounds: Normal heart sounds. No murmur heard.     No friction rub. No gallop.   Pulmonary:      Effort: Pulmonary effort is normal. No respiratory distress.      Breath sounds: No stridor. No wheezing, rhonchi or rales.   Chest:      Chest wall: No tenderness.   Abdominal:      General: Abdomen is flat. Bowel sounds are normal. There is no distension.      Palpations: Abdomen is soft. There is no mass.      Tenderness: There is no abdominal tenderness. There is no guarding or rebound.      Hernia: No hernia is present.   Musculoskeletal:         General: No swelling, tenderness, deformity or signs of injury. Normal range of motion.      Cervical back: Normal range of motion.      Right lower leg: No edema.      Left lower leg: No edema.   Skin:     General: Skin is warm and dry.      Coloration: Skin is not jaundiced  "or pale.      Findings: No bruising, erythema, lesion or rash.   Neurological:      General: No focal deficit present.      Mental Status: He is alert and oriented to person, place, and time.      Sensory: Sensation is intact.      Motor: Tremor present. No weakness.      Coordination: Coordination is intact.      Gait: Gait is intact.      Deep Tendon Reflexes:      Reflex Scores:       Brachioradialis reflexes are 2+ on the right side and 2+ on the left side.       Patellar reflexes are 2+ on the right side and 2+ on the left side.     Comments: Tremor vs muscle fasciculations in bilateral arms, jaw, and back, exacerbated by effort.    Psychiatric:         Mood and Affect: Mood normal.         Behavior: Behavior normal.         Thought Content: Thought content normal.         Judgment: Judgment normal.       Last Recorded Vitals  Blood pressure 122/82, pulse 96, temperature 36.8 °C (98.2 °F), temperature source Temporal, resp. rate 18, height (S) 1.664 m (5' 5.51\"), weight 70.4 kg (155 lb 3.3 oz), SpO2 99%.  Intake/Output last 3 Shifts:  No intake/output data recorded.    Relevant Results      Scheduled medications  acyclovir, 400 mg, oral, q12h  filgrastim or biosimilar, 300 mcg, subcutaneous, q24h  folic acid, 1 mg, oral, Daily  letermovir, 480 mg, oral, Daily  levoFLOXacin, 500 mg, oral, q24h JALYN  magnesium sulfate, 4 g, intravenous, Once  NIFEdipine ER, 90 mg, oral, Daily before breakfast  posaconazole, 300 mg, oral, q24h  tacrolimus, 2 mg, oral, q12h JALYN  ursodiol, 300 mg, oral, TID  vancomycin, 125 mg, oral, Daily      Continuous medications     PRN medications  PRN medications: acetaminophen, albuterol, alteplase, dextrose, diphenhydrAMINE, EPINEPHrine HCl, famotidine, loperamide, methylPREDNISolone sodium succinate (PF), ondansetron, [Held by provider] prochlorperazine, [Held by provider] prochlorperazine, sodium chloride      This patient has a central line   Reason for the central line remaining today? 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      T+24  Single-umbilical cord transplant (T0=9/19/24)    Updates (10/12):  - waiting for plasma CXCL9 as send-out to [facility]  - Consider repeat BMBx next week if no count recovery     ONC  # MDS/AML   - Symptoms began 9/2023; diagnosed 4/2024  - BMBx showing MDS in transition to AML (>10% jaime) w/ trisomy 8, DNMT alpha, and U2AF1 mutation indication adverse risk   - s/p 4 cycles of Decitabine/Venetoclax (C4D1 on 8/12/24)  - BMBx 6/17/24: Blasts cleared, FISH still positive for trisomy 8, still MDS changes   - BMBx 9/5/24: 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 and no increase in blast  - persistent lack of engraftment, now past expected median engraftment date for single cord (d21)  - will send out plasma CXCL9 level to [facility] to assess for immunologic graft rejection  - Can repeat a BMbx after a month if counts do not improve and then consider repeating another cord transplant if required     # Transplant  CONDITIONING Fludarabine, melphalan, and TBI   DONOR Single umbilical cord   MATCH GRADE 6/8   SEX MATCH Mismatched   ABO DONOR A pos   ABO RECIPIENT A pos   GVHD PROPHYLAXIS Tacrolimus and Mycophenolate   GRAFT SOURCE Single umbilical cord   CMV DONOR negative   CMV RECIPIENT positive      # GVHD prophy  - Tacro level (10/13): 10.1 --> cont current dose  - Cont Tacro 2 mg BID  - Check levels daily  - stopped mycophenolate d/t concern for myelosuppression (10/11)  - Viral surveillance qMondays --> T+14 and T+20 all negative (EBV, CMV, adeno, HHV6)      HEME  # Pancytopenia: secondary to chemotherapy  # 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 Switched to PO levaquin 10/11  # Prophylaxis: Acyclovir, Vancomycin, Posaconazole, Letermovir (started T+14), and Bactrim (to start T+30), Ursodiol      # Maculopapular rash (9/27), now resolved, likely d/t Meropenem  - appearance highly suggestive of meropenem reaction vs pre-engraftment syndrome  - maculopapular on inner thighs, non-itching, non-painful, blanching--resolved  - will continue to monitor      CARDIO/PULM:  # Hx of STEMI (11/11/2020)  - TTE 7/19/24: EF 59%, otherwise unremarkable   - Was cleared by Cardiology for transplant   # Hx of HTN   - Nifedipine: 90 mEq ER 24 hr tablet   # Ascending aorta dilation   - Echo showing 3.8-4 cm dilation      FEN/RENAL  - Admit wt: 75.6 kg (9/13/24), Most recent wt: 70.4 kg (10/13).   - Allergic to Lasix, will not take med (extreme hypotension, extreme hypokalemia)     # Chemo induced Diarrhea  - Cdiff & stool path neg (9/22)  - etiology: likely melphalan-related mucositis/enteritis  - Cont loperamide 2mg q6h PRN  # Monitor electrolytes and replace prn  # Hx of treated Hep C in 2015  - (9/12) Hep C RNA - not detected      :  # Hx of BPH and urinary retention (2023)  - Hx elevated PSA    - 7/28/21 Bx: atypical small acinar proliferation    - 4/7/23 Bx: Benign    NEURO/PSYCH:  #Tremor vs fasciculation  -since 10/10, pt reported tremor that was not present prior to admission  -on exam, fine, high-frequency (7+ Hz) tremor worst in the hands, exacerbated by effort, associated with significant fasciculations of the shoulder and back muscles; also  fasciculations in jaw  -strength normal, reflexes 2+  -etiology: hypocalcemia, hypomagnesemia, drug side effect (tacro, cefepime, compazine), primary neurologic disease, essential tremor  -cefepime stopped 10/10  -tacrolimus level wnl, unlikely  -hold compazine for now  -replete magnesium to >2  -if not improving, consider neurology consult     DISPO:  - Full Code  - NOK: spouse Genevieve ([phone])  - PICC  - Oncologist: Jerod     Pt discussed w/ Dr. Hernan Bobby PA-C

## 2024-10-13 NOTE — CARE PLAN
The patient's goals for the shift include      The clinical goals for the shift include pt will remain afebrile through 10/13/2024 at 0830

## 2024-10-14 LAB
ALBUMIN SERPL BCP-MCNC: 3.4 G/DL (ref 3.4–5)
ALP SERPL-CCNC: 68 U/L (ref 33–136)
ALT SERPL W P-5'-P-CCNC: 10 U/L (ref 10–52)
ANION GAP SERPL CALC-SCNC: 11 MMOL/L (ref 10–20)
APTT PPP: 33 SECONDS (ref 27–38)
AST SERPL W P-5'-P-CCNC: 11 U/L (ref 9–39)
BASOPHILS # BLD AUTO: 0 X10*3/UL (ref 0–0.1)
BASOPHILS NFR BLD AUTO: 0 %
BILIRUB SERPL-MCNC: 0.6 MG/DL (ref 0–1.2)
BUN SERPL-MCNC: 11 MG/DL (ref 6–23)
CALCIUM SERPL-MCNC: 8.5 MG/DL (ref 8.6–10.6)
CHLORIDE SERPL-SCNC: 104 MMOL/L (ref 98–107)
CO2 SERPL-SCNC: 27 MMOL/L (ref 21–32)
CREAT SERPL-MCNC: 0.83 MG/DL (ref 0.5–1.3)
EGFRCR SERPLBLD CKD-EPI 2021: >90 ML/MIN/1.73M*2
EOSINOPHIL # BLD AUTO: 0 X10*3/UL (ref 0–0.7)
EOSINOPHIL NFR BLD AUTO: 0 %
ERYTHROCYTE [DISTWIDTH] IN BLOOD BY AUTOMATED COUNT: 16.9 % (ref 11.5–14.5)
FIBRINOGEN PPP-MCNC: 418 MG/DL (ref 200–400)
GLUCOSE SERPL-MCNC: 100 MG/DL (ref 74–99)
HAPTOGLOB SERPL NEPH-MCNC: 156 MG/DL (ref 30–200)
HCT VFR BLD AUTO: 20 % (ref 41–52)
HGB BLD-MCNC: 7.3 G/DL (ref 13.5–17.5)
IGG SERPL-MCNC: 1590 MG/DL (ref 700–1600)
IMM GRANULOCYTES # BLD AUTO: 0 X10*3/UL (ref 0–0.7)
IMM GRANULOCYTES NFR BLD AUTO: 0 % (ref 0–0.9)
INR PPP: 1.1 (ref 0.9–1.1)
LYMPHOCYTES # BLD AUTO: 0.09 X10*3/UL (ref 1.2–4.8)
LYMPHOCYTES NFR BLD AUTO: 100 %
MAGNESIUM SERPL-MCNC: 1.37 MG/DL (ref 1.6–2.4)
MCH RBC QN AUTO: 26.6 PG (ref 26–34)
MCHC RBC AUTO-ENTMCNC: 36.5 G/DL (ref 32–36)
MCV RBC AUTO: 73 FL (ref 80–100)
MONOCYTES # BLD AUTO: 0 X10*3/UL (ref 0.1–1)
MONOCYTES NFR BLD AUTO: 0 %
NEUTROPHILS # BLD AUTO: 0 X10*3/UL (ref 1.2–7.7)
NEUTROPHILS NFR BLD AUTO: 0 %
NRBC BLD-RTO: 0 /100 WBCS (ref 0–0)
PHOSPHATE SERPL-MCNC: 3.5 MG/DL (ref 2.5–4.9)
PLATELET # BLD AUTO: 16 X10*3/UL (ref 150–450)
POTASSIUM SERPL-SCNC: 3.9 MMOL/L (ref 3.5–5.3)
PROT SERPL-MCNC: 6.1 G/DL (ref 6.4–8.2)
PROTHROMBIN TIME: 12.2 SECONDS (ref 9.8–12.8)
RBC # BLD AUTO: 2.74 X10*6/UL (ref 4.5–5.9)
SODIUM SERPL-SCNC: 138 MMOL/L (ref 136–145)
TACROLIMUS BLD-MCNC: 9.9 NG/ML
WBC # BLD AUTO: 0.1 X10*3/UL (ref 4.4–11.3)

## 2024-10-14 PROCEDURE — 80053 COMPREHEN METABOLIC PANEL: CPT

## 2024-10-14 PROCEDURE — 80197 ASSAY OF TACROLIMUS: CPT | Performed by: INTERNAL MEDICINE

## 2024-10-14 PROCEDURE — 83010 ASSAY OF HAPTOGLOBIN QUANT: CPT | Performed by: NURSE PRACTITIONER

## 2024-10-14 PROCEDURE — 2500000002 HC RX 250 W HCPCS SELF ADMINISTERED DRUGS (ALT 637 FOR MEDICARE OP, ALT 636 FOR OP/ED): Performed by: INTERNAL MEDICINE

## 2024-10-14 PROCEDURE — 2500000001 HC RX 250 WO HCPCS SELF ADMINISTERED DRUGS (ALT 637 FOR MEDICARE OP)

## 2024-10-14 PROCEDURE — 82784 ASSAY IGA/IGD/IGG/IGM EACH: CPT | Performed by: NURSE PRACTITIONER

## 2024-10-14 PROCEDURE — 87799 DETECT AGENT NOS DNA QUANT: CPT | Performed by: NURSE PRACTITIONER

## 2024-10-14 PROCEDURE — 87533 HHV-6 DNA QUANT: CPT | Performed by: NURSE PRACTITIONER

## 2024-10-14 PROCEDURE — 84100 ASSAY OF PHOSPHORUS: CPT

## 2024-10-14 PROCEDURE — 83735 ASSAY OF MAGNESIUM: CPT

## 2024-10-14 PROCEDURE — 2500000001 HC RX 250 WO HCPCS SELF ADMINISTERED DRUGS (ALT 637 FOR MEDICARE OP): Performed by: INTERNAL MEDICINE

## 2024-10-14 PROCEDURE — 85384 FIBRINOGEN ACTIVITY: CPT

## 2024-10-14 PROCEDURE — 1170000001 HC PRIVATE ONCOLOGY ROOM DAILY

## 2024-10-14 PROCEDURE — 2500000004 HC RX 250 GENERAL PHARMACY W/ HCPCS (ALT 636 FOR OP/ED): Performed by: STUDENT IN AN ORGANIZED HEALTH CARE EDUCATION/TRAINING PROGRAM

## 2024-10-14 PROCEDURE — 2500000004 HC RX 250 GENERAL PHARMACY W/ HCPCS (ALT 636 FOR OP/ED): Performed by: NURSE PRACTITIONER

## 2024-10-14 PROCEDURE — 2500000004 HC RX 250 GENERAL PHARMACY W/ HCPCS (ALT 636 FOR OP/ED): Mod: JZ | Performed by: INTERNAL MEDICINE

## 2024-10-14 PROCEDURE — 99233 SBSQ HOSP IP/OBS HIGH 50: CPT | Performed by: INTERNAL MEDICINE

## 2024-10-14 PROCEDURE — 85025 COMPLETE CBC W/AUTO DIFF WBC: CPT

## 2024-10-14 PROCEDURE — 2500000002 HC RX 250 W HCPCS SELF ADMINISTERED DRUGS (ALT 637 FOR MEDICARE OP, ALT 636 FOR OP/ED): Performed by: PHYSICIAN ASSISTANT

## 2024-10-14 PROCEDURE — 85610 PROTHROMBIN TIME: CPT

## 2024-10-14 PROCEDURE — 2500000002 HC RX 250 W HCPCS SELF ADMINISTERED DRUGS (ALT 637 FOR MEDICARE OP, ALT 636 FOR OP/ED): Performed by: STUDENT IN AN ORGANIZED HEALTH CARE EDUCATION/TRAINING PROGRAM

## 2024-10-14 RX ORDER — MAGNESIUM SULFATE HEPTAHYDRATE 40 MG/ML
4 INJECTION, SOLUTION INTRAVENOUS ONCE
Status: COMPLETED | OUTPATIENT
Start: 2024-10-14 | End: 2024-10-14

## 2024-10-14 ASSESSMENT — COGNITIVE AND FUNCTIONAL STATUS - GENERAL
MOBILITY SCORE: 24
DAILY ACTIVITIY SCORE: 24

## 2024-10-14 ASSESSMENT — PAIN SCALES - GENERAL
PAINLEVEL_OUTOF10: 0 - NO PAIN

## 2024-10-14 ASSESSMENT — PAIN - FUNCTIONAL ASSESSMENT
PAIN_FUNCTIONAL_ASSESSMENT: 0-10
PAIN_FUNCTIONAL_ASSESSMENT: 0-10

## 2024-10-14 NOTE — CARE PLAN
The patient's goals for the shift include      The clinical goals for the shift include Pt will remain safe throughout shift      Problem: Pain - Adult  Goal: Verbalizes/displays adequate comfort level or baseline comfort level  Outcome: Progressing     Problem: Safety - Adult  Goal: Free from fall injury  Outcome: Progressing     Problem: Discharge Planning  Goal: Discharge to home or other facility with appropriate resources  Outcome: Progressing     Problem: Chronic Conditions and Co-morbidities  Goal: Patient's chronic conditions and co-morbidity symptoms are monitored and maintained or improved  Outcome: Progressing     Problem: Nutrition  Goal: Oral intake greater 75%  Outcome: Progressing  Goal: Adequate PO fluid intake  Outcome: Progressing  Goal: Maintain stable weight  Outcome: Progressing

## 2024-10-14 NOTE — PROGRESS NOTES
Spiritual Care Visit    Clinical Encounter Type  Visited With: Patient and family together  Routine Visit: Introduction  Continue Visiting: Yes    Taxonomy  Intended Effects: Aligning care plan with patient's values, Demonstrate caring and concern, Establish rapport and connectedness  Methods: Collaborate with care team member, Encourage self reflection, Encourage sharing of feelings, Offer support  Interventions: Acknowledge current situation, Active listening, Discuss coping mechanisms with someone, [place]     introduced self and Spiritual Care services to patient Brandt Merritt and his spouse. Patient shared that he has been feeling down due to his lengthy admission. He shared he is staying occupied by playing a game on his phone and visiting with family. Patient expressed missing his ability to be around family without concern for risk to his immune system, to work, and to have the freedom to come and go as he likes. Patient shared that he is Restorationist and that his whitney and prayer are significant in helping him to cope with admission.      provided care through reflective listening, validation of feelings, supportive conversation, and prayer. Patient was appreciative of visit and requested a popsicle, which  provided. Patient and spouse did not have any further needs at this time. Spiritual Care will provide ongoing support and remains available as needed/requested.    Rev. Sarah Chapman MDiv, [facility]

## 2024-10-14 NOTE — PROGRESS NOTES
Brandt Merritt is a 64 y.o. male on day 31 of admission presenting with Acute myeloid leukemia in remission (Multi).    Subjective   Afebrile. No acute issues overnight. Denies HA, dizziness, CP, palpitations, SOB, abd pain, N/V/D or bleeding. Appetite fair. ROS otherwise unremarkable.       Objective     Physical Exam  Constitutional:       General: He is not in acute distress.     Appearance: Normal appearance. He is normal weight. He is not ill-appearing, toxic-appearing or diaphoretic.   HENT:      Head: Normocephalic and atraumatic.      Right Ear: External ear normal.      Left Ear: External ear normal.      Nose: Nose normal. No congestion or rhinorrhea.      Mouth/Throat:      Mouth: Mucous membranes are moist.      Pharynx: Oropharynx is clear. No oropharyngeal exudate or posterior oropharyngeal erythema.   Eyes:      General: No scleral icterus.        Right eye: No discharge.         Left eye: No discharge.      Extraocular Movements: Extraocular movements intact.      Conjunctiva/sclera: Conjunctivae normal.   Cardiovascular:      Rate and Rhythm: Normal rate and regular rhythm.      Heart sounds: Normal heart sounds. No murmur heard.     No friction rub. No gallop.   Pulmonary:      Effort: Pulmonary effort is normal. No respiratory distress.      Breath sounds: No stridor. No wheezing, rhonchi or rales.   Chest:      Chest wall: No tenderness.   Abdominal:      General: Abdomen is flat. Bowel sounds are normal. There is no distension.      Palpations: Abdomen is soft. There is no mass.      Tenderness: There is no abdominal tenderness. There is no guarding or rebound.      Hernia: No hernia is present.   Musculoskeletal:         General: No swelling, tenderness, deformity or signs of injury. Normal range of motion.      Cervical back: Normal range of motion.      Right lower leg: No edema.      Left lower leg: No edema.   Skin:     General: Skin is warm and dry.      Coloration: Skin is not jaundiced  "or pale.      Findings: No bruising, erythema, lesion or rash.   Neurological:      General: No focal deficit present.      Mental Status: He is alert and oriented to person, place, and time.      Sensory: Sensation is intact.      Motor: Tremor present. No weakness.      Coordination: Coordination is intact.      Gait: Gait is intact.      Deep Tendon Reflexes:      Reflex Scores:       Brachioradialis reflexes are 2+ on the right side and 2+ on the left side.       Patellar reflexes are 2+ on the right side and 2+ on the left side.     Comments: Tremor vs muscle fasciculations in bilateral arms, jaw, and back, exacerbated by effort.    Psychiatric:         Mood and Affect: Mood normal.         Behavior: Behavior normal.         Thought Content: Thought content normal.         Judgment: Judgment normal.         Last Recorded Vitals  Blood pressure 119/73, pulse 100, temperature 36.6 °C (97.9 °F), temperature source Temporal, resp. rate 18, height (S) 1.664 m (5' 5.51\"), weight 69.6 kg (153 lb 7 oz), SpO2 100%.  Intake/Output last 3 Shifts:  I/O last 3 completed shifts:  In: 94 (1.3 mL/kg) [I.V.:94 (1.3 mL/kg)]  Out: - (0 mL/kg)   Weight: 70.4 kg     Relevant Results      Scheduled medications  acyclovir, 400 mg, oral, q12h  filgrastim or biosimilar, 300 mcg, subcutaneous, q24h  folic acid, 1 mg, oral, Daily  letermovir, 480 mg, oral, Daily  levoFLOXacin, 500 mg, oral, q24h JALYN  NIFEdipine ER, 90 mg, oral, Daily before breakfast  posaconazole, 300 mg, oral, q24h  tacrolimus, 2 mg, oral, q12h JALYN  ursodiol, 300 mg, oral, TID  vancomycin, 125 mg, oral, Daily      Continuous medications     PRN medications  PRN medications: acetaminophen, albuterol, alteplase, dextrose, diphenhydrAMINE, EPINEPHrine HCl, famotidine, loperamide, methylPREDNISolone sodium succinate (PF), ondansetron, sodium chloride      This patient has a central line   Reason for the central line remaining today? 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      T+25  Single-umbilical cord transplant (T0=9/19/24)     Updates (10/12):  - follow up plasma CXCL9 send-out to [place]  - repeat bone marrow biopsy tomorrow 9AM     ONC  # MDS/AML   - Symptoms began 9/2023; diagnosed 4/2024  - BMBx showing MDS in transition to AML (>10% jaime) w/ trisomy 8, DNMT alpha, and U2AF1 mutation indication adverse risk   - s/p 4 cycles of Decitabine/Venetoclax (C4D1 on 8/12/24)  - BMBx 6/17/24: Blasts cleared, FISH still positive for trisomy 8, still MDS changes   - BMBx 9/5/24: 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 and no increase in blast  - persistent lack of engraftment, now past expected median engraftment date for single cord (d21)  - plasma CXCL9 level sent to [place] to assess for immunologic graft rejection 10/11, follow up results  - Repeat bone marrow biopsy tomorrow 10/15/24 and then consider repeating another cord transplant if required     # Transplant  CONDITIONING Fludarabine, melphalan, and TBI   DONOR Single umbilical cord   MATCH GRADE 6/8   SEX MATCH Mismatched   ABO DONOR A pos   ABO RECIPIENT A pos   GVHD PROPHYLAXIS Tacrolimus and Mycophenolate   GRAFT SOURCE Single umbilical cord   CMV DONOR negative   CMV RECIPIENT positive      # GVHD prophy  - Tacro level (10/13): 9.9 --> cont current dose  - Cont Tacro 2 mg BID  - Check levels daily  - stopped mycophenolate d/t concern for myelosuppression (10/11)  - Viral surveillance qMondays --> T+14 and T+20 all negative (EBV, CMV, adeno, HHV6)     HEME  # Pancytopenia:  secondary to chemotherapy  # 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 Switched to PO levaquin 10/11  # Prophylaxis: Acyclovir, Vancomycin, Posaconazole, Letermovir (started T+14), and Bactrim (to start T+30), Ursodiol      # Maculopapular rash (9/27), now resolved, likely d/t Meropenem  - appearance highly suggestive of meropenem reaction vs pre-engraftment syndrome  - maculopapular on inner thighs, non-itching, non-painful, blanching--resolved  - will continue to monitor      CARDIO/PULM:  # Hx of STEMI (11/11/2020)  - TTE 7/19/24: EF 59%, otherwise unremarkable   - Was cleared by Cardiology for transplant   # Hx of HTN   - Nifedipine: 90 mEq ER 24 hr tablet   # Ascending aorta dilation   - Echo showing 3.8-4 cm dilation      FEN/RENAL  - Admit wt: 75.6 kg (9/13/24), Most recent wt: 70.4 kg (10/13).   - Allergic to Lasix, will not take med (extreme hypotension, extreme hypokalemia)      # Chemo induced Diarrhea  - Cdiff & stool path neg (9/22)  - etiology: likely melphalan-related mucositis/enteritis  - Cont loperamide 2mg q6h PRN  # Monitor electrolytes and replace prn  # Hx of treated Hep C in 2015  - (9/12) Hep C RNA - not detected      :  # Hx of BPH and urinary retention (2023)  - Hx elevated PSA    - 7/28/21 Bx: atypical small acinar proliferation    - 4/7/23 Bx: Benign     NEURO/PSYCH:  #Tremor vs fasciculation  -since 10/10, pt reported tremor that was not present prior to admission  -on exam, fine, high-frequency (7+ Hz) tremor worst in the hands, exacerbated by effort, associated with significant fasciculations of the shoulder and back muscles; also fasciculations in  jaw  -strength normal, reflexes 2+  -etiology: hypocalcemia, hypomagnesemia, drug side effect (tacro, cefepime, compazine), primary neurologic disease, essential tremor  -cefepime stopped 10/10  -tacrolimus level wnl, unlikely  -hold compazine for now  -replete magnesium to >2  -if not improving, consider neurology consult     DISPO:  - Full Code  - NOK: spouse Genevieve ([phone])  - PICC  - Oncologist: Jerod     Pt seen, examined and discussed with Dr Daisy Yanez MD       Detail Level: Zone

## 2024-10-14 NOTE — CARE PLAN
Problem: Pain - Adult  Goal: Verbalizes/displays adequate comfort level or baseline comfort level  Outcome: Progressing     Problem: Safety - Adult  Goal: Free from fall injury  Outcome: Progressing     Problem: Discharge Planning  Goal: Discharge to home or other facility with appropriate resources  Outcome: Progressing     Problem: Chronic Conditions and Co-morbidities  Goal: Patient's chronic conditions and co-morbidity symptoms are monitored and maintained or improved  Outcome: Progressing     Problem: Nutrition  Goal: Oral intake greater 75%  Outcome: Progressing  Goal: Adequate PO fluid intake  Outcome: Progressing  Goal: Maintain stable weight  Outcome: Progressing       The clinical goals for the shift include Patient will remain safe throughout shift

## 2024-10-15 LAB
ABO GROUP (TYPE) IN BLOOD: NORMAL
ADENOVIRUS QPCR,PLASMA, VIRC: NOT DETECTED COPIES/ML
ALBUMIN SERPL BCP-MCNC: 3.3 G/DL (ref 3.4–5)
ALP SERPL-CCNC: 65 U/L (ref 33–136)
ALT SERPL W P-5'-P-CCNC: 11 U/L (ref 10–52)
ANION GAP SERPL CALC-SCNC: 11 MMOL/L (ref 10–20)
ANTIBODY SCREEN: NORMAL
APTT PPP: 33 SECONDS (ref 27–38)
AST SERPL W P-5'-P-CCNC: 11 U/L (ref 9–39)
BASOPHILS # BLD MANUAL: 0 X10*3/UL (ref 0–0.1)
BASOPHILS NFR BLD MANUAL: 0 %
BILIRUB SERPL-MCNC: 0.8 MG/DL (ref 0–1.2)
BLOOD EXPIRATION DATE: NORMAL
BLOOD EXPIRATION DATE: NORMAL
BUN SERPL-MCNC: 10 MG/DL (ref 6–23)
CALCIUM SERPL-MCNC: 8.5 MG/DL (ref 8.6–10.6)
CHLORIDE SERPL-SCNC: 105 MMOL/L (ref 98–107)
CMV DNA SERPL NAA+PROBE-LOG IU: NORMAL {LOG_IU}/ML
CO2 SERPL-SCNC: 28 MMOL/L (ref 21–32)
CREAT SERPL-MCNC: 0.83 MG/DL (ref 0.5–1.3)
DISPENSE STATUS: NORMAL
DISPENSE STATUS: NORMAL
EGFRCR SERPLBLD CKD-EPI 2021: >90 ML/MIN/1.73M*2
EOSINOPHIL # BLD MANUAL: 0 X10*3/UL (ref 0–0.7)
EOSINOPHIL NFR BLD MANUAL: 0 %
ERYTHROCYTE [DISTWIDTH] IN BLOOD BY AUTOMATED COUNT: 16.6 % (ref 11.5–14.5)
FIBRINOGEN PPP-MCNC: 349 MG/DL (ref 200–400)
GLUCOSE SERPL-MCNC: 96 MG/DL (ref 74–99)
HCT VFR BLD AUTO: 19.4 % (ref 41–52)
HGB BLD-MCNC: 6.8 G/DL (ref 13.5–17.5)
HUMAN HERPESVIRUS-6 PCR PLASMA: NOT DETECTED COPIES/ML
IMM GRANULOCYTES # BLD AUTO: 0 X10*3/UL (ref 0–0.7)
IMM GRANULOCYTES NFR BLD AUTO: 0 % (ref 0–0.9)
INR PPP: 1.2 (ref 0.9–1.1)
LABORATORY COMMENT REPORT: NOT DETECTED
LYMPHOCYTES # BLD MANUAL: 0.1 X10*3/UL (ref 1.2–4.8)
LYMPHOCYTES NFR BLD MANUAL: 100 %
MAGNESIUM SERPL-MCNC: 1.34 MG/DL (ref 1.6–2.4)
MCH RBC QN AUTO: 25.9 PG (ref 26–34)
MCHC RBC AUTO-ENTMCNC: 35.1 G/DL (ref 32–36)
MCV RBC AUTO: 74 FL (ref 80–100)
MONOCYTES # BLD MANUAL: 0 X10*3/UL (ref 0.1–1)
MONOCYTES NFR BLD MANUAL: 0 %
NEUTS SEG # BLD MANUAL: 0 X10*3/UL (ref 1.2–7)
NEUTS SEG NFR BLD MANUAL: 0 %
NRBC BLD-RTO: 0 /100 WBCS (ref 0–0)
PHOSPHATE SERPL-MCNC: 3.9 MG/DL (ref 2.5–4.9)
PLATELET # BLD AUTO: 7 X10*3/UL (ref 150–450)
POTASSIUM SERPL-SCNC: 4.1 MMOL/L (ref 3.5–5.3)
PRODUCT BLOOD TYPE: 6200
PRODUCT BLOOD TYPE: 6200
PRODUCT CODE: NORMAL
PRODUCT CODE: NORMAL
PROT SERPL-MCNC: 6.2 G/DL (ref 6.4–8.2)
PROTHROMBIN TIME: 13.1 SECONDS (ref 9.8–12.8)
RBC # BLD AUTO: 2.63 X10*6/UL (ref 4.5–5.9)
RBC MORPH BLD: ABNORMAL
RH FACTOR (ANTIGEN D): NORMAL
SCHISTOCYTES BLD QL SMEAR: ABNORMAL
SODIUM SERPL-SCNC: 140 MMOL/L (ref 136–145)
TACROLIMUS BLD-MCNC: 8.6 NG/ML
TARGETS BLD QL SMEAR: ABNORMAL
TOTAL CELLS COUNTED BLD: 13
UNIT ABO: NORMAL
UNIT ABO: NORMAL
UNIT NUMBER: NORMAL
UNIT NUMBER: NORMAL
UNIT RH: NORMAL
UNIT RH: NORMAL
UNIT VOLUME: 350
UNIT VOLUME: 352
WBC # BLD AUTO: 0.1 X10*3/UL (ref 4.4–11.3)
XM INTEP: NORMAL

## 2024-10-15 PROCEDURE — P9040 RBC LEUKOREDUCED IRRADIATED: HCPCS

## 2024-10-15 PROCEDURE — 86923 COMPATIBILITY TEST ELECTRIC: CPT

## 2024-10-15 PROCEDURE — 36430 TRANSFUSION BLD/BLD COMPNT: CPT

## 2024-10-15 PROCEDURE — 85097 BONE MARROW INTERPRETATION: CPT | Performed by: STUDENT IN AN ORGANIZED HEALTH CARE EDUCATION/TRAINING PROGRAM

## 2024-10-15 PROCEDURE — 2500000001 HC RX 250 WO HCPCS SELF ADMINISTERED DRUGS (ALT 637 FOR MEDICARE OP)

## 2024-10-15 PROCEDURE — G0452 MOLECULAR PATHOLOGY INTERPR: HCPCS | Performed by: PATHOLOGY

## 2024-10-15 PROCEDURE — P9037 PLATE PHERES LEUKOREDU IRRAD: HCPCS

## 2024-10-15 PROCEDURE — 85384 FIBRINOGEN ACTIVITY: CPT

## 2024-10-15 PROCEDURE — 86850 RBC ANTIBODY SCREEN: CPT | Performed by: STUDENT IN AN ORGANIZED HEALTH CARE EDUCATION/TRAINING PROGRAM

## 2024-10-15 PROCEDURE — 85027 COMPLETE CBC AUTOMATED: CPT

## 2024-10-15 PROCEDURE — 85007 BL SMEAR W/DIFF WBC COUNT: CPT

## 2024-10-15 PROCEDURE — 88305 TISSUE EXAM BY PATHOLOGIST: CPT | Performed by: PATHOLOGY

## 2024-10-15 PROCEDURE — 83735 ASSAY OF MAGNESIUM: CPT

## 2024-10-15 PROCEDURE — 2500000002 HC RX 250 W HCPCS SELF ADMINISTERED DRUGS (ALT 637 FOR MEDICARE OP, ALT 636 FOR OP/ED): Performed by: INTERNAL MEDICINE

## 2024-10-15 PROCEDURE — 81267 CHIMERISM ANAL NO CELL SELEC: CPT | Performed by: STUDENT IN AN ORGANIZED HEALTH CARE EDUCATION/TRAINING PROGRAM

## 2024-10-15 PROCEDURE — 88185 FLOWCYTOMETRY/TC ADD-ON: CPT | Mod: TC | Performed by: STUDENT IN AN ORGANIZED HEALTH CARE EDUCATION/TRAINING PROGRAM

## 2024-10-15 PROCEDURE — 2500000004 HC RX 250 GENERAL PHARMACY W/ HCPCS (ALT 636 FOR OP/ED): Mod: JZ | Performed by: INTERNAL MEDICINE

## 2024-10-15 PROCEDURE — 80197 ASSAY OF TACROLIMUS: CPT | Performed by: INTERNAL MEDICINE

## 2024-10-15 PROCEDURE — 2500000001 HC RX 250 WO HCPCS SELF ADMINISTERED DRUGS (ALT 637 FOR MEDICARE OP): Performed by: STUDENT IN AN ORGANIZED HEALTH CARE EDUCATION/TRAINING PROGRAM

## 2024-10-15 PROCEDURE — 2500000002 HC RX 250 W HCPCS SELF ADMINISTERED DRUGS (ALT 637 FOR MEDICARE OP, ALT 636 FOR OP/ED): Performed by: PHYSICIAN ASSISTANT

## 2024-10-15 PROCEDURE — 2500000002 HC RX 250 W HCPCS SELF ADMINISTERED DRUGS (ALT 637 FOR MEDICARE OP, ALT 636 FOR OP/ED): Performed by: STUDENT IN AN ORGANIZED HEALTH CARE EDUCATION/TRAINING PROGRAM

## 2024-10-15 PROCEDURE — 2500000004 HC RX 250 GENERAL PHARMACY W/ HCPCS (ALT 636 FOR OP/ED): Performed by: NURSE PRACTITIONER

## 2024-10-15 PROCEDURE — 1170000001 HC PRIVATE ONCOLOGY ROOM DAILY

## 2024-10-15 PROCEDURE — 88311 DECALCIFY TISSUE: CPT | Performed by: PATHOLOGY

## 2024-10-15 PROCEDURE — 88237 TISSUE CULTURE BONE MARROW: CPT | Performed by: STUDENT IN AN ORGANIZED HEALTH CARE EDUCATION/TRAINING PROGRAM

## 2024-10-15 PROCEDURE — 88305 TISSUE EXAM BY PATHOLOGIST: CPT | Mod: TC,SUR | Performed by: STUDENT IN AN ORGANIZED HEALTH CARE EDUCATION/TRAINING PROGRAM

## 2024-10-15 PROCEDURE — 85610 PROTHROMBIN TIME: CPT

## 2024-10-15 PROCEDURE — 07DR3ZX EXTRACTION OF ILIAC BONE MARROW, PERCUTANEOUS APPROACH, DIAGNOSTIC: ICD-10-PCS | Performed by: INTERNAL MEDICINE

## 2024-10-15 PROCEDURE — 2500000004 HC RX 250 GENERAL PHARMACY W/ HCPCS (ALT 636 FOR OP/ED): Performed by: STUDENT IN AN ORGANIZED HEALTH CARE EDUCATION/TRAINING PROGRAM

## 2024-10-15 PROCEDURE — 82565 ASSAY OF CREATININE: CPT

## 2024-10-15 PROCEDURE — 2500000001 HC RX 250 WO HCPCS SELF ADMINISTERED DRUGS (ALT 637 FOR MEDICARE OP): Performed by: INTERNAL MEDICINE

## 2024-10-15 PROCEDURE — 84100 ASSAY OF PHOSPHORUS: CPT

## 2024-10-15 PROCEDURE — 99233 SBSQ HOSP IP/OBS HIGH 50: CPT | Performed by: INTERNAL MEDICINE

## 2024-10-15 PROCEDURE — 88189 FLOWCYTOMETRY/READ 16 & >: CPT | Performed by: PATHOLOGY

## 2024-10-15 RX ORDER — MAGNESIUM SULFATE HEPTAHYDRATE 40 MG/ML
4 INJECTION, SOLUTION INTRAVENOUS ONCE
Status: COMPLETED | OUTPATIENT
Start: 2024-10-15 | End: 2024-10-15

## 2024-10-15 RX ORDER — MAGNESIUM CHLORIDE 64 MG
64 TABLET, DELAYED RELEASE (ENTERIC COATED) ORAL 2 TIMES DAILY
Status: DISCONTINUED | OUTPATIENT
Start: 2024-10-15 | End: 2024-11-21 | Stop reason: HOSPADM

## 2024-10-15 ASSESSMENT — COGNITIVE AND FUNCTIONAL STATUS - GENERAL
MOBILITY SCORE: 24
DAILY ACTIVITIY SCORE: 24

## 2024-10-15 ASSESSMENT — PAIN SCALES - GENERAL
PAINLEVEL_OUTOF10: 0 - NO PAIN

## 2024-10-15 NOTE — CARE PLAN
Problem: Pain - Adult  Goal: Verbalizes/displays adequate comfort level or baseline comfort level  Outcome: Progressing     Problem: Safety - Adult  Goal: Free from fall injury  Outcome: Progressing     Problem: Discharge Planning  Goal: Discharge to home or other facility with appropriate resources  Outcome: Progressing     Problem: Chronic Conditions and Co-morbidities  Goal: Patient's chronic conditions and co-morbidity symptoms are monitored and maintained or improved  Outcome: Progressing     Problem: Nutrition  Goal: Oral intake greater 75%  Outcome: Progressing  Goal: Adequate PO fluid intake  Outcome: Progressing  Goal: Maintain stable weight  Outcome: Progressing   The patient's goals for the shift include      The clinical goals for the shift include patient will remain free from falls and injury this shift

## 2024-10-15 NOTE — PROGRESS NOTES
Brandt Merritt is a 64 y.o. male on day 32 of admission presenting with Acute myeloid leukemia in remission (Multi).    Subjective   Afebrile. No acute issues overnight. Denies HA, dizziness, CP, palpitations, SOB, abd pain, N/V/D or bleeding. Appetite fair. ROS otherwise unremarkable.       Objective     Physical Exam  Constitutional:       General: He is not in acute distress.     Appearance: Normal appearance. He is normal weight. He is not ill-appearing, toxic-appearing or diaphoretic.   HENT:      Head: Normocephalic and atraumatic.      Right Ear: External ear normal.      Left Ear: External ear normal.      Nose: Nose normal. No congestion or rhinorrhea.      Mouth/Throat:      Mouth: Mucous membranes are moist.      Pharynx: Oropharynx is clear. No oropharyngeal exudate or posterior oropharyngeal erythema.   Eyes:      General: No scleral icterus.        Right eye: No discharge.         Left eye: No discharge.      Extraocular Movements: Extraocular movements intact.      Conjunctiva/sclera: Conjunctivae normal.   Cardiovascular:      Rate and Rhythm: Normal rate and regular rhythm.      Heart sounds: Normal heart sounds. No murmur heard.     No friction rub. No gallop.   Pulmonary:      Effort: Pulmonary effort is normal. No respiratory distress.      Breath sounds: No stridor. No wheezing, rhonchi or rales.   Chest:      Chest wall: No tenderness.   Abdominal:      General: Abdomen is flat. Bowel sounds are normal. There is no distension.      Palpations: Abdomen is soft. There is no mass.      Tenderness: There is no abdominal tenderness. There is no guarding or rebound.      Hernia: No hernia is present.   Musculoskeletal:         General: No swelling, tenderness, deformity or signs of injury. Normal range of motion.      Cervical back: Normal range of motion.      Right lower leg: No edema.      Left lower leg: No edema.   Skin:     General: Skin is warm and dry.      Coloration: Skin is not jaundiced  "or pale.      Findings: No bruising, erythema, lesion or rash.   Neurological:      General: No focal deficit present.      Mental Status: He is alert and oriented to person, place, and time.      Sensory: Sensation is intact.      Motor: Tremor present. No weakness.      Coordination: Coordination is intact.      Gait: Gait is intact.      Deep Tendon Reflexes:      Reflex Scores:       Brachioradialis reflexes are 2+ on the right side and 2+ on the left side.       Patellar reflexes are 2+ on the right side and 2+ on the left side.     Comments: Tremor vs muscle fasciculations in bilateral arms, jaw, and back, exacerbated by effort.    Psychiatric:         Mood and Affect: Mood normal.         Behavior: Behavior normal.         Thought Content: Thought content normal.         Judgment: Judgment normal.         Last Recorded Vitals  Blood pressure 118/73, pulse 68, temperature 36.6 °C (97.9 °F), temperature source Temporal, resp. rate 18, height (S) 1.664 m (5' 5.51\"), weight 69.6 kg (153 lb 7 oz), SpO2 93%.  Intake/Output last 3 Shifts:  No intake/output data recorded.    Relevant Results      Scheduled medications  acyclovir, 400 mg, oral, q12h  filgrastim or biosimilar, 300 mcg, subcutaneous, q24h  folic acid, 1 mg, oral, Daily  letermovir, 480 mg, oral, Daily  levoFLOXacin, 500 mg, oral, q24h JALYN  magnesium sulfate, 4 g, intravenous, Once  NIFEdipine ER, 90 mg, oral, Daily before breakfast  posaconazole, 300 mg, oral, q24h  tacrolimus, 2 mg, oral, q12h JALYN  ursodiol, 300 mg, oral, TID  vancomycin, 125 mg, oral, Daily      Continuous medications     PRN medications  PRN medications: acetaminophen, albuterol, alteplase, dextrose, diphenhydrAMINE, EPINEPHrine HCl, famotidine, loperamide, methylPREDNISolone sodium succinate (PF), ondansetron, sodium chloride      This patient has a central line   Reason for the central line remaining today? 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      T+26  Single-umbilical cord transplant (T0=9/19/24)     Updates (10/12):  - follow up plasma CXCL9 send-out to [place]  - repeat bone marrow biopsy sent today 10/15/24 for disease assessment and chimerism     ONC  # MDS/AML   - Symptoms began 9/2023; diagnosed 4/2024  - BMBx showing MDS in transition to AML (>10% jaime) w/ trisomy 8, DNMT alpha, and U2AF1 mutation indication adverse risk   - s/p 4 cycles of Decitabine/Venetoclax (C4D1 on 8/12/24)  - BMBx 6/17/24: Blasts cleared, FISH still positive for trisomy 8, still MDS changes   - BMBx 9/5/24: 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 and no increase in blast  - persistent lack of engraftment, now past expected median engraftment date for single cord (d21)  - plasma CXCL9 level sent to [place] to assess for immunologic graft rejection 10/11, follow up results  - Repeat bone marrow biopsy sent 10/15/24 and then consider repeating another cord transplant if required     # Transplant  CONDITIONING Fludarabine, melphalan, and TBI   DONOR Single umbilical cord   MATCH GRADE 6/8   SEX MATCH Mismatched   ABO DONOR A pos   ABO RECIPIENT A pos   GVHD PROPHYLAXIS Tacrolimus and Mycophenolate   GRAFT SOURCE Single umbilical cord   CMV DONOR negative   CMV RECIPIENT positive      # GVHD prophy  - Tacro level (10/13): 9.9 --> cont current dose  - Cont Tacro 2 mg BID  - Check levels daily  - stopped mycophenolate d/t concern for myelosuppression (10/11)  - Viral surveillance qMondays --> T+14 and T+20 all negative (EBV, CMV, adeno, HHV6)     HEME  # Pancytopenia: secondary to  chemotherapy  # 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 Switched to PO levaquin 10/11  # Prophylaxis: Acyclovir, Vancomycin, Posaconazole, Letermovir (started T+14), and Bactrim (to start T+30), Ursodiol      # Maculopapular rash (9/27), now resolved, likely d/t Meropenem  - appearance highly suggestive of meropenem reaction vs pre-engraftment syndrome  - maculopapular on inner thighs, non-itching, non-painful, blanching--resolved  - will continue to monitor      CARDIO/PULM:  # Hx of STEMI (11/11/2020)  - TTE 7/19/24: EF 59%, otherwise unremarkable   - Was cleared by Cardiology for transplant   # Hx of HTN   - Nifedipine: 90 mEq ER 24 hr tablet   # Ascending aorta dilation   - Echo showing 3.8-4 cm dilation      FEN/RENAL  - Admit wt: 75.6 kg (9/13/24), Most recent wt: 70.4 kg (10/13).   - Allergic to Lasix, will not take med (extreme hypotension, extreme hypokalemia)      # Chemo induced Diarrhea  - Cdiff & stool path neg (9/22)  - etiology: likely melphalan-related mucositis/enteritis  - Cont loperamide 2mg q6h PRN  # Monitor electrolytes and replace prn  # Hx of treated Hep C in 2015  - (9/12) Hep C RNA - not detected      :  # Hx of BPH and urinary retention (2023)  - Hx elevated PSA    - 7/28/21 Bx: atypical small acinar proliferation    - 4/7/23 Bx: Benign     NEURO/PSYCH:  #Tremor vs fasciculation  -since 10/10, pt reported tremor that was not present prior to admission  -on exam, fine, high-frequency (7+ Hz) tremor worst in the hands, exacerbated by effort, associated with significant fasciculations of the shoulder and back muscles; also fasciculations in jaw  -strength  normal, reflexes 2+  -etiology: hypocalcemia, hypomagnesemia, drug side effect (tacro, cefepime, compazine), primary neurologic disease, essential tremor  -cefepime stopped 10/10  -tacrolimus level wnl, unlikely  -hold compazine for now  -replete magnesium to >2  -if not improving, consider neurology consult     DISPO:  - Full Code  - NOK: spouse Genevieve ([phone])  - PICC  - Oncologist: Jerod Watson seen, examined and discussed with Dr Daisy Yanez MD

## 2024-10-15 NOTE — CARE PLAN
Problem: Safety - Adult  Goal: Free from fall injury  Outcome: Progressing     Problem: Pain - Adult  Goal: Verbalizes/displays adequate comfort level or baseline comfort level  Outcome: Progressing    Problem: Discharge Planning  Goal: Discharge to home or other facility with appropriate resources  Outcome: Progressing     Problem: Chronic Conditions and Co-morbidities  Goal: Patient's chronic conditions and co-morbidity symptoms are monitored and maintained or improved  Outcome: Progressing

## 2024-10-15 NOTE — PROCEDURES
Bone Marrow Biopsy and Aspiration Procedure Note     Informed consent was obtained and potential risks including bleeding, infection and pain were reviewed with the patient.     Posterior iliac crest(s) prepped with Betadine.     Lidocaine 2% local anesthesia infiltrated into the subcutaneous tissue.    Left bone marrow biopsy and left bone marrow aspirate was obtained.     The procedure was tolerated well and there were no complications.    Specimens sent for: flow cytometry, molecular analysis, and chimerisms    Physician: Gloria Yanez MD

## 2024-10-16 LAB
ALBUMIN SERPL BCP-MCNC: 3.7 G/DL (ref 3.4–5)
ALP SERPL-CCNC: 73 U/L (ref 33–136)
ALT SERPL W P-5'-P-CCNC: 12 U/L (ref 10–52)
ANION GAP SERPL CALC-SCNC: 13 MMOL/L (ref 10–20)
APTT PPP: 33 SECONDS (ref 27–38)
AST SERPL W P-5'-P-CCNC: 11 U/L (ref 9–39)
BASOPHILS # BLD AUTO: 0 X10*3/UL (ref 0–0.1)
BASOPHILS NFR BLD AUTO: 0 %
BILIRUB SERPL-MCNC: 1 MG/DL (ref 0–1.2)
BUN SERPL-MCNC: 9 MG/DL (ref 6–23)
CALCIUM SERPL-MCNC: 8.7 MG/DL (ref 8.6–10.6)
CELL COUNT (BLOOD): 0.12 X10*3/UL
CELL POPULATIONS: NORMAL
CHLORIDE SERPL-SCNC: 105 MMOL/L (ref 98–107)
CO2 SERPL-SCNC: 25 MMOL/L (ref 21–32)
CREAT SERPL-MCNC: 0.8 MG/DL (ref 0.5–1.3)
DIAGNOSIS: NORMAL
EBV DNA SPEC NAA+PROBE-LOG#: ABNORMAL {LOG_COPIES}/ML
EGFRCR SERPLBLD CKD-EPI 2021: >90 ML/MIN/1.73M*2
EOSINOPHIL # BLD AUTO: 0 X10*3/UL (ref 0–0.7)
EOSINOPHIL NFR BLD AUTO: 0 %
ERYTHROCYTE [DISTWIDTH] IN BLOOD BY AUTOMATED COUNT: 16.4 % (ref 11.5–14.5)
FIBRINOGEN PPP-MCNC: 450 MG/DL (ref 200–400)
FLOW DIFFERENTIAL: NORMAL
FLOW TEST ORDERED: NORMAL
GLUCOSE SERPL-MCNC: 98 MG/DL (ref 74–99)
HCT VFR BLD AUTO: 24.2 % (ref 41–52)
HGB BLD-MCNC: 8.4 G/DL (ref 13.5–17.5)
IMM GRANULOCYTES # BLD AUTO: 0 X10*3/UL (ref 0–0.7)
IMM GRANULOCYTES NFR BLD AUTO: 0 % (ref 0–0.9)
INR PPP: 1.1 (ref 0.9–1.1)
LAB TEST METHOD: NORMAL
LABORATORY COMMENT REPORT: ABNORMAL
LYMPHOCYTES # BLD AUTO: 0.1 X10*3/UL (ref 1.2–4.8)
LYMPHOCYTES NFR BLD AUTO: 100 %
MAGNESIUM SERPL-MCNC: 1.37 MG/DL (ref 1.6–2.4)
MCH RBC QN AUTO: 26 PG (ref 26–34)
MCHC RBC AUTO-ENTMCNC: 34.7 G/DL (ref 32–36)
MCV RBC AUTO: 75 FL (ref 80–100)
MONOCYTES # BLD AUTO: 0 X10*3/UL (ref 0.1–1)
MONOCYTES NFR BLD AUTO: 0 %
NEUTROPHILS # BLD AUTO: 0 X10*3/UL (ref 1.2–7.7)
NEUTROPHILS NFR BLD AUTO: 0 %
NRBC BLD-RTO: 0 /100 WBCS (ref 0–0)
NUMBER OF CELLS COLLECTED: NORMAL
PATH REPORT.COMMENTS IMP SPEC: NORMAL
PATH REPORT.FINAL DX SPEC: NORMAL
PATH REPORT.GROSS SPEC: NORMAL
PATH REPORT.MICROSCOPIC SPEC OTHER STN: NORMAL
PATH REPORT.RELEVANT HX SPEC: NORMAL
PATH REPORT.TOTAL CANCER: NORMAL
PATH REPORT.TOTAL CANCER: NORMAL
PHOSPHATE SERPL-MCNC: 3.9 MG/DL (ref 2.5–4.9)
PLATELET # BLD AUTO: 51 X10*3/UL (ref 150–450)
POTASSIUM SERPL-SCNC: 3.9 MMOL/L (ref 3.5–5.3)
PROT SERPL-MCNC: 6.7 G/DL (ref 6.4–8.2)
PROTHROMBIN TIME: 12.8 SECONDS (ref 9.8–12.8)
RBC # BLD AUTO: 3.23 X10*6/UL (ref 4.5–5.9)
RBC MORPH BLD: NORMAL
SCAN RESULT: NORMAL
SIGNATURE COMMENT: NORMAL
SODIUM SERPL-SCNC: 139 MMOL/L (ref 136–145)
SPECIMEN VIABILITY: NORMAL
TACROLIMUS BLD-MCNC: 11.1 NG/ML
TARGETS BLD QL SMEAR: NORMAL
WBC # BLD AUTO: 0.1 X10*3/UL (ref 4.4–11.3)

## 2024-10-16 PROCEDURE — 2500000004 HC RX 250 GENERAL PHARMACY W/ HCPCS (ALT 636 FOR OP/ED): Performed by: NURSE PRACTITIONER

## 2024-10-16 PROCEDURE — 85025 COMPLETE CBC W/AUTO DIFF WBC: CPT

## 2024-10-16 PROCEDURE — 2500000002 HC RX 250 W HCPCS SELF ADMINISTERED DRUGS (ALT 637 FOR MEDICARE OP, ALT 636 FOR OP/ED): Performed by: INTERNAL MEDICINE

## 2024-10-16 PROCEDURE — 99233 SBSQ HOSP IP/OBS HIGH 50: CPT | Performed by: INTERNAL MEDICINE

## 2024-10-16 PROCEDURE — 85384 FIBRINOGEN ACTIVITY: CPT

## 2024-10-16 PROCEDURE — 80197 ASSAY OF TACROLIMUS: CPT | Performed by: INTERNAL MEDICINE

## 2024-10-16 PROCEDURE — 2500000005 HC RX 250 GENERAL PHARMACY W/O HCPCS

## 2024-10-16 PROCEDURE — 83735 ASSAY OF MAGNESIUM: CPT

## 2024-10-16 PROCEDURE — 1170000001 HC PRIVATE ONCOLOGY ROOM DAILY

## 2024-10-16 PROCEDURE — 2500000002 HC RX 250 W HCPCS SELF ADMINISTERED DRUGS (ALT 637 FOR MEDICARE OP, ALT 636 FOR OP/ED): Performed by: PHYSICIAN ASSISTANT

## 2024-10-16 PROCEDURE — 2500000004 HC RX 250 GENERAL PHARMACY W/ HCPCS (ALT 636 FOR OP/ED)

## 2024-10-16 PROCEDURE — 2500000001 HC RX 250 WO HCPCS SELF ADMINISTERED DRUGS (ALT 637 FOR MEDICARE OP): Performed by: STUDENT IN AN ORGANIZED HEALTH CARE EDUCATION/TRAINING PROGRAM

## 2024-10-16 PROCEDURE — 84100 ASSAY OF PHOSPHORUS: CPT

## 2024-10-16 PROCEDURE — 2500000001 HC RX 250 WO HCPCS SELF ADMINISTERED DRUGS (ALT 637 FOR MEDICARE OP): Performed by: INTERNAL MEDICINE

## 2024-10-16 PROCEDURE — 2500000004 HC RX 250 GENERAL PHARMACY W/ HCPCS (ALT 636 FOR OP/ED): Mod: JZ | Performed by: INTERNAL MEDICINE

## 2024-10-16 PROCEDURE — 2500000002 HC RX 250 W HCPCS SELF ADMINISTERED DRUGS (ALT 637 FOR MEDICARE OP, ALT 636 FOR OP/ED): Performed by: STUDENT IN AN ORGANIZED HEALTH CARE EDUCATION/TRAINING PROGRAM

## 2024-10-16 PROCEDURE — 85610 PROTHROMBIN TIME: CPT

## 2024-10-16 PROCEDURE — 80053 COMPREHEN METABOLIC PANEL: CPT

## 2024-10-16 PROCEDURE — 2500000001 HC RX 250 WO HCPCS SELF ADMINISTERED DRUGS (ALT 637 FOR MEDICARE OP)

## 2024-10-16 RX ORDER — TACROLIMUS 0.5 MG/1
1.5 CAPSULE ORAL
Status: DISCONTINUED | OUTPATIENT
Start: 2024-10-16 | End: 2024-10-24

## 2024-10-16 RX ORDER — MAGNESIUM SULFATE HEPTAHYDRATE 40 MG/ML
2 INJECTION, SOLUTION INTRAVENOUS ONCE
Status: COMPLETED | OUTPATIENT
Start: 2024-10-16 | End: 2024-10-16

## 2024-10-16 RX ORDER — MAGNESIUM SULFATE HEPTAHYDRATE 40 MG/ML
4 INJECTION, SOLUTION INTRAVENOUS ONCE
Status: COMPLETED | OUTPATIENT
Start: 2024-10-16 | End: 2024-10-16

## 2024-10-16 ASSESSMENT — COGNITIVE AND FUNCTIONAL STATUS - GENERAL
MOBILITY SCORE: 24
DAILY ACTIVITIY SCORE: 24

## 2024-10-16 ASSESSMENT — PAIN SCALES - GENERAL
PAINLEVEL_OUTOF10: 0 - NO PAIN
PAINLEVEL_OUTOF10: 5 - MODERATE PAIN

## 2024-10-16 ASSESSMENT — PAIN - FUNCTIONAL ASSESSMENT: PAIN_FUNCTIONAL_ASSESSMENT: 0-10

## 2024-10-16 NOTE — ASSESSMENT & PLAN NOTE
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      T+25  Single-umbilical cord transplant (T0=9/19/24)    Updates (10/12):  - waiting for plasma CXCL9 as send-out to [place] (phone of lab: [phone]): will be done later today, 10/16 per lab   - Consider repeat BMBx next week if no count recovery     ONC  # MDS/AML   - Symptoms began 9/2023; diagnosed 4/2024  - BMBx showing MDS in transition to AML (>10% jaime) w/ trisomy 8, DNMT alpha, and U2AF1 mutation indication adverse risk   - s/p 4 cycles of Decitabine/Venetoclax (C4D1 on 8/12/24)  - BMBx 6/17/24: Blasts cleared, FISH still positive for trisomy 8, still MDS changes   - BMBx 9/5/24: 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 and no increase in blast  - persistent lack of engraftment, now past expected median engraftment date for single cord (d21)  - will send out plasma CXCL9 level to [place] to assess for immunologic graft rejection (see update above)  - Can repeat a BMbx after a month if counts do not improve and then consider repeating another cord transplant if required     # Transplant  CONDITIONING Fludarabine, melphalan, and TBI   DONOR Single umbilical cord   MATCH GRADE 6/8   SEX MATCH Mismatched   ABO DONOR A pos   ABO RECIPIENT A pos   GVHD PROPHYLAXIS Tacrolimus and Mycophenolate   GRAFT SOURCE Single umbilical cord   CMV DONOR negative   CMV RECIPIENT positive      # GVHD prophy  - Tacro level (10/13): 10.1 --> cont current dose. 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 Switched to PO levaquin 10/11  # Prophylaxis: Acyclovir, Vancomycin, Posaconazole, Letermovir (started T+14), and Bactrim (to start T+30), Ursodiol      # Maculopapular rash (9/27), now resolved, likely d/t Meropenem  - appearance highly suggestive of meropenem reaction vs pre-engraftment syndrome  - maculopapular on inner thighs, non-itching, non-painful, blanching--resolved  - will continue to monitor      CARDIO/PULM:  # Hx of STEMI (11/11/2020)  - TTE 7/19/24: EF 59%, otherwise unremarkable   - Was cleared by Cardiology for transplant   # Hx of HTN   - Nifedipine: 90 mEq ER 24 hr tablet   # Ascending aorta dilation   - Echo showing 3.8-4 cm dilation      FEN/RENAL  - Admit wt: 75.6 kg (9/13/24), Most recent wt: 70.4 kg (10/13).   - Allergic to Lasix, will not take med (extreme hypotension, extreme hypokalemia)     # Chemo induced Diarrhea  - Cdiff & stool path neg (9/22)  - etiology: likely melphalan-related mucositis/enteritis  - Cont loperamide 2mg q6h PRN  # Monitor electrolytes and replace prn  # Hx of treated Hep C in 2015  - (9/12) Hep C RNA - not detected      :  # Hx of BPH and urinary retention (2023)  - Hx elevated PSA    - 7/28/21 Bx: atypical small acinar proliferation    - 4/7/23 Bx: Benign    NEURO/PSYCH:  #Tremor vs fasciculation  -since 10/10, pt reported tremor that was not present prior to admission  -on exam, fine, high-frequency (7+ Hz) tremor worst in the hands, exacerbated by effort, associated with significant fasciculations of the shoulder and back muscles; also fasciculations in jaw  -strength normal, reflexes 2+  -etiology: hypocalcemia, hypomagnesemia, drug side  effect (tacro, cefepime, compazine), primary neurologic disease, essential tremor  -cefepime stopped 10/10  -tacrolimus level wnl, unlikely  -hold compazine for now  -replete magnesium to >2  -if not improving, consider neurology consult     DISPO:  - Full Code  - NOK: spouse Genevieve ([phone])  - PICC  - Oncologist: Jerod     Pt discussed w/ Dr. Blanca Davison

## 2024-10-16 NOTE — PROGRESS NOTES
Brandt Merritt is a 64 y.o. male on day 33 of admission presenting with Acute myeloid leukemia in remission (Multi).    Subjective   Afebrile. No acute issues overnight. Denies HA, dizziness, CP, palpitations, SOB, abd pain, N/V/D or bleeding. Appetite fair. ROS otherwise unremarkable.       Objective     Physical Exam  Constitutional:       General: He is not in acute distress.     Appearance: Normal appearance. He is normal weight. He is not ill-appearing, toxic-appearing or diaphoretic.   HENT:      Head: Normocephalic and atraumatic.      Right Ear: External ear normal.      Left Ear: External ear normal.      Nose: Nose normal. No congestion or rhinorrhea.      Mouth/Throat:      Mouth: Mucous membranes are moist.      Pharynx: Oropharynx is clear. No oropharyngeal exudate or posterior oropharyngeal erythema.   Eyes:      General: No scleral icterus.        Right eye: No discharge.         Left eye: No discharge.      Extraocular Movements: Extraocular movements intact.      Conjunctiva/sclera: Conjunctivae normal.   Cardiovascular:      Rate and Rhythm: Normal rate and regular rhythm.      Heart sounds: Normal heart sounds. No murmur heard.     No friction rub. No gallop.   Pulmonary:      Effort: Pulmonary effort is normal. No respiratory distress.      Breath sounds: No stridor. No wheezing, rhonchi or rales.   Chest:      Chest wall: No tenderness.   Abdominal:      General: Abdomen is flat. Bowel sounds are normal. There is no distension.      Palpations: Abdomen is soft. There is no mass.      Tenderness: There is no abdominal tenderness. There is no guarding or rebound.      Hernia: No hernia is present.   Musculoskeletal:         General: No swelling, tenderness, deformity or signs of injury. Normal range of motion.      Cervical back: Normal range of motion.      Right lower leg: No edema.      Left lower leg: No edema.   Skin:     General: Skin is warm and dry.      Coloration: Skin is not jaundiced  "or pale.      Findings: No bruising, erythema, lesion or rash.   Neurological:      General: No focal deficit present.      Mental Status: He is alert and oriented to person, place, and time.      Sensory: Sensation is intact.      Motor: Tremor present. No weakness.      Coordination: Coordination is intact.      Gait: Gait is intact.      Deep Tendon Reflexes:      Reflex Scores:       Brachioradialis reflexes are 2+ on the right side and 2+ on the left side.       Patellar reflexes are 2+ on the right side and 2+ on the left side.     Comments: Tremor vs muscle fasciculations in bilateral arms, jaw, and back, exacerbated by effort.    Psychiatric:         Mood and Affect: Mood normal.         Behavior: Behavior normal.         Thought Content: Thought content normal.         Judgment: Judgment normal.         Last Recorded Vitals  Blood pressure 132/83, pulse 91, temperature 37.2 °C (99 °F), temperature source Temporal, resp. rate 16, height (S) 1.664 m (5' 5.51\"), weight 69.6 kg (153 lb 7 oz), SpO2 99%.  Intake/Output last 3 Shifts:  I/O last 3 completed shifts:  In: 976 (14 mL/kg) [Blood:976]  Out: - (0 mL/kg)   Weight: 69.6 kg     Relevant Results      Scheduled medications  acyclovir, 400 mg, oral, q12h  filgrastim or biosimilar, 300 mcg, subcutaneous, q24h  folic acid, 1 mg, oral, Daily  letermovir, 480 mg, oral, Daily  levoFLOXacin, 500 mg, oral, q24h JALYN  magnesium chloride, 64 mg, oral, BID  magnesium sulfate, 2 g, intravenous, Once  NIFEdipine ER, 90 mg, oral, Daily before breakfast  posaconazole, 300 mg, oral, q24h  tacrolimus, 2 mg, oral, q12h JALYN  ursodiol, 300 mg, oral, TID  vancomycin, 125 mg, oral, Daily      Continuous medications     PRN medications  PRN medications: acetaminophen, albuterol, alteplase, dextrose, diphenhydrAMINE, EPINEPHrine HCl, famotidine, loperamide, methylPREDNISolone sodium succinate (PF), ondansetron, sodium chloride      This patient has a central line   Reason for the " central line remaining today? 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      T+25  Single-umbilical cord transplant (T0=9/19/24)    Updates (10/12):  - waiting for plasma CXCL9 as send-out to [place] (phone of lab: [phone]): will be done later today, 10/16 per lab   - Consider repeat BMBx next week if no count recovery     ONC  # MDS/AML   - Symptoms began 9/2023; diagnosed 4/2024  - BMBx showing MDS in transition to AML (>10% jaime) w/ trisomy 8, DNMT alpha, and U2AF1 mutation indication adverse risk   - s/p 4 cycles of Decitabine/Venetoclax (C4D1 on 8/12/24)  - BMBx 6/17/24: Blasts cleared, FISH still positive for trisomy 8, still MDS changes   - BMBx 9/5/24: 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 and no increase in blast  - persistent lack of engraftment, now past expected median engraftment date for single cord (d21)  - will send out plasma CXCL9 level to [place] to assess for immunologic graft rejection (see update above)  - Can repeat a BMbx after a month if counts do not improve and then consider repeating another cord transplant if required     # Transplant  CONDITIONING Fludarabine, melphalan, and TBI   DONOR Single umbilical cord   MATCH GRADE 6/8   SEX MATCH Mismatched   ABO DONOR A pos   ABO RECIPIENT A pos   GVHD PROPHYLAXIS Tacrolimus and Mycophenolate   GRAFT SOURCE Single umbilical cord   CMV DONOR negative   CMV RECIPIENT positive      # GVHD prophy  - Tacro level (10/13): 10.1 --> cont current dose. 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 Switched to PO levaquin 10/11  # Prophylaxis: Acyclovir, Vancomycin, Posaconazole, Letermovir (started T+14), and Bactrim (to start T+30), Ursodiol      # Maculopapular rash (9/27), now resolved, likely d/t Meropenem  - appearance highly suggestive of meropenem reaction vs pre-engraftment syndrome  - maculopapular on inner thighs, non-itching, non-painful, blanching--resolved  - will continue to monitor      CARDIO/PULM:  # Hx of STEMI (11/11/2020)  - TTE 7/19/24: EF 59%, otherwise unremarkable   - Was cleared by Cardiology for transplant   # Hx of HTN   - Nifedipine: 90 mEq ER 24 hr tablet   # Ascending aorta dilation   - Echo showing 3.8-4 cm dilation      FEN/RENAL  - Admit wt: 75.6 kg (9/13/24), Most recent wt: 70.4 kg (10/13).   - Allergic to Lasix, will not take med (extreme hypotension, extreme hypokalemia)     # Chemo induced Diarrhea  - Cdiff & stool path neg (9/22)  - etiology: likely melphalan-related mucositis/enteritis  - Cont loperamide 2mg q6h PRN  # Monitor electrolytes and replace prn  # Hx of treated Hep C in 2015  - (9/12) Hep C RNA - not detected      :  # Hx of BPH and urinary retention (2023)  - Hx elevated PSA    - 7/28/21 Bx: atypical small acinar proliferation    - 4/7/23 Bx: Benign    NEURO/PSYCH:  #Tremor vs fasciculation  -since 10/10, pt reported tremor that was not present prior to  admission  -on exam, fine, high-frequency (7+ Hz) tremor worst in the hands, exacerbated by effort, associated with significant fasciculations of the shoulder and back muscles; also fasciculations in jaw  -strength normal, reflexes 2+  -etiology: hypocalcemia, hypomagnesemia, drug side effect (tacro, cefepime, compazine), primary neurologic disease, essential tremor  -cefepime stopped 10/10  -tacrolimus level wnl, unlikely  -hold compazine for now  -replete magnesium to >2  -if not improving, consider neurology consult     DISPO:  - Full Code  - NOK: spouse Genevieve ([phone])  - PICC  - Oncologist: Jerod     Pt discussed w/ Dr. Blanca Gaming, APRN-CNP

## 2024-10-16 NOTE — TUMOR BOARD NOTE
TUMOR BOARD DISCUSSION SUMMARY    PRESENTER: Dr. Gloria Yanez    DIAGNOSIS: Acute myeloid leukemia     SUMMARY/PRESENTATION: Brandt Merritt is a 64 y.o. 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    NOTE: There is no morphologic evidence of residual myeloid neoplasm. Total chimerism will be attempted and results reported separately. Clinical correlation is recommended.     RECOMMENDATIONS:   Likely graft failure.  Alternative donor should be identified and Salvage transplant should be considered.           Disclaimer     [facility] tumor board recommendations represent the consensus opinion of physicians present at a weekly patient care conference. The treating [facility] physician is not always present, and many of the physicians formulating the recommendation have not personally seen or examined the patient under discussion. It is understood that the treating [facility] physician considers the expertise of the Tumor Board Recommendation in formulating his/her plan for the patient. However, in many situations, based on individualized patient considerations, a different plan is determined by the treating physician to be the optimal medical management.     Scribe Attestation  By signing my name below, Tiffany MEDRANO Scribe   attest that this documentation has been prepared under the direction and in the presence of [facility]IG[facility]EM[facility] TUMOR BOARD.

## 2024-10-17 ENCOUNTER — TUMOR BOARD CONFERENCE (OUTPATIENT)
Dept: HEMATOLOGY/ONCOLOGY | Facility: HOSPITAL | Age: 64
End: 2024-10-17
Payer: COMMERCIAL

## 2024-10-17 LAB
ALBUMIN SERPL BCP-MCNC: 3.6 G/DL (ref 3.4–5)
ALP SERPL-CCNC: 75 U/L (ref 33–136)
ALT SERPL W P-5'-P-CCNC: 11 U/L (ref 10–52)
ANION GAP SERPL CALC-SCNC: 13 MMOL/L (ref 10–20)
APTT PPP: 33 SECONDS (ref 27–38)
AST SERPL W P-5'-P-CCNC: 10 U/L (ref 9–39)
BASOPHILS # BLD MANUAL: 0 X10*3/UL (ref 0–0.1)
BASOPHILS NFR BLD MANUAL: 0 %
BILIRUB SERPL-MCNC: 0.7 MG/DL (ref 0–1.2)
BUN SERPL-MCNC: 9 MG/DL (ref 6–23)
CALCIUM SERPL-MCNC: 8.7 MG/DL (ref 8.6–10.6)
CHLORIDE SERPL-SCNC: 104 MMOL/L (ref 98–107)
CMV DNA SERPL NAA+PROBE-LOG IU: NORMAL {LOG_IU}/ML
CO2 SERPL-SCNC: 25 MMOL/L (ref 21–32)
CREAT SERPL-MCNC: 0.83 MG/DL (ref 0.5–1.3)
DACRYOCYTES BLD QL SMEAR: ABNORMAL
EGFRCR SERPLBLD CKD-EPI 2021: >90 ML/MIN/1.73M*2
EOSINOPHIL # BLD MANUAL: 0 X10*3/UL (ref 0–0.7)
EOSINOPHIL NFR BLD MANUAL: 0 %
ERYTHROCYTE [DISTWIDTH] IN BLOOD BY AUTOMATED COUNT: 16.6 % (ref 11.5–14.5)
FIBRINOGEN PPP-MCNC: 405 MG/DL (ref 200–400)
GLUCOSE SERPL-MCNC: 101 MG/DL (ref 74–99)
HCT VFR BLD AUTO: 23.7 % (ref 41–52)
HGB BLD-MCNC: 8.3 G/DL (ref 13.5–17.5)
HYPOCHROMIA BLD QL SMEAR: ABNORMAL
IMM GRANULOCYTES # BLD AUTO: 0 X10*3/UL (ref 0–0.7)
IMM GRANULOCYTES NFR BLD AUTO: 0 % (ref 0–0.9)
INR PPP: 1.2 (ref 0.9–1.1)
LABORATORY COMMENT REPORT: NOT DETECTED
LYMPHOCYTES # BLD MANUAL: 0.09 X10*3/UL (ref 1.2–4.8)
LYMPHOCYTES NFR BLD MANUAL: 92.9 %
MAGNESIUM SERPL-MCNC: 1.48 MG/DL (ref 1.6–2.4)
MCH RBC QN AUTO: 26 PG (ref 26–34)
MCHC RBC AUTO-ENTMCNC: 35 G/DL (ref 32–36)
MCV RBC AUTO: 74 FL (ref 80–100)
MONOCYTES # BLD MANUAL: 0.01 X10*3/UL (ref 0.1–1)
MONOCYTES NFR BLD MANUAL: 7.1 %
NEUTS SEG # BLD MANUAL: 0 X10*3/UL (ref 1.2–7)
NEUTS SEG NFR BLD MANUAL: 0 %
NRBC BLD-RTO: 0 /100 WBCS (ref 0–0)
OVALOCYTES BLD QL SMEAR: ABNORMAL
PHOSPHATE SERPL-MCNC: 4.1 MG/DL (ref 2.5–4.9)
PLATELET # BLD AUTO: 45 X10*3/UL (ref 150–450)
POLYCHROMASIA BLD QL SMEAR: ABNORMAL
POTASSIUM SERPL-SCNC: 3.9 MMOL/L (ref 3.5–5.3)
PROT SERPL-MCNC: 6.7 G/DL (ref 6.4–8.2)
PROTHROMBIN TIME: 13.1 SECONDS (ref 9.8–12.8)
RBC # BLD AUTO: 3.19 X10*6/UL (ref 4.5–5.9)
RBC MORPH BLD: ABNORMAL
SCHISTOCYTES BLD QL SMEAR: ABNORMAL
SODIUM SERPL-SCNC: 138 MMOL/L (ref 136–145)
TACROLIMUS BLD-MCNC: 9.7 NG/ML
TOTAL CELLS COUNTED BLD: 14
WBC # BLD AUTO: 0.1 X10*3/UL (ref 4.4–11.3)

## 2024-10-17 PROCEDURE — 1170000001 HC PRIVATE ONCOLOGY ROOM DAILY

## 2024-10-17 PROCEDURE — 2500000004 HC RX 250 GENERAL PHARMACY W/ HCPCS (ALT 636 FOR OP/ED): Performed by: NURSE PRACTITIONER

## 2024-10-17 PROCEDURE — 84100 ASSAY OF PHOSPHORUS: CPT

## 2024-10-17 PROCEDURE — 2500000002 HC RX 250 W HCPCS SELF ADMINISTERED DRUGS (ALT 637 FOR MEDICARE OP, ALT 636 FOR OP/ED): Performed by: INTERNAL MEDICINE

## 2024-10-17 PROCEDURE — 2500000004 HC RX 250 GENERAL PHARMACY W/ HCPCS (ALT 636 FOR OP/ED): Mod: JZ | Performed by: INTERNAL MEDICINE

## 2024-10-17 PROCEDURE — 99233 SBSQ HOSP IP/OBS HIGH 50: CPT | Performed by: INTERNAL MEDICINE

## 2024-10-17 PROCEDURE — 85007 BL SMEAR W/DIFF WBC COUNT: CPT

## 2024-10-17 PROCEDURE — 85730 THROMBOPLASTIN TIME PARTIAL: CPT

## 2024-10-17 PROCEDURE — 2500000004 HC RX 250 GENERAL PHARMACY W/ HCPCS (ALT 636 FOR OP/ED): Performed by: PHYSICIAN ASSISTANT

## 2024-10-17 PROCEDURE — 2500000001 HC RX 250 WO HCPCS SELF ADMINISTERED DRUGS (ALT 637 FOR MEDICARE OP)

## 2024-10-17 PROCEDURE — 2500000001 HC RX 250 WO HCPCS SELF ADMINISTERED DRUGS (ALT 637 FOR MEDICARE OP): Performed by: INTERNAL MEDICINE

## 2024-10-17 PROCEDURE — 2500000002 HC RX 250 W HCPCS SELF ADMINISTERED DRUGS (ALT 637 FOR MEDICARE OP, ALT 636 FOR OP/ED): Performed by: STUDENT IN AN ORGANIZED HEALTH CARE EDUCATION/TRAINING PROGRAM

## 2024-10-17 PROCEDURE — 85384 FIBRINOGEN ACTIVITY: CPT

## 2024-10-17 PROCEDURE — 80197 ASSAY OF TACROLIMUS: CPT | Performed by: INTERNAL MEDICINE

## 2024-10-17 PROCEDURE — 85027 COMPLETE CBC AUTOMATED: CPT

## 2024-10-17 PROCEDURE — 2500000001 HC RX 250 WO HCPCS SELF ADMINISTERED DRUGS (ALT 637 FOR MEDICARE OP): Performed by: STUDENT IN AN ORGANIZED HEALTH CARE EDUCATION/TRAINING PROGRAM

## 2024-10-17 PROCEDURE — 2500000002 HC RX 250 W HCPCS SELF ADMINISTERED DRUGS (ALT 637 FOR MEDICARE OP, ALT 636 FOR OP/ED): Performed by: PHYSICIAN ASSISTANT

## 2024-10-17 PROCEDURE — 80053 COMPREHEN METABOLIC PANEL: CPT

## 2024-10-17 PROCEDURE — 83735 ASSAY OF MAGNESIUM: CPT

## 2024-10-17 RX ORDER — MAGNESIUM SULFATE HEPTAHYDRATE 40 MG/ML
4 INJECTION, SOLUTION INTRAVENOUS ONCE
Status: COMPLETED | OUTPATIENT
Start: 2024-10-17 | End: 2024-10-17

## 2024-10-17 ASSESSMENT — COGNITIVE AND FUNCTIONAL STATUS - GENERAL
MOBILITY SCORE: 24
MOBILITY SCORE: 24
DAILY ACTIVITIY SCORE: 24
DAILY ACTIVITIY SCORE: 24

## 2024-10-17 ASSESSMENT — PAIN SCALES - GENERAL
PAINLEVEL_OUTOF10: 2
PAINLEVEL_OUTOF10: 0 - NO PAIN

## 2024-10-17 ASSESSMENT — PAIN - FUNCTIONAL ASSESSMENT: PAIN_FUNCTIONAL_ASSESSMENT: 0-10

## 2024-10-17 NOTE — PROGRESS NOTES
"Brandt Merritt is a 64 y.o. male on day 34 of admission presenting with Acute myeloid leukemia in remission (Multi).    Subjective   Patient feeling okay. He denies any f/c/n/v/d. No urinary complaints. No CP, SOB. No bleeding. Appetite okay. Ambulating without difficulty.        Objective     Physical Exam  Constitutional:       General: He is not in acute distress.  HENT:      Head: Normocephalic and atraumatic.      Mouth/Throat:      Mouth: Mucous membranes are moist.      Pharynx: No oropharyngeal exudate.   Eyes:      Extraocular Movements: Extraocular movements intact.      Pupils: Pupils are equal, round, and reactive to light.   Cardiovascular:      Rate and Rhythm: Regular rhythm.      Heart sounds: No murmur heard.     No gallop.   Pulmonary:      Effort: No respiratory distress.      Breath sounds: Normal breath sounds. No wheezing or rhonchi.   Abdominal:      General: Bowel sounds are normal. There is no distension.      Palpations: Abdomen is soft.      Tenderness: There is no abdominal tenderness.   Musculoskeletal:         General: Normal range of motion.      Cervical back: Normal range of motion.      Right lower leg: No edema.      Left lower leg: No edema.   Skin:     General: Skin is warm and dry.   Neurological:      Mental Status: He is alert and oriented to person, place, and time.      Motor: No weakness.   Psychiatric:         Mood and Affect: Mood normal.         Behavior: Behavior normal.         Last Recorded Vitals  Blood pressure 106/68, pulse 96, temperature 36.5 °C (97.7 °F), temperature source Temporal, resp. rate 18, height (S) 1.664 m (5' 5.51\"), weight 69.6 kg (153 lb 7 oz), SpO2 99%.  Intake/Output last 3 Shifts:  No intake/output data recorded.    Relevant Results  Scheduled medications  acyclovir, 400 mg, oral, q12h  filgrastim or biosimilar, 300 mcg, subcutaneous, q24h  folic acid, 1 mg, oral, Daily  letermovir, 480 mg, oral, Daily  levoFLOXacin, 500 mg, oral, q24h " JALYN  magnesium chloride, 64 mg, oral, BID  magnesium sulfate, 4 g, intravenous, Once  NIFEdipine ER, 90 mg, oral, Daily before breakfast  posaconazole, 300 mg, oral, q24h  tacrolimus, 1.5 mg, oral, q12h JALYN  ursodiol, 300 mg, oral, TID  vancomycin, 125 mg, oral, Daily      Continuous medications     PRN medications  PRN medications: acetaminophen, albuterol, alteplase, dextrose, diphenhydrAMINE, EPINEPHrine HCl, famotidine, loperamide, methylPREDNISolone sodium succinate (PF), ondansetron, sodium chloride  Results for orders placed or performed during the hospital encounter of 09/13/24 (from the past 24 hours)   CBC and Auto Differential   Result Value Ref Range    WBC 0.1 (LL) 4.4 - 11.3 x10*3/uL    nRBC 0.0 0.0 - 0.0 /100 WBCs    RBC 3.19 (L) 4.50 - 5.90 x10*6/uL    Hemoglobin 8.3 (L) 13.5 - 17.5 g/dL    Hematocrit 23.7 (L) 41.0 - 52.0 %    MCV 74 (L) 80 - 100 fL    MCH 26.0 26.0 - 34.0 pg    MCHC 35.0 32.0 - 36.0 g/dL    RDW 16.6 (H) 11.5 - 14.5 %    Platelets 45 (L) 150 - 450 x10*3/uL    Immature Granulocytes %, Automated 0.0 0.0 - 0.9 %    Immature Granulocytes Absolute, Automated 0.00 0.00 - 0.70 x10*3/uL   Coagulation Screen   Result Value Ref Range    Protime 13.1 (H) 9.8 - 12.8 seconds    INR 1.2 (H) 0.9 - 1.1    aPTT 33 27 - 38 seconds   Comprehensive Metabolic Panel   Result Value Ref Range    Glucose 101 (H) 74 - 99 mg/dL    Sodium 138 136 - 145 mmol/L    Potassium 3.9 3.5 - 5.3 mmol/L    Chloride 104 98 - 107 mmol/L    Bicarbonate 25 21 - 32 mmol/L    Anion Gap 13 10 - 20 mmol/L    Urea Nitrogen 9 6 - 23 mg/dL    Creatinine 0.83 0.50 - 1.30 mg/dL    eGFR >90 >60 mL/min/1.73m*2    Calcium 8.7 8.6 - 10.6 mg/dL    Albumin 3.6 3.4 - 5.0 g/dL    Alkaline Phosphatase 75 33 - 136 U/L    Total Protein 6.7 6.4 - 8.2 g/dL    AST 10 9 - 39 U/L    Bilirubin, Total 0.7 0.0 - 1.2 mg/dL    ALT 11 10 - 52 U/L   Fibrinogen   Result Value Ref Range    Fibrinogen 405 (H) 200 - 400 mg/dL   Magnesium   Result Value Ref  Range    Magnesium 1.48 (L) 1.60 - 2.40 mg/dL   Phosphorus   Result Value Ref Range    Phosphorus 4.1 2.5 - 4.9 mg/dL   Tacrolimus level   Result Value Ref Range    Tacrolimus  9.7 <=15.0 ng/mL   Manual Differential   Result Value Ref Range    Neutrophils %, Manual 0.0 40.0 - 80.0 %    Lymphocytes %, Manual 92.9 13.0 - 44.0 %    Monocytes %, Manual 7.1 2.0 - 10.0 %    Eosinophils %, Manual 0.0 0.0 - 6.0 %    Basophils %, Manual 0.0 0.0 - 2.0 %    Seg Neutrophils Absolute, Manual 0.00 (L) 1.20 - 7.00 x10*3/uL    Lymphocytes Absolute, Manual 0.09 (L) 1.20 - 4.80 x10*3/uL    Monocytes Absolute, Manual 0.01 (L) 0.10 - 1.00 x10*3/uL    Eosinophils Absolute, Manual 0.00 0.00 - 0.70 x10*3/uL    Basophils Absolute, Manual 0.00 0.00 - 0.10 x10*3/uL    Total Cells Counted 14     RBC Morphology See Below     Polychromasia Mild     Hypochromia Mild     RBC Fragments Few     Ovalocytes Few     Teardrop Cells Few                             Assessment/Plan   Assessment & Plan  Acute myeloid leukemia in remission (Multi)    Stem cells transplant status (Multi)    Immunocompromised    Conditioning chemotherapy prior to peripheral blood stem cell transplant    Brandt Merritt is a 64 y.o male with PMHx htn, Hgb C, Hep C (treated), 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% jaime) w/ trisomy 8, DNMT alpha, and U2AF1 mutation indication adverse risk   - s/p 4 cycles of Decitabine/Venetoclax (C4D1 on 8/12/24)  - BMBx 6/17/24: Blasts cleared, FISH still positive for trisomy 8, still MDS changes   - BMBx 9/5/24: 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 and no increase in blast  - persistent lack of engraftment, now past expected median engraftment date for single cord (d21)  -  will send out plasma CXCL9 level to [place] to assess for immunologic graft rejection-(phone of lab: [phone]): will be done later today, 10/16 per lab   - Repeat BMBx done on 10/15-hypocellular with no residual myeloid neoplasm  -consider repeating another cord transplant if required     # Transplant  CONDITIONING Fludarabine, melphalan, and TBI   DONOR Single umbilical cord   MATCH GRADE 6/8   SEX MATCH Mismatched   ABO DONOR A pos   ABO RECIPIENT A pos   GVHD PROPHYLAXIS Tacrolimus and Mycophenolate   GRAFT SOURCE Single umbilical cord   CMV DONOR negative   CMV RECIPIENT positive      # GVHD prophy  - Tacro level (10/13): 10.1 --> cont current dose. 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 Switched to PO levaquin 10/11  # Prophylaxis: Acyclovir, Vancomycin, Posaconazole, Letermovir (started T+14), and Bactrim (to start T+30), Ursodiol      # Maculopapular rash (9/27), now resolved, likely d/t Meropenem  - appearance highly suggestive of meropenem reaction vs pre-engraftment syndrome  - maculopapular on inner thighs, non-itching, non-painful, blanching--resolved  - will continue to monitor      CARDIO/PULM:  # Hx of STEMI (11/11/2020)  - TTE 7/19/24: EF 59%, otherwise unremarkable   - Was cleared by Cardiology for transplant   # Hx of HTN   -  Nifedipine: 90 mEq ER 24 hr tablet   # Ascending aorta dilation   - Echo showing 3.8-4 cm dilation      FEN/RENAL/GI  - Admit wt: 75.6 kg (9/13/24), Most recent wt: 69.6kg (10/14).   - Allergic to Lasix, will not take med (extreme hypotension, extreme hypokalemia)   # Chemo induced Diarrhea  - Cdiff & stool path neg (9/22)  - etiology: likely melphalan-related mucositis/enteritis  - Cont loperamide 2mg q6h PRN  # Monitor electrolytes and replace prn  # Hx of treated Hep C in 2015  - (9/12) Hep C RNA - not detected      :  # Hx of BPH and urinary retention (2023)  - Hx elevated PSA    - 7/28/21 Bx: atypical small acinar proliferation    - 4/7/23 Bx: Benign    NEURO/PSYCH:  #Tremor vs fasciculation  -since 10/10, pt reported tremor that was not present prior to admission  -on exam, fine, high-frequency (7+ Hz) tremor worst in the hands, exacerbated by effort, associated with significant fasciculations of the shoulder and back muscles; also fasciculations in jaw  -strength normal, reflexes 2+  -etiology: hypocalcemia, hypomagnesemia, drug side effect (tacro, cefepime, compazine), primary neurologic disease, essential tremor  -cefepime stopped 10/10  -tacrolimus level wnl, unlikely  -hold compazine for now  -replete magnesium to >2  -if not improving, consider neurology consult     DISPO:  - Full Code  - NOK: spouse Genevieve ([phone])  - PICC  - Oncologist: Jerod         Pt seen examined and discussed with Dr. Blanca Helms PA-C

## 2024-10-17 NOTE — ASSESSMENT & PLAN NOTE
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% jaime) w/ trisomy 8, DNMT alpha, and U2AF1 mutation indication adverse risk   - s/p 4 cycles of Decitabine/Venetoclax (C4D1 on 8/12/24)  - BMBx 6/17/24: Blasts cleared, FISH still positive for trisomy 8, still MDS changes   - BMBx 9/5/24: 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 and no increase in blast  - persistent lack of engraftment, now past expected median engraftment date for single cord (d21)  - will send out plasma CXCL9 level to [place] to assess for immunologic graft rejection-(phone of lab: [phone]): will be done later today, 10/16 per lab   - Repeat BMBx done on 10/15-hypocellular with no residual myeloid neoplasm  -consider repeating another cord transplant if required     # Transplant  CONDITIONING Fludarabine, melphalan, and TBI   DONOR Single umbilical cord   MATCH GRADE 6/8   SEX MATCH Mismatched   ABO DONOR A pos   ABO RECIPIENT A pos   GVHD PROPHYLAXIS Tacrolimus and Mycophenolate   GRAFT SOURCE Single umbilical cord   CMV DONOR negative   CMV RECIPIENT positive      # GVHD prophy  - Tacro level (10/13): 10.1 --> cont current dose. 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 Switched to PO levaquin 10/11  # Prophylaxis: Acyclovir, Vancomycin, Posaconazole, Letermovir (started T+14), and Bactrim (to start T+30), Ursodiol      # Maculopapular rash (9/27), now resolved, likely d/t Meropenem  - appearance highly suggestive of meropenem reaction vs pre-engraftment syndrome  - maculopapular on inner thighs, non-itching, non-painful, blanching--resolved  - will continue to monitor      CARDIO/PULM:  # Hx of STEMI (11/11/2020)  - TTE 7/19/24: EF 59%, otherwise unremarkable   - Was cleared by Cardiology for transplant   # Hx of HTN   - Nifedipine: 90 mEq ER 24 hr tablet   # Ascending aorta dilation   - Echo showing 3.8-4 cm dilation      FEN/RENAL/GI  - Admit wt: 75.6 kg (9/13/24), Most recent wt: 69.6kg (10/14).   - Allergic to Lasix, will not take med (extreme hypotension, extreme hypokalemia)   # Chemo induced Diarrhea  - Cdiff & stool path neg (9/22)  - etiology: likely melphalan-related mucositis/enteritis  - Cont loperamide 2mg q6h PRN  # Monitor electrolytes and replace prn  # Hx of treated Hep C in 2015  - (9/12) Hep C RNA - not detected      :  # Hx of BPH and urinary retention (2023)  - Hx elevated PSA    - 7/28/21 Bx: atypical small acinar proliferation    - 4/7/23 Bx: Benign    NEURO/PSYCH:  #Tremor vs fasciculation  -since 10/10, pt reported tremor that was not present prior to admission  -on exam, fine, high-frequency (7+ Hz) tremor worst in the hands, exacerbated by effort, associated with significant fasciculations of the shoulder and back muscles; also fasciculations in jaw  -strength normal, reflexes 2+  -etiology: hypocalcemia, hypomagnesemia, drug side effect (tacro, cefepime, compazine), primary neurologic disease, essential tremor  -cefepime stopped  10/10  -tacrolimus level wnl, unlikely  -hold compazine for now  -replete magnesium to >2  -if not improving, consider neurology consult     DISPO:  - Full Code  - NOK: spouse Genevieve ([phone])  - PICC  - Oncologist: Jerod         Pt seen examined and discussed with Dr. Blanca Davison

## 2024-10-18 LAB
ALBUMIN SERPL BCP-MCNC: 3.5 G/DL (ref 3.4–5)
ALP SERPL-CCNC: 77 U/L (ref 33–136)
ALT SERPL W P-5'-P-CCNC: 9 U/L (ref 10–52)
ANION GAP SERPL CALC-SCNC: 11 MMOL/L (ref 10–20)
APTT PPP: 33 SECONDS (ref 27–38)
AST SERPL W P-5'-P-CCNC: 9 U/L (ref 9–39)
BASOPHILS # BLD AUTO: 0 X10*3/UL (ref 0–0.1)
BASOPHILS NFR BLD AUTO: 0 %
BILIRUB SERPL-MCNC: 0.7 MG/DL (ref 0–1.2)
BUN SERPL-MCNC: 13 MG/DL (ref 6–23)
CALCIUM SERPL-MCNC: 8.6 MG/DL (ref 8.6–10.6)
CHLORIDE SERPL-SCNC: 106 MMOL/L (ref 98–107)
CO2 SERPL-SCNC: 26 MMOL/L (ref 21–32)
CREAT SERPL-MCNC: 0.92 MG/DL (ref 0.5–1.3)
EGFRCR SERPLBLD CKD-EPI 2021: >90 ML/MIN/1.73M*2
EOSINOPHIL # BLD AUTO: 0 X10*3/UL (ref 0–0.7)
EOSINOPHIL NFR BLD AUTO: 0 %
ERYTHROCYTE [DISTWIDTH] IN BLOOD BY AUTOMATED COUNT: 16.9 % (ref 11.5–14.5)
FIBRINOGEN PPP-MCNC: 398 MG/DL (ref 200–400)
GLUCOSE SERPL-MCNC: 99 MG/DL (ref 74–99)
HCT VFR BLD AUTO: 22.6 % (ref 41–52)
HGB BLD-MCNC: 7.9 G/DL (ref 13.5–17.5)
IMM GRANULOCYTES # BLD AUTO: 0 X10*3/UL (ref 0–0.7)
IMM GRANULOCYTES NFR BLD AUTO: 0 % (ref 0–0.9)
INR PPP: 1.1 (ref 0.9–1.1)
LYMPHOCYTES # BLD AUTO: 0.07 X10*3/UL (ref 1.2–4.8)
LYMPHOCYTES NFR BLD AUTO: 100 %
MAGNESIUM SERPL-MCNC: 1.53 MG/DL (ref 1.6–2.4)
MCH RBC QN AUTO: 26.3 PG (ref 26–34)
MCHC RBC AUTO-ENTMCNC: 35 G/DL (ref 32–36)
MCV RBC AUTO: 75 FL (ref 80–100)
MONOCYTES # BLD AUTO: 0 X10*3/UL (ref 0.1–1)
MONOCYTES NFR BLD AUTO: 0 %
NEUTROPHILS # BLD AUTO: 0 X10*3/UL (ref 1.2–7.7)
NEUTROPHILS NFR BLD AUTO: 0 %
NRBC BLD-RTO: 0 /100 WBCS (ref 0–0)
PHOSPHATE SERPL-MCNC: 4.3 MG/DL (ref 2.5–4.9)
PLATELET # BLD AUTO: 22 X10*3/UL (ref 150–450)
POTASSIUM SERPL-SCNC: 4 MMOL/L (ref 3.5–5.3)
PROT SERPL-MCNC: 6.6 G/DL (ref 6.4–8.2)
PROTHROMBIN TIME: 12.9 SECONDS (ref 9.8–12.8)
RBC # BLD AUTO: 3 X10*6/UL (ref 4.5–5.9)
SODIUM SERPL-SCNC: 139 MMOL/L (ref 136–145)
TACROLIMUS BLD-MCNC: 8.2 NG/ML
WBC # BLD AUTO: 0.1 X10*3/UL (ref 4.4–11.3)

## 2024-10-18 PROCEDURE — 2500000004 HC RX 250 GENERAL PHARMACY W/ HCPCS (ALT 636 FOR OP/ED): Performed by: NURSE PRACTITIONER

## 2024-10-18 PROCEDURE — 83735 ASSAY OF MAGNESIUM: CPT

## 2024-10-18 PROCEDURE — 80197 ASSAY OF TACROLIMUS: CPT | Performed by: INTERNAL MEDICINE

## 2024-10-18 PROCEDURE — 85610 PROTHROMBIN TIME: CPT

## 2024-10-18 PROCEDURE — 2500000004 HC RX 250 GENERAL PHARMACY W/ HCPCS (ALT 636 FOR OP/ED): Mod: JZ | Performed by: INTERNAL MEDICINE

## 2024-10-18 PROCEDURE — 1170000001 HC PRIVATE ONCOLOGY ROOM DAILY

## 2024-10-18 PROCEDURE — 2500000001 HC RX 250 WO HCPCS SELF ADMINISTERED DRUGS (ALT 637 FOR MEDICARE OP)

## 2024-10-18 PROCEDURE — 84100 ASSAY OF PHOSPHORUS: CPT

## 2024-10-18 PROCEDURE — 85025 COMPLETE CBC W/AUTO DIFF WBC: CPT

## 2024-10-18 PROCEDURE — 85384 FIBRINOGEN ACTIVITY: CPT

## 2024-10-18 PROCEDURE — 2500000001 HC RX 250 WO HCPCS SELF ADMINISTERED DRUGS (ALT 637 FOR MEDICARE OP): Performed by: INTERNAL MEDICINE

## 2024-10-18 PROCEDURE — 80053 COMPREHEN METABOLIC PANEL: CPT

## 2024-10-18 PROCEDURE — 2500000002 HC RX 250 W HCPCS SELF ADMINISTERED DRUGS (ALT 637 FOR MEDICARE OP, ALT 636 FOR OP/ED): Performed by: STUDENT IN AN ORGANIZED HEALTH CARE EDUCATION/TRAINING PROGRAM

## 2024-10-18 PROCEDURE — 2500000001 HC RX 250 WO HCPCS SELF ADMINISTERED DRUGS (ALT 637 FOR MEDICARE OP): Performed by: STUDENT IN AN ORGANIZED HEALTH CARE EDUCATION/TRAINING PROGRAM

## 2024-10-18 PROCEDURE — 2500000004 HC RX 250 GENERAL PHARMACY W/ HCPCS (ALT 636 FOR OP/ED): Performed by: PHYSICIAN ASSISTANT

## 2024-10-18 PROCEDURE — 99233 SBSQ HOSP IP/OBS HIGH 50: CPT | Performed by: INTERNAL MEDICINE

## 2024-10-18 PROCEDURE — 2500000002 HC RX 250 W HCPCS SELF ADMINISTERED DRUGS (ALT 637 FOR MEDICARE OP, ALT 636 FOR OP/ED): Performed by: INTERNAL MEDICINE

## 2024-10-18 PROCEDURE — 2500000002 HC RX 250 W HCPCS SELF ADMINISTERED DRUGS (ALT 637 FOR MEDICARE OP, ALT 636 FOR OP/ED): Performed by: PHYSICIAN ASSISTANT

## 2024-10-18 RX ORDER — MAGNESIUM SULFATE HEPTAHYDRATE 40 MG/ML
4 INJECTION, SOLUTION INTRAVENOUS ONCE
Status: COMPLETED | OUTPATIENT
Start: 2024-10-18 | End: 2024-10-18

## 2024-10-18 ASSESSMENT — COGNITIVE AND FUNCTIONAL STATUS - GENERAL
DAILY ACTIVITIY SCORE: 24
MOBILITY SCORE: 24

## 2024-10-18 ASSESSMENT — PAIN - FUNCTIONAL ASSESSMENT: PAIN_FUNCTIONAL_ASSESSMENT: 0-10

## 2024-10-18 ASSESSMENT — PAIN SCALES - GENERAL
PAINLEVEL_OUTOF10: 0 - NO PAIN

## 2024-10-18 NOTE — PROGRESS NOTES
"Brandt Merritt is a 64 y.o. male on day 35 of admission presenting with Acute myeloid leukemia in remission (Multi).    Subjective   Afebrile. No overnight events.  He had a loose stool this morning. Appetite okay. ROS otherwise unremarkable.      Objective     Physical Exam  Constitutional:       General: He is not in acute distress.  HENT:      Head: Normocephalic and atraumatic.      Mouth/Throat:      Mouth: Mucous membranes are moist.      Pharynx: No oropharyngeal exudate.   Eyes:      Extraocular Movements: Extraocular movements intact.      Pupils: Pupils are equal, round, and reactive to light.   Cardiovascular:      Rate and Rhythm: Regular rhythm.      Heart sounds: No murmur heard.     No gallop.   Pulmonary:      Effort: No respiratory distress.      Breath sounds: Normal breath sounds. No wheezing or rhonchi.   Abdominal:      General: Bowel sounds are normal. There is no distension.      Palpations: Abdomen is soft.      Tenderness: There is no abdominal tenderness.   Musculoskeletal:         General: Normal range of motion.      Cervical back: Normal range of motion.      Right lower leg: No edema.      Left lower leg: No edema.   Skin:     General: Skin is warm and dry.   Neurological:      Mental Status: He is alert and oriented to person, place, and time.      Motor: No weakness.   Psychiatric:         Mood and Affect: Mood normal.         Behavior: Behavior normal.         Last Recorded Vitals  Blood pressure (!) 110/45, pulse 97, temperature 36.8 °C (98.2 °F), temperature source Temporal, resp. rate 18, height (S) 1.664 m (5' 5.51\"), weight 69.6 kg (153 lb 7 oz), SpO2 100%.  Intake/Output last 3 Shifts:  No intake/output data recorded.    Relevant Results  Scheduled medications  acyclovir, 400 mg, oral, q12h  filgrastim or biosimilar, 300 mcg, subcutaneous, q24h  folic acid, 1 mg, oral, Daily  letermovir, 480 mg, oral, Daily  levoFLOXacin, 500 mg, oral, q24h JALYN  magnesium chloride, 64 mg, oral, " BID  NIFEdipine ER, 90 mg, oral, Daily before breakfast  posaconazole, 300 mg, oral, q24h  tacrolimus, 1.5 mg, oral, q12h JALYN  ursodiol, 300 mg, oral, TID  vancomycin, 125 mg, oral, Daily      Continuous medications     PRN medications  PRN medications: acetaminophen, albuterol, alteplase, dextrose, diphenhydrAMINE, EPINEPHrine HCl, famotidine, loperamide, methylPREDNISolone sodium succinate (PF), ondansetron, sodium chloride  Results for orders placed or performed during the hospital encounter of 09/13/24 (from the past 24 hours)   CBC and Auto Differential   Result Value Ref Range    WBC 0.1 (LL) 4.4 - 11.3 x10*3/uL    nRBC 0.0 0.0 - 0.0 /100 WBCs    RBC 3.00 (L) 4.50 - 5.90 x10*6/uL    Hemoglobin 7.9 (L) 13.5 - 17.5 g/dL    Hematocrit 22.6 (L) 41.0 - 52.0 %    MCV 75 (L) 80 - 100 fL    MCH 26.3 26.0 - 34.0 pg    MCHC 35.0 32.0 - 36.0 g/dL    RDW 16.9 (H) 11.5 - 14.5 %    Platelets 22 (LL) 150 - 450 x10*3/uL    Neutrophils % 0.0 40.0 - 80.0 %    Immature Granulocytes %, Automated 0.0 0.0 - 0.9 %    Lymphocytes % 100.0 13.0 - 44.0 %    Monocytes % 0.0 2.0 - 10.0 %    Eosinophils % 0.0 0.0 - 6.0 %    Basophils % 0.0 0.0 - 2.0 %    Neutrophils Absolute 0.00 (L) 1.20 - 7.70 x10*3/uL    Immature Granulocytes Absolute, Automated 0.00 0.00 - 0.70 x10*3/uL    Lymphocytes Absolute 0.07 (L) 1.20 - 4.80 x10*3/uL    Monocytes Absolute 0.00 (L) 0.10 - 1.00 x10*3/uL    Eosinophils Absolute 0.00 0.00 - 0.70 x10*3/uL    Basophils Absolute 0.00 0.00 - 0.10 x10*3/uL   Coagulation Screen   Result Value Ref Range    Protime 12.9 (H) 9.8 - 12.8 seconds    INR 1.1 0.9 - 1.1    aPTT 33 27 - 38 seconds   Comprehensive Metabolic Panel   Result Value Ref Range    Glucose 99 74 - 99 mg/dL    Sodium 139 136 - 145 mmol/L    Potassium 4.0 3.5 - 5.3 mmol/L    Chloride 106 98 - 107 mmol/L    Bicarbonate 26 21 - 32 mmol/L    Anion Gap 11 10 - 20 mmol/L    Urea Nitrogen 13 6 - 23 mg/dL    Creatinine 0.92 0.50 - 1.30 mg/dL    eGFR >90 >60  mL/min/1.73m*2    Calcium 8.6 8.6 - 10.6 mg/dL    Albumin 3.5 3.4 - 5.0 g/dL    Alkaline Phosphatase 77 33 - 136 U/L    Total Protein 6.6 6.4 - 8.2 g/dL    AST 9 9 - 39 U/L    Bilirubin, Total 0.7 0.0 - 1.2 mg/dL    ALT 9 (L) 10 - 52 U/L   Fibrinogen   Result Value Ref Range    Fibrinogen 398 200 - 400 mg/dL   Magnesium   Result Value Ref Range    Magnesium 1.53 (L) 1.60 - 2.40 mg/dL   Phosphorus   Result Value Ref Range    Phosphorus 4.3 2.5 - 4.9 mg/dL   Tacrolimus level   Result Value Ref Range    Tacrolimus  8.2 <=15.0 ng/mL              Assessment/Plan   Assessment & Plan  Acute myeloid leukemia in remission (Multi)    Stem cells transplant status (Multi)    Immunocompromised    Conditioning chemotherapy prior to peripheral blood stem cell transplant    Bradnt Merritt is a 64 y.o male with PMHx htn, Hgb C, Hep C (treated), 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, DNMT alpha, and U2AF1 mutation indication adverse risk   - s/p 4 cycles of Decitabine/Venetoclax (C4D1 on 8/12/24)  - BMBx 6/17/24: Blasts cleared, FISH still positive for trisomy 8, still MDS changes   - BMBx 9/5/24: 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 and no increase in blast  - persistent lack of engraftment, now past expected median engraftment date for single cord (d21)  - will send out plasma CXCL9 level to [place] to assess for immunologic graft rejection-(phone of lab: [phone]): will be done later today, 10/16 per lab   - Repeat BMBx done on 10/15-hypocellular with no residual myeloid neoplasm  -consider repeating another cord transplant if required     # Transplant  CONDITIONING Fludarabine, melphalan, and TBI   DONOR Single umbilical cord   MATCH  GRADE 6/8   SEX MATCH Mismatched   ABO DONOR A pos   ABO RECIPIENT A pos   GVHD PROPHYLAXIS Tacrolimus and Mycophenolate   GRAFT SOURCE Single umbilical cord   CMV DONOR negative   CMV RECIPIENT positive      # GVHD prophy  - Cont Tacro 1.5 mg BID  -Tacrolimus level 8.2 (10/18)  - Check levels daily  - stopped mycophenolate d/t concern for myelosuppression (10/11)  - Viral surveillance qMondays --> T+14 and T+20 all negative (EBV, CMV, adeno, HHV6)     HEME  # Pancytopenia: secondary to chemotherapy  # 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 Switched to PO levaquin 10/11  # Prophylaxis: Acyclovir, Vancomycin, Posaconazole, Letermovir (started T+14), and Bactrim (to start T+30), Ursodiol      # Maculopapular rash (9/27), now resolved, likely d/t Meropenem  - appearance highly suggestive of meropenem reaction vs pre-engraftment syndrome  - maculopapular on inner thighs, non-itching, non-painful, blanching--resolved  - will continue to monitor      CARDIO/PULM:  # Hx of STEMI (11/11/2020)  - TTE 7/19/24: EF 59%, otherwise unremarkable   - Was cleared by Cardiology for transplant   # Hx of HTN   - Nifedipine: 90 mEq ER 24 hr tablet   # Ascending aorta dilation   - Echo showing 3.8-4 cm dilation      FEN/RENAL/GI  - Admit wt: 75.6 kg (9/13/24), Most recent wt: 69.6kg (10/14).   - Allergic to Lasix, will not take med (extreme hypotension, extreme hypokalemia)   # Chemo induced Diarrhea  - Cdiff & stool path neg (9/22)  - etiology: likely melphalan-related mucositis/enteritis  - Cont loperamide 2mg q6h PRN  # Monitor electrolytes and replace prn  # Hx of treated Hep C in 2015  - (9/12) Hep  C RNA - not detected      :  # Hx of BPH and urinary retention (2023)  - Hx elevated PSA    - 7/28/21 Bx: atypical small acinar proliferation    - 4/7/23 Bx: Benign    NEURO/PSYCH:  #Tremor vs fasciculation  -since 10/10, pt reported tremor that was not present prior to admission  -on exam, fine, high-frequency (7+ Hz) tremor worst in the hands, exacerbated by effort, associated with significant fasciculations of the shoulder and back muscles; also fasciculations in jaw  -strength normal, reflexes 2+  -etiology: hypocalcemia, hypomagnesemia, drug side effect (tacro, cefepime, compazine), primary neurologic disease, essential tremor  -cefepime stopped 10/10  -tacrolimus level wnl, unlikely  -hold compazine for now  -replete magnesium to >2  -if not improving, consider neurology consult     DISPO:  - Full Code  - NOK: spouse Genevieve ([phone])  - PICC  - Oncologist: Jerod         Pt seen examined and discussed with Dr. Blanca Montoya PA-C

## 2024-10-18 NOTE — ASSESSMENT & PLAN NOTE
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, DNMT alpha, and U2AF1 mutation indication adverse risk   - s/p 4 cycles of Decitabine/Venetoclax (C4D1 on 8/12/24)  - BMBx 6/17/24: Blasts cleared, FISH still positive for trisomy 8, still MDS changes   - BMBx 9/5/24: 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 and no increase in blast  - persistent lack of engraftment, now past expected median engraftment date for single cord (d21)  - will send out plasma CXCL9 level to [facility] to assess for immunologic graft rejection-(phone of lab: [phone]): will be done later today, 10/16 per lab   - Repeat BMBx done on 10/15-hypocellular with no residual myeloid neoplasm  -consider repeating another cord transplant if required     # Transplant  CONDITIONING Fludarabine, melphalan, and TBI   DONOR Single umbilical cord   MATCH GRADE 6/8   SEX MATCH Mismatched   ABO DONOR A pos   ABO RECIPIENT A pos   GVHD PROPHYLAXIS Tacrolimus and Mycophenolate   GRAFT SOURCE Single umbilical cord   CMV DONOR negative   CMV RECIPIENT positive      # GVHD prophy  - Cont Tacro 1.5 mg BID  -Tacrolimus level 8.2 (10/18)  - Check levels daily  - stopped mycophenolate d/t concern for myelosuppression (10/11)  - Viral surveillance qMondays --> T+14 and T+20 all negative (EBV, CMV, adeno, HHV6)     HEME  # Pancytopenia: secondary to chemotherapy  # 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 Switched to PO levaquin 10/11  # Prophylaxis: Acyclovir, Vancomycin, Posaconazole, Letermovir (started T+14), and Bactrim (to start T+30), Ursodiol      # Maculopapular rash (9/27), now resolved, likely d/t Meropenem  - appearance highly suggestive of meropenem reaction vs pre-engraftment syndrome  - maculopapular on inner thighs, non-itching, non-painful, blanching--resolved  - will continue to monitor      CARDIO/PULM:  # Hx of STEMI (11/11/2020)  - TTE 7/19/24: EF 59%, otherwise unremarkable   - Was cleared by Cardiology for transplant   # Hx of HTN   - Nifedipine: 90 mEq ER 24 hr tablet   # Ascending aorta dilation   - Echo showing 3.8-4 cm dilation      FEN/RENAL/GI  - Admit wt: 75.6 kg (9/13/24), Most recent wt: 69.6kg (10/14).   - Allergic to Lasix, will not take med (extreme hypotension, extreme hypokalemia)   # Chemo induced Diarrhea  - Cdiff & stool path neg (9/22)  - etiology: likely melphalan-related mucositis/enteritis  - Cont loperamide 2mg q6h PRN  # Monitor electrolytes and replace prn  # Hx of treated Hep C in 2015  - (9/12) Hep C RNA - not detected      :  # Hx of BPH and urinary retention (2023)  - Hx elevated PSA    - 7/28/21 Bx: atypical small acinar proliferation    - 4/7/23 Bx: Benign    NEURO/PSYCH:  #Tremor vs fasciculation  -since 10/10, pt reported tremor that was not present prior to admission  -on exam, fine, high-frequency (7+ Hz) tremor worst in the hands, exacerbated by effort, associated with significant fasciculations of the shoulder and back muscles; also fasciculations in jaw  -strength normal, reflexes 2+  -etiology: hypocalcemia, hypomagnesemia, drug side effect (tacro, cefepime, compazine), primary neurologic disease, essential tremor  -cefepime stopped 10/10  -tacrolimus level wnl, unlikely  -hold compazine for now  -replete magnesium to >2  -if not  improving, consider neurology consult     DISPO:  - Full Code  - NOK: spouse Genevieve ([phone])  - PICC  - Oncologist: Jerod         Pt seen examined and discussed with Dr. Blanca Davison

## 2024-10-19 LAB
ALBUMIN SERPL BCP-MCNC: 3.4 G/DL (ref 3.4–5)
ALP SERPL-CCNC: 75 U/L (ref 33–136)
ALT SERPL W P-5'-P-CCNC: 8 U/L (ref 10–52)
ANION GAP SERPL CALC-SCNC: 13 MMOL/L (ref 10–20)
APTT PPP: 33 SECONDS (ref 27–38)
AST SERPL W P-5'-P-CCNC: 11 U/L (ref 9–39)
BASOPHILS # BLD AUTO: 0 X10*3/UL (ref 0–0.1)
BASOPHILS NFR BLD AUTO: 0 %
BILIRUB SERPL-MCNC: 0.7 MG/DL (ref 0–1.2)
BUN SERPL-MCNC: 12 MG/DL (ref 6–23)
CALCIUM SERPL-MCNC: 8.3 MG/DL (ref 8.6–10.6)
CHLORIDE SERPL-SCNC: 106 MMOL/L (ref 98–107)
CO2 SERPL-SCNC: 23 MMOL/L (ref 21–32)
CREAT SERPL-MCNC: 0.87 MG/DL (ref 0.5–1.3)
EGFRCR SERPLBLD CKD-EPI 2021: >90 ML/MIN/1.73M*2
EOSINOPHIL # BLD AUTO: 0 X10*3/UL (ref 0–0.7)
EOSINOPHIL NFR BLD AUTO: 0 %
ERYTHROCYTE [DISTWIDTH] IN BLOOD BY AUTOMATED COUNT: 16.7 % (ref 11.5–14.5)
FIBRINOGEN PPP-MCNC: 425 MG/DL (ref 200–400)
GLUCOSE SERPL-MCNC: 105 MG/DL (ref 74–99)
HCT VFR BLD AUTO: 20.4 % (ref 41–52)
HGB BLD-MCNC: 7.2 G/DL (ref 13.5–17.5)
IMM GRANULOCYTES # BLD AUTO: 0 X10*3/UL (ref 0–0.7)
IMM GRANULOCYTES NFR BLD AUTO: 0 % (ref 0–0.9)
INR PPP: 1.1 (ref 0.9–1.1)
LYMPHOCYTES # BLD AUTO: 0.06 X10*3/UL (ref 1.2–4.8)
LYMPHOCYTES NFR BLD AUTO: 85.7 %
MAGNESIUM SERPL-MCNC: 1.43 MG/DL (ref 1.6–2.4)
MCH RBC QN AUTO: 26.4 PG (ref 26–34)
MCHC RBC AUTO-ENTMCNC: 35.3 G/DL (ref 32–36)
MCV RBC AUTO: 75 FL (ref 80–100)
MONOCYTES # BLD AUTO: 0 X10*3/UL (ref 0.1–1)
MONOCYTES NFR BLD AUTO: 0 %
NEUTROPHILS # BLD AUTO: 0.01 X10*3/UL (ref 1.2–7.7)
NEUTROPHILS NFR BLD AUTO: 14.3 %
NRBC BLD-RTO: 0 /100 WBCS (ref 0–0)
PHOSPHATE SERPL-MCNC: 4.2 MG/DL (ref 2.5–4.9)
PLATELET # BLD AUTO: 16 X10*3/UL (ref 150–450)
POTASSIUM SERPL-SCNC: 3.7 MMOL/L (ref 3.5–5.3)
PROT SERPL-MCNC: 6.3 G/DL (ref 6.4–8.2)
PROTHROMBIN TIME: 12.9 SECONDS (ref 9.8–12.8)
RBC # BLD AUTO: 2.73 X10*6/UL (ref 4.5–5.9)
SODIUM SERPL-SCNC: 138 MMOL/L (ref 136–145)
TACROLIMUS BLD-MCNC: 6.7 NG/ML
WBC # BLD AUTO: 0.1 X10*3/UL (ref 4.4–11.3)

## 2024-10-19 PROCEDURE — 84100 ASSAY OF PHOSPHORUS: CPT

## 2024-10-19 PROCEDURE — 2500000001 HC RX 250 WO HCPCS SELF ADMINISTERED DRUGS (ALT 637 FOR MEDICARE OP)

## 2024-10-19 PROCEDURE — 1170000001 HC PRIVATE ONCOLOGY ROOM DAILY

## 2024-10-19 PROCEDURE — 80053 COMPREHEN METABOLIC PANEL: CPT

## 2024-10-19 PROCEDURE — 85610 PROTHROMBIN TIME: CPT

## 2024-10-19 PROCEDURE — 85025 COMPLETE CBC W/AUTO DIFF WBC: CPT

## 2024-10-19 PROCEDURE — 2500000001 HC RX 250 WO HCPCS SELF ADMINISTERED DRUGS (ALT 637 FOR MEDICARE OP): Performed by: INTERNAL MEDICINE

## 2024-10-19 PROCEDURE — 2500000001 HC RX 250 WO HCPCS SELF ADMINISTERED DRUGS (ALT 637 FOR MEDICARE OP): Performed by: STUDENT IN AN ORGANIZED HEALTH CARE EDUCATION/TRAINING PROGRAM

## 2024-10-19 PROCEDURE — 2500000004 HC RX 250 GENERAL PHARMACY W/ HCPCS (ALT 636 FOR OP/ED)

## 2024-10-19 PROCEDURE — 2500000002 HC RX 250 W HCPCS SELF ADMINISTERED DRUGS (ALT 637 FOR MEDICARE OP, ALT 636 FOR OP/ED): Performed by: INTERNAL MEDICINE

## 2024-10-19 PROCEDURE — 2500000002 HC RX 250 W HCPCS SELF ADMINISTERED DRUGS (ALT 637 FOR MEDICARE OP, ALT 636 FOR OP/ED): Performed by: PHYSICIAN ASSISTANT

## 2024-10-19 PROCEDURE — 80197 ASSAY OF TACROLIMUS: CPT | Performed by: INTERNAL MEDICINE

## 2024-10-19 PROCEDURE — 2500000004 HC RX 250 GENERAL PHARMACY W/ HCPCS (ALT 636 FOR OP/ED): Performed by: NURSE PRACTITIONER

## 2024-10-19 PROCEDURE — 83735 ASSAY OF MAGNESIUM: CPT

## 2024-10-19 PROCEDURE — 99233 SBSQ HOSP IP/OBS HIGH 50: CPT | Performed by: INTERNAL MEDICINE

## 2024-10-19 PROCEDURE — 2500000004 HC RX 250 GENERAL PHARMACY W/ HCPCS (ALT 636 FOR OP/ED): Mod: JZ | Performed by: INTERNAL MEDICINE

## 2024-10-19 PROCEDURE — 85384 FIBRINOGEN ACTIVITY: CPT

## 2024-10-19 PROCEDURE — 2500000002 HC RX 250 W HCPCS SELF ADMINISTERED DRUGS (ALT 637 FOR MEDICARE OP, ALT 636 FOR OP/ED): Performed by: STUDENT IN AN ORGANIZED HEALTH CARE EDUCATION/TRAINING PROGRAM

## 2024-10-19 PROCEDURE — 2500000005 HC RX 250 GENERAL PHARMACY W/O HCPCS

## 2024-10-19 RX ORDER — MAGNESIUM SULFATE HEPTAHYDRATE 40 MG/ML
4 INJECTION, SOLUTION INTRAVENOUS ONCE
Status: COMPLETED | OUTPATIENT
Start: 2024-10-19 | End: 2024-10-19

## 2024-10-19 ASSESSMENT — COGNITIVE AND FUNCTIONAL STATUS - GENERAL
MOBILITY SCORE: 24
DAILY ACTIVITIY SCORE: 24

## 2024-10-19 ASSESSMENT — PAIN SCALES - GENERAL
PAINLEVEL_OUTOF10: 0 - NO PAIN

## 2024-10-19 ASSESSMENT — PAIN - FUNCTIONAL ASSESSMENT
PAIN_FUNCTIONAL_ASSESSMENT: 0-10

## 2024-10-19 NOTE — PROGRESS NOTES
"Brandt Merritt is a 64 y.o. male on day 36 of admission presenting with Acute myeloid leukemia in remission (Multi).    Subjective   Afebrile. No overnight events.  Denies any acute complaints. Reports continued poor appetite. ROS otherwise unremarkable. Excited to be able to leave the floor today and visit the garden.       Objective     Physical Exam  Constitutional:       General: He is not in acute distress.  HENT:      Head: Normocephalic and atraumatic.      Mouth/Throat:      Mouth: Mucous membranes are moist.      Pharynx: No oropharyngeal exudate.   Eyes:      Extraocular Movements: Extraocular movements intact.      Pupils: Pupils are equal, round, and reactive to light.   Cardiovascular:      Rate and Rhythm: Regular rhythm.      Heart sounds: No murmur heard.     No gallop.   Pulmonary:      Effort: No respiratory distress.      Breath sounds: Normal breath sounds. No wheezing or rhonchi.   Abdominal:      General: Bowel sounds are normal. There is no distension.      Palpations: Abdomen is soft.      Tenderness: There is no abdominal tenderness.   Musculoskeletal:         General: Normal range of motion.      Cervical back: Normal range of motion.      Right lower leg: No edema.      Left lower leg: No edema.   Skin:     General: Skin is warm and dry.   Neurological:      Mental Status: He is alert and oriented to person, place, and time.      Motor: No weakness.   Psychiatric:         Mood and Affect: Mood normal.         Behavior: Behavior normal.         Last Recorded Vitals  Blood pressure 101/65, pulse 98, temperature 37.1 °C (98.8 °F), temperature source Temporal, resp. rate 18, height (S) 1.664 m (5' 5.51\"), weight 69.6 kg (153 lb 7 oz), SpO2 100%.  Intake/Output last 3 Shifts:  No intake/output data recorded.    Relevant Results  Scheduled medications  acyclovir, 400 mg, oral, q12h  filgrastim or biosimilar, 300 mcg, subcutaneous, q24h  folic acid, 1 mg, oral, Daily  letermovir, 480 mg, oral, " Daily  levoFLOXacin, 500 mg, oral, q24h JALYN  magnesium chloride, 64 mg, oral, BID  NIFEdipine ER, 90 mg, oral, Daily before breakfast  posaconazole, 300 mg, oral, q24h  tacrolimus, 1.5 mg, oral, q12h JALYN  ursodiol, 300 mg, oral, TID  vancomycin, 125 mg, oral, Daily      Continuous medications     PRN medications  PRN medications: acetaminophen, albuterol, alteplase, dextrose, diphenhydrAMINE, EPINEPHrine HCl, famotidine, loperamide, methylPREDNISolone sodium succinate (PF), ondansetron, sodium chloride  Results for orders placed or performed during the hospital encounter of 09/13/24 (from the past 24 hours)   CBC and Auto Differential   Result Value Ref Range    WBC 0.1 (LL) 4.4 - 11.3 x10*3/uL    nRBC 0.0 0.0 - 0.0 /100 WBCs    RBC 2.73 (L) 4.50 - 5.90 x10*6/uL    Hemoglobin 7.2 (L) 13.5 - 17.5 g/dL    Hematocrit 20.4 (L) 41.0 - 52.0 %    MCV 75 (L) 80 - 100 fL    MCH 26.4 26.0 - 34.0 pg    MCHC 35.3 32.0 - 36.0 g/dL    RDW 16.7 (H) 11.5 - 14.5 %    Platelets 16 (LL) 150 - 450 x10*3/uL    Neutrophils % 14.3 40.0 - 80.0 %    Immature Granulocytes %, Automated 0.0 0.0 - 0.9 %    Lymphocytes % 85.7 13.0 - 44.0 %    Monocytes % 0.0 2.0 - 10.0 %    Eosinophils % 0.0 0.0 - 6.0 %    Basophils % 0.0 0.0 - 2.0 %    Neutrophils Absolute 0.01 (L) 1.20 - 7.70 x10*3/uL    Immature Granulocytes Absolute, Automated 0.00 0.00 - 0.70 x10*3/uL    Lymphocytes Absolute 0.06 (L) 1.20 - 4.80 x10*3/uL    Monocytes Absolute 0.00 (L) 0.10 - 1.00 x10*3/uL    Eosinophils Absolute 0.00 0.00 - 0.70 x10*3/uL    Basophils Absolute 0.00 0.00 - 0.10 x10*3/uL   Coagulation Screen   Result Value Ref Range    Protime 12.9 (H) 9.8 - 12.8 seconds    INR 1.1 0.9 - 1.1    aPTT 33 27 - 38 seconds   Comprehensive Metabolic Panel   Result Value Ref Range    Glucose 105 (H) 74 - 99 mg/dL    Sodium 138 136 - 145 mmol/L    Potassium 3.7 3.5 - 5.3 mmol/L    Chloride 106 98 - 107 mmol/L    Bicarbonate 23 21 - 32 mmol/L    Anion Gap 13 10 - 20 mmol/L    Urea  Nitrogen 12 6 - 23 mg/dL    Creatinine 0.87 0.50 - 1.30 mg/dL    eGFR >90 >60 mL/min/1.73m*2    Calcium 8.3 (L) 8.6 - 10.6 mg/dL    Albumin 3.4 3.4 - 5.0 g/dL    Alkaline Phosphatase 75 33 - 136 U/L    Total Protein 6.3 (L) 6.4 - 8.2 g/dL    AST 11 9 - 39 U/L    Bilirubin, Total 0.7 0.0 - 1.2 mg/dL    ALT 8 (L) 10 - 52 U/L   Fibrinogen   Result Value Ref Range    Fibrinogen 425 (H) 200 - 400 mg/dL   Magnesium   Result Value Ref Range    Magnesium 1.43 (L) 1.60 - 2.40 mg/dL   Phosphorus   Result Value Ref Range    Phosphorus 4.2 2.5 - 4.9 mg/dL   Tacrolimus level   Result Value Ref Range    Tacrolimus  6.7 <=15.0 ng/mL              Assessment/Plan   Assessment & Plan  Acute myeloid leukemia in remission (Multi)    Stem cells transplant status (Multi)    Immunocompromised    Conditioning chemotherapy prior to peripheral blood stem cell transplant    Brandt Merritt is a 64 y.o male with PMHx htn, Hgb C, Hep C (treated), 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% jaime) w/ trisomy 8, DNMT alpha, and U2AF1 mutation indication adverse risk   - s/p 4 cycles of Decitabine/Venetoclax (C4D1 on 8/12/24)  - BMBx 6/17/24: Blasts cleared, FISH still positive for trisomy 8, still MDS changes   - BMBx 9/5/24: 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 and no increase in blast  - persistent lack of engraftment, now past expected median engraftment date for single cord (d21)  - will send out plasma CXCL9 level to [facility] to assess for immunologic graft rejection-(phone of lab: [phone]): will be done later today, 10/16 per lab   - Repeat BMBx done on 10/15-hypocellular with no residual myeloid neoplasm  -consider repeating another cord transplant if required     #  Transplant  CONDITIONING Fludarabine, melphalan, and TBI   DONOR Single umbilical cord   MATCH GRADE 6/8   SEX MATCH Mismatched   ABO DONOR A pos   ABO RECIPIENT A pos   GVHD PROPHYLAXIS Tacrolimus and Mycophenolate   GRAFT SOURCE Single umbilical cord   CMV DONOR negative   CMV RECIPIENT positive      # GVHD prophy  - Cont Tacro 1.5 mg BID  -Tacrolimus level 8.2 (10/18)  - Check levels daily  - stopped mycophenolate d/t concern for myelosuppression (10/11)  - Viral surveillance qMondays --> T+14 and T+20 all negative (EBV, CMV, adeno, HHV6)     HEME  # Pancytopenia: secondary to chemotherapy  # 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 Switched to PO levaquin 10/11  # Prophylaxis: Acyclovir, Vancomycin, Posaconazole, Letermovir (started T+14), and Bactrim (to start T+30), Ursodiol      # Maculopapular rash (9/27), now resolved, likely d/t Meropenem  - appearance highly suggestive of meropenem reaction vs pre-engraftment syndrome  - maculopapular on inner thighs, non-itching, non-painful, blanching--resolved  - will continue to monitor      CARDIO/PULM:  # Hx of STEMI (11/11/2020)  - TTE 7/19/24: EF 59%, otherwise unremarkable   - Was cleared by Cardiology for transplant   # Hx of HTN   - Nifedipine: 90 mEq ER 24 hr tablet   # Ascending aorta dilation   - Echo showing 3.8-4 cm dilation      FEN/RENAL/GI  - Admit wt: 75.6 kg (9/13/24), Most recent wt: 69.6kg (10/14).   - Allergic to Lasix, will not take med (extreme hypotension, extreme hypokalemia)   # Chemo induced Diarrhea  - Cdiff & stool path neg (9/22)  - etiology: likely melphalan-related mucositis/enteritis  - Cont loperamide  2mg q6h PRN  # Monitor electrolytes and replace prn  # Hx of treated Hep C in 2015  - (9/12) Hep C RNA - not detected      :  # Hx of BPH and urinary retention (2023)  - Hx elevated PSA    - 7/28/21 Bx: atypical small acinar proliferation    - 4/7/23 Bx: Benign     NEURO/PSYCH:  #Tremor vs fasciculation  -since 10/10, pt reported tremor that was not present prior to admission  -on exam, fine, high-frequency (7+ Hz) tremor worst in the hands, exacerbated by effort, associated with significant fasciculations of the shoulder and back muscles; also fasciculations in jaw  -strength normal, reflexes 2+  -etiology: hypocalcemia, hypomagnesemia, drug side effect (tacro, cefepime, compazine), primary neurologic disease, essential tremor  -cefepime stopped 10/10  -tacrolimus level wnl, unlikely  -hold compazine for now  -replete magnesium to >2  -if not improving, consider neurology consult     DISPO:  - Full Code  - NOK: spouse Genevieve ([phone])  - PICC  - Oncologist: Jerod Manzano PA-C

## 2024-10-20 VITALS
HEIGHT: 66 IN | RESPIRATION RATE: 18 BRPM | TEMPERATURE: 98.4 F | SYSTOLIC BLOOD PRESSURE: 119 MMHG | BODY MASS INDEX: 24.8 KG/M2 | DIASTOLIC BLOOD PRESSURE: 76 MMHG | WEIGHT: 154.32 LBS | HEART RATE: 87 BPM | OXYGEN SATURATION: 100 %

## 2024-10-20 LAB
ABO GROUP (TYPE) IN BLOOD: NORMAL
ALBUMIN SERPL BCP-MCNC: 3.4 G/DL (ref 3.4–5)
ALP SERPL-CCNC: 76 U/L (ref 33–136)
ALT SERPL W P-5'-P-CCNC: 8 U/L (ref 10–52)
ANION GAP SERPL CALC-SCNC: 11 MMOL/L (ref 10–20)
ANTIBODY SCREEN: NORMAL
APTT PPP: 32 SECONDS (ref 27–38)
AST SERPL W P-5'-P-CCNC: 9 U/L (ref 9–39)
BASOPHILS # BLD AUTO: 0 X10*3/UL (ref 0–0.1)
BASOPHILS NFR BLD AUTO: 0 %
BILIRUB SERPL-MCNC: 0.6 MG/DL (ref 0–1.2)
BLOOD EXPIRATION DATE: NORMAL
BUN SERPL-MCNC: 12 MG/DL (ref 6–23)
CALCIUM SERPL-MCNC: 8.4 MG/DL (ref 8.6–10.6)
CHLORIDE SERPL-SCNC: 105 MMOL/L (ref 98–107)
CO2 SERPL-SCNC: 26 MMOL/L (ref 21–32)
CREAT SERPL-MCNC: 1 MG/DL (ref 0.5–1.3)
DISPENSE STATUS: NORMAL
EGFRCR SERPLBLD CKD-EPI 2021: 83 ML/MIN/1.73M*2
EOSINOPHIL # BLD AUTO: 0 X10*3/UL (ref 0–0.7)
EOSINOPHIL NFR BLD AUTO: 0 %
ERYTHROCYTE [DISTWIDTH] IN BLOOD BY AUTOMATED COUNT: 16.5 % (ref 11.5–14.5)
FIBRINOGEN PPP-MCNC: 367 MG/DL (ref 200–400)
GLUCOSE SERPL-MCNC: 108 MG/DL (ref 74–99)
HCT VFR BLD AUTO: 20 % (ref 41–52)
HGB BLD-MCNC: 7.1 G/DL (ref 13.5–17.5)
IMM GRANULOCYTES # BLD AUTO: 0 X10*3/UL (ref 0–0.7)
IMM GRANULOCYTES NFR BLD AUTO: 0 % (ref 0–0.9)
INR PPP: 1.1 (ref 0.9–1.1)
LYMPHOCYTES # BLD AUTO: 0.06 X10*3/UL (ref 1.2–4.8)
LYMPHOCYTES NFR BLD AUTO: 85.7 %
MAGNESIUM SERPL-MCNC: 1.45 MG/DL (ref 1.6–2.4)
MCH RBC QN AUTO: 26.5 PG (ref 26–34)
MCHC RBC AUTO-ENTMCNC: 35.5 G/DL (ref 32–36)
MCV RBC AUTO: 75 FL (ref 80–100)
MONOCYTES # BLD AUTO: 0 X10*3/UL (ref 0.1–1)
MONOCYTES NFR BLD AUTO: 0 %
NEUTROPHILS # BLD AUTO: 0.01 X10*3/UL (ref 1.2–7.7)
NEUTROPHILS NFR BLD AUTO: 14.3 %
NRBC BLD-RTO: 0 /100 WBCS (ref 0–0)
PHOSPHATE SERPL-MCNC: 4 MG/DL (ref 2.5–4.9)
PLATELET # BLD AUTO: 6 X10*3/UL (ref 150–450)
POTASSIUM SERPL-SCNC: 3.7 MMOL/L (ref 3.5–5.3)
PRODUCT BLOOD TYPE: 6200
PRODUCT CODE: NORMAL
PROT SERPL-MCNC: 6.3 G/DL (ref 6.4–8.2)
PROTHROMBIN TIME: 12.2 SECONDS (ref 9.8–12.8)
RBC # BLD AUTO: 2.68 X10*6/UL (ref 4.5–5.9)
RH FACTOR (ANTIGEN D): NORMAL
SODIUM SERPL-SCNC: 138 MMOL/L (ref 136–145)
TACROLIMUS BLD-MCNC: 6 NG/ML
UNIT ABO: NORMAL
UNIT NUMBER: NORMAL
UNIT RH: NORMAL
UNIT VOLUME: 282
WBC # BLD AUTO: 0.1 X10*3/UL (ref 4.4–11.3)

## 2024-10-20 PROCEDURE — P9037 PLATE PHERES LEUKOREDU IRRAD: HCPCS

## 2024-10-20 PROCEDURE — 86923 COMPATIBILITY TEST ELECTRIC: CPT

## 2024-10-20 PROCEDURE — 85025 COMPLETE CBC W/AUTO DIFF WBC: CPT

## 2024-10-20 PROCEDURE — 2500000004 HC RX 250 GENERAL PHARMACY W/ HCPCS (ALT 636 FOR OP/ED): Performed by: NURSE PRACTITIONER

## 2024-10-20 PROCEDURE — 2500000002 HC RX 250 W HCPCS SELF ADMINISTERED DRUGS (ALT 637 FOR MEDICARE OP, ALT 636 FOR OP/ED): Performed by: STUDENT IN AN ORGANIZED HEALTH CARE EDUCATION/TRAINING PROGRAM

## 2024-10-20 PROCEDURE — 99233 SBSQ HOSP IP/OBS HIGH 50: CPT | Performed by: INTERNAL MEDICINE

## 2024-10-20 PROCEDURE — 84100 ASSAY OF PHOSPHORUS: CPT

## 2024-10-20 PROCEDURE — 80053 COMPREHEN METABOLIC PANEL: CPT

## 2024-10-20 PROCEDURE — 2500000001 HC RX 250 WO HCPCS SELF ADMINISTERED DRUGS (ALT 637 FOR MEDICARE OP)

## 2024-10-20 PROCEDURE — 2500000004 HC RX 250 GENERAL PHARMACY W/ HCPCS (ALT 636 FOR OP/ED): Mod: JZ | Performed by: INTERNAL MEDICINE

## 2024-10-20 PROCEDURE — 86901 BLOOD TYPING SEROLOGIC RH(D): CPT

## 2024-10-20 PROCEDURE — 36430 TRANSFUSION BLD/BLD COMPNT: CPT

## 2024-10-20 PROCEDURE — 83735 ASSAY OF MAGNESIUM: CPT

## 2024-10-20 PROCEDURE — 2500000001 HC RX 250 WO HCPCS SELF ADMINISTERED DRUGS (ALT 637 FOR MEDICARE OP): Performed by: INTERNAL MEDICINE

## 2024-10-20 PROCEDURE — 1170000001 HC PRIVATE ONCOLOGY ROOM DAILY

## 2024-10-20 PROCEDURE — 2500000004 HC RX 250 GENERAL PHARMACY W/ HCPCS (ALT 636 FOR OP/ED)

## 2024-10-20 PROCEDURE — 85384 FIBRINOGEN ACTIVITY: CPT

## 2024-10-20 PROCEDURE — 2500000002 HC RX 250 W HCPCS SELF ADMINISTERED DRUGS (ALT 637 FOR MEDICARE OP, ALT 636 FOR OP/ED): Performed by: PHYSICIAN ASSISTANT

## 2024-10-20 PROCEDURE — 2500000001 HC RX 250 WO HCPCS SELF ADMINISTERED DRUGS (ALT 637 FOR MEDICARE OP): Performed by: STUDENT IN AN ORGANIZED HEALTH CARE EDUCATION/TRAINING PROGRAM

## 2024-10-20 PROCEDURE — 2500000002 HC RX 250 W HCPCS SELF ADMINISTERED DRUGS (ALT 637 FOR MEDICARE OP, ALT 636 FOR OP/ED): Performed by: INTERNAL MEDICINE

## 2024-10-20 PROCEDURE — 80197 ASSAY OF TACROLIMUS: CPT | Performed by: INTERNAL MEDICINE

## 2024-10-20 PROCEDURE — 85730 THROMBOPLASTIN TIME PARTIAL: CPT

## 2024-10-20 RX ORDER — MAGNESIUM SULFATE HEPTAHYDRATE 40 MG/ML
4 INJECTION, SOLUTION INTRAVENOUS ONCE
Status: COMPLETED | OUTPATIENT
Start: 2024-10-20 | End: 2024-10-20

## 2024-10-20 ASSESSMENT — COGNITIVE AND FUNCTIONAL STATUS - GENERAL
MOBILITY SCORE: 24
DAILY ACTIVITIY SCORE: 24

## 2024-10-20 ASSESSMENT — PAIN SCALES - GENERAL
PAINLEVEL_OUTOF10: 0 - NO PAIN

## 2024-10-20 ASSESSMENT — PAIN - FUNCTIONAL ASSESSMENT
PAIN_FUNCTIONAL_ASSESSMENT: 0-10

## 2024-10-20 NOTE — PROGRESS NOTES
"Brandt Merritt is a 64 y.o. male on day 37 of admission presenting with Acute myeloid leukemia in remission (Multi).    Subjective   Afebrile. No overnight events.  Denies any acute complaints. Wife present in the room, following discussion with them we discovered that his niece could also be a viable candidate for transplant. He will reach out to her to come in tomorrow if possible to be assessed.      Objective     Physical Exam  Constitutional:       General: He is not in acute distress.  HENT:      Head: Normocephalic and atraumatic.      Mouth/Throat:      Mouth: Mucous membranes are moist.      Pharynx: No oropharyngeal exudate.   Eyes:      Extraocular Movements: Extraocular movements intact.      Pupils: Pupils are equal, round, and reactive to light.   Cardiovascular:      Rate and Rhythm: Regular rhythm.      Heart sounds: No murmur heard.     No gallop.   Pulmonary:      Effort: No respiratory distress.      Breath sounds: Normal breath sounds. No wheezing or rhonchi.   Abdominal:      General: Bowel sounds are normal. There is no distension.      Palpations: Abdomen is soft.      Tenderness: There is no abdominal tenderness.   Musculoskeletal:         General: Normal range of motion.      Cervical back: Normal range of motion.      Right lower leg: No edema.      Left lower leg: No edema.   Skin:     General: Skin is warm and dry.   Neurological:      Mental Status: He is alert and oriented to person, place, and time.      Motor: No weakness.   Psychiatric:         Mood and Affect: Mood normal.         Behavior: Behavior normal.         Last Recorded Vitals  Blood pressure 111/72, pulse 82, temperature 36.8 °C (98.2 °F), temperature source Temporal, resp. rate 16, height (S) 1.664 m (5' 5.51\"), weight 69.6 kg (153 lb 7 oz), SpO2 100%.  Intake/Output last 3 Shifts:  I/O last 3 completed shifts:  In: 240 (3.4 mL/kg) [P.O.:240]  Out: - (0 mL/kg)   Weight: 69.6 kg     Relevant Results  Scheduled " medications  acyclovir, 400 mg, oral, q12h  filgrastim or biosimilar, 300 mcg, subcutaneous, q24h  folic acid, 1 mg, oral, Daily  letermovir, 480 mg, oral, Daily  levoFLOXacin, 500 mg, oral, q24h JALYN  magnesium chloride, 64 mg, oral, BID  NIFEdipine ER, 90 mg, oral, Daily before breakfast  posaconazole, 300 mg, oral, q24h  tacrolimus, 1.5 mg, oral, q12h JALYN  ursodiol, 300 mg, oral, TID  vancomycin, 125 mg, oral, Daily      Continuous medications     PRN medications  PRN medications: acetaminophen, albuterol, alteplase, dextrose, diphenhydrAMINE, EPINEPHrine HCl, famotidine, loperamide, methylPREDNISolone sodium succinate (PF), ondansetron, sodium chloride  Results for orders placed or performed during the hospital encounter of 09/13/24 (from the past 24 hours)   CBC and Auto Differential   Result Value Ref Range    WBC 0.1 (LL) 4.4 - 11.3 x10*3/uL    nRBC 0.0 0.0 - 0.0 /100 WBCs    RBC 2.68 (L) 4.50 - 5.90 x10*6/uL    Hemoglobin 7.1 (L) 13.5 - 17.5 g/dL    Hematocrit 20.0 (L) 41.0 - 52.0 %    MCV 75 (L) 80 - 100 fL    MCH 26.5 26.0 - 34.0 pg    MCHC 35.5 32.0 - 36.0 g/dL    RDW 16.5 (H) 11.5 - 14.5 %    Platelets 6 (LL) 150 - 450 x10*3/uL    Neutrophils % 14.3 40.0 - 80.0 %    Immature Granulocytes %, Automated 0.0 0.0 - 0.9 %    Lymphocytes % 85.7 13.0 - 44.0 %    Monocytes % 0.0 2.0 - 10.0 %    Eosinophils % 0.0 0.0 - 6.0 %    Basophils % 0.0 0.0 - 2.0 %    Neutrophils Absolute 0.01 (L) 1.20 - 7.70 x10*3/uL    Immature Granulocytes Absolute, Automated 0.00 0.00 - 0.70 x10*3/uL    Lymphocytes Absolute 0.06 (L) 1.20 - 4.80 x10*3/uL    Monocytes Absolute 0.00 (L) 0.10 - 1.00 x10*3/uL    Eosinophils Absolute 0.00 0.00 - 0.70 x10*3/uL    Basophils Absolute 0.00 0.00 - 0.10 x10*3/uL   Coagulation Screen   Result Value Ref Range    Protime 12.2 9.8 - 12.8 seconds    INR 1.1 0.9 - 1.1    aPTT 32 27 - 38 seconds   Comprehensive Metabolic Panel   Result Value Ref Range    Glucose 108 (H) 74 - 99 mg/dL    Sodium 138 136 -  145 mmol/L    Potassium 3.7 3.5 - 5.3 mmol/L    Chloride 105 98 - 107 mmol/L    Bicarbonate 26 21 - 32 mmol/L    Anion Gap 11 10 - 20 mmol/L    Urea Nitrogen 12 6 - 23 mg/dL    Creatinine 1.00 0.50 - 1.30 mg/dL    eGFR 83 >60 mL/min/1.73m*2    Calcium 8.4 (L) 8.6 - 10.6 mg/dL    Albumin 3.4 3.4 - 5.0 g/dL    Alkaline Phosphatase 76 33 - 136 U/L    Total Protein 6.3 (L) 6.4 - 8.2 g/dL    AST 9 9 - 39 U/L    Bilirubin, Total 0.6 0.0 - 1.2 mg/dL    ALT 8 (L) 10 - 52 U/L   Fibrinogen   Result Value Ref Range    Fibrinogen 367 200 - 400 mg/dL   Magnesium   Result Value Ref Range    Magnesium 1.45 (L) 1.60 - 2.40 mg/dL   Phosphorus   Result Value Ref Range    Phosphorus 4.0 2.5 - 4.9 mg/dL   Tacrolimus level   Result Value Ref Range    Tacrolimus  6.0 <=15.0 ng/mL   Type and screen   Result Value Ref Range    ABO TYPE A     Rh TYPE POS     ANTIBODY SCREEN NEG    Prepare Platelets: 1 Units, Irradiated, Leukocytes Reduced (CMV reduced risk)   Result Value Ref Range    PRODUCT CODE G1265A50     Unit Number [id number]     Unit ABO A     Unit RH POS     Dispense Status TR     Blood Expiration Date 10/20/2024 11:59:00 PM EDT     PRODUCT BLOOD TYPE 6200     UNIT VOLUME 282               Assessment/Plan   Assessment & Plan  Acute myeloid leukemia in remission (Multi)    Stem cells transplant status (Multi)    Immunocompromised    Conditioning chemotherapy prior to peripheral blood stem cell transplant    Brandt Merritt is a 64 y.o male with PMHx htn, Hgb C, Hep C (treated), 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      T+31  Single-umbilical cord transplant (T0=9/19/24)     ONC  # MDS/AML   - Symptoms began 9/2023; diagnosed 4/2024  - BMBx showing MDS in transition to AML (>10% jaime) w/ trisomy 8, DNMT alpha, and U2AF1 mutation indication adverse risk   - s/p 4 cycles of Decitabine/Venetoclax (C4D1 on 8/12/24)  - BMBx 6/17/24: Blasts cleared, FISH  still positive for trisomy 8, still MDS changes   - BMBx 9/5/24: 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 and no increase in blast  - persistent lack of engraftment, now past expected median engraftment date for single cord (d21)  - will send out plasma CXCL9 level to [facility] to assess for immunologic graft rejection-(phone of lab: [phone]): will be done later today, 10/16 per lab   - Repeat BMBx done on 10/15-hypocellular with no residual myeloid neoplasm  -consider repeating another cord transplant if required     # Transplant  CONDITIONING Fludarabine, melphalan, and TBI   DONOR Single umbilical cord   MATCH GRADE 6/8   SEX MATCH Mismatched   ABO DONOR A pos   ABO RECIPIENT A pos   GVHD PROPHYLAXIS Tacrolimus and Mycophenolate   GRAFT SOURCE Single umbilical cord   CMV DONOR negative   CMV RECIPIENT positive      # GVHD prophy  - Cont Tacro 1.5 mg BID  -Tacrolimus level 8.2 (10/18)  - Check levels daily  - stopped mycophenolate d/t concern for myelosuppression (10/11)  - Viral surveillance qMondays --> T+14 and T+20 all negative (EBV, CMV, adeno, HHV6)     HEME  # Pancytopenia: secondary to chemotherapy  # 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 Switched to PO levaquin 10/11  # Prophylaxis: Acyclovir, Vancomycin, Posaconazole, Letermovir (started T+14), and Bactrim (to start T+30), Ursodiol      # Maculopapular rash (9/27), now resolved, likely d/t Meropenem  - appearance highly suggestive of meropenem reaction vs pre-engraftment syndrome  - maculopapular on inner thighs, non-itching, non-painful, blanching--resolved  - will continue to monitor       CARDIO/PULM:  # Hx of STEMI (11/11/2020)  - TTE 7/19/24: EF 59%, otherwise unremarkable   - Was cleared by Cardiology for transplant   # Hx of HTN   - Nifedipine: 90 mEq ER 24 hr tablet   # Ascending aorta dilation   - Echo showing 3.8-4 cm dilation      FEN/RENAL/GI  - Admit wt: 75.6 kg (9/13/24), Most recent wt: 69.6kg (10/14).   - Allergic to Lasix, will not take med (extreme hypotension, extreme hypokalemia)   # Chemo induced Diarrhea  - Cdiff & stool path neg (9/22)  - etiology: likely melphalan-related mucositis/enteritis  - Cont loperamide 2mg q6h PRN  # Monitor electrolytes and replace prn  # Hx of treated Hep C in 2015  - (9/12) Hep C RNA - not detected      :  # Hx of BPH and urinary retention (2023)  - Hx elevated PSA    - 7/28/21 Bx: atypical small acinar proliferation    - 4/7/23 Bx: Benign     NEURO/PSYCH:  #Tremor vs fasciculation  -since 10/10, pt reported tremor that was not present prior to admission  -on exam, fine, high-frequency (7+ Hz) tremor worst in the hands, exacerbated by effort, associated with significant fasciculations of the shoulder and back muscles; also fasciculations in jaw  -strength normal, reflexes 2+  -etiology: hypocalcemia, hypomagnesemia, drug side effect (tacro, cefepime, compazine), primary neurologic disease, essential tremor  -cefepime stopped 10/10  -tacrolimus level wnl, unlikely  -hold compazine for now  -replete magnesium to >2  -if not improving, consider neurology consult     DISPO:  - Full Code  - NOK: spouse Genevieve ([phone])  - PICC  - Oncologist: Jerod Manzano PA-C

## 2024-10-20 NOTE — CARE PLAN
Patient afebrile. Vitals stable. No pain. No nausea vomiting or diarrhea. Slept overnight. No issues. Will continue to monitor.

## 2024-10-21 LAB
ALBUMIN SERPL BCP-MCNC: 3.4 G/DL (ref 3.4–5)
ALP SERPL-CCNC: 75 U/L (ref 33–136)
ALT SERPL W P-5'-P-CCNC: 7 U/L (ref 10–52)
ANION GAP SERPL CALC-SCNC: 12 MMOL/L (ref 10–20)
APTT PPP: 32 SECONDS (ref 27–38)
AST SERPL W P-5'-P-CCNC: 12 U/L (ref 9–39)
BASOPHILS # BLD AUTO: 0 X10*3/UL (ref 0–0.1)
BASOPHILS NFR BLD AUTO: 0 %
BILIRUB SERPL-MCNC: 0.7 MG/DL (ref 0–1.2)
BUN SERPL-MCNC: 8 MG/DL (ref 6–23)
CALCIUM SERPL-MCNC: 8.6 MG/DL (ref 8.6–10.6)
CHLORIDE SERPL-SCNC: 107 MMOL/L (ref 98–107)
CO2 SERPL-SCNC: 24 MMOL/L (ref 21–32)
CREAT SERPL-MCNC: 0.91 MG/DL (ref 0.5–1.3)
EGFRCR SERPLBLD CKD-EPI 2021: >90 ML/MIN/1.73M*2
EOSINOPHIL # BLD AUTO: 0 X10*3/UL (ref 0–0.7)
EOSINOPHIL NFR BLD AUTO: 0 %
ERYTHROCYTE [DISTWIDTH] IN BLOOD BY AUTOMATED COUNT: 16.9 % (ref 11.5–14.5)
FIBRINOGEN PPP-MCNC: 413 MG/DL (ref 200–400)
GLUCOSE SERPL-MCNC: 100 MG/DL (ref 74–99)
HCT VFR BLD AUTO: 19.6 % (ref 41–52)
HGB BLD-MCNC: 6.8 G/DL (ref 13.5–17.5)
IMM GRANULOCYTES # BLD AUTO: 0 X10*3/UL (ref 0–0.7)
IMM GRANULOCYTES NFR BLD AUTO: 0 % (ref 0–0.9)
INR PPP: 1.2 (ref 0.9–1.1)
LDH SERPL L TO P-CCNC: 107 U/L (ref 84–246)
LYMPHOCYTES # BLD AUTO: 0.06 X10*3/UL (ref 1.2–4.8)
LYMPHOCYTES NFR BLD AUTO: 85.7 %
MAGNESIUM SERPL-MCNC: 1.44 MG/DL (ref 1.6–2.4)
MCH RBC QN AUTO: 26.2 PG (ref 26–34)
MCHC RBC AUTO-ENTMCNC: 34.7 G/DL (ref 32–36)
MCV RBC AUTO: 75 FL (ref 80–100)
MONOCYTES # BLD AUTO: 0 X10*3/UL (ref 0.1–1)
MONOCYTES NFR BLD AUTO: 0 %
NEUTROPHILS # BLD AUTO: 0.01 X10*3/UL (ref 1.2–7.7)
NEUTROPHILS NFR BLD AUTO: 14.3 %
NRBC BLD-RTO: 0 /100 WBCS (ref 0–0)
PHOSPHATE SERPL-MCNC: 3.9 MG/DL (ref 2.5–4.9)
PLATELET # BLD AUTO: 38 X10*3/UL (ref 150–450)
POTASSIUM SERPL-SCNC: 3.9 MMOL/L (ref 3.5–5.3)
PROT SERPL-MCNC: 6.4 G/DL (ref 6.4–8.2)
PROTHROMBIN TIME: 13.3 SECONDS (ref 9.8–12.8)
RBC # BLD AUTO: 2.6 X10*6/UL (ref 4.5–5.9)
SODIUM SERPL-SCNC: 139 MMOL/L (ref 136–145)
TACROLIMUS BLD-MCNC: 6 NG/ML
WBC # BLD AUTO: 0.1 X10*3/UL (ref 4.4–11.3)

## 2024-10-21 PROCEDURE — 2500000004 HC RX 250 GENERAL PHARMACY W/ HCPCS (ALT 636 FOR OP/ED): Mod: JZ | Performed by: INTERNAL MEDICINE

## 2024-10-21 PROCEDURE — 80197 ASSAY OF TACROLIMUS: CPT | Performed by: INTERNAL MEDICINE

## 2024-10-21 PROCEDURE — 2500000002 HC RX 250 W HCPCS SELF ADMINISTERED DRUGS (ALT 637 FOR MEDICARE OP, ALT 636 FOR OP/ED): Performed by: PHYSICIAN ASSISTANT

## 2024-10-21 PROCEDURE — 83615 LACTATE (LD) (LDH) ENZYME: CPT | Performed by: NURSE PRACTITIONER

## 2024-10-21 PROCEDURE — 1170000001 HC PRIVATE ONCOLOGY ROOM DAILY

## 2024-10-21 PROCEDURE — P9040 RBC LEUKOREDUCED IRRADIATED: HCPCS

## 2024-10-21 PROCEDURE — 84100 ASSAY OF PHOSPHORUS: CPT

## 2024-10-21 PROCEDURE — 83735 ASSAY OF MAGNESIUM: CPT

## 2024-10-21 PROCEDURE — 85610 PROTHROMBIN TIME: CPT

## 2024-10-21 PROCEDURE — 99233 SBSQ HOSP IP/OBS HIGH 50: CPT | Performed by: STUDENT IN AN ORGANIZED HEALTH CARE EDUCATION/TRAINING PROGRAM

## 2024-10-21 PROCEDURE — 2500000001 HC RX 250 WO HCPCS SELF ADMINISTERED DRUGS (ALT 637 FOR MEDICARE OP): Performed by: STUDENT IN AN ORGANIZED HEALTH CARE EDUCATION/TRAINING PROGRAM

## 2024-10-21 PROCEDURE — 80053 COMPREHEN METABOLIC PANEL: CPT

## 2024-10-21 PROCEDURE — 2500000004 HC RX 250 GENERAL PHARMACY W/ HCPCS (ALT 636 FOR OP/ED)

## 2024-10-21 PROCEDURE — 2500000002 HC RX 250 W HCPCS SELF ADMINISTERED DRUGS (ALT 637 FOR MEDICARE OP, ALT 636 FOR OP/ED): Performed by: INTERNAL MEDICINE

## 2024-10-21 PROCEDURE — 87799 DETECT AGENT NOS DNA QUANT: CPT | Performed by: NURSE PRACTITIONER

## 2024-10-21 PROCEDURE — 2500000001 HC RX 250 WO HCPCS SELF ADMINISTERED DRUGS (ALT 637 FOR MEDICARE OP)

## 2024-10-21 PROCEDURE — 86833 HLA CLASS II HIGH DEFIN QUAL: CPT

## 2024-10-21 PROCEDURE — 2500000004 HC RX 250 GENERAL PHARMACY W/ HCPCS (ALT 636 FOR OP/ED): Performed by: NURSE PRACTITIONER

## 2024-10-21 PROCEDURE — 85025 COMPLETE CBC W/AUTO DIFF WBC: CPT

## 2024-10-21 PROCEDURE — 85384 FIBRINOGEN ACTIVITY: CPT

## 2024-10-21 PROCEDURE — 87533 HHV-6 DNA QUANT: CPT | Performed by: NURSE PRACTITIONER

## 2024-10-21 PROCEDURE — 2500000005 HC RX 250 GENERAL PHARMACY W/O HCPCS

## 2024-10-21 PROCEDURE — 36430 TRANSFUSION BLD/BLD COMPNT: CPT

## 2024-10-21 PROCEDURE — 2500000002 HC RX 250 W HCPCS SELF ADMINISTERED DRUGS (ALT 637 FOR MEDICARE OP, ALT 636 FOR OP/ED): Performed by: STUDENT IN AN ORGANIZED HEALTH CARE EDUCATION/TRAINING PROGRAM

## 2024-10-21 PROCEDURE — 2500000001 HC RX 250 WO HCPCS SELF ADMINISTERED DRUGS (ALT 637 FOR MEDICARE OP): Performed by: INTERNAL MEDICINE

## 2024-10-21 RX ORDER — MAGNESIUM SULFATE HEPTAHYDRATE 40 MG/ML
4 INJECTION, SOLUTION INTRAVENOUS ONCE
Status: COMPLETED | OUTPATIENT
Start: 2024-10-21 | End: 2024-10-21

## 2024-10-21 ASSESSMENT — PAIN SCALES - GENERAL
PAINLEVEL_OUTOF10: 0 - NO PAIN

## 2024-10-21 ASSESSMENT — COGNITIVE AND FUNCTIONAL STATUS - GENERAL
MOBILITY SCORE: 24
DAILY ACTIVITIY SCORE: 24
MOBILITY SCORE: 24
DAILY ACTIVITIY SCORE: 24

## 2024-10-21 ASSESSMENT — PAIN - FUNCTIONAL ASSESSMENT
PAIN_FUNCTIONAL_ASSESSMENT: 0-10
PAIN_FUNCTIONAL_ASSESSMENT: 0-10

## 2024-10-21 NOTE — PROGRESS NOTES
"Brandt Merritt is a 64 y.o. male on day 38 of admission presenting with Acute myeloid leukemia in remission (Multi).    Subjective   Afebrile. No overnight events.  Denies any acute complaints. Awaiting his niece and nephew to come into town on Thursday for assessment. Remainder of ROS is negative.     Objective     Physical Exam  Constitutional:       General: He is not in acute distress.  HENT:      Head: Normocephalic and atraumatic.      Mouth/Throat:      Mouth: Mucous membranes are moist.      Pharynx: No oropharyngeal exudate.   Eyes:      Extraocular Movements: Extraocular movements intact.      Pupils: Pupils are equal, round, and reactive to light.   Cardiovascular:      Rate and Rhythm: Regular rhythm.      Heart sounds: No murmur heard.     No gallop.   Pulmonary:      Effort: No respiratory distress.      Breath sounds: Normal breath sounds. No wheezing or rhonchi.   Abdominal:      General: Bowel sounds are normal. There is no distension.      Palpations: Abdomen is soft.      Tenderness: There is no abdominal tenderness.   Musculoskeletal:         General: Normal range of motion.      Cervical back: Normal range of motion.      Right lower leg: No edema.      Left lower leg: No edema.   Skin:     General: Skin is warm and dry.   Neurological:      Mental Status: He is alert and oriented to person, place, and time.      Motor: No weakness.   Psychiatric:         Mood and Affect: Mood normal.         Behavior: Behavior normal.         Last Recorded Vitals  Blood pressure 129/79, pulse 89, temperature 37 °C (98.6 °F), temperature source Temporal, resp. rate 18, height (S) 1.664 m (5' 5.51\"), weight 70 kg (154 lb 5.2 oz), SpO2 97%.  Intake/Output last 3 Shifts:  I/O last 3 completed shifts:  In: 280 (4 mL/kg) [Blood:280]  Out: - (0 mL/kg)   Weight: 70 kg     Relevant Results  Scheduled medications  acyclovir, 400 mg, oral, q12h  filgrastim or biosimilar, 300 mcg, subcutaneous, q24h  folic acid, 1 mg, " oral, Daily  letermovir, 480 mg, oral, Daily  levoFLOXacin, 500 mg, oral, q24h JALYN  magnesium chloride, 64 mg, oral, BID  NIFEdipine ER, 90 mg, oral, Daily before breakfast  posaconazole, 300 mg, oral, q24h  tacrolimus, 1.5 mg, oral, q12h JALYN  ursodiol, 300 mg, oral, TID  vancomycin, 125 mg, oral, Daily      Continuous medications     PRN medications  PRN medications: acetaminophen, albuterol, alteplase, dextrose, diphenhydrAMINE, EPINEPHrine HCl, famotidine, loperamide, methylPREDNISolone sodium succinate (PF), ondansetron, sodium chloride  Results for orders placed or performed during the hospital encounter of 09/13/24 (from the past 24 hours)   CBC and Auto Differential   Result Value Ref Range    WBC 0.1 (LL) 4.4 - 11.3 x10*3/uL    nRBC 0.0 0.0 - 0.0 /100 WBCs    RBC 2.60 (L) 4.50 - 5.90 x10*6/uL    Hemoglobin 6.8 (L) 13.5 - 17.5 g/dL    Hematocrit 19.6 (L) 41.0 - 52.0 %    MCV 75 (L) 80 - 100 fL    MCH 26.2 26.0 - 34.0 pg    MCHC 34.7 32.0 - 36.0 g/dL    RDW 16.9 (H) 11.5 - 14.5 %    Platelets 38 (LL) 150 - 450 x10*3/uL    Neutrophils % 14.3 40.0 - 80.0 %    Immature Granulocytes %, Automated 0.0 0.0 - 0.9 %    Lymphocytes % 85.7 13.0 - 44.0 %    Monocytes % 0.0 2.0 - 10.0 %    Eosinophils % 0.0 0.0 - 6.0 %    Basophils % 0.0 0.0 - 2.0 %    Neutrophils Absolute 0.01 (L) 1.20 - 7.70 x10*3/uL    Immature Granulocytes Absolute, Automated 0.00 0.00 - 0.70 x10*3/uL    Lymphocytes Absolute 0.06 (L) 1.20 - 4.80 x10*3/uL    Monocytes Absolute 0.00 (L) 0.10 - 1.00 x10*3/uL    Eosinophils Absolute 0.00 0.00 - 0.70 x10*3/uL    Basophils Absolute 0.00 0.00 - 0.10 x10*3/uL   Coagulation Screen   Result Value Ref Range    Protime 13.3 (H) 9.8 - 12.8 seconds    INR 1.2 (H) 0.9 - 1.1    aPTT 32 27 - 38 seconds   Comprehensive Metabolic Panel   Result Value Ref Range    Glucose 100 (H) 74 - 99 mg/dL    Sodium 139 136 - 145 mmol/L    Potassium 3.9 3.5 - 5.3 mmol/L    Chloride 107 98 - 107 mmol/L    Bicarbonate 24 21 - 32 mmol/L     Anion Gap 12 10 - 20 mmol/L    Urea Nitrogen 8 6 - 23 mg/dL    Creatinine 0.91 0.50 - 1.30 mg/dL    eGFR >90 >60 mL/min/1.73m*2    Calcium 8.6 8.6 - 10.6 mg/dL    Albumin 3.4 3.4 - 5.0 g/dL    Alkaline Phosphatase 75 33 - 136 U/L    Total Protein 6.4 6.4 - 8.2 g/dL    AST 12 9 - 39 U/L    Bilirubin, Total 0.7 0.0 - 1.2 mg/dL    ALT 7 (L) 10 - 52 U/L   Fibrinogen   Result Value Ref Range    Fibrinogen 413 (H) 200 - 400 mg/dL   Magnesium   Result Value Ref Range    Magnesium 1.44 (L) 1.60 - 2.40 mg/dL   Phosphorus   Result Value Ref Range    Phosphorus 3.9 2.5 - 4.9 mg/dL   Tacrolimus level   Result Value Ref Range    Tacrolimus  6.0 <=15.0 ng/mL   Lactate Dehydrogenase   Result Value Ref Range     84 - 246 U/L   Prepare RBC: 1 Units, Irradiated, Leukocytes Reduced (CMV reduced risk)   Result Value Ref Range    PRODUCT CODE [id number]     Unit Number [id number]     Unit ABO A     Unit RH POS     XM INTEP COMP     Dispense Status IS     Blood Expiration Date 10/29/2024 11:59:00 PM EDT     PRODUCT BLOOD TYPE 6200     UNIT VOLUME 350               Assessment/Plan   Assessment & Plan  Acute myeloid leukemia in remission (Multi)    Stem cells transplant status (Multi)    Immunocompromised    Conditioning chemotherapy prior to peripheral blood stem cell transplant    Brandt Merritt is a 64 y.o male with PMHx htn, Hgb C, Hep C (treated), 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  and no increase in blast  - persistent lack of engraftment, now past expected median engraftment date for single cord (d21)  - will send out plasma CXCL9 level to [place] to assess for immunologic graft rejection-(phone of lab: [phone]): will be done later today, 10/16 per lab   - Repeat BMBx done on 10/15-hypocellular with no residual myeloid neoplasm  -consider repeating another cord transplant if required     # Transplant  CONDITIONING Fludarabine, melphalan, and TBI   DONOR Single umbilical cord   MATCH GRADE 6/8   SEX MATCH Mismatched   ABO DONOR A pos   ABO RECIPIENT A pos   GVHD PROPHYLAXIS Tacrolimus and Mycophenolate   GRAFT SOURCE Single umbilical cord   CMV DONOR negative   CMV RECIPIENT positive      # GVHD prophy  - Cont Tacro 1.5 mg BID  -Tacrolimus level 8.2 (10/18)  - Check levels daily  - stopped mycophenolate d/t concern for myelosuppression (10/11)  - Viral surveillance qMondays --> T+14 and T+20 all negative (EBV, CMV, adeno, HHV6)     HEME  # Pancytopenia: secondary to chemotherapy  # 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 Switched to PO levaquin 10/11  # Prophylaxis: Acyclovir, Vancomycin, Posaconazole, Letermovir (started T+14), and Bactrim (to start T+30), Ursodiol      # Maculopapular rash (9/27), now resolved, likely d/t Meropenem  - appearance highly suggestive of meropenem reaction vs pre-engraftment syndrome  - maculopapular on inner thighs, non-itching, non-painful, blanching--resolved  - will continue to monitor      CARDIO/PULM:  # Hx of STEMI (11/11/2020)  - TTE 7/19/24: EF 59%, otherwise unremarkable   - Was cleared by Cardiology for  transplant   # Hx of HTN   - Nifedipine: 90 mEq ER 24 hr tablet   # Ascending aorta dilation   - Echo showing 3.8-4 cm dilation      FEN/RENAL/GI  - Admit wt: 75.6 kg (9/13/24), Most recent wt: 70kg (10/20).   - Allergic to Lasix, will not take med (extreme hypotension, extreme hypokalemia)   # Chemo induced Diarrhea  - Cdiff & stool path neg (9/22)  - etiology: likely melphalan-related mucositis/enteritis  - Cont loperamide 2mg q6h PRN  # Monitor electrolytes and replace prn  # Hx of treated Hep C in 2015  - (9/12) Hep C RNA - not detected      :  # Hx of BPH and urinary retention (2023)  - Hx elevated PSA    - 7/28/21 Bx: atypical small acinar proliferation    - 4/7/23 Bx: Benign     NEURO/PSYCH:  #Tremor vs fasciculation  -since 10/10, pt reported tremor that was not present prior to admission  -on exam, fine, high-frequency (7+ Hz) tremor worst in the hands, exacerbated by effort, associated with significant fasciculations of the shoulder and back muscles; also fasciculations in jaw  -strength normal, reflexes 2+  -etiology: hypocalcemia, hypomagnesemia, drug side effect (tacro, cefepime, compazine), primary neurologic disease, essential tremor  -cefepime stopped 10/10  -tacrolimus level wnl, unlikely  -hold compazine for now  -replete magnesium to >2  -if not improving, consider neurology consult     DISPO:  - Full Code  - NOK: spouse Genevieve ([phone])  - PICC  - Oncologist: Jerod Manzano PA-C

## 2024-10-22 LAB
ADENOVIRUS QPCR,PLASMA, VIRC: NOT DETECTED COPIES/ML
ALBUMIN SERPL BCP-MCNC: 3.5 G/DL (ref 3.4–5)
ALP SERPL-CCNC: 74 U/L (ref 33–136)
ALT SERPL W P-5'-P-CCNC: 8 U/L (ref 10–52)
ANION GAP SERPL CALC-SCNC: 13 MMOL/L (ref 10–20)
APTT PPP: 31 SECONDS (ref 27–38)
AST SERPL W P-5'-P-CCNC: 9 U/L (ref 9–39)
BASOPHILS # BLD AUTO: 0 X10*3/UL (ref 0–0.1)
BASOPHILS NFR BLD AUTO: 0 %
BILIRUB SERPL-MCNC: 1 MG/DL (ref 0–1.2)
BLOOD EXPIRATION DATE: NORMAL
BUN SERPL-MCNC: 10 MG/DL (ref 6–23)
CALCIUM SERPL-MCNC: 8.6 MG/DL (ref 8.6–10.6)
CHLORIDE SERPL-SCNC: 104 MMOL/L (ref 98–107)
CHROM ANALY OVERALL INTERP-IMP: NORMAL
CO2 SERPL-SCNC: 25 MMOL/L (ref 21–32)
CREAT SERPL-MCNC: 0.93 MG/DL (ref 0.5–1.3)
DISPENSE STATUS: NORMAL
EGFRCR SERPLBLD CKD-EPI 2021: >90 ML/MIN/1.73M*2
ELECTRONICALLY SIGNED BY CYTOGENETICS: NORMAL
EOSINOPHIL # BLD AUTO: 0 X10*3/UL (ref 0–0.7)
EOSINOPHIL NFR BLD AUTO: 0 %
ERYTHROCYTE [DISTWIDTH] IN BLOOD BY AUTOMATED COUNT: 15.9 % (ref 11.5–14.5)
FIBRINOGEN PPP-MCNC: 450 MG/DL (ref 200–400)
GLUCOSE SERPL-MCNC: 100 MG/DL (ref 74–99)
HCT VFR BLD AUTO: 23.6 % (ref 41–52)
HGB BLD-MCNC: 8.6 G/DL (ref 13.5–17.5)
HLA RESULTS: NORMAL
HLA-A+B+C AB NFR SER: NORMAL %
HLA-DP+DQ+DR AB NFR SER: NORMAL %
HUMAN HERPESVIRUS-6 PCR PLASMA: NOT DETECTED COPIES/ML
IMM GRANULOCYTES # BLD AUTO: 0 X10*3/UL (ref 0–0.7)
IMM GRANULOCYTES NFR BLD AUTO: 0 % (ref 0–0.9)
INR PPP: 1.1 (ref 0.9–1.1)
LYMPHOCYTES # BLD AUTO: 0.07 X10*3/UL (ref 1.2–4.8)
LYMPHOCYTES NFR BLD AUTO: 100 %
MAGNESIUM SERPL-MCNC: 1.37 MG/DL (ref 1.6–2.4)
MCH RBC QN AUTO: 27.1 PG (ref 26–34)
MCHC RBC AUTO-ENTMCNC: 36.4 G/DL (ref 32–36)
MCV RBC AUTO: 74 FL (ref 80–100)
MONOCYTES # BLD AUTO: 0 X10*3/UL (ref 0.1–1)
MONOCYTES NFR BLD AUTO: 0 %
NEUTROPHILS # BLD AUTO: 0 X10*3/UL (ref 1.2–7.7)
NEUTROPHILS NFR BLD AUTO: 0 %
NRBC BLD-RTO: 0 /100 WBCS (ref 0–0)
PHOSPHATE SERPL-MCNC: 3.8 MG/DL (ref 2.5–4.9)
PLATELET # BLD AUTO: 27 X10*3/UL (ref 150–450)
POTASSIUM SERPL-SCNC: 3.8 MMOL/L (ref 3.5–5.3)
PRODUCT BLOOD TYPE: 6200
PRODUCT CODE: NORMAL
PROT SERPL-MCNC: 6.7 G/DL (ref 6.4–8.2)
PROTHROMBIN TIME: 12.9 SECONDS (ref 9.8–12.8)
RBC # BLD AUTO: 3.17 X10*6/UL (ref 4.5–5.9)
SODIUM SERPL-SCNC: 138 MMOL/L (ref 136–145)
TACROLIMUS BLD-MCNC: 8.1 NG/ML
UNIT ABO: NORMAL
UNIT NUMBER: NORMAL
UNIT RH: NORMAL
UNIT VOLUME: 350
WBC # BLD AUTO: 0.1 X10*3/UL (ref 4.4–11.3)
XM INTEP: NORMAL

## 2024-10-22 PROCEDURE — 83735 ASSAY OF MAGNESIUM: CPT

## 2024-10-22 PROCEDURE — 36415 COLL VENOUS BLD VENIPUNCTURE: CPT | Performed by: PHYSICIAN ASSISTANT

## 2024-10-22 PROCEDURE — 2500000001 HC RX 250 WO HCPCS SELF ADMINISTERED DRUGS (ALT 637 FOR MEDICARE OP)

## 2024-10-22 PROCEDURE — 2500000002 HC RX 250 W HCPCS SELF ADMINISTERED DRUGS (ALT 637 FOR MEDICARE OP, ALT 636 FOR OP/ED): Performed by: STUDENT IN AN ORGANIZED HEALTH CARE EDUCATION/TRAINING PROGRAM

## 2024-10-22 PROCEDURE — 2500000004 HC RX 250 GENERAL PHARMACY W/ HCPCS (ALT 636 FOR OP/ED): Mod: JZ | Performed by: PHYSICIAN ASSISTANT

## 2024-10-22 PROCEDURE — 87040 BLOOD CULTURE FOR BACTERIA: CPT | Performed by: PHYSICIAN ASSISTANT

## 2024-10-22 PROCEDURE — 84100 ASSAY OF PHOSPHORUS: CPT

## 2024-10-22 PROCEDURE — 2500000001 HC RX 250 WO HCPCS SELF ADMINISTERED DRUGS (ALT 637 FOR MEDICARE OP): Performed by: STUDENT IN AN ORGANIZED HEALTH CARE EDUCATION/TRAINING PROGRAM

## 2024-10-22 PROCEDURE — 80053 COMPREHEN METABOLIC PANEL: CPT

## 2024-10-22 PROCEDURE — 2500000004 HC RX 250 GENERAL PHARMACY W/ HCPCS (ALT 636 FOR OP/ED): Mod: JZ | Performed by: INTERNAL MEDICINE

## 2024-10-22 PROCEDURE — 85610 PROTHROMBIN TIME: CPT

## 2024-10-22 PROCEDURE — 85025 COMPLETE CBC W/AUTO DIFF WBC: CPT

## 2024-10-22 PROCEDURE — 2500000004 HC RX 250 GENERAL PHARMACY W/ HCPCS (ALT 636 FOR OP/ED): Performed by: NURSE PRACTITIONER

## 2024-10-22 PROCEDURE — 2500000002 HC RX 250 W HCPCS SELF ADMINISTERED DRUGS (ALT 637 FOR MEDICARE OP, ALT 636 FOR OP/ED): Performed by: PHYSICIAN ASSISTANT

## 2024-10-22 PROCEDURE — 99232 SBSQ HOSP IP/OBS MODERATE 35: CPT | Performed by: STUDENT IN AN ORGANIZED HEALTH CARE EDUCATION/TRAINING PROGRAM

## 2024-10-22 PROCEDURE — 2500000001 HC RX 250 WO HCPCS SELF ADMINISTERED DRUGS (ALT 637 FOR MEDICARE OP): Performed by: INTERNAL MEDICINE

## 2024-10-22 PROCEDURE — 1170000001 HC PRIVATE ONCOLOGY ROOM DAILY

## 2024-10-22 PROCEDURE — 85384 FIBRINOGEN ACTIVITY: CPT

## 2024-10-22 PROCEDURE — 2500000002 HC RX 250 W HCPCS SELF ADMINISTERED DRUGS (ALT 637 FOR MEDICARE OP, ALT 636 FOR OP/ED): Performed by: INTERNAL MEDICINE

## 2024-10-22 PROCEDURE — 80197 ASSAY OF TACROLIMUS: CPT | Performed by: INTERNAL MEDICINE

## 2024-10-22 RX ORDER — MAGNESIUM SULFATE HEPTAHYDRATE 40 MG/ML
4 INJECTION, SOLUTION INTRAVENOUS ONCE
Status: COMPLETED | OUTPATIENT
Start: 2024-10-22 | End: 2024-10-22

## 2024-10-22 RX ORDER — CEFEPIME 1 G/50ML
2 INJECTION, SOLUTION INTRAVENOUS EVERY 8 HOURS
Status: DISCONTINUED | OUTPATIENT
Start: 2024-10-22 | End: 2024-10-25

## 2024-10-22 ASSESSMENT — PAIN SCALES - GENERAL
PAINLEVEL_OUTOF10: 0 - NO PAIN

## 2024-10-22 ASSESSMENT — PAIN - FUNCTIONAL ASSESSMENT
PAIN_FUNCTIONAL_ASSESSMENT: 0-10
PAIN_FUNCTIONAL_ASSESSMENT: 0-10

## 2024-10-22 NOTE — NURSING NOTE
10/22/24 10:40AM    Patient called me inquiring about where his sister should bring his niece and nephew on Thursday to be HLA-typed. I explained to the patient that we will need to speak with the niece and nephew directly, set them up with a coordinator, and then we will provide dates and times for their appointments. I explained that a coordinator has been in contact and left x2 VM for his niece. We are waiting on a return call from both of them directly to ensure they are willing to be donors.    Daxa Hurtado RN

## 2024-10-22 NOTE — ASSESSMENT & PLAN NOTE
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     # Transplant  CONDITIONING Fludarabine, melphalan, and TBI   DONOR Single umbilical cord   MATCH GRADE 6/8   SEX MATCH Mismatched   ABO DONOR A pos   ABO RECIPIENT A pos   GVHD PROPHYLAXIS Tacrolimus and Mycophenolate   GRAFT SOURCE Single umbilical cord   CMV DONOR negative   CMV RECIPIENT positive      # GVHD prophy  - Cont Tacro 1.5 mg BID  -Tacrolimus level 8.1 (10/22)  - Check levels daily  - stopped mycophenolate d/t concern for myelosuppression (10/11)  - Viral surveillance qMondays --> T+14 and T+20 all negative (EBV, CMV, adeno, HHV6)     HEME  # Pancytopenia: secondary to chemotherapy  # 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 Switched to PO levaquin 10/11  # Prophylaxis: Acyclovir, Vancomycin, Posaconazole, Letermovir (started T+14), and Bactrim (to start at discharge), Ursodiol      # Maculopapular rash (9/27), now resolved, likely d/t Meropenem  - appearance highly suggestive of meropenem reaction vs pre-engraftment syndrome  - maculopapular on inner thighs, non-itching, non-painful, blanching--resolved  - will continue to monitor      CARDIO/PULM:  # Hx of STEMI (11/11/2020)  - TTE 7/19/24: EF 59%, otherwise unremarkable   - Was cleared by Cardiology for transplant   # Hx of HTN   - Nifedipine: 90 mEq ER 24 hr tablet   # Ascending aorta dilation   - Echo showing 3.8-4 cm dilation      FEN/RENAL/GI  - Admit wt: 75.6 kg (9/13/24), Most recent wt: 70kg (10/20).   - Allergic to Lasix, will not take med (extreme hypotension, extreme hypokalemia)   # Chemo induced Diarrhea  - Cdiff & stool path neg (9/22)  - etiology: likely melphalan-related mucositis/enteritis  - Cont loperamide 2mg q6h PRN  # Monitor electrolytes and replace prn  # Hx of treated Hep C in 2015  - (9/12) Hep C RNA - not detected      :  # Hx of BPH and urinary retention (2023)  - Hx elevated PSA    - 7/28/21 Bx: atypical small acinar proliferation    - 4/7/23 Bx: Benign    NEURO/PSYCH:  #Tremor vs fasciculation  -since 10/10, pt reported tremor that was not present prior to admission  -on exam, fine, high-frequency (7+ Hz) tremor worst in the hands, exacerbated by effort, associated with significant fasciculations of the shoulder and back muscles; also fasciculations in jaw  -strength normal, reflexes 2+  -etiology: hypocalcemia, hypomagnesemia, drug side effect (tacro, cefepime, compazine), primary neurologic disease, essential tremor  -cefepime stopped 10/10  -tacrolimus level wnl, unlikely  -hold compazine for now  -replete magnesium to  >2  -if not improving, consider neurology consult     DISPO:  - Full Code  - NOK: spouse Genevieev ([phone])  - PICC  - Oncologist: Jerod         Pt seen examined and discussed with Dr. Hernan Rebolledo

## 2024-10-22 NOTE — PROGRESS NOTES
Spiritual Care Visit    Clinical Encounter Type  Visited With: Patient  Routine Visit: Follow-up  Continue Visiting: Yes     reintroduced self and role to patient Brandt Merritt. Patient updated  on results of his recent biopsy and what the plan is at this point. Patient shared that it is out of his hands so he is remaining prayerful and connected with family and friends. Patient shared he was able [place] healing [place] this weekend which he enjoyed.  provided care through listening and prayer. Patient was appreciative and did not have any further needs at this time. Spiritual Care remains available as needed/requested.    Rev. Sarah Chapman, MDiv, BCC

## 2024-10-22 NOTE — PROGRESS NOTES
"Brandt Merritt is a 64 y.o. male on day 39 of admission presenting with Acute myeloid leukemia in remission (Multi).    Subjective   Afebrile. No overnight events.  Denies any acute complaints. Notes occasional nausea, no emesis. Appetite fair. Ambulating in his room. Remainder of ROS is negative.     Objective     Physical Exam  Constitutional:       General: He is not in acute distress.  HENT:      Head: Normocephalic and atraumatic.      Nose: Nose normal.      Mouth/Throat:      Mouth: Mucous membranes are moist.      Pharynx: Oropharynx is clear. No oropharyngeal exudate.   Eyes:      General: No scleral icterus.     Extraocular Movements: Extraocular movements intact.      Pupils: Pupils are equal, round, and reactive to light.   Cardiovascular:      Rate and Rhythm: Normal rate and regular rhythm.      Heart sounds: No murmur heard.     No gallop.   Pulmonary:      Effort: No respiratory distress.      Breath sounds: Normal breath sounds. No wheezing or rhonchi.   Abdominal:      General: Bowel sounds are normal. There is no distension.      Palpations: Abdomen is soft.      Tenderness: There is no abdominal tenderness.   Musculoskeletal:         General: Normal range of motion.      Cervical back: Normal range of motion.      Right lower leg: No edema.      Left lower leg: No edema.   Skin:     General: Skin is warm and dry.   Neurological:      General: No focal deficit present.      Mental Status: He is alert and oriented to person, place, and time.      Motor: No weakness.   Psychiatric:         Mood and Affect: Mood normal.         Behavior: Behavior normal.         Last Recorded Vitals  Blood pressure 114/75, pulse 98, temperature 36.8 °C (98.2 °F), temperature source Temporal, resp. rate 18, height (S) 1.664 m (5' 5.51\"), weight 70 kg (154 lb 5.2 oz), SpO2 100%.  Intake/Output last 3 Shifts:  No intake/output data recorded.    Relevant Results  Scheduled medications  acyclovir, 400 mg, oral, " q12h  filgrastim or biosimilar, 300 mcg, subcutaneous, q24h  folic acid, 1 mg, oral, Daily  letermovir, 480 mg, oral, Daily  levoFLOXacin, 500 mg, oral, q24h JALYN  magnesium chloride, 64 mg, oral, BID  NIFEdipine ER, 90 mg, oral, Daily before breakfast  posaconazole, 300 mg, oral, q24h  tacrolimus, 1.5 mg, oral, q12h JALYN  ursodiol, 300 mg, oral, TID  vancomycin, 125 mg, oral, Daily      Continuous medications     PRN medications  PRN medications: acetaminophen, albuterol, alteplase, dextrose, diphenhydrAMINE, EPINEPHrine HCl, famotidine, loperamide, methylPREDNISolone sodium succinate (PF), ondansetron, sodium chloride    Assessment/Plan   Assessment & Plan  Acute myeloid leukemia in remission (Multi)    Stem cells transplant status (Multi)    Immunocompromised    Conditioning chemotherapy prior to peripheral blood stem cell transplant    Brandt Merritt is a 64 y.o male with PMHx htn, Hgb C, Hep C (treated), 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, DNMT alpha, and U2AF1 mutation indication adverse risk   - s/p 4 cycles of Decitabine/Venetoclax (C4D1 on 8/12/24)  - BMBx 6/17/24: Blasts cleared, FISH still positive for trisomy 8, still MDS changes   - BMBx 9/5/24: 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 and no increase in blast  - persistent lack of engraftment, now past expected median engraftment date for single cord (d21)  - will send out plasma CXCL9 level to [place] to assess for immunologic graft rejection-(phone of lab: [phone]): will be done later today, 10/16 per lab   - Repeat BMBx done on 10/15-hypocellular with no residual myeloid neoplasm  - awaiting typing of family members, another cord available.     #  Transplant  CONDITIONING Fludarabine, melphalan, and TBI   DONOR Single umbilical cord   MATCH GRADE 6/8   SEX MATCH Mismatched   ABO DONOR A pos   ABO RECIPIENT A pos   GVHD PROPHYLAXIS Tacrolimus and Mycophenolate   GRAFT SOURCE Single umbilical cord   CMV DONOR negative   CMV RECIPIENT positive      # GVHD prophy  - Cont Tacro 1.5 mg BID  -Tacrolimus level 8.1 (10/22)  - Check levels daily  - stopped mycophenolate d/t concern for myelosuppression (10/11)  - Viral surveillance qMondays --> T+14 and T+20 all negative (EBV, CMV, adeno, HHV6)     HEME  # Pancytopenia: secondary to chemotherapy  # 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 Switched to PO levaquin 10/11  # Prophylaxis: Acyclovir, Vancomycin, Posaconazole, Letermovir (started T+14), and Bactrim (to start at discharge), Ursodiol      # Maculopapular rash (9/27), now resolved, likely d/t Meropenem  - appearance highly suggestive of meropenem reaction vs pre-engraftment syndrome  - maculopapular on inner thighs, non-itching, non-painful, blanching--resolved  - will continue to monitor      CARDIO/PULM:  # Hx of STEMI (11/11/2020)  - TTE 7/19/24: EF 59%, otherwise unremarkable   - Was cleared by Cardiology for transplant   # Hx of HTN   - Nifedipine: 90 mEq ER 24 hr tablet   # Ascending aorta dilation   - Echo showing 3.8-4 cm dilation      FEN/RENAL/GI  - Admit wt: 75.6 kg (9/13/24), Most recent wt: 70kg (10/20).   - Allergic to Lasix, will not take med (extreme hypotension, extreme hypokalemia)   # Chemo induced Diarrhea  - Cdiff & stool path neg (9/22)  - etiology: likely melphalan-related mucositis/enteritis  - Cont  loperamide 2mg q6h PRN  # Monitor electrolytes and replace prn  # Hx of treated Hep C in 2015  - (9/12) Hep C RNA - not detected      :  # Hx of BPH and urinary retention (2023)  - Hx elevated PSA    - 7/28/21 Bx: atypical small acinar proliferation    - 4/7/23 Bx: Benign    NEURO/PSYCH:  #Tremor vs fasciculation  -since 10/10, pt reported tremor that was not present prior to admission  -on exam, fine, high-frequency (7+ Hz) tremor worst in the hands, exacerbated by effort, associated with significant fasciculations of the shoulder and back muscles; also fasciculations in jaw  -strength normal, reflexes 2+  -etiology: hypocalcemia, hypomagnesemia, drug side effect (tacro, cefepime, compazine), primary neurologic disease, essential tremor  -cefepime stopped 10/10  -tacrolimus level wnl, unlikely  -hold compazine for now  -replete magnesium to >2  -if not improving, consider neurology consult     DISPO:  - Full Code  - NOK: spouse Genevieve ([phone])  - PICC  - Oncologist: Jerod         Pt seen examined and discussed with Dr. Hernan Bobby PA-C

## 2024-10-23 LAB
ALBUMIN SERPL BCP-MCNC: 3.4 G/DL (ref 3.4–5)
ALP SERPL-CCNC: 77 U/L (ref 33–136)
ALT SERPL W P-5'-P-CCNC: 7 U/L (ref 10–52)
ANION GAP SERPL CALC-SCNC: 15 MMOL/L (ref 10–20)
APTT PPP: 32 SECONDS (ref 27–38)
AST SERPL W P-5'-P-CCNC: 8 U/L (ref 9–39)
BASOPHILS # BLD MANUAL: 0 X10*3/UL (ref 0–0.1)
BASOPHILS NFR BLD MANUAL: 0 %
BILIRUB SERPL-MCNC: 0.8 MG/DL (ref 0–1.2)
BUN SERPL-MCNC: 11 MG/DL (ref 6–23)
CALCIUM SERPL-MCNC: 8.5 MG/DL (ref 8.6–10.6)
CHIMERISM INTERPRETATION: NORMAL
CHLORIDE SERPL-SCNC: 103 MMOL/L (ref 98–107)
CMV DNA SERPL NAA+PROBE-LOG IU: NORMAL {LOG_IU}/ML
CO2 SERPL-SCNC: 23 MMOL/L (ref 21–32)
CREAT SERPL-MCNC: 0.96 MG/DL (ref 0.5–1.3)
EBV DNA SERPL NAA+PROBE-ACNC: 77 IU/ML
EBV DNA SPEC NAA+PROBE-LOG#: 1.89 LOG IU/ML
EGFRCR SERPLBLD CKD-EPI 2021: 87 ML/MIN/1.73M*2
ELECTRONICALLY SIGNED BY: NORMAL
EOSINOPHIL # BLD MANUAL: 0 X10*3/UL (ref 0–0.7)
EOSINOPHIL NFR BLD MANUAL: 0 %
ERYTHROCYTE [DISTWIDTH] IN BLOOD BY AUTOMATED COUNT: 15.8 % (ref 11.5–14.5)
FIBRINOGEN PPP-MCNC: 453 MG/DL (ref 200–400)
GLUCOSE SERPL-MCNC: 107 MG/DL (ref 74–99)
HCT VFR BLD AUTO: 23.5 % (ref 41–52)
HGB BLD-MCNC: 8.5 G/DL (ref 13.5–17.5)
IMM GRANULOCYTES # BLD AUTO: 0 X10*3/UL (ref 0–0.7)
IMM GRANULOCYTES NFR BLD AUTO: 0 % (ref 0–0.9)
INR PPP: 1.3 (ref 0.9–1.1)
LABORATORY COMMENT REPORT: DETECTED
LABORATORY COMMENT REPORT: NOT DETECTED
LYMPHOCYTES # BLD MANUAL: 0.1 X10*3/UL (ref 1.2–4.8)
LYMPHOCYTES NFR BLD MANUAL: 100 %
MAGNESIUM SERPL-MCNC: 1.32 MG/DL (ref 1.6–2.4)
MCH RBC QN AUTO: 27.3 PG (ref 26–34)
MCHC RBC AUTO-ENTMCNC: 36.2 G/DL (ref 32–36)
MCV RBC AUTO: 76 FL (ref 80–100)
MONOCYTES # BLD MANUAL: 0 X10*3/UL (ref 0.1–1)
MONOCYTES NFR BLD MANUAL: 0 %
NEUTS SEG # BLD MANUAL: 0 X10*3/UL (ref 1.2–7)
NEUTS SEG NFR BLD MANUAL: 0 %
NRBC BLD-RTO: 0 /100 WBCS (ref 0–0)
OVALOCYTES BLD QL SMEAR: ABNORMAL
PHOSPHATE SERPL-MCNC: 4.1 MG/DL (ref 2.5–4.9)
PLATELET # BLD AUTO: 15 X10*3/UL (ref 150–450)
POTASSIUM SERPL-SCNC: 3.6 MMOL/L (ref 3.5–5.3)
PROT SERPL-MCNC: 6.7 G/DL (ref 6.4–8.2)
PROTHROMBIN TIME: 14.1 SECONDS (ref 9.8–12.8)
RBC # BLD AUTO: 3.11 X10*6/UL (ref 4.5–5.9)
RBC MORPH BLD: ABNORMAL
SCHISTOCYTES BLD QL SMEAR: ABNORMAL
SODIUM SERPL-SCNC: 137 MMOL/L (ref 136–145)
TACROLIMUS BLD-MCNC: 6.8 NG/ML
TARGETS BLD QL SMEAR: ABNORMAL
TOTAL CELLS COUNTED BLD: 10
WBC # BLD AUTO: 0.1 X10*3/UL (ref 4.4–11.3)

## 2024-10-23 PROCEDURE — 83735 ASSAY OF MAGNESIUM: CPT

## 2024-10-23 PROCEDURE — 85027 COMPLETE CBC AUTOMATED: CPT

## 2024-10-23 PROCEDURE — 2500000004 HC RX 250 GENERAL PHARMACY W/ HCPCS (ALT 636 FOR OP/ED): Mod: JZ | Performed by: INTERNAL MEDICINE

## 2024-10-23 PROCEDURE — 85384 FIBRINOGEN ACTIVITY: CPT

## 2024-10-23 PROCEDURE — 1170000001 HC PRIVATE ONCOLOGY ROOM DAILY

## 2024-10-23 PROCEDURE — 84100 ASSAY OF PHOSPHORUS: CPT

## 2024-10-23 PROCEDURE — 2500000005 HC RX 250 GENERAL PHARMACY W/O HCPCS

## 2024-10-23 PROCEDURE — 2500000002 HC RX 250 W HCPCS SELF ADMINISTERED DRUGS (ALT 637 FOR MEDICARE OP, ALT 636 FOR OP/ED): Performed by: STUDENT IN AN ORGANIZED HEALTH CARE EDUCATION/TRAINING PROGRAM

## 2024-10-23 PROCEDURE — 2500000004 HC RX 250 GENERAL PHARMACY W/ HCPCS (ALT 636 FOR OP/ED): Performed by: PHYSICIAN ASSISTANT

## 2024-10-23 PROCEDURE — 80053 COMPREHEN METABOLIC PANEL: CPT

## 2024-10-23 PROCEDURE — 2500000002 HC RX 250 W HCPCS SELF ADMINISTERED DRUGS (ALT 637 FOR MEDICARE OP, ALT 636 FOR OP/ED): Performed by: INTERNAL MEDICINE

## 2024-10-23 PROCEDURE — 2500000001 HC RX 250 WO HCPCS SELF ADMINISTERED DRUGS (ALT 637 FOR MEDICARE OP): Performed by: STUDENT IN AN ORGANIZED HEALTH CARE EDUCATION/TRAINING PROGRAM

## 2024-10-23 PROCEDURE — 85610 PROTHROMBIN TIME: CPT

## 2024-10-23 PROCEDURE — 80197 ASSAY OF TACROLIMUS: CPT | Performed by: INTERNAL MEDICINE

## 2024-10-23 PROCEDURE — 2500000002 HC RX 250 W HCPCS SELF ADMINISTERED DRUGS (ALT 637 FOR MEDICARE OP, ALT 636 FOR OP/ED): Performed by: PHYSICIAN ASSISTANT

## 2024-10-23 PROCEDURE — 99233 SBSQ HOSP IP/OBS HIGH 50: CPT | Performed by: STUDENT IN AN ORGANIZED HEALTH CARE EDUCATION/TRAINING PROGRAM

## 2024-10-23 PROCEDURE — 2500000004 HC RX 250 GENERAL PHARMACY W/ HCPCS (ALT 636 FOR OP/ED): Performed by: NURSE PRACTITIONER

## 2024-10-23 PROCEDURE — 2500000001 HC RX 250 WO HCPCS SELF ADMINISTERED DRUGS (ALT 637 FOR MEDICARE OP): Performed by: INTERNAL MEDICINE

## 2024-10-23 PROCEDURE — 2500000001 HC RX 250 WO HCPCS SELF ADMINISTERED DRUGS (ALT 637 FOR MEDICARE OP)

## 2024-10-23 PROCEDURE — 85007 BL SMEAR W/DIFF WBC COUNT: CPT

## 2024-10-23 RX ORDER — MAGNESIUM SULFATE HEPTAHYDRATE 40 MG/ML
4 INJECTION, SOLUTION INTRAVENOUS ONCE
Status: COMPLETED | OUTPATIENT
Start: 2024-10-23 | End: 2024-10-23

## 2024-10-23 ASSESSMENT — PAIN SCALES - GENERAL
PAINLEVEL_OUTOF10: 0 - NO PAIN
PAINLEVEL_OUTOF10: 0 - NO PAIN

## 2024-10-23 ASSESSMENT — PAIN - FUNCTIONAL ASSESSMENT: PAIN_FUNCTIONAL_ASSESSMENT: 0-10

## 2024-10-23 NOTE — ASSESSMENT & PLAN NOTE
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% jaime) w/ trisomy 8, DNMT alpha, and U2AF1 mutation indication adverse risk   - s/p 4 cycles of Decitabine/Venetoclax (C4D1 on 8/12/24)  - BMBx 6/17/24: Blasts cleared, FISH still positive for trisomy 8, still MDS changes   - BMBx 9/5/24: 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 and no increase in blast  - persistent lack of engraftment, now past expected median engraftment date for single cord (d21)  - will send out plasma CXCL9 level to [place] to assess for immunologic graft rejection-(phone of lab: [phone]): will be done later today, 10/16 per lab   - Repeat BMBx done on 10/15-hypocellular with no residual myeloid neoplasm  - Tentative plan: proceed with haplo transplant from sister (discussed 10/23)     # Transplant  CONDITIONING Fludarabine, melphalan, and TBI   DONOR Single umbilical cord   MATCH GRADE 6/8   SEX MATCH Mismatched   ABO DONOR A pos   ABO RECIPIENT A pos   GVHD PROPHYLAXIS Tacrolimus and Mycophenolate   GRAFT SOURCE Single umbilical cord   CMV DONOR negative   CMV RECIPIENT positive      # GVHD prophy  - Cont Tacro 1.5 mg BID  -Tacrolimus level 8.1 (10/22)  - Check levels daily  - stopped mycophenolate d/t concern for myelosuppression (10/11)  - Viral surveillance qMondays --> T+14 and T+20 all negative (EBV, CMV, adeno, HHV6)     HEME  # Pancytopenia: secondary to chemotherapy  # 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   # Prophylaxis: Acyclovir, Vancomycin, Posaconazole, Letermovir (started T+14), and Bactrim (to start at discharge), Ursodiol      # Maculopapular rash (9/27), now resolved, likely d/t Meropenem  - appearance highly suggestive of meropenem reaction vs pre-engraftment syndrome  - maculopapular on inner thighs, non-itching, non-painful, blanching--resolved  - will continue to monitor      CARDIO/PULM:  # Hx of STEMI (11/11/2020)  - TTE 7/19/24: EF 59%, otherwise unremarkable   - Was cleared by Cardiology for transplant   # Hx of HTN   - Nifedipine: 90 mEq ER 24 hr tablet   # Ascending aorta dilation   - Echo showing 3.8-4 cm dilation      FEN/RENAL/GI  - Admit wt: 75.6 kg (9/13/24), Most recent wt: 70kg (10/20).   - Allergic to Lasix, will not take med (extreme hypotension, extreme hypokalemia)   # Chemo induced Diarrhea  - Cdiff & stool path neg (9/22)  - etiology: likely melphalan-related mucositis/enteritis  - Cont loperamide 2mg q6h PRN  # Monitor electrolytes and replace prn  # Hx of treated Hep C in 2015  - (9/12) Hep C RNA - not detected      :  # Hx of BPH and urinary retention (2023)  - Hx elevated PSA    - 7/28/21 Bx: atypical small acinar proliferation    - 4/7/23 Bx: Benign    NEURO/PSYCH:  #Tremor vs fasciculation  -since 10/10, pt reported tremor that was not present prior to admission  -on exam, fine, high-frequency (7+ Hz) tremor worst in the hands, exacerbated by effort, associated with significant fasciculations of the shoulder and back muscles; also fasciculations in jaw  -strength normal, reflexes 2+  -etiology: hypocalcemia, hypomagnesemia, drug side effect (tacro, cefepime, compazine), primary neurologic disease, essential tremor  -cefepime stopped  10/10  -tacrolimus level wnl, unlikely  -hold compazine for now  -replete magnesium to >2  -if not improving, consider neurology consult     DISPO:  - Full Code  - NOK: spouse Genevieve ([phone])  - PICC  - Oncologist: Jerod         Pt seen examined and discussed with Dr. Hernan Rebolledo

## 2024-10-23 NOTE — PROGRESS NOTES
"Brandt Merritt is a 64 y.o. male on day 40 of admission presenting with Acute myeloid leukemia in remission (Multi).    Subjective   Febrile last night, reports that he had chills. No overnight events.  Denies any acute complaints. Notes occasional nausea, no emesis. Appetite fair. Ambulating in his room. Remainder of ROS is negative.     Objective     Physical Exam  Constitutional:       General: He is not in acute distress.  HENT:      Head: Normocephalic and atraumatic.      Nose: Nose normal.      Mouth/Throat:      Mouth: Mucous membranes are moist.      Pharynx: Oropharynx is clear. No oropharyngeal exudate.   Eyes:      General: No scleral icterus.     Extraocular Movements: Extraocular movements intact.      Pupils: Pupils are equal, round, and reactive to light.   Cardiovascular:      Rate and Rhythm: Normal rate and regular rhythm.      Heart sounds: No murmur heard.     No gallop.   Pulmonary:      Effort: No respiratory distress.      Breath sounds: Normal breath sounds. No wheezing or rhonchi.   Abdominal:      General: Bowel sounds are normal. There is no distension.      Palpations: Abdomen is soft.      Tenderness: There is no abdominal tenderness.   Musculoskeletal:         General: Normal range of motion.      Cervical back: Normal range of motion.      Right lower leg: No edema.      Left lower leg: No edema.   Skin:     General: Skin is warm and dry.   Neurological:      General: No focal deficit present.      Mental Status: He is alert and oriented to person, place, and time.      Motor: No weakness.   Psychiatric:         Mood and Affect: Mood normal.         Behavior: Behavior normal.       Last Recorded Vitals  Blood pressure 107/66, pulse 96, temperature 36.6 °C (97.9 °F), temperature source Temporal, resp. rate 18, height (S) 1.664 m (5' 5.51\"), weight 70 kg (154 lb 5.2 oz), SpO2 100%.  Intake/Output last 3 Shifts:  I/O last 3 completed shifts:  In: 200 (2.9 mL/kg) [IV Piggyback:200]  Out: " - (0 mL/kg)   Weight: 70 kg     Relevant Results  Scheduled medications  acyclovir, 400 mg, oral, q12h  cefepime, 2 g, intravenous, q8h  filgrastim or biosimilar, 300 mcg, subcutaneous, q24h  folic acid, 1 mg, oral, Daily  letermovir, 480 mg, oral, Daily  magnesium chloride, 64 mg, oral, BID  NIFEdipine ER, 90 mg, oral, Daily before breakfast  posaconazole, 300 mg, oral, q24h  tacrolimus, 1.5 mg, oral, q12h JALYN  ursodiol, 300 mg, oral, TID  vancomycin, 125 mg, oral, Daily      Continuous medications     PRN medications  PRN medications: acetaminophen, albuterol, alteplase, dextrose, diphenhydrAMINE, EPINEPHrine HCl, famotidine, loperamide, methylPREDNISolone sodium succinate (PF), ondansetron, sodium chloride    Assessment/Plan   Assessment & Plan  Acute myeloid leukemia in remission (Multi)    Stem cells transplant status (Multi)    Immunocompromised    Conditioning chemotherapy prior to peripheral blood stem cell transplant    Brandt Merritt is a 64 y.o male with PMHx htn, Hgb C, Hep C (treated), 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% jaime) w/ trisomy 8, DNMT alpha, and U2AF1 mutation indication adverse risk   - s/p 4 cycles of Decitabine/Venetoclax (C4D1 on 8/12/24)  - BMBx 6/17/24: Blasts cleared, FISH still positive for trisomy 8, still MDS changes   - BMBx 9/5/24: 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 and no increase in blast  - persistent lack of engraftment, now past expected median engraftment date for single cord (d21)  - will send out plasma CXCL9 level to [place] to assess for immunologic graft rejection-(phone of lab: [phone]): will be done later today, 10/16 per lab   - Repeat BMBx done on 10/15-hypocellular with no residual  myeloid neoplasm  - Tentative plan: proceed with haplo transplant from sister (discussed 10/23)     # Transplant  CONDITIONING Fludarabine, melphalan, and TBI   DONOR Single umbilical cord   MATCH GRADE 6/8   SEX MATCH Mismatched   ABO DONOR A pos   ABO RECIPIENT A pos   GVHD PROPHYLAXIS Tacrolimus and Mycophenolate   GRAFT SOURCE Single umbilical cord   CMV DONOR negative   CMV RECIPIENT positive      # GVHD prophy  - Cont Tacro 1.5 mg BID  -Tacrolimus level 8.1 (10/22)  - Check levels daily  - stopped mycophenolate d/t concern for myelosuppression (10/11)  - Viral surveillance qMondays --> T+14 and T+20 all negative (EBV, CMV, adeno, HHV6)     HEME  # Pancytopenia: secondary to chemotherapy  # 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   # Prophylaxis: Acyclovir, Vancomycin, Posaconazole, Letermovir (started T+14), and Bactrim (to start at discharge), Ursodiol      # Maculopapular rash (9/27), now resolved, likely d/t Meropenem  - appearance highly suggestive of meropenem reaction vs pre-engraftment syndrome  - maculopapular on inner thighs, non-itching, non-painful, blanching--resolved  - will continue to monitor      CARDIO/PULM:  # Hx of STEMI (11/11/2020)  - TTE 7/19/24: EF 59%, otherwise unremarkable   - Was cleared by Cardiology for transplant   # Hx of HTN   - Nifedipine: 90 mEq ER 24 hr tablet   # Ascending aorta dilation   - Echo showing 3.8-4 cm dilation      FEN/RENAL/GI  - Admit wt: 75.6 kg (9/13/24), Most recent wt: 70kg (10/20).   - Allergic to Lasix, will not take med  (extreme hypotension, extreme hypokalemia)   # Chemo induced Diarrhea  - Cdiff & stool path neg (9/22)  - etiology: likely melphalan-related mucositis/enteritis  - Cont loperamide 2mg q6h PRN  # Monitor electrolytes and replace prn  # Hx of treated Hep C in 2015  - (9/12) Hep C RNA - not detected      :  # Hx of BPH and urinary retention (2023)  - Hx elevated PSA    - 7/28/21 Bx: atypical small acinar proliferation    - 4/7/23 Bx: Benign    NEURO/PSYCH:  #Tremor vs fasciculation  -since 10/10, pt reported tremor that was not present prior to admission  -on exam, fine, high-frequency (7+ Hz) tremor worst in the hands, exacerbated by effort, associated with significant fasciculations of the shoulder and back muscles; also fasciculations in jaw  -strength normal, reflexes 2+  -etiology: hypocalcemia, hypomagnesemia, drug side effect (tacro, cefepime, compazine), primary neurologic disease, essential tremor  -cefepime stopped 10/10  -tacrolimus level wnl, unlikely  -hold compazine for now  -replete magnesium to >2  -if not improving, consider neurology consult     DISPO:  - Full Code  - NOK: spouse Genevieve ([phone])  - PICC  - Oncologist: Jerod         Pt seen examined and discussed with Dr. Hernan Bobyb PA-C

## 2024-10-24 LAB
ABO GROUP (TYPE) IN BLOOD: NORMAL
ABO GROUP (TYPE) IN BLOOD: NORMAL
ALBUMIN SERPL BCP-MCNC: 3.4 G/DL (ref 3.4–5)
ALP SERPL-CCNC: 78 U/L (ref 33–136)
ALT SERPL W P-5'-P-CCNC: 8 U/L (ref 10–52)
ANION GAP SERPL CALC-SCNC: 13 MMOL/L (ref 10–20)
ANTIBODY SCREEN: NORMAL
ANTIBODY SCREEN: NORMAL
APTT PPP: 31 SECONDS (ref 27–38)
AST SERPL W P-5'-P-CCNC: 11 U/L (ref 9–39)
BASOPHILS # BLD AUTO: 0 X10*3/UL (ref 0–0.1)
BASOPHILS NFR BLD AUTO: 0 %
BILIRUB SERPL-MCNC: 0.7 MG/DL (ref 0–1.2)
BUN SERPL-MCNC: 11 MG/DL (ref 6–23)
CALCIUM SERPL-MCNC: 8.4 MG/DL (ref 8.6–10.6)
CHLORIDE SERPL-SCNC: 101 MMOL/L (ref 98–107)
CO2 SERPL-SCNC: 25 MMOL/L (ref 21–32)
CREAT SERPL-MCNC: 0.99 MG/DL (ref 0.5–1.3)
EGFRCR SERPLBLD CKD-EPI 2021: 84 ML/MIN/1.73M*2
EOSINOPHIL # BLD AUTO: 0 X10*3/UL (ref 0–0.7)
EOSINOPHIL NFR BLD AUTO: 0 %
ERYTHROCYTE [DISTWIDTH] IN BLOOD BY AUTOMATED COUNT: 15.7 % (ref 11.5–14.5)
FIBRINOGEN PPP-MCNC: 532 MG/DL (ref 200–400)
GLUCOSE SERPL-MCNC: 109 MG/DL (ref 74–99)
HCT VFR BLD AUTO: 23.1 % (ref 41–52)
HGB BLD-MCNC: 8.3 G/DL (ref 13.5–17.5)
IMM GRANULOCYTES # BLD AUTO: 0 X10*3/UL (ref 0–0.7)
IMM GRANULOCYTES NFR BLD AUTO: 0 % (ref 0–0.9)
INR PPP: 1.1 (ref 0.9–1.1)
LYMPHOCYTES # BLD AUTO: 0.07 X10*3/UL (ref 1.2–4.8)
LYMPHOCYTES NFR BLD AUTO: 87.5 %
MAGNESIUM SERPL-MCNC: 1.58 MG/DL (ref 1.6–2.4)
MCH RBC QN AUTO: 26.6 PG (ref 26–34)
MCHC RBC AUTO-ENTMCNC: 35.9 G/DL (ref 32–36)
MCV RBC AUTO: 74 FL (ref 80–100)
MONOCYTES # BLD AUTO: 0 X10*3/UL (ref 0.1–1)
MONOCYTES NFR BLD AUTO: 0 %
NEUTROPHILS # BLD AUTO: 0.01 X10*3/UL (ref 1.2–7.7)
NEUTROPHILS NFR BLD AUTO: 12.5 %
NRBC BLD-RTO: 0 /100 WBCS (ref 0–0)
PHOSPHATE SERPL-MCNC: 4.7 MG/DL (ref 2.5–4.9)
PLATELET # BLD AUTO: 9 X10*3/UL (ref 150–450)
POTASSIUM SERPL-SCNC: 3.5 MMOL/L (ref 3.5–5.3)
PROT SERPL-MCNC: 6.7 G/DL (ref 6.4–8.2)
PROTHROMBIN TIME: 12.8 SECONDS (ref 9.8–12.8)
RBC # BLD AUTO: 3.12 X10*6/UL (ref 4.5–5.9)
RH FACTOR (ANTIGEN D): NORMAL
RH FACTOR (ANTIGEN D): NORMAL
SODIUM SERPL-SCNC: 135 MMOL/L (ref 136–145)
TACROLIMUS BLD-MCNC: 8.8 NG/ML
WBC # BLD AUTO: 0.1 X10*3/UL (ref 4.4–11.3)

## 2024-10-24 PROCEDURE — 85730 THROMBOPLASTIN TIME PARTIAL: CPT

## 2024-10-24 PROCEDURE — 2500000004 HC RX 250 GENERAL PHARMACY W/ HCPCS (ALT 636 FOR OP/ED): Performed by: NURSE PRACTITIONER

## 2024-10-24 PROCEDURE — 80053 COMPREHEN METABOLIC PANEL: CPT

## 2024-10-24 PROCEDURE — 83735 ASSAY OF MAGNESIUM: CPT

## 2024-10-24 PROCEDURE — 80197 ASSAY OF TACROLIMUS: CPT | Performed by: INTERNAL MEDICINE

## 2024-10-24 PROCEDURE — 2500000002 HC RX 250 W HCPCS SELF ADMINISTERED DRUGS (ALT 637 FOR MEDICARE OP, ALT 636 FOR OP/ED): Performed by: STUDENT IN AN ORGANIZED HEALTH CARE EDUCATION/TRAINING PROGRAM

## 2024-10-24 PROCEDURE — 2500000004 HC RX 250 GENERAL PHARMACY W/ HCPCS (ALT 636 FOR OP/ED)

## 2024-10-24 PROCEDURE — 2500000001 HC RX 250 WO HCPCS SELF ADMINISTERED DRUGS (ALT 637 FOR MEDICARE OP): Performed by: STUDENT IN AN ORGANIZED HEALTH CARE EDUCATION/TRAINING PROGRAM

## 2024-10-24 PROCEDURE — 2500000001 HC RX 250 WO HCPCS SELF ADMINISTERED DRUGS (ALT 637 FOR MEDICARE OP)

## 2024-10-24 PROCEDURE — 99233 SBSQ HOSP IP/OBS HIGH 50: CPT | Performed by: STUDENT IN AN ORGANIZED HEALTH CARE EDUCATION/TRAINING PROGRAM

## 2024-10-24 PROCEDURE — 86850 RBC ANTIBODY SCREEN: CPT | Performed by: STUDENT IN AN ORGANIZED HEALTH CARE EDUCATION/TRAINING PROGRAM

## 2024-10-24 PROCEDURE — 84100 ASSAY OF PHOSPHORUS: CPT

## 2024-10-24 PROCEDURE — 86901 BLOOD TYPING SEROLOGIC RH(D): CPT | Performed by: NURSE PRACTITIONER

## 2024-10-24 PROCEDURE — 2500000004 HC RX 250 GENERAL PHARMACY W/ HCPCS (ALT 636 FOR OP/ED): Mod: JZ | Performed by: PHYSICIAN ASSISTANT

## 2024-10-24 PROCEDURE — 36430 TRANSFUSION BLD/BLD COMPNT: CPT

## 2024-10-24 PROCEDURE — G0452 MOLECULAR PATHOLOGY INTERPR: HCPCS | Performed by: PATHOLOGY

## 2024-10-24 PROCEDURE — P9037 PLATE PHERES LEUKOREDU IRRAD: HCPCS

## 2024-10-24 PROCEDURE — 85025 COMPLETE CBC W/AUTO DIFF WBC: CPT

## 2024-10-24 PROCEDURE — 85384 FIBRINOGEN ACTIVITY: CPT

## 2024-10-24 PROCEDURE — 2500000004 HC RX 250 GENERAL PHARMACY W/ HCPCS (ALT 636 FOR OP/ED): Mod: JZ | Performed by: INTERNAL MEDICINE

## 2024-10-24 PROCEDURE — 2500000002 HC RX 250 W HCPCS SELF ADMINISTERED DRUGS (ALT 637 FOR MEDICARE OP, ALT 636 FOR OP/ED): Performed by: PHYSICIAN ASSISTANT

## 2024-10-24 PROCEDURE — 2500000002 HC RX 250 W HCPCS SELF ADMINISTERED DRUGS (ALT 637 FOR MEDICARE OP, ALT 636 FOR OP/ED): Performed by: INTERNAL MEDICINE

## 2024-10-24 PROCEDURE — 1170000001 HC PRIVATE ONCOLOGY ROOM DAILY

## 2024-10-24 PROCEDURE — 2500000001 HC RX 250 WO HCPCS SELF ADMINISTERED DRUGS (ALT 637 FOR MEDICARE OP): Performed by: INTERNAL MEDICINE

## 2024-10-24 PROCEDURE — 81265 STR MARKERS SPECIMEN ANAL: CPT | Performed by: STUDENT IN AN ORGANIZED HEALTH CARE EDUCATION/TRAINING PROGRAM

## 2024-10-24 PROCEDURE — 2500000001 HC RX 250 WO HCPCS SELF ADMINISTERED DRUGS (ALT 637 FOR MEDICARE OP): Performed by: NURSE PRACTITIONER

## 2024-10-24 RX ORDER — TACROLIMUS 1 MG/1
1 CAPSULE ORAL
Status: DISCONTINUED | OUTPATIENT
Start: 2024-10-24 | End: 2024-10-25

## 2024-10-24 RX ORDER — MAGNESIUM SULFATE HEPTAHYDRATE 40 MG/ML
4 INJECTION, SOLUTION INTRAVENOUS ONCE
Status: COMPLETED | OUTPATIENT
Start: 2024-10-24 | End: 2024-10-24

## 2024-10-24 RX ORDER — NYSTATIN 100000 [USP'U]/ML
5 SUSPENSION ORAL 4 TIMES DAILY
Status: DISCONTINUED | OUTPATIENT
Start: 2024-10-24 | End: 2024-11-01

## 2024-10-24 ASSESSMENT — PAIN SCALES - GENERAL
PAINLEVEL_OUTOF10: 0 - NO PAIN
PAINLEVEL_OUTOF10: 0 - NO PAIN

## 2024-10-24 ASSESSMENT — PAIN - FUNCTIONAL ASSESSMENT: PAIN_FUNCTIONAL_ASSESSMENT: 0-10

## 2024-10-24 NOTE — PROGRESS NOTES
"Brandt Merritt is a 64 y.o. male on day 41 of admission presenting with Acute myeloid leukemia in remission (Multi).    Subjective   Afebrile.  Patient states he is feeling better than yesterday.  He states he didn't wake up with nausea this morning.  His appetite is still decreased but taking fluids.  Family is in at bedside.   Patient offers no new complaints, patient remains stable.     Objective     Physical Exam  Constitutional:       General: He is not in acute distress.  HENT:      Head: Normocephalic and atraumatic.      Nose: Nose normal.      Mouth/Throat:      Mouth: Mucous membranes are moist.      Pharynx: Oropharynx is clear. No oropharyngeal exudate.   Eyes:      General: No scleral icterus.     Extraocular Movements: Extraocular movements intact.      Pupils: Pupils are equal, round, and reactive to light.   Cardiovascular:      Rate and Rhythm: Normal rate and regular rhythm.      Heart sounds: No murmur heard.     No gallop.   Pulmonary:      Effort: No respiratory distress.      Breath sounds: Normal breath sounds. No wheezing or rhonchi.   Abdominal:      General: Bowel sounds are normal. There is no distension.      Palpations: Abdomen is soft.      Tenderness: There is no abdominal tenderness.   Musculoskeletal:         General: Normal range of motion.      Cervical back: Normal range of motion.      Right lower leg: No edema.      Left lower leg: No edema.   Skin:     General: Skin is warm and dry.   Neurological:      General: No focal deficit present.      Mental Status: He is alert and oriented to person, place, and time.      Motor: No weakness.   Psychiatric:         Mood and Affect: Mood normal.         Behavior: Behavior normal.         Last Recorded Vitals  Blood pressure 124/73, pulse 105, temperature 36.7 °C (98.1 °F), temperature source Temporal, resp. rate 16, height (S) 1.664 m (5' 5.51\"), weight 70 kg (154 lb 5.2 oz), SpO2 97%.  Intake/Output last 3 Shifts:  I/O last 3 completed " shifts:  In: 400 (5.7 mL/kg) [IV Piggyback:400]  Out: - (0 mL/kg)   Weight: 70 kg     Relevant Results  Scheduled medications  acyclovir, 400 mg, oral, q12h  cefepime, 2 g, intravenous, q8h  filgrastim or biosimilar, 300 mcg, subcutaneous, q24h  folic acid, 1 mg, oral, Daily  letermovir, 480 mg, oral, Daily  magnesium chloride, 64 mg, oral, BID  NIFEdipine ER, 90 mg, oral, Daily before breakfast  posaconazole, 300 mg, oral, q24h  tacrolimus, 1.5 mg, oral, q12h JALYN  ursodiol, 300 mg, oral, TID  vancomycin, 125 mg, oral, Daily      Continuous medications     PRN medications  PRN medications: acetaminophen, albuterol, alteplase, dextrose, diphenhydrAMINE, EPINEPHrine HCl, famotidine, loperamide, methylPREDNISolone sodium succinate (PF), ondansetron, sodium chloride    Assessment/Plan   Assessment & Plan  Acute myeloid leukemia in remission (Multi)    Stem cells transplant status (Multi)    Immunocompromised    Conditioning chemotherapy prior to peripheral blood stem cell transplant    Brandt Merritt is a 64 y.o male with PMHx htn, Hgb C, Hep C (treated), 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      T+35  Single-umbilical cord transplant (T0=9/19/24)       ONC  # MDS/AML   - Symptoms began 9/2023; diagnosed 4/2024  - BMBx showing MDS in transition to AML (>10% jaime) w/ trisomy 8, DNMT alpha, and U2AF1 mutation indication adverse risk   - s/p 4 cycles of Decitabine/Venetoclax (C4D1 on 8/12/24)  - BMBx 6/17/24: Blasts cleared, FISH still positive for trisomy 8, still MDS changes   - BMBx 9/5/24: 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 and no increase in blast  - persistent lack of engraftment, now past expected median engraftment date for single cord (d21)  - will send out plasma CXCL9 level to [place] to assess for immunologic graft rejection-(phone of lab: [phone]): will be done later today, 10/16 per lab   -  Repeat BMBx done on 10/15-hypocellular with no residual myeloid neoplasm, Chimerism 1% Donor , 99% Recipient   - Tentative plan: proceed with haplo transplant from sister (discussed 10/23)     # Transplant  CONDITIONING Fludarabine, melphalan, and TBI   DONOR Single umbilical cord   MATCH GRADE 6/8   SEX MATCH Mismatched   ABO DONOR A pos   ABO RECIPIENT A pos   GVHD PROPHYLAXIS Tacrolimus and Mycophenolate   GRAFT SOURCE Single umbilical cord   CMV DONOR negative   CMV RECIPIENT positive      # GVHD prophy  - Cont Tacro 1.5 mg BID  -Tacrolimus level 8.1 (10/22)  - Check levels daily  - stopped mycophenolate d/t concern for myelosuppression (10/11)  - Viral surveillance qMondays --> T+14 and T+20 all negative (EBV, CMV, adeno, HHV6)     HEME  # Pancytopenia: secondary to chemotherapy  # 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   # Prophylaxis: Acyclovir, Vancomycin, Posaconazole, Letermovir (started T+14), and Bactrim (to start at discharge), Ursodiol      # Maculopapular rash (9/27), now resolved, likely d/t Meropenem  - appearance highly suggestive of meropenem reaction vs pre-engraftment syndrome  - maculopapular on inner thighs, non-itching, non-painful, blanching--resolved  - will continue to monitor      CARDIO/PULM:  # Hx of STEMI (11/11/2020)  - TTE 7/19/24: EF 59%, otherwise unremarkable   - Was cleared by Cardiology for transplant   # Hx of HTN   - Nifedipine: 90 mEq ER 24 hr tablet   # Ascending aorta dilation   - Echo showing 3.8-4 cm dilation      FEN/RENAL/GI  - Admit wt: 75.6  kg (9/13/24), Most recent wt: 70kg (10/20).   - Allergic to Lasix, will not take med (extreme hypotension, extreme hypokalemia)   # Chemo induced Diarrhea  - Cdiff & stool path neg (9/22)  - etiology: likely melphalan-related mucositis/enteritis  - Cont loperamide 2mg q6h PRN  # Monitor electrolytes and replace prn  # Hx of treated Hep C in 2015  - (9/12) Hep C RNA - not detected      :  # Hx of BPH and urinary retention (2023)  - Hx elevated PSA    - 7/28/21 Bx: atypical small acinar proliferation    - 4/7/23 Bx: Benign    NEURO/PSYCH:  #Tremor vs fasciculation  -since 10/10, pt reported tremor that was not present prior to admission  -on exam, fine, high-frequency (7+ Hz) tremor worst in the hands, exacerbated by effort, associated with significant fasciculations of the shoulder and back muscles; also fasciculations in jaw  -strength normal, reflexes 2+  -etiology: hypocalcemia, hypomagnesemia, drug side effect (tacro, cefepime, compazine), primary neurologic disease, essential tremor  -cefepime stopped 10/10  -tacrolimus level wnl, unlikely  -hold compazine for now  -replete magnesium to >2  -if not improving, consider neurology consult     DISPO:  - Full Code  - NOK: spouse Genevieve ([phone])  - PICC  - Oncologist: Jerod         Pt seen examined and discussed with Dr. Hernan Mitchell, APRN-CNP

## 2024-10-24 NOTE — ASSESSMENT & PLAN NOTE
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      T+35  Single-umbilical cord transplant (T0=9/19/24)       ONC  # MDS/AML   - Symptoms began 9/2023; diagnosed 4/2024  - BMBx showing MDS in transition to AML (>10% jaime) w/ trisomy 8, DNMT alpha, and U2AF1 mutation indication adverse risk   - s/p 4 cycles of Decitabine/Venetoclax (C4D1 on 8/12/24)  - BMBx 6/17/24: Blasts cleared, FISH still positive for trisomy 8, still MDS changes   - BMBx 9/5/24: 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 and no increase in blast  - persistent lack of engraftment, now past expected median engraftment date for single cord (d21)  - will send out plasma CXCL9 level to [place] to assess for immunologic graft rejection-(phone of lab: [phone]): will be done later today, 10/16 per lab   - Repeat BMBx done on 10/15-hypocellular with no residual myeloid neoplasm, Chimerism 1% Donor , 99% Recipient   - Tentative plan: proceed with haplo transplant from sister (discussed 10/23)     # Transplant  CONDITIONING Fludarabine, melphalan, and TBI   DONOR Single umbilical cord   MATCH GRADE 6/8   SEX MATCH Mismatched   ABO DONOR A pos   ABO RECIPIENT A pos   GVHD PROPHYLAXIS Tacrolimus and Mycophenolate   GRAFT SOURCE Single umbilical cord   CMV DONOR negative   CMV RECIPIENT positive      # GVHD prophy  - Cont Tacro 1.5 mg BID  -Tacrolimus level 8.1 (10/22)  - Check levels daily  - stopped mycophenolate d/t concern for myelosuppression (10/11)  - Viral surveillance qMondays --> T+14 and T+20 all negative (EBV, CMV, adeno, HHV6)     HEME  # Pancytopenia: secondary to chemotherapy  # 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   # Prophylaxis: Acyclovir, Vancomycin, Posaconazole, Letermovir (started T+14), and Bactrim (to start at discharge), Ursodiol      # Maculopapular rash (9/27), now resolved, likely d/t Meropenem  - appearance highly suggestive of meropenem reaction vs pre-engraftment syndrome  - maculopapular on inner thighs, non-itching, non-painful, blanching--resolved  - will continue to monitor      CARDIO/PULM:  # Hx of STEMI (11/11/2020)  - TTE 7/19/24: EF 59%, otherwise unremarkable   - Was cleared by Cardiology for transplant   # Hx of HTN   - Nifedipine: 90 mEq ER 24 hr tablet   # Ascending aorta dilation   - Echo showing 3.8-4 cm dilation      FEN/RENAL/GI  - Admit wt: 75.6 kg (9/13/24), Most recent wt: 70kg (10/20).   - Allergic to Lasix, will not take med (extreme hypotension, extreme hypokalemia)   # Chemo induced Diarrhea  - Cdiff & stool path neg (9/22)  - etiology: likely melphalan-related mucositis/enteritis  - Cont loperamide 2mg q6h PRN  # Monitor electrolytes and replace prn  # Hx of treated Hep C in 2015  - (9/12) Hep C RNA - not detected      :  # Hx of BPH and urinary retention (2023)  - Hx elevated PSA    - 7/28/21 Bx: atypical small acinar proliferation    - 4/7/23 Bx: Benign    NEURO/PSYCH:  #Tremor vs fasciculation  -since 10/10, pt reported tremor that was not present prior to admission  -on exam, fine, high-frequency (7+ Hz) tremor worst in the hands, exacerbated by effort, associated with significant fasciculations of the shoulder and back muscles; also fasciculations in jaw  -strength normal, reflexes 2+  -etiology: hypocalcemia, hypomagnesemia, drug side effect (tacro, cefepime, compazine), primary neurologic disease,  essential tremor  -cefepime stopped 10/10  -tacrolimus level wnl, unlikely  -hold compazine for now  -replete magnesium to >2  -if not improving, consider neurology consult     DISPO:  - Full Code  - NOK: spouse Genevieve ([phone])  - PICC  - Oncologist: Jerod         Pt seen examined and discussed with Dr. Hernan Rebolledo

## 2024-10-25 LAB
ALBUMIN SERPL BCP-MCNC: 3.5 G/DL (ref 3.4–5)
ALP SERPL-CCNC: 75 U/L (ref 33–136)
ALT SERPL W P-5'-P-CCNC: 6 U/L (ref 10–52)
ANION GAP SERPL CALC-SCNC: 12 MMOL/L (ref 10–20)
APTT PPP: 29 SECONDS (ref 27–38)
AST SERPL W P-5'-P-CCNC: 10 U/L (ref 9–39)
BASOPHILS # BLD AUTO: 0 X10*3/UL (ref 0–0.1)
BASOPHILS NFR BLD AUTO: 0 %
BILIRUB SERPL-MCNC: 0.6 MG/DL (ref 0–1.2)
BLOOD EXPIRATION DATE: NORMAL
BUN SERPL-MCNC: 9 MG/DL (ref 6–23)
CALCIUM SERPL-MCNC: 8.2 MG/DL (ref 8.6–10.6)
CHLORIDE SERPL-SCNC: 103 MMOL/L (ref 98–107)
CO2 SERPL-SCNC: 26 MMOL/L (ref 21–32)
CREAT SERPL-MCNC: 0.86 MG/DL (ref 0.5–1.3)
DISPENSE STATUS: NORMAL
EGFRCR SERPLBLD CKD-EPI 2021: >90 ML/MIN/1.73M*2
EOSINOPHIL # BLD AUTO: 0 X10*3/UL (ref 0–0.7)
EOSINOPHIL NFR BLD AUTO: 0 %
ERYTHROCYTE [DISTWIDTH] IN BLOOD BY AUTOMATED COUNT: 15.6 % (ref 11.5–14.5)
FIBRINOGEN PPP-MCNC: 481 MG/DL (ref 200–400)
GLUCOSE SERPL-MCNC: 109 MG/DL (ref 74–99)
HCT VFR BLD AUTO: 20.2 % (ref 41–52)
HGB BLD-MCNC: 7.3 G/DL (ref 13.5–17.5)
IMM GRANULOCYTES # BLD AUTO: 0 X10*3/UL (ref 0–0.7)
IMM GRANULOCYTES NFR BLD AUTO: 0 % (ref 0–0.9)
INR PPP: 1.2 (ref 0.9–1.1)
LYMPHOCYTES # BLD AUTO: 0.05 X10*3/UL (ref 1.2–4.8)
LYMPHOCYTES NFR BLD AUTO: 83.3 %
MAGNESIUM SERPL-MCNC: 1.53 MG/DL (ref 1.6–2.4)
MCH RBC QN AUTO: 26.8 PG (ref 26–34)
MCHC RBC AUTO-ENTMCNC: 36.1 G/DL (ref 32–36)
MCV RBC AUTO: 74 FL (ref 80–100)
MONOCYTES # BLD AUTO: 0 X10*3/UL (ref 0.1–1)
MONOCYTES NFR BLD AUTO: 0 %
NEUTROPHILS # BLD AUTO: 0.01 X10*3/UL (ref 1.2–7.7)
NEUTROPHILS NFR BLD AUTO: 16.7 %
NRBC BLD-RTO: 0 /100 WBCS (ref 0–0)
PHOSPHATE SERPL-MCNC: 3.7 MG/DL (ref 2.5–4.9)
PLATELET # BLD AUTO: 28 X10*3/UL (ref 150–450)
POTASSIUM SERPL-SCNC: 3.6 MMOL/L (ref 3.5–5.3)
PRODUCT BLOOD TYPE: 6200
PRODUCT CODE: NORMAL
PROT SERPL-MCNC: 6.3 G/DL (ref 6.4–8.2)
PROTHROMBIN TIME: 13.2 SECONDS (ref 9.8–12.8)
RBC # BLD AUTO: 2.72 X10*6/UL (ref 4.5–5.9)
RBC MORPH BLD: NORMAL
SODIUM SERPL-SCNC: 137 MMOL/L (ref 136–145)
TACROLIMUS BLD-MCNC: 6.2 NG/ML
UNIT ABO: NORMAL
UNIT NUMBER: NORMAL
UNIT RH: NORMAL
UNIT VOLUME: 281
WBC # BLD AUTO: 0.1 X10*3/UL (ref 4.4–11.3)

## 2024-10-25 PROCEDURE — 80197 ASSAY OF TACROLIMUS: CPT | Performed by: INTERNAL MEDICINE

## 2024-10-25 PROCEDURE — 83735 ASSAY OF MAGNESIUM: CPT

## 2024-10-25 PROCEDURE — 2500000004 HC RX 250 GENERAL PHARMACY W/ HCPCS (ALT 636 FOR OP/ED): Mod: JZ | Performed by: PSYCHOLOGIST

## 2024-10-25 PROCEDURE — 85384 FIBRINOGEN ACTIVITY: CPT

## 2024-10-25 PROCEDURE — 84100 ASSAY OF PHOSPHORUS: CPT

## 2024-10-25 PROCEDURE — 2500000001 HC RX 250 WO HCPCS SELF ADMINISTERED DRUGS (ALT 637 FOR MEDICARE OP): Performed by: INTERNAL MEDICINE

## 2024-10-25 PROCEDURE — 80053 COMPREHEN METABOLIC PANEL: CPT

## 2024-10-25 PROCEDURE — 2500000002 HC RX 250 W HCPCS SELF ADMINISTERED DRUGS (ALT 637 FOR MEDICARE OP, ALT 636 FOR OP/ED): Performed by: STUDENT IN AN ORGANIZED HEALTH CARE EDUCATION/TRAINING PROGRAM

## 2024-10-25 PROCEDURE — 2500000005 HC RX 250 GENERAL PHARMACY W/O HCPCS

## 2024-10-25 PROCEDURE — 2500000002 HC RX 250 W HCPCS SELF ADMINISTERED DRUGS (ALT 637 FOR MEDICARE OP, ALT 636 FOR OP/ED): Performed by: INTERNAL MEDICINE

## 2024-10-25 PROCEDURE — 2500000001 HC RX 250 WO HCPCS SELF ADMINISTERED DRUGS (ALT 637 FOR MEDICARE OP)

## 2024-10-25 PROCEDURE — 2500000004 HC RX 250 GENERAL PHARMACY W/ HCPCS (ALT 636 FOR OP/ED): Mod: JZ | Performed by: PHYSICIAN ASSISTANT

## 2024-10-25 PROCEDURE — 85025 COMPLETE CBC W/AUTO DIFF WBC: CPT

## 2024-10-25 PROCEDURE — 2500000004 HC RX 250 GENERAL PHARMACY W/ HCPCS (ALT 636 FOR OP/ED): Performed by: NURSE PRACTITIONER

## 2024-10-25 PROCEDURE — 2500000001 HC RX 250 WO HCPCS SELF ADMINISTERED DRUGS (ALT 637 FOR MEDICARE OP): Performed by: NURSE PRACTITIONER

## 2024-10-25 PROCEDURE — 2500000002 HC RX 250 W HCPCS SELF ADMINISTERED DRUGS (ALT 637 FOR MEDICARE OP, ALT 636 FOR OP/ED): Performed by: PHYSICIAN ASSISTANT

## 2024-10-25 PROCEDURE — 1170000001 HC PRIVATE ONCOLOGY ROOM DAILY

## 2024-10-25 PROCEDURE — 2500000001 HC RX 250 WO HCPCS SELF ADMINISTERED DRUGS (ALT 637 FOR MEDICARE OP): Performed by: STUDENT IN AN ORGANIZED HEALTH CARE EDUCATION/TRAINING PROGRAM

## 2024-10-25 PROCEDURE — 99233 SBSQ HOSP IP/OBS HIGH 50: CPT | Performed by: STUDENT IN AN ORGANIZED HEALTH CARE EDUCATION/TRAINING PROGRAM

## 2024-10-25 PROCEDURE — 85730 THROMBOPLASTIN TIME PARTIAL: CPT

## 2024-10-25 PROCEDURE — 2500000004 HC RX 250 GENERAL PHARMACY W/ HCPCS (ALT 636 FOR OP/ED): Mod: JZ | Performed by: INTERNAL MEDICINE

## 2024-10-25 RX ORDER — POTASSIUM CHLORIDE 29.8 MG/ML
40 INJECTION INTRAVENOUS ONCE
Status: COMPLETED | OUTPATIENT
Start: 2024-10-25 | End: 2024-10-25

## 2024-10-25 RX ORDER — CEFEPIME HYDROCHLORIDE 2 G/50ML
2 INJECTION, SOLUTION INTRAVENOUS EVERY 8 HOURS
Status: DISCONTINUED | OUTPATIENT
Start: 2024-10-25 | End: 2024-10-26

## 2024-10-25 RX ORDER — MAGNESIUM SULFATE HEPTAHYDRATE 40 MG/ML
4 INJECTION, SOLUTION INTRAVENOUS ONCE
Status: COMPLETED | OUTPATIENT
Start: 2024-10-25 | End: 2024-10-25

## 2024-10-25 RX ORDER — TACROLIMUS 0.5 MG/1
0.5 CAPSULE ORAL
Status: DISCONTINUED | OUTPATIENT
Start: 2024-10-25 | End: 2024-10-28

## 2024-10-25 ASSESSMENT — COGNITIVE AND FUNCTIONAL STATUS - GENERAL
MOBILITY SCORE: 24
DAILY ACTIVITIY SCORE: 24
DAILY ACTIVITIY SCORE: 24

## 2024-10-25 ASSESSMENT — PAIN SCALES - GENERAL: PAINLEVEL_OUTOF10: 0 - NO PAIN

## 2024-10-25 NOTE — ASSESSMENT & PLAN NOTE
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      T+36  Single-umbilical cord transplant (T0=9/19/24)       ONC  # MDS/AML   - Symptoms began 9/2023; diagnosed 4/2024  - BMBx showing MDS in transition to AML (>10% jaime) w/ trisomy 8, DNMT alpha, and U2AF1 mutation indication adverse risk   - s/p 4 cycles of Decitabine/Venetoclax (C4D1 on 8/12/24)  - BMBx 6/17/24: Blasts cleared, FISH still positive for trisomy 8, still MDS changes   - BMBx 9/5/24: 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 and no increase in blast  - persistent lack of engraftment, now past expected median engraftment date for single cord (d21)  - will send out plasma CXCL9 level to [place] to assess for immunologic graft rejection-(phone of lab: [phone]): will be done later today, 10/16 per lab   - Repeat BMBx done on 10/15-hypocellular with no residual myeloid neoplasm, Chimerism 1% Donor , 99% Recipient   - Tentative plan: proceed with haplo transplant from sister (discussed 10/23)  -  Plan to give Daratumumab  per Dr. Newsome ,  before next transplant to reduce antibodies, found during cord transplant      # Transplant  CONDITIONING Fludarabine, melphalan, and TBI   DONOR Single umbilical cord   MATCH GRADE 6/8   SEX MATCH Mismatched   ABO DONOR A pos   ABO RECIPIENT A pos   GVHD PROPHYLAXIS Tacrolimus and Mycophenolate   GRAFT SOURCE Single umbilical cord   CMV DONOR negative   CMV RECIPIENT positive      # GVHD prophy  - Cont Tacro 1.5 mg BID  -Tacrolimus level 8.1 (10/22)  - Check levels daily  - Currently weaning off before Haplo transplant , 10/25  decreased to 0.5mg BID  - stopped mycophenolate d/t concern for myelosuppression (10/11)  - Viral surveillance qMondays --> T+14 and T+20 all negative (EBV, CMV, adeno, HHV6)     HEME  # Pancytopenia: secondary to  chemotherapy  # 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   # Prophylaxis: Acyclovir, Vancomycin, Posaconazole, Letermovir (started T+14), and Bactrim (to start at discharge), Ursodiol      # Maculopapular rash (9/27), now resolved, likely d/t Meropenem  - appearance highly suggestive of meropenem reaction vs pre-engraftment syndrome  - maculopapular on inner thighs, non-itching, non-painful, blanching--resolved  - will continue to monitor      CARDIO/PULM:  # Hx of STEMI (11/11/2020)  - TTE 7/19/24: EF 59%, otherwise unremarkable   - Was cleared by Cardiology for transplant   # Hx of HTN   - Nifedipine: 90 mEq ER 24 hr tablet   # Ascending aorta dilation   - Echo showing 3.8-4 cm dilation      FEN/RENAL/GI  - Admit wt: 75.6 kg (9/13/24), Most recent wt: 70kg (10/20).   - Allergic to Lasix, will not take med (extreme hypotension, extreme hypokalemia)   # Chemo induced Diarrhea  - Cdiff & stool path neg (9/22)  - etiology: likely melphalan-related mucositis/enteritis  - Cont loperamide 2mg q6h PRN  # Monitor electrolytes and replace prn  # Hx of treated Hep C in 2015  - (9/12) Hep C RNA - not detected      :  # Hx of BPH and urinary retention (2023)  - Hx elevated PSA    - 7/28/21 Bx: atypical small acinar proliferation    - 4/7/23 Bx: Benign    NEURO/PSYCH:  #Tremor vs fasciculation  -since 10/10, pt reported tremor that was not present prior to admission  -on exam, fine, high-frequency (7+ Hz) tremor worst in the hands, exacerbated by effort, associated with significant fasciculations of  the shoulder and back muscles; also fasciculations in jaw  -strength normal, reflexes 2+  -etiology: hypocalcemia, hypomagnesemia, drug side effect (tacro, cefepime, compazine), primary neurologic disease, essential tremor  -cefepime stopped 10/10  -tacrolimus level wnl, unlikely  -hold compazine for now  -replete magnesium to >2  -if not improving, consider neurology consult     DISPO:  - Full Code  - NOK: spouse Genevieve ([phone])  - PICC  - Oncologist: Jerod         Pt seen examined and discussed with Dr. Hernan Rebolledo

## 2024-10-25 NOTE — CARE PLAN
The clinical goals for the shift include pt will remain safe and HDS throughout shift      Problem: Pain - Adult  Goal: Verbalizes/displays adequate comfort level or baseline comfort level  Outcome: Progressing     Problem: Safety - Adult  Goal: Free from fall injury  Outcome: Progressing     Problem: Discharge Planning  Goal: Discharge to home or other facility with appropriate resources  Outcome: Progressing     Problem: Chronic Conditions and Co-morbidities  Goal: Patient's chronic conditions and co-morbidity symptoms are monitored and maintained or improved  Outcome: Progressing     Problem: Nutrition  Goal: Oral intake greater 75%  Outcome: Progressing  Goal: Adequate PO fluid intake  Outcome: Progressing  Goal: Maintain stable weight  Outcome: Progressing

## 2024-10-25 NOTE — PROGRESS NOTES
"Brandt Merritt is a 64 y.o. male on day 42 of admission presenting with Acute myeloid leukemia in remission (Multi).    Subjective   Afebrile.   Patient  remains stable.   Had some nausea this morning but managed with zofran.    No new complaints.      Objective     Physical Exam  Constitutional:       General: He is not in acute distress.  HENT:      Head: Normocephalic and atraumatic.      Nose: Nose normal.      Mouth/Throat:      Mouth: Mucous membranes are moist.      Pharynx: Oropharynx is clear. No oropharyngeal exudate.   Eyes:      General: No scleral icterus.     Extraocular Movements: Extraocular movements intact.      Pupils: Pupils are equal, round, and reactive to light.   Cardiovascular:      Rate and Rhythm: Normal rate and regular rhythm.      Heart sounds: No murmur heard.     No gallop.   Pulmonary:      Effort: No respiratory distress.      Breath sounds: Normal breath sounds. No wheezing or rhonchi.   Abdominal:      General: Bowel sounds are normal. There is no distension.      Palpations: Abdomen is soft.      Tenderness: There is no abdominal tenderness.   Musculoskeletal:         General: Normal range of motion.      Cervical back: Normal range of motion.      Right lower leg: No edema.      Left lower leg: No edema.   Skin:     General: Skin is warm and dry.   Neurological:      General: No focal deficit present.      Mental Status: He is alert and oriented to person, place, and time.      Motor: No weakness.   Psychiatric:         Mood and Affect: Mood normal.         Behavior: Behavior normal.         Last Recorded Vitals  Blood pressure 94/69, pulse 99, temperature 37.1 °C (98.8 °F), temperature source Temporal, resp. rate 18, height (S) 1.664 m (5' 5.51\"), weight 70 kg (154 lb 5.2 oz), SpO2 98%.  Intake/Output last 3 Shifts:  I/O last 3 completed shifts:  In: 580 (8.3 mL/kg) [P.O.:480; IV Piggyback:100]  Out: - (0 mL/kg)   Weight: 70 kg     Relevant Results  Scheduled " medications  acyclovir, 400 mg, oral, q12h  cefepime, 2 g, intravenous, q8h  filgrastim or biosimilar, 300 mcg, subcutaneous, q24h  folic acid, 1 mg, oral, Daily  letermovir, 480 mg, oral, Daily  magnesium chloride, 64 mg, oral, BID  NIFEdipine ER, 90 mg, oral, Daily before breakfast  nystatin, 5 mL, Swish & Spit, 4x daily  posaconazole, 300 mg, oral, q24h  tacrolimus, 0.5 mg, oral, q12h JALYN  ursodiol, 300 mg, oral, TID  vancomycin, 125 mg, oral, Daily      Continuous medications     PRN medications  PRN medications: acetaminophen, albuterol, alteplase, dextrose, diphenhydrAMINE, EPINEPHrine HCl, famotidine, loperamide, methylPREDNISolone sodium succinate (PF), ondansetron, sodium chloride    Assessment/Plan   Assessment & Plan  Acute myeloid leukemia in remission (Multi)    Stem cells transplant status (Multi)    Immunocompromised    Conditioning chemotherapy prior to peripheral blood stem cell transplant    Brandt Merritt is a 64 y.o male with PMHx htn, Hgb C, Hep C (treated), 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      T+36  Single-umbilical cord transplant (T0=9/19/24)       ONC  # MDS/AML   - Symptoms began 9/2023; diagnosed 4/2024  - BMBx showing MDS in transition to AML (>10% jaime) w/ trisomy 8, DNMT alpha, and U2AF1 mutation indication adverse risk   - s/p 4 cycles of Decitabine/Venetoclax (C4D1 on 8/12/24)  - BMBx 6/17/24: Blasts cleared, FISH still positive for trisomy 8, still MDS changes   - BMBx 9/5/24: 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 and no increase in blast  - persistent lack of engraftment, now past expected median engraftment date for single cord (d21)  - will send out plasma CXCL9 level to [place] to assess for immunologic graft rejection-(phone of lab: [phone]): will be done later today, 10/16 per lab   - Repeat BMBx done on 10/15-hypocellular with no residual myeloid neoplasm,  Chimerism 1% Donor , 99% Recipient   - Tentative plan: proceed with haplo transplant from sister (discussed 10/23)  -  Plan to give Daratumumab  per Dr. Newsome ,  before next transplant to reduce antibodies, found during cord transplant      # Transplant  CONDITIONING Fludarabine, melphalan, and TBI   DONOR Single umbilical cord   MATCH GRADE 6/8   SEX MATCH Mismatched   ABO DONOR A pos   ABO RECIPIENT A pos   GVHD PROPHYLAXIS Tacrolimus and Mycophenolate   GRAFT SOURCE Single umbilical cord   CMV DONOR negative   CMV RECIPIENT positive      # GVHD prophy  - Cont Tacro 1.5 mg BID  -Tacrolimus level 8.1 (10/22)  - Check levels daily  - Currently weaning off before Haplo transplant , 10/25  decreased to 0.5mg BID  - stopped mycophenolate d/t concern for myelosuppression (10/11)  - Viral surveillance qMondays --> T+14 and T+20 all negative (EBV, CMV, adeno, HHV6)     HEME  # Pancytopenia: secondary to chemotherapy  # 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   # Prophylaxis: Acyclovir, Vancomycin, Posaconazole, Letermovir (started T+14), and Bactrim (to start at discharge), Ursodiol      # Maculopapular rash (9/27), now resolved, likely d/t Meropenem  - appearance highly suggestive of meropenem reaction vs pre-engraftment syndrome  - maculopapular on inner thighs, non-itching, non-painful, blanching--resolved  - will continue to monitor      CARDIO/PULM:  # Hx of STEMI (11/11/2020)  - TTE 7/19/24: EF 59%, otherwise unremarkable   - Was cleared by Cardiology for transplant   # Hx of HTN   -  Nifedipine: 90 mEq ER 24 hr tablet   # Ascending aorta dilation   - Echo showing 3.8-4 cm dilation      FEN/RENAL/GI  - Admit wt: 75.6 kg (9/13/24), Most recent wt: 70kg (10/20).   - Allergic to Lasix, will not take med (extreme hypotension, extreme hypokalemia)   # Chemo induced Diarrhea  - Cdiff & stool path neg (9/22)  - etiology: likely melphalan-related mucositis/enteritis  - Cont loperamide 2mg q6h PRN  # Monitor electrolytes and replace prn  # Hx of treated Hep C in 2015  - (9/12) Hep C RNA - not detected      :  # Hx of BPH and urinary retention (2023)  - Hx elevated PSA    - 7/28/21 Bx: atypical small acinar proliferation    - 4/7/23 Bx: Benign    NEURO/PSYCH:  #Tremor vs fasciculation  -since 10/10, pt reported tremor that was not present prior to admission  -on exam, fine, high-frequency (7+ Hz) tremor worst in the hands, exacerbated by effort, associated with significant fasciculations of the shoulder and back muscles; also fasciculations in jaw  -strength normal, reflexes 2+  -etiology: hypocalcemia, hypomagnesemia, drug side effect (tacro, cefepime, compazine), primary neurologic disease, essential tremor  -cefepime stopped 10/10  -tacrolimus level wnl, unlikely  -hold compazine for now  -replete magnesium to >2  -if not improving, consider neurology consult     DISPO:  - Full Code  - NOK: spouse Genevieve ([phone])  - PICC  - Oncologist: Jerod         Pt seen examined and discussed with Dr. Hernan Mitchell, APRN-CNP

## 2024-10-26 LAB
ABO GROUP (TYPE) IN BLOOD: NORMAL
ALBUMIN SERPL BCP-MCNC: 3.2 G/DL (ref 3.4–5)
ALP SERPL-CCNC: 73 U/L (ref 33–136)
ALT SERPL W P-5'-P-CCNC: 8 U/L (ref 10–52)
ANION GAP SERPL CALC-SCNC: 14 MMOL/L (ref 10–20)
ANTIBODY SCREEN: NORMAL
APTT PPP: 30 SECONDS (ref 27–38)
AST SERPL W P-5'-P-CCNC: 9 U/L (ref 9–39)
BACTERIA BLD CULT: NORMAL
BACTERIA BLD CULT: NORMAL
BASOPHILS # BLD AUTO: 0 X10*3/UL (ref 0–0.1)
BASOPHILS NFR BLD AUTO: 0 %
BILIRUB SERPL-MCNC: 0.6 MG/DL (ref 0–1.2)
BLOOD EXPIRATION DATE: NORMAL
BUN SERPL-MCNC: 9 MG/DL (ref 6–23)
CALCIUM SERPL-MCNC: 8.1 MG/DL (ref 8.6–10.6)
CHLORIDE SERPL-SCNC: 103 MMOL/L (ref 98–107)
CO2 SERPL-SCNC: 25 MMOL/L (ref 21–32)
CREAT SERPL-MCNC: 0.87 MG/DL (ref 0.5–1.3)
DISPENSE STATUS: NORMAL
EGFRCR SERPLBLD CKD-EPI 2021: >90 ML/MIN/1.73M*2
EOSINOPHIL # BLD AUTO: 0 X10*3/UL (ref 0–0.7)
EOSINOPHIL NFR BLD AUTO: 0 %
ERYTHROCYTE [DISTWIDTH] IN BLOOD BY AUTOMATED COUNT: 15.4 % (ref 11.5–14.5)
FIBRINOGEN PPP-MCNC: 413 MG/DL (ref 200–400)
GLUCOSE SERPL-MCNC: 104 MG/DL (ref 74–99)
HCT VFR BLD AUTO: 18.8 % (ref 41–52)
HGB BLD-MCNC: 6.7 G/DL (ref 13.5–17.5)
IMM GRANULOCYTES # BLD AUTO: 0 X10*3/UL (ref 0–0.7)
IMM GRANULOCYTES NFR BLD AUTO: 0 % (ref 0–0.9)
INR PPP: 1.2 (ref 0.9–1.1)
LYMPHOCYTES # BLD AUTO: 0.06 X10*3/UL (ref 1.2–4.8)
LYMPHOCYTES NFR BLD AUTO: 85.7 %
MAGNESIUM SERPL-MCNC: 1.58 MG/DL (ref 1.6–2.4)
MCH RBC QN AUTO: 26.6 PG (ref 26–34)
MCHC RBC AUTO-ENTMCNC: 35.6 G/DL (ref 32–36)
MCV RBC AUTO: 75 FL (ref 80–100)
MONOCYTES # BLD AUTO: 0 X10*3/UL (ref 0.1–1)
MONOCYTES NFR BLD AUTO: 0 %
NEUTROPHILS # BLD AUTO: 0.01 X10*3/UL (ref 1.2–7.7)
NEUTROPHILS NFR BLD AUTO: 14.3 %
NRBC BLD-RTO: 0 /100 WBCS (ref 0–0)
PHOSPHATE SERPL-MCNC: 3.8 MG/DL (ref 2.5–4.9)
PLATELET # BLD AUTO: 17 X10*3/UL (ref 150–450)
POTASSIUM SERPL-SCNC: 3.7 MMOL/L (ref 3.5–5.3)
PRODUCT BLOOD TYPE: 9500
PRODUCT CODE: NORMAL
PROT SERPL-MCNC: 6.1 G/DL (ref 6.4–8.2)
PROTHROMBIN TIME: 13 SECONDS (ref 9.8–12.8)
RBC # BLD AUTO: 2.52 X10*6/UL (ref 4.5–5.9)
RH FACTOR (ANTIGEN D): NORMAL
SODIUM SERPL-SCNC: 138 MMOL/L (ref 136–145)
TACROLIMUS BLD-MCNC: 5 NG/ML
UNIT ABO: NORMAL
UNIT NUMBER: NORMAL
UNIT RH: NORMAL
UNIT VOLUME: 329
WBC # BLD AUTO: 0.1 X10*3/UL (ref 4.4–11.3)
XM INTEP: NORMAL

## 2024-10-26 PROCEDURE — 84100 ASSAY OF PHOSPHORUS: CPT

## 2024-10-26 PROCEDURE — 85025 COMPLETE CBC W/AUTO DIFF WBC: CPT

## 2024-10-26 PROCEDURE — 85730 THROMBOPLASTIN TIME PARTIAL: CPT

## 2024-10-26 PROCEDURE — 2500000001 HC RX 250 WO HCPCS SELF ADMINISTERED DRUGS (ALT 637 FOR MEDICARE OP): Performed by: INTERNAL MEDICINE

## 2024-10-26 PROCEDURE — 2500000001 HC RX 250 WO HCPCS SELF ADMINISTERED DRUGS (ALT 637 FOR MEDICARE OP): Performed by: STUDENT IN AN ORGANIZED HEALTH CARE EDUCATION/TRAINING PROGRAM

## 2024-10-26 PROCEDURE — 2500000005 HC RX 250 GENERAL PHARMACY W/O HCPCS

## 2024-10-26 PROCEDURE — 36430 TRANSFUSION BLD/BLD COMPNT: CPT

## 2024-10-26 PROCEDURE — 80197 ASSAY OF TACROLIMUS: CPT | Performed by: INTERNAL MEDICINE

## 2024-10-26 PROCEDURE — 2500000004 HC RX 250 GENERAL PHARMACY W/ HCPCS (ALT 636 FOR OP/ED): Performed by: NURSE PRACTITIONER

## 2024-10-26 PROCEDURE — 2500000001 HC RX 250 WO HCPCS SELF ADMINISTERED DRUGS (ALT 637 FOR MEDICARE OP): Performed by: NURSE PRACTITIONER

## 2024-10-26 PROCEDURE — 2500000004 HC RX 250 GENERAL PHARMACY W/ HCPCS (ALT 636 FOR OP/ED): Mod: JZ | Performed by: INTERNAL MEDICINE

## 2024-10-26 PROCEDURE — 80053 COMPREHEN METABOLIC PANEL: CPT

## 2024-10-26 PROCEDURE — 99232 SBSQ HOSP IP/OBS MODERATE 35: CPT | Performed by: INTERNAL MEDICINE

## 2024-10-26 PROCEDURE — 2500000001 HC RX 250 WO HCPCS SELF ADMINISTERED DRUGS (ALT 637 FOR MEDICARE OP)

## 2024-10-26 PROCEDURE — 2500000004 HC RX 250 GENERAL PHARMACY W/ HCPCS (ALT 636 FOR OP/ED)

## 2024-10-26 PROCEDURE — 85384 FIBRINOGEN ACTIVITY: CPT

## 2024-10-26 PROCEDURE — 2500000002 HC RX 250 W HCPCS SELF ADMINISTERED DRUGS (ALT 637 FOR MEDICARE OP, ALT 636 FOR OP/ED): Performed by: INTERNAL MEDICINE

## 2024-10-26 PROCEDURE — 2500000004 HC RX 250 GENERAL PHARMACY W/ HCPCS (ALT 636 FOR OP/ED): Mod: JZ | Performed by: PSYCHOLOGIST

## 2024-10-26 PROCEDURE — 83735 ASSAY OF MAGNESIUM: CPT

## 2024-10-26 PROCEDURE — 2500000002 HC RX 250 W HCPCS SELF ADMINISTERED DRUGS (ALT 637 FOR MEDICARE OP, ALT 636 FOR OP/ED): Performed by: STUDENT IN AN ORGANIZED HEALTH CARE EDUCATION/TRAINING PROGRAM

## 2024-10-26 PROCEDURE — 2500000002 HC RX 250 W HCPCS SELF ADMINISTERED DRUGS (ALT 637 FOR MEDICARE OP, ALT 636 FOR OP/ED): Performed by: PHYSICIAN ASSISTANT

## 2024-10-26 PROCEDURE — 1170000001 HC PRIVATE ONCOLOGY ROOM DAILY

## 2024-10-26 PROCEDURE — 86920 COMPATIBILITY TEST SPIN: CPT

## 2024-10-26 PROCEDURE — P9040 RBC LEUKOREDUCED IRRADIATED: HCPCS

## 2024-10-26 RX ORDER — LEVOFLOXACIN 500 MG/1
500 TABLET, FILM COATED ORAL EVERY 24 HOURS
Status: DISCONTINUED | OUTPATIENT
Start: 2024-10-26 | End: 2024-11-04

## 2024-10-26 RX ORDER — MAGNESIUM SULFATE HEPTAHYDRATE 40 MG/ML
2 INJECTION, SOLUTION INTRAVENOUS ONCE
Status: COMPLETED | OUTPATIENT
Start: 2024-10-26 | End: 2024-10-26

## 2024-10-26 ASSESSMENT — COGNITIVE AND FUNCTIONAL STATUS - GENERAL
DAILY ACTIVITIY SCORE: 24
MOBILITY SCORE: 24

## 2024-10-26 ASSESSMENT — PAIN SCALES - GENERAL: PAINLEVEL_OUTOF10: 0 - NO PAIN

## 2024-10-26 NOTE — PROGRESS NOTES
"Brandt Merritt is a 64 y.o. male on day 43 of admission presenting with Acute myeloid leukemia in remission (Multi).    Subjective   Afebrile. Feeling well, no complaints. Nausea controlled with zofran.  Denies CP, SOB, abd pain, diarrhea/constipation.     Objective   Physical Exam  Constitutional:       General: He is not in acute distress.  HENT:      Head: Normocephalic and atraumatic.      Nose: Nose normal.      Mouth/Throat:      Mouth: Mucous membranes are moist.   Eyes:      General: No scleral icterus.     Extraocular Movements: Extraocular movements intact.      Pupils: Pupils are equal, round, and reactive to light.   Cardiovascular:      Rate and Rhythm: Normal rate and regular rhythm.   Pulmonary:      Effort: Pulmonary effort is normal. No respiratory distress.      Breath sounds: Normal breath sounds.   Abdominal:      General: Bowel sounds are normal. There is no distension.      Palpations: Abdomen is soft.      Tenderness: There is no abdominal tenderness.   Musculoskeletal:         General: Normal range of motion.      Right lower leg: No edema.      Left lower leg: No edema.   Skin:     General: Skin is warm and dry.   Neurological:      General: No focal deficit present.      Mental Status: He is alert and oriented to person, place, and time.   Psychiatric:         Mood and Affect: Mood normal.         Behavior: Behavior normal.     Last Recorded Vitals  Blood pressure 103/65, pulse 96, temperature 36.8 °C (98.2 °F), resp. rate 16, height (S) 1.664 m (5' 5.51\"), weight 70 kg (154 lb 5.2 oz), SpO2 98%.  Intake/Output last 3 Shifts:  I/O last 3 completed shifts:  In: 2002.5 (28.6 mL/kg) [P.O.:480; I.V.:1022.5 (14.6 mL/kg); IV Piggyback:500]  Out: - (0 mL/kg)   Weight: 70 kg     Relevant Results  Scheduled medications  acyclovir, 400 mg, oral, q12h  cefepime, 2 g, intravenous, q8h  filgrastim or biosimilar, 300 mcg, subcutaneous, q24h  folic acid, 1 mg, oral, Daily  letermovir, 480 mg, oral, " Daily  magnesium chloride, 64 mg, oral, BID  NIFEdipine ER, 90 mg, oral, Daily before breakfast  nystatin, 5 mL, Swish & Spit, 4x daily  posaconazole, 300 mg, oral, q24h  tacrolimus, 0.5 mg, oral, q12h JALYN  ursodiol, 300 mg, oral, TID  vancomycin, 125 mg, oral, Daily      Continuous medications     PRN medications  PRN medications: acetaminophen, albuterol, alteplase, dextrose, diphenhydrAMINE, EPINEPHrine HCl, famotidine, loperamide, methylPREDNISolone sodium succinate (PF), ondansetron, sodium chloride    Assessment/Plan     Brandt Merritt is a 64 y.o male with PMHx of HTN, Hgb C, Hep C (treated), 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) w/ trisomy 8, DNMT alpha, and U2AF1 mutation indication adverse risk   - s/p 4 cycles of Decitabine/Venetoclax (C4D1 on 8/12/24)  - BMBx 6/17/24: Blasts cleared, FISH still positive for trisomy 8, still MDS changes   - BMBx 9/5/24: 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 and no increase in blast  - persistent lack of engraftment, now past expected median engraftment date for single cord (d21)  - will send out plasma CXCL9 level to [place] to assess for immunologic graft rejection-(phone of lab: [phone]): will be done later today, 10/16 per lab   - Repeat BMBx (10/15): hypocellular with no residual myeloid neoplasm. Chimerism 1% Donor, 99% Recipient   - Tentative plan: proceed with haplo transplant from sister (discussed 10/23)  - Plan to give Daratumumab per Dr. Newsome before next transplant to reduce antibodies found during cord transplant      # Transplant  CONDITIONING Fludarabine, melphalan, and TBI   DONOR Single umbilical cord   MATCH GRADE 6/8   SEX MATCH Mismatched   ABO DONOR A pos   ABO RECIPIENT  A pos   GVHD PROPHYLAXIS Tacrolimus and Mycophenolate   GRAFT SOURCE Single umbilical cord   CMV DONOR negative   CMV RECIPIENT positive      # GVHD prophy  - Currently weaning tacrolimus off before Haplo transplant; dose decreased to 0.5mg BID on 10/25   - stopped mycophenolate d/t concern for myelosuppression (10/11)    # Viral surveillance qMonday    EBV: detected, quant 77 (10/21)  CMV: ND (10/21)  Adeno: ND (10/21)  HHV6: ND (10/21)     HEME  # Pancytopenia: secondary to chemotherapy  # 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   - Transfuse for Hgb <7 & PLT <10   - Neupogen 300 mcg started on T+5, cont until WBC recovery  # VTE Prophylaxis: SCDs & Ambulation     ID  #PPX: Acyclovir, Levaquin, Posaconazole, Vancomycin, Letermovir (started T+14), Ursodiol   - pentamidine ordered 10/26  # Neutropenic fever (9/25)  - Fever w/ diarrhea, workup neg, treated with Josefina initially with rash, switched to Cefepime (9/27-10/10)  - CT chest/abd/pelvis only with diarrhea, no other findings  # Maculopapular rash (9/27), now resolved, likely d/t Meropenem  - appearance highly suggestive of meropenem reaction vs pre-engraftment syndrome  - maculopapular on inner thighs, non-itching, non-painful, blanching--resolved  - will continue to monitor   # Recurrent neutropenic fever (10/22)  - repeat bld cxs (10/22) negative  - s/p Cefepime (10/22-10/26)     CARDIO/PULM:  # Hx of STEMI (11/11/2020)  - TTE 7/19/24: EF 59%, otherwise unremarkable   - Was cleared by Cardiology for transplant   # Hx of HTN   - Nifedipine: 90 mEq ER 24 hr tablet   # Ascending aorta dilation   - Echo showing 3.8-4 cm dilation      FEN/RENAL/GI  - Admit wt: 75.6 kg (9/13/24), Most recent wt: 70kg (10/20).   - Allergic to Lasix, will not take med (extreme hypotension, extreme hypokalemia)   # Chemo induced Diarrhea  - Cdiff & stool path neg (9/22)  - etiology: likely melphalan-related mucositis/enteritis  - Cont loperamide 2mg q6h PRN  # Monitor electrolytes and replace prn  # Hx  of treated Hep C in 2015  - (9/12) Hep C RNA - not detected      :  # Hx of BPH and urinary retention (2023)  # Hx elevated PSA    - 7/28/21 Bx: atypical small acinar proliferation    - 4/7/23 Bx: Benign    NEURO/PSYCH:  #Tremor vs fasciculation  -since 10/10, pt reported tremor that was not present prior to admission  -on exam, fine, high-frequency (7+ Hz) tremor worst in the hands, exacerbated by effort, associated with significant fasciculations of the shoulder and back muscles; also fasciculations in jaw  -strength normal, reflexes 2+  -etiology: hypocalcemia, hypomagnesemia, drug side effect (tacro, cefepime, compazine), primary neurologic disease, essential tremor  -cefepime stopped 10/10  -tacrolimus level wnl, unlikely  -hold compazine for now  -replete magnesium to >2  -if not improving, consider neurology consult     DISPO:  - Full Code  - NOK: spouse Genevieve ([phone])  - PICC  - Oncologist: Dr. Newsome    Pt seen examined and discussed with Dr. Daisy Weston PA-C

## 2024-10-27 VITALS
BODY MASS INDEX: 24.8 KG/M2 | HEART RATE: 99 BPM | OXYGEN SATURATION: 97 % | WEIGHT: 154.32 LBS | TEMPERATURE: 99 F | DIASTOLIC BLOOD PRESSURE: 72 MMHG | RESPIRATION RATE: 15 BRPM | SYSTOLIC BLOOD PRESSURE: 114 MMHG | HEIGHT: 66 IN

## 2024-10-27 LAB
ALBUMIN SERPL BCP-MCNC: 3.3 G/DL (ref 3.4–5)
ALP SERPL-CCNC: 69 U/L (ref 33–136)
ALT SERPL W P-5'-P-CCNC: 7 U/L (ref 10–52)
ANION GAP SERPL CALC-SCNC: 13 MMOL/L (ref 10–20)
APTT PPP: 29 SECONDS (ref 27–38)
AST SERPL W P-5'-P-CCNC: 11 U/L (ref 9–39)
BASOPHILS # BLD AUTO: 0 X10*3/UL (ref 0–0.1)
BASOPHILS NFR BLD AUTO: 0 %
BILIRUB SERPL-MCNC: 0.9 MG/DL (ref 0–1.2)
BUN SERPL-MCNC: 9 MG/DL (ref 6–23)
CALCIUM SERPL-MCNC: 7.8 MG/DL (ref 8.6–10.6)
CHLORIDE SERPL-SCNC: 101 MMOL/L (ref 98–107)
CO2 SERPL-SCNC: 26 MMOL/L (ref 21–32)
CREAT SERPL-MCNC: 0.8 MG/DL (ref 0.5–1.3)
EGFRCR SERPLBLD CKD-EPI 2021: >90 ML/MIN/1.73M*2
EOSINOPHIL # BLD AUTO: 0 X10*3/UL (ref 0–0.7)
EOSINOPHIL NFR BLD AUTO: 0 %
ERYTHROCYTE [DISTWIDTH] IN BLOOD BY AUTOMATED COUNT: 14.8 % (ref 11.5–14.5)
FIBRINOGEN PPP-MCNC: 487 MG/DL (ref 200–400)
GLUCOSE SERPL-MCNC: 102 MG/DL (ref 74–99)
HCT VFR BLD AUTO: 20.7 % (ref 41–52)
HGB BLD-MCNC: 7.5 G/DL (ref 13.5–17.5)
IMM GRANULOCYTES # BLD AUTO: 0 X10*3/UL (ref 0–0.7)
IMM GRANULOCYTES NFR BLD AUTO: 0 % (ref 0–0.9)
INR PPP: 1.2 (ref 0.9–1.1)
LYMPHOCYTES # BLD AUTO: 0.08 X10*3/UL (ref 1.2–4.8)
LYMPHOCYTES NFR BLD AUTO: 88.9 %
MAGNESIUM SERPL-MCNC: 1.4 MG/DL (ref 1.6–2.4)
MCH RBC QN AUTO: 26.9 PG (ref 26–34)
MCHC RBC AUTO-ENTMCNC: 36.2 G/DL (ref 32–36)
MCV RBC AUTO: 74 FL (ref 80–100)
MONOCYTES # BLD AUTO: 0 X10*3/UL (ref 0.1–1)
MONOCYTES NFR BLD AUTO: 0 %
NEUTROPHILS # BLD AUTO: 0.01 X10*3/UL (ref 1.2–7.7)
NEUTROPHILS NFR BLD AUTO: 11.1 %
NRBC BLD-RTO: 0 /100 WBCS (ref 0–0)
PHOSPHATE SERPL-MCNC: 3.4 MG/DL (ref 2.5–4.9)
PLATELET # BLD AUTO: 21 X10*3/UL (ref 150–450)
POTASSIUM SERPL-SCNC: 3.7 MMOL/L (ref 3.5–5.3)
PROT SERPL-MCNC: 6.2 G/DL (ref 6.4–8.2)
PROTHROMBIN TIME: 13.8 SECONDS (ref 9.8–12.8)
RBC # BLD AUTO: 2.79 X10*6/UL (ref 4.5–5.9)
SODIUM SERPL-SCNC: 136 MMOL/L (ref 136–145)
TACROLIMUS BLD-MCNC: 4.8 NG/ML
WBC # BLD AUTO: 0.1 X10*3/UL (ref 4.4–11.3)

## 2024-10-27 PROCEDURE — 2500000002 HC RX 250 W HCPCS SELF ADMINISTERED DRUGS (ALT 637 FOR MEDICARE OP, ALT 636 FOR OP/ED): Performed by: STUDENT IN AN ORGANIZED HEALTH CARE EDUCATION/TRAINING PROGRAM

## 2024-10-27 PROCEDURE — 2500000004 HC RX 250 GENERAL PHARMACY W/ HCPCS (ALT 636 FOR OP/ED): Mod: JZ | Performed by: INTERNAL MEDICINE

## 2024-10-27 PROCEDURE — 2500000002 HC RX 250 W HCPCS SELF ADMINISTERED DRUGS (ALT 637 FOR MEDICARE OP, ALT 636 FOR OP/ED): Performed by: PHYSICIAN ASSISTANT

## 2024-10-27 PROCEDURE — 85730 THROMBOPLASTIN TIME PARTIAL: CPT

## 2024-10-27 PROCEDURE — 2500000005 HC RX 250 GENERAL PHARMACY W/O HCPCS

## 2024-10-27 PROCEDURE — 84100 ASSAY OF PHOSPHORUS: CPT

## 2024-10-27 PROCEDURE — 2500000004 HC RX 250 GENERAL PHARMACY W/ HCPCS (ALT 636 FOR OP/ED)

## 2024-10-27 PROCEDURE — 2500000001 HC RX 250 WO HCPCS SELF ADMINISTERED DRUGS (ALT 637 FOR MEDICARE OP): Performed by: NURSE PRACTITIONER

## 2024-10-27 PROCEDURE — 99232 SBSQ HOSP IP/OBS MODERATE 35: CPT | Performed by: INTERNAL MEDICINE

## 2024-10-27 PROCEDURE — 2500000001 HC RX 250 WO HCPCS SELF ADMINISTERED DRUGS (ALT 637 FOR MEDICARE OP): Performed by: INTERNAL MEDICINE

## 2024-10-27 PROCEDURE — 80053 COMPREHEN METABOLIC PANEL: CPT

## 2024-10-27 PROCEDURE — 85025 COMPLETE CBC W/AUTO DIFF WBC: CPT

## 2024-10-27 PROCEDURE — 1170000001 HC PRIVATE ONCOLOGY ROOM DAILY

## 2024-10-27 PROCEDURE — 2500000002 HC RX 250 W HCPCS SELF ADMINISTERED DRUGS (ALT 637 FOR MEDICARE OP, ALT 636 FOR OP/ED): Performed by: INTERNAL MEDICINE

## 2024-10-27 PROCEDURE — 2500000001 HC RX 250 WO HCPCS SELF ADMINISTERED DRUGS (ALT 637 FOR MEDICARE OP)

## 2024-10-27 PROCEDURE — 85384 FIBRINOGEN ACTIVITY: CPT

## 2024-10-27 PROCEDURE — 80197 ASSAY OF TACROLIMUS: CPT

## 2024-10-27 PROCEDURE — 83735 ASSAY OF MAGNESIUM: CPT

## 2024-10-27 PROCEDURE — 2500000004 HC RX 250 GENERAL PHARMACY W/ HCPCS (ALT 636 FOR OP/ED): Performed by: NURSE PRACTITIONER

## 2024-10-27 RX ORDER — MAGNESIUM SULFATE HEPTAHYDRATE 40 MG/ML
2 INJECTION, SOLUTION INTRAVENOUS ONCE
Status: COMPLETED | OUTPATIENT
Start: 2024-10-27 | End: 2024-10-27

## 2024-10-27 RX ORDER — CALCIUM CARBONATE 500(1250)
1250 TABLET ORAL
Status: DISCONTINUED | OUTPATIENT
Start: 2024-10-27 | End: 2024-11-21 | Stop reason: HOSPADM

## 2024-10-27 RX ORDER — CALCIUM CARBONATE 1250 MG/5ML
1250 SUSPENSION ORAL
Status: DISCONTINUED | OUTPATIENT
Start: 2024-10-27 | End: 2024-11-21 | Stop reason: HOSPADM

## 2024-10-27 ASSESSMENT — COGNITIVE AND FUNCTIONAL STATUS - GENERAL
DAILY ACTIVITIY SCORE: 24
MOBILITY SCORE: 24

## 2024-10-27 ASSESSMENT — PAIN SCALES - GENERAL: PAINLEVEL_OUTOF10: 0 - NO PAIN

## 2024-10-27 NOTE — PROGRESS NOTES
"Brandt Merritt is a 64 y.o. male on day 44 of admission presenting with Acute myeloid leukemia in remission (Multi).    Subjective   Afebrile. Feeling well, no complaints. Denies F/C, HA, CP, SOB, abd pain, N/V/D/C.    Objective   Physical Exam  Constitutional:       General: He is not in acute distress.  HENT:      Head: Normocephalic and atraumatic.      Nose: Nose normal.      Mouth/Throat:      Mouth: Mucous membranes are moist.   Eyes:      General: No scleral icterus.     Extraocular Movements: Extraocular movements intact.      Pupils: Pupils are equal, round, and reactive to light.   Cardiovascular:      Rate and Rhythm: Normal rate and regular rhythm.   Pulmonary:      Effort: Pulmonary effort is normal. No respiratory distress.      Breath sounds: Normal breath sounds.   Abdominal:      General: Bowel sounds are normal. There is no distension.      Palpations: Abdomen is soft.      Tenderness: There is no abdominal tenderness.   Musculoskeletal:         General: Normal range of motion.      Right lower leg: No edema.      Left lower leg: No edema.   Skin:     General: Skin is warm and dry.   Neurological:      General: No focal deficit present.      Mental Status: He is alert and oriented to person, place, and time.   Psychiatric:         Mood and Affect: Mood normal.         Behavior: Behavior normal.       Last Recorded Vitals  Blood pressure 120/76, pulse 76, temperature 37.2 °C (99 °F), temperature source Temporal, resp. rate 16, height (S) 1.664 m (5' 5.51\"), weight 70 kg (154 lb 5.2 oz), SpO2 98%.  Intake/Output last 3 Shifts:  I/O last 3 completed shifts:  In: 532.9 (7.6 mL/kg) [I.V.:32.9 (0.5 mL/kg); Blood:350; IV Piggyback:150]  Out: - (0 mL/kg)   Weight: 70 kg     Relevant Results  Scheduled medications  acyclovir, 400 mg, oral, q12h  filgrastim or biosimilar, 300 mcg, subcutaneous, q24h  folic acid, 1 mg, oral, Daily  letermovir, 480 mg, oral, Daily  levoFLOXacin, 500 mg, oral, q24h  magnesium " chloride, 64 mg, oral, BID  NIFEdipine ER, 90 mg, oral, Daily before breakfast  nystatin, 5 mL, Swish & Spit, 4x daily  posaconazole, 300 mg, oral, q24h  tacrolimus, 0.5 mg, oral, q12h JALYN  ursodiol, 300 mg, oral, TID  vancomycin, 125 mg, oral, Daily      Continuous medications     PRN medications  PRN medications: acetaminophen, albuterol, alteplase, dextrose, diphenhydrAMINE, EPINEPHrine HCl, famotidine, loperamide, methylPREDNISolone sodium succinate (PF), ondansetron, sodium chloride    Assessment/Plan     Brandt Merritt is a 64 y.o male with PMHx of HTN, Hgb C, Hep C (treated), 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 Chimerism 1% Donor, 99% Recipient   - Tentative plan: proceed with haplo transplant from sister (discussed 10/23)  - Plan to give Daratumumab per Dr. Newsome before next transplant to reduce antibodies found during cord transplant      # Transplant  CONDITIONING Fludarabine, melphalan, and TBI   DONOR Single umbilical cord   MATCH GRADE 6/8   SEX MATCH Mismatched   ABO DONOR A pos   ABO RECIPIENT A pos   GVHD PROPHYLAXIS Tacrolimus and Mycophenolate   GRAFT  SOURCE Single umbilical cord   CMV DONOR negative   CMV RECIPIENT positive      # GVHD prophy  - Currently weaning tacrolimus off before Haplo transplant; dose decreased to 0.5mg BID on 10/25   - stopped mycophenolate d/t concern for myelosuppression (10/11)    # Viral surveillance qMonday    EBV: detected, quant 77 (10/21)  CMV: ND (10/21)  Adeno: ND (10/21)  HHV6: ND (10/21)     HEME  # Pancytopenia: secondary to chemotherapy  # 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   - Transfuse for Hgb <7 & PLT <10   - Neupogen 300 mcg started on T+5, cont until WBC recovery  # VTE Prophylaxis: SCDs & Ambulation     ID  #PPX: Acyclovir, Levaquin, Posaconazole, Vancomycin, Letermovir (started T+14), Ursodiol   - pentamidine given 10/26  # Neutropenic fever (9/25)  - Fever w/ diarrhea, workup neg, treated with Josefina initially with rash, switched to Cefepime (9/27-10/10)  - CT chest/abd/pelvis only with diarrhea, no other findings  # Maculopapular rash (9/27), now resolved, likely d/t Meropenem  - appearance highly suggestive of meropenem reaction vs pre-engraftment syndrome  - maculopapular on inner thighs, non-itching, non-painful, blanching--resolved  - will continue to monitor   # Recurrent neutropenic fever (10/22)  - repeat bld cxs (10/22) negative  - s/p Cefepime (10/22-10/26)     CARDIO/PULM:  # Hx of STEMI (11/11/2020)  - TTE 7/19/24: EF 59%, otherwise unremarkable   - Was cleared by Cardiology for transplant   # Hx of HTN   - Nifedipine: 90 mEq ER 24 hr tablet   # Ascending aorta dilation   - Echo showing 3.8-4 cm dilation      FEN/RENAL/GI  - Admit wt: 75.6 kg (9/13/24), Most recent wt: 70kg (10/20).   - Allergic to Lasix, will not take med (extreme hypotension, extreme hypokalemia)   # Chemo induced Diarrhea  - etiology: likely melphalan-related mucositis/enteritis  - Cdiff & stool path neg (9/22)  - Cont loperamide 2mg q6h PRN  # Monitor electrolytes and replace prn  # Hx of treated Hep C in 2015  - Hep C RNA - not detected (9/12/24)      :  # Hx of BPH and urinary retention (2023)  # Hx elevated PSA    - 7/28/21 Bx: atypical small acinar proliferation    - 4/7/23 Bx: Benign    NEURO/PSYCH:  #Tremor vs fasciculation  -since 10/10, pt reported tremor that was not present prior to admission  -on exam, fine, high-frequency (7+ Hz) tremor worst in the hands, exacerbated by effort, associated with significant fasciculations of the shoulder and back muscles; also fasciculations in jaw  -strength normal, reflexes 2+  -etiology: hypocalcemia, hypomagnesemia, drug side effect (tacro, cefepime, compazine), primary neurologic disease, essential tremor  -cefepime stopped 10/10  -tacrolimus level wnl, unlikely  -hold compazine for now  -replete magnesium to >2  -if not improving, consider neurology consult     DISPO:  - Full Code  - NOK: spouse Genevieve ([phone])  - PICC  - Oncologist: Dr. Newsome    Pt seen examined and discussed with Dr. Daisy Weston PA-C

## 2024-10-28 LAB
ALBUMIN SERPL BCP-MCNC: 3.3 G/DL (ref 3.4–5)
ALP SERPL-CCNC: 74 U/L (ref 33–136)
ALT SERPL W P-5'-P-CCNC: 7 U/L (ref 10–52)
ANION GAP SERPL CALC-SCNC: 14 MMOL/L (ref 10–20)
APTT PPP: 31 SECONDS (ref 27–38)
AST SERPL W P-5'-P-CCNC: 9 U/L (ref 9–39)
BASOPHILS # BLD AUTO: 0 X10*3/UL (ref 0–0.1)
BASOPHILS NFR BLD AUTO: 0 %
BILIRUB SERPL-MCNC: 0.9 MG/DL (ref 0–1.2)
BLOOD EXPIRATION DATE: NORMAL
BUN SERPL-MCNC: 10 MG/DL (ref 6–23)
CALCIUM SERPL-MCNC: 8 MG/DL (ref 8.6–10.6)
CHLORIDE SERPL-SCNC: 100 MMOL/L (ref 98–107)
CO2 SERPL-SCNC: 25 MMOL/L (ref 21–32)
CREAT SERPL-MCNC: 0.87 MG/DL (ref 0.5–1.3)
DISPENSE STATUS: NORMAL
EGFRCR SERPLBLD CKD-EPI 2021: >90 ML/MIN/1.73M*2
EOSINOPHIL # BLD AUTO: 0 X10*3/UL (ref 0–0.7)
EOSINOPHIL NFR BLD AUTO: 0 %
ERYTHROCYTE [DISTWIDTH] IN BLOOD BY AUTOMATED COUNT: 14.6 % (ref 11.5–14.5)
FIBRINOGEN PPP-MCNC: 441 MG/DL (ref 200–400)
GLUCOSE SERPL-MCNC: 105 MG/DL (ref 74–99)
HCT VFR BLD AUTO: 20.8 % (ref 41–52)
HGB BLD-MCNC: 7.5 G/DL (ref 13.5–17.5)
IMM GRANULOCYTES # BLD AUTO: 0 X10*3/UL (ref 0–0.7)
IMM GRANULOCYTES NFR BLD AUTO: 0 % (ref 0–0.9)
INR PPP: 1.3 (ref 0.9–1.1)
LDH SERPL L TO P-CCNC: 102 U/L (ref 84–246)
LYMPHOCYTES # BLD AUTO: 0.08 X10*3/UL (ref 1.2–4.8)
LYMPHOCYTES NFR BLD AUTO: 88.9 %
MAGNESIUM SERPL-MCNC: 1.36 MG/DL (ref 1.6–2.4)
MCH RBC QN AUTO: 27.3 PG (ref 26–34)
MCHC RBC AUTO-ENTMCNC: 36.1 G/DL (ref 32–36)
MCV RBC AUTO: 76 FL (ref 80–100)
MONOCYTES # BLD AUTO: 0 X10*3/UL (ref 0.1–1)
MONOCYTES NFR BLD AUTO: 0 %
NEUTROPHILS # BLD AUTO: 0.01 X10*3/UL (ref 1.2–7.7)
NEUTROPHILS NFR BLD AUTO: 11.1 %
NRBC BLD-RTO: 0 /100 WBCS (ref 0–0)
PHOSPHATE SERPL-MCNC: 3.8 MG/DL (ref 2.5–4.9)
PLATELET # BLD AUTO: 6 X10*3/UL (ref 150–450)
POTASSIUM SERPL-SCNC: 3.5 MMOL/L (ref 3.5–5.3)
PRODUCT BLOOD TYPE: 6200
PRODUCT CODE: NORMAL
PROT SERPL-MCNC: 6.2 G/DL (ref 6.4–8.2)
PROTHROMBIN TIME: 14.1 SECONDS (ref 9.8–12.8)
RBC # BLD AUTO: 2.75 X10*6/UL (ref 4.5–5.9)
SODIUM SERPL-SCNC: 135 MMOL/L (ref 136–145)
TACROLIMUS BLD-MCNC: 4 NG/ML
UNIT ABO: NORMAL
UNIT NUMBER: NORMAL
UNIT RH: NORMAL
UNIT VOLUME: 258
WBC # BLD AUTO: 0.1 X10*3/UL (ref 4.4–11.3)

## 2024-10-28 PROCEDURE — 85025 COMPLETE CBC W/AUTO DIFF WBC: CPT

## 2024-10-28 PROCEDURE — 36430 TRANSFUSION BLD/BLD COMPNT: CPT

## 2024-10-28 PROCEDURE — 2500000001 HC RX 250 WO HCPCS SELF ADMINISTERED DRUGS (ALT 637 FOR MEDICARE OP)

## 2024-10-28 PROCEDURE — 2500000001 HC RX 250 WO HCPCS SELF ADMINISTERED DRUGS (ALT 637 FOR MEDICARE OP): Performed by: NURSE PRACTITIONER

## 2024-10-28 PROCEDURE — 84100 ASSAY OF PHOSPHORUS: CPT

## 2024-10-28 PROCEDURE — P9073 PLATELETS PHERESIS PATH REDU: HCPCS

## 2024-10-28 PROCEDURE — 2500000004 HC RX 250 GENERAL PHARMACY W/ HCPCS (ALT 636 FOR OP/ED): Performed by: NURSE PRACTITIONER

## 2024-10-28 PROCEDURE — 83735 ASSAY OF MAGNESIUM: CPT

## 2024-10-28 PROCEDURE — 2500000001 HC RX 250 WO HCPCS SELF ADMINISTERED DRUGS (ALT 637 FOR MEDICARE OP): Performed by: INTERNAL MEDICINE

## 2024-10-28 PROCEDURE — 2500000001 HC RX 250 WO HCPCS SELF ADMINISTERED DRUGS (ALT 637 FOR MEDICARE OP): Performed by: STUDENT IN AN ORGANIZED HEALTH CARE EDUCATION/TRAINING PROGRAM

## 2024-10-28 PROCEDURE — 2500000005 HC RX 250 GENERAL PHARMACY W/O HCPCS

## 2024-10-28 PROCEDURE — 2500000002 HC RX 250 W HCPCS SELF ADMINISTERED DRUGS (ALT 637 FOR MEDICARE OP, ALT 636 FOR OP/ED): Performed by: INTERNAL MEDICINE

## 2024-10-28 PROCEDURE — 87799 DETECT AGENT NOS DNA QUANT: CPT | Performed by: INTERNAL MEDICINE

## 2024-10-28 PROCEDURE — 85730 THROMBOPLASTIN TIME PARTIAL: CPT

## 2024-10-28 PROCEDURE — 83615 LACTATE (LD) (LDH) ENZYME: CPT | Performed by: NURSE PRACTITIONER

## 2024-10-28 PROCEDURE — 2500000002 HC RX 250 W HCPCS SELF ADMINISTERED DRUGS (ALT 637 FOR MEDICARE OP, ALT 636 FOR OP/ED): Performed by: STUDENT IN AN ORGANIZED HEALTH CARE EDUCATION/TRAINING PROGRAM

## 2024-10-28 PROCEDURE — 85384 FIBRINOGEN ACTIVITY: CPT

## 2024-10-28 PROCEDURE — 80197 ASSAY OF TACROLIMUS: CPT | Performed by: NURSE PRACTITIONER

## 2024-10-28 PROCEDURE — 1170000001 HC PRIVATE ONCOLOGY ROOM DAILY

## 2024-10-28 PROCEDURE — 80053 COMPREHEN METABOLIC PANEL: CPT

## 2024-10-28 PROCEDURE — 99233 SBSQ HOSP IP/OBS HIGH 50: CPT | Performed by: INTERNAL MEDICINE

## 2024-10-28 PROCEDURE — 2500000004 HC RX 250 GENERAL PHARMACY W/ HCPCS (ALT 636 FOR OP/ED): Mod: JZ | Performed by: INTERNAL MEDICINE

## 2024-10-28 PROCEDURE — 2500000002 HC RX 250 W HCPCS SELF ADMINISTERED DRUGS (ALT 637 FOR MEDICARE OP, ALT 636 FOR OP/ED): Performed by: PHYSICIAN ASSISTANT

## 2024-10-28 RX ORDER — MAGNESIUM SULFATE HEPTAHYDRATE 40 MG/ML
4 INJECTION, SOLUTION INTRAVENOUS ONCE
Status: COMPLETED | OUTPATIENT
Start: 2024-10-28 | End: 2024-10-28

## 2024-10-28 RX ORDER — POTASSIUM CHLORIDE 29.8 MG/ML
40 INJECTION INTRAVENOUS ONCE
Status: COMPLETED | OUTPATIENT
Start: 2024-10-28 | End: 2024-10-28

## 2024-10-28 RX ORDER — TACROLIMUS 0.5 MG/1
0.5 CAPSULE ORAL DAILY
Status: DISCONTINUED | OUTPATIENT
Start: 2024-10-29 | End: 2024-10-29

## 2024-10-28 ASSESSMENT — PAIN - FUNCTIONAL ASSESSMENT
PAIN_FUNCTIONAL_ASSESSMENT: 0-10
PAIN_FUNCTIONAL_ASSESSMENT: 0-10

## 2024-10-28 ASSESSMENT — COGNITIVE AND FUNCTIONAL STATUS - GENERAL
DAILY ACTIVITIY SCORE: 24
MOBILITY SCORE: 24
DAILY ACTIVITIY SCORE: 24
MOBILITY SCORE: 24

## 2024-10-28 ASSESSMENT — PAIN SCALES - GENERAL
PAINLEVEL_OUTOF10: 0 - NO PAIN
PAINLEVEL_OUTOF10: 0 - NO PAIN

## 2024-10-28 NOTE — PROGRESS NOTES
"Brandt Merritt is a 64 y.o. male on day 45 of admission presenting with Acute myeloid leukemia in remission (Multi).    Subjective   Patient states he is feel well today.   Appetite is low.  He isnt eating much.  He states the nausea has improved.  Encouraging him to drink supplements throughout the day. He offers no new complaints.      Objective   Physical Exam  Constitutional:       General: He is not in acute distress.  HENT:      Head: Normocephalic and atraumatic.      Nose: Nose normal.      Mouth/Throat:      Mouth: Mucous membranes are moist.   Eyes:      General: No scleral icterus.     Extraocular Movements: Extraocular movements intact.      Pupils: Pupils are equal, round, and reactive to light.   Cardiovascular:      Rate and Rhythm: Normal rate and regular rhythm.   Pulmonary:      Effort: Pulmonary effort is normal. No respiratory distress.      Breath sounds: Normal breath sounds.   Abdominal:      General: Bowel sounds are normal. There is no distension.      Palpations: Abdomen is soft.      Tenderness: There is no abdominal tenderness.   Musculoskeletal:         General: Normal range of motion.      Right lower leg: No edema.      Left lower leg: No edema.   Skin:     General: Skin is warm and dry.   Neurological:      General: No focal deficit present.      Mental Status: He is alert and oriented to person, place, and time.   Psychiatric:         Mood and Affect: Mood normal.         Behavior: Behavior normal.       Last Recorded Vitals  Blood pressure 111/71, pulse 88, temperature 36.8 °C (98.2 °F), temperature source Temporal, resp. rate 17, height (S) 1.664 m (5' 5.51\"), weight 70 kg (154 lb 5.2 oz), SpO2 98%.  Intake/Output last 3 Shifts:  I/O last 3 completed shifts:  In: 300 (4.3 mL/kg) [P.O.:300]  Out: - (0 mL/kg)   Weight: 70 kg     Relevant Results  Scheduled medications  acyclovir, 400 mg, oral, q12h  calcium carbonate, 1,250 mg, oral, TID   Or  calcium carbonate, 1,250 mg, oral, " TID  filgrastim or biosimilar, 300 mcg, subcutaneous, q24h  folic acid, 1 mg, oral, Daily  letermovir, 480 mg, oral, Daily  levoFLOXacin, 500 mg, oral, q24h  magnesium chloride, 64 mg, oral, BID  NIFEdipine ER, 90 mg, oral, Daily before breakfast  nystatin, 5 mL, Swish & Spit, 4x daily  posaconazole, 300 mg, oral, q24h  [START ON 10/29/2024] tacrolimus, 0.5 mg, oral, Daily  ursodiol, 300 mg, oral, TID  vancomycin, 125 mg, oral, Daily      Continuous medications     PRN medications  PRN medications: acetaminophen, albuterol, alteplase, dextrose, diphenhydrAMINE, EPINEPHrine HCl, famotidine, loperamide, methylPREDNISolone sodium succinate (PF), ondansetron, sodium chloride    Assessment/Plan     Brandt Merritt is a 64 y.o male with PMHx of HTN, Hgb C, Hep C (treated), 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      T+39 Single-umbilical cord transplant (T0=9/19/24)     ONC  # MDS/AML   - Symptoms began 9/2023; diagnosed 4/2024  - BMBx showing MDS in transition to AML (>10% jaime) w/ trisomy 8, DNMT alpha, and U2AF1 mutation indication adverse risk   - s/p 4 cycles of Decitabine/Venetoclax (C4D1 on 8/12/24)  - BMBx 6/17/24: Blasts cleared, FISH still positive for trisomy 8, still MDS changes   - BMBx 9/5/24: 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 and no increase in blast  - persistent lack of engraftment, now past expected median engraftment date for single cord (d21)  - sent plasma CXCL9 level to [place] 10/16 to assess for immunologic graft rejection (lab: [phone]) pending  - Repeat BMBx (10/15): hypocellular with no residual myeloid neoplasm. Chimerism 1% Donor, 99% Recipient   - Tentative plan: proceed with haplo transplant from sister (discussed 10/23)  - Plan to give Daratumumab per Dr. Newsome before next transplant to reduce antibodies found during cord transplant      # Transplant  CONDITIONING Fludarabine,  melphalan, and TBI   DONOR Single umbilical cord   MATCH GRADE 6/8   SEX MATCH Mismatched   ABO DONOR A pos   ABO RECIPIENT A pos   GVHD PROPHYLAXIS Tacrolimus and Mycophenolate   GRAFT SOURCE Single umbilical cord   CMV DONOR negative   CMV RECIPIENT positive      # GVHD prophy  - Currently weaning tacrolimus off before Haplo transplant; dose decreased to 0.5mg BID on 10/25   Decreased to 0.5mg Daily on 10/28   - stopped mycophenolate d/t concern for myelosuppression (10/11)    # Viral surveillance qMonday    EBV: detected, quant 77 (10/21)  CMV: ND (10/21)  Adeno: ND (10/21)  HHV6: ND (10/21)     HEME  # Pancytopenia: secondary to chemotherapy  # 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   - Transfuse for Hgb <7 & PLT <10   - Neupogen 300 mcg started on T+5, cont until WBC recovery  # VTE Prophylaxis: SCDs & Ambulation     ID  #PPX: Acyclovir, Levaquin, Posaconazole, Vancomycin, Letermovir (started T+14), Ursodiol   - pentamidine given 10/26  # Neutropenic fever (9/25)  - Fever w/ diarrhea, workup neg, treated with Josefina initially with rash, switched to Cefepime (9/27-10/10)  - CT chest/abd/pelvis only with diarrhea, no other findings  # Maculopapular rash (9/27), now resolved, likely d/t Meropenem  - appearance highly suggestive of meropenem reaction vs pre-engraftment syndrome  - maculopapular on inner thighs, non-itching, non-painful, blanching--resolved  - will continue to monitor   # Recurrent neutropenic fever (10/22)  - repeat bld cxs (10/22) negative  - s/p Cefepime (10/22-10/26)     CARDIO/PULM:  # Hx of STEMI (11/11/2020)  - TTE 7/19/24: EF 59%, otherwise unremarkable   - Was cleared by Cardiology for transplant   # Hx of HTN   - Nifedipine: 90 mEq ER 24 hr tablet   # Ascending aorta dilation   - Echo showing 3.8-4 cm dilation      FEN/RENAL/GI  - Admit wt: 75.6 kg (9/13/24), Most recent wt: 70kg (10/20).   - Allergic to Lasix, will not take med (extreme hypotension, extreme hypokalemia)   # Chemo induced Diarrhea  -  etiology: likely melphalan-related mucositis/enteritis  - Cdiff & stool path neg (9/22)  - Cont loperamide 2mg q6h PRN  # Monitor electrolytes and replace prn  # Hx of treated Hep C in 2015  - Hep C RNA - not detected (9/12/24)     :  # Hx of BPH and urinary retention (2023)  # Hx elevated PSA    - 7/28/21 Bx: atypical small acinar proliferation    - 4/7/23 Bx: Benign    NEURO/PSYCH:  #Tremor vs fasciculation  -since 10/10, pt reported tremor that was not present prior to admission  -on exam, fine, high-frequency (7+ Hz) tremor worst in the hands, exacerbated by effort, associated with significant fasciculations of the shoulder and back muscles; also fasciculations in jaw  -strength normal, reflexes 2+  -etiology: hypocalcemia, hypomagnesemia, drug side effect (tacro, cefepime, compazine), primary neurologic disease, essential tremor  -cefepime stopped 10/10  -tacrolimus level wnl, unlikely  -hold compazine for now  -replete magnesium to >2  -if not improving, consider neurology consult     DISPO:  - Full Code  - NOK: spouse Genevieve ([phone])  - PICC  - Oncologist: Dr. Newsome    Pt seen examined and discussed with Dr. Daisy Mitchell, HAYLEY-CNP

## 2024-10-28 NOTE — CARE PLAN
The patient's goals for the shift include      The clinical goals for the shift include pt will remain HDS throughout shift    Over the shift, the patient did not make progress toward the following goals. Barriers to progression include disease process. Recommendations to address these barriers include explanation  Problem: Pain - Adult  Goal: Verbalizes/displays adequate comfort level or baseline comfort level  10/28/2024 0942 by Lord Ash RN  Outcome: Progressing  10/28/2024 0942 by Lord Ash RN  Outcome: Progressing     Problem: Safety - Adult  Goal: Free from fall injury  10/28/2024 0942 by Lord Ash RN  Outcome: Progressing  10/28/2024 0942 by Lord Ash RN  Outcome: Progressing     Problem: Discharge Planning  Goal: Discharge to home or other facility with appropriate resources  10/28/2024 0942 by Lord Ash RN  Outcome: Progressing  10/28/2024 0942 by Lord Ash RN  Outcome: Progressing     Problem: Chronic Conditions and Co-morbidities  Goal: Patient's chronic conditions and co-morbidity symptoms are monitored and maintained or improved  10/28/2024 0942 by Lord Ash RN  Outcome: Progressing  10/28/2024 0942 by Lord Ash RN  Outcome: Progressing     Problem: Nutrition  Goal: Oral intake greater 75%  10/28/2024 0942 by Lord Ash RN  Outcome: Progressing  10/28/2024 0942 by Lord Ash RN  Outcome: Progressing  Goal: Adequate PO fluid intake  10/28/2024 0942 by Lord Ash RN  Outcome: Progressing  10/28/2024 0942 by Lord Ash RN  Outcome: Progressing  Goal: Maintain stable weight  10/28/2024 0942 by Lord Ash RN  Outcome: Progressing  10/28/2024 0942 by Lord Ash RN  Outcome: Progressing   .

## 2024-10-29 LAB
ABO GROUP (TYPE) IN BLOOD: NORMAL
ADENOVIRUS QPCR,PLASMA, VIRC: NOT DETECTED COPIES/ML
ALBUMIN SERPL BCP-MCNC: 3.3 G/DL (ref 3.4–5)
ALP SERPL-CCNC: 73 U/L (ref 33–136)
ALT SERPL W P-5'-P-CCNC: 8 U/L (ref 10–52)
ANION GAP SERPL CALC-SCNC: 11 MMOL/L (ref 10–20)
ANTIBODY SCREEN: NORMAL
APTT PPP: 30 SECONDS (ref 27–38)
AST SERPL W P-5'-P-CCNC: 10 U/L (ref 9–39)
BASOPHILS # BLD AUTO: 0 X10*3/UL (ref 0–0.1)
BASOPHILS NFR BLD AUTO: 0 %
BILIRUB SERPL-MCNC: 0.7 MG/DL (ref 0–1.2)
BUN SERPL-MCNC: 10 MG/DL (ref 6–23)
CALCIUM SERPL-MCNC: 8.2 MG/DL (ref 8.6–10.6)
CHIMERISM INTERPRETATION: NORMAL
CHLORIDE SERPL-SCNC: 103 MMOL/L (ref 98–107)
CMV DNA SERPL NAA+PROBE-LOG IU: NORMAL {LOG_IU}/ML
CO2 SERPL-SCNC: 26 MMOL/L (ref 21–32)
CREAT SERPL-MCNC: 0.87 MG/DL (ref 0.5–1.3)
EBV DNA SERPL NAA+PROBE-ACNC: 375 IU/ML
EBV DNA SPEC NAA+PROBE-LOG#: 2.57 LOG IU/ML
EGFRCR SERPLBLD CKD-EPI 2021: >90 ML/MIN/1.73M*2
ELECTRONICALLY SIGNED BY: NORMAL
EOSINOPHIL # BLD AUTO: 0 X10*3/UL (ref 0–0.7)
EOSINOPHIL NFR BLD AUTO: 0 %
ERYTHROCYTE [DISTWIDTH] IN BLOOD BY AUTOMATED COUNT: 14.8 % (ref 11.5–14.5)
FIBRINOGEN PPP-MCNC: 420 MG/DL (ref 200–400)
GLUCOSE SERPL-MCNC: 98 MG/DL (ref 74–99)
HCT VFR BLD AUTO: 20.7 % (ref 41–52)
HGB BLD-MCNC: 7.4 G/DL (ref 13.5–17.5)
IMM GRANULOCYTES # BLD AUTO: 0 X10*3/UL (ref 0–0.7)
IMM GRANULOCYTES NFR BLD AUTO: 0 % (ref 0–0.9)
INR PPP: 1.2 (ref 0.9–1.1)
LABORATORY COMMENT REPORT: DETECTED
LABORATORY COMMENT REPORT: NOT DETECTED
LYMPHOCYTES # BLD AUTO: 0.07 X10*3/UL (ref 1.2–4.8)
LYMPHOCYTES NFR BLD AUTO: 87.5 %
MAGNESIUM SERPL-MCNC: 1.51 MG/DL (ref 1.6–2.4)
MCH RBC QN AUTO: 26.5 PG (ref 26–34)
MCHC RBC AUTO-ENTMCNC: 35.7 G/DL (ref 32–36)
MCV RBC AUTO: 74 FL (ref 80–100)
MONOCYTES # BLD AUTO: 0.01 X10*3/UL (ref 0.1–1)
MONOCYTES NFR BLD AUTO: 12.5 %
NEUTROPHILS # BLD AUTO: 0 X10*3/UL (ref 1.2–7.7)
NEUTROPHILS NFR BLD AUTO: 0 %
NRBC BLD-RTO: 0 /100 WBCS (ref 0–0)
OVALOCYTES BLD QL SMEAR: NORMAL
PHOSPHATE SERPL-MCNC: 3.3 MG/DL (ref 2.5–4.9)
PLATELET # BLD AUTO: 21 X10*3/UL (ref 150–450)
POTASSIUM SERPL-SCNC: 3.9 MMOL/L (ref 3.5–5.3)
PROT SERPL-MCNC: 6.6 G/DL (ref 6.4–8.2)
PROTHROMBIN TIME: 13.4 SECONDS (ref 9.8–12.8)
RBC # BLD AUTO: 2.79 X10*6/UL (ref 4.5–5.9)
RBC MORPH BLD: NORMAL
RH FACTOR (ANTIGEN D): NORMAL
SODIUM SERPL-SCNC: 136 MMOL/L (ref 136–145)
TACROLIMUS BLD-MCNC: 2.4 NG/ML
WBC # BLD AUTO: 0.1 X10*3/UL (ref 4.4–11.3)

## 2024-10-29 PROCEDURE — 85025 COMPLETE CBC W/AUTO DIFF WBC: CPT

## 2024-10-29 PROCEDURE — 85384 FIBRINOGEN ACTIVITY: CPT

## 2024-10-29 PROCEDURE — 2500000001 HC RX 250 WO HCPCS SELF ADMINISTERED DRUGS (ALT 637 FOR MEDICARE OP): Performed by: INTERNAL MEDICINE

## 2024-10-29 PROCEDURE — 2500000001 HC RX 250 WO HCPCS SELF ADMINISTERED DRUGS (ALT 637 FOR MEDICARE OP)

## 2024-10-29 PROCEDURE — 2500000002 HC RX 250 W HCPCS SELF ADMINISTERED DRUGS (ALT 637 FOR MEDICARE OP, ALT 636 FOR OP/ED): Performed by: STUDENT IN AN ORGANIZED HEALTH CARE EDUCATION/TRAINING PROGRAM

## 2024-10-29 PROCEDURE — 99233 SBSQ HOSP IP/OBS HIGH 50: CPT | Performed by: INTERNAL MEDICINE

## 2024-10-29 PROCEDURE — 84100 ASSAY OF PHOSPHORUS: CPT

## 2024-10-29 PROCEDURE — 83735 ASSAY OF MAGNESIUM: CPT

## 2024-10-29 PROCEDURE — 2500000001 HC RX 250 WO HCPCS SELF ADMINISTERED DRUGS (ALT 637 FOR MEDICARE OP): Performed by: NURSE PRACTITIONER

## 2024-10-29 PROCEDURE — 2500000004 HC RX 250 GENERAL PHARMACY W/ HCPCS (ALT 636 FOR OP/ED): Mod: JZ | Performed by: INTERNAL MEDICINE

## 2024-10-29 PROCEDURE — 2500000001 HC RX 250 WO HCPCS SELF ADMINISTERED DRUGS (ALT 637 FOR MEDICARE OP): Performed by: STUDENT IN AN ORGANIZED HEALTH CARE EDUCATION/TRAINING PROGRAM

## 2024-10-29 PROCEDURE — 2500000002 HC RX 250 W HCPCS SELF ADMINISTERED DRUGS (ALT 637 FOR MEDICARE OP, ALT 636 FOR OP/ED): Performed by: PHYSICIAN ASSISTANT

## 2024-10-29 PROCEDURE — 80197 ASSAY OF TACROLIMUS: CPT | Performed by: NURSE PRACTITIONER

## 2024-10-29 PROCEDURE — 1170000001 HC PRIVATE ONCOLOGY ROOM DAILY

## 2024-10-29 PROCEDURE — 2500000002 HC RX 250 W HCPCS SELF ADMINISTERED DRUGS (ALT 637 FOR MEDICARE OP, ALT 636 FOR OP/ED): Performed by: INTERNAL MEDICINE

## 2024-10-29 PROCEDURE — 84520 ASSAY OF UREA NITROGEN: CPT

## 2024-10-29 PROCEDURE — 86901 BLOOD TYPING SEROLOGIC RH(D): CPT

## 2024-10-29 PROCEDURE — 86920 COMPATIBILITY TEST SPIN: CPT

## 2024-10-29 PROCEDURE — 2500000004 HC RX 250 GENERAL PHARMACY W/ HCPCS (ALT 636 FOR OP/ED): Performed by: NURSE PRACTITIONER

## 2024-10-29 PROCEDURE — 85610 PROTHROMBIN TIME: CPT

## 2024-10-29 RX ORDER — MAGNESIUM SULFATE HEPTAHYDRATE 40 MG/ML
4 INJECTION, SOLUTION INTRAVENOUS ONCE
Status: COMPLETED | OUTPATIENT
Start: 2024-10-29 | End: 2024-10-29

## 2024-10-29 ASSESSMENT — COGNITIVE AND FUNCTIONAL STATUS - GENERAL
DAILY ACTIVITIY SCORE: 24
MOBILITY SCORE: 24

## 2024-10-29 ASSESSMENT — PAIN - FUNCTIONAL ASSESSMENT: PAIN_FUNCTIONAL_ASSESSMENT: 0-10

## 2024-10-29 ASSESSMENT — PAIN SCALES - GENERAL
PAINLEVEL_OUTOF10: 0 - NO PAIN

## 2024-10-29 NOTE — CARE PLAN
Problem: Pain - Adult  Goal: Verbalizes/displays adequate comfort level or baseline comfort level  Outcome: Progressing     Problem: Safety - Adult  Goal: Free from fall injury  Outcome: Progressing     Problem: Discharge Planning  Goal: Discharge to home or other facility with appropriate resources  Outcome: Progressing     The clinical goals for the shift include Pt to remain HDS thru shift.

## 2024-10-29 NOTE — PROGRESS NOTES
"Nutrition Follow Up Assessment  Nutrition Assessment      Today is T+40 s/p single-umbilical cord transplant (T=0 on 09/19/24). Pt noted with ongoing lack of engraftment, now moving towards Haplo Transplant from his sister.    Nutrition History:  This service met with pt earlier today, was sitting up on couch at time of visit with him.    Tells appetite/PO intakes have not been good for at least the last week or so. Main complaint is oral thrush, affecting his taste. Also with c/o intermittent nausea, especially with smells of food. Denied vomiting, diarrhea, or constipation. No trouble chewing/swallowing.    Tries to eat ~1 time/day. Wife continues to bring food in for him. Was able to eat some mashed potatoes and sausage yesterday afternoon. Had a sausage biscuit earlier this am for breakfast meal.    Has not been drinking the Ensure Plus, \"I just think it's going to be too thick to try to get down.\" Does still use the lemon drop candies PRN, \"they really help me.\"     Anthropometrics:  Height: (S) 166.4 cm (5' 5.51\")   Weight: 66 kg (145 lb 8.1 oz)   BMI (Calculated): 25.43  IBW/kg (Dietitian Calculated): 64.5 kg  Percent of IBW: 102 %       Admission Weight Trend:  09/13-75.6 kg (admit wt)  10/06-71.3 kg (wt at last nutrition visit on 10/10)  10/29-66.0 kg (current wt)--total of 13% wt loss from admit to present (significant)    Nutrition Focused Physical Exam Findings:  defer: completed at a prior nutrition visit  Edema:  Edema: non-pitting  Edema Location: BLE  Physical Findings:  Skin: Negative    Nutrition Significant Labs:  CBC Trend:   Results from last 7 days   Lab Units 10/29/24  0621 10/28/24  0506 10/27/24  0507 10/26/24  0510   WBC AUTO x10*3/uL 0.1* 0.1* 0.1* 0.1*   RBC AUTO x10*6/uL 2.79* 2.75* 2.79* 2.52*   HEMOGLOBIN g/dL 7.4* 7.5* 7.5* 6.7*   HEMATOCRIT % 20.7* 20.8* 20.7* 18.8*   MCV fL 74* 76* 74* 75*   PLATELETS AUTO x10*3/uL 21* 6* 21* 17*   BMP Trend:   Results from last 7 days   Lab Units " 10/29/24  0621 10/28/24  0506 10/27/24  0507 10/26/24  0510   GLUCOSE mg/dL 98 105* 102* 104*   CALCIUM mg/dL 8.2* 8.0* 7.8* 8.1*   SODIUM mmol/L 136 135* 136 138   POTASSIUM mmol/L 3.9 3.5 3.7 3.7   CO2 mmol/L 26 25 26 25   CHLORIDE mmol/L 103 100 101 103   BUN mg/dL 10 10 9 9   CREATININE mg/dL 0.87 0.87 0.80 0.87   Liver Function Trend:   Results from last 7 days   Lab Units 10/29/24  0621 10/28/24  0506 10/27/24  0507 10/26/24  0510   ALK PHOS U/L 73 74 69 73   AST U/L 10 9 11 9   ALT U/L 8* 7* 7* 8*   BILIRUBIN TOTAL mg/dL 0.7 0.9 0.9 0.6   Renal Lab Trend:   Results from last 7 days   Lab Units 10/29/24  0621 10/28/24  0506 10/27/24  0507 10/26/24  0510   POTASSIUM mmol/L 3.9 3.5 3.7 3.7   PHOSPHORUS mg/dL 3.3 3.8 3.4 3.8   SODIUM mmol/L 136 135* 136 138   MAGNESIUM mg/dL 1.51* 1.36* 1.40* 1.58*   EGFR mL/min/1.73m*2 >90 >90 >90 >90   BUN mg/dL 10 10 9 9   CREATININE mg/dL 0.87 0.87 0.80 0.87      Medications:  Scheduled medications  acyclovir, 400 mg, oral, q12h  calcium carbonate, 1,250 mg, oral, TID   Or  calcium carbonate, 1,250 mg, oral, TID  filgrastim or biosimilar, 300 mcg, subcutaneous, q24h  folic acid, 1 mg, oral, Daily  letermovir, 480 mg, oral, Daily  levoFLOXacin, 500 mg, oral, q24h  magnesium chloride, 64 mg, oral, BID  magnesium sulfate, 4 g, intravenous, Once  NIFEdipine ER, 90 mg, oral, Daily before breakfast  nystatin, 5 mL, Swish & Spit, 4x daily  posaconazole, 300 mg, oral, q24h  ursodiol, 300 mg, oral, TID  vancomycin, 125 mg, oral, Daily    PRN medications  PRN medications: acetaminophen, albuterol, alteplase, dextrose, diphenhydrAMINE, EPINEPHrine HCl, famotidine, loperamide, methylPREDNISolone sodium succinate (PF), ondansetron, sodium chloride    I/O:   Last BM Date: 10/29/24; Stool Appearance: Soft (10/27/24 2000)    Dietary Orders (From admission, onward)       Start     Ordered    10/29/24 1132  Oral nutritional supplements  Until discontinued        Comments: Please provide Ensure  Plus if pt requests it--please do not automatically send to him--may have as many times/day, as desired   Question:  Select supplement:  Answer:  Ensure Plus    10/29/24 1131    10/29/24 1131  Oral nutritional supplements  Until discontinued        Comments: please provide 1 Ensure Clear apple and 1 Ensure Clear berry every day--pt may have more than 2 times/day, if requested   Question Answer Comment   Deliver with Breakfast    Deliver with Dinner    Select supplement: Ensure Clear        10/29/24 1131    10/18/24 2150  May Participate in Room Service  ( ROOM SERVICE MAY PARTICIPATE)  Once        Question:  .  Answer:  Yes    10/18/24 2149    09/14/24 1258  Snacks  Until discontinued        Comments: pt may order from Extended Stay Menu + [employer] Snack List, if requested    09/14/24 1258    09/13/24 1427  Adult diet Low microbial  Diet effective now        Question:  Diet type  Answer:  Low microbial    09/13/24 1428                Estimated Needs:   Total Energy Estimated Needs (kCal): 7238-4545+  Method for Estimating Needs: 66 (current wt) x ~32-35+  Total Protein Estimated Needs (g): 100+  Method for Estimating Needs: 66 (current wt) x ~1.5g/kg+  Total Fluid Estimated Needs (mL): per MD/team    --D/t extended hospitalization, RDN re-calculated estimated needs using pt's most recent wt        Nutrition Diagnosis   Malnutrition Diagnosis  Patient has Malnutrition Diagnosis: Yes  Diagnosis Status: Declining  Malnutrition Diagnosis: Severe malnutrition related to acute disease or injury (s/p recent transplant with treatment related side effects)  As Evidenced by: pt with h/o decreased PO since admit + further decline in PO as of late (likely consuming </=50% estimated energy needs x >/=5 days) with 13% wt loss since admit (~1.5 mos)    Nutrition Diagnosis  Patient has Nutrition Diagnosis: Yes  Diagnosis Status (1): Ongoing  Nutrition Diagnosis 1: Increased nutrient needs  Related to (1): increased metabolic  "demand  As Evidenced by (1): pt with AML, admitted for transplant    Additional Assessment Information: Considering declining nutritional status + ongoing hypermetabolic state, pt would benefit from re-initaition of regular consumption of oral nutrition supplements; discussed with him--agreeable to try Ensure Clear--order placed for BID + will also continue order for Ensure Plus PRN, for pt to order as desired/as tolerated. Did also provide additional lemon drop candies to pt per his request.       Nutrition Interventions/Recommendations     1. Continue Low Microbial Diet, only as tolerated  --RDN adjusted orders for oral nutrition supplements; will provide Ensure Clear BID, continue order for Ensure Plus PRN  --RDN provided additional lemon drop candies to pt d/t ongoing issues with dysgeusia    --->If PO remains poor, considering significant wt losses + severe malnutrition since admit, recommend initiation of supplemental nutrition support, until PO improves. Please re-consult for recs PRN.        Nutrition Prescription for Oral Nutrition: 0904-9672+ kcals/day, 100+ g pro/day        Nutrition Interventions:   Food and/or Nutrient Delivery Interventions  Interventions: Meals and snacks, Medical food supplement  Meals and Snacks: General healthful diet  Goal: Low Microbial Diet  Medical Food Supplement: Commercial beverage  Goal: Ensure Clear BID (240 kcals, 8 g pro each) and Ensure Plus PRN (350 kcals, 13g pro each)    Nutrition Education: Pt aware of significant wt losses since admit. \"I know I need to try to start eating more.\" Encouraged pt to eat small/more frequent meals/snacks + protein. Reviewed other types of oral nutrition supplements offered at this facility. Pt verbalized understanding of all information discussed. Denied any particular questions for RDN at this time.       Nutrition Monitoring and Evaluation   Food/Nutrient Related History Monitoring  Monitoring and Evaluation Plan: Amount of " food  Amount of Food: Estimated amout of food, Medical food intake  Criteria: consume at least 50% of meals/snacks/supplements    Body Composition/Growth/Weight History  Monitoring and Evaluation Plan: Weight  Weight: Measured weight  Criteria: maintain stable wt    Biochemical Data, Medical Tests and Procedures  Monitoring and Evaluation Plan: Electrolyte/renal panel  Electrolyte and Renal Panel: Magnesium, Phosphorus, Potassium, Sodium  Criteria: WNL          Time Spent (min): 45 minutes

## 2024-10-29 NOTE — PROGRESS NOTES
"Brandt Merritt is a 64 y.o. male on day 46 of admission presenting with Acute myeloid leukemia in remission (Multi).    Subjective   Patient was sitting up at bedside.    He states he is feeling well.  Denies any nausea  or diarrhea .   ROS otherwise is negative.    Objective   Physical Exam  Constitutional:       General: He is not in acute distress.  HENT:      Head: Normocephalic and atraumatic.      Nose: Nose normal.      Mouth/Throat:      Mouth: Mucous membranes are moist.   Eyes:      General: No scleral icterus.     Extraocular Movements: Extraocular movements intact.      Pupils: Pupils are equal, round, and reactive to light.   Cardiovascular:      Rate and Rhythm: Normal rate and regular rhythm.   Pulmonary:      Effort: Pulmonary effort is normal. No respiratory distress.      Breath sounds: Normal breath sounds.   Abdominal:      General: Bowel sounds are normal. There is no distension.      Palpations: Abdomen is soft.      Tenderness: There is no abdominal tenderness.   Musculoskeletal:         General: Normal range of motion.      Right lower leg: No edema.      Left lower leg: No edema.   Skin:     General: Skin is warm and dry.   Neurological:      General: No focal deficit present.      Mental Status: He is alert and oriented to person, place, and time.   Psychiatric:         Mood and Affect: Mood normal.         Behavior: Behavior normal.       Last Recorded Vitals  Blood pressure 107/68, pulse 81, temperature 37.3 °C (99.1 °F), resp. rate 18, height (S) 1.664 m (5' 5.51\"), weight 66 kg (145 lb 8.1 oz), SpO2 98%.  Intake/Output last 3 Shifts:  I/O last 3 completed shifts:  In: 693 (9.9 mL/kg) [P.O.:235; I.V.:100 (1.4 mL/kg); Blood:258; IV Piggyback:100]  Out: - (0 mL/kg)   Weight: 70 kg     Relevant Results  Scheduled medications  acyclovir, 400 mg, oral, q12h  calcium carbonate, 1,250 mg, oral, TID   Or  calcium carbonate, 1,250 mg, oral, TID  filgrastim or biosimilar, 300 mcg, subcutaneous, " q24h  folic acid, 1 mg, oral, Daily  letermovir, 480 mg, oral, Daily  levoFLOXacin, 500 mg, oral, q24h  magnesium chloride, 64 mg, oral, BID  NIFEdipine ER, 90 mg, oral, Daily before breakfast  nystatin, 5 mL, Swish & Spit, 4x daily  posaconazole, 300 mg, oral, q24h  tacrolimus, 0.5 mg, oral, Daily  ursodiol, 300 mg, oral, TID  vancomycin, 125 mg, oral, Daily      Continuous medications     PRN medications  PRN medications: acetaminophen, albuterol, alteplase, dextrose, diphenhydrAMINE, EPINEPHrine HCl, famotidine, loperamide, methylPREDNISolone sodium succinate (PF), ondansetron, sodium chloride    Assessment/Plan     Brandt Merritt is a 64 y.o male with PMHx of HTN, Hgb C, Hep C (treated), 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      T+40 Single-umbilical cord transplant (T0=9/19/24)     ONC  # MDS/AML   - Symptoms began 9/2023; diagnosed 4/2024  - BMBx showing MDS in transition to AML (>10% jaime) w/ trisomy 8, DNMT alpha, and U2AF1 mutation indication adverse risk   - s/p 4 cycles of Decitabine/Venetoclax (C4D1 on 8/12/24)  - BMBx 6/17/24: Blasts cleared, FISH still positive for trisomy 8, still MDS changes   - BMBx 9/5/24: 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 and no increase in blast  - persistent lack of engraftment, now past expected median engraftment date for single cord (d21)  - sent plasma CXCL9 level to [facility] 10/16 to assess for immunologic graft rejection (lab: [phone]) pending  - Repeat BMBx (10/15): hypocellular with no residual myeloid neoplasm. Chimerism 1% Donor, 99% Recipient   - Tentative plan: proceed with haplo transplant from sister (discussed 10/23)  - Plan to give Daratumumab per Dr. Newsome before next transplant to reduce antibodies found during cord transplant      # Transplant  CONDITIONING Fludarabine, melphalan, and TBI   DONOR Single umbilical cord   MATCH GRADE 6/8   SEX  MATCH Mismatched   ABO DONOR A pos   ABO RECIPIENT A pos   GVHD PROPHYLAXIS Tacrolimus and Mycophenolate   GRAFT SOURCE Single umbilical cord   CMV DONOR negative   CMV RECIPIENT positive      # GVHD prophy  - Currently weaning tacrolimus off before Haplo transplant; dose decreased to 0.5mg BID on 10/25   Decreased to 0.5mg Daily on 10/28   - stopped mycophenolate d/t concern for myelosuppression (10/11)    # Viral surveillance qMonday    EBV: detected, quant 375 (10/28)  CMV: ND (10/28)  Adeno: ND (10/21)  HHV6: ND (10/21)     HEME  # Pancytopenia: secondary to chemotherapy  # 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   - Transfuse for Hgb <7 & PLT <10   - Neupogen 300 mcg started on T+5, cont until WBC recovery  # VTE Prophylaxis: SCDs & Ambulation     ID  #PPX: Acyclovir, Levaquin, Posaconazole, Vancomycin, Letermovir (started T+14), Ursodiol   - pentamidine given 10/26  # Neutropenic fever (9/25)  - Fever w/ diarrhea, workup neg, treated with Josefina initially with rash, switched to Cefepime (9/27-10/10)  - CT chest/abd/pelvis only with diarrhea, no other findings  # Maculopapular rash (9/27), now resolved, likely d/t Meropenem  - appearance highly suggestive of meropenem reaction vs pre-engraftment syndrome  - maculopapular on inner thighs, non-itching, non-painful, blanching--resolved  - will continue to monitor   # Recurrent neutropenic fever (10/22)  - repeat bld cxs (10/22) negative  - s/p Cefepime (10/22-10/26)     CARDIO/PULM:  # Hx of STEMI (11/11/2020)  - TTE 7/19/24: EF 59%, otherwise unremarkable   - Was cleared by Cardiology for transplant   # Hx of HTN   - Nifedipine: 90 mEq ER 24 hr tablet   # Ascending aorta dilation   - Echo showing 3.8-4 cm dilation      FEN/RENAL/GI  - Admit wt: 75.6 kg (9/13/24), Most recent wt: 70kg (10/20).   - Allergic to Lasix, will not take med (extreme hypotension, extreme hypokalemia)   # Chemo induced Diarrhea  - etiology: likely melphalan-related mucositis/enteritis  - Cdiff & stool path neg  (9/22)  - Cont loperamide 2mg q6h PRN  # Monitor electrolytes and replace prn  # Hx of treated Hep C in 2015  - Hep C RNA - not detected (9/12/24)     :  # Hx of BPH and urinary retention (2023)  # Hx elevated PSA    - 7/28/21 Bx: atypical small acinar proliferation    - 4/7/23 Bx: Benign    NEURO/PSYCH:  #Tremor vs fasciculation  -since 10/10, pt reported tremor that was not present prior to admission  -on exam, fine, high-frequency (7+ Hz) tremor worst in the hands, exacerbated by effort, associated with significant fasciculations of the shoulder and back muscles; also fasciculations in jaw  -strength normal, reflexes 2+  -etiology: hypocalcemia, hypomagnesemia, drug side effect (tacro, cefepime, compazine), primary neurologic disease, essential tremor  -cefepime stopped 10/10  -tacrolimus level wnl, unlikely  -hold compazine for now  -replete magnesium to >2  -if not improving, consider neurology consult     DISPO:  - Full Code  - NOK: spouse Genevieve ([phone])  - PICC  - Oncologist: Dr. Newsome    Pt seen examined and discussed with Dr. Doroteo Mitchell, APRN-CNP

## 2024-10-30 ENCOUNTER — APPOINTMENT (OUTPATIENT)
Dept: RADIOLOGY | Facility: HOSPITAL | Age: 64
DRG: 014 | End: 2024-10-30
Payer: COMMERCIAL

## 2024-10-30 ENCOUNTER — LAB REQUISITION (OUTPATIENT)
Dept: LAB | Facility: HOSPITAL | Age: 64
End: 2024-10-30
Payer: COMMERCIAL

## 2024-10-30 DIAGNOSIS — C92.01 ACUTE MYELOBLASTIC LEUKEMIA, IN REMISSION (MULTI): ICD-10-CM

## 2024-10-30 PROBLEM — R63.4 WEIGHT LOSS: Status: RESOLVED | Noted: 2024-07-29 | Resolved: 2024-10-30

## 2024-10-30 PROBLEM — Z51.11 CONDITIONING CHEMOTHERAPY PRIOR TO PERIPHERAL BLOOD STEM CELL TRANSPLANT: Status: RESOLVED | Noted: 2024-09-15 | Resolved: 2024-10-30

## 2024-10-30 LAB
ALBUMIN SERPL BCP-MCNC: 3.4 G/DL (ref 3.4–5)
ALP SERPL-CCNC: 77 U/L (ref 33–136)
ALT SERPL W P-5'-P-CCNC: 6 U/L (ref 10–52)
ANION GAP SERPL CALC-SCNC: 10 MMOL/L (ref 10–20)
APTT PPP: 31 SECONDS (ref 27–38)
AST SERPL W P-5'-P-CCNC: 10 U/L (ref 9–39)
BASOPHILS # BLD AUTO: 0 X10*3/UL (ref 0–0.1)
BASOPHILS NFR BLD AUTO: 0 %
BILIRUB SERPL-MCNC: 0.7 MG/DL (ref 0–1.2)
BLOOD EXPIRATION DATE: NORMAL
BUN SERPL-MCNC: 12 MG/DL (ref 6–23)
CALCIUM SERPL-MCNC: 8.2 MG/DL (ref 8.6–10.6)
CHLORIDE SERPL-SCNC: 103 MMOL/L (ref 98–107)
CO2 SERPL-SCNC: 26 MMOL/L (ref 21–32)
CREAT SERPL-MCNC: 0.93 MG/DL (ref 0.5–1.3)
DISPENSE STATUS: NORMAL
EGFRCR SERPLBLD CKD-EPI 2021: >90 ML/MIN/1.73M*2
EOSINOPHIL # BLD AUTO: 0 X10*3/UL (ref 0–0.7)
EOSINOPHIL NFR BLD AUTO: 0 %
ERYTHROCYTE [DISTWIDTH] IN BLOOD BY AUTOMATED COUNT: 15 % (ref 11.5–14.5)
FIBRINOGEN PPP-MCNC: 436 MG/DL (ref 200–400)
GLUCOSE SERPL-MCNC: 101 MG/DL (ref 74–99)
HCT VFR BLD AUTO: 20.6 % (ref 41–52)
HGB BLD-MCNC: 7.6 G/DL (ref 13.5–17.5)
IMM GRANULOCYTES # BLD AUTO: 0 X10*3/UL (ref 0–0.7)
IMM GRANULOCYTES NFR BLD AUTO: 0 % (ref 0–0.9)
INR PPP: 1.2 (ref 0.9–1.1)
LYMPHOCYTES # BLD AUTO: 0.1 X10*3/UL (ref 1.2–4.8)
LYMPHOCYTES NFR BLD AUTO: 90.9 %
MAGNESIUM SERPL-MCNC: 1.73 MG/DL (ref 1.6–2.4)
MCH RBC QN AUTO: 27.1 PG (ref 26–34)
MCHC RBC AUTO-ENTMCNC: 36.9 G/DL (ref 32–36)
MCV RBC AUTO: 74 FL (ref 80–100)
MONOCYTES # BLD AUTO: 0 X10*3/UL (ref 0.1–1)
MONOCYTES NFR BLD AUTO: 0 %
NEUTROPHILS # BLD AUTO: 0.01 X10*3/UL (ref 1.2–7.7)
NEUTROPHILS NFR BLD AUTO: 9.1 %
NRBC BLD-RTO: 0 /100 WBCS (ref 0–0)
PHOSPHATE SERPL-MCNC: 3.8 MG/DL (ref 2.5–4.9)
PLATELET # BLD AUTO: 9 X10*3/UL (ref 150–450)
POTASSIUM SERPL-SCNC: 3.9 MMOL/L (ref 3.5–5.3)
PRODUCT BLOOD TYPE: 6200
PRODUCT CODE: NORMAL
PROT SERPL-MCNC: 6.7 G/DL (ref 6.4–8.2)
PROTHROMBIN TIME: 13.8 SECONDS (ref 9.8–12.8)
RBC # BLD AUTO: 2.8 X10*6/UL (ref 4.5–5.9)
RBC MORPH BLD: NORMAL
SODIUM SERPL-SCNC: 135 MMOL/L (ref 136–145)
TACROLIMUS BLD-MCNC: 2.4 NG/ML
UNIT ABO: NORMAL
UNIT NUMBER: NORMAL
UNIT RH: NORMAL
UNIT VOLUME: 331
WBC # BLD AUTO: 0.1 X10*3/UL (ref 4.4–11.3)

## 2024-10-30 PROCEDURE — 2500000001 HC RX 250 WO HCPCS SELF ADMINISTERED DRUGS (ALT 637 FOR MEDICARE OP): Performed by: STUDENT IN AN ORGANIZED HEALTH CARE EDUCATION/TRAINING PROGRAM

## 2024-10-30 PROCEDURE — 2500000001 HC RX 250 WO HCPCS SELF ADMINISTERED DRUGS (ALT 637 FOR MEDICARE OP): Performed by: NURSE PRACTITIONER

## 2024-10-30 PROCEDURE — 1170000001 HC PRIVATE ONCOLOGY ROOM DAILY

## 2024-10-30 PROCEDURE — 83735 ASSAY OF MAGNESIUM: CPT

## 2024-10-30 PROCEDURE — 87075 CULTR BACTERIA EXCEPT BLOOD: CPT

## 2024-10-30 PROCEDURE — 2500000001 HC RX 250 WO HCPCS SELF ADMINISTERED DRUGS (ALT 637 FOR MEDICARE OP)

## 2024-10-30 PROCEDURE — P9073 PLATELETS PHERESIS PATH REDU: HCPCS

## 2024-10-30 PROCEDURE — 87070 CULTURE OTHR SPECIMN AEROBIC: CPT

## 2024-10-30 PROCEDURE — 85610 PROTHROMBIN TIME: CPT

## 2024-10-30 PROCEDURE — 36430 TRANSFUSION BLD/BLD COMPNT: CPT

## 2024-10-30 PROCEDURE — 80197 ASSAY OF TACROLIMUS: CPT | Performed by: NURSE PRACTITIONER

## 2024-10-30 PROCEDURE — 99233 SBSQ HOSP IP/OBS HIGH 50: CPT | Performed by: INTERNAL MEDICINE

## 2024-10-30 PROCEDURE — 2500000002 HC RX 250 W HCPCS SELF ADMINISTERED DRUGS (ALT 637 FOR MEDICARE OP, ALT 636 FOR OP/ED): Performed by: INTERNAL MEDICINE

## 2024-10-30 PROCEDURE — 2500000002 HC RX 250 W HCPCS SELF ADMINISTERED DRUGS (ALT 637 FOR MEDICARE OP, ALT 636 FOR OP/ED): Performed by: PHYSICIAN ASSISTANT

## 2024-10-30 PROCEDURE — 71250 CT THORAX DX C-: CPT

## 2024-10-30 PROCEDURE — 2500000001 HC RX 250 WO HCPCS SELF ADMINISTERED DRUGS (ALT 637 FOR MEDICARE OP): Performed by: INTERNAL MEDICINE

## 2024-10-30 PROCEDURE — 85025 COMPLETE CBC W/AUTO DIFF WBC: CPT

## 2024-10-30 PROCEDURE — 87533 HHV-6 DNA QUANT: CPT | Performed by: PHYSICIAN ASSISTANT

## 2024-10-30 PROCEDURE — 85384 FIBRINOGEN ACTIVITY: CPT

## 2024-10-30 PROCEDURE — 84100 ASSAY OF PHOSPHORUS: CPT

## 2024-10-30 PROCEDURE — 82565 ASSAY OF CREATININE: CPT

## 2024-10-30 PROCEDURE — 2500000002 HC RX 250 W HCPCS SELF ADMINISTERED DRUGS (ALT 637 FOR MEDICARE OP, ALT 636 FOR OP/ED): Performed by: STUDENT IN AN ORGANIZED HEALTH CARE EDUCATION/TRAINING PROGRAM

## 2024-10-30 RX ORDER — SODIUM CHLORIDE 9 MG/ML
150 INJECTION, SOLUTION INTRAVENOUS CONTINUOUS
Status: CANCELLED | OUTPATIENT
Start: 2024-11-09 | End: 2025-01-04

## 2024-10-30 ASSESSMENT — PAIN SCALES - GENERAL
PAINLEVEL_OUTOF10: 0 - NO PAIN

## 2024-10-30 ASSESSMENT — COGNITIVE AND FUNCTIONAL STATUS - GENERAL
MOBILITY SCORE: 24
DAILY ACTIVITIY SCORE: 24
DAILY ACTIVITIY SCORE: 24
MOBILITY SCORE: 24

## 2024-10-30 ASSESSMENT — PAIN - FUNCTIONAL ASSESSMENT
PAIN_FUNCTIONAL_ASSESSMENT: 0-10

## 2024-10-30 NOTE — CARE PLAN
The clinical goals for the shift include Patient will remain VSS and HDS throughout shift until 1900 on 10/30/2024.      Problem: Pain - Adult  Goal: Verbalizes/displays adequate comfort level or baseline comfort level  Outcome: Progressing     Problem: Safety - Adult  Goal: Free from fall injury  Outcome: Progressing     Problem: Discharge Planning  Goal: Discharge to home or other facility with appropriate resources  Outcome: Progressing     Problem: Chronic Conditions and Co-morbidities  Goal: Patient's chronic conditions and co-morbidity symptoms are monitored and maintained or improved  Outcome: Progressing     Problem: Nutrition  Goal: Oral intake greater 75%  Outcome: Progressing  Goal: Adequate PO fluid intake  Outcome: Progressing  Goal: Maintain stable weight  Outcome: Progressing

## 2024-10-30 NOTE — PROGRESS NOTES
"Brandt Merritt is a 64 y.o. male on day 47 of admission presenting with Acute myeloid leukemia in remission (Multi).    Subjective     Afebrile, no overnight events.     This morning (10/30/24), he reports that he continues to feel \"pretty good.\" Denies fevers and chills. Denies chest pain, dyspnea, cough, and productive cough. Denies nausea/vomiting/diarrhea. Denies constipation. Denies mouth sores \"other than a little thrush.\"    His sister is undergoing stem-cell collection today +/- tomorrow.    Objective   Physical Exam  Constitutional:       General: He is not in acute distress.  HENT:      Head: Normocephalic and atraumatic.      Nose: Nose normal.      Mouth/Throat:      Mouth: Mucous membranes are moist.   Eyes:      General: No scleral icterus.     Extraocular Movements: Extraocular movements intact.      Pupils: Pupils are equal, round, and reactive to light.   Cardiovascular:      Rate and Rhythm: Normal rate and regular rhythm.   Pulmonary:      Effort: Pulmonary effort is normal. No respiratory distress.      Breath sounds: Normal breath sounds.   Abdominal:      General: Bowel sounds are normal. There is no distension.      Palpations: Abdomen is soft.      Tenderness: There is no abdominal tenderness.   Musculoskeletal:         General: Normal range of motion.      Right lower leg: No edema.      Left lower leg: No edema.   Skin:     General: Skin is warm and dry.   Neurological:      General: No focal deficit present.      Mental Status: He is alert and oriented to person, place, and time.   Psychiatric:         Mood and Affect: Mood normal.         Behavior: Behavior normal.       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      ONC  # MDS/AML   - Symptoms began 9/2023; diagnosed 4/2024  - BMBx showing MDS in transition to AML (>10% jaime) w/ trisomy 8, DNMT alpha, and U2AF1 mutation indication adverse risk   - S/p 4 cycles of Decitabine/Venetoclax (C4D1 on 8/12/24)  - BMBx (6/17/24): Blasts cleared, FISH still positive for trisomy 8, still MDS changes   - BMBx (9/5/24): 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 and no increase in blast    # SCT #1  # Primary Engraftment Failure  - Conditioning: Flu-Mariana-TBI  - Donor: Single Cord, 6/8  - ABO Donor / Recipient: A+ / A+  - CMV Donor / Recipient: - / +  - aGVHD Prophylaxis: Tacrolimus + MMF  - Sent plasma CXCL9 level to [place] [facility] to assess for immunologic graft rejection (10/16/24): 431 pg/mL  - Repeat BMBx (10/15): hypocellular with no residual myeloid neoplasm. 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   - Transfuse for Hgb <7 & PLT <10   # VTE Prophylaxis: SCDs & Ambulation  # SOS Prophylaxis  - Ursodiol      ID  # Neutropenic fever (9/25, 10/12)  - Fever w/ diarrhea, workup neg, treated with Josefina initially with rash, switched to cefepime (9/27-10/10)  - CT chest/abd/pelvis only with diarrhea, no other findings  - Repeat blood cultures (10/22):  negative  - Cefepime (10/22-10/26)    # Viral surveillance:   - qMonday    - EBV: 375 U/L (10/28), up from 77 U/L (10/21/24)  - CMV: ND (10/28)  - Adeno: ND (10/28)  - HHV6: ND (10/21), 10/30 pending    # Prophylaxis:   - VZV: Acyclovir,   - Aspergillus: Posaconazole  - C. Diff: Vancomycin  - CMV: Letermovir (started T+14)  - PJP: Pentamidine given 10/26    FEN/RENAL/GI  - Admit wt: 75.6 kg (9/13/24)  Most recent weight 64.5 kg (142 lb 3.2 oz) (10/30/2024  7:27 AM)  - Allergic to Lasix, will not take med (extreme hypotension, extreme hypokalemia)  # Monitor electrolytes and replace prn  # Hx of treated Hep C in 2015  - Hep C RNA - not detected (9/12/24)     CARDIO/PULM:  # Hx of STEMI (11/11/2020)  - TTE 7/19/24: EF 59%, otherwise unremarkable   - Was cleared by Cardiology for transplant   # Hx of HTN   - Nifedipine: 90 mEq ER 24 hr tablet   # Ascending aorta dilation   - TTE (4/29/24): 3.8-4 cm dilation     :  # Hx of BPH and urinary retention (2023)  # Hx elevated PSA    - 7/28/21 Bx: atypical small acinar proliferation    - 4/7/23 Bx: Benign    NEURO/PSYCH  # Tremor (10/10- )  :: Most likely due to tacrolimus vs cefepime  - Fine, high-frequency (7+ Hz) tremor worst in the hands, exacerbated by effort,   - Previously associated with significant fasciculations of the shoulder and back muscles; also fasciculations in jaw -- these are no longer aparrent  - Cefepime stopped 10/10, tacrolimus stopped 10/29  - If still no improvement, could consider Neurology consult     DISPO:  - FULL CODE  - NOK: spouse Genevieve ([phone])  - PICC  - Oncologist: Dr. Newsome    Patient seen examined and discussed with JUANJOSE DavidC  BMT (Allogeneic SCT/Acute Leukemia) Team

## 2024-10-31 ENCOUNTER — APPOINTMENT (OUTPATIENT)
Dept: CARDIOLOGY | Facility: HOSPITAL | Age: 64
DRG: 014 | End: 2024-10-31
Payer: COMMERCIAL

## 2024-10-31 ENCOUNTER — LAB REQUISITION (OUTPATIENT)
Dept: LAB | Facility: CLINIC | Age: 64
End: 2024-10-31
Payer: COMMERCIAL

## 2024-10-31 ENCOUNTER — APPOINTMENT (OUTPATIENT)
Dept: RADIATION ONCOLOGY | Facility: HOSPITAL | Age: 64
End: 2024-10-31
Payer: COMMERCIAL

## 2024-10-31 DIAGNOSIS — C92.00 ACUTE MYELOBLASTIC LEUKEMIA, NOT HAVING ACHIEVED REMISSION (MULTI): ICD-10-CM

## 2024-10-31 LAB
ALBUMIN SERPL BCP-MCNC: 3.4 G/DL (ref 3.4–5)
ALP SERPL-CCNC: 70 U/L (ref 33–136)
ALT SERPL W P-5'-P-CCNC: 6 U/L (ref 10–52)
ANION GAP SERPL CALC-SCNC: 14 MMOL/L (ref 10–20)
AORTIC VALVE PEAK VELOCITY: 1.49 M/S
APTT PPP: 29 SECONDS (ref 27–38)
AST SERPL W P-5'-P-CCNC: 9 U/L (ref 9–39)
AV PEAK GRADIENT: 8.9 MMHG
AVA (PEAK VEL): 2.81 CM2
BASOPHILS # BLD AUTO: 0 X10*3/UL (ref 0–0.1)
BASOPHILS NFR BLD AUTO: 0 %
BILIRUB SERPL-MCNC: 0.6 MG/DL (ref 0–1.2)
BUN SERPL-MCNC: 11 MG/DL (ref 6–23)
CALCIUM SERPL-MCNC: 8.1 MG/DL (ref 8.6–10.6)
CHLORIDE SERPL-SCNC: 103 MMOL/L (ref 98–107)
CMV DNA SERPL NAA+PROBE-LOG IU: NORMAL {LOG_IU}/ML
CO2 SERPL-SCNC: 24 MMOL/L (ref 21–32)
CREAT SERPL-MCNC: 0.96 MG/DL (ref 0.5–1.3)
EGFRCR SERPLBLD CKD-EPI 2021: 87 ML/MIN/1.73M*2
EJECTION FRACTION APICAL 4 CHAMBER: 55.8
EJECTION FRACTION: 58 %
EOSINOPHIL # BLD AUTO: 0 X10*3/UL (ref 0–0.7)
EOSINOPHIL NFR BLD AUTO: 0 %
ERYTHROCYTE [DISTWIDTH] IN BLOOD BY AUTOMATED COUNT: 14.8 % (ref 11.5–14.5)
FIBRINOGEN PPP-MCNC: 305 MG/DL (ref 200–400)
GLUCOSE SERPL-MCNC: 103 MG/DL (ref 74–99)
HCT VFR BLD AUTO: 22.9 % (ref 41–52)
HGB BLD-MCNC: 8.5 G/DL (ref 13.5–17.5)
HLA CLS I TYP PNL BLD/T DONR HIGH RES: NORMAL
HLA RESULTS: NORMAL
HLA-DP2 QL: NORMAL
HLA-DQB1 HIGH RES: NORMAL
HLA-DRB1 HIGH RES: NORMAL
HUMAN HERPESVIRUS-6 PCR PLASMA: ABNORMAL COPIES/ML
IMM GRANULOCYTES # BLD AUTO: 0 X10*3/UL (ref 0–0.7)
IMM GRANULOCYTES NFR BLD AUTO: 0 % (ref 0–0.9)
INR PPP: 1.4 (ref 0.9–1.1)
LABORATORY COMMENT REPORT: NOT DETECTED
LEFT ATRIUM VOLUME AREA LENGTH INDEX BSA: 21.4 ML/M2
LEFT VENTRICLE INTERNAL DIMENSION DIASTOLE: 3.9 CM (ref 3.5–6)
LEFT VENTRICULAR OUTFLOW TRACT DIAMETER: 2.1 CM
LYMPHOCYTES # BLD AUTO: 0.1 X10*3/UL (ref 1.2–4.8)
LYMPHOCYTES NFR BLD AUTO: 90.9 %
MAGNESIUM SERPL-MCNC: 1.65 MG/DL (ref 1.6–2.4)
MCH RBC QN AUTO: 27.1 PG (ref 26–34)
MCHC RBC AUTO-ENTMCNC: 37.1 G/DL (ref 32–36)
MCV RBC AUTO: 73 FL (ref 80–100)
MONOCYTES # BLD AUTO: 0.01 X10*3/UL (ref 0.1–1)
MONOCYTES NFR BLD AUTO: 9.1 %
NEUTROPHILS # BLD AUTO: 0 X10*3/UL (ref 1.2–7.7)
NEUTROPHILS NFR BLD AUTO: 0 %
NRBC BLD-RTO: 0 /100 WBCS (ref 0–0)
PHOSPHATE SERPL-MCNC: 3.9 MG/DL (ref 2.5–4.9)
PLATELET # BLD AUTO: 15 X10*3/UL (ref 150–450)
POTASSIUM SERPL-SCNC: 3.9 MMOL/L (ref 3.5–5.3)
PROT SERPL-MCNC: 6.5 G/DL (ref 6.4–8.2)
PROTHROMBIN TIME: 15.9 SECONDS (ref 9.8–12.8)
RBC # BLD AUTO: 3.14 X10*6/UL (ref 4.5–5.9)
RBC MORPH BLD: NORMAL
RIGHT VENTRICLE FREE WALL PEAK S': 14.7 CM/S
SODIUM SERPL-SCNC: 137 MMOL/L (ref 136–145)
TRICUSPID ANNULAR PLANE SYSTOLIC EXCURSION: 2.3 CM
WBC # BLD AUTO: 0.1 X10*3/UL (ref 4.4–11.3)

## 2024-10-31 PROCEDURE — 83735 ASSAY OF MAGNESIUM: CPT

## 2024-10-31 PROCEDURE — 93308 TTE F-UP OR LMTD: CPT | Performed by: INTERNAL MEDICINE

## 2024-10-31 PROCEDURE — 76376 3D RENDER W/INTRP POSTPROCES: CPT

## 2024-10-31 PROCEDURE — 2500000002 HC RX 250 W HCPCS SELF ADMINISTERED DRUGS (ALT 637 FOR MEDICARE OP, ALT 636 FOR OP/ED): Performed by: STUDENT IN AN ORGANIZED HEALTH CARE EDUCATION/TRAINING PROGRAM

## 2024-10-31 PROCEDURE — 2500000004 HC RX 250 GENERAL PHARMACY W/ HCPCS (ALT 636 FOR OP/ED): Mod: JZ | Performed by: INTERNAL MEDICINE

## 2024-10-31 PROCEDURE — 2500000001 HC RX 250 WO HCPCS SELF ADMINISTERED DRUGS (ALT 637 FOR MEDICARE OP)

## 2024-10-31 PROCEDURE — 93356 MYOCRD STRAIN IMG SPCKL TRCK: CPT | Performed by: INTERNAL MEDICINE

## 2024-10-31 PROCEDURE — 2500000001 HC RX 250 WO HCPCS SELF ADMINISTERED DRUGS (ALT 637 FOR MEDICARE OP): Performed by: NURSE PRACTITIONER

## 2024-10-31 PROCEDURE — 85610 PROTHROMBIN TIME: CPT

## 2024-10-31 PROCEDURE — 76376 3D RENDER W/INTRP POSTPROCES: CPT | Performed by: INTERNAL MEDICINE

## 2024-10-31 PROCEDURE — 2500000001 HC RX 250 WO HCPCS SELF ADMINISTERED DRUGS (ALT 637 FOR MEDICARE OP): Performed by: INTERNAL MEDICINE

## 2024-10-31 PROCEDURE — 85384 FIBRINOGEN ACTIVITY: CPT

## 2024-10-31 PROCEDURE — 77290 THER RAD SIMULAJ FIELD CPLX: CPT | Performed by: RADIOLOGY

## 2024-10-31 PROCEDURE — 2500000002 HC RX 250 W HCPCS SELF ADMINISTERED DRUGS (ALT 637 FOR MEDICARE OP, ALT 636 FOR OP/ED): Performed by: PHYSICIAN ASSISTANT

## 2024-10-31 PROCEDURE — 1170000001 HC PRIVATE ONCOLOGY ROOM DAILY

## 2024-10-31 PROCEDURE — 85025 COMPLETE CBC W/AUTO DIFF WBC: CPT

## 2024-10-31 PROCEDURE — 93321 DOPPLER ECHO F-UP/LMTD STD: CPT | Performed by: INTERNAL MEDICINE

## 2024-10-31 PROCEDURE — 2500000001 HC RX 250 WO HCPCS SELF ADMINISTERED DRUGS (ALT 637 FOR MEDICARE OP): Performed by: STUDENT IN AN ORGANIZED HEALTH CARE EDUCATION/TRAINING PROGRAM

## 2024-10-31 PROCEDURE — 2500000002 HC RX 250 W HCPCS SELF ADMINISTERED DRUGS (ALT 637 FOR MEDICARE OP, ALT 636 FOR OP/ED): Performed by: INTERNAL MEDICINE

## 2024-10-31 PROCEDURE — 82565 ASSAY OF CREATININE: CPT

## 2024-10-31 PROCEDURE — 84100 ASSAY OF PHOSPHORUS: CPT

## 2024-10-31 PROCEDURE — 93325 DOPPLER ECHO COLOR FLOW MAPG: CPT | Performed by: INTERNAL MEDICINE

## 2024-10-31 PROCEDURE — 99233 SBSQ HOSP IP/OBS HIGH 50: CPT | Performed by: INTERNAL MEDICINE

## 2024-10-31 RX ORDER — PROCHLORPERAZINE MALEATE 10 MG
10 TABLET ORAL EVERY 6 HOURS PRN
Status: DISCONTINUED | OUTPATIENT
Start: 2024-10-31 | End: 2024-11-21 | Stop reason: HOSPADM

## 2024-10-31 RX ORDER — DIPHENHYDRAMINE HCL 50 MG
50 CAPSULE ORAL ONCE
Status: COMPLETED | OUTPATIENT
Start: 2024-10-31 | End: 2024-10-31

## 2024-10-31 RX ORDER — PALONOSETRON 0.05 MG/ML
0.25 INJECTION, SOLUTION INTRAVENOUS ONCE
Status: COMPLETED | OUTPATIENT
Start: 2024-10-31 | End: 2024-10-31

## 2024-10-31 RX ORDER — FAMOTIDINE 10 MG/ML
20 INJECTION INTRAVENOUS AS NEEDED
Status: DISCONTINUED | OUTPATIENT
Start: 2024-10-31 | End: 2024-11-21 | Stop reason: HOSPADM

## 2024-10-31 RX ORDER — PROCHLORPERAZINE EDISYLATE 5 MG/ML
10 INJECTION INTRAMUSCULAR; INTRAVENOUS EVERY 6 HOURS PRN
Status: DISCONTINUED | OUTPATIENT
Start: 2024-10-31 | End: 2024-11-21 | Stop reason: HOSPADM

## 2024-10-31 RX ORDER — EPINEPHRINE 1 MG/ML
0.3 INJECTION, SOLUTION, CONCENTRATE INTRAVENOUS EVERY 5 MIN PRN
Status: DISCONTINUED | OUTPATIENT
Start: 2024-10-31 | End: 2024-11-21 | Stop reason: HOSPADM

## 2024-10-31 RX ORDER — ACETAMINOPHEN 325 MG/1
650 TABLET ORAL ONCE
Status: COMPLETED | OUTPATIENT
Start: 2024-10-31 | End: 2024-10-31

## 2024-10-31 RX ORDER — DIPHENHYDRAMINE HYDROCHLORIDE 50 MG/ML
50 INJECTION INTRAMUSCULAR; INTRAVENOUS AS NEEDED
Status: DISCONTINUED | OUTPATIENT
Start: 2024-10-31 | End: 2024-11-21 | Stop reason: HOSPADM

## 2024-10-31 RX ORDER — ALBUTEROL SULFATE 0.83 MG/ML
3 SOLUTION RESPIRATORY (INHALATION) AS NEEDED
Status: DISCONTINUED | OUTPATIENT
Start: 2024-10-31 | End: 2024-11-21 | Stop reason: HOSPADM

## 2024-10-31 RX ORDER — SODIUM CHLORIDE 9 MG/ML
100 INJECTION, SOLUTION INTRAVENOUS CONTINUOUS
Status: DISCONTINUED | OUTPATIENT
Start: 2024-10-31 | End: 2024-11-06

## 2024-10-31 ASSESSMENT — COGNITIVE AND FUNCTIONAL STATUS - GENERAL
DAILY ACTIVITIY SCORE: 24
MOBILITY SCORE: 24
DAILY ACTIVITIY SCORE: 24
MOBILITY SCORE: 24

## 2024-10-31 ASSESSMENT — ACTIVITIES OF DAILY LIVING (ADL)
HOW_WELL_CAN_YOU_DRESS_YOURSELF: INDEPENDENTLY
ADEQUATE_TO_COMPLETE_ADL: YES
HOW_WELL_CAN_YOU_USE_BATHROOM_BY_YOURSELF: INDEPENDENTLY
ADL_BEFORE_ADMISSION: INDEPENDENTLY
HOW_WELL_CAN_YOU_BATHE_YOURSELF: INDEPENDENTLY
WALKS_IN_HOME: INDEPENDENTLY
HOW_WELL_CAN_YOU_COMPLETE_GROOMING_TASKS: INDEPENDENTLY
ADL_BEFORE_ADMISSION: RIGHT
HOW_WELL_CAN_YOU_FEED_YOURSELF: INDEPENDENTLY

## 2024-10-31 ASSESSMENT — PAIN SCALES - GENERAL
PAINLEVEL_OUTOF10: 0 - NO PAIN

## 2024-10-31 ASSESSMENT — PAIN - FUNCTIONAL ASSESSMENT
PAIN_FUNCTIONAL_ASSESSMENT: 0-10

## 2024-10-31 NOTE — CARE PLAN
The patient's goals for the shift include      The clinical goals for the shift include Pt will remain afebrile throughout the shift      Problem: Pain - Adult  Goal: Verbalizes/displays adequate comfort level or baseline comfort level  Outcome: Progressing     Problem: Safety - Adult  Goal: Free from fall injury  Outcome: Progressing     Problem: Discharge Planning  Goal: Discharge to home or other facility with appropriate resources  Outcome: Progressing     Problem: Chronic Conditions and Co-morbidities  Goal: Patient's chronic conditions and co-morbidity symptoms are monitored and maintained or improved  Outcome: Progressing     Problem: Nutrition  Goal: Oral intake greater 75%  Outcome: Progressing  Goal: Adequate PO fluid intake  Outcome: Progressing  Goal: Maintain stable weight  Outcome: Progressing

## 2024-10-31 NOTE — PROGRESS NOTES
Spiritual Care Visit    Clinical Encounter Type  Visited With: Patient  Routine Visit: Follow-up  Continue Visiting: Yes     visited patient Brandt Merritt. Patient shared that his younger sister, Kathe, matched as his donor, and that he is preparing for transplant on 11/6. Patient reflected on his gratitude for sister and the close bond they have. Patient shared that his spouse has been a trooper while he has been in the hospital. Patient needed to have an echo done;  assured patient of ongoing prayer and will continue to visit. Patient was appreciative and did not have any needs at this time. Spiritual Care remains available as needed/requested.    Rev. Sarah Chapman, MDiv, BCC

## 2024-10-31 NOTE — CARE PLAN
The patient's goals for the shift include chemotherapy today.    The clinical goals for the shift include Patient will tolerate chemotherapy today with no adverse effects or complaints until 1900 on 10/31/2024.      Problem: Pain - Adult  Goal: Verbalizes/displays adequate comfort level or baseline comfort level  Outcome: Progressing     Problem: Safety - Adult  Goal: Free from fall injury  Outcome: Progressing     Problem: Chronic Conditions and Co-morbidities  Goal: Patient's chronic conditions and co-morbidity symptoms are monitored and maintained or improved  Outcome: Progressing     Problem: Nutrition  Goal: Oral intake greater 75%  Outcome: Progressing  Goal: Adequate PO fluid intake  Outcome: Progressing  Goal: Maintain stable weight  Outcome: Progressing  Patient remains VSS and HDS throughout shift. Patient remains safe and free from falls. Patient had fair PO intake today. Patient tolerated chemo with no new complaints or side effects reported. Patient denies n/v/d/ chills and pain. Patient's family visiting at bedside this afternoon.

## 2024-10-31 NOTE — PROGRESS NOTES
Brandt Merritt is a 64 y.o. male on day 48 of admission presenting with Acute myeloid leukemia in remission (Multi).    Subjective     Afebrile, no overnight events. Sister completed collection yesterday (10/30/24).     This morning (10/31/24), he reports he continues to feel very well! Denies fevers and chills. Denies chest pain, dyspnea, cough, and productive cough. Denies nausea/vomiting/diarrhea. Denies constipation. Denies mouth sores, tooth, and sinus pain.     Plan to start conditioning with Flu/Cy/TBI/ATG today, as well as rituximab for EBV today (10/31/24).       Objective   Physical Exam  Constitutional:       General: He is not in acute distress.  HENT:      Head: Normocephalic and atraumatic.      Nose: Nose normal.      Mouth/Throat:      Mouth: Mucous membranes are moist.   Eyes:      General: No scleral icterus.     Extraocular Movements: Extraocular movements intact.      Pupils: Pupils are equal, round, and reactive to light.   Cardiovascular:      Rate and Rhythm: Normal rate and regular rhythm.   Pulmonary:      Effort: Pulmonary effort is normal. No respiratory distress.      Breath sounds: Normal breath sounds.   Abdominal:      General: Bowel sounds are normal. There is no distension.      Palpations: Abdomen is soft.      Tenderness: There is no abdominal tenderness.   Musculoskeletal:         General: Normal range of motion.      Right lower leg: No edema.      Left lower leg: No edema.   Skin:     General: Skin is warm and dry.   Neurological:      General: No focal deficit present.      Mental Status: He is alert and oriented to person, place, and time.   Psychiatric:         Mood and Affect: Mood normal.         Behavior: Behavior normal.       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      T+42 (Allogeneic BMT Evaluation - Planned 9/19/2024), -6 (Allogeneic PBSC Transplant Evaluation - Planned 11/6/2024) today (10/31/24)     Active issues today (10/31/24):  # Primary Graft failure; conditioning for haploidentical stem-cell rescue.  # Tremor :: likely due to tacrolimus, improving.   # EBV viremia, low-level; rituximab today (10/31/24)     ONC  # MDS/AML   - Symptoms began 9/2023; diagnosed 4/2024  - BMBx showing MDS in transition to AML (>10% jaime) w/ trisomy 8, DNMT alpha, and U2AF1 mutation indication adverse risk   - S/p 4 cycles of Decitabine/Venetoclax (C4D1 on 8/12/24)  - BMBx (6/17/24): Blasts cleared, FISH still positive for trisomy 8, still MDS changes   - BMBx (9/5/24): 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 and no increase in blast    # SCT #1: Single-unit Cord, T0=9/19/24  # Primary Engraftment Failure  - Conditioning: Flu-Mariana-TBI  - Donor: Single Cord, 6/8  = ABO Donor / Recipient: A+ / A+  = CMV Donor / Recipient: - / +  - aGVHD Prophylaxis: Tacrolimus + MMF  - Sent plasma CXCL9 level to [place] to assess for immunologic graft rejection (10/16/24): 431 pg/mL, which is normal (less suggestive of immunologic rejection)  - Repeat BMBx (10/15): hypocellular with no residual myeloid neoplasm. Chimerism 1% Donor, 99% Recipient     # SCT #2: Haploidentical sister, T0=11/6/24  - Pre-SCT testing:  = CT chest (10/30): no pulmonary findings, trace pericardial effusion  = Onco-TTE (10/31): pending  - Graft: Haploidentical sister  = ABO Donor / Recipient: O+ / A+  = CMV Donor / Recipient: + / +  - Conditioning: Flu/Cy/TBI/ATG  = Fludarabine 24 mg/m2 on T-6,-5,-4,-3,-2   = Cyclophosphamide 14.5mg/kg T-6,-5   =  cGy T-1   = ATG 1.5mg/kg T-5,-3,-1   GCSF T+5   - aGVHD Prophylaxis: ATG, Post-Transplant Cyclophosphamide  = PtCy 25mg/kg T+3,+4   = Tac and MMF T+5    # GVHD  - No signs or  symptoms of aGVHD  - Prophylaxis  = Currently off tacrolimus before Haplo transplant: stopped 10/29/24, not checking levels until needed again  = Off mycophenolate since 10/11/24 due to concern for myelosuppression    HEME  # Pancytopenia: secondary to chemotherapy, graft failure, MDS  - Neupogen 300 mcg (9/24-10/30/24)  # 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   - Transfuse for Hgb <7 & PLT <10   # VTE Prophylaxis: SCDs & Ambulation  # SOS Prophylaxis  - Ursodiol      ID  # EBV Viremia  - EBV: 375 U/L (10/28), up from 77 U/L (10/21/24)  - Treating despite low levels due to upcoming second SCT  - Rituximab 600 mg x 1 (10/31/24)  - Continue to check EBV PCR weekly on Mondays  # Neutropenic fever (9/25, 10/12)  - Fever w/ diarrhea, workup neg, treated with Josefina initially with rash, switched to cefepime (9/27-10/10)  - CT chest/abd/pelvis only with diarrhea, no other findings  - Repeat blood cultures (10/22): negative  - Cefepime (10/22-10/26)  # Viral surveillance:   - CMV (Mo/Th): ND (10/28)  - Adeno (Mo): ND (10/28)  - HHV6 (Mo): ND (10/21), 10/30 pending    # Prophylaxis:   - VZV: Acyclovir   - Aspergillus: Posaconazole  - C. Diff: Vancomycin  - CMV: Letermovir  - PJP: Pentamidine given 10/26    FEN/RENAL/GI  - Admit wt: 75.6 kg (9/13/24)  Most recent weight 65 kg (143 lb 4.8 oz) (10/31/2024  6:00 AM)  - Allergic to Lasix, will not take med (extreme hypotension, extreme hypokalemia)  # Monitor electrolytes and replace prn  # Hx of treated Hep C in 2015  - Hep C RNA - not detected (9/12/24)     CARDIO/PULM:  # Hx of STEMI (11/11/2020)  - TTE 7/19/24: EF 59%, otherwise unremarkable   - Was cleared by Cardiology for transplant   # Hx of HTN   - Nifedipine: 90 mEq ER 24 hr tablet   # Ascending aorta dilation   - TTE (4/29/24): 3.8-4 cm dilation     :  # Hx of BPH and urinary retention (2023)  # Hx elevated PSA    - 7/28/21 Bx: atypical small acinar proliferation    - 4/7/23 Bx: Benign    NEURO/PSYCH  # Tremor (10/10- )  :: Most  likely due to tacrolimus vs cefepime  - Fine, high-frequency (7+ Hz) tremor worst in the hands, exacerbated by effort,   - Previously associated with significant fasciculations of the shoulder and back muscles; also fasciculations in jaw -- these are no longer aparrent  - Cefepime stopped 10/10, tacrolimus stopped 10/29  - If still no improvement, could consider Neurology consult     DISPO:  - FULL CODE  - NOK: spouse Genevieve ([phone])  - PICC  - Oncologist: Dr. Newsome    Addendum: Dr. Malaika Newsome    On 10/30/24 I discussed in detail with  And  plan for rescue haploidentical transplant from his sister Kathe Silverman after his rejected UCB transplant. Confirmed that patient has no alloantibodies against his sisters HLA type,  Rviewed treatment plan of prep and dise effects with fludarabine, cyclophosphamide ATG and TBI, GVHD  prophylaxis will be tacro, MMF and post transplant  cyclophosphamide 25 mg/m2   days 3 and 4.  All questions answered and chemo consent reviewed and signed. Orders reviewed and entered into EMR.    Patient seen examined and discussed with Dr. Doroteo Valdovinos PA-C  BMT (Allogeneic SCT/Acute Leukemia) Team

## 2024-11-01 ENCOUNTER — APPOINTMENT (OUTPATIENT)
Dept: RADIOLOGY | Facility: HOSPITAL | Age: 64
DRG: 014 | End: 2024-11-01
Payer: COMMERCIAL

## 2024-11-01 LAB
ALBUMIN SERPL BCP-MCNC: 3.3 G/DL (ref 3.4–5)
ALP SERPL-CCNC: 68 U/L (ref 33–136)
ALT SERPL W P-5'-P-CCNC: 6 U/L (ref 10–52)
ANION GAP SERPL CALC-SCNC: 12 MMOL/L (ref 10–20)
APTT PPP: 30 SECONDS (ref 27–38)
AST SERPL W P-5'-P-CCNC: 10 U/L (ref 9–39)
BASOPHILS # BLD AUTO: 0 X10*3/UL (ref 0–0.1)
BASOPHILS NFR BLD AUTO: 0 %
BILIRUB SERPL-MCNC: 0.5 MG/DL (ref 0–1.2)
BLOOD EXPIRATION DATE: NORMAL
BLOOD EXPIRATION DATE: NORMAL
BUN SERPL-MCNC: 9 MG/DL (ref 6–23)
CALCIUM SERPL-MCNC: 8.1 MG/DL (ref 8.6–10.6)
CHLORIDE SERPL-SCNC: 105 MMOL/L (ref 98–107)
CO2 SERPL-SCNC: 23 MMOL/L (ref 21–32)
CREAT SERPL-MCNC: 0.77 MG/DL (ref 0.5–1.3)
DISPENSE STATUS: NORMAL
DISPENSE STATUS: NORMAL
EGFRCR SERPLBLD CKD-EPI 2021: >90 ML/MIN/1.73M*2
EOSINOPHIL # BLD AUTO: 0 X10*3/UL (ref 0–0.7)
EOSINOPHIL NFR BLD AUTO: 0 %
ERYTHROCYTE [DISTWIDTH] IN BLOOD BY AUTOMATED COUNT: 14.9 % (ref 11.5–14.5)
FIBRINOGEN PPP-MCNC: 398 MG/DL (ref 200–400)
GLUCOSE SERPL-MCNC: 101 MG/DL (ref 74–99)
HCT VFR BLD AUTO: 18.6 % (ref 41–52)
HGB BLD-MCNC: 6.7 G/DL (ref 13.5–17.5)
IMM GRANULOCYTES # BLD AUTO: 0 X10*3/UL (ref 0–0.7)
IMM GRANULOCYTES NFR BLD AUTO: 0 % (ref 0–0.9)
INR PPP: 1.2 (ref 0.9–1.1)
LYMPHOCYTES # BLD AUTO: 0.05 X10*3/UL (ref 1.2–4.8)
LYMPHOCYTES NFR BLD AUTO: 83.3 %
MAGNESIUM SERPL-MCNC: 1.59 MG/DL (ref 1.6–2.4)
MCH RBC QN AUTO: 26.7 PG (ref 26–34)
MCHC RBC AUTO-ENTMCNC: 36 G/DL (ref 32–36)
MCV RBC AUTO: 74 FL (ref 80–100)
MONOCYTES # BLD AUTO: 0.01 X10*3/UL (ref 0.1–1)
MONOCYTES NFR BLD AUTO: 16.7 %
NEUTROPHILS # BLD AUTO: 0 X10*3/UL (ref 1.2–7.7)
NEUTROPHILS NFR BLD AUTO: 0 %
NRBC BLD-RTO: 0 /100 WBCS (ref 0–0)
PHOSPHATE SERPL-MCNC: 3.5 MG/DL (ref 2.5–4.9)
PLATELET # BLD AUTO: 8 X10*3/UL (ref 150–450)
POTASSIUM SERPL-SCNC: 4 MMOL/L (ref 3.5–5.3)
PRODUCT BLOOD TYPE: 600
PRODUCT BLOOD TYPE: 9500
PRODUCT CODE: NORMAL
PRODUCT CODE: NORMAL
PROT SERPL-MCNC: 6.5 G/DL (ref 6.4–8.2)
PROTHROMBIN TIME: 13.8 SECONDS (ref 9.8–12.8)
RBC # BLD AUTO: 2.51 X10*6/UL (ref 4.5–5.9)
SODIUM SERPL-SCNC: 136 MMOL/L (ref 136–145)
UNIT ABO: NORMAL
UNIT ABO: NORMAL
UNIT NUMBER: NORMAL
UNIT NUMBER: NORMAL
UNIT RH: NORMAL
UNIT RH: NORMAL
UNIT VOLUME: 289
UNIT VOLUME: 291
WBC # BLD AUTO: 0.1 X10*3/UL (ref 4.4–11.3)
XM INTEP: NORMAL

## 2024-11-01 PROCEDURE — P9040 RBC LEUKOREDUCED IRRADIATED: HCPCS

## 2024-11-01 PROCEDURE — 81001 URINALYSIS AUTO W/SCOPE: CPT

## 2024-11-01 PROCEDURE — 2500000001 HC RX 250 WO HCPCS SELF ADMINISTERED DRUGS (ALT 637 FOR MEDICARE OP): Performed by: STUDENT IN AN ORGANIZED HEALTH CARE EDUCATION/TRAINING PROGRAM

## 2024-11-01 PROCEDURE — 84100 ASSAY OF PHOSPHORUS: CPT

## 2024-11-01 PROCEDURE — 2500000001 HC RX 250 WO HCPCS SELF ADMINISTERED DRUGS (ALT 637 FOR MEDICARE OP)

## 2024-11-01 PROCEDURE — 1170000001 HC PRIVATE ONCOLOGY ROOM DAILY

## 2024-11-01 PROCEDURE — 2500000002 HC RX 250 W HCPCS SELF ADMINISTERED DRUGS (ALT 637 FOR MEDICARE OP, ALT 636 FOR OP/ED): Performed by: INTERNAL MEDICINE

## 2024-11-01 PROCEDURE — 83735 ASSAY OF MAGNESIUM: CPT

## 2024-11-01 PROCEDURE — 36415 COLL VENOUS BLD VENIPUNCTURE: CPT

## 2024-11-01 PROCEDURE — 2500000002 HC RX 250 W HCPCS SELF ADMINISTERED DRUGS (ALT 637 FOR MEDICARE OP, ALT 636 FOR OP/ED): Performed by: PHYSICIAN ASSISTANT

## 2024-11-01 PROCEDURE — 99233 SBSQ HOSP IP/OBS HIGH 50: CPT | Performed by: INTERNAL MEDICINE

## 2024-11-01 PROCEDURE — 2500000004 HC RX 250 GENERAL PHARMACY W/ HCPCS (ALT 636 FOR OP/ED): Performed by: INTERNAL MEDICINE

## 2024-11-01 PROCEDURE — 2500000002 HC RX 250 W HCPCS SELF ADMINISTERED DRUGS (ALT 637 FOR MEDICARE OP, ALT 636 FOR OP/ED): Performed by: STUDENT IN AN ORGANIZED HEALTH CARE EDUCATION/TRAINING PROGRAM

## 2024-11-01 PROCEDURE — 85384 FIBRINOGEN ACTIVITY: CPT

## 2024-11-01 PROCEDURE — P9037 PLATE PHERES LEUKOREDU IRRAD: HCPCS

## 2024-11-01 PROCEDURE — 80053 COMPREHEN METABOLIC PANEL: CPT

## 2024-11-01 PROCEDURE — 2500000001 HC RX 250 WO HCPCS SELF ADMINISTERED DRUGS (ALT 637 FOR MEDICARE OP): Performed by: INTERNAL MEDICINE

## 2024-11-01 PROCEDURE — 36430 TRANSFUSION BLD/BLD COMPNT: CPT

## 2024-11-01 PROCEDURE — 2500000004 HC RX 250 GENERAL PHARMACY W/ HCPCS (ALT 636 FOR OP/ED): Mod: JZ

## 2024-11-01 PROCEDURE — 71045 X-RAY EXAM CHEST 1 VIEW: CPT

## 2024-11-01 PROCEDURE — 85610 PROTHROMBIN TIME: CPT

## 2024-11-01 PROCEDURE — 85025 COMPLETE CBC W/AUTO DIFF WBC: CPT

## 2024-11-01 PROCEDURE — 71045 X-RAY EXAM CHEST 1 VIEW: CPT | Performed by: RADIOLOGY

## 2024-11-01 PROCEDURE — 87040 BLOOD CULTURE FOR BACTERIA: CPT

## 2024-11-01 RX ORDER — CEFEPIME HYDROCHLORIDE 2 G/50ML
2 INJECTION, SOLUTION INTRAVENOUS EVERY 8 HOURS
Status: DISCONTINUED | OUTPATIENT
Start: 2024-11-01 | End: 2024-11-10

## 2024-11-01 RX ORDER — ACETAMINOPHEN 325 MG/1
650 TABLET ORAL ONCE
Status: COMPLETED | OUTPATIENT
Start: 2024-11-01 | End: 2024-11-01

## 2024-11-01 RX ORDER — ACETAMINOPHEN 325 MG/1
650 TABLET ORAL ONCE
Status: DISCONTINUED | OUTPATIENT
Start: 2024-11-01 | End: 2024-11-01

## 2024-11-01 RX ORDER — DIPHENHYDRAMINE HCL 50 MG
50 CAPSULE ORAL ONCE
Status: COMPLETED | OUTPATIENT
Start: 2024-11-01 | End: 2024-11-01

## 2024-11-01 RX ORDER — MAGNESIUM SULFATE HEPTAHYDRATE 40 MG/ML
2 INJECTION, SOLUTION INTRAVENOUS ONCE
Status: COMPLETED | OUTPATIENT
Start: 2024-11-01 | End: 2024-11-01

## 2024-11-01 RX ORDER — ACETAMINOPHEN 325 MG/1
975 TABLET ORAL ONCE
Status: COMPLETED | OUTPATIENT
Start: 2024-11-01 | End: 2024-11-01

## 2024-11-01 RX ORDER — NYSTATIN 100000 [USP'U]/ML
5 SUSPENSION ORAL 4 TIMES DAILY
Status: DISPENSED | OUTPATIENT
Start: 2024-11-01 | End: 2024-11-07

## 2024-11-01 ASSESSMENT — COGNITIVE AND FUNCTIONAL STATUS - GENERAL
MOBILITY SCORE: 24
DAILY ACTIVITIY SCORE: 24

## 2024-11-01 ASSESSMENT — PAIN SCALES - GENERAL
PAINLEVEL_OUTOF10: 0 - NO PAIN
PAINLEVEL_OUTOF10: 0 - NO PAIN

## 2024-11-01 NOTE — NURSING NOTE
Chemo note:    Patient Brandt Merritt received D1 chemotherapy today in preparation for haplo transplant from sister.   Patient received cyclophosamide 935mg/156.75 mL at a rate of 78.4 mL/hr. Chemo administered via x2 lumen internal jugular catheter. +BBR present before and after infusion. Patient had no new adverse effects. Patient received fosfaprepatitant IV and palonosetron 250mcg IVP prior to infusion.    Patient received fludarabine 41.5 mg/ 111.66 mL at a rate of 223.3 mL/hr. +BBR present before and after infusion. Chemo administered via x2 lumen internal jugular catheter. Patient had no new adverse effects.    Patient received rituximab 600 mg/327 mL. Rate of administration titrated as ordered (see EMAR). Chemo administered via x2 lumen internal jugular catheter.  +BBR before and after infusion. Patient premedicated with PO tylenol and benadryl. Patient VSS throughout infusion (see flowsheets) with no signs of reaction.    Debbie MARTINS RN

## 2024-11-01 NOTE — PROGRESS NOTES
Brandt Merritt is a 64 y.o. male on day 49 of admission presenting with Acute myeloid leukemia in remission (Multi).    Subjective   Feeling well. No complaints. Wife brings food which he eats, eats at least one good meal a day. Tolerated flu/cy/rituxan well yesterday. Denies F/C, HA, CP, SOB, abd pain, N/V/D/C.    Objective   Physical Exam  Constitutional:       General: He is not in acute distress.  HENT:      Head: Normocephalic and atraumatic.      Nose: Nose normal.      Mouth/Throat:      Mouth: Mucous membranes are moist.   Eyes:      General: No scleral icterus.     Extraocular Movements: Extraocular movements intact.      Pupils: Pupils are equal, round, and reactive to light.   Cardiovascular:      Rate and Rhythm: Normal rate and regular rhythm.   Pulmonary:      Effort: Pulmonary effort is normal. No respiratory distress.      Breath sounds: Normal breath sounds.   Abdominal:      General: Bowel sounds are normal. There is no distension.      Palpations: Abdomen is soft.      Tenderness: There is no abdominal tenderness.   Musculoskeletal:         General: Normal range of motion.      Right lower leg: No edema.      Left lower leg: No edema.   Skin:     General: Skin is warm and dry.   Neurological:      General: No focal deficit present.      Mental Status: He is alert and oriented to person, place, and time.   Psychiatric:         Mood and Affect: Mood normal.         Behavior: Behavior normal.       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      T+43 (Allogeneic BMT Evaluation - Planned 9/19/2024), -5 (Allogeneic PBSC Transplant Evaluation - Planned 11/6/2024) today (11/01/24)     Active issues today  (11/01/24):  # Primary Graft failure; proceeding with haploidentical stem-cell rescue.  # Tremor :: likely due to tacrolimus, improving.   # EBV viremia, low-level; s/p rituximab (10/31/24)     ONC  # MDS/AML   - Symptoms began 9/2023; diagnosed 4/2024  - BMBx showing MDS in transition to AML (>10% jaime) w/ trisomy 8, DNMT alpha, and U2AF1 mutation indication adverse risk   - S/p 4 cycles of Decitabine/Venetoclax (C4D1 on 8/12/24)  - BMBx (6/17/24): Blasts cleared, FISH still positive for trisomy 8, still MDS changes   - BMBx (9/5/24): 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 and no increase in blast    # SCT #1: Single-unit Cord, T0=9/19/24  # Primary Engraftment Failure  - Conditioning: Flu-Mariana-TBI  - Donor: Single Cord, 6/8  = ABO Donor / Recipient: A+ / A+  = CMV Donor / Recipient: - / +  - aGVHD Prophylaxis: Tacrolimus + MMF  - Sent plasma CXCL9 level to [facility] to assess for immunologic graft rejection (10/16/24): 431 pg/mL, which is normal (less suggestive of immunologic rejection)  - Repeat BMBx (10/15): hypocellular with no residual myeloid neoplasm. Chimerism 1% Donor, 99% Recipient     # SCT #2: Haploidentical sister, T0=11/6/24  - Pre-SCT testing:  = CT chest (10/30): no pulmonary findings, trace pericardial effusion  = Onco-TTE (10/31): pending  - Graft: Haploidentical sister  = ABO Donor / Recipient: O+ / A+  = CMV Donor / Recipient: + / +  - Conditioning: Flu/Cy/TBI/ATG  = Fludarabine 24 mg/m2 on T-6,-5,-4,-3,-2   = Cyclophosphamide 14.5mg/kg T-6,-5   =  cGy T-1   = ATG 1.5mg/kg T-5,-3,-1   GCSF T+5   - aGVHD Prophylaxis: ATG, Post-Transplant Cyclophosphamide  = PtCy 25mg/kg T+3,+4   = Tac and MMF T+5    # GVHD  - No signs or symptoms of aGVHD  - Prophylaxis:  = Currently off tacrolimus before Haplo transplant: stopped 10/29/24, not checking levels until needed again  = Off mycophenolate since 10/11/24 due to concern for myelosuppression    HEME  # Pancytopenia: secondary  to chemotherapy, graft failure, MDS  # 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   - monitor counts daily   - Transfuse for Hgb <7 & PLT <10   - s/p Neupogen 300 mcg (9/24-10/30/24), resume T+5  # VTE Prophylaxis: SCDs & Ambulation I/s/o thrombocytopenia  # SOS Prophylaxis: Ursodiol      ID  # EBV Viremia  - EBV: 375 U/L (10/28), up from 77 U/L (10/21/24)  - Treating despite low levels due to upcoming second SCT  - Rituximab 600 mg x 1 (10/31/24)  - Continue to check EBV PCR weekly on Mondays  # Neutropenic fever (9/25, 10/12)  - Fever w/ diarrhea, workup neg, treated with Josefina initially with rash, switched to cefepime (9/27-10/10)  - CT chest/abd/pelvis only with diarrhea, no other findings  - Repeat blood cultures (10/22): negative  - Cefepime (10/22-10/26)  # Viral surveillance:   - CMV (Mo/Th): ND (10/31)  - Adeno (Mo): ND (10/28)  - HHV6 (Mo): ND (10/21),  <188 (10/30)  # Prophylaxis:   - VZV: Acyclovir   - Aspergillus: Posaconazole  - C. Diff: Vancomycin  - CMV: Letermovir  - PJP: Pentamidine given 10/26    FEN/RENAL/GI  - Admit wt: 75.6 kg (9/13/24)  Most recent weight 65 kg (143 lb 4.8 oz) (10/31/2024  6:00 AM)  - Allergic to Lasix, will not take med (extreme hypotension, extreme hypokalemia)  # Monitor electrolytes and replace prn  # Hx of treated Hep C in 2015  - Hep C RNA - not detected (9/12/24)     CARDIO/PULM:  # Hx of STEMI (11/11/2020)  - TTE 7/19/24: EF 59%, otherwise unremarkable   - Was cleared by Cardiology for transplant   # Hx of HTN   - Nifedipine: 90 mEq ER 24 hr tablet   # Ascending aorta dilation   - TTE (4/29/24): 3.8-4 cm dilation     :  # Hx of BPH and urinary retention (2023)  # Hx elevated PSA    - 7/28/21 Bx: atypical small acinar proliferation    - 4/7/23 Bx: Benign    NEURO/PSYCH  # Tremor (10/10- )  :: Most likely due to tacrolimus vs cefepime  - Fine, high-frequency (7+ Hz) tremor worst in the hands, exacerbated by effort,   - Previously associated with significant fasciculations of the shoulder  and back muscles; also fasciculations in jaw -- these are no longer aparrent  - Cefepime stopped 10/10, tacrolimus stopped 10/29  - If still no improvement, could consider Neurology consult     DISPO:  - FULL CODE  - NOK: spouse Genevieve ([phone])  - PICC  - Oncologist: Dr. Newsome    Patient seen examined and discussed with JUANJOSE SaldañaC  BMT (Allogeneic SCT/Acute Leukemia) Team

## 2024-11-01 NOTE — CARE PLAN
Problem: Pain - Adult  Goal: Verbalizes/displays adequate comfort level or baseline comfort level  Outcome: Progressing     Problem: Safety - Adult  Goal: Free from fall injury  Outcome: Progressing     Problem: Discharge Planning  Goal: Discharge to home or other facility with appropriate resources  Outcome: Progressing     Problem: Chronic Conditions and Co-morbidities  Goal: Patient's chronic conditions and co-morbidity symptoms are monitored and maintained or improved  Outcome: Progressing     Problem: Nutrition  Goal: Oral intake greater 75%  Outcome: Progressing  Goal: Adequate PO fluid intake  Outcome: Progressing  Goal: Maintain stable weight  Outcome: Progressing   The patient's goals for the shift include      The clinical goals for the shift include Patient will remain HDS    Over the shift, the patient did not make progress toward the following goals. Barriers to progression include disease process. Recommendations to address these barriers include explanation.

## 2024-11-02 LAB
ALBUMIN SERPL BCP-MCNC: 3.4 G/DL (ref 3.4–5)
ALP SERPL-CCNC: 69 U/L (ref 33–136)
ALT SERPL W P-5'-P-CCNC: 9 U/L (ref 10–52)
ANION GAP SERPL CALC-SCNC: 11 MMOL/L (ref 10–20)
APPEARANCE UR: CLEAR
APTT PPP: 30 SECONDS (ref 27–38)
AST SERPL W P-5'-P-CCNC: 42 U/L (ref 9–39)
BASOPHILS # BLD AUTO: 0 X10*3/UL (ref 0–0.1)
BASOPHILS NFR BLD AUTO: ABNORMAL %
BILIRUB SERPL-MCNC: 0.6 MG/DL (ref 0–1.2)
BILIRUB UR STRIP.AUTO-MCNC: NEGATIVE MG/DL
BUN SERPL-MCNC: 13 MG/DL (ref 6–23)
CALCIUM SERPL-MCNC: 8.2 MG/DL (ref 8.6–10.6)
CHLORIDE SERPL-SCNC: 105 MMOL/L (ref 98–107)
CO2 SERPL-SCNC: 25 MMOL/L (ref 21–32)
COLOR UR: ABNORMAL
CREAT SERPL-MCNC: 0.99 MG/DL (ref 0.5–1.3)
EGFRCR SERPLBLD CKD-EPI 2021: 84 ML/MIN/1.73M*2
EOSINOPHIL # BLD AUTO: 0 X10*3/UL (ref 0–0.7)
EOSINOPHIL NFR BLD AUTO: ABNORMAL %
ERYTHROCYTE [DISTWIDTH] IN BLOOD BY AUTOMATED COUNT: 14.7 % (ref 11.5–14.5)
FIBRINOGEN PPP-MCNC: 487 MG/DL (ref 200–400)
GLUCOSE SERPL-MCNC: 141 MG/DL (ref 74–99)
GLUCOSE UR STRIP.AUTO-MCNC: NORMAL MG/DL
HCT VFR BLD AUTO: 21.9 % (ref 41–52)
HGB BLD-MCNC: 8.2 G/DL (ref 13.5–17.5)
HOLD SPECIMEN: NORMAL
IMM GRANULOCYTES # BLD AUTO: ABNORMAL 10*3/UL
IMM GRANULOCYTES NFR BLD AUTO: ABNORMAL %
INR PPP: 1.3 (ref 0.9–1.1)
KETONES UR STRIP.AUTO-MCNC: NEGATIVE MG/DL
LEUKOCYTE ESTERASE UR QL STRIP.AUTO: NEGATIVE
LYMPHOCYTES # BLD AUTO: 0 X10*3/UL (ref 1.2–4.8)
LYMPHOCYTES NFR BLD AUTO: ABNORMAL %
MAGNESIUM SERPL-MCNC: 1.77 MG/DL (ref 1.6–2.4)
MCH RBC QN AUTO: 27.6 PG (ref 26–34)
MCHC RBC AUTO-ENTMCNC: 37.4 G/DL (ref 32–36)
MCV RBC AUTO: 74 FL (ref 80–100)
MONOCYTES # BLD AUTO: 0 X10*3/UL (ref 0.1–1)
MONOCYTES NFR BLD AUTO: ABNORMAL %
MUCOUS THREADS #/AREA URNS AUTO: ABNORMAL /LPF
NEUTROPHILS # BLD AUTO: 0 X10*3/UL (ref 1.2–7.7)
NEUTROPHILS NFR BLD AUTO: ABNORMAL %
NITRITE UR QL STRIP.AUTO: NEGATIVE
NRBC BLD-RTO: 0 /100 WBCS (ref 0–0)
PH UR STRIP.AUTO: 6.5 [PH]
PHOSPHATE SERPL-MCNC: 3.5 MG/DL (ref 2.5–4.9)
PLATELET # BLD AUTO: 22 X10*3/UL (ref 150–450)
POTASSIUM SERPL-SCNC: 3.9 MMOL/L (ref 3.5–5.3)
PROT SERPL-MCNC: 6.8 G/DL (ref 6.4–8.2)
PROT UR STRIP.AUTO-MCNC: ABNORMAL MG/DL
PROTHROMBIN TIME: 14.1 SECONDS (ref 9.8–12.8)
RBC # BLD AUTO: 2.97 X10*6/UL (ref 4.5–5.9)
RBC # UR STRIP.AUTO: ABNORMAL /UL
RBC #/AREA URNS AUTO: ABNORMAL /HPF
RBC MORPH BLD: NORMAL
SODIUM SERPL-SCNC: 137 MMOL/L (ref 136–145)
SP GR UR STRIP.AUTO: 1.02
UROBILINOGEN UR STRIP.AUTO-MCNC: NORMAL MG/DL
WBC # BLD AUTO: <0.1 X10*3/UL (ref 4.4–11.3)
WBC #/AREA URNS AUTO: ABNORMAL /HPF

## 2024-11-02 PROCEDURE — 2500000001 HC RX 250 WO HCPCS SELF ADMINISTERED DRUGS (ALT 637 FOR MEDICARE OP)

## 2024-11-02 PROCEDURE — 80053 COMPREHEN METABOLIC PANEL: CPT

## 2024-11-02 PROCEDURE — 2500000002 HC RX 250 W HCPCS SELF ADMINISTERED DRUGS (ALT 637 FOR MEDICARE OP, ALT 636 FOR OP/ED): Performed by: INTERNAL MEDICINE

## 2024-11-02 PROCEDURE — 2500000004 HC RX 250 GENERAL PHARMACY W/ HCPCS (ALT 636 FOR OP/ED): Mod: JZ

## 2024-11-02 PROCEDURE — 2500000001 HC RX 250 WO HCPCS SELF ADMINISTERED DRUGS (ALT 637 FOR MEDICARE OP): Performed by: INTERNAL MEDICINE

## 2024-11-02 PROCEDURE — 85025 COMPLETE CBC W/AUTO DIFF WBC: CPT

## 2024-11-02 PROCEDURE — 87493 C DIFF AMPLIFIED PROBE: CPT | Performed by: NURSE PRACTITIONER

## 2024-11-02 PROCEDURE — 87506 IADNA-DNA/RNA PROBE TQ 6-11: CPT | Performed by: NURSE PRACTITIONER

## 2024-11-02 PROCEDURE — 2500000002 HC RX 250 W HCPCS SELF ADMINISTERED DRUGS (ALT 637 FOR MEDICARE OP, ALT 636 FOR OP/ED): Performed by: PHYSICIAN ASSISTANT

## 2024-11-02 PROCEDURE — 99233 SBSQ HOSP IP/OBS HIGH 50: CPT | Performed by: INTERNAL MEDICINE

## 2024-11-02 PROCEDURE — 2500000002 HC RX 250 W HCPCS SELF ADMINISTERED DRUGS (ALT 637 FOR MEDICARE OP, ALT 636 FOR OP/ED): Performed by: STUDENT IN AN ORGANIZED HEALTH CARE EDUCATION/TRAINING PROGRAM

## 2024-11-02 PROCEDURE — 1170000001 HC PRIVATE ONCOLOGY ROOM DAILY

## 2024-11-02 PROCEDURE — 84100 ASSAY OF PHOSPHORUS: CPT

## 2024-11-02 PROCEDURE — 2500000005 HC RX 250 GENERAL PHARMACY W/O HCPCS

## 2024-11-02 PROCEDURE — 83735 ASSAY OF MAGNESIUM: CPT

## 2024-11-02 PROCEDURE — 85730 THROMBOPLASTIN TIME PARTIAL: CPT

## 2024-11-02 PROCEDURE — 2500000001 HC RX 250 WO HCPCS SELF ADMINISTERED DRUGS (ALT 637 FOR MEDICARE OP): Performed by: STUDENT IN AN ORGANIZED HEALTH CARE EDUCATION/TRAINING PROGRAM

## 2024-11-02 PROCEDURE — 85384 FIBRINOGEN ACTIVITY: CPT

## 2024-11-02 PROCEDURE — 2500000004 HC RX 250 GENERAL PHARMACY W/ HCPCS (ALT 636 FOR OP/ED): Performed by: INTERNAL MEDICINE

## 2024-11-02 RX ORDER — ACETAMINOPHEN 325 MG/1
975 TABLET ORAL ONCE
Status: COMPLETED | OUTPATIENT
Start: 2024-11-02 | End: 2024-11-02

## 2024-11-02 RX ORDER — PALONOSETRON 0.05 MG/ML
0.25 INJECTION, SOLUTION INTRAVENOUS ONCE
Status: COMPLETED | OUTPATIENT
Start: 2024-11-02 | End: 2024-11-02

## 2024-11-02 ASSESSMENT — PAIN SCALES - GENERAL
PAINLEVEL_OUTOF10: 0 - NO PAIN

## 2024-11-02 ASSESSMENT — COGNITIVE AND FUNCTIONAL STATUS - GENERAL
MOBILITY SCORE: 24
DAILY ACTIVITIY SCORE: 24

## 2024-11-02 NOTE — CARE PLAN
The clinical goals for the shift include pt will remain HDS throughout shift      Problem: Pain - Adult  Goal: Verbalizes/displays adequate comfort level or baseline comfort level  Outcome: Progressing     Problem: Chronic Conditions and Co-morbidities  Goal: Patient's chronic conditions and co-morbidity symptoms are monitored and maintained or improved  Outcome: Progressing     Problem: Nutrition  Goal: Oral intake greater 75%  Outcome: Progressing  Goal: Adequate PO fluid intake  Outcome: Progressing  Goal: Maintain stable weight  Outcome: Progressing     Problem: Discharge Planning  Goal: Discharge to home or other facility with appropriate resources  Outcome: Progressing

## 2024-11-02 NOTE — NURSING NOTE
Dose # D -4 of Fludarabine chemotherapy 41.5mg in 111.6 mL given over 30min via x2 lumen internal jugular catheter.  Dose up at 0942 and down at 1054 after flush.  Pre-medicated with Aloxi.  Patient, dose and rate verified with second RN, Olimpia Rojas.  + BBR obtained via syringe aspiration before and after administration.  Patient with no side effects.

## 2024-11-02 NOTE — PROGRESS NOTES
Brandt Merritt is a 64 y.o. male on day 50 of admission presenting with Acute myeloid leukemia in remission (Multi).    Subjective    Patient states he is feeling better today,   He had some diarrhea today .   Appetite is still low , encourage to drink ensures as possible.    Otherwise he remains stable.      Objective   Physical Exam  Constitutional:       General: He is not in acute distress.  HENT:      Head: Normocephalic and atraumatic.      Nose: Nose normal.      Mouth/Throat:      Mouth: Mucous membranes are moist.   Eyes:      General: No scleral icterus.     Extraocular Movements: Extraocular movements intact.      Pupils: Pupils are equal, round, and reactive to light.   Cardiovascular:      Rate and Rhythm: Normal rate and regular rhythm.   Pulmonary:      Effort: Pulmonary effort is normal. No respiratory distress.      Breath sounds: Normal breath sounds.   Abdominal:      General: Bowel sounds are normal. There is no distension.      Palpations: Abdomen is soft.      Tenderness: There is no abdominal tenderness.   Musculoskeletal:         General: Normal range of motion.      Right lower leg: No edema.      Left lower leg: No edema.   Skin:     General: Skin is warm and dry.   Neurological:      General: No focal deficit present.      Mental Status: He is alert and oriented to person, place, and time.   Psychiatric:         Mood and Affect: Mood normal.         Behavior: Behavior normal.       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      T+44 today (11/02/24)     Active issues today (11/02/24):  # Primary Graft failure; proceeding with haploidentical stem-cell rescue.  # Tremor :: likely due to tacrolimus,  improving.   # EBV viremia, low-level; s/p rituximab (10/31/24)     ONC  # MDS/AML   - Symptoms began 9/2023; diagnosed 4/2024  - BMBx showing MDS in transition to AML (>10% jaime) w/ trisomy 8, DNMT alpha, and U2AF1 mutation indication adverse risk   - S/p 4 cycles of Decitabine/Venetoclax (C4D1 on 8/12/24)  - BMBx (6/17/24): Blasts cleared, FISH still positive for trisomy 8, still MDS changes   - BMBx (9/5/24): 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 and no increase in blast    # SCT #1: Single-unit Cord, T0=9/19/24  # Primary Engraftment Failure  - Conditioning: Flu-Mariana-TBI  - Donor: Single Cord, 6/8  = ABO Donor / Recipient: A+ / A+  = CMV Donor / Recipient: - / +  - aGVHD Prophylaxis: Tacrolimus + MMF  - Sent plasma CXCL9 level to [facility] to assess for immunologic graft rejection (10/16/24): 431 pg/mL, which is normal (less suggestive of immunologic rejection)  - Repeat BMBx (10/15): hypocellular with no residual myeloid neoplasm. 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, T0=11/6/24  - Pre-SCT testing:  = CT chest (10/30): no pulmonary findings, trace pericardial effusion  = Onco-TTE (10/31): pending  - Graft: Haploidentical sister  = ABO Donor / Recipient: O+ / A+  = CMV Donor / Recipient: + / +  - Conditioning: Flu/Cy/TBI/ATG  = Fludarabine 24 mg/m2 on T-6,-5,-4,-3,-2   = Cyclophosphamide 14.5mg/kg T-6,-5   =  cGy T-1   = ATG 1.5mg/kg T-5,-3,-1   GCSF T+5   - aGVHD Prophylaxis: ATG, Post-Transplant Cyclophosphamide  = PtCy 25mg/kg T+3,+4   = Tac and MMF T+5    # GVHD  - No signs or symptoms of aGVHD  - Prophylaxis:  = Currently off tacrolimus before Haplo transplant: stopped 10/29/24, not checking levels until needed again  = Off mycophenolate since 10/11/24 due to concern for myelosuppression    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   - monitor counts daily   - Transfuse for Hgb <7 &  PLT <10   - s/p Neupogen 300 mcg (9/24-10/30/24), resume T+5  # VTE Prophylaxis: SCDs & Ambulation I/s/o thrombocytopenia  # SOS Prophylaxis: Ursodiol      ID  # EBV Viremia  - EBV: 375 U/L (10/28), up from 77 U/L (10/21/24)  - Treating despite low levels due to upcoming second SCT  - Rituximab 600 mg x 1 (10/31/24)  - Continue to check EBV PCR weekly on Mondays  # Neutropenic fever (9/25, 10/12)  - Fever w/ diarrhea, workup neg, treated with Josefina initially with rash, switched to cefepime (9/27-10/10)  - CT chest/abd/pelvis only with diarrhea, no other findings  - Repeat blood cultures (10/22): negative  - Cefepime (10/22-10/26)  _Fever after ATG on 11/1 ; septic work up : BC: pending, UA:pending   # Viral surveillance:   - CMV (Mo/Th): ND (10/31)  - Adeno (Mo): ND (10/28)  - HHV6 (Mo): ND (10/21),  <188 (10/30)  # Prophylaxis:   - VZV: Acyclovir   - Aspergillus: Posaconazole  - C. Diff: Vancomycin  - CMV: Letermovir  - PJP: Pentamidine given 10/26    FEN/RENAL/GI  - Admit wt: 75.6 kg (9/13/24)  Most recent weight 66.6 kg (146 lb 13.2 oz) (11/2/2024  2:48 PM)  - Allergic to Lasix, will not take med (extreme hypotension, extreme hypokalemia)  # Monitor electrolytes and replace prn  # Hx of treated Hep C in 2015  - Hep C RNA - not detected (9/12/24)     CARDIO/PULM:  # Hx of STEMI (11/11/2020)  - TTE 7/19/24: EF 59%, otherwise unremarkable   - Was cleared by Cardiology for transplant   # Hx of HTN   - Nifedipine: 90 mEq ER 24 hr tablet   # Ascending aorta dilation   - TTE (4/29/24): 3.8-4 cm dilation     :  # Hx of BPH and urinary retention (2023)  # Hx elevated PSA    - 7/28/21 Bx: atypical small acinar proliferation    - 4/7/23 Bx: Benign    NEURO/PSYCH  # Tremor (10/10- )  :: Most likely due to tacrolimus vs cefepime  - Fine, high-frequency (7+ Hz) tremor worst in the hands, exacerbated by effort,   - Previously associated with significant fasciculations of the shoulder and back muscles; also fasciculations in  jaw -- these are no longer aparrent  - Cefepime stopped 10/10, tacrolimus stopped 10/29  - If still no improvement, could consider Neurology consult     DISPO:  - FULL CODE  - NOK: spouse Genevieve ([phone])  - PICC  - Oncologist: Dr. Newsome    Patient seen examined and discussed with Dr. Doroteo Mitchell, APRN-CNP  BMT (Allogeneic SCT/Acute Leukemia) Team

## 2024-11-03 VITALS
RESPIRATION RATE: 18 BRPM | HEART RATE: 85 BPM | HEIGHT: 66 IN | SYSTOLIC BLOOD PRESSURE: 114 MMHG | DIASTOLIC BLOOD PRESSURE: 73 MMHG | WEIGHT: 147.05 LBS | BODY MASS INDEX: 23.63 KG/M2 | OXYGEN SATURATION: 98 % | TEMPERATURE: 97.5 F

## 2024-11-03 LAB
ALBUMIN SERPL BCP-MCNC: 3 G/DL (ref 3.4–5)
ALP SERPL-CCNC: 61 U/L (ref 33–136)
ALT SERPL W P-5'-P-CCNC: 8 U/L (ref 10–52)
ANION GAP SERPL CALC-SCNC: 12 MMOL/L (ref 10–20)
APTT PPP: 29 SECONDS (ref 27–38)
AST SERPL W P-5'-P-CCNC: 25 U/L (ref 9–39)
BACTERIA BLD CULT: NORMAL
BACTERIA BLD CULT: NORMAL
BACTERIA BPU CULT: NORMAL
BASOPHILS # BLD AUTO: 0 X10*3/UL (ref 0–0.1)
BASOPHILS NFR BLD AUTO: ABNORMAL %
BILIRUB SERPL-MCNC: 0.6 MG/DL (ref 0–1.2)
BUN SERPL-MCNC: 11 MG/DL (ref 6–23)
C DIF TOX TCDA+TCDB STL QL NAA+PROBE: NOT DETECTED
CALCIUM SERPL-MCNC: 7.7 MG/DL (ref 8.6–10.6)
CHLORIDE SERPL-SCNC: 104 MMOL/L (ref 98–107)
CO2 SERPL-SCNC: 22 MMOL/L (ref 21–32)
CREAT SERPL-MCNC: 0.8 MG/DL (ref 0.5–1.3)
EGFRCR SERPLBLD CKD-EPI 2021: >90 ML/MIN/1.73M*2
EOSINOPHIL # BLD AUTO: 0 X10*3/UL (ref 0–0.7)
EOSINOPHIL NFR BLD AUTO: ABNORMAL %
ERYTHROCYTE [DISTWIDTH] IN BLOOD BY AUTOMATED COUNT: 14.9 % (ref 11.5–14.5)
FIBRINOGEN PPP-MCNC: 502 MG/DL (ref 200–400)
GLUCOSE SERPL-MCNC: 123 MG/DL (ref 74–99)
HCT VFR BLD AUTO: 18.5 % (ref 41–52)
HGB BLD-MCNC: 7 G/DL (ref 13.5–17.5)
IMM GRANULOCYTES # BLD AUTO: ABNORMAL 10*3/UL
IMM GRANULOCYTES NFR BLD AUTO: ABNORMAL %
INR PPP: 1.3 (ref 0.9–1.1)
LYMPHOCYTES # BLD AUTO: 0 X10*3/UL (ref 1.2–4.8)
LYMPHOCYTES NFR BLD AUTO: ABNORMAL %
MAGNESIUM SERPL-MCNC: 1.84 MG/DL (ref 1.6–2.4)
MCH RBC QN AUTO: 27 PG (ref 26–34)
MCHC RBC AUTO-ENTMCNC: 37.8 G/DL (ref 32–36)
MCV RBC AUTO: 71 FL (ref 80–100)
MONOCYTES # BLD AUTO: 0 X10*3/UL (ref 0.1–1)
MONOCYTES NFR BLD AUTO: ABNORMAL %
NEUTROPHILS # BLD AUTO: 0 X10*3/UL (ref 1.2–7.7)
NEUTROPHILS NFR BLD AUTO: ABNORMAL %
NRBC BLD-RTO: ABNORMAL /100{WBCS}
PHOSPHATE SERPL-MCNC: 2.4 MG/DL (ref 2.5–4.9)
PLATELET # BLD AUTO: 11 X10*3/UL (ref 150–450)
POTASSIUM SERPL-SCNC: 3.7 MMOL/L (ref 3.5–5.3)
PROT SERPL-MCNC: 6 G/DL (ref 6.4–8.2)
PROTHROMBIN TIME: 14.5 SECONDS (ref 9.8–12.8)
RBC # BLD AUTO: 2.59 X10*6/UL (ref 4.5–5.9)
SODIUM SERPL-SCNC: 134 MMOL/L (ref 136–145)
WBC # BLD AUTO: <0.1 X10*3/UL (ref 4.4–11.3)

## 2024-11-03 PROCEDURE — 2500000002 HC RX 250 W HCPCS SELF ADMINISTERED DRUGS (ALT 637 FOR MEDICARE OP, ALT 636 FOR OP/ED): Performed by: STUDENT IN AN ORGANIZED HEALTH CARE EDUCATION/TRAINING PROGRAM

## 2024-11-03 PROCEDURE — 2500000004 HC RX 250 GENERAL PHARMACY W/ HCPCS (ALT 636 FOR OP/ED): Mod: JZ | Performed by: INTERNAL MEDICINE

## 2024-11-03 PROCEDURE — 1170000001 HC PRIVATE ONCOLOGY ROOM DAILY

## 2024-11-03 PROCEDURE — 2500000001 HC RX 250 WO HCPCS SELF ADMINISTERED DRUGS (ALT 637 FOR MEDICARE OP): Performed by: STUDENT IN AN ORGANIZED HEALTH CARE EDUCATION/TRAINING PROGRAM

## 2024-11-03 PROCEDURE — 99233 SBSQ HOSP IP/OBS HIGH 50: CPT | Performed by: INTERNAL MEDICINE

## 2024-11-03 PROCEDURE — 83735 ASSAY OF MAGNESIUM: CPT

## 2024-11-03 PROCEDURE — 2500000002 HC RX 250 W HCPCS SELF ADMINISTERED DRUGS (ALT 637 FOR MEDICARE OP, ALT 636 FOR OP/ED): Performed by: INTERNAL MEDICINE

## 2024-11-03 PROCEDURE — 2500000001 HC RX 250 WO HCPCS SELF ADMINISTERED DRUGS (ALT 637 FOR MEDICARE OP)

## 2024-11-03 PROCEDURE — 80053 COMPREHEN METABOLIC PANEL: CPT

## 2024-11-03 PROCEDURE — 85730 THROMBOPLASTIN TIME PARTIAL: CPT

## 2024-11-03 PROCEDURE — 84100 ASSAY OF PHOSPHORUS: CPT

## 2024-11-03 PROCEDURE — 2500000004 HC RX 250 GENERAL PHARMACY W/ HCPCS (ALT 636 FOR OP/ED): Mod: JZ

## 2024-11-03 PROCEDURE — 2500000002 HC RX 250 W HCPCS SELF ADMINISTERED DRUGS (ALT 637 FOR MEDICARE OP, ALT 636 FOR OP/ED): Performed by: PHYSICIAN ASSISTANT

## 2024-11-03 PROCEDURE — 2500000001 HC RX 250 WO HCPCS SELF ADMINISTERED DRUGS (ALT 637 FOR MEDICARE OP): Performed by: INTERNAL MEDICINE

## 2024-11-03 PROCEDURE — 85384 FIBRINOGEN ACTIVITY: CPT

## 2024-11-03 RX ORDER — ACETAMINOPHEN 325 MG/1
650 TABLET ORAL ONCE
Status: COMPLETED | OUTPATIENT
Start: 2024-11-04 | End: 2024-11-03

## 2024-11-03 RX ORDER — DIPHENHYDRAMINE HCL 50 MG
50 CAPSULE ORAL ONCE
Status: COMPLETED | OUTPATIENT
Start: 2024-11-04 | End: 2024-11-03

## 2024-11-03 ASSESSMENT — PAIN SCALES - GENERAL
PAINLEVEL_OUTOF10: 0 - NO PAIN

## 2024-11-03 ASSESSMENT — PAIN - FUNCTIONAL ASSESSMENT
PAIN_FUNCTIONAL_ASSESSMENT: 0-10
PAIN_FUNCTIONAL_ASSESSMENT: 0-10

## 2024-11-03 ASSESSMENT — COGNITIVE AND FUNCTIONAL STATUS - GENERAL
DAILY ACTIVITIY SCORE: 24
MOBILITY SCORE: 24

## 2024-11-03 NOTE — PROGRESS NOTES
Brandt Merritt is a 64 y.o. male on day 51 of admission presenting with Acute myeloid leukemia in remission (Multi).    Subjective   Patient is sitting on the side of the couch  stating  he is feeling fair.   He denies and nausea  but states diarrhea continues .   His appetite is still low  and is encourage to keep up fluids .  Otherwise  he remains stable.      Objective   Physical Exam  Constitutional:       General: He is not in acute distress.  HENT:      Head: Normocephalic and atraumatic.      Nose: Nose normal.      Mouth/Throat:      Mouth: Mucous membranes are moist.   Eyes:      General: No scleral icterus.     Extraocular Movements: Extraocular movements intact.      Pupils: Pupils are equal, round, and reactive to light.   Cardiovascular:      Rate and Rhythm: Normal rate and regular rhythm.   Pulmonary:      Effort: Pulmonary effort is normal. No respiratory distress.      Breath sounds: Normal breath sounds.   Abdominal:      General: Bowel sounds are normal. There is no distension.      Palpations: Abdomen is soft.      Tenderness: There is no abdominal tenderness.   Musculoskeletal:         General: Normal range of motion.      Right lower leg: No edema.      Left lower leg: No edema.   Skin:     General: Skin is warm and dry.   Neurological:      General: No focal deficit present.      Mental Status: He is alert and oriented to person, place, and time.   Psychiatric:         Mood and Affect: Mood normal.         Behavior: Behavior normal.       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      T+45 today (11/03/24)     Active issues today (11/03/24):  # Primary Graft failure; proceeding with haploidentical  stem-cell rescue.  # Tremor :: likely due to tacrolimus, improving.   # EBV viremia, low-level; s/p rituximab (10/31/24)     ONC  # MDS/AML   - Symptoms began 9/2023; diagnosed 4/2024  - BMBx showing MDS in transition to AML (>10% jaime) w/ trisomy 8, DNMT alpha, and U2AF1 mutation indication adverse risk   - S/p 4 cycles of Decitabine/Venetoclax (C4D1 on 8/12/24)  - BMBx (6/17/24): Blasts cleared, FISH still positive for trisomy 8, still MDS changes   - BMBx (9/5/24): 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 and no increase in blast    # SCT #1: Single-unit Cord, T0=9/19/24  # Primary Engraftment Failure  - Conditioning: Flu-Mariana-TBI  - Donor: Single Cord, 6/8  = ABO Donor / Recipient: A+ / A+  = CMV Donor / Recipient: - / +  - aGVHD Prophylaxis: Tacrolimus + MMF  - Sent plasma CXCL9 level to [place] to assess for immunologic graft rejection (10/16/24): 431 pg/mL, which is normal (less suggestive of immunologic rejection)  - Repeat BMBx (10/15): hypocellular with no residual myeloid neoplasm. 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, T0=11/6/24  - Pre-SCT testing:  = CT chest (10/30): no pulmonary findings, trace pericardial effusion  = Onco-TTE (10/31): pending  - Graft: Haploidentical sister  = ABO Donor / Recipient: O+ / A+  = CMV Donor / Recipient: + / +  - Conditioning: Flu/Cy/TBI/ATG  = Fludarabine 24 mg/m2 on T-6,-5,-4,-3,-2   = Cyclophosphamide 14.5mg/kg T-6,-5   =  cGy T-1   = ATG 1.5mg/kg T-5,-3,-1   GCSF T+5   - aGVHD Prophylaxis: ATG, Post-Transplant Cyclophosphamide  = PtCy 25mg/kg T+3,+4   = Tac and MMF T+5    # GVHD  - No signs or symptoms of aGVHD  - Prophylaxis:  = Currently off tacrolimus before Haplo transplant: stopped 10/29/24, not checking levels until needed again  = Off mycophenolate since 10/11/24 due to concern for myelosuppression    HEME  # Pancytopenia: secondary to chemotherapy, graft failure, MDS  # Hemoglobin C  carrier   - monitor counts daily   - Transfuse for Hgb <7 & PLT <10   - s/p Neupogen 300 mcg (9/24-10/30/24), resume T+5  # VTE Prophylaxis: SCDs & Ambulation I/s/o thrombocytopenia  # SOS Prophylaxis: Ursodiol      ID  # EBV Viremia  - EBV: 375 U/L (10/28), up from 77 U/L (10/21/24)  - Treating despite low levels due to upcoming second SCT  - Rituximab 600 mg x 1 (10/31/24)  - Continue to check EBV PCR weekly on Mondays  # Neutropenic fever (9/25, 10/12)  - Fever w/ diarrhea, workup neg, treated with Josefina initially with rash, switched to cefepime (9/27-10/10)  - CT chest/abd/pelvis only with diarrhea, no other findings  - Repeat blood cultures (10/22): negative  - Cefepime (10/22-10/26)  _Fever after ATG on 11/1 ; septic work up : BC: pending, UA:pending   # Viral surveillance:   - CMV (Mo/Th): ND (10/31)  - Adeno (Mo): ND (10/28)  - HHV6 (Mo): ND (10/21),  <188 (10/30)  # Prophylaxis:   - VZV: Acyclovir   - Aspergillus: Posaconazole  - C. Diff: Vancomycin  - CMV: Letermovir  - PJP: Pentamidine given 10/26    FEN/RENAL/GI  - Admit wt: 75.6 kg (9/13/24)  Most recent weight 66.7 kg (147 lb 0.8 oz) (11/3/2024  8:20 AM)  - Allergic to Lasix, will not take med (extreme hypotension, extreme hypokalemia)  # Monitor electrolytes and replace prn  # Hx of treated Hep C in 2015  - Hep C RNA - not detected (9/12/24)     CARDIO/PULM:  # Hx of STEMI (11/11/2020)  - TTE 7/19/24: EF 59%, otherwise unremarkable   - Was cleared by Cardiology for transplant   # Hx of HTN   - Nifedipine: 90 mEq ER 24 hr tablet   # Ascending aorta dilation   - TTE (4/29/24): 3.8-4 cm dilation     :  # Hx of BPH and urinary retention (2023)  # Hx elevated PSA    - 7/28/21 Bx: atypical small acinar proliferation    - 4/7/23 Bx: Benign    NEURO/PSYCH  # Tremor (10/10- )  :: Most likely due to tacrolimus vs cefepime  - Fine, high-frequency (7+ Hz) tremor worst in the hands, exacerbated by effort,   - Previously associated with significant fasciculations  of the shoulder and back muscles; also fasciculations in jaw -- these are no longer aparrent  - Cefepime stopped 10/10, tacrolimus stopped 10/29  - If still no improvement, could consider Neurology consult     DISPO:  - FULL CODE  - NOK: spouse Genevieve ([phone])  - PICC  - Oncologist: Dr. Newsome    Patient seen examined and discussed with Dr. Doroteo Mitchell, HAYLEY-CNP  BMT (Allogeneic SCT/Acute Leukemia) Team

## 2024-11-04 ENCOUNTER — APPOINTMENT (OUTPATIENT)
Dept: RADIATION ONCOLOGY | Facility: HOSPITAL | Age: 64
End: 2024-11-04
Payer: COMMERCIAL

## 2024-11-04 LAB
ABO GROUP (TYPE) IN BLOOD: NORMAL
ALBUMIN SERPL BCP-MCNC: 2.9 G/DL (ref 3.4–5)
ALP SERPL-CCNC: 59 U/L (ref 33–136)
ALT SERPL W P-5'-P-CCNC: 9 U/L (ref 10–52)
ANION GAP SERPL CALC-SCNC: 12 MMOL/L (ref 10–20)
ANTIBODY SCREEN: NORMAL
APTT PPP: 23 SECONDS (ref 27–38)
AST SERPL W P-5'-P-CCNC: 18 U/L (ref 9–39)
BASOPHILS # BLD AUTO: 0 X10*3/UL (ref 0–0.1)
BASOPHILS NFR BLD AUTO: 0 %
BILIRUB SERPL-MCNC: 0.6 MG/DL (ref 0–1.2)
BLOOD EXPIRATION DATE: NORMAL
BUN SERPL-MCNC: 12 MG/DL (ref 6–23)
BURR CELLS BLD QL SMEAR: NORMAL
C COLI+JEJ+UPSA DNA STL QL NAA+PROBE: NOT DETECTED
CALCIUM SERPL-MCNC: 7.8 MG/DL (ref 8.6–10.6)
CHLORIDE SERPL-SCNC: 104 MMOL/L (ref 98–107)
CO2 SERPL-SCNC: 23 MMOL/L (ref 21–32)
CREAT SERPL-MCNC: 0.65 MG/DL (ref 0.5–1.3)
DISPENSE STATUS: NORMAL
EC STX1 GENE STL QL NAA+PROBE: NOT DETECTED
EC STX2 GENE STL QL NAA+PROBE: NOT DETECTED
EGFRCR SERPLBLD CKD-EPI 2021: >90 ML/MIN/1.73M*2
EOSINOPHIL # BLD AUTO: 0 X10*3/UL (ref 0–0.7)
EOSINOPHIL NFR BLD AUTO: 0 %
ERYTHROCYTE [DISTWIDTH] IN BLOOD BY AUTOMATED COUNT: 14.9 % (ref 11.5–14.5)
FIBRINOGEN PPP-MCNC: 436 MG/DL (ref 200–400)
GLUCOSE SERPL-MCNC: 119 MG/DL (ref 74–99)
HCT VFR BLD AUTO: 19.8 % (ref 41–52)
HGB BLD-MCNC: 7.1 G/DL (ref 13.5–17.5)
IMM GRANULOCYTES # BLD AUTO: 0 X10*3/UL (ref 0–0.7)
IMM GRANULOCYTES NFR BLD AUTO: 0 % (ref 0–0.9)
INR PPP: 1.1 (ref 0.9–1.1)
LDH SERPL L TO P-CCNC: 189 U/L (ref 84–246)
LYMPHOCYTES # BLD AUTO: 0 X10*3/UL (ref 1.2–4.8)
LYMPHOCYTES NFR BLD AUTO: 0 %
MAGNESIUM SERPL-MCNC: 1.99 MG/DL (ref 1.6–2.4)
MCH RBC QN AUTO: 26.4 PG (ref 26–34)
MCHC RBC AUTO-ENTMCNC: 35.9 G/DL (ref 32–36)
MCV RBC AUTO: 74 FL (ref 80–100)
MONOCYTES # BLD AUTO: 0 X10*3/UL (ref 0.1–1)
MONOCYTES NFR BLD AUTO: 0 %
NEUTROPHILS # BLD AUTO: 0.01 X10*3/UL (ref 1.2–7.7)
NEUTROPHILS NFR BLD AUTO: 100 %
NOROVIRUS GI + GII RNA STL NAA+PROBE: NOT DETECTED
NRBC BLD-RTO: 0 /100 WBCS (ref 0–0)
OVALOCYTES BLD QL SMEAR: NORMAL
PHOSPHATE SERPL-MCNC: 2.7 MG/DL (ref 2.5–4.9)
PLATELET # BLD AUTO: 7 X10*3/UL (ref 150–450)
POTASSIUM SERPL-SCNC: 3.7 MMOL/L (ref 3.5–5.3)
PRODUCT BLOOD TYPE: 6200
PRODUCT CODE: NORMAL
PROT SERPL-MCNC: 6 G/DL (ref 6.4–8.2)
PROTHROMBIN TIME: 12.3 SECONDS (ref 9.8–12.8)
RBC # BLD AUTO: 2.69 X10*6/UL (ref 4.5–5.9)
RBC MORPH BLD: NORMAL
RH FACTOR (ANTIGEN D): NORMAL
RV RNA STL NAA+PROBE: NOT DETECTED
SALMONELLA DNA STL QL NAA+PROBE: NOT DETECTED
SHIGELLA DNA SPEC QL NAA+PROBE: NOT DETECTED
SODIUM SERPL-SCNC: 135 MMOL/L (ref 136–145)
TARGETS BLD QL SMEAR: NORMAL
UNIT ABO: NORMAL
UNIT NUMBER: NORMAL
UNIT RH: NORMAL
UNIT VOLUME: 219
V CHOLERAE DNA STL QL NAA+PROBE: NOT DETECTED
WBC # BLD AUTO: <0.1 X10*3/UL (ref 4.4–11.3)
Y ENTEROCOL DNA STL QL NAA+PROBE: NOT DETECTED

## 2024-11-04 PROCEDURE — 84100 ASSAY OF PHOSPHORUS: CPT

## 2024-11-04 PROCEDURE — 85384 FIBRINOGEN ACTIVITY: CPT

## 2024-11-04 PROCEDURE — 77334 RADIATION TREATMENT AID(S): CPT | Performed by: RADIOLOGY

## 2024-11-04 PROCEDURE — 2500000002 HC RX 250 W HCPCS SELF ADMINISTERED DRUGS (ALT 637 FOR MEDICARE OP, ALT 636 FOR OP/ED): Performed by: PHYSICIAN ASSISTANT

## 2024-11-04 PROCEDURE — 2500000001 HC RX 250 WO HCPCS SELF ADMINISTERED DRUGS (ALT 637 FOR MEDICARE OP)

## 2024-11-04 PROCEDURE — 36430 TRANSFUSION BLD/BLD COMPNT: CPT

## 2024-11-04 PROCEDURE — 87799 DETECT AGENT NOS DNA QUANT: CPT | Performed by: INTERNAL MEDICINE

## 2024-11-04 PROCEDURE — 99233 SBSQ HOSP IP/OBS HIGH 50: CPT | Performed by: INTERNAL MEDICINE

## 2024-11-04 PROCEDURE — 77300 RADIATION THERAPY DOSE PLAN: CPT | Performed by: RADIOLOGY

## 2024-11-04 PROCEDURE — 2500000002 HC RX 250 W HCPCS SELF ADMINISTERED DRUGS (ALT 637 FOR MEDICARE OP, ALT 636 FOR OP/ED): Performed by: INTERNAL MEDICINE

## 2024-11-04 PROCEDURE — 87533 HHV-6 DNA QUANT: CPT | Performed by: PHYSICIAN ASSISTANT

## 2024-11-04 PROCEDURE — 1170000001 HC PRIVATE ONCOLOGY ROOM DAILY

## 2024-11-04 PROCEDURE — 83615 LACTATE (LD) (LDH) ENZYME: CPT | Performed by: NURSE PRACTITIONER

## 2024-11-04 PROCEDURE — 2500000002 HC RX 250 W HCPCS SELF ADMINISTERED DRUGS (ALT 637 FOR MEDICARE OP, ALT 636 FOR OP/ED): Performed by: STUDENT IN AN ORGANIZED HEALTH CARE EDUCATION/TRAINING PROGRAM

## 2024-11-04 PROCEDURE — 77370 RADIATION PHYSICS CONSULT: CPT | Performed by: RADIOLOGY

## 2024-11-04 PROCEDURE — 80053 COMPREHEN METABOLIC PANEL: CPT

## 2024-11-04 PROCEDURE — 2500000004 HC RX 250 GENERAL PHARMACY W/ HCPCS (ALT 636 FOR OP/ED): Mod: JZ

## 2024-11-04 PROCEDURE — 85025 COMPLETE CBC W/AUTO DIFF WBC: CPT

## 2024-11-04 PROCEDURE — 83735 ASSAY OF MAGNESIUM: CPT

## 2024-11-04 PROCEDURE — 2500000004 HC RX 250 GENERAL PHARMACY W/ HCPCS (ALT 636 FOR OP/ED): Performed by: INTERNAL MEDICINE

## 2024-11-04 PROCEDURE — 86920 COMPATIBILITY TEST SPIN: CPT

## 2024-11-04 PROCEDURE — 85730 THROMBOPLASTIN TIME PARTIAL: CPT

## 2024-11-04 PROCEDURE — 2500000001 HC RX 250 WO HCPCS SELF ADMINISTERED DRUGS (ALT 637 FOR MEDICARE OP): Performed by: STUDENT IN AN ORGANIZED HEALTH CARE EDUCATION/TRAINING PROGRAM

## 2024-11-04 PROCEDURE — 86900 BLOOD TYPING SEROLOGIC ABO: CPT

## 2024-11-04 PROCEDURE — P9037 PLATE PHERES LEUKOREDU IRRAD: HCPCS

## 2024-11-04 PROCEDURE — 2500000001 HC RX 250 WO HCPCS SELF ADMINISTERED DRUGS (ALT 637 FOR MEDICARE OP): Performed by: INTERNAL MEDICINE

## 2024-11-04 RX ORDER — PALONOSETRON 0.05 MG/ML
0.25 INJECTION, SOLUTION INTRAVENOUS ONCE
Status: COMPLETED | OUTPATIENT
Start: 2024-11-04 | End: 2024-11-04

## 2024-11-04 ASSESSMENT — COGNITIVE AND FUNCTIONAL STATUS - GENERAL
DAILY ACTIVITIY SCORE: 24
MOBILITY SCORE: 24

## 2024-11-04 ASSESSMENT — PAIN SCALES - GENERAL
PAINLEVEL_OUTOF10: 0 - NO PAIN

## 2024-11-04 ASSESSMENT — PAIN - FUNCTIONAL ASSESSMENT
PAIN_FUNCTIONAL_ASSESSMENT: 0-10
PAIN_FUNCTIONAL_ASSESSMENT: 0-10

## 2024-11-04 NOTE — NURSING NOTE
Nursing Note  Patient received dose #4/4 Fludarabine 41.5 MG in 111.66  0.9 % NaCl  IV  infusion via  white lumen of right internal jugular CVP line over 30 minutes. Prior initiation of chemotherapy infusion, dose, medication, diluent and patient ID checked and verified at bed side with Jessica Caballero RN. Also, prior initiation of chemotherapy  patient premedicated with Palonosetron  0.25  MG IV push. Positive blood return obtained via syringe aspiration from white lumen of CVP line prior initiation and upon completion of chemotherapy IV infusion. Denies any nausea and vomiting. Continue to receive IV fluid 0.9 % NaCl  at 100 ML/HR.

## 2024-11-04 NOTE — NURSING NOTE
Dose # D -3 of Fludarabine chemotherapy 41.5mg in 111.6 mL given over 30min via x2 lumen internal jugular catheter.   Patient, dose and rate verified with second RN, Olimpia Rojas.  + BBR obtained via syringe aspiration before and after administration.  Patient with no side effects.    Pt received ATG

## 2024-11-04 NOTE — PROGRESS NOTES
Brandt Merritt is a 64 y.o. male on day 52 of admission presenting with Acute myeloid leukemia in remission (Multi).    Subjective   Patient is sitting up at bedside with wife in visiting.  He states diarrhea is improving with imodium.   Other wise he remains stable.      Objective   Physical Exam  Constitutional:       General: He is not in acute distress.  HENT:      Head: Normocephalic and atraumatic.      Nose: Nose normal.      Mouth/Throat:      Mouth: Mucous membranes are moist.   Eyes:      General: No scleral icterus.     Extraocular Movements: Extraocular movements intact.      Pupils: Pupils are equal, round, and reactive to light.   Cardiovascular:      Rate and Rhythm: Normal rate and regular rhythm.   Pulmonary:      Effort: Pulmonary effort is normal. No respiratory distress.      Breath sounds: Normal breath sounds.   Abdominal:      General: Bowel sounds are normal. There is no distension.      Palpations: Abdomen is soft.      Tenderness: There is no abdominal tenderness.   Musculoskeletal:         General: Normal range of motion.      Right lower leg: No edema.      Left lower leg: No edema.   Skin:     General: Skin is warm and dry.   Neurological:      General: No focal deficit present.      Mental Status: He is alert and oriented to person, place, and time.   Psychiatric:         Mood and Affect: Mood normal.         Behavior: Behavior normal.       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      T+46 today (11/04/24)     Active issues today (11/04/24):  # Primary Graft failure; proceeding with haploidentical stem-cell rescue.  # Tremor :: likely due to tacrolimus, improving.   # EBV viremia, low-level; s/p  rituximab (10/31/24)     ONC  # MDS/AML   - Symptoms began 9/2023; diagnosed 4/2024  - BMBx showing MDS in transition to AML (>10% jaime) w/ trisomy 8, DNMT alpha, and U2AF1 mutation indication adverse risk   - S/p 4 cycles of Decitabine/Venetoclax (C4D1 on 8/12/24)  - BMBx (6/17/24): Blasts cleared, FISH still positive for trisomy 8, still MDS changes   - BMBx (9/5/24): 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 and no increase in blast    # SCT #1: Single-unit Cord, T0=9/19/24  # Primary Engraftment Failure  - Conditioning: Flu-Mariana-TBI  - Donor: Single Cord, 6/8  = ABO Donor / Recipient: A+ / A+  = CMV Donor / Recipient: - / +  - aGVHD Prophylaxis: Tacrolimus + MMF  - Sent plasma CXCL9 level to [facility] to assess for immunologic graft rejection (10/16/24): 431 pg/mL, which is normal (less suggestive of immunologic rejection)  - Repeat BMBx (10/15): hypocellular with no residual myeloid neoplasm. Chimerism 1% Donor, 99% Recipient     Currently T-2  # SCT #2: Haploidentical sister, T0=11/6/24  - Pre-SCT testing:  = CT chest (10/30): no pulmonary findings, trace pericardial effusion  = Onco-TTE (10/31): pending  - Graft: Haploidentical sister  = ABO Donor / Recipient: O+ / A+  = CMV Donor / Recipient: + / +  - Conditioning: Flu/Cy/TBI/ATG  = Fludarabine 24 mg/m2 on T-6,-5,-4,-3,-2   = Cyclophosphamide 14.5mg/kg T-6,-5   =  cGy T-1   = ATG 1.5mg/kg T-5,-3,-1   GCSF T+5   - aGVHD Prophylaxis: ATG, Post-Transplant Cyclophosphamide  = PtCy 25mg/kg T+3,+4   = Tac and MMF T+5    # GVHD  - No signs or symptoms of aGVHD  - Prophylaxis:  = Currently off tacrolimus before Haplo transplant: stopped 10/29/24, not checking levels until needed again  = Off mycophenolate since 10/11/24 due to concern for myelosuppression    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   - monitor counts daily   - Transfuse for Hgb <7 & PLT <10   - s/p Neupogen 300 mcg  (9/24-10/30/24), resume T+5  # VTE Prophylaxis: SCDs & Ambulation I/s/o thrombocytopenia  # SOS Prophylaxis: Ursodiol      ID  # EBV Viremia  - EBV: 375 U/L (10/28), up from 77 U/L (10/21/24)  - Treating despite low levels due to upcoming second SCT  - Rituximab 600 mg x 1 (10/31/24)  - Continue to check EBV PCR weekly on Mondays  # Neutropenic fever (9/25, 10/12)  - Fever w/ diarrhea, workup neg, treated with Josefina initially with rash, switched to cefepime (9/27-10/10)  - CT chest/abd/pelvis only with diarrhea, no other findings  - Repeat blood cultures (10/22): negative  - Cefepime (10/22-10/26)  _Fever after ATG on 11/1 ; septic work up : BC: pending, UA:pending   # Viral surveillance:   - CMV (Mo/Th): ND (10/31)pending 11/4   - Adeno (Mo): ND (10/28) pending 11/4  - HHV6 (Mo): ND (10/21),  <188 (10/30) 11/4 :pending   # Prophylaxis:   - VZV: Acyclovir   - Aspergillus: Posaconazole  - C. Diff: Vancomycin  - CMV: Letermovir  - PJP: Pentamidine given 10/26    FEN/RENAL/GI  - Admit wt: 75.6 kg (9/13/24)  Most recent weight 66.7 kg (147 lb 0.8 oz) (11/3/2024  8:20 AM)  - Allergic to Lasix, will not take med (extreme hypotension, extreme hypokalemia)  # Monitor electrolytes and replace prn  # Hx of treated Hep C in 2015  - Hep C RNA - not detected (9/12/24)     CARDIO/PULM:  # Hx of STEMI (11/11/2020)  - TTE 7/19/24: EF 59%, otherwise unremarkable   - Was cleared by Cardiology for transplant   # Hx of HTN   - Nifedipine: 90 mEq ER 24 hr tablet   # Ascending aorta dilation   - TTE (4/29/24): 3.8-4 cm dilation     :  # Hx of BPH and urinary retention (2023)  # Hx elevated PSA    - 7/28/21 Bx: atypical small acinar proliferation    - 4/7/23 Bx: Benign    NEURO/PSYCH  # Tremor (10/10- )  :: Most likely due to tacrolimus vs cefepime  - Fine, high-frequency (7+ Hz) tremor worst in the hands, exacerbated by effort,   - Previously associated with significant fasciculations of the shoulder and back muscles; also  fasciculations in jaw -- these are no longer aparrent  - Cefepime stopped 10/10, tacrolimus stopped 10/29  - If still no improvement, could consider Neurology consult     DISPO:  - FULL CODE  - NOK: spouse Genevieve ([phone])  - PICC  - Oncologist: Dr. Newsome    Patient seen examined and discussed with Dr. Malaika Mitchell, APRN-CNP  BMT (Allogeneic SCT/Acute Leukemia) Team

## 2024-11-05 ENCOUNTER — LAB REQUISITION (OUTPATIENT)
Dept: LAB | Facility: CLINIC | Age: 64
End: 2024-11-05
Payer: COMMERCIAL

## 2024-11-05 ENCOUNTER — RADIATION ONCOLOGY OTV (OUTPATIENT)
Dept: RADIATION ONCOLOGY | Facility: HOSPITAL | Age: 64
End: 2024-11-05
Payer: COMMERCIAL

## 2024-11-05 ENCOUNTER — DOCUMENTATION (OUTPATIENT)
Dept: RADIATION ONCOLOGY | Facility: HOSPITAL | Age: 64
End: 2024-11-05

## 2024-11-05 ENCOUNTER — HOSPITAL ENCOUNTER (OUTPATIENT)
Dept: RADIATION ONCOLOGY | Facility: HOSPITAL | Age: 64
Setting detail: RADIATION/ONCOLOGY SERIES
Discharge: HOME | End: 2024-11-05
Payer: COMMERCIAL

## 2024-11-05 VITALS
OXYGEN SATURATION: 100 % | SYSTOLIC BLOOD PRESSURE: 107 MMHG | DIASTOLIC BLOOD PRESSURE: 68 MMHG | TEMPERATURE: 98.2 F | HEART RATE: 88 BPM | RESPIRATION RATE: 20 BRPM

## 2024-11-05 DIAGNOSIS — C92.00 ACUTE MYELOBLASTIC LEUKEMIA, NOT HAVING ACHIEVED REMISSION (MULTI): ICD-10-CM

## 2024-11-05 DIAGNOSIS — Z51.0 ENCOUNTER FOR ANTINEOPLASTIC RADIATION THERAPY: ICD-10-CM

## 2024-11-05 LAB
ADENOVIRUS QPCR,PLASMA, VIRC: NOT DETECTED COPIES/ML
ALBUMIN SERPL BCP-MCNC: 2.8 G/DL (ref 3.4–5)
ALP SERPL-CCNC: 56 U/L (ref 33–136)
ALT SERPL W P-5'-P-CCNC: 15 U/L (ref 10–52)
ANION GAP SERPL CALC-SCNC: 8 MMOL/L (ref 10–20)
APTT PPP: 24 SECONDS (ref 27–38)
AST SERPL W P-5'-P-CCNC: 27 U/L (ref 9–39)
BACTERIA BLD CULT: NORMAL
BACTERIA BLD CULT: NORMAL
BASOPHILS # BLD AUTO: 0 X10*3/UL (ref 0–0.1)
BASOPHILS NFR BLD AUTO: 0 %
BILIRUB SERPL-MCNC: 0.6 MG/DL (ref 0–1.2)
BUN SERPL-MCNC: 9 MG/DL (ref 6–23)
CALCIUM SERPL-MCNC: 7.5 MG/DL (ref 8.6–10.6)
CHLORIDE SERPL-SCNC: 104 MMOL/L (ref 98–107)
CMV DNA SERPL NAA+PROBE-LOG IU: NORMAL {LOG_IU}/ML
CO2 SERPL-SCNC: 27 MMOL/L (ref 21–32)
CREAT SERPL-MCNC: 0.71 MG/DL (ref 0.5–1.3)
EBV DNA SERPL NAA+PROBE-ACNC: 122 IU/ML
EBV DNA SPEC NAA+PROBE-LOG#: 2.09 LOG IU/ML
EGFRCR SERPLBLD CKD-EPI 2021: >90 ML/MIN/1.73M*2
EOSINOPHIL # BLD AUTO: 0 X10*3/UL (ref 0–0.7)
EOSINOPHIL NFR BLD AUTO: 0 %
ERYTHROCYTE [DISTWIDTH] IN BLOOD BY AUTOMATED COUNT: 14.7 % (ref 11.5–14.5)
FIBRINOGEN PPP-MCNC: 450 MG/DL (ref 200–400)
GLUCOSE SERPL-MCNC: 100 MG/DL (ref 74–99)
HCT VFR BLD AUTO: 18.5 % (ref 41–52)
HGB BLD-MCNC: 6.7 G/DL (ref 13.5–17.5)
HLA CLS I TYP PNL BLD/T DONR HIGH RES: NORMAL
HLA CLS I TYP PNL BLD/T DONR HIGH RES: NORMAL
HLA RESULTS: NORMAL
HLA RESULTS: NORMAL
HLA-DP2 QL: NORMAL
HLA-DP2 QL: NORMAL
HLA-DQB1 HIGH RES: NORMAL
HLA-DQB1 HIGH RES: NORMAL
HLA-DRB1 HIGH RES: NORMAL
HLA-DRB1 HIGH RES: NORMAL
HUMAN HERPESVIRUS-6 PCR PLASMA: 451 COPIES/ML
IMM GRANULOCYTES # BLD AUTO: 0 X10*3/UL (ref 0–0.7)
IMM GRANULOCYTES NFR BLD AUTO: 0 % (ref 0–0.9)
INR PPP: 1 (ref 0.9–1.1)
LABORATORY COMMENT REPORT: DETECTED
LABORATORY COMMENT REPORT: NOT DETECTED
LYMPHOCYTES # BLD AUTO: 0 X10*3/UL (ref 1.2–4.8)
LYMPHOCYTES NFR BLD AUTO: 0 %
MAGNESIUM SERPL-MCNC: 1.86 MG/DL (ref 1.6–2.4)
MCH RBC QN AUTO: 26.7 PG (ref 26–34)
MCHC RBC AUTO-ENTMCNC: 36.2 G/DL (ref 32–36)
MCV RBC AUTO: 74 FL (ref 80–100)
MONOCYTES # BLD AUTO: 0 X10*3/UL (ref 0.1–1)
MONOCYTES NFR BLD AUTO: 0 %
NEUTROPHILS # BLD AUTO: 0.01 X10*3/UL (ref 1.2–7.7)
NEUTROPHILS NFR BLD AUTO: 100 %
NRBC BLD-RTO: 0 /100 WBCS (ref 0–0)
PHOSPHATE SERPL-MCNC: 2.8 MG/DL (ref 2.5–4.9)
PLATELET # BLD AUTO: 21 X10*3/UL (ref 150–450)
POTASSIUM SERPL-SCNC: 3.3 MMOL/L (ref 3.5–5.3)
PROT SERPL-MCNC: 5.6 G/DL (ref 6.4–8.2)
PROTHROMBIN TIME: 11.8 SECONDS (ref 9.8–12.8)
RAD ONC MSQ ACTUAL FRACTIONS DELIVERED: 1
RAD ONC MSQ ACTUAL SESSION DELIVERED DOSE: 200 CGRAY
RAD ONC MSQ ACTUAL TOTAL DOSE: 200 CGRAY
RAD ONC MSQ ELAPSED DAYS: 0
RAD ONC MSQ LAST DATE: NORMAL
RAD ONC MSQ PRESCRIBED FRACTIONAL DOSE: 200 CGRAY
RAD ONC MSQ PRESCRIBED NUMBER OF FRACTIONS: 1
RAD ONC MSQ PRESCRIBED TECHNIQUE: NORMAL
RAD ONC MSQ PRESCRIBED TOTAL DOSE: 200 CGRAY
RAD ONC MSQ PRESCRIPTION PATTERN COMMENT: NORMAL
RAD ONC MSQ START DATE: NORMAL
RAD ONC MSQ TREATMENT COURSE NUMBER: 1
RAD ONC MSQ TREATMENT SITE: NORMAL
RBC # BLD AUTO: 2.51 X10*6/UL (ref 4.5–5.9)
SODIUM SERPL-SCNC: 136 MMOL/L (ref 136–145)
WBC # BLD AUTO: <0.1 X10*3/UL (ref 4.4–11.3)

## 2024-11-05 PROCEDURE — 77331 SPECIAL RADIATION DOSIMETRY: CPT | Mod: XU | Performed by: RADIOLOGY

## 2024-11-05 PROCEDURE — 77336 RADIATION PHYSICS CONSULT: CPT | Mod: 59 | Performed by: RADIOLOGY

## 2024-11-05 PROCEDURE — 85610 PROTHROMBIN TIME: CPT

## 2024-11-05 PROCEDURE — 85025 COMPLETE CBC W/AUTO DIFF WBC: CPT

## 2024-11-05 PROCEDURE — 2500000001 HC RX 250 WO HCPCS SELF ADMINISTERED DRUGS (ALT 637 FOR MEDICARE OP)

## 2024-11-05 PROCEDURE — 77412 RADIATION TX DELIVERY LVL 3: CPT | Performed by: RADIOLOGY

## 2024-11-05 PROCEDURE — 83735 ASSAY OF MAGNESIUM: CPT

## 2024-11-05 PROCEDURE — 2500000002 HC RX 250 W HCPCS SELF ADMINISTERED DRUGS (ALT 637 FOR MEDICARE OP, ALT 636 FOR OP/ED): Performed by: STUDENT IN AN ORGANIZED HEALTH CARE EDUCATION/TRAINING PROGRAM

## 2024-11-05 PROCEDURE — 2500000005 HC RX 250 GENERAL PHARMACY W/O HCPCS

## 2024-11-05 PROCEDURE — 2500000002 HC RX 250 W HCPCS SELF ADMINISTERED DRUGS (ALT 637 FOR MEDICARE OP, ALT 636 FOR OP/ED): Performed by: INTERNAL MEDICINE

## 2024-11-05 PROCEDURE — 77280 THER RAD SIMULAJ FIELD SMPL: CPT | Performed by: STUDENT IN AN ORGANIZED HEALTH CARE EDUCATION/TRAINING PROGRAM

## 2024-11-05 PROCEDURE — 85384 FIBRINOGEN ACTIVITY: CPT

## 2024-11-05 PROCEDURE — 99233 SBSQ HOSP IP/OBS HIGH 50: CPT | Performed by: INTERNAL MEDICINE

## 2024-11-05 PROCEDURE — 2500000002 HC RX 250 W HCPCS SELF ADMINISTERED DRUGS (ALT 637 FOR MEDICARE OP, ALT 636 FOR OP/ED)

## 2024-11-05 PROCEDURE — 2500000004 HC RX 250 GENERAL PHARMACY W/ HCPCS (ALT 636 FOR OP/ED): Mod: JZ

## 2024-11-05 PROCEDURE — 2500000004 HC RX 250 GENERAL PHARMACY W/ HCPCS (ALT 636 FOR OP/ED): Mod: JZ | Performed by: INTERNAL MEDICINE

## 2024-11-05 PROCEDURE — 2500000002 HC RX 250 W HCPCS SELF ADMINISTERED DRUGS (ALT 637 FOR MEDICARE OP, ALT 636 FOR OP/ED): Performed by: PHYSICIAN ASSISTANT

## 2024-11-05 PROCEDURE — P9040 RBC LEUKOREDUCED IRRADIATED: HCPCS

## 2024-11-05 PROCEDURE — 2500000001 HC RX 250 WO HCPCS SELF ADMINISTERED DRUGS (ALT 637 FOR MEDICARE OP): Performed by: STUDENT IN AN ORGANIZED HEALTH CARE EDUCATION/TRAINING PROGRAM

## 2024-11-05 PROCEDURE — 77470 SPECIAL RADIATION TREATMENT: CPT | Performed by: RADIOLOGY

## 2024-11-05 PROCEDURE — 80053 COMPREHEN METABOLIC PANEL: CPT

## 2024-11-05 PROCEDURE — 2500000001 HC RX 250 WO HCPCS SELF ADMINISTERED DRUGS (ALT 637 FOR MEDICARE OP): Performed by: INTERNAL MEDICINE

## 2024-11-05 PROCEDURE — 84100 ASSAY OF PHOSPHORUS: CPT

## 2024-11-05 PROCEDURE — 1170000001 HC PRIVATE ONCOLOGY ROOM DAILY

## 2024-11-05 PROCEDURE — 36430 TRANSFUSION BLD/BLD COMPNT: CPT

## 2024-11-05 PROCEDURE — 2500000004 HC RX 250 GENERAL PHARMACY W/ HCPCS (ALT 636 FOR OP/ED): Performed by: REGISTERED NURSE

## 2024-11-05 RX ORDER — POTASSIUM CHLORIDE 20 MEQ/1
40 TABLET, EXTENDED RELEASE ORAL ONCE
Status: COMPLETED | OUTPATIENT
Start: 2024-11-05 | End: 2024-11-05

## 2024-11-05 RX ORDER — DIPHENHYDRAMINE HCL 50 MG
50 CAPSULE ORAL ONCE
Status: COMPLETED | OUTPATIENT
Start: 2024-11-05 | End: 2024-11-05

## 2024-11-05 RX ORDER — ONDANSETRON HYDROCHLORIDE 2 MG/ML
8 INJECTION, SOLUTION INTRAVENOUS ONCE
Status: COMPLETED | OUTPATIENT
Start: 2024-11-05 | End: 2024-11-05

## 2024-11-05 RX ORDER — ACETAMINOPHEN 325 MG/1
650 TABLET ORAL ONCE
Status: COMPLETED | OUTPATIENT
Start: 2024-11-05 | End: 2024-11-05

## 2024-11-05 ASSESSMENT — PAIN SCALES - GENERAL
PAINLEVEL_OUTOF10: 0 - NO PAIN
PAINLEVEL_OUTOF10: 0-NO PAIN
PAINLEVEL_OUTOF10: 0 - NO PAIN

## 2024-11-05 ASSESSMENT — COGNITIVE AND FUNCTIONAL STATUS - GENERAL
MOBILITY SCORE: 24
DAILY ACTIVITIY SCORE: 24
DAILY ACTIVITIY SCORE: 24
MOBILITY SCORE: 24

## 2024-11-05 ASSESSMENT — PAIN - FUNCTIONAL ASSESSMENT
PAIN_FUNCTIONAL_ASSESSMENT: 0-10

## 2024-11-05 NOTE — SIGNIFICANT EVENT
11/05/24 0011   Prechemo Checklist   Has the patient been in the hospital, ED, or urgent care since last date of service N/A   Chemo/Immuno Consent Completed and Signed Yes   Protocol/Indications Verified Yes   Confirmed to previous date/time of medication Yes   Compared to previous dose Yes   All medications are dated accurately Yes   Pregnancy Test Negative Not applicable   Parameters Met Yes   BSA/Weight-Height Verified Yes   Dose Calculations Verified (current, total, cumulative) Yes

## 2024-11-05 NOTE — SIGNIFICANT EVENT
ATG ( rabbit) Thymoglobulin 100 MG in 0.9 % NaCl 250 ML IV infusion initiated via brown lumen of right internal jugular CVP line and to be infused over 6 hours. Prior initiation of ATG IV infusion dose, medication, diluent and patient ID checked and verified at bed side with Karon Hull RN. Also, patient premedicated  with Tylenol 650 MG PO, Benadryl 50 MG PO and Methylprednisolone 65.625 MG IV push. Positive blood return obtained via syringe aspiration from brown lumen  of CVP line prior initiation of ATG   infusion.

## 2024-11-05 NOTE — PROGRESS NOTES
Radiation Oncology On Treatment Visit    Patient Name:  Brandt Merritt  MRN:  [id number]  :  1960    Referring Provider: No ref. provider found  Primary Care Provider: Bill Richmond MD  Care Team: Patient Care Team:  Bill Richmond MD as PCP - General (Internal Medicine)  Rosanne M Casal, APRN-CNP, DNP as PCP - [facility] PCP  Sadia CHAUDHARY MD as Consulting Physician (Hematology and Oncology)  Malaika Newsome MD as Consulting Physician (Hematology and Oncology)  Katherine Tomlin RN as Registered Nurse (Hematology and Oncology)    Date of Service: 2024     Diagnosis:   Specialty Problems          Radiation Oncology Problems    Acute myeloid leukemia in remission (Multi)         Treatment Summary:  Electron Beam: Not Applicable Total body, Not Applicable Total body    Treatment Period Technique Fraction Dose Fractions Total Dose   Course 1 2024-2024  (days elapsed: 0)         TBI 2024-2024 Opposed Laterals 200 / 200 cGy  200 / 200 cGy     SUBJECTIVE: The patient feels well and has no issues.       OBJECTIVE:   Vital Signs:  /68   Pulse 88   Temp 36.8 °C (98.2 °F)   Resp 20   SpO2 100%     Other Pertinent Findings:     Toxicity Assessment          2024    16:59 2024    10:00   Toxicity Assessment   Adverse Events Reviewed (WDL) No (Exceptions to WDL) No (Exceptions to WDL)   Treatment Site General General   Anorexia Grade 0 Grade 0   Anxiety Grade 0 Grade 0   Dehydration Grade 0 Grade 0   Diarrhea Grade 0 Grade 0   Fatigue Grade 0 Grade 0   Nausea Grade 0 Grade 0   Pain Grade 0 Grade 0   Vomiting Grade 0 Grade 0        Assessment / Plan:  The patient is tolerating radiation therapy as anticipated.  Continue per current treatment plan.

## 2024-11-05 NOTE — PROGRESS NOTES
"Brandt Merritt is a 64 y.o. male on day 53 of admission presenting with Acute myeloid leukemia in remission (Multi).    Subjective   C/o frequent urination overnight due to the IVFs running constantly. Ate a sausage sandwich yesterday. Appetite is still not that great. ROS otherwise unremarkable.        Objective     Physical Exam  Constitutional:       Appearance: Normal appearance.   HENT:      Head: Normocephalic and atraumatic.      Comments: Mild temporal wasting     Nose: Nose normal.      Mouth/Throat:      Mouth: Mucous membranes are dry.      Pharynx: Oropharynx is clear.   Eyes:      Conjunctiva/sclera: Conjunctivae normal.      Pupils: Pupils are equal, round, and reactive to light.   Cardiovascular:      Rate and Rhythm: Normal rate and regular rhythm.      Pulses: Normal pulses.      Heart sounds: Normal heart sounds.   Pulmonary:      Effort: Pulmonary effort is normal.      Breath sounds: Normal breath sounds.   Abdominal:      General: Abdomen is flat. Bowel sounds are normal.      Palpations: Abdomen is soft.   Musculoskeletal:         General: Normal range of motion.      Cervical back: Normal range of motion and neck supple.      Comments: DL Tunneled R Internal Jugular catheter. Entry site c/d/i   Skin:     General: Skin is warm and dry.      Capillary Refill: Capillary refill takes 2 to 3 seconds.   Neurological:      General: No focal deficit present.      Mental Status: He is alert and oriented to person, place, and time.   Psychiatric:         Mood and Affect: Mood normal.         Behavior: Behavior normal.         Last Recorded Vitals  Blood pressure 107/68, pulse 88, temperature 36.8 °C (98.2 °F), temperature source Temporal, resp. rate 20, height (S) 1.664 m (5' 5.51\"), weight 66.7 kg (147 lb 0.8 oz), SpO2 100%.  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      T+48 today (11/5/24)      Active issues today (11/04/24):  # Primary Graft failure; proceeding with Haploidentical stem-cell rescue.  # Tremor :: likely due to Tacrolimus, improving.   # EBV viremia, low-level; s/p Rituximab (10/31/24)     ONC  # MDS/AML   - Symptoms began 9/2023; diagnosed 4/2024  - BMBx showing MDS in transition to AML (>10% jaime) w/ trisomy 8, DNMT alpha, and U2AF1 mutation indication adverse risk   - S/p 4 cycles of Decitabine/Venetoclax (C4D1 on 8/12/24)  - BMBx (6/17/24): Blasts cleared, FISH still positive for trisomy 8, still MDS changes   - BMBx (9/5/24): 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 and no increase in blasts     # SCT #1: Single-unit Cord, T0=9/19/24  # Primary Engraftment Failure  - Conditioning: Flu-Mariana-TBI  - Donor: Single Cord, 6/8  = ABO Donor / Recipient: A+ / A+  = CMV Donor / Recipient: - / +  - aGVHD Prophylaxis: Tacrolimus + MMF  - Sent plasma CXCL9 level to [place] to assess for immunologic graft rejection (10/16/24): 431 pg/mL, which is normal (less suggestive of immunologic rejection)  - Repeat BMBx (10/15): hypocellular with no residual myeloid neoplasm. Chimerism 1% Donor, 99% Recipient      Currently T-1  # SCT #2: Haploidentical sister, T0=11/6/24  - Pre-SCT testing:  = CT Chest (10/30): no abnormal pulmonary findings, trace pericardial effusion  = Onco-TTE (10/31): EF 58%  -  Graft: Haploidentical sister  = ABO Donor / Recipient: O+ / A+  = CMV Donor / Recipient: + / +  - Conditioning: Flu/Cy/TBI/ATG  = Fludarabine 24 mg/m2 on T-6,-5,-4,-3,-2   = Cyclophosphamide 14.5mg/kg T-6,-5   =  cGy T-1   = ATG 1.5mg/kg T-5,-3,-1   GCSF T+5   - aGVHD Prophylaxis: ATG, Post-Transplant Cyclophosphamide  = PtCy 25mg/kg T+3,+4   = Tacro and MMF T+5  - IVFs placed on 11/5. 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   - Monitor counts daily   - Transfuse for Hgb <7 & PLT <10      - S/p PRBC: 11/5  - S/p Neupogen 300 mcg (9/24-10/30/24), resume T+5  # VTE Prophylaxis: SCDs & Ambulation I/s/o thrombocytopenia  # SOS Prophylaxis: Ursodiol      ID  # EBV Viremia  - EBV: 375 U/L (10/28), up from 77 U/L (10/21/24)  - Treating despite low levels due to upcoming second SCT  - Rituximab 600 mg x 1 (10/31/24)  - Check EBV PCR weekly on Mondays, 11/4 pending  # Neutropenic fever (9/25, 10/12)  - Fever w/ diarrhea, workup neg, treated initially with Josefina, switched to Cefepime d/t rash (9/27-10/10)  - CT C/A/P d/t diarrhea. No abnormal findings  - Repeat blood cultures (10/22): negative  - Cefepime (10/22-10/26)  # Fever after ATG on 11/1 ; septic work up (Blood cx and UA both negative)   # Viral surveillance:   - CMV (Mo/Th): ND (10/31) pending 11/4   - Adeno (Mo): ND (10/28) ND (11/4)  - HHV6 (Mo): ND (10/21),  <188 (10/30) 11/4 :pending   # Prophylaxis:   - VZV: Acyclovir   - Aspergillus: Posaconazole  - C. Diff: Vancomycin  - CMV: Letermovir  - PJP: Pentamidine given 10/26     FEN/RENAL/GI  - Admit wt: 75.6 kg (9/13/24)  Most recent weight 66.7 kg (147 lb 0.8 oz) (11/3/2024  8:20 AM)  # 9 kgs weight loss. Nutrition Consulted (11/4)  - Allergic to  Lasix, will not take med (extreme hypotension, extreme hypokalemia)  # Hypokalemia. Repleted last (11/5)  # Hx of treated Hep C in 2015  - Hep C RNA - not detected (9/12/24)     CARDIO/PULM:  # Hx of STEMI (11/11/2020)  - TTE 7/19/24: EF 59%, otherwise unremarkable   - Was cleared by Cardiology for transplant   # Hx of HTN   - Nifedipine: 90 mEq ER 24 hr tablet   # Ascending aorta dilation   - TTE (4/29/24): 3.8-4 cm dilation      :  # Hx of BPH and urinary retention (2023)  # Hx elevated PSA    - 7/28/21 Bx: atypical small acinar proliferation    - 4/7/23 Bx: Benign     NEURO/PSYCH  # Tremor (10/10- )  :: Most likely due to tacrolimus vs cefepime  - Fine, high-frequency (7+ Hz) tremor worst in the hands, exacerbated by effort,   - Previously associated with significant fasciculations of the shoulder and back muscles; also fasciculations in jaw -- these are no longer aparrent  - Cefepime stopped 10/10, tacrolimus stopped 10/29  - If still no improvement, could consider Neurology consult     DISPO:  - FULL CODE  - NOK: spouse Genevieve ([phone])  - DL Tunneled R Internal Jugular catheter  - Oncologist: Dr. Newsome     Patient seen examined and discussed with Dr. Malaika Newsome        I spent 45 minutes in the professional and overall care of this patient.      HAYLEY Wyman-CNP

## 2024-11-05 NOTE — CARE PLAN
The patient's goals for the shift include      The clinical goals for the shift include Patient will be hemodynamically stable    Problem: Pain - Adult  Goal: Verbalizes/displays adequate comfort level or baseline comfort level  Outcome: Progressing     Problem: Safety - Adult  Goal: Free from fall injury  Outcome: Progressing     Problem: Discharge Planning  Goal: Discharge to home or other facility with appropriate resources  Outcome: Progressing     Problem: Chronic Conditions and Co-morbidities  Goal: Patient's chronic conditions and co-morbidity symptoms are monitored and maintained or improved  Outcome: Progressing     Problem: Nutrition  Goal: Oral intake greater 75%  Outcome: Progressing  Goal: Adequate PO fluid intake  Outcome: Progressing  Goal: Maintain stable weight  Outcome: Progressing

## 2024-11-06 LAB
ALBUMIN SERPL BCP-MCNC: 2.9 G/DL (ref 3.4–5)
ALP SERPL-CCNC: 62 U/L (ref 33–136)
ALT SERPL W P-5'-P-CCNC: 15 U/L (ref 10–52)
ANION GAP SERPL CALC-SCNC: 11 MMOL/L (ref 10–20)
APPEARANCE UR: CLEAR
APTT PPP: 26 SECONDS (ref 27–38)
AST SERPL W P-5'-P-CCNC: 19 U/L (ref 9–39)
BASOPHILS # BLD AUTO: 0 X10*3/UL (ref 0–0.1)
BASOPHILS NFR BLD AUTO: 0 %
BILIRUB SERPL-MCNC: 0.6 MG/DL (ref 0–1.2)
BILIRUB UR STRIP.AUTO-MCNC: NEGATIVE MG/DL
BLOOD EXPIRATION DATE: NORMAL
BUN SERPL-MCNC: 10 MG/DL (ref 6–23)
CALCIUM SERPL-MCNC: 7.9 MG/DL (ref 8.6–10.6)
CHLORIDE SERPL-SCNC: 104 MMOL/L (ref 98–107)
CO2 SERPL-SCNC: 25 MMOL/L (ref 21–32)
COLOR UR: COLORLESS
CREAT SERPL-MCNC: 0.52 MG/DL (ref 0.5–1.3)
DISPENSE STATUS: NORMAL
EGFRCR SERPLBLD CKD-EPI 2021: >90 ML/MIN/1.73M*2
EOSINOPHIL # BLD AUTO: 0 X10*3/UL (ref 0–0.7)
EOSINOPHIL NFR BLD AUTO: 0 %
ERYTHROCYTE [DISTWIDTH] IN BLOOD BY AUTOMATED COUNT: 14.1 % (ref 11.5–14.5)
FIBRINOGEN PPP-MCNC: 390 MG/DL (ref 200–400)
GLUCOSE SERPL-MCNC: 117 MG/DL (ref 74–99)
GLUCOSE UR STRIP.AUTO-MCNC: NORMAL MG/DL
HCT VFR BLD AUTO: 19.7 % (ref 41–52)
HGB BLD-MCNC: 7.2 G/DL (ref 13.5–17.5)
IMM GRANULOCYTES # BLD AUTO: 0 X10*3/UL (ref 0–0.7)
IMM GRANULOCYTES NFR BLD AUTO: 0 % (ref 0–0.9)
INR PPP: 1 (ref 0.9–1.1)
KETONES UR STRIP.AUTO-MCNC: NEGATIVE MG/DL
LEUKOCYTE ESTERASE UR QL STRIP.AUTO: NEGATIVE
LYMPHOCYTES # BLD AUTO: 0 X10*3/UL (ref 1.2–4.8)
LYMPHOCYTES NFR BLD AUTO: 0 %
MAGNESIUM SERPL-MCNC: 1.98 MG/DL (ref 1.6–2.4)
MCH RBC QN AUTO: 27.2 PG (ref 26–34)
MCHC RBC AUTO-ENTMCNC: 36.5 G/DL (ref 32–36)
MCV RBC AUTO: 74 FL (ref 80–100)
MONOCYTES # BLD AUTO: 0 X10*3/UL (ref 0.1–1)
MONOCYTES NFR BLD AUTO: 0 %
NEUTROPHILS # BLD AUTO: 0.01 X10*3/UL (ref 1.2–7.7)
NEUTROPHILS NFR BLD AUTO: 100 %
NITRITE UR QL STRIP.AUTO: NEGATIVE
NRBC BLD-RTO: 0 /100 WBCS (ref 0–0)
PH UR STRIP.AUTO: 7 [PH]
PHOSPHATE SERPL-MCNC: 3 MG/DL (ref 2.5–4.9)
PLATELET # BLD AUTO: 13 X10*3/UL (ref 150–450)
POTASSIUM SERPL-SCNC: 3.8 MMOL/L (ref 3.5–5.3)
PRODUCT BLOOD TYPE: 5100
PRODUCT CODE: NORMAL
PROT SERPL-MCNC: 5.8 G/DL (ref 6.4–8.2)
PROT UR STRIP.AUTO-MCNC: NEGATIVE MG/DL
PROTHROMBIN TIME: 11.5 SECONDS (ref 9.8–12.8)
RBC # BLD AUTO: 2.65 X10*6/UL (ref 4.5–5.9)
RBC # UR STRIP.AUTO: NEGATIVE /UL
RBC MORPH BLD: NORMAL
SODIUM SERPL-SCNC: 136 MMOL/L (ref 136–145)
SP GR UR STRIP.AUTO: 1.01
UNIT ABO: NORMAL
UNIT NUMBER: NORMAL
UNIT RH: NORMAL
UNIT VOLUME: 284
UROBILINOGEN UR STRIP.AUTO-MCNC: NORMAL MG/DL
WBC # BLD AUTO: <0.1 X10*3/UL (ref 4.4–11.3)
XM INTEP: NORMAL

## 2024-11-06 PROCEDURE — 2500000004 HC RX 250 GENERAL PHARMACY W/ HCPCS (ALT 636 FOR OP/ED)

## 2024-11-06 PROCEDURE — 84075 ASSAY ALKALINE PHOSPHATASE: CPT

## 2024-11-06 PROCEDURE — 2500000001 HC RX 250 WO HCPCS SELF ADMINISTERED DRUGS (ALT 637 FOR MEDICARE OP)

## 2024-11-06 PROCEDURE — 84100 ASSAY OF PHOSPHORUS: CPT

## 2024-11-06 PROCEDURE — 81003 URINALYSIS AUTO W/O SCOPE: CPT | Performed by: NURSE PRACTITIONER

## 2024-11-06 PROCEDURE — 87040 BLOOD CULTURE FOR BACTERIA: CPT | Performed by: NURSE PRACTITIONER

## 2024-11-06 PROCEDURE — 85730 THROMBOPLASTIN TIME PARTIAL: CPT

## 2024-11-06 PROCEDURE — 83735 ASSAY OF MAGNESIUM: CPT

## 2024-11-06 PROCEDURE — 38240 TRANSPLT ALLO HCT/DONOR: CPT

## 2024-11-06 PROCEDURE — 2500000002 HC RX 250 W HCPCS SELF ADMINISTERED DRUGS (ALT 637 FOR MEDICARE OP, ALT 636 FOR OP/ED): Performed by: PHYSICIAN ASSISTANT

## 2024-11-06 PROCEDURE — 1170000001 HC PRIVATE ONCOLOGY ROOM DAILY

## 2024-11-06 PROCEDURE — 99233 SBSQ HOSP IP/OBS HIGH 50: CPT | Performed by: INTERNAL MEDICINE

## 2024-11-06 PROCEDURE — 87086 URINE CULTURE/COLONY COUNT: CPT | Performed by: NURSE PRACTITIONER

## 2024-11-06 PROCEDURE — 38230 BONE MARROW HARVEST ALLOGEN: CPT | Performed by: INTERNAL MEDICINE

## 2024-11-06 PROCEDURE — 2500000002 HC RX 250 W HCPCS SELF ADMINISTERED DRUGS (ALT 637 FOR MEDICARE OP, ALT 636 FOR OP/ED): Performed by: STUDENT IN AN ORGANIZED HEALTH CARE EDUCATION/TRAINING PROGRAM

## 2024-11-06 PROCEDURE — 2500000004 HC RX 250 GENERAL PHARMACY W/ HCPCS (ALT 636 FOR OP/ED): Performed by: INTERNAL MEDICINE

## 2024-11-06 PROCEDURE — 85025 COMPLETE CBC W/AUTO DIFF WBC: CPT

## 2024-11-06 PROCEDURE — 2500000004 HC RX 250 GENERAL PHARMACY W/ HCPCS (ALT 636 FOR OP/ED): Performed by: NURSE PRACTITIONER

## 2024-11-06 PROCEDURE — 38208 THAW PRESERVED STEM CELLS: CPT

## 2024-11-06 PROCEDURE — 85384 FIBRINOGEN ACTIVITY: CPT

## 2024-11-06 PROCEDURE — 2500000002 HC RX 250 W HCPCS SELF ADMINISTERED DRUGS (ALT 637 FOR MEDICARE OP, ALT 636 FOR OP/ED): Performed by: INTERNAL MEDICINE

## 2024-11-06 PROCEDURE — 36415 COLL VENOUS BLD VENIPUNCTURE: CPT | Performed by: NURSE PRACTITIONER

## 2024-11-06 PROCEDURE — 2500000001 HC RX 250 WO HCPCS SELF ADMINISTERED DRUGS (ALT 637 FOR MEDICARE OP): Performed by: STUDENT IN AN ORGANIZED HEALTH CARE EDUCATION/TRAINING PROGRAM

## 2024-11-06 PROCEDURE — 2500000001 HC RX 250 WO HCPCS SELF ADMINISTERED DRUGS (ALT 637 FOR MEDICARE OP): Performed by: INTERNAL MEDICINE

## 2024-11-06 PROCEDURE — 30243Y2 TRANSFUSION OF ALLOGENEIC RELATED HEMATOPOIETIC STEM CELLS INTO CENTRAL VEIN, PERCUTANEOUS APPROACH: ICD-10-PCS | Performed by: INTERNAL MEDICINE

## 2024-11-06 PROCEDURE — 2500000001 HC RX 250 WO HCPCS SELF ADMINISTERED DRUGS (ALT 637 FOR MEDICARE OP): Performed by: NURSE PRACTITIONER

## 2024-11-06 RX ORDER — LORAZEPAM 2 MG/ML
1 INJECTION INTRAMUSCULAR ONCE AS NEEDED
Status: DISCONTINUED | OUTPATIENT
Start: 2024-11-06 | End: 2024-11-21 | Stop reason: HOSPADM

## 2024-11-06 RX ORDER — SODIUM CHLORIDE 9 MG/ML
100 INJECTION, SOLUTION INTRAVENOUS CONTINUOUS
Status: ACTIVE | OUTPATIENT
Start: 2024-11-06 | End: 2024-11-07

## 2024-11-06 RX ORDER — VANCOMYCIN HYDROCHLORIDE 750 MG/150ML
1500 INJECTION, SOLUTION INTRAVENOUS
Status: DISCONTINUED | OUTPATIENT
Start: 2024-11-06 | End: 2024-11-06

## 2024-11-06 RX ORDER — DIPHENHYDRAMINE HCL 25 MG
25 CAPSULE ORAL ONCE
Status: COMPLETED | OUTPATIENT
Start: 2024-11-06 | End: 2024-11-06

## 2024-11-06 RX ORDER — ACETAMINOPHEN 325 MG/1
650 TABLET ORAL ONCE
Status: COMPLETED | OUTPATIENT
Start: 2024-11-06 | End: 2024-11-06

## 2024-11-06 RX ORDER — VANCOMYCIN/0.9 % SOD CHLORIDE 1.5G/250ML
1500 PLASTIC BAG, INJECTION (ML) INTRAVENOUS ONCE
Status: DISCONTINUED | OUTPATIENT
Start: 2024-11-06 | End: 2024-11-06

## 2024-11-06 RX ORDER — VANCOMYCIN HYDROCHLORIDE 750 MG/150ML
1500 INJECTION, SOLUTION INTRAVENOUS ONCE
Status: COMPLETED | OUTPATIENT
Start: 2024-11-06 | End: 2024-11-06

## 2024-11-06 ASSESSMENT — COGNITIVE AND FUNCTIONAL STATUS - GENERAL
MOBILITY SCORE: 24
DAILY ACTIVITIY SCORE: 24

## 2024-11-06 ASSESSMENT — PAIN SCALES - GENERAL
PAINLEVEL_OUTOF10: 0 - NO PAIN

## 2024-11-06 ASSESSMENT — PAIN - FUNCTIONAL ASSESSMENT: PAIN_FUNCTIONAL_ASSESSMENT: 0-10

## 2024-11-06 NOTE — CARE PLAN
The patient's goals for the shift include      Problem: Pain - Adult  Goal: Verbalizes/displays adequate comfort level or baseline comfort level  Outcome: Progressing     Problem: Safety - Adult  Goal: Free from fall injury  Outcome: Progressing     Problem: Discharge Planning  Goal: Discharge to home or other facility with appropriate resources  Outcome: Progressing     Problem: Chronic Conditions and Co-morbidities  Goal: Patient's chronic conditions and co-morbidity symptoms are monitored and maintained or improved  Outcome: Progressing     Problem: Nutrition  Goal: Oral intake greater 75%  Outcome: Progressing  Goal: Adequate PO fluid intake  Outcome: Progressing  Goal: Maintain stable weight  Outcome: Progressing     The clinical goals for the shift include Patient will be hemodynamically stable

## 2024-11-06 NOTE — ASSESSMENT & PLAN NOTE
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

## 2024-11-06 NOTE — PROGRESS NOTES
"Brandt Merritt is a 64 y.o. male on day 54 of admission presenting with Acute myeloid leukemia in remission (Multi).    Subjective   Patient is sitting up at bedside  receiving  his PBSCT .  He tolerated cell infusion well.     He states he is feeling well today.       Later in afternoon he became febrile 38.5 .  Work up  was started , UA (dysuria) , Blood cultures , and CXR  implemented.  He is having some chills during the fever .         Objective     Physical Exam  Constitutional:       Appearance: Normal appearance.   HENT:      Head: Normocephalic and atraumatic.      Comments: Mild temporal wasting     Nose: Nose normal.      Mouth/Throat:      Mouth: Mucous membranes are dry.      Pharynx: Oropharynx is clear.   Eyes:      Conjunctiva/sclera: Conjunctivae normal.      Pupils: Pupils are equal, round, and reactive to light.   Cardiovascular:      Rate and Rhythm: Normal rate and regular rhythm.      Pulses: Normal pulses.      Heart sounds: Normal heart sounds.   Pulmonary:      Effort: Pulmonary effort is normal.      Breath sounds: Normal breath sounds.   Abdominal:      General: Abdomen is flat. Bowel sounds are normal.      Palpations: Abdomen is soft.   Musculoskeletal:         General: Normal range of motion.      Cervical back: Normal range of motion and neck supple.      Comments: DL Tunneled R Internal Jugular catheter. Entry site c/d/i   Skin:     General: Skin is warm and dry.      Capillary Refill: Capillary refill takes 2 to 3 seconds.   Neurological:      General: No focal deficit present.      Mental Status: He is alert and oriented to person, place, and time.   Psychiatric:         Mood and Affect: Mood normal.         Behavior: Behavior normal.         Last Recorded Vitals  Blood pressure 124/68, pulse 105, temperature 37.9 °C (100.2 °F), temperature source Temporal, resp. rate 16, height (S) 1.664 m (5' 5.51\"), weight 66.4 kg (146 lb 6.2 oz), SpO2 98%.  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      T+49 today (11/5/24)      Active issues today (11/04/24):  # Primary Graft failure; proceeding with Haploidentical stem-cell rescue.  # Tremor :: likely due to Tacrolimus, improving.   # EBV viremia, low-level; s/p Rituximab (10/31/24)     ONC  # MDS/AML   - Symptoms began 9/2023; diagnosed 4/2024  - BMBx showing MDS in transition to AML (>10% jaime) w/ trisomy 8, DNMT alpha, and U2AF1 mutation indication adverse risk   - S/p 4 cycles of Decitabine/Venetoclax (C4D1 on 8/12/24)  - BMBx (6/17/24): Blasts cleared, FISH still positive for trisomy 8, still MDS changes   - BMBx (9/5/24): 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 and no increase in blasts     # SCT #1: Single-unit Cord, T0=9/19/24  # Primary Engraftment Failure  - Conditioning: Flu-Mariana-TBI  - Donor: Single Cord, 6/8  = ABO Donor / Recipient: A+ / A+  = CMV Donor / Recipient: - / +  - aGVHD Prophylaxis: Tacrolimus + MMF  - Sent plasma CXCL9 level to [place] to assess for immunologic graft rejection (10/16/24): 431 pg/mL, which is normal (less suggestive of immunologic rejection)  - Repeat BMBx (10/15): hypocellular with no residual myeloid neoplasm. Chimerism 1% Donor, 99% Recipient      Currently T0  # SCT #2: Haploidentical sister, T0=11/6/24  - Pre-SCT testing:  = CT  Chest (10/30): no abnormal pulmonary findings, trace pericardial effusion  = Onco-TTE (10/31): EF 58%  - Graft: Haploidentical sister  = ABO Donor / Recipient: O+ / A+  = CMV Donor / Recipient: + / +  - Conditioning: Flu/Cy/TBI/ATG  = Fludarabine 24 mg/m2 on T-6,-5,-4,-3,-2   = Cyclophosphamide 14.5mg/kg T-6,-5   =  cGy T-1   = ATG 1.5mg/kg T-5,-3,-1   GCSF T+5   - aGVHD Prophylaxis: ATG, Post-Transplant Cyclophosphamide  = PtCy 25mg/kg T+3,+4   = Tacro and MMF T+5  - restarted IVF on 11/6 , while also get when getting Cy      # GVHD  - No signs or symptoms of aGVHD  - Prophylaxis:  = Tacrolimus stopped 10/29 before Haplo transplant  = MMF stopped 10/11 d/t concern for myelosuppression     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   - Monitor counts daily   - Transfuse for Hgb <7 & PLT <10      - S/p PRBC: 11/5  - S/p Neupogen 300 mcg (9/24-10/30/24), resume T+5  # VTE Prophylaxis: SCDs & Ambulation I/s/o thrombocytopenia  # SOS Prophylaxis: Ursodiol      ID  # EBV Viremia  - EBV: 375 U/L (10/28), up from 77 U/L (10/21/24)  - Treating despite low levels due to upcoming second SCT  - Rituximab 600 mg x 1 (10/31/24)  - Check EBV PCR weekly on Mondays, 11/4 pending  # Neutropenic fever (9/25, 10/12)  - Fever w/ diarrhea, workup neg, treated initially with Josefina, switched to Cefepime d/t rash (9/27-10/10)  - CT C/A/P d/t diarrhea. No abnormal findings  - Repeat blood cultures (10/22): negative  - Cefepime (10/22-10/26)  # Fever after ATG on 11/1 ; septic work up (Blood cx and UA both negative)   -Febrile on 11/6 (38.5) , BC: pending, UA (dysuria) UC,  CXR :pending , gave 1 dose of Vanco   # Viral surveillance:   - CMV (Mo/Th): ND (10/31) pending 11/4   - Adeno (Mo): ND (10/28) ND (11/4)  - HHV6 (Mo): ND (10/21),  <188 (10/30) 11/4 :pending   # Prophylaxis:   - VZV: Acyclovir   - Aspergillus: Posaconazole  - C. Diff: Vancomycin  - CMV: Letermovir  - PJP: Pentamidine  given 10/26     FEN/RENAL/GI  - Admit wt: 75.6 kg (9/13/24)  Most recent weight 66.7 kg (147 lb 0.8 oz) (11/3/2024  8:20 AM)  # 9 kgs weight loss. Nutrition Consulted (11/4)  - Allergic to Lasix, will not take med (extreme hypotension, extreme hypokalemia)  # Hypokalemia. Repleted last (11/5)  # Hx of treated Hep C in 2015  - Hep C RNA - not detected (9/12/24)     CARDIO/PULM:  # Hx of STEMI (11/11/2020)  - TTE 7/19/24: EF 59%, otherwise unremarkable   - Was cleared by Cardiology for transplant   # Hx of HTN   - Nifedipine: 90 mEq ER 24 hr tablet   # Ascending aorta dilation   - TTE (4/29/24): 3.8-4 cm dilation      :  # Hx of BPH and urinary retention (2023)  # Hx elevated PSA    - 7/28/21 Bx: atypical small acinar proliferation    - 4/7/23 Bx: Benign     NEURO/PSYCH  # Tremor (10/10- )  :: Most likely due to tacrolimus vs cefepime  - Fine, high-frequency (7+ Hz) tremor worst in the hands, exacerbated by effort,   - Previously associated with significant fasciculations of the shoulder and back muscles; also fasciculations in jaw -- these are no longer aparrent  - Cefepime stopped 10/10, tacrolimus stopped 10/29  - If still no improvement, could consider Neurology consult     DISPO:  - FULL CODE  - NOK: spouse Genevieve ([phone])  - DL Tunneled R Internal Jugular catheter  - Oncologist: Dr. Newsome     Patient seen examined and discussed with Dr. Malaika Mitchell, APRN-CNP

## 2024-11-07 LAB
ABO GROUP (TYPE) IN BLOOD: NORMAL
ALBUMIN SERPL BCP-MCNC: 3.2 G/DL (ref 3.4–5)
ALP SERPL-CCNC: 62 U/L (ref 33–136)
ALT SERPL W P-5'-P-CCNC: 14 U/L (ref 10–52)
ANION GAP SERPL CALC-SCNC: 12 MMOL/L (ref 10–20)
ANTIBODY SCREEN: NORMAL
APTT PPP: 27 SECONDS (ref 27–38)
AST SERPL W P-5'-P-CCNC: 20 U/L (ref 9–39)
BACTERIA UR CULT: NO GROWTH
BASOPHILS # BLD AUTO: 0 X10*3/UL (ref 0–0.1)
BASOPHILS NFR BLD AUTO: 0 %
BILIRUB SERPL-MCNC: 0.5 MG/DL (ref 0–1.2)
BUN SERPL-MCNC: 7 MG/DL (ref 6–23)
CALCIUM SERPL-MCNC: 7.7 MG/DL (ref 8.6–10.6)
CHLORIDE SERPL-SCNC: 100 MMOL/L (ref 98–107)
CO2 SERPL-SCNC: 26 MMOL/L (ref 21–32)
CREAT SERPL-MCNC: 0.71 MG/DL (ref 0.5–1.3)
EGFRCR SERPLBLD CKD-EPI 2021: >90 ML/MIN/1.73M*2
EOSINOPHIL # BLD AUTO: 0 X10*3/UL (ref 0–0.7)
EOSINOPHIL NFR BLD AUTO: 0 %
ERYTHROCYTE [DISTWIDTH] IN BLOOD BY AUTOMATED COUNT: 14.4 % (ref 11.5–14.5)
FIBRINOGEN PPP-MCNC: 403 MG/DL (ref 200–400)
GLUCOSE SERPL-MCNC: 126 MG/DL (ref 74–99)
HCT VFR BLD AUTO: 20.7 % (ref 41–52)
HGB BLD-MCNC: 7.4 G/DL (ref 13.5–17.5)
IMM GRANULOCYTES # BLD AUTO: 0 X10*3/UL (ref 0–0.7)
IMM GRANULOCYTES NFR BLD AUTO: 0 % (ref 0–0.9)
INR PPP: 1.1 (ref 0.9–1.1)
LYMPHOCYTES # BLD AUTO: 0 X10*3/UL (ref 1.2–4.8)
LYMPHOCYTES NFR BLD AUTO: 0 %
MAGNESIUM SERPL-MCNC: 1.84 MG/DL (ref 1.6–2.4)
MCH RBC QN AUTO: 26.7 PG (ref 26–34)
MCHC RBC AUTO-ENTMCNC: 35.7 G/DL (ref 32–36)
MCV RBC AUTO: 75 FL (ref 80–100)
MONOCYTES # BLD AUTO: 0.01 X10*3/UL (ref 0.1–1)
MONOCYTES NFR BLD AUTO: 100 %
NEUTROPHILS # BLD AUTO: 0 X10*3/UL (ref 1.2–7.7)
NEUTROPHILS NFR BLD AUTO: 0 %
NRBC BLD-RTO: 0 /100 WBCS (ref 0–0)
PHOSPHATE SERPL-MCNC: 3 MG/DL (ref 2.5–4.9)
PLATELET # BLD AUTO: 10 X10*3/UL (ref 150–450)
POTASSIUM SERPL-SCNC: 3.4 MMOL/L (ref 3.5–5.3)
PROT SERPL-MCNC: 6.1 G/DL (ref 6.4–8.2)
PROTHROMBIN TIME: 12.6 SECONDS (ref 9.8–12.8)
RBC # BLD AUTO: 2.77 X10*6/UL (ref 4.5–5.9)
RBC MORPH BLD: NORMAL
RH FACTOR (ANTIGEN D): NORMAL
SODIUM SERPL-SCNC: 135 MMOL/L (ref 136–145)
WBC # BLD AUTO: <0.1 X10*3/UL (ref 4.4–11.3)

## 2024-11-07 PROCEDURE — 2500000004 HC RX 250 GENERAL PHARMACY W/ HCPCS (ALT 636 FOR OP/ED)

## 2024-11-07 PROCEDURE — 84100 ASSAY OF PHOSPHORUS: CPT

## 2024-11-07 PROCEDURE — 2500000002 HC RX 250 W HCPCS SELF ADMINISTERED DRUGS (ALT 637 FOR MEDICARE OP, ALT 636 FOR OP/ED): Performed by: INTERNAL MEDICINE

## 2024-11-07 PROCEDURE — 2500000001 HC RX 250 WO HCPCS SELF ADMINISTERED DRUGS (ALT 637 FOR MEDICARE OP): Performed by: STUDENT IN AN ORGANIZED HEALTH CARE EDUCATION/TRAINING PROGRAM

## 2024-11-07 PROCEDURE — 2500000002 HC RX 250 W HCPCS SELF ADMINISTERED DRUGS (ALT 637 FOR MEDICARE OP, ALT 636 FOR OP/ED): Performed by: PHYSICIAN ASSISTANT

## 2024-11-07 PROCEDURE — 2500000001 HC RX 250 WO HCPCS SELF ADMINISTERED DRUGS (ALT 637 FOR MEDICARE OP): Performed by: INTERNAL MEDICINE

## 2024-11-07 PROCEDURE — 2500000002 HC RX 250 W HCPCS SELF ADMINISTERED DRUGS (ALT 637 FOR MEDICARE OP, ALT 636 FOR OP/ED): Performed by: STUDENT IN AN ORGANIZED HEALTH CARE EDUCATION/TRAINING PROGRAM

## 2024-11-07 PROCEDURE — 36430 TRANSFUSION BLD/BLD COMPNT: CPT

## 2024-11-07 PROCEDURE — 2500000004 HC RX 250 GENERAL PHARMACY W/ HCPCS (ALT 636 FOR OP/ED): Mod: JZ

## 2024-11-07 PROCEDURE — P9073 PLATELETS PHERESIS PATH REDU: HCPCS

## 2024-11-07 PROCEDURE — 83735 ASSAY OF MAGNESIUM: CPT

## 2024-11-07 PROCEDURE — 1170000001 HC PRIVATE ONCOLOGY ROOM DAILY

## 2024-11-07 PROCEDURE — 2500000001 HC RX 250 WO HCPCS SELF ADMINISTERED DRUGS (ALT 637 FOR MEDICARE OP)

## 2024-11-07 PROCEDURE — 86920 COMPATIBILITY TEST SPIN: CPT

## 2024-11-07 PROCEDURE — 80053 COMPREHEN METABOLIC PANEL: CPT

## 2024-11-07 PROCEDURE — 2500000002 HC RX 250 W HCPCS SELF ADMINISTERED DRUGS (ALT 637 FOR MEDICARE OP, ALT 636 FOR OP/ED)

## 2024-11-07 PROCEDURE — 86901 BLOOD TYPING SEROLOGIC RH(D): CPT | Performed by: STUDENT IN AN ORGANIZED HEALTH CARE EDUCATION/TRAINING PROGRAM

## 2024-11-07 PROCEDURE — 85610 PROTHROMBIN TIME: CPT

## 2024-11-07 PROCEDURE — 85384 FIBRINOGEN ACTIVITY: CPT

## 2024-11-07 PROCEDURE — 85025 COMPLETE CBC W/AUTO DIFF WBC: CPT

## 2024-11-07 PROCEDURE — 99233 SBSQ HOSP IP/OBS HIGH 50: CPT | Performed by: INTERNAL MEDICINE

## 2024-11-07 RX ORDER — VANCOMYCIN HYDROCHLORIDE 1 G/200ML
1000 INJECTION, SOLUTION INTRAVENOUS EVERY 12 HOURS
Status: DISCONTINUED | OUTPATIENT
Start: 2024-11-07 | End: 2024-11-09

## 2024-11-07 RX ORDER — ACETAMINOPHEN 325 MG/1
650 TABLET ORAL ONCE
Status: COMPLETED | OUTPATIENT
Start: 2024-11-07 | End: 2024-11-07

## 2024-11-07 RX ORDER — VANCOMYCIN HYDROCHLORIDE 1 G/20ML
INJECTION, POWDER, LYOPHILIZED, FOR SOLUTION INTRAVENOUS DAILY PRN
Status: DISCONTINUED | OUTPATIENT
Start: 2024-11-07 | End: 2024-11-09

## 2024-11-07 RX ORDER — POTASSIUM CHLORIDE 20 MEQ/1
20 TABLET, EXTENDED RELEASE ORAL ONCE
Status: COMPLETED | OUTPATIENT
Start: 2024-11-07 | End: 2024-11-07

## 2024-11-07 RX ORDER — POTASSIUM CHLORIDE 20 MEQ/1
40 TABLET, EXTENDED RELEASE ORAL ONCE
Status: COMPLETED | OUTPATIENT
Start: 2024-11-07 | End: 2024-11-07

## 2024-11-07 ASSESSMENT — COGNITIVE AND FUNCTIONAL STATUS - GENERAL
DAILY ACTIVITIY SCORE: 24
DAILY ACTIVITIY SCORE: 24
MOBILITY SCORE: 24
MOBILITY SCORE: 24

## 2024-11-07 ASSESSMENT — PAIN - FUNCTIONAL ASSESSMENT: PAIN_FUNCTIONAL_ASSESSMENT: 0-10

## 2024-11-07 ASSESSMENT — PAIN SCALES - GENERAL
PAINLEVEL_OUTOF10: 0 - NO PAIN

## 2024-11-07 NOTE — PROGRESS NOTES
Brandt Merritt is a 64 y.o. 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    Subjective   Had high grade fevers yesterday after the cell infusion, persistent despite tylenol, given 1mg/kg solumedrol which helped break the fever at 3AM today morning. Again had fever today morning, given tylenol and then repeated after ~4 hours due to persistent fevers, finally coming down now.     Other than the fevers doing ok, fatigued and tired due to the frequent temp checks he has required in the last 24 hours. Appetite ok, no mouth sores. Normal bowel movements. Normal urination. No pain anywhere. Breathing normal, no runny nose. No bleeding or bruising.        Objective     Physical Exam  Constitutional:       Appearance: Normal appearance.   HENT:      Head: Normocephalic and atraumatic.      Comments: Mild temporal wasting     Nose: Nose normal.      Mouth/Throat:      Mouth: Mucous membranes are dry.      Pharynx: Oropharynx is clear.   Eyes:      Conjunctiva/sclera: Conjunctivae normal.      Pupils: Pupils are equal, round, and reactive to light.   Cardiovascular:      Rate and Rhythm: Normal rate and regular rhythm.      Pulses: Normal pulses.      Heart sounds: Normal heart sounds.   Pulmonary:      Effort: Pulmonary effort is normal.      Breath sounds: Normal breath sounds.   Abdominal:      General: Abdomen is flat. Bowel sounds are normal.      Palpations: Abdomen is soft.   Musculoskeletal:         General: Normal range of motion.      Cervical back: Normal range of motion and neck supple.      Comments: DL Tunneled R Internal Jugular catheter. Entry site c/d/i   Skin:     General: Skin is warm and dry.      Capillary Refill: Capillary refill takes 2 to 3 seconds.   Neurological:      General: No focal deficit present.      Mental Status: He is alert and oriented to person, place, and time.   Psychiatric:         Mood and Affect: Mood normal.         Behavior: Behavior  "normal.         Last Recorded Vitals  Blood pressure 129/75, pulse 94, temperature 37 °C (98.6 °F), temperature source Temporal, resp. rate 18, height (S) 1.664 m (5' 5.51\"), weight 66.4 kg (146 lb 6.2 oz), SpO2 98%.  Intake/Output last 3 Shifts:  I/O last 3 completed shifts:  In: 967.3 (14.6 mL/kg) [I.V.:436.7 (6.6 mL/kg); Blood:284; IV Piggyback:246.7]  Out: 750 (11.3 mL/kg) [Urine:750 (0.3 mL/kg/hr)]  Weight: 66.4 kg       Assessment/Plan   Assessment & Plan  Acute myeloid leukemia in remission (Multi)    Stem cells transplant status (Multi)    Immunocompromised    EBV (Katherine-Barr virus) viremia    T+49 from single cord transplant, T+1 from haplo stem cell rescue     Active issues today (11/07/24):  # Fevers post cell infusion - likely CRS, s/p one dose of solumedrol, tylenol as needed. Blood cultures in process. On zosyn. May consider repeat solumedrol. Avoid dexamethasone as it has longer half life  #monitor for aGVHD  #monitor for hemolytic anemia given ABO incompatibility     ONC  # MDS/AML   - Symptoms began 9/2023; diagnosed 4/2024  - BMBx showing MDS in transition to AML (>10% jaime) w/ trisomy 8, DNMT alpha, and U2AF1 mutation indication adverse risk   - S/p 4 cycles of Decitabine/Venetoclax (C4D1 on 8/12/24)  - BMBx (6/17/24): Blasts cleared, FISH still positive for trisomy 8, still MDS changes   - BMBx (9/5/24): 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 and no increase in blasts     # SCT #1: Single-unit Cord, T0=9/19/24  # Primary Engraftment Failure  - Conditioning: Flu-Mariana-TBI  - Donor: Single Cord, 6/8  = ABO Donor / Recipient: A+ / A+  = CMV Donor / Recipient: - / +  - aGVHD Prophylaxis: Tacrolimus + MMF  - Sent plasma CXCL9 level to [facility] to assess for immunologic graft rejection (10/16/24): 431 pg/mL, which is normal (less suggestive of immunologic rejection)  - Repeat BMBx (10/15): hypocellular with no residual myeloid neoplasm. Chimerism 1% Donor, 99% Recipient      # " SCT #2: Haploidentical sister, T0=11/6/24  - Pre-SCT testing:  = CT Chest (10/30): no abnormal pulmonary findings, trace pericardial effusion  = Onco-TTE (10/31): EF 58%  - Graft: Haploidentical sister  = ABO Donor / Recipient: O+ / A+ (ABO incompatibility +)  = CMV Donor / Recipient: + / +  - Conditioning: Flu/Cy/TBI/rATG  = Fludarabine 24 mg/m2 on T-6,-5,-4,-3,-2   = Cyclophosphamide 14.5mg/kg T-6,-5   =  cGy T-1   = - GVHD prophylaxis -  rATG 1.5mg/kg T-5,-3,-1, PTCy (25mg/kg T+3, T+4), tacro, MMF (T+5)  GCSF T+5??? - to be confirmed with Dr Newsome and Yuri  - off IVF, ok to start if sweating with fevers and chills. 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  # VTE Prophylaxis: SCDs & Ambulation I/s/o thrombocytopenia  # SOS Prophylaxis: Ursodiol      ID  # EBV Viremia  - EBV: 375 U/L (10/28), up from 77 U/L (10/21/24)  - Treating despite low levels due to upcoming second SCT  - Rituximab 600 mg x 1 (10/31/24)  - Check EBV PCR weekly on Mondays, 11/4 122  # Neutropenic fever (9/25, 10/12)  - Fever w/ diarrhea, workup neg, treated initially with Josefina, switched to Cefepime d/t rash (9/27-10/10)  - CT C/A/P d/t diarrhea. No abnormal findings  - Repeat blood cultures (10/22): negative  - Cefepime (10/22-10/26)  # Fever after ATG on 11/1 ; septic work up (Blood cx and UA both negative)   -Febrile on 11/6 (38.5) , BC: pending, UA (dysuria) UC,  CXR :pending , gave 1 dose of Vanco, continued on cefepime (11/6-**)  # Viral surveillance:   - CMV (Mo/Th): ND (11/4) pending 11/7  - Adeno (Mo): ND (10/28) ND (11/4)  - HHV6 (Mo): ND (10/21),   <188 (10/30) 11/4 :451  # Prophylaxis:   - VZV: Acyclovir   - Aspergillus: Posaconazole  - C. Diff: Vancomycin  - CMV: Letermovir  - PJP: Pentamidine given 10/26, next due 11/23/24     FEN/RENAL/GI  - Admit wt: 75.6 kg (9/13/24)  Most recent weight   Wt Readings from Last 1 Encounters:   11/06/24 66.4 kg (146 lb 6.2 oz)       # 9 kgs weight loss. Nutrition Consulted (11/4),appreciate recs  - Allergic to Lasix, will not take med (extreme hypotension, extreme hypokalemia)  # Hypokalemia. Repleted PRN  # Hx of treated Hep C in 2015  - Hep C RNA - not detected (9/12/24)     CARDIO/PULM:  # Hx of STEMI (11/11/2020)  - TTE 7/19/24: EF 59%, otherwise unremarkable   - Was cleared by Cardiology for transplant   # Hx of HTN   - Nifedipine: 90 mEq ER 24 hr tablet   # Ascending aorta dilation   - TTE (4/29/24): 3.8-4 cm dilation      :  # Hx of BPH and urinary retention (2023)  # Hx elevated PSA    - 7/28/21 Bx: atypical small acinar proliferation    - 4/7/23 Bx: Benign     NEURO/PSYCH  # Tremor (10/10- )  :: Most likely due to tacrolimus vs cefepime  - Fine, high-frequency (7+ Hz) tremor worst in the hands, exacerbated by effort,   - Previously associated with significant fasciculations of the shoulder and back muscles; also fasciculations in jaw -- these are no longer aparrent  - Cefepime stopped 10/10, tacrolimus stopped 10/29  - If still no improvement, could consider Neurology consult     DISPO:  - FULL CODE  - NOK: spouse Genevieve ([phone])  - DL Tunneled R Internal Jugular catheter  - Oncologist: Dr. Newsome     Patient seen examined and discussed with Dr. Malaika Yanez MD

## 2024-11-07 NOTE — CONSULTS
"Nutrition Follow Up Assessment:   Nutrition Assessment    --->Reason for Assessment: Provider consult order    Since last nutrition visit, today is T+49 from single-unit cord transplant (T=0 on 09/19/24) and T+1 from Haplo transplant (T=0 on 11/06/24). Noted to have been running fevers s/p transplant yesterday, resolved today.    Nutrition History:  This service met with pt and pt's wife at bedside earlier today, pt sitting up on couch at time of visit with him.    PO remains decreased. Wife continues to bring food in for him. Typically eats ~1 time/day, though did not eat at all yesterday, \"because of the transplant and my fevers.\" Today, at time of visit with him, no PO, as of yet, though wife preparing to heat food up for him during visit. Also brought in orange juice he plans on drinking later today.    Has been receiving the Ensure Clear, likes it better than the Ensure Plus, drinking ~1 carton/day.    C/o intermittent nausea, no vomiting, diarrhea, or trouble chewing/swallowing. Also with c/o ongoing issues with dysgeusia, continues to use lemon drop candies PRN.    Anthropometrics:  Height: (S) 166.4 cm (5' 5.51\")   Weight: 66.4 kg (146 lb 6.2 oz)   BMI (Calculated): 25.43  IBW/kg (Dietitian Calculated): 64.5 kg  Percent of IBW: 103 %       Weight History:   09/13-75.6 kg (admit wt)  10/29-66.0 kg (wt at last nutrition visit)  11/06-66.4 kg (current wt)--total of 12% wt loss since admit; wt stable x ~1 week    Nutrition Focused Physical Exam Findings:  defer: completed at a prior nutrition visit  Edema:  Edema: none  Physical Findings:  Skin: Negative    Nutrition Significant Labs:  CBC Trend:   Results from last 7 days   Lab Units 11/07/24  0325 11/06/24  0312 11/05/24  0446 11/04/24  0419   WBC AUTO x10*3/uL <0.1* <0.1* <0.1* <0.1*   RBC AUTO x10*6/uL 2.77* 2.65* 2.51* 2.69*   HEMOGLOBIN g/dL 7.4* 7.2* 6.7* 7.1*   HEMATOCRIT % 20.7* 19.7* 18.5* 19.8*   MCV fL 75* 74* 74* 74*   PLATELETS AUTO x10*3/uL 10* " 13* 21* 7*   BMP Trend:   Results from last 7 days   Lab Units 11/07/24 0325 11/06/24 0312 11/05/24 0446 11/04/24 0419   GLUCOSE mg/dL 126* 117* 100* 119*   CALCIUM mg/dL 7.7* 7.9* 7.5* 7.8*   SODIUM mmol/L 135* 136 136 135*   POTASSIUM mmol/L 3.4* 3.8 3.3* 3.7   CO2 mmol/L 26 25 27 23   CHLORIDE mmol/L 100 104 104 104   BUN mg/dL 7 10 9 12   CREATININE mg/dL 0.71 0.52 0.71 0.65   Liver Function Trend:   Results from last 7 days   Lab Units 11/07/24 0325 11/06/24 0312 11/05/24 0446 11/04/24 0419   ALK PHOS U/L 62 62 56 59   AST U/L 20 19 27 18   ALT U/L 14 15 15 9*   BILIRUBIN TOTAL mg/dL 0.5 0.6 0.6 0.6   Renal Lab Trend:   Results from last 7 days   Lab Units 11/07/24 0325 11/06/24 0312 11/05/24 0446 11/04/24 0419   POTASSIUM mmol/L 3.4* 3.8 3.3* 3.7   PHOSPHORUS mg/dL 3.0 3.0 2.8 2.7   SODIUM mmol/L 135* 136 136 135*   MAGNESIUM mg/dL 1.84 1.98 1.86 1.99   EGFR mL/min/1.73m*2 >90 >90 >90 >90   BUN mg/dL 7 10 9 12   CREATININE mg/dL 0.71 0.52 0.71 0.65      Medications:  Scheduled medications  acyclovir, 400 mg, oral, q12h  calcium carbonate, 1,250 mg, oral, TID   Or  calcium carbonate, 1,250 mg, oral, TID  cefepime, 2 g, intravenous, q8h  folic acid, 1 mg, oral, Daily  letermovir, 480 mg, oral, Daily  magnesium chloride, 64 mg, oral, BID  NIFEdipine ER, 90 mg, oral, Daily before breakfast  nystatin, 5 mL, Swish & Spit, 4x daily  posaconazole, 300 mg, oral, q24h  ursodiol, 300 mg, oral, TID  vancomycin, 125 mg, oral, Daily    PRN medications  PRN medications: acetaminophen, albuterol, alteplase, dextrose, diphenhydrAMINE, EPINEPHrine HCl, famotidine, loperamide, LORazepam, methylPREDNISolone sodium succinate (PF), ondansetron, prochlorperazine, prochlorperazine, sodium chloride    I/O:   Last BM Date: 11/07/24; Stool Appearance: Loose (11/07/24 0750)    Dietary Orders (From admission, onward)       Start     Ordered    10/29/24 1132  Oral nutritional supplements  Until discontinued        Comments:  Please provide Ensure Plus if pt requests it--please do not automatically send to him--may have as many times/day, as desired   Question:  Select supplement:  Answer:  Ensure Plus    10/29/24 1131    10/29/24 1131  Oral nutritional supplements  Until discontinued        Comments: please provide 1 Ensure Clear apple and 1 Ensure Clear berry every day--pt may have more than 2 times/day, if requested   Question Answer Comment   Deliver with Breakfast    Deliver with Dinner    Select supplement: Ensure Clear        10/29/24 1131    10/18/24 2150  May Participate in Room Service  ( ROOM SERVICE MAY PARTICIPATE)  Once        Question:  .  Answer:  Yes    10/18/24 2149    09/14/24 1258  Snacks  Until discontinued        Comments: pt may order from Extended Stay Menu + [facility]ack List, if requested    09/14/24 1258    09/13/24 1427  Adult diet Low microbial  Diet effective now        Question:  Diet type  Answer:  Low microbial    09/13/24 1428                Estimated Needs:   Total Energy Estimated Needs (kCal): ~1775-3510  Method for Estimating Needs: 66 (current wt) x ~32-35+  Total Protein Estimated Needs (g): 100+  Method for Estimating Needs: 66 (current wt) x ~1.5g/kg+  Total Fluid Estimated Needs (mL): 1ml/kcal or per MD/team        Nutrition Diagnosis   Malnutrition Diagnosis  Patient has Malnutrition Diagnosis: Yes  Diagnosis Status: Ongoing  Malnutrition Diagnosis: Severe malnutrition related to acute disease or injury (s/p recent transplant with treatment related side effects)  As Evidenced by: pt with h/o decreased PO since admit + further decline in PO as of late (likely consuming </=50% estimated energy needs x >/=5 days) with 12% wt loss since admit (~2 mos)    Nutrition Diagnosis  Patient has Nutrition Diagnosis: Yes  Diagnosis Status (1): Ongoing  Nutrition Diagnosis 1: Increased nutrient needs  Related to (1): increased metabolic demand  As Evidenced by (1): pt with AML, admitted for  transplant    Additional Assessment Information: Considering ongoing malnutrition + hypermetabolic state, do feel pt continues to benefit from use of oral nutrition supplements; agreeable to continue order for Ensure Clear BID + Ensure Plus PRN. Did also provide additional lemon drop candies to pt.       Nutrition Interventions/Recommendations     1. Continue Low Microbial Diet, only as tolerated  --RDN to continue order for Ensure Clear BID + Ensure Plus PRN  --RDN provided additional lemon drop candies to pt today d/t ongoing c/o dysgeusia    --->Wt appears to have stabilized x ~the last week + pt trying to regularly consume supplements. If additional wt losses to occur and PO remains poor, will need to consider initiation of nutrition support. Please re-consult for recs PRN.        Nutrition Prescription for Oral Nutrition: ~1990-6785 kcals/day, 100+ g pro/day        Nutrition Interventions:   Interventions: Meals and snacks, Medical food supplement  Meals and Snacks: General healthful diet  Goal: Low Microbial Diet  Medical Food Supplement: Commercial beverage  Goal: Ensure Clear BID (240 kcals, 8g pro each) and/or Ensure Plus PRN (350 kcals, 13g pro each)    Nutrition Education: Encouraged ongoing PO, even if only able to take in smaller amounts at a time. Discussed trying to eat more than one time/day + trying to increase consumption of PO fluids. Pt thinks he can try to drink 2 cartons of Ensure Clear/day. Denied any particular questions for RDN at this time.       Nutrition Monitoring and Evaluation   Food/Nutrient Related History Monitoring  Monitoring and Evaluation Plan: Amount of food  Amount of Food: Estimated amout of food, Medical food intake  Criteria: consume 50% or more of meals/snacks/supplements    Body Composition/Growth/Weight History  Monitoring and Evaluation Plan: Weight  Weight: Measured weight  Criteria: maintain stable wt    Biochemical Data, Medical Tests and Procedures  Monitoring and  Evaluation Plan: Electrolyte/renal panel  Electrolyte and Renal Panel: Magnesium, Phosphorus, Potassium, Sodium  Criteria: WNL          Time Spent (min): 30 minutes

## 2024-11-07 NOTE — CARE PLAN
The patient's goals for the shift include      The clinical goals for the shift include pt to be afebrile

## 2024-11-07 NOTE — NURSING NOTE
24     Allogeneic HSCT product  [id number] delivered by Cellular Therapy personnel and verified at bedside .Patient premedicated per orders. Patient identification verified against protocol with second RN Maria Eugenia Wang, using name, MRN, and . Product infused via Alaris pump at a rate of 250 ml/hr through right double internal jugular CVC white lumen . +BBR obtained prior to and following infusion. Infusion started at 1048 and completed at 1125. Pt. received a total of 70 ml and a total dose of 5.19 x10^8/kg TNC  7.27 x10^6 CD34 2.0  x^8/kg CD3 per WKS 0002. Patient monitored throughout and vitals remained stable throughout infusion. No complications noted. Patient instructed not to ambulate without supervision until cleared by medical team. Patient verbalized understanding of this safety measure. Patient to be monitored 1 hour following infusion. Tolerated infusion well.      One hour post stem cell infusion vitals stable. 5 hours post stem cell infusion at 1630, patient's temperature 38.5. Katherine Mitchell NP and Dr. Newsome notified and aware. Tylenol administered and cultures drawn. Voicemail left and message sent to cellular therapy. Dr. Newsome notified to complete adverse reaction form. No additional symptoms at this time

## 2024-11-08 LAB
ALBUMIN SERPL BCP-MCNC: 3.3 G/DL (ref 3.4–5)
ALP SERPL-CCNC: 61 U/L (ref 33–136)
ALT SERPL W P-5'-P-CCNC: 11 U/L (ref 10–52)
ANION GAP SERPL CALC-SCNC: 12 MMOL/L (ref 10–20)
APTT PPP: 28 SECONDS (ref 27–38)
AST SERPL W P-5'-P-CCNC: 13 U/L (ref 9–39)
BASOPHILS # BLD AUTO: 0 X10*3/UL (ref 0–0.1)
BASOPHILS NFR BLD AUTO: 0 %
BILIRUB SERPL-MCNC: 0.8 MG/DL (ref 0–1.2)
BLOOD EXPIRATION DATE: NORMAL
BUN SERPL-MCNC: 11 MG/DL (ref 6–23)
CALCIUM SERPL-MCNC: 8.1 MG/DL (ref 8.6–10.6)
CHLORIDE SERPL-SCNC: 99 MMOL/L (ref 98–107)
CMV DNA SERPL NAA+PROBE-LOG IU: NORMAL {LOG_IU}/ML
CO2 SERPL-SCNC: 27 MMOL/L (ref 21–32)
CREAT SERPL-MCNC: 0.7 MG/DL (ref 0.5–1.3)
DISPENSE STATUS: NORMAL
EGFRCR SERPLBLD CKD-EPI 2021: >90 ML/MIN/1.73M*2
EOSINOPHIL # BLD AUTO: 0 X10*3/UL (ref 0–0.7)
EOSINOPHIL NFR BLD AUTO: 0 %
ERYTHROCYTE [DISTWIDTH] IN BLOOD BY AUTOMATED COUNT: 14.2 % (ref 11.5–14.5)
FIBRINOGEN PPP-MCNC: 498 MG/DL (ref 200–400)
GLUCOSE SERPL-MCNC: 146 MG/DL (ref 74–99)
HCT VFR BLD AUTO: 21.9 % (ref 41–52)
HGB BLD-MCNC: 7.8 G/DL (ref 13.5–17.5)
IMM GRANULOCYTES # BLD AUTO: 0 X10*3/UL (ref 0–0.7)
IMM GRANULOCYTES NFR BLD AUTO: 0 % (ref 0–0.9)
INR PPP: 1.1 (ref 0.9–1.1)
LABORATORY COMMENT REPORT: NOT DETECTED
LYMPHOCYTES # BLD AUTO: 0 X10*3/UL (ref 1.2–4.8)
LYMPHOCYTES NFR BLD AUTO: 0 %
MAGNESIUM SERPL-MCNC: 2.11 MG/DL (ref 1.6–2.4)
MCH RBC QN AUTO: 26.6 PG (ref 26–34)
MCHC RBC AUTO-ENTMCNC: 35.6 G/DL (ref 32–36)
MCV RBC AUTO: 75 FL (ref 80–100)
MONOCYTES # BLD AUTO: 0 X10*3/UL (ref 0.1–1)
MONOCYTES NFR BLD AUTO: 0 %
NEUTROPHILS # BLD AUTO: 0.01 X10*3/UL (ref 1.2–7.7)
NEUTROPHILS NFR BLD AUTO: 100 %
NRBC BLD-RTO: ABNORMAL /100{WBCS}
PHOSPHATE SERPL-MCNC: 3.3 MG/DL (ref 2.5–4.9)
PLATELET # BLD AUTO: 27 X10*3/UL (ref 150–450)
POTASSIUM SERPL-SCNC: 3.8 MMOL/L (ref 3.5–5.3)
PRODUCT BLOOD TYPE: 6200
PRODUCT CODE: NORMAL
PROT SERPL-MCNC: 6.6 G/DL (ref 6.4–8.2)
PROTHROMBIN TIME: 12.9 SECONDS (ref 9.8–12.8)
RBC # BLD AUTO: 2.93 X10*6/UL (ref 4.5–5.9)
SODIUM SERPL-SCNC: 134 MMOL/L (ref 136–145)
UNIT ABO: NORMAL
UNIT NUMBER: NORMAL
UNIT RH: NORMAL
UNIT VOLUME: 287
VANCOMYCIN SERPL-MCNC: 11.4 UG/ML (ref 5–20)
WBC # BLD AUTO: <0.1 X10*3/UL (ref 4.4–11.3)

## 2024-11-08 PROCEDURE — 2500000002 HC RX 250 W HCPCS SELF ADMINISTERED DRUGS (ALT 637 FOR MEDICARE OP, ALT 636 FOR OP/ED): Performed by: STUDENT IN AN ORGANIZED HEALTH CARE EDUCATION/TRAINING PROGRAM

## 2024-11-08 PROCEDURE — 1170000001 HC PRIVATE ONCOLOGY ROOM DAILY

## 2024-11-08 PROCEDURE — 2500000002 HC RX 250 W HCPCS SELF ADMINISTERED DRUGS (ALT 637 FOR MEDICARE OP, ALT 636 FOR OP/ED): Performed by: INTERNAL MEDICINE

## 2024-11-08 PROCEDURE — 2500000002 HC RX 250 W HCPCS SELF ADMINISTERED DRUGS (ALT 637 FOR MEDICARE OP, ALT 636 FOR OP/ED): Performed by: PHYSICIAN ASSISTANT

## 2024-11-08 PROCEDURE — 2500000004 HC RX 250 GENERAL PHARMACY W/ HCPCS (ALT 636 FOR OP/ED): Mod: JZ

## 2024-11-08 PROCEDURE — 2500000001 HC RX 250 WO HCPCS SELF ADMINISTERED DRUGS (ALT 637 FOR MEDICARE OP): Performed by: INTERNAL MEDICINE

## 2024-11-08 PROCEDURE — 80202 ASSAY OF VANCOMYCIN: CPT

## 2024-11-08 PROCEDURE — 2500000004 HC RX 250 GENERAL PHARMACY W/ HCPCS (ALT 636 FOR OP/ED)

## 2024-11-08 PROCEDURE — 85384 FIBRINOGEN ACTIVITY: CPT

## 2024-11-08 PROCEDURE — 2500000001 HC RX 250 WO HCPCS SELF ADMINISTERED DRUGS (ALT 637 FOR MEDICARE OP): Performed by: STUDENT IN AN ORGANIZED HEALTH CARE EDUCATION/TRAINING PROGRAM

## 2024-11-08 PROCEDURE — 99233 SBSQ HOSP IP/OBS HIGH 50: CPT | Performed by: INTERNAL MEDICINE

## 2024-11-08 PROCEDURE — 2500000005 HC RX 250 GENERAL PHARMACY W/O HCPCS

## 2024-11-08 PROCEDURE — 84100 ASSAY OF PHOSPHORUS: CPT

## 2024-11-08 PROCEDURE — 83735 ASSAY OF MAGNESIUM: CPT

## 2024-11-08 PROCEDURE — 85730 THROMBOPLASTIN TIME PARTIAL: CPT

## 2024-11-08 PROCEDURE — 85025 COMPLETE CBC W/AUTO DIFF WBC: CPT

## 2024-11-08 PROCEDURE — 80053 COMPREHEN METABOLIC PANEL: CPT

## 2024-11-08 PROCEDURE — 2500000001 HC RX 250 WO HCPCS SELF ADMINISTERED DRUGS (ALT 637 FOR MEDICARE OP)

## 2024-11-08 ASSESSMENT — COGNITIVE AND FUNCTIONAL STATUS - GENERAL
MOBILITY SCORE: 24
DAILY ACTIVITIY SCORE: 24

## 2024-11-08 ASSESSMENT — PAIN SCALES - GENERAL: PAINLEVEL_OUTOF10: 0 - NO PAIN

## 2024-11-08 NOTE — PROGRESS NOTES
"Brandt Merritt is a 64 y.o. male on day 56 of admission presenting with Acute myeloid leukemia in remission (Multi).    Subjective   Had another fever yesterday from 2-8 pm, received a dose of tylenol and solumedrol. Started on vanc. No fevers since 8PM.     Today AM, feels much better. No nausea, appetite still poor, but eating home cooked food. No diarrhea. No pain anywhere.     Rest ROS negative.        Objective     Physical Exam  Constitutional:       Appearance: Normal appearance.   HENT:      Head: Normocephalic and atraumatic.      Comments: Mild temporal wasting     Nose: Nose normal.      Mouth/Throat:      Mouth: Mucous membranes are dry.      Pharynx: Oropharynx is clear.   Eyes:      Conjunctiva/sclera: Conjunctivae normal.      Pupils: Pupils are equal, round, and reactive to light.   Cardiovascular:      Rate and Rhythm: Normal rate and regular rhythm.      Pulses: Normal pulses.      Heart sounds: Normal heart sounds.   Pulmonary:      Effort: Pulmonary effort is normal.      Breath sounds: Normal breath sounds.   Abdominal:      General: Abdomen is flat. Bowel sounds are normal.      Palpations: Abdomen is soft.   Musculoskeletal:         General: Normal range of motion.      Cervical back: Normal range of motion and neck supple.      Comments: DL Tunneled R Internal Jugular catheter. Entry site c/d/i   Skin:     General: Skin is warm and dry.      Capillary Refill: Capillary refill takes 2 to 3 seconds.   Neurological:      General: No focal deficit present.      Mental Status: He is alert and oriented to person, place, and time.   Psychiatric:         Mood and Affect: Mood normal.         Behavior: Behavior normal.         Last Recorded Vitals  Blood pressure 108/63, pulse 86, temperature 36.7 °C (98.1 °F), temperature source Temporal, resp. rate 18, height (S) 1.664 m (5' 5.51\"), weight 65 kg (143 lb 4.8 oz), SpO2 97%.  Intake/Output last 3 Shifts:  I/O last 3 completed shifts:  In: 200 (3.1 " mL/kg) [IV Piggyback:200]  Out: 1050 (16.2 mL/kg) [Urine:1050 (0.4 mL/kg/hr)]  Weight: 65 kg       Assessment/Plan   Assessment & Plan  Acute myeloid leukemia in remission (Multi)    Stem cells transplant status (Multi)    Immunocompromised    EBV (Katherine-Barr virus) viremia    T+50 from single cord transplant, T+2 from haplo stem cell rescue     Active issues today (11/08/24):  # Fevers post cell infusion - likely CRS, s/p one dose of solumedrol, tylenol as needed. Blood cultures in process. On cefepime. Vanc added. No more steroid.   #monitor weight, concern for continued weight loss  #monitor for aGVHD  #monitor for hemolytic anemia given ABO incompatibility     ONC  # MDS/AML   - Symptoms began 9/2023; diagnosed 4/2024  - BMBx showing MDS in transition to AML (>10% blast) w/ trisomy 8, DNMT alpha, and U2AF1 mutation indication adverse risk   - S/p 4 cycles of Decitabine/Venetoclax (C4D1 on 8/12/24)  - BMBx (6/17/24): Blasts cleared, FISH still positive for trisomy 8, still MDS changes   - BMBx (9/5/24): 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 and no increase in blasts     # SCT #1: Single-unit Cord, T0=9/19/24  # Primary Engraftment Failure  - Conditioning: Flu-Mariana-TBI  - Donor: Single Cord, 6/8  = ABO Donor / Recipient: A+ / A+  = CMV Donor / Recipient: - / +  - aGVHD Prophylaxis: Tacrolimus + MMF  - Sent plasma CXCL9 level to [facility] to assess for immunologic graft rejection (10/16/24): 431 pg/mL, which is normal (less suggestive of immunologic rejection)  - Repeat BMBx (10/15): hypocellular with no residual myeloid neoplasm. 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  # VTE Prophylaxis: SCDs & Ambulation I/s/o thrombocytopenia  # SOS Prophylaxis: Ursodiol      ID  # EBV Viremia  - EBV: 375 U/L (10/28), up from 77 U/L (10/21/24)  - Treating despite low levels due to upcoming second SCT  - Rituximab 600 mg x 1 (10/31/24)  - Check EBV PCR weekly on Mondays, 11/4 122  # Neutropenic fever (9/25, 10/12)  - Fever w/ diarrhea, workup neg, treated initially with Josefina, switched to Cefepime d/t rash (9/27-10/10)  - CT C/A/P d/t diarrhea. No abnormal findings  - Repeat blood cultures (10/22): negative  - Cefepime (10/22-10/26)  # Fever after ATG on 11/1 ; septic work up (Blood cx and UA both negative)   -Febrile on 11/6 (38.5) , BC: pending, UA (dysuria) UC,  CXR :pending , gave 1 dose of Vanco, continued on cefepime (11/6-**). Vanc (11/8 - **, may stop tomorrow if continues to be afebrile)  # Viral surveillance:   - CMV (Mo/Th): ND (11/4) pending 11/7  - Adeno (Mo): ND (10/28) ND (11/4)  - HHV6 (Mo): ND (10/21),  <188 (10/30) 11/4 :451  # Prophylaxis:   - VZV: Acyclovir   - Aspergillus: Posaconazole  - C. Diff: Vancomycin  - CMV: Letermovir  - PJP: Pentamidine given 10/26, next due 11/23/24     FEN/RENAL/GI  - Admit wt: 75.6 kg (9/13/24)  Most recent weight   Wt Readings from  Last 1 Encounters:   11/08/24 65 kg (143 lb 4.8 oz)       # 10 kgs weight loss. Nutrition Consulted (11/4),appreciate recs  - Allergic to Lasix, will not take med (extreme hypotension, extreme hypokalemia)  # Hypokalemia. Repleted PRN  # Hx of treated Hep C in 2015  - Hep C RNA - not detected (9/12/24)     CARDIO/PULM:  # Hx of STEMI (11/11/2020)  - TTE 7/19/24: EF 59%, otherwise unremarkable   - Was cleared by Cardiology for transplant   # Hx of HTN   - Nifedipine: 90 mEq ER 24 hr tablet   # Ascending aorta dilation   - TTE (4/29/24): 3.8-4 cm dilation      :  # Hx of BPH and urinary retention (2023)  # Hx elevated PSA    - 7/28/21 Bx: atypical small acinar proliferation    - 4/7/23 Bx: Benign     NEURO/PSYCH  # Tremor (10/10- )  :: Most likely due to tacrolimus vs cefepime  - Fine, high-frequency (7+ Hz) tremor worst in the hands, exacerbated by effort,   - Previously associated with significant fasciculations of the shoulder and back muscles; also fasciculations in jaw -- these are no longer aparrent  - Cefepime stopped 10/10, tacrolimus stopped 10/29  - If still no improvement, could consider Neurology consult     DISPO:  - FULL CODE  - NOK: spouse Genevieve ([phone])  - DL Tunneled R Internal Jugular catheter  - Oncologist: Dr. Newsome     Patient seen examined and discussed with Dr. Malaika Yanez MD

## 2024-11-08 NOTE — CARE PLAN
The clinical goals for the shift include pt will remain HDS      Problem: Pain - Adult  Goal: Verbalizes/displays adequate comfort level or baseline comfort level  Outcome: Progressing     Problem: Safety - Adult  Goal: Free from fall injury  Outcome: Progressing     Problem: Discharge Planning  Goal: Discharge to home or other facility with appropriate resources  Outcome: Progressing     Problem: Chronic Conditions and Co-morbidities  Goal: Patient's chronic conditions and co-morbidity symptoms are monitored and maintained or improved  Outcome: Progressing     Problem: Nutrition  Goal: Oral intake greater 75%  Outcome: Progressing  Goal: Adequate PO fluid intake  Outcome: Progressing  Goal: Maintain stable weight  Outcome: Progressing

## 2024-11-08 NOTE — PROGRESS NOTES
Vancomycin Dosing by Pharmacy- FOLLOW UP    Brandt Merritt is a 64 y.o. year old male who Pharmacy has been consulted for vancomycin dosing for other neutropenic fever . Based on the patient's indication and renal status this patient is being dosed based on a goal AUC of 400-600.     Renal function is currently stable.    Current vancomycin dose: 1000 mg given every 12 hours    Estimated vancomycin AUC on current dose: 463 mg/L.hr     Visit Vitals  /70 (BP Location: Right arm, Patient Position: Lying)   Pulse 80   Temp 36.8 °C (98.2 °F) (Temporal)   Resp 18        Lab Results   Component Value Date    CREATININE 0.70 2024    CREATININE 0.71 2024    CREATININE 0.52 2024    CREATININE 0.71 2024        Patient weight is as follows:   Vitals:    24 0600   Weight: 65 kg (143 lb 4.8 oz)       Cultures:  No results found for the encounter in last 14 days.       I/O last 3 completed shifts:  In: 200 (3.1 mL/kg) [IV Piggyback:200]  Out: 1050 (16.2 mL/kg) [Urine:1050 (0.4 mL/kg/hr)]  Weight: 65 kg   I/O during current shift:  No intake/output data recorded.    Temp (24hrs), Av.8 °C (100 °F), Min:36.6 °C (97.9 °F), Max:39.2 °C (102.6 °F)      Assessment/Plan    Within goal AUC range. Continue current vancomycin regimen.    This dosing regimen is predicted by [employer] to result in the following pharmacokinetic parameters:  Loading dose: N/A  Regimen: 1000 mg IV every 12 hours.  Start time: 00:02 on 2024  Exposure target: AUC24 (range)400-600 mg/L.hr   UDC85-00: 397 mg/L.hr  AUC24,ss: 463 mg/L.hr  Probability of AUC24 > 400: 74 %  Ctrough,ss: 15.1 mg/L  Probability of Ctrough,ss > 20: 19 %    The next level will be obtained on  with AM labs. May be obtained sooner if clinically indicated.   Will continue to monitor renal function daily while on vancomycin and order serum creatinine at least every 48 hours if not already ordered.  Follow for continued vancomycin needs, clinical  response, and signs/symptoms of toxicity.       Flor Marquis, RPh

## 2024-11-09 LAB
ALBUMIN SERPL BCP-MCNC: 3.1 G/DL (ref 3.4–5)
ALP SERPL-CCNC: 56 U/L (ref 33–136)
ALT SERPL W P-5'-P-CCNC: 10 U/L (ref 10–52)
ANION GAP SERPL CALC-SCNC: 12 MMOL/L (ref 10–20)
APTT PPP: 28 SECONDS (ref 27–38)
AST SERPL W P-5'-P-CCNC: 10 U/L (ref 9–39)
BASOPHILS # BLD AUTO: 0 X10*3/UL (ref 0–0.1)
BASOPHILS NFR BLD AUTO: 0 %
BILIRUB SERPL-MCNC: 0.6 MG/DL (ref 0–1.2)
BUN SERPL-MCNC: 11 MG/DL (ref 6–23)
CALCIUM SERPL-MCNC: 7.6 MG/DL (ref 8.6–10.6)
CHLORIDE SERPL-SCNC: 98 MMOL/L (ref 98–107)
CO2 SERPL-SCNC: 24 MMOL/L (ref 21–32)
CREAT SERPL-MCNC: 0.75 MG/DL (ref 0.5–1.3)
EGFRCR SERPLBLD CKD-EPI 2021: >90 ML/MIN/1.73M*2
EOSINOPHIL # BLD AUTO: 0 X10*3/UL (ref 0–0.7)
EOSINOPHIL NFR BLD AUTO: 0 %
ERYTHROCYTE [DISTWIDTH] IN BLOOD BY AUTOMATED COUNT: 14.2 % (ref 11.5–14.5)
FIBRINOGEN PPP-MCNC: 453 MG/DL (ref 200–400)
GLUCOSE SERPL-MCNC: 107 MG/DL (ref 74–99)
HCT VFR BLD AUTO: 21.1 % (ref 41–52)
HGB BLD-MCNC: 7.3 G/DL (ref 13.5–17.5)
IMM GRANULOCYTES # BLD AUTO: 0 X10*3/UL (ref 0–0.7)
IMM GRANULOCYTES NFR BLD AUTO: 0 % (ref 0–0.9)
INR PPP: 1.2 (ref 0.9–1.1)
LYMPHOCYTES # BLD AUTO: 0 X10*3/UL (ref 1.2–4.8)
LYMPHOCYTES NFR BLD AUTO: 0 %
MAGNESIUM SERPL-MCNC: 2.18 MG/DL (ref 1.6–2.4)
MCH RBC QN AUTO: 26.9 PG (ref 26–34)
MCHC RBC AUTO-ENTMCNC: 34.6 G/DL (ref 32–36)
MCV RBC AUTO: 78 FL (ref 80–100)
MONOCYTES # BLD AUTO: 0 X10*3/UL (ref 0.1–1)
MONOCYTES NFR BLD AUTO: 0 %
NEUTROPHILS # BLD AUTO: 0.01 X10*3/UL (ref 1.2–7.7)
NEUTROPHILS NFR BLD AUTO: 100 %
NRBC BLD-RTO: 0 /100 WBCS (ref 0–0)
PHOSPHATE SERPL-MCNC: 2.1 MG/DL (ref 2.5–4.9)
PLATELET # BLD AUTO: 16 X10*3/UL (ref 150–450)
POTASSIUM SERPL-SCNC: 3.4 MMOL/L (ref 3.5–5.3)
PROT SERPL-MCNC: 5.9 G/DL (ref 6.4–8.2)
PROTHROMBIN TIME: 13.2 SECONDS (ref 9.8–12.8)
RBC # BLD AUTO: 2.71 X10*6/UL (ref 4.5–5.9)
SODIUM SERPL-SCNC: 131 MMOL/L (ref 136–145)
WBC # BLD AUTO: <0.1 X10*3/UL (ref 4.4–11.3)

## 2024-11-09 PROCEDURE — 84100 ASSAY OF PHOSPHORUS: CPT

## 2024-11-09 PROCEDURE — 83735 ASSAY OF MAGNESIUM: CPT

## 2024-11-09 PROCEDURE — 2500000005 HC RX 250 GENERAL PHARMACY W/O HCPCS

## 2024-11-09 PROCEDURE — 85384 FIBRINOGEN ACTIVITY: CPT

## 2024-11-09 PROCEDURE — 2500000001 HC RX 250 WO HCPCS SELF ADMINISTERED DRUGS (ALT 637 FOR MEDICARE OP): Performed by: INTERNAL MEDICINE

## 2024-11-09 PROCEDURE — 2500000004 HC RX 250 GENERAL PHARMACY W/ HCPCS (ALT 636 FOR OP/ED): Mod: JZ

## 2024-11-09 PROCEDURE — 80053 COMPREHEN METABOLIC PANEL: CPT

## 2024-11-09 PROCEDURE — 2500000004 HC RX 250 GENERAL PHARMACY W/ HCPCS (ALT 636 FOR OP/ED): Mod: JW | Performed by: PHYSICIAN ASSISTANT

## 2024-11-09 PROCEDURE — 85025 COMPLETE CBC W/AUTO DIFF WBC: CPT

## 2024-11-09 PROCEDURE — 2500000001 HC RX 250 WO HCPCS SELF ADMINISTERED DRUGS (ALT 637 FOR MEDICARE OP)

## 2024-11-09 PROCEDURE — 2500000004 HC RX 250 GENERAL PHARMACY W/ HCPCS (ALT 636 FOR OP/ED)

## 2024-11-09 PROCEDURE — 2500000002 HC RX 250 W HCPCS SELF ADMINISTERED DRUGS (ALT 637 FOR MEDICARE OP, ALT 636 FOR OP/ED): Performed by: INTERNAL MEDICINE

## 2024-11-09 PROCEDURE — 2500000002 HC RX 250 W HCPCS SELF ADMINISTERED DRUGS (ALT 637 FOR MEDICARE OP, ALT 636 FOR OP/ED): Performed by: STUDENT IN AN ORGANIZED HEALTH CARE EDUCATION/TRAINING PROGRAM

## 2024-11-09 PROCEDURE — 2500000002 HC RX 250 W HCPCS SELF ADMINISTERED DRUGS (ALT 637 FOR MEDICARE OP, ALT 636 FOR OP/ED): Performed by: PHYSICIAN ASSISTANT

## 2024-11-09 PROCEDURE — 2500000002 HC RX 250 W HCPCS SELF ADMINISTERED DRUGS (ALT 637 FOR MEDICARE OP, ALT 636 FOR OP/ED)

## 2024-11-09 PROCEDURE — 1170000001 HC PRIVATE ONCOLOGY ROOM DAILY

## 2024-11-09 PROCEDURE — 99233 SBSQ HOSP IP/OBS HIGH 50: CPT | Performed by: INTERNAL MEDICINE

## 2024-11-09 PROCEDURE — 2500000001 HC RX 250 WO HCPCS SELF ADMINISTERED DRUGS (ALT 637 FOR MEDICARE OP): Performed by: STUDENT IN AN ORGANIZED HEALTH CARE EDUCATION/TRAINING PROGRAM

## 2024-11-09 PROCEDURE — 2500000004 HC RX 250 GENERAL PHARMACY W/ HCPCS (ALT 636 FOR OP/ED): Performed by: INTERNAL MEDICINE

## 2024-11-09 PROCEDURE — 85730 THROMBOPLASTIN TIME PARTIAL: CPT

## 2024-11-09 RX ORDER — PALONOSETRON 0.05 MG/ML
0.25 INJECTION, SOLUTION INTRAVENOUS ONCE
Status: COMPLETED | OUTPATIENT
Start: 2024-11-09 | End: 2024-11-09

## 2024-11-09 RX ORDER — SODIUM CHLORIDE 9 MG/ML
100 INJECTION, SOLUTION INTRAVENOUS CONTINUOUS
Status: ACTIVE | OUTPATIENT
Start: 2024-11-09 | End: 2024-11-11

## 2024-11-09 RX ORDER — POTASSIUM CHLORIDE 20 MEQ/1
20 TABLET, EXTENDED RELEASE ORAL ONCE
Status: COMPLETED | OUTPATIENT
Start: 2024-11-09 | End: 2024-11-09

## 2024-11-09 ASSESSMENT — PAIN - FUNCTIONAL ASSESSMENT
PAIN_FUNCTIONAL_ASSESSMENT: 0-10

## 2024-11-09 ASSESSMENT — PAIN SCALES - GENERAL
PAINLEVEL_OUTOF10: 0 - NO PAIN

## 2024-11-09 ASSESSMENT — COGNITIVE AND FUNCTIONAL STATUS - GENERAL
DAILY ACTIVITIY SCORE: 24
MOBILITY SCORE: 24

## 2024-11-09 NOTE — ASSESSMENT & PLAN NOTE
Brandt Merritt is a 64 year-old man with a past medical history of hypertension, Hgb C, Hep C (treated), and MDS which transitioned to AML, s/p single-unit cord transplant prepped with Flu/Mariana/TBI, and GVHD prophy with Tacro/MMF, 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

## 2024-11-09 NOTE — PROGRESS NOTES
Vancomycin Dosing by Pharmacy- Cessation of Therapy    Consult to pharmacy for vancomycin dosing has been discontinued by the prescriber, pharmacy will sign off at this time.    Please call pharmacy if there are further questions or re-enter a consult if vancomycin is resumed.     Jean Argueta, PharmD

## 2024-11-09 NOTE — PROGRESS NOTES
"Brandt Merritt is a 64 y.o. male on day 57 of admission presenting with Acute myeloid leukemia in remission (Multi).    Subjective   Afebrile overnight. Patient denies any new issues and endorses feeling well. He still has poor appetite/oral intake. Did have some chili yesterday and is planning on trying more today.     Rest ROS negative.        Objective     Physical Exam  Constitutional:       Appearance: Normal appearance.   HENT:      Head: Normocephalic and atraumatic.      Comments: Mild temporal wasting     Nose: Nose normal.      Mouth/Throat:      Mouth: Mucous membranes are dry.      Pharynx: Oropharynx is clear.   Eyes:      Conjunctiva/sclera: Conjunctivae normal.      Pupils: Pupils are equal, round, and reactive to light.   Cardiovascular:      Rate and Rhythm: Normal rate and regular rhythm.      Pulses: Normal pulses.      Heart sounds: Normal heart sounds.   Pulmonary:      Effort: Pulmonary effort is normal.      Breath sounds: Normal breath sounds.   Abdominal:      General: Abdomen is flat. Bowel sounds are normal.      Palpations: Abdomen is soft.   Musculoskeletal:         General: Normal range of motion.      Cervical back: Normal range of motion and neck supple.      Comments: DL Tunneled R Internal Jugular catheter. Entry site c/d/i   Skin:     General: Skin is warm and dry.      Capillary Refill: Capillary refill takes 2 to 3 seconds.   Neurological:      General: No focal deficit present.      Mental Status: He is alert and oriented to person, place, and time.   Psychiatric:         Mood and Affect: Mood normal.         Behavior: Behavior normal.       Last Recorded Vitals  Blood pressure 110/64, pulse 70, temperature 37.7 °C (99.9 °F), temperature source Temporal, resp. rate 18, height (S) 1.664 m (5' 5.51\"), weight 65 kg (143 lb 4.8 oz), SpO2 97%.  Intake/Output last 3 Shifts:  I/O last 3 completed shifts:  In: 450 (6.9 mL/kg) [IV Piggyback:450]  Out: 300 (4.6 mL/kg) [Urine:300 (0.1 " mL/kg/hr)]  Weight: 65 kg       Assessment/Plan   Assessment & Plan  Acute myeloid leukemia in remission (Multi)    Stem cells transplant status (Multi)    Immunocompromised    EBV (Katherine-Barr virus) viremia    Brandt Merritt is a 64 year-old man with a past medical history of hypertension, Hgb C, Hep C (treated), and MDS which transitioned to AML, s/p single-unit cord transplant prepped with Flu/Mariana/TBI, and GVHD prophy with Tacro/MMF, 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      T+51 from single cord transplant, T+3 from haplo stem cell rescue     Active issues today (11/09/24):  - afebrile, cultures remain negative   - discontinue vancomycin  - continue to avoid steroids even if febrile   - encourage PO intake  - PTCy today   - decrease fluid rate tomorrow      ONC  # MDS/AML   - Symptoms began 9/2023; diagnosed 4/2024  - BMBx showing MDS in transition to AML (>10% blast) w/ trisomy 8, DNMT alpha, and U2AF1 mutation indication adverse risk   - S/p 4 cycles of Decitabine/Venetoclax (C4D1 on 8/12/24)  - BMBx (6/17/24): Blasts cleared, FISH still positive for trisomy 8, still MDS changes   - BMBx (9/5/24): 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 and no increase in blasts     # SCT #1: Single-unit Cord, T0=9/19/24  # Primary Engraftment Failure  - Conditioning: Flu-Mariana-TBI  - Donor: Single Cord, 6/8  = ABO Donor / Recipient: A+ / A+  = CMV Donor / Recipient: - / +  - aGVHD Prophylaxis: Tacrolimus + MMF  - Sent plasma CXCL9 level to [place] to assess for immunologic graft rejection (10/16/24): 431 pg/mL, which is normal (less suggestive of immunologic rejection)  - Repeat BMBx (10/15): hypocellular with no residual myeloid neoplasm. 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  - Pre-SCT testing:  = CT Chest (10/30): no abnormal pulmonary findings, trace pericardial effusion  = Onco-TTE (10/31): EF 58%  - Graft:  Haploidentical sister  = ABO Donor / Recipient: O+ / A+ (ABO incompatibility +)  = CMV Donor / Recipient: + / +  - Conditioning: Flu/Cy/TBI/rATG  = Fludarabine 24 mg/m2 on T-6,-5,-4,-3,-2   = Cyclophosphamide 14.5mg/kg T-6,-5   =  cGy T-1   = - GVHD prophylaxis -  rATG 1.5mg/kg T-5,-3,-1, PTCy (25mg/kg T+3, T+4), tacro, MMF (T+5)  No G-CSF as received rATG  - PTCy today with 150cc/hr NS   - plan to decrease IVF rate 11/10      # GVHD  - No signs or symptoms of aGVHD from SCT#1  = Tacrolimus stopped 10/29 before Haplo transplant  = MMF stopped 10/11 d/t concern for myelosuppression     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   - Monitor counts daily   - Transfuse for Hgb <7 & PLT <10      - S/p PRBC: 11/5     - S/p PLT: 11/4, 11/8  - S/p Neupogen 300 mcg (9/24-10/30/24)  # VTE Prophylaxis: SCDs & Ambulation I/s/o thrombocytopenia  # SOS Prophylaxis: Ursodiol      ID  # EBV Viremia  - EBV: 375 U/L (10/28), up from 77 U/L (10/21/24)  - Treating despite low levels due to upcoming second SCT  - Rituximab 600 mg x 1 (10/31/24)  - Check EBV PCR weekly on Mondays, 11/4 122  # Neutropenic fever (9/25, 10/12)  - Fever w/ diarrhea, workup neg, treated initially with Josefina, switched to Cefepime d/t rash (9/27-10/10)  - CT C/A/P d/t diarrhea. No abnormal findings  - Repeat blood cultures (10/22): negative  - Cefepime (10/22-10/26)  # Fever after ATG on 11/1 ; septic work up (Blood cx and UA both negative)   -Febrile on 11/6 (38.5), BC: NGTD, UC NGTD, continued on cefepime (11/6-**). Vanc (11/8 - 11/9)   # Viral surveillance:   - CMV (Mo/Th): ND (11/4) pending 11/7  - Adeno (Mo): ND (10/28) ND (11/4)  - HHV6 (Mo): ND (10/21),  <188 (10/30) 11/4 :451  # Prophylaxis:   - VZV: Acyclovir   - Aspergillus: Posaconazole  - C. Diff: Vancomycin  - CMV: Letermovir  - PJP: Pentamidine given 10/26, next due 11/23/24     FEN/RENAL/GI  - Admit wt: 75.6 kg (9/13/24)  Most recent weight    Wt Readings from Last 1 Encounters:   11/08/24 65 kg (143 lb 4.8 oz)       # 10 kgs weight loss. Nutrition Consulted (11/4),appreciate recs  - Allergic to Lasix, will not take med (extreme hypotension, extreme hypokalemia)  # Hypokalemia. Repleted PRN  # Hx of treated Hep C in 2015  - Hep C RNA - not detected (9/12/24)     CARDIO/PULM:  # Hx of STEMI (11/11/2020)  - TTE 7/19/24: EF 59%, otherwise unremarkable   - Was cleared by Cardiology for transplant   # Hx of HTN   - Nifedipine: 90 mEq ER 24 hr tablet   # Ascending aorta dilation   - TTE (4/29/24): 3.8-4 cm dilation      :  # Hx of BPH and urinary retention (2023)  # Hx elevated PSA    - 7/28/21 Bx: atypical small acinar proliferation    - 4/7/23 Bx: Benign     NEURO/PSYCH  # Tremor (10/10- )  :: Most likely due to tacrolimus vs cefepime  - Fine, high-frequency (7+ Hz) tremor worst in the hands, exacerbated by effort,   - Previously associated with significant fasciculations of the shoulder and back muscles; also fasciculations in jaw -- these are no longer aparrent  - Cefepime initially stopped 10/10, tacrolimus stopped 10/29  - If still no improvement, could consider Neurology consult     DISPO:  - FULL CODE  - NOK: spouse Genevieve ([phone])  - DL Tunneled R Internal Jugular catheter  - Oncologist: Dr. Newsome     Patient seen examined and discussed with Dr. Malaika Martínez MD

## 2024-11-09 NOTE — NURSING NOTE
Nursing Note (Chemotherapy Note)  Patient received dose #1/2 Cyclophosphamide 1568 MG , [place] 1250 MG in 637.9 0.9 % Sodium Chloride via white lumen of right internal jugular CVP line over two hours. Prior initiation of chemotherapy IV infusion doses, medications, diluent and patient ID checked and verified at bed side with Leni Gonzales RN. Patient premedicated with   Palonosetron 0.25 MG IV push and  Fosaprepitant 150 MG in 250 ML 0.9 % NaCl IV piggy back. Positive blood return obtained via syringe aspiration from white lumen of CVP line, prior initiation and and upon completion of chemotherapy infusion. Patient denies any nausea and vomiting.

## 2024-11-10 VITALS
OXYGEN SATURATION: 94 % | HEIGHT: 66 IN | BODY MASS INDEX: 22.64 KG/M2 | RESPIRATION RATE: 20 BRPM | DIASTOLIC BLOOD PRESSURE: 71 MMHG | TEMPERATURE: 98.6 F | WEIGHT: 140.87 LBS | HEART RATE: 84 BPM | SYSTOLIC BLOOD PRESSURE: 125 MMHG

## 2024-11-10 LAB
ALBUMIN SERPL BCP-MCNC: 2.8 G/DL (ref 3.4–5)
ALP SERPL-CCNC: 51 U/L (ref 33–136)
ALT SERPL W P-5'-P-CCNC: 8 U/L (ref 10–52)
ANION GAP SERPL CALC-SCNC: 13 MMOL/L (ref 10–20)
APTT PPP: 28 SECONDS (ref 27–38)
AST SERPL W P-5'-P-CCNC: 8 U/L (ref 9–39)
BACTERIA BLD CULT: NORMAL
BACTERIA BPU CULT: NORMAL
BASOPHILS # BLD AUTO: ABNORMAL 10*3/UL
BASOPHILS NFR BLD AUTO: ABNORMAL %
BILIRUB SERPL-MCNC: 0.7 MG/DL (ref 0–1.2)
BLOOD EXPIRATION DATE: NORMAL
BLOOD EXPIRATION DATE: NORMAL
BUN SERPL-MCNC: 8 MG/DL (ref 6–23)
CALCIUM SERPL-MCNC: 7.4 MG/DL (ref 8.6–10.6)
CHLORIDE SERPL-SCNC: 103 MMOL/L (ref 98–107)
CO2 SERPL-SCNC: 23 MMOL/L (ref 21–32)
CREAT SERPL-MCNC: 0.62 MG/DL (ref 0.5–1.3)
DISPENSE STATUS: NORMAL
DISPENSE STATUS: NORMAL
EGFRCR SERPLBLD CKD-EPI 2021: >90 ML/MIN/1.73M*2
EOSINOPHIL # BLD AUTO: ABNORMAL 10*3/UL
EOSINOPHIL NFR BLD AUTO: ABNORMAL %
ERYTHROCYTE [DISTWIDTH] IN BLOOD BY AUTOMATED COUNT: 14.1 % (ref 11.5–14.5)
FIBRINOGEN PPP-MCNC: 520 MG/DL (ref 200–400)
GLUCOSE SERPL-MCNC: 107 MG/DL (ref 74–99)
HCT VFR BLD AUTO: 18.8 % (ref 41–52)
HGB BLD-MCNC: 6.7 G/DL (ref 13.5–17.5)
IMM GRANULOCYTES # BLD AUTO: ABNORMAL 10*3/UL
IMM GRANULOCYTES NFR BLD AUTO: ABNORMAL %
INR PPP: 1.2 (ref 0.9–1.1)
LYMPHOCYTES # BLD AUTO: ABNORMAL 10*3/UL
LYMPHOCYTES NFR BLD AUTO: ABNORMAL %
MAGNESIUM SERPL-MCNC: 1.95 MG/DL (ref 1.6–2.4)
MCH RBC QN AUTO: 26.8 PG (ref 26–34)
MCHC RBC AUTO-ENTMCNC: 35.6 G/DL (ref 32–36)
MCV RBC AUTO: 75 FL (ref 80–100)
MONOCYTES # BLD AUTO: ABNORMAL 10*3/UL
MONOCYTES NFR BLD AUTO: ABNORMAL %
NEUTROPHILS # BLD AUTO: ABNORMAL 10*3/UL
NEUTROPHILS NFR BLD AUTO: ABNORMAL %
NRBC BLD-RTO: ABNORMAL /100{WBCS}
PHOSPHATE SERPL-MCNC: 2.5 MG/DL (ref 2.5–4.9)
PLATELET # BLD AUTO: 10 X10*3/UL (ref 150–450)
POTASSIUM SERPL-SCNC: 3.1 MMOL/L (ref 3.5–5.3)
PRODUCT BLOOD TYPE: 5100
PRODUCT BLOOD TYPE: 600
PRODUCT CODE: NORMAL
PRODUCT CODE: NORMAL
PROT SERPL-MCNC: 5.5 G/DL (ref 6.4–8.2)
PROTHROMBIN TIME: 13.6 SECONDS (ref 9.8–12.8)
RBC # BLD AUTO: 2.5 X10*6/UL (ref 4.5–5.9)
SODIUM SERPL-SCNC: 136 MMOL/L (ref 136–145)
UNIT ABO: NORMAL
UNIT ABO: NORMAL
UNIT NUMBER: NORMAL
UNIT NUMBER: NORMAL
UNIT RH: NORMAL
UNIT RH: NORMAL
UNIT VOLUME: 277
UNIT VOLUME: 336
WBC # BLD AUTO: <0.1 X10*3/UL (ref 4.4–11.3)
XM INTEP: NORMAL

## 2024-11-10 PROCEDURE — 36430 TRANSFUSION BLD/BLD COMPNT: CPT

## 2024-11-10 PROCEDURE — 80053 COMPREHEN METABOLIC PANEL: CPT

## 2024-11-10 PROCEDURE — 2500000004 HC RX 250 GENERAL PHARMACY W/ HCPCS (ALT 636 FOR OP/ED): Mod: JZ

## 2024-11-10 PROCEDURE — 2500000002 HC RX 250 W HCPCS SELF ADMINISTERED DRUGS (ALT 637 FOR MEDICARE OP, ALT 636 FOR OP/ED): Performed by: STUDENT IN AN ORGANIZED HEALTH CARE EDUCATION/TRAINING PROGRAM

## 2024-11-10 PROCEDURE — 36415 COLL VENOUS BLD VENIPUNCTURE: CPT | Performed by: STUDENT IN AN ORGANIZED HEALTH CARE EDUCATION/TRAINING PROGRAM

## 2024-11-10 PROCEDURE — 1170000001 HC PRIVATE ONCOLOGY ROOM DAILY

## 2024-11-10 PROCEDURE — 2500000001 HC RX 250 WO HCPCS SELF ADMINISTERED DRUGS (ALT 637 FOR MEDICARE OP)

## 2024-11-10 PROCEDURE — 2500000004 HC RX 250 GENERAL PHARMACY W/ HCPCS (ALT 636 FOR OP/ED): Performed by: INTERNAL MEDICINE

## 2024-11-10 PROCEDURE — P9073 PLATELETS PHERESIS PATH REDU: HCPCS

## 2024-11-10 PROCEDURE — 2500000002 HC RX 250 W HCPCS SELF ADMINISTERED DRUGS (ALT 637 FOR MEDICARE OP, ALT 636 FOR OP/ED): Performed by: INTERNAL MEDICINE

## 2024-11-10 PROCEDURE — 2500000001 HC RX 250 WO HCPCS SELF ADMINISTERED DRUGS (ALT 637 FOR MEDICARE OP): Performed by: INTERNAL MEDICINE

## 2024-11-10 PROCEDURE — 84100 ASSAY OF PHOSPHORUS: CPT

## 2024-11-10 PROCEDURE — 85027 COMPLETE CBC AUTOMATED: CPT

## 2024-11-10 PROCEDURE — 2500000002 HC RX 250 W HCPCS SELF ADMINISTERED DRUGS (ALT 637 FOR MEDICARE OP, ALT 636 FOR OP/ED): Performed by: PHYSICIAN ASSISTANT

## 2024-11-10 PROCEDURE — P9040 RBC LEUKOREDUCED IRRADIATED: HCPCS

## 2024-11-10 PROCEDURE — 99233 SBSQ HOSP IP/OBS HIGH 50: CPT | Performed by: INTERNAL MEDICINE

## 2024-11-10 PROCEDURE — 85730 THROMBOPLASTIN TIME PARTIAL: CPT

## 2024-11-10 PROCEDURE — 2500000004 HC RX 250 GENERAL PHARMACY W/ HCPCS (ALT 636 FOR OP/ED): Mod: JZ | Performed by: STUDENT IN AN ORGANIZED HEALTH CARE EDUCATION/TRAINING PROGRAM

## 2024-11-10 PROCEDURE — 2500000004 HC RX 250 GENERAL PHARMACY W/ HCPCS (ALT 636 FOR OP/ED)

## 2024-11-10 PROCEDURE — 87040 BLOOD CULTURE FOR BACTERIA: CPT | Performed by: STUDENT IN AN ORGANIZED HEALTH CARE EDUCATION/TRAINING PROGRAM

## 2024-11-10 PROCEDURE — 2500000001 HC RX 250 WO HCPCS SELF ADMINISTERED DRUGS (ALT 637 FOR MEDICARE OP): Performed by: STUDENT IN AN ORGANIZED HEALTH CARE EDUCATION/TRAINING PROGRAM

## 2024-11-10 PROCEDURE — 83735 ASSAY OF MAGNESIUM: CPT

## 2024-11-10 PROCEDURE — 85384 FIBRINOGEN ACTIVITY: CPT

## 2024-11-10 RX ORDER — MEROPENEM AND SODIUM CHLORIDE 1 G/50ML
1 INJECTION, SOLUTION INTRAVENOUS EVERY 8 HOURS
Status: DISCONTINUED | OUTPATIENT
Start: 2024-11-10 | End: 2024-11-10

## 2024-11-10 RX ORDER — POTASSIUM CHLORIDE 29.8 MG/ML
40 INJECTION INTRAVENOUS ONCE
Status: COMPLETED | OUTPATIENT
Start: 2024-11-10 | End: 2024-11-10

## 2024-11-10 RX ORDER — CEFEPIME HYDROCHLORIDE 2 G/50ML
2 INJECTION, SOLUTION INTRAVENOUS EVERY 8 HOURS
Status: DISCONTINUED | OUTPATIENT
Start: 2024-11-10 | End: 2024-11-20

## 2024-11-10 RX ORDER — NIFEDIPINE 60 MG/1
60 TABLET, FILM COATED, EXTENDED RELEASE ORAL
Status: DISCONTINUED | OUTPATIENT
Start: 2024-11-11 | End: 2024-11-21 | Stop reason: HOSPADM

## 2024-11-10 ASSESSMENT — COGNITIVE AND FUNCTIONAL STATUS - GENERAL
MOBILITY SCORE: 24
DAILY ACTIVITIY SCORE: 24
MOBILITY SCORE: 24
DAILY ACTIVITIY SCORE: 24

## 2024-11-10 ASSESSMENT — PAIN SCALES - GENERAL
PAINLEVEL_OUTOF10: 0 - NO PAIN

## 2024-11-10 ASSESSMENT — PAIN - FUNCTIONAL ASSESSMENT
PAIN_FUNCTIONAL_ASSESSMENT: 0-10

## 2024-11-10 NOTE — PROGRESS NOTES
"Brandt Merritt is a 64 y.o. 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    Subjective   Febrile yesterday late afternoon/evening but asymptomatic. Fever resolved without tylenol. Otherwise NAEO.     Patient reports feeling well today. He only had half of a can of soup yesterday. Had 2-3 loose BMs. Otherwise denies any new issues.     Rest ROS negative.        Objective     Physical Exam  Constitutional:       Appearance: Normal appearance.   HENT:      Head: Normocephalic and atraumatic.      Comments: Mild temporal wasting     Nose: Nose normal.      Mouth/Throat:      Mouth: Mucous membranes are dry.      Pharynx: Oropharynx is clear.   Eyes:      Conjunctiva/sclera: Conjunctivae normal.      Pupils: Pupils are equal, round, and reactive to light.   Cardiovascular:      Rate and Rhythm: Normal rate and regular rhythm.      Pulses: Normal pulses.      Heart sounds: Normal heart sounds.   Pulmonary:      Effort: Pulmonary effort is normal.      Breath sounds: Normal breath sounds.   Abdominal:      General: Abdomen is flat. Bowel sounds are normal.      Palpations: Abdomen is soft.   Musculoskeletal:         General: Normal range of motion.      Cervical back: Normal range of motion and neck supple.      Comments: DL Tunneled R Internal Jugular catheter. Entry site c/d/i   Skin:     General: Skin is warm and dry.      Capillary Refill: Capillary refill takes 2 to 3 seconds.   Neurological:      General: No focal deficit present.      Mental Status: He is alert and oriented to person, place, and time.   Psychiatric:         Mood and Affect: Mood normal.         Behavior: Behavior normal.       Last Recorded Vitals  Blood pressure 105/61, pulse 79, temperature 37.2 °C (99 °F), temperature source Temporal, resp. rate 18, height (S) 1.664 m (5' 5.51\"), weight 63.9 kg (140 lb 14 oz), SpO2 97%.  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      T+52 from single cord transplant, T+4 from haplo stem cell rescue     Active issues today (11/10/24):  - asymptomatic fever last PM  - 2nd dose of PTCy today  - hgb 6.7, 1u RBC   - plt 10, 1u PLT  - repeat Bcx, continue cefepime (meropenem allergy)   - decrease nifedipine to 60mg given low-normal pressures, concern for sepsis  - plan for MMF, tacro, and filgrastim starting T+5 (11/11)   - decrease IVF to 100cc/hr   - okay to unhook for 15min for walks      ONC  # MDS/AML   - Symptoms began 9/2023; diagnosed 4/2024  - BMBx showing MDS in transition to AML (>10% blast) w/ trisomy 8, DNMT alpha, and U2AF1 mutation indication adverse risk   - S/p 4 cycles of Decitabine/Venetoclax (C4D1 on 8/12/24)  - BMBx (6/17/24): Blasts cleared, FISH still positive for trisomy 8, still MDS changes   - BMBx (9/5/24): 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 and no increase in blasts     # SCT #1: Single-unit Cord, T0=9/19/24  # Primary Engraftment Failure  - Conditioning: Flu-Mariana-TBI  - Donor: Single Cord, 6/8  = ABO Donor / Recipient: A+ / A+  = CMV Donor / Recipient: - / +  - aGVHD Prophylaxis: Tacrolimus + MMF  - Sent plasma CXCL9 level to [place] to assess for immunologic graft rejection (10/16/24): 431 pg/mL, which is normal (less suggestive of immunologic rejection)  - Repeat BMBx (10/15): hypocellular  with no residual myeloid neoplasm. 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  - Pre-SCT testing:  = CT Chest (10/30): no abnormal pulmonary findings, trace pericardial effusion  = Onco-TTE (10/31): EF 58%  - Graft: Haploidentical sister  = ABO Donor / Recipient: O+ / A+ (ABO incompatibility +)  = CMV Donor / Recipient: + / +  - Conditioning: Flu/Cy/TBI/rATG  = Fludarabine 24 mg/m2 on T-6,-5,-4,-3,-2   = Cyclophosphamide 14.5mg/kg T-6,-5   =  cGy T-1   = - GVHD prophylaxis -  rATG 1.5mg/kg T-5,-3,-1, PTCy (25mg/kg T+3, T+4), tacro, MMF (T+5)  No G-CSF as received rATG  - 2nd PTCy today  - decreaseed IVF rate 11/10 to 100cc/hr       # GVHD  - No signs or symptoms of aGVHD from SCT#1  = Tacrolimus stopped 10/29 before Haplo transplant  = MMF stopped 10/11 d/t concern for myelosuppression     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   - Monitor counts daily   - Transfuse for Hgb <7 & PLT <10      - S/p PRBC: 11/5, 11/10      - S/p PLT: 11/4, 11/8, 11/10  - S/p Neupogen 300 mcg (9/24-10/30/24), restart T+5  # VTE Prophylaxis: SCDs & Ambulation I/s/o thrombocytopenia  # SOS Prophylaxis: Ursodiol      ID  # EBV Viremia  - EBV: 375 U/L (10/28), up from 77 U/L (10/21/24)  - Treating despite low levels due to upcoming second SCT  - Rituximab 600 mg x 1 (10/31/24)  - Check EBV PCR weekly on Mondays, 11/4 122  # Neutropenic fever (9/25, 10/12)  - Fever w/ diarrhea, workup neg, treated initially with Josefina, switched to Cefepime d/t rash (9/27-10/10)  - CT C/A/P d/t diarrhea. No abnormal findings  - Repeat blood cultures (10/22): negative  - Cefepime (10/22-10/26)  # Fever after ATG on 11/1 ; septic work up (Blood cx and UA both negative)   -Febrile on 11/6 (38.5), BC: NGTD, UC NGTD, continued on cefepime (11/6-**). Vanc (11/8 - 11/9)  -repeat Bcx    # Viral surveillance:   - CMV (Mo/Th): ND (11/4) pending 11/7  - Adeno (Mo): ND  (10/28) ND (11/4)  - HHV6 (Mo): ND (10/21),  <188 (10/30) 11/4 :451  # Prophylaxis:   - VZV: Acyclovir   - Aspergillus: Posaconazole  - C. Diff: Vancomycin PO  - CMV: Letermovir  - PJP: Pentamidine given 10/26, next due 11/23/24     FEN/RENAL/GI  - Admit wt: 75.6 kg (9/13/24)  Most recent weight   Wt Readings from Last 1 Encounters:   11/10/24 63.9 kg (140 lb 14 oz)       # 10 kgs weight loss. Nutrition Consulted (11/4),appreciate recs  - Allergic to Lasix, will not take med (extreme hypotension, extreme hypokalemia)  # Hypokalemia. Repleted PRN  # Hx of treated Hep C in 2015  - Hep C RNA - not detected (9/12/24)     CARDIO/PULM:  # Hx of STEMI (11/11/2020)  - TTE 7/19/24: EF 59%, otherwise unremarkable   - Was cleared by Cardiology for transplant   # Hx of HTN   - Nifedipine: decrease to 60 mEq ER 24 hr tablet   # Ascending aorta dilation   - TTE (4/29/24): 3.8-4 cm dilation      :  # Hx of BPH and urinary retention (2023)  # Hx elevated PSA    - 7/28/21 Bx: atypical small acinar proliferation    - 4/7/23 Bx: Benign     NEURO/PSYCH  # Tremor (10/10- )  :: Most likely due to tacrolimus vs cefepime  - Fine, high-frequency (7+ Hz) tremor worst in the hands, exacerbated by effort,   - Previously associated with significant fasciculations of the shoulder and back muscles; also fasciculations in jaw -- these are no longer aparrent  - Cefepime initially stopped 10/10, tacrolimus stopped 10/29  - If still no improvement, could consider Neurology consult     DISPO:  - FULL CODE  - NOK: spouse Genevieve ([phone])  - DL Tunneled R Internal Jugular catheter  - Oncologist: Dr. Newsome     Patient seen examined and discussed with Dr. Malaika Martínez MD

## 2024-11-10 NOTE — NURSING NOTE
Nursing Note (Chemotherapy Note)  Patient received dose #2/2 Cyclophosphamide 1568 MG , [place] 1250 MG in  0.9 % Sodium Chloride   637.9 ML  IV via white lumen of right internal jugular CVP line over two hours . Prior initiation of chemotherapy infusion , doses, medications , diluent and patient ID checked and verified at bed side with Zane Wright RN. Positive blood return obtained via syringe aspiration from white lumen of CVP line prior initiation and upon completion of chemotherapy infusion. Patient denies any nausea and vomiting.

## 2024-11-10 NOTE — NURSING NOTE
Nursing Note  Patient remains afebrile, continue to receive PO and IV antibiotics . Vitals signs stable. Denies any pain, nausea and vomiting. Patient continue to have loose stools. PO intake fair. Continue to receive IV fluid 0.9 % NaCl at 100 ML/HR. Voids clear yellow urine adequate amount.Patient transfused with one unit of PRBCs and one unit of platelets . Tolerated PRBCs and platelets transfusion well. No signs and symptoms of allergic present. Vital  signs stable during PRBCs and platelets transfusion. Will continue to monitor patient and will notify MD of acute changes.

## 2024-11-10 NOTE — CARE PLAN
The patient's goals for the shift include      The clinical goals for the shift include pt will remain afebrile through 11/10/2024 at 0830      Problem: Pain - Adult  Goal: Verbalizes/displays adequate comfort level or baseline comfort level  Outcome: Progressing     Problem: Safety - Adult  Goal: Free from fall injury  Outcome: Progressing     Problem: Discharge Planning  Goal: Discharge to home or other facility with appropriate resources  Outcome: Progressing     Problem: Chronic Conditions and Co-morbidities  Goal: Patient's chronic conditions and co-morbidity symptoms are monitored and maintained or improved  Outcome: Progressing     Problem: Nutrition  Goal: Oral intake greater 75%  Outcome: Progressing  Goal: Adequate PO fluid intake  Outcome: Progressing  Goal: Maintain stable weight  Outcome: Progressing

## 2024-11-11 LAB
ALBUMIN SERPL BCP-MCNC: 2.9 G/DL (ref 3.4–5)
ALP SERPL-CCNC: 59 U/L (ref 33–136)
ALT SERPL W P-5'-P-CCNC: 7 U/L (ref 10–52)
ANION GAP SERPL CALC-SCNC: 12 MMOL/L (ref 10–20)
APTT PPP: 28 SECONDS (ref 27–38)
AST SERPL W P-5'-P-CCNC: 9 U/L (ref 9–39)
BASOPHILS # BLD AUTO: 0 X10*3/UL (ref 0–0.1)
BASOPHILS NFR BLD AUTO: 0 %
BILIRUB SERPL-MCNC: 0.7 MG/DL (ref 0–1.2)
BUN SERPL-MCNC: 6 MG/DL (ref 6–23)
CALCIUM SERPL-MCNC: 7.9 MG/DL (ref 8.6–10.6)
CHLORIDE SERPL-SCNC: 104 MMOL/L (ref 98–107)
CO2 SERPL-SCNC: 25 MMOL/L (ref 21–32)
CREAT SERPL-MCNC: 0.57 MG/DL (ref 0.5–1.3)
EGFRCR SERPLBLD CKD-EPI 2021: >90 ML/MIN/1.73M*2
EOSINOPHIL # BLD AUTO: 0 X10*3/UL (ref 0–0.7)
EOSINOPHIL NFR BLD AUTO: 0 %
ERYTHROCYTE [DISTWIDTH] IN BLOOD BY AUTOMATED COUNT: 14.3 % (ref 11.5–14.5)
FIBRINOGEN PPP-MCNC: 444 MG/DL (ref 200–400)
GLUCOSE SERPL-MCNC: 93 MG/DL (ref 74–99)
HCT VFR BLD AUTO: 20.5 % (ref 41–52)
HGB BLD-MCNC: 7.3 G/DL (ref 13.5–17.5)
IMM GRANULOCYTES # BLD AUTO: 0 X10*3/UL (ref 0–0.7)
IMM GRANULOCYTES NFR BLD AUTO: 0 % (ref 0–0.9)
INR PPP: 1.2 (ref 0.9–1.1)
LDH SERPL L TO P-CCNC: 161 U/L (ref 84–246)
LYMPHOCYTES # BLD AUTO: 0 X10*3/UL (ref 1.2–4.8)
LYMPHOCYTES NFR BLD AUTO: 0 %
MAGNESIUM SERPL-MCNC: 1.97 MG/DL (ref 1.6–2.4)
MCH RBC QN AUTO: 27.2 PG (ref 26–34)
MCHC RBC AUTO-ENTMCNC: 35.6 G/DL (ref 32–36)
MCV RBC AUTO: 77 FL (ref 80–100)
MONOCYTES # BLD AUTO: 0 X10*3/UL (ref 0.1–1)
MONOCYTES NFR BLD AUTO: 0 %
NEUTROPHILS # BLD AUTO: 0.01 X10*3/UL (ref 1.2–7.7)
NEUTROPHILS NFR BLD AUTO: 100 %
NRBC BLD-RTO: 0 /100 WBCS (ref 0–0)
PHOSPHATE SERPL-MCNC: 2.2 MG/DL (ref 2.5–4.9)
PLATELET # BLD AUTO: 37 X10*3/UL (ref 150–450)
POTASSIUM SERPL-SCNC: 3.2 MMOL/L (ref 3.5–5.3)
PROT SERPL-MCNC: 5.7 G/DL (ref 6.4–8.2)
PROTHROMBIN TIME: 13 SECONDS (ref 9.8–12.8)
RBC # BLD AUTO: 2.68 X10*6/UL (ref 4.5–5.9)
RBC MORPH BLD: NORMAL
SODIUM SERPL-SCNC: 138 MMOL/L (ref 136–145)
WBC # BLD AUTO: <0.1 X10*3/UL (ref 4.4–11.3)

## 2024-11-11 PROCEDURE — 2500000005 HC RX 250 GENERAL PHARMACY W/O HCPCS

## 2024-11-11 PROCEDURE — 2500000004 HC RX 250 GENERAL PHARMACY W/ HCPCS (ALT 636 FOR OP/ED): Mod: JZ | Performed by: STUDENT IN AN ORGANIZED HEALTH CARE EDUCATION/TRAINING PROGRAM

## 2024-11-11 PROCEDURE — 2500000001 HC RX 250 WO HCPCS SELF ADMINISTERED DRUGS (ALT 637 FOR MEDICARE OP)

## 2024-11-11 PROCEDURE — 1170000001 HC PRIVATE ONCOLOGY ROOM DAILY

## 2024-11-11 PROCEDURE — 2500000004 HC RX 250 GENERAL PHARMACY W/ HCPCS (ALT 636 FOR OP/ED)

## 2024-11-11 PROCEDURE — 2500000004 HC RX 250 GENERAL PHARMACY W/ HCPCS (ALT 636 FOR OP/ED): Performed by: PHYSICIAN ASSISTANT

## 2024-11-11 PROCEDURE — 2500000002 HC RX 250 W HCPCS SELF ADMINISTERED DRUGS (ALT 637 FOR MEDICARE OP, ALT 636 FOR OP/ED): Performed by: INTERNAL MEDICINE

## 2024-11-11 PROCEDURE — 84100 ASSAY OF PHOSPHORUS: CPT

## 2024-11-11 PROCEDURE — 99233 SBSQ HOSP IP/OBS HIGH 50: CPT | Performed by: INTERNAL MEDICINE

## 2024-11-11 PROCEDURE — 80053 COMPREHEN METABOLIC PANEL: CPT

## 2024-11-11 PROCEDURE — 83735 ASSAY OF MAGNESIUM: CPT

## 2024-11-11 PROCEDURE — 85025 COMPLETE CBC W/AUTO DIFF WBC: CPT

## 2024-11-11 PROCEDURE — 2500000002 HC RX 250 W HCPCS SELF ADMINISTERED DRUGS (ALT 637 FOR MEDICARE OP, ALT 636 FOR OP/ED): Performed by: STUDENT IN AN ORGANIZED HEALTH CARE EDUCATION/TRAINING PROGRAM

## 2024-11-11 PROCEDURE — 2500000001 HC RX 250 WO HCPCS SELF ADMINISTERED DRUGS (ALT 637 FOR MEDICARE OP): Performed by: STUDENT IN AN ORGANIZED HEALTH CARE EDUCATION/TRAINING PROGRAM

## 2024-11-11 PROCEDURE — 83615 LACTATE (LD) (LDH) ENZYME: CPT | Performed by: STUDENT IN AN ORGANIZED HEALTH CARE EDUCATION/TRAINING PROGRAM

## 2024-11-11 PROCEDURE — 87799 DETECT AGENT NOS DNA QUANT: CPT | Performed by: INTERNAL MEDICINE

## 2024-11-11 PROCEDURE — 2500000004 HC RX 250 GENERAL PHARMACY W/ HCPCS (ALT 636 FOR OP/ED): Performed by: INTERNAL MEDICINE

## 2024-11-11 PROCEDURE — 85730 THROMBOPLASTIN TIME PARTIAL: CPT

## 2024-11-11 PROCEDURE — 87533 HHV-6 DNA QUANT: CPT | Performed by: PHYSICIAN ASSISTANT

## 2024-11-11 PROCEDURE — 85384 FIBRINOGEN ACTIVITY: CPT

## 2024-11-11 PROCEDURE — 2500000001 HC RX 250 WO HCPCS SELF ADMINISTERED DRUGS (ALT 637 FOR MEDICARE OP): Performed by: INTERNAL MEDICINE

## 2024-11-11 RX ORDER — MYCOPHENOLATE MOFETIL 250 MG/1
1000 CAPSULE ORAL 3 TIMES DAILY
Status: DISCONTINUED | OUTPATIENT
Start: 2024-11-11 | End: 2024-11-12

## 2024-11-11 RX ORDER — POTASSIUM CHLORIDE 29.8 MG/ML
40 INJECTION INTRAVENOUS ONCE
Status: COMPLETED | OUTPATIENT
Start: 2024-11-11 | End: 2024-11-11

## 2024-11-11 ASSESSMENT — COGNITIVE AND FUNCTIONAL STATUS - GENERAL
MOBILITY SCORE: 24
DAILY ACTIVITIY SCORE: 24

## 2024-11-11 ASSESSMENT — PAIN - FUNCTIONAL ASSESSMENT: PAIN_FUNCTIONAL_ASSESSMENT: 0-10

## 2024-11-11 ASSESSMENT — PAIN SCALES - GENERAL
PAINLEVEL_OUTOF10: 0 - NO PAIN

## 2024-11-11 NOTE — PROGRESS NOTES
"Brandt Merritt is a 64 y.o. 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    Subjective   Afebrile. No new complaints. Sitting up on daybed. He reports having 2 loose stools this morning. ROS otherwise unremarkable.       Objective     Physical Exam  Constitutional:       Appearance: Normal appearance.   HENT:      Head: Normocephalic and atraumatic.      Comments: Mild temporal wasting     Nose: Nose normal.      Mouth/Throat:      Mouth: Mucous membranes are dry.      Pharynx: Oropharynx is clear.   Eyes:      Conjunctiva/sclera: Conjunctivae normal.      Pupils: Pupils are equal, round, and reactive to light.   Cardiovascular:      Rate and Rhythm: Normal rate and regular rhythm.      Pulses: Normal pulses.      Heart sounds: Normal heart sounds.   Pulmonary:      Effort: Pulmonary effort is normal.      Breath sounds: Normal breath sounds.   Abdominal:      General: Abdomen is flat. Bowel sounds are normal.      Palpations: Abdomen is soft.   Musculoskeletal:         General: Normal range of motion.      Cervical back: Normal range of motion and neck supple.      Comments: DL Tunneled R Internal Jugular catheter. Entry site c/d/i   Skin:     General: Skin is warm and dry.      Capillary Refill: Capillary refill takes 2 to 3 seconds.   Neurological:      General: No focal deficit present.      Mental Status: He is alert and oriented to person, place, and time.   Psychiatric:         Mood and Affect: Mood normal.         Behavior: Behavior normal.         Last Recorded Vitals  Blood pressure 111/69, pulse 86, temperature 36.2 °C (97.2 °F), temperature source Temporal, resp. rate 18, height (S) 1.664 m (5' 5.51\"), weight 65.6 kg (144 lb 10 oz), SpO2 99%.  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      T+53 from single cord transplant, T+5 from haplo stem cell rescue        ONC  # MDS/AML   - Symptoms began 9/2023; diagnosed 4/2024  - BMBx showing MDS in transition to AML (>10% blast) w/ trisomy 8, DNMT alpha, and U2AF1 mutation indication adverse risk   - S/p 4 cycles of Decitabine/Venetoclax (C4D1 on 8/12/24)  - BMBx (6/17/24): Blasts cleared, FISH still positive for trisomy 8, still MDS changes   - BMBx (9/5/24): 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 and no increase in blasts     # SCT #1: Single-unit Cord, T0=9/19/24  # Primary Engraftment Failure  - Conditioning: Flu-Mariana-TBI  - Donor: Single Cord, 6/8  = ABO Donor / Recipient: A+ / A+  = CMV Donor / Recipient: - / +  - aGVHD Prophylaxis: Tacrolimus + MMF  - Sent plasma CXCL9 level to [place] to assess for immunologic graft rejection (10/16/24): 431 pg/mL, which is normal (less suggestive of immunologic rejection)  - Repeat BMBx (10/15): hypocellular with no residual myeloid neoplasm. 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  - Pre-SCT testing:  = CT Chest (10/30): no abnormal pulmonary findings, trace pericardial effusion  = Onco-TTE (10/31): EF 58%  - Graft: Haploidentical sister  = ABO Donor / Recipient: O+ / A+ (ABO incompatibility +)  = CMV Donor / Recipient: + / +  - Conditioning: Flu/Cy/TBI/rATG  = Fludarabine 24 mg/m2 on T-6,-5,-4,-3,-2   = Cyclophosphamide 14.5mg/kg T-6,-5   =  cGy T-1    = GVHD prophylaxis -  rATG 1.5mg/kg T-5,-3,-1, PTCy (25mg/kg T+3, T+4), tacro, MMF (T+5)       # GVHD  - No signs or symptoms of aGVHD from SCT#1  = Started Tacro and MMF on T+5 (11/11) following Haplo rescue (sister)     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   - Monitor counts daily   - Transfuse for Hgb <7 & PLT <10      - S/p PRBC: 11/5, 11/10      - S/p PLT: 11/4, 11/8, 11/10  - S/p Neupogen 300 mcg (9/24-10/30/24), restarted T+5 (11/11)  # VTE Prophylaxis: SCDs & Ambulation I/s/o thrombocytopenia  # SOS Prophylaxis: Ursodiol      ID  # EBV Viremia  - EBV: 375 U/L (10/28), up from 77 U/L (10/21/24)  - Treating despite low levels due to upcoming second SCT  - Rituximab 600 mg x 1 (10/31/24)  - Check EBV PCR weekly on Mondays, 11/4 122  # Neutropenic fever (9/25, 10/12)  - Fever w/ diarrhea, workup neg, treated initially with Josefina, switched to Cefepime d/t rash (9/27-10/10)  - CT C/A/P d/t diarrhea. No abnormal findings  - Repeat blood cultures (10/22): negative  - Cefepime (10/22-10/26)  # Fever after ATG on 11/1 ; septic work up (Blood cx and UA both negative)   -Febrile on 11/6 (38.5), BC: NGTD, UC NGTD, continued on cefepime (11/6-**). Vanc (11/8 - 11/9)  -repeat Bcx    # Viral surveillance:   - CMV (Mo/Th): ND (11/4, 11/7)   - Adeno (Mo): ND (10/28) ND (11/4)  - HHV6 (Mo): ND (10/21),  <188 (10/30) 11/4 :451  # Prophylaxis:   - VZV: Acyclovir   - Aspergillus: Posaconazole  - C. Diff: Vancomycin PO  - CMV: Letermovir  - PJP: Pentamidine given 10/26, next due 11/23/24     FEN/RENAL/GI  - Admit wt: 75.6 kg (9/13/24)  Most recent weight   Wt Readings from Last 1 Encounters:   11/11/24 65.6 kg (144 lb 10 oz)       # 10 kgs weight loss. Nutrition Consulted (11/4),appreciate recs  - Allergic to Lasix, will not take med (extreme hypotension, extreme hypokalemia)  # Hypokalemia/Hypophosphatemia. Repleted PRN  # Hx of treated Hep C in 2015  - Hep C RNA - not  detected (9/12/24)     CARDIO/PULM:  # Hx of STEMI (11/11/2020)  - TTE 7/19/24: EF 59%, otherwise unremarkable   - Was cleared by Cardiology for transplant   # Hx of HTN   - Nifedipine: decrease to 60 mEq ER 24 hr tablet   # Ascending aorta dilation   - TTE (4/29/24): 3.8-4 cm dilation      :  # Hx of BPH and urinary retention (2023)  # Hx elevated PSA    - 7/28/21 Bx: atypical small acinar proliferation    - 4/7/23 Bx: Benign     NEURO/PSYCH  # Tremor (10/10- )  :: Most likely due to tacrolimus vs cefepime  - Fine, high-frequency (7+ Hz) tremor worst in the hands, exacerbated by effort,   - Previously associated with significant fasciculations of the shoulder and back muscles; also fasciculations in jaw -- these are no longer aparrent  - Cefepime initially stopped 10/10, tacrolimus stopped 10/29  - If still no improvement, could consider Neurology consult     DISPO:  - FULL CODE  - NOK: spouse Genevieve ([phone])  - DL Tunneled R Internal Jugular catheter  - Oncologist: Dr. Newsome     Patient seen examined and discussed with Dr. Malaika Montoya PA-C

## 2024-11-12 LAB
ADENOVIRUS QPCR,PLASMA, VIRC: NOT DETECTED COPIES/ML
ALBUMIN SERPL BCP-MCNC: 3 G/DL (ref 3.4–5)
ALP SERPL-CCNC: 64 U/L (ref 33–136)
ALT SERPL W P-5'-P-CCNC: 8 U/L (ref 10–52)
ANION GAP SERPL CALC-SCNC: 10 MMOL/L (ref 10–20)
APTT PPP: 29 SECONDS (ref 27–38)
AST SERPL W P-5'-P-CCNC: 9 U/L (ref 9–39)
BASOPHILS # BLD AUTO: ABNORMAL 10*3/UL
BASOPHILS NFR BLD AUTO: ABNORMAL %
BILIRUB SERPL-MCNC: 0.7 MG/DL (ref 0–1.2)
BUN SERPL-MCNC: 6 MG/DL (ref 6–23)
CALCIUM SERPL-MCNC: 8.1 MG/DL (ref 8.6–10.6)
CHLORIDE SERPL-SCNC: 102 MMOL/L (ref 98–107)
CMV DNA SERPL NAA+PROBE-LOG IU: NORMAL {LOG_IU}/ML
CO2 SERPL-SCNC: 28 MMOL/L (ref 21–32)
CREAT SERPL-MCNC: 0.51 MG/DL (ref 0.5–1.3)
EBV DNA SPEC NAA+PROBE-LOG#: ABNORMAL {LOG_COPIES}/ML
EBV DNA SPEC NAA+PROBE-LOG#: NORMAL {LOG_COPIES}/ML
EGFRCR SERPLBLD CKD-EPI 2021: >90 ML/MIN/1.73M*2
EOSINOPHIL # BLD AUTO: ABNORMAL 10*3/UL
EOSINOPHIL NFR BLD AUTO: ABNORMAL %
ERYTHROCYTE [DISTWIDTH] IN BLOOD BY AUTOMATED COUNT: 14.1 % (ref 11.5–14.5)
FIBRINOGEN PPP-MCNC: 471 MG/DL (ref 200–400)
GLUCOSE SERPL-MCNC: 92 MG/DL (ref 74–99)
HCT VFR BLD AUTO: 21.5 % (ref 41–52)
HGB BLD-MCNC: 7.7 G/DL (ref 13.5–17.5)
HUMAN HERPESVIRUS-6 PCR PLASMA: 500 COPIES/ML
IMM GRANULOCYTES # BLD AUTO: ABNORMAL 10*3/UL
IMM GRANULOCYTES NFR BLD AUTO: ABNORMAL %
INR PPP: 1.1 (ref 0.9–1.1)
LABORATORY COMMENT REPORT: ABNORMAL
LABORATORY COMMENT REPORT: NOT DETECTED
LABORATORY COMMENT REPORT: NOT DETECTED
LYMPHOCYTES # BLD AUTO: ABNORMAL 10*3/UL
LYMPHOCYTES NFR BLD AUTO: ABNORMAL %
MAGNESIUM SERPL-MCNC: 1.88 MG/DL (ref 1.6–2.4)
MCH RBC QN AUTO: 27.2 PG (ref 26–34)
MCHC RBC AUTO-ENTMCNC: 35.8 G/DL (ref 32–36)
MCV RBC AUTO: 76 FL (ref 80–100)
MONOCYTES # BLD AUTO: ABNORMAL 10*3/UL
MONOCYTES NFR BLD AUTO: ABNORMAL %
NEUTROPHILS # BLD AUTO: ABNORMAL 10*3/UL
NEUTROPHILS NFR BLD AUTO: ABNORMAL %
NRBC BLD-RTO: 0 /100 WBCS (ref 0–0)
PHOSPHATE SERPL-MCNC: 2.6 MG/DL (ref 2.5–4.9)
PLATELET # BLD AUTO: 26 X10*3/UL (ref 150–450)
POTASSIUM SERPL-SCNC: 3.4 MMOL/L (ref 3.5–5.3)
PROT SERPL-MCNC: 5.9 G/DL (ref 6.4–8.2)
PROTHROMBIN TIME: 12.3 SECONDS (ref 9.8–12.8)
RBC # BLD AUTO: 2.83 X10*6/UL (ref 4.5–5.9)
RBC MORPH BLD: NORMAL
SODIUM SERPL-SCNC: 137 MMOL/L (ref 136–145)
TACROLIMUS BLD-MCNC: 2.8 NG/ML
WBC # BLD AUTO: <0.1 X10*3/UL (ref 4.4–11.3)

## 2024-11-12 PROCEDURE — 2500000002 HC RX 250 W HCPCS SELF ADMINISTERED DRUGS (ALT 637 FOR MEDICARE OP, ALT 636 FOR OP/ED): Performed by: INTERNAL MEDICINE

## 2024-11-12 PROCEDURE — 87799 DETECT AGENT NOS DNA QUANT: CPT | Performed by: INTERNAL MEDICINE

## 2024-11-12 PROCEDURE — 2500000004 HC RX 250 GENERAL PHARMACY W/ HCPCS (ALT 636 FOR OP/ED): Performed by: INTERNAL MEDICINE

## 2024-11-12 PROCEDURE — 84100 ASSAY OF PHOSPHORUS: CPT

## 2024-11-12 PROCEDURE — 2500000002 HC RX 250 W HCPCS SELF ADMINISTERED DRUGS (ALT 637 FOR MEDICARE OP, ALT 636 FOR OP/ED): Performed by: STUDENT IN AN ORGANIZED HEALTH CARE EDUCATION/TRAINING PROGRAM

## 2024-11-12 PROCEDURE — 1170000001 HC PRIVATE ONCOLOGY ROOM DAILY

## 2024-11-12 PROCEDURE — 2500000005 HC RX 250 GENERAL PHARMACY W/O HCPCS

## 2024-11-12 PROCEDURE — 87799 DETECT AGENT NOS DNA QUANT: CPT | Performed by: PHYSICIAN ASSISTANT

## 2024-11-12 PROCEDURE — 80053 COMPREHEN METABOLIC PANEL: CPT

## 2024-11-12 PROCEDURE — 2500000001 HC RX 250 WO HCPCS SELF ADMINISTERED DRUGS (ALT 637 FOR MEDICARE OP)

## 2024-11-12 PROCEDURE — 85025 COMPLETE CBC W/AUTO DIFF WBC: CPT

## 2024-11-12 PROCEDURE — 2500000001 HC RX 250 WO HCPCS SELF ADMINISTERED DRUGS (ALT 637 FOR MEDICARE OP): Performed by: INTERNAL MEDICINE

## 2024-11-12 PROCEDURE — 85730 THROMBOPLASTIN TIME PARTIAL: CPT

## 2024-11-12 PROCEDURE — 2500000002 HC RX 250 W HCPCS SELF ADMINISTERED DRUGS (ALT 637 FOR MEDICARE OP, ALT 636 FOR OP/ED): Performed by: PHYSICIAN ASSISTANT

## 2024-11-12 PROCEDURE — 83735 ASSAY OF MAGNESIUM: CPT

## 2024-11-12 PROCEDURE — 2500000004 HC RX 250 GENERAL PHARMACY W/ HCPCS (ALT 636 FOR OP/ED): Mod: JZ | Performed by: STUDENT IN AN ORGANIZED HEALTH CARE EDUCATION/TRAINING PROGRAM

## 2024-11-12 PROCEDURE — 2500000004 HC RX 250 GENERAL PHARMACY W/ HCPCS (ALT 636 FOR OP/ED): Performed by: PHYSICIAN ASSISTANT

## 2024-11-12 PROCEDURE — 80197 ASSAY OF TACROLIMUS: CPT | Performed by: INTERNAL MEDICINE

## 2024-11-12 PROCEDURE — 85384 FIBRINOGEN ACTIVITY: CPT

## 2024-11-12 PROCEDURE — 99233 SBSQ HOSP IP/OBS HIGH 50: CPT | Performed by: INTERNAL MEDICINE

## 2024-11-12 PROCEDURE — 2500000001 HC RX 250 WO HCPCS SELF ADMINISTERED DRUGS (ALT 637 FOR MEDICARE OP): Performed by: STUDENT IN AN ORGANIZED HEALTH CARE EDUCATION/TRAINING PROGRAM

## 2024-11-12 RX ORDER — POTASSIUM CHLORIDE 750 MG/1
20 TABLET, FILM COATED, EXTENDED RELEASE ORAL ONCE
Status: COMPLETED | OUTPATIENT
Start: 2024-11-12 | End: 2024-11-12

## 2024-11-12 RX ORDER — POTASSIUM CHLORIDE 29.8 MG/ML
40 INJECTION INTRAVENOUS ONCE
Status: COMPLETED | OUTPATIENT
Start: 2024-11-12 | End: 2024-11-12

## 2024-11-12 ASSESSMENT — COGNITIVE AND FUNCTIONAL STATUS - GENERAL
DAILY ACTIVITIY SCORE: 24
MOBILITY SCORE: 24

## 2024-11-12 ASSESSMENT — PAIN - FUNCTIONAL ASSESSMENT
PAIN_FUNCTIONAL_ASSESSMENT: 0-10
PAIN_FUNCTIONAL_ASSESSMENT: 0-10

## 2024-11-12 ASSESSMENT — PAIN SCALES - GENERAL
PAINLEVEL_OUTOF10: 0 - NO PAIN

## 2024-11-12 NOTE — PROGRESS NOTES
Spiritual Care Visit    Clinical Encounter Type  Visited With: Patient and family together  Routine Visit: Follow-up  Continue Visiting: Yes    Taxonomy  Intended Effects: Build relationship of care and support, Demonstrate caring and concern  Methods: Encourage sharing of feelings, Offer support  Interventions: Acknowledge current situation, Active listening, Discuss coping mechanisms with someone, Jeny Munguiain visited patient Brandt Merritt and his spouse. Patient shared how he has been feeling since he received his transplant next week, expressing he was able to eat this morning after not feeling hungry for a number of days. Patient's spouse had brought food and drinks for patient and shared how she has been coping. Both are coping through acceptance and doing what needs to be done each day, as well as through their whitney and prayer.  held space for spouse to consider how she can care for herself, as well.  provided care through reflective listening, supportive conversation, and prayer. Patient and spouse were appreciative and did not have any further needs at this time. Spiritual Care remains available as needed/requested.    Rev. Sarah Chapman MDiv, BCC

## 2024-11-12 NOTE — CARE PLAN
The patient's goals for the shift include improving labs.    The clinical goals for the shift include Pateint will reman VSS and afebrile throughout shift until 0700 on 11/12/2024.    Over the shift, the patient did not make progress toward the following goals. Barriers to progression include fatigue. Recommendations to address these barriers include promote rest, cluster nursing care.    Problem: Pain - Adult  Goal: Verbalizes/displays adequate comfort level or baseline comfort level  Outcome: Progressing     Problem: Safety - Adult  Goal: Free from fall injury  Outcome: Progressing     Problem: Discharge Planning  Goal: Discharge to home or other facility with appropriate resources  Outcome: Progressing     Problem: Chronic Conditions and Co-morbidities  Goal: Patient's chronic conditions and co-morbidity symptoms are monitored and maintained or improved  Outcome: Progressing     Problem: Nutrition  Goal: Oral intake greater 75%  Outcome: Progressing  Goal: Adequate PO fluid intake  Outcome: Progressing  Goal: Maintain stable weight  Outcome: Progressing  Patient VSS and HDS overnight. Patient slept approximately 8 hours overnight. Patient remains safe and free from falls or injury. Patient denies n/v/d and pain overnight.

## 2024-11-12 NOTE — PROGRESS NOTES
"Brandt Merritt is a 64 y.o. male on day 60 of admission presenting with Acute myeloid leukemia in remission (Multi).    Subjective   Afebrile. Patient reports difficulty with taking the MMF pills. Switched MMF to IV today. Appetite fair. Had 2 stools this morning. Denies fever or rash. ROS otherwise unremarkable.       Objective     Physical Exam  Constitutional:       Appearance: Normal appearance.   HENT:      Head: Normocephalic and atraumatic.      Comments: Mild temporal wasting     Nose: Nose normal.      Mouth/Throat:      Mouth: Mucous membranes are dry.      Pharynx: Oropharynx is clear.   Eyes:      Conjunctiva/sclera: Conjunctivae normal.      Pupils: Pupils are equal, round, and reactive to light.   Cardiovascular:      Rate and Rhythm: Normal rate and regular rhythm.      Pulses: Normal pulses.      Heart sounds: Normal heart sounds.   Pulmonary:      Effort: Pulmonary effort is normal.      Breath sounds: Normal breath sounds.   Abdominal:      General: Abdomen is flat. Bowel sounds are normal.      Palpations: Abdomen is soft.   Musculoskeletal:         General: Normal range of motion.      Cervical back: Normal range of motion and neck supple.      Comments: DL Tunneled R Internal Jugular catheter. Entry site c/d/i   Skin:     General: Skin is warm and dry.      Capillary Refill: Capillary refill takes 2 to 3 seconds.   Neurological:      General: No focal deficit present.      Mental Status: He is alert and oriented to person, place, and time.   Psychiatric:         Mood and Affect: Mood normal.         Behavior: Behavior normal.         Last Recorded Vitals  Blood pressure 101/68, pulse 79, temperature 36 °C (96.8 °F), temperature source Temporal, resp. rate 17, height (S) 1.664 m (5' 5.51\"), weight 62.9 kg (138 lb 10.7 oz), SpO2 99%.  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      T+54 from single cord transplant, T+6 from haplo stem cell rescue        ONC  # MDS/AML   - Symptoms began 9/2023; diagnosed 4/2024  - BMBx showing MDS in transition to AML (>10% blast) w/ trisomy 8, DNMT alpha, and U2AF1 mutation indication adverse risk   - S/p 4 cycles of Decitabine/Venetoclax (C4D1 on 8/12/24)  - BMBx (6/17/24): Blasts cleared, FISH still positive for trisomy 8, still MDS changes   - BMBx (9/5/24): 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 and no increase in blasts     # SCT #1: Single-unit Cord, T0=9/19/24  # Primary Engraftment Failure  - Conditioning: Flu-Mariana-TBI  - Donor: Single Cord, 6/8  = ABO Donor / Recipient: A+ / A+  = CMV Donor / Recipient: - / +  - aGVHD Prophylaxis: Tacrolimus + MMF  - Sent plasma CXCL9 level to [place] to assess for immunologic graft rejection (10/16/24): 431 pg/mL, which is normal (less suggestive of immunologic rejection)  - Repeat BMBx (10/15): hypocellular with no residual myeloid neoplasm. 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  - Pre-SCT testing:  = CT Chest (10/30): no abnormal pulmonary findings, trace pericardial effusion  = Onco-TTE (10/31): EF 58%  - Graft: Haploidentical sister  = ABO Donor / Recipient: O+ / A+ (ABO incompatibility +)  = CMV Donor / Recipient: + / +  - Conditioning: Flu/Cy/TBI/rATG  = Fludarabine 24 mg/m2 on T-6,-5,-4,-3,-2   =  Cyclophosphamide 14.5mg/kg T-6,-5   =  cGy T-1   = GVHD prophylaxis -  rATG 1.5mg/kg T-5,-3,-1, PTCy (25mg/kg T+3, T+4), tacro, MMF (T+5)       # GVHD  - No signs or symptoms of aGVHD from SCT#1  = Started Tacro and MMF on T+5 (11/11) following Haplo rescue (sister)  - Tacro level (11/12) 2.8. Cont current dose.     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   - Monitor counts daily   - Transfuse for Hgb <7 & PLT <10      - S/p PRBC: 11/5, 11/10      - S/p PLT: 11/4, 11/8, 11/10  - S/p Neupogen 300 mcg (9/24-10/30/24), restarted T+5 (11/11)  # VTE Prophylaxis: SCDs & Ambulation I/s/o thrombocytopenia  # SOS Prophylaxis: Ursodiol      ID  # EBV Viremia  - EBV: 375 U/L (10/28), up from 77 U/L (10/21/24)  - Treating despite low levels due to upcoming second SCT  - Rituximab 600 mg x 1 (10/31/24)  - Check EBV PCR weekly on Mondays, 11/4 122, 11/12 not detected  # Neutropenic fever (9/25, 10/12)  - Fever w/ diarrhea, workup neg, treated initially with Josefina, switched to Cefepime d/t rash (9/27-10/10)  - CT C/A/P d/t diarrhea. No abnormal findings  - Repeat blood cultures (10/22): negative  - Cefepime (10/22-10/26)  # Fever after ATG on 11/1 ; septic work up (Blood cx and UA both negative)   -Febrile on 11/6 (38.5), BC: NGTD, UC NGTD, continued on cefepime (11/6->). Vanc (11/8 - 11/9)  -repeat Bcx    # Viral surveillance:   - CMV (Mo/Th): ND (11/4, 11/7, 11/11)   - Adeno (Mo): ND (10/28) ND (11/4)  - HHV6 (Mo): ND (10/21),  <188 (10/30) 11/4 :451  # Prophylaxis:   - VZV: Acyclovir   - Aspergillus: Posaconazole  - C. Diff: Vancomycin PO  - CMV: Letermovir  - PJP: Pentamidine given 10/26, next due 11/23/24     FEN/RENAL/GI  - Admit wt: 75.6 kg (9/13/24)  Most recent weight   Wt Readings from Last 1 Encounters:   11/12/24 62.9 kg (138 lb 10.7 oz)       # 10 kgs weight loss. Nutrition Consulted (11/4),appreciate recs  - Allergic to Lasix, will not take med (extreme  hypotension, extreme hypokalemia)  # Hypokalemia. Repleted last on 11/12.  # Hx of treated Hep C in 2015  - Hep C RNA - not detected (9/12/24)     CARDIO/PULM:  # Hx of STEMI (11/11/2020)  - TTE 7/19/24: EF 59%, otherwise unremarkable   - Was cleared by Cardiology for transplant   # Hx of HTN   - Nifedipine: decrease to 60 mEq ER 24 hr tablet   # Ascending aorta dilation   - TTE (4/29/24): 3.8-4 cm dilation      :  # Hx of BPH and urinary retention (2023)  # Hx elevated PSA    - 7/28/21 Bx: atypical small acinar proliferation    - 4/7/23 Bx: Benign     NEURO/PSYCH  # Tremor (10/10- )  :: Most likely due to tacrolimus vs cefepime  - Fine, high-frequency (7+ Hz) tremor worst in the hands, exacerbated by effort,   - Previously associated with significant fasciculations of the shoulder and back muscles; also fasciculations in jaw -- these are no longer aparrent  - Cefepime initially stopped 10/10, tacrolimus stopped 10/29  - If still no improvement, could consider Neurology consult     DISPO:  - FULL CODE  - NOK: spouse Genevieve ([phone])  - DL Tunneled R Internal Jugular catheter  - Oncologist: Dr. Newsome     Patient seen examined and discussed with Dr. Malaika Montoya PA-C

## 2024-11-13 LAB
ALBUMIN SERPL BCP-MCNC: 3.1 G/DL (ref 3.4–5)
ALP SERPL-CCNC: 62 U/L (ref 33–136)
ALT SERPL W P-5'-P-CCNC: 7 U/L (ref 10–52)
ANION GAP SERPL CALC-SCNC: 11 MMOL/L (ref 10–20)
APTT PPP: 27 SECONDS (ref 27–38)
AST SERPL W P-5'-P-CCNC: 9 U/L (ref 9–39)
BACTERIA BPU CULT: NORMAL
BASOPHILS # BLD AUTO: 0 X10*3/UL (ref 0–0.1)
BASOPHILS NFR BLD AUTO: 0 %
BILIRUB SERPL-MCNC: 0.6 MG/DL (ref 0–1.2)
BUN SERPL-MCNC: 5 MG/DL (ref 6–23)
CALCIUM SERPL-MCNC: 8.3 MG/DL (ref 8.6–10.6)
CHLORIDE SERPL-SCNC: 104 MMOL/L (ref 98–107)
CO2 SERPL-SCNC: 28 MMOL/L (ref 21–32)
CREAT SERPL-MCNC: 0.6 MG/DL (ref 0.5–1.3)
EGFRCR SERPLBLD CKD-EPI 2021: >90 ML/MIN/1.73M*2
EOSINOPHIL # BLD AUTO: 0 X10*3/UL (ref 0–0.7)
EOSINOPHIL NFR BLD AUTO: 0 %
ERYTHROCYTE [DISTWIDTH] IN BLOOD BY AUTOMATED COUNT: 14.4 % (ref 11.5–14.5)
FIBRINOGEN PPP-MCNC: 433 MG/DL (ref 200–400)
GLUCOSE SERPL-MCNC: 93 MG/DL (ref 74–99)
HCT VFR BLD AUTO: 21.5 % (ref 41–52)
HGB BLD-MCNC: 7.5 G/DL (ref 13.5–17.5)
IMM GRANULOCYTES # BLD AUTO: 0 X10*3/UL (ref 0–0.7)
IMM GRANULOCYTES NFR BLD AUTO: 0 % (ref 0–0.9)
INR PPP: 1 (ref 0.9–1.1)
LYMPHOCYTES # BLD AUTO: 0 X10*3/UL (ref 1.2–4.8)
LYMPHOCYTES NFR BLD AUTO: 0 %
MAGNESIUM SERPL-MCNC: 1.76 MG/DL (ref 1.6–2.4)
MCH RBC QN AUTO: 26.9 PG (ref 26–34)
MCHC RBC AUTO-ENTMCNC: 34.9 G/DL (ref 32–36)
MCV RBC AUTO: 77 FL (ref 80–100)
MONOCYTES # BLD AUTO: 0 X10*3/UL (ref 0.1–1)
MONOCYTES NFR BLD AUTO: 0 %
NEUTROPHILS # BLD AUTO: 0.01 X10*3/UL (ref 1.2–7.7)
NEUTROPHILS NFR BLD AUTO: 100 %
NRBC BLD-RTO: 0 /100 WBCS (ref 0–0)
PHOSPHATE SERPL-MCNC: 2.3 MG/DL (ref 2.5–4.9)
PLATELET # BLD AUTO: 19 X10*3/UL (ref 150–450)
POTASSIUM SERPL-SCNC: 3.7 MMOL/L (ref 3.5–5.3)
PROT SERPL-MCNC: 6 G/DL (ref 6.4–8.2)
PROTHROMBIN TIME: 11.7 SECONDS (ref 9.8–12.8)
RBC # BLD AUTO: 2.79 X10*6/UL (ref 4.5–5.9)
SODIUM SERPL-SCNC: 139 MMOL/L (ref 136–145)
TACROLIMUS BLD-MCNC: 5 NG/ML
WBC # BLD AUTO: <0.1 X10*3/UL (ref 4.4–11.3)

## 2024-11-13 PROCEDURE — 2500000001 HC RX 250 WO HCPCS SELF ADMINISTERED DRUGS (ALT 637 FOR MEDICARE OP)

## 2024-11-13 PROCEDURE — 80053 COMPREHEN METABOLIC PANEL: CPT

## 2024-11-13 PROCEDURE — 2500000001 HC RX 250 WO HCPCS SELF ADMINISTERED DRUGS (ALT 637 FOR MEDICARE OP): Performed by: INTERNAL MEDICINE

## 2024-11-13 PROCEDURE — 99233 SBSQ HOSP IP/OBS HIGH 50: CPT | Performed by: INTERNAL MEDICINE

## 2024-11-13 PROCEDURE — 2500000002 HC RX 250 W HCPCS SELF ADMINISTERED DRUGS (ALT 637 FOR MEDICARE OP, ALT 636 FOR OP/ED): Performed by: STUDENT IN AN ORGANIZED HEALTH CARE EDUCATION/TRAINING PROGRAM

## 2024-11-13 PROCEDURE — 1170000001 HC PRIVATE ONCOLOGY ROOM DAILY

## 2024-11-13 PROCEDURE — 83735 ASSAY OF MAGNESIUM: CPT

## 2024-11-13 PROCEDURE — 85384 FIBRINOGEN ACTIVITY: CPT

## 2024-11-13 PROCEDURE — 84100 ASSAY OF PHOSPHORUS: CPT

## 2024-11-13 PROCEDURE — 85730 THROMBOPLASTIN TIME PARTIAL: CPT

## 2024-11-13 PROCEDURE — 2500000002 HC RX 250 W HCPCS SELF ADMINISTERED DRUGS (ALT 637 FOR MEDICARE OP, ALT 636 FOR OP/ED): Performed by: INTERNAL MEDICINE

## 2024-11-13 PROCEDURE — 2500000004 HC RX 250 GENERAL PHARMACY W/ HCPCS (ALT 636 FOR OP/ED): Performed by: INTERNAL MEDICINE

## 2024-11-13 PROCEDURE — 80197 ASSAY OF TACROLIMUS: CPT | Performed by: INTERNAL MEDICINE

## 2024-11-13 PROCEDURE — 85025 COMPLETE CBC W/AUTO DIFF WBC: CPT

## 2024-11-13 PROCEDURE — 2500000001 HC RX 250 WO HCPCS SELF ADMINISTERED DRUGS (ALT 637 FOR MEDICARE OP): Performed by: STUDENT IN AN ORGANIZED HEALTH CARE EDUCATION/TRAINING PROGRAM

## 2024-11-13 PROCEDURE — 2500000004 HC RX 250 GENERAL PHARMACY W/ HCPCS (ALT 636 FOR OP/ED): Performed by: PHYSICIAN ASSISTANT

## 2024-11-13 PROCEDURE — 2500000004 HC RX 250 GENERAL PHARMACY W/ HCPCS (ALT 636 FOR OP/ED): Mod: JZ | Performed by: STUDENT IN AN ORGANIZED HEALTH CARE EDUCATION/TRAINING PROGRAM

## 2024-11-13 ASSESSMENT — COGNITIVE AND FUNCTIONAL STATUS - GENERAL
DAILY ACTIVITIY SCORE: 24
MOBILITY SCORE: 24

## 2024-11-13 ASSESSMENT — PAIN SCALES - GENERAL
PAINLEVEL_OUTOF10: 0 - NO PAIN

## 2024-11-13 NOTE — PROGRESS NOTES
"Brandt Merritt is a 64 y.o. male on day 61 of admission presenting with Acute myeloid leukemia in remission (Multi).    Subjective   No acute events over night. Afebrile. Reports he is feeling well today. He reports that he is eating better. His wife has been bringing in soup and things that he likes to eat. He reports that he walks down and warms up his soup. He stools are loose. Denies fever, SOB, CP, Abd pain. ROS otherwise unremarkable.          Objective     Physical Exam  Constitutional:       Appearance: Normal appearance.   HENT:      Head: Normocephalic and atraumatic.      Comments: Mild temporal wasting     Nose: Nose normal.      Mouth/Throat:      Mouth: Mucous membranes are dry.      Pharynx: Oropharynx is clear.   Eyes:      Conjunctiva/sclera: Conjunctivae normal.      Pupils: Pupils are equal, round, and reactive to light.   Cardiovascular:      Rate and Rhythm: Normal rate and regular rhythm.      Pulses: Normal pulses.      Heart sounds: Normal heart sounds.   Pulmonary:      Effort: Pulmonary effort is normal.      Breath sounds: Normal breath sounds.   Abdominal:      General: Abdomen is flat. Bowel sounds are normal.      Palpations: Abdomen is soft.   Musculoskeletal:         General: Normal range of motion.      Cervical back: Normal range of motion and neck supple.      Comments: DL Tunneled R Internal Jugular catheter. Entry site c/d/i   Skin:     General: Skin is warm and dry.      Capillary Refill: Capillary refill takes 2 to 3 seconds.   Neurological:      General: No focal deficit present.      Mental Status: He is alert and oriented to person, place, and time.   Psychiatric:         Mood and Affect: Mood normal.         Behavior: Behavior normal.       Last Recorded Vitals  Blood pressure 106/74, pulse 84, temperature 36.6 °C (97.9 °F), temperature source Temporal, resp. rate 16, height (S) 1.664 m (5' 5.51\"), weight 62.9 kg (138 lb 10.7 oz), SpO2 100%.  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      T+55 from single cord transplant, T+7 from haplo stem cell rescue        ONC  # MDS/AML   - Symptoms began 9/2023; diagnosed 4/2024  - BMBx showing MDS in transition to AML (>10% blast) w/ trisomy 8, DNMT alpha, and U2AF1 mutation indication adverse risk   - S/p 4 cycles of Decitabine/Venetoclax (C4D1 on 8/12/24)  - BMBx (6/17/24): Blasts cleared, FISH still positive for trisomy 8, still MDS changes   - BMBx (9/5/24): 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 and no increase in blasts     # SCT #1: Single-unit Cord, T0=9/19/24  # Primary Engraftment Failure  - Conditioning: Flu-Mariana-TBI  - Donor: Single Cord, 6/8  = ABO Donor / Recipient: A+ / A+  = CMV Donor / Recipient: - / +  - aGVHD Prophylaxis: Tacrolimus + MMF  - Sent plasma CXCL9 level to [place] to assess for immunologic graft rejection (10/16/24): 431 pg/mL, which is normal (less suggestive of immunologic rejection)  - Repeat BMBx (10/15): hypocellular with no residual myeloid neoplasm. 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  - Pre-SCT testing:  = CT Chest (10/30): no abnormal pulmonary findings, trace pericardial effusion  = Onco-TTE (10/31): EF 58%  - Graft: Haploidentical sister  = ABO Donor / Recipient: O+ / A+ (ABO  incompatibility +)  = CMV Donor / Recipient: + / +  - Conditioning: Flu/Cy/TBI/rATG  = Fludarabine 24 mg/m2 on T-6,-5,-4,-3,-2   = Cyclophosphamide 14.5mg/kg T-6,-5   =  cGy T-1   = GVHD prophylaxis -  rATG 1.5mg/kg T-5,-3,-1, PTCy (25mg/kg T+3, T+4), tacro, MMF (T+5)       # GVHD  - No signs or symptoms of aGVHD from SCT#1  = Started Tacro and MMF on T+5 (11/11) following Haplo rescue (sister)  - Tacro level (11/13) 5, Cont current dose.     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   - Monitor counts daily   - Transfuse for Hgb <7 & PLT <10      - S/p PRBC: 11/5, 11/10      - S/p PLT: 11/4, 11/8, 11/10  - S/p Neupogen 300 mcg (9/24-10/30/24), restarted T+5 (11/11-current)  # VTE Prophylaxis: SCDs & Ambulation I/s/o thrombocytopenia  # SOS Prophylaxis: Ursodiol      ID  # EBV Viremia  - EBV: 375 U/L (10/28), up from 77 U/L (10/21/24)  - Treating despite low levels due to upcoming second SCT  - Rituximab 600 mg x 1 (10/31/24)  - Check EBV PCR weekly on Mondays, 11/4 122, 11/12 not detected  # Neutropenic fever (9/25, 10/12)  - Fever w/ diarrhea, workup neg, treated initially with Josefina, switched to Cefepime d/t rash (9/27-10/10)  - CT C/A/P d/t diarrhea. No abnormal findings  - Repeat blood cultures (10/22): negative  - Cefepime (10/22-10/26)  # Fever after ATG on 11/1 ; septic work up (Blood cx and UA both negative)   -Febrile last  on 11/9, BC: NGTD, UC NGTD, continued on cefepime (11/6->). Vanc (11/8 - 11/9)  -Repeat BC 11/10: NGTD  # Viral surveillance:   - CMV (Mo/Th): ND (11/4, 11/7, 11/11)   - Adeno (Mo): ND (10/28) ND (11/4, 11/11)  - HHV6 (Mo): ND (10/21),  <188 (10/30) 11/4 :451, 11/11 500  # Prophylaxis:   - VZV: Acyclovir   - Aspergillus: Posaconazole  - C. Diff: Vancomycin PO  - CMV: Letermovir  - PJP: Pentamidine given 10/26, next due 11/23/24     FEN/RENAL/GI  - Admit wt: 75.6 kg (9/13/24)  Most recent weight   Wt Readings from Last 1  Encounters:   11/12/24 62.9 kg (138 lb 10.7 oz)     #10 kgs weight loss. Nutrition Consulted (11/4),appreciate recs  - Allergic to Lasix, will not take med (extreme hypotension, extreme hypokalemia)  # Hypokalemia. Repleted last on 11/12.  # Hx of treated Hep C in 2015  - Hep C RNA - not detected (9/12/24)     CARDIO/PULM:  # Hx of STEMI (11/11/2020)  - TTE 7/19/24: EF 59%, otherwise unremarkable   - Was cleared by Cardiology for transplant   # Hx of HTN   - Nifedipine: decrease to 60 mEq ER 24 hr tablet   # Ascending aorta dilation   - TTE (4/29/24): 3.8-4 cm dilation      :  # Hx of BPH and urinary retention (2023)  # Hx elevated PSA    - 7/28/21 Bx: atypical small acinar proliferation    - 4/7/23 Bx: Benign     NEURO/PSYCH  # Tremor (10/10- )  :: Most likely due to tacrolimus vs cefepime  - Fine, high-frequency (7+ Hz) tremor worst in the hands, exacerbated by effort,   - Previously associated with significant fasciculations of the shoulder and back muscles; also fasciculations in jaw -- these are no longer aparrent  - Cefepime initially stopped 10/10, tacrolimus stopped 10/29  - If still no improvement, could consider Neurology consult     DISPO:  - FULL CODE  - NOK: spouse Genevieve ([phone])  - DL Tunneled R Internal Jugular catheter  - Oncologist: Dr. Newsome     Patient seen examined and discussed with Dr. Malaika San, APRN-CNP

## 2024-11-14 LAB
ABO GROUP (TYPE) IN BLOOD: NORMAL
ALBUMIN SERPL BCP-MCNC: 3 G/DL (ref 3.4–5)
ALP SERPL-CCNC: 66 U/L (ref 33–136)
ALT SERPL W P-5'-P-CCNC: 7 U/L (ref 10–52)
ANION GAP SERPL CALC-SCNC: 11 MMOL/L (ref 10–20)
ANTIBODY SCREEN: NORMAL
APTT PPP: 27 SECONDS (ref 27–38)
AST SERPL W P-5'-P-CCNC: 9 U/L (ref 9–39)
BACTERIA BLD CULT: NORMAL
BACTERIA BLD CULT: NORMAL
BASOPHILS # BLD AUTO: 0 X10*3/UL (ref 0–0.1)
BASOPHILS NFR BLD AUTO: ABNORMAL %
BILIRUB SERPL-MCNC: 0.4 MG/DL (ref 0–1.2)
BLOOD EXPIRATION DATE: NORMAL
BUN SERPL-MCNC: 6 MG/DL (ref 6–23)
CALCIUM SERPL-MCNC: 7.9 MG/DL (ref 8.6–10.6)
CHLORIDE SERPL-SCNC: 104 MMOL/L (ref 98–107)
CO2 SERPL-SCNC: 26 MMOL/L (ref 21–32)
CREAT SERPL-MCNC: 0.57 MG/DL (ref 0.5–1.3)
DISPENSE STATUS: NORMAL
EGFRCR SERPLBLD CKD-EPI 2021: >90 ML/MIN/1.73M*2
EOSINOPHIL # BLD AUTO: 0 X10*3/UL (ref 0–0.7)
EOSINOPHIL NFR BLD AUTO: ABNORMAL %
ERYTHROCYTE [DISTWIDTH] IN BLOOD BY AUTOMATED COUNT: 14.4 % (ref 11.5–14.5)
FIBRINOGEN PPP-MCNC: 392 MG/DL (ref 200–400)
GLUCOSE SERPL-MCNC: 98 MG/DL (ref 74–99)
HCT VFR BLD AUTO: 19.6 % (ref 41–52)
HGB BLD-MCNC: 6.9 G/DL (ref 13.5–17.5)
IMM GRANULOCYTES # BLD AUTO: ABNORMAL 10*3/UL
IMM GRANULOCYTES NFR BLD AUTO: ABNORMAL %
INR PPP: 1 (ref 0.9–1.1)
LYMPHOCYTES # BLD AUTO: 0 X10*3/UL (ref 1.2–4.8)
LYMPHOCYTES NFR BLD AUTO: ABNORMAL %
MAGNESIUM SERPL-MCNC: 1.59 MG/DL (ref 1.6–2.4)
MCH RBC QN AUTO: 27.3 PG (ref 26–34)
MCHC RBC AUTO-ENTMCNC: 35.2 G/DL (ref 32–36)
MCV RBC AUTO: 78 FL (ref 80–100)
MONOCYTES # BLD AUTO: 0 X10*3/UL (ref 0.1–1)
MONOCYTES NFR BLD AUTO: ABNORMAL %
NEUTROPHILS # BLD AUTO: 0 X10*3/UL (ref 1.2–7.7)
NEUTROPHILS NFR BLD AUTO: ABNORMAL %
NRBC BLD-RTO: ABNORMAL /100{WBCS}
PHOSPHATE SERPL-MCNC: 2.5 MG/DL (ref 2.5–4.9)
PLATELET # BLD AUTO: 14 X10*3/UL (ref 150–450)
POTASSIUM SERPL-SCNC: 3.4 MMOL/L (ref 3.5–5.3)
PRODUCT BLOOD TYPE: 9500
PRODUCT CODE: NORMAL
PROT SERPL-MCNC: 5.8 G/DL (ref 6.4–8.2)
PROTHROMBIN TIME: 11.2 SECONDS (ref 9.8–12.8)
RBC # BLD AUTO: 2.53 X10*6/UL (ref 4.5–5.9)
RH FACTOR (ANTIGEN D): NORMAL
SODIUM SERPL-SCNC: 138 MMOL/L (ref 136–145)
TACROLIMUS BLD-MCNC: 5.2 NG/ML
UNIT ABO: NORMAL
UNIT NUMBER: NORMAL
UNIT RH: NORMAL
UNIT VOLUME: 276
WBC # BLD AUTO: <0.1 X10*3/UL (ref 4.4–11.3)
XM INTEP: NORMAL

## 2024-11-14 PROCEDURE — 86923 COMPATIBILITY TEST ELECTRIC: CPT

## 2024-11-14 PROCEDURE — 2500000004 HC RX 250 GENERAL PHARMACY W/ HCPCS (ALT 636 FOR OP/ED): Performed by: INTERNAL MEDICINE

## 2024-11-14 PROCEDURE — 80197 ASSAY OF TACROLIMUS: CPT | Performed by: INTERNAL MEDICINE

## 2024-11-14 PROCEDURE — P9040 RBC LEUKOREDUCED IRRADIATED: HCPCS

## 2024-11-14 PROCEDURE — 2500000004 HC RX 250 GENERAL PHARMACY W/ HCPCS (ALT 636 FOR OP/ED): Performed by: PHYSICIAN ASSISTANT

## 2024-11-14 PROCEDURE — 86900 BLOOD TYPING SEROLOGIC ABO: CPT | Performed by: NURSE PRACTITIONER

## 2024-11-14 PROCEDURE — 85610 PROTHROMBIN TIME: CPT

## 2024-11-14 PROCEDURE — 2500000002 HC RX 250 W HCPCS SELF ADMINISTERED DRUGS (ALT 637 FOR MEDICARE OP, ALT 636 FOR OP/ED): Performed by: STUDENT IN AN ORGANIZED HEALTH CARE EDUCATION/TRAINING PROGRAM

## 2024-11-14 PROCEDURE — 2500000001 HC RX 250 WO HCPCS SELF ADMINISTERED DRUGS (ALT 637 FOR MEDICARE OP)

## 2024-11-14 PROCEDURE — 80053 COMPREHEN METABOLIC PANEL: CPT

## 2024-11-14 PROCEDURE — 2500000002 HC RX 250 W HCPCS SELF ADMINISTERED DRUGS (ALT 637 FOR MEDICARE OP, ALT 636 FOR OP/ED): Performed by: INTERNAL MEDICINE

## 2024-11-14 PROCEDURE — 85384 FIBRINOGEN ACTIVITY: CPT

## 2024-11-14 PROCEDURE — 85027 COMPLETE CBC AUTOMATED: CPT

## 2024-11-14 PROCEDURE — 2500000004 HC RX 250 GENERAL PHARMACY W/ HCPCS (ALT 636 FOR OP/ED): Mod: JZ | Performed by: STUDENT IN AN ORGANIZED HEALTH CARE EDUCATION/TRAINING PROGRAM

## 2024-11-14 PROCEDURE — 83735 ASSAY OF MAGNESIUM: CPT

## 2024-11-14 PROCEDURE — 36430 TRANSFUSION BLD/BLD COMPNT: CPT

## 2024-11-14 PROCEDURE — 84100 ASSAY OF PHOSPHORUS: CPT

## 2024-11-14 PROCEDURE — 1170000001 HC PRIVATE ONCOLOGY ROOM DAILY

## 2024-11-14 PROCEDURE — 2500000001 HC RX 250 WO HCPCS SELF ADMINISTERED DRUGS (ALT 637 FOR MEDICARE OP): Performed by: INTERNAL MEDICINE

## 2024-11-14 PROCEDURE — 2500000004 HC RX 250 GENERAL PHARMACY W/ HCPCS (ALT 636 FOR OP/ED): Performed by: NURSE PRACTITIONER

## 2024-11-14 PROCEDURE — 2500000001 HC RX 250 WO HCPCS SELF ADMINISTERED DRUGS (ALT 637 FOR MEDICARE OP): Performed by: STUDENT IN AN ORGANIZED HEALTH CARE EDUCATION/TRAINING PROGRAM

## 2024-11-14 PROCEDURE — 99233 SBSQ HOSP IP/OBS HIGH 50: CPT | Performed by: INTERNAL MEDICINE

## 2024-11-14 RX ORDER — POTASSIUM CHLORIDE 29.8 MG/ML
40 INJECTION INTRAVENOUS ONCE
Status: COMPLETED | OUTPATIENT
Start: 2024-11-14 | End: 2024-11-14

## 2024-11-14 RX ORDER — MAGNESIUM SULFATE HEPTAHYDRATE 40 MG/ML
4 INJECTION, SOLUTION INTRAVENOUS ONCE
Status: COMPLETED | OUTPATIENT
Start: 2024-11-14 | End: 2024-11-14

## 2024-11-14 ASSESSMENT — COGNITIVE AND FUNCTIONAL STATUS - GENERAL
DAILY ACTIVITIY SCORE: 24
MOBILITY SCORE: 24

## 2024-11-14 ASSESSMENT — PAIN SCALES - GENERAL
PAINLEVEL_OUTOF10: 0 - NO PAIN
PAINLEVEL_OUTOF10: 0 - NO PAIN

## 2024-11-14 ASSESSMENT — PAIN - FUNCTIONAL ASSESSMENT: PAIN_FUNCTIONAL_ASSESSMENT: 0-10

## 2024-11-14 NOTE — PROGRESS NOTES
"Brandt Merritt is a 64 y.o. male on day 62 of admission presenting with Acute myeloid leukemia in remission (Multi).    Subjective   No acute events over night. Afebrile. Reports that he continues to feel well. He woke up this morning and made himself more soup. He is drinking water and wants his wife to bring him in sweet tea and pepsi. He reports that he has loose stools, no diarrhea. Denies fever, SOB, CP, Abd pain. ROS otherwise unremarkable.          Objective     Physical Exam  Constitutional:       Appearance: Normal appearance.   HENT:      Head: Normocephalic and atraumatic.      Comments: Mild temporal wasting     Nose: Nose normal.      Mouth/Throat:      Mouth: Mucous membranes are dry.      Pharynx: Oropharynx is clear.   Eyes:      Conjunctiva/sclera: Conjunctivae normal.      Pupils: Pupils are equal, round, and reactive to light.   Cardiovascular:      Rate and Rhythm: Normal rate and regular rhythm.      Pulses: Normal pulses.      Heart sounds: Normal heart sounds.   Pulmonary:      Effort: Pulmonary effort is normal.      Breath sounds: Normal breath sounds.   Abdominal:      General: Abdomen is flat. Bowel sounds are normal.      Palpations: Abdomen is soft.   Musculoskeletal:         General: Normal range of motion.      Cervical back: Normal range of motion and neck supple.      Comments: DL Tunneled R Internal Jugular catheter. Entry site c/d/i   Skin:     General: Skin is warm and dry.      Capillary Refill: Capillary refill takes 2 to 3 seconds.   Neurological:      General: No focal deficit present.      Mental Status: He is alert and oriented to person, place, and time.   Psychiatric:         Mood and Affect: Mood normal.         Behavior: Behavior normal.       Last Recorded Vitals  Blood pressure 112/72, pulse 89, temperature 36.1 °C (97 °F), resp. rate 18, height (S) 1.664 m (5' 5.51\"), weight 63.7 kg (140 lb 6.9 oz), SpO2 99%.  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      T+56 from single cord transplant, T+8 from haplo stem cell rescue        ONC  # MDS/AML   - Symptoms began 9/2023; diagnosed 4/2024  - BMBx showing MDS in transition to AML (>10% blast) w/ trisomy 8, DNMT alpha, and U2AF1 mutation indication adverse risk   - S/p 4 cycles of Decitabine/Venetoclax (C4D1 on 8/12/24)  - BMBx (6/17/24): Blasts cleared, FISH still positive for trisomy 8, still MDS changes   - BMBx (9/5/24): 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 and no increase in blasts     # SCT #1: Single-unit Cord, T0=9/19/24  # Primary Engraftment Failure  - Conditioning: Flu-Mariana-TBI  - Donor: Single Cord, 6/8  = ABO Donor / Recipient: A+ / A+  = CMV Donor / Recipient: - / +  - aGVHD Prophylaxis: Tacrolimus + MMF  - Sent plasma CXCL9 level to [facility] to assess for immunologic graft rejection (10/16/24): 431 pg/mL, which is normal (less suggestive of immunologic rejection)  - Repeat BMBx (10/15): hypocellular with no residual myeloid neoplasm. 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  - Pre-SCT testing:  = CT Chest (10/30): no abnormal pulmonary findings, trace pericardial effusion  = Onco-TTE (10/31): EF 58%  - Graft: Haploidentical sister  = ABO Donor / Recipient: O+ / A+ (ABO incompatibility +)  = CMV Donor / Recipient: + / +  - Conditioning: Flu/Cy/TBI/rATG  = Fludarabine  24 mg/m2 on T-6,-5,-4,-3,-2   = Cyclophosphamide 14.5mg/kg T-6,-5   =  cGy T-1   = GVHD prophylaxis -  rATG 1.5mg/kg T-5,-3,-1, PTCy (25mg/kg T+3, T+4), tacro, MMF (T+5)       # GVHD  - No signs or symptoms of aGVHD from SCT#1  = Started Tacro and MMF on T+5 (11/11) following Haplo rescue (sister)  - Tacro level (11/14) 5.2, Cont current dose.     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   - Monitor counts daily   - Transfuse for Hgb <7 & PLT <10      - S/p PRBC: 11/5, 11/10      - S/p PLT: 11/4, 11/8, 11/10  - S/p Neupogen 300 mcg (9/24-10/30/24), restarted T+5 (11/11-current)  # VTE Prophylaxis: SCDs & Ambulation I/s/o thrombocytopenia  # SOS Prophylaxis: Ursodiol      ID  # EBV Viremia  - EBV: 375 U/L (10/28), up from 77 U/L (10/21/24)  - Treating despite low levels due to upcoming second SCT  - Rituximab 600 mg x 1 (10/31/24)  - Check EBV PCR weekly on Mondays, 11/4 122, 11/12 not detected  # Neutropenic fever (9/25, 10/12)  - Fever w/ diarrhea, workup neg, treated initially with Josefina, switched to Cefepime d/t rash (9/27-10/10)  - CT C/A/P d/t diarrhea. No abnormal findings  - Repeat blood cultures (10/22): negative  - Cefepime (10/22-10/26)  # Fever after ATG on 11/1 ; septic work up (Blood cx and UA both negative)   -Febrile last  on 11/9, BC: NGTD, UC NGTD, continued on cefepime (11/6->). Vanc (11/8 - 11/9)  -Repeat BC 11/10: NGTD  # Viral surveillance:   - CMV (Mo/Th): ND (11/4, 11/7, 11/11)   - Adeno (Mo): ND (10/28) ND (11/4, 11/11)  - HHV6 (Mo): ND (10/21),  <188 (10/30) 11/4 :451, 11/11 500  # Prophylaxis:   - VZV: Acyclovir   - Aspergillus: Posaconazole  - C. Diff: Vancomycin PO  - CMV: Letermovir  - PJP: Pentamidine given 10/26, next due 11/23/24     FEN/RENAL/GI  - Admit wt: 75.6 kg (9/13/24)  Most recent weight   Wt Readings from Last 1 Encounters:   11/14/24 63.7 kg (140 lb 6.9 oz)     #10 kgs weight loss. Nutrition Consulted  (11/4),appreciate recs  - Allergic to Lasix, will not take med (extreme hypotension, extreme hypokalemia)  # Hypokalemia. Repleted last on 11/14.  # Hx of treated Hep C in 2015  - Hep C RNA - not detected (9/12/24)     CARDIO/PULM:  # Hx of STEMI (11/11/2020)  - TTE 7/19/24: EF 59%, otherwise unremarkable   - Was cleared by Cardiology for transplant   # Hx of HTN   - Nifedipine: decrease to 60 mEq ER 24 hr tablet   # Ascending aorta dilation   - TTE (4/29/24): 3.8-4 cm dilation      :  # Hx of BPH and urinary retention (2023)  # Hx elevated PSA    - 7/28/21 Bx: atypical small acinar proliferation    - 4/7/23 Bx: Benign     NEURO/PSYCH  # Tremor (10/10- )  :: Most likely due to tacrolimus vs cefepime  - Fine, high-frequency (7+ Hz) tremor worst in the hands, exacerbated by effort,   - Previously associated with significant fasciculations of the shoulder and back muscles; also fasciculations in jaw -- these are no longer aparrent  - Cefepime initially stopped 10/10, tacrolimus stopped 10/29  - If still no improvement, could consider Neurology consult     DISPO:  - FULL CODE  - NOK: spouse Genevieve ([phone])  - DL Tunneled R Internal Jugular catheter  - Oncologist: Dr. Newsome     Patient seen examined and discussed with Dr. Malaika San, APRN-CNP

## 2024-11-14 NOTE — CARE PLAN
Problem: Pain - Adult  Goal: Verbalizes/displays adequate comfort level or baseline comfort level  Outcome: Progressing     Problem: Safety - Adult  Goal: Free from fall injury  Outcome: Progressing     Problem: Discharge Planning  Goal: Discharge to home or other facility with appropriate resources  Outcome: Progressing     Problem: Chronic Conditions and Co-morbidities  Goal: Patient's chronic conditions and co-morbidity symptoms are monitored and maintained or improved  Outcome: Progressing     Problem: Nutrition  Goal: Oral intake greater 75%  Outcome: Progressing  Goal: Adequate PO fluid intake  Outcome: Progressing  Goal: Maintain stable weight  Outcome: Progressing   The patient's goals for the shift include      The clinical goals for the shift include remain HDS

## 2024-11-15 LAB
ALBUMIN SERPL BCP-MCNC: 3.3 G/DL (ref 3.4–5)
ALP SERPL-CCNC: 67 U/L (ref 33–136)
ALT SERPL W P-5'-P-CCNC: 7 U/L (ref 10–52)
ANION GAP SERPL CALC-SCNC: 12 MMOL/L (ref 10–20)
APTT PPP: 29 SECONDS (ref 27–38)
AST SERPL W P-5'-P-CCNC: 9 U/L (ref 9–39)
BASOPHILS # BLD AUTO: 0 X10*3/UL (ref 0–0.1)
BASOPHILS NFR BLD AUTO: 0 %
BILIRUB SERPL-MCNC: 0.6 MG/DL (ref 0–1.2)
BUN SERPL-MCNC: 5 MG/DL (ref 6–23)
BURR CELLS BLD QL SMEAR: NORMAL
CALCIUM SERPL-MCNC: 8.1 MG/DL (ref 8.6–10.6)
CHLORIDE SERPL-SCNC: 104 MMOL/L (ref 98–107)
CMV DNA SERPL NAA+PROBE-LOG IU: NORMAL {LOG_IU}/ML
CO2 SERPL-SCNC: 26 MMOL/L (ref 21–32)
CREAT SERPL-MCNC: 0.58 MG/DL (ref 0.5–1.3)
DACRYOCYTES BLD QL SMEAR: NORMAL
EGFRCR SERPLBLD CKD-EPI 2021: >90 ML/MIN/1.73M*2
EOSINOPHIL # BLD AUTO: 0 X10*3/UL (ref 0–0.7)
EOSINOPHIL NFR BLD AUTO: 0 %
ERYTHROCYTE [DISTWIDTH] IN BLOOD BY AUTOMATED COUNT: 13.9 % (ref 11.5–14.5)
FIBRINOGEN PPP-MCNC: 418 MG/DL (ref 200–400)
GLUCOSE SERPL-MCNC: 86 MG/DL (ref 74–99)
HCT VFR BLD AUTO: 22.3 % (ref 41–52)
HGB BLD-MCNC: 8 G/DL (ref 13.5–17.5)
IMM GRANULOCYTES # BLD AUTO: 0 X10*3/UL (ref 0–0.7)
IMM GRANULOCYTES NFR BLD AUTO: 0 % (ref 0–0.9)
INR PPP: 1 (ref 0.9–1.1)
LABORATORY COMMENT REPORT: NOT DETECTED
LYMPHOCYTES # BLD AUTO: 0 X10*3/UL (ref 1.2–4.8)
LYMPHOCYTES NFR BLD AUTO: 0 %
MAGNESIUM SERPL-MCNC: 1.81 MG/DL (ref 1.6–2.4)
MCH RBC QN AUTO: 28.1 PG (ref 26–34)
MCHC RBC AUTO-ENTMCNC: 35.9 G/DL (ref 32–36)
MCV RBC AUTO: 78 FL (ref 80–100)
MONOCYTES # BLD AUTO: 0 X10*3/UL (ref 0.1–1)
MONOCYTES NFR BLD AUTO: 0 %
NEUTROPHILS # BLD AUTO: 0.01 X10*3/UL (ref 1.2–7.7)
NEUTROPHILS NFR BLD AUTO: 100 %
NRBC BLD-RTO: 0 /100 WBCS (ref 0–0)
OVALOCYTES BLD QL SMEAR: NORMAL
PHOSPHATE SERPL-MCNC: 2.8 MG/DL (ref 2.5–4.9)
PLATELET # BLD AUTO: 11 X10*3/UL (ref 150–450)
POTASSIUM SERPL-SCNC: 3.5 MMOL/L (ref 3.5–5.3)
PROT SERPL-MCNC: 5.8 G/DL (ref 6.4–8.2)
PROTHROMBIN TIME: 11.8 SECONDS (ref 9.8–12.8)
RBC # BLD AUTO: 2.85 X10*6/UL (ref 4.5–5.9)
RBC MORPH BLD: NORMAL
SCHISTOCYTES BLD QL SMEAR: NORMAL
SODIUM SERPL-SCNC: 138 MMOL/L (ref 136–145)
TACROLIMUS BLD-MCNC: 7.3 NG/ML
WBC # BLD AUTO: <0.1 X10*3/UL (ref 4.4–11.3)

## 2024-11-15 PROCEDURE — 2500000001 HC RX 250 WO HCPCS SELF ADMINISTERED DRUGS (ALT 637 FOR MEDICARE OP)

## 2024-11-15 PROCEDURE — 2500000005 HC RX 250 GENERAL PHARMACY W/O HCPCS

## 2024-11-15 PROCEDURE — 80197 ASSAY OF TACROLIMUS: CPT | Performed by: INTERNAL MEDICINE

## 2024-11-15 PROCEDURE — 99233 SBSQ HOSP IP/OBS HIGH 50: CPT | Performed by: INTERNAL MEDICINE

## 2024-11-15 PROCEDURE — 2500000001 HC RX 250 WO HCPCS SELF ADMINISTERED DRUGS (ALT 637 FOR MEDICARE OP): Performed by: STUDENT IN AN ORGANIZED HEALTH CARE EDUCATION/TRAINING PROGRAM

## 2024-11-15 PROCEDURE — 2500000002 HC RX 250 W HCPCS SELF ADMINISTERED DRUGS (ALT 637 FOR MEDICARE OP, ALT 636 FOR OP/ED): Performed by: STUDENT IN AN ORGANIZED HEALTH CARE EDUCATION/TRAINING PROGRAM

## 2024-11-15 PROCEDURE — 83735 ASSAY OF MAGNESIUM: CPT

## 2024-11-15 PROCEDURE — 80053 COMPREHEN METABOLIC PANEL: CPT

## 2024-11-15 PROCEDURE — 2500000002 HC RX 250 W HCPCS SELF ADMINISTERED DRUGS (ALT 637 FOR MEDICARE OP, ALT 636 FOR OP/ED): Performed by: INTERNAL MEDICINE

## 2024-11-15 PROCEDURE — 2500000001 HC RX 250 WO HCPCS SELF ADMINISTERED DRUGS (ALT 637 FOR MEDICARE OP): Performed by: INTERNAL MEDICINE

## 2024-11-15 PROCEDURE — 85025 COMPLETE CBC W/AUTO DIFF WBC: CPT

## 2024-11-15 PROCEDURE — 2500000004 HC RX 250 GENERAL PHARMACY W/ HCPCS (ALT 636 FOR OP/ED): Performed by: PHYSICIAN ASSISTANT

## 2024-11-15 PROCEDURE — 84100 ASSAY OF PHOSPHORUS: CPT

## 2024-11-15 PROCEDURE — 85730 THROMBOPLASTIN TIME PARTIAL: CPT

## 2024-11-15 PROCEDURE — 2500000004 HC RX 250 GENERAL PHARMACY W/ HCPCS (ALT 636 FOR OP/ED): Performed by: INTERNAL MEDICINE

## 2024-11-15 PROCEDURE — 85384 FIBRINOGEN ACTIVITY: CPT

## 2024-11-15 PROCEDURE — 1170000001 HC PRIVATE ONCOLOGY ROOM DAILY

## 2024-11-15 PROCEDURE — 2500000004 HC RX 250 GENERAL PHARMACY W/ HCPCS (ALT 636 FOR OP/ED): Mod: JZ | Performed by: STUDENT IN AN ORGANIZED HEALTH CARE EDUCATION/TRAINING PROGRAM

## 2024-11-15 ASSESSMENT — COGNITIVE AND FUNCTIONAL STATUS - GENERAL
DAILY ACTIVITIY SCORE: 24
MOBILITY SCORE: 24

## 2024-11-15 ASSESSMENT — PAIN SCALES - GENERAL: PAINLEVEL_OUTOF10: 0 - NO PAIN

## 2024-11-15 NOTE — PROGRESS NOTES
"Nutrition Follow Up Assessment  Nutrition Assessment      Today is T+57 from Single Cord Transplant and T+9 from Haplo SCT.    Nutrition History:  This service met with pt and pt's wife at bedside earlier today, pt sitting up in bed at time of visit with him.    Tells over the last few days, appetite/PO intakes have started to  again,though remain decreased from baseline, \"I wake up feeling hungry.\" Has now gone from eating 1 time/day to 2 times/day. Wife continues to bring in cooked foods from home + has also brought in canned soups pt can heat up during the day.    No longer drinking the Ensure Clear, reports he is tired of supplements at this point. Has been drinking things like water, iced team, and Diet Pepsi throughout the day.    Denied any issues with n/v or trouble chewing/swallowing. Continues with intermittent diarrhea. Taste slowly improving, continues to use lemon drop candies PRN. Has also been trying to walk the halls more throughout the day.    Anthropometrics:  Height: (S) 166.4 cm (5' 5.51\")   Weight: 63.7 kg (140 lb 6.9 oz)   BMI (Calculated): 25.43  IBW/kg (Dietitian Calculated): 64.5 kg  Percent of IBW: 99 %       Admission Weight Trend:  09/13-75.6 kg (admit wt)  11/06-66.4 kg (wt at last nutrition visit with pt on 11/07)  11/11-65.6 kg  11/12-62.9 kg  11/14-63.7 kg (current wt)--total of 16% wt loss since admit (significant)    Nutrition Focused Physical Exam Findings:  defer: completed at a prior nutrition visit  Edema:  Edema: none  Physical Findings:  Skin: Negative    Nutrition Significant Labs:  CBC Trend:   Results from last 7 days   Lab Units 11/15/24  0515 11/14/24  0500 11/13/24  0601 11/12/24  0628   WBC AUTO x10*3/uL <0.1* <0.1* <0.1* <0.1*   RBC AUTO x10*6/uL 2.85* 2.53* 2.79* 2.83*   HEMOGLOBIN g/dL 8.0* 6.9* 7.5* 7.7*   HEMATOCRIT % 22.3* 19.6* 21.5* 21.5*   MCV fL 78* 78* 77* 76*   PLATELETS AUTO x10*3/uL 11* 14* 19* 26*   BMP Trend:   Results from last 7 days   Lab Units " 11/15/24  0515 11/14/24  0500 11/13/24  0601 11/12/24  0628   GLUCOSE mg/dL 86 98 93 92   CALCIUM mg/dL 8.1* 7.9* 8.3* 8.1*   SODIUM mmol/L 138 138 139 137   POTASSIUM mmol/L 3.5 3.4* 3.7 3.4*   CO2 mmol/L 26 26 28 28   CHLORIDE mmol/L 104 104 104 102   BUN mg/dL 5* 6 5* 6   CREATININE mg/dL 0.58 0.57 0.60 0.51   Liver Function Trend:   Results from last 7 days   Lab Units 11/15/24  0515 11/14/24  0500 11/13/24  0601 11/12/24  0628   ALK PHOS U/L 67 66 62 64   AST U/L 9 9 9 9   ALT U/L 7* 7* 7* 8*   BILIRUBIN TOTAL mg/dL 0.6 0.4 0.6 0.7   Renal Lab Trend:   Results from last 7 days   Lab Units 11/15/24  0515 11/14/24  0500 11/13/24  0601 11/12/24  0628   POTASSIUM mmol/L 3.5 3.4* 3.7 3.4*   PHOSPHORUS mg/dL 2.8 2.5 2.3* 2.6   SODIUM mmol/L 138 138 139 137   MAGNESIUM mg/dL 1.81 1.59* 1.76 1.88   EGFR mL/min/1.73m*2 >90 >90 >90 >90   BUN mg/dL 5* 6 5* 6   CREATININE mg/dL 0.58 0.57 0.60 0.51      Medications:  Scheduled medications  acyclovir, 400 mg, oral, q12h  calcium carbonate, 1,250 mg, oral, TID   Or  calcium carbonate, 1,250 mg, oral, TID  cefepime, 2 g, intravenous, q8h  filgrastim or biosimilar, 300 mcg, subcutaneous, q24h  folic acid, 1 mg, oral, Daily  letermovir, 480 mg, oral, Daily  magnesium chloride, 64 mg, oral, BID  mycophenolate, 1,000 mg, intravenous, TID  NIFEdipine ER, 60 mg, oral, Daily before breakfast  posaconazole, 300 mg, oral, q24h  tacrolimus, 1.5 mg, oral, q12h  ursodiol, 300 mg, oral, TID  vancomycin, 125 mg, oral, Daily    PRN medications  PRN medications: acetaminophen, albuterol, alteplase, alteplase, dextrose, diphenhydrAMINE, EPINEPHrine HCl, famotidine, loperamide, LORazepam, methylPREDNISolone sodium succinate (PF), ondansetron, prochlorperazine, prochlorperazine, sodium chloride    I/O:   Last BM Date: 11/15/24; Stool Appearance: Loose (11/15/24 0900)    Dietary Orders (From admission, onward)       Start     Ordered    11/15/24 1216  Oral nutritional supplements  Until  discontinued        Comments: please provide Ensure Clear, only if pt requests it, do not automatically send to him, may have as many times/day, as desired   Question:  Select supplement:  Answer:  Ensure Clear    11/15/24 1216    10/29/24 1132  Oral nutritional supplements  Until discontinued        Comments: Please provide Ensure Plus if pt requests it--please do not automatically send to him--may have as many times/day, as desired   Question:  Select supplement:  Answer:  Ensure Plus    10/29/24 1131    10/18/24 2150  May Participate in Room Service  ( ROOM SERVICE MAY PARTICIPATE)  Once        Question:  .  Answer:  Yes    10/18/24 2149    09/14/24 1258  Snacks  Until discontinued        Comments: pt may order from [facility] + [employer] Snack List, if requested    09/14/24 1258    09/13/24 1427  Adult diet Low microbial  Diet effective now        Question:  Diet type  Answer:  Low microbial    09/13/24 1428                Estimated Needs:   Total Energy Estimated Needs (kCal): ~4468-6788  Method for Estimating Needs: 66 x ~32-35+  Total Protein Estimated Needs (g): 100+  Method for Estimating Needs: 66 x ~1.5g/kg+  Total Fluid Estimated Needs (mL): 1ml/kcal or per MD/team        Nutrition Diagnosis   Malnutrition Diagnosis  Patient has Malnutrition Diagnosis: Yes  Diagnosis Status: Ongoing  Malnutrition Diagnosis: Severe malnutrition related to acute disease or injury (s/p recent transplant with treatment related side effects)  As Evidenced by: pt with h/o decreased PO since admit + further decline in PO as of late (likely consuming </=50% estimated energy needs x >/=5 days) with 16% wt loss since admit (~2 mos)    Nutrition Diagnosis  Patient has Nutrition Diagnosis: Yes  Diagnosis Status (1): Ongoing  Nutrition Diagnosis 1: Increased nutrient needs  Related to (1): increased metabolic demand  As Evidenced by (1): pt with AML, admitted for transplant    Additional Assessment Information: Considering  "ongoing malnutition with hypermetabolic state, do feel pt would benefit from ongoing use of oral nutrition supplements, though he declines for now. Was agreeable to RDN keeping orders in for both Ensure Clear and Ensure Plus PRN--to order as desired.     Also declined need for additional lemon drop candies, \"I still have plenty of those.\"       Nutrition Interventions/Recommendations     1. Continue Low Microbial Diet, only as tolerated.  --PO appears to now be improving + recent wt gains--continue to follow PO and wt during stay.  --RDN adjusted orders for oral nutrition supplements, per pt's request. Does not want them automatically sent anymore. Order for Ensure Clear and Ensure Plus remain in place PRN--pt to order, as desired.  --Pt declined need for additional lemon drop candies today.    2. D/t ongoing/extended hospitalization, would check 25(OH)-D level.    3. Daily weights (standing preferred, if feasible).        Nutrition Prescription for Oral Nutrition: ~4604-2431 kcals/day, 100+ g pro/day        Nutrition Interventions:   Food and/or Nutrient Delivery Interventions  Interventions: Meals and snacks  Meals and Snacks: General healthful diet  Goal: Low Microbial Diet    Nutrition Education: Pt denied any questions for RDN at this time.       Nutrition Monitoring and Evaluation   Food/Nutrient Related History Monitoring  Monitoring and Evaluation Plan: Amount of food  Amount of Food: Estimated amout of food  Criteria: consume 50% or more of meals/snacks    Body Composition/Growth/Weight History  Monitoring and Evaluation Plan: Weight  Weight: Measured weight  Criteria: maintain stable wt    Biochemical Data, Medical Tests and Procedures  Monitoring and Evaluation Plan: Electrolyte/renal panel  Electrolyte and Renal Panel: Magnesium, Phosphorus, Potassium, Sodium  Criteria: WNL          Time Spent (min): 30 minutes  "

## 2024-11-15 NOTE — CARE PLAN
Pt  has remained HDS and VSS throughout shift.    Problem: Pain - Adult  Goal: Verbalizes/displays adequate comfort level or baseline comfort level  Outcome: Progressing     Problem: Safety - Adult  Goal: Free from fall injury  Outcome: Progressing     Problem: Discharge Planning  Goal: Discharge to home or other facility with appropriate resources  Outcome: Progressing     Problem: Chronic Conditions and Co-morbidities  Goal: Patient's chronic conditions and co-morbidity symptoms are monitored and maintained or improved  Outcome: Progressing     Problem: Nutrition  Goal: Oral intake greater 75%  Outcome: Progressing  Goal: Adequate PO fluid intake  Outcome: Progressing  Goal: Maintain stable weight  Outcome: Progressing    The clinical goals for the shift include pt will remain HDS throughout shift

## 2024-11-15 NOTE — PROGRESS NOTES
"Brandt Merritt is a 64 y.o. 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    Subjective   No acute events over night. Afebrile. Feeling well today, denies any acute complaints. Expresses anxiety over his WBC count, reassured by Dr. Newsome that it is too early to expect a rise in his WBCs. No major plans for the day, his wife is bringing in food from home.     He reports that he has loose stools, no diarrhea. Denies fever, SOB, CP, Abd pain. ROS otherwise unremarkable.          Objective     Physical Exam  Constitutional:       Appearance: Normal appearance.   HENT:      Head: Normocephalic and atraumatic.      Comments: Mild temporal wasting     Nose: Nose normal.      Mouth/Throat:      Mouth: Mucous membranes are moist.      Pharynx: Oropharynx is clear.   Eyes:      Conjunctiva/sclera: Conjunctivae normal.      Pupils: Pupils are equal, round, and reactive to light.   Cardiovascular:      Rate and Rhythm: Normal rate and regular rhythm.      Pulses: Normal pulses.      Heart sounds: Normal heart sounds.   Pulmonary:      Effort: Pulmonary effort is normal.      Breath sounds: Normal breath sounds.   Abdominal:      General: Abdomen is flat. Bowel sounds are normal.      Palpations: Abdomen is soft.   Musculoskeletal:         General: Normal range of motion.      Cervical back: Normal range of motion and neck supple.      Comments: DL Tunneled R Internal Jugular catheter. Entry site c/d/i   Skin:     General: Skin is warm and dry.      Capillary Refill: Capillary refill takes 2 to 3 seconds.   Neurological:      General: No focal deficit present.      Mental Status: He is alert and oriented to person, place, and time.   Psychiatric:         Mood and Affect: Mood normal.         Behavior: Behavior normal.         Last Recorded Vitals  Blood pressure 123/74, pulse 90, temperature 36.3 °C (97.3 °F), temperature source Temporal, resp. rate 18, height (S) 1.664 m (5' 5.51\"), " weight 63.7 kg (140 lb 6.9 oz), SpO2 97%.  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)    Stem cells transplant status (Multi)    Immunocompromised    EBV (Katherine-Barr virus) viremia    T+57 from single cord transplant, T+9 from haplo stem cell rescue        ONC  # MDS/AML   - Symptoms began 9/2023; diagnosed 4/2024  - BMBx showing MDS in transition to AML (>10% blast) w/ trisomy 8, DNMT alpha, and U2AF1 mutation indication adverse risk   - S/p 4 cycles of Decitabine/Venetoclax (C4D1 on 8/12/24)  - BMBx (6/17/24): Blasts cleared, FISH still positive for trisomy 8, still MDS changes   - BMBx (9/5/24): 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 and no increase in blasts     # SCT #1: Single-unit Cord, T0=9/19/24  # Primary Engraftment Failure  - Conditioning: Flu-Mariana-TBI  - Donor: Single Cord, 6/8  = ABO Donor / Recipient: A+ / A+  = CMV Donor / Recipient: - / +  - aGVHD Prophylaxis: Tacrolimus + MMF  - Sent plasma CXCL9 level to [facility] to assess for immunologic graft rejection (10/16/24): 431 pg/mL, which is normal (less suggestive of immunologic rejection)  - Repeat BMBx (10/15): hypocellular with no residual myeloid neoplasm. 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  - Pre-SCT testing:  = CT Chest (10/30): no abnormal pulmonary findings, trace pericardial effusion  = Onco-TTE (10/31): EF 58%  - Graft: Haploidentical sister  = ABO Donor / Recipient: O+ / A+ (ABO incompatibility +)  = CMV Donor / Recipient: + / +  - Conditioning: Flu/Cy/TBI/rATG  = Fludarabine 24 mg/m2 on T-6,-5,-4,-3,-2   = Cyclophosphamide 14.5mg/kg T-6,-5   =  cGy T-1   = GVHD prophylaxis -  rATG 1.5mg/kg T-5,-3,-1, PTCy (25mg/kg T+3, T+4), tacro, MMF (T+5)       # GVHD  - No signs or symptoms of aGVHD from  SCT#1  = Started Tacro and MMF on T+5 (11/11) following Haplo rescue (sister)  - Tacro level (11/14) 5.2, Cont current dose.     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   - Monitor counts daily   - Transfuse for Hgb <7 & PLT <10      - S/p PRBC: 11/5, 11/10      - S/p PLT: 11/4, 11/8, 11/10  - S/p Neupogen 300 mcg (9/24-10/30/24), restarted T+5 (11/11-current)  # VTE Prophylaxis: SCDs & Ambulation I/s/o thrombocytopenia  # SOS Prophylaxis: Ursodiol      ID  # EBV Viremia  - EBV: 375 U/L (10/28), up from 77 U/L (10/21/24)  - Treating despite low levels due to upcoming second SCT  - Rituximab 600 mg x 1 (10/31/24)  - Check EBV PCR weekly on Mondays, 11/4 122, 11/12 not detected  # Neutropenic fever (9/25, 10/12)  - Fever w/ diarrhea, workup neg, treated initially with Josefina, switched to Cefepime d/t rash (9/27-10/10)  - CT C/A/P d/t diarrhea. No abnormal findings  - Repeat blood cultures (10/22): negative  - Cefepime (10/22-10/26)  # Fever after ATG on 11/1 ; septic work up (Blood cx and UA both negative)   -Febrile last  on 11/9, BC: NGTD, UC NGTD, continued on cefepime (11/6->). Vanc (11/8 - 11/9)  -Repeat BC 11/10: NGTD  # Viral surveillance:   - CMV (Mo/Th): ND (11/4, 11/7, 11/11)   - Adeno (Mo): ND (10/28) ND (11/4, 11/11)  - HHV6 (Mo): ND (10/21),  <188 (10/30) 11/4 :451, 11/11 500  # Prophylaxis:   - VZV: Acyclovir   - Aspergillus: Posaconazole  - C. Diff: Vancomycin PO  - CMV: Letermovir  - PJP: Pentamidine given 10/26, next due 11/23/24     FEN/RENAL/GI  - Admit wt: 75.6 kg (9/13/24)  Most recent weight   Wt Readings from Last 1 Encounters:   11/14/24 63.7 kg (140 lb 6.9 oz)     #10 kgs weight loss. Nutrition Consulted (11/4),appreciate recs  - Allergic to Lasix, will not take med (extreme hypotension, extreme hypokalemia)  # Hypokalemia. Repleted last on 11/14.  # Hx of treated Hep C in 2015  - Hep C RNA - not detected (9/12/24)     CARDIO/PULM:  # Hx of  STEMI (11/11/2020)  - TTE 7/19/24: EF 59%, otherwise unremarkable   - Was cleared by Cardiology for transplant   # Hx of HTN   - Nifedipine: decrease to 60 mEq ER 24 hr tablet   # Ascending aorta dilation   - TTE (4/29/24): 3.8-4 cm dilation      :  # Hx of BPH and urinary retention (2023)  # Hx elevated PSA    - 7/28/21 Bx: atypical small acinar proliferation    - 4/7/23 Bx: Benign     NEURO/PSYCH  # Tremor (10/10- )  :: Most likely due to tacrolimus vs cefepime  - Fine, high-frequency (7+ Hz) tremor worst in the hands, exacerbated by effort,   - Previously associated with significant fasciculations of the shoulder and back muscles; also fasciculations in jaw -- these are no longer aparrent  - Cefepime initially stopped 10/10, tacrolimus stopped 10/29  - If still no improvement, could consider Neurology consult     DISPO:  - FULL CODE  - NOK: spouse Genevieve ([phone])  - DL Tunneled R Internal Jugular catheter  - Oncologist: Dr. Newsome     Patient seen examined and discussed with Dr. Malaika Manzano PA-C

## 2024-11-16 LAB
ALBUMIN SERPL BCP-MCNC: 3.3 G/DL (ref 3.4–5)
ALP SERPL-CCNC: 76 U/L (ref 33–136)
ALT SERPL W P-5'-P-CCNC: 5 U/L (ref 10–52)
ANION GAP SERPL CALC-SCNC: 12 MMOL/L (ref 10–20)
APTT PPP: 30 SECONDS (ref 27–38)
AST SERPL W P-5'-P-CCNC: 10 U/L (ref 9–39)
BASOPHILS # BLD MANUAL: 0 X10*3/UL (ref 0–0.1)
BASOPHILS NFR BLD MANUAL: 0 %
BILIRUB SERPL-MCNC: 0.6 MG/DL (ref 0–1.2)
BLOOD EXPIRATION DATE: NORMAL
BUN SERPL-MCNC: 5 MG/DL (ref 6–23)
CALCIUM SERPL-MCNC: 8.8 MG/DL (ref 8.6–10.6)
CHLORIDE SERPL-SCNC: 105 MMOL/L (ref 98–107)
CO2 SERPL-SCNC: 27 MMOL/L (ref 21–32)
CREAT SERPL-MCNC: 0.59 MG/DL (ref 0.5–1.3)
DACRYOCYTES BLD QL SMEAR: ABNORMAL
DISPENSE STATUS: NORMAL
EGFRCR SERPLBLD CKD-EPI 2021: >90 ML/MIN/1.73M*2
EOSINOPHIL # BLD MANUAL: 0 X10*3/UL (ref 0–0.7)
EOSINOPHIL NFR BLD MANUAL: 0 %
ERYTHROCYTE [DISTWIDTH] IN BLOOD BY AUTOMATED COUNT: 13.9 % (ref 11.5–14.5)
FIBRINOGEN PPP-MCNC: 444 MG/DL (ref 200–400)
GLUCOSE SERPL-MCNC: 92 MG/DL (ref 74–99)
HCT VFR BLD AUTO: 24.3 % (ref 41–52)
HGB BLD-MCNC: 8.7 G/DL (ref 13.5–17.5)
IMM GRANULOCYTES # BLD AUTO: 0 X10*3/UL (ref 0–0.7)
IMM GRANULOCYTES NFR BLD AUTO: 0 % (ref 0–0.9)
INR PPP: 1.1 (ref 0.9–1.1)
LYMPHOCYTES # BLD MANUAL: 0 X10*3/UL (ref 1.2–4.8)
LYMPHOCYTES NFR BLD MANUAL: 0 %
MAGNESIUM SERPL-MCNC: 1.43 MG/DL (ref 1.6–2.4)
MCH RBC QN AUTO: 27.7 PG (ref 26–34)
MCHC RBC AUTO-ENTMCNC: 35.8 G/DL (ref 32–36)
MCV RBC AUTO: 77 FL (ref 80–100)
MONOCYTES # BLD MANUAL: <0.1 X10*3/UL (ref 0.1–1)
MONOCYTES NFR BLD MANUAL: 100 %
NEUTS SEG # BLD MANUAL: 0 X10*3/UL (ref 1.2–7)
NEUTS SEG NFR BLD MANUAL: 0 %
NRBC BLD-RTO: 0 /100 WBCS (ref 0–0)
OVALOCYTES BLD QL SMEAR: ABNORMAL
PHOSPHATE SERPL-MCNC: 2.9 MG/DL (ref 2.5–4.9)
PLATELET # BLD AUTO: 7 X10*3/UL (ref 150–450)
POLYCHROMASIA BLD QL SMEAR: ABNORMAL
POTASSIUM SERPL-SCNC: 3.7 MMOL/L (ref 3.5–5.3)
PRODUCT BLOOD TYPE: 8400
PRODUCT CODE: NORMAL
PROT SERPL-MCNC: 6.3 G/DL (ref 6.4–8.2)
PROTHROMBIN TIME: 12.1 SECONDS (ref 9.8–12.8)
RBC # BLD AUTO: 3.14 X10*6/UL (ref 4.5–5.9)
RBC MORPH BLD: ABNORMAL
SODIUM SERPL-SCNC: 140 MMOL/L (ref 136–145)
TACROLIMUS BLD-MCNC: 7 NG/ML
TOTAL CELLS COUNTED BLD: 1
UNIT ABO: NORMAL
UNIT NUMBER: NORMAL
UNIT RH: NORMAL
UNIT VOLUME: 284
WBC # BLD AUTO: <0.1 X10*3/UL (ref 4.4–11.3)

## 2024-11-16 PROCEDURE — 2500000001 HC RX 250 WO HCPCS SELF ADMINISTERED DRUGS (ALT 637 FOR MEDICARE OP)

## 2024-11-16 PROCEDURE — 2500000004 HC RX 250 GENERAL PHARMACY W/ HCPCS (ALT 636 FOR OP/ED): Performed by: INTERNAL MEDICINE

## 2024-11-16 PROCEDURE — 2500000002 HC RX 250 W HCPCS SELF ADMINISTERED DRUGS (ALT 637 FOR MEDICARE OP, ALT 636 FOR OP/ED): Performed by: INTERNAL MEDICINE

## 2024-11-16 PROCEDURE — 36430 TRANSFUSION BLD/BLD COMPNT: CPT

## 2024-11-16 PROCEDURE — 85027 COMPLETE CBC AUTOMATED: CPT

## 2024-11-16 PROCEDURE — 2500000001 HC RX 250 WO HCPCS SELF ADMINISTERED DRUGS (ALT 637 FOR MEDICARE OP): Performed by: STUDENT IN AN ORGANIZED HEALTH CARE EDUCATION/TRAINING PROGRAM

## 2024-11-16 PROCEDURE — 2500000004 HC RX 250 GENERAL PHARMACY W/ HCPCS (ALT 636 FOR OP/ED): Performed by: PHYSICIAN ASSISTANT

## 2024-11-16 PROCEDURE — P9037 PLATE PHERES LEUKOREDU IRRAD: HCPCS

## 2024-11-16 PROCEDURE — 2500000001 HC RX 250 WO HCPCS SELF ADMINISTERED DRUGS (ALT 637 FOR MEDICARE OP): Performed by: INTERNAL MEDICINE

## 2024-11-16 PROCEDURE — 85384 FIBRINOGEN ACTIVITY: CPT

## 2024-11-16 PROCEDURE — 80053 COMPREHEN METABOLIC PANEL: CPT

## 2024-11-16 PROCEDURE — 1170000001 HC PRIVATE ONCOLOGY ROOM DAILY

## 2024-11-16 PROCEDURE — 2500000004 HC RX 250 GENERAL PHARMACY W/ HCPCS (ALT 636 FOR OP/ED): Mod: JZ | Performed by: STUDENT IN AN ORGANIZED HEALTH CARE EDUCATION/TRAINING PROGRAM

## 2024-11-16 PROCEDURE — 85610 PROTHROMBIN TIME: CPT

## 2024-11-16 PROCEDURE — 2500000002 HC RX 250 W HCPCS SELF ADMINISTERED DRUGS (ALT 637 FOR MEDICARE OP, ALT 636 FOR OP/ED): Performed by: STUDENT IN AN ORGANIZED HEALTH CARE EDUCATION/TRAINING PROGRAM

## 2024-11-16 PROCEDURE — 80197 ASSAY OF TACROLIMUS: CPT | Performed by: INTERNAL MEDICINE

## 2024-11-16 PROCEDURE — 84100 ASSAY OF PHOSPHORUS: CPT

## 2024-11-16 PROCEDURE — 99232 SBSQ HOSP IP/OBS MODERATE 35: CPT | Performed by: INTERNAL MEDICINE

## 2024-11-16 PROCEDURE — 2500000004 HC RX 250 GENERAL PHARMACY W/ HCPCS (ALT 636 FOR OP/ED): Performed by: NURSE PRACTITIONER

## 2024-11-16 PROCEDURE — 83735 ASSAY OF MAGNESIUM: CPT

## 2024-11-16 PROCEDURE — 85007 BL SMEAR W/DIFF WBC COUNT: CPT

## 2024-11-16 RX ORDER — MAGNESIUM SULFATE HEPTAHYDRATE 40 MG/ML
4 INJECTION, SOLUTION INTRAVENOUS ONCE
Status: COMPLETED | OUTPATIENT
Start: 2024-11-16 | End: 2024-11-16

## 2024-11-16 ASSESSMENT — COGNITIVE AND FUNCTIONAL STATUS - GENERAL
MOBILITY SCORE: 24
DAILY ACTIVITIY SCORE: 24

## 2024-11-16 ASSESSMENT — PAIN SCALES - GENERAL
PAINLEVEL_OUTOF10: 0 - NO PAIN

## 2024-11-16 ASSESSMENT — PAIN - FUNCTIONAL ASSESSMENT: PAIN_FUNCTIONAL_ASSESSMENT: 0-10

## 2024-11-16 NOTE — PROGRESS NOTES
"Brandt Merritt is a 64 y.o. 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    Subjective   No acute events over night. Afebrile. Sitting in the chair. Reports that he's doing OK. We reinforced that we expect his counts at the earliest to start coming in in about 5 days. His wife is bringing in soup and foods that he likes from home. Otherwise he denies fever, SOB, CP, Abd pain. ROS otherwise unremarkable.          Objective     Physical Exam  Constitutional:       Appearance: Normal appearance.   HENT:      Head: Normocephalic and atraumatic.      Comments: Mild temporal wasting     Nose: Nose normal.      Mouth/Throat:      Mouth: Mucous membranes are moist.      Pharynx: Oropharynx is clear.   Eyes:      Conjunctiva/sclera: Conjunctivae normal.      Pupils: Pupils are equal, round, and reactive to light.   Cardiovascular:      Rate and Rhythm: Normal rate and regular rhythm.      Pulses: Normal pulses.      Heart sounds: Normal heart sounds.   Pulmonary:      Effort: Pulmonary effort is normal.      Breath sounds: Normal breath sounds.   Abdominal:      General: Abdomen is flat. Bowel sounds are normal.      Palpations: Abdomen is soft.   Musculoskeletal:         General: Normal range of motion.      Cervical back: Normal range of motion and neck supple.      Comments: DL Tunneled R Internal Jugular catheter. Entry site c/d/i   Skin:     General: Skin is warm and dry.      Capillary Refill: Capillary refill takes 2 to 3 seconds.   Neurological:      General: No focal deficit present.      Mental Status: He is alert and oriented to person, place, and time.   Psychiatric:         Mood and Affect: Mood normal.         Behavior: Behavior normal.       Last Recorded Vitals  Blood pressure 117/70, pulse 85, temperature 36.8 °C (98.2 °F), temperature source Temporal, resp. rate 18, height (S) 1.664 m (5' 5.51\"), weight 62.6 kg (138 lb 0.1 oz), SpO2 100%.  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      T+58 from single cord transplant, T+10 from haplo stem cell rescue        ONC  # MDS/AML   - Symptoms began 9/2023; diagnosed 4/2024  - BMBx showing MDS in transition to AML (>10% blast) w/ trisomy 8, DNMT alpha, and U2AF1 mutation indication adverse risk   - S/p 4 cycles of Decitabine/Venetoclax (C4D1 on 8/12/24)  - BMBx (6/17/24): Blasts cleared, FISH still positive for trisomy 8, still MDS changes   - BMBx (9/5/24): 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 and no increase in blasts     # SCT #1: Single-unit Cord, T0=9/19/24  # Primary Engraftment Failure  - Conditioning: Flu-Mariana-TBI  - Donor: Single Cord, 6/8  = ABO Donor / Recipient: A+ / A+  = CMV Donor / Recipient: - / +  - aGVHD Prophylaxis: Tacrolimus + MMF  - Sent plasma CXCL9 level to [place] to assess for immunologic graft rejection (10/16/24): 431 pg/mL, which is normal (less suggestive of immunologic rejection)  - Repeat BMBx (10/15): hypocellular with no residual myeloid neoplasm. 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  - Pre-SCT testing:  = CT Chest (10/30): no abnormal pulmonary findings, trace pericardial effusion  = Onco-TTE (10/31): EF 58%  - Graft: Haploidentical sister  = ABO Donor / Recipient: O+ / A+ (ABO incompatibility +)  = CMV Donor / Recipient: + / +  - Conditioning:  Flu/Cy/TBI/rATG  = Fludarabine 24 mg/m2 on T-6,-5,-4,-3,-2   = Cyclophosphamide 14.5mg/kg T-6,-5   =  cGy T-1   = GVHD prophylaxis -  rATG 1.5mg/kg T-5,-3,-1, PTCy (25mg/kg T+3, T+4), tacro, MMF (T+5)       # GVHD  - No signs or symptoms of aGVHD from SCT#1  = Started Tacro and MMF on T+5 (11/11) following Haplo rescue (sister)  - Tacro level (11/14) 5.2, Cont current dose.     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   - Monitor counts daily   - Transfuse for Hgb <7 & PLT <10      - S/p PRBC: 11/5, 11/10      - S/p PLT: 11/4, 11/8, 11/10, 11/16  - S/p Neupogen 300 mcg (9/24-10/30/24), restarted T+5 (11/11-current)  # VTE Prophylaxis: SCDs & Ambulation I/s/o thrombocytopenia  # SOS Prophylaxis: Ursodiol      ID  # EBV Viremia  - EBV: 375 U/L (10/28), up from 77 U/L (10/21/24)  - Treating despite low levels due to upcoming second SCT  - Rituximab 600 mg x 1 (10/31/24)  - Check EBV PCR weekly on Mondays, 11/4 122, 11/12 not detected  # Neutropenic fever (9/25, 10/12)  - Fever w/ diarrhea, workup neg, treated initially with Josefina, switched to Cefepime d/t rash (9/27-10/10)  - CT C/A/P d/t diarrhea. No abnormal findings  - Repeat blood cultures (10/22): negative  - Cefepime (10/22-10/26) (11/16- current)  # Fever after ATG on 11/1 ; septic work up (Blood cx and UA both negative)   -Febrile last  on 11/9, BC: NGTD, UC NGTD, continued on cefepime (11/6->). Vanc (11/8 - 11/9)  -Repeat BC 11/10: NGTD  # Viral surveillance:   - CMV (Mo/Th): ND (11/4, 11/7, 11/11)   - Adeno (Mo): ND (10/28) ND (11/4, 11/11)  - HHV6 (Mo): ND (10/21),  <188 (10/30) 11/4 :451, 11/11 500  # Prophylaxis:   - VZV: Acyclovir   - Aspergillus: Posaconazole  - C. Diff: Vancomycin PO  - CMV: Letermovir  - PJP: Pentamidine given 10/26, next due 11/23/24     FEN/RENAL/GI  - Admit wt: 75.6 kg (9/13/24)  Most recent weight   Wt Readings from Last 1 Encounters:   11/16/24 62.6 kg (138 lb 0.1 oz)      #10 kgs weight loss. Nutrition Consulted (11/4),appreciate recs  - Allergic to Lasix, will not take med (extreme hypotension, extreme hypokalemia)  # Hypokalemia. Repleted last on 11/14.  # Hx of treated Hep C in 2015  - Hep C RNA - not detected (9/12/24)     CARDIO/PULM:  # Hx of STEMI (11/11/2020)  - TTE 7/19/24: EF 59%, otherwise unremarkable   - Was cleared by Cardiology for transplant   # Hx of HTN   - Nifedipine: decrease to 60 mEq ER 24 hr tablet   # Ascending aorta dilation   - TTE (4/29/24): 3.8-4 cm dilation      :  # Hx of BPH and urinary retention (2023)  # Hx elevated PSA    - 7/28/21 Bx: atypical small acinar proliferation    - 4/7/23 Bx: Benign     NEURO/PSYCH  # Tremor (10/10- )  :: Most likely due to tacrolimus vs cefepime  - Fine, high-frequency (7+ Hz) tremor worst in the hands, exacerbated by effort,   - Previously associated with significant fasciculations of the shoulder and back muscles; also fasciculations in jaw -- these are no longer aparrent  - Cefepime initially stopped 10/10, tacrolimus stopped 10/29  - If still no improvement, could consider Neurology consult     DISPO:  - FULL CODE  - NOK: spouse Genevieve ([phone])  - DL Tunneled R Internal Jugular catheter  - Oncologist: Dr. Newsome     Patient seen examined and discussed with Dr. Malaika San, APRN-CNP

## 2024-11-16 NOTE — CARE PLAN
Problem: Pain - Adult  Goal: Verbalizes/displays adequate comfort level or baseline comfort level  Outcome: Progressing     Problem: Safety - Adult  Goal: Free from fall injury  Outcome: Progressing     Problem: Discharge Planning  Goal: Discharge to home or other facility with appropriate resources  Outcome: Progressing     Problem: Nutrition  Goal: Oral intake greater 75%  Outcome: Progressing  Goal: Adequate PO fluid intake  Outcome: Progressing  Goal: Maintain stable weight  Outcome: Progressing     Problem: Nutrition  Goal: Oral intake greater 75%  Outcome: Progressing  Goal: Adequate PO fluid intake  Outcome: Progressing  Goal: Maintain stable weight  Outcome: Progressing

## 2024-11-17 VITALS
BODY MASS INDEX: 22.29 KG/M2 | DIASTOLIC BLOOD PRESSURE: 70 MMHG | RESPIRATION RATE: 18 BRPM | SYSTOLIC BLOOD PRESSURE: 105 MMHG | TEMPERATURE: 98.1 F | OXYGEN SATURATION: 99 % | HEART RATE: 96 BPM | WEIGHT: 138.67 LBS | HEIGHT: 66 IN

## 2024-11-17 LAB
ALBUMIN SERPL BCP-MCNC: 3.2 G/DL (ref 3.4–5)
ALP SERPL-CCNC: 77 U/L (ref 33–136)
ALT SERPL W P-5'-P-CCNC: 6 U/L (ref 10–52)
ANION GAP SERPL CALC-SCNC: 12 MMOL/L (ref 10–20)
APTT PPP: 27 SECONDS (ref 27–38)
AST SERPL W P-5'-P-CCNC: 12 U/L (ref 9–39)
BASOPHILS # BLD AUTO: 0 X10*3/UL (ref 0–0.1)
BASOPHILS NFR BLD AUTO: 0 %
BILIRUB SERPL-MCNC: 0.5 MG/DL (ref 0–1.2)
BUN SERPL-MCNC: 6 MG/DL (ref 6–23)
CALCIUM SERPL-MCNC: 8.5 MG/DL (ref 8.6–10.6)
CHLORIDE SERPL-SCNC: 104 MMOL/L (ref 98–107)
CO2 SERPL-SCNC: 27 MMOL/L (ref 21–32)
CREAT SERPL-MCNC: 0.64 MG/DL (ref 0.5–1.3)
EGFRCR SERPLBLD CKD-EPI 2021: >90 ML/MIN/1.73M*2
EOSINOPHIL # BLD AUTO: 0 X10*3/UL (ref 0–0.7)
EOSINOPHIL NFR BLD AUTO: 0 %
ERYTHROCYTE [DISTWIDTH] IN BLOOD BY AUTOMATED COUNT: 14.1 % (ref 11.5–14.5)
FIBRINOGEN PPP-MCNC: 413 MG/DL (ref 200–400)
GLUCOSE SERPL-MCNC: 93 MG/DL (ref 74–99)
HCT VFR BLD AUTO: 23.2 % (ref 41–52)
HGB BLD-MCNC: 8 G/DL (ref 13.5–17.5)
IMM GRANULOCYTES # BLD AUTO: 0 X10*3/UL (ref 0–0.7)
IMM GRANULOCYTES NFR BLD AUTO: 0 % (ref 0–0.9)
INR PPP: 1.1 (ref 0.9–1.1)
LYMPHOCYTES # BLD AUTO: 0.01 X10*3/UL (ref 1.2–4.8)
LYMPHOCYTES NFR BLD AUTO: 14.3 %
MAGNESIUM SERPL-MCNC: 1.66 MG/DL (ref 1.6–2.4)
MCH RBC QN AUTO: 27.3 PG (ref 26–34)
MCHC RBC AUTO-ENTMCNC: 34.5 G/DL (ref 32–36)
MCV RBC AUTO: 79 FL (ref 80–100)
MONOCYTES # BLD AUTO: 0.05 X10*3/UL (ref 0.1–1)
MONOCYTES NFR BLD AUTO: 71.4 %
NEUTROPHILS # BLD AUTO: 0.01 X10*3/UL (ref 1.2–7.7)
NEUTROPHILS NFR BLD AUTO: 14.3 %
NRBC BLD-RTO: 0 /100 WBCS (ref 0–0)
PHOSPHATE SERPL-MCNC: 2.9 MG/DL (ref 2.5–4.9)
PLATELET # BLD AUTO: 40 X10*3/UL (ref 150–450)
POTASSIUM SERPL-SCNC: 3.8 MMOL/L (ref 3.5–5.3)
PROT SERPL-MCNC: 6.1 G/DL (ref 6.4–8.2)
PROTHROMBIN TIME: 12.3 SECONDS (ref 9.8–12.8)
RBC # BLD AUTO: 2.93 X10*6/UL (ref 4.5–5.9)
SODIUM SERPL-SCNC: 139 MMOL/L (ref 136–145)
TACROLIMUS BLD-MCNC: 6.7 NG/ML
WBC # BLD AUTO: 0.1 X10*3/UL (ref 4.4–11.3)

## 2024-11-17 PROCEDURE — 84100 ASSAY OF PHOSPHORUS: CPT

## 2024-11-17 PROCEDURE — 85384 FIBRINOGEN ACTIVITY: CPT

## 2024-11-17 PROCEDURE — 2500000002 HC RX 250 W HCPCS SELF ADMINISTERED DRUGS (ALT 637 FOR MEDICARE OP, ALT 636 FOR OP/ED): Performed by: STUDENT IN AN ORGANIZED HEALTH CARE EDUCATION/TRAINING PROGRAM

## 2024-11-17 PROCEDURE — 2500000001 HC RX 250 WO HCPCS SELF ADMINISTERED DRUGS (ALT 637 FOR MEDICARE OP)

## 2024-11-17 PROCEDURE — 99233 SBSQ HOSP IP/OBS HIGH 50: CPT | Performed by: INTERNAL MEDICINE

## 2024-11-17 PROCEDURE — 80197 ASSAY OF TACROLIMUS: CPT | Performed by: INTERNAL MEDICINE

## 2024-11-17 PROCEDURE — 2500000002 HC RX 250 W HCPCS SELF ADMINISTERED DRUGS (ALT 637 FOR MEDICARE OP, ALT 636 FOR OP/ED): Performed by: INTERNAL MEDICINE

## 2024-11-17 PROCEDURE — 1170000001 HC PRIVATE ONCOLOGY ROOM DAILY

## 2024-11-17 PROCEDURE — 83735 ASSAY OF MAGNESIUM: CPT

## 2024-11-17 PROCEDURE — 2500000004 HC RX 250 GENERAL PHARMACY W/ HCPCS (ALT 636 FOR OP/ED): Performed by: PHYSICIAN ASSISTANT

## 2024-11-17 PROCEDURE — 85730 THROMBOPLASTIN TIME PARTIAL: CPT

## 2024-11-17 PROCEDURE — 2500000004 HC RX 250 GENERAL PHARMACY W/ HCPCS (ALT 636 FOR OP/ED): Performed by: INTERNAL MEDICINE

## 2024-11-17 PROCEDURE — 80053 COMPREHEN METABOLIC PANEL: CPT

## 2024-11-17 PROCEDURE — 2500000004 HC RX 250 GENERAL PHARMACY W/ HCPCS (ALT 636 FOR OP/ED): Mod: JZ | Performed by: STUDENT IN AN ORGANIZED HEALTH CARE EDUCATION/TRAINING PROGRAM

## 2024-11-17 PROCEDURE — 85025 COMPLETE CBC W/AUTO DIFF WBC: CPT

## 2024-11-17 PROCEDURE — 2500000001 HC RX 250 WO HCPCS SELF ADMINISTERED DRUGS (ALT 637 FOR MEDICARE OP): Performed by: STUDENT IN AN ORGANIZED HEALTH CARE EDUCATION/TRAINING PROGRAM

## 2024-11-17 PROCEDURE — 2500000005 HC RX 250 GENERAL PHARMACY W/O HCPCS

## 2024-11-17 PROCEDURE — 2500000002 HC RX 250 W HCPCS SELF ADMINISTERED DRUGS (ALT 637 FOR MEDICARE OP, ALT 636 FOR OP/ED)

## 2024-11-17 PROCEDURE — 2500000001 HC RX 250 WO HCPCS SELF ADMINISTERED DRUGS (ALT 637 FOR MEDICARE OP): Performed by: INTERNAL MEDICINE

## 2024-11-17 RX ORDER — POTASSIUM CHLORIDE 750 MG/1
40 TABLET, FILM COATED, EXTENDED RELEASE ORAL ONCE
Status: COMPLETED | OUTPATIENT
Start: 2024-11-17 | End: 2024-11-17

## 2024-11-17 RX ORDER — PANTOPRAZOLE SODIUM 40 MG/1
40 TABLET, DELAYED RELEASE ORAL
Status: DISCONTINUED | OUTPATIENT
Start: 2024-11-18 | End: 2024-11-21 | Stop reason: HOSPADM

## 2024-11-17 ASSESSMENT — COGNITIVE AND FUNCTIONAL STATUS - GENERAL
MOBILITY SCORE: 24
DAILY ACTIVITIY SCORE: 24

## 2024-11-17 ASSESSMENT — PAIN SCALES - GENERAL
PAINLEVEL_OUTOF10: 0 - NO PAIN
PAINLEVEL_OUTOF10: 0 - NO PAIN

## 2024-11-17 ASSESSMENT — PAIN - FUNCTIONAL ASSESSMENT: PAIN_FUNCTIONAL_ASSESSMENT: 0-10

## 2024-11-17 NOTE — PROGRESS NOTES
"Brandt Merritt is a 64 y.o. male on day 65 of admission presenting with Acute myeloid leukemia in remission (Multi).    Subjective   No acute events over night. Afebrile. Sitting in the chair, repo[rts some gerd symptoms including water brash. Remainder of ROS negative. Patient continues to wait patiently for his count recovery. His wife is bringing in soup and foods that he likes from home. Otherwise he denies fever, SOB, CP, Abd pain. ROS otherwise unremarkable.          Objective     Physical Exam  Constitutional:       Appearance: Normal appearance.   HENT:      Head: Normocephalic and atraumatic.      Comments: Mild temporal wasting     Nose: Nose normal.      Mouth/Throat:      Mouth: Mucous membranes are moist.      Pharynx: Oropharynx is clear.   Eyes:      Conjunctiva/sclera: Conjunctivae normal.      Pupils: Pupils are equal, round, and reactive to light.   Cardiovascular:      Rate and Rhythm: Normal rate and regular rhythm.      Pulses: Normal pulses.      Heart sounds: Normal heart sounds.   Pulmonary:      Effort: Pulmonary effort is normal.      Breath sounds: Normal breath sounds.   Abdominal:      General: Abdomen is flat. Bowel sounds are normal.      Palpations: Abdomen is soft.   Musculoskeletal:         General: Normal range of motion.      Cervical back: Normal range of motion and neck supple.      Comments: DL Tunneled R Internal Jugular catheter. Entry site c/d/i   Skin:     General: Skin is warm and dry.      Capillary Refill: Capillary refill takes 2 to 3 seconds.   Neurological:      General: No focal deficit present.      Mental Status: He is alert and oriented to person, place, and time.   Psychiatric:         Mood and Affect: Mood normal.         Behavior: Behavior normal.         Last Recorded Vitals  Blood pressure 122/69, pulse 91, temperature 36 °C (96.8 °F), temperature source Temporal, resp. rate 18, height (S) 1.664 m (5' 5.51\"), weight 62.9 kg (138 lb 10.7 oz), SpO2 " 99%.  Intake/Output last 3 Shifts:  I/O last 3 completed shifts:  In: 1540 (24.6 mL/kg) [Blood:280; IV Piggyback:1260]  Out: - (0 mL/kg)   Weight: 62.6 kg       Assessment/Plan   Assessment & Plan  Acute myeloid leukemia in remission (Multi)    Stem cells transplant status (Multi)    Immunocompromised    EBV (Katherine-Barr virus) viremia    T+59 from single cord transplant, T+11 from haplo stem cell rescue        ONC  # MDS/AML   - Symptoms began 9/2023; diagnosed 4/2024  - BMBx showing MDS in transition to AML (>10% blast) w/ trisomy 8, DNMT alpha, and U2AF1 mutation indication adverse risk   - S/p 4 cycles of Decitabine/Venetoclax (C4D1 on 8/12/24)  - BMBx (6/17/24): Blasts cleared, FISH still positive for trisomy 8, still MDS changes   - BMBx (9/5/24): 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 and no increase in blasts     # SCT #1: Single-unit Cord, T0=9/19/24  # Primary Engraftment Failure  - Conditioning: Flu-Mariana-TBI  - Donor: Single Cord, 6/8  = ABO Donor / Recipient: A+ / A+  = CMV Donor / Recipient: - / +  - aGVHD Prophylaxis: Tacrolimus + MMF  - Sent plasma CXCL9 level to [place] to assess for immunologic graft rejection (10/16/24): 431 pg/mL, which is normal (less suggestive of immunologic rejection)  - Repeat BMBx (10/15): hypocellular with no residual myeloid neoplasm. 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  - Pre-SCT testing:  = CT Chest (10/30): no abnormal pulmonary findings, trace pericardial effusion  = Onco-TTE (10/31): EF 58%  - Graft: Haploidentical sister  = ABO Donor / Recipient: O+ / A+ (ABO incompatibility +)  = CMV Donor / Recipient: + / +  - Conditioning: Flu/Cy/TBI/rATG  = Fludarabine 24 mg/m2 on T-6,-5,-4,-3,-2   = Cyclophosphamide 14.5mg/kg T-6,-5   =  cGy T-1   = GVHD prophylaxis -  rATG 1.5mg/kg T-5,-3,-1, PTCy (25mg/kg T+3, T+4), tacro, MMF (T+5)     # GVHD  - No signs or symptoms of aGVHD from SCT#1  = Started  Tacro and MMF on T+5 (11/11) following Haplo rescue (sister)  - Tacro level (11/17) 6.7, Cont current dose.     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   - Monitor counts daily   - Transfuse for Hgb <7 & PLT <10      - S/p PRBC: 11/5, 11/10      - S/p PLT: 11/4, 11/8, 11/10, 11/16  - S/p Neupogen 300 mcg (9/24-10/30/24), restarted T+5 (11/11-current)  # VTE Prophylaxis: SCDs & Ambulation I/s/o thrombocytopenia  # SOS Prophylaxis: Ursodiol      ID  # EBV Viremia  - EBV: 375 U/L (10/28), up from 77 U/L (10/21/24)  - Treating despite low levels due to upcoming second SCT  - Rituximab 600 mg x 1 (10/31/24)  - Check EBV PCR weekly on Mondays, 11/4 122, 11/12 not detected  # Neutropenic fever (9/25, 10/12)  - Fever w/ diarrhea, workup neg, treated initially with Josefina, switched to Cefepime d/t rash (9/27-10/10)  - CT C/A/P d/t diarrhea. No abnormal findings  - Repeat blood cultures (10/22): negative  - Cefepime (10/22-10/26) (11/16- current)  # Fever after ATG on 11/1 ; septic work up (Blood cx and UA both negative)   -Febrile last  on 11/9, BC: NGTD, UC NGTD, continued on cefepime (11/6->). Vanc (11/8 - 11/9)  -Repeat BC 11/10: NGTD  # Viral surveillance:   - CMV (Mo/Th): ND (11/4, 11/7, 11/11)   - Adeno (Mo): ND (10/28) ND (11/4, 11/11)  - HHV6 (Mo): ND (10/21),  <188 (10/30) 11/4 :451, 11/11 500  # Prophylaxis:   - VZV: Acyclovir   - Aspergillus: Posaconazole  - C. Diff: Vancomycin PO  - CMV: Letermovir  - PJP: Pentamidine given 10/26, next due 11/23/24     FEN/RENAL/GI  - Admit wt: 75.6 kg (9/13/24)  Most recent weight   Wt Readings from Last 1 Encounters:   11/17/24 62.9 kg (138 lb 10.7 oz)     #10 kgs weight loss. Nutrition Consulted (11/4),appreciate recs  - Allergic to Lasix, will not take med (extreme hypotension, extreme hypokalemia)  # Hypokalemia. Repleted last on 11/14.  # Hx of treated Hep C in 2015  - Hep C RNA - not detected (9/12/24)        CARDIO/PULM:  # Hx of STEMI (11/11/2020)  - TTE 7/19/24: EF 59%, otherwise unremarkable   - Was cleared by Cardiology for transplant   # Hx of HTN   - Nifedipine: decrease to 60 mEq ER 24 hr tablet   # Ascending aorta dilation   - TTE (4/29/24): 3.8-4 cm dilation      :  # Hx of BPH and urinary retention (2023)  # Hx elevated PSA    - 7/28/21 Bx: atypical small acinar proliferation    - 4/7/23 Bx: Benign     NEURO/PSYCH  # Tremor (10/10- )  :: Most likely due to tacrolimus vs cefepime  - Fine, high-frequency (7+ Hz) tremor worst in the hands, exacerbated by effort,   - Previously associated with significant fasciculations of the shoulder and back muscles; also fasciculations in jaw -- these are no longer aparrent  - Cefepime initially stopped 10/10, tacrolimus stopped 10/29  - If still no improvement, could consider Neurology consult     DISPO:  - FULL CODE  - NOK: spouse Genevieve ([phone])  - DL Tunneled R Internal Jugular catheter  - Oncologist: Dr. Newsome     Patient seen examined and discussed with Dr. Malaika Manzano PA-C

## 2024-11-18 LAB
ALBUMIN SERPL BCP-MCNC: 3.4 G/DL (ref 3.4–5)
ALP SERPL-CCNC: 89 U/L (ref 33–136)
ALT SERPL W P-5'-P-CCNC: 5 U/L (ref 10–52)
ANION GAP SERPL CALC-SCNC: 11 MMOL/L (ref 10–20)
APTT PPP: 29 SECONDS (ref 27–38)
AST SERPL W P-5'-P-CCNC: 12 U/L (ref 9–39)
BASOPHILS # BLD AUTO: 0 X10*3/UL (ref 0–0.1)
BASOPHILS NFR BLD AUTO: 0 %
BILIRUB SERPL-MCNC: 0.5 MG/DL (ref 0–1.2)
BUN SERPL-MCNC: 5 MG/DL (ref 6–23)
CALCIUM SERPL-MCNC: 9 MG/DL (ref 8.6–10.6)
CHLORIDE SERPL-SCNC: 105 MMOL/L (ref 98–107)
CO2 SERPL-SCNC: 27 MMOL/L (ref 21–32)
CREAT SERPL-MCNC: 0.71 MG/DL (ref 0.5–1.3)
EGFRCR SERPLBLD CKD-EPI 2021: >90 ML/MIN/1.73M*2
EOSINOPHIL # BLD AUTO: 0 X10*3/UL (ref 0–0.7)
EOSINOPHIL NFR BLD AUTO: 0 %
ERYTHROCYTE [DISTWIDTH] IN BLOOD BY AUTOMATED COUNT: 14.2 % (ref 11.5–14.5)
FIBRINOGEN PPP-MCNC: 498 MG/DL (ref 200–400)
GLUCOSE SERPL-MCNC: 93 MG/DL (ref 74–99)
HCT VFR BLD AUTO: 23.7 % (ref 41–52)
HGB BLD-MCNC: 8.3 G/DL (ref 13.5–17.5)
IMM GRANULOCYTES # BLD AUTO: 0.02 X10*3/UL (ref 0–0.7)
IMM GRANULOCYTES NFR BLD AUTO: 6.7 % (ref 0–0.9)
INR PPP: 1.1 (ref 0.9–1.1)
LDH SERPL L TO P-CCNC: 130 U/L (ref 84–246)
LYMPHOCYTES # BLD AUTO: 0 X10*3/UL (ref 1.2–4.8)
LYMPHOCYTES NFR BLD AUTO: 0 %
MAGNESIUM SERPL-MCNC: 1.4 MG/DL (ref 1.6–2.4)
MCH RBC QN AUTO: 27.9 PG (ref 26–34)
MCHC RBC AUTO-ENTMCNC: 35 G/DL (ref 32–36)
MCV RBC AUTO: 80 FL (ref 80–100)
MONOCYTES # BLD AUTO: 0.16 X10*3/UL (ref 0.1–1)
MONOCYTES NFR BLD AUTO: 53.3 %
NEUTROPHILS # BLD AUTO: 0.12 X10*3/UL (ref 1.2–7.7)
NEUTROPHILS NFR BLD AUTO: 40 %
NRBC BLD-RTO: 0 /100 WBCS (ref 0–0)
PHOSPHATE SERPL-MCNC: 2.5 MG/DL (ref 2.5–4.9)
PLATELET # BLD AUTO: 41 X10*3/UL (ref 150–450)
POTASSIUM SERPL-SCNC: 4.1 MMOL/L (ref 3.5–5.3)
PROT SERPL-MCNC: 6.2 G/DL (ref 6.4–8.2)
PROTHROMBIN TIME: 12.7 SECONDS (ref 9.8–12.8)
RBC # BLD AUTO: 2.97 X10*6/UL (ref 4.5–5.9)
RBC MORPH BLD: NORMAL
SODIUM SERPL-SCNC: 139 MMOL/L (ref 136–145)
TACROLIMUS BLD-MCNC: 7.6 NG/ML
WBC # BLD AUTO: 0.3 X10*3/UL (ref 4.4–11.3)

## 2024-11-18 PROCEDURE — 2500000001 HC RX 250 WO HCPCS SELF ADMINISTERED DRUGS (ALT 637 FOR MEDICARE OP): Performed by: INTERNAL MEDICINE

## 2024-11-18 PROCEDURE — 2500000004 HC RX 250 GENERAL PHARMACY W/ HCPCS (ALT 636 FOR OP/ED): Performed by: PHYSICIAN ASSISTANT

## 2024-11-18 PROCEDURE — 87799 DETECT AGENT NOS DNA QUANT: CPT | Performed by: INTERNAL MEDICINE

## 2024-11-18 PROCEDURE — 2500000004 HC RX 250 GENERAL PHARMACY W/ HCPCS (ALT 636 FOR OP/ED): Performed by: INTERNAL MEDICINE

## 2024-11-18 PROCEDURE — 80197 ASSAY OF TACROLIMUS: CPT | Performed by: INTERNAL MEDICINE

## 2024-11-18 PROCEDURE — 2500000002 HC RX 250 W HCPCS SELF ADMINISTERED DRUGS (ALT 637 FOR MEDICARE OP, ALT 636 FOR OP/ED): Performed by: STUDENT IN AN ORGANIZED HEALTH CARE EDUCATION/TRAINING PROGRAM

## 2024-11-18 PROCEDURE — 1170000001 HC PRIVATE ONCOLOGY ROOM DAILY

## 2024-11-18 PROCEDURE — 2500000001 HC RX 250 WO HCPCS SELF ADMINISTERED DRUGS (ALT 637 FOR MEDICARE OP)

## 2024-11-18 PROCEDURE — 2500000004 HC RX 250 GENERAL PHARMACY W/ HCPCS (ALT 636 FOR OP/ED): Mod: JZ | Performed by: STUDENT IN AN ORGANIZED HEALTH CARE EDUCATION/TRAINING PROGRAM

## 2024-11-18 PROCEDURE — 83615 LACTATE (LD) (LDH) ENZYME: CPT | Performed by: STUDENT IN AN ORGANIZED HEALTH CARE EDUCATION/TRAINING PROGRAM

## 2024-11-18 PROCEDURE — 99233 SBSQ HOSP IP/OBS HIGH 50: CPT | Performed by: STUDENT IN AN ORGANIZED HEALTH CARE EDUCATION/TRAINING PROGRAM

## 2024-11-18 PROCEDURE — 83735 ASSAY OF MAGNESIUM: CPT

## 2024-11-18 PROCEDURE — 85384 FIBRINOGEN ACTIVITY: CPT

## 2024-11-18 PROCEDURE — 87533 HHV-6 DNA QUANT: CPT | Performed by: PHYSICIAN ASSISTANT

## 2024-11-18 PROCEDURE — 85025 COMPLETE CBC W/AUTO DIFF WBC: CPT

## 2024-11-18 PROCEDURE — 2500000004 HC RX 250 GENERAL PHARMACY W/ HCPCS (ALT 636 FOR OP/ED)

## 2024-11-18 PROCEDURE — 2500000002 HC RX 250 W HCPCS SELF ADMINISTERED DRUGS (ALT 637 FOR MEDICARE OP, ALT 636 FOR OP/ED): Performed by: INTERNAL MEDICINE

## 2024-11-18 PROCEDURE — 84100 ASSAY OF PHOSPHORUS: CPT

## 2024-11-18 PROCEDURE — 80053 COMPREHEN METABOLIC PANEL: CPT

## 2024-11-18 PROCEDURE — 85610 PROTHROMBIN TIME: CPT

## 2024-11-18 PROCEDURE — 2500000001 HC RX 250 WO HCPCS SELF ADMINISTERED DRUGS (ALT 637 FOR MEDICARE OP): Performed by: STUDENT IN AN ORGANIZED HEALTH CARE EDUCATION/TRAINING PROGRAM

## 2024-11-18 RX ORDER — MAGNESIUM SULFATE HEPTAHYDRATE 40 MG/ML
4 INJECTION, SOLUTION INTRAVENOUS ONCE
Status: COMPLETED | OUTPATIENT
Start: 2024-11-18 | End: 2024-11-18

## 2024-11-18 ASSESSMENT — PAIN - FUNCTIONAL ASSESSMENT
PAIN_FUNCTIONAL_ASSESSMENT: 0-10
PAIN_FUNCTIONAL_ASSESSMENT: 0-10

## 2024-11-18 ASSESSMENT — COGNITIVE AND FUNCTIONAL STATUS - GENERAL
DAILY ACTIVITIY SCORE: 24
DAILY ACTIVITIY SCORE: 24
MOBILITY SCORE: 24
MOBILITY SCORE: 24

## 2024-11-18 ASSESSMENT — PAIN SCALES - GENERAL
PAINLEVEL_OUTOF10: 0 - NO PAIN

## 2024-11-18 NOTE — PROGRESS NOTES
"Brandt Merritt is a 64 y.o. male on day 66 of admission presenting with Acute myeloid leukemia in remission (Multi).    Subjective   No acute events over night. Afebrile. Sitting in the chair, wife at the bedside. No acute complaints, Brandt and his wife are relieved to hear his ANC is 120 today! Remainder of ROS is negative.          Objective     Physical Exam  Constitutional:       Appearance: Normal appearance.   HENT:      Head: Normocephalic and atraumatic.      Comments: Mild temporal wasting     Nose: Nose normal.      Mouth/Throat:      Mouth: Mucous membranes are moist.      Pharynx: Oropharynx is clear.   Eyes:      Conjunctiva/sclera: Conjunctivae normal.      Pupils: Pupils are equal, round, and reactive to light.   Cardiovascular:      Rate and Rhythm: Normal rate and regular rhythm.      Pulses: Normal pulses.      Heart sounds: Normal heart sounds.   Pulmonary:      Effort: Pulmonary effort is normal.      Breath sounds: Normal breath sounds.   Abdominal:      General: Abdomen is flat. Bowel sounds are normal.      Palpations: Abdomen is soft.   Musculoskeletal:         General: Normal range of motion.      Cervical back: Normal range of motion and neck supple.      Comments: DL Tunneled R Internal Jugular catheter. Entry site c/d/i   Skin:     General: Skin is warm and dry.      Capillary Refill: Capillary refill takes 2 to 3 seconds.   Neurological:      General: No focal deficit present.      Mental Status: He is alert and oriented to person, place, and time.   Psychiatric:         Mood and Affect: Mood normal.         Behavior: Behavior normal.         Last Recorded Vitals  Blood pressure 103/66, pulse 87, temperature 36.6 °C (97.9 °F), temperature source Temporal, resp. rate 18, height (S) 1.664 m (5' 5.51\"), weight 62.9 kg (138 lb 10.7 oz), SpO2 99%.  Intake/Output last 3 Shifts:  I/O last 3 completed shifts:  In: 990 (15.7 mL/kg) [IV Piggyback:990]  Out: - (0 mL/kg)   Weight: 62.9 kg "       Assessment/Plan   Assessment & Plan  Acute myeloid leukemia in remission (Multi)    Stem cells transplant status (Multi)    Immunocompromised    EBV (Katherine-Barr virus) viremia    T+60 from single cord transplant, T+12 from haplo stem cell rescue        ONC  # MDS/AML   - Symptoms began 9/2023; diagnosed 4/2024  - BMBx showing MDS in transition to AML (>10% blast) w/ trisomy 8, DNMT alpha, and U2AF1 mutation indication adverse risk   - S/p 4 cycles of Decitabine/Venetoclax (C4D1 on 8/12/24)  - BMBx (6/17/24): Blasts cleared, FISH still positive for trisomy 8, still MDS changes   - BMBx (9/5/24): 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 and no increase in blasts     # SCT #1: Single-unit Cord, T0=9/19/24  # Primary Engraftment Failure  - Conditioning: Flu-Mariana-TBI  - Donor: Single Cord, 6/8  = ABO Donor / Recipient: A+ / A+  = CMV Donor / Recipient: - / +  - aGVHD Prophylaxis: Tacrolimus + MMF  - Sent plasma CXCL9 level to [place] to assess for immunologic graft rejection (10/16/24): 431 pg/mL, which is normal (less suggestive of immunologic rejection)  - Repeat BMBx (10/15): hypocellular with no residual myeloid neoplasm. 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  - Pre-SCT testing:  = CT Chest (10/30): no abnormal pulmonary findings, trace pericardial effusion  = Onco-TTE (10/31): EF 58%  - Graft: Haploidentical sister  = ABO Donor / Recipient: O+ / A+ (ABO incompatibility +)  = CMV Donor / Recipient: + / +  - Conditioning: Flu/Cy/TBI/rATG  = Fludarabine 24 mg/m2 on T-6,-5,-4,-3,-2   = Cyclophosphamide 14.5mg/kg T-6,-5   =  cGy T-1   = GVHD prophylaxis -  rATG 1.5mg/kg T-5,-3,-1, PTCy (25mg/kg T+3, T+4), tacro, MMF (T+5)     # GVHD  - No signs or symptoms of aGVHD from SCT#1  = Started Tacro and MMF on T+5 (11/11) following Haplo rescue (sister)  - Tacro level (11/17) 6.7, Cont current dose.     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   - Monitor counts daily   - Transfuse for Hgb <7 & PLT <10      - S/p PRBC: 11/5, 11/10      - S/p PLT: 11/4, 11/8, 11/10, 11/16  - S/p Neupogen 300 mcg (9/24-10/30/24), restarted T+5 (11/11-current)  # VTE Prophylaxis: SCDs & Ambulation I/s/o thrombocytopenia  # SOS Prophylaxis: Ursodiol      ID  # EBV Viremia  - EBV: 375 U/L (10/28), up from 77 U/L (10/21/24)  - Treating despite low levels due to upcoming second SCT  - Rituximab 600 mg x 1 (10/31/24)  - Check EBV PCR weekly on Mondays, 11/4 122, 11/12 not detected  # Neutropenic fever (9/25, 10/12)  - Fever w/ diarrhea, workup neg, treated initially with Josefina, switched to Cefepime d/t rash (9/27-10/10)  - CT C/A/P d/t diarrhea. No abnormal findings  - Repeat blood cultures (10/22): negative  - Cefepime (10/22-10/26) (11/16- current)  # Fever after ATG on 11/1 ; septic work up (Blood cx and UA both negative)   -Febrile last  on 11/9, BC: NGTD, UC NGTD, continued on cefepime (11/6->). Vanc (11/8 - 11/9)  -Repeat BC 11/10: NGTD  # Viral surveillance:   - CMV (Mo/Th): ND (11/4, 11/7, 11/11)   - Adeno (Mo): ND (10/28) ND (11/4, 11/11)  - HHV6 (Mo): ND (10/21),  <188 (10/30) 11/4 :451, 11/11 500  # Prophylaxis:   - VZV: Acyclovir   - Aspergillus: Posaconazole  - C. Diff: Vancomycin PO  - CMV: Letermovir  - PJP: Pentamidine given 10/26, next due 11/23/24     FEN/RENAL/GI  - Admit wt: 75.6 kg (9/13/24)  Most recent weight   Wt Readings from Last 1 Encounters:   11/17/24 62.9 kg (138 lb 10.7 oz)     #12 kgs weight loss. Nutrition Consulted (11/4),appreciate recs  - Allergic to Lasix, will not take med (extreme hypotension, extreme hypokalemia)  # Hypokalemia. Repleted last on 11/14.  # Hx of treated Hep C in 2015  - Hep C RNA - not detected (9/12/24)       CARDIO/PULM:  # Hx of STEMI (11/11/2020)  - TTE 7/19/24: EF 59%, otherwise unremarkable   - Was cleared by Cardiology for transplant   # Hx of HTN   -  Nifedipine: decrease to 60 mEq ER 24 hr tablet   # Ascending aorta dilation   - TTE (4/29/24): 3.8-4 cm dilation      :  # Hx of BPH and urinary retention (2023)  # Hx elevated PSA    - 7/28/21 Bx: atypical small acinar proliferation    - 4/7/23 Bx: Benign     NEURO/PSYCH  # Tremor (10/10- )  :: Most likely due to tacrolimus vs cefepime  - Fine, high-frequency (7+ Hz) tremor worst in the hands, exacerbated by effort,   - Previously associated with significant fasciculations of the shoulder and back muscles; also fasciculations in jaw -- these are no longer aparrent  - Cefepime initially stopped 10/10, tacrolimus stopped 10/29  - If still no improvement, could consider Neurology consult     DISPO:  - FULL CODE  - NOK: spouse Genevieve ([phone])  - DL Tunneled R Internal Jugular catheter  - Oncologist: Dr. Newsome     Patient seen examined and discussed with Dr. Malaika Manzano PA-C

## 2024-11-18 NOTE — NURSING NOTE
Nursing Note  Patient remains afebrile, continue to receive PO and IV antibiotics. Vital signs stable. Denies any pain , nausea, vomiting and diarrhea. PO intake fair. Patient received Magnesium Sulfate 4 GM IV infusion over four hours. Voids clear yellow urine adequate amount. Ambulating in room and gonsales. Family (wife) at bed side during this shift. Will continue to monitor patient and will notify MD of acute changes.

## 2024-11-19 LAB
ADENOVIRUS QPCR,PLASMA, VIRC: NOT DETECTED COPIES/ML
ALBUMIN SERPL BCP-MCNC: 3.3 G/DL (ref 3.4–5)
ALP SERPL-CCNC: 80 U/L (ref 33–136)
ALT SERPL W P-5'-P-CCNC: 4 U/L (ref 10–52)
ANION GAP SERPL CALC-SCNC: 12 MMOL/L (ref 10–20)
APTT PPP: 29 SECONDS (ref 27–38)
AST SERPL W P-5'-P-CCNC: 10 U/L (ref 9–39)
BASOPHILS # BLD MANUAL: 0 X10*3/UL (ref 0–0.1)
BASOPHILS NFR BLD MANUAL: 0 %
BILIRUB SERPL-MCNC: 0.4 MG/DL (ref 0–1.2)
BUN SERPL-MCNC: 6 MG/DL (ref 6–23)
CALCIUM SERPL-MCNC: 8.3 MG/DL (ref 8.6–10.6)
CHLORIDE SERPL-SCNC: 105 MMOL/L (ref 98–107)
CMV DNA SERPL NAA+PROBE-LOG IU: NORMAL {LOG_IU}/ML
CO2 SERPL-SCNC: 24 MMOL/L (ref 21–32)
CREAT SERPL-MCNC: 0.74 MG/DL (ref 0.5–1.3)
EBV DNA SPEC NAA+PROBE-LOG#: NORMAL {LOG_COPIES}/ML
EGFRCR SERPLBLD CKD-EPI 2021: >90 ML/MIN/1.73M*2
EOSINOPHIL # BLD MANUAL: 0 X10*3/UL (ref 0–0.7)
EOSINOPHIL NFR BLD MANUAL: 0 %
ERYTHROCYTE [DISTWIDTH] IN BLOOD BY AUTOMATED COUNT: 14.5 % (ref 11.5–14.5)
FIBRINOGEN PPP-MCNC: 498 MG/DL (ref 200–400)
GLUCOSE SERPL-MCNC: 87 MG/DL (ref 74–99)
HCT VFR BLD AUTO: 22.9 % (ref 41–52)
HGB BLD-MCNC: 7.6 G/DL (ref 13.5–17.5)
HUMAN HERPESVIRUS-6 PCR PLASMA: 900 COPIES/ML
IMM GRANULOCYTES # BLD AUTO: 0.02 X10*3/UL (ref 0–0.7)
IMM GRANULOCYTES NFR BLD AUTO: 1.5 % (ref 0–0.9)
INR PPP: 1.1 (ref 0.9–1.1)
LABORATORY COMMENT REPORT: NOT DETECTED
LABORATORY COMMENT REPORT: NOT DETECTED
LYMPHOCYTES # BLD MANUAL: 0 X10*3/UL (ref 1.2–4.8)
LYMPHOCYTES NFR BLD MANUAL: 0 %
MAGNESIUM SERPL-MCNC: 1.89 MG/DL (ref 1.6–2.4)
MCH RBC QN AUTO: 27.1 PG (ref 26–34)
MCHC RBC AUTO-ENTMCNC: 33.2 G/DL (ref 32–36)
MCV RBC AUTO: 82 FL (ref 80–100)
MONOCYTES # BLD MANUAL: 0.31 X10*3/UL (ref 0.1–1)
MONOCYTES NFR BLD MANUAL: 24.2 %
NEUTS SEG # BLD MANUAL: 0.98 X10*3/UL (ref 1.2–7)
NEUTS SEG NFR BLD MANUAL: 75 %
NRBC BLD-RTO: 2.3 /100 WBCS (ref 0–0)
PHOSPHATE SERPL-MCNC: 2.4 MG/DL (ref 2.5–4.9)
PLATELET # BLD AUTO: 28 X10*3/UL (ref 150–450)
POTASSIUM SERPL-SCNC: 4.2 MMOL/L (ref 3.5–5.3)
PROT SERPL-MCNC: 6 G/DL (ref 6.4–8.2)
PROTHROMBIN TIME: 12.2 SECONDS (ref 9.8–12.8)
RBC # BLD AUTO: 2.8 X10*6/UL (ref 4.5–5.9)
RBC MORPH BLD: ABNORMAL
SODIUM SERPL-SCNC: 137 MMOL/L (ref 136–145)
TACROLIMUS BLD-MCNC: 9.7 NG/ML
TOTAL CELLS COUNTED BLD: 124
VARIANT LYMPHS # BLD MANUAL: 0.01 X10*3/UL (ref 0–0.5)
VARIANT LYMPHS NFR BLD: 0.8 %
WBC # BLD AUTO: 1.3 X10*3/UL (ref 4.4–11.3)

## 2024-11-19 PROCEDURE — 2500000004 HC RX 250 GENERAL PHARMACY W/ HCPCS (ALT 636 FOR OP/ED): Performed by: STUDENT IN AN ORGANIZED HEALTH CARE EDUCATION/TRAINING PROGRAM

## 2024-11-19 PROCEDURE — 85007 BL SMEAR W/DIFF WBC COUNT: CPT

## 2024-11-19 PROCEDURE — 99233 SBSQ HOSP IP/OBS HIGH 50: CPT | Performed by: STUDENT IN AN ORGANIZED HEALTH CARE EDUCATION/TRAINING PROGRAM

## 2024-11-19 PROCEDURE — 2500000001 HC RX 250 WO HCPCS SELF ADMINISTERED DRUGS (ALT 637 FOR MEDICARE OP): Performed by: STUDENT IN AN ORGANIZED HEALTH CARE EDUCATION/TRAINING PROGRAM

## 2024-11-19 PROCEDURE — 1170000001 HC PRIVATE ONCOLOGY ROOM DAILY

## 2024-11-19 PROCEDURE — 2500000001 HC RX 250 WO HCPCS SELF ADMINISTERED DRUGS (ALT 637 FOR MEDICARE OP)

## 2024-11-19 PROCEDURE — 85610 PROTHROMBIN TIME: CPT

## 2024-11-19 PROCEDURE — 2500000002 HC RX 250 W HCPCS SELF ADMINISTERED DRUGS (ALT 637 FOR MEDICARE OP, ALT 636 FOR OP/ED): Performed by: STUDENT IN AN ORGANIZED HEALTH CARE EDUCATION/TRAINING PROGRAM

## 2024-11-19 PROCEDURE — 2500000001 HC RX 250 WO HCPCS SELF ADMINISTERED DRUGS (ALT 637 FOR MEDICARE OP): Performed by: INTERNAL MEDICINE

## 2024-11-19 PROCEDURE — 2500000002 HC RX 250 W HCPCS SELF ADMINISTERED DRUGS (ALT 637 FOR MEDICARE OP, ALT 636 FOR OP/ED): Performed by: INTERNAL MEDICINE

## 2024-11-19 PROCEDURE — 2500000004 HC RX 250 GENERAL PHARMACY W/ HCPCS (ALT 636 FOR OP/ED): Performed by: INTERNAL MEDICINE

## 2024-11-19 PROCEDURE — 2500000004 HC RX 250 GENERAL PHARMACY W/ HCPCS (ALT 636 FOR OP/ED): Performed by: PHYSICIAN ASSISTANT

## 2024-11-19 PROCEDURE — 82784 ASSAY IGA/IGD/IGG/IGM EACH: CPT | Performed by: NURSE PRACTITIONER

## 2024-11-19 PROCEDURE — 2500000004 HC RX 250 GENERAL PHARMACY W/ HCPCS (ALT 636 FOR OP/ED): Performed by: REGISTERED NURSE

## 2024-11-19 PROCEDURE — 80053 COMPREHEN METABOLIC PANEL: CPT

## 2024-11-19 PROCEDURE — 80197 ASSAY OF TACROLIMUS: CPT | Performed by: INTERNAL MEDICINE

## 2024-11-19 PROCEDURE — 85384 FIBRINOGEN ACTIVITY: CPT

## 2024-11-19 PROCEDURE — RXMED WILLOW AMBULATORY MEDICATION CHARGE

## 2024-11-19 PROCEDURE — 85027 COMPLETE CBC AUTOMATED: CPT

## 2024-11-19 PROCEDURE — 83735 ASSAY OF MAGNESIUM: CPT

## 2024-11-19 PROCEDURE — 84100 ASSAY OF PHOSPHORUS: CPT

## 2024-11-19 RX ORDER — MYCOPHENOLATE MOFETIL 250 MG/1
1000 CAPSULE ORAL 3 TIMES DAILY
Status: DISCONTINUED | OUTPATIENT
Start: 2024-11-19 | End: 2024-11-21 | Stop reason: HOSPADM

## 2024-11-19 ASSESSMENT — COGNITIVE AND FUNCTIONAL STATUS - GENERAL
DAILY ACTIVITIY SCORE: 24
MOBILITY SCORE: 24

## 2024-11-19 ASSESSMENT — PAIN SCALES - GENERAL
PAINLEVEL_OUTOF10: 0 - NO PAIN

## 2024-11-19 ASSESSMENT — PAIN - FUNCTIONAL ASSESSMENT: PAIN_FUNCTIONAL_ASSESSMENT: 0-10

## 2024-11-19 NOTE — PROGRESS NOTES
"Brandt Merritt is a 64 y.o. male on day 67 of admission presenting with Acute myeloid leukemia in remission (Multi).    Subjective   Feels well. Anxious to go home soon. No issues with taking all his oral meds. ROS unremarkable.    Objective     Physical Exam  Constitutional:       Appearance: Normal appearance.   HENT:      Head: Normocephalic and atraumatic.      Nose: Nose normal.      Mouth/Throat:      Mouth: Mucous membranes are moist.      Pharynx: Oropharynx is clear.   Eyes:      Conjunctiva/sclera: Conjunctivae normal.      Pupils: Pupils are equal, round, and reactive to light.   Cardiovascular:      Rate and Rhythm: Normal rate and regular rhythm.      Pulses: Normal pulses.      Heart sounds: Normal heart sounds.   Pulmonary:      Effort: Pulmonary effort is normal.      Breath sounds: Normal breath sounds.   Abdominal:      General: Abdomen is flat. Bowel sounds are normal.      Palpations: Abdomen is soft.   Musculoskeletal:         General: Normal range of motion.      Cervical back: Normal range of motion and neck supple.   Skin:     General: Skin is warm and dry.      Capillary Refill: Capillary refill takes 2 to 3 seconds.      Comments: RADHA Yost. Entry site c/d/i   Neurological:      General: No focal deficit present.      Mental Status: He is alert and oriented to person, place, and time.   Psychiatric:         Mood and Affect: Mood normal.         Behavior: Behavior normal.       Last Recorded Vitals  Blood pressure 99/64, pulse 100, temperature 36.4 °C (97.5 °F), resp. rate 18, height (S) 1.664 m (5' 5.51\"), weight 62.9 kg (138 lb 10.7 oz), SpO2 99%.  Intake/Output last 3 Shifts:  I/O last 3 completed shifts:  In: 1668 (26.5 mL/kg) [P.O.:958; I.V.:100 (1.6 mL/kg); IV Piggyback:610]  Out: 8 (0.1 mL/kg) [Urine:8 (0 mL/kg/hr)]  Weight: 62.9 kg       Assessment/Plan   Assessment & Plan  Acute myeloid leukemia in remission (Multi)    Stem cells transplant status " (Multi)    Immunocompromised    EBV (Katherine-Barr virus) viremia    Brandt Merritt is a 64 year-old man with a past medical history of hypertension, Hgb C, Hep C (treated), and MDS which transitioned to AML, s/p single-unit cord transplant prepped with Flu/Mariana/TBI, and GVHD prophy with Tacro/MMF, 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      S/p Single Cord Blood Transplant (T0=9/19/24) with engraphment failure  T+13 from Haplo Stem Cell rescue, (T0=11/6/24)     ONC  # MDS/AML   - Symptoms began 9/2023; diagnosed 4/2024  - BMBx showing MDS in transition to AML (>10% blast) w/ trisomy 8, DNMT alpha, and U2AF1 mutation indication adverse risk   - S/p 4 cycles of Decitabine/Venetoclax (C4D1 on 8/12/24)  - BMBx (6/17/24): Blasts cleared, FISH still positive for trisomy 8, still MDS changes   - BMBx (9/5/24): 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 and no increase in blasts     # SCT #1: Single-unit Cord, T0=9/19/24  # Primary Engraftment Failure  - Conditioning: Flu-Mariana-TBI  - Donor: Single Cord, 6/8  = ABO Donor / Recipient: A+ / A+  = CMV Donor / Recipient: - / +  - aGVHD Prophylaxis: Tacrolimus + MMF  - Sent plasma CXCL9 level to [facility] to assess for immunologic graft rejection (10/16/24): 431 pg/mL, which is normal (less suggestive of immunologic rejection)  - Repeat BMBx (10/15): hypocellular with no residual myeloid neoplasm. 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  - Pre-SCT testing:  = CT Chest (10/30): no abnormal pulmonary findings, trace pericardial effusion  = Onco-TTE (10/31): EF 58%  - Graft: Haploidentical sister  = ABO Donor / Recipient: O+ / A+ (ABO incompatibility +)  = CMV Donor / Recipient: + / +  - Conditioning: Flu/Cy/TBI/rATG  = Fludarabine 24 mg/m2 on T-6,-5,-4,-3,-2   = Cyclophosphamide 14.5mg/kg T-6,-5   =  cGy T-1   = GVHD prophylaxis -  rATG 1.5mg/kg T-5,-3,-1, PTCy (25mg/kg T+3,  T+4), Tacro, MMF (T+5)     # GVHD  - No signs or symptoms of aGVHD from SCT#1  = Started Tacro and MMF on T+5 (11/11) following Haplo rescue (sister)  - Tacro levels: 7.0 (11/16), 6.7 (11/17), 7.6 (11/18), 9.7 (11/19). Cont current dose.     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   - Monitor counts daily   - Transfuse for Hgb <7 & PLT <10      - S/p PRBC: 11/5, 11/10      - S/p PLT: 11/4, 11/8, 11/10, 11/16  - S/p Neupogen 300 mcg (9/24-10/30/24), restarted T+5 (11/11-current)  # VTE Prophylaxis: SCDs & Ambulation I/s/o thrombocytopenia  # SOS Prophylaxis: Ursodiol      ID  # EBV Viremia  - EBV: 375 U/L (10/28), up from 77 U/L (10/21/24)  - Treating despite low levels due to upcoming second SCT  - Rituximab 600 mg x 1 (10/31/24)  - Check EBV PCR weekly on Mondays, 11/4 122, 11/12 ND, 11/18 pending  # Neutropenic fever (9/25, 10/12)  - Fever w/ diarrhea, workup neg, treated initially with Josefina, switched to Cefepime d/t rash (9/27-10/10)  - CT C/A/P d/t diarrhea. No abnormal findings  - Repeat blood cultures (10/22): negative  - Cefepime (10/22-10/26) (11/16- current). Plan to stop on 11/20?  # Fever after ATG on 11/1 ; septic work up (Blood cx and UA both negative)   -Febrile last on 11/9, Bld Cx: NGTD, UCx NGTD, continue Cefepime (11/6->). Vanco (11/8 - 11/9)  -Repeat Bld Cx 11/10: NGTD  # Viral surveillance:   - CMV (Mo/Th): ND (11/4, 11/7, 11/11). 11/18 pending   - Adeno (Mo): ND (10/28) ND (11/4, 11/11)  - HHV6 (Mo): ND (10/21),  <188 (10/30) 11/4 :451, 11/11 500  # Prophylaxis:   - VZV: Acyclovir   - Aspergillus: Posaconazole  - C. Diff: Vancomycin PO  - CMV: Letermovir  - PJP: Pentamidine given 10/26, next due 11/23/24     FEN/RENAL/GI  - Admit wt: 75.6 kg (9/13/24)  Most recent weight       Wt Readings from Last 1 Encounters:   11/17/24 62.9 kg (138 lb 10.7 oz)      #12 kgs weight loss. Nutrition Consulted (11/4),appreciate recs  - Allergic to Lasix, will  not take med (extreme hypotension, extreme hypokalemia)  # Hypokalemia. Repleted last on 11/14.  # Hx of treated Hep C in 2015  - Hep C RNA - not detected (9/12/24)     CARDIO/PULM:  # Hx of STEMI (11/11/2020)  - TTE 7/19/24: EF 59%, otherwise unremarkable   - Was cleared by Cardiology for transplant   # Hx of HTN   - Nifedipine: decrease to 60 mEq ER 24 hr tablet   # Ascending aorta dilation   - TTE (4/29/24): 3.8-4 cm dilation      :  # Hx of BPH and urinary retention (2023)  # Hx elevated PSA    - 7/28/21 Bx: atypical small acinar proliferation    - 4/7/23 Bx: Benign     NEURO/PSYCH  # Tremor (10/10- )  :: Most likely due to Tacrolimus vs Cefepime  - Fine, high-frequency (7+ Hz) tremor worst in the hands, exacerbated by effort,   - Previously associated with significant fasciculations of the shoulder and back muscles; also fasciculations in jaw -- these are no longer aparrent  - Cefepime initially stopped 10/10, Tacrolimus stopped 10/29  - If still no improvement, could consider Neurology consult     DISPO:  - FULL CODE  - NOK: spouse Genevieve ([phone])  - DL Tunneled R Internal Jugular catheter. Pull line?   - Oncologist: Dr. Malaika Newsome  - Meds to be deliver to patient's room on 11/20 (except for [facility] @ [facility] and Letermovir from [facility])  - Plan to discharged to home on 11/21    Patient seen examined and discussed with Dr. Malaika Newsome      I spent 30 minutes in the professional and overall care of this patient.      Kim Burnham, APRN-CNP

## 2024-11-20 ENCOUNTER — HOME HEALTH ADMISSION (OUTPATIENT)
Dept: HOME HEALTH SERVICES | Facility: HOME HEALTH | Age: 64
End: 2024-11-20
Payer: COMMERCIAL

## 2024-11-20 LAB
ALBUMIN SERPL BCP-MCNC: 3.2 G/DL (ref 3.4–5)
ALBUMIN SERPL BCP-MCNC: 3.5 G/DL (ref 3.4–5)
ALP SERPL-CCNC: 87 U/L (ref 33–136)
ALT SERPL W P-5'-P-CCNC: 5 U/L (ref 10–52)
ANION GAP SERPL CALC-SCNC: 13 MMOL/L (ref 10–20)
ANION GAP SERPL CALC-SCNC: 15 MMOL/L (ref 10–20)
APTT PPP: 26 SECONDS (ref 27–38)
AST SERPL W P-5'-P-CCNC: 11 U/L (ref 9–39)
BASOPHILS # BLD MANUAL: 0 X10*3/UL (ref 0–0.1)
BASOPHILS NFR BLD MANUAL: 0 %
BILIRUB SERPL-MCNC: 0.5 MG/DL (ref 0–1.2)
BUN SERPL-MCNC: 7 MG/DL (ref 6–23)
BUN SERPL-MCNC: 7 MG/DL (ref 6–23)
CALCIUM SERPL-MCNC: 8.5 MG/DL (ref 8.6–10.6)
CALCIUM SERPL-MCNC: 8.5 MG/DL (ref 8.6–10.6)
CHLORIDE SERPL-SCNC: 102 MMOL/L (ref 98–107)
CHLORIDE SERPL-SCNC: 104 MMOL/L (ref 98–107)
CO2 SERPL-SCNC: 23 MMOL/L (ref 21–32)
CO2 SERPL-SCNC: 24 MMOL/L (ref 21–32)
CREAT SERPL-MCNC: 0.73 MG/DL (ref 0.5–1.3)
CREAT SERPL-MCNC: 0.78 MG/DL (ref 0.5–1.3)
EGFRCR SERPLBLD CKD-EPI 2021: >90 ML/MIN/1.73M*2
EGFRCR SERPLBLD CKD-EPI 2021: >90 ML/MIN/1.73M*2
EOSINOPHIL # BLD MANUAL: 0 X10*3/UL (ref 0–0.7)
EOSINOPHIL NFR BLD MANUAL: 0 %
ERYTHROCYTE [DISTWIDTH] IN BLOOD BY AUTOMATED COUNT: 14.6 % (ref 11.5–14.5)
FIBRINOGEN PPP-MCNC: 418 MG/DL (ref 200–400)
GLUCOSE SERPL-MCNC: 100 MG/DL (ref 74–99)
GLUCOSE SERPL-MCNC: 95 MG/DL (ref 74–99)
HCT VFR BLD AUTO: 22.1 % (ref 41–52)
HGB BLD-MCNC: 7.6 G/DL (ref 13.5–17.5)
IMM GRANULOCYTES # BLD AUTO: 0.31 X10*3/UL (ref 0–0.7)
IMM GRANULOCYTES NFR BLD AUTO: 6.9 % (ref 0–0.9)
INR PPP: 1.1 (ref 0.9–1.1)
LYMPHOCYTES # BLD MANUAL: 0 X10*3/UL (ref 1.2–4.8)
LYMPHOCYTES NFR BLD MANUAL: 0 %
MAGNESIUM SERPL-MCNC: 1.29 MG/DL (ref 1.6–2.4)
MAGNESIUM SERPL-MCNC: 2.1 MG/DL (ref 1.6–2.4)
MCH RBC QN AUTO: 27.9 PG (ref 26–34)
MCHC RBC AUTO-ENTMCNC: 34.4 G/DL (ref 32–36)
MCV RBC AUTO: 81 FL (ref 80–100)
MONOCYTES # BLD MANUAL: 0.67 X10*3/UL (ref 0.1–1)
MONOCYTES NFR BLD MANUAL: 14.9 %
NEUTROPHILS # BLD MANUAL: 3.83 X10*3/UL (ref 1.2–7.7)
NEUTS BAND # BLD MANUAL: 0.59 X10*3/UL (ref 0–0.7)
NEUTS BAND NFR BLD MANUAL: 13.2 %
NEUTS SEG # BLD MANUAL: 3.24 X10*3/UL (ref 1.2–7)
NEUTS SEG NFR BLD MANUAL: 71.9 %
NRBC BLD-RTO: 0.9 /100 WBCS (ref 0–0)
PHOSPHATE SERPL-MCNC: 2.4 MG/DL (ref 2.5–4.9)
PHOSPHATE SERPL-MCNC: 2.7 MG/DL (ref 2.5–4.9)
PLATELET # BLD AUTO: 38 X10*3/UL (ref 150–450)
POTASSIUM SERPL-SCNC: 3.6 MMOL/L (ref 3.5–5.3)
POTASSIUM SERPL-SCNC: 3.7 MMOL/L (ref 3.5–5.3)
PROT SERPL-MCNC: 6.2 G/DL (ref 6.4–8.2)
PROTHROMBIN TIME: 12.8 SECONDS (ref 9.8–12.8)
RBC # BLD AUTO: 2.72 X10*6/UL (ref 4.5–5.9)
RBC MORPH BLD: ABNORMAL
SODIUM SERPL-SCNC: 136 MMOL/L (ref 136–145)
SODIUM SERPL-SCNC: 137 MMOL/L (ref 136–145)
TACROLIMUS BLD-MCNC: 8.6 NG/ML
TOTAL CELLS COUNTED BLD: 121
WBC # BLD AUTO: 4.5 X10*3/UL (ref 4.4–11.3)

## 2024-11-20 PROCEDURE — 2500000001 HC RX 250 WO HCPCS SELF ADMINISTERED DRUGS (ALT 637 FOR MEDICARE OP): Performed by: INTERNAL MEDICINE

## 2024-11-20 PROCEDURE — 2500000001 HC RX 250 WO HCPCS SELF ADMINISTERED DRUGS (ALT 637 FOR MEDICARE OP): Performed by: STUDENT IN AN ORGANIZED HEALTH CARE EDUCATION/TRAINING PROGRAM

## 2024-11-20 PROCEDURE — 80197 ASSAY OF TACROLIMUS: CPT | Performed by: INTERNAL MEDICINE

## 2024-11-20 PROCEDURE — 1170000001 HC PRIVATE ONCOLOGY ROOM DAILY

## 2024-11-20 PROCEDURE — 83735 ASSAY OF MAGNESIUM: CPT | Performed by: NURSE PRACTITIONER

## 2024-11-20 PROCEDURE — 2500000001 HC RX 250 WO HCPCS SELF ADMINISTERED DRUGS (ALT 637 FOR MEDICARE OP)

## 2024-11-20 PROCEDURE — 2500000002 HC RX 250 W HCPCS SELF ADMINISTERED DRUGS (ALT 637 FOR MEDICARE OP, ALT 636 FOR OP/ED): Performed by: INTERNAL MEDICINE

## 2024-11-20 PROCEDURE — 80069 RENAL FUNCTION PANEL: CPT | Mod: CCI | Performed by: NURSE PRACTITIONER

## 2024-11-20 PROCEDURE — 2500000004 HC RX 250 GENERAL PHARMACY W/ HCPCS (ALT 636 FOR OP/ED)

## 2024-11-20 PROCEDURE — 85027 COMPLETE CBC AUTOMATED: CPT

## 2024-11-20 PROCEDURE — 85007 BL SMEAR W/DIFF WBC COUNT: CPT

## 2024-11-20 PROCEDURE — 81267 CHIMERISM ANAL NO CELL SELEC: CPT | Performed by: NURSE PRACTITIONER

## 2024-11-20 PROCEDURE — 2500000004 HC RX 250 GENERAL PHARMACY W/ HCPCS (ALT 636 FOR OP/ED): Performed by: INTERNAL MEDICINE

## 2024-11-20 PROCEDURE — 2500000002 HC RX 250 W HCPCS SELF ADMINISTERED DRUGS (ALT 637 FOR MEDICARE OP, ALT 636 FOR OP/ED): Performed by: STUDENT IN AN ORGANIZED HEALTH CARE EDUCATION/TRAINING PROGRAM

## 2024-11-20 PROCEDURE — 85730 THROMBOPLASTIN TIME PARTIAL: CPT

## 2024-11-20 PROCEDURE — 99233 SBSQ HOSP IP/OBS HIGH 50: CPT | Performed by: STUDENT IN AN ORGANIZED HEALTH CARE EDUCATION/TRAINING PROGRAM

## 2024-11-20 PROCEDURE — 80053 COMPREHEN METABOLIC PANEL: CPT

## 2024-11-20 PROCEDURE — 2500000004 HC RX 250 GENERAL PHARMACY W/ HCPCS (ALT 636 FOR OP/ED): Mod: JZ | Performed by: STUDENT IN AN ORGANIZED HEALTH CARE EDUCATION/TRAINING PROGRAM

## 2024-11-20 PROCEDURE — 2500000004 HC RX 250 GENERAL PHARMACY W/ HCPCS (ALT 636 FOR OP/ED): Performed by: REGISTERED NURSE

## 2024-11-20 PROCEDURE — 85384 FIBRINOGEN ACTIVITY: CPT

## 2024-11-20 PROCEDURE — 84100 ASSAY OF PHOSPHORUS: CPT

## 2024-11-20 PROCEDURE — 83735 ASSAY OF MAGNESIUM: CPT

## 2024-11-20 RX ORDER — MAGNESIUM SULFATE HEPTAHYDRATE 40 MG/ML
4 INJECTION, SOLUTION INTRAVENOUS ONCE
Status: COMPLETED | OUTPATIENT
Start: 2024-11-20 | End: 2024-11-20

## 2024-11-20 RX ORDER — FOLIC ACID 1 MG/1
1 TABLET ORAL DAILY
Start: 2024-11-20 | End: 2025-03-20

## 2024-11-20 RX ORDER — ACYCLOVIR 400 MG/1
400 TABLET ORAL 2 TIMES DAILY
Start: 2024-11-20 | End: 2024-12-20

## 2024-11-20 RX ORDER — LOPERAMIDE HYDROCHLORIDE 2 MG/1
2 CAPSULE ORAL 4 TIMES DAILY PRN
Start: 2024-11-20 | End: 2024-11-30

## 2024-11-20 ASSESSMENT — PAIN SCALES - GENERAL
PAINLEVEL_OUTOF10: 0 - NO PAIN

## 2024-11-20 ASSESSMENT — COGNITIVE AND FUNCTIONAL STATUS - GENERAL
DAILY ACTIVITIY SCORE: 24
MOBILITY SCORE: 24

## 2024-11-20 ASSESSMENT — PAIN - FUNCTIONAL ASSESSMENT
PAIN_FUNCTIONAL_ASSESSMENT: 0-10
PAIN_FUNCTIONAL_ASSESSMENT: 0-10

## 2024-11-20 NOTE — PROGRESS NOTES
Brandt Merritt is a 64 y.o. 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    Subjective   Afebrile. Offers no complaints today, stated his diarrhea is improving, discussed discharge tomorrow afternoon. Denies CP SOB palpitations HA vision changes no abdominal pain NVC no dysuria or flank pain, no bleeding noted    Endorsed good intake of fluids however solids are more of a challenge due to no desire to eat. No oral pain    ROS otherwise unremarkable     Objective     Physical Exam  Constitutional:       General: He is not in acute distress.     Appearance: Normal appearance.   HENT:      Head: Normocephalic and atraumatic.      Nose: Nose normal.      Mouth/Throat:      Mouth: Mucous membranes are moist.      Pharynx: Oropharynx is clear. No oropharyngeal exudate.   Eyes:      General: No scleral icterus.     Conjunctiva/sclera: Conjunctivae normal.      Pupils: Pupils are equal, round, and reactive to light.   Cardiovascular:      Rate and Rhythm: Normal rate and regular rhythm.      Pulses: Normal pulses.      Heart sounds: Normal heart sounds. No murmur heard.  Pulmonary:      Effort: Pulmonary effort is normal. No respiratory distress.      Breath sounds: Normal breath sounds. No wheezing.   Abdominal:      General: Abdomen is flat. Bowel sounds are normal. There is no distension.      Palpations: Abdomen is soft.      Tenderness: There is no abdominal tenderness.   Musculoskeletal:         General: Normal range of motion.      Cervical back: Normal range of motion and neck supple.   Skin:     General: Skin is warm and dry.      Capillary Refill: Capillary refill takes 2 to 3 seconds.      Comments: RADHA Yost. Entry site c/d/i   Neurological:      Mental Status: He is alert and oriented to person, place, and time.   Psychiatric:         Mood and Affect: Mood normal.         Behavior: Behavior normal.       Last Recorded Vitals  Blood pressure 110/67, pulse 97,  "temperature 36.6 °C (97.9 °F), temperature source Temporal, resp. rate 16, height (S) 1.664 m (5' 5.51\"), weight 62.9 kg (138 lb 10.7 oz), SpO2 97%.  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      S/p Single Cord Blood Transplant (T0=9/19/24) with engraphment failure  T+14 from Haplo Stem Cell rescue, (T0=11/6/24)     ONC  # MDS/AML   - Symptoms began 9/2023; diagnosed 4/2024  - BMBx showing MDS in transition to AML (>10% blast) w/ trisomy 8, DNMT alpha, and U2AF1 mutation indication adverse risk   - S/p 4 cycles of Decitabine/Venetoclax (C4D1 on 8/12/24)  - BMBx (6/17/24): Blasts cleared, FISH still positive for trisomy 8, still MDS changes   - BMBx (9/5/24): 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 and no increase in blasts     # SCT #1: Single-unit Cord, T0=9/19/24  # Primary Engraftment Failure  - Conditioning: Flu-Mariana-TBI  - Donor: Single Cord, 6/8  = ABO Donor / Recipient: A+ / A+  = CMV Donor / Recipient: - / +  - aGVHD Prophylaxis: Tacrolimus + MMF  - Sent plasma CXCL9 level to [place] to assess for immunologic graft rejection (10/16/24): 431 pg/mL, which is normal (less suggestive of immunologic rejection)  - Repeat BMBx (10/15): hypocellular with no residual myeloid neoplasm. 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  - Pre-SCT testing:  = CT Chest (10/30): no " abnormal pulmonary findings, trace pericardial effusion  = Onco-TTE (10/31): EF 58%  - Graft: Haploidentical sister  = ABO Donor / Recipient: O+ / A+ (ABO incompatibility +)  = CMV Donor / Recipient: + / +  - Conditioning: Flu/Cy/TBI/rATG  = Fludarabine 24 mg/m2 on T-6,-5,-4,-3,-2   = Cyclophosphamide 14.5mg/kg T-6,-5   =  cGy T-1   = GVHD prophylaxis -  rATG 1.5mg/kg T-5,-3,-1, PTCy (25mg/kg T+3, T+4), Tacro, MMF (T+5)     # GVHD  - No signs or symptoms of aGVHD from SCT#1  = Started Tacro and MMF on T+5 (11/11) following Haplo rescue (sister)  - Tacro levels: 7.0 (11/16), 6.7 (11/17), 7.6 (11/18), 9.7 (11/19). Cont current dose.     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   - Monitor counts daily   - Transfuse for Hgb <7 & PLT <10      - S/p PRBC: 11/5, 11/10      - S/p PLT: 11/4, 11/8, 11/10, 11/16  - S/p Neupogen 300 mcg (9/24-10/30/24), restarted T+5 (11/11-current)  # VTE Prophylaxis: SCDs & Ambulation I/s/o thrombocytopenia  # SOS Prophylaxis: Ursodiol      ID  # EBV Viremia  - EBV: 375 U/L (10/28), up from 77 U/L (10/21/24)  - Treating despite low levels due to upcoming second SCT  - Rituximab 600 mg x 1 (10/31/24)  - Check EBV PCR weekly on Mondays, 11/18 ND  # Neutropenic fever (9/25, 10/12)  - Fever w/ diarrhea, workup neg, treated initially with Josefina, switched to Cefepime d/t rash (9/27-10/10)  - CT C/A/P d/t diarrhea. No abnormal findings  - Repeat blood cultures (10/22): negative  - Cefepime (10/22-10/26) (11/16- 11/20).   # Fever after ATG on 11/1 ; septic work up (Blood cx and UA both negative)   -Febrile last on 11/9, Bld Cx: NGTD, UCx NGTD, continue Cefepime (11/6->). Vanco (11/8 - 11/9)  -Repeat Bld Cx 11/10: NGTD  # Viral surveillance:   - CMV (Mo/Th): (11/18) ND  - Adeno (Mo): ND (11/18): ND  - HHV6 (Mo): (11/18): 900   # Prophylaxis:   - VZV: Acyclovir   - Aspergillus: Posaconazole  - C. Diff: Vancomycin PO  - CMV: Letermovir  - PJP:  Pentamidine given 10/26, will go home on Bactrim      FEN/RENAL/GI  - Admit wt: 75.6 kg (9/13/24)  Most recent weight    - Allergic to Lasix, will not take med (extreme hypotension, extreme hypokalemia)  Monitor electrolytes and replete as needed   # Hx of treated Hep C in 2015  - Hep C RNA - not detected (9/12/24)     CARDIO/PULM:  # Hx of STEMI (11/11/2020)  - TTE 7/19/24: EF 59%, otherwise unremarkable   - Was cleared by Cardiology for transplant   # Hx of HTN   - Nifedipine: decrease to 60 mEq ER 24 hr tablet   # Ascending aorta dilation   - TTE (4/29/24): 3.8-4 cm dilation      :  # Hx of BPH and urinary retention (2023)  # Hx elevated PSA    - 7/28/21 Bx: atypical small acinar proliferation    - 4/7/23 Bx: Benign     NEURO/PSYCH  # Tremor (10/10- )  :: Most likely due to Tacrolimus vs Cefepime  - Fine, high-frequency (7+ Hz) tremor worst in the hands, exacerbated by effort,   - Previously associated with significant fasciculations of the shoulder and back muscles; also fasciculations in jaw -- these are no longer aparrent  - If still no improvement, could consider Neurology consult     DISPO:  - FULL CODE  - NOK: spouse Genevieve ([phone])  - DL Tunneled R Internal Jugular catheter. Pull line?   - Oncologist: Dr. Malaika Newsome  - Meds to be deliver to patient's room on 11/20 (except [facility] and [facility] from [facility])  - Plan to discharged to home on 11/21    Patient seen examined and discussed with Dr. Aroldo Mccallum, APRN-CNP

## 2024-11-20 NOTE — PROGRESS NOTES
11/20/24 1800   Discharge Planning   Living Arrangements Spouse/significant other   Support Systems Spouse/significant other;Family members   Assistance Needed line care   Type of Residence Private residence   Home or Post Acute Services In home services   Type of Home Care Services Home nursing visits  (line care supplies and teaching)   Expected Discharge Disposition Home H  ([facility])   Does the patient need discharge transport arranged? No     Pt to discharge home on 11/21 with DL tunneled internal jugular line and needs line care, agreeable to [facility]. HCO placed by provider. Per [facility], pt has met deductible and has 100% coverage and SOC confirmed for 11/22.

## 2024-11-20 NOTE — CARE PLAN
The clinical goals for the shift include Patient will remain safe and free from falls/injury throughout the shift.    Over the shift, the patient made progress toward the following goals:    Problem: Pain - Adult  Goal: Verbalizes/displays adequate comfort level or baseline comfort level  Outcome: Progressing     Problem: Safety - Adult  Goal: Free from fall injury  Outcome: Progressing     Problem: Discharge Planning  Goal: Discharge to home or other facility with appropriate resources  Outcome: Progressing     Problem: Chronic Conditions and Co-morbidities  Goal: Patient's chronic conditions and co-morbidity symptoms are monitored and maintained or improved  Outcome: Progressing     Problem: Nutrition  Goal: Oral intake greater 75%  Outcome: Progressing  Goal: Adequate PO fluid intake  Outcome: Progressing  Goal: Maintain stable weight  Outcome: Progressing

## 2024-11-21 ENCOUNTER — APPOINTMENT (OUTPATIENT)
Dept: RADIOLOGY | Facility: HOSPITAL | Age: 64
DRG: 014 | End: 2024-11-21
Payer: COMMERCIAL

## 2024-11-21 ENCOUNTER — PHARMACY VISIT (OUTPATIENT)
Dept: PHARMACY | Facility: CLINIC | Age: 64
End: 2024-11-21
Payer: COMMERCIAL

## 2024-11-21 ENCOUNTER — DOCUMENTATION (OUTPATIENT)
Dept: HOME HEALTH SERVICES | Facility: HOME HEALTH | Age: 64
End: 2024-11-21
Payer: COMMERCIAL

## 2024-11-21 VITALS
DIASTOLIC BLOOD PRESSURE: 71 MMHG | HEART RATE: 86 BPM | BODY MASS INDEX: 22.29 KG/M2 | WEIGHT: 138.67 LBS | SYSTOLIC BLOOD PRESSURE: 115 MMHG | HEIGHT: 66 IN | OXYGEN SATURATION: 98 % | RESPIRATION RATE: 18 BRPM | TEMPERATURE: 97.9 F

## 2024-11-21 LAB
ALBUMIN SERPL BCP-MCNC: 3.4 G/DL (ref 3.4–5)
ALP SERPL-CCNC: 95 U/L (ref 33–136)
ALT SERPL W P-5'-P-CCNC: 7 U/L (ref 10–52)
ANION GAP SERPL CALC-SCNC: 12 MMOL/L (ref 10–20)
APTT PPP: 23 SECONDS (ref 27–38)
AST SERPL W P-5'-P-CCNC: 15 U/L (ref 9–39)
BASOPHILS # BLD MANUAL: 0 X10*3/UL (ref 0–0.1)
BASOPHILS NFR BLD MANUAL: 0 %
BILIRUB SERPL-MCNC: 0.4 MG/DL (ref 0–1.2)
BUN SERPL-MCNC: 6 MG/DL (ref 6–23)
CALCIUM SERPL-MCNC: 8.4 MG/DL (ref 8.6–10.6)
CHLORIDE SERPL-SCNC: 104 MMOL/L (ref 98–107)
CMV DNA SERPL NAA+PROBE-LOG IU: NORMAL {LOG_IU}/ML
CO2 SERPL-SCNC: 26 MMOL/L (ref 21–32)
CREAT SERPL-MCNC: 0.71 MG/DL (ref 0.5–1.3)
EGFRCR SERPLBLD CKD-EPI 2021: >90 ML/MIN/1.73M*2
EOSINOPHIL # BLD MANUAL: 0 X10*3/UL (ref 0–0.7)
EOSINOPHIL NFR BLD MANUAL: 0 %
ERYTHROCYTE [DISTWIDTH] IN BLOOD BY AUTOMATED COUNT: 15 % (ref 11.5–14.5)
FIBRINOGEN PPP-MCNC: 371 MG/DL (ref 200–400)
GLUCOSE SERPL-MCNC: 86 MG/DL (ref 74–99)
HCT VFR BLD AUTO: 23.3 % (ref 41–52)
HGB BLD-MCNC: 7.7 G/DL (ref 13.5–17.5)
IMM GRANULOCYTES # BLD AUTO: 0.62 X10*3/UL (ref 0–0.7)
IMM GRANULOCYTES NFR BLD AUTO: 6.7 % (ref 0–0.9)
INR PPP: 1.1 (ref 0.9–1.1)
LABORATORY COMMENT REPORT: NOT DETECTED
LYMPHOCYTES # BLD MANUAL: 0 X10*3/UL (ref 1.2–4.8)
LYMPHOCYTES NFR BLD MANUAL: 0 %
MAGNESIUM SERPL-MCNC: 1.56 MG/DL (ref 1.6–2.4)
MCH RBC QN AUTO: 27.2 PG (ref 26–34)
MCHC RBC AUTO-ENTMCNC: 33 G/DL (ref 32–36)
MCV RBC AUTO: 82 FL (ref 80–100)
MONOCYTES # BLD MANUAL: 1.42 X10*3/UL (ref 0.1–1)
MONOCYTES NFR BLD MANUAL: 15.3 %
MYELOCYTES # BLD MANUAL: 0.07 X10*3/UL
MYELOCYTES NFR BLD MANUAL: 0.8 %
NEUTS SEG # BLD MANUAL: 7.57 X10*3/UL (ref 1.2–7)
NEUTS SEG NFR BLD MANUAL: 81.4 %
NRBC BLD-RTO: 0.8 /100 WBCS (ref 0–0)
PHOSPHATE SERPL-MCNC: 2.7 MG/DL (ref 2.5–4.9)
PLATELET # BLD AUTO: 44 X10*3/UL (ref 150–450)
POTASSIUM SERPL-SCNC: 3.7 MMOL/L (ref 3.5–5.3)
PROT SERPL-MCNC: 5.9 G/DL (ref 6.4–8.2)
PROTHROMBIN TIME: 12.7 SECONDS (ref 9.8–12.8)
RBC # BLD AUTO: 2.83 X10*6/UL (ref 4.5–5.9)
RBC MORPH BLD: ABNORMAL
SODIUM SERPL-SCNC: 138 MMOL/L (ref 136–145)
TACROLIMUS BLD-MCNC: 8.3 NG/ML
TOTAL CELLS COUNTED BLD: 118
VARIANT LYMPHS # BLD MANUAL: 0.23 X10*3/UL (ref 0–0.5)
VARIANT LYMPHS NFR BLD: 2.5 %
WBC # BLD AUTO: 9.3 X10*3/UL (ref 4.4–11.3)

## 2024-11-21 PROCEDURE — 84100 ASSAY OF PHOSPHORUS: CPT

## 2024-11-21 PROCEDURE — 2720000007 HC OR 272 NO HCPCS

## 2024-11-21 PROCEDURE — 2500000001 HC RX 250 WO HCPCS SELF ADMINISTERED DRUGS (ALT 637 FOR MEDICARE OP)

## 2024-11-21 PROCEDURE — 2500000002 HC RX 250 W HCPCS SELF ADMINISTERED DRUGS (ALT 637 FOR MEDICARE OP, ALT 636 FOR OP/ED): Performed by: STUDENT IN AN ORGANIZED HEALTH CARE EDUCATION/TRAINING PROGRAM

## 2024-11-21 PROCEDURE — C1751 CATH, INF, PER/CENT/MIDLINE: HCPCS

## 2024-11-21 PROCEDURE — 37799 UNLISTED PX VASCULAR SURGERY: CPT | Performed by: NURSE PRACTITIONER

## 2024-11-21 PROCEDURE — 80053 COMPREHEN METABOLIC PANEL: CPT

## 2024-11-21 PROCEDURE — 83735 ASSAY OF MAGNESIUM: CPT

## 2024-11-21 PROCEDURE — 85610 PROTHROMBIN TIME: CPT

## 2024-11-21 PROCEDURE — 2500000004 HC RX 250 GENERAL PHARMACY W/ HCPCS (ALT 636 FOR OP/ED): Performed by: NURSE PRACTITIONER

## 2024-11-21 PROCEDURE — 2500000004 HC RX 250 GENERAL PHARMACY W/ HCPCS (ALT 636 FOR OP/ED): Performed by: REGISTERED NURSE

## 2024-11-21 PROCEDURE — 85384 FIBRINOGEN ACTIVITY: CPT

## 2024-11-21 PROCEDURE — 36573 INSJ PICC RS&I 5 YR+: CPT

## 2024-11-21 PROCEDURE — RXMED WILLOW AMBULATORY MEDICATION CHARGE

## 2024-11-21 PROCEDURE — 2500000002 HC RX 250 W HCPCS SELF ADMINISTERED DRUGS (ALT 637 FOR MEDICARE OP, ALT 636 FOR OP/ED): Performed by: INTERNAL MEDICINE

## 2024-11-21 PROCEDURE — 85027 COMPLETE CBC AUTOMATED: CPT

## 2024-11-21 PROCEDURE — 80197 ASSAY OF TACROLIMUS: CPT | Performed by: INTERNAL MEDICINE

## 2024-11-21 PROCEDURE — 2500000001 HC RX 250 WO HCPCS SELF ADMINISTERED DRUGS (ALT 637 FOR MEDICARE OP): Performed by: STUDENT IN AN ORGANIZED HEALTH CARE EDUCATION/TRAINING PROGRAM

## 2024-11-21 PROCEDURE — 2500000004 HC RX 250 GENERAL PHARMACY W/ HCPCS (ALT 636 FOR OP/ED): Performed by: INTERNAL MEDICINE

## 2024-11-21 PROCEDURE — 85007 BL SMEAR W/DIFF WBC COUNT: CPT

## 2024-11-21 PROCEDURE — 99239 HOSP IP/OBS DSCHRG MGMT >30: CPT | Performed by: STUDENT IN AN ORGANIZED HEALTH CARE EDUCATION/TRAINING PROGRAM

## 2024-11-21 PROCEDURE — 2500000001 HC RX 250 WO HCPCS SELF ADMINISTERED DRUGS (ALT 637 FOR MEDICARE OP): Performed by: INTERNAL MEDICINE

## 2024-11-21 RX ORDER — MAGNESIUM SULFATE HEPTAHYDRATE 40 MG/ML
4 INJECTION, SOLUTION INTRAVENOUS ONCE
Status: COMPLETED | OUTPATIENT
Start: 2024-11-21 | End: 2024-11-21

## 2024-11-21 RX ORDER — CALCIUM CARBONATE 500(1250)
1250 TABLET ORAL
Qty: 90 TABLET | Refills: 0 | Status: SHIPPED | OUTPATIENT
Start: 2024-11-21 | End: 2024-12-21

## 2024-11-21 RX ORDER — LIDOCAINE HYDROCHLORIDE 10 MG/ML
5 INJECTION, SOLUTION INFILTRATION; PERINEURAL ONCE
Status: DISCONTINUED | OUTPATIENT
Start: 2024-11-21 | End: 2024-11-21 | Stop reason: HOSPADM

## 2024-11-21 RX ORDER — POTASSIUM CHLORIDE 750 MG/1
20 CAPSULE, EXTENDED RELEASE ORAL 2 TIMES DAILY
Qty: 12 CAPSULE | Refills: 0 | Status: SHIPPED | OUTPATIENT
Start: 2024-11-21 | End: 2024-11-25 | Stop reason: SDUPTHER

## 2024-11-21 RX ORDER — POTASSIUM CHLORIDE 29.8 MG/ML
40 INJECTION INTRAVENOUS ONCE
Status: COMPLETED | OUTPATIENT
Start: 2024-11-21 | End: 2024-11-21

## 2024-11-21 ASSESSMENT — ACTIVITIES OF DAILY LIVING (ADL): LACK_OF_TRANSPORTATION: NO

## 2024-11-21 ASSESSMENT — PAIN SCALES - GENERAL: PAINLEVEL_OUTOF10: 0 - NO PAIN

## 2024-11-21 NOTE — NURSING NOTE
Patient is discharged to home with wife on 11/21 at 12:33. Patient had PICC placed the left arm which was flushed and capped, dressing due to be changed on 11/28. Patient received all medications he is to be taking at home. Answered any questions he or his wife had and went over medication instructions, s/s of complications, PICC line care, future appointments, and instructions for the next steps in his care within his after visit summary. Patient was enthusiastic to leave.

## 2024-11-21 NOTE — PROCEDURES
Pre-procedure and verification completed at bedside prior to procedure. Procedure is Arrow guard catheter removal. Central line is a dual lumen and is located in right neck. This NP performed PPE with handwashing and clean glove administration. Arrow guard catheter dressing was removed and 2 external sutures were removed. Central line was removed without incident. Sterile 4x4  was applied with firm pressure. Pressure dressing was applied. Pt instructed to leave pressure dressing on for 24 hours which time he could change it to a telfa island dressing. Discussed no showers/baths x 24h. He was instructed to change the telfa island dressing daily or PRN in case of moisture for a few days.

## 2024-11-21 NOTE — DISCHARGE SUMMARY
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     His hospital course was complicated by hypertension, febrile neutropenia, primary engraftment failure requiring haploidentical stem-cell rescue    After initial conditioning with Flu-Mariana-TBI, a single cord stem cell transplant on 10/16/24 resulted in primary engraftment failure, confirmed by bone marrow biopsy on 10/15 showing 1% donor chimerism and hypocellularity without myeloid neoplasm. Despite tacrolimus and MMF for GVHD prophylaxis, there were no signs of acute GVHD, and MMF was discontinued on 10/11 due to myelosuppression concerns. Tacrolimus was stopped on 10/29 in preparation for a planned haploidentical transplant from the patient’s sister, with cell collection completed on 10/30. He was conditioned with Flu/Cy/TBI and aGVHD prophylaxis with post-transplant cyclophosphamide, tacrolimus, and mycophenolate. T=0, 11/6/24. ABO Donor/Recipient: O+, A+. CMV donor/recipient: both positive. Started Tacro and MMF on T+5 (11/11).       The patient experienced episodes of neutropenic fever on 9/25 and 10/12, initially treated with meropenem until a rash prompted a switch to cefepime from 9/27 to 10/10. Diarrhea was the only finding on CT chest/abdomen/pelvis, and blood cultures on 10/22 were negative, leading to a second cefepime course from 10/22 to  10/26.    He was also noted to have EBV viremia, with level up to 375 U/L on 10/28 and was treated despite low levels due to hisupcoming second SCT; he received rituximab 600 mg x 1 (10/31/24).    Tacrolimus level on 11/20 8.9 ng/mL home dose will be Tacrolimus 1.5mg/BID  Cellcept at 1000mg/TID continues at discharge   Instructed not to start Bactrim until informed     ACCESS: DL tunneled line placed 9/13  PPX: Acyclovir, Posaconazole, Letermovir, Bactrim & Ursodiol   PRIMARY ONC: Dr. Malaika Newsome   Post Transplant follow up 3x/week starting 11/23  Dr. Malaika Newsome appt requested for 11/25 @ 1300  PICC SOLO placed on day of discharge      Pertinent Physical Exam At Time of Discharge  Physical Exam  Constitutional:       Appearance: Normal appearance.   HENT:      Head: Normocephalic and atraumatic.      Mouth: Mucous membranes are moist.      Pharynx: Oropharynx is clear. No oropharyngeal exudate.   Eyes:      General: No scleral icterus.  Cardiovascular:      Rate and Rhythm: Normal rate and regular rhythm.      Pulses: Normal pulses.      Heart sounds: Normal heart sounds. No murmur heard.  Pulmonary:      Effort: Pulmonary effort is normal. No respiratory distress.      Breath sounds: Normal breath sounds. No wheezing.   Abdominal:      General: Abdomen is flat. Bowel sounds are normal. There is no distension.      Palpations: Abdomen is soft.      Tenderness: There is no abdominal tenderness.   Musculoskeletal:         General: Normal range of motion.   Skin:     General: Skin is warm and dry.      Capillary Refill: Capillary refill takes 2 to 3 seconds.      Comments: RADHA Yost. Entry site c/d/i   Neurological:      Mental Status: He is alert and oriented to person, place, and time.     Home Medications     Medication List      START taking these medications     calcium carbonate 500 mg calcium (1,250 mg) tablet; Commonly known as:   Oscal; Take 1 tablet (1,250 mg) by mouth 3 times daily (morning, midday,    late afternoon).   loperamide 2 mg capsule; Commonly known as: Imodium; Take 1 capsule (2   mg) by mouth 4 times a day as needed for diarrhea for up to 10 days.   potassium chloride ER 10 mEq ER capsule; Commonly known as: Micro-K;   Take 2 capsules (20 mEq) by mouth 2 times a day for 3 days. Do not crush   or chew.     CONTINUE taking these medications     acyclovir 400 mg tablet; Commonly known as: Zovirax; Take 1 tablet (400   mg) by mouth 2 times a day.   folic acid 1 mg tablet; Commonly known as: Folvite; Take 1 tablet (1 mg)   by mouth once daily.   letermovir 480 mg tablet; Commonly known as: Prevymis; Take 1 tablet   (480 mg) by mouth once daily.   magnesium chloride 64 mg EC tablet; Commonly known as: MagDelay; Take 2   tablets (128 mg) by mouth 3 times a day. Or as instructed on your   medication list.; Notes to patient: Supplement   mycophenolate 250 mg capsule; Commonly known as: Cellcept; Take 4   capsules (1,000 mg) by mouth 3 times a day.   NIFEdipine ER 60 mg 24 hr tablet; Commonly known as: Adalat CC; Take 1   tablet (60 mg) by mouth once daily in the morning. Take before meals. Do   not crush, chew, or split.; Notes to patient: Blood pressure   ondansetron 8 mg tablet; Commonly known as: Zofran; Take 1 tablet (8 mg)   by mouth every 8 hours if needed for nausea or vomiting.; Notes to   patient: Nausea (1st line)   posaconazole 100 mg DR tablet; Commonly known as: Noxafil; Take 3   tablets (300 mg) by mouth once daily. Do not crush, chew, or split. This   is to prevent fungal infections.   prochlorperazine 10 mg tablet; Commonly known as: Compazine; Take 1   tablet (10 mg) by mouth every 6 hours if needed for nausea or vomiting.;   Notes to patient: Nausea (2nd line)   sennosides-docusate sodium 8.6-50 mg tablet; Commonly known as:   Karissa-Colace; Take 1 tablet by mouth once daily.; Notes to patient:   Constipation   sulfamethoxazole-trimethoprim 800-160 mg tablet; Commonly known as:   Bactrim DS;  Take 1 tablet by mouth 3 times a week. Take on Mondays,   Wednesdays, and Fridays. Do not start until instructed.; Notes to patient:   DO NOT TAKE UNTIL INSTRUCTED BY DR LIPSCOMB   * tacrolimus 0.5 mg capsule; Commonly known as: Prograf; Take 1 capsule   (0.5 mg) by mouth every 12 hours. Or as instructed on your medication   list.; Notes to patient: To prevent graft versus host disease. REMEMBER TO   HOLD YOUR MORNING DOSE THE DAY YOU HAVE A CLINIC APPOINTMENT. Drug levels   will be drawn at your clinic visits to assess the dose of this medication.   * tacrolimus 1 mg capsule; Commonly known as: Prograf; Take 2 capsules   (2 mg) by mouth every 12 hours. Or as instructed on your medication list.;   Notes to patient: To prevent graft versus host disease. REMEMBER TO HOLD   YOUR MORNING DOSE THE DAY YOU HAVE A CLINIC APPOINTMENT. Drug levels will   be drawn at your clinic visits to assess the dose of this medication.   ursodiol 300 mg capsule; Commonly known as: Actigall; Take 1 capsule   (300 mg) by mouth 3 times a day.; Notes to patient: To protect your liver.  * This list has 2 medication(s) that are the same as other medications   prescribed for you. Read the directions carefully, and ask your doctor or   other care provider to review them with you.       Outpatient Follow-Up  Future Appointments   Date Time Provider Department [place]   11/23/2024  8:30 AM INF 01 [facility]   11/23/2024  9:30 AM SCC LB BMT POST TRANSPLANT [facility]   11/25/2024  2:00 PM INF 15 [facility]   11/25/2024  2:00 PM SCC LB BMT POST TRANSPLANT [facility]   11/27/2024  2:00 PM INF 02 [facility]   11/27/2024  2:00 PM SCC LB BMT POST TRANSPLANT [facility]       Cande Mccallum, APRN-CNP

## 2024-11-21 NOTE — POST-PROCEDURE NOTE
Peripherally Inserted Central Catheter ( PICC) Pre-Procedure Checklist:  Emergent Line Insertion: No  Type of Line to be Placed: PICC  Consent Obtained: Yes  Emergency Medication Necessary: No  Patient Identified with 2 Independent Identifiers: Yes  Review of Allergies, Anticoagulation, Relevant Labs, ECG/Telemetry: Yes  Risks/Benefits/Alternatives Discussed with Patient/POA/Legal Representative: Yes  Stop Sign on Door: Yes  Time Out Performed: Yes  Catheter Exchange: No    Positioning Checklist:  All People, Including Patient, in the Room with Cap and Mask: Yes  Fluoroscopy Used to Identify Vessel and Guide Insertion: No   Sterile Cover Used: Yes  Full Barrier Precautions Followed (Mask, Cap, Gown, Gloves): Yes  Hands Washed: Yes  Monitors Attached with Sound Alarms On: No  Full Body Sterile Drape (Head-to-Toe) Used to Cover Patient: Yes  Trendelenburg Position (For IJ and Subclavian): No  CHG Skin Prep Used and Allowed to Air Dry to Skin Procedure: Yes    Procedure Checklist:  Blood Aspirated From All Lumens, All Ports Subsequently Flushed: Yes  Catheter Caps Placed on All Lumens; Lumens Clamped: Yes  Maintain Guidewire Control Throughout, Ensuring Guidewire Removal: Yes  Maintain Sterile Field Throughout Insertion: Yes  Catheter Secured: Yes  Confirmatory Test of Venous Placement: Non-Pulsatile Blood    Post Procedure Checklist:  Date and Time Written on Dressing: Yes  Sharp and Wire Count and Safe Disposal of all Sharps/Wires: Yes  Sterile Dressing Applied Per Protocol: Yes  X-ray Ordered or ECG Image: Yes    PICC Insertion Details:  Size (Fr): 4  Lumen Type:double  Catheter to Vein Ratio Less Than 50%: Yes  Total Length (cm): 44  External Length (cm): 0  Orientation: left  Location: basilic  Site Prep: Chlorohexidine; Usual sterile procedure followed  Local Anesthetic: Injectable/Subcutaneous  Indication:  Insertion Team Members in the Room: Nurse, LPN  Initial Extremity Circumference (cm): 28  Insertion  Attempts: 1  Patient Tolerance: Tolerated Well, Age Appropriate  Comfort Measures: Subcutaneous anesthetic; Verbal  Procedure Location: Bedside  Safety Measures: Patient specific safety measures addressed with RN  Estimated Blood Loss (mL):   Vessel Fully Compressible Proximally and Distally to Insertion Site: Yes  Brisk Blood Return Obtained and Line Draws Easily: Yes  Tip Location:SVC  Line Confirmation: ECG  Lot #:[id number]  : Bard  PICC Line Exp Date:12/31/2025  Securement: Stat Lock  Post Procedure Checklist: Handoff with RN; Obtain all new IV tubing prior to use; Bed at lowest level and wheels locked; Line discharge information at bedside.  Additional Details: Line was inserted using Modified Seldinger's Technique.   Placed by: Renita Rdz RN-[facility]

## 2024-11-21 NOTE — PROGRESS NOTES
11/21/24 1100   Discharge Planning   Living Arrangements Spouse/significant other   Support Systems Spouse/significant other   Assistance Needed none   Type of Residence Private residence   Home or Post Acute Services None   Expected Discharge Disposition Home   Does the patient need discharge transport arranged? No   Financial Resource Strain   How hard is it for you to pay for the very basics like food, housing, medical care, and heating? Not very   Housing Stability   In the last 12 months, was there a time when you were not able to pay the mortgage or rent on time? N   In the past 12 months, how many times have you moved where you were living? 0   At any time in the past 12 months, were you homeless or living in a shelter (including now)? N   Transportation Needs   In the past 12 months, has lack of transportation kept you from medical appointments or from getting medications? no   In the past 12 months, has lack of transportation kept you from meetings, work, or from getting things needed for daily living? No     Pt with MDS/AML is s/p an UCBSCT on 9/19/24 and is T+15 from a Haplo PBSCT.  He is ready for discharge today.   Met with the pt and his wife to provide them with a pill box, thermometer, Allo discharge packet and ED card.  Reviewed the discharge plan and information with them.   He will return to his home, where he lives with his wife, who will be his caregiver.  The pt is still independent and doesn't require any home care or DME.   He has a DL Arrowguard catheter which will be removed prior to discharge and has had a SOLO PICC placed.  He has follow up with Dr. Malaika Newsome and thrice weekly count checks with post-BMT clinic.   Radha Contreras RN, TCC

## 2024-11-23 ENCOUNTER — INFUSION (OUTPATIENT)
Dept: HEMATOLOGY/ONCOLOGY | Facility: HOSPITAL | Age: 64
End: 2024-11-23
Payer: COMMERCIAL

## 2024-11-23 ENCOUNTER — OFFICE VISIT (OUTPATIENT)
Dept: HEMATOLOGY/ONCOLOGY | Facility: HOSPITAL | Age: 64
End: 2024-11-23
Payer: COMMERCIAL

## 2024-11-23 VITALS
DIASTOLIC BLOOD PRESSURE: 67 MMHG | OXYGEN SATURATION: 100 % | TEMPERATURE: 97.3 F | SYSTOLIC BLOOD PRESSURE: 112 MMHG | HEART RATE: 108 BPM

## 2024-11-23 DIAGNOSIS — R25.1 TREMOR: ICD-10-CM

## 2024-11-23 DIAGNOSIS — D46.9 MDS (MYELODYSPLASTIC SYNDROME) (MULTI): ICD-10-CM

## 2024-11-23 DIAGNOSIS — D84.9 IMMUNOCOMPROMISED: ICD-10-CM

## 2024-11-23 DIAGNOSIS — C92.01 ACUTE MYELOID LEUKEMIA IN REMISSION (MULTI): Primary | ICD-10-CM

## 2024-11-23 DIAGNOSIS — Z94.84 HISTORY OF ALLOGENEIC HEMATOPOIETIC STEM CELL TRANSPLANT: ICD-10-CM

## 2024-11-23 DIAGNOSIS — R63.0 LACK OF APPETITE: ICD-10-CM

## 2024-11-23 DIAGNOSIS — E87.8 ELECTROLYTE DISTURBANCE: ICD-10-CM

## 2024-11-23 DIAGNOSIS — C92.01 ACUTE MYELOID LEUKEMIA IN REMISSION (MULTI): ICD-10-CM

## 2024-11-23 DIAGNOSIS — I10 ESSENTIAL HYPERTENSION: ICD-10-CM

## 2024-11-23 DIAGNOSIS — C92.00 ACUTE MYELOID LEUKEMIA NOT HAVING ACHIEVED REMISSION (MULTI): ICD-10-CM

## 2024-11-23 PROBLEM — B27.00 EBV (EPSTEIN-BARR VIRUS) VIREMIA: Status: RESOLVED | Noted: 2024-08-26 | Resolved: 2024-11-23

## 2024-11-23 PROBLEM — D59.9 ACQUIRED HEMOLYTIC ANEMIA: Status: RESOLVED | Noted: 2024-02-20 | Resolved: 2024-11-23

## 2024-11-23 PROBLEM — E66.811 OBESITY, CLASS I, BMI 30-34.9: Status: RESOLVED | Noted: 2021-06-30 | Resolved: 2024-11-23

## 2024-11-23 LAB
ABO GROUP (TYPE) IN BLOOD: NORMAL
ALBUMIN SERPL BCP-MCNC: 3.7 G/DL (ref 3.4–5)
ALP SERPL-CCNC: 102 U/L (ref 33–136)
ALT SERPL W P-5'-P-CCNC: 3 U/L (ref 10–52)
ANION GAP SERPL CALC-SCNC: 13 MMOL/L (ref 10–20)
ANTIBODY SCREEN: NORMAL
APPEARANCE UR: ABNORMAL
APTT PPP: 32 SECONDS (ref 27–38)
AST SERPL W P-5'-P-CCNC: 12 U/L (ref 9–39)
BASOPHILS # BLD MANUAL: 0 X10*3/UL (ref 0–0.1)
BASOPHILS NFR BLD MANUAL: 0 %
BILIRUB SERPL-MCNC: 0.4 MG/DL (ref 0–1.2)
BILIRUB UR STRIP.AUTO-MCNC: NEGATIVE MG/DL
BUN SERPL-MCNC: 8 MG/DL (ref 6–23)
CALCIUM SERPL-MCNC: 9.2 MG/DL (ref 8.6–10.6)
CAOX CRY #/AREA UR COMP ASSIST: ABNORMAL /HPF
CHLORIDE SERPL-SCNC: 106 MMOL/L (ref 98–107)
CO2 SERPL-SCNC: 23 MMOL/L (ref 21–32)
COLOR UR: YELLOW
CREAT SERPL-MCNC: 0.8 MG/DL (ref 0.5–1.3)
CREAT UR-MCNC: 368.4 MG/DL (ref 20–370)
DACRYOCYTES BLD QL SMEAR: ABNORMAL
DOHLE BOD BLD QL SMEAR: PRESENT
EGFRCR SERPLBLD CKD-EPI 2021: >90 ML/MIN/1.73M*2
EOSINOPHIL # BLD MANUAL: 0 X10*3/UL (ref 0–0.7)
EOSINOPHIL NFR BLD MANUAL: 0 %
ERYTHROCYTE [DISTWIDTH] IN BLOOD BY AUTOMATED COUNT: 15.2 % (ref 11.5–14.5)
FIBRINOGEN PPP-MCNC: 453 MG/DL (ref 200–400)
GLUCOSE SERPL-MCNC: 112 MG/DL (ref 74–99)
GLUCOSE UR STRIP.AUTO-MCNC: NORMAL MG/DL
HCT VFR BLD AUTO: 23.9 % (ref 41–52)
HGB BLD-MCNC: 8.1 G/DL (ref 13.5–17.5)
HYALINE CASTS #/AREA URNS AUTO: ABNORMAL /LPF
IGG SERPL-MCNC: 1230 MG/DL (ref 700–1600)
IMM GRANULOCYTES # BLD AUTO: 0.55 X10*3/UL (ref 0–0.7)
IMM GRANULOCYTES NFR BLD AUTO: 8.3 % (ref 0–0.9)
INR PPP: 1.2 (ref 0.9–1.1)
KETONES UR STRIP.AUTO-MCNC: ABNORMAL MG/DL
LEUKOCYTE ESTERASE UR QL STRIP.AUTO: NEGATIVE
LYMPHOCYTES # BLD MANUAL: 0.07 X10*3/UL (ref 1.2–4.8)
LYMPHOCYTES NFR BLD MANUAL: 1 %
MAGNESIUM SERPL-MCNC: 1.14 MG/DL (ref 1.6–2.4)
MCH RBC QN AUTO: 27.9 PG (ref 26–34)
MCHC RBC AUTO-ENTMCNC: 33.9 G/DL (ref 32–36)
MCV RBC AUTO: 82 FL (ref 80–100)
METAMYELOCYTES # BLD MANUAL: 0.07 X10*3/UL
METAMYELOCYTES NFR BLD MANUAL: 1 %
MONOCYTES # BLD MANUAL: 1.61 X10*3/UL (ref 0.1–1)
MONOCYTES NFR BLD MANUAL: 24 %
MUCOUS THREADS #/AREA URNS AUTO: ABNORMAL /LPF
MYELOCYTES # BLD MANUAL: 0.13 X10*3/UL
MYELOCYTES NFR BLD MANUAL: 2 %
NEUTROPHILS # BLD MANUAL: 4.76 X10*3/UL (ref 1.2–7.7)
NEUTS BAND # BLD MANUAL: 0.47 X10*3/UL (ref 0–0.7)
NEUTS BAND NFR BLD MANUAL: 7 %
NEUTS SEG # BLD MANUAL: 4.29 X10*3/UL (ref 1.2–7)
NEUTS SEG NFR BLD MANUAL: 64 %
NITRITE UR QL STRIP.AUTO: NEGATIVE
NRBC BLD-RTO: 0.6 /100 WBCS (ref 0–0)
OVALOCYTES BLD QL SMEAR: ABNORMAL
PH UR STRIP.AUTO: 5.5 [PH]
PLATELET # BLD AUTO: 68 X10*3/UL (ref 150–450)
POLYCHROMASIA BLD QL SMEAR: ABNORMAL
POTASSIUM SERPL-SCNC: 3.3 MMOL/L (ref 3.5–5.3)
PROT SERPL-MCNC: 6.9 G/DL (ref 6.4–8.2)
PROT UR STRIP.AUTO-MCNC: ABNORMAL MG/DL
PROT UR-ACNC: 103 MG/DL (ref 5–25)
PROT/CREAT UR: 0.28 MG/MG CREAT (ref 0–0.17)
PROTHROMBIN TIME: 13.2 SECONDS (ref 9.8–12.8)
RBC # BLD AUTO: 2.9 X10*6/UL (ref 4.5–5.9)
RBC # UR STRIP.AUTO: ABNORMAL /UL
RBC #/AREA URNS AUTO: ABNORMAL /HPF
RBC MORPH BLD: ABNORMAL
RH FACTOR (ANTIGEN D): NORMAL
SODIUM SERPL-SCNC: 139 MMOL/L (ref 136–145)
SP GR UR STRIP.AUTO: 1.02
TACROLIMUS BLD-MCNC: 9.9 NG/ML
TOTAL CELLS COUNTED BLD: 100
URATE SERPL-MCNC: 2.6 MG/DL (ref 4–7.5)
UROBILINOGEN UR STRIP.AUTO-MCNC: NORMAL MG/DL
VARIANT LYMPHS # BLD MANUAL: 0.07 X10*3/UL (ref 0–0.5)
VARIANT LYMPHS NFR BLD: 1 %
WBC # BLD AUTO: 6.7 X10*3/UL (ref 4.4–11.3)
WBC #/AREA URNS AUTO: ABNORMAL /HPF

## 2024-11-23 PROCEDURE — 82570 ASSAY OF URINE CREATININE: CPT

## 2024-11-23 PROCEDURE — 85610 PROTHROMBIN TIME: CPT

## 2024-11-23 PROCEDURE — 85007 BL SMEAR W/DIFF WBC COUNT: CPT

## 2024-11-23 PROCEDURE — 2500000004 HC RX 250 GENERAL PHARMACY W/ HCPCS (ALT 636 FOR OP/ED): Performed by: NURSE PRACTITIONER

## 2024-11-23 PROCEDURE — 81001 URINALYSIS AUTO W/SCOPE: CPT

## 2024-11-23 PROCEDURE — 96365 THER/PROPH/DIAG IV INF INIT: CPT | Mod: INF

## 2024-11-23 PROCEDURE — 2500000002 HC RX 250 W HCPCS SELF ADMINISTERED DRUGS (ALT 637 FOR MEDICARE OP, ALT 636 FOR OP/ED): Performed by: NURSE PRACTITIONER

## 2024-11-23 PROCEDURE — 85027 COMPLETE CBC AUTOMATED: CPT

## 2024-11-23 PROCEDURE — 80053 COMPREHEN METABOLIC PANEL: CPT

## 2024-11-23 PROCEDURE — 96366 THER/PROPH/DIAG IV INF ADDON: CPT | Mod: INF

## 2024-11-23 PROCEDURE — 85384 FIBRINOGEN ACTIVITY: CPT

## 2024-11-23 PROCEDURE — 86901 BLOOD TYPING SEROLOGIC RH(D): CPT

## 2024-11-23 PROCEDURE — 99215 OFFICE O/P EST HI 40 MIN: CPT | Mod: 25 | Performed by: NURSE PRACTITIONER

## 2024-11-23 PROCEDURE — 80197 ASSAY OF TACROLIMUS: CPT

## 2024-11-23 PROCEDURE — 83735 ASSAY OF MAGNESIUM: CPT

## 2024-11-23 PROCEDURE — 84550 ASSAY OF BLOOD/URIC ACID: CPT

## 2024-11-23 PROCEDURE — 96374 THER/PROPH/DIAG INJ IV PUSH: CPT | Mod: INF

## 2024-11-23 PROCEDURE — 96375 TX/PRO/DX INJ NEW DRUG ADDON: CPT | Mod: INF

## 2024-11-23 RX ORDER — POTASSIUM CHLORIDE 750 MG/1
20 TABLET, FILM COATED, EXTENDED RELEASE ORAL ONCE
Status: COMPLETED | OUTPATIENT
Start: 2024-11-23 | End: 2024-11-23

## 2024-11-23 RX ORDER — MIRTAZAPINE 15 MG/1
15 TABLET, FILM COATED ORAL NIGHTLY
Qty: 30 TABLET | Refills: 3 | Status: SHIPPED | OUTPATIENT
Start: 2024-11-23 | End: 2025-03-23

## 2024-11-23 RX ORDER — POTASSIUM CHLORIDE 750 MG/1
20 TABLET, FILM COATED, EXTENDED RELEASE ORAL ONCE
Status: CANCELLED | OUTPATIENT
Start: 2024-11-23 | End: 2024-11-23

## 2024-11-23 RX ORDER — ONDANSETRON 2 MG/ML
8 INJECTION INTRAMUSCULAR; INTRAVENOUS ONCE
Status: COMPLETED | OUTPATIENT
Start: 2024-11-23 | End: 2024-11-23

## 2024-11-23 RX ORDER — ONDANSETRON 2 MG/ML
8 INJECTION INTRAMUSCULAR; INTRAVENOUS ONCE
Status: CANCELLED | OUTPATIENT
Start: 2024-11-23 | End: 2024-11-23

## 2024-11-23 RX ORDER — MAGNESIUM SULFATE HEPTAHYDRATE 40 MG/ML
4 INJECTION, SOLUTION INTRAVENOUS ONCE
Status: CANCELLED | OUTPATIENT
Start: 2024-11-23 | End: 2024-11-23

## 2024-11-23 RX ORDER — MAGNESIUM SULFATE HEPTAHYDRATE 40 MG/ML
4 INJECTION, SOLUTION INTRAVENOUS ONCE
Status: COMPLETED | OUTPATIENT
Start: 2024-11-23 | End: 2024-11-23

## 2024-11-23 ASSESSMENT — ENCOUNTER SYMPTOMS
DYSURIA: 0
HEMATURIA: 0
CONSTIPATION: 0
CHILLS: 0
CHEST TIGHTNESS: 0
UNEXPECTED WEIGHT CHANGE: 0
LIGHT-HEADEDNESS: 0
COUGH: 0
FEVER: 0
BLOOD IN STOOL: 0
NAUSEA: 1
SHORTNESS OF BREATH: 0
DIARRHEA: 0
HEADACHES: 0
ABDOMINAL PAIN: 0
DIZZINESS: 0
VOMITING: 0
NUMBNESS: 0

## 2024-11-23 NOTE — ASSESSMENT & PLAN NOTE
Prophylaxis:   Antiviral prophylaxis: acyclovir, Letermovir  Antifungal: posaconazole  PCP prophylaxis: Bactrim to begin closer to day 30 (although with count recovery may start next week)     IgG level. IVIG for level pending from today     Active Surveillance:    CMV PCR: ND 11/21  EBV PCR: ND 11/18  Adenovirus: ND 11/18  HHV6: 900 11/18    EBV Viremia during transplant admission  - Treated despite low levels due to upcoming second SCT  - Rituximab 600 mg x 1 (10/31/24)     Immunizations:   Covid vaccine series. Consider at T+ 3 months  Seasonal influenza vaccine. Consider at T+ 3 months     Will plan to begin immunizations at T+6mths (May 2025) depending on degree of immunosuppression     Infectious Disease follow up evaluation:  request pending as of 11/23/24     Immunodeficiency panel 100 days, 6mos and 1 year.

## 2024-11-23 NOTE — ASSESSMENT & PLAN NOTE
Continue nifedipine 60 mEq ER 24 hr tablet   Cardiology and ECHO request pending as of 11/23/24    Ascending aorta dilation   - TTE (4/29/24): 3.8-4 cm dilation     Hx of STEMI (11/11/2020

## 2024-11-23 NOTE — ASSESSMENT & PLAN NOTE
#1: Single-unit Cord, T0=9/19/24, Primary Engraftment Failure  #2: Haploidentical rescue (sister), T0=11/6/24    Counts show engraftment. Chimerisms ordered for 12/9 as well as BMBX.    HEME  Transfuse for Hgb <7 & PLT <10       GVHD  No signs or symptoms of aGVHD today  Continue Tacro and MMF   Tacro level: 9.9 on 1.5 mg BID. Decrease dose to 1.5 mg in the am and 1 mg in the pm.

## 2024-11-24 LAB
CMV DNA SERPL NAA+PROBE-LOG IU: NORMAL {LOG_IU}/ML
LABORATORY COMMENT REPORT: NOT DETECTED

## 2024-11-25 ENCOUNTER — NUTRITION (OUTPATIENT)
Dept: HEMATOLOGY/ONCOLOGY | Facility: HOSPITAL | Age: 64
End: 2024-11-25

## 2024-11-25 ENCOUNTER — APPOINTMENT (OUTPATIENT)
Dept: HEMATOLOGY/ONCOLOGY | Facility: HOSPITAL | Age: 64
End: 2024-11-25
Payer: COMMERCIAL

## 2024-11-25 ENCOUNTER — INFUSION (OUTPATIENT)
Dept: HEMATOLOGY/ONCOLOGY | Facility: HOSPITAL | Age: 64
End: 2024-11-25
Payer: COMMERCIAL

## 2024-11-25 ENCOUNTER — OFFICE VISIT (OUTPATIENT)
Dept: HEMATOLOGY/ONCOLOGY | Facility: HOSPITAL | Age: 64
End: 2024-11-25
Payer: COMMERCIAL

## 2024-11-25 VITALS
RESPIRATION RATE: 17 BRPM | DIASTOLIC BLOOD PRESSURE: 79 MMHG | TEMPERATURE: 97.7 F | OXYGEN SATURATION: 100 % | SYSTOLIC BLOOD PRESSURE: 133 MMHG | BODY MASS INDEX: 21.99 KG/M2 | HEART RATE: 100 BPM | WEIGHT: 134.26 LBS

## 2024-11-25 VITALS
OXYGEN SATURATION: 100 % | DIASTOLIC BLOOD PRESSURE: 69 MMHG | SYSTOLIC BLOOD PRESSURE: 118 MMHG | HEART RATE: 102 BPM | RESPIRATION RATE: 16 BRPM | TEMPERATURE: 98.4 F

## 2024-11-25 VITALS — WEIGHT: 134.26 LBS | BODY MASS INDEX: 21.58 KG/M2 | HEIGHT: 66 IN

## 2024-11-25 DIAGNOSIS — C92.00 ACUTE MYELOID LEUKEMIA NOT HAVING ACHIEVED REMISSION (MULTI): ICD-10-CM

## 2024-11-25 DIAGNOSIS — Z94.84 STEM CELLS TRANSPLANT STATUS (MULTI): ICD-10-CM

## 2024-11-25 DIAGNOSIS — D46.9 MDS (MYELODYSPLASTIC SYNDROME) (MULTI): ICD-10-CM

## 2024-11-25 DIAGNOSIS — C92.01 ACUTE MYELOID LEUKEMIA IN REMISSION (MULTI): ICD-10-CM

## 2024-11-25 LAB
ABO GROUP (TYPE) IN BLOOD: NORMAL
ALBUMIN SERPL BCP-MCNC: 3.9 G/DL (ref 3.4–5)
ALP SERPL-CCNC: 87 U/L (ref 33–136)
ALT SERPL W P-5'-P-CCNC: 6 U/L (ref 10–52)
ANION GAP SERPL CALC-SCNC: 16 MMOL/L (ref 10–20)
ANTIBODY SCREEN: NORMAL
AST SERPL W P-5'-P-CCNC: 12 U/L (ref 9–39)
BASOPHILS # BLD MANUAL: 0.08 X10*3/UL (ref 0–0.1)
BASOPHILS NFR BLD MANUAL: 1 %
BILIRUB SERPL-MCNC: 0.5 MG/DL (ref 0–1.2)
BUN SERPL-MCNC: 13 MG/DL (ref 6–23)
CALCIUM SERPL-MCNC: 9.3 MG/DL (ref 8.6–10.3)
CHLORIDE SERPL-SCNC: 104 MMOL/L (ref 98–107)
CO2 SERPL-SCNC: 23 MMOL/L (ref 21–32)
CREAT SERPL-MCNC: 1.31 MG/DL (ref 0.5–1.3)
DACRYOCYTES BLD QL SMEAR: ABNORMAL
EGFRCR SERPLBLD CKD-EPI 2021: 60 ML/MIN/1.73M*2
EOSINOPHIL # BLD MANUAL: 0 X10*3/UL (ref 0–0.7)
EOSINOPHIL NFR BLD MANUAL: 0 %
ERYTHROCYTE [DISTWIDTH] IN BLOOD BY AUTOMATED COUNT: 15.4 % (ref 11.5–14.5)
GLUCOSE SERPL-MCNC: 119 MG/DL (ref 74–99)
HAPTOGLOB SERPL NEPH-MCNC: 267 MG/DL (ref 30–200)
HCT VFR BLD AUTO: 22.9 % (ref 41–52)
HGB BLD-MCNC: 7.7 G/DL (ref 13.5–17.5)
IMM GRANULOCYTES # BLD AUTO: 0.44 X10*3/UL (ref 0–0.7)
IMM GRANULOCYTES NFR BLD AUTO: 5.8 % (ref 0–0.9)
LDH SERPL L TO P-CCNC: 293 U/L (ref 84–246)
LYMPHOCYTES # BLD MANUAL: 0 X10*3/UL (ref 1.2–4.8)
LYMPHOCYTES NFR BLD MANUAL: 0 %
MAGNESIUM SERPL-MCNC: 1.32 MG/DL (ref 1.6–2.4)
MCH RBC QN AUTO: 27.4 PG (ref 26–34)
MCHC RBC AUTO-ENTMCNC: 33.6 G/DL (ref 32–36)
MCV RBC AUTO: 82 FL (ref 80–100)
METAMYELOCYTES # BLD MANUAL: 0.38 X10*3/UL
METAMYELOCYTES NFR BLD MANUAL: 5 %
MONOCYTES # BLD MANUAL: 1.9 X10*3/UL (ref 0.1–1)
MONOCYTES NFR BLD MANUAL: 25 %
MYELOCYTES # BLD MANUAL: 0.15 X10*3/UL
MYELOCYTES NFR BLD MANUAL: 2 %
NEUTROPHILS # BLD MANUAL: 5.1 X10*3/UL (ref 1.2–7.7)
NEUTS BAND # BLD MANUAL: 0.08 X10*3/UL (ref 0–0.7)
NEUTS BAND NFR BLD MANUAL: 1 %
NEUTS SEG # BLD MANUAL: 5.02 X10*3/UL (ref 1.2–7)
NEUTS SEG NFR BLD MANUAL: 66 %
NRBC BLD-RTO: 0.3 /100 WBCS (ref 0–0)
OVALOCYTES BLD QL SMEAR: ABNORMAL
PLATELET # BLD AUTO: 115 X10*3/UL (ref 150–450)
POLYCHROMASIA BLD QL SMEAR: ABNORMAL
POTASSIUM SERPL-SCNC: 3.3 MMOL/L (ref 3.5–5.3)
PROT SERPL-MCNC: 7.4 G/DL (ref 6.4–8.2)
RBC # BLD AUTO: 2.81 X10*6/UL (ref 4.5–5.9)
RBC MORPH BLD: ABNORMAL
RH FACTOR (ANTIGEN D): NORMAL
SCHISTOCYTES BLD QL SMEAR: ABNORMAL
SODIUM SERPL-SCNC: 140 MMOL/L (ref 136–145)
TACROLIMUS BLD-MCNC: 14.4 NG/ML
TOTAL CELLS COUNTED BLD: 100
WBC # BLD AUTO: 7.6 X10*3/UL (ref 4.4–11.3)

## 2024-11-25 PROCEDURE — 83735 ASSAY OF MAGNESIUM: CPT

## 2024-11-25 PROCEDURE — 1157F ADVNC CARE PLAN IN RCRD: CPT | Performed by: INTERNAL MEDICINE

## 2024-11-25 PROCEDURE — 83010 ASSAY OF HAPTOGLOBIN QUANT: CPT

## 2024-11-25 PROCEDURE — 3078F DIAST BP <80 MM HG: CPT | Performed by: INTERNAL MEDICINE

## 2024-11-25 PROCEDURE — 99215 OFFICE O/P EST HI 40 MIN: CPT | Performed by: INTERNAL MEDICINE

## 2024-11-25 PROCEDURE — 1159F MED LIST DOCD IN RCRD: CPT | Performed by: INTERNAL MEDICINE

## 2024-11-25 PROCEDURE — 80053 COMPREHEN METABOLIC PANEL: CPT

## 2024-11-25 PROCEDURE — 80197 ASSAY OF TACROLIMUS: CPT

## 2024-11-25 PROCEDURE — 99215 OFFICE O/P EST HI 40 MIN: CPT | Mod: 25 | Performed by: INTERNAL MEDICINE

## 2024-11-25 PROCEDURE — 2500000004 HC RX 250 GENERAL PHARMACY W/ HCPCS (ALT 636 FOR OP/ED): Performed by: INTERNAL MEDICINE

## 2024-11-25 PROCEDURE — 1036F TOBACCO NON-USER: CPT | Performed by: INTERNAL MEDICINE

## 2024-11-25 PROCEDURE — 96366 THER/PROPH/DIAG IV INF ADDON: CPT | Mod: INF

## 2024-11-25 PROCEDURE — 87799 DETECT AGENT NOS DNA QUANT: CPT

## 2024-11-25 PROCEDURE — 86901 BLOOD TYPING SEROLOGIC RH(D): CPT

## 2024-11-25 PROCEDURE — 85007 BL SMEAR W/DIFF WBC COUNT: CPT

## 2024-11-25 PROCEDURE — 96365 THER/PROPH/DIAG IV INF INIT: CPT | Mod: INF

## 2024-11-25 PROCEDURE — 87533 HHV-6 DNA QUANT: CPT

## 2024-11-25 PROCEDURE — 1111F DSCHRG MED/CURRENT MED MERGE: CPT | Performed by: INTERNAL MEDICINE

## 2024-11-25 PROCEDURE — 1126F AMNT PAIN NOTED NONE PRSNT: CPT | Performed by: INTERNAL MEDICINE

## 2024-11-25 PROCEDURE — 85027 COMPLETE CBC AUTOMATED: CPT

## 2024-11-25 PROCEDURE — 83615 LACTATE (LD) (LDH) ENZYME: CPT

## 2024-11-25 PROCEDURE — 3075F SYST BP GE 130 - 139MM HG: CPT | Performed by: INTERNAL MEDICINE

## 2024-11-25 RX ORDER — POTASSIUM CHLORIDE 750 MG/1
20 CAPSULE, EXTENDED RELEASE ORAL 2 TIMES DAILY
Qty: 40 CAPSULE | Refills: 0 | Status: SHIPPED | OUTPATIENT
Start: 2024-11-25 | End: 2024-12-05

## 2024-11-25 RX ORDER — MAGNESIUM SULFATE HEPTAHYDRATE 40 MG/ML
4 INJECTION, SOLUTION INTRAVENOUS ONCE
Status: CANCELLED | OUTPATIENT
Start: 2024-11-25 | End: 2024-11-25

## 2024-11-25 RX ORDER — MAGNESIUM SULFATE HEPTAHYDRATE 40 MG/ML
4 INJECTION, SOLUTION INTRAVENOUS ONCE
Status: COMPLETED | OUTPATIENT
Start: 2024-11-25 | End: 2024-11-25

## 2024-11-25 ASSESSMENT — ENCOUNTER SYMPTOMS
SLEEP DISTURBANCE: 0
MYALGIAS: 0
VOMITING: 0
HEADACHES: 0
PALPITATIONS: 0
SHORTNESS OF BREATH: 1
FEVER: 0
WEAKNESS: 0
NERVOUS/ANXIOUS: 0
DIFFICULTY URINATING: 0
SINUS PAIN: 0
APPETITE CHANGE: 1
DIARRHEA: 1
ABDOMINAL PAIN: 0
DECREASED CONCENTRATION: 0
LIGHT-HEADEDNESS: 0
ACTIVITY CHANGE: 1
COUGH: 0
NAUSEA: 0
WHEEZING: 0
FATIGUE: 1
CONSTIPATION: 0
DIZZINESS: 0
ARTHRALGIAS: 0

## 2024-11-25 ASSESSMENT — PAIN SCALES - GENERAL: PAINLEVEL_OUTOF10: 0-NO PAIN

## 2024-11-25 NOTE — PROGRESS NOTES
Patient ID: Brandt Merritt is a 64 y.o. male.    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  His course was notable for poor po intake and weight loss and intermittent fevers all of which wer culture negative.  Since hospital discharge he feels wiped out. He was able to walk from the parking garage to the clinic without stopping, but stated he felt winded and needed 10 minutes to recover. Appetite is poor. He also reports white phlegm and thickened saliva. Loose stools once a day. Denies fevers/chills, CP, N/V, abdominal pain, or LE edema.       Review of Systems   Constitutional:  Positive for activity change, appetite change and fatigue. Negative for fever.   HENT:  Negative for congestion, ear pain, hearing loss and sinus pain.    Respiratory:  Positive for shortness of breath. Negative for cough and wheezing.    Cardiovascular:  Negative for chest pain, palpitations and leg swelling.   Gastrointestinal:  Positive for diarrhea. Negative for abdominal pain, constipation, nausea and vomiting.   Genitourinary:  Negative for difficulty urinating.   Musculoskeletal:  Negative for arthralgias and myalgias.   Skin:  Negative for rash.   Neurological:  Negative for dizziness, weakness, light-headedness and headaches.   Psychiatric/Behavioral:  Negative for decreased concentration and sleep disturbance. The patient is not nervous/anxious.        /79   Pulse 100   Temp 36.5 °C (97.7 °F)   Resp 17   Wt 60.9 kg (134 lb 4.2 oz)   SpO2 100%   BMI 21.99 kg/m²       Physical Exam  Vitals reviewed.   Constitutional:        Appearance: Normal appearance.      Comments: Temporal wasting, looks fatigued.   HENT:      Head: Normocephalic and atraumatic.      Mouth/Throat:      Mouth: Mucous membranes are moist.   Eyes:      Extraocular Movements: Extraocular movements intact.      Conjunctiva/sclera: Conjunctivae normal.      Pupils: Pupils are equal, round, and reactive to light.   Cardiovascular:      Rate and Rhythm: Normal rate and regular rhythm.      Pulses: Normal pulses.   Pulmonary:      Effort: Pulmonary effort is normal.      Breath sounds: Normal breath sounds.   Abdominal:      General: Abdomen is flat. Bowel sounds are normal.      Palpations: Abdomen is soft.   Musculoskeletal:         General: Normal range of motion.      Cervical back: Normal range of motion and neck supple.   Lymphadenopathy:      Comments: No lymphadenopathy   Skin:     General: Skin is warm and dry.      Findings: No lesion or rash.   Neurological:      General: No focal deficit present.      Mental Status: He is alert and oriented to person, place, and time. Mental status is at baseline.      Comments: No numbness or tingling   Psychiatric:         Mood and Affect: Mood normal.         Behavior: Behavior normal.         Thought Content: Thought content normal.         Judgment: Judgment normal.       Performance Status:  KPS 70     GVHD Assessment  Acute GVHD Overall Grade:      MONITORING:  TRANSPLANT ASSOCIATED THROMBOTIC MICROANGIOPATHY (TA-TMA)    Biopsy-proven disease (kidney or GI)  NO     OR use clinical criteria (must meet >= 4 of the following within 14 days at 2 consecutive time points    PARAMETER RECENT LAB VALUES CRITERIA MET   Anemia Lab Results   Component Value Date    HGB 8.1 (L) 11/23/2024    HGB 7.7 (L) 11/21/2024    HGB 7.6 (L) 11/20/2024    HGB 7.6 (L) 11/19/2024    HGB 8.3 (L) 11/18/2024    None   Thrombocytopenia Lab Results   Component Value Date    PLT 68 (L) 11/23/2024    PLT 44 (L) 11/21/2024    PLT 38 (LL) 11/20/2024    PLT  28 (LL) 11/19/2024    PLT 41 (L) 11/18/2024    None   Elevated LDH >= ULN Lab Results   Component Value Date     11/18/2024     11/11/2024     11/04/2024     10/28/2024     10/21/2024     No   Shistocytes  Not assessed   Hypertension (SBP )140 or DBP >90) BP Readings from Last 5 Encounters:   11/25/24 133/79   11/23/24 112/67   11/21/24 115/71   11/05/24 107/68   09/13/24 147/82     No   Elevated sC5b-9 >= ULN  Not assessed   Proteinuria >= 1 random U Prot/Creat ratio Lab Results   Component Value Date    UTPCR 0.28 (H) 11/23/2024    UTPCR 0.35 (H) 09/30/2024     No     Assessment & Plan  MDS (myelodysplastic syndrome) (Multi)      Oncology History Overview Note   4/2024  Myelodysplastic neoplasm with increased blasts-2 ( WHO)  Myelodysplastic neoplasm/AML  (ICC 2022 classification)    Presented in  10/2023 for pancytopenia, found to have hemolysis. Patient had normal CBC June 2020. Denied any hemorrhoidal bleeding . Has had C Scope and EGD , treated Hep C 2014 . 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      CBC 4/11/24 wbc 3.0, hgb 7.1, platelets 167K ANC 0.73    BM biopsy done on 4/11/24  as folllows:  BM histology  Myelodysplastic neoplasm with increased blasts-2 ( WHO)  Myelodysplastic neoplasm/AML  (ICC 2022 classification)  Balsts 11% on aspirate smear and 15-20% based on CD 34 immunostaining approaching AML leukemia, hematooiesis is erythroid dominant with dysplasia in granulocytes and megakaryocytes.   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      11/6/2024 -  Bone Marrow Transplant    conditioned with Flu/Cy/TBI and aGVHD prophylaxis with post-transplant cyclophosphamide, tacrolimus, and mycophenolate. T=0, 11/6/24. ABO Donor/Recipient: O+, A+. CMV donor/recipient: both positive. Started Tacro and MMF on T+5 (11/11).          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   - s/p 4 cycles of Decitabine/Venetoclax. BM Bx 6/17/24: Blasts cleared, FISH still positive for trisomy 8, still MDS changes   - BMBx (9/5/24): 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 and no increase in blasts     11/25/24: No s/s of disease. Counts show engraftment. Next disease assessment with BmBx at T+30 (scheduled for 12/9/24) with chimerism.     History of allogeneic hematopoietic stem cell transplant  #1: Single-unit Cord, T0=9/19/24, Primary Engraftment Failure  - Conditioning: Flu-Mariana-TBI  - Donor: Single Cord, 6/8  = ABO Donor / Recipient: A+ / A+  = CMV Donor / Recipient: - / +  - aGVHD Prophylaxis: Tacrolimus + MMF  - engraftment failure, developed HLA antibody against class I of cord  - Repeat BMBx (10/15): hypocellular with no residual myeloid neoplasm. 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), T0=11/6/24  - Graft: Haploidentical sister  = ABO Donor / Recipient: O+ / A+ (ABO incompatibility +)  = CMV Donor / Recipient: + / +  - Conditioning: Flu/Cy/TBI/rATG  = GVHD prophylaxis -  rATG 1.5mg/kg T-5,-3,-1, PTCy (25mg/kg T+3, T+4), Tacro, MMF (T+5)     HEME  Transfuse for Hgb <7 & PLT <10       GVHD  No signs or symptoms of aGVHD today  Continue Tacro (1.5 mg AM / 1 mg PM) and MMF   - Tacro 9.9 on 11/23/24   Level 11/25/24 14.4 not a  trough ( confirmed with wife)     Immunocompromised  Prophylaxis:   Antiviral prophylaxis: acyclovir, Letermovir  Antifungal: posaconazole  PCP prophylaxis: Bactrim to begin closer to day 30 (although with count recovery may start next week)     IgG level. 1230 11/19/24     Active Surveillance:    CMV PCR: ND 11/23  EBV PCR: ND 11/18  Adenovirus: ND 11/18  HHV6: 900 11/18     EBV Viremia during transplant admission  - Treated despite low levels due to upcoming second SCT  - Rituximab 600 mg x 1 (10/31/24)     Immunizations:   Covid vaccine series. Consider at T+ 3 months  Seasonal influenza vaccine. Consider at T+ 3 months     Will plan to begin immunizations at T+6mths (May 2025) depending on degree of immunosuppression     Infectious Disease follow up evaluation:  request pending as of 11/23/24     Immunodeficiency panel 100 days, 6mos and 1 year.     Essential hypertension  Continue nifedipine 60 mEq ER 24 hr tablet   Cardiology and ECHO request, scheduled 12/18/24     Ascending aorta dilation   - TTE (4/29/24): 3.8-4 cm dilation      Hx of STEMI (11/11/2020) has cardiology folllowup 12/18/24    Tremor  Most likely due to Tacrolimus   If  no improvement, could consider Neurology consult    Electrolyte disturbance    Continue oral magnesium TID and potassium BID  Given IV mag today    Lack of appetite  Mirtazapine 15 mg at night    Loose Stools  PRN imodium    Renal failure creat up to 1.3 today,  will advise to increase po fluids    RTC:  M/W/F count checks and fu with Dr. Newsome in 1 week    Patient seen and discussed with attending physician, Dr. Newsome, who agrees with the above.    Attending I have examined patient with Dr. Schaefer and revised, appended note where appropriate    Dhruv Schaefer MD  Heme/Onc Fellow, PGY6

## 2024-11-25 NOTE — PROGRESS NOTES
"NUTRITION Assessment NOTE    Reason for Visit:  Brandt Merritt is a 64 y.o. 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    PMH: HTN, Hep C    Currently T+67 (cord); T+19 (haplo)    Pt seen today in infusion.     Lab Results   Component Value Date/Time    GLUCOSE 112 (H) 11/23/2024 0834     11/23/2024 0834    K 3.3 (L) 11/23/2024 0834     11/23/2024 0834    CO2 23 11/23/2024 0834    ANIONGAP 13 11/23/2024 0834    BUN 8 11/23/2024 0834    CREATININE 0.80 11/23/2024 0834    EGFR >90 11/23/2024 0834    CALCIUM 9.2 11/23/2024 0834    ALBUMIN 3.7 11/23/2024 0834    ALKPHOS 102 11/23/2024 0834    PROT 6.9 11/23/2024 0834    AST 12 11/23/2024 0834    BILITOT 0.4 11/23/2024 0834    ALT 3 (L) 11/23/2024 0834     Lab Results   Component Value Date    WBC 6.7 11/23/2024    HGB 8.1 (L) 11/23/2024    HCT 23.9 (L) 11/23/2024    MCV 82 11/23/2024    PLT 68 (L) 11/23/2024       Lab Results   Component Value Date/Time    VITD25 12 (A) 09/19/2023 1646       Anthropometrics:  Anthropometrics  Height: 166.4 cm (5' 5.51\")  Weight: 60.9 kg (134 lb 4.2 oz)  BMI (Calculated): 21.99  IBW/kg (Dietitian Calculated): 63.2 kg  Percent of IBW: 96 %    *Wt at time of transplant admit: (9/13) 75.6 kg   *Wt at first post-transplant visit: (11/25): 60.9 kg     *Wt down ~15 kg (19%) x 2.5 mos     Wt Readings from Last 10 Encounters:   11/25/24 60.9 kg (134 lb 4.2 oz)   11/25/24 60.9 kg (134 lb 4.2 oz)   11/17/24 62.9 kg (138 lb 10.7 oz)   09/12/24 74.3 kg (163 lb 12.8 oz)   09/09/24 74.2 kg (163 lb 9.3 oz)   09/05/24 73.6 kg (162 lb 4.1 oz)   08/29/24 74.4 kg (164 lb 0.4 oz)   08/16/24 75.3 kg (166 lb 0.1 oz)   08/15/24 76.4 kg (168 lb 6.9 oz)   08/14/24 75.4 kg (166 lb 3.6 oz)   07/19/24        74.2 kg   06/24/24        71.0 kg  05/20/24        72.8 kg  04/23/24        75.5 kg     Food And Nutrient Intake:      Appetite still not good   Ordered Remeron on " "Saturday but hasn't picked up yet   Not really hungry  Eating because he knows he needs to eat  Reports taste changes; everything tastes \"bland\"  Only Pepsi tastes normal   Not eating full meals--just grabs things here and there; has not eaten anything so far at 3 pm  Drinking fluids--Pepsi and water   Gets full from meds   Tired of supplements  Some nausea--takes meds only PRN  Still with diarrhea 1x/day--loose but not watery   Can walk from here to parking garage before getting tired  Napping during the day       Last night had some BBQ chicken                                                                  Nutrition Focused Physical Exam Findings:                          Energy Needs  Calculated Energy Needs Using Equations  Height: 166.4 cm (5' 5.51\")  Estimated Energy Needs  Total Energy Estimated Needs (kCal):  (8708-3274)  Total Estimated Energy Need per Day (kCal/kg):  (30-35)  Estimated Fluid Needs  Total Fluid Estimated Needs (mL):  (1800+)  Total Fluid Estimated Needs (mL/kg):  (30+)  Estimated Protein Needs  Total Protein Estimated Needs (g):  (75-85)  Total Protein Estimated Needs (g/kg):  (1.2-1.4)        Nutrition Diagnosis             Nutrition Interventions/Recommendations   Nutrition Prescription   High Protein, High Calorie  Food Safety     Nutrition Education   Reviewed importance of protein and food sources of protein  Encourage smaller more frequent meals and snack every 2 hrs; avoid skipping meals or going long periods without eating anything  Provided High calorie snack list  Use Caffeine free Pepsi vs regular; beverages with calories including lemonade, juices, etc  Milkshakes or smoothies daily as pt does not care for ONS  Discussed tips for managing taste changes--has lemon hard candies; suggested condiments, seasonings to help enhance flavors    Coordination of Care           Nutrition Monitoring and Evaluation                         "

## 2024-11-25 NOTE — PROGRESS NOTES
Brandt Merritt is a 64 y.o. male who presents in stable condition for count check after seeing his provider this afternoon.     He is on the following chemotherapy regimen:     Treatment Plans       Name Type Plan Dates Plan Provider         Active    (CELL - HAPLO) Fludarabine / Cyclophosphamide / Total Body Irradiation and Antithymocyte Globulin (rabbit) with Post-transplant Cyclophosphamide Oncology Treatment 10/31/2024 - Present Malaika Newsome MD             Since his last visit, he has been doing fair.  Overall, he states his energy level is stable.  His appetite & hydration status has been unchanged.  He reports fatigue.  He has no other concerns.     4gm Magnesium given over 2 hours. Patient will continue to take his potassium as directed.     Patient tolerated infusion well and has been educated with the overall therapy plan. Questions & concerns addressed by her provider during visit. AVS, lab results & future appointment provided. Patient discharged in stable condition with his wife.     Reviewed and approved by VALERIO RAY on 11/25/24 at 5:41 PM.

## 2024-11-26 ENCOUNTER — TELEPHONE (OUTPATIENT)
Dept: HEMATOLOGY/ONCOLOGY | Facility: HOSPITAL | Age: 64
End: 2024-11-26
Payer: COMMERCIAL

## 2024-11-26 NOTE — TELEPHONE ENCOUNTER
Per Dr. Newsome patient's tacro level was 14.  She is going to decrease patient's dose to Tacrolimus 1mg two times daily.  Also per Dr. Newsome patient's creatinine levels were elevated.  She would like him to make sure he is drinking plenty of fluids.  This RN spoke with patient and reviewed this information with him.  Patient confirmed understanding.  Patient has no further questions at this time.

## 2024-11-27 ENCOUNTER — APPOINTMENT (OUTPATIENT)
Dept: HEMATOLOGY/ONCOLOGY | Facility: HOSPITAL | Age: 64
End: 2024-11-27
Payer: COMMERCIAL

## 2024-11-27 ENCOUNTER — OFFICE VISIT (OUTPATIENT)
Dept: HEMATOLOGY/ONCOLOGY | Facility: HOSPITAL | Age: 64
End: 2024-11-27
Payer: COMMERCIAL

## 2024-11-27 ENCOUNTER — INFUSION (OUTPATIENT)
Dept: HEMATOLOGY/ONCOLOGY | Facility: HOSPITAL | Age: 64
End: 2024-11-27
Payer: COMMERCIAL

## 2024-11-27 VITALS
TEMPERATURE: 99.5 F | WEIGHT: 131.17 LBS | BODY MASS INDEX: 21.49 KG/M2 | DIASTOLIC BLOOD PRESSURE: 71 MMHG | OXYGEN SATURATION: 100 % | RESPIRATION RATE: 18 BRPM | HEART RATE: 94 BPM | SYSTOLIC BLOOD PRESSURE: 120 MMHG

## 2024-11-27 DIAGNOSIS — Z94.84 STEM CELLS TRANSPLANT STATUS (MULTI): ICD-10-CM

## 2024-11-27 DIAGNOSIS — D46.9 MDS (MYELODYSPLASTIC SYNDROME) (MULTI): ICD-10-CM

## 2024-11-27 DIAGNOSIS — C92.00 ACUTE MYELOID LEUKEMIA NOT HAVING ACHIEVED REMISSION (MULTI): ICD-10-CM

## 2024-11-27 DIAGNOSIS — C92.01 ACUTE MYELOID LEUKEMIA IN REMISSION (MULTI): ICD-10-CM

## 2024-11-27 LAB
ABO GROUP (TYPE) IN BLOOD: NORMAL
ADENOVIRUS QPCR,PLASMA, VIRC: NOT DETECTED COPIES/ML
ALBUMIN SERPL BCP-MCNC: 3.7 G/DL (ref 3.4–5)
ALP SERPL-CCNC: 75 U/L (ref 33–136)
ALT SERPL W P-5'-P-CCNC: 4 U/L (ref 10–52)
ANION GAP SERPL CALC-SCNC: 16 MMOL/L (ref 10–20)
ANTIBODY SCREEN: NORMAL
AST SERPL W P-5'-P-CCNC: 11 U/L (ref 9–39)
BASOPHILS # BLD AUTO: 0.07 X10*3/UL (ref 0–0.1)
BASOPHILS NFR BLD AUTO: 0.9 %
BILIRUB SERPL-MCNC: 0.6 MG/DL (ref 0–1.2)
BUN SERPL-MCNC: 10 MG/DL (ref 6–23)
CALCIUM SERPL-MCNC: 9 MG/DL (ref 8.6–10.3)
CHIMERISM INTERPRETATION: NORMAL
CHLORIDE SERPL-SCNC: 103 MMOL/L (ref 98–107)
CMV DNA SERPL NAA+PROBE-LOG IU: NORMAL {LOG_IU}/ML
CO2 SERPL-SCNC: 23 MMOL/L (ref 21–32)
CREAT SERPL-MCNC: 1.14 MG/DL (ref 0.5–1.3)
CREAT UR-MCNC: 374.3 MG/DL (ref 20–370)
EBV DNA SPEC NAA+PROBE-LOG#: NORMAL {LOG_COPIES}/ML
EGFRCR SERPLBLD CKD-EPI 2021: 71 ML/MIN/1.73M*2
ELECTRONICALLY SIGNED BY: NORMAL
EOSINOPHIL # BLD AUTO: 0 X10*3/UL (ref 0–0.7)
EOSINOPHIL NFR BLD AUTO: 0 %
ERYTHROCYTE [DISTWIDTH] IN BLOOD BY AUTOMATED COUNT: 15.7 % (ref 11.5–14.5)
GLUCOSE SERPL-MCNC: 113 MG/DL (ref 74–99)
HAPTOGLOB SERPL NEPH-MCNC: 308 MG/DL (ref 30–200)
HCT VFR BLD AUTO: 22.6 % (ref 41–52)
HGB BLD-MCNC: 7.4 G/DL (ref 13.5–17.5)
HUMAN HERPESVIRUS-6 PCR PLASMA: NOT DETECTED COPIES/ML
IMM GRANULOCYTES # BLD AUTO: 0.33 X10*3/UL (ref 0–0.7)
IMM GRANULOCYTES NFR BLD AUTO: 4.4 % (ref 0–0.9)
LABORATORY COMMENT REPORT: NOT DETECTED
LABORATORY COMMENT REPORT: NOT DETECTED
LDH SERPL L TO P-CCNC: 257 U/L (ref 84–246)
LYMPHOCYTES # BLD AUTO: 0.63 X10*3/UL (ref 1.2–4.8)
LYMPHOCYTES NFR BLD AUTO: 8.5 %
MAGNESIUM SERPL-MCNC: 1.46 MG/DL (ref 1.6–2.4)
MCH RBC QN AUTO: 27.4 PG (ref 26–34)
MCHC RBC AUTO-ENTMCNC: 32.7 G/DL (ref 32–36)
MCV RBC AUTO: 84 FL (ref 80–100)
MONOCYTES # BLD AUTO: 2.61 X10*3/UL (ref 0.1–1)
MONOCYTES NFR BLD AUTO: 35 %
NEUTROPHILS # BLD AUTO: 3.81 X10*3/UL (ref 1.2–7.7)
NEUTROPHILS NFR BLD AUTO: 51.2 %
NRBC BLD-RTO: 0 /100 WBCS (ref 0–0)
PLATELET # BLD AUTO: 199 X10*3/UL (ref 150–450)
POTASSIUM SERPL-SCNC: 3.6 MMOL/L (ref 3.5–5.3)
PROT SERPL-MCNC: 7.1 G/DL (ref 6.4–8.2)
RBC # BLD AUTO: 2.7 X10*6/UL (ref 4.5–5.9)
RH FACTOR (ANTIGEN D): NORMAL
SODIUM SERPL-SCNC: 138 MMOL/L (ref 136–145)
TACROLIMUS BLD-MCNC: 8.2 NG/ML
URATE SERPL-MCNC: 3.5 MG/DL (ref 4–7.5)
WBC # BLD AUTO: 7.5 X10*3/UL (ref 4.4–11.3)

## 2024-11-27 PROCEDURE — 87799 DETECT AGENT NOS DNA QUANT: CPT

## 2024-11-27 PROCEDURE — 80197 ASSAY OF TACROLIMUS: CPT

## 2024-11-27 PROCEDURE — 96365 THER/PROPH/DIAG IV INF INIT: CPT | Mod: INF

## 2024-11-27 PROCEDURE — 82570 ASSAY OF URINE CREATININE: CPT

## 2024-11-27 PROCEDURE — 83735 ASSAY OF MAGNESIUM: CPT

## 2024-11-27 PROCEDURE — 84550 ASSAY OF BLOOD/URIC ACID: CPT

## 2024-11-27 PROCEDURE — 85025 COMPLETE CBC W/AUTO DIFF WBC: CPT

## 2024-11-27 PROCEDURE — 2500000004 HC RX 250 GENERAL PHARMACY W/ HCPCS (ALT 636 FOR OP/ED): Performed by: NURSE PRACTITIONER

## 2024-11-27 PROCEDURE — 83615 LACTATE (LD) (LDH) ENZYME: CPT

## 2024-11-27 PROCEDURE — 87533 HHV-6 DNA QUANT: CPT

## 2024-11-27 PROCEDURE — 83010 ASSAY OF HAPTOGLOBIN QUANT: CPT

## 2024-11-27 PROCEDURE — 80053 COMPREHEN METABOLIC PANEL: CPT

## 2024-11-27 PROCEDURE — 99215 OFFICE O/P EST HI 40 MIN: CPT | Mod: 25 | Performed by: NURSE PRACTITIONER

## 2024-11-27 PROCEDURE — 86901 BLOOD TYPING SEROLOGIC RH(D): CPT

## 2024-11-27 RX ORDER — MAGNESIUM SULFATE HEPTAHYDRATE 40 MG/ML
2 INJECTION, SOLUTION INTRAVENOUS ONCE
Status: CANCELLED | OUTPATIENT
Start: 2024-11-27 | End: 2024-11-27

## 2024-11-27 RX ORDER — HEPARIN 100 UNIT/ML
500 SYRINGE INTRAVENOUS AS NEEDED
OUTPATIENT
Start: 2024-11-27

## 2024-11-27 RX ORDER — HEPARIN SODIUM,PORCINE/PF 10 UNIT/ML
50 SYRINGE (ML) INTRAVENOUS AS NEEDED
OUTPATIENT
Start: 2024-11-27

## 2024-11-27 RX ORDER — ONDANSETRON HYDROCHLORIDE 8 MG/1
8 TABLET, FILM COATED ORAL EVERY 8 HOURS
Qty: 30 TABLET | Refills: 5 | Status: SHIPPED | OUTPATIENT
Start: 2024-11-27

## 2024-11-27 RX ORDER — MAGNESIUM SULFATE HEPTAHYDRATE 40 MG/ML
2 INJECTION, SOLUTION INTRAVENOUS ONCE
Status: COMPLETED | OUTPATIENT
Start: 2024-11-27 | End: 2024-11-27

## 2024-11-27 ASSESSMENT — ENCOUNTER SYMPTOMS
HEMATOLOGIC/LYMPHATIC NEGATIVE: 1
EXTREMITY WEAKNESS: 1
APPETITE CHANGE: 1
CARDIOVASCULAR NEGATIVE: 1
FATIGUE: 1
COUGH: 1
ENDOCRINE NEGATIVE: 1
NAUSEA: 1
PSYCHIATRIC NEGATIVE: 1
EYES NEGATIVE: 1

## 2024-11-27 NOTE — PROGRESS NOTES
Patient arrived ambulatory to infusion for scheduled tx of post SCT count check. Seen by Alma Raymond CNP. Mg+1.46- 2g IV given replacement per order. Pt to return to clinic Friday, type and screen sent. Tolerated infusion without issue. Given updated med list by pharmacist. Discharged in stable condition.

## 2024-11-27 NOTE — PROGRESS NOTES
POST-CELLULAR THERAPY ONCOLOGY PHARMACY MEDICATION EDUCATION NOTE   Brandt Merritt is a 64 y.o. male currently day +21 following haplo-identical allogeneic stem cell transplant for a diagnosis of AML. Pharmacist was consulted to provide home medication education.     Lab Results   Component Value Date    WBC 7.5 11/27/2024    NEUTROABS 3.81 11/27/2024    HGB 7.4 (L) 11/27/2024     11/27/2024      Lab Results   Component Value Date    GLUCOSE 113 (H) 11/27/2024    K 3.6 11/27/2024    MG 1.46 (L) 11/27/2024    CREATININE 1.14 11/27/2024       Medication Education: Education was provided regarding all home medication changes. In addition, patient was given written daily medication schedule/calendar. The schedule was discussed in detail with the patient, including drug name, use and dose, and the appropriate timing of self-administration.   - Medication changes: scheduled ondansetron    The patient demonstrated Level of Understanding : Good for their current medication list.    All questions were answered. Patient/family verbalized understanding of the plan of care and information provided. Will follow as necessary. Time spent with patient/family and/or coordinating care: 30 minutes.      Radha Cruz, PharmD, [facility]  Ambulatory Stem Cell Transplant Transplant Pharmacist    Current Outpatient Medications   Medication Instructions    acyclovir (ZOVIRAX) 400 mg, oral, 2 times daily    folic acid (FOLVITE) 1 mg, oral, Daily    letermovir (PREVYMIS) 480 mg, oral, Daily    loperamide (IMODIUM) 2 mg, oral, 4 times daily PRN    Mag 64 128 mg, oral, 3 times daily, Or as instructed on your medication list.    mirtazapine (REMERON) 15 mg, oral, Nightly    mycophenolate (CELLCEPT) 1,000 mg, oral, 3 times daily    NIFEdipine ER (ADALAT CC) 60 mg, oral, Daily before breakfast, Do not crush, chew, or split.    ondansetron (ZOFRAN) 8 mg, oral, Every 8 hours    Oyster Shell Calcium 500 1,250 mg, oral, 3 times daily  (morning, midday, late afternoon)    posaconazole (NOXAFIL) 300 mg, oral, Daily, Do not crush, chew, or split. This is to prevent fungal infections.    potassium chloride ER (Micro-K) 10 mEq ER capsule 20 mEq, oral, 2 times daily, Do not crush or chew.    prochlorperazine (COMPAZINE) 10 mg, oral, Every 6 hours PRN    sennosides-docusate sodium (Karissa-Colace) 8.6-50 mg tablet 1 tablet, oral, Daily    sulfamethoxazole-trimethoprim (Bactrim DS) 800-160 mg tablet 1 tablet, oral, 3 times weekly, Take on Mondays, Wednesdays, and Fridays. Do not start until instructed.    tacrolimus (PROGRAF) 0.5 mg, oral, Every 12 hours, Or as instructed on your medication list.    tacrolimus (PROGRAF) 2 mg, oral, Every 12 hours, Or as instructed on your medication list.    ursodiol (ACTIGALL) 300 mg, oral, 3 times daily

## 2024-11-27 NOTE — PROGRESS NOTES
Patient ID:  Brandt Merritt is a 64 y.o. male.  Referring Physician:   ONC Dr Newsome  Primary Care Provider:  Bill Richmond MD    Assessment/Plan      11/27/24 T+69 (9/19/24); T+21 (11/6/24). Anorexia. Started mirtazapine last deisi. Add 3x day Zofran. 32# weight loss since SCT pre-admission weight 9/9 74.2kg->59.5kg. If sx persist, consider budesonide. Start Bactrim on 11/29.    Oncology History Overview Note   4/2024  Myelodysplastic neoplasm with increased blasts-2 ( WHO)  Myelodysplastic neoplasm/AML  (ICC 2022 classification)    Presented in  10/2023 for pancytopenia, found to have hemolysis. Patient had normal CBC June 2020. Denied any hemorrhoidal bleeding . Has had C Scope and EGD , treated Hep C 2014 . 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      CBC 4/11/24 wbc 3.0, hgb 7.1, platelets 167K ANC 0.73    BM biopsy done on 4/11/24  as folllows:  BM histology  Myelodysplastic neoplasm with increased blasts-2 ( WHO)  Myelodysplastic neoplasm/AML  (ICC 2022 classification)  Balsts 11% on aspirate smear and 15-20% based on CD 34 immunostaining approaching AML leukemia, hematooiesis is erythroid dominant with dysplasia in granulocytes and megakaryocytes.   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      11/6/2024 -  Bone Marrow Transplant    conditioned with Flu/Cy/TBI and aGVHD prophylaxis with post-transplant cyclophosphamide, tacrolimus, and  mycophenolate. T=0, 11/6/24. ABO Donor/Recipient: O+, A+. CMV donor/recipient: both positive. Started Tacro and MMF on T+5 (11/11).         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     s/p 4 cycles of Decitabine/Venetoclax. BM Bx 6/17/24: Blasts cleared, FISH still positive for trisomy 8, still MDS changes   BMBx (9/5/24): 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 and no increase in blasts     History of allogeneic hematopoietic stem cell transplant  #1: Single-unit Cord, T0=9/19/24, Primary Engraftment Failure  - Conditioning: Flu-Mariana-TBI  - Donor: Single Cord, 6/8  = ABO Donor / Recipient: A+ / A+  = CMV Donor / Recipient: - / +  - aGVHD Prophylaxis: Tacrolimus + MMF  - engraftment failure, developed HLA antibody against class I of cord  - Repeat BMBx (10/15): hypocellular with no residual myeloid neoplasm. 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), T0=11/6/24  - Graft: Haploidentical sister  = ABO Donor / Recipient: O+ / A+ (ABO incompatibility +)  = CMV Donor / Recipient: + / +  - Conditioning: Flu/Cy/TBI/rATG  = GVHD prophylaxis -  rATG 1.5mg/kg T-5,-3,-1, PTCy (25mg/kg T+3, T+4), Tacro, MMF (T+5)     DATE DAY SOURCE MORPHOLOGY MRD WHOLE CHIMERISM   (% donor) CD3 CHIMERISM   (% donor) CD33 CHIMERISM   (% donor)   11/20/24 D+30 PB      100%     12/9/24 D+30 BM               D+60 PB               D+100 PB               D+100 BM                Monitor for post-transplant hemolysis   11/27/24  Haptoglobin 308 11/27/24  UPC ratio 0.28 11/23/24    GVHD prophylaxis  GVHD  Anorexia/poor oral intake/weight loss. Added Mirtazapine. Start ATC Zofran. If persists, consider aGVHD.  Continue Tacro 1mg bid As of 11/26 deisi with 11/27 Level 8.2.  Cont MMF until day 35.    Weights  Pre-transplant: 9/9 74.2kg   Upon SCT discharge:  11/25 60.9kg  Today's weight:59.5kg      Infectious Disease & Immune Reconstitution     Prophylaxis  Antiviral prophylaxis: acyclovir, Letermovir  Antifungal: posaconazole  PCP prophylaxis: 10/26/24 Pentamidine; anticipate Bactrim beginning 11/29     Hypogammaglobulinemia  IgG level. IVIG for level <400   [id number] 11/19/24     Active Surveillance:     CMV PCR: ND 11/27  EBV PCR: ND 11/27  Adenovirus: ND 11/18  HHV6: 900 11/18     EBV Viremia during transplant admission  Treated despite low levels due to upcoming second SCT  Rituximab 600 mg x 1 (10/31/24)     Immunizations  Covid vaccine series   Seasonal influenza vaccine   Will plan to begin immunizations at T+6mths ### depending on degree of immunosuppression     Infectious Disease follow up evaluation: Requested/not yet scheduled    Immunodeficiency panel 100 days, 6mos and 1 year.     Immunizations:   Covid vaccine series. Consider at T+ 3 months  Seasonal influenza vaccine. Consider at T+ 3 month  Will plan to begin immunizations at T+6mths (May 2025) depending on degree of immunosuppression     Cardiac:    Essential hypertension  Continue nifedipine 60 mEq ER 24 hr tablet   Cardiology 12/18 and ECHO 12/23     Ascending aorta dilation   TTE (4/29/24): 3.8-4 cm dilation      Hx of STEMI (11/11/2020) has cardiology folllowup 12/18/24     Tremor  Most likely due to Tacrolimus   If  no improvement, could consider Neurology consult     Electrolyte disturbance  Continue oral magnesium TID and potassium BID  2g IV mag today     Lack of appetite  Mirtazapine 15 mg at nightas of 11/26     Loose Stools  PRN imodium    Medication reconciliation  Has all prescribed meds. Instructed to bring list to each visit. Please update regularly. New list and reconciliation completed 11/27/24.    IV Access  L PICC line    Psychosocial.    Caregiver Ananya  [facility] in [place]    Subjective    History of Present Illness:  Patient presents today 11/27/24, accompanied by his wife, for  "follow up after prolonged hospitalization for umbilical  cord blood transplant 10/16/24 with graft rejection followed by haploidentical cord from his sister on 11/6/24 with flu/cy, ATG and modified dose cyclophosphamide with engraftment on day T+8.       Started mirtazepine last night. Drinking ~2L Pepsi. Couple cups of water. \"Lost a lot weight.\" Did not eat anything yesterday. \"Some nausea bc spitting a lot. Lots of phlegm.\" Rinses out mouth. No vomiting. Diarrhea 1x day before yesterday.    Energy level fair. Sleeping a lot during the day. He was able to walk from the parking garage to the clinic without stopping, but stated he felt winded and needed 10 minutes to recover.     Wife Ananya. Lives in [place].           Review of Systems   Constitutional:  Positive for appetite change and fatigue.   HENT:  Negative.     Eyes: Negative.    Respiratory:  Positive for cough (With phlegm).    Cardiovascular: Negative.    Gastrointestinal:  Positive for nausea.   Endocrine: Negative.    Genitourinary: Negative.     Skin: Negative.    Neurological:  Positive for extremity weakness (legs).   Hematological: Negative.    Psychiatric/Behavioral: Negative.              Objective        Physical Exam  Vitals reviewed.   Constitutional:       Appearance: Normal appearance.      Comments: Thin.  Lying in bed.    HENT:      Head: Normocephalic and atraumatic.      Nose: Nose normal.      Mouth/Throat:      Mouth: Mucous membranes are moist.   Eyes:      Pupils: Pupils are equal, round, and reactive to light.   Cardiovascular:      Rate and Rhythm: Normal rate and regular rhythm.      Pulses: Normal pulses.      Heart sounds: Normal heart sounds.   Pulmonary:      Effort: Pulmonary effort is normal.      Breath sounds: Normal breath sounds.   Abdominal:      General: Abdomen is flat. Bowel sounds are normal.      Palpations: Abdomen is soft.   Musculoskeletal:         General: Normal range of motion.   Skin:     General: Skin is warm " and dry.      Comments: PICC dressing dry and intact.    Neurological:      General: No focal deficit present.      Mental Status: He is alert and oriented to person, place, and time.   Psychiatric:         Mood and Affect: Mood normal.       RTC  11/29 12/2, 12/4, 12/6 12/9 with bmbx, 12/11, 12/13 12/16 Dr Newsome,, 12/18 Dr Jones, 12/20 12/23 with ECHO, 12/26 12/30

## 2024-11-29 ENCOUNTER — OFFICE VISIT (OUTPATIENT)
Dept: HEMATOLOGY/ONCOLOGY | Facility: HOSPITAL | Age: 64
End: 2024-11-29
Payer: COMMERCIAL

## 2024-11-29 ENCOUNTER — NUTRITION (OUTPATIENT)
Dept: HEMATOLOGY/ONCOLOGY | Facility: HOSPITAL | Age: 64
End: 2024-11-29
Payer: COMMERCIAL

## 2024-11-29 ENCOUNTER — INFUSION (OUTPATIENT)
Dept: HEMATOLOGY/ONCOLOGY | Facility: HOSPITAL | Age: 64
End: 2024-11-29
Payer: COMMERCIAL

## 2024-11-29 VITALS
RESPIRATION RATE: 16 BRPM | WEIGHT: 130 LBS | SYSTOLIC BLOOD PRESSURE: 142 MMHG | OXYGEN SATURATION: 100 % | BODY MASS INDEX: 21.3 KG/M2 | TEMPERATURE: 99 F | HEART RATE: 81 BPM | DIASTOLIC BLOOD PRESSURE: 78 MMHG

## 2024-11-29 VITALS — WEIGHT: 130.07 LBS | BODY MASS INDEX: 20.9 KG/M2 | HEIGHT: 66 IN

## 2024-11-29 DIAGNOSIS — D46.9 MDS (MYELODYSPLASTIC SYNDROME) (MULTI): ICD-10-CM

## 2024-11-29 DIAGNOSIS — C92.01 ACUTE MYELOID LEUKEMIA IN REMISSION (MULTI): ICD-10-CM

## 2024-11-29 DIAGNOSIS — Z94.84 STEM CELLS TRANSPLANT STATUS (MULTI): ICD-10-CM

## 2024-11-29 DIAGNOSIS — D61.818 PANCYTOPENIA: ICD-10-CM

## 2024-11-29 DIAGNOSIS — C92.00 ACUTE MYELOID LEUKEMIA NOT HAVING ACHIEVED REMISSION (MULTI): ICD-10-CM

## 2024-11-29 LAB
ABO GROUP (TYPE) IN BLOOD: NORMAL
ALBUMIN SERPL BCP-MCNC: 3.7 G/DL (ref 3.4–5)
ALP SERPL-CCNC: 74 U/L (ref 33–136)
ALT SERPL W P-5'-P-CCNC: 8 U/L (ref 10–52)
ANION GAP SERPL CALC-SCNC: 17 MMOL/L (ref 10–20)
ANTIBODY SCREEN: NORMAL
APPEARANCE UR: ABNORMAL
AST SERPL W P-5'-P-CCNC: 15 U/L (ref 9–39)
BASOPHILS # BLD AUTO: 0.08 X10*3/UL (ref 0–0.1)
BASOPHILS NFR BLD AUTO: 0.9 %
BILIRUB SERPL-MCNC: 0.7 MG/DL (ref 0–1.2)
BILIRUB UR STRIP.AUTO-MCNC: NEGATIVE MG/DL
BLOOD EXPIRATION DATE: NORMAL
BUN SERPL-MCNC: 21 MG/DL (ref 6–23)
CALCIUM SERPL-MCNC: 8.9 MG/DL (ref 8.6–10.3)
CHLORIDE SERPL-SCNC: 103 MMOL/L (ref 98–107)
CO2 SERPL-SCNC: 24 MMOL/L (ref 21–32)
COLOR UR: YELLOW
CREAT SERPL-MCNC: 1.56 MG/DL (ref 0.5–1.3)
CREAT UR-MCNC: 248.6 MG/DL (ref 20–370)
DISPENSE STATUS: NORMAL
EGFRCR SERPLBLD CKD-EPI 2021: 49 ML/MIN/1.73M*2
EOSINOPHIL # BLD AUTO: 0.01 X10*3/UL (ref 0–0.7)
EOSINOPHIL NFR BLD AUTO: 0.1 %
ERYTHROCYTE [DISTWIDTH] IN BLOOD BY AUTOMATED COUNT: 15.6 % (ref 11.5–14.5)
GLUCOSE SERPL-MCNC: 125 MG/DL (ref 74–99)
GLUCOSE UR STRIP.AUTO-MCNC: NORMAL MG/DL
HCT VFR BLD AUTO: 21.6 % (ref 41–52)
HGB BLD-MCNC: 7.1 G/DL (ref 13.5–17.5)
IMM GRANULOCYTES # BLD AUTO: 0.23 X10*3/UL (ref 0–0.7)
IMM GRANULOCYTES NFR BLD AUTO: 2.6 % (ref 0–0.9)
KETONES UR STRIP.AUTO-MCNC: NEGATIVE MG/DL
LEUKOCYTE ESTERASE UR QL STRIP.AUTO: NEGATIVE
LYMPHOCYTES # BLD AUTO: 0.71 X10*3/UL (ref 1.2–4.8)
LYMPHOCYTES NFR BLD AUTO: 8.1 %
MAGNESIUM SERPL-MCNC: 1.61 MG/DL (ref 1.6–2.4)
MCH RBC QN AUTO: 27.4 PG (ref 26–34)
MCHC RBC AUTO-ENTMCNC: 32.9 G/DL (ref 32–36)
MCV RBC AUTO: 83 FL (ref 80–100)
MONOCYTES # BLD AUTO: 2.56 X10*3/UL (ref 0.1–1)
MONOCYTES NFR BLD AUTO: 29.3 %
MUCOUS THREADS #/AREA URNS AUTO: ABNORMAL /LPF
NEUTROPHILS # BLD AUTO: 5.16 X10*3/UL (ref 1.2–7.7)
NEUTROPHILS NFR BLD AUTO: 59 %
NITRITE UR QL STRIP.AUTO: NEGATIVE
NRBC BLD-RTO: 0 /100 WBCS (ref 0–0)
PH UR STRIP.AUTO: 5.5 [PH]
PLATELET # BLD AUTO: 280 X10*3/UL (ref 150–450)
POTASSIUM SERPL-SCNC: 3.2 MMOL/L (ref 3.5–5.3)
PRODUCT BLOOD TYPE: 9500
PRODUCT CODE: NORMAL
PROT SERPL-MCNC: 7.1 G/DL (ref 6.4–8.2)
PROT UR STRIP.AUTO-MCNC: ABNORMAL MG/DL
PROT UR-ACNC: 70 MG/DL (ref 5–25)
PROT/CREAT UR: 0.28 MG/MG CREAT (ref 0–0.17)
RBC # BLD AUTO: 2.59 X10*6/UL (ref 4.5–5.9)
RBC # UR STRIP.AUTO: NEGATIVE /UL
RBC #/AREA URNS AUTO: ABNORMAL /HPF
RH FACTOR (ANTIGEN D): NORMAL
SODIUM SERPL-SCNC: 141 MMOL/L (ref 136–145)
SP GR UR STRIP.AUTO: 1.02
UNIT ABO: NORMAL
UNIT NUMBER: NORMAL
UNIT RH: NORMAL
UNIT VOLUME: 350
UROBILINOGEN UR STRIP.AUTO-MCNC: NORMAL MG/DL
WBC # BLD AUTO: 8.8 X10*3/UL (ref 4.4–11.3)
WBC #/AREA URNS AUTO: ABNORMAL /HPF
XM INTEP: NORMAL

## 2024-11-29 PROCEDURE — 1157F ADVNC CARE PLAN IN RCRD: CPT | Performed by: NURSE PRACTITIONER

## 2024-11-29 PROCEDURE — 87799 DETECT AGENT NOS DNA QUANT: CPT | Performed by: NURSE PRACTITIONER

## 2024-11-29 PROCEDURE — 84156 ASSAY OF PROTEIN URINE: CPT

## 2024-11-29 PROCEDURE — 36430 TRANSFUSION BLD/BLD COMPNT: CPT

## 2024-11-29 PROCEDURE — 1111F DSCHRG MED/CURRENT MED MERGE: CPT | Performed by: NURSE PRACTITIONER

## 2024-11-29 PROCEDURE — 86850 RBC ANTIBODY SCREEN: CPT

## 2024-11-29 PROCEDURE — 96365 THER/PROPH/DIAG IV INF INIT: CPT | Mod: INF

## 2024-11-29 PROCEDURE — 99215 OFFICE O/P EST HI 40 MIN: CPT | Mod: 25 | Performed by: NURSE PRACTITIONER

## 2024-11-29 PROCEDURE — 96366 THER/PROPH/DIAG IV INF ADDON: CPT | Mod: INF

## 2024-11-29 PROCEDURE — 85025 COMPLETE CBC W/AUTO DIFF WBC: CPT

## 2024-11-29 PROCEDURE — 80053 COMPREHEN METABOLIC PANEL: CPT

## 2024-11-29 PROCEDURE — 2500000004 HC RX 250 GENERAL PHARMACY W/ HCPCS (ALT 636 FOR OP/ED): Performed by: NURSE PRACTITIONER

## 2024-11-29 PROCEDURE — 99215 OFFICE O/P EST HI 40 MIN: CPT | Performed by: NURSE PRACTITIONER

## 2024-11-29 PROCEDURE — 83735 ASSAY OF MAGNESIUM: CPT

## 2024-11-29 PROCEDURE — 81001 URINALYSIS AUTO W/SCOPE: CPT

## 2024-11-29 PROCEDURE — P9040 RBC LEUKOREDUCED IRRADIATED: HCPCS

## 2024-11-29 RX ORDER — DIPHENHYDRAMINE HYDROCHLORIDE 50 MG/ML
50 INJECTION INTRAMUSCULAR; INTRAVENOUS AS NEEDED
Status: DISCONTINUED | OUTPATIENT
Start: 2024-11-29 | End: 2024-11-29 | Stop reason: HOSPADM

## 2024-11-29 RX ORDER — POTASSIUM CHLORIDE 29.8 MG/ML
40 INJECTION INTRAVENOUS ONCE
Status: CANCELLED | OUTPATIENT
Start: 2024-11-29 | End: 2024-11-29

## 2024-11-29 RX ORDER — DIPHENHYDRAMINE HYDROCHLORIDE 50 MG/ML
50 INJECTION INTRAMUSCULAR; INTRAVENOUS AS NEEDED
OUTPATIENT
Start: 2024-11-29

## 2024-11-29 RX ORDER — EPINEPHRINE 0.3 MG/.3ML
0.3 INJECTION SUBCUTANEOUS EVERY 5 MIN PRN
Status: DISCONTINUED | OUTPATIENT
Start: 2024-11-29 | End: 2024-11-29 | Stop reason: HOSPADM

## 2024-11-29 RX ORDER — ALBUTEROL SULFATE 0.83 MG/ML
3 SOLUTION RESPIRATORY (INHALATION) AS NEEDED
OUTPATIENT
Start: 2024-11-29

## 2024-11-29 RX ORDER — POTASSIUM CHLORIDE 29.8 MG/ML
40 INJECTION INTRAVENOUS ONCE
Status: COMPLETED | OUTPATIENT
Start: 2024-11-29 | End: 2024-11-29

## 2024-11-29 RX ORDER — FAMOTIDINE 10 MG/ML
20 INJECTION INTRAVENOUS ONCE AS NEEDED
Status: DISCONTINUED | OUTPATIENT
Start: 2024-11-29 | End: 2024-11-29 | Stop reason: HOSPADM

## 2024-11-29 RX ORDER — ALBUTEROL SULFATE 0.83 MG/ML
3 SOLUTION RESPIRATORY (INHALATION) AS NEEDED
Status: DISCONTINUED | OUTPATIENT
Start: 2024-11-29 | End: 2024-11-29 | Stop reason: HOSPADM

## 2024-11-29 RX ORDER — EPINEPHRINE 0.3 MG/.3ML
0.3 INJECTION SUBCUTANEOUS EVERY 5 MIN PRN
OUTPATIENT
Start: 2024-11-29

## 2024-11-29 RX ORDER — FAMOTIDINE 10 MG/ML
20 INJECTION INTRAVENOUS ONCE AS NEEDED
OUTPATIENT
Start: 2024-11-29

## 2024-11-29 NOTE — PROGRESS NOTES
Pt here for count check.  Reports feeling very fatigued and SOB with exertion.  Hgb 7.1.  One unit of PRBC transfused without incidence.  Discharged home in stable condition.

## 2024-11-29 NOTE — PROGRESS NOTES
"NUTRITION FOLLOW UP NOTE    Reason for Visit:  Brandt Merritt is a 64 y.o. 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    PMH: HTN, Hep C    Currently T+71 (cord); T+23 (haplo)    Pt and wife seen today in infusion.     Lab Results   Component Value Date/Time    GLUCOSE 125 (H) 11/29/2024 1312     11/29/2024 1312    K 3.2 (L) 11/29/2024 1312     11/29/2024 1312    CO2 24 11/29/2024 1312    ANIONGAP 17 11/29/2024 1312    BUN 21 11/29/2024 1312    CREATININE 1.56 (H) 11/29/2024 1312    EGFR 49 (L) 11/29/2024 1312    CALCIUM 8.9 11/29/2024 1312    ALBUMIN 3.7 11/29/2024 1312    ALKPHOS 74 11/29/2024 1312    PROT 7.1 11/29/2024 1312    AST 15 11/29/2024 1312    BILITOT 0.7 11/29/2024 1312    ALT 8 (L) 11/29/2024 1312     Mg 1.61 (WNL)    Lab Results   Component Value Date    WBC 8.8 11/29/2024    HGB 7.1 (L) 11/29/2024    HCT 21.6 (L) 11/29/2024    MCV 83 11/29/2024     11/29/2024       Lab Results   Component Value Date/Time    VITD25 12 (A) 09/19/2023 1646       Anthropometrics:  Anthropometrics  Height: 166.4 cm (5' 5.51\")  Weight: 59 kg (130 lb 1.1 oz)  BMI (Calculated): 21.31  IBW/kg (Dietitian Calculated): 63.2 kg  Percent of IBW: 93 %    *Wt at time of transplant admit: (9/13) 75.6 kg   *Wt at first post-transplant visit: (11/25): 60.9 kg     *Wt down 4# x 4 days     Wt Readings from Last 10 Encounters:   11/29/24 59 kg (130 lb)   11/29/24 59 kg (130 lb 1.1 oz)   11/27/24 59.5 kg (131 lb 2.8 oz)   11/25/24 60.9 kg (134 lb 4.2 oz)   11/25/24 60.9 kg (134 lb 4.2 oz)   11/17/24 62.9 kg (138 lb 10.7 oz)   09/12/24 74.3 kg (163 lb 12.8 oz)   09/09/24 74.2 kg (163 lb 9.3 oz)   09/05/24 73.6 kg (162 lb 4.1 oz)   08/29/24 74.4 kg (164 lb 0.4 oz)   07/19/24        74.2 kg   06/24/24        71.0 kg  05/20/24        72.8 kg  04/23/24        75.5 kg     Food And Nutrient Intake:      Not eating well; appetite remains minimal. " " Does not get hungry.   Can go all day without eating; when he does eat, has to force himself to do so.   Had very small piece of ham, stuffing, mac and cheese yesterday for Thanksgiving.  Reports dysgeusia; things taste \"off.\"  Mouth dry most of the time.  Drinking fluids better than eating solids; drinking 2-3 10 oz cups of water; takes pills with g'supriya and Pepsi.  Wife feels he is probably drinking even less than he reports.   Not interested in supplements--tried Ensure Plus and Ensure Clear in the hospital but disliked.   Does have \"periodic\" nausea--started taking Zofran more regularly but still has nausea at some point every day.   Able to identify when he is beginning to feel nauseated--will rinse out his mouth which helps keep him from vomiting.   Still having loose stools but feels it is improved today; had 1 loose BM this am.  Energy levels low; fatigues easily--to receive PRBC's today.   Able to walk to infusion from parking garage.     As of 2:30 pm, pt has not had anything to eat yet today.   Agrees to try oatmeal and Oreo's with milk this evening.     Started Remeron on 12/27.                                                             Nutrition Focused Physical Exam Findings:      Subcutaneous Fat Loss  Orbital Fat Pads: Mild-Moderate (slight dark circles and slight hollowing)  Buccal Fat Pads: Mild-Moderate (flat cheeks, minimal bounce)  Triceps: Mild-Moderate (less than ample fat tissue)    Muscle Wasting  Temporalis: Mild-Moderate (slight depression)  Pectoralis (Clavicular Region): Mild-Moderate (some protrusion of clavicle)  Deltoid/Trapezius: Mild-Moderate (slight protrusion of acromion process)  Quadriceps: Mild-Moderate (mild depression on inner and outer thigh)  Gastrocnemius: Mild-Moderate (not well developed muscle)              Energy Needs  Calculated Energy Needs Using Equations  Height: 166.4 cm (5' 5.51\")  Estimated Energy Needs  Total Energy Estimated Needs (kCal):  (6635-9448)  Total " Estimated Energy Need per Day (kCal/kg):  (30-35)  Estimated Fluid Needs  Total Fluid Estimated Needs (mL):  (1800+)  Total Fluid Estimated Needs (mL/kg):  (30+)  Estimated Protein Needs  Total Protein Estimated Needs (g):  (70-85)  Total Protein Estimated Needs (g/kg):  (1.2-1.4)        Nutrition Diagnosis   Malnutrition Diagnosis  Patient has Malnutrition Diagnosis: Yes  Diagnosis Status: Ongoing  Malnutrition Diagnosis: Moderate malnutrition related to acute disease or injury  As Evidenced by: decreased appetite/intakes, taste changes, GI symptoms, early satiety with significant wt loss and overt muscle/fat wasting.    Pt remains moderately malnourished.  No improvements in PO intakes; wt continues to decline.  Pt would benefit from use of supplements, however, pt dislikes.         Nutrition Interventions/Recommendations   Nutrition Prescription   High Protein, High Calorie  Food Safety     Nutrition Education   Pt is taking meds on an empty stomach--suggested eating something such as PB toast, yogurt, etc to minimize risk for further nausea.  Continue scheduled Zofran, however, suspect nausea will improve if pt makes more of an effort to eat with meds   Encourage increased protein intakes; suggested calorie containing, caffeine free beverages; Goal: 60+ oz daily  Include 1 cup whole milk daily  Milkshakes or smoothies daily as pt does not care for ONS  Discussed tips for managing taste changes--has lemon hard candies; suggested condiments, seasonings to help enhance flavors  Pt may benefit from start of Budesonide if nausea does not improve with anti-emetics or increased intakes     Coordination of Care   NP/BMT team         Nutrition Monitoring and Evaluation      Will continue to f/up and monitor weight, labs. PO intakes and GI symptoms.

## 2024-11-30 LAB
ADENOVIRUS QPCR,PLASMA, VIRC: NOT DETECTED COPIES/ML
HUMAN HERPESVIRUS-6 PCR PLASMA: NOT DETECTED COPIES/ML

## 2024-12-01 LAB
CMV DNA SERPL NAA+PROBE-LOG IU: NORMAL {LOG_IU}/ML
EBV DNA SPEC NAA+PROBE-LOG#: NORMAL {LOG_COPIES}/ML
LABORATORY COMMENT REPORT: NOT DETECTED
LABORATORY COMMENT REPORT: NOT DETECTED

## 2024-12-01 ASSESSMENT — ENCOUNTER SYMPTOMS
FATIGUE: 1
APPETITE CHANGE: 1
PSYCHIATRIC NEGATIVE: 1
COUGH: 1
HEMATOLOGIC/LYMPHATIC NEGATIVE: 1
EYES NEGATIVE: 1
EXTREMITY WEAKNESS: 1
ENDOCRINE NEGATIVE: 1
CARDIOVASCULAR NEGATIVE: 1

## 2024-12-02 ENCOUNTER — TELEPHONE (OUTPATIENT)
Dept: HEMATOLOGY/ONCOLOGY | Facility: HOSPITAL | Age: 64
End: 2024-12-02

## 2024-12-02 ENCOUNTER — INFUSION (OUTPATIENT)
Dept: HEMATOLOGY/ONCOLOGY | Facility: HOSPITAL | Age: 64
End: 2024-12-02
Payer: COMMERCIAL

## 2024-12-02 ENCOUNTER — APPOINTMENT (OUTPATIENT)
Dept: HEMATOLOGY/ONCOLOGY | Facility: HOSPITAL | Age: 64
End: 2024-12-02
Payer: COMMERCIAL

## 2024-12-02 ENCOUNTER — NUTRITION (OUTPATIENT)
Dept: HEMATOLOGY/ONCOLOGY | Facility: HOSPITAL | Age: 64
End: 2024-12-02
Payer: COMMERCIAL

## 2024-12-02 ENCOUNTER — OFFICE VISIT (OUTPATIENT)
Dept: HEMATOLOGY/ONCOLOGY | Facility: HOSPITAL | Age: 64
End: 2024-12-02
Payer: COMMERCIAL

## 2024-12-02 VITALS
DIASTOLIC BLOOD PRESSURE: 77 MMHG | OXYGEN SATURATION: 100 % | TEMPERATURE: 98.4 F | BODY MASS INDEX: 21.67 KG/M2 | SYSTOLIC BLOOD PRESSURE: 125 MMHG | RESPIRATION RATE: 16 BRPM | HEART RATE: 99 BPM | WEIGHT: 132.28 LBS

## 2024-12-02 VITALS — WEIGHT: 132.28 LBS | BODY MASS INDEX: 21.26 KG/M2 | HEIGHT: 66 IN

## 2024-12-02 DIAGNOSIS — C92.01 AML (ACUTE MYELOID LEUKEMIA) IN REMISSION (MULTI): Primary | ICD-10-CM

## 2024-12-02 DIAGNOSIS — C92.01 ACUTE MYELOID LEUKEMIA IN REMISSION (MULTI): ICD-10-CM

## 2024-12-02 DIAGNOSIS — D61.818 PANCYTOPENIA: ICD-10-CM

## 2024-12-02 LAB
ABO GROUP (TYPE) IN BLOOD: NORMAL
ALBUMIN SERPL BCP-MCNC: 3.7 G/DL (ref 3.4–5)
ALP SERPL-CCNC: 78 U/L (ref 33–136)
ALT SERPL W P-5'-P-CCNC: 8 U/L (ref 10–52)
ANION GAP SERPL CALC-SCNC: 16 MMOL/L (ref 10–20)
ANTIBODY SCREEN: NORMAL
AST SERPL W P-5'-P-CCNC: 15 U/L (ref 9–39)
BASOPHILS # BLD AUTO: 0.1 X10*3/UL (ref 0–0.1)
BASOPHILS NFR BLD AUTO: 0.9 %
BILIRUB SERPL-MCNC: 0.8 MG/DL (ref 0–1.2)
BUN SERPL-MCNC: 17 MG/DL (ref 6–23)
CALCIUM SERPL-MCNC: 9 MG/DL (ref 8.6–10.3)
CHLORIDE SERPL-SCNC: 101 MMOL/L (ref 98–107)
CO2 SERPL-SCNC: 22 MMOL/L (ref 21–32)
CREAT SERPL-MCNC: 1.45 MG/DL (ref 0.5–1.3)
EGFRCR SERPLBLD CKD-EPI 2021: 53 ML/MIN/1.73M*2
EOSINOPHIL # BLD AUTO: 0 X10*3/UL (ref 0–0.7)
EOSINOPHIL NFR BLD AUTO: 0 %
ERYTHROCYTE [DISTWIDTH] IN BLOOD BY AUTOMATED COUNT: 14.9 % (ref 11.5–14.5)
GLUCOSE SERPL-MCNC: 123 MG/DL (ref 74–99)
HCT VFR BLD AUTO: 24.3 % (ref 41–52)
HGB BLD-MCNC: 8.2 G/DL (ref 13.5–17.5)
IMM GRANULOCYTES # BLD AUTO: 0.12 X10*3/UL (ref 0–0.7)
IMM GRANULOCYTES NFR BLD AUTO: 1.1 % (ref 0–0.9)
LYMPHOCYTES # BLD AUTO: 0.69 X10*3/UL (ref 1.2–4.8)
LYMPHOCYTES NFR BLD AUTO: 6.3 %
MAGNESIUM SERPL-MCNC: 1.22 MG/DL (ref 1.6–2.4)
MCH RBC QN AUTO: 27.9 PG (ref 26–34)
MCHC RBC AUTO-ENTMCNC: 33.7 G/DL (ref 32–36)
MCV RBC AUTO: 83 FL (ref 80–100)
MONOCYTES # BLD AUTO: 2.04 X10*3/UL (ref 0.1–1)
MONOCYTES NFR BLD AUTO: 18.6 %
NEUTROPHILS # BLD AUTO: 8 X10*3/UL (ref 1.2–7.7)
NEUTROPHILS NFR BLD AUTO: 73.1 %
NRBC BLD-RTO: 0 /100 WBCS (ref 0–0)
PLATELET # BLD AUTO: 363 X10*3/UL (ref 150–450)
POTASSIUM SERPL-SCNC: 3.3 MMOL/L (ref 3.5–5.3)
PROT SERPL-MCNC: 7.1 G/DL (ref 6.4–8.2)
RBC # BLD AUTO: 2.94 X10*6/UL (ref 4.5–5.9)
RH FACTOR (ANTIGEN D): NORMAL
SODIUM SERPL-SCNC: 136 MMOL/L (ref 136–145)
TACROLIMUS BLD-MCNC: 11.3 NG/ML
WBC # BLD AUTO: 11 X10*3/UL (ref 4.4–11.3)

## 2024-12-02 PROCEDURE — 86901 BLOOD TYPING SEROLOGIC RH(D): CPT

## 2024-12-02 PROCEDURE — 80053 COMPREHEN METABOLIC PANEL: CPT

## 2024-12-02 PROCEDURE — 80197 ASSAY OF TACROLIMUS: CPT

## 2024-12-02 PROCEDURE — 1111F DSCHRG MED/CURRENT MED MERGE: CPT | Performed by: INTERNAL MEDICINE

## 2024-12-02 PROCEDURE — 99215 OFFICE O/P EST HI 40 MIN: CPT | Mod: 25 | Performed by: INTERNAL MEDICINE

## 2024-12-02 PROCEDURE — 2500000002 HC RX 250 W HCPCS SELF ADMINISTERED DRUGS (ALT 637 FOR MEDICARE OP, ALT 636 FOR OP/ED): Performed by: STUDENT IN AN ORGANIZED HEALTH CARE EDUCATION/TRAINING PROGRAM

## 2024-12-02 PROCEDURE — 96365 THER/PROPH/DIAG IV INF INIT: CPT | Mod: INF

## 2024-12-02 PROCEDURE — 83735 ASSAY OF MAGNESIUM: CPT

## 2024-12-02 PROCEDURE — 85025 COMPLETE CBC W/AUTO DIFF WBC: CPT

## 2024-12-02 PROCEDURE — 1157F ADVNC CARE PLAN IN RCRD: CPT | Performed by: INTERNAL MEDICINE

## 2024-12-02 PROCEDURE — 99215 OFFICE O/P EST HI 40 MIN: CPT | Performed by: INTERNAL MEDICINE

## 2024-12-02 PROCEDURE — 2500000004 HC RX 250 GENERAL PHARMACY W/ HCPCS (ALT 636 FOR OP/ED): Performed by: STUDENT IN AN ORGANIZED HEALTH CARE EDUCATION/TRAINING PROGRAM

## 2024-12-02 RX ORDER — HEPARIN SODIUM,PORCINE/PF 10 UNIT/ML
50 SYRINGE (ML) INTRAVENOUS AS NEEDED
Status: CANCELLED | OUTPATIENT
Start: 2024-12-02

## 2024-12-02 RX ORDER — POTASSIUM CHLORIDE 750 MG/1
20 TABLET, FILM COATED, EXTENDED RELEASE ORAL ONCE
Status: COMPLETED | OUTPATIENT
Start: 2024-12-02 | End: 2024-12-02

## 2024-12-02 RX ORDER — HEPARIN SODIUM,PORCINE/PF 10 UNIT/ML
50 SYRINGE (ML) INTRAVENOUS AS NEEDED
Status: DISCONTINUED | OUTPATIENT
Start: 2024-12-02 | End: 2024-12-02 | Stop reason: HOSPADM

## 2024-12-02 RX ORDER — POTASSIUM CHLORIDE 750 MG/1
20 TABLET, FILM COATED, EXTENDED RELEASE ORAL ONCE
Status: CANCELLED | OUTPATIENT
Start: 2024-12-02 | End: 2024-12-02

## 2024-12-02 RX ORDER — EPINEPHRINE 0.3 MG/.3ML
0.3 INJECTION SUBCUTANEOUS EVERY 5 MIN PRN
OUTPATIENT
Start: 2024-12-02

## 2024-12-02 RX ORDER — HEPARIN 100 UNIT/ML
500 SYRINGE INTRAVENOUS AS NEEDED
Status: DISCONTINUED | OUTPATIENT
Start: 2024-12-02 | End: 2024-12-02 | Stop reason: HOSPADM

## 2024-12-02 RX ORDER — MAGNESIUM SULFATE HEPTAHYDRATE 40 MG/ML
2 INJECTION, SOLUTION INTRAVENOUS ONCE
Status: CANCELLED | OUTPATIENT
Start: 2024-12-02 | End: 2024-12-02

## 2024-12-02 RX ORDER — DIPHENHYDRAMINE HYDROCHLORIDE 50 MG/ML
50 INJECTION INTRAMUSCULAR; INTRAVENOUS AS NEEDED
OUTPATIENT
Start: 2024-12-02

## 2024-12-02 RX ORDER — ALBUTEROL SULFATE 0.83 MG/ML
3 SOLUTION RESPIRATORY (INHALATION) AS NEEDED
OUTPATIENT
Start: 2024-12-02

## 2024-12-02 RX ORDER — MAGNESIUM SULFATE HEPTAHYDRATE 40 MG/ML
2 INJECTION, SOLUTION INTRAVENOUS ONCE
Status: COMPLETED | OUTPATIENT
Start: 2024-12-02 | End: 2024-12-02

## 2024-12-02 RX ORDER — HEPARIN 100 UNIT/ML
500 SYRINGE INTRAVENOUS AS NEEDED
Status: CANCELLED | OUTPATIENT
Start: 2024-12-02

## 2024-12-02 RX ORDER — FAMOTIDINE 10 MG/ML
20 INJECTION INTRAVENOUS ONCE AS NEEDED
OUTPATIENT
Start: 2024-12-02

## 2024-12-02 ASSESSMENT — ENCOUNTER SYMPTOMS: NAUSEA: 0

## 2024-12-02 NOTE — PROGRESS NOTES
Patient ID:  Brandt Merritt is a 64 y.o. male.  Referring Physician:   ONC Dr Newsome  Primary Care Provider:  Bill Richmond MD    Assessment/Plan      11/29/24 T+71 (9/19/24); T+23 (11/6/24). Anorexia. Started mirtazapine 11/26. Add 3x day Zofran. 32# weight loss since SCT pre-admission weight 9/9 74.2kg->59kg. If sx persist, consider budesonide vs EGD.     Oncology History Overview Note   4/2024  Myelodysplastic neoplasm with increased blasts-2 ( WHO)  Myelodysplastic neoplasm/AML  (ICC 2022 classification)    Presented in  10/2023 for pancytopenia, found to have hemolysis. Patient had normal CBC June 2020. Denied any hemorrhoidal bleeding . Has had C Scope and EGD , treated Hep C 2014 . 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      CBC 4/11/24 wbc 3.0, hgb 7.1, platelets 167K ANC 0.73    BM biopsy done on 4/11/24  as folllows:  BM histology  Myelodysplastic neoplasm with increased blasts-2 ( WHO)  Myelodysplastic neoplasm/AML  (ICC 2022 classification)  Balsts 11% on aspirate smear and 15-20% based on CD 34 immunostaining approaching AML leukemia, hematooiesis is erythroid dominant with dysplasia in granulocytes and megakaryocytes.   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      11/6/2024 -  Bone Marrow Transplant    conditioned with Flu/Cy/TBI and aGVHD prophylaxis with post-transplant cyclophosphamide, tacrolimus, and mycophenolate. T=0, 11/6/24.  ABO Donor/Recipient: O+, A+. CMV donor/recipient: both positive. Started Tacro and MMF on T+5 (11/11).         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     s/p 4 cycles of Decitabine/Venetoclax. BM Bx 6/17/24: Blasts cleared, FISH still positive for trisomy 8, still MDS changes   BMBx (9/5/24): 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 and no increase in blasts     History of allogeneic hematopoietic stem cell transplant  #1: Single-unit Cord, T0=9/19/24, Primary Engraftment Failure  - Conditioning: Flu-Mariana-TBI  - Donor: Single Cord, 6/8  = ABO Donor / Recipient: A+ / A+  = CMV Donor / Recipient: - / +  - aGVHD Prophylaxis: Tacrolimus + MMF  - engraftment failure, developed HLA antibody against class I of cord  - Repeat BMBx (10/15): hypocellular with no residual myeloid neoplasm. 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), T0=11/6/24  - Graft: Haploidentical sister  = ABO Donor / Recipient: O+ / A+ (ABO incompatibility +)  = CMV Donor / Recipient: + / +  - Conditioning: Flu/Cy/TBI/rATG  = GVHD prophylaxis -  rATG 1.5mg/kg T-5,-3,-1, PTCy (25mg/kg T+3, T+4), Tacro, MMF (T+5)     DATE DAY SOURCE MORPHOLOGY MRD WHOLE CHIMERISM   (% donor) CD3 CHIMERISM   (% donor) CD33 CHIMERISM   (% donor)   11/20/24 D+30 PB      100%     12/9/24 D+30 BM               D+60 PB               D+100 PB               D+100 BM                Monitor for post-transplant hemolysis   11/27/24  Haptoglobin 308 11/27/24  UPC ratio 0.28 11/29/24    GVHD prophylaxis  GVHD  Anorexia/poor oral intake/weight loss/nausea. Added Mirtazapine 11/26. Started ATC Zofran. If persists, consider aGVHD evaluation/treatment.     Met with dietician today and encouraged to take pills with small amount of food. Plans to try oatmeal.    Continue Tacro 1mg bid As of 11/26  deisi with 11/27 Level 8.2. Took tacrolimus prior to 11/29 visit. Will repeat 12/2.    Cont MMF until day 35.    Weights  Pre-transplant: 9/9 74.2kg   Upon SCT discharge: 11/25 60.9kg  Today's weight:59kg      Infectious Disease & Immune Reconstitution     Prophylaxis  Antiviral prophylaxis: acyclovir, Letermovir  Antifungal: posaconazole  PCP prophylaxis: 10/26/24 Pentamidine; anticipate Bactrim beginning week of 12/2    Hypogammaglobulinemia  IgG level. IVIG for level <400   IgG 1230 11/19/24     Active Surveillance:     CMV PCR: ND 11/29  EBV PCR: ND 11/29  Adenovirus: ND 11/27  HHV6: ND 11/27     EBV Viremia during transplant admission  Treated despite low levels due to upcoming second SCT  Rituximab 600 mg x 1 (10/31/24)    Immunizations:   Covid vaccine series. Consider at T+3 months  Seasonal influenza vaccine. Consider at T+ 3 month  Will plan to begin immunizations at T+6mths (May 2025) depending on degree of immunosuppression    ID follow-up scheduled for Jan 2025    Send Immunodeficiency panel T+ 100, 6mths, and 1yr     Cardiac:    Essential hypertension  Continue nifedipine 60 mEq ER 24 hr tablet   Cardiology 12/18 and ECHO 12/23     Ascending aorta dilation   TTE (4/29/24): 3.8-4 cm dilation      Hx of STEMI (11/11/2020) has cardiology folllowup 12/18/24     Tremor  Most likely due to Tacrolimus   If  no improvement, could consider Neurology consult     Electrolyte disturbance  Continue oral magnesium TID and potassium BID  40meq IV Potassium today     Loose Stools  PRN imodium. Overall improving.    Medication reconciliation  Has all prescribed meds. Instructed to bring list to each visit.     IV Access  L PICC line    Psychosocial.    Caregiver Ananya  [facility] in [place]    Subjective    History of Present Illness:  Patient presents today 11/29/24, accompanied by his wife, for follow up after prolonged hospitalization for umbilical  cord blood transplant 10/16/24 with graft failure followed by  haploidentical cord from his sister on 11/6/24 with flu/cy, ATG and modified dose cyclophosphamide with engraftment on day T+8.       Started mirtazepine 11/26. Drinking fluids, but mostly soda. Not eating and taking pills on empty stomach. Unsure if scheduled zofran is helping.     Energy level fair. Sleeping a lot during the day. Able to walk from the parking garage to the clinic without stopping, but needs to rest upon arrival.     Wife Ananya. Lives in [place].              Objective        Physical Exam  Vitals reviewed.   Constitutional:       Appearance: Normal appearance.      Comments: Thin.  Lying in bed.    HENT:      Head: Normocephalic and atraumatic.      Nose: Nose normal.      Mouth/Throat:      Mouth: Mucous membranes are moist.   Eyes:      Pupils: Pupils are equal, round, and reactive to light.   Cardiovascular:      Rate and Rhythm: Normal rate and regular rhythm.      Pulses: Normal pulses.      Heart sounds: Normal heart sounds.   Pulmonary:      Effort: Pulmonary effort is normal.      Breath sounds: Normal breath sounds.   Abdominal:      General: Abdomen is flat. Bowel sounds are normal.      Palpations: Abdomen is soft.   Musculoskeletal:         General: Normal range of motion.   Skin:     General: Skin is warm and dry.      Comments: PICC dressing dry and intact.    Neurological:      General: No focal deficit present.      Mental Status: He is alert and oriented to person, place, and time.   Psychiatric:         Mood and Affect: Mood normal.       RTC  12/2 Dr. Newsome, 12/4, 12/6 12/9 with bmbx & Karon Casas CNP, 12/11, 12/13 12/16 Dr Newsome,, 12/18 Dr Jones, 12/20 12/23 with ECHO, 12/26 12/30 Karon Casas CNP

## 2024-12-02 NOTE — PROGRESS NOTES
Patient ID:  Brandt Merritt is a 64 y.o. male.  Referring Physician:   ONC Dr Newsome  Primary Care Provider:  Bill Richmond MD    Assessment/Plan      12/2/24 T+69 (9/19/24); T+26 (11/6/24).     Oncology History Overview Note   4/2024  Myelodysplastic neoplasm with increased blasts-2 ( WHO)  Myelodysplastic neoplasm/AML  (ICC 2022 classification)    Presented in  10/2023 for pancytopenia, found to have hemolysis. Patient had normal CBC June 2020. Denied any hemorrhoidal bleeding . Has had C Scope and EGD , treated Hep C 2014 . 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      CBC 4/11/24 wbc 3.0, hgb 7.1, platelets 167K ANC 0.73    BM biopsy done on 4/11/24  as folllows:  BM histology  Myelodysplastic neoplasm with increased blasts-2 ( WHO)  Myelodysplastic neoplasm/AML  (ICC 2022 classification)  Balsts 11% on aspirate smear and 15-20% based on CD 34 immunostaining approaching AML leukemia, hematooiesis is erythroid dominant with dysplasia in granulocytes and megakaryocytes.   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      11/6/2024 -  Bone Marrow Transplant    conditioned with Flu/Cy/TBI and aGVHD prophylaxis with post-transplant cyclophosphamide, tacrolimus, and mycophenolate. T=0, 11/6/24. ABO Donor/Recipient: O+, A+. CMV donor/recipient: both positive. Started Tacro and MMF on T+5 (11/11).         Acute myeloid leukemia in remission (Multi)  Diagnosed  4/2024: BM Bx with MDS in in transition to AML (>10% blast) w/ trisomy 8, DNMT alpha, and U2AF1 mutation indication adverse risk.     s/p 4 cycles of Decitabine/Venetoclax. BM Bx 6/17/24: Blasts cleared, FISH still positive for trisomy 8, still MDS changes   BMBx (9/5/24): 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 and no increase in blasts     History of allogeneic hematopoietic stem cell transplant  #1: Single-unit Cord, T0=9/19/24, Primary Engraftment Failure  - Conditioning: Flu-Mariana-TBI  - Donor: Single Cord, 6/8  = ABO Donor / Recipient: A+ / A+  = CMV Donor / Recipient: - / +  - aGVHD Prophylaxis: Tacrolimus + MMF  - engraftment failure, developed HLA antibody against class I of cord  - Repeat BMBx (10/15): hypocellular with no residual myeloid neoplasm. 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), T0=11/6/24  - Graft: Haploidentical sister  = ABO Donor / Recipient: O+ / A+ (ABO incompatibility +)  = CMV Donor / Recipient: + / +  - Conditioning: Flu/Cy/TBI/rATG  = GVHD prophylaxis -  rATG 1.5mg/kg T-5,-3,-1, PTCy (25mg/kg T+3, T+4), Tacro, MMF (T+5)     DATE DAY SOURCE MORPHOLOGY MRD WHOLE CHIMERISM   (% donor) CD3 CHIMERISM   (% donor) CD33 CHIMERISM   (% donor)   11/20/24 D+30 PB      100%     12/9/24 D+30 BM               D+60 PB               D+100 PB               D+100 BM                Monitor for post-transplant hemolysis   11/27/24  Haptoglobin 308 11/27/24  UPC ratio 0.28 11/23/24    GVHD prophylaxis  GVHD  Anorexia/poor oral intake/weight loss. Added Mirtazapine. Continue Zofran. If persists, consider aGVHD.  Continue Tacro 1mg bid As of 11/26 deisi with 11/27 Level 8.2.  Wean MMF from 1 g TID to 500 mg TID 12/2/24 - present  Due to poor appetite will decrease MMF to 500 mg po tid  Plan to stop MMF on T+35    Weights  Pre-transplant: 9/9 74.2kg   Upon SCT discharge: 11/25 60.9kg  Today's weight: 60 kg      Infectious Disease & Immune Reconstitution      Prophylaxis  Antiviral prophylaxis: acyclovir, Letermovir  Antifungal: posaconazole  PCP prophylaxis: 10/26/24 - 11/28/24 Pentamidine; Bactrim 11/29 - present    Hypogammaglobulinemia  IgG level. IVIG for level <400   IgG 1230 11/19/24     Active Surveillance:     CMV PCR: ND 11/29  EBV PCR: ND 11/29  Adenovirus: ND 11/18  HHV6: 900 11/18     EBV Viremia during transplant admission  Treated despite low levels due to upcoming second SCT  Rituximab 600 mg x 1 (10/31/24)     Immunizations  Covid vaccine series   Seasonal influenza vaccine   Will plan to begin immunizations at T+6mths ### depending on degree of immunosuppression     Infectious Disease follow up evaluation: Requested/not yet scheduled    Immunodeficiency panel 100 days, 6mos and 1 year.     Immunizations:   Covid vaccine series. Consider at T+ 3 months  Seasonal influenza vaccine. Consider at T+ 3 month  Will plan to begin immunizations at T+6mths (May 2025) depending on degree of immunosuppression     Cardiac:    Essential hypertension  Continue nifedipine 60 mEq ER 24 hr tablet   Cardiology 12/18 and ECHO 12/23     Ascending aorta dilation   TTE (4/29/24): 3.8-4 cm dilation      Hx of STEMI (11/11/2020) has cardiology folllowup 12/18/24     Tremor  Most likely due to Tacrolimus   If no improvement, could consider Neurology consult     Electrolyte disturbance  Continue oral magnesium TID and potassium BID  2g IV mag today  Potassium replacement today as well     Lack of appetite  Mirtazapine 15 mg at night as of 11/26     Loose Stools  PRN imodium    Medication reconciliation  Has all prescribed meds. Instructed to bring list to each visit. Please update regularly. New list and reconciliation completed 12/2/24.    IV Access  L PICC line    Psychosocial.    Caregiver Ananya  [facility] in [place]    Subjective    History of Present Illness:    Patient presents today 12/2/24, accompanied by his wife, for follow up after prolonged hospitalization for  umbilical cord blood transplant 10/16/24 with graft rejection followed by haploidentical cord from his sister on 11/6/24 with flu/cy, ATG and modified dose cyclophosphamide with engraftment on day T+8.      Patient presents with wife for follow-up. He reports his appetite is better and he thinks he has gained weight. Continues to have bad taste in mouth and copious sputum. Energy level is fair. Continues to remain active.  Diarrhea 1x day before yesterday.      Review of Systems   Constitutional:  Positive for appetite change and fatigue.   HENT:  Negative.     Eyes: Negative.    Respiratory:  Positive for cough (With phlegm).    Cardiovascular: Negative.    Gastrointestinal:  Negative for nausea.   Endocrine: Negative.    Genitourinary: Negative.     Skin: Negative.    Neurological:  Positive for extremity weakness (legs).   Hematological: Negative.    Psychiatric/Behavioral: Negative.              Objective        Physical Exam  Vitals reviewed.   Constitutional:       Appearance: Normal appearance.      Comments: Thin.  Lying in bed.    HENT:      Head: Normocephalic and atraumatic.      Nose: Nose normal.      Mouth/Throat:      Mouth: Mucous membranes are moist.   Eyes:      Pupils: Pupils are equal, round, and reactive to light.   Cardiovascular:      Rate and Rhythm: Normal rate and regular rhythm.      Pulses: Normal pulses.      Heart sounds: Normal heart sounds.   Pulmonary:      Effort: Pulmonary effort is normal.      Breath sounds: Normal breath sounds.   Abdominal:      General: Abdomen is flat. Bowel sounds are normal.      Palpations: Abdomen is soft.   Musculoskeletal:         General: Normal range of motion.   Skin:     General: Skin is warm and dry.      Comments: PICC dressing dry and intact.    Neurological:      General: No focal deficit present.      Mental Status: He is alert and oriented to person, place, and time.   Psychiatric:         Mood and Affect: Mood normal.     RTC  12/4,  12/6 12/9 with bmbx, 12/11, 12/13 12/16 Dr Newsome, 12/18 Dr Jones, 12/20 12/23 with ECHO, 12/26 12/30      Attending Addendum    Patient seen with Dr. Dejesus , overall doing a bit better and is eating some, exercise tolerance somewhat better after prbc transfusion over the weekend,  no evidence of GVHD,  creatinine 1.45 which is somewhat improved from over the weekend.  Encourage drinking fluids with electrolytes and calories,  decrease MMF to 500 mg po tid to help with dysgeusia.

## 2024-12-02 NOTE — PROGRESS NOTES
Brandt Merritt is a 64 y.o. male who presents in stable condition for ct chk    He is on the following chemotherapy regimen:     Treatment Plans       Name Type Plan Dates Plan Provider         Active    (CELL - HAPLO) Fludarabine / Cyclophosphamide / Total Body Irradiation and Antithymocyte Globulin (rabbit) with Post-transplant Cyclophosphamide Oncology Treatment 10/31/2024 - Present Malaika Newsome MD             Since his last visit, he has been doing fair.  Overall, he states his energy level is stable.  His appetite & hydration level has been unchanged.  He reports fatigue & nausea managed by rest & antiemetics.    Patient tolerated magnesium infusion well and has been educated with the overall therapy plan. Questions & concerns addressed by her provider during visit via bedside. AVS, lab results & future appointment provided. Patient discharged in stable condition with his wife.    Reviewed and approved by VALERIO RAY on 12/2/24 at 1040

## 2024-12-02 NOTE — PROGRESS NOTES
"NUTRITION FOLLOW UP NOTE    Reason for Visit:  Brandt Merritt is a 64 y.o. 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    PMH: HTN, Hep C    Currently T+74 (cord); T+26 (haplo)    Pt and wife seen today in infusion.     Lab Results   Component Value Date/Time    GLUCOSE 123 (H) 12/02/2024 0811     12/02/2024 0811    K 3.3 (L) 12/02/2024 0811     12/02/2024 0811    CO2 22 12/02/2024 0811    ANIONGAP 16 12/02/2024 0811    BUN 17 12/02/2024 0811    CREATININE 1.45 (H) 12/02/2024 0811    EGFR 53 (L) 12/02/2024 0811    CALCIUM 9.0 12/02/2024 0811    ALBUMIN 3.7 12/02/2024 0811    ALKPHOS 78 12/02/2024 0811    PROT 7.1 12/02/2024 0811    AST 15 12/02/2024 0811    BILITOT 0.8 12/02/2024 0811    ALT 8 (L) 12/02/2024 0811     Mg 1.22 (L)    Lab Results   Component Value Date    WBC 11.0 12/02/2024    HGB 8.2 (L) 12/02/2024    HCT 24.3 (L) 12/02/2024    MCV 83 12/02/2024     12/02/2024       Lab Results   Component Value Date/Time    VITD25 12 (A) 09/19/2023 1646       Anthropometrics:  Anthropometrics  Height: 166.4 cm (5' 5.51\")  Weight: 60 kg (132 lb 4.4 oz)  BMI (Calculated): 21.67  IBW/kg (Dietitian Calculated): 63.2 kg  Percent of IBW: 95 %    *Wt at time of transplant admit: (9/13) 75.6 kg   *Wt at first post-transplant visit: (11/25): 60.9 kg     *Wt up 1 kg this weekend    Wt Readings from Last 10 Encounters:   12/02/24 60 kg (132 lb 4.4 oz)   12/02/24 60 kg (132 lb 4.4 oz)   11/29/24 59 kg (130 lb)   11/29/24 59 kg (130 lb 1.1 oz)   11/27/24 59.5 kg (131 lb 2.8 oz)   11/25/24 60.9 kg (134 lb 4.2 oz)   11/25/24 60.9 kg (134 lb 4.2 oz)   11/17/24 62.9 kg (138 lb 10.7 oz)   09/12/24 74.3 kg (163 lb 12.8 oz)   09/09/24 74.2 kg (163 lb 9.3 oz)   07/19/24        74.2 kg   06/24/24        71.0 kg  05/20/24        72.8 kg  04/23/24        75.5 kg     Food And Nutrient Intake:      (12/2):  This weekend, pt made more of an " "attempt to eat.   Portions still small, however, pt was able to eat some mac and cheese, a small piece of ham, oatmeal and Oreos.     Nausea better; taking antiemetics more regularly and finds lemon heads help as well.   Loose stools x1 yesterday.  Feels stool frequency has decreased and stools are less watery.   Fluid intakes a bit better as well-now drinking Crystal light.  Energy remains down but continues to walk into infusion from parking garage unassisted.     Spoke with MD; plan to decrease MMF to 2 tabs TID with plans to stop on ~T+35.     (11/30):  Not eating well; appetite remains minimal.  Does not get hungry.   Can go all day without eating; when he does eat, has to force himself to do so.   Had very small piece of ham, stuffing, mac and cheese yesterday for Thanksgiving.  Reports dysgeusia; things taste \"off.\"  Mouth dry most of the time.  Drinking fluids better than eating solids; drinking 2-3 10 oz cups of water; takes pills with g'supriya and Pepsi.  Wife feels he is probably drinking even less than he reports.   Not interested in supplements--tried Ensure Plus and Ensure Clear in the hospital but disliked.   Does have \"periodic\" nausea--started taking Zofran more regularly but still has nausea at some point every day.   Able to identify when he is beginning to feel nauseated--will rinse out his mouth which helps keep him from vomiting.   Still having loose stools but feels it is improved today; had 1 loose BM this am.  Energy levels low; fatigues easily--to receive PRBC's today.   Able to walk to infusion from parking garage.     As of 2:30 pm, pt has not had anything to eat yet today.   Agrees to try oatmeal and Oreo's with milk this evening.     Started Remeron on 12/27.                                                             Nutrition Focused Physical Exam Findings:      Subcutaneous Fat Loss  Orbital Fat Pads: Mild-Moderate (slight dark circles and slight hollowing)  Buccal Fat Pads: " "Mild-Moderate (flat cheeks, minimal bounce)  Triceps: Mild-Moderate (less than ample fat tissue)    Muscle Wasting  Temporalis: Mild-Moderate (slight depression)  Pectoralis (Clavicular Region): Mild-Moderate (some protrusion of clavicle)  Deltoid/Trapezius: Mild-Moderate (slight protrusion of acromion process)  Quadriceps: Mild-Moderate (mild depression on inner and outer thigh)  Gastrocnemius: Mild-Moderate (not well developed muscle)              Energy Needs  Calculated Energy Needs Using Equations  Height: 166.4 cm (5' 5.51\")  Estimated Energy Needs  Total Energy Estimated Needs (kCal):  (4239-4895)  Total Estimated Energy Need per Day (kCal/kg):  (30-35)  Estimated Fluid Needs  Total Fluid Estimated Needs (mL):  (1800+)  Total Fluid Estimated Needs (mL/kg):  (30+)  Estimated Protein Needs  Total Protein Estimated Needs (g):  (70-85)  Total Protein Estimated Needs (g/kg):  (1.2-1.4)        Nutrition Diagnosis   Malnutrition Diagnosis  Patient has Malnutrition Diagnosis: Yes  Diagnosis Status: Ongoing  Malnutrition Diagnosis: Moderate malnutrition related to acute disease or injury  As Evidenced by: decreased appetite/intakes, taste changes, GI symptoms, early satiety with significant wt loss and overt muscle/fat wasting.    Pt remains moderately malnourished.   Pt would benefit from use of supplements, however, pt dislikes.         Nutrition Interventions/Recommendations   Nutrition Prescription   High Protein, High Calorie  Food Safety     Nutrition Education   Will recheck 25-OH Vit D3 (last labs from 9/2023 suggested deficiency--? If adequately repleted.   Pt is taking meds on an empty stomach--suggested eating something such as PB toast, yogurt, etc to minimize risk for further nausea.  Continue scheduled Zofran, however, suspect nausea will improve if pt makes more of an effort to eat with meds   Encourage increased protein intakes; suggested calorie containing, caffeine free beverages; Goal: 60+ oz " daily  Include 1 cup whole milk daily  Milkshakes or smoothies daily as pt does not care for ONS  Discussed tips for managing taste changes--has lemon hard candies; suggested condiments, seasonings to help enhance flavors  Pt may benefit from start of Budesonide if nausea does not improve with anti-emetics or increased intakes     Coordination of Care   NP/BMT team         Nutrition Monitoring and Evaluation      Will continue to f/up and monitor weight, labs. PO intakes and GI symptoms.

## 2024-12-04 ENCOUNTER — APPOINTMENT (OUTPATIENT)
Dept: HEMATOLOGY/ONCOLOGY | Facility: HOSPITAL | Age: 64
End: 2024-12-04
Payer: COMMERCIAL

## 2024-12-04 ENCOUNTER — OFFICE VISIT (OUTPATIENT)
Dept: OTHER | Facility: HOSPITAL | Age: 64
End: 2024-12-04
Payer: COMMERCIAL

## 2024-12-04 ENCOUNTER — TREATMENT (OUTPATIENT)
Dept: OTHER | Facility: HOSPITAL | Age: 64
End: 2024-12-04
Payer: COMMERCIAL

## 2024-12-04 VITALS
TEMPERATURE: 99.1 F | RESPIRATION RATE: 18 BRPM | OXYGEN SATURATION: 100 % | DIASTOLIC BLOOD PRESSURE: 74 MMHG | WEIGHT: 134.26 LBS | BODY MASS INDEX: 21.99 KG/M2 | HEART RATE: 84 BPM | SYSTOLIC BLOOD PRESSURE: 117 MMHG

## 2024-12-04 DIAGNOSIS — Z94.84 STEM CELLS TRANSPLANT STATUS (MULTI): ICD-10-CM

## 2024-12-04 DIAGNOSIS — C92.01 ACUTE MYELOID LEUKEMIA IN REMISSION (MULTI): ICD-10-CM

## 2024-12-04 DIAGNOSIS — E55.9 HYPOVITAMINOSIS D: Primary | ICD-10-CM

## 2024-12-04 DIAGNOSIS — C92.01 AML (ACUTE MYELOID LEUKEMIA) IN REMISSION (MULTI): ICD-10-CM

## 2024-12-04 LAB
25(OH)D3 SERPL-MCNC: 9 NG/ML (ref 30–100)
ALBUMIN SERPL BCP-MCNC: 3.5 G/DL (ref 3.4–5)
ALP SERPL-CCNC: 79 U/L (ref 33–136)
ALT SERPL W P-5'-P-CCNC: 14 U/L (ref 10–52)
ANION GAP SERPL CALC-SCNC: 14 MMOL/L (ref 10–20)
APPEARANCE UR: CLEAR
AST SERPL W P-5'-P-CCNC: 21 U/L (ref 9–39)
BASOPHILS # BLD AUTO: 0.08 X10*3/UL (ref 0–0.1)
BASOPHILS NFR BLD AUTO: 0.8 %
BILIRUB SERPL-MCNC: 0.7 MG/DL (ref 0–1.2)
BILIRUB UR STRIP.AUTO-MCNC: NEGATIVE MG/DL
BUN SERPL-MCNC: 21 MG/DL (ref 6–23)
CALCIUM SERPL-MCNC: 8.8 MG/DL (ref 8.6–10.3)
CHLORIDE SERPL-SCNC: 101 MMOL/L (ref 98–107)
CO2 SERPL-SCNC: 24 MMOL/L (ref 21–32)
COLOR UR: YELLOW
CREAT SERPL-MCNC: 1.45 MG/DL (ref 0.5–1.3)
CREAT UR-MCNC: 233.7 MG/DL (ref 20–370)
EGFRCR SERPLBLD CKD-EPI 2021: 53 ML/MIN/1.73M*2
EOSINOPHIL # BLD AUTO: 0.11 X10*3/UL (ref 0–0.7)
EOSINOPHIL NFR BLD AUTO: 1.1 %
ERYTHROCYTE [DISTWIDTH] IN BLOOD BY AUTOMATED COUNT: 15.1 % (ref 11.5–14.5)
GLUCOSE SERPL-MCNC: 112 MG/DL (ref 74–99)
GLUCOSE UR STRIP.AUTO-MCNC: NORMAL MG/DL
HAPTOGLOB SERPL NEPH-MCNC: 321 MG/DL (ref 30–200)
HCT VFR BLD AUTO: 24 % (ref 41–52)
HGB BLD-MCNC: 8.1 G/DL (ref 13.5–17.5)
HYALINE CASTS #/AREA URNS AUTO: ABNORMAL /LPF
IMM GRANULOCYTES # BLD AUTO: 0.27 X10*3/UL (ref 0–0.7)
IMM GRANULOCYTES NFR BLD AUTO: 2.7 % (ref 0–0.9)
KETONES UR STRIP.AUTO-MCNC: NEGATIVE MG/DL
LDH SERPL L TO P-CCNC: 217 U/L (ref 84–246)
LEUKOCYTE ESTERASE UR QL STRIP.AUTO: NEGATIVE
LYMPHOCYTES # BLD AUTO: 0.59 X10*3/UL (ref 1.2–4.8)
LYMPHOCYTES NFR BLD AUTO: 5.9 %
MAGNESIUM SERPL-MCNC: 1.65 MG/DL (ref 1.6–2.4)
MCH RBC QN AUTO: 27.9 PG (ref 26–34)
MCHC RBC AUTO-ENTMCNC: 33.8 G/DL (ref 32–36)
MCV RBC AUTO: 83 FL (ref 80–100)
MONOCYTES # BLD AUTO: 2.38 X10*3/UL (ref 0.1–1)
MONOCYTES NFR BLD AUTO: 23.8 %
MUCOUS THREADS #/AREA URNS AUTO: ABNORMAL /LPF
NEUTROPHILS # BLD AUTO: 6.57 X10*3/UL (ref 1.2–7.7)
NEUTROPHILS NFR BLD AUTO: 65.7 %
NITRITE UR QL STRIP.AUTO: NEGATIVE
NRBC BLD-RTO: 0 /100 WBCS (ref 0–0)
PH UR STRIP.AUTO: 5.5 [PH]
PLATELET # BLD AUTO: 350 X10*3/UL (ref 150–450)
POTASSIUM SERPL-SCNC: 3.3 MMOL/L (ref 3.5–5.3)
PROT SERPL-MCNC: 6.8 G/DL (ref 6.4–8.2)
PROT UR STRIP.AUTO-MCNC: ABNORMAL MG/DL
PROT UR-ACNC: 43 MG/DL (ref 5–25)
PROT/CREAT UR: 0.18 MG/MG CREAT (ref 0–0.17)
RBC # BLD AUTO: 2.9 X10*6/UL (ref 4.5–5.9)
RBC # UR STRIP.AUTO: NEGATIVE /UL
RBC #/AREA URNS AUTO: ABNORMAL /HPF
SODIUM SERPL-SCNC: 136 MMOL/L (ref 136–145)
SP GR UR STRIP.AUTO: 1.02
TACROLIMUS BLD-MCNC: 7.8 NG/ML
URATE SERPL-MCNC: 5.4 MG/DL (ref 4–7.5)
UROBILINOGEN UR STRIP.AUTO-MCNC: ABNORMAL MG/DL
WBC # BLD AUTO: 10 X10*3/UL (ref 4.4–11.3)
WBC #/AREA URNS AUTO: ABNORMAL /HPF

## 2024-12-04 PROCEDURE — 3078F DIAST BP <80 MM HG: CPT | Performed by: NURSE PRACTITIONER

## 2024-12-04 PROCEDURE — 1111F DSCHRG MED/CURRENT MED MERGE: CPT | Performed by: NURSE PRACTITIONER

## 2024-12-04 PROCEDURE — 83010 ASSAY OF HAPTOGLOBIN QUANT: CPT

## 2024-12-04 PROCEDURE — 1159F MED LIST DOCD IN RCRD: CPT | Performed by: NURSE PRACTITIONER

## 2024-12-04 PROCEDURE — 1157F ADVNC CARE PLAN IN RCRD: CPT | Performed by: NURSE PRACTITIONER

## 2024-12-04 PROCEDURE — 85025 COMPLETE CBC W/AUTO DIFF WBC: CPT

## 2024-12-04 PROCEDURE — 87799 DETECT AGENT NOS DNA QUANT: CPT

## 2024-12-04 PROCEDURE — 99215 OFFICE O/P EST HI 40 MIN: CPT | Performed by: NURSE PRACTITIONER

## 2024-12-04 PROCEDURE — 81001 URINALYSIS AUTO W/SCOPE: CPT

## 2024-12-04 PROCEDURE — 87533 HHV-6 DNA QUANT: CPT

## 2024-12-04 PROCEDURE — 82306 VITAMIN D 25 HYDROXY: CPT

## 2024-12-04 PROCEDURE — 83735 ASSAY OF MAGNESIUM: CPT

## 2024-12-04 PROCEDURE — 36591 DRAW BLOOD OFF VENOUS DEVICE: CPT

## 2024-12-04 PROCEDURE — 3074F SYST BP LT 130 MM HG: CPT | Performed by: NURSE PRACTITIONER

## 2024-12-04 PROCEDURE — 1160F RVW MEDS BY RX/DR IN RCRD: CPT | Performed by: NURSE PRACTITIONER

## 2024-12-04 PROCEDURE — 84075 ASSAY ALKALINE PHOSPHATASE: CPT

## 2024-12-04 PROCEDURE — 84550 ASSAY OF BLOOD/URIC ACID: CPT

## 2024-12-04 PROCEDURE — 82570 ASSAY OF URINE CREATININE: CPT

## 2024-12-04 PROCEDURE — 2500000002 HC RX 250 W HCPCS SELF ADMINISTERED DRUGS (ALT 637 FOR MEDICARE OP, ALT 636 FOR OP/ED): Performed by: NURSE PRACTITIONER

## 2024-12-04 PROCEDURE — 80197 ASSAY OF TACROLIMUS: CPT

## 2024-12-04 PROCEDURE — 83615 LACTATE (LD) (LDH) ENZYME: CPT

## 2024-12-04 RX ORDER — POTASSIUM CHLORIDE 750 MG/1
20 CAPSULE, EXTENDED RELEASE ORAL 2 TIMES DAILY
Qty: 40 CAPSULE | Refills: 0 | Status: SHIPPED | OUTPATIENT
Start: 2024-12-04 | End: 2024-12-14

## 2024-12-04 RX ORDER — POTASSIUM CHLORIDE 20 MEQ/1
20 TABLET, EXTENDED RELEASE ORAL ONCE
Status: COMPLETED | OUTPATIENT
Start: 2024-12-04 | End: 2024-12-04

## 2024-12-04 RX ORDER — POTASSIUM CHLORIDE 20 MEQ/1
20 TABLET, EXTENDED RELEASE ORAL ONCE
Status: CANCELLED | OUTPATIENT
Start: 2024-12-04 | End: 2024-12-04

## 2024-12-04 RX ORDER — ERGOCALCIFEROL 1.25 MG/1
1.25 CAPSULE ORAL WEEKLY
Qty: 8 CAPSULE | Refills: 0 | Status: SHIPPED | OUTPATIENT
Start: 2024-12-04 | End: 2025-01-29

## 2024-12-04 NOTE — PROGRESS NOTES
Patient ID: Brandt Merritt is a 64 y.o. male.    Subjective    Patient presents today, accompanied by his wife, for follow up.  Reports feeling ok.  He thinks his appetite is slowly improving.  Oatmeal has been tasting good to him.  He is drinking water throughout the day.  He has not had diarrhea in 2 days now.   No other concerns or complaints.       Objective    BSA: 1.68 meters squared  /74 (BP Location: Right arm, Patient Position: Sitting)   Pulse 84   Temp 37.3 °C (99.1 °F) (Skin)   Resp 18   Wt 60.9 kg (134 lb 4.2 oz)   SpO2 100%   BMI 21.99 kg/m²     HCT Type: Single cord  and Haplo   Transplant Date:  9/19/2024 and 11/6/2024  Days since transplant:  76 and 28     Physical Exam  Constitutional:       Appearance: Normal appearance.   HENT:      Head: Normocephalic.   Eyes:      Pupils: Pupils are equal, round, and reactive to light.   Cardiovascular:      Rate and Rhythm: Normal rate and regular rhythm.   Pulmonary:      Effort: Pulmonary effort is normal.      Breath sounds: Normal breath sounds.   Abdominal:      General: Bowel sounds are normal.      Palpations: Abdomen is soft.   Musculoskeletal:         General: Normal range of motion.      Cervical back: Normal range of motion and neck supple.   Lymphadenopathy:      Comments: No lymphadenopathy   Skin:     General: Skin is warm and dry.      Findings: No lesion or rash.      Comments: Skin dry throughout   Neurological:      General: No focal deficit present.      Mental Status: He is alert and oriented to person, place, and time. Mental status is at baseline.      Comments: No numbness or tingling   Psychiatric:         Mood and Affect: Mood normal.       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      s/p 4 cycles of Decitabine/Venetoclax. BM Bx 6/17/24: Blasts cleared, FISH still positive for trisomy 8, still MDS changes   BMBx (9/5/24): 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 and no increase in blasts      History of allogeneic hematopoietic stem cell transplant  #1: Single-unit Cord, T0=9/19/24, Primary Engraftment Failure  - Conditioning: Flu-Mariana-TBI  - Donor: Single Cord, 6/8  = ABO Donor / Recipient: A+ / A+  = CMV Donor / Recipient: - / +  - aGVHD Prophylaxis: Tacrolimus + MMF  - engraftment failure, developed HLA antibody against class I of cord  - Repeat BMBx (10/15): hypocellular with no residual myeloid neoplasm. 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), T0=11/6/24  - Graft: Haploidentical sister  = ABO Donor / Recipient: O+ / A+ (ABO incompatibility +)  = CMV Donor / Recipient: + / +  - Conditioning: Flu/Cy/TBI/rATG  = GVHD prophylaxis -  rATG 1.5mg/kg T-5,-3,-1, PTCy (25mg/kg T+3, T+4), Tacro, MMF (T+5)     DATE DAY SOURCE MORPHOLOGY MRD WHOLE CHIMERISM   (% donor) CD3 CHIMERISM   (% donor) CD33 CHIMERISM   (% donor)   11/20/24 D+30 PB      100%       12/9/24 D+30 BM               D+60 PB               D+100 PB               D+100 BM                Monitor for post-transplant hemolysis   12/4/24  Haptoglobin 321 12/4/24  UPC ratio 0.18 12/4/24     GVHD prophylaxis  GVHD  Anorexia/poor oral intake/weight loss. Added Mirtazapine. Continue Zofran. If persists, consider aGVHD.  - Reports improvement in appetite  Continue Tacro 1mg bid. Level 7.8.  Due to poor appetite will decrease MMF to 500 mg po tid  Plan to stop MMF on T+35     Weights  Pre-transplant: 9/9 74.2kg   Upon SCT discharge: 11/25 60.9kg  Today's weight: 60.9 kg       Infectious Disease & Immune Reconstitution      Prophylaxis  Antiviral prophylaxis:  acyclovir, Letermovir  Antifungal: posaconazole  PCP prophylaxis: 10/26/24 - 11/28/24 Pentamidine; Bactrim 12/4 - present     Hypogammaglobulinemia  IgG level. IVIG for level <400   IgG 1230 11/19/24      Active Surveillance:     CMV PCR: ND 11/29  EBV PCR: ND 11/29  Adenovirus: ND 11/27  HHV6: 900 11/27     EBV Viremia during transplant admission  Treated despite low levels due to upcoming second SCT  Rituximab 600 mg x 1 (10/31/24)     Immunizations  Covid vaccine series   Seasonal influenza vaccine   Will plan to begin immunizations at T+6mths ### depending on degree of immunosuppression      Infectious Disease follow up evaluation: 1/10/25     Immunodeficiency panel 100 days, 6mos and 1 year.       Cardiac:    Essential hypertension  Continue nifedipine 60 mEq ER 24 hr tablet   Cardiology 12/18 and ECHO 12/23     Ascending aorta dilation   TTE (4/29/24): 3.8-4 cm dilation      Hx of STEMI (11/11/2020) has cardiology folllowup 12/18/24     Tremor  Most likely due to Tacrolimus   If no improvement, could consider Neurology consult     Electrolyte disturbance  Continue oral magnesium TID and potassium BID  Potassium replacement orally today     Vitamin D deficiency  12/4/24: Vit D level 9  Begin weekly replacement x 8 weeks (please discuss with patient at next visit, script sent)     Lack of appetite  Mirtazapine 15 mg at night as of 11/26  Reports improvement in appetite and weight stable     Loose Stools  PRN imodium     IV Access  L PICC line     Psychosocial.    Caregiver Ananya  [facility] [place]    Oncology History Overview Note   4/2024  Myelodysplastic neoplasm with increased blasts-2 ( WHO)  Myelodysplastic neoplasm/AML  (ICC 2022 classification)    Presented in  10/2023 for pancytopenia, found to have hemolysis. Patient had normal CBC June 2020. Denied any hemorrhoidal bleeding . Has had C Scope and EGD , treated Hep C 2014 . 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      CBC 4/11/24 wbc 3.0, hgb  7.1, platelets 167K ANC 0.73    BM biopsy done on 4/11/24  as folllows:  BM histology  Myelodysplastic neoplasm with increased blasts-2 ( WHO)  Myelodysplastic neoplasm/AML  (ICC 2022 classification)  Balsts 11% on aspirate smear and 15-20% based on CD 34 immunostaining approaching AML leukemia, hematooiesis is erythroid dominant with dysplasia in granulocytes and megakaryocytes.   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      11/6/2024 -  Bone Marrow Transplant    conditioned with Flu/Cy/TBI and aGVHD prophylaxis with post-transplant cyclophosphamide, tacrolimus, and mycophenolate. T=0, 11/6/24. ABO Donor/Recipient: O+, A+. CMV donor/recipient: both positive. Started Tacro and MMF on T+5 (11/11).        RTC:  12/6 12/9 with bmbx & Karon Casas CNP, 12/11, 12/13 12/16 Dr Newsome,, 12/18 Dr Jones, 12/20 12/23 with ECHO, 12/26 12/30 Karon Avila, APRN-CNP

## 2024-12-04 NOTE — PROGRESS NOTES
Pt ambulated to SCT unit without assistance accompanied by spouse. Pt denies complaints or pain today. Vitals obtained, blood drawn and sent to lab via LUE double lumen PICC; both lumens flushed without difficulty and had +BBR. PICC dressing and caps changed. Terri Avila NP came to see patient. Pt received 20 mEq PO potassium. No other nursing needs required. Pt given copy of lab results, and ambulated off unit without complaints or assistance accompanied by spouse.

## 2024-12-05 LAB
ADENOVIRUS QPCR,PLASMA, VIRC: NOT DETECTED COPIES/ML
EBV DNA SPEC NAA+PROBE-LOG#: NORMAL {LOG_COPIES}/ML
HUMAN HERPESVIRUS-6 PCR PLASMA: NOT DETECTED COPIES/ML
LABORATORY COMMENT REPORT: NOT DETECTED

## 2024-12-06 ENCOUNTER — OFFICE VISIT (OUTPATIENT)
Dept: HEMATOLOGY/ONCOLOGY | Facility: HOSPITAL | Age: 64
End: 2024-12-06
Payer: COMMERCIAL

## 2024-12-06 ENCOUNTER — INFUSION (OUTPATIENT)
Dept: HEMATOLOGY/ONCOLOGY | Facility: HOSPITAL | Age: 64
End: 2024-12-06
Payer: COMMERCIAL

## 2024-12-06 ENCOUNTER — NUTRITION (OUTPATIENT)
Dept: HEMATOLOGY/ONCOLOGY | Facility: HOSPITAL | Age: 64
End: 2024-12-06

## 2024-12-06 VITALS — HEIGHT: 66 IN | BODY MASS INDEX: 21.65 KG/M2 | WEIGHT: 134.7 LBS

## 2024-12-06 VITALS
BODY MASS INDEX: 22.07 KG/M2 | TEMPERATURE: 98.2 F | HEART RATE: 102 BPM | OXYGEN SATURATION: 100 % | RESPIRATION RATE: 16 BRPM | DIASTOLIC BLOOD PRESSURE: 56 MMHG | SYSTOLIC BLOOD PRESSURE: 91 MMHG | WEIGHT: 134.7 LBS

## 2024-12-06 DIAGNOSIS — C92.01 ACUTE MYELOID LEUKEMIA IN REMISSION (MULTI): ICD-10-CM

## 2024-12-06 DIAGNOSIS — Z94.84 STEM CELLS TRANSPLANT STATUS (MULTI): ICD-10-CM

## 2024-12-06 LAB
ALBUMIN SERPL BCP-MCNC: 3.4 G/DL (ref 3.4–5)
ALP SERPL-CCNC: 73 U/L (ref 33–136)
ALT SERPL W P-5'-P-CCNC: 9 U/L (ref 10–52)
ANION GAP SERPL CALC-SCNC: 16 MMOL/L (ref 10–20)
AST SERPL W P-5'-P-CCNC: 11 U/L (ref 9–39)
BASOPHILS # BLD MANUAL: 0.21 X10*3/UL (ref 0–0.1)
BASOPHILS NFR BLD MANUAL: 2 %
BILIRUB SERPL-MCNC: 0.4 MG/DL (ref 0–1.2)
BUN SERPL-MCNC: 15 MG/DL (ref 6–23)
BURR CELLS BLD QL SMEAR: ABNORMAL
CALCIUM SERPL-MCNC: 8.8 MG/DL (ref 8.6–10.3)
CHLORIDE SERPL-SCNC: 106 MMOL/L (ref 98–107)
CMV DNA SERPL NAA+PROBE-LOG IU: NORMAL {LOG_IU}/ML
CO2 SERPL-SCNC: 22 MMOL/L (ref 21–32)
CREAT SERPL-MCNC: 1.14 MG/DL (ref 0.5–1.3)
DACRYOCYTES BLD QL SMEAR: ABNORMAL
EGFRCR SERPLBLD CKD-EPI 2021: 71 ML/MIN/1.73M*2
EOSINOPHIL # BLD MANUAL: 0.21 X10*3/UL (ref 0–0.7)
EOSINOPHIL NFR BLD MANUAL: 2 %
ERYTHROCYTE [DISTWIDTH] IN BLOOD BY AUTOMATED COUNT: 15.8 % (ref 11.5–14.5)
GLUCOSE SERPL-MCNC: 105 MG/DL (ref 74–99)
HCT VFR BLD AUTO: 24 % (ref 41–52)
HGB BLD-MCNC: 7.9 G/DL (ref 13.5–17.5)
HOLD SPECIMEN: NORMAL
HYPOCHROMIA BLD QL SMEAR: ABNORMAL
IMM GRANULOCYTES # BLD AUTO: 0.74 X10*3/UL (ref 0–0.7)
IMM GRANULOCYTES NFR BLD AUTO: 6.9 % (ref 0–0.9)
LABORATORY COMMENT REPORT: NOT DETECTED
LYMPHOCYTES # BLD MANUAL: 0.86 X10*3/UL (ref 1.2–4.8)
LYMPHOCYTES NFR BLD MANUAL: 8 %
MAGNESIUM SERPL-MCNC: 1.41 MG/DL (ref 1.6–2.4)
MCH RBC QN AUTO: 27.8 PG (ref 26–34)
MCHC RBC AUTO-ENTMCNC: 32.9 G/DL (ref 32–36)
MCV RBC AUTO: 85 FL (ref 80–100)
METAMYELOCYTES # BLD MANUAL: 0.11 X10*3/UL
METAMYELOCYTES NFR BLD MANUAL: 1 %
MONOCYTES # BLD MANUAL: 2.14 X10*3/UL (ref 0.1–1)
MONOCYTES NFR BLD MANUAL: 20 %
MYELOCYTES # BLD MANUAL: 0.21 X10*3/UL
MYELOCYTES NFR BLD MANUAL: 2 %
NEUTS SEG # BLD MANUAL: 6.96 X10*3/UL (ref 1.2–7)
NEUTS SEG NFR BLD MANUAL: 65 %
NRBC BLD-RTO: 0 /100 WBCS (ref 0–0)
OVALOCYTES BLD QL SMEAR: ABNORMAL
PLATELET # BLD AUTO: 443 X10*3/UL (ref 150–450)
POLYCHROMASIA BLD QL SMEAR: ABNORMAL
POTASSIUM SERPL-SCNC: 3.4 MMOL/L (ref 3.5–5.3)
PROT SERPL-MCNC: 6.7 G/DL (ref 6.4–8.2)
RBC # BLD AUTO: 2.84 X10*6/UL (ref 4.5–5.9)
RBC MORPH BLD: ABNORMAL
SCHISTOCYTES BLD QL SMEAR: ABNORMAL
SODIUM SERPL-SCNC: 141 MMOL/L (ref 136–145)
TACROLIMUS BLD-MCNC: 5.2 NG/ML
TOTAL CELLS COUNTED BLD: 100
WBC # BLD AUTO: 10.7 X10*3/UL (ref 4.4–11.3)

## 2024-12-06 PROCEDURE — 85027 COMPLETE CBC AUTOMATED: CPT

## 2024-12-06 PROCEDURE — 85007 BL SMEAR W/DIFF WBC COUNT: CPT

## 2024-12-06 PROCEDURE — 83735 ASSAY OF MAGNESIUM: CPT | Performed by: NURSE PRACTITIONER

## 2024-12-06 PROCEDURE — 36591 DRAW BLOOD OFF VENOUS DEVICE: CPT

## 2024-12-06 PROCEDURE — 2500000002 HC RX 250 W HCPCS SELF ADMINISTERED DRUGS (ALT 637 FOR MEDICARE OP, ALT 636 FOR OP/ED): Performed by: NURSE PRACTITIONER

## 2024-12-06 PROCEDURE — 99215 OFFICE O/P EST HI 40 MIN: CPT | Performed by: NURSE PRACTITIONER

## 2024-12-06 PROCEDURE — 2500000004 HC RX 250 GENERAL PHARMACY W/ HCPCS (ALT 636 FOR OP/ED): Performed by: NURSE PRACTITIONER

## 2024-12-06 PROCEDURE — 96523 IRRIG DRUG DELIVERY DEVICE: CPT

## 2024-12-06 PROCEDURE — 80197 ASSAY OF TACROLIMUS: CPT

## 2024-12-06 PROCEDURE — 84075 ASSAY ALKALINE PHOSPHATASE: CPT

## 2024-12-06 RX ORDER — LANOLIN ALCOHOL/MO/W.PET/CERES
400 CREAM (GRAM) TOPICAL DAILY
Status: DISCONTINUED | OUTPATIENT
Start: 2024-12-06 | End: 2024-12-06 | Stop reason: HOSPADM

## 2024-12-06 RX ORDER — HEPARIN SODIUM,PORCINE/PF 10 UNIT/ML
50 SYRINGE (ML) INTRAVENOUS AS NEEDED
Status: DISCONTINUED | OUTPATIENT
Start: 2024-12-06 | End: 2024-12-06 | Stop reason: HOSPADM

## 2024-12-06 RX ORDER — LANOLIN ALCOHOL/MO/W.PET/CERES
400 CREAM (GRAM) TOPICAL DAILY
Status: CANCELLED | OUTPATIENT
Start: 2024-12-06

## 2024-12-06 RX ORDER — POTASSIUM CHLORIDE 750 MG/1
20 TABLET, FILM COATED, EXTENDED RELEASE ORAL ONCE
Status: CANCELLED | OUTPATIENT
Start: 2024-12-06 | End: 2024-12-06

## 2024-12-06 RX ORDER — HEPARIN 100 UNIT/ML
500 SYRINGE INTRAVENOUS AS NEEDED
Status: DISCONTINUED | OUTPATIENT
Start: 2024-12-06 | End: 2024-12-06 | Stop reason: HOSPADM

## 2024-12-06 RX ORDER — HEPARIN 100 UNIT/ML
500 SYRINGE INTRAVENOUS AS NEEDED
OUTPATIENT
Start: 2024-12-06

## 2024-12-06 RX ORDER — POTASSIUM CHLORIDE 750 MG/1
20 TABLET, FILM COATED, EXTENDED RELEASE ORAL ONCE
Status: COMPLETED | OUTPATIENT
Start: 2024-12-06 | End: 2024-12-06

## 2024-12-06 RX ORDER — HEPARIN SODIUM,PORCINE/PF 10 UNIT/ML
50 SYRINGE (ML) INTRAVENOUS AS NEEDED
OUTPATIENT
Start: 2024-12-06

## 2024-12-06 ASSESSMENT — PAIN SCALES - GENERAL: PAINLEVEL_OUTOF10: 0-NO PAIN

## 2024-12-06 NOTE — PROGRESS NOTES
Patient ID: Brandt Merritt is a 64 y.o. male.    Subjective    Patient presents today, accompanied by his wife, for follow up.  Reports feeling well.  His appetite is much better and he is eating well. Stools are soft.  He has no concerns or complaints today.      Objective    HCT Type: Single cord  and Haplo   Transplant Date:  9/19/2024 and 11/6/2024  Days since transplant:  76 and 28     Physical Exam  Constitutional:       Appearance: Normal appearance.   HENT:      Head: Normocephalic.   Eyes:      Pupils: Pupils are equal, round, and reactive to light.   Cardiovascular:      Rate and Rhythm: Normal rate and regular rhythm.   Pulmonary:      Effort: Pulmonary effort is normal.      Breath sounds: Normal breath sounds.   Abdominal:      General: Bowel sounds are normal.      Palpations: Abdomen is soft.   Musculoskeletal:         General: Normal range of motion.      Cervical back: Normal range of motion and neck supple.   Lymphadenopathy:      Comments: No lymphadenopathy   Skin:     General: Skin is warm and dry.      Findings: No lesion or rash.   Neurological:      General: No focal deficit present.      Mental Status: He is alert and oriented to person, place, and time. Mental status is at baseline.      Comments: No numbness or tingling   Psychiatric:         Mood and Affect: Mood normal.       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      s/p 4 cycles of Decitabine/Venetoclax. BM Bx 6/17/24: Blasts cleared, FISH still positive for trisomy 8, still MDS changes   BMBx (9/5/24): 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 and no  increase in blasts      History of allogeneic hematopoietic stem cell transplant  #1: Single-unit Cord, T0=9/19/24, Primary Engraftment Failure  - Conditioning: Flu-Mariana-TBI  - Donor: Single Cord, 6/8  = ABO Donor / Recipient: A+ / A+  = CMV Donor / Recipient: - / +  - aGVHD Prophylaxis: Tacrolimus + MMF  - engraftment failure, developed HLA antibody against class I of cord  - Repeat BMBx (10/15): hypocellular with no residual myeloid neoplasm. 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), T0=11/6/24  - Graft: Haploidentical sister  = ABO Donor / Recipient: O+ / A+ (ABO incompatibility +)  = CMV Donor / Recipient: + / +  - Conditioning: Flu/Cy/TBI/rATG  = GVHD prophylaxis -  rATG 1.5mg/kg T-5,-3,-1, PTCy (25mg/kg T+3, T+4), Tacro, MMF (T+5)     DATE DAY SOURCE MORPHOLOGY MRD WHOLE CHIMERISM   (% donor) CD3 CHIMERISM   (% donor) CD33 CHIMERISM   (% donor)   11/20/24 D+30 PB      100%       12/9/24 D+30 BM               D+60 PB               D+100 PB               D+100 BM                Monitor for post-transplant hemolysis   12/4/24  Haptoglobin 321 12/4/24  UPC ratio 0.18 12/4/24     GVHD prophylaxis  GVHD  Anorexia/poor oral intake/weight loss. Added Mirtazapine. Continue Zofran. If persists, consider aGVHD.  - Reports improvement in appetite  Tacrolimus level today was 5.2. Increase Tacro to 1 mg in the am and 1.5 mg in the pm.   Continue  mg po tid  Plan to stop MMF on T+35     Weights  Pre-transplant: 9/9 74.2kg   Upon SCT discharge: 11/25 60.9kg  Today's weight: 61.1 kg       Infectious Disease & Immune Reconstitution      Prophylaxis  Antiviral prophylaxis: acyclovir, Letermovir  Antifungal: posaconazole  PCP prophylaxis: 10/26/24 - 11/28/24 Pentamidine; Bactrim 12/4 - present     Hypogammaglobulinemia  IgG level. IVIG for level <400   IgG 1230 11/19/24      Active Surveillance:     CMV PCR: ND 11/29  EBV PCR: ND 12/4  Adenovirus: ND 12/4  HHV6: ND 12/4     EBV Viremia  during transplant admission  Treated despite low levels due to upcoming second SCT  Rituximab 600 mg x 1 (10/31/24)     Immunizations  Covid vaccine series   Seasonal influenza vaccine   Will plan to begin immunizations at T+6mths ### depending on degree of immunosuppression      Infectious Disease follow up evaluation: 1/10/25     Immunodeficiency panel 100 days, 6mos and 1 year.       Cardiac:    Essential hypertension  Continue nifedipine 60 mEq ER 24 hr tablet   Cardiology 12/18 and ECHO 12/23     Ascending aorta dilation   TTE (4/29/24): 3.8-4 cm dilation      Hx of STEMI (11/11/2020) has cardiology folllowup 12/18/24     Tremor  Most likely due to Tacrolimus   If no improvement, could consider Neurology consult     Electrolyte disturbance  Continue oral magnesium TID and potassium BID  Potassium and magnesium replacement orally today      Vitamin D deficiency  12/4/24: Vit D level 9  Begin weekly replacement x 8 weeks      Lack of appetite  Mirtazapine 15 mg at night as of 11/26  Reports improvement in appetite and weight stable     Loose Stools  PRN imodium     IV Access  L PICC line     Psychosocial.    Caregiver Ananya  [facility] in [place]    Oncology History Overview Note   4/2024  Myelodysplastic neoplasm with increased blasts-2 ( WHO)  Myelodysplastic neoplasm/AML  (ICC 2022 classification)    Presented in  10/2023 for pancytopenia, found to have hemolysis. Patient had normal CBC June 2020. Denied any hemorrhoidal bleeding . Has had C Scope and EGD , treated Hep C 2014 . 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      CBC 4/11/24 wbc 3.0, hgb 7.1, platelets 167K ANC 0.73    BM biopsy done on 4/11/24  as folllows:  BM histology  Myelodysplastic neoplasm with increased blasts-2 ( WHO)  Myelodysplastic neoplasm/AML  (ICC 2022 classification)  Balsts 11% on aspirate smear and 15-20% based on CD 34 immunostaining approaching AML leukemia, hematooiesis is erythroid dominant with dysplasia in granulocytes  and megakaryocytes.   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      11/6/2024 -  Bone Marrow Transplant    conditioned with Flu/Cy/TBI and aGVHD prophylaxis with post-transplant cyclophosphamide, tacrolimus, and mycophenolate. T=0, 11/6/24. ABO Donor/Recipient: O+, A+. CMV donor/recipient: both positive. Started Tacro and MMF on T+5 (11/11).        RTC:  12/9 with bmbx & Karon Casas CNP, 12/11, 12/13 12/16 Dr Newsome,, 12/18 Dr Jones, 12/20 12/23 with ECHO, 12/26 12/30 Karon Avila, APRN-CNP

## 2024-12-06 NOTE — PROGRESS NOTES
"NUTRITION FOLLOW UP NOTE    Reason for Visit:  Brandt Merritt is a 64 y.o. 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    PMH: HTN, Hep C    Currently T+78 (cord); T+30 (haplo)    Pt and wife seen today in infusion.     Lab Results   Component Value Date/Time    GLUCOSE 105 (H) 12/06/2024 0814     12/06/2024 0814    K 3.4 (L) 12/06/2024 0814     12/06/2024 0814    CO2 22 12/06/2024 0814    ANIONGAP 16 12/06/2024 0814    BUN 15 12/06/2024 0814    CREATININE 1.14 12/06/2024 0814    EGFR 71 12/06/2024 0814    CALCIUM 8.8 12/06/2024 0814    ALBUMIN 3.4 12/06/2024 0814    ALKPHOS 73 12/06/2024 0814    PROT 6.7 12/06/2024 0814    AST 11 12/06/2024 0814    BILITOT 0.4 12/06/2024 0814    ALT 9 (L) 12/06/2024 0814     Mg 1. (L)    Lab Results   Component Value Date    WBC 10.7 12/06/2024    HGB 7.9 (L) 12/06/2024    HCT 24.0 (L) 12/06/2024    MCV 85 12/06/2024     12/06/2024       Lab Results   Component Value Date/Time    VITD25 9 (L) 12/04/2024 0933       Anthropometrics:  Anthropometrics  Height: 166.4 cm (5' 5.51\")  Weight: 61.1 kg (134 lb 11.2 oz)  BMI (Calculated): 22.07  IBW/kg (Dietitian Calculated): 63.2 kg  Percent of IBW: 97 %    *Wt at time of transplant admit: (9/13) 75.6 kg   *Wt at first post-transplant visit: (11/25): 60.9 kg     *Wt up ~2 kg x 1 week     Wt Readings from Last 10 Encounters:   12/06/24 61.1 kg (134 lb 11.2 oz)   12/06/24 61.1 kg (134 lb 11.2 oz)   12/04/24 60.9 kg (134 lb 4.2 oz)   12/02/24 60 kg (132 lb 4.4 oz)   12/02/24 60 kg (132 lb 4.4 oz)   11/29/24 59 kg (130 lb)   11/29/24 59 kg (130 lb 1.1 oz)   11/27/24 59.5 kg (131 lb 2.8 oz)   11/25/24 60.9 kg (134 lb 4.2 oz)   11/25/24 60.9 kg (134 lb 4.2 oz)   07/19/24        74.2 kg   06/24/24        71.0 kg  05/20/24        72.8 kg  04/23/24        75.5 kg     Food And Nutrient Intake:      (12/6)  Appetite better;  eating 2 meals a day " "  Portions increasing.  Tastes remain off but more things tasting better   Eating lots of oatmeal with butter  More interested in eating   Drinking mainly water, gatorade, pop--all taste okay  No nausea  Still with loose stools but thinks its because he is eating a bit more; only having 1 BM today.   Ate sausage, mac and cheese, chicken   Sweets tasted good   Energy still low; endurance down.       (12/2):  This weekend, pt made more of an attempt to eat.   Portions still small, however, pt was able to eat some mac and cheese, a small piece of ham, oatmeal and Oreos.     Nausea better; taking antiemetics more regularly and finds lemon heads help as well.   Loose stools x1 yesterday.  Feels stool frequency has decreased and stools are less watery.   Fluid intakes a bit better as well-now drinking Crystal light.  Energy remains down but continues to walk into infusion from parking garage unassisted.     Spoke with MD; plan to decrease MMF to 2 tabs TID with plans to stop on ~T+35.     (11/30):  Not eating well; appetite remains minimal.  Does not get hungry.   Can go all day without eating; when he does eat, has to force himself to do so.   Had very small piece of ham, stuffing, mac and cheese yesterday for Thanksgiving.  Reports dysgeusia; things taste \"off.\"  Mouth dry most of the time.  Drinking fluids better than eating solids; drinking 2-3 10 oz cups of water; takes pills with g'supriya and Pepsi.  Wife feels he is probably drinking even less than he reports.   Not interested in supplements--tried Ensure Plus and Ensure Clear in the hospital but disliked.   Does have \"periodic\" nausea--started taking Zofran more regularly but still has nausea at some point every day.   Able to identify when he is beginning to feel nauseated--will rinse out his mouth which helps keep him from vomiting.   Still having loose stools but feels it is improved today; had 1 loose BM this am.  Energy levels low; fatigues easily--to receive " "PRBC's today.   Able to walk to infusion from parking garage.     As of 2:30 pm, pt has not had anything to eat yet today.   Agrees to try oatmeal and Oreo's with milk this evening.     Started Remeron on 12/27.                                                             Nutrition Focused Physical Exam Findings:                          Energy Needs  Calculated Energy Needs Using Equations  Height: 166.4 cm (5' 5.51\")        Nutrition Diagnosis        Pt remains moderately malnourished.   Pt would benefit from use of supplements, however, pt dislikes.         Nutrition Interventions/Recommendations   Nutrition Prescription   High Protein, High Calorie  Food Safety     Nutrition Education   Will recheck 25-OH Vit D3 (last labs from 9/2023 suggested deficiency--? If adequately repleted.   Pt is taking meds on an empty stomach--suggested eating something such as PB toast, yogurt, etc to minimize risk for further nausea.  Continue scheduled Zofran, however, suspect nausea will improve if pt makes more of an effort to eat with meds   Encourage increased protein intakes; suggested calorie containing, caffeine free beverages; Goal: 60+ oz daily  Include 1 cup whole milk daily  Milkshakes or smoothies daily as pt does not care for ONS  Discussed tips for managing taste changes--has lemon hard candies; suggested condiments, seasonings to help enhance flavors  Pt may benefit from start of Budesonide if nausea does not improve with anti-emetics or increased intakes     Coordination of Care   NP/BMT team         Nutrition Monitoring and Evaluation      Will continue to f/up and monitor weight, labs. PO intakes and GI symptoms.              "

## 2024-12-06 NOTE — ASSESSMENT & PLAN NOTE
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      s/p 4 cycles of Decitabine/Venetoclax. BM Bx 6/17/24: Blasts cleared, FISH still positive for trisomy 8, still MDS changes   BMBx (9/5/24): 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 and no increase in blasts      History of allogeneic hematopoietic stem cell transplant  #1: Single-unit Cord, T0=9/19/24, Primary Engraftment Failure  - Conditioning: Flu-Mariana-TBI  - Donor: Single Cord, 6/8  = ABO Donor / Recipient: A+ / A+  = CMV Donor / Recipient: - / +  - aGVHD Prophylaxis: Tacrolimus + MMF  - engraftment failure, developed HLA antibody against class I of cord  - Repeat BMBx (10/15): hypocellular with no residual myeloid neoplasm. 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), T0=11/6/24  - Graft: Haploidentical sister  = ABO Donor / Recipient: O+ / A+ (ABO incompatibility +)  = CMV Donor / Recipient: + / +  - Conditioning: Flu/Cy/TBI/rATG  = GVHD prophylaxis -  rATG 1.5mg/kg T-5,-3,-1, PTCy (25mg/kg T+3, T+4), Tacro, MMF (T+5)     DATE DAY SOURCE MORPHOLOGY MRD WHOLE CHIMERISM   (% donor) CD3 CHIMERISM   (% donor) CD33 CHIMERISM   (% donor)   11/20/24 D+30 PB      100%       12/9/24 D+30 BM               D+60 PB               D+100 PB               D+100 BM                Monitor for post-transplant hemolysis   12/4/24  Haptoglobin 321 12/4/24  UPC ratio 0.18 12/4/24     GVHD prophylaxis  GVHD  Anorexia/poor oral intake/weight loss. Added Mirtazapine. Continue Zofran. If persists, consider aGVHD.  - Reports improvement in appetite  Tacrolimus level today was 5.2. Increase Tacro to 1 mg in the am and 1.5 mg in the pm.   Continue  mg po tid  Plan to stop MMF on T+35     Weights  Pre-transplant: 9/9 74.2kg   Upon SCT discharge: 11/25 60.9kg  Today's weight: 61.1 kg        Infectious Disease & Immune Reconstitution      Prophylaxis  Antiviral prophylaxis: acyclovir, Letermovir  Antifungal: posaconazole  PCP prophylaxis: 10/26/24 - 11/28/24 Pentamidine; Bactrim 12/4 - present     Hypogammaglobulinemia  IgG level. IVIG for level <400   IgG 1230 11/19/24      Active Surveillance:     CMV PCR: ND 11/29  EBV PCR: ND 12/4  Adenovirus: ND 12/4  HHV6: ND 12/4     EBV Viremia during transplant admission  Treated despite low levels due to upcoming second SCT  Rituximab 600 mg x 1 (10/31/24)     Immunizations  Covid vaccine series   Seasonal influenza vaccine   Will plan to begin immunizations at T+6mths ### depending on degree of immunosuppression      Infectious Disease follow up evaluation: 1/10/25     Immunodeficiency panel 100 days, 6mos and 1 year.       Cardiac:    Essential hypertension  Continue nifedipine 60 mEq ER 24 hr tablet   Cardiology 12/18 and ECHO 12/23     Ascending aorta dilation   TTE (4/29/24): 3.8-4 cm dilation      Hx of STEMI (11/11/2020) has cardiology folllowup 12/18/24     Tremor  Most likely due to Tacrolimus   If no improvement, could consider Neurology consult     Electrolyte disturbance  Continue oral magnesium TID and potassium BID  Potassium and magnesium replacement orally today      Vitamin D deficiency  12/4/24: Vit D level 9  Begin weekly replacement x 8 weeks      Lack of appetite  Mirtazapine 15 mg at night as of 11/26  Reports improvement in appetite and weight stable     Loose Stools  PRN imodium     IV Access  L PICC line     Psychosocial.    Caregiver Ananya  [facility] in [place]

## 2024-12-06 NOTE — PROGRESS NOTES
Brandt Merritt is a 64 y.o. male who presents in stable condition for count check & BMP f/u    He is on the following chemotherapy regimen:     Treatment Plans       Name Type Plan Dates Plan Provider         Active    (CELL - HAPLO) Fludarabine / Cyclophosphamide / Total Body Irradiation and Antithymocyte Globulin (rabbit) with Post-transplant Cyclophosphamide Oncology Treatment 10/31/2024 - Present Malaika Newsome MD             Since his last visit, he has been doing well.  Overall, he states his energy level is stable.  His appetite & hydration status has been improved.  He reports mild shortness of breath on exertion due to low hgb. Patients' blood pressure was low on admission but is asymptomatic.     Gave oral potassium & magnesium due to low levels.    Patient educated with the overall therapy plan. Questions & concerns addressed by his provider during BMT visit. AVS, lab results & future appointment provided. Patient discharged in stable condition.    Reviewed and approved by VALERIO RAY on 12/6/24 at 2:32 PM.

## 2024-12-08 ASSESSMENT — ENCOUNTER SYMPTOMS
PSYCHIATRIC NEGATIVE: 1
HEMATOLOGIC/LYMPHATIC NEGATIVE: 1
FATIGUE: 1
EYES NEGATIVE: 1
NAUSEA: 0
ENDOCRINE NEGATIVE: 1
APPETITE CHANGE: 1
CARDIOVASCULAR NEGATIVE: 1

## 2024-12-08 NOTE — PROGRESS NOTES
Patient ID:  Brandt Merritt is a 64 y.o. male.  Referring Physician:   ONC Dr Newsome  Primary Care Provider:  Bill Richmond MD    Assessment/Plan      12/9/24 T+81 (9/19/24); T+33 (11/6/24).     Oncology History Overview Note   4/2024  Myelodysplastic neoplasm with increased blasts-2 ( WHO)  Myelodysplastic neoplasm/AML  (ICC 2022 classification)    Presented in  10/2023 for pancytopenia, found to have hemolysis. Patient had normal CBC June 2020. Denied any hemorrhoidal bleeding . Has had C Scope and EGD , treated Hep C 2014 . 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      CBC 4/11/24 wbc 3.0, hgb 7.1, platelets 167K ANC 0.73    BM biopsy done on 4/11/24  as folllows:  BM histology  Myelodysplastic neoplasm with increased blasts-2 ( WHO)  Myelodysplastic neoplasm/AML  (ICC 2022 classification)  Balsts 11% on aspirate smear and 15-20% based on CD 34 immunostaining approaching AML leukemia, hematooiesis is erythroid dominant with dysplasia in granulocytes and megakaryocytes.   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      11/6/2024 -  Bone Marrow Transplant    conditioned with Flu/Cy/TBI and aGVHD prophylaxis with post-transplant cyclophosphamide, tacrolimus, and mycophenolate. T=0, 11/6/24. ABO Donor/Recipient: O+, A+. CMV donor/recipient: both positive. Started Tacro and MMF on T+5 (11/11).         Response:      Past Medical History:  HTN   STEMI  2020 after getting  a water pills.  Chronic hepatitis C infection treated in 2015 treated  with Dr. Lloyd  Past Medical History:   Diagnosis Date    Chest pain 2021    HTN (hypertension) 10/05/2023    Hypertension     Personal history of other diseases of the circulatory system     History of hypertension      Surgical History:  Conosocopy 2021  Upper EGD 10/31/23  Surgical reduction torsion of testes      Past Surgical History:   Procedure Laterality Date    COLONOSCOPY  2013    Complete Colonoscopy    OTHER SURGICAL HISTORY  10/07/2015    Surgery Testis Reduction Of Torsion Of Testis Right      Family History:  CAD, DM in mother       Mother  of CVA at age 69  Brother with prostate CA, 1 sister with liver disease  2 children healthy  Family History   Problem Relation Name Age of Onset    Other (diabetes mellitus) Mother      Prostate cancer Brother           Social History:  Lives with wife of 30 years, works at [employer], [employer].  29 years, former smoker, smoked x 15 yrs quit 20+ years ago. Marijuana 3 x a week. Served in [employer] in [place] and [facility],   Social History   Social History       Alcohol use: Not Currently       Comment: occansionally    Drug use: Yes       Types: Marijuana       Comment: 3 weeks ago          Subjective    History of Present Illness:    Brandt presents to the clinic today (24) with his wife Genevieve for follow up after prolonged hospitalization for umbilical cord blood transplant 10/16/24 with graft rejection followed by haploidentical cord from his sister on 24 with flu/cy, ATG and modified dose cyclophosphamide with engraftment on day T+8.  Today he is T+ 81 of umbilical cord transfusion and is T+ 33 of his haplo sister.      Reports he is feeling well.  Energy level is lower side but improving slowly.  Appetite is improving, eatings about two meals per day.  Has oatmeal with  butter for breakfast and then eats a good sized dinner.  Slowly gaining some weight back.  Continue to have mild shortness of breath on exertion, like doing stairs.  Is staying active around the house and helped wash clothes.  Stools have been formed, was previously having diarrhea.    PICC line site to left arm doing well.  Reports no fevers, chills, or new rashes.  Is taking tacrolimus 0.5 mg twice daily and held his dose today prior to lab draws.      Review of Systems   Constitutional:  Positive for appetite change and fatigue.   HENT:  Negative.     Eyes: Negative.    Respiratory:  Positive for shortness of breath.    Cardiovascular: Negative.    Gastrointestinal:  Negative for blood in stool, constipation, diarrhea, nausea and vomiting.   Endocrine: Negative.    Genitourinary: Negative.     Musculoskeletal:  Negative for gait problem.   Skin: Negative.    Neurological:  Negative for dizziness, gait problem, light-headedness and numbness.   Hematological: Negative.    Psychiatric/Behavioral: Negative.         Objective      There were no vitals taken for this visit.   There were no vitals filed for this visit.    Physical Exam  Vitals reviewed.   Constitutional:       Appearance: Normal appearance.      Comments: Thin.  Lying in bed.    HENT:      Head: Normocephalic and atraumatic.      Nose: Nose normal.      Mouth/Throat:      Mouth: Mucous membranes are moist.   Eyes:      Pupils: Pupils are equal, round, and reactive to light.   Cardiovascular:      Rate and Rhythm: Normal rate and regular rhythm.      Pulses: Normal pulses.      Heart sounds: Normal heart sounds.   Pulmonary:      Effort: Pulmonary effort is normal.      Breath sounds: Normal breath sounds.   Abdominal:      General: Abdomen is flat. Bowel sounds are normal.      Palpations: Abdomen is soft. There is no mass.      Tenderness: There is no abdominal tenderness.   Musculoskeletal:         General: No swelling. Normal range of motion.    Skin:     General: Skin is warm and dry.      Comments: PICC to left arm, dressing dry and intact.   Neurological:      General: No focal deficit present.      Mental Status: He is alert and oriented to person, place, and time.   Psychiatric:         Mood and Affect: Mood normal.         Behavior: Behavior normal.       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     s/p 4 cycles of Decitabine/Venetoclax. BM Bx 6/17/24: Blasts cleared, FISH still positive for trisomy 8, still MDS changes   BMBx (9/5/24): 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 and no increase in blasts     History of allogeneic hematopoietic stem cell transplant  #1: Single-unit Cord, T0=9/19/24, Primary Engraftment Failure  - Conditioning: Flu-Mariana-TBI  - Donor: Single Cord, 6/8  = ABO Donor / Recipient: A+ / A+  = CMV Donor / Recipient: - / +  - aGVHD Prophylaxis: Tacrolimus + MMF  - engraftment failure, developed HLA antibody against class I of cord  - Repeat BMBx (10/15): hypocellular with no residual myeloid neoplasm. 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), T0=11/6/24  - Graft: Haploidentical sister  = ABO Donor / Recipient: O+ / A+ (ABO incompatibility +)  = CMV Donor / Recipient: + / +  - Conditioning: Flu/Cy/TBI/rATG  = GVHD prophylaxis -  rATG 1.5mg/kg T-5,-3,-1, PTCy (25mg/kg T+3, T+4), Tacro, MMF (T+5)     DATE DAY SOURCE MORPHOLOGY MRD WHOLE CHIMERISM   (% donor) CD3 CHIMERISM   (% donor) CD33 CHIMERISM   (% donor)   11/20/24 D+30 PB      100%     1/9/25-postponed D+30 BM               D+60 PB               D+100 PB               D+100 BM             12/9/24:  Brandt asking to postpone bone marrow biopsy today.  Dr. Newsome okay to postpone by 1 month.  Scheduled for 1/9/24.  Added on peripheral chimerism testing to labs today.       Monitor for  post-transplant hemolysis   12/4/24.  Haptoglobin 321 12/4/24  UPC ratio 0.18 12/4/24    GVHD prophylaxis  GVHD  Anorexia/poor oral intake/weight loss. Added Mirtazapine. Continue Zofran. If persists, consider aGVHD.  12/9/24:  Currently taking tacrolimus 0.5 mg BID, held dose today prior to labs.  FK level 4.6 (12/9/24).  Advised patient on 12/10/24 to increase tacrolimus dose to 1 mg in the AM and 0.5 mg in the PM.    Wean MMF from 1 g TID to 500 mg TID 12/2/24 - present  Due to poor appetite will decrease MMF to 500 mg po tid.  Improvement to GI symptoms since decreasing MMF dose.    Plan to stop MMF on T+35, advised to stop on 12/11/24.    Weights  Pre-transplant: 9/9 74.2kg   Upon SCT discharge: 11/25 60.9kg  Today's weight: 60 kg (12/9/24).  Discussed ways to obtain more calories into diet.  Monitoring.    Infectious Disease & Immune Reconstitution     Prophylaxis  Antiviral prophylaxis: acyclovir, Letermovir  Antifungal: posaconazole  PCP prophylaxis: 10/26/24 - 11/28/24 Pentamidine; Bactrim 11/29 - present    Hypogammaglobulinemia  IgG level. IVIG for level <400   IgG 1230 11/19/24, monitoring monthly.      Active Surveillance:     CMV PCR: ND 12/4  EBV PCR: ND 12/4  Adenovirus: ND 12/4  HHV6: ND 12/4.       EBV Viremia during transplant admission  Treated despite low levels due to upcoming second SCT  Rituximab 600 mg x 1 (10/31/24)     Immunizations  Covid vaccine series   Seasonal influenza vaccine   Will plan to begin immunizations at T+6mths ### depending on degree of immunosuppression     Infectious Disease follow up evaluation: Requested/not yet scheduled    Immunodeficiency panel 100 days, 6mos and 1 year.     Immunizations:   Covid vaccine series. Consider at T+ 3 months  Seasonal influenza vaccine. Consider at T+ 3 month  Will plan to begin immunizations at T+6mths (May 2025) depending on degree of immunosuppression     Cardiac:    Essential hypertension  Continue nifedipine 60 mEq ER 24 hr  tablet   Cardiology 12/18 and ECHO 12/23     Ascending aorta dilation   TTE (4/29/24): 3.8-4 cm dilation      Hx of STEMI (11/11/2020) has cardiology folllowup 12/18/24     Tremor  Most likely due to Tacrolimus   If no improvement, could consider Neurology consult     Electrolyte disturbance  Continue oral magnesium TID and potassium BID  12/9/25:  Magnesium level 1.36, Administered 2g IV mag today.  Continue PO magnesium.  Potassium level 4.0 today.  Monitoring, may be able to decrease PO potassium if remains at normal level at next visit.       Lack of appetite  Mirtazapine 15 mg at night as of 11/26 12/9/24:  Reports appetite level is improving.  Continue mirtazapine.  Monitoring.     Loose Stools  PRN imodium    Medication reconciliation  Has all prescribed meds. Instructed to bring list to each visit. Please update regularly. New list and reconciliation completed 12/2/24.    IV Access  L PICC line  12/9/24:  PICC line site with no redness, tenderness, swelling, or drainage.    Psychosocial.    Caregiver [facility] in [place]    RTC:  12/11, 12/13: Post BMT and infusion  12/16: Dr Newsome and infusion  12/18 Dr Jones and post BMT and infusion  12/20: Post BMT and infusion  12/23 with ECHO, post BMT and infusion  12/26: post BMT and infusion  12/30: GRICELDA Terry and infusion  1/10/25: ID appt    Requested appt for 1/2/25, 1/6/25, and 1/9/25. Postpone bone marrow biopsy for 1/9/25.  Appts need scheduled.    ----------------------------------------------------------------------------  GRICELDA Vallejo

## 2024-12-09 ENCOUNTER — INFUSION (OUTPATIENT)
Dept: HEMATOLOGY/ONCOLOGY | Facility: HOSPITAL | Age: 64
End: 2024-12-09
Payer: COMMERCIAL

## 2024-12-09 ENCOUNTER — OFFICE VISIT (OUTPATIENT)
Dept: HEMATOLOGY/ONCOLOGY | Facility: HOSPITAL | Age: 64
End: 2024-12-09
Payer: COMMERCIAL

## 2024-12-09 ENCOUNTER — APPOINTMENT (OUTPATIENT)
Dept: HEMATOLOGY/ONCOLOGY | Facility: HOSPITAL | Age: 64
End: 2024-12-09
Payer: COMMERCIAL

## 2024-12-09 VITALS
BODY MASS INDEX: 21.67 KG/M2 | SYSTOLIC BLOOD PRESSURE: 122 MMHG | OXYGEN SATURATION: 100 % | WEIGHT: 132.28 LBS | DIASTOLIC BLOOD PRESSURE: 84 MMHG | TEMPERATURE: 98.6 F | RESPIRATION RATE: 16 BRPM | HEART RATE: 102 BPM

## 2024-12-09 DIAGNOSIS — C92.01 ACUTE MYELOID LEUKEMIA IN REMISSION (MULTI): Primary | ICD-10-CM

## 2024-12-09 DIAGNOSIS — Z94.84 HISTORY OF ALLOGENEIC HEMATOPOIETIC STEM CELL TRANSPLANT: ICD-10-CM

## 2024-12-09 DIAGNOSIS — D61.818 PANCYTOPENIA: ICD-10-CM

## 2024-12-09 DIAGNOSIS — E87.8 ELECTROLYTE DISTURBANCE: ICD-10-CM

## 2024-12-09 DIAGNOSIS — R63.0 LACK OF APPETITE: ICD-10-CM

## 2024-12-09 DIAGNOSIS — I10 ESSENTIAL HYPERTENSION: ICD-10-CM

## 2024-12-09 DIAGNOSIS — D84.9 IMMUNOCOMPROMISED: ICD-10-CM

## 2024-12-09 DIAGNOSIS — C92.01 ACUTE MYELOID LEUKEMIA IN REMISSION (MULTI): ICD-10-CM

## 2024-12-09 LAB
ABO GROUP (TYPE) IN BLOOD: NORMAL
ALBUMIN SERPL BCP-MCNC: 3.5 G/DL (ref 3.4–5)
ALP SERPL-CCNC: 76 U/L (ref 33–136)
ALT SERPL W P-5'-P-CCNC: 7 U/L (ref 10–52)
ANION GAP SERPL CALC-SCNC: 14 MMOL/L (ref 10–20)
ANTIBODY SCREEN: NORMAL
AST SERPL W P-5'-P-CCNC: 11 U/L (ref 9–39)
BASOPHILS # BLD MANUAL: 0.25 X10*3/UL (ref 0–0.1)
BASOPHILS NFR BLD MANUAL: 2 %
BILIRUB SERPL-MCNC: 0.4 MG/DL (ref 0–1.2)
BUN SERPL-MCNC: 12 MG/DL (ref 6–23)
CALCIUM SERPL-MCNC: 9.1 MG/DL (ref 8.6–10.3)
CHLORIDE SERPL-SCNC: 107 MMOL/L (ref 98–107)
CO2 SERPL-SCNC: 24 MMOL/L (ref 21–32)
CREAT SERPL-MCNC: 1.12 MG/DL (ref 0.5–1.3)
DACRYOCYTES BLD QL SMEAR: ABNORMAL
EGFRCR SERPLBLD CKD-EPI 2021: 72 ML/MIN/1.73M*2
EOSINOPHIL # BLD MANUAL: 0.12 X10*3/UL (ref 0–0.7)
EOSINOPHIL NFR BLD MANUAL: 1 %
ERYTHROCYTE [DISTWIDTH] IN BLOOD BY AUTOMATED COUNT: 17.2 % (ref 11.5–14.5)
GLUCOSE SERPL-MCNC: 106 MG/DL (ref 74–99)
HCT VFR BLD AUTO: 25.8 % (ref 41–52)
HGB BLD-MCNC: 8.1 G/DL (ref 13.5–17.5)
HYPOCHROMIA BLD QL SMEAR: ABNORMAL
IMM GRANULOCYTES # BLD AUTO: 0.68 X10*3/UL (ref 0–0.7)
IMM GRANULOCYTES NFR BLD AUTO: 5.5 % (ref 0–0.9)
LYMPHOCYTES # BLD MANUAL: 0.99 X10*3/UL (ref 1.2–4.8)
LYMPHOCYTES NFR BLD MANUAL: 8 %
MAGNESIUM SERPL-MCNC: 1.36 MG/DL (ref 1.6–2.4)
MCH RBC QN AUTO: 27.8 PG (ref 26–34)
MCHC RBC AUTO-ENTMCNC: 31.4 G/DL (ref 32–36)
MCV RBC AUTO: 89 FL (ref 80–100)
MONOCYTES # BLD MANUAL: 1.86 X10*3/UL (ref 0.1–1)
MONOCYTES NFR BLD MANUAL: 15 %
MYELOCYTES # BLD MANUAL: 0.12 X10*3/UL
MYELOCYTES NFR BLD MANUAL: 1 %
NEUTS SEG # BLD MANUAL: 8.93 X10*3/UL (ref 1.2–7)
NEUTS SEG NFR BLD MANUAL: 72 %
NRBC BLD-RTO: 0 /100 WBCS (ref 0–0)
OVALOCYTES BLD QL SMEAR: ABNORMAL
PLATELET # BLD AUTO: 457 X10*3/UL (ref 150–450)
POLYCHROMASIA BLD QL SMEAR: ABNORMAL
POTASSIUM SERPL-SCNC: 4 MMOL/L (ref 3.5–5.3)
PROT SERPL-MCNC: 6.7 G/DL (ref 6.4–8.2)
RBC # BLD AUTO: 2.91 X10*6/UL (ref 4.5–5.9)
RBC MORPH BLD: ABNORMAL
RH FACTOR (ANTIGEN D): NORMAL
SCHISTOCYTES BLD QL SMEAR: ABNORMAL
SODIUM SERPL-SCNC: 141 MMOL/L (ref 136–145)
TACROLIMUS BLD-MCNC: 4.6 NG/ML
TOTAL CELLS COUNTED BLD: 100
VARIANT LYMPHS # BLD MANUAL: 0.12 X10*3/UL (ref 0–0.5)
VARIANT LYMPHS NFR BLD: 1 %
WBC # BLD AUTO: 12.4 X10*3/UL (ref 4.4–11.3)

## 2024-12-09 PROCEDURE — 80197 ASSAY OF TACROLIMUS: CPT

## 2024-12-09 PROCEDURE — 1157F ADVNC CARE PLAN IN RCRD: CPT

## 2024-12-09 PROCEDURE — 99215 OFFICE O/P EST HI 40 MIN: CPT

## 2024-12-09 PROCEDURE — 1159F MED LIST DOCD IN RCRD: CPT

## 2024-12-09 PROCEDURE — 85007 BL SMEAR W/DIFF WBC COUNT: CPT

## 2024-12-09 PROCEDURE — 1160F RVW MEDS BY RX/DR IN RCRD: CPT

## 2024-12-09 PROCEDURE — 86900 BLOOD TYPING SEROLOGIC ABO: CPT

## 2024-12-09 PROCEDURE — 1111F DSCHRG MED/CURRENT MED MERGE: CPT

## 2024-12-09 PROCEDURE — 96365 THER/PROPH/DIAG IV INF INIT: CPT | Mod: INF

## 2024-12-09 PROCEDURE — 83735 ASSAY OF MAGNESIUM: CPT

## 2024-12-09 PROCEDURE — 81267 CHIMERISM ANAL NO CELL SELEC: CPT

## 2024-12-09 PROCEDURE — 2500000004 HC RX 250 GENERAL PHARMACY W/ HCPCS (ALT 636 FOR OP/ED)

## 2024-12-09 PROCEDURE — 84075 ASSAY ALKALINE PHOSPHATASE: CPT

## 2024-12-09 PROCEDURE — 85027 COMPLETE CBC AUTOMATED: CPT

## 2024-12-09 RX ORDER — FAMOTIDINE 10 MG/ML
20 INJECTION INTRAVENOUS ONCE AS NEEDED
OUTPATIENT
Start: 2024-12-09

## 2024-12-09 RX ORDER — MAGNESIUM SULFATE HEPTAHYDRATE 40 MG/ML
2 INJECTION, SOLUTION INTRAVENOUS ONCE
Status: COMPLETED | OUTPATIENT
Start: 2024-12-09 | End: 2024-12-09

## 2024-12-09 RX ORDER — EPINEPHRINE 0.3 MG/.3ML
0.3 INJECTION SUBCUTANEOUS EVERY 5 MIN PRN
OUTPATIENT
Start: 2024-12-09

## 2024-12-09 RX ORDER — MAGNESIUM SULFATE HEPTAHYDRATE 40 MG/ML
2 INJECTION, SOLUTION INTRAVENOUS ONCE
Status: CANCELLED | OUTPATIENT
Start: 2024-12-09 | End: 2024-12-09

## 2024-12-09 RX ORDER — DIPHENHYDRAMINE HYDROCHLORIDE 50 MG/ML
50 INJECTION INTRAMUSCULAR; INTRAVENOUS AS NEEDED
OUTPATIENT
Start: 2024-12-09

## 2024-12-09 RX ORDER — ALBUTEROL SULFATE 0.83 MG/ML
3 SOLUTION RESPIRATORY (INHALATION) AS NEEDED
OUTPATIENT
Start: 2024-12-09

## 2024-12-09 ASSESSMENT — ENCOUNTER SYMPTOMS
SHORTNESS OF BREATH: 1
VOMITING: 0
BLOOD IN STOOL: 0
CONSTIPATION: 0
LIGHT-HEADEDNESS: 0
DIARRHEA: 0
NUMBNESS: 0
DIZZINESS: 0

## 2024-12-09 ASSESSMENT — PAIN SCALES - GENERAL: PAINLEVEL_OUTOF10: 0-NO PAIN

## 2024-12-09 NOTE — PROGRESS NOTES
Brandt Merritt  [id number]  1960    Electron Beam: Not Applicable Total body, Not Applicable Total body    Treatment Period Technique Fraction Dose Fractions Total Dose   Course 2 9/18/2024-11/5/2024  (days elapsed: 48)         TBI-2 11/5/2024-11/5/2024 Opposed Laterals 200 / 200 cGy 1 / 1 200 / 200 cGy        Patient Disposition:  The patient with follow up as needed in our clinic.  The patient is encouraged to contact the clinic with any questions or concerns.      Sarah Carbajal, RN, BSN  Nurse Partner to Dr. Reynolds  Radiation Oncology, [facility]  Phone - [phone]  Fax - [phone]

## 2024-12-09 NOTE — PROGRESS NOTES
Brandt Merritt is a 64 y.o. male who presents in stable condition for count check    He is on the following chemotherapy regimen:     Treatment Plans       Name Type Plan Dates Plan Provider         Active    (CELL - HAPLO) Fludarabine / Cyclophosphamide / Total Body Irradiation and Antithymocyte Globulin (rabbit) with Post-transplant Cyclophosphamide Oncology Treatment 10/31/2024 - Present Malaika Newsome MD             Since his last visit, he has been doing well.  Overall, he states his energy level is stable.  His appetite & hydration status has been good.  He reports no complaints.      Type&Screen drawn today.    Patient tolerated infusion of magnesium over an hour well and has been educated with the overall therapy plan. Questions & concerns addressed by her provider during visit at bedside. AVS, lab results & future appointment provided. Patient discharged in stable condition with his wife.    PICC line dressing due to be changed 12/11/2024    Labs to be drawn Wednesday 12/11/2024: CBC, CMP, Mg, Tacrolimus, Adenovirus, Uric acid, LDH, Katherine B, Urine (UA, protein, Creatinine)     Labs to be drawn Friday 12/13/2024: CBC, CMP, Mg, Tacro, CMV, T&S    Reviewed and approved by VALERIO RAY on 12/9/24 at 4:31 PM.

## 2024-12-10 LAB
CMV DNA SERPL NAA+PROBE-LOG IU: NORMAL {LOG_IU}/ML
LABORATORY COMMENT REPORT: NOT DETECTED

## 2024-12-11 ENCOUNTER — NUTRITION (OUTPATIENT)
Dept: HEMATOLOGY/ONCOLOGY | Facility: HOSPITAL | Age: 64
End: 2024-12-11

## 2024-12-11 ENCOUNTER — TELEPHONE (OUTPATIENT)
Dept: HEMATOLOGY/ONCOLOGY | Facility: HOSPITAL | Age: 64
End: 2024-12-11

## 2024-12-11 ENCOUNTER — INFUSION (OUTPATIENT)
Dept: HEMATOLOGY/ONCOLOGY | Facility: HOSPITAL | Age: 64
End: 2024-12-11
Payer: COMMERCIAL

## 2024-12-11 ENCOUNTER — OFFICE VISIT (OUTPATIENT)
Dept: HEMATOLOGY/ONCOLOGY | Facility: HOSPITAL | Age: 64
End: 2024-12-11
Payer: COMMERCIAL

## 2024-12-11 VITALS
WEIGHT: 141.54 LBS | BODY MASS INDEX: 23.19 KG/M2 | OXYGEN SATURATION: 100 % | RESPIRATION RATE: 19 BRPM | TEMPERATURE: 99.1 F | DIASTOLIC BLOOD PRESSURE: 86 MMHG | HEART RATE: 98 BPM | SYSTOLIC BLOOD PRESSURE: 133 MMHG

## 2024-12-11 VITALS — BODY MASS INDEX: 22.75 KG/M2 | WEIGHT: 141.54 LBS | HEIGHT: 66 IN

## 2024-12-11 DIAGNOSIS — C92.01 ACUTE MYELOID LEUKEMIA IN REMISSION (MULTI): ICD-10-CM

## 2024-12-11 DIAGNOSIS — Z94.84 HISTORY OF ALLOGENEIC HEMATOPOIETIC STEM CELL TRANSPLANT: Primary | ICD-10-CM

## 2024-12-11 DIAGNOSIS — C92.00 ACUTE MYELOID LEUKEMIA NOT HAVING ACHIEVED REMISSION (MULTI): ICD-10-CM

## 2024-12-11 LAB
ALBUMIN SERPL BCP-MCNC: 3.3 G/DL (ref 3.4–5)
ALP SERPL-CCNC: 70 U/L (ref 33–136)
ALT SERPL W P-5'-P-CCNC: 5 U/L (ref 10–52)
ANION GAP SERPL CALC-SCNC: 14 MMOL/L (ref 10–20)
APPEARANCE UR: CLEAR
AST SERPL W P-5'-P-CCNC: 9 U/L (ref 9–39)
BASOPHILS # BLD MANUAL: 0.24 X10*3/UL (ref 0–0.1)
BASOPHILS NFR BLD MANUAL: 2 %
BILIRUB SERPL-MCNC: 0.3 MG/DL (ref 0–1.2)
BILIRUB UR STRIP.AUTO-MCNC: NEGATIVE MG/DL
BUN SERPL-MCNC: 11 MG/DL (ref 6–23)
CALCIUM SERPL-MCNC: 8.6 MG/DL (ref 8.6–10.3)
CHLORIDE SERPL-SCNC: 105 MMOL/L (ref 98–107)
CO2 SERPL-SCNC: 24 MMOL/L (ref 21–32)
COLOR UR: ABNORMAL
CREAT SERPL-MCNC: 0.98 MG/DL (ref 0.5–1.3)
CREAT UR-MCNC: 189.8 MG/DL (ref 20–370)
DACRYOCYTES BLD QL SMEAR: ABNORMAL
EGFRCR SERPLBLD CKD-EPI 2021: 85 ML/MIN/1.73M*2
EOSINOPHIL # BLD MANUAL: 0.83 X10*3/UL (ref 0–0.7)
EOSINOPHIL NFR BLD MANUAL: 7 %
ERYTHROCYTE [DISTWIDTH] IN BLOOD BY AUTOMATED COUNT: 17.8 % (ref 11.5–14.5)
GLUCOSE SERPL-MCNC: 100 MG/DL (ref 74–99)
GLUCOSE UR STRIP.AUTO-MCNC: NORMAL MG/DL
HCT VFR BLD AUTO: 24.4 % (ref 41–52)
HGB BLD-MCNC: 7.7 G/DL (ref 13.5–17.5)
HYPOCHROMIA BLD QL SMEAR: ABNORMAL
IMM GRANULOCYTES # BLD AUTO: 0.64 X10*3/UL (ref 0–0.7)
IMM GRANULOCYTES NFR BLD AUTO: 5.4 % (ref 0–0.9)
KETONES UR STRIP.AUTO-MCNC: NEGATIVE MG/DL
LDH SERPL L TO P-CCNC: 191 U/L (ref 84–246)
LEUKOCYTE ESTERASE UR QL STRIP.AUTO: ABNORMAL
LYMPHOCYTES # BLD MANUAL: 0.94 X10*3/UL (ref 1.2–4.8)
LYMPHOCYTES NFR BLD MANUAL: 8 %
MAGNESIUM SERPL-MCNC: 1.55 MG/DL (ref 1.6–2.4)
MCH RBC QN AUTO: 28.3 PG (ref 26–34)
MCHC RBC AUTO-ENTMCNC: 31.6 G/DL (ref 32–36)
MCV RBC AUTO: 90 FL (ref 80–100)
MONOCYTES # BLD MANUAL: 1.77 X10*3/UL (ref 0.1–1)
MONOCYTES NFR BLD MANUAL: 15 %
MYELOCYTES # BLD MANUAL: 0.47 X10*3/UL
MYELOCYTES NFR BLD MANUAL: 4 %
NEUTS SEG # BLD MANUAL: 7.55 X10*3/UL (ref 1.2–7)
NEUTS SEG NFR BLD MANUAL: 64 %
NITRITE UR QL STRIP.AUTO: NEGATIVE
NRBC BLD-RTO: 0.2 /100 WBCS (ref 0–0)
OVALOCYTES BLD QL SMEAR: ABNORMAL
PH UR STRIP.AUTO: 5 [PH]
PLATELET # BLD AUTO: 421 X10*3/UL (ref 150–450)
PLATELET CLUMP BLD QL SMEAR: PRESENT
POLYCHROMASIA BLD QL SMEAR: ABNORMAL
POTASSIUM SERPL-SCNC: 4.1 MMOL/L (ref 3.5–5.3)
PROT SERPL-MCNC: 6.1 G/DL (ref 6.4–8.2)
PROT UR STRIP.AUTO-MCNC: ABNORMAL MG/DL
PROT UR-ACNC: 21 MG/DL (ref 5–25)
PROT/CREAT UR: 0.11 MG/MG CREAT (ref 0–0.17)
RBC # BLD AUTO: 2.72 X10*6/UL (ref 4.5–5.9)
RBC # UR STRIP.AUTO: NEGATIVE /UL
RBC #/AREA URNS AUTO: NORMAL /HPF
RBC MORPH BLD: ABNORMAL
SODIUM SERPL-SCNC: 139 MMOL/L (ref 136–145)
SP GR UR STRIP.AUTO: 1.02
TACROLIMUS BLD-MCNC: 5.3 NG/ML
TOTAL CELLS COUNTED BLD: 100
URATE SERPL-MCNC: 5.3 MG/DL (ref 4–7.5)
UROBILINOGEN UR STRIP.AUTO-MCNC: NORMAL MG/DL
WBC # BLD AUTO: 11.8 X10*3/UL (ref 4.4–11.3)
WBC #/AREA URNS AUTO: NORMAL /HPF

## 2024-12-11 PROCEDURE — 85027 COMPLETE CBC AUTOMATED: CPT

## 2024-12-11 PROCEDURE — 1126F AMNT PAIN NOTED NONE PRSNT: CPT | Performed by: NURSE PRACTITIONER

## 2024-12-11 PROCEDURE — 87799 DETECT AGENT NOS DNA QUANT: CPT

## 2024-12-11 PROCEDURE — 80197 ASSAY OF TACROLIMUS: CPT

## 2024-12-11 PROCEDURE — 96365 THER/PROPH/DIAG IV INF INIT: CPT | Mod: INF

## 2024-12-11 PROCEDURE — 99215 OFFICE O/P EST HI 40 MIN: CPT | Performed by: NURSE PRACTITIONER

## 2024-12-11 PROCEDURE — 1157F ADVNC CARE PLAN IN RCRD: CPT | Performed by: NURSE PRACTITIONER

## 2024-12-11 PROCEDURE — 3079F DIAST BP 80-89 MM HG: CPT | Performed by: NURSE PRACTITIONER

## 2024-12-11 PROCEDURE — 80053 COMPREHEN METABOLIC PANEL: CPT

## 2024-12-11 PROCEDURE — 83735 ASSAY OF MAGNESIUM: CPT

## 2024-12-11 PROCEDURE — 84550 ASSAY OF BLOOD/URIC ACID: CPT

## 2024-12-11 PROCEDURE — 1159F MED LIST DOCD IN RCRD: CPT | Performed by: NURSE PRACTITIONER

## 2024-12-11 PROCEDURE — 85007 BL SMEAR W/DIFF WBC COUNT: CPT

## 2024-12-11 PROCEDURE — 3075F SYST BP GE 130 - 139MM HG: CPT | Performed by: NURSE PRACTITIONER

## 2024-12-11 PROCEDURE — 82570 ASSAY OF URINE CREATININE: CPT

## 2024-12-11 PROCEDURE — 2500000004 HC RX 250 GENERAL PHARMACY W/ HCPCS (ALT 636 FOR OP/ED): Performed by: NURSE PRACTITIONER

## 2024-12-11 PROCEDURE — 81001 URINALYSIS AUTO W/SCOPE: CPT

## 2024-12-11 PROCEDURE — 83615 LACTATE (LD) (LDH) ENZYME: CPT

## 2024-12-11 PROCEDURE — 1111F DSCHRG MED/CURRENT MED MERGE: CPT | Performed by: NURSE PRACTITIONER

## 2024-12-11 RX ORDER — MAGNESIUM SULFATE HEPTAHYDRATE 40 MG/ML
2 INJECTION, SOLUTION INTRAVENOUS ONCE
Status: CANCELLED | OUTPATIENT
Start: 2024-12-11 | End: 2024-12-11

## 2024-12-11 RX ORDER — HEPARIN 100 UNIT/ML
500 SYRINGE INTRAVENOUS AS NEEDED
Status: DISCONTINUED | OUTPATIENT
Start: 2024-12-11 | End: 2024-12-11 | Stop reason: HOSPADM

## 2024-12-11 RX ORDER — HEPARIN SODIUM,PORCINE/PF 10 UNIT/ML
50 SYRINGE (ML) INTRAVENOUS AS NEEDED
Status: CANCELLED | OUTPATIENT
Start: 2024-12-11

## 2024-12-11 RX ORDER — HEPARIN 100 UNIT/ML
500 SYRINGE INTRAVENOUS AS NEEDED
Status: CANCELLED | OUTPATIENT
Start: 2024-12-11

## 2024-12-11 RX ORDER — TACROLIMUS 1 MG/1
1 CAPSULE ORAL EVERY MORNING
Qty: 30 CAPSULE | Refills: 2 | Status: SHIPPED | OUTPATIENT
Start: 2024-12-11 | End: 2024-12-13 | Stop reason: DRUGHIGH

## 2024-12-11 RX ORDER — TACROLIMUS 0.5 MG/1
0.5 CAPSULE ORAL NIGHTLY
Qty: 30 CAPSULE | Refills: 2 | Status: SHIPPED | OUTPATIENT
Start: 2024-12-11 | End: 2024-12-13 | Stop reason: DRUGHIGH

## 2024-12-11 RX ORDER — HEPARIN SODIUM,PORCINE/PF 10 UNIT/ML
50 SYRINGE (ML) INTRAVENOUS AS NEEDED
Status: DISCONTINUED | OUTPATIENT
Start: 2024-12-11 | End: 2024-12-11 | Stop reason: HOSPADM

## 2024-12-11 RX ORDER — MAGNESIUM SULFATE HEPTAHYDRATE 40 MG/ML
2 INJECTION, SOLUTION INTRAVENOUS ONCE
Status: COMPLETED | OUTPATIENT
Start: 2024-12-11 | End: 2024-12-11

## 2024-12-11 ASSESSMENT — PAIN SCALES - GENERAL: PAINLEVEL_OUTOF10: 0-NO PAIN

## 2024-12-11 NOTE — PROGRESS NOTES
Patient for Mal-heme visit with lab count check. PICC line intact and WDL, labs drawn from line, dressing changed per protocol. IV Magnesium given per order. Patient tolerated well. Based on parameters, no additional needs to be addressed. Patient discharged home with family, ambulated from department under own power, no acute distress noted.

## 2024-12-11 NOTE — PROGRESS NOTES
"NUTRITION FOLLOW UP NOTE    Reason for Visit:  Brandt Merritt is a 64 y.o. 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    PMH: HTN, Hep C    Currently T+83 (cord); T+35 (haplo)    Pt and wife seen today in infusion.     Lab Results   Component Value Date/Time    GLUCOSE 100 (H) 12/11/2024 0847     12/11/2024 0847    K 4.1 12/11/2024 0847     12/11/2024 0847    CO2 24 12/11/2024 0847    ANIONGAP 14 12/11/2024 0847    BUN 11 12/11/2024 0847    CREATININE 0.98 12/11/2024 0847    EGFR 85 12/11/2024 0847    CALCIUM 8.6 12/11/2024 0847    ALBUMIN 3.3 (L) 12/11/2024 0847    ALKPHOS 70 12/11/2024 0847    PROT 6.1 (L) 12/11/2024 0847    AST 9 12/11/2024 0847    BILITOT 0.3 12/11/2024 0847    ALT 5 (L) 12/11/2024 0847     Mg 1.55 (L)--on SlowMg 2 tabs TID     Lab Results   Component Value Date    WBC 11.8 (H) 12/11/2024    HGB 7.7 (L) 12/11/2024    HCT 24.4 (L) 12/11/2024    MCV 90 12/11/2024     12/11/2024       Lab Results   Component Value Date/Time    VITD25 9 (L) 12/04/2024 0933   *Started Vit D2 50,000IU x 8 weeks on 12/4.    Anthropometrics:  Anthropometrics  Height: 166.4 cm (5' 5.51\")  Weight: 64.2 kg (141 lb 8.6 oz)  BMI (Calculated): 23.19  IBW/kg (Dietitian Calculated): 63.2 kg  Percent of IBW: 102 %    *Wt at time of transplant admit: (9/13) 75.6 kg   *Wt at first post-transplant visit: (11/25): 60.9 kg   Overall, pt with 11 kg (15%) wt loss x ~3 mos    *Wt up 3 kg x 1 week (*reweighed pt on 12/11 for accuracy*)    Wt Readings from Last 10 Encounters:   12/11/24 64.2 kg (141 lb 8.6 oz)   12/11/24 64.2 kg (141 lb 8.6 oz)   12/09/24 60 kg (132 lb 4.4 oz)   12/06/24 61.1 kg (134 lb 11.2 oz)   12/06/24 61.1 kg (134 lb 11.2 oz)   12/04/24 60.9 kg (134 lb 4.2 oz)   12/02/24 60 kg (132 lb 4.4 oz)   12/02/24 60 kg (132 lb 4.4 oz)   11/29/24 59 kg (130 lb)   11/29/24 59 kg (130 lb 1.1 oz)   Admit 9/13-11/25 09/09/24       " " 74.2 kg  08/16/24        75.3 kg   07/19/24        74.2 kg   06/24/24        71.0 kg  05/20/24        72.8 kg  04/23/24        75.5 kg     Food And Nutrient Intake:      Pt feeling better overall.   Eating well; appetite improving.  Eating 2 meals a day and snacking in-between.   Tastes improving but still only ~50% of baseline.   Mainly sticking with the foods that taste best to him (ie fish, desserts, mac and cheese)  Now able to eat larger portions and is getting hungry at times.   Wife said pt is now asking for food.   Fluid intakes appear adequate--drinking mainly water and caffeine free pop  Denies nausea; not taking any anti-emetics.     Diarrhea resolved; having normal, formed stools most days.   Energy better; endurance improving--not as tired from walking from parking garage to infusion and is able to do stairs.   Stops MMF today.     Usual intakes:    B--Oatmeal with milk  L--snacks (cookies)  D--mac and cheese or fish or chicken with ely slaw      Dislikes all nutrition supplements/protein drinks.     Started Remeron on 11/27.                                                             Nutrition Focused Physical Exam Findings:      Subcutaneous Fat Loss  Orbital Fat Pads: Mild-Moderate (slight dark circles and slight hollowing)  Buccal Fat Pads: Mild-Moderate (flat cheeks, minimal bounce)  Triceps: Mild-Moderate (less than ample fat tissue)    Muscle Wasting  Temporalis: Mild-Moderate (slight depression)  Pectoralis (Clavicular Region): Mild-Moderate (some protrusion of clavicle)  Deltoid/Trapezius: Mild-Moderate (slight protrusion of acromion process)  Quadriceps: Mild-Moderate (mild depression on inner and outer thigh)  Gastrocnemius: Mild-Moderate (not well developed muscle)              Energy Needs  Calculated Energy Needs Using Equations  Height: 166.4 cm (5' 5.51\")  Estimated Energy Needs  Total Energy Estimated Needs (kCal):  (2175-6087)  Total Estimated Energy Need per Day (kCal/kg):  " (30-35)  Estimated Fluid Needs  Total Fluid Estimated Needs (mL):  (1800+)  Total Fluid Estimated Needs (mL/kg):  (30+)  Estimated Protein Needs  Total Protein Estimated Needs (g):  (70-85)  Total Protein Estimated Needs (g/kg):  (1.2-1.4)        Nutrition Diagnosis   Malnutrition Diagnosis  Patient has Malnutrition Diagnosis: Yes  Diagnosis Status: New  Malnutrition Diagnosis: Mild malnutrition related to acute disease or injury  As Evidenced by: taste changes and significant wt loss since hospital admit    *Overall nutrition status improving; appetite better, intakes improved and weight increasing.  GI symptoms resolved at present. Dysgeusia persists but is improving as well.  Anticipate continued improvements, especially as MMF stopping today.        Nutrition Interventions/Recommendations   Nutrition Prescription   High Protein, High Calorie  Food Safety     Nutrition Education   Encouraged increased protein intake; include source with all meals and snacks.   Continue to encourage calorie containing, caffeine free beverages; Goal: 60+ oz daily  Include milk daily to drink for additional protein   Milkshakes or smoothies regularly as pt does not care for ONS  Pt aware of tips for managing taste changes--has lemon hard candies; suggested condiments, seasonings to help enhance flavors.  Discussing continuing to try new foods to assess tolerance.      Coordination of Care   NP/BMT team         Nutrition Monitoring and Evaluation      Will continue to f/up and monitor weight, labs. PO intakes and GI symptoms.

## 2024-12-11 NOTE — TELEPHONE ENCOUNTER
TACROLIMUS DOSE RECOMMENDATION NOTE   Brandt Merritt is a 64 y.o. male currently day +35 following haplo-identical allogeneic stem cell transplant for a diagnosis of AML. Patient on tacrolimus for GVHD prophylaxis with level resulting today. Pharmacist was consulted to provide tacrolimus dose recommendations.     Objective   Creatinine   Date Value Ref Range Status   12/11/2024 0.98 0.50 - 1.30 mg/dL Final   12/09/2024 1.12 0.50 - 1.30 mg/dL Final   12/06/2024 1.14 0.50 - 1.30 mg/dL Final     Bilirubin, Total   Date Value Ref Range Status   12/11/2024 0.3 0.0 - 1.2 mg/dL Final   12/09/2024 0.4 0.0 - 1.2 mg/dL Final   12/06/2024 0.4 0.0 - 1.2 mg/dL Final     Tacrolimus    Date Value Ref Range Status   12/11/2024 5.3 <=15.0 ng/mL Final   12/09/2024 4.6 <=15.0 ng/mL Final   12/06/2024 5.2 <=15.0 ng/mL Final       Assessment  Current tacrolimus dose: 1 mg in the morning and 0.5 mg in the evening (dose increased 12/10)  Goal tacrolimus level: 5-10 ng/mL    Plan   The patient's tacrolimus level today was 5.3 which is Subtherapeutic. Since his dose was last increased yesterday, no change, continue current dose.    All questions were answered. Patient/family verbalized understanding of the plan of care and information provided. Will follow as necessary. Time spent with patient/family and/or coordinating care: 15 minutes.      Radha Cruz, PharmD, [facility]  Ambulatory Stem Cell Transplant Transplant Pharmacist    Current Outpatient Medications   Medication Instructions    acyclovir (ZOVIRAX) 400 mg, oral, 2 times daily    ergocalciferol (VITAMIN D2) 1,250 mcg, oral, Weekly    folic acid (FOLVITE) 1 mg, oral, Daily    letermovir (PREVYMIS) 480 mg, oral, Daily    Mag 64 128 mg, oral, 3 times daily, Or as instructed on your medication list.    mirtazapine (REMERON) 15 mg, oral, Nightly    NIFEdipine ER (ADALAT CC) 60 mg, oral, Daily before breakfast, Do not crush, chew, or split.    ondansetron (ZOFRAN) 8 mg, oral, Every 8  hours    Oyster Shell Calcium 500 1,250 mg, oral, 3 times daily (morning, midday, late afternoon)    posaconazole (NOXAFIL) 300 mg, oral, Daily, Do not crush, chew, or split. This is to prevent fungal infections.    potassium chloride ER (Micro-K) 10 mEq ER capsule 20 mEq, oral, 2 times daily, Do not crush or chew.    prochlorperazine (COMPAZINE) 10 mg, oral, Every 6 hours PRN    sennosides-docusate sodium (Karissa-Colace) 8.6-50 mg tablet 1 tablet, oral, Daily    sulfamethoxazole-trimethoprim (Bactrim DS) 800-160 mg tablet 1 tablet, oral, 3 times weekly, Take on Mondays, Wednesdays, and Fridays. Do not start until instructed.    tacrolimus (PROGRAF) 1 mg, oral, Every morning, Or as instructed on your medication list.    tacrolimus (PROGRAF) 0.5 mg, oral, Nightly, Or as instructed on your medication list.    ursodiol (ACTIGALL) 300 mg, oral, 3 times daily

## 2024-12-12 LAB — ADENOVIRUS QPCR,PLASMA, VIRC: NOT DETECTED COPIES/ML

## 2024-12-13 ENCOUNTER — TELEPHONE (OUTPATIENT)
Dept: HEMATOLOGY/ONCOLOGY | Facility: HOSPITAL | Age: 64
End: 2024-12-13
Payer: COMMERCIAL

## 2024-12-13 ENCOUNTER — OFFICE VISIT (OUTPATIENT)
Dept: HEMATOLOGY/ONCOLOGY | Facility: HOSPITAL | Age: 64
End: 2024-12-13
Payer: COMMERCIAL

## 2024-12-13 ENCOUNTER — INFUSION (OUTPATIENT)
Dept: HEMATOLOGY/ONCOLOGY | Facility: HOSPITAL | Age: 64
End: 2024-12-13
Payer: COMMERCIAL

## 2024-12-13 VITALS
DIASTOLIC BLOOD PRESSURE: 77 MMHG | RESPIRATION RATE: 18 BRPM | OXYGEN SATURATION: 100 % | HEART RATE: 98 BPM | SYSTOLIC BLOOD PRESSURE: 132 MMHG | BODY MASS INDEX: 23.69 KG/M2 | WEIGHT: 144.62 LBS | TEMPERATURE: 97.9 F

## 2024-12-13 DIAGNOSIS — C92.01 ACUTE MYELOID LEUKEMIA IN REMISSION (MULTI): ICD-10-CM

## 2024-12-13 DIAGNOSIS — C92.00 ACUTE MYELOID LEUKEMIA NOT HAVING ACHIEVED REMISSION (MULTI): ICD-10-CM

## 2024-12-13 DIAGNOSIS — D61.818 PANCYTOPENIA: ICD-10-CM

## 2024-12-13 LAB
ABO GROUP (TYPE) IN BLOOD: NORMAL
ALBUMIN SERPL BCP-MCNC: 3.2 G/DL (ref 3.4–5)
ALP SERPL-CCNC: 72 U/L (ref 33–136)
ALT SERPL W P-5'-P-CCNC: 5 U/L (ref 10–52)
ANION GAP SERPL CALC-SCNC: 11 MMOL/L (ref 10–20)
ANTIBODY SCREEN: NORMAL
AST SERPL W P-5'-P-CCNC: 10 U/L (ref 9–39)
BASOPHILS # BLD AUTO: 0.08 X10*3/UL (ref 0–0.1)
BASOPHILS NFR BLD AUTO: 0.7 %
BILIRUB SERPL-MCNC: 0.3 MG/DL (ref 0–1.2)
BUN SERPL-MCNC: 9 MG/DL (ref 6–23)
CALCIUM SERPL-MCNC: 8.2 MG/DL (ref 8.6–10.3)
CHIMERISM INTERPRETATION: NORMAL
CHLORIDE SERPL-SCNC: 107 MMOL/L (ref 98–107)
CO2 SERPL-SCNC: 25 MMOL/L (ref 21–32)
CREAT SERPL-MCNC: 0.82 MG/DL (ref 0.5–1.3)
EBV DNA SPEC NAA+PROBE-LOG#: NORMAL {LOG_COPIES}/ML
EGFRCR SERPLBLD CKD-EPI 2021: >90 ML/MIN/1.73M*2
ELECTRONICALLY SIGNED BY: NORMAL
EOSINOPHIL # BLD AUTO: 0.51 X10*3/UL (ref 0–0.7)
EOSINOPHIL NFR BLD AUTO: 4.5 %
ERYTHROCYTE [DISTWIDTH] IN BLOOD BY AUTOMATED COUNT: 19 % (ref 11.5–14.5)
GLUCOSE SERPL-MCNC: 100 MG/DL (ref 74–99)
HAPTOGLOB SERPL NEPH-MCNC: 177 MG/DL (ref 30–200)
HCT VFR BLD AUTO: 23.7 % (ref 41–52)
HGB BLD-MCNC: 7.5 G/DL (ref 13.5–17.5)
IMM GRANULOCYTES # BLD AUTO: 0.39 X10*3/UL (ref 0–0.7)
IMM GRANULOCYTES NFR BLD AUTO: 3.5 % (ref 0–0.9)
LABORATORY COMMENT REPORT: NOT DETECTED
LYMPHOCYTES # BLD AUTO: 0.95 X10*3/UL (ref 1.2–4.8)
LYMPHOCYTES NFR BLD AUTO: 8.4 %
MAGNESIUM SERPL-MCNC: 1.48 MG/DL (ref 1.6–2.4)
MCH RBC QN AUTO: 28.7 PG (ref 26–34)
MCHC RBC AUTO-ENTMCNC: 31.6 G/DL (ref 32–36)
MCV RBC AUTO: 91 FL (ref 80–100)
MONOCYTES # BLD AUTO: 1.55 X10*3/UL (ref 0.1–1)
MONOCYTES NFR BLD AUTO: 13.8 %
NEUTROPHILS # BLD AUTO: 7.79 X10*3/UL (ref 1.2–7.7)
NEUTROPHILS NFR BLD AUTO: 69.1 %
NRBC BLD-RTO: 0.3 /100 WBCS (ref 0–0)
PLATELET # BLD AUTO: 384 X10*3/UL (ref 150–450)
POTASSIUM SERPL-SCNC: 3.9 MMOL/L (ref 3.5–5.3)
PROT SERPL-MCNC: 5.8 G/DL (ref 6.4–8.2)
RBC # BLD AUTO: 2.61 X10*6/UL (ref 4.5–5.9)
RH FACTOR (ANTIGEN D): NORMAL
SODIUM SERPL-SCNC: 139 MMOL/L (ref 136–145)
TACROLIMUS BLD-MCNC: 3 NG/ML
WBC # BLD AUTO: 11.3 X10*3/UL (ref 4.4–11.3)

## 2024-12-13 PROCEDURE — 87533 HHV-6 DNA QUANT: CPT

## 2024-12-13 PROCEDURE — 84075 ASSAY ALKALINE PHOSPHATASE: CPT

## 2024-12-13 PROCEDURE — 86901 BLOOD TYPING SEROLOGIC RH(D): CPT

## 2024-12-13 PROCEDURE — 83010 ASSAY OF HAPTOGLOBIN QUANT: CPT

## 2024-12-13 PROCEDURE — 86900 BLOOD TYPING SEROLOGIC ABO: CPT

## 2024-12-13 PROCEDURE — 83735 ASSAY OF MAGNESIUM: CPT

## 2024-12-13 PROCEDURE — 85025 COMPLETE CBC W/AUTO DIFF WBC: CPT

## 2024-12-13 PROCEDURE — 96365 THER/PROPH/DIAG IV INF INIT: CPT | Mod: INF

## 2024-12-13 PROCEDURE — 80197 ASSAY OF TACROLIMUS: CPT

## 2024-12-13 PROCEDURE — 99215 OFFICE O/P EST HI 40 MIN: CPT | Performed by: NURSE PRACTITIONER

## 2024-12-13 PROCEDURE — 2500000004 HC RX 250 GENERAL PHARMACY W/ HCPCS (ALT 636 FOR OP/ED): Performed by: NURSE PRACTITIONER

## 2024-12-13 RX ORDER — MAGNESIUM SULFATE HEPTAHYDRATE 40 MG/ML
2 INJECTION, SOLUTION INTRAVENOUS ONCE
OUTPATIENT
Start: 2024-12-16

## 2024-12-13 RX ORDER — HEPARIN 100 UNIT/ML
500 SYRINGE INTRAVENOUS AS NEEDED
Status: DISCONTINUED | OUTPATIENT
Start: 2024-12-13 | End: 2024-12-13 | Stop reason: HOSPADM

## 2024-12-13 RX ORDER — ALBUTEROL SULFATE 0.83 MG/ML
3 SOLUTION RESPIRATORY (INHALATION) AS NEEDED
OUTPATIENT
Start: 2024-12-13

## 2024-12-13 RX ORDER — HEPARIN SODIUM,PORCINE/PF 10 UNIT/ML
50 SYRINGE (ML) INTRAVENOUS AS NEEDED
OUTPATIENT
Start: 2024-12-13

## 2024-12-13 RX ORDER — MAGNESIUM SULFATE HEPTAHYDRATE 40 MG/ML
2 INJECTION, SOLUTION INTRAVENOUS ONCE
Status: COMPLETED | OUTPATIENT
Start: 2024-12-13 | End: 2024-12-13

## 2024-12-13 RX ORDER — HEPARIN SODIUM,PORCINE/PF 10 UNIT/ML
50 SYRINGE (ML) INTRAVENOUS AS NEEDED
Status: DISCONTINUED | OUTPATIENT
Start: 2024-12-13 | End: 2024-12-13 | Stop reason: HOSPADM

## 2024-12-13 RX ORDER — MAGNESIUM SULFATE HEPTAHYDRATE 40 MG/ML
2 INJECTION, SOLUTION INTRAVENOUS ONCE
Status: CANCELLED | OUTPATIENT
Start: 2024-12-13 | End: 2024-12-13

## 2024-12-13 RX ORDER — EPINEPHRINE 0.3 MG/.3ML
0.3 INJECTION SUBCUTANEOUS EVERY 5 MIN PRN
OUTPATIENT
Start: 2024-12-13

## 2024-12-13 RX ORDER — TACROLIMUS 1 MG/1
1 CAPSULE ORAL EVERY 12 HOURS
Qty: 60 CAPSULE | Refills: 2 | Status: SHIPPED | OUTPATIENT
Start: 2024-12-13

## 2024-12-13 RX ORDER — DIPHENHYDRAMINE HYDROCHLORIDE 50 MG/ML
50 INJECTION INTRAMUSCULAR; INTRAVENOUS AS NEEDED
OUTPATIENT
Start: 2024-12-13

## 2024-12-13 RX ORDER — HEPARIN 100 UNIT/ML
500 SYRINGE INTRAVENOUS AS NEEDED
OUTPATIENT
Start: 2024-12-13

## 2024-12-13 RX ORDER — FAMOTIDINE 10 MG/ML
20 INJECTION INTRAVENOUS ONCE AS NEEDED
OUTPATIENT
Start: 2024-12-13

## 2024-12-13 ASSESSMENT — ENCOUNTER SYMPTOMS
LIGHT-HEADEDNESS: 0
HEMATURIA: 0
VOMITING: 0
FEVER: 0
DIARRHEA: 0
NUMBNESS: 0
NAUSEA: 0
LEG SWELLING: 0
BLOOD IN STOOL: 0
COUGH: 0
EYES NEGATIVE: 1
PSYCHIATRIC NEGATIVE: 1
DYSURIA: 0
FEVER: 0
SHORTNESS OF BREATH: 0
VOMITING: 0
NAUSEA: 0
CONSTIPATION: 0
DYSURIA: 0
NAUSEA: 0
HEADACHES: 0
HEADACHES: 0
LEG SWELLING: 0
SHORTNESS OF BREATH: 1
HEMATURIA: 0
HEMATOLOGIC/LYMPHATIC NEGATIVE: 1
DIARRHEA: 0
FATIGUE: 1
COUGH: 0
APPETITE CHANGE: 1
SHORTNESS OF BREATH: 1
DIARRHEA: 0
VOMITING: 0
ENDOCRINE NEGATIVE: 1
DIZZINESS: 0
CARDIOVASCULAR NEGATIVE: 1

## 2024-12-13 NOTE — PROGRESS NOTES
Patient ID:  Brandt Merritt is a 64 y.o. male.  Referring Physician:   ONC Dr Newsome  Primary Care Provider:  Bill Richmond MD    12/13/24 T+85 (9/19/24); T+37 (11/6/24).     Oncology History Overview Note   4/2024  Myelodysplastic neoplasm with increased blasts-2 ( WHO)  Myelodysplastic neoplasm/AML  (ICC 2022 classification)    Presented in  10/2023 for pancytopenia, found to have hemolysis. Patient had normal CBC June 2020. Denied any hemorrhoidal bleeding . Has had C Scope and EGD , treated Hep C 2014 . 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      CBC 4/11/24 wbc 3.0, hgb 7.1, platelets 167K ANC 0.73    BM biopsy done on 4/11/24  as folllows:  BM histology  Myelodysplastic neoplasm with increased blasts-2 ( WHO)  Myelodysplastic neoplasm/AML  (ICC 2022 classification)  Balsts 11% on aspirate smear and 15-20% based on CD 34 immunostaining approaching AML leukemia, hematooiesis is erythroid dominant with dysplasia in granulocytes and megakaryocytes.   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      11/6/2024 -  Bone Marrow Transplant    conditioned with Flu/Cy/TBI and aGVHD prophylaxis with post-transplant cyclophosphamide, tacrolimus, and mycophenolate. T=0, 11/6/24. ABO Donor/Recipient: O+, A+. CMV donor/recipient: both positive. Started Tacro and MMF on T+5 (11/11).          Past Medical History:  HTN   STEMI 11/11/2020 after getting  a water  pills.  Chronic hepatitis C infection treated in  treated  with Dr. Lloyd  Past Medical History:   Diagnosis Date    Chest pain 2021    HTN (hypertension) 10/05/2023    Hypertension     Personal history of other diseases of the circulatory system     History of hypertension      Surgical History:  Colonosocopy 2021  Upper EGD 10/31/23  Surgical reduction torsion of testes      Past Surgical History:   Procedure Laterality Date    COLONOSCOPY  2013    Complete Colonoscopy    OTHER SURGICAL HISTORY  10/07/2015    Surgery Testis Reduction Of Torsion Of Testis Right      Family History:  CAD, DM in mother       Mother  of CVA at age 69  Brother with prostate CA, 1 sister with liver disease  2 children healthy  Family History   Problem Relation Name Age of Onset    Other (diabetes mellitus) Mother      Prostate cancer Brother        Social History:  Lives with wife of 30 years, works at [employer], [employer].  29 years, former smoker, smoked x 15 yrs quit 20+ years ago. Marijuana 3 x a week. Served in  in [place] and [facility],   Social History   Social History       Alcohol use: Not Currently       Comment: occansionally    Drug use: Yes       Types: Marijuana       Comment: 3 weeks ago          Subjective    History of Present Illness:    Brandt presents to the clinic today (24) with his wife Genevieve. Status post umbilical cord blood transplant 10/16/24 with graft failure followed by haploidentical cord from his sister on 24 with flu/cy, ATG and modified dose cyclophosphamide with engraftment on day T+8.      Reports feeling well.  He has even gained some weight over this last week.  No other concerns or complaints.    Review of Systems   Constitutional:  Negative for fever.   HENT:  Negative.     Respiratory:  Negative for cough and shortness of breath.    Cardiovascular:  Negative for chest pain and leg  swelling.   Gastrointestinal:  Negative for diarrhea, nausea and vomiting.   Genitourinary:  Negative for dysuria and hematuria.    Skin:  Negative for rash.   Neurological:  Negative for headaches.       Objective    Physical Exam  Vitals reviewed.   Constitutional:       Appearance: Normal appearance.      Comments: Thin.  Lying in bed.    HENT:      Head: Normocephalic and atraumatic.      Nose: Nose normal.      Mouth/Throat:      Mouth: Mucous membranes are moist.   Eyes:      Pupils: Pupils are equal, round, and reactive to light.   Cardiovascular:      Rate and Rhythm: Normal rate and regular rhythm.      Pulses: Normal pulses.      Heart sounds: Normal heart sounds.   Pulmonary:      Effort: Pulmonary effort is normal.      Breath sounds: Normal breath sounds.   Abdominal:      General: Abdomen is flat. Bowel sounds are normal.      Palpations: Abdomen is soft. There is no mass.      Tenderness: There is no abdominal tenderness.   Musculoskeletal:         General: No swelling. Normal range of motion.      Cervical back: Normal range of motion and neck supple.   Lymphadenopathy:      Comments: No lymphadenopathy   Skin:     General: Skin is warm and dry.      Findings: No lesion or rash.      Comments: PICC to left arm, dressing dry and intact.   Neurological:      General: No focal deficit present.      Mental Status: He is alert and oriented to person, place, and time. Mental status is at baseline.      Comments: No numbness or tingling   Psychiatric:         Mood and Affect: Mood normal.         Behavior: Behavior normal.       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     s/p 4 cycles of Decitabine/Venetoclax. BM Bx 6/17/24: Blasts cleared, FISH still positive for trisomy 8, still MDS changes   BMBx (9/5/24): hypocellular bone marrow (20%) with granulocytic  hypoplasia and no increase in blasts     History of allogeneic hematopoietic stem cell transplant  #1: Single-unit Cord, T0=9/19/24, Primary Engraftment Failure  - Conditioning: Flu-Mariana-TBI  - Donor: Single Cord, 6/8  = ABO Donor / Recipient: A+ / A+  = CMV Donor / Recipient: - / +  - aGVHD Prophylaxis: Tacrolimus + MMF  - engraftment failure, developed HLA antibody against class I of cord  - Repeat BMBx (10/15): hypocellular with no residual myeloid neoplasm. Chimerism 1% Donor, 99% Recipient     #2: Haploidentical rescue (sister), T0=11/6/24  - Graft: Haploidentical sister  = ABO Donor / Recipient: O+ / A+ (ABO incompatibility +)  = CMV Donor / Recipient: + / +  - Conditioning: Flu/Cy/TBI/rATG  = GVHD prophylaxis -  rATG 1.5mg/kg T-5,-3,-1, PTCy (25mg/kg T+3, T+4), Tacro, MMF (T+5)     DATE DAY SOURCE MORPHOLOGY MRD WHOLE CHIMERISM   (% donor) CD3 CHIMERISM   (% donor) CD33 CHIMERISM   (% donor)   11/20/24 D+30 PB      100%     12/9/24 D+30 PB      100%       12/11/24 D+30 PB    Pending Pending   1/9/25  BM          D+60 PB               D+100 PB               D+100 BM             12/9/24:  Brandt asking to postpone bone marrow biopsy.  Dr. Newsome okay to postpone by 1 month. Chimerism repeated.    Monitor for post-transplant hemolysis   12/11/24.  Haptoglobin 177 12/13/24  UPC ratio 0.11 12/11/24    GVHD prophylaxis  Tacrolimus dose 1mg in the morning and 0.5mg at night. Level 3 on 12/13/24. Increase dose to 1 mg BID    Last day of MMF 12/11/24 (T+35)    Weights  Pre-transplant: 9/9 74.2kg   Upon SCT discharge: 11/25 60.9kg  Today's weight: 65.6 kg (12/13/24).      Infectious Disease & Immune Reconstitution     Prophylaxis  Antiviral prophylaxis: acyclovir, Letermovir  Antifungal: posaconazole  PCP prophylaxis: 11/28/24 Pentamidine   Started Bactrim 11/29 - present    Hypogammaglobulinemia  IgG level. IVIG for level <400   IgG 1230 11/19/24, monitoring monthly.      Active Surveillance:     CMV PCR: ND  12/9  EBV PCR: ND 12/11  Adenovirus: ND 12/11  HHV6: ND 12/4.       EBV Viremia during transplant admission  Treated despite low levels due to upcoming second SCT  Rituximab 600 mg x 1 (10/31/24)     Immunizations:   Covid vaccine series. Consider at T+ 3 months  Seasonal influenza vaccine. Consider at T+ 3 month  Will plan to begin immunizations at T+6mths (May 2025) depending on degree of immunosuppression    Infectious Disease Appt: 1/10/25    Immunodeficiency Profile due at T+ 100, 6mths, and 1yr     Cardiac:    Essential hypertension  Continue nifedipine 60 mEq ER 24 hr tablet      Ascending aorta dilation   TTE (4/29/24): 3.8-4 cm dilation      Hx of STEMI (11/11/2020)     Cardiology appt 12/18 and ECHO 12/23      FEN  Continue oral magnesium TID and potassium BID  Given 2gm IV mag on 12/13/24. Pre-ordered 2gm for 12/16.     Mirtazapine 15 mg at night as of 11/26  Appetite improving and gaining weight     RTC:  12/16: Dr Newsome and infusion  12/18 Dr Jones and post BMT and infusion  12/20: Post BMT and infusion  12/23 with ECHO, post BMT and infusion  12/26: post BMT and infusion  12/30: GRICELDA Terry and infusion  1/10/25: ID appt    Requested appt for 1/2/25, 1/6/25, and 1/9/25. Appts need scheduled.    ----------------------------------------------------------------------------  GRICELDA Neal

## 2024-12-13 NOTE — TELEPHONE ENCOUNTER
TACROLIMUS DOSE RECOMMENDATION NOTE   Brandt Merritt is a 64 y.o. male currently day +37 following haplo-identical allogeneic stem cell transplant for a diagnosis of AML. Patient on tacrolimus for GVHD prophylaxis with level resulting today. Pharmacist was consulted to provide tacrolimus dose recommendations.     Objective   Creatinine   Date Value Ref Range Status   12/13/2024 0.82 0.50 - 1.30 mg/dL Final   12/11/2024 0.98 0.50 - 1.30 mg/dL Final   12/09/2024 1.12 0.50 - 1.30 mg/dL Final     Bilirubin, Total   Date Value Ref Range Status   12/13/2024 0.3 0.0 - 1.2 mg/dL Final   12/11/2024 0.3 0.0 - 1.2 mg/dL Final   12/09/2024 0.4 0.0 - 1.2 mg/dL Final     Tacrolimus    Date Value Ref Range Status   12/13/2024 3.0 <=15.0 ng/mL Final   12/11/2024 5.3 <=15.0 ng/mL Final   12/09/2024 4.6 <=15.0 ng/mL Final       Assessment  Current tacrolimus dose: 1 mg in the morning and 0.5 mg in the evening  Goal tacrolimus level: 5-10 ng/mL    Plan   The patient's tacrolimus level today was 3.0 which is Subtherapeutic. I recommended a 25% dose Increase: tacrolimus 1 mg every 12 hours. I spoke with the patient/caregiver via phone and instructed them to adjust their tacrolimus dose. Recommendation accepted, continue to monitor tacrolimus levels to assess adjustment. Their medication sheet has been updated to be given at a future appointment.    All questions were answered. Patient/family verbalized understanding of the plan of care and information provided. Will follow as necessary. Time spent with patient/family and/or coordinating care: 10 minutes.      Radha Cruz, PharmD, [facility]  Ambulatory Stem Cell Transplant Transplant Pharmacist    Current Outpatient Medications   Medication Instructions    acyclovir (ZOVIRAX) 400 mg, oral, 2 times daily    ergocalciferol (VITAMIN D2) 1,250 mcg, oral, Weekly    folic acid (FOLVITE) 1 mg, oral, Daily    letermovir (PREVYMIS) 480 mg, oral, Daily    Mag 64 128 mg, oral, 3 times daily, Or  as instructed on your medication list.    mirtazapine (REMERON) 15 mg, oral, Nightly    NIFEdipine ER (ADALAT CC) 60 mg, oral, Daily before breakfast, Do not crush, chew, or split.    ondansetron (ZOFRAN) 8 mg, oral, Every 8 hours    Oyster Shell Calcium 500 1,250 mg, oral, 3 times daily (morning, midday, late afternoon)    posaconazole (NOXAFIL) 300 mg, oral, Daily, Do not crush, chew, or split. This is to prevent fungal infections.    potassium chloride ER (Micro-K) 10 mEq ER capsule 20 mEq, oral, 2 times daily, Do not crush or chew.    prochlorperazine (COMPAZINE) 10 mg, oral, Every 6 hours PRN    sennosides-docusate sodium (Karissa-Colace) 8.6-50 mg tablet 1 tablet, oral, Daily    sulfamethoxazole-trimethoprim (Bactrim DS) 800-160 mg tablet 1 tablet, oral, 3 times weekly, Take on Mondays, Wednesdays, and Fridays. Do not start until instructed.    tacrolimus (PROGRAF) 1 mg, oral, Every morning, Or as instructed on your medication list.    tacrolimus (PROGRAF) 0.5 mg, oral, Nightly, Or as instructed on your medication list.    ursodiol (ACTIGALL) 300 mg, oral, 3 times daily

## 2024-12-13 NOTE — PROGRESS NOTES
Brandt Merritt is a 64 y.o. male who presents in stable condition for count check & magnesium infusion    Since his last visit, he has been doing well.  Overall, he states his energy level is stable.  His appetite & hydration status has improved.  He reports no complaints.     Patient tolerated infusion well and has been educated with the overall therapy plan. Questions & concerns addressed by her provider during visit via bedside. AVS, lab results & future appointment provided. Patient discharged in stable condition.    PICC line due for change: 12/18/2024  Count check labs to be drawn Monday 12/16/2024: CMV, CBC, CMP, Tacro, Mg    IgG level due: 12/20/2024    Reviewed and approved by VALERIO RAY on 12/13/24 at 2:25 PM.

## 2024-12-13 NOTE — PROGRESS NOTES
Patient ID:  Brandt Merritt is a 64 y.o. male.  Referring Physician:   ONC Dr Newsome  Primary Care Provider:  Bill Richmond MD    Assessment/Plan      12/11/24 T+83 (9/19/24); T+35 (11/6/24).     Oncology History Overview Note   4/2024  Myelodysplastic neoplasm with increased blasts-2 ( WHO)  Myelodysplastic neoplasm/AML  (ICC 2022 classification)    Presented in  10/2023 for pancytopenia, found to have hemolysis. Patient had normal CBC June 2020. Denied any hemorrhoidal bleeding . Has had C Scope and EGD , treated Hep C 2014 . 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      CBC 4/11/24 wbc 3.0, hgb 7.1, platelets 167K ANC 0.73    BM biopsy done on 4/11/24  as folllows:  BM histology  Myelodysplastic neoplasm with increased blasts-2 ( WHO)  Myelodysplastic neoplasm/AML  (ICC 2022 classification)  Balsts 11% on aspirate smear and 15-20% based on CD 34 immunostaining approaching AML leukemia, hematooiesis is erythroid dominant with dysplasia in granulocytes and megakaryocytes.   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      11/6/2024 -  Bone Marrow Transplant    conditioned with Flu/Cy/TBI and aGVHD prophylaxis with post-transplant cyclophosphamide, tacrolimus, and mycophenolate. T=0, 11/6/24. ABO Donor/Recipient: O+, A+. CMV donor/recipient: both positive. Started Tacro and MMF on T+5 (11/11).            Past Medical History:  HTN   STEMI 11/11/2020 after  getting  a water pills.  Chronic hepatitis C infection treated in  treated  with Dr. Lloyd  Past Medical History:   Diagnosis Date    Chest pain 2021    HTN (hypertension) 10/05/2023    Hypertension     Personal history of other diseases of the circulatory system     History of hypertension      Surgical History:  Colonosocopy 2021  Upper EGD 10/31/23  Surgical reduction torsion of testes      Past Surgical History:   Procedure Laterality Date    COLONOSCOPY  2013    Complete Colonoscopy    OTHER SURGICAL HISTORY  10/07/2015    Surgery Testis Reduction Of Torsion Of Testis Right      Family History:  CAD, DM in mother       Mother  of CVA at age 69  Brother with prostate CA, 1 sister with liver disease  2 children healthy  Family History   Problem Relation Name Age of Onset    Other (diabetes mellitus) Mother      Prostate cancer Brother           Social History:  Lives with wife of 30 years, works at [employer].  29 years, former smoker, smoked x 15 yrs quit 20+ years ago. Marijuana 3 x a week. Served in  in [place] and [facility],   Social History   Social History       Alcohol use: Not Currently       Comment: occansionally    Drug use: Yes       Types: Marijuana       Comment: 3 weeks ago          Subjective    History of Present Illness:    Brandt presents to the clinic today (24) with his wife Genevieve. Status post umbilical cord blood transplant 10/16/24 with graft failure followed by haploidentical cord from his sister on 24 with flu/cy, ATG and modified dose cyclophosphamide with engraftment on day T+8.      Today he is T+ 83 of umbilical cord transfusion and is T+ 35 of his haplo sister.      Reports improvement in appetite and is gaining weight. Minimizing the amount of soda he is drinking and focusing more on water. Denies nausea, vomiting, and diarrhea. Fatigue improving, ambulating around  the house without difficulty. Mild PRICE. Denies rash.      Review of Systems   Constitutional:  Negative for fever.   HENT:  Negative.     Respiratory:  Positive for shortness of breath. Negative for cough.    Cardiovascular:  Negative for chest pain and leg swelling.   Gastrointestinal:  Negative for diarrhea, nausea and vomiting.   Genitourinary:  Negative for dysuria and hematuria.    Skin:  Negative for rash.   Neurological:  Negative for headaches.       Objective      Visit Vitals  /86 (BP Location: Right arm, Patient Position: Sitting, BP Cuff Size: Adult)   Pulse 98   Temp 37.3 °C (99.1 °F) (Temporal)   Resp 19      Vitals:    12/11/24 0837   Weight: 64.2 kg (141 lb 8.6 oz)       Physical Exam  Vitals reviewed.   Constitutional:       Appearance: Normal appearance.      Comments: Thin.  Lying in bed.    HENT:      Head: Normocephalic and atraumatic.      Nose: Nose normal.      Mouth/Throat:      Mouth: Mucous membranes are moist.   Eyes:      Pupils: Pupils are equal, round, and reactive to light.   Cardiovascular:      Rate and Rhythm: Normal rate and regular rhythm.      Pulses: Normal pulses.      Heart sounds: Normal heart sounds.   Pulmonary:      Effort: Pulmonary effort is normal.      Breath sounds: Normal breath sounds.   Abdominal:      General: Abdomen is flat. Bowel sounds are normal.      Palpations: Abdomen is soft. There is no mass.      Tenderness: There is no abdominal tenderness.   Musculoskeletal:         General: No swelling. Normal range of motion.   Skin:     General: Skin is warm and dry.      Comments: PICC to left arm, dressing dry and intact.   Neurological:      General: No focal deficit present.      Mental Status: He is alert and oriented to person, place, and time.   Psychiatric:         Mood and Affect: Mood normal.         Behavior: Behavior normal.       Acute myeloid leukemia in remission (Multi)  Diagnosed 4/2024: BM Bx with MDS in in transition to AML (>10% blast) w/  trisomy 8, DNMT alpha, and U2AF1 mutation indication adverse risk.     s/p 4 cycles of Decitabine/Venetoclax. BM Bx 6/17/24: Blasts cleared, FISH still positive for trisomy 8, still MDS changes   BMBx (9/5/24): 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 and no increase in blasts     History of allogeneic hematopoietic stem cell transplant  #1: Single-unit Cord, T0=9/19/24, Primary Engraftment Failure  - Conditioning: Flu-Mariana-TBI  - Donor: Single Cord, 6/8  = ABO Donor / Recipient: A+ / A+  = CMV Donor / Recipient: - / +  - aGVHD Prophylaxis: Tacrolimus + MMF  - engraftment failure, developed HLA antibody against class I of cord  - Repeat BMBx (10/15): hypocellular with no residual myeloid neoplasm. Chimerism 1% Donor, 99% Recipient     #2: Haploidentical rescue (sister), T0=11/6/24  - Graft: Haploidentical sister  = ABO Donor / Recipient: O+ / A+ (ABO incompatibility +)  = CMV Donor / Recipient: + / +  - Conditioning: Flu/Cy/TBI/rATG  = GVHD prophylaxis -  rATG 1.5mg/kg T-5,-3,-1, PTCy (25mg/kg T+3, T+4), Tacro, MMF (T+5)     DATE DAY SOURCE MORPHOLOGY MRD WHOLE CHIMERISM   (% donor) CD3 CHIMERISM   (% donor) CD33 CHIMERISM   (% donor)   11/20/24 D+30 PB      100%     12/9/24 D+30 PB      Pending       12/11/24 D+30 PB    Pending Pending   1/9/25  BM          D+60 PB               D+100 PB               D+100 BM             12/9/24:  Brandt asking to postpone bone marrow biopsy.  Dr. Jerod dallas to postpone by 1 month. Chimerism repeated.    Monitor for post-transplant hemolysis   12/11/24.  Haptoglobin 321 12/4/24  UPC ratio 0.11 12/11/24    GVHD prophylaxis  Increased tacrolimus dose to 1mg in the morning and 0.5mg at night on 12/10. Level 5.3 12/11. Continue same dose and repeat level 12/13.    Last day of MMF 12/11/24 (T+35)    Weights  Pre-transplant: 9/9 74.2kg   Upon SCT discharge: 11/25 60.9kg  Today's weight: 64.2 kg (12/11/24).      Infectious Disease & Immune Reconstitution      Prophylaxis  Antiviral prophylaxis: acyclovir, Letermovir  Antifungal: posaconazole  PCP prophylaxis: 10/26/24 - 11/28/24 Pentamidine;   Started Bactrim 11/29 - present    Hypogammaglobulinemia  IgG level. IVIG for level <400   IgG 1230 11/19/24, monitoring monthly.      Active Surveillance:     CMV PCR: ND 12/9  EBV PCR: ND 12/4  Adenovirus: ND 12/4  HHV6: ND 12/4.       EBV Viremia during transplant admission  Treated despite low levels due to upcoming second SCT  Rituximab 600 mg x 1 (10/31/24)     Immunizations:   Covid vaccine series. Consider at T+ 3 months  Seasonal influenza vaccine. Consider at T+ 3 month  Will plan to begin immunizations at T+6mths (May 2025) depending on degree of immunosuppression    Infectious Disease Appt: 1/10/25    Immunodeficiency Profile due at T+ 100, 6mths, and 1yr     Cardiac:    Essential hypertension  Continue nifedipine 60 mEq ER 24 hr tablet      Ascending aorta dilation   TTE (4/29/24): 3.8-4 cm dilation      Hx of STEMI (11/11/2020)     Cardiology appt 12/18 and ECHO 12/23      FEN  Continue oral magnesium TID and potassium BID  Given 2gm IV mag on 12/11/24. Pre-ordered 2gm for 12/13.     Mirtazapine 15 mg at night as of 11/26  Appetite improving      IV Access  L PICC line    Psychosocial.    Caregiver An[facility] [place]    RTC:  12/13: Post BMT and infusion  12/16: Dr Newsome and infusion  12/18 Dr Jones and post BMT and infusion  12/20: Post BMT and infusion  12/23 with ECHO, post BMT and infusion  12/26: post BMT and infusion  12/30: GRICELDA Terry and infusion  1/10/25: ID appt    Requested appt for 1/2/25, 1/6/25, and 1/9/25. Appts need scheduled.    ----------------------------------------------------------------------------  GRICELDA Mancera

## 2024-12-13 NOTE — PROGRESS NOTES
MALE PHYSICAL EXAM    Miguel Ángel Mendoza is a 57year old male being seen for a complete physical exam.    The patient is changing physicians because of insurance reasons    The patient has no concerns    History reviewed. No pertinent past medical history. Current Outpatient Prescriptions   Medication Sig Dispense Refill   â¢ aspirin 81 MG tablet Take 81 mg by mouth daily. No current facility-administered medications for this visit.       Allergies as of 10/06/2017   â¢ (No Known Allergies)     Past Surgical History:   Procedure Laterality Date   â¢ VASECTOMY       Social History     Social History   â¢ Marital status:      Spouse name: N/A   â¢ Number of children: N/A   â¢ Years of education: N/A     Social History Main Topics   â¢ Smoking status: Never Smoker   â¢ Smokeless tobacco: Never Used   â¢ Alcohol use 1.2 oz/week     2 Standard drinks or equivalent per week   â¢ Drug use: No   â¢ Sexual activity: Yes     Partners: Female     Birth control/ protection: Post-menopausal     Other Topics Concern   â¢ None     Social History Narrative   â¢ None     Most Recent Immunizations   Administered Date(s) Administered   â¢ INFLUENZA QUADRIVALENT 10/06/2017       REVIEW OF SYSTEMS:  CONSTITTIONAL: Denies weight loss or gain, fever,chills, night sweats  EYES: Denies blurred, double or diminished vision  HENT: Denies tinnitus, hearing loss, epistaxis, gum bleeding, swollen neck nodes, tooth pain  CARDIOVASCULAR: Denies chest pain, palpitations, tachycardia, shortness of breath, peripheral edema and syncope  RESPIRATORY: Denies cough shortness of breath,dyspnea  GASTROINTESTINAL: Denies dysphagia, heartburn,abdominal pain,frequent diarrhea or constipation, blood in stool or black stools nausea, vomiting  GENITOURINARY: Denies dysuria, urinary frequency, nocturia, decreased force of stream, hesitancy, hematuria, penile discharge, and erectile dysfunction  MUSCULOSKELETAL: Denies myalgias, arthralgias  SKIN: Denies rashes, Patient ID:  Brandt Merritt is a 64 y.o. male.  Referring Physician:   ONC Dr Newsome  Primary Care Provider:  Bill Richmond MD    Assessment/Plan      12/9/24 T+81 (9/19/24); T+33 (11/6/24).     Oncology History Overview Note   4/2024  Myelodysplastic neoplasm with increased blasts-2 ( WHO)  Myelodysplastic neoplasm/AML  (ICC 2022 classification)    Presented in  10/2023 for pancytopenia, found to have hemolysis. Patient had normal CBC June 2020. Denied any hemorrhoidal bleeding . Has had C Scope and EGD , treated Hep C 2014 . 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      CBC 4/11/24 wbc 3.0, hgb 7.1, platelets 167K ANC 0.73    BM biopsy done on 4/11/24  as folllows:  BM histology  Myelodysplastic neoplasm with increased blasts-2 ( WHO)  Myelodysplastic neoplasm/AML  (ICC 2022 classification)  Balsts 11% on aspirate smear and 15-20% based on CD 34 immunostaining approaching AML leukemia, hematooiesis is erythroid dominant with dysplasia in granulocytes and megakaryocytes.   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      11/6/2024 -  Bone Marrow Transplant    conditioned with Flu/Cy/TBI and aGVHD prophylaxis with post-transplant cyclophosphamide, tacrolimus, and mycophenolate. T=0, 11/6/24. ABO Donor/Recipient: O+, A+. CMV donor/recipient: both positive. Started Tacro and MMF on T+5 (11/11).         Response:      Past Medical History:  HTN   STEMI  "skin lesion, unusual moles  NEUROLOGICAL: Denies weakness, paresthesias, syncope, seizures, tremors, memory loss  PSYCHIATRIC: Denies depression, nervousness, suicidal ideation  ENDOCRINE: Denies heat or cold intolerance, polydipsia, polyuria  ALLERGIC: Denies environmental allergies, bee sting hypersensitivity    PHYSICAL EXAM:  Vitals:    10/06/17 1114   BP: 148/86   Pulse: 78   Resp: 18   Temp: 98.3 Â°F (36.8 Â°C)   TempSrc: Oral   Weight: 109.9 kg   Height: 5' 11"" (1.803 m)       CONSTITUTIONAL: Well-developed, adequately nourished, normal appearance, in no respiratory distress  HEENT:  EOMI (extraocular movements intact), PERRLA (pupils equal, round, reactive to light and accommodation), conjunctivae clear,sclerae anicteric; tympanic membranes are clear, EACs (external auditory canals) clear; nasal mucosa clear; pharynx clear, dentition in good repair, oral mucosa clear, gums pink, hearing grossly normal  NECK:  Supple with no adenopathy,no thyromegaly or thyroid nodules; no carotid bruits; full ROM (range of motion); no masses  CARDIOVASCULAR:  Heart regular rate and rhythm, no murmurs, peripheral pulses preserved throughout  RESPIRATORY:  Lungs clear even on forceful expiration; no use of accessory respiratory muscles  BACK: No CVA (costovertebral angle) tenderness, no spinal tenderness  ABDOMEN: Non tender, non distended; no hepatosplenomegaly; no masses, normoactive bowel sounds present, no abdominal bruits  : Normal male phallus, circumcised; testes without masses or tenderness; no hernias; no inguinal adenopathy; no penile discharge; no penile lesions  EXTREMITIES: No clubbing, cyanosis or edema; no gross deformities  MUSCULOSKELETAL:  No joint tenderness or swelling; full range of motion in both upper and lower extremity joints; no muscle atrophy  SKIN: No rashes or suspicious lesions  NEUROLOGICAL: No focal deficits; cranial nerves II-XII grossly normal, with+5/-5 strength testing both upper and lower " 2020 after getting  a water pills.  Chronic hepatitis C infection treated in 2015 treated  with Dr. Lloyd  Past Medical History:   Diagnosis Date    Chest pain 2021    HTN (hypertension) 10/05/2023    Hypertension     Personal history of other diseases of the circulatory system     History of hypertension      Surgical History:  Conosocopy 2021  Upper EGD 10/31/23  Surgical reduction torsion of testes      Past Surgical History:   Procedure Laterality Date    COLONOSCOPY  2013    Complete Colonoscopy    OTHER SURGICAL HISTORY  10/07/2015    Surgery Testis Reduction Of Torsion Of Testis Right      Family History:  CAD, DM in mother       Mother  of CVA at age 69  Brother with prostate CA, 1 sister with liver disease  2 children healthy  Family History   Problem Relation Name Age of Onset    Other (diabetes mellitus) Mother      Prostate cancer Brother           Social History:  Lives with wife of 30 years, works at [employer], [employer], now retired.   29 years, former smoker, smoked x 15 yrs quit 20+ years ago. Marijuana 3 x a week. Served in [employer] in [place] and [facility],   Social History   Social History       Alcohol use: Not Currently       Comment: occansionally    Drug use: Yes       Types: Marijuana       Comment: 3 weeks ago          Subjective    History of Present Illness:    Brandt presents to the clinic today (24) with his wife Genevieve for follow up after prolonged hospitalization for umbilical cord blood transplant 10/16/24 with graft rejection followed by haploidentical cord from his sister on 24 with flu/cy, ATG and modified dose cyclophosphamide with engraftment on day T+8.  Today he is T+ 89 of umbilical cord transfusion and is T+ 41 of his haplo sister transplant .      Reports he is improving every day, eating better and energy level is improving.   Continue to have mild shortness of breath on  exertion, has appointment with cardiology on Wednesday. Stools have been formed, no rashes.      PICC line site to left arm doing well.  Reports no fevers, chills, or new rashes.  Current tacro dose is 1.0 mg twice a day.      Review of Systems   Constitutional:  Positive for appetite change and fatigue.   HENT:  Negative.     Eyes: Negative.    Respiratory:  Positive for shortness of breath.    Cardiovascular: Negative.    Gastrointestinal:  Negative for blood in stool, constipation, diarrhea, nausea and vomiting.   Endocrine: Negative.    Genitourinary: Negative.     Musculoskeletal:  Negative for gait problem.   Skin: Negative.    Neurological:  Negative for dizziness, gait problem, light-headedness and numbness.   Hematological: Negative.    Psychiatric/Behavioral: Negative.         Objective      There were no vitals taken for this visit.   There were no vitals filed for this visit.    Physical Exam  Vitals reviewed.   Constitutional:       Appearance: Normal appearance.      Comments: Mild temporal wasting    HENT:      Head: Normocephalic and atraumatic.      Nose: Nose normal.      Mouth/Throat:      Mouth: Mucous membranes are moist.   Eyes:      Extraocular Movements: Extraocular movements intact.      Conjunctiva/sclera: Conjunctivae normal.      Pupils: Pupils are equal, round, and reactive to light.   Cardiovascular:      Rate and Rhythm: Normal rate and regular rhythm.      Pulses: Normal pulses.      Heart sounds: Normal heart sounds.   Pulmonary:      Effort: Pulmonary effort is normal.      Breath sounds: Normal breath sounds.   Abdominal:      General: Abdomen is flat. Bowel sounds are normal.      Palpations: Abdomen is soft. There is no mass.      Tenderness: There is no abdominal tenderness.   Musculoskeletal:         General: No swelling. Normal range of motion.   Skin:     General: Skin is warm and dry.      Comments: PICC to left arm, dressing dry and intact.   Neurological:      General: No  extremities; sensation grossly normal; normal gait and stance  MENTAL STATUS: Alert and orientated to person, place and date, memory without deficits    ASSESSMENT AND PLAN:  Patient was instructed to continue good diet and exercise program.    Annual physical exam  The patient is doing well we will do appropriate screening blood work on him see him back in one years time  - [facility]; Future  - PROSTATE SPECIFIC ANTIGEN; Future  - LIPID PANEL WITH REFLEX; Future    Need for hepatitis C screening test  - HEPATITIS C ANTIBODY WITH REFLEX; Future    Need for vaccination  - INFLUENZA QUADRIVALENT SPLIT 0.5 ML VACCINE      Colonoscopy: Patient states it was approximately 2 years ago we'll have to get records  Immunizations:  Tdap -  patient feels is up-to-date  Advanced Directives: 5 Wishes packet given, discussed, patient will return when completed    Orders Placed This Encounter   â¢ INFLUENZA QUADRIVALENT SPLIT 0.5 ML VACCINE   â¢ Basic Metabolic Panel   â¢ Prostate Specific Antigen   â¢ Hepatitis C Antibody with Reflex   â¢ Lipid Panel with Reflex   â¢ aspirin 81 MG tablet       Return in about 1 year (around 10/6/2018) for Complete Physical. focal deficit present.      Mental Status: He is alert and oriented to person, place, and time.   Psychiatric:         Mood and Affect: Mood normal.         Behavior: Behavior normal.         Thought Content: Thought content normal.         Judgment: Judgment normal.       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     s/p 4 cycles of Decitabine/Venetoclax. BM Bx 6/17/24: Blasts cleared, FISH still positive for trisomy 8, still MDS changes   BMBx (9/5/24): 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 and no increase in blasts     History of allogeneic hematopoietic stem cell transplant  #1: Single-unit Cord, T0=9/19/24, Primary Engraftment Failure  - Conditioning: Flu-Mariana-TBI  - Donor: Single Cord, 6/8  = ABO Donor / Recipient: A+ / A+  = CMV Donor / Recipient: - / +  - aGVHD Prophylaxis: Tacrolimus + MMF  - engraftment failure, developed HLA antibody against class I of cord  - Repeat BMBx (10/15): hypocellular with no residual myeloid neoplasm. 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), T0=11/6/24  - Graft: Haploidentical sister  = ABO Donor / Recipient: O+ / A+ (ABO incompatibility +)  = CMV Donor / Recipient: + / +  - Conditioning: Flu/Cy/TBI/rATG  = GVHD prophylaxis -  rATG 1.5mg/kg T-5,-3,-1, PTCy (25mg/kg T+3, T+4), Tacro, MMF (T+5)     DATE DAY SOURCE MORPHOLOGY MRD WHOLE CHIMERISM   (% donor) CD3 CHIMERISM   (% donor) CD33 CHIMERISM   (% donor)   11/20/24 D+30 PB      100%     1/9/25-postponed D+30 BM               D+60 PB               D+100 PB               D+100 BM             12/16/24:  Overall doing well no signs of acute GVHD.   First bone marrow biopsy scheduled for 1/9/24.    Chimerism from 12/11/24 in process. Check epo level to see if patient may benefit from erythropoietin     Monitor for post-transplant  hemolysis   12/16/24.  Haptoglobin 321 12/4/24  UPC ratio 0.18 12/4/24    GVHD prophylaxis  GVHD  Anorexia/poor oral intake/weight loss. Added Mirtazapine. Continue Zofran. If persists, consider aGVHD.  12/9/24:  Currently taking tacrolimus 0.5 mg BID, held dose today prior to labs.  FK level 4.6 (12/9/24).  Advised patient on 12/10/24 to increase tacrolimus dose to 1 mg in the AM and 0.5 mg in the PM.    Wean MMF from 1 g TID to 500 mg TID 12/2/24 - present  Due to poor appetite will decrease MMF to 500 mg po tid.  Improvement to GI symptoms since decreasing MMF dose.    Stopped  MMF on T+35,   12/16/24. Current dose of tacro 1 mg po bid,  level 2.3, increase tacro to 1.5 mg po bid, pateint contacted by Radha Leal.  Awaiting erythropiietin level to see if patient candidate for epo shots.    Weights  Pre-transplant: 9/9 74.2kg   Upon SCT discharge: 11/25 60.9kg  Weight: 66.7 kg (12/16/24).  Appetitie is improving.     Infectious Disease & Immune Reconstitution     Prophylaxis  Antiviral prophylaxis: acyclovir, Letermovir  Antifungal: posaconazole  PCP prophylaxis: 10/26/24 - 11/28/24 Pentamidine; Bactrim 11/29 - present    Hypogammaglobulinemia  IgG level. IVIG for level <400   IgG 1230 11/19/24, monitoring monthly.      Active Surveillance:     CMV PCR: ND 12/4  EBV PCR: ND 12/4  Adenovirus: ND 12/4  HHV6: ND 12/4.       EBV Viremia during transplant admission  Treated despite low levels due to upcoming second SCT  Rituximab 600 mg x 1 (10/31/24)     Immunizations  Covid vaccine series   Seasonal influenza vaccine   Will plan to begin immunizations at T+6mths ### depending on degree of immunosuppression     Infectious Disease follow up evaluation: Requested/not yet scheduled    Immunodeficiency panel 100 days, 6mos and 1 year.     Immunizations:   Covid vaccine series. Consider at T+ 3 months  Seasonal influenza vaccine. Consider at T+ 3 month  Will plan to begin immunizations at T+6mths (May 2025)  depending on degree of immunosuppression     Cardiac:    Essential hypertension  Continue nifedipine 60 mEq ER 24 hr tablet   Cardiology 12/18 and ECHO 12/23     Ascending aorta dilation   TTE (4/29/24): 3.8-4 cm dilation      Hx of STEMI (11/11/2020) has cardiology folllowup 12/18/24          Electrolyte disturbance   Continue oral magnesium TID and potassium BID  Ongoing needs for parenteral magnesium.          Lack of appetite  Mirtazapine 15 mg at night as of 11/26 12/9/24:  Reports appetite level is improving.  Continue mirtazapine.  Monitoring.     Loose Stools  PRN imodium    Medication reconciliation  Has all prescribed meds. Instructed to bring list to each visit. Please update regularly. New list and reconciliation completed 12/2/24.    IV Access  L PICC line  12/9/24:  PICC line site with no redness, tenderness, swelling, or drainage.    Psychosocial.    Caregiver [facility] [place]    RTC:  12/11, 12/13: Post BMT and infusion  12/16: Dr Newsome and infusion  12/18 Dr Jones and post BMT and infusion  12/20: Post BMT and infusion  12/23 with ECHO, post BMT and infusion  12/26: post BMT and infusion  12/30: Karon Casas, HAYLEY-CNP and infusion  1/10/25: ID appt    Requested appt for 1/2/25, 1/6/25, and 1/9/25. Postpone bone marrow biopsy for 1/9/25.  Appts need scheduled.    ----------------------------------------------------------------------------  Malaika Newsome MD

## 2024-12-14 LAB — HUMAN HERPESVIRUS-6 PCR PLASMA: NOT DETECTED COPIES/ML

## 2024-12-15 LAB
CMV DNA SERPL NAA+PROBE-LOG IU: ABNORMAL {LOG_IU}/ML
LABORATORY COMMENT REPORT: ABNORMAL

## 2024-12-16 ENCOUNTER — INFUSION (OUTPATIENT)
Dept: HEMATOLOGY/ONCOLOGY | Facility: HOSPITAL | Age: 64
End: 2024-12-16
Payer: COMMERCIAL

## 2024-12-16 ENCOUNTER — TELEPHONE (OUTPATIENT)
Dept: HEMATOLOGY/ONCOLOGY | Facility: HOSPITAL | Age: 64
End: 2024-12-16

## 2024-12-16 ENCOUNTER — OFFICE VISIT (OUTPATIENT)
Dept: HEMATOLOGY/ONCOLOGY | Facility: HOSPITAL | Age: 64
End: 2024-12-16
Payer: COMMERCIAL

## 2024-12-16 VITALS
DIASTOLIC BLOOD PRESSURE: 73 MMHG | HEART RATE: 90 BPM | OXYGEN SATURATION: 100 % | TEMPERATURE: 99 F | RESPIRATION RATE: 19 BRPM | WEIGHT: 147.05 LBS | SYSTOLIC BLOOD PRESSURE: 137 MMHG | BODY MASS INDEX: 24.09 KG/M2

## 2024-12-16 DIAGNOSIS — C92.01 ACUTE MYELOID LEUKEMIA IN REMISSION (MULTI): ICD-10-CM

## 2024-12-16 DIAGNOSIS — Z94.84 HISTORY OF ALLOGENEIC HEMATOPOIETIC STEM CELL TRANSPLANT: ICD-10-CM

## 2024-12-16 DIAGNOSIS — D61.818 PANCYTOPENIA: ICD-10-CM

## 2024-12-16 DIAGNOSIS — Z94.84 HISTORY OF ALLOGENEIC HEMATOPOIETIC STEM CELL TRANSPLANT: Primary | ICD-10-CM

## 2024-12-16 LAB
ABO GROUP (TYPE) IN BLOOD: NORMAL
ALBUMIN SERPL BCP-MCNC: 3.4 G/DL (ref 3.4–5)
ALP SERPL-CCNC: 72 U/L (ref 33–136)
ALT SERPL W P-5'-P-CCNC: 8 U/L (ref 10–52)
ANION GAP SERPL CALC-SCNC: 12 MMOL/L (ref 10–20)
ANTIBODY SCREEN: NORMAL
AST SERPL W P-5'-P-CCNC: 13 U/L (ref 9–39)
BASOPHILS # BLD AUTO: 0.09 X10*3/UL (ref 0–0.1)
BASOPHILS NFR BLD AUTO: 0.8 %
BILIRUB SERPL-MCNC: 0.3 MG/DL (ref 0–1.2)
BUN SERPL-MCNC: 6 MG/DL (ref 6–23)
BURR CELLS BLD QL SMEAR: NORMAL
CALCIUM SERPL-MCNC: 8.5 MG/DL (ref 8.6–10.3)
CHLORIDE SERPL-SCNC: 106 MMOL/L (ref 98–107)
CO2 SERPL-SCNC: 28 MMOL/L (ref 21–32)
CREAT SERPL-MCNC: 0.82 MG/DL (ref 0.5–1.3)
DACRYOCYTES BLD QL SMEAR: NORMAL
EGFRCR SERPLBLD CKD-EPI 2021: >90 ML/MIN/1.73M*2
EOSINOPHIL # BLD AUTO: 0.46 X10*3/UL (ref 0–0.7)
EOSINOPHIL NFR BLD AUTO: 4.3 %
ERYTHROCYTE [DISTWIDTH] IN BLOOD BY AUTOMATED COUNT: 20.7 % (ref 11.5–14.5)
GLUCOSE SERPL-MCNC: 111 MG/DL (ref 74–99)
HCT VFR BLD AUTO: 25.6 % (ref 41–52)
HGB BLD-MCNC: 7.8 G/DL (ref 13.5–17.5)
HGB RETIC QN: 31 PG (ref 28–38)
IMM GRANULOCYTES # BLD AUTO: 0.12 X10*3/UL (ref 0–0.7)
IMM GRANULOCYTES NFR BLD AUTO: 1.1 % (ref 0–0.9)
IMMATURE RETIC FRACTION: 27.5 %
LDH SERPL L TO P-CCNC: 200 U/L (ref 84–246)
LYMPHOCYTES # BLD AUTO: 0.94 X10*3/UL (ref 1.2–4.8)
LYMPHOCYTES NFR BLD AUTO: 8.7 %
MAGNESIUM SERPL-MCNC: 1.55 MG/DL (ref 1.6–2.4)
MCH RBC QN AUTO: 29 PG (ref 26–34)
MCHC RBC AUTO-ENTMCNC: 30.5 G/DL (ref 32–36)
MCV RBC AUTO: 95 FL (ref 80–100)
MONOCYTES # BLD AUTO: 1.22 X10*3/UL (ref 0.1–1)
MONOCYTES NFR BLD AUTO: 11.3 %
NEUTROPHILS # BLD AUTO: 7.93 X10*3/UL (ref 1.2–7.7)
NEUTROPHILS NFR BLD AUTO: 73.8 %
NRBC BLD-RTO: 0 /100 WBCS (ref 0–0)
OVALOCYTES BLD QL SMEAR: NORMAL
PLATELET # BLD AUTO: 273 X10*3/UL (ref 150–450)
PLATELET CLUMP BLD QL SMEAR: PRESENT
POLYCHROMASIA BLD QL SMEAR: NORMAL
POTASSIUM SERPL-SCNC: 4 MMOL/L (ref 3.5–5.3)
PROT SERPL-MCNC: 6 G/DL (ref 6.4–8.2)
RBC # BLD AUTO: 2.69 X10*6/UL (ref 4.5–5.9)
RBC MORPH BLD: NORMAL
RETICS #: 0.2 X10*6/UL (ref 0.02–0.11)
RETICS/RBC NFR AUTO: 7.2 % (ref 0.5–2)
RH FACTOR (ANTIGEN D): NORMAL
SCHISTOCYTES BLD QL SMEAR: NORMAL
SODIUM SERPL-SCNC: 142 MMOL/L (ref 136–145)
TACROLIMUS BLD-MCNC: 2.3 NG/ML
WBC # BLD AUTO: 10.8 X10*3/UL (ref 4.4–11.3)

## 2024-12-16 PROCEDURE — 83735 ASSAY OF MAGNESIUM: CPT

## 2024-12-16 PROCEDURE — 85025 COMPLETE CBC W/AUTO DIFF WBC: CPT

## 2024-12-16 PROCEDURE — 2500000004 HC RX 250 GENERAL PHARMACY W/ HCPCS (ALT 636 FOR OP/ED): Performed by: NURSE PRACTITIONER

## 2024-12-16 PROCEDURE — 83615 LACTATE (LD) (LDH) ENZYME: CPT

## 2024-12-16 PROCEDURE — 85045 AUTOMATED RETICULOCYTE COUNT: CPT

## 2024-12-16 PROCEDURE — 86901 BLOOD TYPING SEROLOGIC RH(D): CPT

## 2024-12-16 PROCEDURE — 84075 ASSAY ALKALINE PHOSPHATASE: CPT

## 2024-12-16 PROCEDURE — 99215 OFFICE O/P EST HI 40 MIN: CPT | Mod: 25 | Performed by: INTERNAL MEDICINE

## 2024-12-16 PROCEDURE — 96365 THER/PROPH/DIAG IV INF INIT: CPT | Mod: INF

## 2024-12-16 PROCEDURE — 80197 ASSAY OF TACROLIMUS: CPT

## 2024-12-16 RX ORDER — HEPARIN SODIUM,PORCINE/PF 10 UNIT/ML
50 SYRINGE (ML) INTRAVENOUS AS NEEDED
Status: DISCONTINUED | OUTPATIENT
Start: 2024-12-16 | End: 2024-12-16 | Stop reason: HOSPADM

## 2024-12-16 RX ORDER — HEPARIN 100 UNIT/ML
500 SYRINGE INTRAVENOUS AS NEEDED
Status: DISCONTINUED | OUTPATIENT
Start: 2024-12-16 | End: 2024-12-16 | Stop reason: HOSPADM

## 2024-12-16 RX ORDER — FAMOTIDINE 10 MG/ML
20 INJECTION INTRAVENOUS ONCE AS NEEDED
Status: CANCELLED | OUTPATIENT
Start: 2024-12-16

## 2024-12-16 RX ORDER — HEPARIN SODIUM,PORCINE/PF 10 UNIT/ML
50 SYRINGE (ML) INTRAVENOUS AS NEEDED
Status: CANCELLED | OUTPATIENT
Start: 2024-12-16

## 2024-12-16 RX ORDER — MAGNESIUM SULFATE HEPTAHYDRATE 40 MG/ML
2 INJECTION, SOLUTION INTRAVENOUS ONCE
Status: COMPLETED | OUTPATIENT
Start: 2024-12-16 | End: 2024-12-16

## 2024-12-16 RX ORDER — ALBUTEROL SULFATE 0.83 MG/ML
3 SOLUTION RESPIRATORY (INHALATION) AS NEEDED
Status: CANCELLED | OUTPATIENT
Start: 2024-12-16

## 2024-12-16 RX ORDER — HEPARIN 100 UNIT/ML
500 SYRINGE INTRAVENOUS AS NEEDED
Status: CANCELLED | OUTPATIENT
Start: 2024-12-16

## 2024-12-16 RX ORDER — EPINEPHRINE 0.3 MG/.3ML
0.3 INJECTION SUBCUTANEOUS EVERY 5 MIN PRN
Status: CANCELLED | OUTPATIENT
Start: 2024-12-16

## 2024-12-16 RX ORDER — DIPHENHYDRAMINE HYDROCHLORIDE 50 MG/ML
50 INJECTION INTRAMUSCULAR; INTRAVENOUS AS NEEDED
Status: CANCELLED | OUTPATIENT
Start: 2024-12-16

## 2024-12-16 RX ORDER — MAGNESIUM SULFATE HEPTAHYDRATE 40 MG/ML
4 INJECTION, SOLUTION INTRAVENOUS ONCE
Status: CANCELLED | OUTPATIENT
Start: 2024-12-16 | End: 2024-12-16

## 2024-12-16 ASSESSMENT — PAIN SCALES - GENERAL: PAINLEVEL_OUTOF10: 0-NO PAIN

## 2024-12-16 NOTE — TELEPHONE ENCOUNTER
TACROLIMUS DOSE RECOMMENDATION NOTE   Brandt Merritt is a 64 y.o. male currently day +40 following haplo-identical allogeneic stem cell transplant for a diagnosis of AML. Patient on tacrolimus for GVHD prophylaxis with level resulting today. Pharmacist was consulted to provide tacrolimus dose recommendations.     Objective   Creatinine   Date Value Ref Range Status   12/16/2024 0.82 0.50 - 1.30 mg/dL Final   12/13/2024 0.82 0.50 - 1.30 mg/dL Final   12/11/2024 0.98 0.50 - 1.30 mg/dL Final     Bilirubin, Total   Date Value Ref Range Status   12/16/2024 0.3 0.0 - 1.2 mg/dL Final   12/13/2024 0.3 0.0 - 1.2 mg/dL Final   12/11/2024 0.3 0.0 - 1.2 mg/dL Final     Tacrolimus    Date Value Ref Range Status   12/16/2024 2.3 <=15.0 ng/mL Final   12/13/2024 3.0 <=15.0 ng/mL Final   12/11/2024 5.3 <=15.0 ng/mL Final       Assessment  Current tacrolimus dose: 1 mg every 12 hours  Goal tacrolimus level: 5-10 ng/mL    Plan   The patient's tacrolimus level today was 2.3 which is Subtherapeutic. I recommended a 50% dose Increase: tacrolimus 1.5 mg every 12 hours. I spoke with the patient/caregiver via phone and instructed them to adjust their tacrolimus dose. Recommendation accepted, continue to monitor tacrolimus levels to assess adjustment.    All questions were answered. Patient/family verbalized understanding of the plan of care and information provided. Will follow as necessary. Time spent with patient/family and/or coordinating care: 15 minutes.      Radha Cruz, PharmD, [facility]  Ambulatory Stem Cell Transplant Transplant Pharmacist    Current Outpatient Medications   Medication Instructions    acyclovir (ZOVIRAX) 400 mg, oral, 2 times daily    ergocalciferol (VITAMIN D2) 1,250 mcg, oral, Weekly    folic acid (FOLVITE) 1 mg, oral, Daily    letermovir (PREVYMIS) 480 mg, oral, Daily    Mag 64 128 mg, oral, 3 times daily, Or as instructed on your medication list.    mirtazapine (REMERON) 15 mg, oral, Nightly    ondansetron  (ZOFRAN) 8 mg, oral, Every 8 hours    Oyster Shell Calcium 500 1,250 mg, oral, 3 times daily (morning, midday, late afternoon)    posaconazole (NOXAFIL) 300 mg, oral, Daily, Do not crush, chew, or split. This is to prevent fungal infections.    prochlorperazine (COMPAZINE) 10 mg, oral, Every 6 hours PRN    sennosides-docusate sodium (Karissa-Colace) 8.6-50 mg tablet 1 tablet, oral, Daily    sulfamethoxazole-trimethoprim (Bactrim DS) 800-160 mg tablet 1 tablet, oral, 3 times weekly, Take on Mondays, Wednesdays, and Fridays. Do not start until instructed.    tacrolimus (PROGRAF) 1 mg, oral, Every 12 hours, Or as instructed on your medication list.    ursodiol (ACTIGALL) 300 mg, oral, 3 times daily

## 2024-12-16 NOTE — PROGRESS NOTES
Brandt Merritt is a 64 y.o. male who presents in stable condition for count check    Since his last visit, he has been doing well.  Overall, he states his energy level is stable.  His appetite & hydration status has been improved.  He reports no complaints.  He has no other concerns.    Magnesium level 1.55 Provider ordered for 2gm IV Magnesium    Patient tolerated infusion well and has been educated with the overall therapy plan. He has no other questions or concerns at this time. AVS, lab results & future appointment provided. Patient discharged in stable condition with his wife to see provider at this time.    Reviewed and approved by VALERIO RAY on 12/16/24 at 11:42 AM.

## 2024-12-16 NOTE — PROGRESS NOTES
PT seen in clinic for a FUV with Dr. Newsome; see provider note for assessment. Vitals, nutrition, allergies, and meds reviewed. PT reports no new issues or prescription request. AVS provided prior to discharge.

## 2024-12-17 LAB
CMV DNA SERPL NAA+PROBE-LOG IU: NORMAL {LOG_IU}/ML
LABORATORY COMMENT REPORT: NOT DETECTED

## 2024-12-18 ENCOUNTER — OFFICE VISIT (OUTPATIENT)
Dept: CARDIOLOGY | Facility: HOSPITAL | Age: 64
End: 2024-12-18
Payer: COMMERCIAL

## 2024-12-18 ENCOUNTER — NUTRITION (OUTPATIENT)
Dept: HEMATOLOGY/ONCOLOGY | Facility: HOSPITAL | Age: 64
End: 2024-12-18

## 2024-12-18 ENCOUNTER — OFFICE VISIT (OUTPATIENT)
Dept: HEMATOLOGY/ONCOLOGY | Facility: HOSPITAL | Age: 64
End: 2024-12-18
Payer: COMMERCIAL

## 2024-12-18 ENCOUNTER — INFUSION (OUTPATIENT)
Dept: HEMATOLOGY/ONCOLOGY | Facility: HOSPITAL | Age: 64
End: 2024-12-18
Payer: COMMERCIAL

## 2024-12-18 VITALS
TEMPERATURE: 100 F | HEART RATE: 92 BPM | SYSTOLIC BLOOD PRESSURE: 140 MMHG | RESPIRATION RATE: 18 BRPM | DIASTOLIC BLOOD PRESSURE: 83 MMHG | BODY MASS INDEX: 24.03 KG/M2 | WEIGHT: 146.7 LBS | OXYGEN SATURATION: 100 %

## 2024-12-18 VITALS
WEIGHT: 146.61 LBS | HEART RATE: 88 BPM | DIASTOLIC BLOOD PRESSURE: 82 MMHG | OXYGEN SATURATION: 100 % | BODY MASS INDEX: 24.02 KG/M2 | TEMPERATURE: 99.1 F | RESPIRATION RATE: 14 BRPM | SYSTOLIC BLOOD PRESSURE: 132 MMHG

## 2024-12-18 VITALS — HEIGHT: 66 IN | WEIGHT: 146.61 LBS | BODY MASS INDEX: 23.56 KG/M2

## 2024-12-18 VITALS — TEMPERATURE: 99.1 F

## 2024-12-18 DIAGNOSIS — C92.01 ACUTE MYELOID LEUKEMIA IN REMISSION (MULTI): ICD-10-CM

## 2024-12-18 DIAGNOSIS — D61.818 PANCYTOPENIA: ICD-10-CM

## 2024-12-18 DIAGNOSIS — I10 ESSENTIAL HYPERTENSION: ICD-10-CM

## 2024-12-18 DIAGNOSIS — Z94.84 HISTORY OF ALLOGENEIC HEMATOPOIETIC STEM CELL TRANSPLANT: ICD-10-CM

## 2024-12-18 DIAGNOSIS — Z94.84 HISTORY OF ALLOGENEIC HEMATOPOIETIC STEM CELL TRANSPLANT: Primary | ICD-10-CM

## 2024-12-18 DIAGNOSIS — I77.810 ASCENDING AORTA DILATION (CMS-HCC): ICD-10-CM

## 2024-12-18 LAB
25(OH)D3 SERPL-MCNC: 27 NG/ML (ref 30–100)
ABO GROUP (TYPE) IN BLOOD: NORMAL
ALBUMIN SERPL BCP-MCNC: 3.4 G/DL (ref 3.4–5)
ALP SERPL-CCNC: 70 U/L (ref 33–136)
ALT SERPL W P-5'-P-CCNC: 8 U/L (ref 10–52)
ANION GAP SERPL CALC-SCNC: 12 MMOL/L (ref 10–20)
ANTIBODY SCREEN: NORMAL
APPEARANCE UR: CLEAR
AST SERPL W P-5'-P-CCNC: 13 U/L (ref 9–39)
BASOPHILS # BLD AUTO: 0.07 X10*3/UL (ref 0–0.1)
BASOPHILS NFR BLD AUTO: 0.9 %
BILIRUB SERPL-MCNC: 0.4 MG/DL (ref 0–1.2)
BILIRUB UR STRIP.AUTO-MCNC: NEGATIVE MG/DL
BUN SERPL-MCNC: 8 MG/DL (ref 6–23)
BURR CELLS BLD QL SMEAR: NORMAL
CALCIUM SERPL-MCNC: 8.4 MG/DL (ref 8.6–10.3)
CHIMERISM INTERPRETATION: NORMAL
CHIMERISM INTERPRETATION: NORMAL
CHLORIDE SERPL-SCNC: 106 MMOL/L (ref 98–107)
CO2 SERPL-SCNC: 27 MMOL/L (ref 21–32)
COLOR UR: NORMAL
CREAT SERPL-MCNC: 0.85 MG/DL (ref 0.5–1.3)
CREAT UR-MCNC: 76.3 MG/DL (ref 20–370)
DACRYOCYTES BLD QL SMEAR: NORMAL
EGFRCR SERPLBLD CKD-EPI 2021: >90 ML/MIN/1.73M*2
ELECTRONICALLY SIGNED BY: NORMAL
ELECTRONICALLY SIGNED BY: NORMAL
EOSINOPHIL # BLD AUTO: 0.41 X10*3/UL (ref 0–0.7)
EOSINOPHIL NFR BLD AUTO: 5 %
ERYTHROCYTE [DISTWIDTH] IN BLOOD BY AUTOMATED COUNT: 20.9 % (ref 11.5–14.5)
GLUCOSE SERPL-MCNC: 87 MG/DL (ref 74–99)
GLUCOSE UR STRIP.AUTO-MCNC: NORMAL MG/DL
HCT VFR BLD AUTO: 25.4 % (ref 41–52)
HGB BLD-MCNC: 7.9 G/DL (ref 13.5–17.5)
HYPOCHROMIA BLD QL SMEAR: NORMAL
IMM GRANULOCYTES # BLD AUTO: 0.04 X10*3/UL (ref 0–0.7)
IMM GRANULOCYTES NFR BLD AUTO: 0.5 % (ref 0–0.9)
KETONES UR STRIP.AUTO-MCNC: NEGATIVE MG/DL
LDH SERPL L TO P-CCNC: 197 U/L (ref 84–246)
LEUKOCYTE ESTERASE UR QL STRIP.AUTO: NEGATIVE
LYMPHOCYTES # BLD AUTO: 1 X10*3/UL (ref 1.2–4.8)
LYMPHOCYTES NFR BLD AUTO: 12.2 %
MAGNESIUM SERPL-MCNC: 1.71 MG/DL (ref 1.6–2.4)
MCH RBC QN AUTO: 29.4 PG (ref 26–34)
MCHC RBC AUTO-ENTMCNC: 31.1 G/DL (ref 32–36)
MCV RBC AUTO: 94 FL (ref 80–100)
MONOCYTES # BLD AUTO: 1.1 X10*3/UL (ref 0.1–1)
MONOCYTES NFR BLD AUTO: 13.4 %
NEUTROPHILS # BLD AUTO: 5.57 X10*3/UL (ref 1.2–7.7)
NEUTROPHILS NFR BLD AUTO: 68 %
NITRITE UR QL STRIP.AUTO: NEGATIVE
NRBC BLD-RTO: 0 /100 WBCS (ref 0–0)
OVALOCYTES BLD QL SMEAR: NORMAL
PH UR STRIP.AUTO: 7 [PH]
PLATELET # BLD AUTO: 225 X10*3/UL (ref 150–450)
PLATELET CLUMP BLD QL SMEAR: PRESENT
POLYCHROMASIA BLD QL SMEAR: NORMAL
POTASSIUM SERPL-SCNC: 3.9 MMOL/L (ref 3.5–5.3)
PROT SERPL-MCNC: 6 G/DL (ref 6.4–8.2)
PROT UR STRIP.AUTO-MCNC: NEGATIVE MG/DL
PROT UR-ACNC: 8 MG/DL (ref 5–25)
PROT/CREAT UR: 0.1 MG/MG CREAT (ref 0–0.17)
RBC # BLD AUTO: 2.69 X10*6/UL (ref 4.5–5.9)
RBC # UR STRIP.AUTO: NEGATIVE /UL
RBC MORPH BLD: NORMAL
RH FACTOR (ANTIGEN D): NORMAL
SCHISTOCYTES BLD QL SMEAR: NORMAL
SODIUM SERPL-SCNC: 141 MMOL/L (ref 136–145)
SP GR UR STRIP.AUTO: 1.01
TACROLIMUS BLD-MCNC: 3 NG/ML
UROBILINOGEN UR STRIP.AUTO-MCNC: NORMAL MG/DL
WBC # BLD AUTO: 8.2 X10*3/UL (ref 4.4–11.3)

## 2024-12-18 PROCEDURE — 82668 ASSAY OF ERYTHROPOIETIN: CPT

## 2024-12-18 PROCEDURE — 85025 COMPLETE CBC W/AUTO DIFF WBC: CPT

## 2024-12-18 PROCEDURE — 82306 VITAMIN D 25 HYDROXY: CPT

## 2024-12-18 PROCEDURE — 81003 URINALYSIS AUTO W/O SCOPE: CPT

## 2024-12-18 PROCEDURE — 80053 COMPREHEN METABOLIC PANEL: CPT

## 2024-12-18 PROCEDURE — 99214 OFFICE O/P EST MOD 30 MIN: CPT | Mod: 25 | Performed by: STUDENT IN AN ORGANIZED HEALTH CARE EDUCATION/TRAINING PROGRAM

## 2024-12-18 PROCEDURE — 86900 BLOOD TYPING SEROLOGIC ABO: CPT

## 2024-12-18 PROCEDURE — 83735 ASSAY OF MAGNESIUM: CPT

## 2024-12-18 PROCEDURE — 80197 ASSAY OF TACROLIMUS: CPT

## 2024-12-18 PROCEDURE — 96365 THER/PROPH/DIAG IV INF INIT: CPT | Mod: INF

## 2024-12-18 PROCEDURE — 2500000004 HC RX 250 GENERAL PHARMACY W/ HCPCS (ALT 636 FOR OP/ED): Performed by: INTERNAL MEDICINE

## 2024-12-18 PROCEDURE — 84156 ASSAY OF PROTEIN URINE: CPT | Performed by: INTERNAL MEDICINE

## 2024-12-18 PROCEDURE — 99214 OFFICE O/P EST MOD 30 MIN: CPT | Performed by: INTERNAL MEDICINE

## 2024-12-18 PROCEDURE — 83615 LACTATE (LD) (LDH) ENZYME: CPT

## 2024-12-18 PROCEDURE — 82784 ASSAY IGA/IGD/IGG/IGM EACH: CPT | Performed by: NURSE PRACTITIONER

## 2024-12-18 RX ORDER — DIPHENHYDRAMINE HYDROCHLORIDE 50 MG/ML
50 INJECTION INTRAMUSCULAR; INTRAVENOUS AS NEEDED
OUTPATIENT
Start: 2024-12-18

## 2024-12-18 RX ORDER — FAMOTIDINE 10 MG/ML
20 INJECTION INTRAVENOUS ONCE AS NEEDED
OUTPATIENT
Start: 2024-12-18

## 2024-12-18 RX ORDER — MAGNESIUM SULFATE HEPTAHYDRATE 40 MG/ML
4 INJECTION, SOLUTION INTRAVENOUS ONCE
Status: COMPLETED | OUTPATIENT
Start: 2024-12-18 | End: 2024-12-18

## 2024-12-18 RX ORDER — EPINEPHRINE 0.3 MG/.3ML
0.3 INJECTION SUBCUTANEOUS EVERY 5 MIN PRN
OUTPATIENT
Start: 2024-12-18

## 2024-12-18 RX ORDER — ALBUTEROL SULFATE 0.83 MG/ML
3 SOLUTION RESPIRATORY (INHALATION) AS NEEDED
OUTPATIENT
Start: 2024-12-18

## 2024-12-18 RX ORDER — HEPARIN 100 UNIT/ML
500 SYRINGE INTRAVENOUS AS NEEDED
OUTPATIENT
Start: 2024-12-18

## 2024-12-18 RX ORDER — HEPARIN SODIUM,PORCINE/PF 10 UNIT/ML
50 SYRINGE (ML) INTRAVENOUS AS NEEDED
Status: DISCONTINUED | OUTPATIENT
Start: 2024-12-18 | End: 2024-12-18 | Stop reason: HOSPADM

## 2024-12-18 RX ORDER — HEPARIN SODIUM,PORCINE/PF 10 UNIT/ML
50 SYRINGE (ML) INTRAVENOUS AS NEEDED
OUTPATIENT
Start: 2024-12-18

## 2024-12-18 ASSESSMENT — ENCOUNTER SYMPTOMS
WHEEZING: 0
ARTHRALGIAS: 1
NEUROLOGICAL NEGATIVE: 1
STRIDOR: 0
CHILLS: 0
NERVOUS/ANXIOUS: 1
SHORTNESS OF BREATH: 1
GASTROINTESTINAL NEGATIVE: 1
CARDIOVASCULAR NEGATIVE: 1
FEVER: 0
DIAPHORESIS: 0
COUGH: 0
HEMATOLOGIC/LYMPHATIC NEGATIVE: 1

## 2024-12-18 NOTE — PROGRESS NOTES
Oncology History Overview Note    4/2024  Myelodysplastic neoplasm with increased blasts-2 ( WHO)  Myelodysplastic neoplasm/AML  (ICC 2022 classification)     Presented in  10/2023 for pancytopenia, found to have hemolysis. Patient had normal CBC June 2020. Denied any hemorrhoidal bleeding . Has had C Scope and EGD , treated Hep C 2014 . 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       CBC 4/11/24 wbc 3.0, hgb 7.1, platelets 167K ANC 0.73     BM biopsy done on 4/11/24  as folllows:  BM histology  Myelodysplastic neoplasm with increased blasts-2 ( WHO)  Myelodysplastic neoplasm/AML  (ICC 2022 classification)  Balsts 11% on aspirate smear and 15-20% based on CD 34 immunostaining approaching AML leukemia, hematooiesis is erythroid dominant with dysplasia in granulocytes and megakaryocytes.   FISH studies trisomy 8 17.2%  NGS panel DNMT3 aVAF 36%  U2AF1 VAF 32%         Acute myeloid leukemia in remission (Multi)    4/23/2024 Initial Diagnosis      Acute myeloid leukemia not having achieved remission (Multi)       5/13/2024 - 8/16/2024 Chemotherapy      Venetoclax / Decitabine, 28 Day Cycles - Induction / Consolidation       10/16/2024 - 10/16/2024 Bone Marrow Transplant      conditioning with Flu-Mariana-TBI, a single cord stem cell transplant on 10/16/24 resulted in primary engraftment failure, confirmed by bone marrow biopsy on 10/15 showing 1% donor chimerism and hypocellularity without myeloid neoplasm.        11/6/2024 -  Bone Marrow Transplant      conditioned with Flu/Cy/TBI and aGVHD prophylaxis with post-transplant cyclophosphamide, tacrolimus, and mycophenolate. T=0, 11/6/24. ABO Donor/Recipient: O+, A+. CMV donor/recipient: both positive. Started Tacro and MMF on T+5 (11/11).               Past Medical History:  HTN   STEMI 11/11/2020 after getting  a water pills.  Chronic hepatitis C infection treated in 2015 treated  with Dr. Lloyd  Medical History        Past Medical History:   Diagnosis Date    Chest  pain 2021    HTN (hypertension) 10/05/2023    Hypertension      Personal history of other diseases of the circulatory system       History of hypertension         Surgical History:  Colonosocopy 2021  Upper EGD 10/31/23  Surgical reduction torsion of testes      Surgical History         Past Surgical History:   Procedure Laterality Date    COLONOSCOPY   2013     Complete Colonoscopy    OTHER SURGICAL HISTORY   10/07/2015     Surgery Testis Reduction Of Torsion Of Testis Right         Family History:  CAD, DM in mother       Mother  of CVA at age 69  Brother with prostate CA, 1 sister with liver disease  2 children healthy  Family History          Family History   Problem Relation Name Age of Onset    Other (diabetes mellitus) Mother        Prostate cancer Brother             Social History:  Lives with wife of 30 years, works at [employer], 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  29 years, former smoker, smoked x 15 yrs quit 20+ years ago. Marijuana 3 x a week. Served in [employer] in [place] and [facility],   Social History   Social History             Alcohol use: Not Currently       Comment: occansionally    Drug use: Yes       Types: Marijuana       Comment: 3 weeks ago               Subjective  History of Present Illness:     Brandt presents to the clinic today (24) with his wife Genevieve. Status post umbilical cord blood transplant 10/16/24 with graft failure followed by haploidentical cord from his sister on 24 with flu/cy, ATG and modified dose cyclophosphamide with engraftment on day T+8.  He is s/p umbilical cord transfusion t+91 and T+43 of haplo sister transplant.      Reports feeling well.  He states his appetite continues to improve. He denies any abdominal pain, N/V/D. He reports no recent illness. He denies any bruising or bleeding. No other concerns or complaints.     Review of Systems   Constitutional:  Negative for fever.   HENT:   Negative.     Respiratory:  Negative for cough and shortness of breath.    Cardiovascular:  Negative for chest pain and leg swelling.   Gastrointestinal:  Negative for diarrhea, nausea and vomiting.   Genitourinary:  Negative for dysuria and hematuria.    Skin:  Negative for rash.   Neurological:  Negative for headaches.          Objective  Physical Exam  Vitals reviewed.   Constitutional:       Appearance: Normal appearance.      Comments: Thin.  Lying in bed.    HENT:      Head: Normocephalic and atraumatic.      Nose: Nose normal.      Mouth/Throat:      Mouth: Mucous membranes are moist.   Eyes:      Pupils: Pupils are equal, round, and reactive to light.   Cardiovascular:      Rate and Rhythm: Normal rate and regular rhythm.      Pulses: Normal pulses.      Heart sounds: Normal heart sounds.   Pulmonary:      Effort: Pulmonary effort is normal.      Breath sounds: Normal breath sounds.   Abdominal:      General: Abdomen is flat. Bowel sounds are normal.      Palpations: Abdomen is soft. There is no mass.      Tenderness: There is no abdominal tenderness.   Musculoskeletal:         General: No swelling. Normal range of motion.      Cervical back: Normal range of motion and neck supple.   Lymphadenopathy:      Comments: No lymphadenopathy   Skin:     General: Skin is warm and dry.      Findings: No lesion or rash.    Neurological:      General: No focal deficit present.      Mental Status: He is alert and oriented to person, place, and time. Mental status is at baseline.      Comments: No numbness or tingling   Psychiatric:         Mood and Affect: Mood normal.         Behavior: Behavior normal.         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      s/p 4 cycles of Decitabine/Venetoclax. BM Bx 6/17/24: Blasts cleared, FISH still positive for trisomy 8, still MDS changes    BMBx (9/5/24): 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 and no increase in blasts      History of allogeneic hematopoietic stem cell transplant  #1: Single-unit Cord, T0=9/19/24, Primary Engraftment Failure  - Conditioning: Flu-Mariana-TBI  - Donor: Single Cord, 6/8  = ABO Donor / Recipient: A+ / A+  = CMV Donor / Recipient: - / +  - aGVHD Prophylaxis: Tacrolimus + MMF  - engraftment failure, developed HLA antibody against class I of cord  - Repeat BMBx (10/15): hypocellular with no residual myeloid neoplasm. Chimerism 1% Donor, 99% Recipient      #2: Haploidentical rescue (sister), T0=11/6/24  - Graft: Haploidentical sister  = ABO Donor / Recipient: O+ / A+ (ABO incompatibility +)  = CMV Donor / Recipient: + / +  - Conditioning: Flu/Cy/TBI/rATG  = GVHD prophylaxis -  rATG 1.5mg/kg T-5,-3,-1, PTCy (25mg/kg T+3, T+4), Tacro, MMF (T+5)     DATE DAY SOURCE MORPHOLOGY MRD WHOLE CHIMERISM   (% donor) CD3 CHIMERISM   (% donor) CD33 CHIMERISM   (% donor)   11/20/24 D+30 PB      100%       12/9/24 D+30 PB      100%       12/11/24 D+30 PB       Pending Pending   1/9/25   BM               D+60 PB               D+100 PB               D+100 BM             12/9/24:  Brandt asking to postpone bone marrow biopsy.  Dr. Newsome okay to postpone by 1 month. Chimerism repeated.     Monitor for post-transplant hemolysis   12/18/24.  Haptoglobin 177 12/13/24  UPC ratio 0.10 12/18/24     GVHD prophylaxis  Tacrolimus dose 1.5 mg BID     Last day of MMF 12/11/24 (T+35)     Weights  Pre-transplant: 9/9 74.2kg   Upon SCT discharge: 11/25 60.9kg  Today's weight: 66.5 kg (12/18/24).       Infectious Disease & Immune Reconstitution      Prophylaxis  Antiviral prophylaxis: acyclovir, Letermovir  Antifungal: posaconazole  PCP prophylaxis: 11/28/24 Pentamidine   Started Bactrim 11/29 - present     Hypogammaglobulinemia  IgG level. IVIG for level <400   IgG 1230 11/19/24, monitoring monthly.       Active Surveillance:      CMV PCR: ND 12/9  EBV PCR: ND 12/11  Adenovirus: ND 12/11  HHV6: ND 12/4.       EBV Viremia during transplant admission  Treated despite low levels due to upcoming second SCT  Rituximab 600 mg x 1 (10/31/24)     Immunizations:   Covid vaccine series. Consider at T+ 3 months  Seasonal influenza vaccine. Consider at T+ 3 month  Will plan to begin immunizations at T+6mths (May 2025) depending on degree of immunosuppression     Infectious Disease Appt: 1/10/25     Immunodeficiency Profile due at T+ 100, 6mths, and 1yr     Cardiac:    Essential hypertension  Continue nifedipine 60 mEq ER 24 hr tablet      Ascending aorta dilation   TTE (4/29/24): 3.8-4 cm dilation      Hx of STEMI (11/11/2020)      Cardiology appt 12/18 and ECHO 12/23      FEN  Continue oral magnesium TID and potassium BID.  Has 4 days left on 10 day course of potassium will reevaluate counts to see if potassium can be stopped.  IV Mg was terminated early because level was 1.71 (12/18/24).     Mirtazapine 15 mg at night as of 11/26  Appetite improving and gaining weight     RTC:  12/20: Post BMT and infusion  12/23 with ECHO, post BMT and infusion  12/26: post BMT and infusion  12/30: HAYLEY Terry-CNP and infusion  1/9/25: bone marrow biopsy   1/10/25: ID appt     Requested appt for 1/2/25, 1/6/25

## 2024-12-18 NOTE — PROGRESS NOTES
"NUTRITION FOLLOW UP NOTE    Reason for Visit:  Brandt Merritt is a 64 y.o. 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    PMH: HTN, Hep C    Currently T+90 (cord); T+42 (haplo)    Pt and wife seen today in infusion.     Lab Results   Component Value Date/Time    GLUCOSE 87 12/18/2024 0933     12/18/2024 0933    K 3.9 12/18/2024 0933     12/18/2024 0933    CO2 27 12/18/2024 0933    ANIONGAP 12 12/18/2024 0933    BUN 8 12/18/2024 0933    CREATININE 0.85 12/18/2024 0933    EGFR >90 12/18/2024 0933    CALCIUM 8.4 (L) 12/18/2024 0933    ALBUMIN 3.4 12/18/2024 0933    ALKPHOS 70 12/18/2024 0933    PROT 6.0 (L) 12/18/2024 0933    AST 13 12/18/2024 0933    BILITOT 0.4 12/18/2024 0933    ALT 8 (L) 12/18/2024 0933     Mg 1.71 (L)--on SlowMg 2 tabs TID   Taking PO K+ for 10 days    Lab Results   Component Value Date    WBC 8.2 12/18/2024    HGB 7.9 (L) 12/18/2024    HCT 25.4 (L) 12/18/2024    MCV 94 12/18/2024     12/18/2024       Lab Results   Component Value Date/Time    VITD25 27 (L) 12/18/2024 0933   *Started Vit D2 50,000IU x 8 weeks on 12/4.    Anthropometrics:  Anthropometrics  Height: 166.4 cm (5' 5.51\")  Weight: 66.5 kg (146 lb 9.7 oz)  BMI (Calculated): 24.02  IBW/kg (Dietitian Calculated): 63.2 kg  Percent of IBW: 104 %    *Wt at time of transplant admit: (9/13) 75.6 kg   *Wt at first post-transplant visit: (11/25): 60.9 kg   Overall, pt with 11 kg (15%) wt loss x ~3 mos    *Wt up 2 kg x 1 week     Wt Readings from Last 10 Encounters:   12/18/24 66.5 kg (146 lb 9.7 oz)   12/18/24 66.5 kg (146 lb 11.2 oz)   12/16/24 66.7 kg (147 lb 0.8 oz)   12/13/24 65.6 kg (144 lb 10 oz)   12/11/24 64.2 kg (141 lb 8.6 oz)   12/11/24 64.2 kg (141 lb 8.6 oz)   12/09/24 60 kg (132 lb 4.4 oz)   12/06/24 61.1 kg (134 lb 11.2 oz)   12/06/24 61.1 kg (134 lb 11.2 oz)   12/04/24 60.9 kg (134 lb 4.2 oz)   Admit 9/13-11/25 09/09/24        " "74.2 kg  08/16/24        75.3 kg   07/19/24        74.2 kg   06/24/24        71.0 kg  05/20/24        72.8 kg  04/23/24        75.5 kg     Food And Nutrient Intake:      Pt continues to do well.  Now enjoying eating again.  Getting hungry at times and asking for specific foods.   Eating at least 2 meals a day as well as snacks in between.   Foods are tasting better but still not 100%; sweets taste best but meats still don't have the same flavors.   Has been having pancakes and sausage for breakfast for the past few days; sausage doesn't taste he knows it should yet.   Fluid intakes okay; drinking mainly water but started drinking raspberry iced tea as well.   Feels appetite and tastes have improved since stopping MMF.   Denies N/V.  No diarrhea; having regular BM's.   Energy levels improving; walked from parking garage but still struggles going up stairs.       Usual intakes:    B--Oatmeal with milk or pancakes with sausage   L--snacks (cookies)  D--mac and cheese or fish or chicken with ely slaw      Dislikes all nutrition supplements/protein drinks.     Started Remeron on 11/27.                                                             Nutrition Focused Physical Exam Findings:      Subcutaneous Fat Loss  Triceps: Mild-Moderate (less than ample fat tissue)    Muscle Wasting  Temporalis: Mild-Moderate (slight depression)  Pectoralis (Clavicular Region): Mild-Moderate (some protrusion of clavicle)  Deltoid/Trapezius: Mild-Moderate (slight protrusion of acromion process)  Quadriceps: Mild-Moderate (mild depression on inner and outer thigh)  Gastrocnemius: Mild-Moderate (not well developed muscle)              Energy Needs  Calculated Energy Needs Using Equations  Height: 166.4 cm (5' 5.51\")  Estimated Energy Needs  Total Energy Estimated Needs (kCal):  (3961-6249)  Total Estimated Energy Need per Day (kCal/kg):  (30-35)  Estimated Fluid Needs  Total Fluid Estimated Needs (mL):  (1800+)  Total Fluid Estimated Needs " (mL/kg):  (30+)  Estimated Protein Needs  Total Protein Estimated Needs (g):  (70-85)  Total Protein Estimated Needs (g/kg):  (1.2-1.4)        Nutrition Diagnosis   Malnutrition Diagnosis  Diagnosis Status: Ongoing  Malnutrition Diagnosis: Mild malnutrition related to acute disease or injury  As Evidenced by: taste changes and significant wt loss since hospital admit    *Overall nutrition status improving; appetite better, intakes improved and weight increasing.  GI symptoms resolved at present. Dysgeusia persists but is improving as well.  Anticipate continued improvements.     Nutrition Interventions/Recommendations   Nutrition Prescription   High Protein, High Calorie  Food Safety     Nutrition Education   Pt aware of importance of protein intakes; include source with all meals and snacks.   Encouraged PO fluids; Goal: 60+ oz daily  Include milk daily (whole milk; reg or ramirez) for  additional protein   Milkshakes or smoothies regularly as pt does not care for ONS  Pt aware of tips for managing taste changes--has lemon hard candies; suggested condiments, seasonings to help enhance flavors.  Discussing continuing to try new foods to assess tolerance.      Coordination of Care   NP/BMT team         Nutrition Monitoring and Evaluation      Will continue to f/up and monitor weight, labs. PO intakes and GI symptoms.

## 2024-12-18 NOTE — PROGRESS NOTES
Patient presents to infusion appointment in stable condition for count check. Patient seen by ANGEL Gomez PA-C at bedside. Hgb 7.0, platelets 225, ANC 5.57, no treatment indicated. 4 g IV magnesium sulfate pre-ordered and started, stopped after 25 ml per provider due to magnesium result 1.71. Lab results and schedule reviewed with patient. Discharged in stable condition.

## 2024-12-18 NOTE — PROGRESS NOTES
Patient:  Brandt Merritt  YOB: 1960  MRN: [id number]       Chief Complaint/Active Symptoms:       Brandt Merritt is a 64 y.o. male who returns today for cardiac follow-up.    Patient is here a little late for follow-up after stem cell transplant in October. Is mostly recovered but still has some mild PRICE without orthopnea, PND, or lower extremity edema. No chest pain or discomfort. No palpitations, dizziness or lightheadedness. Activity level is almost back to normal. Not on any active therapy at present. Feeling much better.     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 Currently on no active therapy.       Testing:  EKG - SR, inc RBBB, ST elevation V2/V3  Echo 10/31/24 - EF 58%  Echo 7/17/24 -EF 59%    Echo 4/2024 - normal LVEF 60-65%  CT chest 10/30/24 no coronary calcifications, mild aortic calcifications, trace pericardial effusion - no mention of any thoracic aortic dilation    Review of Systems   Constitutional:  Negative for chills, diaphoresis and fever.   HENT: Negative.     Respiratory:  Positive for shortness of breath. Negative for cough, wheezing and stridor.    Cardiovascular: Negative.    Gastrointestinal: Negative.    Genitourinary: Negative.    Musculoskeletal:  Positive for arthralgias.   Neurological: Negative.    Hematological: Negative.    Psychiatric/Behavioral:  The patient is nervous/anxious.    All other systems reviewed and are negative.      Objective:     Visit Vitals  /82 (BP Location: Left arm, Patient Position: Sitting, BP Cuff Size: Adult)   Pulse 88   Temp 37.3 °C (99.1 °F)   Resp 14   Wt 66.5 kg (146 lb 9.7 oz)   SpO2 100%   BMI 24.02 kg/m²   Smoking Status Former   BSA 1.75 m²       Allergies:     Allergies   Allergen Reactions    Lasix [Furosemide] Other     Extreme hypotension and extreme hypokalemia.    Meropenem Rash     Unclear if rash due to meropenem or pre-engraftment syndrome     Thiazides Other     Recurrent significant hypokalemia          Medications:     Current Outpatient Medications   Medication Instructions    acyclovir (ZOVIRAX) 400 mg, oral, 2 times daily    ergocalciferol (VITAMIN D2) 1,250 mcg, oral, Weekly    folic acid (FOLVITE) 1 mg, oral, Daily    letermovir (PREVYMIS) 480 mg, oral, Daily    Mag 64 128 mg, oral, 3 times daily, Or as instructed on your medication list.    mirtazapine (REMERON) 15 mg, oral, Nightly    ondansetron (ZOFRAN) 8 mg, oral, Every 8 hours    Oyster Shell Calcium 500 1,250 mg, oral, 3 times daily (morning, midday, late afternoon)    posaconazole (NOXAFIL) 300 mg, oral, Daily, Do not crush, chew, or split. This is to prevent fungal infections.    potassium chloride ER (Micro-K) 10 mEq ER capsule 20 mEq, oral, 2 times daily, Do not crush or chew.    prochlorperazine (COMPAZINE) 10 mg, oral, Every 6 hours PRN    sennosides-docusate sodium (Karissa-Colace) 8.6-50 mg tablet 1 tablet, oral, Daily    sulfamethoxazole-trimethoprim (Bactrim DS) 800-160 mg tablet 1 tablet, oral, 3 times weekly, Take on Mondays, Wednesdays, and Fridays. Do not start until instructed.    tacrolimus (PROGRAF) 1 mg, oral, Every 12 hours, Or as instructed on your medication list.    ursodiol (ACTIGALL) 300 mg, oral, 3 times daily       Physical Examination:   GENERAL:  Well developed, well nourished, in no acute distress.  HEENT: NC AT, anicteric sclera  NECK:  Supple, no JVD, no bruit.  CHEST:  Symmetric and nontender.  LUNGS:  Clear to auscultation bilaterally, normal respiratory effort.  HEART:  PMI is nondisplaced. RRR with normal S1 and S2, no S3, no mumur or rub. No carotid or abdominal bruits  EXTREMITIES:  Warm with good color, no clubbing or cyanosis.  There is no edema noted.  PERIPHERAL VASCULAR:  Pulses present and equally palpable; 2+ throughout.  MUSCULOSKELETAL: mild sarcopenia  NEURO/PSYCH:  Alert and oriented times three with approppriate behavior and responses. Nonfocal  motor examination with normal gait and ambulation  Skin: no rash or lesions on exposed skin or reported.    Lab:     CBC:   Lab Results   Component Value Date    WBC 8.2 12/18/2024    RBC 2.69 (L) 12/18/2024    HGB 7.9 (L) 12/18/2024    HCT 25.4 (L) 12/18/2024     12/18/2024        CMP:    Lab Results   Component Value Date     12/18/2024    K 3.9 12/18/2024     12/18/2024    CO2 27 12/18/2024    BUN 8 12/18/2024    CREATININE 0.85 12/18/2024    GLUCOSE 87 12/18/2024    CALCIUM 8.4 (L) 12/18/2024       Magnesium:    Lab Results   Component Value Date    MG 1.71 12/18/2024       Lipid Profile:    Lab Results   Component Value Date    TRIG 53 09/19/2023    HDL 38.7 (A) 09/19/2023       TSH:    Lab Results   Component Value Date    TSH 1.98 09/05/2024       BNP:   Lab Results   Component Value Date    BNP 27 07/17/2024        PT/INR:    Lab Results   Component Value Date    PROTIME 13.2 (H) 11/23/2024    INR 1.2 (H) 11/23/2024       HgBA1c:    Lab Results   Component Value Date    HGBA1C 6.1 (H) 06/30/2021       BMP:  Lab Results   Component Value Date     12/18/2024     12/16/2024     12/13/2024    K 3.9 12/18/2024    K 4.0 12/16/2024    K 3.9 12/13/2024     12/18/2024     12/16/2024     12/13/2024    CO2 27 12/18/2024    CO2 28 12/16/2024    CO2 25 12/13/2024    BUN 8 12/18/2024    BUN 6 12/16/2024    BUN 9 12/13/2024    CREATININE 0.85 12/18/2024    CREATININE 0.82 12/16/2024    CREATININE 0.82 12/13/2024       Cardiac Enzymes:    Lab Results   Component Value Date    TROPHS 7 07/17/2024       Hepatic Function Panel:    Lab Results   Component Value Date    ALKPHOS 70 12/18/2024    ALT 8 (L) 12/18/2024    AST 13 12/18/2024    PROT 6.0 (L) 12/18/2024    BILITOT 0.4 12/18/2024    BILIDIR 0.2 09/20/2024       Labs reviewed    Diagnostic Studies:     Transthoracic echo (TTE) complete    Result Date: 7/17/2024   [facility], [address] [place] [address],  [place], [place] [address]                Tel [phone] and Fax [phone] TRANSTHORACIC ECHOCARDIOGRAM REPORT  Patient Name:      DANNY SOTO       Reading Physician:    [id number] Jenelle Floyd MD Study Date:        7/17/2024            Ordering Provider:    [id number] GABRIEL DESAI                                                               ALONZO MRN/PID:           [id number]             Fellow: Accession#:        [id number]         Nurse: Date of Birth/Age: 1960 / 64 years Sonographer:          AMADO Britt RDCS Gender:            M                    Additional Staff: Height:            167.64 cm            Admit Date: Weight:            72.12 kg             Admission Status:     Outpatient BSA / BMI:         1.81 m2 / 25.66      Encounter#:           [id number]                    kg/m2 Blood Pressure:    138/87 mmHg          Department Location:  [facility]                                                               Invasive Study Type:    TRANSTHORACIC ECHO (TTE) COMPLETE Diagnosis/ICD: Encounter for other preprocedural examination-Z01.818 Indication:    Pre- Allo PBSCT CPT Code:      Echo Complete w Full Doppler-46400 Patient History: Pertinent History: HTN. STEMI, Hep C, AML,. Study Detail: The following Echo studies were performed: 2D, M-Mode, Doppler and               color flow. Technically challenging study due to prominent lung               artifact.  PHYSICIAN INTERPRETATION: Left Ventricle: Left ventricular ejection fraction is normal, calculated by Mcintyre's biplane at 59%. There are no regional wall motion abnormalities. The left ventricular cavity size is normal. Spectral Doppler shows a normal pattern of left ventricular diastolic filling. Left Atrium: The left atrium is normal in size. Right Ventricle: The right ventricle is normal in size. There is normal  right ventricular global systolic function. Right Atrium: The right atrium is normal in size. Aortic Valve: The aortic valve is trileaflet. There is no evidence of aortic valve regurgitation. The peak instantaneous gradient of the aortic valve is 7.2 mmHg. Mitral Valve: The mitral valve is normal in structure. There is trace to mild mitral valve regurgitation. Tricuspid Valve: The tricuspid valve is structurally normal. There is trace tricuspid regurgitation. The Doppler estimated RVSP is within normal limits at 24.0 mmHg. RVSP may be underestiamted due to incomplete TR CW Doppler envelope. Pulmonic Valve: The pulmonic valve is structurally normal. There is physiologic pulmonic valve regurgitation. Pericardium: There is no pericardial effusion noted. Aorta: The aortic root is abnormal. There is mild dilatation of the ascending aorta. There is mild dilatation of the aortic root. Systemic Veins: The inferior vena cava appears to be of normal size. There is IVC inspiratory collapse greater than 50%. In comparison to the previous echocardiogram(s): Compared with the prior exam from 4/29/2024 ( [facility]) there are no significant chnages.  CONCLUSIONS:  1. Left ventricular ejection fraction is normal, calculated by Mcintyre's biplane at 59%.  2. There is normal right ventricular global systolic function.  3. RVSP within normal limits.  4. Compared with the prior exam from 4/29/2024 ( [facility]) there are no significant chnages. QUANTITATIVE DATA SUMMARY: 2D MEASUREMENTS:                          Normal Ranges: Ao Root d:     3.60 cm   (2.0-3.7cm) LAs:           3.40 cm   (2.7-4.0cm) IVSd:          0.90 cm   (0.6-1.1cm) LVPWd:         0.80 cm   (0.6-1.1cm) LVIDd:         4.50 cm   (3.9-5.9cm) LVIDs:         3.60 cm LV Mass Index: 67.8 g/m2 LV % FS        20.0 % LA VOLUME:                               Normal Ranges: LA Vol A4C:        63.7 ml    (22+/-6mL/m2) LA Vol A2C:        49.6 ml LA Vol BP:          57.6 ml LA Vol Index A4C:  35.1ml/m2 LA Vol Index A2C:  27.3 ml/m2 LA Vol Index BP:   31.8 ml/m2 LA Area A4C:       20.9 cm2 LA Area A2C:       18.0 cm2 LA Major Axis A4C: 5.8 cm LA Major Axis A2C: 5.6 cm LA Volume Index:   31.8 ml/m2 RA VOLUME BY A/L METHOD:                       Normal Ranges: RA Area A4C: 15.7 cm2 AORTA MEASUREMENTS:                      Normal Ranges: Ao Sinus, d: 3.95 cm (2.1-3.5cm) Asc Ao, d:   3.90 cm (2.1-3.4cm) LV SYSTOLIC FUNCTION BY 2D PLANIMETRY (MOD):                      Normal Ranges: EF-A4C View:    54 % (>=55%) EF-A2C View:    66 % EF-Biplane:     59 % LV EF Reported: 59 % LV DIASTOLIC FUNCTION:                           Normal Ranges: MV Peak E:    0.91 m/s    (0.7-1.2 m/s) MV Peak A:    0.65 m/s    (0.42-0.7 m/s) E/A Ratio:    1.41        (1.0-2.2) MV e'         0.112 m/s   (>8.0) MV lateral e' 0.12 m/s MV medial e'  0.11 m/s MV A Dur:     111.00 msec E/e' Ratio:   8.15        (<8.0) MV DT:        166 msec    (150-240 msec) MITRAL VALVE:                 Normal Ranges: MV DT: 166 msec (150-240msec) AORTIC VALVE:                         Normal Ranges: AoV Vmax:      1.34 m/s (<=1.7m/s) AoV Peak P.2 mmHg (<20mmHg) LVOT Max Wyatt:  1.23 m/s (<=1.1m/s) LVOT VTI:      24.20 cm LVOT Diameter: 2.50 cm  (1.8-2.4cm) AoV Area,Vmax: 4.51 cm2 (2.5-4.5cm2)  RIGHT VENTRICLE: RV Basal 4.20 cm RV Mid   2.80 cm RV Major 7.8 cm TAPSE:   29.5 mm RV s'    0.16 m/s TRICUSPID VALVE/RVSP:                             Normal Ranges: Peak TR Velocity: 2.29 m/s RV Syst Pressure: 24.0 mmHg (< 30mmHg) IVC Diam:         1.71 cm PULMONIC VALVE:                      Normal Ranges: PV Max Wyatt: 1.0 m/s  (0.6-0.9m/s) PV Max PG:  3.9 mmHg  [id number] Jenelle Floyd MD Electronically signed on 2024 at 10:56:07 AM  ** Final **     Transthoracic echo (TTE) complete    Result Date: 2024   [facility], [address] [address]              Tel [phone] and Fax [phone]  TRANSTHORACIC ECHOCARDIOGRAM REPORT  Patient Name:      DANNY SOTO       Reading Physician:    [id number] Adrián Lagos MD Study Date:        4/29/2024            Ordering Provider:    [id number] ESTEFANIA LIPSCOMB MRN/PID:           [id number]             Fellow: Accession#:        [id number]         Nurse: Date of Birth/Age: 1960 / 64 years Sonographer:          Jayna Avalos RDCS Gender:            M                    Additional Staff: Height:            165.10 cm            Admit Date: Weight:            75.30 kg             Admission Status:     Outpatient BSA / BMI:         1.83 m2 / 27.62      Encounter#:           [id number]                    kg/m2                                         Department Location:  [facility] Blood Pressure: 139 /70 mmHg Study Type:    TRANSTHORACIC ECHO (TTE) COMPLETE Diagnosis/ICD: Encounter for other preprocedural examination-Z01.818 Indication:    Pre chemotherapy, Myelodysplastic syndrome CPT Code:      Echo Complete w Full Doppler-[id number] Patient History: Smoker:            Former. Pertinent History: HTN; Myelodysplastic neoplasm/ AML; Hx of hep C; Marijuana                    use. Study Detail: The following Echo studies were performed: 2D, M-Mode, Doppler and               color flow.  PHYSICIAN INTERPRETATION: Left Ventricle: The left ventricular systolic function is normal, with an estimated ejection fraction of 60-65%. The calculated ejection fraction is normal at 68 % using the Mcintyre's Bi-plane MOD calculation. There are no regional wall motion abnormalities. The left ventricular cavity size is normal. Spectral Doppler shows a normal pattern of left ventricular diastolic filling. Left Atrium: The left atrium is normal in size. Right Ventricle: The right ventricle is normal in size.  There is normal right ventriclar wall thickness. There is normal right ventricular global systolic function. Right Atrium: The right atrium is normal in size. Aortic Valve: The aortic valve appears structurally normal. The aortic valve appears tricuspid and non-restricted. There is no evidence of aortic valve regurgitation. The peak instantaneous gradient of the aortic valve is 7.2 mmHg. The mean gradient of the aortic valve is 3.2 mmHg. Mitral Valve: The mitral valve is normal in structure. There is normal mitral valve leaflet mobility. There is no evidence of mitral valve regurgitation. Tricuspid Valve: The tricuspid valve is structurally normal. There is normal tricuspid valve leaflet mobility. There is trace tricuspid regurgitation. Pulmonic Valve: The pulmonic valve is structurally normal. There is trace to mild pulmonic valve regurgitation. Pericardium: There is no pericardial effusion noted. Aorta: The aortic root is normal. The Ao Sinus is 3.90 cm. The Asc Ao is 3.50 cm. There is upper limits of normal dilatation of the ascending aorta. The aortic root is at the upper limits of normal size. Systemic Veins: The inferior vena cava appears to be of normal size. There is IVC inspiratory collapse greater than 50%.  CONCLUSIONS:  1. Left ventricular systolic function is normal with a 60-65% estimated ejection fraction. QUANTITATIVE DATA SUMMARY: 2D MEASUREMENTS:                          Normal Ranges: IVSd:          0.88 cm   (0.6-1.1cm) LVPWd:         0.94 cm   (0.6-1.1cm) LVIDd:         3.84 cm   (3.9-5.9cm) LVIDs:         2.58 cm LV Mass Index: 56.7 g/m2 LV % FS        32.7 % LA VOLUME:                               Normal Ranges: LA Vol A4C:        54.0 ml    (22+/-6mL/m2) LA Vol A2C:        54.7 ml LA Vol BP:         56.4 ml LA Vol Index A4C:  29.5 ml/m2 LA Vol Index A2C:  29.9 ml/m2 LA Vol Index BP:   30.9 ml/m2 LA Area A4C:       18.0 cm2 LA Area A2C:       18.8 cm2 LA Major Axis A4C: 5.1 cm LA Major Axis  A2C: 5.5 cm LA Vol A4C:        47.3 ml LA Vol A2C:        85.6 ml RA VOLUME BY A/L METHOD:                               Normal Ranges: RA Vol A4C:        42.6 ml    (8.3-19.5ml) RA Vol Index A4C:  23.3 ml/m2 RA Area A4C:       16.6 cm2 RA Major Axis A4C: 5.5 cm M-MODE MEASUREMENTS:                  Normal Ranges: Ao Root: 3.70 cm (2.0-3.7cm) LAs:     3.83 cm (2.7-4.0cm) AORTA MEASUREMENTS:                      Normal Ranges: Ao Sinus, d: 3.90 cm (2.1-3.5cm) Asc Ao, d:   3.50 cm (2.1-3.4cm) LV SYSTOLIC FUNCTION BY 2D PLANIMETRY (MOD):                     Normal Ranges: EF-A4C View: 58.9 % (>=55%) EF-A2C View: 75.0 % EF-Biplane:  67.6 % LV DIASTOLIC FUNCTION:                          Normal Ranges: MV Peak E:    0.89 m/s   (0.7-1.2 m/s) MV Peak A:    0.66 m/s   (0.42-0.7 m/s) E/A Ratio:    1.35       (1.0-2.2) MV e'         0.11 m/s   (>8.0) MV lateral e' 0.11 m/s MV medial e'  0.11 m/s MV A Dur:     99.90 msec E/e' Ratio:   8.13       (<8.0) MITRAL VALVE:                 Normal Ranges: MV DT: 163 msec (150-240msec) AORTIC VALVE:                                   Normal Ranges: AoV Vmax:                1.34 m/s (<=1.7m/s) AoV Peak P.2 mmHg (<20mmHg) AoV Mean PG:             3.2 mmHg (1.7-11.5mmHg) LVOT Max Wyatt:            1.21 m/s (<=1.1m/s) AoV VTI:                 21.97 cm (18-25cm) LVOT VTI:                22.69 cm LVOT Diameter:           2.35 cm  (1.8-2.4cm) AoV Area, VTI:           4.48 cm2 (2.5-5.5cm2) AoV Area,Vmax:           3.93 cm2 (2.5-4.5cm2) AoV Dimensionless Index: 1.03  RIGHT VENTRICLE: RV Basal 3.70 cm RV Mid   2.80 cm RV Major 7.9 cm TAPSE:   29.7 mm RV s'    0.21 m/s TRICUSPID VALVE/RVSP:                   Normal Ranges: IVC Diam: 1.70 cm PULMONIC VALVE:                         Normal Ranges: PV Accel Time: 111 msec (>120ms) PV Max Wyatt:    0.9 m/s  (0.6-0.9m/s) PV Max PG:     3.6 mmHg  [id number] Adrián Lagos MD Electronically signed on 2024 at 6:19:09 PM  ** Final **      CT scan  reviewed - did not show or mention any aortic dilation    Radiology:     No orders to display     ASSESSMENT     Problem List Items Addressed This Visit       Essential hypertension    Acute myeloid leukemia in remission (Multi)    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 No signs of any LV dysfunction or cardiac symptoms.   Hypertension - controlled  Ascending aortic dilation - not seen in recent CT scan.     Can follow-up with cardiology on an as-needed basis. Please call if any questions or concerns.

## 2024-12-19 LAB
CMV DNA SERPL NAA+PROBE-LOG IU: ABNORMAL {LOG_IU}/ML
EPO SERPL-ACNC: 113 MU/ML (ref 4–27)
LABORATORY COMMENT REPORT: ABNORMAL

## 2024-12-19 NOTE — PROGRESS NOTES
Patient ID: Brandt Merritt is a 64 y.o. male.  Primary Oncologist: Dr. Newsome     ASSESSMENT & PLAN     Oncology History Overview Note   4/2024  Myelodysplastic neoplasm with increased blasts-2 ( WHO)  Myelodysplastic neoplasm/AML  (ICC 2022 classification)    Presented in  10/2023 for pancytopenia, found to have hemolysis. Patient had normal CBC June 2020. Denied any hemorrhoidal bleeding . Has had C Scope and EGD , treated Hep C 2014 . 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      CBC 4/11/24 wbc 3.0, hgb 7.1, platelets 167K ANC 0.73    BM biopsy done on 4/11/24  as folllows:  BM histology  Myelodysplastic neoplasm with increased blasts-2 ( WHO)  Myelodysplastic neoplasm/AML  (ICC 2022 classification)  Balsts 11% on aspirate smear and 15-20% based on CD 34 immunostaining approaching AML leukemia, hematooiesis is erythroid dominant with dysplasia in granulocytes and megakaryocytes.   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      11/6/2024 -  Bone Marrow Transplant    conditioned with Flu/Cy/TBI and aGVHD prophylaxis with post-transplant cyclophosphamide, tacrolimus, and mycophenolate. T=0, 11/6/24. ABO Donor/Recipient: O+, A+. CMV donor/recipient: both positive. Started Tacro and MMF on T+5 (11/11).        12/20/24:  Diarrhea x 1 this am. Instructed to call if diarrhea continues.  Recent epo level (12/180 was 113.  Hgb up to 9.2 today  Assessment &  Plan  Acute myeloid leukemia in remission (Multi)    Acute myeloid leukemia in remission (Multi)  Diagnosed 4/2024: BM Bx with MDS in in transition to AML (>10% blast) w/ trisomy 8, DNMT alpha, and U2AF1 mutation indication adverse risk.      s/p 4 cycles of Decitabine/Venetoclax. BM Bx 6/17/24: Blasts cleared, FISH still positive for trisomy 8, still MDS changes   BMBx (9/5/24): 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 and no increase in blasts      History of allogeneic hematopoietic stem cell transplant  #1: Single-unit Cord, T0=9/19/24, Primary Engraftment Failure  - Conditioning: Flu-Mariana-TBI  - Donor: Single Cord, 6/8  = ABO Donor / Recipient: A+ / A+  = CMV Donor / Recipient: - / +  - aGVHD Prophylaxis: Tacrolimus + MMF  - engraftment failure, developed HLA antibody against class I of cord  - Repeat BMBx (10/15): hypocellular with no residual myeloid neoplasm. 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), T0=11/6/24  - Graft: Haploidentical sister  = ABO Donor / Recipient: O+ / A+ (ABO incompatibility +)  = CMV Donor / Recipient: + / +  - Conditioning: Flu/Cy/TBI/rATG  = GVHD prophylaxis -  rATG 1.5mg/kg T-5,-3,-1, PTCy (25mg/kg T+3, T+4), Tacro, MMF (T+5)     DATE DAY SOURCE MORPHOLOGY MRD WHOLE CHIMERISM   (% donor) CD3 CHIMERISM   (% donor) CD33 CHIMERISM   (% donor)   11/20/24 D+30 PB     100%       12/9/24 D+34 from haplo PB   100%     1/9/25-postponed D+30 BM               D+60 PB               D+100 PB               D+100 BM               Monitor for post-transplant hemolysis   12/18/24.  Haptoglobin 177 12/13/24  UPC ratio 0.1 12/18/24     GVHD prophylaxis  GVHD  Anorexia/poor oral intake/weight loss. Added Mirtazapine. Continue Zofran. If persists, consider aGVHD.  12/9/24:  Currently taking tacrolimus 0.5 mg BID, held dose today prior to labs.  FK level 4.6 (12/9/24).  Advised patient on 12/10/24 to increase tacrolimus dose to 1 mg in the AM and 0.5  mg in the PM.    Wean MMF from 1 g TID to 500 mg TID 12/2/24 - present  Due to poor appetite will decrease MMF to 500 mg po tid.  Improvement to GI symptoms since decreasing MMF dose.    Stopped  MMF on T+35,   12/16/24. Current dose of tacro 1 mg po bid,  level 2.3, increase tacro to 1.5 mg po bid, pateint contacted by Radha Leal.  12/28/24/ Tacro level 3.5 on 1.5 mg BID. Increase dose to 2 mg BID  4 kg weight loss since 12/16.  Denies changes in appetite or food intake.  No GI losses reported (except 1 episode diarrhea on arrival to clinic today).  Monitor closely     Weights  Pre-transplant: 9/9 74.2kg   Upon SCT discharge: 11/25 60.9kg  Weight: 62.7 kg (12/20/24).      Infectious Disease & Immune Reconstitution      Prophylaxis  Antiviral prophylaxis: acyclovir, Letermovir  Antifungal: posaconazole  PCP prophylaxis: 10/26/24 - 11/28/24 Pentamidine; Bactrim 11/29 - present     Hypogammaglobulinemia  IgG level. IVIG for level <400   IgG 1230 11/19/24, monitoring monthly.      EBV Viremia during transplant admission  Treated despite low levels due to upcoming second SCT  Rituximab 600 mg x 1 (10/31/24)     Immunizations  Covid vaccine series   Seasonal influenza vaccine   Will plan to begin immunizations at T+6mths ### depending on degree of immunosuppression      Infectious Disease follow up evaluation: Requested/not yet scheduled     Immunodeficiency panel 100 days, 6mos and 1 year.      Immunizations:   Covid vaccine series. Consider at T+ 3 months  Seasonal influenza vaccine. Consider at T+ 3 month  Will plan to begin immunizations at T+6mths (May 2025) depending on degree of immunosuppression     Cardiac:    Essential hypertension  Continue nifedipine 60 mEq ER 24 hr tablet   Cardiology 12/18 and ECHO 12/23     Ascending aorta dilation   TTE (4/29/24): 3.8-4 cm dilation      Hx of STEMI (11/11/2020) has cardiology folllowup 12/18/24     Electrolyte disturbance   Continue oral magnesium TID and potassium  BID  Ongoing needs for parenteral magnesium.       Lack of appetite  Mirtazapine 15 mg at night as of   Reports appetite level is improving.  Continue mirtazapine.  Monitoring.     Loose Stools  PRN imodium     SUBJECTIVE     Days since transplant: 92 Allo (24  44 Haplo (24)    Patient presents today, accompanied by his wife, for follow up.  Reports feeling good today.  Yesterday he didn't feel well and slept a lot.  Denies fevers.  He had 1 episode of diarrhea this morning, but had not had diarrhea in several days prior. Appetite is good and he is drinking water and sports drinks.      Review of Systems   Constitutional:  Negative for chills, fever and unexpected weight change.   HENT:   Negative for mouth sores, nosebleeds and trouble swallowing.    Eyes:  Negative for eye problems.   Respiratory:  Negative for cough, hemoptysis and shortness of breath.    Cardiovascular:  Negative for chest pain, leg swelling and palpitations.   Gastrointestinal:  Positive for diarrhea. Negative for blood in stool, nausea and vomiting.   Genitourinary:  Negative for dysuria and hematuria.    Musculoskeletal:  Negative for myalgias.   Skin:  Negative for itching and rash.   Neurological:  Negative for headaches and numbness.     Past Medical History:   Diagnosis Date    Chest pain 2021    HTN (hypertension) 10/05/2023    Hypertension     Personal history of other diseases of the circulatory system     History of hypertension     Social History     Socioeconomic History    Marital status:      Spouse name: Not on file    Number of children: Not on file    Years of education: Not on file    Highest education level: Not on file   Occupational History    Not on file   Tobacco Use    Smoking status: Former     Current packs/day: 0.00     Average packs/day: 0.3 packs/day for 28.0 years (7.0 ttl pk-yrs)     Types: Cigarettes     Start date: 1988     Quit date: 2016     Years since quittin.9     Smokeless tobacco: Never   Vaping Use    Vaping status: Never Used   Substance and Sexual Activity    Alcohol use: Not Currently     Comment: occansionally    Drug use: Yes     Types: Marijuana     Comment: quit when diagnosed    Sexual activity: Not on file   Other Topics Concern    Not on file   Social History Narrative    Not on file     Social Drivers of Health     Financial Resource Strain: Low Risk  (11/21/2024)    Overall Financial Resource Strain (CARDIA)     Difficulty of Paying Living Expenses: Not very hard   Food Insecurity: Patient Declined (9/29/2024)    Hunger Vital Sign     Worried About Running Out of Food in the Last Year: Patient declined     Ran Out of Food in the Last Year: Patient declined   Transportation Needs: No Transportation Needs (11/21/2024)    PRAPARE - Transportation     Lack of Transportation (Medical): No     Lack of Transportation (Non-Medical): No   Physical Activity: Not on file   Stress: Not on file   Social Connections: Not on file   Intimate Partner Violence: Not At Risk (9/29/2024)    Humiliation, Afraid, Rape, and Kick questionnaire     Fear of Current or Ex-Partner: No     Emotionally Abused: No     Physically Abused: No     Sexually Abused: No   Housing Stability: Low Risk  (11/21/2024)    Housing Stability Vital Sign     Unable to Pay for Housing in the Last Year: No     Number of Times Moved in the Last Year: 0     Homeless in the Last Year: No     Past Surgical History:   Procedure Laterality Date    COLONOSCOPY  06/13/2013    Complete Colonoscopy    OTHER SURGICAL HISTORY  10/07/2015    Surgery Testis Reduction Of Torsion Of Testis Right       OBJECTIVE     Physical Exam  Constitutional:       Appearance: Normal appearance.   HENT:      Head: Normocephalic.   Eyes:      Pupils: Pupils are equal, round, and reactive to light.   Cardiovascular:      Rate and Rhythm: Normal rate and regular rhythm.   Pulmonary:      Effort: Pulmonary effort is normal.      Breath sounds:  "Normal breath sounds.   Abdominal:      General: Bowel sounds are normal.      Palpations: Abdomen is soft.   Musculoskeletal:         General: Normal range of motion.      Cervical back: Normal range of motion and neck supple.   Lymphadenopathy:      Comments: No lymphadenopathy   Skin:     General: Skin is warm and dry.      Findings: No lesion or rash.   Neurological:      General: No focal deficit present.      Mental Status: He is alert and oriented to person, place, and time. Mental status is at baseline.      Comments: No numbness or tingling   Psychiatric:         Mood and Affect: Mood normal.       Performance Status:  Karnofsky Score: 80 - Normal activity with effort; some signs or symptoms of disease      POST TRANSPLANT MONITORING   GVHD  Acute GVHD Overall stGstrstastdstest:st st1st Viral Monitoring:     CMV   Lab Results   Component Value Date    CMVDNAPCR <35 Detected (A) 12/18/2024    CMVDNAPCR Not Detected 12/16/2024    CMVDNAPCR <35 Detected (A) 12/13/2024      EBV   Lab Results   Component Value Date    EBVDNAPCR Not Detected 12/11/2024    EBVDNAPCR Not Detected 12/04/2024    EBVDNAPCR Not Detected 11/29/2024      Adenovirus   Lab Results   Component Value Date    ADEPB Not Detected 12/11/2024    ADEPB Not Detected 12/04/2024    ADEPB Not Detected 11/27/2024       Toxo   No results found for: \"TGONDPCRBL\"      TMA Monitoring:     MARKERS RECENT 3 VALUES   LDH Lab Results   Component Value Date     12/18/2024     12/16/2024     12/11/2024      Haptoglobin Lab Results   Component Value Date    HAPTOGLOBIN 177 12/13/2024    HAPTOGLOBIN 321 (H) 12/04/2024    HAPTOGLOBIN 308 (H) 11/27/2024      BP BP Readings from Last 3 Encounters:   12/20/24 134/81   12/18/24 132/82   12/18/24 140/83      Serum Creatinine Lab Results   Component Value Date    CREATININE 0.98 12/20/2024    CREATININE 0.85 12/18/2024    CREATININE 0.82 12/16/2024      Urine Protein/Creat Ratio Lab Results   Component Value Date "    UTPCR 0.10 12/18/2024    UTPCR 0.11 12/11/2024    UTPCR 0.18 (H) 12/04/2024      Tacrolimus Level Lab Results   Component Value Date    TACROLIMUS 3.5 12/20/2024    TACROLIMUS 3.0 12/18/2024    TACROLIMUS 2.3 12/16/2024        RTC:  12/23 with ECHO, post BMT and infusion  12/26: post BMT and infusion  12/30: GRICELDA Terry and infusion  1/2, 1/6, 1/9: Post BMT and infusion  1/10/25: ID appt     Postpone bone marrow biopsy until 1/9/25.     Terri Avila APRN-CNP

## 2024-12-20 ENCOUNTER — INFUSION (OUTPATIENT)
Dept: HEMATOLOGY/ONCOLOGY | Facility: HOSPITAL | Age: 64
End: 2024-12-20
Payer: COMMERCIAL

## 2024-12-20 ENCOUNTER — TELEPHONE (OUTPATIENT)
Dept: HEMATOLOGY/ONCOLOGY | Facility: HOSPITAL | Age: 64
End: 2024-12-20

## 2024-12-20 ENCOUNTER — OFFICE VISIT (OUTPATIENT)
Dept: HEMATOLOGY/ONCOLOGY | Facility: HOSPITAL | Age: 64
End: 2024-12-20
Payer: COMMERCIAL

## 2024-12-20 VITALS
SYSTOLIC BLOOD PRESSURE: 134 MMHG | WEIGHT: 138.23 LBS | OXYGEN SATURATION: 99 % | TEMPERATURE: 99.5 F | DIASTOLIC BLOOD PRESSURE: 81 MMHG | RESPIRATION RATE: 17 BRPM | HEART RATE: 102 BPM | BODY MASS INDEX: 22.64 KG/M2

## 2024-12-20 DIAGNOSIS — Z94.84 HISTORY OF ALLOGENEIC HEMATOPOIETIC STEM CELL TRANSPLANT: ICD-10-CM

## 2024-12-20 DIAGNOSIS — C92.01 ACUTE MYELOID LEUKEMIA IN REMISSION (MULTI): ICD-10-CM

## 2024-12-20 DIAGNOSIS — C92.00 ACUTE MYELOID LEUKEMIA NOT HAVING ACHIEVED REMISSION (MULTI): ICD-10-CM

## 2024-12-20 LAB
ALBUMIN SERPL BCP-MCNC: 3.9 G/DL (ref 3.4–5)
ALP SERPL-CCNC: 75 U/L (ref 33–136)
ALT SERPL W P-5'-P-CCNC: 8 U/L (ref 10–52)
ANION GAP SERPL CALC-SCNC: 15 MMOL/L (ref 10–20)
AST SERPL W P-5'-P-CCNC: 13 U/L (ref 9–39)
BASOPHILS # BLD AUTO: 0.05 X10*3/UL (ref 0–0.1)
BASOPHILS NFR BLD AUTO: 0.7 %
BILIRUB SERPL-MCNC: 0.7 MG/DL (ref 0–1.2)
BUN SERPL-MCNC: 8 MG/DL (ref 6–23)
BURR CELLS BLD QL SMEAR: NORMAL
CALCIUM SERPL-MCNC: 9 MG/DL (ref 8.6–10.3)
CHLORIDE SERPL-SCNC: 105 MMOL/L (ref 98–107)
CO2 SERPL-SCNC: 23 MMOL/L (ref 21–32)
CREAT SERPL-MCNC: 0.98 MG/DL (ref 0.5–1.3)
DACRYOCYTES BLD QL SMEAR: NORMAL
EGFRCR SERPLBLD CKD-EPI 2021: 85 ML/MIN/1.73M*2
EOSINOPHIL # BLD AUTO: 0.46 X10*3/UL (ref 0–0.7)
EOSINOPHIL NFR BLD AUTO: 6.2 %
ERYTHROCYTE [DISTWIDTH] IN BLOOD BY AUTOMATED COUNT: 20.6 % (ref 11.5–14.5)
GLUCOSE SERPL-MCNC: 103 MG/DL (ref 74–99)
HCT VFR BLD AUTO: 29.8 % (ref 41–52)
HGB BLD-MCNC: 9.2 G/DL (ref 13.5–17.5)
HYPOCHROMIA BLD QL SMEAR: NORMAL
IGG SERPL-MCNC: 1030 MG/DL (ref 700–1600)
IMM GRANULOCYTES # BLD AUTO: 0.03 X10*3/UL (ref 0–0.7)
IMM GRANULOCYTES NFR BLD AUTO: 0.4 % (ref 0–0.9)
LYMPHOCYTES # BLD AUTO: 1.07 X10*3/UL (ref 1.2–4.8)
LYMPHOCYTES NFR BLD AUTO: 14.4 %
MAGNESIUM SERPL-MCNC: 1.65 MG/DL (ref 1.6–2.4)
MCH RBC QN AUTO: 29.4 PG (ref 26–34)
MCHC RBC AUTO-ENTMCNC: 30.9 G/DL (ref 32–36)
MCV RBC AUTO: 95 FL (ref 80–100)
MONOCYTES # BLD AUTO: 0.95 X10*3/UL (ref 0.1–1)
MONOCYTES NFR BLD AUTO: 12.8 %
NEUTROPHILS # BLD AUTO: 4.88 X10*3/UL (ref 1.2–7.7)
NEUTROPHILS NFR BLD AUTO: 65.5 %
NRBC BLD-RTO: 0 /100 WBCS (ref 0–0)
OVALOCYTES BLD QL SMEAR: NORMAL
PLATELET # BLD AUTO: 196 X10*3/UL (ref 150–450)
PLATELET CLUMP BLD QL SMEAR: PRESENT
POLYCHROMASIA BLD QL SMEAR: NORMAL
POTASSIUM SERPL-SCNC: 3.9 MMOL/L (ref 3.5–5.3)
PROT SERPL-MCNC: 6.7 G/DL (ref 6.4–8.2)
RBC # BLD AUTO: 3.13 X10*6/UL (ref 4.5–5.9)
RBC MORPH BLD: NORMAL
SCHISTOCYTES BLD QL SMEAR: NORMAL
SODIUM SERPL-SCNC: 139 MMOL/L (ref 136–145)
TACROLIMUS BLD-MCNC: 3.5 NG/ML
WBC # BLD AUTO: 7.4 X10*3/UL (ref 4.4–11.3)

## 2024-12-20 PROCEDURE — 83735 ASSAY OF MAGNESIUM: CPT

## 2024-12-20 PROCEDURE — 80197 ASSAY OF TACROLIMUS: CPT

## 2024-12-20 PROCEDURE — 96523 IRRIG DRUG DELIVERY DEVICE: CPT

## 2024-12-20 PROCEDURE — 85025 COMPLETE CBC W/AUTO DIFF WBC: CPT

## 2024-12-20 PROCEDURE — 82565 ASSAY OF CREATININE: CPT

## 2024-12-20 PROCEDURE — 99215 OFFICE O/P EST HI 40 MIN: CPT | Performed by: NURSE PRACTITIONER

## 2024-12-20 RX ORDER — TACROLIMUS 1 MG/1
2 CAPSULE ORAL EVERY 12 HOURS
Qty: 120 CAPSULE | Refills: 1 | Status: SHIPPED | OUTPATIENT
Start: 2024-12-20

## 2024-12-20 ASSESSMENT — ENCOUNTER SYMPTOMS
MYALGIAS: 0
NUMBNESS: 0
CHILLS: 0
EYE PROBLEMS: 0
DIARRHEA: 1
BLOOD IN STOOL: 0
VOMITING: 0
DYSURIA: 0
UNEXPECTED WEIGHT CHANGE: 0
FEVER: 0
TROUBLE SWALLOWING: 0
SHORTNESS OF BREATH: 0
COUGH: 0
NAUSEA: 0
HEMATURIA: 0
HEMOPTYSIS: 0
HEADACHES: 0
PALPITATIONS: 0
LEG SWELLING: 0

## 2024-12-20 ASSESSMENT — PAIN SCALES - GENERAL: PAINLEVEL_OUTOF10: 0-NO PAIN

## 2024-12-20 NOTE — PROGRESS NOTES
Pt arrived to outpatient infusion for count check. No transfusion needs today. Pt seen by Terri RICARDO CNP. Pt advised to reach out if diarrhea continues, pt verbalize understanding. Pt discharged in stable condition and ambulated off unit accompanied by wife.

## 2024-12-20 NOTE — TELEPHONE ENCOUNTER
TACROLIMUS DOSE RECOMMENDATION NOTE   Brandt Merritt is a 64 y.o. male currently day +44 following haplo-identical allogeneic stem cell transplant for a diagnosis of AML. Patient on tacrolimus for GVHD prophylaxis with level resulting today. Pharmacist was consulted to provide tacrolimus dose recommendations.     Objective   Creatinine   Date Value Ref Range Status   12/20/2024 0.98 0.50 - 1.30 mg/dL Final   12/18/2024 0.85 0.50 - 1.30 mg/dL Final   12/16/2024 0.82 0.50 - 1.30 mg/dL Final     Bilirubin, Total   Date Value Ref Range Status   12/20/2024 0.7 0.0 - 1.2 mg/dL Final   12/18/2024 0.4 0.0 - 1.2 mg/dL Final   12/16/2024 0.3 0.0 - 1.2 mg/dL Final     Tacrolimus    Date Value Ref Range Status   12/20/2024 3.5 <=15.0 ng/mL Final   12/18/2024 3.0 <=15.0 ng/mL Final   12/16/2024 2.3 <=15.0 ng/mL Final       Assessment  Current tacrolimus dose: 1.5 mg every 12 hours  Goal tacrolimus level: 5-10 ng/mL    Plan   The patient's tacrolimus level today was 3.5 which is Subtherapeutic. I recommended a 33% dose Increase: tacrolimus 2 mg every 12 hours. I spoke with the patient/caregiver via phone and instructed them to adjust their tacrolimus dose. Recommendation accepted, continue to monitor tacrolimus levels to assess adjustment.    All questions were answered. Patient/family verbalized understanding of the plan of care and information provided. Will follow as necessary. Time spent with patient/family and/or coordinating care: 15 minutes.      Radha Cruz, PharmD, [facility]  Ambulatory Stem Cell Transplant Transplant Pharmacist    Current Outpatient Medications   Medication Instructions    acyclovir (ZOVIRAX) 400 mg, oral, 2 times daily    ergocalciferol (VITAMIN D2) 1,250 mcg, oral, Weekly    folic acid (FOLVITE) 1 mg, oral, Daily    letermovir (PREVYMIS) 480 mg, oral, Daily    Mag 64 128 mg, oral, 3 times daily, Or as instructed on your medication list.    mirtazapine (REMERON) 15 mg, oral, Nightly    ondansetron  (ZOFRAN) 8 mg, oral, Every 8 hours    Oyster Shell Calcium 500 1,250 mg, oral, 3 times daily (morning, midday, late afternoon)    posaconazole (NOXAFIL) 300 mg, oral, Daily, Do not crush, chew, or split. This is to prevent fungal infections.    potassium chloride ER (Micro-K) 10 mEq ER capsule 20 mEq, oral, 2 times daily, Do not crush or chew.    prochlorperazine (COMPAZINE) 10 mg, oral, Every 6 hours PRN    sennosides-docusate sodium (Karissa-Colace) 8.6-50 mg tablet 1 tablet, oral, Daily    sulfamethoxazole-trimethoprim (Bactrim DS) 800-160 mg tablet 1 tablet, oral, 3 times weekly, Take on Mondays, Wednesdays, and Fridays. Do not start until instructed.    tacrolimus (PROGRAF) 1 mg, oral, Every 12 hours, Or as instructed on your medication list.    ursodiol (ACTIGALL) 300 mg, oral, 3 times daily

## 2024-12-21 NOTE — RADIATION COMPLETION NOTES
Radiation Oncology Treatment Summary    Patient Name:  Brandt Merritt  MRN:  [id number]  :  1960    Referring Provider: No ref. provider found  Primary Care Provider: Bill Richmond MD    Brief History: Brandt Merritt is a 64 y.o. male with Myelodysplastic neoplasm/AML s/p 4 cycles of Decitabine and venetoclax. He underwent Allogenic Stem Cell Transplant and will receive total body irradiation 200 cGy/ single fraction as part of the conditioning regimen on 2024.  He had primary engraftment failure confirmed by bone marrow biopsy on 10/15 showing 1% donor chimerism and hypocellularity without myeloid neoplasm. Despite tacrolimus and MMF for GVHD prophylaxis, there were no signs of acute GVHD, and MMF was discontinued on 10/11 due to myelosuppression concerns. Tacrolimus was stopped on 10/29 in preparation for a planned haploidentical transplant from the patient’s sister. The patient completed radiotherapy as outlined below.    Radiation Treatment Summary:    Electron Beam: Not Applicable Total body, Not Applicable Total body (Resolved)    Treatment Period Technique Fraction Dose Fractions Total Dose   Course 2 2024-2024  (days elapsed: 48)         TBI-2 2024-2024 Opposed Laterals 200 / 200 cGy  200 / 200 cGy     Please see the patient's Mosaiq chart for further details regarding the radiation plan, including beam energy.    Concurrent Chemotherapy:  Per BMT regimen    CTCAE Toxicity Overview:   Toxicity Assessment          2024    10:00   Toxicity Assessment   Adverse Events Reviewed (WDL) No (Exceptions to WDL)   Treatment Site General   Anorexia Grade 0   Anxiety Grade 0   Dehydration Grade 0   Diarrhea Grade 0   Fatigue Grade 0   Nausea Grade 0   Pain Grade 0   Vomiting Grade 0     Patient Disposition: The patient with follow up as needed in our clinic. The patient is encouraged to contact the clinic with any questions or concerns.       April Reynolds MD, MMM  Senior  Attending Physician, [facility]  Professor, [employer]   Our Capron: “To Heal, To Teach, To Discover.”  RN partner: [phone]/ [email or web address]    Phone (scheduling): [phone]/ [email or web address]  Proton Therapy (scheduling): [phone]/ [email or web address]  Phone (after hours): [phone]

## 2024-12-23 ENCOUNTER — HOSPITAL ENCOUNTER (OUTPATIENT)
Dept: CARDIOLOGY | Facility: HOSPITAL | Age: 64
Discharge: HOME | End: 2024-12-23
Payer: COMMERCIAL

## 2024-12-23 ENCOUNTER — APPOINTMENT (OUTPATIENT)
Dept: CARDIOLOGY | Facility: HOSPITAL | Age: 64
End: 2024-12-23
Payer: COMMERCIAL

## 2024-12-23 ENCOUNTER — INFUSION (OUTPATIENT)
Dept: HEMATOLOGY/ONCOLOGY | Facility: HOSPITAL | Age: 64
End: 2024-12-23
Payer: COMMERCIAL

## 2024-12-23 ENCOUNTER — OFFICE VISIT (OUTPATIENT)
Dept: HEMATOLOGY/ONCOLOGY | Facility: HOSPITAL | Age: 64
End: 2024-12-23
Payer: COMMERCIAL

## 2024-12-23 VITALS
SYSTOLIC BLOOD PRESSURE: 147 MMHG | RESPIRATION RATE: 16 BRPM | OXYGEN SATURATION: 100 % | HEIGHT: 66 IN | HEART RATE: 93 BPM | TEMPERATURE: 99 F | WEIGHT: 134.92 LBS | BODY MASS INDEX: 21.68 KG/M2 | DIASTOLIC BLOOD PRESSURE: 81 MMHG

## 2024-12-23 DIAGNOSIS — C92.01 ACUTE MYELOID LEUKEMIA IN REMISSION (MULTI): ICD-10-CM

## 2024-12-23 DIAGNOSIS — Z94.84 HISTORY OF ALLOGENEIC HEMATOPOIETIC STEM CELL TRANSPLANT: Primary | ICD-10-CM

## 2024-12-23 DIAGNOSIS — Z51.81 ENCOUNTER FOR THERAPEUTIC DRUG LEVEL MONITORING: ICD-10-CM

## 2024-12-23 LAB
ALBUMIN SERPL BCP-MCNC: 3.9 G/DL (ref 3.4–5)
ALP SERPL-CCNC: 73 U/L (ref 33–136)
ALT SERPL W P-5'-P-CCNC: 6 U/L (ref 10–52)
ANION GAP SERPL CALC-SCNC: 13 MMOL/L (ref 10–20)
APPEARANCE UR: CLEAR
AST SERPL W P-5'-P-CCNC: 14 U/L (ref 9–39)
BASOPHILS # BLD AUTO: 0.05 X10*3/UL (ref 0–0.1)
BASOPHILS NFR BLD AUTO: 0.7 %
BILIRUB SERPL-MCNC: 0.9 MG/DL (ref 0–1.2)
BILIRUB UR STRIP.AUTO-MCNC: NEGATIVE MG/DL
BUN SERPL-MCNC: 13 MG/DL (ref 6–23)
CALCIUM SERPL-MCNC: 9 MG/DL (ref 8.6–10.6)
CHLORIDE SERPL-SCNC: 106 MMOL/L (ref 98–107)
CO2 SERPL-SCNC: 22 MMOL/L (ref 21–32)
COLOR UR: YELLOW
CREAT SERPL-MCNC: 1.02 MG/DL (ref 0.5–1.3)
CREAT UR-MCNC: 206.4 MG/DL (ref 20–370)
EBV DNA BLD NAA+PROBE-LOG IU: NORMAL {LOG_IU}/ML
EGFRCR SERPLBLD CKD-EPI 2021: 81 ML/MIN/1.73M*2
EJECTION FRACTION APICAL 4 CHAMBER: 63.5
EJECTION FRACTION: 65 %
EOSINOPHIL # BLD AUTO: 0.38 X10*3/UL (ref 0–0.7)
EOSINOPHIL NFR BLD AUTO: 5.2 %
ERYTHROCYTE [DISTWIDTH] IN BLOOD BY AUTOMATED COUNT: 19.8 % (ref 11.5–14.5)
GLUCOSE SERPL-MCNC: 102 MG/DL (ref 74–99)
GLUCOSE UR STRIP.AUTO-MCNC: NORMAL MG/DL
HAPTOGLOB SERPL NEPH-MCNC: 170 MG/DL (ref 30–200)
HCT VFR BLD AUTO: 30.4 % (ref 41–52)
HGB BLD-MCNC: 9.5 G/DL (ref 13.5–17.5)
HYALINE CASTS #/AREA URNS AUTO: ABNORMAL /LPF
IMM GRANULOCYTES # BLD AUTO: 0.03 X10*3/UL (ref 0–0.7)
IMM GRANULOCYTES NFR BLD AUTO: 0.4 % (ref 0–0.9)
KETONES UR STRIP.AUTO-MCNC: NEGATIVE MG/DL
LABORATORY COMMENT REPORT: NOT DETECTED
LDH SERPL L TO P-CCNC: 191 U/L (ref 84–246)
LEFT ATRIUM VOLUME AREA LENGTH INDEX BSA: 18.1 ML/M2
LEFT VENTRICLE INTERNAL DIMENSION DIASTOLE: 4.4 CM (ref 3.5–6)
LEFT VENTRICULAR OUTFLOW TRACT DIAMETER: 2.3 CM
LEUKOCYTE ESTERASE UR QL STRIP.AUTO: NEGATIVE
LYMPHOCYTES # BLD AUTO: 1.16 X10*3/UL (ref 1.2–4.8)
LYMPHOCYTES NFR BLD AUTO: 15.8 %
MAGNESIUM SERPL-MCNC: 1.8 MG/DL (ref 1.6–2.4)
MCH RBC QN AUTO: 29.8 PG (ref 26–34)
MCHC RBC AUTO-ENTMCNC: 31.3 G/DL (ref 32–36)
MCV RBC AUTO: 95 FL (ref 80–100)
MITRAL VALVE E/A RATIO: 1.11
MONOCYTES # BLD AUTO: 1.29 X10*3/UL (ref 0.1–1)
MONOCYTES NFR BLD AUTO: 17.5 %
MUCOUS THREADS #/AREA URNS AUTO: ABNORMAL /LPF
NEUTROPHILS # BLD AUTO: 4.45 X10*3/UL (ref 1.2–7.7)
NEUTROPHILS NFR BLD AUTO: 60.4 %
NITRITE UR QL STRIP.AUTO: NEGATIVE
NRBC BLD-RTO: 0 /100 WBCS (ref 0–0)
PH UR STRIP.AUTO: 5.5 [PH]
PLATELET # BLD AUTO: 159 X10*3/UL (ref 150–450)
POTASSIUM SERPL-SCNC: 3.7 MMOL/L (ref 3.5–5.3)
PROT SERPL-MCNC: 6.7 G/DL (ref 6.4–8.2)
PROT UR STRIP.AUTO-MCNC: NORMAL MG/DL
PROT UR-ACNC: 23 MG/DL (ref 5–25)
PROT/CREAT UR: 0.11 MG/MG CREAT (ref 0–0.17)
RBC # BLD AUTO: 3.19 X10*6/UL (ref 4.5–5.9)
RBC # UR STRIP.AUTO: NEGATIVE /UL
RBC #/AREA URNS AUTO: ABNORMAL /HPF
RIGHT VENTRICLE FREE WALL PEAK S': 16.7 CM/S
SODIUM SERPL-SCNC: 137 MMOL/L (ref 136–145)
SP GR UR STRIP.AUTO: 1.02
TACROLIMUS BLD-MCNC: 5.4 NG/ML
TRICUSPID ANNULAR PLANE SYSTOLIC EXCURSION: 2.5 CM
URATE SERPL-MCNC: 6.3 MG/DL (ref 4–7.5)
UROBILINOGEN UR STRIP.AUTO-MCNC: NORMAL MG/DL
WBC # BLD AUTO: 7.4 X10*3/UL (ref 4.4–11.3)
WBC #/AREA URNS AUTO: ABNORMAL /HPF

## 2024-12-23 PROCEDURE — 36591 DRAW BLOOD OFF VENOUS DEVICE: CPT

## 2024-12-23 PROCEDURE — 80053 COMPREHEN METABOLIC PANEL: CPT

## 2024-12-23 PROCEDURE — 82570 ASSAY OF URINE CREATININE: CPT

## 2024-12-23 PROCEDURE — 87533 HHV-6 DNA QUANT: CPT

## 2024-12-23 PROCEDURE — 85025 COMPLETE CBC W/AUTO DIFF WBC: CPT

## 2024-12-23 PROCEDURE — 93321 DOPPLER ECHO F-UP/LMTD STD: CPT

## 2024-12-23 PROCEDURE — 83615 LACTATE (LD) (LDH) ENZYME: CPT

## 2024-12-23 PROCEDURE — 87799 DETECT AGENT NOS DNA QUANT: CPT

## 2024-12-23 PROCEDURE — 83735 ASSAY OF MAGNESIUM: CPT

## 2024-12-23 PROCEDURE — 83010 ASSAY OF HAPTOGLOBIN QUANT: CPT

## 2024-12-23 PROCEDURE — 99215 OFFICE O/P EST HI 40 MIN: CPT | Performed by: STUDENT IN AN ORGANIZED HEALTH CARE EDUCATION/TRAINING PROGRAM

## 2024-12-23 PROCEDURE — 80197 ASSAY OF TACROLIMUS: CPT

## 2024-12-23 PROCEDURE — 84550 ASSAY OF BLOOD/URIC ACID: CPT

## 2024-12-23 PROCEDURE — 81001 URINALYSIS AUTO W/SCOPE: CPT

## 2024-12-23 NOTE — ASSESSMENT & PLAN NOTE
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 Despite tacrolimus and MMF for GVHD prophylaxis, there were no signs of acute GVHD, and MMF was discontinued on 10/11 due to myelosuppression concerns. Tacrolimus was stopped on 10/29 in preparation for a planned haploidentical transplant from the patient’s sister, with cell collection completed on 10/30. He was conditioned with Flu/Cy/TBI and aGVHD prophylaxis with post-transplant cyclophosphamide, tacrolimus, and mycophenolate. T=0, 11/6/24. ABO Donor/Recipient: O+, A+. CMV donor/recipient: both positive. Started Tacro and MMF on T+5 (11/11).       The patient experienced episodes of neutropenic fever on 9/25 and 10/12, initially treated with meropenem until a rash prompted a switch to cefepime from 9/27 to 10/10. Diarrhea was the only finding on CT chest/abdomen/pelvis, and blood cultures on 10/22 were negative, leading to a second cefepime course from 10/22 to 10/26.     He was also noted to have EBV viremia, with level up to 375 U/L on 10/28 and was treated despite low levels due to hisupcoming second SCT; he received rituximab 600 mg x 1 (10/31/24).     T= 0 Single cord 9/19/24  Haplo (sister) 11/6/24   CONDITIONING Fludarabine, melphalan, and TBI for cord  Fludarabine, cytoxan/TBI for haplo   DONOR Single  cord  Matched related haplo   SEX MATCH Matched   ABO DONOR O pos   ABO RECIPIENT A pos   GVHD PROPHYLAXIS Mycophenolate and Tacrolimus   GRAFT SOURCE Peripheral blood and Umbilical cord blood   TRIAL(s)           DATE DAY SOURCE MORPHOLOGY MRD WHOLE CHIMERISM   (% donor) CD3 CHIMERISM   (% donor) CD33 CHIMERISM   (% donor)   Requested for 12/9 D+30 PB             12/9/24 D+30 BM               D+60 PB               D+100 PB               D+100 BM               Monitor for post-transplant hemolysis   12/18/24.  Haptoglobin 177 12/13/24  UPC ratio 0.1 12/18/24     GVHD prophylaxis  GVHD  Anorexia/poor oral intake/weight loss. Added Mirtazapine. Continue Zofran. If persists, consider aGVHD.  12/9/24:  Currently taking tacrolimus 0.5 mg BID, held dose today prior to labs.  FK level 4.6 (12/9/24).  Advised patient on 12/10/24 to increase tacrolimus dose to 1 mg in the AM and 0.5 mg in the PM.    Wean MMF from 1 g TID to 500 mg TID 12/2/24 - present  Due to poor appetite will decrease MMF to 500 mg po tid.  Improvement to GI symptoms since decreasing MMF dose.    Stopped  MMF on T+35,   12/23/24. Current dose of tacro 2 mg po bid, pateint contacted by Radha Leal. Tacro level was 5.4 today.  4 kg weight loss since 12/18.  Admits to diarrhea twice daily.   Monitor closely     Weights  Pre-transplant: 9/9 74.2kg   Upon SCT discharge: 11/25 60.9kg  Weight: 61.2kg (12/23/24).       Infectious Disease & Immune Reconstitution      Prophylaxis  Antiviral prophylaxis: acyclovir, Letermovir  Antifungal: posaconazole  PCP prophylaxis: 10/26/24 - 11/28/24 Pentamidine; Bactrim 11/29 - present     Hypogammaglobulinemia  IgG level. IVIG for level <400   IgG 1030 on 12/18, monitoring monthly.       EBV Viremia during transplant admission  Treated despite low levels due to upcoming second SCT  Rituximab 600 mg x 1 (10/31/24)     Immunizations  Covid vaccine series T + 3 months  Seasonal influenza vaccine T + 3 months      Infectious Disease follow up evaluation: 1/10/25     Immunodeficiency panel 100 days, 6mos and 1 year.     Will plan to begin immunizations at T+6mths (May 2025) depending on degree of immunosuppression     Cardiac:    Essential hypertension   Cardiology 12/18 : Saw Dr. Jones and can follow up prn. No signs of LV dysfunction or cardiac symptoms.   ECHO 12/23     Ascending aorta dilation   TTE (4/29/24): 3.8-4 cm dilation      Hx of STEMI (11/11/2020) has cardiology folllowup 12/18/24     Electrolyte disturbance   Continue oral magnesium 128mg TID   Discontinued potassium on 12/21, K+ level on 12/23 was 3.7.      Lack of appetite  Mirtazapine 15 mg at night as of 11/26  Reports appetite level is improving.  Continue mirtazapine.  Monitoring.     Loose Stools  Diarrhea twice daily over last couple of days. Has not been using imodium.   Will order stool pathogen panel + C. Diff and give patient stool kit to collect at home and can drop off at any [facility] location. Patient still staying hydrated and appetite has been slightly decreased but still eating throughout the day. Instructed him to use his PRN imodium.

## 2024-12-23 NOTE — PROGRESS NOTES
Patient seen for count check, no required transfusions today. Seen at chairside for BMT visit. PICC dressing flushed and positive for blood return x2 lumens. Schedule and labs reviewed with patient. Nat CMP machine down, CMP result unable to be reviewed prior to patient discharge. Discharged ambulatory in stable condition.

## 2024-12-23 NOTE — ASSESSMENT & PLAN NOTE
Diagnosed 4/2024: BM Bx with MDS in in transition to AML (>10% blast) w/ trisomy 8, DNMT alpha, and U2AF1 mutation indication adverse risk.      s/p 4 cycles of Decitabine/Venetoclax. BM Bx 6/17/24: Blasts cleared, FISH still positive for trisomy 8, still MDS changes   BMBx (9/5/24): 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 and no increase in blasts

## 2024-12-23 NOTE — PROGRESS NOTES
Patient ID: Brandt Merritt is a 64 y.o. male.    Oncology History Overview Note   4/2024  Myelodysplastic neoplasm with increased blasts-2 ( WHO)  Myelodysplastic neoplasm/AML  (ICC 2022 classification)    Presented in  10/2023 for pancytopenia, found to have hemolysis. Patient had normal CBC June 2020. Denied any hemorrhoidal bleeding . Has had C Scope and EGD , treated Hep C 2014 . 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      CBC 4/11/24 wbc 3.0, hgb 7.1, platelets 167K ANC 0.73    BM biopsy done on 4/11/24  as folllows:  BM histology  Myelodysplastic neoplasm with increased blasts-2 ( WHO)  Myelodysplastic neoplasm/AML  (ICC 2022 classification)  Balsts 11% on aspirate smear and 15-20% based on CD 34 immunostaining approaching AML leukemia, hematooiesis is erythroid dominant with dysplasia in granulocytes and megakaryocytes.   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      11/6/2024 -  Bone Marrow Transplant    conditioned with Flu/Cy/TBI and aGVHD prophylaxis with post-transplant cyclophosphamide, tacrolimus, and mycophenolate. T=0, 11/6/24. ABO Donor/Recipient: O+, A+. CMV donor/recipient: both positive. Started Tacro and MMF on T+5 (11/11).          SUBJECTIVE     HPI  12/23:Brandt Merritt presents today for post transplant follow up, accompanied by his wife. He is providing the history at today's visit. He is still having some loose stools. He  "states he is having loose stools about twice daily, last week this was about once daily. He denies any abdominal pain and cramping. He states he has not used any of his imodium. He denies any N/V and blood in the stool. He denies any fevers and B symptoms. He does admit to having some decreases in appetite. He is still staying well hydrated.     He denies chest pain, dyspnea, palpitations, lower extremity edema, dizziness, and lightheadedness.     His hgb today was 9.5 and EPO level on 12/18 was 113. Will hold on DARBY since bone marrow is responding well. Next bone marrow biopsy is 1/9/25.       Objective    BSA: 1.68 meters squared  /81 (BP Location: Right arm, Patient Position: Sitting, BP Cuff Size: Adult)   Pulse 93   Temp 37.2 °C (99 °F)   Resp 16   Ht 1.664 m (5' 5.51\")   Wt 61.2 kg (134 lb 14.7 oz)   SpO2 100%   BMI 22.10 kg/m²      Physical Exam  Constitutional:       General: He is not in acute distress.     Appearance: Normal appearance. He is not ill-appearing.   Cardiovascular:      Rate and Rhythm: Normal rate and regular rhythm.      Pulses: Normal pulses.      Heart sounds: Normal heart sounds.   Pulmonary:      Effort: Pulmonary effort is normal. No respiratory distress.      Breath sounds: Normal breath sounds. No wheezing.   Abdominal:      General: Bowel sounds are normal. There is no distension.      Palpations: Abdomen is soft.      Tenderness: There is no abdominal tenderness. There is no guarding.   Musculoskeletal:         General: No swelling, tenderness or deformity.   Skin:     General: Skin is warm.      Coloration: Skin is not jaundiced.      Findings: No bruising, erythema or lesion.   Neurological:      General: No focal deficit present.      Mental Status: He is alert. Mental status is at baseline.   Psychiatric:         Mood and Affect: Mood normal.         Behavior: Behavior normal.         Thought Content: Thought content normal.         Judgment: Judgment normal. "         Performance Status:  Karnofsky Score: 90 - Able to carry on normal activity; minor signs or symptoms of disease       POST TRANSPLANT MONITORING   Days since transplant:      Acute GVHD Overall stGstrstastdstest:st st1st Chronic GVHD Total Score: 0    ASSESSMENT & PLAN     Assessment & Plan  Acute myeloid leukemia in remission (Multi)  Diagnosed 4/2024: BM Bx with MDS in in transition to AML (>10% blast) w/ trisomy 8, DNMT alpha, and U2AF1 mutation indication adverse risk.      s/p 4 cycles of Decitabine/Venetoclax. BM Bx 6/17/24: Blasts cleared, FISH still positive for trisomy 8, still MDS changes   BMBx (9/5/24): 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 and no increase in blasts   History of allogeneic hematopoietic stem cell transplant  Brandt Merritt is a 64 year-old man with a past medical history of hypertension, Hgb C, Hep C (treated), and MDS which transitioned to AML, s/p single-unit cord transplant prepped with Flu/Mariana/TBI, and GVHD prophy with Tacro/MMF, now with primary engraftment failure and tracking to haploidentical stem-cell rescue.      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 Despite tacrolimus and MMF for GVHD prophylaxis, there were no signs of acute GVHD, and MMF was discontinued on 10/11 due to myelosuppression concerns. Tacrolimus was stopped on 10/29 in preparation for a planned haploidentical transplant from the patient’s sister, with cell collection completed on 10/30. He was conditioned with Flu/Cy/TBI and aGVHD prophylaxis with post-transplant cyclophosphamide, tacrolimus, and mycophenolate. T=0, 11/6/24. ABO Donor/Recipient: O+,  A+. CMV donor/recipient: both positive. Started Tacro and MMF on T+5 (11/11).       The patient experienced episodes of neutropenic fever on 9/25 and 10/12, initially treated with meropenem until a rash prompted a switch to cefepime from 9/27 to 10/10. Diarrhea was the only finding on CT chest/abdomen/pelvis, and blood cultures on 10/22 were negative, leading to a second cefepime course from 10/22 to 10/26.     He was also noted to have EBV viremia, with level up to 375 U/L on 10/28 and was treated despite low levels due to hisupcoming second SCT; he received rituximab 600 mg x 1 (10/31/24).     T= 0 Single cord 9/19/24  Haplo (sister) 11/6/24   CONDITIONING Fludarabine, melphalan, and TBI for cord  Fludarabine, cytoxan/TBI for haplo   DONOR Single cord  Matched related haplo   SEX MATCH Matched   ABO DONOR O pos   ABO RECIPIENT A pos   GVHD PROPHYLAXIS Mycophenolate and Tacrolimus   GRAFT SOURCE Peripheral blood and Umbilical cord blood   TRIAL(s)           DATE DAY SOURCE MORPHOLOGY MRD WHOLE CHIMERISM   (% donor) CD3 CHIMERISM   (% donor) CD33 CHIMERISM   (% donor)   Requested for 12/9 D+30 PB             12/9/24 D+30 BM               D+60 PB               D+100 PB               D+100 BM               Monitor for post-transplant hemolysis   12/18/24.  Haptoglobin 177 12/13/24  UPC ratio 0.1 12/18/24     GVHD prophylaxis  GVHD  Anorexia/poor oral intake/weight loss. Added Mirtazapine. Continue Zofran. If persists, consider aGVHD.  12/9/24:  Currently taking tacrolimus 0.5 mg BID, held dose today prior to labs.  FK level 4.6 (12/9/24).  Advised patient on 12/10/24 to increase tacrolimus dose to 1 mg in the AM and 0.5 mg in the PM.    Wean MMF from 1 g TID to 500 mg TID 12/2/24 - present  Due to poor appetite will decrease MMF to 500 mg po tid.  Improvement to GI symptoms since decreasing MMF dose.    Stopped  MMF on T+35,   12/23/24. Current dose of tacro 2 mg po bid, pateint contacted by Radha Leal.  Tacro level was 5.4 today.  4 kg weight loss since 12/18.  Admits to diarrhea twice daily.   Monitor closely     Weights  Pre-transplant: 9/9 74.2kg   Upon SCT discharge: 11/25 60.9kg  Weight: 61.2kg (12/23/24).       Infectious Disease & Immune Reconstitution      Prophylaxis  Antiviral prophylaxis: acyclovir, Letermovir  Antifungal: posaconazole  PCP prophylaxis: 10/26/24 - 11/28/24 Pentamidine; Bactrim 11/29 - present     Hypogammaglobulinemia  IgG level. IVIG for level <400   IgG 1030 on 12/18, monitoring monthly.       EBV Viremia during transplant admission  Treated despite low levels due to upcoming second SCT  Rituximab 600 mg x 1 (10/31/24)     Immunizations  Covid vaccine series T + 3 months  Seasonal influenza vaccine T + 3 months     Infectious Disease follow up evaluation: 1/10/25     Immunodeficiency panel 100 days, 6mos and 1 year.     Will plan to begin immunizations at T+6mths (May 2025) depending on degree of immunosuppression     Cardiac:    Essential hypertension   Cardiology 12/18 : Saw Dr. Jones and can follow up prn. No signs of LV dysfunction or cardiac symptoms.   ECHO 12/23     Ascending aorta dilation   TTE (4/29/24): 3.8-4 cm dilation      Hx of STEMI (11/11/2020) has cardiology folllowup 12/18/24     Electrolyte disturbance   Continue oral magnesium 128mg TID   Discontinued potassium on 12/21, K+ level on 12/23 was 3.7.      Lack of appetite  Mirtazapine 15 mg at night as of 11/26  Reports appetite level is improving.  Continue mirtazapine.  Monitoring.     Loose Stools  Diarrhea twice daily over last couple of days. Has not been using imodium.   Will order stool pathogen panel + C. Diff and give patient stool kit to collect at home and can drop off at any [facility] location. Patient still staying hydrated and appetite has been slightly decreased but still eating throughout the day. Instructed him to use his PRN imodium.     RTC   12/26 post BMT/infusion   12/30 appt with Karon  Vishak/infusion   1/2, 1/6, 1/9: post BMT/infusion   1/9/25: bone marrow biopsy  1/10/25 ID appt     Instructed patient that we will call with potassium and tacro levels to see if meds need to be adjusted to be in therapeutic range. Advised patient to drop off stool sample at any [facility] location for C diff testing and stool pathogen panel. Advised to call if diarrhea persists or symptoms worsen such as abdominal pain, fever, chest pain.         Laurel Weathers PA-C

## 2024-12-24 LAB
EBV DNA SPEC NAA+PROBE-LOG#: NORMAL {LOG_COPIES}/ML
LABORATORY COMMENT REPORT: NOT DETECTED

## 2024-12-25 LAB
CMV DNA SERPL NAA+PROBE-ACNC: 94 IU/ML
CMV DNA SERPL NAA+PROBE-LOG IU: 1.97 LOG IU/ML
LABORATORY COMMENT REPORT: DETECTED

## 2024-12-26 ENCOUNTER — OFFICE VISIT (OUTPATIENT)
Dept: HEMATOLOGY/ONCOLOGY | Facility: HOSPITAL | Age: 64
End: 2024-12-26
Payer: COMMERCIAL

## 2024-12-26 ENCOUNTER — INFUSION (OUTPATIENT)
Dept: HEMATOLOGY/ONCOLOGY | Facility: HOSPITAL | Age: 64
End: 2024-12-26
Payer: COMMERCIAL

## 2024-12-26 VITALS
SYSTOLIC BLOOD PRESSURE: 128 MMHG | WEIGHT: 131.61 LBS | BODY MASS INDEX: 21.56 KG/M2 | OXYGEN SATURATION: 100 % | DIASTOLIC BLOOD PRESSURE: 73 MMHG | TEMPERATURE: 98.1 F | HEART RATE: 99 BPM | RESPIRATION RATE: 18 BRPM

## 2024-12-26 DIAGNOSIS — Z94.84 H/O AUTOLOGOUS STEM CELL TRANSPLANT: Primary | ICD-10-CM

## 2024-12-26 DIAGNOSIS — Z94.84 STEM CELLS TRANSPLANT STATUS (MULTI): ICD-10-CM

## 2024-12-26 DIAGNOSIS — K59.00 CONSTIPATION, UNSPECIFIED CONSTIPATION TYPE: ICD-10-CM

## 2024-12-26 DIAGNOSIS — C92.01 ACUTE MYELOID LEUKEMIA IN REMISSION (MULTI): ICD-10-CM

## 2024-12-26 DIAGNOSIS — Z94.84 HISTORY OF ALLOGENEIC HEMATOPOIETIC STEM CELL TRANSPLANT: ICD-10-CM

## 2024-12-26 DIAGNOSIS — C92.00 ACUTE MYELOID LEUKEMIA NOT HAVING ACHIEVED REMISSION (MULTI): ICD-10-CM

## 2024-12-26 LAB
ALBUMIN SERPL BCP-MCNC: 3.9 G/DL (ref 3.4–5)
ALP SERPL-CCNC: 65 U/L (ref 33–136)
ALT SERPL W P-5'-P-CCNC: 6 U/L (ref 10–52)
ANION GAP SERPL CALC-SCNC: 13 MMOL/L (ref 10–20)
AST SERPL W P-5'-P-CCNC: 12 U/L (ref 9–39)
BASOPHILS # BLD AUTO: 0.05 X10*3/UL (ref 0–0.1)
BASOPHILS NFR BLD AUTO: 0.7 %
BILIRUB SERPL-MCNC: 0.7 MG/DL (ref 0–1.2)
BUN SERPL-MCNC: 12 MG/DL (ref 6–23)
CALCIUM SERPL-MCNC: 8.7 MG/DL (ref 8.6–10.3)
CHLORIDE SERPL-SCNC: 104 MMOL/L (ref 98–107)
CO2 SERPL-SCNC: 23 MMOL/L (ref 21–32)
CREAT SERPL-MCNC: 1.04 MG/DL (ref 0.5–1.3)
EGFRCR SERPLBLD CKD-EPI 2021: 79 ML/MIN/1.73M*2
EOSINOPHIL # BLD AUTO: 0.4 X10*3/UL (ref 0–0.7)
EOSINOPHIL NFR BLD AUTO: 5.6 %
ERYTHROCYTE [DISTWIDTH] IN BLOOD BY AUTOMATED COUNT: 18.5 % (ref 11.5–14.5)
GLUCOSE SERPL-MCNC: 109 MG/DL (ref 74–99)
HCT VFR BLD AUTO: 32.6 % (ref 41–52)
HGB BLD-MCNC: 10.1 G/DL (ref 13.5–17.5)
IMM GRANULOCYTES # BLD AUTO: 0.03 X10*3/UL (ref 0–0.7)
IMM GRANULOCYTES NFR BLD AUTO: 0.4 % (ref 0–0.9)
LYMPHOCYTES # BLD AUTO: 1.43 X10*3/UL (ref 1.2–4.8)
LYMPHOCYTES NFR BLD AUTO: 19.9 %
MAGNESIUM SERPL-MCNC: 1.73 MG/DL (ref 1.6–2.4)
MCH RBC QN AUTO: 29.4 PG (ref 26–34)
MCHC RBC AUTO-ENTMCNC: 31 G/DL (ref 32–36)
MCV RBC AUTO: 95 FL (ref 80–100)
MONOCYTES # BLD AUTO: 1.35 X10*3/UL (ref 0.1–1)
MONOCYTES NFR BLD AUTO: 18.8 %
NEUTROPHILS # BLD AUTO: 3.91 X10*3/UL (ref 1.2–7.7)
NEUTROPHILS NFR BLD AUTO: 54.6 %
NRBC BLD-RTO: 0 /100 WBCS (ref 0–0)
PLATELET # BLD AUTO: 178 X10*3/UL (ref 150–450)
POTASSIUM SERPL-SCNC: 3.5 MMOL/L (ref 3.5–5.3)
PROT SERPL-MCNC: 6.4 G/DL (ref 6.4–8.2)
RBC # BLD AUTO: 3.44 X10*6/UL (ref 4.5–5.9)
SODIUM SERPL-SCNC: 136 MMOL/L (ref 136–145)
TACROLIMUS BLD-MCNC: 5.6 NG/ML
WBC # BLD AUTO: 7.2 X10*3/UL (ref 4.4–11.3)

## 2024-12-26 PROCEDURE — 83735 ASSAY OF MAGNESIUM: CPT

## 2024-12-26 PROCEDURE — 99215 OFFICE O/P EST HI 40 MIN: CPT | Performed by: NURSE PRACTITIONER

## 2024-12-26 PROCEDURE — 80053 COMPREHEN METABOLIC PANEL: CPT

## 2024-12-26 PROCEDURE — 80197 ASSAY OF TACROLIMUS: CPT

## 2024-12-26 PROCEDURE — 85025 COMPLETE CBC W/AUTO DIFF WBC: CPT

## 2024-12-26 PROCEDURE — 36591 DRAW BLOOD OFF VENOUS DEVICE: CPT

## 2024-12-26 RX ORDER — HEPARIN SODIUM,PORCINE/PF 10 UNIT/ML
50 SYRINGE (ML) INTRAVENOUS AS NEEDED
OUTPATIENT
Start: 2024-12-26

## 2024-12-26 RX ORDER — HEPARIN 100 UNIT/ML
500 SYRINGE INTRAVENOUS AS NEEDED
OUTPATIENT
Start: 2024-12-26

## 2024-12-26 RX ORDER — AMOXICILLIN 250 MG
1 CAPSULE ORAL 2 TIMES DAILY PRN
Qty: 30 TABLET | Refills: 1 | Status: SHIPPED | OUTPATIENT
Start: 2024-12-26 | End: 2025-12-26

## 2024-12-26 RX ORDER — SULFAMETHOXAZOLE AND TRIMETHOPRIM 800; 160 MG/1; MG/1
1 TABLET ORAL 3 TIMES WEEKLY
Qty: 12 TABLET | Refills: 2 | Status: SHIPPED | OUTPATIENT
Start: 2024-12-26

## 2024-12-26 RX ORDER — CALCIUM CARBONATE 500(1250)
1250 TABLET ORAL DAILY
Qty: 30 TABLET | Refills: 3 | Status: SHIPPED | OUTPATIENT
Start: 2024-12-26 | End: 2025-04-25

## 2024-12-26 RX ORDER — ACYCLOVIR 400 MG/1
400 TABLET ORAL 2 TIMES DAILY
Qty: 180 TABLET | Refills: 0 | Status: SHIPPED | OUTPATIENT
Start: 2024-12-26 | End: 2025-03-26

## 2024-12-26 RX ORDER — BUDESONIDE 3 MG/1
3 CAPSULE, COATED PELLETS ORAL 3 TIMES DAILY
Qty: 90 CAPSULE | Refills: 1 | Status: SHIPPED | OUTPATIENT
Start: 2024-12-26

## 2024-12-26 ASSESSMENT — ENCOUNTER SYMPTOMS
ENDOCRINE NEGATIVE: 1
HEMATOLOGIC/LYMPHATIC NEGATIVE: 1
RESPIRATORY NEGATIVE: 1
EYES NEGATIVE: 1
FATIGUE: 1
CARDIOVASCULAR NEGATIVE: 1
NEUROLOGICAL NEGATIVE: 1
GASTROINTESTINAL NEGATIVE: 1
PSYCHIATRIC NEGATIVE: 1
MUSCULOSKELETAL NEGATIVE: 1
APPETITE CHANGE: 1

## 2024-12-26 ASSESSMENT — PAIN SCALES - GENERAL: PAINLEVEL_OUTOF10: 0-NO PAIN

## 2024-12-26 NOTE — PROGRESS NOTES
Patient ID:  Brandt Merritt is a 64 y.o. male.  Referring Physician:   BMT Dr Newsome  Primary Care Provider:  Bill Richmond MD    Assessment/Plan    12/26/24 T+ 50 (s/p haplo and T+98 s/p single unit cord). Anorexia, taste changes and weight loss 171# (9/17/24)-> 131# today. Cont mirtazapine and ATC Zofran. Add budesonide 3x day. Intermittent diarrhea. Kit given for stool cx. RTC 12/28    Oncology History Overview Note   4/2024  Myelodysplastic neoplasm with increased blasts-2 ( WHO)  Myelodysplastic neoplasm/AML  (ICC 2022 classification)    Presented in  10/2023 for pancytopenia, found to have hemolysis. Patient had normal CBC June 2020. Denied any hemorrhoidal bleeding . Has had C Scope and EGD , treated Hep C 2014 . 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      CBC 4/11/24 wbc 3.0, hgb 7.1, platelets 167K ANC 0.73    BM biopsy done on 4/11/24  as folllows:  BM histology  Myelodysplastic neoplasm with increased blasts-2 ( WHO)  Myelodysplastic neoplasm/AML  (ICC 2022 classification)  Balsts 11% on aspirate smear and 15-20% based on CD 34 immunostaining approaching AML leukemia, hematooiesis is erythroid dominant with dysplasia in granulocytes and megakaryocytes.   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      11/6/2024 -  Bone Marrow Transplant    conditioned with Flu/Cy/TBI and aGVHD prophylaxis with post-transplant cyclophosphamide, tacrolimus,  and mycophenolate. T=0, 11/6/24. ABO Donor/Recipient: O+, A+. CMV donor/recipient: both positive. Started Tacro and MMF on T+5 (11/11).         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      s/p 4 cycles of Decitabine/Venetoclax. BM Bx 6/17/24: Blasts cleared, FISH still positive for trisomy 8, still MDS changes   BMBx (9/5/24): 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 and no increase in blasts     History of allogeneic hematopoietic stem cell transplant  Brandt Merritt is a 64 year-old man with a past medical history of hypertension, Hgb C, Hep C (treated), and MDS which transitioned to AML, s/p single-unit cord transplant prepped with Flu/Mariana/TBI, and GVHD prophy with Tacro/MMF, then with primary engraftment failure followed by haploidentical stem-cell rescue.      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 Despite tacrolimus and MMF for GVHD prophylaxis, there were no signs of acute GVHD, and MMF was discontinued on 10/11 due to myelosuppression concerns. Tacrolimus was stopped on 10/29 in preparation for a planned haploidentical transplant from the patient’s sister, with cell collection completed on 10/30. He was conditioned with Flu/Cy/TBI and aGVHD prophylaxis with post-transplant cyclophosphamide, tacrolimus, and mycophenolate. T=0, 11/6/24. ABO Donor/Recipient: O+, A+. CMV donor/recipient: both positive. Started Tacro and MMF on T+5 (11/11).       The patient experienced episodes of neutropenic fever on 9/25  and 10/12, initially treated with meropenem until a rash prompted a switch to cefepime from 9/27 to 10/10. Diarrhea was the only finding on CT chest/abdomen/pelvis, and blood cultures on 10/22 were negative, leading to a second cefepime course from 10/22 to 10/26.     He was also noted to have EBV viremia, with level up to 375 U/L on 10/28 and was treated despite low levels due to hisupcoming second SCT; he received rituximab 600 mg x 1 (10/31/24).     T= 0 Single cord 9/19/24  Haplo (sister) 11/6/24   CONDITIONING Fludarabine, melphalan, and TBI for cord  Fludarabine, cytoxan/TBI for haplo   DONOR Single cord  Matched related haplo   SEX MATCH Matched   ABO DONOR O pos   ABO RECIPIENT A pos   GVHD PROPHYLAXIS Mycophenolate and Tacrolimus   GRAFT SOURCE Peripheral blood and Umbilical cord blood   TRIAL(s)           DATE DAY SOURCE MORPHOLOGY MRD WHOLE CHIMERISM   (% donor) CD3 CHIMERISM   (% donor) CD33 CHIMERISM   (% donor)   12/9/24 D+30    100     12/11/24 D+30 PB        100  100    D+30 BM  Deferred            1/9/24 D+60 BM               D+100 PB               D+100 BM               Monitor for post-transplant hemolysis   12/23/24.  Haptoglobin 170 12/23/24  UPC ratio 0.11 12/23/24    Anemia  12/18 Epo 113.   12/16 Retic 7.2%  DARBY on hold.  Cont folic acid.     GVHD prophylaxis  GVHD  Anorexia/poor oral intake/weight loss. Added Mirtazapine. Continue Zofran. If persists, consider aGVHD.  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 Cont mirtazapine and ATC Zofran.   Add budesonide 3x day.   Response TBD    MMF  Wean MMF from 1 g TID to 500 mg TID 12/2/24 - present  Due to poor appetite will decrease MMF to 500 mg po tid.  Improvement to GI symptoms since decreasing MMF dose.    Stopped  MMF on T+35,     Tacrolimus  12/23/24. Current dose of tacro 2 mg po bid, pateint contacted by Radha Leal. 12/23 Tacro level was 5.4.  4 kg weight  loss since 12/18.  Admits to diarrhea twice daily. Monitor closely     Weights  Pre-transplant: 9/9 74.2kg   Upon SCT discharge: 11/25 60.9kg  Start of budesonide:: 59.7kg (12/26/24).  40 pound weight loss since 9/17/24  Today's weight     Infectious Disease & Immune Reconstitution      Prophylaxis  Antiviral prophylaxis: acyclovir, Letermovir  Antifungal: posaconazole  PCP prophylaxis: 10/26/24 - 11/28/24 Pentamidine; Bactrim 11/29 - present     Hypogammaglobulinemia  IgG level. IVIG for level <400   IgG 1030 on 12/18, monitoring monthly.       EBV Viremia during transplant admission  Treated despite low levels due to upcoming second SCT  Rituximab 600 mg x 1 (10/31/24)    Viral monitoring  CMV detected 94 12/23  EBV ND 12/23  Adeno ND 12/11; 12/23 pending  HHV6 12/13 ND; 12/23 pending     Immunizations  Covid vaccine series T + 3 months  Seasonal influenza vaccine T + 3 months   Will plan to begin immunizations at T+6mths (May 2025) depending on degree of immunosuppression     Infectious Disease follow up evaluation: 1/10/25     Immunodeficiency panel 100 days, 6mos and 1 year.      Cardiac:    Essential hypertension   Cardiology 12/18 : Saw Dr. Jones and can follow up prn. No signs of LV dysfunction or cardiac symptoms.   ECHO 12/23     Ascending aorta dilation   TTE (4/29/24): 3.8-4 cm dilation      Hx of STEMI (11/11/2020) has cardiology folllowup 12/18/24     Electrolyte disturbance   Continue oral magnesium 128mg TID       Loose Stools  Diarrhea twice daily over last couple of days. Has not been using imodium.   Will order stool pathogen panel + C. Diff and give patient stool kit to collect at home and can drop off at any [facility] location.     Vitamin D deficiency  12/18 Level 27.  Cont weekly supplement    IV Access  L PICC dressing dry and intact     Subjective    History of Present Illness:  Mr Merritt presents to the clinic today with his wife for routine follow up evaluation.     Recent  diarrhea:  Monday 2 episodes of watery diarrhea,  Tuesday 1 loose stool with pieces.  Wednesday none.  Thursday 1 loose stool with pieces.    Eating small meals. Drinking plenty of fluids. Taste barriers. Occas nausea. Zofran tid. Tacro held before am lab draws 11a and 11p.  Monday Vomited after oatmeal on Monday; mac and cheese in evening  Tuesday Pizza and chicken wings for dinner  Wednesday Mac and cheese, some turkey, egg, greens    Energy level pretty good. Steady on feet. No falls.           Review of Systems   Constitutional:  Positive for appetite change and fatigue (Mild).   HENT:  Negative.     Eyes: Negative.    Respiratory: Negative.     Cardiovascular: Negative.    Gastrointestinal: Negative.    Endocrine: Negative.    Genitourinary: Negative.     Musculoskeletal: Negative.    Skin: Negative.    Neurological: Negative.    Hematological: Negative.    Psychiatric/Behavioral: Negative.              Objective        Physical Exam  Vitals reviewed.   Constitutional:       Appearance: Normal appearance.      Comments: Thin   HENT:      Head: Normocephalic and atraumatic.      Comments: Scalp alopecia     Nose: Nose normal.      Mouth/Throat:      Mouth: Mucous membranes are moist.   Eyes:      General: Scleral icterus: infusion.      Pupils: Pupils are equal, round, and reactive to light.   Cardiovascular:      Rate and Rhythm: Normal rate and regular rhythm.      Pulses: Normal pulses.      Heart sounds: Normal heart sounds.   Pulmonary:      Effort: Pulmonary effort is normal.      Breath sounds: Normal breath sounds.   Abdominal:      General: Abdomen is flat. Bowel sounds are normal.      Palpations: Abdomen is soft.   Musculoskeletal:         General: Normal range of motion.      Cervical back: Normal range of motion.   Skin:     General: Skin is warm and dry.      Comments: L PICC dressing dry and intact   Neurological:      General: No focal deficit present.      Mental Status: He is alert and oriented  to person, place, and time.   Psychiatric:         Mood and Affect: Mood normal.       RTC   12/30 appt with Karon Casas/infusion 1/2 1/6, 1/9: post BMT/infusion with bmbx, 1/10/25 ID appt     Requested- with Monday line draw prior to MD/ANUSHA appt  1/13, 1/16 1/20, 1/23 1/27, 1/30

## 2024-12-27 LAB
ADENOVIRUS QPCR,PLASMA, VIRC: NOT DETECTED COPIES/ML
CMV DNA SERPL NAA+PROBE-LOG IU: ABNORMAL {LOG_IU}/ML
HUMAN HERPESVIRUS-6 PCR PLASMA: NOT DETECTED COPIES/ML
LABORATORY COMMENT REPORT: ABNORMAL

## 2024-12-27 ASSESSMENT — ENCOUNTER SYMPTOMS
NUMBNESS: 0
CARDIOVASCULAR NEGATIVE: 1
LIGHT-HEADEDNESS: 0
VOMITING: 0
DIARRHEA: 0
SHORTNESS OF BREATH: 1
EYES NEGATIVE: 1
NAUSEA: 0
DIZZINESS: 0
HEMATOLOGIC/LYMPHATIC NEGATIVE: 1
APPETITE CHANGE: 1
CONSTIPATION: 0
BLOOD IN STOOL: 0
FATIGUE: 1
PSYCHIATRIC NEGATIVE: 1
ENDOCRINE NEGATIVE: 1

## 2024-12-27 NOTE — PROGRESS NOTES
Patient ID:  Brandt Merritt is a 64 y.o. male.  Referring Physician:   ONC Dr Newsome  Primary Care Provider:  Bill Richmond MD    Assessment/Plan      12/30/24:  T+102 (9/19/24); T+54 (11/6/24).     Oncology History Overview Note   4/2024  Myelodysplastic neoplasm with increased blasts-2 ( WHO)  Myelodysplastic neoplasm/AML  (ICC 2022 classification)    Presented in  10/2023 for pancytopenia, found to have hemolysis. Patient had normal CBC June 2020. Denied any hemorrhoidal bleeding . Has had C Scope and EGD , treated Hep C 2014 . 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      CBC 4/11/24 wbc 3.0, hgb 7.1, platelets 167K ANC 0.73    BM biopsy done on 4/11/24  as folllows:  BM histology  Myelodysplastic neoplasm with increased blasts-2 ( WHO)  Myelodysplastic neoplasm/AML  (ICC 2022 classification)  Balsts 11% on aspirate smear and 15-20% based on CD 34 immunostaining approaching AML leukemia, hematooiesis is erythroid dominant with dysplasia in granulocytes and megakaryocytes.   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      11/6/2024 -  Bone Marrow Transplant    conditioned with Flu/Cy/TBI and aGVHD prophylaxis with post-transplant cyclophosphamide, tacrolimus, and mycophenolate. T=0, 11/6/24. ABO Donor/Recipient: O+, A+. CMV donor/recipient: both positive. Started Tacro and MMF on T+5 (11/11).         Response:      Past Medical History:  HTN   STEMI  2020 after getting  a water pills.  Chronic hepatitis C infection treated in 2015 treated  with Dr. Lloyd  Past Medical History:   Diagnosis Date    Chest pain 2021    HTN (hypertension) 10/05/2023    Hypertension     Personal history of other diseases of the circulatory system     History of hypertension      Surgical History:  Conosocopy 2021  Upper EGD 10/31/23  Surgical reduction torsion of testes      Past Surgical History:   Procedure Laterality Date    COLONOSCOPY  2013    Complete Colonoscopy    OTHER SURGICAL HISTORY  10/07/2015    Surgery Testis Reduction Of Torsion Of Testis Right      Family History:  CAD, DM in mother       Mother  of CVA at age 69  Brother with prostate CA, 1 sister with liver disease  2 children healthy  Family History   Problem Relation Name Age of Onset    Other (diabetes mellitus) Mother      Prostate cancer Brother           Social History:  Lives with wife of 30 years, works at [employer], [employer]ician [employer], now retired.   29 years, former smoker, smoked x 15 yrs quit 20+ years ago. Marijuana 3 x a week. Served in [employer] in [place] and [facility],   Social History   Social History       Alcohol use: Not Currently       Comment: occansionally    Drug use: Yes       Types: Marijuana       Comment: 3 weeks ago          Subjective    History of Present Illness:    Brandt presents to the clinic today (24) with his wife Genevieve for follow up after prolonged hospitalization for umbilical cord blood transplant 10/16/24 with graft rejection followed by haploidentical cord from his sister on 24 with flu/cy, ATG and modified dose cyclophosphamide with engraftment on day T+8.  Today he is T+ 102 of umbilical cord transfusion and is T+ 54 of his haplo sister transplant .       Reports he is doing well.  Energy level is medium.  Washed clothes yesterday and made multiple trips up and down the stairs.   Gets tired after doing tasks.  Appetite remains lower, doesn't get hungry until later in the day. Weight down a few pounds since last visit.  Eating about one meal per day and a snack.  Drinking 16 oz water, plus drinking juices and pop daily.  Yesterday, he reports eating 2 donuts, fish sandwich, coleslaw, mac and cheese, and cheetos.  Reports no nausea, hasn't had to take antiemetics. Bowel movements have been solid for the last few days.  Stated taking budesonide 3 times per day on Friday.  Feels like he was able to eat more since starting budesonide.  Currently on tacrolimus 2 mg twice daily.  Held tacrolimus dose prior to lab draws today.  Continues to have shortness of breath on exertion.  Continues taking magnesium chloride 2 tablets 3 times per day.  PICC line to left arm is doing well.        Review of Systems   Constitutional:  Positive for appetite change, fatigue and unexpected weight change. Negative for chills, diaphoresis and fever.   HENT:  Negative.     Eyes: Negative.    Respiratory:  Positive for shortness of breath.    Cardiovascular: Negative.    Gastrointestinal:  Negative for blood in stool, constipation, diarrhea, nausea and vomiting.   Endocrine: Negative.    Genitourinary: Negative.     Musculoskeletal: Negative.  Negative for gait problem.   Skin: Negative.    Neurological:  Negative for dizziness, gait problem, light-headedness and numbness.   Hematological: Negative.    Psychiatric/Behavioral: Negative.         Objective      There were no vitals taken for this visit.   There were no vitals filed for this visit.    Physical Exam  Vitals reviewed.   Constitutional:       Appearance: Normal appearance.      Comments: Thin   HENT:      Head: Normocephalic and atraumatic.      Nose: Nose normal.      Mouth/Throat:      Mouth: Mucous membranes are moist.   Eyes:      Extraocular Movements: Extraocular movements intact.      Conjunctiva/sclera: Conjunctivae normal.      Pupils: Pupils are  equal, round, and reactive to light.   Cardiovascular:      Rate and Rhythm: Normal rate and regular rhythm.      Pulses: Normal pulses.      Heart sounds: Normal heart sounds.   Pulmonary:      Effort: Pulmonary effort is normal.      Breath sounds: Normal breath sounds.   Abdominal:      General: Abdomen is flat. Bowel sounds are normal.      Palpations: Abdomen is soft. There is no mass.      Tenderness: There is no abdominal tenderness.   Musculoskeletal:         General: No swelling. Normal range of motion.   Skin:     General: Skin is warm and dry.      Comments: PICC to left arm, dressing dry and intact.   Neurological:      General: No focal deficit present.      Mental Status: He is alert and oriented to person, place, and time.   Psychiatric:         Mood and Affect: Mood normal.         Behavior: Behavior normal.         Thought Content: Thought content normal.         Judgment: Judgment normal.       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     s/p 4 cycles of Decitabine/Venetoclax. BM Bx 6/17/24: Blasts cleared, FISH still positive for trisomy 8, still MDS changes   BMBx (9/5/24): 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 and no increase in blasts     History of allogeneic hematopoietic stem cell transplant  #1: Single-unit Cord, T0=9/19/24, Primary Engraftment Failure  - Conditioning: Flu-Mariana-TBI  - Donor: Single Cord, 6/8  = ABO Donor / Recipient: A+ / A+  = CMV Donor / Recipient: - / +  - aGVHD Prophylaxis: Tacrolimus + MMF  - engraftment failure, developed HLA antibody against class I of cord  - Repeat BMBx (10/15): hypocellular with no residual myeloid neoplasm. 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), T0=11/6/24  - Graft: Haploidentical sister  = ABO Donor / Recipient: O+ / A+ (ABO  incompatibility +)  = CMV Donor / Recipient: + / +  - Conditioning: Flu/Cy/TBI/rATG  = GVHD prophylaxis -  rATG 1.5mg/kg T-5,-3,-1, PTCy (25mg/kg T+3, T+4), Tacro, MMF (T+5)     DATE DAY SOURCE MORPHOLOGY MRD WHOLE CHIMERISM   (% donor) CD3 CHIMERISM   (% donor) CD33 CHIMERISM   (% donor)   11/20/24 D+30 PB      100     12/11/24 D+30 PB    100 100   Deferred D+30 BM             1/9/25 D+60 BM          D+60 PB               D+100 PB               D+100 BM               12/30/24: Overall doing well no signs of acute GVHD.   First bone marrow biopsy scheduled for 1/9/24.       Monitor for post-transplant hemolysis   12/30/24.  Haptoglobin 180 12/30/24  UPC ratio 0.11 12/23/24, in process from 12/30/24.    Anemia  12/18 Epo 113.   12/16 Retic 7.2%  DARBY on hold.  Cont folic acid.    GVHD prophylaxis  GVHD  Anorexia/poor oral intake/weight loss. Added Mirtazapine. Continue Zofran. If persists, consider aGVHD.  12/9/24:  Currently taking tacrolimus 0.5 mg BID, held dose today prior to labs.  FK level 4.6 (12/9/24).  Advised patient on 12/10/24 to increase tacrolimus dose to 1 mg in the AM and 0.5 mg in the PM.    Wean MMF from 1 g TID to 500 mg TID 12/2/24 - present  Due to poor appetite will decrease MMF to 500 mg po tid.  Improvement to GI symptoms since decreasing MMF dose.    Stopped  MMF on T+35,   12/30/24:  Started on budesonide TID on 12/27/24.  Brandt reports that he has starting eating more since starting budesonide.  Reports no nausea, vomiting, or diarrhea at this time.      Stage I/Grade II Upper GI GVHD  Anorexia, taste changes and weight loss 171# (9/17/24).  Cont mirtazapine and ATC Zofran.   Add budesonide 3x day.   Response TBD  12/30/24:  Continues to have weight loss.  Weight 58.5 kg today.  Reports he is tolerating more food since starting budesonide on 12/27/24.  If no further improvement can increase dose of mirtazapine.      MMF  Wean MMF from 1 g TID to 500 mg TID 12/2/24 - present  Due to  poor appetite will decrease MMF to 500 mg po tid.  Improvement to GI symptoms since decreasing MMF dose.    Stopped  MMF on T+35,     Tacrolimus  12/30/24:  Current dose of tacro 2 mg po BID.  Held dose today prior to lab draws.  FK level 5.6 (12/26/24) level in process from today.  Monitor closely    Weights  Pre-transplant: 9/9 74.2kg   Upon SCT discharge: 11/25 60.9kg  Start of budesonide: 59.7kg (12/26/24).  40 pound weight loss since 9/17/24  Today's weight:  58.8 kg.  Monitoring.  Discussed ways to add more calories into diet.  Following with nutrition.      Infectious Disease & Immune Reconstitution     Prophylaxis  Antiviral prophylaxis: acyclovir, Letermovir  Antifungal: posaconazole  PCP prophylaxis: 10/26/24 - 11/28/24 Pentamidine; Bactrim 11/29 - present    Hypogammaglobulinemia  IgG level. IVIG for level <400   IgG 1030 (12/18/24), monitoring monthly.      Active Surveillance:     CMV detected <35 12/26/24, level in process from 12/30/24  EBV ND 12/23/24, level in process from 12/30/24.  Adeno ND 12/23/24, level in process from 12/30/24  HHV6 12/23/24, level in process from 12/30/24     EBV Viremia during transplant admission  Treated despite low levels due to upcoming second SCT  Rituximab 600 mg x 1 (10/31/24)     Immunizations  Covid vaccine series   Seasonal influenza vaccine   Will plan to begin immunizations at T+6mths ### depending on degree of immunosuppression     Infectious Disease follow up evaluation: 1/10/25     Immunodeficiency panel 100 days, 6mos and 1 year.     Immunizations:   Covid vaccine series. Consider at T+ 3 months  Seasonal influenza vaccine. Consider at T+ 3 month  Will plan to begin immunizations at T+6mths (May 2025) depending on degree of immunosuppression     Cardiac:    Essential hypertension  Hx of STEMI (11/11/2020)  Cardiology  appt on 12/18/24  ECHO 12/23/24: LVEF 65%     Ascending aorta dilation   TTE (4/29/24): 3.8-4 cm dilation      Electrolyte disturbance    Magnesium level 1.81 (12/30/24).  Continue oral magnesium 128 mg TID     Lack of appetite  Mirtazapine 15 mg at night as of 11/26/24 12/30/24: Reports appetite level is improving.  Continue mirtazapine.  Monitoring.     Loose Stools  Diarrhea twice daily over last couple of days. Has not been using imodium.   Will order stool pathogen panel + C. Diff and give patient stool kit to collect at home and can drop off at any [facility] location.   12/30/24:  Reports stools have been formed the last few days.  Monitoring.      Vitamin D deficiency  12/18 Level 27.  Cont weekly supplement    Medication reconciliation  Has all prescribed meds. Instructed to bring list to each visit. Please update regularly. New list and reconciliation completed 12/30/24.    IV Access  L PICC line  PICC line site with no redness, tenderness, swelling, or drainage.    Psychosocial.    Caregiver Genevieve  [facility] in [place]    RTC:  1/2/25, 1/6/25:  Post BMT and infusion appt  1/9/25:  BM biopsy, post BMT and infusion appt  1/10/25: ID appt    Scheduled for twice weekly for provider visit and infusion until 1/30/25.  Added central line prior to provider visits  ----------------------------------------------------------------------------  GRICELDA Vallejo

## 2024-12-30 ENCOUNTER — OFFICE VISIT (OUTPATIENT)
Dept: HEMATOLOGY/ONCOLOGY | Facility: HOSPITAL | Age: 64
End: 2024-12-30
Payer: COMMERCIAL

## 2024-12-30 ENCOUNTER — INFUSION (OUTPATIENT)
Dept: HEMATOLOGY/ONCOLOGY | Facility: HOSPITAL | Age: 64
End: 2024-12-30
Payer: COMMERCIAL

## 2024-12-30 ENCOUNTER — TELEPHONE (OUTPATIENT)
Dept: HEMATOLOGY/ONCOLOGY | Facility: HOSPITAL | Age: 64
End: 2024-12-30

## 2024-12-30 ENCOUNTER — NUTRITION (OUTPATIENT)
Dept: HEMATOLOGY/ONCOLOGY | Facility: HOSPITAL | Age: 64
End: 2024-12-30

## 2024-12-30 VITALS
SYSTOLIC BLOOD PRESSURE: 116 MMHG | OXYGEN SATURATION: 100 % | DIASTOLIC BLOOD PRESSURE: 80 MMHG | BODY MASS INDEX: 21.24 KG/M2 | WEIGHT: 129.63 LBS | TEMPERATURE: 98.2 F | RESPIRATION RATE: 16 BRPM | HEART RATE: 102 BPM

## 2024-12-30 VITALS — HEART RATE: 88 BPM

## 2024-12-30 VITALS — BODY MASS INDEX: 20.83 KG/M2 | WEIGHT: 129.63 LBS | HEIGHT: 66 IN

## 2024-12-30 DIAGNOSIS — E87.8 ELECTROLYTE DISTURBANCE: ICD-10-CM

## 2024-12-30 DIAGNOSIS — I10 ESSENTIAL HYPERTENSION: ICD-10-CM

## 2024-12-30 DIAGNOSIS — D61.818 PANCYTOPENIA: ICD-10-CM

## 2024-12-30 DIAGNOSIS — R63.0 LACK OF APPETITE: ICD-10-CM

## 2024-12-30 DIAGNOSIS — R63.4 WEIGHT LOSS: ICD-10-CM

## 2024-12-30 DIAGNOSIS — Z94.84 HISTORY OF ALLOGENEIC HEMATOPOIETIC STEM CELL TRANSPLANT: ICD-10-CM

## 2024-12-30 DIAGNOSIS — Z94.84 STEM CELLS TRANSPLANT STATUS (MULTI): ICD-10-CM

## 2024-12-30 DIAGNOSIS — D84.9 IMMUNOCOMPROMISED: ICD-10-CM

## 2024-12-30 DIAGNOSIS — C92.00 ACUTE MYELOID LEUKEMIA NOT HAVING ACHIEVED REMISSION (MULTI): ICD-10-CM

## 2024-12-30 DIAGNOSIS — C92.01 ACUTE MYELOID LEUKEMIA IN REMISSION (MULTI): Primary | ICD-10-CM

## 2024-12-30 DIAGNOSIS — C92.01 ACUTE MYELOID LEUKEMIA IN REMISSION (MULTI): ICD-10-CM

## 2024-12-30 DIAGNOSIS — D64.9 ANEMIA, UNSPECIFIED TYPE: ICD-10-CM

## 2024-12-30 LAB
ALBUMIN SERPL BCP-MCNC: 4.1 G/DL (ref 3.4–5)
ALP SERPL-CCNC: 71 U/L (ref 33–136)
ALT SERPL W P-5'-P-CCNC: 5 U/L (ref 10–52)
ANION GAP SERPL CALC-SCNC: 15 MMOL/L (ref 10–20)
APPEARANCE UR: CLEAR
AST SERPL W P-5'-P-CCNC: 10 U/L (ref 9–39)
BASOPHILS # BLD AUTO: 0.05 X10*3/UL (ref 0–0.1)
BASOPHILS NFR BLD AUTO: 0.6 %
BILIRUB SERPL-MCNC: 0.6 MG/DL (ref 0–1.2)
BILIRUB UR STRIP.AUTO-MCNC: NEGATIVE MG/DL
BUN SERPL-MCNC: 20 MG/DL (ref 6–23)
CALCIUM SERPL-MCNC: 8.2 MG/DL (ref 8.6–10.3)
CHLORIDE SERPL-SCNC: 104 MMOL/L (ref 98–107)
CO2 SERPL-SCNC: 22 MMOL/L (ref 21–32)
COLOR UR: YELLOW
CREAT SERPL-MCNC: 1.27 MG/DL (ref 0.5–1.3)
CREAT UR-MCNC: 363.8 MG/DL (ref 20–370)
EGFRCR SERPLBLD CKD-EPI 2021: 62 ML/MIN/1.73M*2
EOSINOPHIL # BLD AUTO: 0.1 X10*3/UL (ref 0–0.7)
EOSINOPHIL NFR BLD AUTO: 1.2 %
ERYTHROCYTE [DISTWIDTH] IN BLOOD BY AUTOMATED COUNT: 17.3 % (ref 11.5–14.5)
GLUCOSE SERPL-MCNC: 124 MG/DL (ref 74–99)
GLUCOSE UR STRIP.AUTO-MCNC: NORMAL MG/DL
HAPTOGLOB SERPL NEPH-MCNC: 180 MG/DL (ref 30–200)
HCT VFR BLD AUTO: 34.3 % (ref 41–52)
HGB BLD-MCNC: 11.1 G/DL (ref 13.5–17.5)
HYALINE CASTS #/AREA URNS AUTO: ABNORMAL /LPF
IMM GRANULOCYTES # BLD AUTO: 0.02 X10*3/UL (ref 0–0.7)
IMM GRANULOCYTES NFR BLD AUTO: 0.2 % (ref 0–0.9)
KETONES UR STRIP.AUTO-MCNC: NEGATIVE MG/DL
LDH SERPL L TO P-CCNC: 174 U/L (ref 84–246)
LEUKOCYTE ESTERASE UR QL STRIP.AUTO: NEGATIVE
LYMPHOCYTES # BLD AUTO: 1.56 X10*3/UL (ref 1.2–4.8)
LYMPHOCYTES NFR BLD AUTO: 18.9 %
MAGNESIUM SERPL-MCNC: 1.81 MG/DL (ref 1.6–2.4)
MCH RBC QN AUTO: 30.3 PG (ref 26–34)
MCHC RBC AUTO-ENTMCNC: 32.4 G/DL (ref 32–36)
MCV RBC AUTO: 94 FL (ref 80–100)
MONOCYTES # BLD AUTO: 0.89 X10*3/UL (ref 0.1–1)
MONOCYTES NFR BLD AUTO: 10.8 %
MUCOUS THREADS #/AREA URNS AUTO: ABNORMAL /LPF
NEUTROPHILS # BLD AUTO: 5.64 X10*3/UL (ref 1.2–7.7)
NEUTROPHILS NFR BLD AUTO: 68.3 %
NITRITE UR QL STRIP.AUTO: NEGATIVE
NRBC BLD-RTO: 0 /100 WBCS (ref 0–0)
PH UR STRIP.AUTO: 5.5 [PH]
PLATELET # BLD AUTO: 190 X10*3/UL (ref 150–450)
POTASSIUM SERPL-SCNC: 3.5 MMOL/L (ref 3.5–5.3)
PROT SERPL-MCNC: 6.6 G/DL (ref 6.4–8.2)
PROT UR STRIP.AUTO-MCNC: NORMAL MG/DL
PROT UR-ACNC: 33 MG/DL (ref 5–25)
PROT/CREAT UR: 0.09 MG/MG CREAT (ref 0–0.17)
RBC # BLD AUTO: 3.66 X10*6/UL (ref 4.5–5.9)
RBC # UR STRIP.AUTO: NEGATIVE /UL
RBC #/AREA URNS AUTO: ABNORMAL /HPF
SODIUM SERPL-SCNC: 137 MMOL/L (ref 136–145)
SP GR UR STRIP.AUTO: 1.02
TACROLIMUS BLD-MCNC: 17.3 NG/ML
URATE SERPL-MCNC: 6.4 MG/DL (ref 4–7.5)
UROBILINOGEN UR STRIP.AUTO-MCNC: NORMAL MG/DL
WBC # BLD AUTO: 8.3 X10*3/UL (ref 4.4–11.3)
WBC #/AREA URNS AUTO: ABNORMAL /HPF

## 2024-12-30 PROCEDURE — 84550 ASSAY OF BLOOD/URIC ACID: CPT

## 2024-12-30 PROCEDURE — 80197 ASSAY OF TACROLIMUS: CPT

## 2024-12-30 PROCEDURE — 83010 ASSAY OF HAPTOGLOBIN QUANT: CPT

## 2024-12-30 PROCEDURE — 87799 DETECT AGENT NOS DNA QUANT: CPT

## 2024-12-30 PROCEDURE — 80053 COMPREHEN METABOLIC PANEL: CPT

## 2024-12-30 PROCEDURE — 87533 HHV-6 DNA QUANT: CPT

## 2024-12-30 PROCEDURE — 83735 ASSAY OF MAGNESIUM: CPT

## 2024-12-30 PROCEDURE — 83615 LACTATE (LD) (LDH) ENZYME: CPT

## 2024-12-30 PROCEDURE — 81001 URINALYSIS AUTO W/SCOPE: CPT

## 2024-12-30 PROCEDURE — 84156 ASSAY OF PROTEIN URINE: CPT

## 2024-12-30 PROCEDURE — 85025 COMPLETE CBC W/AUTO DIFF WBC: CPT

## 2024-12-30 PROCEDURE — 99215 OFFICE O/P EST HI 40 MIN: CPT

## 2024-12-30 PROCEDURE — 36591 DRAW BLOOD OFF VENOUS DEVICE: CPT

## 2024-12-30 RX ORDER — ONDANSETRON HYDROCHLORIDE 8 MG/1
8 TABLET, FILM COATED ORAL EVERY 8 HOURS PRN
Qty: 30 TABLET | Refills: 5 | Status: SHIPPED | OUTPATIENT
Start: 2024-12-30

## 2024-12-30 RX ORDER — HEPARIN SODIUM,PORCINE/PF 10 UNIT/ML
50 SYRINGE (ML) INTRAVENOUS AS NEEDED
Status: CANCELLED | OUTPATIENT
Start: 2024-12-30

## 2024-12-30 RX ORDER — HEPARIN 100 UNIT/ML
500 SYRINGE INTRAVENOUS AS NEEDED
OUTPATIENT
Start: 2024-12-30

## 2024-12-30 RX ORDER — FOLIC ACID 1 MG/1
1 TABLET ORAL DAILY
Qty: 30 TABLET | Refills: 3 | Status: SHIPPED | OUTPATIENT
Start: 2024-12-30 | End: 2025-04-29

## 2024-12-30 ASSESSMENT — ENCOUNTER SYMPTOMS
FEVER: 0
CHILLS: 0
UNEXPECTED WEIGHT CHANGE: 1
DIAPHORESIS: 0
MUSCULOSKELETAL NEGATIVE: 1

## 2024-12-30 ASSESSMENT — PAIN SCALES - GENERAL: PAINLEVEL_OUTOF10: 0-NO PAIN

## 2024-12-30 NOTE — PROGRESS NOTES
Pt presents today for a count check. Labs drawn via PICC with brisk blood return. Denies any new complaints at this visit and feels well, Nausea and diarrhea resolved since last visit. Karon Casas CNP at bedside for fuv. Labs within acceptable limits and no replacement needs indicated at this time. Patient ambulated off the unit in stable condition accompanied by his support person and is aware of upcoming scheduled appts.

## 2024-12-30 NOTE — PROGRESS NOTES
"NUTRITION FOLLOW UP NOTE    Reason for Visit:  Brandt Merritt is a 64 y.o. 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    PMH: HTN, Hep C    Currently T+102 (cord); T+54 (haplo)    Pt and wife seen today in infusion.     Lab Results   Component Value Date/Time    GLUCOSE 124 (H) 12/30/2024 0844     12/30/2024 0844    K 3.5 12/30/2024 0844     12/30/2024 0844    CO2 22 12/30/2024 0844    ANIONGAP 15 12/30/2024 0844    BUN 20 12/30/2024 0844    CREATININE 1.27 12/30/2024 0844    EGFR 62 12/30/2024 0844    CALCIUM 8.2 (L) 12/30/2024 0844    ALBUMIN 4.1 12/30/2024 0844    ALKPHOS 71 12/30/2024 0844    PROT 6.6 12/30/2024 0844    AST 10 12/30/2024 0844    BILITOT 0.6 12/30/2024 0844    ALT 5 (L) 12/30/2024 0844    MG 1.81 12/30/2024 0844    PHOS 2.7 11/21/2024 0552       Lab Results   Component Value Date    WBC 8.3 12/30/2024    HGB 11.1 (L) 12/30/2024    HCT 34.3 (L) 12/30/2024    MCV 94 12/30/2024     12/30/2024       Lab Results   Component Value Date/Time    VITD25 27 (L) 12/18/2024 0933   *Started Vit D2 50,000IU x 8 weeks on 12/4.    Anthropometrics:  Anthropometrics  Height: 166.4 cm (5' 5.51\")  Weight: 58.8 kg (129 lb 10.1 oz)  BMI (Calculated): 21.24  IBW/kg (Dietitian Calculated): 63.2 kg  Percent of IBW: 93 %    *Wt at time of transplant admit: (9/13) 75.6 kg   *Wt at first post-transplant visit: (11/25): 60.9 kg   Overall, pt with 11 kg (15%) wt loss x ~3 mos    *Wt trending down; now with ~8 kg (12%) x 2 weeks     Wt Readings from Last 10 Encounters:   12/30/24 58.8 kg (129 lb 10.1 oz)   12/30/24 58.8 kg (129 lb 10.1 oz)   12/26/24 59.7 kg (131 lb 9.8 oz)   12/23/24 61.2 kg (134 lb 14.7 oz)   12/20/24 62.7 kg (138 lb 3.7 oz)   12/18/24 66.5 kg (146 lb 9.7 oz)   12/18/24 66.5 kg (146 lb 9.7 oz)   12/18/24 66.5 kg (146 lb 11.2 oz)   12/16/24 66.7 kg (147 lb 0.8 oz)   12/13/24 65.6 kg (144 lb 10 oz)   12/04/24  " "      60.9 kg  Admit 9/13-11/25 09/09/24        74.2 kg  08/16/24        75.3 kg   07/19/24        74.2 kg   06/24/24        71.0 kg  05/20/24        72.8 kg  04/23/24        75.5 kg     Food And Nutrient Intake:      Last week didn't feel like eating    Pt started on Budesonide last week due to c/o nausea and anorexia.  Remains on Remeron as well.     Started budesonide Friday; feeling a little better.   Yesterday:  2 donuts and tea. Dinner fish sandwich with ely slaw and tartar sauce and mac and cheese   Snack: corn chips    This am had Bbq chips and pretzels     Nausea pretty \"good.\"  Hasn't needed Zofran.   Diarrhea resolved.  Normal BM's ~every other day.     Feels he drinks well--water, g'supriya and tea   Tastes are better    Energy level 7/10--winded coming from parking garage   Appetite 4 out of 10.     Rec ice cream at dinner every night/milkshakes (whole milk)   Drink 2-3 bottles a day       -----------------------------  Pt continues to do well.  Now enjoying eating again.  Getting hungry at times and asking for specific foods.   Eating at least 2 meals a day as well as snacks in between.   Foods are tasting better but still not 100%; sweets taste best but meats still don't have the same flavors.   Has been having pancakes and sausage for breakfast for the past few days; sausage doesn't taste he knows it should yet.   Fluid intakes okay; drinking mainly water but started drinking raspberry iced tea as well.   Feels appetite and tastes have improved since stopping MMF.   Denies N/V.  No diarrhea; having regular BM's.   Energy levels improving; walked from parking garage but still struggles going up stairs.       Usual intakes:    B--Oatmeal with milk or pancakes with sausage   L--snacks (cookies)  D--mac and cheese or fish or chicken with ely slaw      Dislikes all nutrition supplements/protein drinks.     Started Remeron on 11/27.                                                             Nutrition " "Focused Physical Exam Findings:                          Energy Needs  Calculated Energy Needs Using Equations  Height: 166.4 cm (5' 5.51\")        Nutrition Diagnosis        *Overall nutrition status improving; appetite better, intakes improved and weight increasing.  GI symptoms resolved at present. Dysgeusia persists but is improving as well.  Anticipate continued improvements.     Nutrition Interventions/Recommendations   Nutrition Prescription   High Protein, High Calorie  Food Safety     Nutrition Education   Pt aware of importance of protein intakes; include source with all meals and snacks.   Encouraged PO fluids; Goal: 60+ oz daily  Include milk daily (whole milk; reg or ramirez) for  additional protein   Milkshakes or smoothies regularly as pt does not care for ONS  Pt aware of tips for managing taste changes--has lemon hard candies; suggested condiments, seasonings to help enhance flavors.  Discussing continuing to try new foods to assess tolerance.      Coordination of Care   NP/BMT team         Nutrition Monitoring and Evaluation      Will continue to f/up and monitor weight, labs. PO intakes and GI symptoms.              "

## 2024-12-30 NOTE — TELEPHONE ENCOUNTER
TACROLIMUS DOSE RECOMMENDATION NOTE   Brandt Merritt is a 64 y.o. male currently day +54 following haplo-identical allogeneic stem cell transplant for a diagnosis of AML. Patient on tacrolimus for GVHD prophylaxis with level resulting today. Pharmacist was consulted to provide tacrolimus dose recommendations.     Objective   Creatinine   Date Value Ref Range Status   12/30/2024 1.27 0.50 - 1.30 mg/dL Final   12/26/2024 1.04 0.50 - 1.30 mg/dL Final   12/23/2024 1.02 0.50 - 1.30 mg/dL Final     Bilirubin, Total   Date Value Ref Range Status   12/30/2024 0.6 0.0 - 1.2 mg/dL Final   12/26/2024 0.7 0.0 - 1.2 mg/dL Final   12/23/2024 0.9 0.0 - 1.2 mg/dL Final     Tacrolimus    Date Value Ref Range Status   12/30/2024 17.3 (H) <=15.0 ng/mL Final   12/26/2024 5.6 <=15.0 ng/mL Final   12/23/2024 5.4 <=15.0 ng/mL Final       Assessment  Current tacrolimus dose: 2 mg every 12 hours  Goal tacrolimus level: 5-10 ng/mL    Plan   The patient's tacrolimus level today was 17.3 which is Supratherapeutic. I recommended a 25% dose Decrease: tacrolimus 1.5 mg every 12 hours. Additionally, patient will hold dose tonight and tomorrow morning.I spoke with the patient/caregiver via phone and instructed them to adjust their tacrolimus dose. Recommendation accepted, continue to monitor tacrolimus levels to assess adjustment.    All questions were answered. Patient/family verbalized understanding of the plan of care and information provided. Will follow as necessary. Time spent with patient/family and/or coordinating care: 15 minutes.      Radha Cruz, PharmD, [facility]  Ambulatory Stem Cell Transplant Transplant Pharmacist    Current Outpatient Medications   Medication Instructions    acyclovir (ZOVIRAX) 400 mg, oral, 2 times daily    budesonide EC (ENTOCORT EC) 3 mg, oral, 3 times daily    calcium carbonate (OSCAL) 1,250 mg, oral, Daily    ergocalciferol (VITAMIN D2) 1,250 mcg, oral, Weekly    folic acid (FOLVITE) 1 mg, oral, Daily     letermovir (PREVYMIS) 480 mg, oral, Daily    Mag 64 128 mg, oral, 3 times daily, Or as instructed on your medication list.    mirtazapine (REMERON) 15 mg, oral, Nightly    ondansetron (ZOFRAN) 8 mg, oral, Every 8 hours PRN    posaconazole (NOXAFIL) 300 mg, oral, Daily, Do not crush, chew, or split. This is to prevent fungal infections.    prochlorperazine (COMPAZINE) 10 mg, oral, Every 6 hours PRN    sennosides-docusate sodium (Karissa-Colace) 8.6-50 mg tablet 1 tablet, oral, 2 times daily PRN    sulfamethoxazole-trimethoprim (Bactrim DS) 800-160 mg tablet 1 tablet, oral, 3 times weekly, Take on Mondays, Wednesdays, and Fridays.    tacrolimus (PROGRAF) 2 mg, oral, Every 12 hours, Or as instructed on your medication list.    ursodiol (ACTIGALL) 300 mg, oral, 3 times daily

## 2024-12-31 LAB
ADENOVIRUS QPCR,PLASMA, VIRC: NOT DETECTED COPIES/ML
CMV DNA SERPL NAA+PROBE-ACNC: 251 IU/ML
CMV DNA SERPL NAA+PROBE-LOG IU: 2.4 LOG IU/ML
EBV DNA SPEC NAA+PROBE-LOG#: NORMAL {LOG_COPIES}/ML
HUMAN HERPESVIRUS-6 PCR PLASMA: NOT DETECTED COPIES/ML
LABORATORY COMMENT REPORT: DETECTED
LABORATORY COMMENT REPORT: NOT DETECTED

## 2025-01-02 ENCOUNTER — TELEPHONE (OUTPATIENT)
Dept: HEMATOLOGY/ONCOLOGY | Facility: CLINIC | Age: 65
End: 2025-01-02
Payer: COMMERCIAL

## 2025-01-02 ENCOUNTER — OFFICE VISIT (OUTPATIENT)
Dept: HEMATOLOGY/ONCOLOGY | Facility: HOSPITAL | Age: 65
End: 2025-01-02
Payer: COMMERCIAL

## 2025-01-02 ENCOUNTER — APPOINTMENT (OUTPATIENT)
Dept: HEMATOLOGY/ONCOLOGY | Facility: HOSPITAL | Age: 65
End: 2025-01-02
Payer: COMMERCIAL

## 2025-01-02 ENCOUNTER — LAB (OUTPATIENT)
Dept: HEMATOLOGY/ONCOLOGY | Facility: HOSPITAL | Age: 65
End: 2025-01-02
Payer: COMMERCIAL

## 2025-01-02 ENCOUNTER — INFUSION (OUTPATIENT)
Dept: HEMATOLOGY/ONCOLOGY | Facility: HOSPITAL | Age: 65
End: 2025-01-02
Payer: COMMERCIAL

## 2025-01-02 VITALS
DIASTOLIC BLOOD PRESSURE: 84 MMHG | SYSTOLIC BLOOD PRESSURE: 136 MMHG | WEIGHT: 130.73 LBS | HEIGHT: 66 IN | HEART RATE: 94 BPM | RESPIRATION RATE: 18 BRPM | TEMPERATURE: 99.7 F | BODY MASS INDEX: 21.01 KG/M2 | OXYGEN SATURATION: 100 %

## 2025-01-02 DIAGNOSIS — C92.01 ACUTE MYELOID LEUKEMIA IN REMISSION (MULTI): ICD-10-CM

## 2025-01-02 LAB
ALBUMIN SERPL BCP-MCNC: 4.1 G/DL (ref 3.4–5)
ALP SERPL-CCNC: 68 U/L (ref 33–136)
ALT SERPL W P-5'-P-CCNC: 9 U/L (ref 10–52)
ANION GAP SERPL CALC-SCNC: 13 MMOL/L (ref 10–20)
APPEARANCE UR: CLEAR
AST SERPL W P-5'-P-CCNC: 14 U/L (ref 9–39)
BASOPHILS # BLD AUTO: 0.06 X10*3/UL (ref 0–0.1)
BASOPHILS NFR BLD AUTO: 0.8 %
BILIRUB SERPL-MCNC: 0.8 MG/DL (ref 0–1.2)
BILIRUB UR STRIP.AUTO-MCNC: NEGATIVE MG/DL
BUN SERPL-MCNC: 17 MG/DL (ref 6–23)
CALCIUM SERPL-MCNC: 9.3 MG/DL (ref 8.6–10.3)
CHLORIDE SERPL-SCNC: 104 MMOL/L (ref 98–107)
CO2 SERPL-SCNC: 25 MMOL/L (ref 21–32)
COLOR UR: YELLOW
CREAT SERPL-MCNC: 1.04 MG/DL (ref 0.5–1.3)
CREAT UR-MCNC: 264.9 MG/DL (ref 20–370)
EGFRCR SERPLBLD CKD-EPI 2021: 79 ML/MIN/1.73M*2
EOSINOPHIL # BLD AUTO: 0.31 X10*3/UL (ref 0–0.7)
EOSINOPHIL NFR BLD AUTO: 4.2 %
ERYTHROCYTE [DISTWIDTH] IN BLOOD BY AUTOMATED COUNT: 17.1 % (ref 11.5–14.5)
GLUCOSE SERPL-MCNC: 96 MG/DL (ref 74–99)
GLUCOSE UR STRIP.AUTO-MCNC: NORMAL MG/DL
HAPTOGLOB SERPL NEPH-MCNC: 186 MG/DL (ref 30–200)
HCT VFR BLD AUTO: 35.1 % (ref 41–52)
HGB BLD-MCNC: 11.3 G/DL (ref 13.5–17.5)
HYALINE CASTS #/AREA URNS AUTO: ABNORMAL /LPF
IGG SERPL-MCNC: 851 MG/DL (ref 700–1600)
IMM GRANULOCYTES # BLD AUTO: 0.05 X10*3/UL (ref 0–0.7)
IMM GRANULOCYTES NFR BLD AUTO: 0.7 % (ref 0–0.9)
KETONES UR STRIP.AUTO-MCNC: NEGATIVE MG/DL
LDH SERPL L TO P-CCNC: 183 U/L (ref 84–246)
LEUKOCYTE ESTERASE UR QL STRIP.AUTO: NEGATIVE
LYMPHOCYTES # BLD AUTO: 1.61 X10*3/UL (ref 1.2–4.8)
LYMPHOCYTES NFR BLD AUTO: 22.1 %
MAGNESIUM SERPL-MCNC: 1.46 MG/DL (ref 1.6–2.4)
MCH RBC QN AUTO: 30.2 PG (ref 26–34)
MCHC RBC AUTO-ENTMCNC: 32.2 G/DL (ref 32–36)
MCV RBC AUTO: 94 FL (ref 80–100)
MONOCYTES # BLD AUTO: 1.01 X10*3/UL (ref 0.1–1)
MONOCYTES NFR BLD AUTO: 13.8 %
MUCOUS THREADS #/AREA URNS AUTO: ABNORMAL /LPF
NEUTROPHILS # BLD AUTO: 4.26 X10*3/UL (ref 1.2–7.7)
NEUTROPHILS NFR BLD AUTO: 58.4 %
NITRITE UR QL STRIP.AUTO: NEGATIVE
NRBC BLD-RTO: 0 /100 WBCS (ref 0–0)
PH UR STRIP.AUTO: 5.5 [PH]
PLATELET # BLD AUTO: 206 X10*3/UL (ref 150–450)
POTASSIUM SERPL-SCNC: 3.4 MMOL/L (ref 3.5–5.3)
PROT SERPL-MCNC: 6.3 G/DL (ref 6.4–8.2)
PROT UR STRIP.AUTO-MCNC: ABNORMAL MG/DL
PROT UR-ACNC: 34 MG/DL (ref 5–25)
PROT/CREAT UR: 0.13 MG/MG CREAT (ref 0–0.17)
RBC # BLD AUTO: 3.74 X10*6/UL (ref 4.5–5.9)
RBC # UR STRIP.AUTO: NEGATIVE /UL
RBC #/AREA URNS AUTO: ABNORMAL /HPF
SODIUM SERPL-SCNC: 139 MMOL/L (ref 136–145)
SP GR UR STRIP.AUTO: 1.02
TACROLIMUS BLD-MCNC: 6.4 NG/ML
URATE SERPL-MCNC: 5.6 MG/DL (ref 4–7.5)
UROBILINOGEN UR STRIP.AUTO-MCNC: NORMAL MG/DL
WBC # BLD AUTO: 7.3 X10*3/UL (ref 4.4–11.3)
WBC #/AREA URNS AUTO: ABNORMAL /HPF

## 2025-01-02 PROCEDURE — 84550 ASSAY OF BLOOD/URIC ACID: CPT

## 2025-01-02 PROCEDURE — 82570 ASSAY OF URINE CREATININE: CPT

## 2025-01-02 PROCEDURE — 36591 DRAW BLOOD OFF VENOUS DEVICE: CPT

## 2025-01-02 PROCEDURE — 2500000004 HC RX 250 GENERAL PHARMACY W/ HCPCS (ALT 636 FOR OP/ED): Performed by: INTERNAL MEDICINE

## 2025-01-02 PROCEDURE — 99215 OFFICE O/P EST HI 40 MIN: CPT

## 2025-01-02 PROCEDURE — 87533 HHV-6 DNA QUANT: CPT

## 2025-01-02 PROCEDURE — 85025 COMPLETE CBC W/AUTO DIFF WBC: CPT

## 2025-01-02 PROCEDURE — 80197 ASSAY OF TACROLIMUS: CPT

## 2025-01-02 PROCEDURE — 83735 ASSAY OF MAGNESIUM: CPT

## 2025-01-02 PROCEDURE — 99211 OFF/OP EST MAY X REQ PHY/QHP: CPT

## 2025-01-02 PROCEDURE — 83010 ASSAY OF HAPTOGLOBIN QUANT: CPT

## 2025-01-02 PROCEDURE — 80053 COMPREHEN METABOLIC PANEL: CPT

## 2025-01-02 PROCEDURE — 81001 URINALYSIS AUTO W/SCOPE: CPT

## 2025-01-02 PROCEDURE — 83615 LACTATE (LD) (LDH) ENZYME: CPT

## 2025-01-02 PROCEDURE — 82784 ASSAY IGA/IGD/IGG/IGM EACH: CPT

## 2025-01-02 PROCEDURE — 87799 DETECT AGENT NOS DNA QUANT: CPT

## 2025-01-02 RX ORDER — HEPARIN SODIUM,PORCINE/PF 10 UNIT/ML
50 SYRINGE (ML) INTRAVENOUS AS NEEDED
Status: DISCONTINUED | OUTPATIENT
Start: 2025-01-02 | End: 2025-01-02 | Stop reason: HOSPADM

## 2025-01-02 RX ORDER — HEPARIN 100 UNIT/ML
500 SYRINGE INTRAVENOUS AS NEEDED
OUTPATIENT
Start: 2025-01-02

## 2025-01-02 RX ORDER — HEPARIN SODIUM,PORCINE/PF 10 UNIT/ML
50 SYRINGE (ML) INTRAVENOUS AS NEEDED
OUTPATIENT
Start: 2025-01-02

## 2025-01-02 RX ORDER — TACROLIMUS 0.5 MG/1
0.5 CAPSULE ORAL EVERY 12 HOURS
Qty: 60 CAPSULE | Refills: 2 | Status: SHIPPED | OUTPATIENT
Start: 2025-01-02

## 2025-01-02 RX ADMIN — HEPARIN, PORCINE (PF) 10 UNIT/ML INTRAVENOUS SYRINGE 50 UNITS: at 11:31

## 2025-01-02 RX ADMIN — HEPARIN, PORCINE (PF) 10 UNIT/ML INTRAVENOUS SYRINGE 50 UNITS: at 11:33

## 2025-01-02 ASSESSMENT — ENCOUNTER SYMPTOMS
MUSCULOSKELETAL NEGATIVE: 1
HEMATOLOGIC/LYMPHATIC NEGATIVE: 1
LIGHT-HEADEDNESS: 0
APPETITE CHANGE: 1
CARDIOVASCULAR NEGATIVE: 1
DIZZINESS: 0
ENDOCRINE NEGATIVE: 1
NAUSEA: 0
NUMBNESS: 0
FEVER: 0
EYES NEGATIVE: 1
CONSTIPATION: 0
CHILLS: 0
DIAPHORESIS: 0
DIARRHEA: 0
PSYCHIATRIC NEGATIVE: 1
FATIGUE: 0
BLOOD IN STOOL: 0
UNEXPECTED WEIGHT CHANGE: 1
VOMITING: 0

## 2025-01-02 NOTE — PROGRESS NOTES
Patient ID:  Brandt Merritt is a 64 y.o. male.  Referring Physician:   ONC Dr Newsome  Primary Care Provider:  Bill Richmond MD    Assessment/Plan        Oncology History Overview Note   4/2024  Myelodysplastic neoplasm with increased blasts-2 ( WHO)  Myelodysplastic neoplasm/AML  (ICC 2022 classification)    Presented in  10/2023 for pancytopenia, found to have hemolysis. Patient had normal CBC June 2020. Denied any hemorrhoidal bleeding . Has had C Scope and EGD , treated Hep C 2014 . 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      CBC 4/11/24 wbc 3.0, hgb 7.1, platelets 167K ANC 0.73    BM biopsy done on 4/11/24  as folllows:  BM histology  Myelodysplastic neoplasm with increased blasts-2 ( WHO)  Myelodysplastic neoplasm/AML  (ICC 2022 classification)  Balsts 11% on aspirate smear and 15-20% based on CD 34 immunostaining approaching AML leukemia, hematooiesis is erythroid dominant with dysplasia in granulocytes and megakaryocytes.   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      11/6/2024 -  Bone Marrow Transplant    conditioned with Flu/Cy/TBI and aGVHD prophylaxis with post-transplant cyclophosphamide, tacrolimus, and mycophenolate. T=0, 11/6/24. ABO Donor/Recipient: O+, A+. CMV donor/recipient: both positive. Started Tacro and MMF on T+5 (11/11).         Response:      Past Medical History:  HTN   STEMI 11/11/2020 after getting  a water  pills.  Chronic hepatitis C infection treated in  treated  with Dr. Lloyd  Past Medical History:   Diagnosis Date    Chest pain 2021    HTN (hypertension) 10/05/2023    Hypertension     Personal history of other diseases of the circulatory system     History of hypertension      Surgical History:  Conosocopy 2021  Upper EGD 10/31/23  Surgical reduction torsion of testes      Past Surgical History:   Procedure Laterality Date    COLONOSCOPY  2013    Complete Colonoscopy    OTHER SURGICAL HISTORY  10/07/2015    Surgery Testis Reduction Of Torsion Of Testis Right      Family History:  CAD, DM in mother       Mother  of CVA at age 69  Brother with prostate CA, 1 sister with liver disease  2 children healthy  Family History   Problem Relation Name Age of Onset    Other (diabetes mellitus) Mother      Prostate cancer Brother           Social History:  Lives with wife of 30 years, works at [employer], [employer], now retired.   29 years, former smoker, smoked x 15 yrs quit 20+ years ago. Marijuana 3 x a week. Served in  in [place] and [facility],   Social History   Social History       Alcohol use: Not Currently       Comment: occansionally    Drug use: Yes       Types: Marijuana       Comment: 3 weeks ago          Subjective    History of Present Illness:    Brandt presents to the clinic today (24) with his wife Genevieve for follow up after prolonged hospitalization for umbilical cord blood transplant 10/16/24 with graft rejection followed by haploidentical cord from his sister on 24 with flu/cy, ATG and modified dose cyclophosphamide with engraftment on day T+8.  Today he is T+ 102 of umbilical cord transfusion and is T+ 54 of his haplo sister transplant .       Reports he is doing well.  Energy level is medium.  Washed clothes yesterday and made multiple trips up and down the stairs.  Gets tired after doing tasks.   Appetite remains lower, doesn't get hungry until later in the day. Weight down a few pounds since last visit.  Eating about one meal per day and a snack.  Drinking 16 oz water, plus drinking juices and pop daily.  Yesterday, he reports eating 2 donuts, fish sandwich, coleslaw, mac and cheese, and cheetos.  Reports no nausea, hasn't had to take antiemetics. Bowel movements have been solid for the last few days.  Stated taking budesonide 3 times per day on Friday.  Feels like he was able to eat more since starting budesonide.  Currently on tacrolimus 2 mg twice daily.  Held tacrolimus dose prior to lab draws today.  Continues to have shortness of breath on exertion.  Continues taking magnesium chloride 2 tablets 3 times per day.  PICC line to left arm is doing well.      1/2/25: reports doing well since starting budesonide. Only 1 episode of loose stool since starting. Appetite improved and is gaining weight. No other concerns today      Review of Systems   Constitutional:  Positive for appetite change and unexpected weight change. Negative for chills, diaphoresis, fatigue and fever.   HENT:  Negative.     Eyes: Negative.    Cardiovascular: Negative.    Gastrointestinal:  Negative for blood in stool, constipation, diarrhea, nausea and vomiting.   Endocrine: Negative.    Genitourinary: Negative.     Musculoskeletal: Negative.  Negative for gait problem.   Skin: Negative.    Neurological:  Negative for dizziness, gait problem, light-headedness and numbness.   Hematological: Negative.    Psychiatric/Behavioral: Negative.         Objective      There were no vitals filed for this visit.    Physical Exam  Vitals reviewed.   Constitutional:       Appearance: Normal appearance.      Comments: Thin   HENT:      Head: Normocephalic and atraumatic.      Nose: Nose normal.      Mouth/Throat:      Mouth: Mucous membranes are moist.   Eyes:      Extraocular Movements: Extraocular movements intact.      Conjunctiva/sclera:  Conjunctivae normal.      Pupils: Pupils are equal, round, and reactive to light.   Cardiovascular:      Rate and Rhythm: Normal rate and regular rhythm.      Pulses: Normal pulses.      Heart sounds: Normal heart sounds.   Pulmonary:      Effort: Pulmonary effort is normal.      Breath sounds: Normal breath sounds.   Abdominal:      General: Abdomen is flat. Bowel sounds are normal.      Palpations: Abdomen is soft. There is no mass.      Tenderness: There is no abdominal tenderness.   Musculoskeletal:         General: No swelling. Normal range of motion.   Skin:     General: Skin is warm and dry.      Comments: PICC to left arm, dressing dry and intact.   Neurological:      General: No focal deficit present.      Mental Status: He is alert and oriented to person, place, and time.   Psychiatric:         Mood and Affect: Mood normal.         Behavior: Behavior normal.         Thought Content: Thought content normal.         Judgment: Judgment normal.       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     s/p 4 cycles of Decitabine/Venetoclax. BM Bx 6/17/24: Blasts cleared, FISH still positive for trisomy 8, still MDS changes   BMBx (9/5/24): 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 and no increase in blasts     History of allogeneic hematopoietic stem cell transplant  #1: Single-unit Cord, T0=9/19/24, Primary Engraftment Failure  - Conditioning: Flu-Mariana-TBI  - Donor: Single Cord, 6/8  = ABO Donor / Recipient: A+ / A+  = CMV Donor / Recipient: - / +  - aGVHD Prophylaxis: Tacrolimus + MMF  - engraftment failure, developed HLA antibody against class I of cord  - Repeat BMBx (10/15): hypocellular with no residual myeloid neoplasm. 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), T0=11/6/24  - Graft: Haploidentical  sister  = ABO Donor / Recipient: O+ / A+ (ABO incompatibility +)  = CMV Donor / Recipient: + / +  - Conditioning: Flu/Cy/TBI/rATG  = GVHD prophylaxis -  rATG 1.5mg/kg T-5,-3,-1, PTCy (25mg/kg T+3, T+4), Tacro, MMF (T+5)     DATE DAY SOURCE MORPHOLOGY MRD WHOLE CHIMERISM   (% donor) CD3 CHIMERISM   (% donor) CD33 CHIMERISM   (% donor)   11/20/24 D+30 PB      100     12/11/24 D+30 PB    100 100   Deferred D+30 BM             1/9/25 D+60 BM          D+60 PB               D+100 PB               D+100 BM               12/30/24: Overall doing well no signs of acute GVHD.   First bone marrow biopsy scheduled for 1/9/24.       Monitor for post-transplant hemolysis   12/30/24.  Haptoglobin 180 12/30/24  UPC ratio 0.11 12/23/24, in process from 12/30/24.    Anemia  12/18 Epo 113.   12/16 Retic 7.2%  DARBY on hold.  Cont folic acid.    GVHD prophylaxis  GVHD  Anorexia/poor oral intake/weight loss. Added Mirtazapine. Continue Zofran. If persists, consider aGVHD.  12/9/24:  Currently taking tacrolimus 0.5 mg BID, held dose today prior to labs.  FK level 4.6 (12/9/24).  Advised patient on 12/10/24 to increase tacrolimus dose to 1 mg in the AM and 0.5 mg in the PM.    Wean MMF from 1 g TID to 500 mg TID 12/2/24 - present  Due to poor appetite will decrease MMF to 500 mg po tid.  Improvement to GI symptoms since decreasing MMF dose.    Stopped  MMF on T+35,   12/30/24:  Started on budesonide TID on 12/27/24.  Brandt reports that he has starting eating more since starting budesonide.  Reports no nausea, vomiting, or diarrhea at this time.      Stage I/Grade II Upper GI GVHD  Anorexia, taste changes and weight loss 171# (9/17/24).  Cont mirtazapine and ATC Zofran.   Add budesonide 3x day.   Response TBD  12/30/24:  Continues to have weight loss.  Weight 58.5 kg today.  Reports he is tolerating more food since starting budesonide on 12/27/24.  If no further improvement can increase dose of mirtazapine.      MMF  Wean MMF from 1  g TID to 500 mg TID 12/2/24 - present  Due to poor appetite will decrease MMF to 500 mg po tid.  Improvement to GI symptoms since decreasing MMF dose.    Stopped  MMF on T+35,     Tacrolimus  1/2/25:  Current dose of tacro 2 mg po BID.  Held dose today prior to lab draws.      Weights  Pre-transplant: 9/9 74.2kg   Upon SCT discharge: 11/25 60.9kg  Start of budesonide: 59.7kg (12/26/24).  40 pound weight loss since 9/17/24  Today's weight:  58.8 kg.  Monitoring.  Discussed ways to add more calories into diet.  Following with nutrition.      Infectious Disease & Immune Reconstitution     Prophylaxis  Antiviral prophylaxis: acyclovir, Letermovir  Antifungal: posaconazole  PCP prophylaxis: 10/26/24 - 11/28/24 Pentamidine; Bactrim 11/29 - present    Hypogammaglobulinemia  IgG level. IVIG for level <400   IgG 1030 (12/18/24), monitoring monthly.      Active Surveillance:     CMV detected 251 on 12/30/24  EBV ND 12/30/24   Adeno ND 12/30/24  HHV6 ND 12/30/24     EBV Viremia during transplant admission  Treated despite low levels due to upcoming second SCT  Rituximab 600 mg x 1 (10/31/24)     Immunizations  Covid vaccine series   Seasonal influenza vaccine   Will plan to begin immunizations at T+6mths ### depending on degree of immunosuppression     Infectious Disease follow up evaluation: 1/10/25     Immunodeficiency panel 100 days, 6mos and 1 year.     Immunizations:   Covid vaccine series. Consider at T+ 3 months  Seasonal influenza vaccine. Consider at T+ 3 month  Will plan to begin immunizations at T+6mths (May 2025) depending on degree of immunosuppression     Cardiac:    Essential hypertension  Hx of STEMI (11/11/2020)  Cardiology  appt on 12/18/24  ECHO 12/23/24: LVEF 65%     Ascending aorta dilation   TTE (4/29/24): 3.8-4 cm dilation      Electrolyte disturbance   Magnesium level 1.81 (12/30/24).  Continue oral magnesium 128 mg TID     Lack of appetite  Mirtazapine 15 mg at night as of 11/26/24 12/30/24: Reports  appetite level is improving.  Continue mirtazapine.  Monitoring.  Appetite improving, slowly gaining weight.     Loose Stools  Diarrhea twice daily over last couple of days. Has not been using imodium.   Will order stool pathogen panel + C. Diff and give patient stool kit to collect at home and can drop off at any [facility], [place] location.   12/30/24:  Reports stools have been formed the last few days.  Monitoring.    1/2/25: 1 loose stool this AM, previously formed.     Vitamin D deficiency  12/18 Level 27.  Cont weekly supplement    Medication reconciliation  Has all prescribed meds. Instructed to bring list to each visit. Please update regularly. New list and reconciliation completed 12/30/24.    IV Access  L PICC line  PICC line site with no redness, tenderness, swelling, or drainage.    Psychosocial.    Caregiver Genevieve  [facility] in [place]    RTC:  1/6/25:  Post BMT and infusion appt  1/9/25:  BM biopsy, post BMT and infusion appt  1/10/25: ID appt    Scheduled for twice weekly for provider visit and infusion until 1/30/25.  Added central line prior to provider visits  ----------------------------------------------------------------------------  HAYLEY Gomez-ELDER

## 2025-01-02 NOTE — PROGRESS NOTES
Brandt Merritt is a 64 y.o. male who presents in stable condition for post transplant follow-up    Since his last visit, he has been doing well.  Overall, he states his energy level is stable.  His appetite & hydration status has been improved.  He reports no complaints.      Labs & PICC dressing completed up in line clinic. Patient seen by provider via bedside.     Reviewed and approved by VALERIO RAY on 1/2/25 at 2:39 PM.

## 2025-01-02 NOTE — TELEPHONE ENCOUNTER
Pt called in the triage line. He is scheduled today at 130 for infusion and clinic. He takes his Prograf at 11 and will need his labs drawn prior to that. I added him on to the line clinic at 11. He knows he will have to wait to be seen until 130 in tx.     If anything else is available to where he doesn't have to wait, please advise. As of now, he is set for line clinic at 11, infusion and [facility] LB BMT POST TRANSPLANT at 130.    Pt is aware that I added him on same day and this may not work for the line clinic, they do not start until 9. He will keep his phone by him in case this needs changed.

## 2025-01-03 LAB
ADENOVIRUS QPCR,PLASMA, VIRC: NOT DETECTED COPIES/ML
CMV DNA SERPL NAA+PROBE-ACNC: 688 IU/ML
CMV DNA SERPL NAA+PROBE-LOG IU: 2.84 LOG IU/ML
EBV DNA SPEC NAA+PROBE-LOG#: NORMAL {LOG_COPIES}/ML
HUMAN HERPESVIRUS-6 PCR PLASMA: NOT DETECTED COPIES/ML
LABORATORY COMMENT REPORT: DETECTED
LABORATORY COMMENT REPORT: NOT DETECTED

## 2025-01-06 ENCOUNTER — TELEPHONE (OUTPATIENT)
Dept: HEMATOLOGY/ONCOLOGY | Facility: HOSPITAL | Age: 65
End: 2025-01-06

## 2025-01-06 ENCOUNTER — INFUSION (OUTPATIENT)
Dept: HEMATOLOGY/ONCOLOGY | Facility: HOSPITAL | Age: 65
End: 2025-01-06
Payer: COMMERCIAL

## 2025-01-06 ENCOUNTER — OFFICE VISIT (OUTPATIENT)
Dept: HEMATOLOGY/ONCOLOGY | Facility: HOSPITAL | Age: 65
End: 2025-01-06
Payer: COMMERCIAL

## 2025-01-06 VITALS
WEIGHT: 134 LBS | RESPIRATION RATE: 16 BRPM | DIASTOLIC BLOOD PRESSURE: 79 MMHG | BODY MASS INDEX: 21.95 KG/M2 | TEMPERATURE: 99 F | HEART RATE: 90 BPM | OXYGEN SATURATION: 100 % | SYSTOLIC BLOOD PRESSURE: 133 MMHG

## 2025-01-06 DIAGNOSIS — C92.01 ACUTE MYELOID LEUKEMIA IN REMISSION (MULTI): ICD-10-CM

## 2025-01-06 DIAGNOSIS — B25.9 CYTOMEGALOVIRUS INFECTION, UNSPECIFIED CYTOMEGALOVIRAL INFECTION TYPE (MULTI): Primary | ICD-10-CM

## 2025-01-06 LAB
ALBUMIN SERPL BCP-MCNC: 3.9 G/DL (ref 3.4–5)
ALP SERPL-CCNC: 67 U/L (ref 33–136)
ALT SERPL W P-5'-P-CCNC: 13 U/L (ref 10–52)
ANION GAP SERPL CALC-SCNC: 13 MMOL/L (ref 10–20)
AST SERPL W P-5'-P-CCNC: 19 U/L (ref 9–39)
BASOPHILS # BLD AUTO: 0.06 X10*3/UL (ref 0–0.1)
BASOPHILS NFR BLD AUTO: 0.8 %
BILIRUB SERPL-MCNC: 0.6 MG/DL (ref 0–1.2)
BUN SERPL-MCNC: 13 MG/DL (ref 6–23)
CALCIUM SERPL-MCNC: 8.8 MG/DL (ref 8.6–10.3)
CHLORIDE SERPL-SCNC: 106 MMOL/L (ref 98–107)
CO2 SERPL-SCNC: 25 MMOL/L (ref 21–32)
CREAT SERPL-MCNC: 0.99 MG/DL (ref 0.5–1.3)
EGFRCR SERPLBLD CKD-EPI 2021: 84 ML/MIN/1.73M*2
EOSINOPHIL # BLD AUTO: 0.36 X10*3/UL (ref 0–0.7)
EOSINOPHIL NFR BLD AUTO: 4.7 %
ERYTHROCYTE [DISTWIDTH] IN BLOOD BY AUTOMATED COUNT: 16 % (ref 11.5–14.5)
GLUCOSE SERPL-MCNC: 96 MG/DL (ref 74–99)
HCT VFR BLD AUTO: 34.4 % (ref 41–52)
HGB BLD-MCNC: 10.8 G/DL (ref 13.5–17.5)
IMM GRANULOCYTES # BLD AUTO: 0.04 X10*3/UL (ref 0–0.7)
IMM GRANULOCYTES NFR BLD AUTO: 0.5 % (ref 0–0.9)
LYMPHOCYTES # BLD AUTO: 2.5 X10*3/UL (ref 1.2–4.8)
LYMPHOCYTES NFR BLD AUTO: 33 %
MAGNESIUM SERPL-MCNC: 1.5 MG/DL (ref 1.6–2.4)
MCH RBC QN AUTO: 29.7 PG (ref 26–34)
MCHC RBC AUTO-ENTMCNC: 31.4 G/DL (ref 32–36)
MCV RBC AUTO: 95 FL (ref 80–100)
MONOCYTES # BLD AUTO: 0.95 X10*3/UL (ref 0.1–1)
MONOCYTES NFR BLD AUTO: 12.5 %
NEUTROPHILS # BLD AUTO: 3.67 X10*3/UL (ref 1.2–7.7)
NEUTROPHILS NFR BLD AUTO: 48.5 %
NRBC BLD-RTO: 0 /100 WBCS (ref 0–0)
PLATELET # BLD AUTO: 170 X10*3/UL (ref 150–450)
POTASSIUM SERPL-SCNC: 3.7 MMOL/L (ref 3.5–5.3)
PROT SERPL-MCNC: 5.7 G/DL (ref 6.4–8.2)
RBC # BLD AUTO: 3.64 X10*6/UL (ref 4.5–5.9)
SODIUM SERPL-SCNC: 140 MMOL/L (ref 136–145)
TACROLIMUS BLD-MCNC: 8.5 NG/ML
WBC # BLD AUTO: 7.6 X10*3/UL (ref 4.4–11.3)

## 2025-01-06 PROCEDURE — 85025 COMPLETE CBC W/AUTO DIFF WBC: CPT

## 2025-01-06 PROCEDURE — 80053 COMPREHEN METABOLIC PANEL: CPT

## 2025-01-06 PROCEDURE — 83735 ASSAY OF MAGNESIUM: CPT

## 2025-01-06 PROCEDURE — 99215 OFFICE O/P EST HI 40 MIN: CPT | Performed by: STUDENT IN AN ORGANIZED HEALTH CARE EDUCATION/TRAINING PROGRAM

## 2025-01-06 PROCEDURE — 36591 DRAW BLOOD OFF VENOUS DEVICE: CPT

## 2025-01-06 PROCEDURE — 80197 ASSAY OF TACROLIMUS: CPT

## 2025-01-06 RX ORDER — VALGANCICLOVIR 450 MG/1
900 TABLET, FILM COATED ORAL 2 TIMES DAILY
Qty: 60 TABLET | Refills: 0 | Status: SHIPPED | OUTPATIENT
Start: 2025-01-06 | End: 2025-03-07

## 2025-01-06 ASSESSMENT — ENCOUNTER SYMPTOMS
HEADACHES: 0
HEMATURIA: 0
PALPITATIONS: 0
ABDOMINAL PAIN: 0
NAUSEA: 0
FREQUENCY: 0
DIARRHEA: 0
LIGHT-HEADEDNESS: 0
LEG SWELLING: 0
SHORTNESS OF BREATH: 0
CONSTIPATION: 0
DYSURIA: 0
WHEEZING: 0
FATIGUE: 0
APPETITE CHANGE: 0
CHEST TIGHTNESS: 0
DEPRESSION: 0
DIZZINESS: 0
FEVER: 0
VOMITING: 0
BRUISES/BLEEDS EASILY: 0
COUGH: 0
ADENOPATHY: 0
CHILLS: 0

## 2025-01-06 ASSESSMENT — PAIN SCALES - GENERAL: PAINLEVEL_OUTOF10: 0-NO PAIN

## 2025-01-06 NOTE — PROGRESS NOTES
Patient seen in infusion for count check accompanied by spouse, no required transfusions today. Labs drawn from PICC. PICC dressing C/D/I and not due for dressing change today. Both lumens flushed with NS and positive for blood return. Labs and schedule reviewed. Seen at chairside by Laurel HUERTA CNP and GAVIOTA Jensen. Discharged ambulatory in stable condition.

## 2025-01-06 NOTE — PROGRESS NOTES
Patient ID: Brandt Merritt is a 64 y.o. male.    Subjective    HPI  Presents today for post transplant visit, accompanied by his wife. He overall states he is doing well and is seeing improvement. He denies fever, chills, night sweats, chest pain, dyspnea, abdominal pain, N/V/D, mouth sores, rashes, ulcers, bleeding/bruising. He does get fatigued at times after over exerting himself. He states he is seeing improvement though, he was able to walk over today from the garage without being too short of breath and tired. He also states his appetite is steadily improving. He states he is eating cereal or oatmeal for breakfast and having a smaller lunch and then having a larger dinner. Last night he had a baked potato and salad for dinner.     Review of Systems   Constitutional:  Negative for appetite change, chills, fatigue and fever.   Respiratory:  Negative for chest tightness, cough, shortness of breath and wheezing.    Cardiovascular:  Negative for chest pain, leg swelling and palpitations.   Gastrointestinal:  Negative for abdominal pain, constipation, diarrhea, nausea and vomiting.   Genitourinary:  Negative for dysuria, frequency and hematuria.    Skin:  Negative for itching and rash.   Neurological:  Negative for dizziness, headaches and light-headedness.   Hematological:  Negative for adenopathy. Does not bruise/bleed easily.   Psychiatric/Behavioral:  Negative for depression and suicidal ideas.        Objective    BSA: There is no height or weight on file to calculate BSA.  There were no vitals taken for this visit.  Vital signs reviewed today.      Physical Exam  Constitutional:       General: He is not in acute distress.     Appearance: Normal appearance. He is not ill-appearing.   HENT:      Mouth/Throat:      Mouth: Mucous membranes are moist.      Pharynx: Oropharynx is clear. No oropharyngeal exudate.   Cardiovascular:      Rate and Rhythm: Normal rate and regular rhythm.      Pulses: Normal pulses.       Heart sounds: Normal heart sounds.   Pulmonary:      Effort: Pulmonary effort is normal. No respiratory distress.      Breath sounds: Normal breath sounds. No wheezing.   Abdominal:      General: Abdomen is flat. There is no distension.      Palpations: Abdomen is soft.      Tenderness: There is no abdominal tenderness.   Musculoskeletal:         General: No swelling or tenderness. Normal range of motion.   Skin:     General: Skin is warm.      Coloration: Skin is not jaundiced or pale.      Findings: No bruising or erythema.   Neurological:      General: No focal deficit present.      Mental Status: He is alert and oriented to person, place, and time.   Psychiatric:         Mood and Affect: Mood normal.         Behavior: Behavior normal.         Thought Content: Thought content normal.         Judgment: Judgment normal.         Performance Status:  Karnofsky Score: 90 - Able to carry on normal activity; minor signs or symptoms of disease       Assessment/Plan        Oncology History Overview Note   4/2024  Myelodysplastic neoplasm with increased blasts-2 ( WHO)  Myelodysplastic neoplasm/AML  (ICC 2022 classification)    Presented in  10/2023 for pancytopenia, found to have hemolysis. Patient had normal CBC June 2020. Denied any hemorrhoidal bleeding . Has had C Scope and EGD , treated Hep C 2014 . 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      CBC 4/11/24 wbc 3.0, hgb 7.1, platelets 167K ANC 0.73    BM biopsy done on 4/11/24  as folllows:  BM histology  Myelodysplastic neoplasm with increased blasts-2 ( WHO)  Myelodysplastic neoplasm/AML  (ICC 2022 classification)  Balsts 11% on aspirate smear and 15-20% based on CD 34 immunostaining approaching AML leukemia, hematooiesis is erythroid dominant with dysplasia in granulocytes and megakaryocytes.   FISH studies trisomy 8 17.2%  NGS panel DNMT3 aVAF 36%  U2AF1 VAF 32%       Acute myeloid leukemia in remission (Multi)   4/23/2024 Initial Diagnosis    Acute myeloid  leukemia not having achieved remission (Multi)     2024 - 2024 Chemotherapy    Venetoclax / Decitabine, 28 Day Cycles - Induction / Consolidation     10/16/2024 - 10/16/2024 Bone Marrow Transplant    conditioning with Flu-Mariana-TBI, a single cord stem cell transplant on 10/16/24 resulted in primary engraftment failure, confirmed by bone marrow biopsy on 10/15 showing 1% donor chimerism and hypocellularity without myeloid neoplasm.      2024 -  Bone Marrow Transplant    conditioned with Flu/Cy/TBI and aGVHD prophylaxis with post-transplant cyclophosphamide, tacrolimus, and mycophenolate. T=0, 24. ABO Donor/Recipient: O+, A+. CMV donor/recipient: both positive. Started Tacro and MMF on T+5 ().         25: T +109 (24); T+61 (24)  Past Medical History:  HTN   STEMI 2020 after getting  a water pills.  Chronic hepatitis C infection treated in  treated  with Dr. Lloyd  Medical History        Past Medical History:   Diagnosis Date    Chest pain 2021    HTN (hypertension) 10/05/2023    Hypertension      Personal history of other diseases of the circulatory system       History of hypertension         Surgical History:  Conosocopy 2021  Upper EGD 10/31/23  Surgical reduction torsion of testes      Surgical History         Past Surgical History:   Procedure Laterality Date    COLONOSCOPY   2013     Complete Colonoscopy    OTHER SURGICAL HISTORY   10/07/2015     Surgery Testis Reduction Of Torsion Of Testis Right         Family History:  CAD, DM in mother       Mother  of CVA at age 69  Brother with prostate CA, 1 sister with liver disease  2 children healthy  Family History          Family History   Problem Relation Name Age of Onset    Other (diabetes mellitus) Mother        Prostate cancer Brother                Social History:  Lives with wife of 30 years, works at [employer], 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   29 years, former smoker,  smoked x 15 yrs quit 20+ years ago. Marijuana 3 x a week. 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      s/p 4 cycles of Decitabine/Venetoclax. BM Bx 6/17/24: Blasts cleared, FISH still positive for trisomy 8, still MDS changes   BMBx (9/5/24): 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 and no increase in blasts      History of allogeneic hematopoietic stem cell transplant  #1: Single-unit Cord, T0=9/19/24, Primary Engraftment Failure  - Conditioning: Flu-Mariana-TBI  - Donor: Single Cord, 6/8  = ABO Donor / Recipient: A+ / A+  = CMV Donor / Recipient: - / +  - aGVHD Prophylaxis: Tacrolimus + MMF  - engraftment failure, developed HLA antibody against class I of cord  - Repeat BMBx (10/15): hypocellular with no residual myeloid neoplasm. 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), T0=11/6/24  - Graft: Haploidentical sister  = ABO Donor / Recipient: O+ / A+ (ABO incompatibility +)  = CMV Donor / Recipient: + / +  - Conditioning: Flu/Cy/TBI/rATG  = GVHD prophylaxis -  rATG 1.5mg/kg T-5,-3,-1, PTCy (25mg/kg T+3, T+4), Tacro, MMF (T+5)     DATE DAY SOURCE MORPHOLOGY MRD WHOLE CHIMERISM   (% donor) CD3 CHIMERISM   (% donor) CD33 CHIMERISM   (% donor)   11/20/24 D+30 PB      100       12/11/24 D+30 PB       100 100   Deferred D+30 BM             1/9/25 D+60 BM               D+60 PB               D+100 PB               D+100 BM                    Monitor for post-transplant hemolysis   1/2/25.  Haptoglobin 186 1/2/25  UPC ratio 0.13 1/2/25     Anemia  12/18 Epo 113.   12/16 Retic  7.2%  DARBY on hold.  Cont folic acid.     GVHD prophylaxis  GVHD  Anorexia/poor oral intake/weight loss. Added Mirtazapine. Continue Zofran. If persists, consider aGVHD.  12/9/24:  Currently taking tacrolimus 0.5 mg BID, held dose today prior to labs.  FK level 4.6 (12/9/24).  Advised patient on 12/10/24 to increase tacrolimus dose to 1 mg in the AM and 0.5 mg in the PM.    Wean MMF from 1 g TID to 500 mg TID 12/2/24 - present  Due to poor appetite will decrease MMF to 500 mg po tid.  Improvement to GI symptoms since decreasing MMF dose.    Stopped  MMF on T+35,   1/6/25: Started on Budesonide on 12/27/24 for presumed acute grade II GVHD of upper GI tract. Appetite improving. Denies N/V/D.  First bone marrow biopsy scheduled for 1/9/24.        Stage I/Grade II Upper GI GVHD  Anorexia, taste changes and weight loss 171# (9/17/24).  Cont mirtazapine and ATC Zofran.   Add budesonide 3x day.   Response TBD  1/6/25: Weight beginning to increase. Weight 60.8 kg today.  Reports he is tolerating more food since starting budesonide on 12/27/24.  If no further improvement can increase dose of mirtazapine.       MMF  Wean MMF from 1 g TID to 500 mg TID 12/2/24 - present  Due to poor appetite will decrease MMF to 500 mg po tid.  Improvement to GI symptoms since decreasing MMF dose.    Stopped  MMF on T+35,      Tacrolimus  1/2/25:  Current dose of tacro 1.5mg po BID.  Held dose today prior to lab draws. Tacro level on 1/2/25 was 6.4. Results pending from today. Monitor closely     Weights  Pre-transplant: 9/9 74.2kg   Upon SCT discharge: 11/25 60.9kg  Start of budesonide: 59.7kg (12/26/24).  40 pound weight loss since 9/17/24  Today's weight:  60.8kg  Monitoring.  Discussed ways to add more calories into diet.  Following with nutrition.       Infectious Disease & Immune Reconstitution      Prophylaxis  Antiviral prophylaxis: acyclovir, Letermovir. Initiating on valganciclovir   Antifungal: posaconazole  PCP prophylaxis: 10/26/24  - 11/28/24 Pentamidine; Bactrim 11/29 - present     Hypogammaglobulinemia  IgG level. IVIG for level <400   IgG 1030 (12/18/24), monitoring monthly.       Active Surveillance:     CMV detected <35 12/26/24, CMV detected 688 on 1/2/25. Levels pending from today. Given uptrend, will initiate valganciclovir 900 mg BID today per Dr. Newsome. Continue to monitor closely.   EBV ND 1/2/25  Adeno ND 1/2/25  HHV6 ND 1/2/25       EBV Viremia during transplant admission  Treated despite low levels due to upcoming second SCT  Rituximab 600 mg x 1 (10/31/24)     Immunizations  Covid vaccine series   Seasonal influenza vaccine   Will plan to begin immunizations at T+6mths ### depending on degree of immunosuppression      Infectious Disease follow up evaluation: 1/10/25      Immunodeficiency panel 100 days, 6mos and 1 year.      Immunizations:   Covid vaccine series. Consider at T+ 3 months  Seasonal influenza vaccine. Consider at T+ 3 month  Will plan to begin immunizations at T+6mths (May 2025) depending on degree of immunosuppression     Cardiac:    Essential hypertension  Hx of STEMI (11/11/2020)  Cardiology  appt on 12/18/24  ECHO 12/23/24: LVEF 65%     Ascending aorta dilation   TTE (4/29/24): 3.8-4 cm dilation      Electrolyte disturbance   Magnesium level 1.81 (12/30/24).  Continue oral magnesium 128 mg TID     Lack of appetite  Mirtazapine 15 mg at night as of 11/26/24 12/30/24: Reports appetite level is improving.  Continue mirtazapine.  Monitoring.     Loose Stools  Diarrhea twice daily over last couple of days. Has not been using imodium.   Will order stool pathogen panel + C. Diff and give patient stool kit to collect at home and can drop off at any [facility] location.   12/30/24:  Reports stools have been formed the last few days.  Monitoring.       Vitamin D deficiency  12/18 Level 27.  Cont weekly supplement     Medication reconciliation  Has all prescribed meds. Instructed to bring list to each visit. Please update  regularly. New list and reconciliation completed 12/30/24.     IV Access  L PICC line  PICC line site with no redness, tenderness, swelling, or drainage.     Psychosocial.    Caregiver [facility] [place]     RTC:  1/9/25:  BM biopsy, post BMT and infusion appt  1/10/25: ID appt  1/13/25: appt with Karon Casas CNP     Scheduled for twice weekly for provider visit and infusion until 1/30/25.  Added central line prior to provider visits       Laurel Weathers PA-C

## 2025-01-07 LAB
CMV DNA SERPL NAA+PROBE-ACNC: 167 IU/ML
CMV DNA SERPL NAA+PROBE-LOG IU: 2.22 LOG IU/ML
LABORATORY COMMENT REPORT: DETECTED

## 2025-01-09 ENCOUNTER — NUTRITION (OUTPATIENT)
Dept: HEMATOLOGY/ONCOLOGY | Facility: HOSPITAL | Age: 65
End: 2025-01-09

## 2025-01-09 ENCOUNTER — INFUSION (OUTPATIENT)
Dept: HEMATOLOGY/ONCOLOGY | Facility: HOSPITAL | Age: 65
End: 2025-01-09
Payer: COMMERCIAL

## 2025-01-09 ENCOUNTER — PROCEDURE VISIT (OUTPATIENT)
Dept: HEMATOLOGY/ONCOLOGY | Facility: HOSPITAL | Age: 65
End: 2025-01-09
Payer: COMMERCIAL

## 2025-01-09 ENCOUNTER — OFFICE VISIT (OUTPATIENT)
Dept: HEMATOLOGY/ONCOLOGY | Facility: HOSPITAL | Age: 65
End: 2025-01-09
Payer: COMMERCIAL

## 2025-01-09 VITALS — WEIGHT: 134.7 LBS | BODY MASS INDEX: 21.65 KG/M2 | HEIGHT: 66 IN

## 2025-01-09 VITALS
WEIGHT: 134.8 LBS | SYSTOLIC BLOOD PRESSURE: 145 MMHG | TEMPERATURE: 98.8 F | DIASTOLIC BLOOD PRESSURE: 90 MMHG | HEART RATE: 88 BPM | RESPIRATION RATE: 18 BRPM | OXYGEN SATURATION: 100 % | BODY MASS INDEX: 22.08 KG/M2

## 2025-01-09 DIAGNOSIS — C92.01 ACUTE MYELOID LEUKEMIA IN REMISSION (MULTI): ICD-10-CM

## 2025-01-09 DIAGNOSIS — Z94.84 HISTORY OF ALLOGENEIC HEMATOPOIETIC STEM CELL TRANSPLANT: ICD-10-CM

## 2025-01-09 DIAGNOSIS — C92.01 ACUTE MYELOID LEUKEMIA IN REMISSION (MULTI): Primary | ICD-10-CM

## 2025-01-09 LAB
ALBUMIN SERPL BCP-MCNC: 4 G/DL (ref 3.4–5)
ALP SERPL-CCNC: 67 U/L (ref 33–136)
ALT SERPL W P-5'-P-CCNC: 22 U/L (ref 10–52)
ANION GAP SERPL CALC-SCNC: 12 MMOL/L (ref 10–20)
APPEARANCE UR: CLEAR
AST SERPL W P-5'-P-CCNC: 25 U/L (ref 9–39)
BASOPHILS # BLD AUTO: 0.05 X10*3/UL (ref 0–0.1)
BASOPHILS NFR BLD AUTO: 0.7 %
BILIRUB SERPL-MCNC: 0.4 MG/DL (ref 0–1.2)
BILIRUB UR STRIP.AUTO-MCNC: NEGATIVE MG/DL
BUN SERPL-MCNC: 14 MG/DL (ref 6–23)
CALCIUM SERPL-MCNC: 9 MG/DL (ref 8.6–10.3)
CHLORIDE SERPL-SCNC: 107 MMOL/L (ref 98–107)
CO2 SERPL-SCNC: 26 MMOL/L (ref 21–32)
COLOR UR: NORMAL
CREAT SERPL-MCNC: 0.94 MG/DL (ref 0.5–1.3)
CREAT UR-MCNC: 129.5 MG/DL (ref 20–370)
EGFRCR SERPLBLD CKD-EPI 2021: 89 ML/MIN/1.73M*2
EOSINOPHIL # BLD AUTO: 0.28 X10*3/UL (ref 0–0.7)
EOSINOPHIL NFR BLD AUTO: 4.1 %
ERYTHROCYTE [DISTWIDTH] IN BLOOD BY AUTOMATED COUNT: 15.9 % (ref 11.5–14.5)
GLUCOSE SERPL-MCNC: 88 MG/DL (ref 74–99)
GLUCOSE UR STRIP.AUTO-MCNC: NORMAL MG/DL
HAPTOGLOB SERPL NEPH-MCNC: 119 MG/DL (ref 30–200)
HCT VFR BLD AUTO: 34 % (ref 41–52)
HGB BLD-MCNC: 10.8 G/DL (ref 13.5–17.5)
IMM GRANULOCYTES # BLD AUTO: 0.01 X10*3/UL (ref 0–0.7)
IMM GRANULOCYTES NFR BLD AUTO: 0.1 % (ref 0–0.9)
KETONES UR STRIP.AUTO-MCNC: NEGATIVE MG/DL
LDH SERPL L TO P-CCNC: 184 U/L (ref 84–246)
LEUKOCYTE ESTERASE UR QL STRIP.AUTO: NEGATIVE
LYMPHOCYTES # BLD AUTO: 2.55 X10*3/UL (ref 1.2–4.8)
LYMPHOCYTES NFR BLD AUTO: 37.8 %
MAGNESIUM SERPL-MCNC: 1.57 MG/DL (ref 1.6–2.4)
MCH RBC QN AUTO: 30.5 PG (ref 26–34)
MCHC RBC AUTO-ENTMCNC: 31.8 G/DL (ref 32–36)
MCV RBC AUTO: 96 FL (ref 80–100)
MONOCYTES # BLD AUTO: 0.69 X10*3/UL (ref 0.1–1)
MONOCYTES NFR BLD AUTO: 10.2 %
NEUTROPHILS # BLD AUTO: 3.17 X10*3/UL (ref 1.2–7.7)
NEUTROPHILS NFR BLD AUTO: 47.1 %
NITRITE UR QL STRIP.AUTO: NEGATIVE
NRBC BLD-RTO: 0 /100 WBCS (ref 0–0)
PH UR STRIP.AUTO: 5.5 [PH]
PLATELET # BLD AUTO: 197 X10*3/UL (ref 150–450)
POTASSIUM SERPL-SCNC: 3.7 MMOL/L (ref 3.5–5.3)
PROT SERPL-MCNC: 5.9 G/DL (ref 6.4–8.2)
PROT UR STRIP.AUTO-MCNC: NEGATIVE MG/DL
PROT UR-ACNC: 13 MG/DL (ref 5–25)
PROT/CREAT UR: 0.1 MG/MG CREAT (ref 0–0.17)
RBC # BLD AUTO: 3.54 X10*6/UL (ref 4.5–5.9)
RBC # UR STRIP.AUTO: NEGATIVE /UL
SODIUM SERPL-SCNC: 141 MMOL/L (ref 136–145)
SP GR UR STRIP.AUTO: 1.01
TACROLIMUS BLD-MCNC: 9.6 NG/ML
URATE SERPL-MCNC: 4.9 MG/DL (ref 4–7.5)
UROBILINOGEN UR STRIP.AUTO-MCNC: NORMAL MG/DL
WBC # BLD AUTO: 6.8 X10*3/UL (ref 4.4–11.3)

## 2025-01-09 PROCEDURE — 87533 HHV-6 DNA QUANT: CPT

## 2025-01-09 PROCEDURE — 88185 FLOWCYTOMETRY/TC ADD-ON: CPT | Mod: TC | Performed by: PHYSICIAN ASSISTANT

## 2025-01-09 PROCEDURE — 96374 THER/PROPH/DIAG INJ IV PUSH: CPT | Mod: INF

## 2025-01-09 PROCEDURE — 87799 DETECT AGENT NOS DNA QUANT: CPT

## 2025-01-09 PROCEDURE — 38222 DX BONE MARROW BX & ASPIR: CPT | Mod: LT | Performed by: PHYSICIAN ASSISTANT

## 2025-01-09 PROCEDURE — 85097 BONE MARROW INTERPRETATION: CPT | Performed by: PHYSICIAN ASSISTANT

## 2025-01-09 PROCEDURE — 82570 ASSAY OF URINE CREATININE: CPT

## 2025-01-09 PROCEDURE — 99215 OFFICE O/P EST HI 40 MIN: CPT | Mod: 25 | Performed by: NURSE PRACTITIONER

## 2025-01-09 PROCEDURE — 96365 THER/PROPH/DIAG IV INF INIT: CPT | Mod: INF

## 2025-01-09 PROCEDURE — 84550 ASSAY OF BLOOD/URIC ACID: CPT

## 2025-01-09 PROCEDURE — 81003 URINALYSIS AUTO W/O SCOPE: CPT

## 2025-01-09 PROCEDURE — 80053 COMPREHEN METABOLIC PANEL: CPT

## 2025-01-09 PROCEDURE — 2500000004 HC RX 250 GENERAL PHARMACY W/ HCPCS (ALT 636 FOR OP/ED)

## 2025-01-09 PROCEDURE — 83010 ASSAY OF HAPTOGLOBIN QUANT: CPT

## 2025-01-09 PROCEDURE — 85025 COMPLETE CBC W/AUTO DIFF WBC: CPT

## 2025-01-09 PROCEDURE — 80197 ASSAY OF TACROLIMUS: CPT

## 2025-01-09 PROCEDURE — 83615 LACTATE (LD) (LDH) ENZYME: CPT

## 2025-01-09 PROCEDURE — 83735 ASSAY OF MAGNESIUM: CPT

## 2025-01-09 RX ORDER — HEPARIN 100 UNIT/ML
500 SYRINGE INTRAVENOUS AS NEEDED
OUTPATIENT
Start: 2025-01-09

## 2025-01-09 RX ORDER — HEPARIN SODIUM,PORCINE/PF 10 UNIT/ML
50 SYRINGE (ML) INTRAVENOUS AS NEEDED
Status: DISCONTINUED | OUTPATIENT
Start: 2025-01-09 | End: 2025-01-09 | Stop reason: HOSPADM

## 2025-01-09 RX ORDER — HEPARIN SODIUM,PORCINE/PF 10 UNIT/ML
50 SYRINGE (ML) INTRAVENOUS AS NEEDED
OUTPATIENT
Start: 2025-01-09

## 2025-01-09 RX ORDER — HEPARIN 100 UNIT/ML
500 SYRINGE INTRAVENOUS AS NEEDED
Status: DISCONTINUED | OUTPATIENT
Start: 2025-01-09 | End: 2025-01-09 | Stop reason: HOSPADM

## 2025-01-09 RX ORDER — MAGNESIUM SULFATE HEPTAHYDRATE 40 MG/ML
2 INJECTION, SOLUTION INTRAVENOUS ONCE
Status: COMPLETED | OUTPATIENT
Start: 2025-01-09 | End: 2025-01-09

## 2025-01-09 RX ORDER — MAGNESIUM SULFATE HEPTAHYDRATE 40 MG/ML
2 INJECTION, SOLUTION INTRAVENOUS ONCE
Status: CANCELLED | OUTPATIENT
Start: 2025-01-09 | End: 2025-01-09

## 2025-01-09 RX ADMIN — MAGNESIUM SULFATE IN WATER 2 G: 40 INJECTION, SOLUTION INTRAVENOUS at 10:40

## 2025-01-09 ASSESSMENT — ENCOUNTER SYMPTOMS
VOMITING: 0
CONSTIPATION: 0
FREQUENCY: 0
CHILLS: 0
ABDOMINAL PAIN: 0
FEVER: 0
CHEST TIGHTNESS: 0
SLEEP DISTURBANCE: 0
DEPRESSION: 0
DIZZINESS: 0
HEMATURIA: 0
LIGHT-HEADEDNESS: 0
SHORTNESS OF BREATH: 0
WOUND: 0
ADENOPATHY: 0
SORE THROAT: 0
CONFUSION: 0
DIARRHEA: 0
PALPITATIONS: 0
FATIGUE: 0
TROUBLE SWALLOWING: 0
COUGH: 0
LEG SWELLING: 0
NAUSEA: 0
HEADACHES: 0
DYSURIA: 0
WHEEZING: 0
BRUISES/BLEEDS EASILY: 0
NERVOUS/ANXIOUS: 0

## 2025-01-09 ASSESSMENT — PAIN SCALES - GENERAL: PAINLEVEL_OUTOF10: 0-NO PAIN

## 2025-01-09 NOTE — PROGRESS NOTES
Brandt Merritt is a 64 y.o. male who presents in stable condition for ct chk & Bone marrow biopsy    Since his last visit, he has been doing well.  Overall, he states his energy level is stable.  His appetite & hydration status has been good.  He reports no complaints.      Patient tolerated infusion of magnesium well and has been educated with the overall therapy plan. Bone marrow biopsy site clean, dry & covered with bandaid. Questions & concerns addressed by her provider during visit. AVS, lab results & future appointment provided. Patient discharged in stable condition.    Reviewed and approved by VALERIO RAY on 1/9/25 at 2:15 PM.

## 2025-01-09 NOTE — PROGRESS NOTES
Patient ID: Brandt Merritt is a 64 y.o. male.    Biopsy bone marrow    Date/Time: 1/9/2025 2:14 PM    Performed by: Payal Bobby PA-C  Authorized by: Payal Bobby PA-C    Consent:     Consent obtained:  Written    Consent given by:  Patient    Risks, benefits, and alternatives were discussed: yes      Risks discussed:  Bleeding, infection and pain  Universal protocol:     Procedure explained and questions answered to patient or proxy's satisfaction: yes      Relevant documents present and verified: yes      Test results available: yes      Site/side marked: yes      Immediately prior to procedure, a time out was called: yes      Patient identity confirmed:  Verbally with patient and hospital-assigned identification number  Indications:     Indications:  Acute myeloid leukemia, s/p haploidenitcal stem cell transplant Day +64  Pre-procedure details:     Skin preparation:  Povidone-iodine    Preparation: Patient was prepped and draped in the usual sterile fashion    Sedation:     Sedation type:  None  Anesthesia:     Anesthesia method:  Local infiltration    Local anesthetic:  Lidocaine 1% w/o epi  Procedure specific details:      Patient was placed in prone position, and the left iliac crest was prepped and draped in sterile fashion. Anesthesia with 1% Lidocaine was administered to the site. Using the Jamshidi needle, an aspirate was obtained and samples sent for evaluation by Ar Morocho. Then the needle was advanced and a core biopsy was obtained. Following hemostasis, a bandage was applied to the site.  Post-procedure details:     Procedure completion:  Tolerated well, no immediate complications

## 2025-01-09 NOTE — PROGRESS NOTES
Patient ID: Brandt Merritt is a 64 y.o. male.    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  Today he is T+ 112 of umbilical cord transfusion and is T+ 64 of his haplo sister transplant. Overall he states he is doing well. He states his appetite is continuing to improve. He denies any N/V/D and abdominal pain. He is having about one loose bowel movement per day. He is still taking the budesonide TID. He states he has been feeling hungrier throughout the day. He denies any fevers, chills, night sweats, chest pain, dyspnea, bleeding and bruising. He does admit to some dry skin on his forehead and near the bridge of his nose. He has been noticing an improvement in his energy. He has been able to walk from the parking garage to the office. Overall, he states he has been noticing an overall improvement. Current tacro dose is 1.5mg BID and he held his dose prior to his visit today. He has his Day + 60 bone marrow biopsy today.     Review of Systems   Constitutional:  Negative for chills, fatigue and fever.   HENT:   Negative for mouth sores, sore throat and trouble swallowing.    Respiratory:  Negative for chest tightness, cough, shortness of breath and wheezing.    Cardiovascular:  Negative for chest pain, leg swelling and palpitations.   Gastrointestinal:  Negative for abdominal pain, constipation, diarrhea, nausea and vomiting.   Genitourinary:  Negative for dysuria, frequency and hematuria.    Musculoskeletal:  Negative for gait problem.   Skin:  Negative for itching, rash and wound.   Neurological:  Negative for dizziness, gait problem, headaches and light-headedness.   Hematological:  Negative for adenopathy. Does not bruise/bleed easily.   Psychiatric/Behavioral:  Negative  for confusion, depression and sleep disturbance. The patient is not nervous/anxious.        Objective    BSA: 1.68 meters squared  /90 (BP Location: Left arm, Patient Position: Sitting)   Pulse 88   Temp 37.1 °C (98.8 °F) (Temporal)   Resp 18   Wt 61.1 kg (134 lb 12.8 oz)   SpO2 100%   BMI 22.08 kg/m²   Vital signs reviewed today.      Physical Exam  Constitutional:       General: He is not in acute distress.     Appearance: Normal appearance. He is not ill-appearing.   HENT:      Head: Normocephalic and atraumatic.      Mouth/Throat:      Mouth: Mucous membranes are moist.      Pharynx: No oropharyngeal exudate or posterior oropharyngeal erythema.   Cardiovascular:      Rate and Rhythm: Normal rate and regular rhythm.      Pulses: Normal pulses.      Heart sounds: Normal heart sounds.   Pulmonary:      Effort: Pulmonary effort is normal. No respiratory distress.      Breath sounds: Normal breath sounds. No wheezing.   Abdominal:      General: Abdomen is flat. There is no distension.      Tenderness: There is no abdominal tenderness.   Musculoskeletal:         General: No swelling or tenderness. Normal range of motion.      Right lower leg: No edema.      Left lower leg: No edema.   Skin:     General: Skin is warm.      Findings: No bruising, erythema or rash.      Comments: Dry darkening skin on bridge of nose and forehead.   Neurological:      General: No focal deficit present.      Mental Status: He is alert and oriented to person, place, and time.   Psychiatric:         Mood and Affect: Mood normal.         Behavior: Behavior normal.         Thought Content: Thought content normal.         Judgment: Judgment normal.         Performance Status:  Karnofsky Score: 90 - Able to carry on normal activity; minor signs or symptoms of disease       Assessment/Plan        Oncology History Overview Note   4/2024  Myelodysplastic neoplasm with increased blasts-2 ( WHO)  Myelodysplastic neoplasm/AML  (ICC 2022  classification)    Presented in  10/2023 for pancytopenia, found to have hemolysis. Patient had normal CBC June 2020. Denied any hemorrhoidal bleeding . Has had C Scope and EGD , treated Hep C 2014 . 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      CBC 4/11/24 wbc 3.0, hgb 7.1, platelets 167K ANC 0.73    BM biopsy done on 4/11/24  as folllows:  BM histology  Myelodysplastic neoplasm with increased blasts-2 ( WHO)  Myelodysplastic neoplasm/AML  (ICC 2022 classification)  Balsts 11% on aspirate smear and 15-20% based on CD 34 immunostaining approaching AML leukemia, hematooiesis is erythroid dominant with dysplasia in granulocytes and megakaryocytes.   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      11/6/2024 -  Bone Marrow Transplant    conditioned with Flu/Cy/TBI and aGVHD prophylaxis with post-transplant cyclophosphamide, tacrolimus, and mycophenolate. T=0, 11/6/24. ABO Donor/Recipient: O+, A+. CMV donor/recipient: both positive. Started Tacro and MMF on T+5 (11/11).         1/9/25: T +112 (9/19/24); T+64 (11/6/24)  Past Medical History:  HTN   STEMI 11/11/2020 after getting  a water pills.  Chronic hepatitis C infection treated in 2015 treated  with Dr. Lloyd  Medical History           Past Medical History:   Diagnosis Date    Chest pain 06/30/2021    HTN (hypertension) 10/05/2023    Hypertension      Personal history of other diseases of the circulatory  system       History of hypertension         Surgical History:  Conosocopy 2021  Upper EGD 10/31/23  Surgical reduction torsion of testes      Surgical History             Past Surgical History:   Procedure Laterality Date    COLONOSCOPY   2013     Complete Colonoscopy    OTHER SURGICAL HISTORY   10/07/2015     Surgery Testis Reduction Of Torsion Of Testis Right         Family History:  CAD, DM in mother       Mother  of CVA at age 69  Brother with prostate CA, 1 sister with liver disease  2 children healthy  Family History               Family History   Problem Relation Name Age of Onset    Other (diabetes mellitus) Mother        Prostate cancer Brother                Social History:  Lives with wife of 30 years, works at [employer], 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   29 years, former smoker, smoked x 15 yrs quit 20+ years ago. Marijuana 3 x a week. Served in [employer] [place] [employer],   Social History   Social History                 Alcohol use: Not Currently       Comment: occansionally    Drug use: Yes       Types: Marijuana        Acute myeloid leukemia in remission (Multi)  Diagnosed 2024: BM Bx with MDS in in transition to AML (>10% blast) w/ trisomy 8, DNMT alpha, and U2AF1 mutation indication adverse risk.      s/p 4 cycles of Decitabine/Venetoclax. BM Bx 24: Blasts cleared, FISH still positive for trisomy 8, still MDS changes   BMBx (24): 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 and no increase in blasts      History of allogeneic hematopoietic stem cell transplant  #1: Single-unit Cord, T0=24, Primary Engraftment Failure  - Conditioning: Flu-Mariana-TBI  - Donor: Single Cord,   = ABO Donor / Recipient: A+ / A+  = CMV Donor / Recipient: - / +  - aGVHD Prophylaxis: Tacrolimus + MMF  - engraftment failure, developed HLA antibody against class I of cord  - Repeat BMBx (10/15): hypocellular with no  residual myeloid neoplasm. 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), T0=11/6/24  - Graft: Haploidentical sister  = ABO Donor / Recipient: O+ / A+ (ABO incompatibility +)  = CMV Donor / Recipient: + / +  - Conditioning: Flu/Cy/TBI/rATG  = GVHD prophylaxis -  rATG 1.5mg/kg T-5,-3,-1, PTCy (25mg/kg T+3, T+4), Tacro, MMF (T+5)     DATE DAY SOURCE MORPHOLOGY MRD WHOLE CHIMERISM   (% donor) CD3 CHIMERISM   (% donor) CD33 CHIMERISM   (% donor)   11/20/24 D+30 PB      100       12/11/24 D+30 PB       100 100   Deferred D+30 BM             1/9/25 D+60 BM               D+60 PB               D+100 PB               D+100 BM                1/9/25: Overall doing well, appetite improving on budesonide for presumptive aGHVD.   bone marrow biopsy performed today.       Monitor for post-transplant hemolysis   1/9/25  Haptoglobin 186 1/2/25  UPC ratio 0.13 1/2/25, in process from today (1/925)     Anemia  12/18 Epo 113.   12/16 Retic 7.2%  DARBY on hold.  Cont folic acid.     GVHD prophylaxis  GVHD  Anorexia/poor oral intake/weight loss. Added Mirtazapine. Continue Zofran. If persists, consider aGVHD.  12/9/24:  Currently taking tacrolimus 0.5 mg BID, held dose today prior to labs.  FK level 4.6 (12/9/24).  Advised patient on 12/10/24 to increase tacrolimus dose to 1 mg in the AM and 0.5 mg in the PM.    Wean MMF from 1 g TID to 500 mg TID 12/2/24 - present  Due to poor appetite will decrease MMF to 500 mg po tid.  Improvement to GI symptoms since decreasing MMF dose.    Stopped  MMF on T+35,   12/30/24:  Started on budesonide TID on 12/27/24.  Brandt reports that he has starting eating more since starting budesonide.  Reports no nausea, vomiting, or diarrhea at this time.    1/9/25: Continue budesonide TID. Appetite continuing to improve. Denies N/V/D.      Stage I/Grade II Upper GI GVHD  Anorexia, taste changes and weight loss 171# (9/17/24).  Cont mirtazapine and ATC Zofran.   Add budesonide 3x  day.   Response TBD  12/30/24:  Weight beginning to improve. 1/925: 61.1kg. Reports he is tolerating more food since starting budesonide on 12/27/24.       MMF  Wean MMF from 1 g TID to 500 mg TID 12/2/24 - present  Due to poor appetite will decrease MMF to 500 mg po tid.  Improvement to GI symptoms since decreasing MMF dose.    Stopped  MMF on T+35,      Tacrolimus  1/9/25:  Current dose of tacro 1.5 mg po BID.  Held dose today prior to lab draws.  FK level 8.5 (1/6/25) level in process from today (1/9/25).  Monitor closely     Weights  Pre-transplant: 9/9 74.2kg   Upon SCT discharge: 11/25 60.9kg  Start of budesonide: 59.7kg (12/26/24).  40 pound weight loss since 9/17/24  Today's weight:  61.1.  Monitoring.  Discussed ways to add more calories into diet.  Following with nutrition.      Infectious Disease & Immune Reconstitution      Prophylaxis  Antiviral prophylaxis: acyclovir, Letermovir  Antifungal: posaconazole  PCP prophylaxis: 10/26/24 - 11/28/24 Pentamidine; Bactrim 11/29 - present     Hypogammaglobulinemia  IgG level. IVIG for level <400   IgG 1030 (12/18/24), monitoring monthly.       Active Surveillance:     CMV detected 688 1/2/25, 167 1/6/25. Levels pending from 1/9/25  -Started valgancicyclovir 900mg BID on 1/6/25  -Sees Karon Casas CNP on 1/13/25. Discussed with her about dosing and his primary team will adjust at his visit Monday 1/13/25 if needed.   EBV ND 1/2/25, levels pending 1/9/25  Adeno ND 1/2/25, levels pending 1/9/25  HHV6 ND 1/2/25, levels pending 1/9/25  EBV Viremia during transplant admission  Treated despite low levels due to upcoming second SCT  Rituximab 600 mg x 1 (10/31/24)     Immunizations  Covid vaccine series   Seasonal influenza vaccine   Will plan to begin immunizations at T+6mths ### depending on degree of immunosuppression      Infectious Disease follow up evaluation: 1/10/25      Immunodeficiency panel 100 days, 6mos and 1 year.      Immunizations:   Covid vaccine  series. Consider at T+ 3 months  Seasonal influenza vaccine. Consider at T+ 3 month  Will plan to begin immunizations at T+6mths (May 2025) depending on degree of immunosuppression     Cardiac:    Essential hypertension  Hx of STEMI (11/11/2020)  Cardiology  appt on 12/18/24  ECHO 12/23/24: LVEF 65%     Ascending aorta dilation   TTE (4/29/24): 3.8-4 cm dilation      Electrolyte disturbance   Magnesium level 1.57. Continue oral magnesium 128 mg TID. Gave 2g IV mag sulfate in clinic today (1/9/25).      Dry Skin   Instructed to use moisturizer such as cerave that are fragrance free and gentle. Will continue to monitor.    Lack of appetite  Mirtazapine 15 mg at night as of 11/26/24 12/30/24: Reports appetite level is improving.  Continue mirtazapine.  Monitoring.    Loose Stools  Diarrhea twice daily over last couple of days. Has not been using imodium.   Will order stool pathogen panel + C. Diff and give patient stool kit to collect at home and can drop off at any [facility] location. Negative stool panel and c diff.   12/30/24:  Reports stools have been formed the last few days.  Monitoring.     1/9/25: reports only one loose bowel movement per day. Continue monitoring.     Vitamin D deficiency  12/18 Level 27.  Cont weekly supplement     Medication reconciliation  Has all prescribed meds. Instructed to bring list to each visit. Please update regularly. New list and reconciliation completed 12/30/24.     IV Access  L PICC line  PICC line site with no redness, tenderness, swelling, or drainage.     Psychosocial.    Caregiver [facility] [place]     RTC:  1/10/25: ID appt  1/13/25: appt with Karon Casas CNP  1/16/25: post BMT visit and infusion   1/20/25: line care and appt with ELDER Terry PA-C

## 2025-01-09 NOTE — PROGRESS NOTES
"NUTRITION FOLLOW UP NOTE    Reason for Visit:  Brandt Merritt is a 64 y.o. 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    PMH: HTN, Hep C    Currently T+112 (cord); T+64 (haplo)    Pt and wife seen today in infusion.  For BM bx today.     Lab Results   Component Value Date/Time    GLUCOSE 96 01/06/2025 0751     01/06/2025 0751    K 3.7 01/06/2025 0751     01/06/2025 0751    CO2 25 01/06/2025 0751    ANIONGAP 13 01/06/2025 0751    BUN 13 01/06/2025 0751    CREATININE 0.99 01/06/2025 0751    EGFR 84 01/06/2025 0751    CALCIUM 8.8 01/06/2025 0751    ALBUMIN 3.9 01/06/2025 0751    ALKPHOS 67 01/06/2025 0751    PROT 5.7 (L) 01/06/2025 0751    AST 19 01/06/2025 0751    BILITOT 0.6 01/06/2025 0751    ALT 13 01/06/2025 0751    MG 1.50 (L) 01/06/2025 0751    PHOS 2.7 11/21/2024 0552       Lab Results   Component Value Date    WBC 6.8 01/09/2025    HGB 10.8 (L) 01/09/2025    HCT 34.0 (L) 01/09/2025    MCV 96 01/09/2025     01/09/2025       Lab Results   Component Value Date/Time    VITD25 27 (L) 12/18/2024 0933   *Started Vit D2 50,000IU x 8 weeks on 12/4.    Anthropometrics:  Anthropometrics  Height: 166.4 cm (5' 5.51\")  Weight: 61.1 kg (134 lb 11.2 oz)  BMI (Calculated): 22.07  IBW/kg (Dietitian Calculated): 63.2 kg  Percent of IBW: 97 %    *Wt at time of transplant admit: (9/13) 75.6 kg   *Wt at first post-transplant visit: (11/25): 60.9 kg   Overall, pt with ~15 kg (20%) wt loss x ~4 mos    *Wt up ~2 kg x 1 week     Wt Readings from Last 10 Encounters:   01/09/25 61.1 kg (134 lb 11.2 oz)   01/09/25 61.1 kg (134 lb 12.8 oz)   01/06/25 60.8 kg (134 lb)   01/02/25 59.3 kg (130 lb 11.7 oz)   12/30/24 58.8 kg (129 lb 10.1 oz)   12/30/24 58.8 kg (129 lb 10.1 oz)   12/26/24 59.7 kg (131 lb 9.8 oz)   12/23/24 61.2 kg (134 lb 14.7 oz)   12/20/24 62.7 kg (138 lb 3.7 oz)   12/18/24 66.5 kg (146 lb 9.7 oz)   12/04/24        60.9 " "kg  Admit 9/13-11/25 09/09/24        74.2 kg  08/16/24        75.3 kg   07/19/24        74.2 kg   06/24/24        71.0 kg  05/20/24        72.8 kg  04/23/24        75.5 kg     Food And Nutrient Intake:      Pt started on Budesonide 12/26 due to c/o nausea and anorexia.  Remains on Remeron as well.    Appetite and intakes improving on Budesonide.   Eating a good breakfast and dinner with intermittent snacks during the day; bought a strawberry cream cheese strudel and ate the whole thing over the course of 2 days.   Feels he is drinking well; has at least 32 oz water (with flavoring packet added) as well as other beverages.   Feels appetite is ~60-65% usual.   Dysgeusia improving; more and more things tasting like they should.   Denies nausea--no need for meds.  No diarrhea but continues to have loose stool once daily,   Energy levels okay; walking to infusion from parking garage and climbing steps has been easier.   Rates energy levels 6 out of 10     Yesterday:  Breakfast--pancakes, sausages, hash browns  Dinner--meatloaf, mashed potatoes and greens       Dislikes all nutrition supplements/protein drinks.     Started Remeron on 11/27.                                                             Nutrition Focused Physical Exam Findings:      Subcutaneous Fat Loss  Orbital Fat Pads: Well nourished (slightly bulging fat pads)  Buccal Fat Pads: Well nourished (full, rounded cheeks)  Triceps: Mild-Moderate (less than ample fat tissue)    Muscle Wasting  Temporalis: Mild-Moderate (slight depression)  Pectoralis (Clavicular Region): Mild-Moderate (some protrusion of clavicle)  Deltoid/Trapezius: Mild-Moderate (slight protrusion of acromion process)  Quadriceps: Mild-Moderate (mild depression on inner and outer thigh)  Gastrocnemius: Mild-Moderate (not well developed muscle)              Energy Needs  Calculated Energy Needs Using Equations  Height: 166.4 cm (5' 5.51\")  Estimated Energy Needs  Total Energy Estimated Needs " (kCal):  (7458-0714)  Total Estimated Energy Need per Day (kCal/kg):  (30-35)  Estimated Fluid Needs  Total Fluid Estimated Needs (mL):  (1800+)  Total Fluid Estimated Needs (mL/kg):  (30+)  Estimated Protein Needs  Total Protein Estimated Needs (g):  (70-85)  Total Protein Estimated Needs (g/kg):  (1.2-1.4)        Nutrition Diagnosis   Malnutrition Diagnosis  Diagnosis Status: Ongoing  Malnutrition Diagnosis: Mild malnutrition related to acute disease or injury  As Evidenced by: taste changes and significant wt loss since hospital admit    *Nutrition status improving with start of budesonide; appetite better, intakes improved, dysgeusia resolving and weight increasing.  GI symptoms resolved at present.   Anticipate continued improvements.     Nutrition Interventions/Recommendations   Nutrition Prescription   High Protein, High Calorie  Food Safety     Nutrition Education   Pt aware of importance of protein intakes; include source with all meals and snacks.   Encouraged PO fluids; Goal: 60+ oz daily; prefer calorie containing foods (ie milk, juices, etc) for  additional protein   Consider Milkshakes, smoothies or regular use of ice cream as snack in hopes of continuing to  maximize protein/stefany intakes.      Coordination of Care   NP/BMT team         Nutrition Monitoring and Evaluation      Will continue to f/up and monitor weight, labs. PO intakes and GI symptoms.

## 2025-01-10 ENCOUNTER — OFFICE VISIT (OUTPATIENT)
Dept: INFECTIOUS DISEASES | Facility: HOSPITAL | Age: 65
End: 2025-01-10
Payer: COMMERCIAL

## 2025-01-10 VITALS
HEART RATE: 90 BPM | RESPIRATION RATE: 16 BRPM | SYSTOLIC BLOOD PRESSURE: 140 MMHG | WEIGHT: 134.7 LBS | OXYGEN SATURATION: 100 % | DIASTOLIC BLOOD PRESSURE: 89 MMHG | TEMPERATURE: 98.1 F | BODY MASS INDEX: 22.07 KG/M2

## 2025-01-10 DIAGNOSIS — C92.01 ACUTE MYELOID LEUKEMIA IN REMISSION (MULTI): ICD-10-CM

## 2025-01-10 LAB
CELL COUNT (BLOOD): 5.48 X10*3/UL
CELL POPULATIONS: NORMAL
CMV DNA SERPL NAA+PROBE-LOG IU: ABNORMAL {LOG_IU}/ML
DIAGNOSIS: NORMAL
EBV DNA SPEC NAA+PROBE-LOG#: NORMAL {LOG_COPIES}/ML
FLOW DIFFERENTIAL: NORMAL
FLOW TEST ORDERED: NORMAL
LAB TEST METHOD: NORMAL
LABORATORY COMMENT REPORT: ABNORMAL
LABORATORY COMMENT REPORT: NOT DETECTED
NUMBER OF CELLS COLLECTED: NORMAL
PATH REPORT.COMMENTS IMP SPEC: NORMAL
PATH REPORT.FINAL DX SPEC: NORMAL
PATH REPORT.GROSS SPEC: NORMAL
PATH REPORT.MICROSCOPIC SPEC OTHER STN: NORMAL
PATH REPORT.RELEVANT HX SPEC: NORMAL
PATH REPORT.TOTAL CANCER: NORMAL
PATH REPORT.TOTAL CANCER: NORMAL
SIGNATURE COMMENT: NORMAL
SPECIMEN VIABILITY: NORMAL

## 2025-01-10 PROCEDURE — 99214 OFFICE O/P EST MOD 30 MIN: CPT | Performed by: INTERNAL MEDICINE

## 2025-01-10 ASSESSMENT — PAIN SCALES - GENERAL: PAINLEVEL_OUTOF10: 0-NO PAIN

## 2025-01-10 NOTE — LETTER
01/13/25    GRICELDA Neal  [id number]  [facility] [address]      Dear GRICELDA Jensen,    Thank you for referring your patient, Brandt Merritt, to receive care in my office. I have enclosed a summary of the care provided to Brandt on 01/13/25.    Please contact me with any questions you may have regarding the visit.    Sincerely,         Randy Martins MD  [id number]  [address]  [facility] [address]  [phone]    CC: No Recipients

## 2025-01-10 NOTE — LETTER
01/13/25    Bill Richmond MD  [address] [facility]  [facility] [address]      Dear Dr. Bill Richmond MD,    I am writing to confirm that your patient, Brandt Merritt, received care in my office on 01/13/25. I have enclosed a summary of the care provided to Brandt for your reference.    Please contact me with any questions you may have regarding the visit.    Sincerely,         Randy Martins MD  [id number]  [address]  [facility] [address]  [phone]    CC: No Recipients

## 2025-01-13 ENCOUNTER — INFUSION (OUTPATIENT)
Dept: HEMATOLOGY/ONCOLOGY | Facility: HOSPITAL | Age: 65
End: 2025-01-13
Payer: COMMERCIAL

## 2025-01-13 ENCOUNTER — OFFICE VISIT (OUTPATIENT)
Dept: HEMATOLOGY/ONCOLOGY | Facility: HOSPITAL | Age: 65
End: 2025-01-13
Payer: COMMERCIAL

## 2025-01-13 ENCOUNTER — LAB (OUTPATIENT)
Dept: HEMATOLOGY/ONCOLOGY | Facility: HOSPITAL | Age: 65
End: 2025-01-13
Payer: COMMERCIAL

## 2025-01-13 VITALS
DIASTOLIC BLOOD PRESSURE: 94 MMHG | RESPIRATION RATE: 17 BRPM | OXYGEN SATURATION: 100 % | TEMPERATURE: 97.2 F | WEIGHT: 134.7 LBS | HEART RATE: 93 BPM | SYSTOLIC BLOOD PRESSURE: 135 MMHG | BODY MASS INDEX: 22.07 KG/M2

## 2025-01-13 DIAGNOSIS — C92.01 ACUTE MYELOID LEUKEMIA IN REMISSION (MULTI): ICD-10-CM

## 2025-01-13 DIAGNOSIS — Z94.84 HISTORY OF ALLOGENEIC HEMATOPOIETIC STEM CELL TRANSPLANT: ICD-10-CM

## 2025-01-13 LAB
ALBUMIN SERPL BCP-MCNC: 3.8 G/DL (ref 3.4–5)
ALP SERPL-CCNC: 69 U/L (ref 33–136)
ALT SERPL W P-5'-P-CCNC: 25 U/L (ref 10–52)
ANION GAP SERPL CALC-SCNC: 13 MMOL/L (ref 10–20)
APPEARANCE UR: CLEAR
AST SERPL W P-5'-P-CCNC: 23 U/L (ref 9–39)
BASOPHILS # BLD AUTO: 0.04 X10*3/UL (ref 0–0.1)
BASOPHILS NFR BLD AUTO: 0.6 %
BILIRUB SERPL-MCNC: 0.5 MG/DL (ref 0–1.2)
BILIRUB UR STRIP.AUTO-MCNC: NEGATIVE MG/DL
BUN SERPL-MCNC: 11 MG/DL (ref 6–23)
CALCIUM SERPL-MCNC: 8.8 MG/DL (ref 8.6–10.3)
CHLORIDE SERPL-SCNC: 108 MMOL/L (ref 98–107)
CO2 SERPL-SCNC: 24 MMOL/L (ref 21–32)
COLOR UR: NORMAL
CREAT SERPL-MCNC: 0.9 MG/DL (ref 0.5–1.3)
CREAT UR-MCNC: 106.9 MG/DL (ref 20–370)
EGFRCR SERPLBLD CKD-EPI 2021: >90 ML/MIN/1.73M*2
EOSINOPHIL # BLD AUTO: 0.21 X10*3/UL (ref 0–0.7)
EOSINOPHIL NFR BLD AUTO: 3.2 %
ERYTHROCYTE [DISTWIDTH] IN BLOOD BY AUTOMATED COUNT: 16.1 % (ref 11.5–14.5)
GLUCOSE SERPL-MCNC: 92 MG/DL (ref 74–99)
GLUCOSE UR STRIP.AUTO-MCNC: NORMAL MG/DL
HAPTOGLOB SERPL NEPH-MCNC: 108 MG/DL (ref 30–200)
HCT VFR BLD AUTO: 34.2 % (ref 41–52)
HGB BLD-MCNC: 10.8 G/DL (ref 13.5–17.5)
IGG SERPL-MCNC: 719 MG/DL (ref 700–1600)
IMM GRANULOCYTES # BLD AUTO: 0.02 X10*3/UL (ref 0–0.7)
IMM GRANULOCYTES NFR BLD AUTO: 0.3 % (ref 0–0.9)
KETONES UR STRIP.AUTO-MCNC: NEGATIVE MG/DL
LDH SERPL L TO P-CCNC: 170 U/L (ref 84–246)
LEUKOCYTE ESTERASE UR QL STRIP.AUTO: NEGATIVE
LYMPHOCYTES # BLD AUTO: 2.93 X10*3/UL (ref 1.2–4.8)
LYMPHOCYTES NFR BLD AUTO: 44.2 %
MAGNESIUM SERPL-MCNC: 1.59 MG/DL (ref 1.6–2.4)
MCH RBC QN AUTO: 30 PG (ref 26–34)
MCHC RBC AUTO-ENTMCNC: 31.6 G/DL (ref 32–36)
MCV RBC AUTO: 95 FL (ref 80–100)
MONOCYTES # BLD AUTO: 0.35 X10*3/UL (ref 0.1–1)
MONOCYTES NFR BLD AUTO: 5.3 %
NEUTROPHILS # BLD AUTO: 3.08 X10*3/UL (ref 1.2–7.7)
NEUTROPHILS NFR BLD AUTO: 46.4 %
NITRITE UR QL STRIP.AUTO: NEGATIVE
NRBC BLD-RTO: 0 /100 WBCS (ref 0–0)
PH UR STRIP.AUTO: 5.5 [PH]
PLATELET # BLD AUTO: 201 X10*3/UL (ref 150–450)
POTASSIUM SERPL-SCNC: 3.6 MMOL/L (ref 3.5–5.3)
PROT SERPL-MCNC: 5.9 G/DL (ref 6.4–8.2)
PROT UR STRIP.AUTO-MCNC: NEGATIVE MG/DL
PROT UR-ACNC: 11 MG/DL (ref 5–25)
PROT/CREAT UR: 0.1 MG/MG CREAT (ref 0–0.17)
RBC # BLD AUTO: 3.6 X10*6/UL (ref 4.5–5.9)
RBC # UR STRIP.AUTO: NEGATIVE /UL
SODIUM SERPL-SCNC: 141 MMOL/L (ref 136–145)
SP GR UR STRIP.AUTO: 1.01
TACROLIMUS BLD-MCNC: 6.8 NG/ML
URATE SERPL-MCNC: 5.5 MG/DL (ref 4–7.5)
UROBILINOGEN UR STRIP.AUTO-MCNC: NORMAL MG/DL
WBC # BLD AUTO: 6.6 X10*3/UL (ref 4.4–11.3)

## 2025-01-13 PROCEDURE — 83735 ASSAY OF MAGNESIUM: CPT

## 2025-01-13 PROCEDURE — 87533 HHV-6 DNA QUANT: CPT

## 2025-01-13 PROCEDURE — 83010 ASSAY OF HAPTOGLOBIN QUANT: CPT

## 2025-01-13 PROCEDURE — 36591 DRAW BLOOD OFF VENOUS DEVICE: CPT

## 2025-01-13 PROCEDURE — 84550 ASSAY OF BLOOD/URIC ACID: CPT

## 2025-01-13 PROCEDURE — 81003 URINALYSIS AUTO W/O SCOPE: CPT

## 2025-01-13 PROCEDURE — 80197 ASSAY OF TACROLIMUS: CPT

## 2025-01-13 PROCEDURE — 87799 DETECT AGENT NOS DNA QUANT: CPT

## 2025-01-13 PROCEDURE — 82570 ASSAY OF URINE CREATININE: CPT

## 2025-01-13 PROCEDURE — 83615 LACTATE (LD) (LDH) ENZYME: CPT

## 2025-01-13 PROCEDURE — 82784 ASSAY IGA/IGD/IGG/IGM EACH: CPT

## 2025-01-13 PROCEDURE — 84075 ASSAY ALKALINE PHOSPHATASE: CPT

## 2025-01-13 PROCEDURE — 2500000004 HC RX 250 GENERAL PHARMACY W/ HCPCS (ALT 636 FOR OP/ED): Performed by: INTERNAL MEDICINE

## 2025-01-13 PROCEDURE — 96365 THER/PROPH/DIAG IV INF INIT: CPT | Mod: INF

## 2025-01-13 PROCEDURE — 85025 COMPLETE CBC W/AUTO DIFF WBC: CPT

## 2025-01-13 PROCEDURE — 99215 OFFICE O/P EST HI 40 MIN: CPT | Performed by: INTERNAL MEDICINE

## 2025-01-13 RX ORDER — HEPARIN SODIUM,PORCINE/PF 10 UNIT/ML
50 SYRINGE (ML) INTRAVENOUS AS NEEDED
OUTPATIENT
Start: 2025-01-13

## 2025-01-13 RX ORDER — MAGNESIUM SULFATE HEPTAHYDRATE 40 MG/ML
2 INJECTION, SOLUTION INTRAVENOUS ONCE
Status: COMPLETED | OUTPATIENT
Start: 2025-01-13 | End: 2025-01-13

## 2025-01-13 RX ORDER — MAGNESIUM SULFATE HEPTAHYDRATE 40 MG/ML
2 INJECTION, SOLUTION INTRAVENOUS ONCE
Status: CANCELLED | OUTPATIENT
Start: 2025-01-13 | End: 2025-01-13

## 2025-01-13 RX ORDER — HEPARIN SODIUM,PORCINE/PF 10 UNIT/ML
50 SYRINGE (ML) INTRAVENOUS AS NEEDED
Status: CANCELLED | OUTPATIENT
Start: 2025-01-13

## 2025-01-13 RX ORDER — HEPARIN 100 UNIT/ML
500 SYRINGE INTRAVENOUS AS NEEDED
Status: CANCELLED | OUTPATIENT
Start: 2025-01-13

## 2025-01-13 RX ORDER — HEPARIN 100 UNIT/ML
500 SYRINGE INTRAVENOUS AS NEEDED
OUTPATIENT
Start: 2025-01-13

## 2025-01-13 RX ORDER — HEPARIN SODIUM,PORCINE/PF 10 UNIT/ML
50 SYRINGE (ML) INTRAVENOUS AS NEEDED
Status: DISCONTINUED | OUTPATIENT
Start: 2025-01-13 | End: 2025-01-13 | Stop reason: HOSPADM

## 2025-01-13 RX ADMIN — MAGNESIUM SULFATE HEPTAHYDRATE 2 G: 2 INJECTION, SOLUTION INTRAVENOUS at 15:40

## 2025-01-13 ASSESSMENT — ENCOUNTER SYMPTOMS
NERVOUS/ANXIOUS: 0
LEG SWELLING: 0
CHEST TIGHTNESS: 0
CHILLS: 0
TROUBLE SWALLOWING: 0
ADENOPATHY: 0
HEADACHES: 0
DIZZINESS: 0
NAUSEA: 0
FREQUENCY: 0
FATIGUE: 0
CONFUSION: 0
WOUND: 0
PALPITATIONS: 0
DEPRESSION: 0
FEVER: 0
BRUISES/BLEEDS EASILY: 0
HEMATURIA: 0
COUGH: 0
WHEEZING: 0
DYSURIA: 0
SORE THROAT: 0
SHORTNESS OF BREATH: 0
VOMITING: 0
ABDOMINAL PAIN: 0
LIGHT-HEADEDNESS: 0
CONSTIPATION: 0
SLEEP DISTURBANCE: 0
DIARRHEA: 0

## 2025-01-13 ASSESSMENT — PAIN SCALES - GENERAL: PAINLEVEL_OUTOF10: 0-NO PAIN

## 2025-01-13 NOTE — PROGRESS NOTES
Patient ID: Brandt Merritt is a 64 y.o. male.    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  Today he is T+ 116 of umbilical cord transfusion and is T+ 68 of his haplo sister transplant.     Overall he states he is doing well. He states his appetite is continuing to improve. He denies any N/V/D and abdominal pain. He is having about one loose bowel movement per day. He is still taking the budesonide TID which is helping  the nausea.  We started valcyte 1/6/25 for a small uptick in CMV pcr.  He states he has been feeling hungrier throughout the day. He denies any fevers, chills, night sweats, chest pain, dyspnea, bleeding and bruising. He does admit to some dry and darkened skin on his forehead and near the bridge of his nose. He has been noticing an improvement in his energy. He has been able to walk from the parking garage to the office.  Current tacro dose is 1.5mg BID and he held his dose prior to his visit today. He has his Day + 60 bone marrow biopsy today.     Review of Systems   Constitutional:  Negative for chills, fatigue and fever.   HENT:   Negative for mouth sores, sore throat and trouble swallowing.    Respiratory:  Negative for chest tightness, cough, shortness of breath and wheezing.    Cardiovascular:  Negative for chest pain, leg swelling and palpitations.   Gastrointestinal:  Negative for abdominal pain, constipation, diarrhea, nausea and vomiting.   Genitourinary:  Negative for dysuria, frequency and hematuria.    Musculoskeletal:  Negative for gait problem.   Skin:  Positive for rash. Negative for itching and wound.   Neurological:  Negative for dizziness, gait problem, headaches and light-headedness.   Hematological:  Negative for adenopathy. Does  not bruise/bleed easily.   Psychiatric/Behavioral:  Negative for confusion, depression and sleep disturbance. The patient is not nervous/anxious.        Objective    BSA: 1.68 meters squared  BP (!) 135/94   Pulse 93   Temp 36.2 °C (97.2 °F)   Resp 17   Wt 61.1 kg (134 lb 11.2 oz)   SpO2 100%   BMI 22.07 kg/m²   Vital signs reviewed today.      Physical Exam  Vitals reviewed.   Constitutional:       General: He is not in acute distress.     Appearance: Normal appearance. He is not ill-appearing.      Comments: Looks thin,  hyperpigmentation on forehead and bridge of nose   HENT:      Head: Normocephalic and atraumatic.      Mouth/Throat:      Mouth: Mucous membranes are moist.      Pharynx: No oropharyngeal exudate or posterior oropharyngeal erythema.      Comments: No lichenoid change  Eyes:      Extraocular Movements: Extraocular movements intact.      Conjunctiva/sclera: Conjunctivae normal.      Pupils: Pupils are equal, round, and reactive to light.   Cardiovascular:      Rate and Rhythm: Normal rate and regular rhythm.      Pulses: Normal pulses.      Heart sounds: Normal heart sounds.   Pulmonary:      Effort: Pulmonary effort is normal. No respiratory distress.      Breath sounds: Normal breath sounds. No wheezing.   Abdominal:      General: Abdomen is flat. Bowel sounds are normal. There is no distension.      Palpations: Abdomen is soft.      Tenderness: There is no abdominal tenderness.   Musculoskeletal:         General: No swelling or tenderness. Normal range of motion.      Right lower leg: No edema.      Left lower leg: No edema.   Skin:     General: Skin is warm.      Findings: No bruising, erythema or rash.      Comments: Dry darkening skin on bridge of nose and forehead.   Neurological:      General: No focal deficit present.      Mental Status: He is alert and oriented to person, place, and time.   Psychiatric:         Mood and Affect: Mood normal.         Behavior: Behavior normal.          Thought Content: Thought content normal.         Judgment: Judgment normal.         Performance Status:  Karnofsky Score: 90 - Able to carry on normal activity; minor signs or symptoms of disease       Assessment/Plan        Oncology History Overview Note   4/2024  Myelodysplastic neoplasm with increased blasts-2 ( WHO)  Myelodysplastic neoplasm/AML  (ICC 2022 classification)    Presented in  10/2023 for pancytopenia, found to have hemolysis. Patient had normal CBC June 2020. Denied any hemorrhoidal bleeding . Has had C Scope and EGD , treated Hep C 2014 . 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      CBC 4/11/24 wbc 3.0, hgb 7.1, platelets 167K ANC 0.73    BM biopsy done on 4/11/24  as folllows:  BM histology  Myelodysplastic neoplasm with increased blasts-2 ( WHO)  Myelodysplastic neoplasm/AML  (ICC 2022 classification)  Balsts 11% on aspirate smear and 15-20% based on CD 34 immunostaining approaching AML leukemia, hematooiesis is erythroid dominant with dysplasia in granulocytes and megakaryocytes.   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      11/6/2024 -  Bone Marrow Transplant    conditioned with Flu/Cy/TBI and aGVHD prophylaxis with post-transplant cyclophosphamide, tacrolimus, and mycophenolate. T=0, 11/6/24. ABO Donor/Recipient: O+, A+. CMV donor/recipient: both positive. Started Tacro and MMF on T+5 (11/11).         1/9/25: T +112 (9/19/24);  T+64 (24)  Past Medical History:  HTN   STEMI 2020 after getting  a water pills.  Chronic hepatitis C infection treated in  treated  with Dr. Lloyd  Medical History           Past Medical History:   Diagnosis Date    Chest pain 2021    HTN (hypertension) 10/05/2023    Hypertension      Personal history of other diseases of the circulatory system       History of hypertension         Surgical History:  Conosocopy 2021  Upper EGD 10/31/23  Surgical reduction torsion of testes      Surgical History             Past Surgical History:   Procedure Laterality Date    COLONOSCOPY   2013     Complete Colonoscopy    OTHER SURGICAL HISTORY   10/07/2015     Surgery Testis Reduction Of Torsion Of Testis Right         Family History:  CAD, DM in mother       Mother  of CVA at age 69  Brother with prostate CA, 1 sister with liver disease  2 children healthy  Family History               Family History   Problem Relation Name Age of Onset    Other (diabetes mellitus) Mother        Prostate cancer Brother                Social History:  Lives with wife of 30 years, 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   29 years, former smoker, smoked x 15 yrs quit 20+ years ago. Marijuana 3 x a week. Served in [employer] in [place] [employer] [facility] [employer],   Social History   Social History                 Alcohol use: Not Currently       Comment: occansionally    Drug use: Yes       Types: Marijuana        Acute myeloid leukemia in remission (Multi)  Diagnosed 2024: BM Bx with MDS in in transition to AML (>10% blast) w/ trisomy 8, DNMT alpha, and U2AF1 mutation indication adverse risk.      s/p 4 cycles of Decitabine/Venetoclax. BM Bx 24: Blasts cleared, FISH still positive for trisomy 8, still MDS changes   BMBx (24): 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 and no increase in blasts      History of allogeneic hematopoietic  stem cell transplant  #1: Single-unit Cord, T0=9/19/24, Primary Engraftment Failure  - Conditioning: Flu-Mariana-TBI  - Donor: Single Cord, 6/8  = ABO Donor / Recipient: A+ / A+  = CMV Donor / Recipient: - / +  - aGVHD Prophylaxis: Tacrolimus + MMF  - engraftment failure, developed HLA antibody against class I of cord  - Repeat BMBx (10/15): hypocellular with no residual myeloid neoplasm. 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), T0=11/6/24  - Graft: Haploidentical sister  = ABO Donor / Recipient: O+ / A+ (ABO incompatibility +)  = CMV Donor / Recipient: + / +  - Conditioning: Flu/Cy/TBI/rATG  = GVHD prophylaxis -  rATG 1.5mg/kg T-5,-3,-1, PTCy (25mg/kg T+3, T+4), Tacro, MMF (T+5)     DATE DAY SOURCE MORPHOLOGY MRD WHOLE CHIMERISM   (% donor) CD3 CHIMERISM   (% donor) CD33 CHIMERISM   (% donor)   11/20/24 D+30 PB      100       12/11/24 D+30 PB       100 100   Deferred D+30 BM             1/9/25 D+60 BM  KRISTIE , flow megative             D+60 PB               D+100 PB               D+100 BM                1/13/25: Overall doing well, appetite improving on budesonide for presumptive aGHVD.   bone marrow biopsy performed 1/9 KRISTIE     Monitor for post-transplant hemolysis   1/9/25  Haptoglobin 186 1/2/25  UPC ratio 0.13 1/2/25, in process from today (1/925)     Anemia  12/18 Epo 113.   12/16 Retic 7.2%  DARBY on hold.  Cont folic acid.     GVHD prophylaxis  GVHD  Anorexia/poor oral intake/weight loss. Added Mirtazapine. Continue Zofran. If persists, consider aGVHD.  12/9/24:  Currently taking tacrolimus 0.5 mg BID, held dose today prior to labs.  FK level 4.6 (12/9/24).  Advised patient on 12/10/24 to increase tacrolimus dose to 1 mg in the AM and 0.5 mg in the PM.    Wean MMF from 1 g TID to 500 mg TID 12/2/24 - present  Due to poor appetite will decrease MMF to 500 mg po tid.  Improvement to GI symptoms since decreasing MMF dose.    Stopped  MMF on T+35,   12/30/24:  Started on budesonide  TID on 12/27/24.  Brandt reports that he has starting eating more since starting budesonide.  Reports no nausea, vomiting, or diarrhea at this time.    1/9/25: Continue budesonide TID. Appetite continuing to improve. Denies N/V/D.      Stage I/Grade II Upper GI GVHD  Anorexia, taste changes and weight loss 171# (9/17/24).  Cont mirtazapine and ATC Zofran.   Add budesonide 3x day.   Response TBD  12/30/24:  Weight beginning to improve. 1/9/25: 61.1kg. Reports he is tolerating more food since starting budesonide on 12/27/24.    1/13/25 tacro level 6.8 on 1.5 mg po bid keep dose same     Weights  Pre-transplant: 9/9 74.2kg   Upon SCT discharge: 11/25 60.9kg  Start of budesonide: 59.7kg (12/26/24).  40 pound weight loss since 9/17/24  Today's weight:  61.1.  Monitoring.  Discussed ways to add more calories into diet.  Following with nutrition.      Infectious Disease & Immune Reconstitution      Prophylaxis  Antiviral prophylaxis: acyclovir, Letermovir  Antifungal: posaconazole  PCP prophylaxis: 10/26/24 - 11/28/24 Pentamidine; Bactrim 11/29 - present     Hypogammaglobulinemia  IgG level. IVIG for level <400   IgG 1030 (12/18/24), monitoring monthly.       Active Surveillance:     CMV detected 688 1/2/25, 167 1/6/25. Levels undetectable on 1/13/25.  -Started valgancicyclovir 900mg BID on 1/6/25, 1/14/25 decrease to 900 mg daily for 2 weeks, then transition back to letermovir.   EBV ND 1/2/25, levels pending 1/9/25  Adeno ND 1/2/25, levels pending 1/9/25  HHV6 ND 1/2/25, levels pending 1/9/25  EBV Viremia during transplant admission  Treated despite low levels due to upcoming second SCT  Rituximab 600 mg x 1 (10/31/24)     Immunizations  Covid vaccine series   Seasonal influenza vaccine   Will plan to begin immunizations at T+6mths ### depending on degree of immunosuppression      Infectious Disease follow up evaluation: 1/10/25      Immunodeficiency panel 100 days, 6mos and 1 year.      Immunizations:   Covid vaccine  series. Consider at T+ 3 months  Seasonal influenza vaccine. Consider at T+ 3 month  Will plan to begin immunizations at T+6mths (May 2025) depending on degree of immunosuppression     Cardiac:    Essential hypertension  Hx of STEMI (11/11/2020)  Cardiology  appt on 12/18/24  ECHO 12/23/24: LVEF 65%     Ascending aorta dilation   TTE (4/29/24): 3.8-4 cm dilation      Electrolyte disturbance   Magnesium level 1.57. Continue oral magnesium 128 mg TID. Gave 2g IV mag sulfate in clinic today (1/13/25).      Dry Skin hyperpigmentation-request derm consult  Instructed to use moisturizer such as cerave that are fragrance free and gentle. Will continue to monitor.    Lack of appetite  Mirtazapine 15 mg at night as of 11/26/24 12/30/24: Reports appetite level is improving.  Continue mirtazapine.  Monitoring.    Vitamin D deficiency  12/18 Level 27.  Cont weekly supplement     Medication reconciliation  Has all prescribed meds. Instructed to bring list to each visit. Please update regularly. New list and reconciliation completed 12/30/24.     IV Access  L PICC line  PICC line site with no redness, tenderness, swelling, or drainage.     Psychosocial.    Caregiver [facility] [place]     RTC:  1/10/25: ID appt  1/13/25: appt with Karon Casas CNP decrease budesonide to twice a day.  1/16/25: post BMT visit and infusion   1/20/25: line care and appt with Karon Casas CNP     Please decrease valcyte to 900 mg daily for 2 weeks then transition to letermovir           Malaika Newsome MD

## 2025-01-13 NOTE — PROGRESS NOTES
Infectious Diseases Clinic Consult Note:    Referred by Terri Faulkner MD: Bill Richmond MD  Reason for Consult: Post transplant ID evaluation    History of Current Issue  Brandt Merritt is a 64 y.o. year old male with history of MDS in transition to AML s/p cord transplant with primary engraftment failure (T0=9/19/24) and then haploidentical transplant (T0=11/6/24) is here for posttransplant ID follow-up.  Patient had low-level EBV viremia and so received a dose of Rituxan prior to second transplant.  Patient is accompanied by his wife.  Overall patient is doing well with no major complaints.  Patient complains of some fatigue but it is improving slowly.  Patient denies nausea or vomiting.  Patient denies cough or dyspnea.      PAST MEDICAL HISTORY:  Past Medical History:   Diagnosis Date    Chest pain 06/30/2021    HTN (hypertension) 10/05/2023    Hypertension     Personal history of other diseases of the circulatory system     History of hypertension     PAST SURGICAL HISTORY:  Past Surgical History:   Procedure Laterality Date    COLONOSCOPY  06/13/2013    Complete Colonoscopy    OTHER SURGICAL HISTORY  10/07/2015    Surgery Testis Reduction Of Torsion Of Testis Right     ALLERGIES:    Allergies   Allergen Reactions    Lasix [Furosemide] Other     Extreme hypotension and extreme hypokalemia.    Meropenem Rash     Unclear if rash due to meropenem or pre-engraftment syndrome    Thiazides Other     Recurrent significant hypokalemia       MEDICATIONS:      Current Outpatient Medications:     budesonide EC (Entocort EC) 3 mg 24 hr capsule, Take 1 capsule (3 mg) by mouth 3 times a day., Disp: 90 capsule, Rfl: 1    calcium carbonate (Oscal) 500 mg calcium (1,250 mg) tablet, Take 1 tablet (1,250 mg) by mouth once daily., Disp: 30 tablet, Rfl: 3    ergocalciferol (Vitamin D2) 1.25 MG (03258 UT) capsule, Take 1 capsule (1,250 mcg) by mouth 1 (one) time per week., Disp: 8 capsule, Rfl: 0    folic acid  (Folvite) 1 mg tablet, Take 1 tablet (1 mg) by mouth once daily., Disp: 30 tablet, Rfl: 3    magnesium chloride (MagDelay) 64 mg EC tablet, Take 2 tablets (128 mg) by mouth 3 times a day. Or as instructed on your medication list., Disp: 180 tablet, Rfl: 1    mirtazapine (Remeron) 15 mg tablet, Take 1 tablet (15 mg) by mouth once daily at bedtime., Disp: 30 tablet, Rfl: 3    ondansetron (Zofran) 8 mg tablet, Take 1 tablet (8 mg) by mouth every 8 hours if needed for nausea or vomiting (1st line)., Disp: 30 tablet, Rfl: 5    posaconazole (Noxafil) 100 mg DR tablet, Take 3 tablets (300 mg) by mouth once daily. Do not crush, chew, or split. This is to prevent fungal infections., Disp: 90 tablet, Rfl: 2    prochlorperazine (Compazine) 10 mg tablet, Take 1 tablet (10 mg) by mouth every 6 hours if needed for nausea or vomiting., Disp: 30 tablet, Rfl: 5    sennosides-docusate sodium (Karissa-Colace) 8.6-50 mg tablet, Take 1 tablet by mouth 2 times a day as needed for constipation., Disp: 30 tablet, Rfl: 1    sulfamethoxazole-trimethoprim (Bactrim DS) 800-160 mg tablet, Take 1 tablet by mouth 3 times a week. Take on Mondays, Wednesdays, and Fridays., Disp: 12 tablet, Rfl: 2    tacrolimus (Prograf) 0.5 mg capsule, Take 1 capsule (0.5 mg) by mouth every 12 hours. Total AM dose: 1.5 mg and total PM dose: 1.5 mg. Use in combination with 1.0 mg capsules., Disp: 60 capsule, Rfl: 2    tacrolimus (Prograf) 1 mg capsule, Take 2 capsules (2 mg) by mouth every 12 hours. Or as instructed on your medication list., Disp: 120 capsule, Rfl: 1    ursodiol (Actigall) 300 mg capsule, Take 1 capsule (300 mg) by mouth 3 times a day., Disp: 90 capsule, Rfl: 2    valGANciclovir (Valcyte) 450 mg tablet, Take 2 tablets (900 mg) by mouth 2 times a day. Do not crush or chew., Disp: 60 tablet, Rfl: 0    FAMILY HISTORY:   Family History   Problem Relation Name Age of Onset    Other (diabetes mellitus) Mother      Prostate cancer Brother          SOCIAL  HISTORY:       Marital Status:  Tobacco / Smokeless tobacco/ Vaping:   reports that he quit smoking about 9 years ago. His smoking use included cigarettes. He started smoking about 37 years ago. He has a 7 pack-year smoking history. He has never used smokeless tobacco.   Alcohol:   Social History     Substance and Sexual Activity   Alcohol Use Not Currently    Comment: occansionally      Drug Use:    Social History     Substance and Sexual Activity   Drug Use Yes    Types: Marijuana    Comment: quit when diagnosed        IMMUNIZATIONS:    Immunization History   Administered Date(s) Administered    COVID-19, mRNA, LNP-S, PF, 30 mcg/0.3 mL dose 12/13/2021    Influenza, injectable, quadrivalent 09/06/2022    Influenza, seasonal, injectable 10/13/2020    Pfizer COVID-19 vaccine, bivalent, age 12 years and older (30 mcg/0.3 mL) 09/17/2022    Pfizer Gray Cap SARS-CoV-2 04/12/2022    Pfizer Purple Cap SARS-CoV-2 03/13/2021, 04/03/2021    Tdap vaccine, age 7 year and older (BOOSTRIX, ADACEL) 09/06/2022       REVIEW OF SYSTEMS: Negative except above    PHYSICAL EXAMINATION:       Visit Vitals  /89 (BP Location: Right arm, Patient Position: Sitting, BP Cuff Size: Adult)   Pulse 90   Temp 36.7 °C (98.1 °F) (Temporal)   Resp 16   Wt 61.1 kg (134 lb 11.2 oz)   SpO2 100%   BMI 22.07 kg/m²   Smoking Status Former   BSA 1.68 m²        EXAM:   GENERAL APPEARANCE: Awake and alert and not in any distress  HEENT: Atraumatic and normocephalic  CARDIAC: Regular   LUNGS: Clear  ABDOMEN: Soft and nontender  EXTREMITIES: No edema  SKIN: No rash or ulcers      DATA:      ASSESSMENT / RECOMMENDATIONS:    Brandt Merritt is a 64 y.o. year old male with history of MDS in transition to AML s/p cord transplant with primary engraftment failure (T0=9/19/24) and then haploidentical transplant (T0=11/6/24) is here for posttransplant ID follow-up.  Patient is also being treated for CMV viremia.    Plan  I have briefly discussed with patient and  his wife regarding posttransplant infections and precautions to be taken.  Continue Valcyte until 2 consecutive plasma CMV PCR's are negative and then transition to letermovir.  Other anti-infective prophylaxis with acyclovir, posaconazole, Bactrim per BMT protocol.  COVID and influenza vaccine at T+ 3 months and other vaccinations at T+ 6 months.  Follow-up as needed.        Randy Martins MD

## 2025-01-13 NOTE — PROGRESS NOTES
Brandt Merritt is a 64 y.o. male who presents for ct check.    Pt was seen by Dr Newsome prior to infusion appt, see her note for further assessment details.    Mg 1.59-Dr Newsome and Margarita Casas ANUSHA notified, 2g IV magnesium sulfate ordered.    Pt tolerated infusion well without incident. Patient discharged independently off unit in stable condition accompanied by wife.

## 2025-01-15 LAB
ADENOVIRUS QPCR,PLASMA, VIRC: NOT DETECTED COPIES/ML
ADENOVIRUS QPCR,PLASMA, VIRC: NOT DETECTED COPIES/ML
CHIMERISM INTERPRETATION: NORMAL
CHIMERISM INTERPRETATION: NORMAL
ELECTRONICALLY SIGNED BY: NORMAL
ELECTRONICALLY SIGNED BY: NORMAL
HUMAN HERPESVIRUS-6 PCR PLASMA: NOT DETECTED COPIES/ML
HUMAN HERPESVIRUS-6 PCR PLASMA: NOT DETECTED COPIES/ML

## 2025-01-15 ASSESSMENT — ENCOUNTER SYMPTOMS
GASTROINTESTINAL NEGATIVE: 1
ENDOCRINE NEGATIVE: 1
CONSTITUTIONAL NEGATIVE: 1
PSYCHIATRIC NEGATIVE: 1
CARDIOVASCULAR NEGATIVE: 1
NEUROLOGICAL NEGATIVE: 1
EYES NEGATIVE: 1
HEMATOLOGIC/LYMPHATIC NEGATIVE: 1
MUSCULOSKELETAL NEGATIVE: 1
RESPIRATORY NEGATIVE: 1

## 2025-01-15 NOTE — PROGRESS NOTES
Patient ID:  Brandt Merritt is a 64 y.o. male.  Referring Physician:   BMT Dr Newsome  Primary Care Provider:  Bill Richmond MD    Assessment/Plan    1/16/25 T+71 (allo PBSC). No complaints. No signs/sx of active GVHD or infection. Budesonide tapered to bid on Monday. Anticipate Valcyte taper on 1/20.     Oncology History Overview Note   4/2024  Myelodysplastic neoplasm with increased blasts-2 ( WHO)  Myelodysplastic neoplasm/AML  (ICC 2022 classification)    Presented in  10/2023 for pancytopenia, found to have hemolysis. Patient had normal CBC June 2020. Denied any hemorrhoidal bleeding . Has had C Scope and EGD , treated Hep C 2014 . 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      CBC 4/11/24 wbc 3.0, hgb 7.1, platelets 167K ANC 0.73    BM biopsy done on 4/11/24  as folllows:  BM histology  Myelodysplastic neoplasm with increased blasts-2 ( WHO)  Myelodysplastic neoplasm/AML  (ICC 2022 classification)  Balsts 11% on aspirate smear and 15-20% based on CD 34 immunostaining approaching AML leukemia, hematooiesis is erythroid dominant with dysplasia in granulocytes and megakaryocytes.   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      11/6/2024 -  Bone Marrow Transplant    conditioned with Flu/Cy/TBI and aGVHD prophylaxis with post-transplant cyclophosphamide, tacrolimus, and mycophenolate. T=0, 11/6/24. ABO Donor/Recipient: O+, A+. CMV donor/recipient: both  positive. Started Tacro and MMF on T+5 (11/11).         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      s/p 4 cycles of Decitabine/Venetoclax. BM Bx 6/17/24: Blasts cleared, FISH still positive for trisomy 8, still MDS changes   BMBx (9/5/24): 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 and no increase in blasts      History of allogeneic hematopoietic stem cell transplant  #1: Single-unit Cord, T0=9/19/24, Primary Engraftment Failure  - Conditioning: Flu-Mariana-TBI  - Donor: Single Cord, 6/8  = ABO Donor / Recipient: A+ / A+  = CMV Donor / Recipient: - / +  - aGVHD Prophylaxis: Tacrolimus + MMF  - engraftment failure, developed HLA antibody against class I of cord  - Repeat BMBx (10/15): hypocellular with no residual myeloid neoplasm. 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), T0=11/6/24  - Graft: Haploidentical sister  = ABO Donor / Recipient: O+ / A+ (ABO incompatibility +)  = CMV Donor / Recipient: + / +  - Conditioning: Flu/Cy/TBI/rATG  = GVHD prophylaxis -  rATG 1.5mg/kg T-5,-3,-1, PTCy (25mg/kg T+3, T+4), Tacro, MMF (T+5)     DATE DAY SOURCE MORPHOLOGY MRD WHOLE CHIMERISM   (% donor) CD3 CHIMERISM   (% donor) CD33 CHIMERISM   (% donor)   11/20/24 D+30 PB      100       12/11/24 D+30 PB       100 100   Deferred D+30 BM             1/9/25 D+60 BM  KRISTIE , flow negative      100  100     D+60 PB               D+100 PB               D+100 BM                   Monitor for post-transplant hemolysis   1/13/25  Haptoglobin 108 1/13/25  UPC ratio 0.13 1/13/25     Anemia  12/18 Epo 113.   12/16 Retic 7.2%  DARBY on hold.  Cont folic acid.     GVHD prophylaxis  GVHD  Anorexia/poor oral intake/weight loss. Added Mirtazapine. Continue Zofran. If persists, consider aGVHD.  12/9/24:  Currently taking tacrolimus 0.5 mg BID, held dose today  prior to labs.  FK level 4.6 (12/9/24).  Advised patient on 12/10/24 to increase tacrolimus dose to 1 mg in the AM and 0.5 mg in the PM.    Wean MMF from 1 g TID to 500 mg TID 12/2/24 - present  Due to poor appetite will decrease MMF to 500 mg po tid.  Improvement to GI symptoms since decreasing MMF dose.    Stopped  MMF on T+35,   12/30/24:  Started on budesonide TID on 12/27/24.  Brandt reports that he has starting eating more since starting budesonide.  Reports no nausea, vomiting, or diarrhea at this time.    Appetite continuing to improve. Denies N/V/D. 1/13/25 Decreased budesonide bid per Dr Newsome     Stage I/Grade II Upper GI GVHD  Anorexia, taste changes and weight loss 171# (9/17/24).  Cont mirtazapine and ATC Zofran.   Add budesonide 3x day.   Response TBD  12/30/24:  Weight beginning to improve. 1/9/25: 61.1kg. Reports he is tolerating more food since starting budesonide on 12/27/24.    1/13/25 tacro level 6.8 on 1.5 mg po bid keep dose same     Weights  Pre-transplant: 9/9 74.2kg   Upon SCT discharge: 11/25 60.9kg  Start of budesonide: 59.7kg (12/26/24).  40 pound weight loss since 9/17/24  Today's weight 62.4kg    Infectious Disease & Immune Reconstitution      Prophylaxis  Antiviral prophylaxis: acyclovir, Letermovir  Antifungal: posaconazole  PCP prophylaxis: 10/26/24 - 11/28/24 Pentamidine; cont Bactrim     Hypogammaglobulinemia  IgG level. IVIG for level <400   1/13 IgG 719     Active Surveillance:     CMV detected 688 1/2/25, 167 1/6/25. Levels undetectable on 1/13/25.  Started valgancicyclovir 900mg BID on 1/6/25, Plan 1/20/25 decrease to 900 mg daily for 2 weeks, then transition back to letermovir.   EBV ND 1/13/25  Adeno ND 1/13/25  HHV6 ND 1/13/25  EBV Viremia during transplant admission  Treated despite low levels due to upcoming second SCT  Rituximab 600 mg x 1 (10/31/24)     Infectious Disease follow up evaluation: 1/10/25      Immunodeficiency panel 100 days, 6mos and 1 year.       Immunizations:   Covid vaccine series. Consider at T+ 3 months  Seasonal influenza vaccine. Consider at T+ 3 month  Will plan to begin immunizations at T+6mths (May 2025) depending on degree of immunosuppression     Cardiac:    Essential hypertension  Hx of STEMI (11/11/2020)  Cardiology  appt on 12/18/24  ECHO 12/23/24: LVEF 65%     Ascending aorta dilation   TTE (4/29/24): 3.8-4 cm dilation      Electrolyte disturbance   Magnesium level 1.55.. Continue oral magnesium 128 mg TID.      Dry Skin hyperpigmentation-request derm consult  Instructed to use moisturizer such as cerave that are fragrance free and gentle. Will continue to monitor.     Lack of appetite  Mirtazapine 15 mg at night as of 11/26/24 12/30/24: Reports appetite level is improving.  Continue mirtazapine.       Vitamin D deficiency  12/18 Level 27.  Cont weekly supplement     Medication reconciliation  Medications reviewed; no scripts needed.     IV Access  L PICC line     Psychosocial.    Caregiver [facility] [place]           Subjective    History of Present Illness:  Presents today 1/17/25 for follow up visit, accompanied by his wife after umbilical cord blood transplant 10/16/24 with graft rejection followed by haploidentical cord from his sister on 11/6/24 with flu/cy, ATG and modified dose cyclophosphamide with engraftment on day T+8.  Today he is T+ 119 of umbilical cord transfusion and is T+ 71 of his haplo sister transplant.     Feels great. Eating good. Walking a lot since elevators not working. Steady. No falls. Energy level 8/10.    Sick dog.           Review of Systems   Constitutional: Negative.    HENT:  Negative.     Eyes: Negative.    Respiratory: Negative.     Cardiovascular: Negative.    Gastrointestinal: Negative.    Endocrine: Negative.    Genitourinary: Negative.     Musculoskeletal: Negative.    Skin: Negative.         Dark spots around nose. Plans to see dermatologist-wait list sooner than June.   Neurological:  Negative.    Hematological: Negative.    Psychiatric/Behavioral: Negative.              Objective        Physical Exam  Vitals reviewed.   Constitutional:       Appearance: Normal appearance.   HENT:      Head: Normocephalic and atraumatic.      Nose: Nose normal.      Mouth/Throat:      Mouth: Mucous membranes are moist.   Eyes:      Pupils: Pupils are equal, round, and reactive to light.   Cardiovascular:      Rate and Rhythm: Normal rate and regular rhythm.      Pulses: Normal pulses.      Heart sounds: Normal heart sounds.   Pulmonary:      Effort: Pulmonary effort is normal.      Breath sounds: Normal breath sounds.   Abdominal:      General: Abdomen is flat. Bowel sounds are normal.      Palpations: Abdomen is soft.   Musculoskeletal:         General: Normal range of motion.      Cervical back: Normal range of motion.   Skin:     General: Skin is warm and dry.      Comments: Mild skin darkening around nose.   PICC dressing dry and intact.   Neurological:      General: No focal deficit present.      Mental Status: He is alert and oriented to person, place, and time.   Psychiatric:         Mood and Affect: Mood normal.       RTC  1/20, 1/23 1/27, 1/30    Requested with PICC draw  2/3  2/10  2/17  2/24

## 2025-01-16 ENCOUNTER — NUTRITION (OUTPATIENT)
Dept: HEMATOLOGY/ONCOLOGY | Facility: HOSPITAL | Age: 65
End: 2025-01-16

## 2025-01-16 ENCOUNTER — INFUSION (OUTPATIENT)
Dept: HEMATOLOGY/ONCOLOGY | Facility: HOSPITAL | Age: 65
End: 2025-01-16
Payer: COMMERCIAL

## 2025-01-16 ENCOUNTER — OFFICE VISIT (OUTPATIENT)
Dept: HEMATOLOGY/ONCOLOGY | Facility: HOSPITAL | Age: 65
End: 2025-01-16
Payer: COMMERCIAL

## 2025-01-16 VITALS
SYSTOLIC BLOOD PRESSURE: 154 MMHG | WEIGHT: 137.57 LBS | DIASTOLIC BLOOD PRESSURE: 87 MMHG | RESPIRATION RATE: 18 BRPM | TEMPERATURE: 99 F | BODY MASS INDEX: 22.54 KG/M2 | OXYGEN SATURATION: 100 % | HEART RATE: 92 BPM

## 2025-01-16 VITALS — HEIGHT: 66 IN | WEIGHT: 137.57 LBS | BODY MASS INDEX: 22.11 KG/M2

## 2025-01-16 DIAGNOSIS — Z94.84 HISTORY OF ALLOGENEIC HEMATOPOIETIC STEM CELL TRANSPLANT: ICD-10-CM

## 2025-01-16 DIAGNOSIS — C92.01 ACUTE MYELOID LEUKEMIA IN REMISSION (MULTI): ICD-10-CM

## 2025-01-16 LAB
ALBUMIN SERPL BCP-MCNC: 3.8 G/DL (ref 3.4–5)
ALP SERPL-CCNC: 72 U/L (ref 33–136)
ALT SERPL W P-5'-P-CCNC: 22 U/L (ref 10–52)
ANION GAP SERPL CALC-SCNC: 11 MMOL/L (ref 10–20)
AST SERPL W P-5'-P-CCNC: 18 U/L (ref 9–39)
BASOPHILS # BLD AUTO: 0.02 X10*3/UL (ref 0–0.1)
BASOPHILS NFR BLD AUTO: 0.3 %
BILIRUB SERPL-MCNC: 0.5 MG/DL (ref 0–1.2)
BUN SERPL-MCNC: 13 MG/DL (ref 6–23)
CALCIUM SERPL-MCNC: 8.8 MG/DL (ref 8.6–10.3)
CHLORIDE SERPL-SCNC: 108 MMOL/L (ref 98–107)
CO2 SERPL-SCNC: 26 MMOL/L (ref 21–32)
CREAT SERPL-MCNC: 0.86 MG/DL (ref 0.5–1.3)
EGFRCR SERPLBLD CKD-EPI 2021: >90 ML/MIN/1.73M*2
EOSINOPHIL # BLD AUTO: 0.25 X10*3/UL (ref 0–0.7)
EOSINOPHIL NFR BLD AUTO: 4.4 %
ERYTHROCYTE [DISTWIDTH] IN BLOOD BY AUTOMATED COUNT: 16.3 % (ref 11.5–14.5)
GLUCOSE SERPL-MCNC: 99 MG/DL (ref 74–99)
HCT VFR BLD AUTO: 33.9 % (ref 41–52)
HGB BLD-MCNC: 10.7 G/DL (ref 13.5–17.5)
IMM GRANULOCYTES # BLD AUTO: 0.01 X10*3/UL (ref 0–0.7)
IMM GRANULOCYTES NFR BLD AUTO: 0.2 % (ref 0–0.9)
LYMPHOCYTES # BLD AUTO: 2.03 X10*3/UL (ref 1.2–4.8)
LYMPHOCYTES NFR BLD AUTO: 35.5 %
MAGNESIUM SERPL-MCNC: 1.55 MG/DL (ref 1.6–2.4)
MCH RBC QN AUTO: 31 PG (ref 26–34)
MCHC RBC AUTO-ENTMCNC: 31.6 G/DL (ref 32–36)
MCV RBC AUTO: 98 FL (ref 80–100)
MONOCYTES # BLD AUTO: 0.13 X10*3/UL (ref 0.1–1)
MONOCYTES NFR BLD AUTO: 2.3 %
NEUTROPHILS # BLD AUTO: 3.28 X10*3/UL (ref 1.2–7.7)
NEUTROPHILS NFR BLD AUTO: 57.3 %
NRBC BLD-RTO: 0 /100 WBCS (ref 0–0)
PLATELET # BLD AUTO: 164 X10*3/UL (ref 150–450)
POTASSIUM SERPL-SCNC: 3.6 MMOL/L (ref 3.5–5.3)
PROT SERPL-MCNC: 6 G/DL (ref 6.4–8.2)
RBC # BLD AUTO: 3.45 X10*6/UL (ref 4.5–5.9)
SODIUM SERPL-SCNC: 141 MMOL/L (ref 136–145)
TACROLIMUS BLD-MCNC: 7.2 NG/ML
WBC # BLD AUTO: 5.7 X10*3/UL (ref 4.4–11.3)

## 2025-01-16 PROCEDURE — 36591 DRAW BLOOD OFF VENOUS DEVICE: CPT

## 2025-01-16 PROCEDURE — 84075 ASSAY ALKALINE PHOSPHATASE: CPT

## 2025-01-16 PROCEDURE — 99214 OFFICE O/P EST MOD 30 MIN: CPT | Performed by: NURSE PRACTITIONER

## 2025-01-16 PROCEDURE — 80197 ASSAY OF TACROLIMUS: CPT

## 2025-01-16 PROCEDURE — 83735 ASSAY OF MAGNESIUM: CPT

## 2025-01-16 PROCEDURE — 85025 COMPLETE CBC W/AUTO DIFF WBC: CPT

## 2025-01-16 RX ORDER — BUDESONIDE 3 MG/1
3 CAPSULE, COATED PELLETS ORAL EVERY 12 HOURS
Qty: 60 CAPSULE | Refills: 1 | Status: SHIPPED | OUTPATIENT
Start: 2025-01-16

## 2025-01-16 ASSESSMENT — PAIN SCALES - GENERAL: PAINLEVEL_OUTOF10: 0-NO PAIN

## 2025-01-16 NOTE — PROGRESS NOTES
"NUTRITION FOLLOW UP NOTE    Reason for Visit:  Brandt Merritt is a 64 y.o. 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    PMH: HTN, Hep C    Currently T+119 (cord); T+71 (haplo)    *Pt with presumed Stage 1/Grade 2 upper GI GVHD; started Budesonide 12/26; decreased to BID on 1/13.    BM bx (1/9):  KRISTIE    Pt and wife seen today in infusion.      Lab Results   Component Value Date/Time    GLUCOSE 99 01/16/2025 1017     01/16/2025 1017    K 3.6 01/16/2025 1017     (H) 01/16/2025 1017    CO2 26 01/16/2025 1017    ANIONGAP 11 01/16/2025 1017    BUN 13 01/16/2025 1017    CREATININE 0.86 01/16/2025 1017    EGFR >90 01/16/2025 1017    CALCIUM 8.8 01/16/2025 1017    ALBUMIN 3.8 01/16/2025 1017    ALKPHOS 72 01/16/2025 1017    PROT 6.0 (L) 01/16/2025 1017    AST 18 01/16/2025 1017    BILITOT 0.5 01/16/2025 1017    ALT 22 01/16/2025 1017    MG 1.55 (L) 01/16/2025 1017    PHOS 2.7 11/21/2024 0552     *On MgCl 2 tabs TID     Lab Results   Component Value Date    WBC 5.7 01/16/2025    HGB 10.7 (L) 01/16/2025    HCT 33.9 (L) 01/16/2025    MCV 98 01/16/2025     01/16/2025       Lab Results   Component Value Date/Time    VITD25 27 (L) 12/18/2024 0933   *Started Vit D2 50,000IU x 8 weeks on 12/4.    Anthropometrics:  Anthropometrics  Height: 166.4 cm (5' 5.51\")  Weight: 62.4 kg (137 lb 9.1 oz)  BMI (Calculated): 22.54  IBW/kg (Dietitian Calculated): 63.2 kg  Percent of IBW: 99 %    *Wt at time of transplant admit: (9/13) 75.6 kg   *Wt at first post-transplant visit: (11/25): 60.9 kg   Overall, pt with ~15 kg (20%) wt loss x ~4 mos    *Wt continues to trend upwards; up 3 kg x 2 weeks     Wt Readings from Last 10 Encounters:   01/16/25 62.4 kg (137 lb 9.1 oz)   01/16/25 62.4 kg (137 lb 9.1 oz)   01/13/25 61.1 kg (134 lb 11.2 oz)   01/10/25 61.1 kg (134 lb 11.2 oz)   01/09/25 61.1 kg (134 lb 11.2 oz)   01/09/25 61.1 kg (134 lb 12.8 oz) " "  01/06/25 60.8 kg (134 lb)   01/02/25 59.3 kg (130 lb 11.7 oz)   12/30/24 58.8 kg (129 lb 10.1 oz)   12/30/24 58.8 kg (129 lb 10.1 oz)   12/04/24        60.9 kg  Admit 9/13-11/25 09/09/24        74.2 kg  08/16/24        75.3 kg   07/19/24        74.2 kg   06/24/24        71.0 kg  05/20/24        72.8 kg  04/23/24        75.5 kg     Food And Nutrient Intake:      Appetite and intakes continue to improve.   Eats best at breakfast and dinner; snacks t/o the day.   Tastes better; dysgeusia resolving.   Snacking frequently on chocolate chip cookies and milk.  Drinking \"lots\" of fluids; mainly drinking water, Clear Choice flavored water.  Does not like protein supplements but did make Oreo milkshake last night.   Denies nausea.  Feels appetite now 80% of baseline levels.   No diarrhea; having soft BM's every other day.  Energy pretty good; now feels ~8 out of 10.      Yesterday breakfast--2 sausage patties, 3 pancakes with syrup and hash browns  Last night for dinner--chicken, mac and cheese, biscuit and milkshake     Dislikes all ONS/supplements.   Started Remeron on 11/27.                                                             Nutrition Focused Physical Exam Findings:      Subcutaneous Fat Loss  Orbital Fat Pads: Well nourished (slightly bulging fat pads)  Buccal Fat Pads: Well nourished (full, rounded cheeks)  Triceps: Mild-Moderate (less than ample fat tissue)    Muscle Wasting  Temporalis: Mild-Moderate (slight depression)  Pectoralis (Clavicular Region): Mild-Moderate (some protrusion of clavicle)  Deltoid/Trapezius: Mild-Moderate (slight protrusion of acromion process)  Quadriceps: Mild-Moderate (mild depression on inner and outer thigh)  Gastrocnemius: Mild-Moderate (not well developed muscle)              Energy Needs  Calculated Energy Needs Using Equations  Height: 166.4 cm (5' 5.51\")  Estimated Energy Needs  Total Energy Estimated Needs (kCal):  (6679-6957)  Total Estimated Energy Need per Day " (kCal/kg):  (30-35)  Estimated Fluid Needs  Total Fluid Estimated Needs (mL):  (1800+)  Total Fluid Estimated Needs (mL/kg):  (30+)  Estimated Protein Needs  Total Protein Estimated Needs (g):  (70-85)  Total Protein Estimated Needs (g/kg):  (1.2-1.4)        Nutrition Diagnosis   Malnutrition Diagnosis  Diagnosis Status: Ongoing  Malnutrition Diagnosis: Mild malnutrition related to acute disease or injury  As Evidenced by: taste changes and significant wt loss since hospital admit    *Nutrition status improving with start of budesonide; appetite better, intakes improved, dysgeusia resolving and weight increasing.  GI symptoms resolved at present.   Anticipate continued improvements.     Nutrition Interventions/Recommendations   Nutrition Prescription   High Protein, High Calorie  Food Safety     Nutrition Education   Pt aware of importance of protein intakes; include source with all meals and snacks.   Encouraged PO fluids; Goal: 60+ oz daily; prefer calorie containing foods (ie milk, juices, etc) for  additional protein   Consider Milkshakes, smoothies or regular use of ice cream as snack in hopes of continuing to  maximize protein/stefany intakes.      Coordination of Care   NP/BMT team         Nutrition Monitoring and Evaluation      Will continue to f/up and monitor weight, labs. PO intakes and GI symptoms.

## 2025-01-16 NOTE — PROGRESS NOTES
Patient arrives to infusion for his count check. Dressing and caps for his double lumen PICC was changed .  His labs were good, except his Mag, it was 1.55.  NP Alma GARCIA was ok with that. No replacement needed. He was also seen by dietician and Alma Raymond. He was discharged stable.

## 2025-01-17 LAB
CHROM ANALY OVERALL INTERP-IMP: NORMAL
CMV DNA SERPL NAA+PROBE-LOG IU: NORMAL {LOG_IU}/ML
ELECTRONICALLY COSIGNED BY CYTOGENETICS: NORMAL
ELECTRONICALLY SIGNED BY CYTOGENETICS: NORMAL
FISH ISCN RESULTS: NORMAL
LABORATORY COMMENT REPORT: NOT DETECTED

## 2025-01-18 ASSESSMENT — ENCOUNTER SYMPTOMS
ADENOPATHY: 0
CONSTIPATION: 0
NAUSEA: 0
SORE THROAT: 0
FREQUENCY: 0
FEVER: 0
COUGH: 0
LIGHT-HEADEDNESS: 0
DIARRHEA: 0
CHEST TIGHTNESS: 0
PALPITATIONS: 0
CHILLS: 0
VOMITING: 0
SHORTNESS OF BREATH: 0
FATIGUE: 0
HEMATURIA: 0
TROUBLE SWALLOWING: 0
BRUISES/BLEEDS EASILY: 0
DYSURIA: 0
DIZZINESS: 0
WHEEZING: 0
ABDOMINAL PAIN: 0
LEG SWELLING: 0
WOUND: 0
HEADACHES: 0

## 2025-01-18 NOTE — PROGRESS NOTES
Patient ID: Brandt Merritt is a 64 y.o. male.    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  Brandt presents to the clinic today (1/20/25) accompanied by his wife for follow up evaluation after umbilical cord blood transplant with graft rejection followed by haploidentical cord from his sister on 11/6/24 with flu/cy, ATG and modified dose cyclophosphamide with engraftment on day T+8.  Today he is T+ 123 of umbilical cord transfusion and is T+ 75 of his haplo sister transplant.     Reports he is doing well overall and has had no changes to health since last visit.  Energy level is doing good.  He states his appetite is continuing to improve.  He is able to eat more and has added ice cream and milk shakes.  He denies any N/V/D and abdominal pain. He is having about one loose bowel movement per day. He is still taking the budesonide TID which is helping the nausea.  Continues to have dark skin to forehead and bridge of nose.  Is able to see more dark spots under beard now.  No itching.  Unable to get into dermatology until June.  BP is elevated today.  Brandt states he has nifedipine prescription at home, was told to stop by Dr. Newsome about 1 month ago.  Currently taking tacrolimus 1.5 mg twice daily.  Held tacrolimus dose today prior to lab draws.  PICC site is doing well to left arm.    Review of Systems   Constitutional:  Negative for appetite change, chills, diaphoresis, fatigue, fever and unexpected weight change.   HENT:   Negative for mouth sores, sore throat and trouble swallowing.    Respiratory:  Negative for chest tightness, cough, shortness of breath and wheezing.    Cardiovascular:  Negative for chest pain, leg swelling and palpitations.   Gastrointestinal:  Negative for abdominal pain, constipation, diarrhea, nausea and  vomiting.   Genitourinary:  Negative for dysuria, frequency and hematuria.    Musculoskeletal: Negative.  Negative for gait problem.   Skin:  Positive for rash. Negative for itching and wound.   Neurological:  Negative for dizziness, gait problem, headaches and light-headedness.   Hematological:  Negative for adenopathy. Does not bruise/bleed easily.   Psychiatric/Behavioral: Negative.       Objective    BSA: 1.71 meters squared  BP (!) 147/94   Pulse 84   Temp 36.4 °C (97.5 °F)   Resp 17   Wt 62.9 kg (138 lb 10.7 oz)   SpO2 100%   BMI 22.72 kg/m²     Physical Exam  Vitals reviewed.   Constitutional:       General: He is not in acute distress.     Appearance: Normal appearance. He is not ill-appearing.      Comments: Thin but is slowly gaining weight.     HENT:      Head: Normocephalic and atraumatic.      Mouth/Throat:      Mouth: Mucous membranes are moist.      Comments: No lichenoid change  Eyes:      Extraocular Movements: Extraocular movements intact.      Conjunctiva/sclera: Conjunctivae normal.      Pupils: Pupils are equal, round, and reactive to light.   Cardiovascular:      Rate and Rhythm: Normal rate and regular rhythm.      Pulses: Normal pulses.      Heart sounds: Normal heart sounds.   Pulmonary:      Effort: Pulmonary effort is normal.      Breath sounds: Normal breath sounds.   Abdominal:      General: Abdomen is flat. Bowel sounds are normal. There is no distension.      Palpations: Abdomen is soft.      Tenderness: There is no abdominal tenderness.   Musculoskeletal:         General: No swelling. Normal range of motion.   Skin:     General: Skin is warm and dry.      Comments: Hyperpigmentation to skin on bridge of nose, forehead, and jaw line.     Neurological:      General: No focal deficit present.      Mental Status: He is alert and oriented to person, place, and time.   Psychiatric:         Mood and Affect: Mood normal.         Behavior: Behavior normal.         Thought Content:  Thought content normal.         Judgment: Judgment normal.     Performance Status:  Karnofsky Score: 90 - Able to carry on normal activity; minor signs or symptoms of disease     Assessment/Plan        Oncology History Overview Note   4/2024  Myelodysplastic neoplasm with increased blasts-2 ( WHO)  Myelodysplastic neoplasm/AML  (ICC 2022 classification)    Presented in  10/2023 for pancytopenia, found to have hemolysis. Patient had normal CBC June 2020. Denied any hemorrhoidal bleeding . Has had C Scope and EGD , treated Hep C 2014 . 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      CBC 4/11/24 wbc 3.0, hgb 7.1, platelets 167K ANC 0.73    BM biopsy done on 4/11/24  as folllows:  BM histology  Myelodysplastic neoplasm with increased blasts-2 ( WHO)  Myelodysplastic neoplasm/AML  (ICC 2022 classification)  Balsts 11% on aspirate smear and 15-20% based on CD 34 immunostaining approaching AML leukemia, hematooiesis is erythroid dominant with dysplasia in granulocytes and megakaryocytes.   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      11/6/2024 -  Bone Marrow Transplant    conditioned with Flu/Cy/TBI and aGVHD prophylaxis with post-transplant cyclophosphamide, tacrolimus, and mycophenolate. T=0, 11/6/24. ABO Donor/Recipient: O+, A+. CMV donor/recipient: both positive. Started Tacro and MMF on T+5 (11/11).        Past Medical History:  HTN   STEMI 11/11/2020 after  getting  a water pills.  Chronic hepatitis C infection treated in  treated  with Dr. Lloyd  Medical History           Past Medical History:   Diagnosis Date    Chest pain 2021    HTN (hypertension) 10/05/2023    Hypertension      Personal history of other diseases of the circulatory system       History of hypertension         Surgical History:  Conosocopy 2021  Upper EGD 10/31/23  Surgical reduction torsion of testes      Surgical History             Past Surgical History:   Procedure Laterality Date    COLONOSCOPY   2013     Complete Colonoscopy    OTHER SURGICAL HISTORY   10/07/2015     Surgery Testis Reduction Of Torsion Of Testis Right         Family History:  CAD, DM in mother       Mother  of CVA at age 69  Brother with prostate CA, 1 sister with liver disease  2 children healthy  Family History               Family History   Problem Relation Name Age of Onset    Other (diabetes mellitus) Mother        Prostate cancer Brother                Social History:  Lives with wife of 30 years, works at [employer], now retired.   29 years, former smoker, smoked x 15 yrs quit 20+ years ago. Marijuana 3 x a week. Served in [employer] in [place] and [facility],   Social History   Social History                 Alcohol use: Not Currently       Comment: occansionally    Drug use: Yes       Types: Marijuana        --------------------------------------------------------------------------------------------  Assessment and Plan:    Acute myeloid leukemia in remission (Multi)  Diagnosed 2024: BM Bx with MDS in in transition to AML (>10% blast) w/ trisomy 8, DNMT alpha, and U2AF1 mutation indication adverse risk.      s/p 4 cycles of Decitabine/Venetoclax. BM Bx 24: Blasts cleared, FISH still positive for trisomy 8, still MDS changes   BMBx (24): 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 and no increase  in blasts      History of allogeneic hematopoietic stem cell transplant  #1: Single-unit Cord, T0=9/19/24, Primary Engraftment Failure  - Conditioning: Flu-Mariana-TBI  - Donor: Single Cord, 6/8  = ABO Donor / Recipient: A+ / A+  = CMV Donor / Recipient: - / +  - aGVHD Prophylaxis: Tacrolimus + MMF  - engraftment failure, developed HLA antibody against class I of cord  - Repeat BMBx (10/15): hypocellular with no residual myeloid neoplasm. 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), T0=11/6/24  - Graft: Haploidentical sister  = ABO Donor / Recipient: O+ / A+ (ABO incompatibility +)  = CMV Donor / Recipient: + / +  - Conditioning: Flu/Cy/TBI/rATG  = GVHD prophylaxis -  rATG 1.5mg/kg T-5,-3,-1, PTCy (25mg/kg T+3, T+4), Tacro, MMF (T+5)     DATE DAY SOURCE MORPHOLOGY MRD WHOLE CHIMERISM   (% donor) CD3 CHIMERISM   (% donor) CD33 CHIMERISM   (% donor)   11/20/24 D+30 PB      100       12/11/24 D+30 PB       100 100   Deferred D+30 BM             1/9/25 D+60 BM  KRISTIE , flow negative             D+60 PB               D+100 PB               D+100 BM                1/20/25:  CBC results stable from today.  Brandt is doing well overall.  Continues to have hyperpigmented rash to face.  Dermatology consulted.  Bone marrow biopsy performed 1/9 showing KRISTIE, FISH negative for trisomy 8.     Monitor for post-transplant hemolysis   1/20/25  Haptoglobin 116 1/20/25  UPC ratio 0.09 1/20/25     Anemia  12/18 Epo 113.   12/16 Retic 7.2%  DARBY on hold.  Cont folic acid.     GVHD prophylaxis  GVHD  Anorexia/poor oral intake/weight loss. Added Mirtazapine. Continue Zofran. If persists, consider aGVHD.  12/9/24:  Currently taking tacrolimus 0.5 mg BID, held dose today prior to labs.  FK level 4.6 (12/9/24).  Advised patient on 12/10/24 to increase tacrolimus dose to 1 mg in the AM and 0.5 mg in the PM.    Wean MMF from 1 g TID to 500 mg TID 12/2/24 - present  Due to poor appetite will decrease MMF to 500 mg po  tid.  Improvement to GI symptoms since decreasing MMF dose.    Stopped  MMF on T+35,   12/30/24:  Started on budesonide TID on 12/27/24.  Brandt reports that he has starting eating more since starting budesonide.  Reports no nausea, vomiting, or diarrhea at this time.       Stage I/Grade II Upper GI GVHD  Anorexia, taste changes and weight loss 171# (9/17/24).  Cont mirtazapine and ATC Zofran.   Add budesonide 3x day.   Response TBD  12/30/24:  Weight beginning to improve. 1/9/25: 61.1kg. Reports he is tolerating more food since starting budesonide on 12/27/24.    1/13/25 tacro level 6.8 on 1.5 mg po bid keep dose same  1/20/25: Currently on FK 1.5 mg BID.  FK level 6.4 (1/20/25).  Continue current dose.  Continue budesonide TID. Appetite continuing to improve. Denies N/V/D and is gaining weight.  Plan to taper budesonide to BID at next visit if GI symptoms stable.     Weights  Pre-transplant: 9/9/24 74.2kg   Upon SCT discharge: 11/25 60.9kg  Start of budesonide: 59.7kg (12/26/24).  40 pound weight loss since 9/17/24  Today's weight:  62.4 kg (1/20/25).  Monitoring.  Discussed ways to add more calories into diet.  Following with nutrition.      Infectious Disease & Immune Reconstitution      Prophylaxis  Antiviral prophylaxis: acyclovir, Letermovir-both held at this time while on valganciclovir.  Plan to restart acyclovir and letermovir after discontinuation of valganciclovir.  CMV level elevated, valganciclovir treatment-see below.  Antifungal: posaconazole  PCP prophylaxis: 10/26/24 - 11/28/24 Pentamidine; Bactrim 11/29 - present     Hypogammaglobulinemia  IgG level. IVIG for level <400   IgG 628 (1/20/25), monitoring monthly.       Active Surveillance:     CMV detected 688 1/2/25, 167 1/6/25.   -Started valgancicyclovir 900mg BID on 1/6/25.  CMV levels undetectable on 1/13/25, and 1/20/25.  1/20/25: decreased to 900 mg daily for 2 weeks, then transition back to letermovir if CMV level remains ND.  EBV ND  1/20/25  Adeno ND 1/13/25, levels pending 1/20/25  HHV6 ND 1/20/25  EBV Viremia during transplant admission  Treated despite low levels due to upcoming second SCT  Rituximab 600 mg x 1 (10/31/24)     Immunizations  Covid vaccine series and influenza vaccine around T+3 months  Will plan to begin immunizations at T+6mths ### depending on degree of immunosuppression      Infectious Disease follow up evaluation: 1/10/25      Immunodeficiency panel 100 days, 6mos and 1 year.      Immunizations:   Covid vaccine series. Consider at T+ 3 months (February 2025)  Seasonal influenza vaccine. Consider at T+ 3 month (February 2025).  Will plan to begin immunizations at T+6mths (May 2025) depending on degree of immunosuppression     Cardiac:    Essential hypertension  Hx of STEMI (11/11/2020)  Cardiology  appt on 12/18/24  ECHO 12/23/24: LVEF 65%     Ascending aorta dilation   TTE (4/29/24): 3.8-4 cm dilation      Electrolyte disturbance   Magnesium level 1.47 (1/20/25). Continue oral magnesium 128 mg TID. Gave 2g IV mag sulfate in clinic today (1/20/25).      Dry Skin hyperpigmentation-request derm consult  Instructed to use moisturizer such as cerave that are fragrance free and gentle. Will continue to monitor.  1/20/25:  Continues to have hyperpigmentation to forehead, bridge of nose, and now reports he notices at his jaw line under beard.  Denies pruritus.  Monitoring.      Lack of appetite  Mirtazapine 15 mg at night as of 11/26/24 1/20/25: Reports appetite level is improving.  Continue mirtazapine.  Monitoring.    Vitamin D deficiency  12/18/24:  Level 27.  Cont weekly supplement  Plan to recheck vitamin D level on 1/23/25.       Medication reconciliation  Has all prescribed meds. Instructed to bring list to each visit. Please update regularly. New list and reconciliation completed 1/20/25.    Hypertension:  Nifedipine held December 2024.  1/20/25:  BP has been elevated last few visits.  Restarted nifedipine ER 60 mg  daily.  Advised Brandt to monitor BP at home and to contact with continued elevated readings.     IV Access  L PICC line  PICC line site with no redness, tenderness, swelling, or drainage.  Plan to discontinue PICC line soon when no longer requiring electrolyte replacement.       Psychosocial.    Careg[facility] [place]     RTC:  1/23/25:  Post BMT provider visit and infusion.  Taper budesonide to BID if GI symptoms stable.  1/27/25:  Central line visit, appointment with GRICELDA Vallejo, and infusion.  1/30/25:  Post BMT provider visit and infusion.     Weekly central line appts, provider visits, and infusion visits scheduled until 2/24/25.    GRICELDA Vallejo

## 2025-01-20 ENCOUNTER — INFUSION (OUTPATIENT)
Dept: HEMATOLOGY/ONCOLOGY | Facility: HOSPITAL | Age: 65
End: 2025-01-20
Payer: COMMERCIAL

## 2025-01-20 ENCOUNTER — OFFICE VISIT (OUTPATIENT)
Dept: HEMATOLOGY/ONCOLOGY | Facility: HOSPITAL | Age: 65
End: 2025-01-20
Payer: COMMERCIAL

## 2025-01-20 ENCOUNTER — LAB (OUTPATIENT)
Dept: HEMATOLOGY/ONCOLOGY | Facility: HOSPITAL | Age: 65
End: 2025-01-20
Payer: COMMERCIAL

## 2025-01-20 VITALS
OXYGEN SATURATION: 100 % | HEART RATE: 84 BPM | DIASTOLIC BLOOD PRESSURE: 94 MMHG | BODY MASS INDEX: 22.72 KG/M2 | RESPIRATION RATE: 17 BRPM | TEMPERATURE: 97.5 F | WEIGHT: 138.67 LBS | SYSTOLIC BLOOD PRESSURE: 147 MMHG

## 2025-01-20 DIAGNOSIS — C92.01 ACUTE MYELOID LEUKEMIA IN REMISSION (MULTI): ICD-10-CM

## 2025-01-20 DIAGNOSIS — Z94.84 HISTORY OF ALLOGENEIC HEMATOPOIETIC STEM CELL TRANSPLANT: ICD-10-CM

## 2025-01-20 DIAGNOSIS — I15.9 SECONDARY HYPERTENSION: ICD-10-CM

## 2025-01-20 DIAGNOSIS — R63.4 WEIGHT LOSS: ICD-10-CM

## 2025-01-20 DIAGNOSIS — I10 ESSENTIAL HYPERTENSION: ICD-10-CM

## 2025-01-20 DIAGNOSIS — C92.01 ACUTE MYELOID LEUKEMIA IN REMISSION (MULTI): Primary | ICD-10-CM

## 2025-01-20 DIAGNOSIS — E87.8 ELECTROLYTE DISTURBANCE: ICD-10-CM

## 2025-01-20 DIAGNOSIS — C92.00 ACUTE MYELOID LEUKEMIA NOT HAVING ACHIEVED REMISSION (MULTI): ICD-10-CM

## 2025-01-20 DIAGNOSIS — B25.9 CYTOMEGALOVIRUS INFECTION, UNSPECIFIED CYTOMEGALOVIRAL INFECTION TYPE (MULTI): ICD-10-CM

## 2025-01-20 DIAGNOSIS — E55.9 VITAMIN D DEFICIENCY: ICD-10-CM

## 2025-01-20 DIAGNOSIS — R63.0 LACK OF APPETITE: ICD-10-CM

## 2025-01-20 DIAGNOSIS — D84.9 IMMUNOCOMPROMISED: ICD-10-CM

## 2025-01-20 LAB
ALBUMIN SERPL BCP-MCNC: 3.8 G/DL (ref 3.4–5)
ALP SERPL-CCNC: 77 U/L (ref 33–136)
ALT SERPL W P-5'-P-CCNC: 17 U/L (ref 10–52)
ANION GAP SERPL CALC-SCNC: 12 MMOL/L (ref 10–20)
APPEARANCE UR: CLEAR
AST SERPL W P-5'-P-CCNC: 15 U/L (ref 9–39)
BASOPHILS # BLD AUTO: 0.04 X10*3/UL (ref 0–0.1)
BASOPHILS NFR BLD AUTO: 0.9 %
BILIRUB SERPL-MCNC: 0.5 MG/DL (ref 0–1.2)
BILIRUB UR STRIP.AUTO-MCNC: NEGATIVE MG/DL
BUN SERPL-MCNC: 12 MG/DL (ref 6–23)
CALCIUM SERPL-MCNC: 8.7 MG/DL (ref 8.6–10.3)
CHLORIDE SERPL-SCNC: 107 MMOL/L (ref 98–107)
CO2 SERPL-SCNC: 25 MMOL/L (ref 21–32)
COLOR UR: NORMAL
CREAT SERPL-MCNC: 0.97 MG/DL (ref 0.5–1.3)
CREAT UR-MCNC: 190.7 MG/DL (ref 20–370)
DACRYOCYTES BLD QL SMEAR: NORMAL
EGFRCR SERPLBLD CKD-EPI 2021: 86 ML/MIN/1.73M*2
EOSINOPHIL # BLD AUTO: 0.3 X10*3/UL (ref 0–0.7)
EOSINOPHIL NFR BLD AUTO: 6.5 %
ERYTHROCYTE [DISTWIDTH] IN BLOOD BY AUTOMATED COUNT: 16.6 % (ref 11.5–14.5)
GLUCOSE SERPL-MCNC: 88 MG/DL (ref 74–99)
GLUCOSE UR STRIP.AUTO-MCNC: NORMAL MG/DL
HAPTOGLOB SERPL NEPH-MCNC: 116 MG/DL (ref 30–200)
HCT VFR BLD AUTO: 34.4 % (ref 41–52)
HGB BLD-MCNC: 11 G/DL (ref 13.5–17.5)
IGG SERPL-MCNC: 628 MG/DL (ref 700–1600)
IMM GRANULOCYTES # BLD AUTO: 0.01 X10*3/UL (ref 0–0.7)
IMM GRANULOCYTES NFR BLD AUTO: 0.2 % (ref 0–0.9)
KETONES UR STRIP.AUTO-MCNC: NEGATIVE MG/DL
LDH SERPL L TO P-CCNC: 154 U/L (ref 84–246)
LEUKOCYTE ESTERASE UR QL STRIP.AUTO: NEGATIVE
LYMPHOCYTES # BLD AUTO: 1.99 X10*3/UL (ref 1.2–4.8)
LYMPHOCYTES NFR BLD AUTO: 43 %
MAGNESIUM SERPL-MCNC: 1.47 MG/DL (ref 1.6–2.4)
MCH RBC QN AUTO: 31.1 PG (ref 26–34)
MCHC RBC AUTO-ENTMCNC: 32 G/DL (ref 32–36)
MCV RBC AUTO: 97 FL (ref 80–100)
MONOCYTES # BLD AUTO: 0.08 X10*3/UL (ref 0.1–1)
MONOCYTES NFR BLD AUTO: 1.7 %
NEUTROPHILS # BLD AUTO: 2.21 X10*3/UL (ref 1.2–7.7)
NEUTROPHILS NFR BLD AUTO: 47.7 %
NITRITE UR QL STRIP.AUTO: NEGATIVE
NRBC BLD-RTO: 0 /100 WBCS (ref 0–0)
OVALOCYTES BLD QL SMEAR: NORMAL
PH UR STRIP.AUTO: 5.5 [PH]
PLATELET # BLD AUTO: 158 X10*3/UL (ref 150–450)
POTASSIUM SERPL-SCNC: 3.6 MMOL/L (ref 3.5–5.3)
PROT SERPL-MCNC: 5.8 G/DL (ref 6.4–8.2)
PROT UR STRIP.AUTO-MCNC: NEGATIVE MG/DL
PROT UR-ACNC: 17 MG/DL (ref 5–25)
PROT/CREAT UR: 0.09 MG/MG CREAT (ref 0–0.17)
RBC # BLD AUTO: 3.54 X10*6/UL (ref 4.5–5.9)
RBC # UR STRIP.AUTO: NEGATIVE /UL
RBC MORPH BLD: NORMAL
SCHISTOCYTES BLD QL SMEAR: NORMAL
SODIUM SERPL-SCNC: 140 MMOL/L (ref 136–145)
SP GR UR STRIP.AUTO: 1.02
TACROLIMUS BLD-MCNC: 6.4 NG/ML
URATE SERPL-MCNC: 5.2 MG/DL (ref 4–7.5)
UROBILINOGEN UR STRIP.AUTO-MCNC: NORMAL MG/DL
WBC # BLD AUTO: 4.6 X10*3/UL (ref 4.4–11.3)

## 2025-01-20 PROCEDURE — 1159F MED LIST DOCD IN RCRD: CPT

## 2025-01-20 PROCEDURE — 80197 ASSAY OF TACROLIMUS: CPT

## 2025-01-20 PROCEDURE — 81003 URINALYSIS AUTO W/O SCOPE: CPT

## 2025-01-20 PROCEDURE — 99215 OFFICE O/P EST HI 40 MIN: CPT

## 2025-01-20 PROCEDURE — 83735 ASSAY OF MAGNESIUM: CPT

## 2025-01-20 PROCEDURE — 1160F RVW MEDS BY RX/DR IN RCRD: CPT

## 2025-01-20 PROCEDURE — 36591 DRAW BLOOD OFF VENOUS DEVICE: CPT

## 2025-01-20 PROCEDURE — 87799 DETECT AGENT NOS DNA QUANT: CPT

## 2025-01-20 PROCEDURE — 83010 ASSAY OF HAPTOGLOBIN QUANT: CPT

## 2025-01-20 PROCEDURE — 2500000004 HC RX 250 GENERAL PHARMACY W/ HCPCS (ALT 636 FOR OP/ED)

## 2025-01-20 PROCEDURE — 1126F AMNT PAIN NOTED NONE PRSNT: CPT

## 2025-01-20 PROCEDURE — 3077F SYST BP >= 140 MM HG: CPT

## 2025-01-20 PROCEDURE — 87533 HHV-6 DNA QUANT: CPT

## 2025-01-20 PROCEDURE — 84156 ASSAY OF PROTEIN URINE: CPT

## 2025-01-20 PROCEDURE — 83615 LACTATE (LD) (LDH) ENZYME: CPT

## 2025-01-20 PROCEDURE — 84550 ASSAY OF BLOOD/URIC ACID: CPT

## 2025-01-20 PROCEDURE — 3080F DIAST BP >= 90 MM HG: CPT

## 2025-01-20 PROCEDURE — 1157F ADVNC CARE PLAN IN RCRD: CPT

## 2025-01-20 PROCEDURE — 85025 COMPLETE CBC W/AUTO DIFF WBC: CPT

## 2025-01-20 PROCEDURE — 96365 THER/PROPH/DIAG IV INF INIT: CPT | Mod: INF

## 2025-01-20 PROCEDURE — 80053 COMPREHEN METABOLIC PANEL: CPT

## 2025-01-20 PROCEDURE — 82784 ASSAY IGA/IGD/IGG/IGM EACH: CPT

## 2025-01-20 RX ORDER — HEPARIN SODIUM,PORCINE/PF 10 UNIT/ML
50 SYRINGE (ML) INTRAVENOUS AS NEEDED
Status: CANCELLED | OUTPATIENT
Start: 2025-01-20

## 2025-01-20 RX ORDER — HEPARIN SODIUM,PORCINE/PF 10 UNIT/ML
50 SYRINGE (ML) INTRAVENOUS AS NEEDED
Status: DISCONTINUED | OUTPATIENT
Start: 2025-01-20 | End: 2025-01-20 | Stop reason: HOSPADM

## 2025-01-20 RX ORDER — NIFEDIPINE 60 MG/1
60 TABLET, FILM COATED, EXTENDED RELEASE ORAL
Qty: 30 TABLET | Refills: 11 | Status: SHIPPED | OUTPATIENT
Start: 2025-01-20 | End: 2026-01-20

## 2025-01-20 RX ORDER — MAGNESIUM SULFATE HEPTAHYDRATE 40 MG/ML
2 INJECTION, SOLUTION INTRAVENOUS ONCE
Status: CANCELLED | OUTPATIENT
Start: 2025-01-20 | End: 2025-01-20

## 2025-01-20 RX ORDER — VALGANCICLOVIR 450 MG/1
900 TABLET, FILM COATED ORAL DAILY
Qty: 60 TABLET | Refills: 0 | Status: SHIPPED | OUTPATIENT
Start: 2025-01-20 | End: 2025-01-23 | Stop reason: SDUPTHER

## 2025-01-20 RX ORDER — HEPARIN 100 UNIT/ML
500 SYRINGE INTRAVENOUS AS NEEDED
Status: CANCELLED | OUTPATIENT
Start: 2025-01-20

## 2025-01-20 RX ORDER — TACROLIMUS 1 MG/1
2 CAPSULE ORAL EVERY 12 HOURS
Qty: 120 CAPSULE | Refills: 1 | Status: SHIPPED | OUTPATIENT
Start: 2025-01-20

## 2025-01-20 RX ORDER — MAGNESIUM SULFATE HEPTAHYDRATE 40 MG/ML
2 INJECTION, SOLUTION INTRAVENOUS ONCE
Status: COMPLETED | OUTPATIENT
Start: 2025-01-20 | End: 2025-01-20

## 2025-01-20 RX ORDER — HEPARIN 100 UNIT/ML
500 SYRINGE INTRAVENOUS AS NEEDED
Status: DISCONTINUED | OUTPATIENT
Start: 2025-01-20 | End: 2025-01-20 | Stop reason: HOSPADM

## 2025-01-20 RX ADMIN — MAGNESIUM SULFATE IN WATER 2 G: 40 INJECTION, SOLUTION INTRAVENOUS at 11:23

## 2025-01-20 ASSESSMENT — ENCOUNTER SYMPTOMS
MUSCULOSKELETAL NEGATIVE: 1
PSYCHIATRIC NEGATIVE: 1
APPETITE CHANGE: 0
DIAPHORESIS: 0
UNEXPECTED WEIGHT CHANGE: 0

## 2025-01-20 ASSESSMENT — PAIN SCALES - GENERAL: PAINLEVEL_OUTOF10: 0-NO PAIN

## 2025-01-20 NOTE — PROGRESS NOTES
Pt present today for count check.  Pt saw provider prior to treatment.  See provider's note for full assessment.  Magnesium infused with no issues.  Pt discharged to home in stable condition.

## 2025-01-21 LAB
CMV DNA SERPL NAA+PROBE-LOG IU: NORMAL {LOG_IU}/ML
EBV DNA SPEC NAA+PROBE-LOG#: NORMAL {LOG_COPIES}/ML
HUMAN HERPESVIRUS-6 PCR PLASMA: NOT DETECTED COPIES/ML
LABORATORY COMMENT REPORT: NOT DETECTED
LABORATORY COMMENT REPORT: NOT DETECTED

## 2025-01-22 PROBLEM — E55.9 VITAMIN D DEFICIENCY: Status: ACTIVE | Noted: 2025-01-22

## 2025-01-22 PROBLEM — B25.9 CYTOMEGALOVIRUS INFECTION (MULTI): Status: ACTIVE | Noted: 2025-01-22

## 2025-01-22 PROBLEM — I15.9 SECONDARY HYPERTENSION: Status: ACTIVE | Noted: 2025-01-22

## 2025-01-23 ENCOUNTER — OFFICE VISIT (OUTPATIENT)
Dept: HEMATOLOGY/ONCOLOGY | Facility: HOSPITAL | Age: 65
End: 2025-01-23
Payer: COMMERCIAL

## 2025-01-23 ENCOUNTER — NUTRITION (OUTPATIENT)
Dept: HEMATOLOGY/ONCOLOGY | Facility: HOSPITAL | Age: 65
End: 2025-01-23
Payer: COMMERCIAL

## 2025-01-23 ENCOUNTER — INFUSION (OUTPATIENT)
Dept: HEMATOLOGY/ONCOLOGY | Facility: HOSPITAL | Age: 65
End: 2025-01-23
Payer: COMMERCIAL

## 2025-01-23 VITALS
HEART RATE: 89 BPM | DIASTOLIC BLOOD PRESSURE: 85 MMHG | WEIGHT: 137 LBS | BODY MASS INDEX: 22.44 KG/M2 | RESPIRATION RATE: 18 BRPM | TEMPERATURE: 97.5 F | OXYGEN SATURATION: 99 % | SYSTOLIC BLOOD PRESSURE: 145 MMHG

## 2025-01-23 VITALS — WEIGHT: 136.91 LBS | BODY MASS INDEX: 22 KG/M2 | HEIGHT: 66 IN

## 2025-01-23 DIAGNOSIS — Z94.84 HISTORY OF ALLOGENEIC HEMATOPOIETIC STEM CELL TRANSPLANT: ICD-10-CM

## 2025-01-23 DIAGNOSIS — Z94.84 STEM CELLS TRANSPLANT STATUS (MULTI): ICD-10-CM

## 2025-01-23 DIAGNOSIS — C92.01 ACUTE MYELOID LEUKEMIA IN REMISSION (MULTI): ICD-10-CM

## 2025-01-23 DIAGNOSIS — B25.9 CYTOMEGALOVIRUS INFECTION, UNSPECIFIED CYTOMEGALOVIRAL INFECTION TYPE (MULTI): ICD-10-CM

## 2025-01-23 LAB
ALBUMIN SERPL BCP-MCNC: 4.1 G/DL (ref 3.4–5)
ALP SERPL-CCNC: 82 U/L (ref 33–136)
ALT SERPL W P-5'-P-CCNC: 15 U/L (ref 10–52)
ANION GAP SERPL CALC-SCNC: 12 MMOL/L (ref 10–20)
AST SERPL W P-5'-P-CCNC: 14 U/L (ref 9–39)
BASOPHILS # BLD AUTO: 0.05 X10*3/UL (ref 0–0.1)
BASOPHILS NFR BLD AUTO: 1 %
BILIRUB SERPL-MCNC: 0.5 MG/DL (ref 0–1.2)
BUN SERPL-MCNC: 12 MG/DL (ref 6–23)
BURR CELLS BLD QL SMEAR: NORMAL
CALCIUM SERPL-MCNC: 8.9 MG/DL (ref 8.6–10.3)
CHLORIDE SERPL-SCNC: 107 MMOL/L (ref 98–107)
CO2 SERPL-SCNC: 26 MMOL/L (ref 21–32)
CREAT SERPL-MCNC: 0.87 MG/DL (ref 0.5–1.3)
DACRYOCYTES BLD QL SMEAR: NORMAL
EGFRCR SERPLBLD CKD-EPI 2021: >90 ML/MIN/1.73M*2
EOSINOPHIL # BLD AUTO: 0.17 X10*3/UL (ref 0–0.7)
EOSINOPHIL NFR BLD AUTO: 3.3 %
ERYTHROCYTE [DISTWIDTH] IN BLOOD BY AUTOMATED COUNT: 16.7 % (ref 11.5–14.5)
GLUCOSE SERPL-MCNC: 92 MG/DL (ref 74–99)
HCT VFR BLD AUTO: 37.5 % (ref 41–52)
HGB BLD-MCNC: 11.8 G/DL (ref 13.5–17.5)
IMM GRANULOCYTES # BLD AUTO: 0.01 X10*3/UL (ref 0–0.7)
IMM GRANULOCYTES NFR BLD AUTO: 0.2 % (ref 0–0.9)
LYMPHOCYTES # BLD AUTO: 2.27 X10*3/UL (ref 1.2–4.8)
LYMPHOCYTES NFR BLD AUTO: 44.2 %
MAGNESIUM SERPL-MCNC: 1.69 MG/DL (ref 1.6–2.4)
MCH RBC QN AUTO: 30.6 PG (ref 26–34)
MCHC RBC AUTO-ENTMCNC: 31.5 G/DL (ref 32–36)
MCV RBC AUTO: 97 FL (ref 80–100)
MONOCYTES # BLD AUTO: 0.09 X10*3/UL (ref 0.1–1)
MONOCYTES NFR BLD AUTO: 1.8 %
NEUTROPHILS # BLD AUTO: 2.55 X10*3/UL (ref 1.2–7.7)
NEUTROPHILS NFR BLD AUTO: 49.5 %
NRBC BLD-RTO: 0 /100 WBCS (ref 0–0)
OVALOCYTES BLD QL SMEAR: NORMAL
PLATELET # BLD AUTO: 187 X10*3/UL (ref 150–450)
POTASSIUM SERPL-SCNC: 3.8 MMOL/L (ref 3.5–5.3)
PROT SERPL-MCNC: 6.3 G/DL (ref 6.4–8.2)
RBC # BLD AUTO: 3.85 X10*6/UL (ref 4.5–5.9)
RBC MORPH BLD: NORMAL
SCHISTOCYTES BLD QL SMEAR: NORMAL
SODIUM SERPL-SCNC: 141 MMOL/L (ref 136–145)
TACROLIMUS BLD-MCNC: 7.2 NG/ML
WBC # BLD AUTO: 5.1 X10*3/UL (ref 4.4–11.3)

## 2025-01-23 PROCEDURE — 85025 COMPLETE CBC W/AUTO DIFF WBC: CPT

## 2025-01-23 PROCEDURE — 36591 DRAW BLOOD OFF VENOUS DEVICE: CPT

## 2025-01-23 PROCEDURE — 83735 ASSAY OF MAGNESIUM: CPT

## 2025-01-23 PROCEDURE — 1157F ADVNC CARE PLAN IN RCRD: CPT | Performed by: NURSE PRACTITIONER

## 2025-01-23 PROCEDURE — 3079F DIAST BP 80-89 MM HG: CPT | Performed by: NURSE PRACTITIONER

## 2025-01-23 PROCEDURE — 80197 ASSAY OF TACROLIMUS: CPT

## 2025-01-23 PROCEDURE — 3077F SYST BP >= 140 MM HG: CPT | Performed by: NURSE PRACTITIONER

## 2025-01-23 PROCEDURE — 99215 OFFICE O/P EST HI 40 MIN: CPT | Performed by: NURSE PRACTITIONER

## 2025-01-23 PROCEDURE — 1126F AMNT PAIN NOTED NONE PRSNT: CPT | Performed by: NURSE PRACTITIONER

## 2025-01-23 PROCEDURE — 80053 COMPREHEN METABOLIC PANEL: CPT

## 2025-01-23 RX ORDER — HEPARIN SODIUM,PORCINE/PF 10 UNIT/ML
50 SYRINGE (ML) INTRAVENOUS AS NEEDED
OUTPATIENT
Start: 2025-01-23

## 2025-01-23 RX ORDER — VALGANCICLOVIR 450 MG/1
900 TABLET, FILM COATED ORAL DAILY
Qty: 20 TABLET | Refills: 0 | Status: SHIPPED | OUTPATIENT
Start: 2025-01-23 | End: 2025-02-02

## 2025-01-23 RX ORDER — HEPARIN 100 UNIT/ML
500 SYRINGE INTRAVENOUS AS NEEDED
OUTPATIENT
Start: 2025-01-23

## 2025-01-23 ASSESSMENT — ENCOUNTER SYMPTOMS
PSYCHIATRIC NEGATIVE: 1
GASTROINTESTINAL NEGATIVE: 1
CONSTITUTIONAL NEGATIVE: 1
HEMATOLOGIC/LYMPHATIC NEGATIVE: 1
RESPIRATORY NEGATIVE: 1
MUSCULOSKELETAL NEGATIVE: 1
EYES NEGATIVE: 1
ENDOCRINE NEGATIVE: 1
CARDIOVASCULAR NEGATIVE: 1
NEUROLOGICAL NEGATIVE: 1

## 2025-01-23 ASSESSMENT — PAIN SCALES - GENERAL: PAINLEVEL_OUTOF10: 0-NO PAIN

## 2025-01-23 NOTE — PROGRESS NOTES
Pt present today for count check.  Pt saw provider during visit.  See provider's note for full assessment.  No treatment needed while in infusion.  Pt discharged to home in stable condition.

## 2025-01-23 NOTE — PROGRESS NOTES
"NUTRITION FOLLOW UP NOTE    Reason for Visit:  Brandt Merritt is a 64 y.o. 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    PMH: HTN, Hep C    Currently T+126 (cord); T+78 (haplo)    *Pt with presumed Stage 1/Grade 2 upper GI GVHD; started Budesonide 12/26; decreased to BID on 1/13.    BM bx (1/9):  KRISTIE    Pt and wife seen today in infusion.      Lab Results   Component Value Date/Time    GLUCOSE 92 01/23/2025 0858     01/23/2025 0858    K 3.8 01/23/2025 0858     01/23/2025 0858    CO2 26 01/23/2025 0858    ANIONGAP 12 01/23/2025 0858    BUN 12 01/23/2025 0858    CREATININE 0.87 01/23/2025 0858    EGFR >90 01/23/2025 0858    CALCIUM 8.9 01/23/2025 0858    ALBUMIN 4.1 01/23/2025 0858    ALKPHOS 82 01/23/2025 0858    PROT 6.3 (L) 01/23/2025 0858    AST 14 01/23/2025 0858    BILITOT 0.5 01/23/2025 0858    ALT 15 01/23/2025 0858    MG 1.69 01/23/2025 0858    PHOS 2.7 11/21/2024 0552     *On MgCl 2 tabs TID     Lab Results   Component Value Date    WBC 5.1 01/23/2025    HGB 11.8 (L) 01/23/2025    HCT 37.5 (L) 01/23/2025    MCV 97 01/23/2025     01/23/2025       Lab Results   Component Value Date/Time    VITD25 27 (L) 12/18/2024 0933   *Started Vit D2 50,000IU x 8 weeks on 12/4.    Anthropometrics:  Anthropometrics  Height: 166.4 cm (5' 5.51\")  Weight: 62.1 kg (136 lb 14.5 oz)  BMI (Calculated): 22.43  IBW/kg (Dietitian Calculated): 63.2 kg  Percent of IBW: 98 %    *Wt at time of transplant admit: (9/13) 75.6 kg   *Wt at first post-transplant visit: (11/25): 60.9 kg   Overall, pt with ~15 kg (20%) wt loss x ~4 mos    *Wt stable/up x 1 month      Wt Readings from Last 10 Encounters:   01/23/25 62.1 kg (136 lb 14.5 oz)   01/23/25 62.1 kg (137 lb)   01/20/25 62.9 kg (138 lb 10.7 oz)   01/16/25 62.4 kg (137 lb 9.1 oz)   01/16/25 62.4 kg (137 lb 9.1 oz)   01/13/25 61.1 kg (134 lb 11.2 oz)   01/10/25 61.1 kg (134 lb 11.2 oz) " "  01/09/25 61.1 kg (134 lb 11.2 oz)   01/09/25 61.1 kg (134 lb 12.8 oz)   01/06/25 60.8 kg (134 lb)   12/30/24        58.8 kg   12/23/24        61.2 kg  12/04/24        60.9 kg  Admit 9/13-11/25 09/09/24        74.2 kg  08/16/24        75.3 kg   07/19/24        74.2 kg   06/24/24        71.0 kg  05/20/24        72.8 kg  04/23/24        75.5 kg     Food And Nutrient Intake:      Appetite and intakes remain good.   Eating main meals at breakfast and dinner; snacks t/o the afternoon as well as in the evening.   Fluid intakes good; drinking mainly plain water and flavored water.  Intermittently will have a milkshake.   Eating well; tastes pretty much back to baseline.   No N/V; no stomach upset. Remains on Budesonide once daily.   No diarrhea but does have soft stools 1x/day.  Feels good; energy continues to improve.     Excited because tomorrow is his birthday and he is planning on retiring.      Dislikes all ONS/supplements.   Started Remeron 15 mg on 11/27.                                                             Nutrition Focused Physical Exam Findings:      Subcutaneous Fat Loss  Orbital Fat Pads: Well nourished (slightly bulging fat pads)  Buccal Fat Pads: Well nourished (full, rounded cheeks)  Triceps: Mild-Moderate (less than ample fat tissue)    Muscle Wasting  Temporalis: Mild-Moderate (slight depression)  Pectoralis (Clavicular Region): Mild-Moderate (some protrusion of clavicle)  Deltoid/Trapezius: Mild-Moderate (slight protrusion of acromion process)  Quadriceps: Mild-Moderate (mild depression on inner and outer thigh)  Gastrocnemius: Mild-Moderate (not well developed muscle)              Energy Needs  Calculated Energy Needs Using Equations  Height: 166.4 cm (5' 5.51\")  Estimated Energy Needs  Total Energy Estimated Needs in 24 hours (kCal):  (9183-2419)  Energy Estimated Needs per kg Body Weight in 24 hours (kCal/kg):  (30-35)  Estimated Fluid Needs  Total Fluid Estimated Needs in 24 Hours (mL):  " (1800+)  Total Fluid Estimated Needs in 24 hours (mL/kg):  (30+)  Estimated Protein Needs  Total Protein Estimated Needs in 24 Hours (g):  (70-85)  Protein Estimated Needs per kg Body Weight in 24 Hours (g/kg):  (1.2-1.4)        Nutrition Diagnosis   Malnutrition Diagnosis  Diagnosis Status: Active  Malnutrition Diagnosis: Mild malnutrition related to acute disease or injury  As Evidenced by: muscle, fat and wt loss as a result of prolonged hospital stay and post transplant side effects    *Remains mildly malnourished, hoever, overall nutrition status had improved considerably since starting Budesonide--now tapering off.  Appetite, intakes and dysgeusia all improving.     Nutrition Interventions/Recommendations   Nutrition Prescription   High Protein, High Calorie  Food Safety     Nutrition Education   Pt aware of importance of protein intakes; include source with all meals and snacks.   Encouraged PO fluids; Goal: 60+ oz daily; prefer calorie containing foods (ie milk, juices, etc) for  additional protein   Consider Milkshakes, smoothies or regular use of ice cream as snack in hopes of continuing to  maximize protein/stefany intakes (pt dislikes ONS).      Coordination of Care   NP/BMT team         Nutrition Monitoring and Evaluation      Will continue to f/up and monitor weight, labs. PO intakes and GI symptoms.

## 2025-01-23 NOTE — PROGRESS NOTES
Patient ID:  Brandt Merritt is a 64 y.o. male.  Referring Physician:   BMT Dr Newsome  Primary Care Provider:  Bill Richmond MD    Assessment/Plan    1/23/25 T+78 and 126. No complaints. No signs/sx of active GVHD or infection. Budesonide tapered to bid on 1/13. Consider decrease to daily on 1/27.  Valcyte tapered to 900mg daily on 1/20. CMV det on 1/9 and then ND since 1/13. Consider removal of PICC line in near future since not needed. Plan to transition to weekly appt in February.      Oncology History Overview Note   4/2024  Myelodysplastic neoplasm with increased blasts-2 ( WHO)  Myelodysplastic neoplasm/AML  (ICC 2022 classification)    Presented in  10/2023 for pancytopenia, found to have hemolysis. Patient had normal CBC June 2020. Denied any hemorrhoidal bleeding . Has had C Scope and EGD , treated Hep C 2014 . 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      CBC 4/11/24 wbc 3.0, hgb 7.1, platelets 167K ANC 0.73    BM biopsy done on 4/11/24  as folllows:  BM histology  Myelodysplastic neoplasm with increased blasts-2 ( WHO)  Myelodysplastic neoplasm/AML  (ICC 2022 classification)  Balsts 11% on aspirate smear and 15-20% based on CD 34 immunostaining approaching AML leukemia, hematooiesis is erythroid dominant with dysplasia in granulocytes and megakaryocytes.   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      11/6/2024 -  Bone Marrow Transplant     conditioned with Flu/Cy/TBI and aGVHD prophylaxis with post-transplant cyclophosphamide, tacrolimus, and mycophenolate. T=0, 11/6/24. ABO Donor/Recipient: O+, A+. CMV donor/recipient: both positive. Started Tacro and MMF on T+5 (11/11).        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      s/p 4 cycles of Decitabine/Venetoclax. BM Bx 6/17/24: Blasts cleared, FISH still positive for trisomy 8, still MDS changes   BMBx (9/5/24): 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 and no increase in blasts      History of allogeneic hematopoietic stem cell transplant  #1: Single-unit Cord, T0=9/19/24, Primary Engraftment Failure  - Conditioning: Flu-Mariana-TBI  - Donor: Single Cord, 6/8  = ABO Donor / Recipient: A+ / A+  = CMV Donor / Recipient: - / +  - aGVHD Prophylaxis: Tacrolimus + MMF  - engraftment failure, developed HLA antibody against class I of cord  - Repeat BMBx (10/15): hypocellular with no residual myeloid neoplasm. 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), T0=11/6/24  - Graft: Haploidentical sister  = ABO Donor / Recipient: O+ / A+ (ABO incompatibility +)  = CMV Donor / Recipient: + / +  - Conditioning: Flu/Cy/TBI/rATG  = GVHD prophylaxis -  rATG 1.5mg/kg T-5,-3,-1, PTCy (25mg/kg T+3, T+4), Tacro, MMF (T+5)     DATE DAY SOURCE MORPHOLOGY MRD WHOLE CHIMERISM   (% donor) CD3 CHIMERISM   (% donor) CD33 CHIMERISM   (% donor)   11/20/24 D+30 PB      100       12/11/24 D+30 PB       100 100   Deferred D+30 BM             1/9/25 D+60 BM  KRISTIE , flow negative      100  100     D+60 PB               D+100 PB               D+100 BM                   Monitor for post-transplant hemolysis   1/20/25  Haptoglobin 116 1/20/25  UPC ratio 0.09 1/20/25     Anemia  12/18 Epo 113.   12/16 Retic 7.2%  DARBY on hold.  Cont folic acid.     GVHD  prophylaxis  GVHD  Anorexia/poor oral intake/weight loss. Added Mirtazapine. Continue Zofran. If persists, consider aGVHD.  12/9/24:  Currently taking tacrolimus 0.5 mg BID, held dose today prior to labs.  FK level 4.6 (12/9/24).  Advised patient on 12/10/24 to increase tacrolimus dose to 1 mg in the AM and 0.5 mg in the PM.    Wean MMF from 1 g TID to 500 mg TID 12/2/24 - present  Due to poor appetite will decrease MMF to 500 mg po tid.  Improvement to GI symptoms since decreasing MMF dose.    Stopped  MMF on T+35,   12/30/24:  Started on budesonide TID on 12/27/24.  Brandt reports that he has starting eating more since starting budesonide.  Reports no nausea, vomiting, or diarrhea at this time.    Appetite continuing to improve. Denies N/V/D. 1/13/25 Decreased budesonide bid per Dr Newsome     Stage I/Grade II Upper GI GVHD  Anorexia, taste changes and weight loss 171# (9/17/24).  Cont mirtazapine and ATC Zofran.   Add budesonide 3x day.   Response TBD  12/30/24:  Weight beginning to improve. 1/9/25: 61.1kg.   Reports he is tolerating more food since starting budesonide on 12/27/24.    1/13/25 tacro level 6.8 on 1.5 mg po bid keep dose same     Weights  Pre-transplant: 9/9 74.2kg   Upon SCT discharge: 11/25 60.9kg  Start of budesonide: 59.7kg (12/26/24).  40 pound weight loss since 9/17/24  Today's weight 62.1kg     Infectious Disease & Immune Reconstitution      Prophylaxis  Antiviral prophylaxis: acyclovir, Letermovir (on HOLD while on Valcyte see below)  Antifungal: posaconazole  PCP prophylaxis: 10/26/24 - 11/28/24 Pentamidine; cont Bactrim     Hypogammaglobulinemia  IgG level. IVIG for level <400   1/20 IgG 628     Active Surveillance:     CMV detected 688 1/2/25, 167 1/6/25. Levels undetectable since 1/13/25.; most recently 1/20.  Started valgancicyclovir 900mg BID on 1/6/25,   Plan 1/20/25 decrease to 900 mg daily for 2 weeks, then transition back to letermovir/acyclovir.   Adeno ND 1/13/25  HHV6 ND  1/20/25  EBV ND 1/20/25  EBV Viremia during transplant admission  Treated despite low levels due to upcoming second SCT  Rituximab 600 mg x 1 (10/31/24)     Infectious Disease follow up evaluation: 1/10/25      Immunodeficiency panel 100 days, 6mos and 1 year.      Immunizations:   Covid vaccine series. Consider at T+ 3 months  Seasonal influenza vaccine. Consider at T+ 3 month  Will plan to begin immunizations at T+6mths (May 2025) depending on degree of immunosuppression     Cardiac:    Essential hypertension  Hx of STEMI (11/11/2020)  Cardiology  appt on 12/18/24  ECHO 12/23/24: LVEF 65%     Ascending aorta dilation   TTE (4/29/24): 3.8-4 cm dilation      Electrolyte disturbance   Continue oral magnesium 128 mg TID.      Dry Skin hyperpigmentation-request derm consult  Instructed to use moisturizer such as cerave that are fragrance free and gentle. Will continue to monitor.     Lack of appetite, improved  Mirtazapine 15 mg at night as of 11/26/24     Vitamin D deficiency  12/18 Level 27.  Cont weekly supplement     Medication reconciliation  Medications reviewed; no scripts needed.     IV Access  L PICC line     Psychosocial.    Caregiver Genevieve  [facility] in [place]    Subjective    History of Present Illness:  Mr Merritt presents to the clinic today 1/23/25 for follow up visit, accompanied by his wife. He is s/p umbilical cord blood transplant 10/16/24 with graft rejection followed by haploidentical cord from his sister on 11/6/24 with flu/cy, ATG and modified dose cyclophosphamide with engraftment on day T+8.      Feels great. Eating good. Walking a lot since elevators not working. Steady. No falls. Energy level 8/10.                Review of Systems   Constitutional: Negative.    HENT:  Negative.     Eyes: Negative.    Respiratory: Negative.     Cardiovascular: Negative.    Gastrointestinal: Negative.    Endocrine: Negative.    Genitourinary: Negative.     Musculoskeletal: Negative.    Skin: Negative.          Patches of darkened skin across nose and forehead.    Neurological: Negative.    Hematological: Negative.    Psychiatric/Behavioral: Negative.              Objective        Physical Exam  Vitals reviewed.   Constitutional:       Appearance: Normal appearance.   HENT:      Head: Normocephalic and atraumatic.      Nose: Nose normal.      Mouth/Throat:      Mouth: Mucous membranes are moist.   Eyes:      Pupils: Pupils are equal, round, and reactive to light.   Cardiovascular:      Rate and Rhythm: Normal rate and regular rhythm.      Pulses: Normal pulses.      Heart sounds: Normal heart sounds.   Pulmonary:      Effort: Pulmonary effort is normal.      Breath sounds: Normal breath sounds.   Abdominal:      General: Abdomen is flat. Bowel sounds are normal.      Palpations: Abdomen is soft.   Musculoskeletal:         General: Normal range of motion.      Cervical back: Normal range of motion.   Skin:     General: Skin is warm and dry.   Neurological:      General: No focal deficit present.      Mental Status: He is alert and oriented to person, place, and time.   Psychiatric:         Mood and Affect: Mood normal.       RTC  1/27, 1/30   MD/ANUSHA with PICC draw/infusion prn  2/3  2/10  2/17  2/24

## 2025-01-24 LAB
CMV DNA SERPL NAA+PROBE-LOG IU: NORMAL {LOG_IU}/ML
LABORATORY COMMENT REPORT: NOT DETECTED

## 2025-01-25 LAB — ADENOVIRUS QPCR,PLASMA, VIRC: NOT DETECTED COPIES/ML

## 2025-01-27 ENCOUNTER — LAB (OUTPATIENT)
Dept: HEMATOLOGY/ONCOLOGY | Facility: HOSPITAL | Age: 65
End: 2025-01-27
Payer: COMMERCIAL

## 2025-01-27 ENCOUNTER — OFFICE VISIT (OUTPATIENT)
Dept: HEMATOLOGY/ONCOLOGY | Facility: HOSPITAL | Age: 65
End: 2025-01-27
Payer: COMMERCIAL

## 2025-01-27 ENCOUNTER — INFUSION (OUTPATIENT)
Dept: HEMATOLOGY/ONCOLOGY | Facility: HOSPITAL | Age: 65
End: 2025-01-27
Payer: COMMERCIAL

## 2025-01-27 ENCOUNTER — APPOINTMENT (OUTPATIENT)
Dept: HEMATOLOGY/ONCOLOGY | Facility: HOSPITAL | Age: 65
End: 2025-01-27
Payer: COMMERCIAL

## 2025-01-27 ENCOUNTER — SOCIAL WORK (OUTPATIENT)
Dept: HEMATOLOGY/ONCOLOGY | Facility: HOSPITAL | Age: 65
End: 2025-01-27
Payer: COMMERCIAL

## 2025-01-27 VITALS
WEIGHT: 137.9 LBS | BODY MASS INDEX: 22.59 KG/M2 | SYSTOLIC BLOOD PRESSURE: 123 MMHG | OXYGEN SATURATION: 99 % | HEART RATE: 95 BPM | DIASTOLIC BLOOD PRESSURE: 80 MMHG | RESPIRATION RATE: 18 BRPM

## 2025-01-27 DIAGNOSIS — E55.9 HYPOVITAMINOSIS D: ICD-10-CM

## 2025-01-27 DIAGNOSIS — Z94.84 HISTORY OF ALLOGENEIC HEMATOPOIETIC STEM CELL TRANSPLANT: ICD-10-CM

## 2025-01-27 DIAGNOSIS — E55.9 VITAMIN D DEFICIENCY: ICD-10-CM

## 2025-01-27 DIAGNOSIS — Z94.84 HISTORY OF ALLOGENEIC HEMATOPOIETIC STEM CELL TRANSPLANT: Primary | ICD-10-CM

## 2025-01-27 DIAGNOSIS — C92.01 ACUTE MYELOID LEUKEMIA IN REMISSION (MULTI): ICD-10-CM

## 2025-01-27 LAB
25(OH)D3 SERPL-MCNC: 28 NG/ML (ref 30–100)
ALBUMIN SERPL BCP-MCNC: 4 G/DL (ref 3.4–5)
ALP SERPL-CCNC: 87 U/L (ref 33–136)
ALT SERPL W P-5'-P-CCNC: 21 U/L (ref 10–52)
ANION GAP SERPL CALC-SCNC: 12 MMOL/L (ref 10–20)
APPEARANCE UR: CLEAR
AST SERPL W P-5'-P-CCNC: 18 U/L (ref 9–39)
BASOPHILS # BLD AUTO: 0.05 X10*3/UL (ref 0–0.1)
BASOPHILS NFR BLD AUTO: 0.9 %
BILIRUB SERPL-MCNC: 0.4 MG/DL (ref 0–1.2)
BILIRUB UR STRIP.AUTO-MCNC: NEGATIVE MG/DL
BUN SERPL-MCNC: 12 MG/DL (ref 6–23)
CALCIUM SERPL-MCNC: 8.8 MG/DL (ref 8.6–10.3)
CHLORIDE SERPL-SCNC: 107 MMOL/L (ref 98–107)
CO2 SERPL-SCNC: 25 MMOL/L (ref 21–32)
COLOR UR: NORMAL
CREAT SERPL-MCNC: 0.84 MG/DL (ref 0.5–1.3)
CREAT UR-MCNC: 89.9 MG/DL (ref 20–370)
EGFRCR SERPLBLD CKD-EPI 2021: >90 ML/MIN/1.73M*2
EOSINOPHIL # BLD AUTO: 0.19 X10*3/UL (ref 0–0.7)
EOSINOPHIL NFR BLD AUTO: 3.3 %
ERYTHROCYTE [DISTWIDTH] IN BLOOD BY AUTOMATED COUNT: 16.9 % (ref 11.5–14.5)
GLUCOSE SERPL-MCNC: 96 MG/DL (ref 74–99)
GLUCOSE UR STRIP.AUTO-MCNC: NORMAL MG/DL
HAPTOGLOB SERPL NEPH-MCNC: 113 MG/DL (ref 30–200)
HCT VFR BLD AUTO: 37.9 % (ref 41–52)
HGB BLD-MCNC: 12 G/DL (ref 13.5–17.5)
IGG SERPL-MCNC: 707 MG/DL (ref 700–1600)
IMM GRANULOCYTES # BLD AUTO: 0.01 X10*3/UL (ref 0–0.7)
IMM GRANULOCYTES NFR BLD AUTO: 0.2 % (ref 0–0.9)
KETONES UR STRIP.AUTO-MCNC: NEGATIVE MG/DL
LDH SERPL L TO P-CCNC: 145 U/L (ref 84–246)
LEUKOCYTE ESTERASE UR QL STRIP.AUTO: NEGATIVE
LYMPHOCYTES # BLD AUTO: 2.49 X10*3/UL (ref 1.2–4.8)
LYMPHOCYTES NFR BLD AUTO: 42.7 %
MAGNESIUM SERPL-MCNC: 1.8 MG/DL (ref 1.6–2.4)
MCH RBC QN AUTO: 30.9 PG (ref 26–34)
MCHC RBC AUTO-ENTMCNC: 31.7 G/DL (ref 32–36)
MCV RBC AUTO: 98 FL (ref 80–100)
MONOCYTES # BLD AUTO: 0.16 X10*3/UL (ref 0.1–1)
MONOCYTES NFR BLD AUTO: 2.7 %
NEUTROPHILS # BLD AUTO: 2.93 X10*3/UL (ref 1.2–7.7)
NEUTROPHILS NFR BLD AUTO: 50.2 %
NITRITE UR QL STRIP.AUTO: NEGATIVE
NRBC BLD-RTO: 0 /100 WBCS (ref 0–0)
PH UR STRIP.AUTO: 5 [PH]
PLATELET # BLD AUTO: 200 X10*3/UL (ref 150–450)
POTASSIUM SERPL-SCNC: 3.8 MMOL/L (ref 3.5–5.3)
PROT SERPL-MCNC: 6.1 G/DL (ref 6.4–8.2)
PROT UR STRIP.AUTO-MCNC: NEGATIVE MG/DL
PROT UR-ACNC: 9 MG/DL (ref 5–25)
PROT/CREAT UR: 0.1 MG/MG CREAT (ref 0–0.17)
RBC # BLD AUTO: 3.88 X10*6/UL (ref 4.5–5.9)
RBC # UR STRIP.AUTO: NEGATIVE /UL
SODIUM SERPL-SCNC: 140 MMOL/L (ref 136–145)
SP GR UR STRIP.AUTO: 1.01
TACROLIMUS BLD-MCNC: 6.2 NG/ML
URATE SERPL-MCNC: 4.5 MG/DL (ref 4–7.5)
UROBILINOGEN UR STRIP.AUTO-MCNC: NORMAL MG/DL
WBC # BLD AUTO: 5.8 X10*3/UL (ref 4.4–11.3)

## 2025-01-27 PROCEDURE — 83735 ASSAY OF MAGNESIUM: CPT

## 2025-01-27 PROCEDURE — 82306 VITAMIN D 25 HYDROXY: CPT

## 2025-01-27 PROCEDURE — 80053 COMPREHEN METABOLIC PANEL: CPT

## 2025-01-27 PROCEDURE — 87799 DETECT AGENT NOS DNA QUANT: CPT

## 2025-01-27 PROCEDURE — 85025 COMPLETE CBC W/AUTO DIFF WBC: CPT

## 2025-01-27 PROCEDURE — 3079F DIAST BP 80-89 MM HG: CPT | Performed by: NURSE PRACTITIONER

## 2025-01-27 PROCEDURE — 80197 ASSAY OF TACROLIMUS: CPT

## 2025-01-27 PROCEDURE — 83615 LACTATE (LD) (LDH) ENZYME: CPT

## 2025-01-27 PROCEDURE — 83010 ASSAY OF HAPTOGLOBIN QUANT: CPT

## 2025-01-27 PROCEDURE — 82784 ASSAY IGA/IGD/IGG/IGM EACH: CPT

## 2025-01-27 PROCEDURE — 81003 URINALYSIS AUTO W/O SCOPE: CPT

## 2025-01-27 PROCEDURE — 84550 ASSAY OF BLOOD/URIC ACID: CPT

## 2025-01-27 PROCEDURE — 99214 OFFICE O/P EST MOD 30 MIN: CPT | Performed by: NURSE PRACTITIONER

## 2025-01-27 PROCEDURE — 36591 DRAW BLOOD OFF VENOUS DEVICE: CPT

## 2025-01-27 PROCEDURE — 1157F ADVNC CARE PLAN IN RCRD: CPT | Performed by: NURSE PRACTITIONER

## 2025-01-27 PROCEDURE — 3074F SYST BP LT 130 MM HG: CPT | Performed by: NURSE PRACTITIONER

## 2025-01-27 PROCEDURE — 99211 OFF/OP EST MAY X REQ PHY/QHP: CPT

## 2025-01-27 PROCEDURE — 87533 HHV-6 DNA QUANT: CPT

## 2025-01-27 PROCEDURE — 2500000004 HC RX 250 GENERAL PHARMACY W/ HCPCS (ALT 636 FOR OP/ED): Performed by: INTERNAL MEDICINE

## 2025-01-27 PROCEDURE — 82570 ASSAY OF URINE CREATININE: CPT

## 2025-01-27 RX ORDER — ERGOCALCIFEROL 1.25 MG/1
1.25 CAPSULE ORAL WEEKLY
Qty: 8 CAPSULE | Refills: 0 | Status: SHIPPED | OUTPATIENT
Start: 2025-01-27 | End: 2025-03-24

## 2025-01-27 RX ORDER — BUDESONIDE 3 MG/1
3 CAPSULE, COATED PELLETS ORAL EVERY MORNING
COMMUNITY

## 2025-01-27 RX ORDER — HEPARIN 100 UNIT/ML
500 SYRINGE INTRAVENOUS AS NEEDED
OUTPATIENT
Start: 2025-01-27

## 2025-01-27 RX ORDER — TACROLIMUS 1 MG/1
1 CAPSULE ORAL 2 TIMES DAILY
COMMUNITY

## 2025-01-27 RX ORDER — HEPARIN SODIUM,PORCINE/PF 10 UNIT/ML
50 SYRINGE (ML) INTRAVENOUS AS NEEDED
Status: DISCONTINUED | OUTPATIENT
Start: 2025-01-27 | End: 2025-01-27 | Stop reason: HOSPADM

## 2025-01-27 RX ORDER — HEPARIN SODIUM,PORCINE/PF 10 UNIT/ML
50 SYRINGE (ML) INTRAVENOUS AS NEEDED
OUTPATIENT
Start: 2025-01-27

## 2025-01-27 RX ADMIN — HEPARIN, PORCINE (PF) 10 UNIT/ML INTRAVENOUS SYRINGE 50 UNITS: at 13:21

## 2025-01-27 RX ADMIN — HEPARIN, PORCINE (PF) 10 UNIT/ML INTRAVENOUS SYRINGE 50 UNITS: at 13:20

## 2025-01-27 ASSESSMENT — ENCOUNTER SYMPTOMS
HEMATOLOGIC/LYMPHATIC NEGATIVE: 1
RESPIRATORY NEGATIVE: 1
CARDIOVASCULAR NEGATIVE: 1
EYES NEGATIVE: 1
GASTROINTESTINAL NEGATIVE: 1
MUSCULOSKELETAL NEGATIVE: 1
NEUROLOGICAL NEGATIVE: 1
PSYCHIATRIC NEGATIVE: 1
CONSTITUTIONAL NEGATIVE: 1
ENDOCRINE NEGATIVE: 1

## 2025-01-27 NOTE — PROGRESS NOTES
SW received documentation via email from RENEA Baldwin on [employer] from [employer], which has requested updates on patient's medical appointments/follow up.  Medical provider form has been emailed to provider for completion.   Once completed, will submit back to [employer] for their review.

## 2025-01-27 NOTE — PROGRESS NOTES
Patient ID:  Brandt Merritt is a 65 y.o. male.  Referring Physician:   BMT Dr Newsome  Primary Care Provider:  Bill Richmond MD    Assessment/Plan    1/27/25 T+82 and 130. No complaints. No signs/sx of active GVHD or infection. Budesonide tapered to daily. Valcyte tapered to 900mg daily on 1/20. CMV det on 1/9 and then ND since 1/13. Consider removal of PICC line in near future since not needed. Plan to transition to weekly appt in February.     Oncology History Overview Note   4/2024  Myelodysplastic neoplasm with increased blasts-2 ( WHO)  Myelodysplastic neoplasm/AML  (ICC 2022 classification)    Presented in  10/2023 for pancytopenia, found to have hemolysis. Patient had normal CBC June 2020. Denied any hemorrhoidal bleeding . Has had C Scope and EGD , treated Hep C 2014 . 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      CBC 4/11/24 wbc 3.0, hgb 7.1, platelets 167K ANC 0.73    BM biopsy done on 4/11/24  as folllows:  BM histology  Myelodysplastic neoplasm with increased blasts-2 ( WHO)  Myelodysplastic neoplasm/AML  (ICC 2022 classification)  Balsts 11% on aspirate smear and 15-20% based on CD 34 immunostaining approaching AML leukemia, hematooiesis is erythroid dominant with dysplasia in granulocytes and megakaryocytes.   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      11/6/2024 -  Bone Marrow Transplant    conditioned with Flu/Cy/TBI and aGVHD  prophylaxis with post-transplant cyclophosphamide, tacrolimus, and mycophenolate. T=0, 11/6/24. ABO Donor/Recipient: O+, A+. CMV donor/recipient: both positive. Started Tacro and MMF on T+5 (11/11).         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      s/p 4 cycles of Decitabine/Venetoclax. BM Bx 6/17/24: Blasts cleared, FISH still positive for trisomy 8, still MDS changes   BMBx (9/5/24): 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 and no increase in blasts      History of allogeneic hematopoietic stem cell transplant  #1: Single-unit Cord, T0=9/19/24, Primary Engraftment Failure  - Conditioning: Flu-Mariana-TBI  - Donor: Single Cord, 6/8  = ABO Donor / Recipient: A+ / A+  = CMV Donor / Recipient: - / +  - aGVHD Prophylaxis: Tacrolimus + MMF  - engraftment failure, developed HLA antibody against class I of cord  - Repeat BMBx (10/15): hypocellular with no residual myeloid neoplasm. 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), T0=11/6/24  - Graft: Haploidentical sister  = ABO Donor / Recipient: O+ / A+ (ABO incompatibility +)  = CMV Donor / Recipient: + / +  - Conditioning: Flu/Cy/TBI/rATG  = GVHD prophylaxis -  rATG 1.5mg/kg T-5,-3,-1, PTCy (25mg/kg T+3, T+4), Tacro, MMF (T+5)     DATE DAY SOURCE MORPHOLOGY MRD WHOLE CHIMERISM   (% donor) CD3 CHIMERISM   (% donor) CD33 CHIMERISM   (% donor)   11/20/24 D+30 PB      100       12/11/24 D+30 PB       100 100   Deferred D+30 BM             1/9/25 D+60 BM  KRISTIE , flow negative      100  100     D+60 PB               D+100 PB               D+100 BM                   Monitor for post-transplant hemolysis   1/27/25  Haptoglobin 116 1/20/25  UPC ratio 0.09 1/20/25     Anemia  12/18 Epo 113.   12/16 Retic 7.2%  DARBY on hold.  Cont folic acid.     GVHD prophylaxis  GVHD  Anorexia/poor oral  intake/weight loss. Added Mirtazapine. Continue Zofran. If persists, consider aGVHD.  12/9/24:  Currently taking tacrolimus 0.5 mg BID, held dose today prior to labs.  FK level 4.6 (12/9/24).  Advised patient on 12/10/24 to increase tacrolimus dose to 1 mg in the AM and 0.5 mg in the PM.    Wean MMF from 1 g TID to 500 mg TID 12/2/24 - present  Due to poor appetite will decrease MMF to 500 mg po tid.  Improvement to GI symptoms since decreasing MMF dose.    Stopped  MMF on T+35,      Stage I/Grade II Upper GI GVHD  Anorexia, taste changes and weight loss 171# (9/17/24).  Cont mirtazapine and ATC Zofran.   Add budesonide 3x day.   12/30/24:  Started on budesonide TID on 12/27/24.  Brandt reports that he has starting eating more since starting budesonide.  Reports no nausea, vomiting, or diarrhea at this time.    Appetite continuing to improve. Denies N/V/D. 1/13/25 Decreased budesonide bid per Dr Newsome; 1/27 Decrease 3mg daily.    1/23/25 Tacro level 7.2 on 1.5 mg po bid      Weights  Pre-transplant: 9/9 74.2kg   Upon SCT discharge: 11/25 60.9kg  Start of budesonide: 59.7kg (12/26/24).  40 pound weight loss since 9/17/24  Today's weight 62.6kg     Infectious Disease & Immune Reconstitution      Prophylaxis  Antiviral prophylaxis: acyclovir, Letermovir (on HOLD while on Valcyte see below)  Antifungal: posaconazole  PCP prophylaxis: 10/26/24 - 11/28/24 Pentamidine; cont Bactrim     Hypogammaglobulinemia  IgG level. IVIG for level <400   1/20 IgG 628     Active Surveillance:     CMV detected 688 1/2/25, 167 1/6/25. Levels undetectable since 1/13/25.; most recently 1/23.  Started valgancicyclovir 900mg BID on 1/6/25,   Plan 1/20/25 decrease to 900 mg daily for 2 weeks (2/3), then transition back to letermovir/acyclovir.   Adeno ND 1/20/25  HHV6 ND 1/20/25  EBV ND 1/20/25  EBV Viremia during transplant admission  Treated despite low levels due to upcoming second SCT  Rituximab 600 mg x 1 (10/31/24)     Infectious  Disease follow up evaluation: 1/10/25      Immunodeficiency panel 100 days, 6mos and 1 year.      Immunizations:   Covid vaccine series. Consider at T+ 3 months  Seasonal influenza vaccine. Consider at T+ 3 month  Will plan to begin immunizations at T+6mths (May 2025) depending on degree of immunosuppression     Cardiac:    Essential hypertension  Hx of STEMI (11/11/2020)  Cardiology  appt on 12/18/24  ECHO 12/23/24: LVEF 65%     Ascending aorta dilation   TTE (4/29/24): 3.8-4 cm dilation      Electrolyte disturbance   Continue oral magnesium 128 mg TID.      Dry Skin hyperpigmentation-request derm consult  Instructed to use moisturizer such as cerave that are fragrance free and gentle. Will continue to monitor.     Lack of appetite, improved  Mirtazapine 15 mg at night as of 11/26/24     Vitamin D deficiency  12/18 Level 27.  Last dose of weekly supplement taken. 1/27 Repeat level pending.      Medication reconciliation  Medications reviewed; no scripts needed.     IV Access  L PICC line     Psychosocial.    Caregiver Genevieve  [facility] in [place]    Subjective    History of Present Illness:  Mr Merritt presents to the clinic today 1/23/25 for follow up visit, accompanied by his wife.     He is s/p umbilical cord blood transplant 10/16/24 with graft rejection followed by haploidentical cord from his sister on 11/6/24 with flu/cy, ATG and modified dose cyclophosphamide with engraftment on day T+8.      Feels great.     Eating good.     Steady. No falls. Energy level very good.    Hands, upper chest, around eyes, nose, forehead darkened spots. Mild peeling L hand. No discomfort. No itching.    Tacrolimus dose held.                     Review of Systems   Constitutional: Negative.    HENT:  Negative.     Eyes: Negative.    Respiratory: Negative.     Cardiovascular: Negative.    Gastrointestinal: Negative.    Endocrine: Negative.    Genitourinary: Negative.     Musculoskeletal: Negative.    Skin: Negative.     Neurological: Negative.    Hematological: Negative.    Psychiatric/Behavioral: Negative.              Objective        Physical Exam  Vitals reviewed.   Constitutional:       Appearance: Normal appearance.   HENT:      Head: Normocephalic and atraumatic.      Nose: Nose normal.      Mouth/Throat:      Mouth: Mucous membranes are moist.   Eyes:      Pupils: Pupils are equal, round, and reactive to light.   Cardiovascular:      Rate and Rhythm: Normal rate and regular rhythm.      Pulses: Normal pulses.      Heart sounds: Normal heart sounds.   Pulmonary:      Effort: Pulmonary effort is normal.      Breath sounds: Normal breath sounds.   Abdominal:      General: Abdomen is flat. Bowel sounds are normal.      Palpations: Abdomen is soft.   Musculoskeletal:         General: Normal range of motion.      Cervical back: Normal range of motion.   Skin:     General: Skin is warm and dry.      Comments: L PICC dressing dry and intact.    Neurological:      General: No focal deficit present.      Mental Status: He is alert and oriented to person, place, and time.   Psychiatric:         Mood and Affect: Mood normal.       RTC  1/30  2/3  2/10  2/17  2/24

## 2025-01-27 NOTE — PROGRESS NOTES
Pt present today for count check.  Labs drawn at central line appt.  Pt saw provider during visit.  See provider's note for full assessment.  No treatment needed.  Pt discharged to home in stable condition.

## 2025-01-30 ENCOUNTER — OFFICE VISIT (OUTPATIENT)
Dept: HEMATOLOGY/ONCOLOGY | Facility: HOSPITAL | Age: 65
End: 2025-01-30
Payer: COMMERCIAL

## 2025-01-30 ENCOUNTER — INFUSION (OUTPATIENT)
Dept: HEMATOLOGY/ONCOLOGY | Facility: HOSPITAL | Age: 65
End: 2025-01-30
Payer: COMMERCIAL

## 2025-01-30 VITALS
OXYGEN SATURATION: 100 % | DIASTOLIC BLOOD PRESSURE: 78 MMHG | HEART RATE: 78 BPM | BODY MASS INDEX: 23.37 KG/M2 | SYSTOLIC BLOOD PRESSURE: 146 MMHG | RESPIRATION RATE: 16 BRPM | TEMPERATURE: 98.4 F | WEIGHT: 142.64 LBS

## 2025-01-30 DIAGNOSIS — C92.01 ACUTE MYELOID LEUKEMIA IN REMISSION (MULTI): ICD-10-CM

## 2025-01-30 DIAGNOSIS — Z94.84 HISTORY OF ALLOGENEIC HEMATOPOIETIC STEM CELL TRANSPLANT: ICD-10-CM

## 2025-01-30 LAB
ALBUMIN SERPL BCP-MCNC: 3.8 G/DL (ref 3.4–5)
ALP SERPL-CCNC: 79 U/L (ref 33–136)
ALT SERPL W P-5'-P-CCNC: 17 U/L (ref 10–52)
ANION GAP SERPL CALC-SCNC: 11 MMOL/L (ref 10–20)
AST SERPL W P-5'-P-CCNC: 14 U/L (ref 9–39)
BASOPHILS # BLD AUTO: 0.08 X10*3/UL (ref 0–0.1)
BASOPHILS NFR BLD AUTO: 2 %
BILIRUB SERPL-MCNC: 0.5 MG/DL (ref 0–1.2)
BUN SERPL-MCNC: 11 MG/DL (ref 6–23)
CALCIUM SERPL-MCNC: 8.8 MG/DL (ref 8.6–10.6)
CHLORIDE SERPL-SCNC: 111 MMOL/L (ref 98–107)
CO2 SERPL-SCNC: 24 MMOL/L (ref 21–32)
CREAT SERPL-MCNC: 0.99 MG/DL (ref 0.5–1.3)
EGFRCR SERPLBLD CKD-EPI 2021: 83 ML/MIN/1.73M*2
EOSINOPHIL # BLD AUTO: 0.2 X10*3/UL (ref 0–0.7)
EOSINOPHIL NFR BLD AUTO: 4.9 %
ERYTHROCYTE [DISTWIDTH] IN BLOOD BY AUTOMATED COUNT: 16.6 % (ref 11.5–14.5)
GLUCOSE SERPL-MCNC: 99 MG/DL (ref 74–99)
HCT VFR BLD AUTO: 34.8 % (ref 41–52)
HGB BLD-MCNC: 11.1 G/DL (ref 13.5–17.5)
IMM GRANULOCYTES # BLD AUTO: 0.01 X10*3/UL (ref 0–0.7)
IMM GRANULOCYTES NFR BLD AUTO: 0.2 % (ref 0–0.9)
LYMPHOCYTES # BLD AUTO: 1.9 X10*3/UL (ref 1.2–4.8)
LYMPHOCYTES NFR BLD AUTO: 46.8 %
MAGNESIUM SERPL-MCNC: 1.76 MG/DL (ref 1.6–2.4)
MCH RBC QN AUTO: 31.4 PG (ref 26–34)
MCHC RBC AUTO-ENTMCNC: 31.9 G/DL (ref 32–36)
MCV RBC AUTO: 98 FL (ref 80–100)
MONOCYTES # BLD AUTO: 0.28 X10*3/UL (ref 0.1–1)
MONOCYTES NFR BLD AUTO: 6.9 %
NEUTROPHILS # BLD AUTO: 1.59 X10*3/UL (ref 1.2–7.7)
NEUTROPHILS NFR BLD AUTO: 39.2 %
NRBC BLD-RTO: 0 /100 WBCS (ref 0–0)
PLATELET # BLD AUTO: 167 X10*3/UL (ref 150–450)
POTASSIUM SERPL-SCNC: 4 MMOL/L (ref 3.5–5.3)
PROT SERPL-MCNC: 5.7 G/DL (ref 6.4–8.2)
RBC # BLD AUTO: 3.54 X10*6/UL (ref 4.5–5.9)
SODIUM SERPL-SCNC: 142 MMOL/L (ref 136–145)
TACROLIMUS BLD-MCNC: 5 NG/ML
WBC # BLD AUTO: 4.1 X10*3/UL (ref 4.4–11.3)

## 2025-01-30 PROCEDURE — 85025 COMPLETE CBC W/AUTO DIFF WBC: CPT

## 2025-01-30 PROCEDURE — 1157F ADVNC CARE PLAN IN RCRD: CPT

## 2025-01-30 PROCEDURE — 80197 ASSAY OF TACROLIMUS: CPT

## 2025-01-30 PROCEDURE — 83735 ASSAY OF MAGNESIUM: CPT

## 2025-01-30 PROCEDURE — 80053 COMPREHEN METABOLIC PANEL: CPT

## 2025-01-30 PROCEDURE — 99215 OFFICE O/P EST HI 40 MIN: CPT

## 2025-01-30 PROCEDURE — 36591 DRAW BLOOD OFF VENOUS DEVICE: CPT

## 2025-01-30 PROCEDURE — 1036F TOBACCO NON-USER: CPT

## 2025-01-30 ASSESSMENT — ENCOUNTER SYMPTOMS
NERVOUS/ANXIOUS: 0
BRUISES/BLEEDS EASILY: 0
DYSURIA: 0
PALPITATIONS: 0
VOMITING: 0
FEVER: 0
CONSTIPATION: 0
CHILLS: 0
DIARRHEA: 0
LEG SWELLING: 0
HEMATURIA: 0
FATIGUE: 0
CONFUSION: 0
ADENOPATHY: 0
WHEEZING: 0
COUGH: 0
ABDOMINAL PAIN: 0
SORE THROAT: 0
SHORTNESS OF BREATH: 0
HEADACHES: 0
DEPRESSION: 0
LIGHT-HEADEDNESS: 0
APPETITE CHANGE: 0
FREQUENCY: 0
DIZZINESS: 0
NAUSEA: 0

## 2025-01-30 ASSESSMENT — PAIN SCALES - GENERAL: PAINLEVEL_OUTOF10: 0-NO PAIN

## 2025-01-30 NOTE — PROGRESS NOTES
POST-CELLULAR THERAPY ONCOLOGY PHARMACY MEDICATION EDUCATION NOTE   Brandt Merritt is a 65 y.o. male currently day +133 (Allogeneic BMT Evaluation - Planned 9/19/2024), 85 (Allogeneic PBSC Transplant Evaluation - Planned 11/6/2024) following haplo-identical allogeneic stem cell transplant for a diagnosis of AML. Pharmacist was consulted to provide home medication education.     Lab Results   Component Value Date    WBC 4.1 (L) 01/30/2025    NEUTROABS 1.59 01/30/2025    HGB 11.1 (L) 01/30/2025     01/30/2025      Lab Results   Component Value Date    GLUCOSE 96 01/27/2025    K 3.8 01/27/2025    MG 1.80 01/27/2025    CREATININE 0.84 01/27/2025       Medication Education: Education was provided regarding all home medication changes. In addition, patient was given written daily medication schedule/calendar. The schedule was discussed in detail with the patient, including drug name, use and dose, and the appropriate timing of self-administration.   - Patient last filled posaconazole for a 30 day supply on 11/21/2024. He still has posaconazole tablets remaining after visual confirmation. Reminded patient that he should take 3 tablets (300 mg) once daily. He will check that his pill box is set-up correctly when he gets home. He believes he has been taking his dose correctly.     The patient demonstrated Level of Understanding : Fair for their current medication list.    All questions were answered. Patient/family verbalized understanding of the plan of care and information provided. Will follow as necessary. Time spent with patient/family and/or coordinating care: 30 minutes.      Radha Cruz, PharmD, [facility]  Ambulatory Stem Cell Transplant Transplant Pharmacist    Current Outpatient Medications   Medication Instructions    budesonide EC (ENTOCORT EC) 3 mg, oral, Every morning    calcium carbonate (OSCAL) 1,250 mg, oral, Daily    ergocalciferol (VITAMIN D2) 1,250 mcg, oral, Weekly    folic acid (FOLVITE) 1 mg,  oral, Daily    Mag 64 128 mg, oral, 3 times daily, Or as instructed on your medication list.    mirtazapine (REMERON) 15 mg, oral, Nightly    NIFEdipine ER (ADALAT CC) 60 mg, oral, Daily before breakfast, Do not crush, chew, or split.    ondansetron (ZOFRAN) 8 mg, oral, Every 8 hours PRN    posaconazole (NOXAFIL) 300 mg, oral, Daily, Do not crush, chew, or split. This is to prevent fungal infections.    prochlorperazine (COMPAZINE) 10 mg, oral, Every 6 hours PRN    sennosides-docusate sodium (Karissa-Colace) 8.6-50 mg tablet 1 tablet, oral, 2 times daily PRN    sulfamethoxazole-trimethoprim (Bactrim DS) 800-160 mg tablet 1 tablet, oral, 3 times weekly, Take on Mondays, Wednesdays, and Fridays.    tacrolimus (PROGRAF) 0.5 mg, oral, Every 12 hours, Total AM dose: 1.5 mg and total PM dose: 1.5 mg. Use in combination with 1.0 mg capsules.    tacrolimus (PROGRAF) 1 mg, oral, 2 times daily    ursodiol (ACTIGALL) 300 mg, oral, 3 times daily    valGANciclovir (VALCYTE) 900 mg, oral, Daily, Do not crush or chew.

## 2025-01-30 NOTE — PROGRESS NOTES
-Patient presents to the clinic today for count check  -Labs drawn and reviewed with the patient in detail  -Patient's provider today was Monet  -Based on lab levels and provider recommendations, they needed no interventions, only picc dressing change.  -Reviewed plan, medications, and schedule with the patient in detail, all questions answered.   -Patient ambulated out of the clinic with ease with friend.  *Plan to pull PICC on  1/3 if labs are stable, check with provider  *Patient to monitor BP at home BID, provider may adjust BP meds if needed next visit.

## 2025-01-30 NOTE — PROGRESS NOTES
Patient ID:  Brandt Merritt is a 65 y.o. male.  Referring Physician:   BMT Dr Newsome  Primary Care Provider:  Bill Richmond MD    Assessment/Plan    1/30/25 T+85 and 133. No complaints. No signs/sx of active GVHD or infection. Budesonide tapered to 3mg daily. Valcyte tapered to 900mg daily on 1/20 and will continue on that dose until 2/3. Will then switch back to acyclovir and letermovir. CMV det on 1/9 and then ND since 1/13. Removal of PICC line planned for Monday 2/3. Weekly appointments in February. BP slightly elevated today (1/30/25), he will check his BP at home twice a day until his visit on Monday to see if he needs a BP medication change. Will start another cycle of vitamin D (1 tablet one time per week) until 3/24/25 since his level was 28 from last visit. Will defer T + 100 bone marrow biopsy since he had one at T + 60.     Oncology History Overview Note   4/2024  Myelodysplastic neoplasm with increased blasts-2 ( WHO)  Myelodysplastic neoplasm/AML  (ICC 2022 classification)    Presented in  10/2023 for pancytopenia, found to have hemolysis. Patient had normal CBC June 2020. Denied any hemorrhoidal bleeding . Has had C Scope and EGD , treated Hep C 2014 . 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      CBC 4/11/24 wbc 3.0, hgb 7.1, platelets 167K ANC 0.73    BM biopsy done on 4/11/24  as folllows:  BM histology  Myelodysplastic neoplasm with increased blasts-2 ( WHO)  Myelodysplastic neoplasm/AML  (ICC 2022 classification)  Balsts 11% on aspirate smear and 15-20% based on CD 34 immunostaining approaching AML leukemia, hematooiesis is erythroid dominant with dysplasia in granulocytes and megakaryocytes.   FISH studies trisomy 8 17.2%  NGS panel DNMT3 aVAF 36%  U2AF1 VAF 32%       Acute myeloid leukemia in remission (Multi)   4/23/2024 Initial Diagnosis    Acute myeloid leukemia not having achieved remission (Multi)     5/13/2024 - 8/16/2024 Chemotherapy    Venetoclax / Decitabine, 28 Day Cycles -  Induction / Consolidation     10/16/2024 - 10/16/2024 Bone Marrow Transplant    conditioning with Flu-Mariana-TBI, a single cord stem cell transplant on 10/16/24 resulted in primary engraftment failure, confirmed by bone marrow biopsy on 10/15 showing 1% donor chimerism and hypocellularity without myeloid neoplasm.      11/6/2024 -  Bone Marrow Transplant    conditioned with Flu/Cy/TBI and aGVHD prophylaxis with post-transplant cyclophosphamide, tacrolimus, and mycophenolate. T=0, 11/6/24. ABO Donor/Recipient: O+, A+. CMV donor/recipient: both positive. Started Tacro and MMF on T+5 (11/11).         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      s/p 4 cycles of Decitabine/Venetoclax. BM Bx 6/17/24: Blasts cleared, FISH still positive for trisomy 8, still MDS changes   BMBx (9/5/24): 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 and no increase in blasts      History of allogeneic hematopoietic stem cell transplant  #1: Single-unit Cord, T0=9/19/24, Primary Engraftment Failure  - Conditioning: Flu-Mariana-TBI  - Donor: Single Cord, 6/8  = ABO Donor / Recipient: A+ / A+  = CMV Donor / Recipient: - / +  - aGVHD Prophylaxis: Tacrolimus + MMF  - engraftment failure, developed HLA antibody against class I of cord  - Repeat BMBx (10/15): hypocellular with no residual myeloid neoplasm. 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), T0=11/6/24  - Graft: Haploidentical sister  = ABO Donor / Recipient: O+ / A+ (ABO incompatibility +)  = CMV Donor / Recipient: + / +  - Conditioning: Flu/Cy/TBI/rATG  = GVHD prophylaxis -  rATG 1.5mg/kg T-5,-3,-1, PTCy (25mg/kg T+3, T+4), Tacro, MMF (T+5)     DATE DAY SOURCE MORPHOLOGY MRD WHOLE CHIMERISM   (% donor) CD3 CHIMERISM   (% donor) CD33 CHIMERISM   (% donor)   11/20/24 D+30 PB      100       12/11/24 D+30 PB        100 100   Deferred D+30 BM             1/9/25 D+60 BM  KRISTIE , flow negative      100  100     D+60 PB               D+100 PB               D+100 BM  Defer                 Monitor for post-transplant hemolysis   1/27/25  Haptoglobin 113 1/27/25  UPC ratio 0.10 1/27/25     Anemia  12/18 Epo 113.   12/16 Retic 7.2%  DARBY on hold.  Cont folic acid.     GVHD prophylaxis  GVHD  Anorexia/poor oral intake/weight loss. Added Mirtazapine. Continue Zofran. If persists, consider aGVHD.  12/9/24:  Currently taking tacrolimus 0.5 mg BID, held dose today prior to labs.  FK level 4.6 (12/9/24).  Advised patient on 12/10/24 to increase tacrolimus dose to 1 mg in the AM and 0.5 mg in the PM.    Wean MMF from 1 g TID to 500 mg TID 12/2/24 - present  Due to poor appetite will decrease MMF to 500 mg po tid.  Improvement to GI symptoms since decreasing MMF dose.    Stopped  MMF on T+35,      Stage I/Grade II Upper GI GVHD  Anorexia, taste changes and weight loss 171# (9/17/24).  Cont mirtazapine and ATC Zofran.   Add budesonide 3x day.   12/30/24:  Started on budesonide TID on 12/27/24.  Brandt reports that he has starting eating more since starting budesonide.  Reports no nausea, vomiting, or diarrhea at this time.    Appetite continuing to improve. Denies N/V/D. 1/13/25 Decreased budesonide bid per Dr Newsome; 1/27 Decrease to 3mg daily. Will continue on this dose for 1-2 weeks before discontinuing.     1/27/25 Tacro level 6.2 on 1.5 mg po bid      Weights  Pre-transplant: 9/9 74.2kg   Upon SCT discharge: 11/25 60.9kg  Start of budesonide: 59.7kg (12/26/24).  40 pound weight loss since 9/17/24  Today's weight 64.7 kg     Infectious Disease & Immune Reconstitution      Prophylaxis  Antiviral prophylaxis: acyclovir, Letermovir (on HOLD while on Valcyte see below)  Antifungal: posaconazole  PCP prophylaxis: 10/26/24 - 11/28/24 Pentamidine; cont Bactrim  Radha Luszcak, PHarmD saw him today and completed a medication reconciliation       Hypogammaglobulinemia  IgG level. IVIG for level <400   1/20 IgG 628     Active Surveillance:     CMV detected 688 1/2/25, 167 1/6/25. Levels undetectable since 1/13/25.; most recently 1/27.  Started valgancicyclovir 900mg BID on 1/6/25,   Plan 1/20/25 decrease to 900 mg daily for 2 weeks (2/3), then transition back to letermovir/acyclovir.   Adeno ND 1/27/25  HHV6 ND 1/27/25  EBV ND 1/27/25  EBV Viremia during transplant admission  Treated despite low levels due to upcoming second SCT  Rituximab 600 mg x 1 (10/31/24)     Infectious Disease follow up evaluation: 1/10/25      Immunodeficiency panel 100 days, 6mos and 1 year.      Immunizations:   Covid vaccine series. Consider at T+ 3 months  Seasonal influenza vaccine. Consider at T+ 3 month  Will plan to begin immunizations at T+6mths (May 2025) depending on degree of immunosuppression     Cardiac:    Essential hypertension- was started on nifedipine 60mg daily on 1/20.   -BP today 160/93 and then repeat BP was 146/78. Advised him to take his BP twice a day over the next week. Will consider changing his BP medication if BP remains elevated.   Hx of STEMI (11/11/2020)  Cardiology  appt on 12/18/24  ECHO 12/23/24: LVEF 65%     Ascending aorta dilation   TTE (4/29/24): 3.8-4 cm dilation      Electrolyte disturbance   Continue oral magnesium 128 mg TID. Mg level today was 1.76.     Dry Skin hyperpigmentation  Instructed to use moisturizer such as cerave that are fragrance free and gentle. Will continue to monitor.     Lack of appetite, improved  Mirtazapine 15 mg at night as of 11/26/24     Vitamin D deficiency  12/18 Level 27.  Last dose of weekly supplement taken. 1/27 Repeat level was 28. Will begin another cycle of      Medication reconciliation  Medications reviewed; no scripts needed.     IV Access  L PICC line     Psychosocial.    Caregiver Genevieve  [facility] in [place]    Subjective    History of Present Illness:  Mr Merritt presents to the clinic today  1/30/25 for follow up visit, accompanied by his wife.     He is s/p umbilical cord blood transplant 10/16/24 with graft rejection followed by haploidentical cord from his sister on 11/6/24 with flu/cy, ATG and modified dose cyclophosphamide with engraftment on day T+8.      Overall, he feels great. Eating good.     Steady. No falls. Energy level very good. Activity continues to increase. He did hold his tacrolimus dose held.     Completed medication reconciliation today.                     Review of Systems   Constitutional:  Negative for appetite change, chills, fatigue and fever.   HENT:   Negative for mouth sores, sore throat and tinnitus.    Respiratory:  Negative for cough, shortness of breath and wheezing.    Cardiovascular:  Negative for chest pain, leg swelling and palpitations.   Gastrointestinal:  Negative for abdominal pain, constipation, diarrhea, nausea and vomiting.   Genitourinary:  Negative for dysuria, frequency and hematuria.    Skin:  Negative for itching and rash.   Neurological:  Negative for dizziness, headaches and light-headedness.   Hematological:  Negative for adenopathy. Does not bruise/bleed easily.   Psychiatric/Behavioral:  Negative for confusion and depression. The patient is not nervous/anxious.             Objective        Physical Exam  Vitals reviewed.   Constitutional:       Appearance: Normal appearance.   HENT:      Head: Normocephalic and atraumatic.      Nose: Nose normal.      Mouth/Throat:      Mouth: Mucous membranes are moist.      Pharynx: Oropharynx is clear. No oropharyngeal exudate or posterior oropharyngeal erythema.   Eyes:      Pupils: Pupils are equal, round, and reactive to light.   Cardiovascular:      Rate and Rhythm: Normal rate and regular rhythm.      Pulses: Normal pulses.      Heart sounds: Normal heart sounds.   Pulmonary:      Effort: Pulmonary effort is normal.      Breath sounds: Normal breath sounds.   Abdominal:      General: Abdomen is flat. There  is no distension.      Palpations: Abdomen is soft. There is no mass.      Tenderness: There is no abdominal tenderness.   Musculoskeletal:         General: No swelling, tenderness or deformity. Normal range of motion.      Cervical back: Normal range of motion.      Right lower leg: No edema.      Left lower leg: No edema.   Skin:     General: Skin is warm and dry.      Comments: L PICC dressing dry and intact.    Neurological:      General: No focal deficit present.      Mental Status: He is alert and oriented to person, place, and time.   Psychiatric:         Mood and Affect: Mood normal.       RTC  2/3  2/10  2/17  2/24

## 2025-01-31 LAB
CMV DNA SERPL NAA+PROBE-LOG IU: NORMAL {LOG_IU}/ML
LABORATORY COMMENT REPORT: NOT DETECTED

## 2025-02-02 ASSESSMENT — ENCOUNTER SYMPTOMS
HEADACHES: 0
APPETITE CHANGE: 0
FATIGUE: 0
BRUISES/BLEEDS EASILY: 0
CONSTIPATION: 0
DEPRESSION: 0
SHORTNESS OF BREATH: 0
NERVOUS/ANXIOUS: 0
HEMATURIA: 0
FEVER: 0
DIARRHEA: 0
DIZZINESS: 0
CHILLS: 0
FREQUENCY: 0
ABDOMINAL PAIN: 0
COUGH: 0
SORE THROAT: 0
NAUSEA: 0
LIGHT-HEADEDNESS: 0
VOMITING: 0
PALPITATIONS: 0
CONFUSION: 0
ADENOPATHY: 0
DYSURIA: 0
WHEEZING: 0
LEG SWELLING: 0

## 2025-02-02 NOTE — PROGRESS NOTES
Patient ID:  Brandt Merritt is a 65 y.o. male.  Referring Physician:   BMT Dr Newsome  Primary Care Provider:  Bill Richmond MD    Assessment/Plan    2/3/25 T+89 and 137. No complaints. No signs/sx of active GVHD or infection. Budesonide tapered to 3mg daily. Valcyte tapered to 900mg daily on 1/20 and will continue on that dose until 2/3. Will then switch back to acyclovir and letermovir. CMV det on 1/9 and then ND since 1/13. Removal of PICC line done on Monday 2/3. Weekly appointments in February. BP remains elevated, have started lisinopril 5 mg daily on 2/3/25. s Will start another cycle of vitamin D (1 tablet one time per week) until 3/24/25 since his level was 28 from last visit. Will defer T + 100 bone marrow biopsy since he had one at T + 60.     Oncology History Overview Note   4/2024  Myelodysplastic neoplasm with increased blasts-2 ( WHO)  Myelodysplastic neoplasm/AML  (ICC 2022 classification)    Presented in  10/2023 for pancytopenia, found to have hemolysis. Patient had normal CBC June 2020. Denied any hemorrhoidal bleeding . Has had C Scope and EGD , treated Hep C 2014 . 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      CBC 4/11/24 wbc 3.0, hgb 7.1, platelets 167K ANC 0.73    BM biopsy done on 4/11/24  as folllows:  BM histology  Myelodysplastic neoplasm with increased blasts-2 ( WHO)  Myelodysplastic neoplasm/AML  (ICC 2022 classification)  Balsts 11% on aspirate smear and 15-20% based on CD 34 immunostaining approaching AML leukemia, hematooiesis is erythroid dominant with dysplasia in granulocytes and megakaryocytes.   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      11/6/2024 -  Bone Marrow Transplant    conditioned with Flu/Cy/TBI and aGVHD prophylaxis with post-transplant cyclophosphamide, tacrolimus, and mycophenolate. T=0, 11/6/24. ABO Donor/Recipient: O+, A+. CMV donor/recipient: both positive. Started Tacro and MMF on T+5 (11/11).         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      s/p 4 cycles of Decitabine/Venetoclax. BM Bx 6/17/24: Blasts cleared, FISH still positive for trisomy 8, still MDS changes   BMBx (9/5/24): 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 and no increase in blasts      History of allogeneic hematopoietic stem cell transplant  #1: Single-unit Cord, T0=9/19/24, Primary Engraftment Failure  - Conditioning: Flu-Mariana-TBI  - Donor: Single Cord, 6/8  = ABO Donor / Recipient: A+ / A+  = CMV Donor / Recipient: - / +  - aGVHD Prophylaxis: Tacrolimus + MMF  - engraftment failure, developed HLA antibody against class I of cord  - Repeat BMBx (10/15): hypocellular with no residual myeloid neoplasm. 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), T0=11/6/24  - Graft: Haploidentical sister  = ABO Donor / Recipient: O+ / A+ (ABO incompatibility +)  = CMV Donor / Recipient: + / +  - Conditioning: Flu/Cy/TBI/rATG  = GVHD prophylaxis -  rATG 1.5mg/kg T-5,-3,-1, PTCy (25mg/kg T+3, T+4), Tacro, MMF (T+5)     DATE DAY SOURCE MORPHOLOGY MRD WHOLE CHIMERISM   (% donor) CD3 CHIMERISM   (% donor) CD33 CHIMERISM   (% donor)   11/20/24 D+30 PB      100       12/11/24 D+30 PB       100 100   Deferred D+30 BM             1/9/25 D+60 BM  KRISTIE , flow negative       100  100     D+60 PB               D+100 PB               D+100 BM  Defer                 Monitor for post-transplant hemolysis   1/27/25  Haptoglobin 113 1/27/25  UPC ratio 0.10 1/27/25     Anemia  12/18 Epo 113.   12/16 Retic 7.2%  DARBY on hold.  Cont folic acid.     GVHD prophylaxis  GVHD  Anorexia/poor oral intake/weight loss. Added Mirtazapine. Continue Zofran. If persists, consider aGVHD.  12/9/24:  Currently taking tacrolimus 0.5 mg BID, held dose today prior to labs.  FK level 4.6 (12/9/24).  Advised patient on 12/10/24 to increase tacrolimus dose to 1 mg in the AM and 0.5 mg in the PM.    Wean MMF from 1 g TID to 500 mg TID 12/2/24 - present  Due to poor appetite will decrease MMF to 500 mg po tid.  Improvement to GI symptoms since decreasing MMF dose.    Stopped  MMF on T+35,      Stage I/Grade II Upper GI GVHD  Anorexia, taste changes and weight loss 171# (9/17/24).  Cont mirtazapine and ATC Zofran.   Add budesonide 3x day.   12/30/24:  Started on budesonide TID on 12/27/24.  Brandt reports that he has starting eating more since starting budesonide.  Reports no nausea, vomiting, or diarrhea at this time.    Appetite continuing to improve. Denies N/V/D. 1/13/25 Decreased budesonide bid per Dr Newsome; 1/27 Decrease to 3mg daily. Will continue on this dose for 1-2 weeks before discontinuing.     1/27/25 Tacro level 6.2 on 1.5 mg po bid   2/3/25 tacro level 15, patient states he has not been taking his posaconazole for about a month. One dose of tacro held and reduced to 0.5 mg in am and 1 mg in pm.      Weights  Pre-transplant: 9/9 74.2kg   Upon SCT discharge: 11/25 60.9kg  Start of budesonide: 59.7kg (12/26/24).  40 pound weight loss since 9/17/24  Today's weight 65.1 kg     Infectious Disease & Immune Reconstitution      Prophylaxis  Antiviral prophylaxis: acyclovir, Letermovir   Antifungal: posaconazole  PCP prophylaxis: 10/26/24 - 11/28/24 Pentamidine; cont Bactrim  Radha Cruz, PHarmD saw  him today and completed a medication reconciliation      Hypogammaglobulinemia  IgG level. IVIG for level <400   1/20 IgG 628     Active Surveillance:     CMV detected 688 1/2/25, 167 1/6/25. Levels undetectable since 1/13/25.; most recently 1/27.  Started valgancicyclovir 900mg BID on 1/6/25,   Plan 1/20/25 decrease to 900 mg daily for 2 weeks (2/3), then transition back to letermovir/acyclovir.   Adeno ND 1/27/25  HHV6 ND 1/27/25  EBV ND 1/27/25  EBV Viremia during transplant admission  Treated despite low levels due to upcoming second SCT  Rituximab 600 mg x 1 (10/31/24)     Infectious Disease follow up evaluation: 1/10/25      Immunodeficiency panel 100 days, 6mos and 1 year.      Immunizations:   Covid vaccine series. Consider at T+ 3 months  Seasonal influenza vaccine. Consider at T+ 3 month  Will plan to begin immunizations at T+6mths (May 2025) depending on degree of immunosuppression     Cardiac:    Essential hypertension- was started on nifedipine 60mg daily on 1/20.   -BP today 160/93 and then repeat BP was 146/78. Advised him to take his BP twice a day over the next week. Will consider changing his BP medication if BP remains elevated.   Hx of STEMI (11/11/2020)  Cardiology  appt on 12/18/24  ECHO 12/23/24: LVEF 65%     Ascending aorta dilation   TTE (4/29/24): 3.8-4 cm dilation      Electrolyte disturbance   Continue oral magnesium 128 mg TID. Mg level today was 1.76.     Dry Skin hyperpigmentation  Instructed to use moisturizer such as cerave that are fragrance free and gentle. Will continue to monitor. Ederm consult feels may be due to bactrim which I prefer not to discontinue,  Has visit in May,  Advised about mineral based sun screen.      Lack of appetite, improved  Mirtazapine 15 mg at night as of 11/26/24     Vitamin D deficiency  12/18 Level 27.  Last dose of weekly supplement taken. 1/27 Repeat level was 28. Will begin another cycle of      Medication reconciliation  Medications reviewed; no  scripts needed.     IV Access  L PICC line     Psychosocial.    Caregiver Genevieve  [facility] in [place]    Subjective    History of Present Illness:    Mr Merritt presents to the clinic today 2/3/25 for follow up visit, accompanied by his wife.     He is s/p umbilical cord blood transplant 10/16/24 with graft rejection followed by haploidentical cord from his sister on 11/6/24 with flu/cy, ATG and modified dose cyclophosphamide with engraftment on day T+8.      Overall, he feels great. Eating good. He has ongoing hyperpigmentation of face and palms of both hands. He has occasional loose stools once or twice a day,  no cramps, denies mouth dryness.     Steady. No falls. Energy level very good. Activity continues to increase. He did hold his tacrolimus dose held.     Completed medication reconciliation today.   Review of Systems   Constitutional:  Negative for appetite change, chills, fatigue and fever.   HENT:   Negative for mouth sores, sore throat and tinnitus.    Respiratory:  Negative for cough, shortness of breath and wheezing.    Cardiovascular:  Negative for chest pain, leg swelling and palpitations.   Gastrointestinal:  Negative for abdominal pain, constipation, diarrhea, nausea and vomiting.   Genitourinary:  Negative for dysuria, frequency and hematuria.    Skin:  Negative for itching and rash.   Neurological:  Negative for dizziness, headaches and light-headedness.   Hematological:  Negative for adenopathy. Does not bruise/bleed easily.   Psychiatric/Behavioral:  Negative for confusion and depression. The patient is not nervous/anxious.             Objective        Physical Exam  Vitals reviewed.   Constitutional:       Appearance: Normal appearance.   HENT:      Head: Normocephalic and atraumatic.      Nose: Nose normal.      Mouth/Throat:      Mouth: Mucous membranes are moist.      Pharynx: Oropharynx is clear. No oropharyngeal exudate or posterior oropharyngeal erythema.   Eyes:      Pupils: Pupils are  equal, round, and reactive to light.   Cardiovascular:      Rate and Rhythm: Normal rate and regular rhythm.      Pulses: Normal pulses.      Heart sounds: Normal heart sounds.   Pulmonary:      Effort: Pulmonary effort is normal.      Breath sounds: Normal breath sounds.   Abdominal:      General: Abdomen is flat. There is no distension.      Palpations: Abdomen is soft. There is no mass.      Tenderness: There is no abdominal tenderness.   Musculoskeletal:         General: No swelling, tenderness or deformity. Normal range of motion.      Cervical back: Normal range of motion.      Right lower leg: No edema.      Left lower leg: No edema.   Skin:     General: Skin is warm and dry.      Comments: L PICC dressing dry and intact.    Neurological:      General: No focal deficit present.      Mental Status: He is alert and oriented to person, place, and time.   Psychiatric:         Mood and Affect: Mood normal.     RTC  2/3  2/10  2/17  2/24

## 2025-02-03 ENCOUNTER — APPOINTMENT (OUTPATIENT)
Dept: HEMATOLOGY/ONCOLOGY | Facility: HOSPITAL | Age: 65
End: 2025-02-03
Payer: COMMERCIAL

## 2025-02-03 ENCOUNTER — OFFICE VISIT (OUTPATIENT)
Dept: HEMATOLOGY/ONCOLOGY | Facility: HOSPITAL | Age: 65
End: 2025-02-03
Payer: COMMERCIAL

## 2025-02-03 ENCOUNTER — SPECIALTY PHARMACY (OUTPATIENT)
Dept: PHARMACY | Facility: CLINIC | Age: 65
End: 2025-02-03

## 2025-02-03 ENCOUNTER — E-CONSULT (OUTPATIENT)
Dept: DERMATOLOGY | Facility: HOSPITAL | Age: 65
End: 2025-02-03

## 2025-02-03 ENCOUNTER — LAB (OUTPATIENT)
Dept: HEMATOLOGY/ONCOLOGY | Facility: HOSPITAL | Age: 65
End: 2025-02-03
Payer: COMMERCIAL

## 2025-02-03 ENCOUNTER — TELEPHONE (OUTPATIENT)
Dept: HEMATOLOGY/ONCOLOGY | Facility: HOSPITAL | Age: 65
End: 2025-02-03

## 2025-02-03 VITALS
HEART RATE: 77 BPM | OXYGEN SATURATION: 100 % | TEMPERATURE: 98.1 F | BODY MASS INDEX: 23.51 KG/M2 | WEIGHT: 143.52 LBS | SYSTOLIC BLOOD PRESSURE: 157 MMHG | RESPIRATION RATE: 16 BRPM | DIASTOLIC BLOOD PRESSURE: 91 MMHG

## 2025-02-03 DIAGNOSIS — R21 RASH: Primary | ICD-10-CM

## 2025-02-03 DIAGNOSIS — Z94.84 HISTORY OF ALLOGENEIC HEMATOPOIETIC STEM CELL TRANSPLANT: ICD-10-CM

## 2025-02-03 DIAGNOSIS — C92.01 ACUTE MYELOID LEUKEMIA IN REMISSION (MULTI): ICD-10-CM

## 2025-02-03 DIAGNOSIS — L81.9 HYPERPIGMENTATION: Primary | ICD-10-CM

## 2025-02-03 DIAGNOSIS — C92.00 ACUTE MYELOID LEUKEMIA NOT HAVING ACHIEVED REMISSION (MULTI): ICD-10-CM

## 2025-02-03 LAB
ALBUMIN SERPL BCP-MCNC: 3.9 G/DL (ref 3.4–5)
ALP SERPL-CCNC: 80 U/L (ref 33–136)
ALT SERPL W P-5'-P-CCNC: 17 U/L (ref 10–52)
ANION GAP SERPL CALC-SCNC: 14 MMOL/L (ref 10–20)
APPEARANCE UR: CLEAR
AST SERPL W P-5'-P-CCNC: 17 U/L (ref 9–39)
BASOPHILS # BLD AUTO: 0.07 X10*3/UL (ref 0–0.1)
BASOPHILS NFR BLD AUTO: 1.6 %
BILIRUB SERPL-MCNC: 0.4 MG/DL (ref 0–1.2)
BILIRUB UR STRIP.AUTO-MCNC: NEGATIVE MG/DL
BUN SERPL-MCNC: 15 MG/DL (ref 6–23)
CALCIUM SERPL-MCNC: 8.8 MG/DL (ref 8.6–10.3)
CHLORIDE SERPL-SCNC: 112 MMOL/L (ref 98–107)
CO2 SERPL-SCNC: 22 MMOL/L (ref 21–32)
COLOR UR: NORMAL
CREAT SERPL-MCNC: 1.25 MG/DL (ref 0.5–1.3)
CREAT UR-MCNC: 97.8 MG/DL (ref 20–370)
EGFRCR SERPLBLD CKD-EPI 2021: 63 ML/MIN/1.73M*2
EOSINOPHIL # BLD AUTO: 0.18 X10*3/UL (ref 0–0.7)
EOSINOPHIL NFR BLD AUTO: 4 %
ERYTHROCYTE [DISTWIDTH] IN BLOOD BY AUTOMATED COUNT: 16.1 % (ref 11.5–14.5)
GLUCOSE SERPL-MCNC: 89 MG/DL (ref 74–99)
GLUCOSE UR STRIP.AUTO-MCNC: NORMAL MG/DL
HAPTOGLOB SERPL NEPH-MCNC: 100 MG/DL (ref 30–200)
HCT VFR BLD AUTO: 34.9 % (ref 41–52)
HGB BLD-MCNC: 11.3 G/DL (ref 13.5–17.5)
IGG SERPL-MCNC: 545 MG/DL (ref 700–1600)
IMM GRANULOCYTES # BLD AUTO: 0.02 X10*3/UL (ref 0–0.7)
IMM GRANULOCYTES NFR BLD AUTO: 0.4 % (ref 0–0.9)
KETONES UR STRIP.AUTO-MCNC: NEGATIVE MG/DL
LDH SERPL L TO P-CCNC: 169 U/L (ref 84–246)
LEUKOCYTE ESTERASE UR QL STRIP.AUTO: NEGATIVE
LYMPHOCYTES # BLD AUTO: 2.34 X10*3/UL (ref 1.2–4.8)
LYMPHOCYTES NFR BLD AUTO: 52 %
MAGNESIUM SERPL-MCNC: 1.64 MG/DL (ref 1.6–2.4)
MCH RBC QN AUTO: 31.5 PG (ref 26–34)
MCHC RBC AUTO-ENTMCNC: 32.4 G/DL (ref 32–36)
MCV RBC AUTO: 97 FL (ref 80–100)
MONOCYTES # BLD AUTO: 0.33 X10*3/UL (ref 0.1–1)
MONOCYTES NFR BLD AUTO: 7.3 %
NEUTROPHILS # BLD AUTO: 1.56 X10*3/UL (ref 1.2–7.7)
NEUTROPHILS NFR BLD AUTO: 34.7 %
NITRITE UR QL STRIP.AUTO: NEGATIVE
NRBC BLD-RTO: 0 /100 WBCS (ref 0–0)
PH UR STRIP.AUTO: 5 [PH]
PLATELET # BLD AUTO: 170 X10*3/UL (ref 150–450)
POTASSIUM SERPL-SCNC: 3.7 MMOL/L (ref 3.5–5.3)
PROT SERPL-MCNC: 5.8 G/DL (ref 6.4–8.2)
PROT UR STRIP.AUTO-MCNC: NEGATIVE MG/DL
PROT UR-ACNC: 10 MG/DL (ref 5–25)
PROT/CREAT UR: 0.1 MG/MG CREAT (ref 0–0.17)
RBC # BLD AUTO: 3.59 X10*6/UL (ref 4.5–5.9)
RBC # UR STRIP.AUTO: NEGATIVE /UL
SODIUM SERPL-SCNC: 144 MMOL/L (ref 136–145)
SP GR UR STRIP.AUTO: 1.01
TACROLIMUS BLD-MCNC: 15.1 NG/ML
URATE SERPL-MCNC: 6.1 MG/DL (ref 4–7.5)
UROBILINOGEN UR STRIP.AUTO-MCNC: NORMAL MG/DL
WBC # BLD AUTO: 4.5 X10*3/UL (ref 4.4–11.3)

## 2025-02-03 PROCEDURE — 99451 NTRPROF PH1/NTRNET/EHR 5/>: CPT | Performed by: DERMATOLOGY

## 2025-02-03 PROCEDURE — 81003 URINALYSIS AUTO W/O SCOPE: CPT

## 2025-02-03 PROCEDURE — 99215 OFFICE O/P EST HI 40 MIN: CPT | Performed by: INTERNAL MEDICINE

## 2025-02-03 PROCEDURE — 3077F SYST BP >= 140 MM HG: CPT | Performed by: INTERNAL MEDICINE

## 2025-02-03 PROCEDURE — 36591 DRAW BLOOD OFF VENOUS DEVICE: CPT

## 2025-02-03 PROCEDURE — 83615 LACTATE (LD) (LDH) ENZYME: CPT

## 2025-02-03 PROCEDURE — 83735 ASSAY OF MAGNESIUM: CPT

## 2025-02-03 PROCEDURE — 1126F AMNT PAIN NOTED NONE PRSNT: CPT | Performed by: INTERNAL MEDICINE

## 2025-02-03 PROCEDURE — 85025 COMPLETE CBC W/AUTO DIFF WBC: CPT

## 2025-02-03 PROCEDURE — 80197 ASSAY OF TACROLIMUS: CPT

## 2025-02-03 PROCEDURE — 1157F ADVNC CARE PLAN IN RCRD: CPT | Performed by: INTERNAL MEDICINE

## 2025-02-03 PROCEDURE — 87799 DETECT AGENT NOS DNA QUANT: CPT

## 2025-02-03 PROCEDURE — 83010 ASSAY OF HAPTOGLOBIN QUANT: CPT

## 2025-02-03 PROCEDURE — 3080F DIAST BP >= 90 MM HG: CPT | Performed by: INTERNAL MEDICINE

## 2025-02-03 PROCEDURE — 84550 ASSAY OF BLOOD/URIC ACID: CPT

## 2025-02-03 PROCEDURE — 84156 ASSAY OF PROTEIN URINE: CPT

## 2025-02-03 PROCEDURE — 80053 COMPREHEN METABOLIC PANEL: CPT

## 2025-02-03 PROCEDURE — 87533 HHV-6 DNA QUANT: CPT

## 2025-02-03 PROCEDURE — 82784 ASSAY IGA/IGD/IGG/IGM EACH: CPT

## 2025-02-03 RX ORDER — ACYCLOVIR 400 MG/1
400 TABLET ORAL EVERY 12 HOURS
Qty: 60 TABLET | Refills: 2 | Status: SHIPPED | OUTPATIENT
Start: 2025-02-03

## 2025-02-03 RX ORDER — LISINOPRIL 5 MG/1
5 TABLET ORAL DAILY
Qty: 30 TABLET | Refills: 11 | Status: SHIPPED | OUTPATIENT
Start: 2025-02-03 | End: 2026-02-03

## 2025-02-03 RX ORDER — HEPARIN SODIUM,PORCINE/PF 10 UNIT/ML
50 SYRINGE (ML) INTRAVENOUS AS NEEDED
OUTPATIENT
Start: 2025-02-03

## 2025-02-03 RX ORDER — HEPARIN 100 UNIT/ML
500 SYRINGE INTRAVENOUS AS NEEDED
OUTPATIENT
Start: 2025-02-03

## 2025-02-03 RX ORDER — POSACONAZOLE 100 MG/1
300 TABLET, DELAYED RELEASE ORAL DAILY
Qty: 90 TABLET | Refills: 2 | Status: SHIPPED | OUTPATIENT
Start: 2025-02-03

## 2025-02-03 ASSESSMENT — PAIN SCALES - GENERAL: PAINLEVEL_OUTOF10: 0-NO PAIN

## 2025-02-03 NOTE — PATIENT INSTRUCTIONS
Restart prevomyst, letermovir 480 mg a day and acyclovir 400 mg twice a day to take place of valganciclovir

## 2025-02-03 NOTE — PROGRESS NOTES
POST-CELLULAR THERAPY ONCOLOGY PHARMACY MEDICATION EDUCATION NOTE   Brandt Merritt is a 65 y.o. 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 Pharmacist was consulted to provide home medication education.     Lab Results   Component Value Date    WBC 4.5 02/03/2025    NEUTROABS 1.56 02/03/2025    HGB 11.3 (L) 02/03/2025     02/03/2025      Lab Results   Component Value Date    GLUCOSE 89 02/03/2025    K 3.7 02/03/2025    MG 1.64 02/03/2025    CREATININE 1.25 02/03/2025       Medication Education: Education was provided regarding all home medication changes. In addition, patient was given written daily medication schedule/calendar. The schedule was discussed in detail with the patient, including drug name, use and dose, and the appropriate timing of self-administration.   - Patient last filled posaconazole for a 30 day supply on 11/21/2024. He has 3 days worth of posaconazole tablets remaining at home. He states that he has probably missed a few days of posaconazole therapy. Reminded patient that he should take 3 tablets (300 mg) once daily.  - Stopped valganciclovir and re-started acyclovir and letermovir   - Added lisinopril  - Stopping ursodiol tomorrow    The patient demonstrated Level of Understanding : Fair for their current medication list.    All questions were answered. Patient/family verbalized understanding of the plan of care and information provided. Will follow as necessary. Time spent with patient/family and/or coordinating care: 30 minutes.      Radha Cruz, PharmD, [facility]  Ambulatory Stem Cell Transplant Transplant Pharmacist    Current Outpatient Medications   Medication Instructions    acyclovir (ZOVIRAX) 400 mg, oral, Every 12 hours    budesonide EC (ENTOCORT EC) 3 mg, oral, Every morning    calcium carbonate  (OSCAL) 1,250 mg, oral, Daily    ergocalciferol (VITAMIN D2) 1,250 mcg, oral, Weekly    folic acid (FOLVITE) 1 mg, oral, Daily    letermovir (PREVYMIS) 480 mg, oral, Daily    lisinopril 5 mg, oral, Daily    Mag 64 128 mg, oral, 3 times daily, Or as instructed on your medication list.    mirtazapine (REMERON) 15 mg, oral, Nightly    NIFEdipine ER (ADALAT CC) 60 mg, oral, Daily before breakfast, Do not crush, chew, or split.    ondansetron (ZOFRAN) 8 mg, oral, Every 8 hours PRN    posaconazole (NOXAFIL) 300 mg, oral, Daily, Do not crush, chew, or split. This is to prevent fungal infections.    prochlorperazine (COMPAZINE) 10 mg, oral, Every 6 hours PRN    sennosides-docusate sodium (Karissa-Colace) 8.6-50 mg tablet 1 tablet, oral, 2 times daily PRN    sulfamethoxazole-trimethoprim (Bactrim DS) 800-160 mg tablet 1 tablet, oral, 3 times weekly, Take on Mondays, Wednesdays, and Fridays.    tacrolimus (PROGRAF) 0.5 mg, oral, Every 12 hours, Total AM dose: 1.5 mg and total PM dose: 1.5 mg. Use in combination with 1.0 mg capsules.    tacrolimus (PROGRAF) 1 mg, oral, 2 times daily

## 2025-02-03 NOTE — PROGRESS NOTES
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  Developed hyperpigmented rash to face that started about 4 weeks ago and is worsening.  No itching noted.     He is on bactrim, prochlorperazine, posaconazole, mirtazapine, tacrolimus PO, nifedipine, lisinopril, letermovir, acyclovir, magnesium chloride, calcium carbonate, ondansetron, folic acid, ergocalciferol, budesonide EC    Two photos were provided. The lighting and resolution of the photos are not the sharpest. There appears to be scattered well-demarcated brown macules on the nose and forehead and a broader ill-defined brown patch on the right temple      Findings / recommendations:   After careful review of the patient's information available in the medical record, the following are my findings and recommendations:     Melasma / photo-sensitizing hyperpigmentation due to bactrim (sulfamethoxazole-trimethoprim) or prochlorperazine is favored based on clinical timeline and photos provided  - Bactrim trimethoprim-sulfamethoxazole) and Compazine (prohclorperazine) are the two medications on his list most commonly associated with photosensitivity    Recommendations:  - Start a pigmented mineral sunscreen. All sunscreens protect from skin cancer but not all sunscreens protect from the sunlight that causes hyperpigmentation. Pigmented mineral sunscreens are needed for this.     Mineral Sunscreen, usually with zinc. If these are too thick/white/chalky look for \"tinted\" ones  EltaMD tinted ([employer], [employer]), Neutrogena Mineral tinted    Lightening agents should be prescribed by a dermatologist. There are many options from topical retinoids to bleaching creams and we should have a discussion to see which he prefers. If this is desired, refer for an in-person dermatology visit      Diagnosis:    1. Hyperpigmentation         eConsult Time: 10 minutes    "   Joleen Chavez MD      2/3/2025

## 2025-02-03 NOTE — TELEPHONE ENCOUNTER
TACROLIMUS DOSE RECOMMENDATION NOTE   Brandt Merritt is a 65 y.o. 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 Patient on tacrolimus for GVHD prophylaxis with level resulting today. Pharmacist was consulted to provide tacrolimus dose recommendations.     Objective   Creatinine   Date Value Ref Range Status   02/03/2025 1.25 0.50 - 1.30 mg/dL Final   01/30/2025 0.99 0.50 - 1.30 mg/dL Final   01/27/2025 0.84 0.50 - 1.30 mg/dL Final     Bilirubin, Total   Date Value Ref Range Status   02/03/2025 0.4 0.0 - 1.2 mg/dL Final   01/30/2025 0.5 0.0 - 1.2 mg/dL Final   01/27/2025 0.4 0.0 - 1.2 mg/dL Final     Tacrolimus    Date Value Ref Range Status   02/03/2025 15.1 (H) <=15.0 ng/mL Final   01/30/2025 5.0 <=15.0 ng/mL Final   01/27/2025 6.2 <=15.0 ng/mL Final       Assessment  Current tacrolimus dose: 1.5 mg every 12 hours  Goal tacrolimus level: 5-10 ng/mL    Plan   The patient's tacrolimus level today was 15.1 which is Supratherapeutic. Patient most likely re-started posaconazole on 1/30 which would explain why this level is elevated. I recommended the patient hold their dose tonight and a 50% dose Decrease starting tomorrow (2/4): tacrolimus 0.5 mg every morning and 1 mg at night. I spoke with the patient/caregiver via phone and instructed them to adjust their tacrolimus dose. Recommendation accepted, continue to monitor tacrolimus levels to assess adjustment. Patient only has 3 days of posaconazole remaining. Encouraged him to call  specialty pharmacy to set up posaconazole delivery.     All questions were answered. Patient/family verbalized understanding of the plan of care and information provided. Will follow as necessary. Time spent with patient/family and/or coordinating care: 15 minutes.      Radha Cruz, PharmD, BCOP  Ambulatory Stem  Cell Transplant Transplant Pharmacist    Current Outpatient Medications   Medication Instructions    acyclovir (ZOVIRAX) 400 mg, oral, Every 12 hours    budesonide EC (ENTOCORT EC) 3 mg, oral, Every morning    calcium carbonate (OSCAL) 1,250 mg, oral, Daily    ergocalciferol (VITAMIN D2) 1,250 mcg, oral, Weekly    folic acid (FOLVITE) 1 mg, oral, Daily    letermovir (PREVYMIS) 480 mg, oral, Daily    lisinopril 5 mg, oral, Daily    Mag 64 128 mg, oral, 3 times daily, Or as instructed on your medication list.    mirtazapine (REMERON) 15 mg, oral, Nightly    NIFEdipine ER (ADALAT CC) 60 mg, oral, Daily before breakfast, Do not crush, chew, or split.    ondansetron (ZOFRAN) 8 mg, oral, Every 8 hours PRN    posaconazole (NOXAFIL) 300 mg, oral, Daily, Do not crush, chew, or split. This is to prevent fungal infections.    prochlorperazine (COMPAZINE) 10 mg, oral, Every 6 hours PRN    sennosides-docusate sodium (Karissa-Colace) 8.6-50 mg tablet 1 tablet, oral, 2 times daily PRN    sulfamethoxazole-trimethoprim (Bactrim DS) 800-160 mg tablet 1 tablet, oral, 3 times weekly, Take on Mondays, Wednesdays, and Fridays.    tacrolimus (PROGRAF) 0.5 mg, oral, Every 12 hours, Total AM dose: 1.5 mg and total PM dose: 1.5 mg. Use in combination with 1.0 mg capsules.    tacrolimus (PROGRAF) 1 mg, oral, 2 times daily

## 2025-02-04 LAB
ADENOVIRUS QPCR,PLASMA, VIRC: NOT DETECTED COPIES/ML
CMV DNA SERPL NAA+PROBE-LOG IU: NORMAL {LOG_IU}/ML
EBV DNA SPEC NAA+PROBE-LOG#: NORMAL {LOG_COPIES}/ML
HUMAN HERPESVIRUS-6 PCR PLASMA: NOT DETECTED COPIES/ML
LABORATORY COMMENT REPORT: NOT DETECTED
LABORATORY COMMENT REPORT: NOT DETECTED

## 2025-02-05 ENCOUNTER — PHARMACY VISIT (OUTPATIENT)
Dept: PHARMACY | Facility: CLINIC | Age: 65
End: 2025-02-05
Payer: COMMERCIAL

## 2025-02-05 PROCEDURE — RXMED WILLOW AMBULATORY MEDICATION CHARGE

## 2025-02-09 ASSESSMENT — ENCOUNTER SYMPTOMS
HEADACHES: 0
CONSTIPATION: 0
CHILLS: 0
FREQUENCY: 0
COUGH: 0
ADENOPATHY: 0
LIGHT-HEADEDNESS: 0
PALPITATIONS: 0
SORE THROAT: 0
FATIGUE: 0
DYSURIA: 0
DIARRHEA: 0
NERVOUS/ANXIOUS: 0
WHEEZING: 0
DEPRESSION: 0
FEVER: 0
HEMATURIA: 0
LEG SWELLING: 0
CONFUSION: 0
ABDOMINAL PAIN: 0
DIZZINESS: 0
VOMITING: 0
NAUSEA: 0
SHORTNESS OF BREATH: 0
APPETITE CHANGE: 0
BRUISES/BLEEDS EASILY: 0

## 2025-02-10 ENCOUNTER — TELEPHONE (OUTPATIENT)
Dept: HEMATOLOGY/ONCOLOGY | Facility: HOSPITAL | Age: 65
End: 2025-02-10

## 2025-02-10 ENCOUNTER — LAB (OUTPATIENT)
Dept: LAB | Facility: HOSPITAL | Age: 65
End: 2025-02-10
Payer: COMMERCIAL

## 2025-02-10 ENCOUNTER — OFFICE VISIT (OUTPATIENT)
Dept: HEMATOLOGY/ONCOLOGY | Facility: HOSPITAL | Age: 65
End: 2025-02-10
Payer: COMMERCIAL

## 2025-02-10 ENCOUNTER — INFUSION (OUTPATIENT)
Dept: HEMATOLOGY/ONCOLOGY | Facility: HOSPITAL | Age: 65
End: 2025-02-10
Payer: COMMERCIAL

## 2025-02-10 ENCOUNTER — LAB (OUTPATIENT)
Dept: HEMATOLOGY/ONCOLOGY | Facility: HOSPITAL | Age: 65
End: 2025-02-10
Payer: COMMERCIAL

## 2025-02-10 VITALS
HEART RATE: 108 BPM | BODY MASS INDEX: 23.59 KG/M2 | WEIGHT: 144 LBS | TEMPERATURE: 98.6 F | DIASTOLIC BLOOD PRESSURE: 64 MMHG | SYSTOLIC BLOOD PRESSURE: 125 MMHG | OXYGEN SATURATION: 100 %

## 2025-02-10 DIAGNOSIS — Z94.84 HISTORY OF ALLOGENEIC HEMATOPOIETIC STEM CELL TRANSPLANT: ICD-10-CM

## 2025-02-10 DIAGNOSIS — Z94.84 HISTORY OF ALLOGENEIC HEMATOPOIETIC STEM CELL TRANSPLANT: Primary | ICD-10-CM

## 2025-02-10 DIAGNOSIS — C92.00 ACUTE MYELOID LEUKEMIA NOT HAVING ACHIEVED REMISSION (MULTI): ICD-10-CM

## 2025-02-10 DIAGNOSIS — C92.01 ACUTE MYELOID LEUKEMIA IN REMISSION (MULTI): ICD-10-CM

## 2025-02-10 DIAGNOSIS — Z94.84 STEM CELLS TRANSPLANT STATUS (MULTI): ICD-10-CM

## 2025-02-10 LAB
ALBUMIN SERPL BCP-MCNC: 4 G/DL (ref 3.4–5)
ALP SERPL-CCNC: 87 U/L (ref 33–136)
ALT SERPL W P-5'-P-CCNC: 22 U/L (ref 10–52)
ANION GAP SERPL CALC-SCNC: 13 MMOL/L (ref 10–20)
AST SERPL W P-5'-P-CCNC: 19 U/L (ref 9–39)
BASOPHILS # BLD MANUAL: 0.13 X10*3/UL (ref 0–0.1)
BASOPHILS NFR BLD MANUAL: 3 %
BILIRUB SERPL-MCNC: 0.5 MG/DL (ref 0–1.2)
BUN SERPL-MCNC: 17 MG/DL (ref 6–23)
BURR CELLS BLD QL SMEAR: ABNORMAL
CALCIUM SERPL-MCNC: 8.7 MG/DL (ref 8.6–10.3)
CHLORIDE SERPL-SCNC: 105 MMOL/L (ref 98–107)
CO2 SERPL-SCNC: 23 MMOL/L (ref 21–32)
CREAT SERPL-MCNC: 1.04 MG/DL (ref 0.5–1.3)
DACRYOCYTES BLD QL SMEAR: ABNORMAL
EGFRCR SERPLBLD CKD-EPI 2021: 79 ML/MIN/1.73M*2
EOSINOPHIL # BLD MANUAL: 0.13 X10*3/UL (ref 0–0.7)
EOSINOPHIL NFR BLD MANUAL: 3 %
ERYTHROCYTE [DISTWIDTH] IN BLOOD BY AUTOMATED COUNT: 16.1 % (ref 11.5–14.5)
GLUCOSE SERPL-MCNC: 106 MG/DL (ref 74–99)
HCT VFR BLD AUTO: 40.8 % (ref 41–52)
HGB BLD-MCNC: 13.5 G/DL (ref 13.5–17.5)
IGG SERPL-MCNC: 559 MG/DL (ref 700–1600)
IMM GRANULOCYTES # BLD AUTO: 0.32 X10*3/UL (ref 0–0.7)
IMM GRANULOCYTES NFR BLD AUTO: 7.5 % (ref 0–0.9)
LYMPHOCYTES # BLD MANUAL: 0.95 X10*3/UL (ref 1.2–4.8)
LYMPHOCYTES NFR BLD MANUAL: 22 %
MAGNESIUM SERPL-MCNC: 1.21 MG/DL (ref 1.6–2.4)
MCH RBC QN AUTO: 31.8 PG (ref 26–34)
MCHC RBC AUTO-ENTMCNC: 33.1 G/DL (ref 32–36)
MCV RBC AUTO: 96 FL (ref 80–100)
MONOCYTES # BLD MANUAL: 1.2 X10*3/UL (ref 0.1–1)
MONOCYTES NFR BLD MANUAL: 28 %
NEUTROPHILS # BLD MANUAL: 1.89 X10*3/UL (ref 1.2–7.7)
NEUTS BAND # BLD MANUAL: 0.3 X10*3/UL (ref 0–0.7)
NEUTS BAND NFR BLD MANUAL: 7 %
NEUTS SEG # BLD MANUAL: 1.59 X10*3/UL (ref 1.2–7)
NEUTS SEG NFR BLD MANUAL: 37 %
NRBC BLD-RTO: 0 /100 WBCS (ref 0–0)
OVALOCYTES BLD QL SMEAR: ABNORMAL
PLATELET # BLD AUTO: 177 X10*3/UL (ref 150–450)
PLATELET CLUMP BLD QL SMEAR: PRESENT
POLYCHROMASIA BLD QL SMEAR: ABNORMAL
POTASSIUM SERPL-SCNC: 3.8 MMOL/L (ref 3.5–5.3)
PROT SERPL-MCNC: 6.1 G/DL (ref 6.4–8.2)
RBC # BLD AUTO: 4.24 X10*6/UL (ref 4.5–5.9)
RBC MORPH BLD: ABNORMAL
SCHISTOCYTES BLD QL SMEAR: ABNORMAL
SODIUM SERPL-SCNC: 137 MMOL/L (ref 136–145)
TACROLIMUS BLD-MCNC: 13.2 NG/ML
TOTAL CELLS COUNTED BLD: 100
WBC # BLD AUTO: 4.3 X10*3/UL (ref 4.4–11.3)

## 2025-02-10 PROCEDURE — 80197 ASSAY OF TACROLIMUS: CPT | Performed by: NURSE PRACTITIONER

## 2025-02-10 PROCEDURE — 87799 DETECT AGENT NOS DNA QUANT: CPT

## 2025-02-10 PROCEDURE — 2500000004 HC RX 250 GENERAL PHARMACY W/ HCPCS (ALT 636 FOR OP/ED): Performed by: NURSE PRACTITIONER

## 2025-02-10 PROCEDURE — 99215 OFFICE O/P EST HI 40 MIN: CPT | Performed by: NURSE PRACTITIONER

## 2025-02-10 PROCEDURE — 80053 COMPREHEN METABOLIC PANEL: CPT | Performed by: NURSE PRACTITIONER

## 2025-02-10 PROCEDURE — 85007 BL SMEAR W/DIFF WBC COUNT: CPT

## 2025-02-10 PROCEDURE — 85027 COMPLETE CBC AUTOMATED: CPT

## 2025-02-10 PROCEDURE — 96365 THER/PROPH/DIAG IV INF INIT: CPT | Mod: INF

## 2025-02-10 PROCEDURE — 83735 ASSAY OF MAGNESIUM: CPT | Performed by: NURSE PRACTITIONER

## 2025-02-10 PROCEDURE — 82784 ASSAY IGA/IGD/IGG/IGM EACH: CPT | Performed by: NURSE PRACTITIONER

## 2025-02-10 PROCEDURE — 1157F ADVNC CARE PLAN IN RCRD: CPT | Performed by: NURSE PRACTITIONER

## 2025-02-10 RX ORDER — MAGNESIUM CHLORIDE 64 MG
128 TABLET, DELAYED RELEASE (ENTERIC COATED) ORAL 3 TIMES DAILY
Qty: 180 TABLET | Refills: 1 | Status: SHIPPED | OUTPATIENT
Start: 2025-02-10

## 2025-02-10 RX ORDER — MAGNESIUM SULFATE HEPTAHYDRATE 40 MG/ML
4 INJECTION, SOLUTION INTRAVENOUS ONCE
Status: CANCELLED | OUTPATIENT
Start: 2025-02-10 | End: 2025-02-10

## 2025-02-10 RX ORDER — BUDESONIDE 3 MG/1
3 CAPSULE, COATED PELLETS ORAL 3 TIMES WEEKLY
COMMUNITY
End: 2025-02-10 | Stop reason: WASHOUT

## 2025-02-10 RX ORDER — BUDESONIDE 3 MG/1
3 CAPSULE, COATED PELLETS ORAL EVERY MORNING
COMMUNITY
End: 2025-02-14 | Stop reason: HOSPADM

## 2025-02-10 RX ORDER — MAGNESIUM SULFATE HEPTAHYDRATE 40 MG/ML
4 INJECTION, SOLUTION INTRAVENOUS ONCE
Status: COMPLETED | OUTPATIENT
Start: 2025-02-10 | End: 2025-02-10

## 2025-02-10 RX ADMIN — MAGNESIUM SULFATE IN WATER 4 G: 4 INJECTION, SOLUTION INTRAVENOUS at 10:18

## 2025-02-10 NOTE — PROGRESS NOTES
Patient came in for count check. Labs drawn and results reviewed. Patient was seen by NP at beside. Patient tolerated the treatment very well. Patient encouraged to submit stool sample as soon as possible. Discharged to home in stable condition accompanied by significant others.

## 2025-02-10 NOTE — TELEPHONE ENCOUNTER
TACROLIMUS DOSE RECOMMENDATION NOTE   Brandt Merritt is a 65 y.o. 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 Patient on tacrolimus for GVHD prophylaxis with level resulting today. Pharmacist was consulted to provide tacrolimus dose recommendations.     Objective   Creatinine   Date Value Ref Range Status   02/10/2025 1.04 0.50 - 1.30 mg/dL Final   02/03/2025 1.25 0.50 - 1.30 mg/dL Final   01/30/2025 0.99 0.50 - 1.30 mg/dL Final     Bilirubin, Total   Date Value Ref Range Status   02/10/2025 0.5 0.0 - 1.2 mg/dL Final   02/03/2025 0.4 0.0 - 1.2 mg/dL Final   01/30/2025 0.5 0.0 - 1.2 mg/dL Final     Tacrolimus    Date Value Ref Range Status   02/10/2025 13.2 <=15.0 ng/mL Final   02/03/2025 15.1 (H) <=15.0 ng/mL Final   01/30/2025 5.0 <=15.0 ng/mL Final       Assessment  Current tacrolimus dose: 0.5 mg in the morning and 1.0 mg in the evening  Goal tacrolimus level: 5-10 ng/mL    Plan   The patient's tacrolimus level today was 13.2 which is Supratherapeutic. I recommended a 33% dose Decrease: tacrolimus 0.5 mg every 12 hours. I spoke with the patient/caregiver via phone and instructed them to adjust their tacrolimus dose. Recommendation accepted, continue to monitor tacrolimus levels to assess adjustment.    All questions were answered. Patient/family verbalized understanding of the plan of care and information provided. Will follow as necessary. Time spent with patient/family and/or coordinating care: 10 minutes.      Radha Cruz, PharmD, [facility]  Ambulatory Stem Cell Transplant Transplant Pharmacist    Current Outpatient Medications   Medication Instructions    acyclovir (ZOVIRAX) 400 mg, oral, Every 12 hours    budesonide EC (ENTOCORT EC) 3 mg, oral, Every morning    calcium carbonate (OSCAL) 1,250 mg, oral, Daily    ergocalciferol (VITAMIN  D2) 1,250 mcg, oral, Weekly    folic acid (FOLVITE) 1 mg, oral, Daily    letermovir (PREVYMIS) 480 mg, oral, Daily    lisinopril 5 mg, oral, Daily    magnesium chloride (MAGDELAY) 128 mg, oral, 3 times daily, Or as instructed on your medication list.    mirtazapine (REMERON) 15 mg, oral, Nightly    NIFEdipine ER (ADALAT CC) 60 mg, oral, Daily before breakfast, Do not crush, chew, or split.    ondansetron (ZOFRAN) 8 mg, oral, Every 8 hours PRN    posaconazole (NOXAFIL) 300 mg, oral, Daily, Do not crush, chew, or split. This is to prevent fungal infections.    prochlorperazine (COMPAZINE) 10 mg, oral, Every 6 hours PRN    sennosides-docusate sodium (Karissa-Colace) 8.6-50 mg tablet 1 tablet, oral, 2 times daily PRN    sulfamethoxazole-trimethoprim (Bactrim DS) 800-160 mg tablet 1 tablet, oral, 3 times weekly, Take on Mondays, Wednesdays, and Fridays.    tacrolimus (PROGRAF) 0.5 mg, oral, Every 12 hours, Total AM dose: 1.5 mg and total PM dose: 1.5 mg. Use in combination with 1.0 mg capsules.    tacrolimus (PROGRAF) 1 mg, oral, 2 times daily

## 2025-02-10 NOTE — PROGRESS NOTES
Patient ID:  Brandt Merritt is a 65 y.o. male.  Referring Physician:   BMT Dr Newsome  Primary Care Provider:  Bill Richmond MD    Assessment/Plan    2/10/25 T+96. Due for 100 day chimerism studies. No evidence of infection or recurrent aGVHD. Cont daily budesonide. 3 loose stools daily x3. Kit given for c.diff and stool pathogen panel. Mg 4g IV today. Tacrolimus dose adjusted.     RTC 2/17, 2/24,     Oncology History Overview Note   4/2024  Myelodysplastic neoplasm with increased blasts-2 ( WHO)  Myelodysplastic neoplasm/AML  (ICC 2022 classification)    Presented in  10/2023 for pancytopenia, found to have hemolysis. Patient had normal CBC June 2020. Denied any hemorrhoidal bleeding . Has had C Scope and EGD , treated Hep C 2014 . 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      CBC 4/11/24 wbc 3.0, hgb 7.1, platelets 167K ANC 0.73    BM biopsy done on 4/11/24  as folllows:  BM histology  Myelodysplastic neoplasm with increased blasts-2 ( WHO)  Myelodysplastic neoplasm/AML  (ICC 2022 classification)  Balsts 11% on aspirate smear and 15-20% based on CD 34 immunostaining approaching AML leukemia, hematooiesis is erythroid dominant with dysplasia in granulocytes and megakaryocytes.   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      11/6/2024 -  Bone Marrow Transplant    conditioned with Flu/Cy/TBI and aGVHD prophylaxis with post-transplant cyclophosphamide,  tacrolimus, and mycophenolate. T=0, 11/6/24. ABO Donor/Recipient: O+, A+. CMV donor/recipient: both positive. Started Tacro and MMF on T+5 (11/11).         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      s/p 4 cycles of Decitabine/Venetoclax. BM Bx 6/17/24: Blasts cleared, FISH still positive for trisomy 8, still MDS changes   BMBx (9/5/24): 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 and no increase in blasts      History of allogeneic hematopoietic stem cell transplant  #1: Single-unit Cord, T0=9/19/24, Primary Engraftment Failure  - Conditioning: Flu-Mariana-TBI  - Donor: Single Cord, 6/8  = ABO Donor / Recipient: A+ / A+  = CMV Donor / Recipient: - / +  - aGVHD Prophylaxis: Tacrolimus + MMF  - engraftment failure, developed HLA antibody against class I of cord  - Repeat BMBx (10/15): hypocellular with no residual myeloid neoplasm. 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), T0=11/6/24  - Graft: Haploidentical sister  = ABO Donor / Recipient: O+ / A+ (ABO incompatibility +)  = CMV Donor / Recipient: + / +  - Conditioning: Flu/Cy/TBI/rATG  = GVHD prophylaxis -  rATG 1.5mg/kg T-5,-3,-1, PTCy (25mg/kg T+3, T+4), Tacro, MMF (T+5)     DATE DAY SOURCE MORPHOLOGY MRD WHOLE CHIMERISM   (% donor) CD3 CHIMERISM   (% donor) CD33 CHIMERISM   (% donor)   11/20/24 D+30 PB      100       12/11/24 D+30 PB       100 100   Deferred D+30 BM             1/9/25 D+60 BM KRISTIE , flow negative      100  100     D+60 PB              2/17/25 D+100 PB               D+100 BM  Defer                 Monitor for post-transplant hemolysis   2/3/25  Haptoglobin 100 2/3/25  UPC ratio 0.10 2/3/25     Anemia  12/18 Epo 113.   12/16 Retic 7.2%  DARBY on hold.  Cont folic acid.     GVHD prophylaxis  GVHD  Anorexia/poor oral intake/weight loss. Added Mirtazapine.  Continue Zofran. If persists, consider aGVHD.  12/9/24:  Currently taking tacrolimus 0.5 mg BID, held dose today prior to labs.  FK level 4.6 (12/9/24).  Advised patient on 12/10/24 to increase tacrolimus dose to 1 mg in the AM and 0.5 mg in the PM.    Wean MMF from 1 g TID to 500 mg TID 12/2/24 - present  Due to poor appetite will decrease MMF to 500 mg po tid.  Improvement to GI symptoms since decreasing MMF dose.    Stopped  MMF on T+35,      Stage I/Grade II Upper GI GVHD  Anorexia, taste changes and weight loss 171# (9/17/24).  Cont mirtazapine and ATC Zofran.   Add budesonide 3x day.   12/30/24:  Started on budesonide TID on 12/27/24.  Brandt reports that he has starting eating more since starting budesonide.  Reports no nausea, vomiting, or diarrhea at this time.    Appetite continuing to improve. Denies N/V/D. 1/13/25 Decreased budesonide bid per Dr Newsome;   1/27 Decrease to 3mg daily. Will continue on this dose for 1-2 weeks before discontinuing.      1/27/25 Tacro level 6.2 on 1.5 mg po bid   2/3/25 tacro level 15, patient states he has not been taking his posaconazole for about a month. One dose of tacro held and reduced to 0.5 mg in am and 1 mg in pm.   2/10/25 Level 13.2. Instructed to decrease dose 0.5mg bid.      Weights  Pre-transplant: 9/9 74.2kg   Upon SCT discharge: 11/25 60.9kg  Start of budesonide: 59.7kg (12/26/24).  40 pound weight loss since 9/17/24  Today's weight 65.3kg     Infectious Disease & Immune Reconstitution      Prophylaxis  Antiviral prophylaxis: acyclovir, Letermovir   Antifungal: posaconazole  PCP prophylaxis: 10/26/24 - 11/28/24 Pentamidine; cont Bactrim     Hypogammaglobulinemia  IgG level. IVIG for level <400   2/10 IgG 559     Active Surveillance:     CMV detected 688 1/2/25, 167 1/6/25. Levels undetectable since 1/13/25.; most recently 2/10.  Started valgancicyclovir 900mg BID on 1/6/25,   Plan 1/20/25 decrease to 900 mg daily for 2 weeks (2/3), then transition back to  "letermovir/acyclovir.   Adeno ND 2/10/25  HHV6 ND 2/3/25  EBV ND 2/3/25  EBV Viremia during transplant admission  Treated despite low levels due to upcoming second SCT  Rituximab 600 mg x 1 (10/31/24)     Infectious Disease follow up evaluation: 1/10/25      Immunodeficiency panel 100 days, 6mos and 1 year.      Immunizations:   Covid vaccine series. Consider at T+ 3 months  Seasonal influenza vaccine. Consider at T+ 3 month  Will plan to begin immunizations at T+6mths (May 2025) depending on degree of immunosuppression     Cardiac:    Essential hypertension- was started on nifedipine 60mg daily on 1/20.   -BP today 160/93 and then repeat BP was 146/78. Advised him to take his BP twice a day over the next week. Will consider changing his BP medication if BP remains elevated.   Hx of STEMI (11/11/2020)  Cardiology  appt on 12/18/24  ECHO 12/23/24: LVEF 65%     Ascending aorta dilation   TTE (4/29/24): 3.8-4 cm dilation      Electrolyte disturbance   Continue oral magnesium 128 mg TID. Mg level 1.21. 4g IV given.      Lack of appetite, improved  Mirtazapine 15 mg at night as of 11/26/24     Vitamin D deficiency  12/18 Level 27.  Last dose of weekly supplement taken. 1/27 Repeat level was 28. Will begin another cycle of     Melasma / photo-sensitizing hyperpigmentation due to bactrim (sulfamethoxazole-trimethoprim) or prochlorperazine (derm e-consult) is favored based on clinical timeline and photos provided.    Start a pigmented mineral sunscreen. All sunscreens protect from skin cancer but not all sunscreens protect from the sunlight that causes hyperpigmentation. Pigmented mineral sunscreens are needed for this.      Mineral Sunscreen, usually with zinc. If these are too thick/white/chalky look for \"tinted\" ones: EltaMD tinted ([employer], [employer]), Neutrogena Mineral tinted     Lightening agents should be prescribed by a dermatologist. There are many options from topical retinoids to bleaching creams and we should have " a discussion to see which he prefers. If this is desired, refer for an in-person dermatology visit-scheduled for May.     Medication reconciliation  Medications reviewed; no scripts needed.     IV Access  L PICC line     Psychosocial.    Caregiver Genev[facility] in [place]    Subjective    History of Present Illness:  Mr Merritt presents to the clinic today for follow up visit, accompanied by his wife.      He is s/p umbilical cord blood transplant 10/16/24 with graft rejection followed by haploidentical cord from his sister on 11/6/24 with flu/cy, ATG and modified dose cyclophosphamide with engraftment on day T+8.      Overall, he feels good.     Eating/drinking good. He has occasional loose stools once a day x3 (most recently).     Steady. No falls. Energy level very good. Activity continues to increase.     He has ongoing hyperpigmentation of face and palms of both hands.     Held tacro.       Review of Systems   Constitutional: Negative.    HENT:  Negative.     Eyes: Negative.    Respiratory: Negative.     Cardiovascular: Negative.    Gastrointestinal: Negative.    Endocrine: Negative.    Genitourinary: Negative.     Musculoskeletal: Negative.    Skin: Negative.         Darkening face and hands.    Neurological: Negative.    Hematological: Negative.    Psychiatric/Behavioral: Negative.              Objective        Physical Exam  Vitals reviewed.   Constitutional:       Appearance: Normal appearance.   HENT:      Head: Normocephalic and atraumatic.      Nose: Nose normal.      Mouth/Throat:      Mouth: Mucous membranes are moist.   Eyes:      Pupils: Pupils are equal, round, and reactive to light.   Cardiovascular:      Rate and Rhythm: Normal rate and regular rhythm.      Pulses: Normal pulses.      Heart sounds: Normal heart sounds.   Pulmonary:      Effort: Pulmonary effort is normal.      Breath sounds: Normal breath sounds.   Abdominal:      General: Abdomen is flat. Bowel sounds are normal.       Palpations: Abdomen is soft.   Musculoskeletal:         General: Normal range of motion.      Cervical back: Normal range of motion.   Skin:     General: Skin is warm and dry.      Comments: Hyperpigmentation around nose/forehead and palms.   No rash.   Neurological:      General: No focal deficit present.      Mental Status: He is alert and oriented to person, place, and time.   Psychiatric:         Mood and Affect: Mood normal.

## 2025-02-10 NOTE — PROGRESS NOTES
Patient ID:  Brandt Merritt is a 65 y.o. male.  Referring Physician:   BMT Dr Newsome  Primary Care Provider:  Bill Richmond MD    Assessment/Plan    2/3/25 T+89 and 137. No complaints. No signs/sx of active GVHD or infection. Budesonide tapered to 3mg daily. Valcyte tapered to 900mg daily on 1/20 until 2/3/25 and currently on acyclovir and letermovir. CMV det on 1/9 and then ND since 1/13. Removal of PICC line done on Monday 2/3. Weekly appointments in February. BP remains elevated, have started lisinopril 5 mg daily on 2/3/25.  Will start another cycle of vitamin D (1 tablet one time per week) until 3/24/25 since his level was 28 from last visit. Will defer T + 100 bone marrow biopsy since he had one at T + 60.     Oncology History Overview Note   4/2024  Myelodysplastic neoplasm with increased blasts-2 ( WHO)  Myelodysplastic neoplasm/AML  (ICC 2022 classification)    Presented in  10/2023 for pancytopenia, found to have hemolysis. Patient had normal CBC June 2020. Denied any hemorrhoidal bleeding . Has had C Scope and EGD , treated Hep C 2014 . 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      CBC 4/11/24 wbc 3.0, hgb 7.1, platelets 167K ANC 0.73    BM biopsy done on 4/11/24  as folllows:  BM histology  Myelodysplastic neoplasm with increased blasts-2 ( WHO)  Myelodysplastic neoplasm/AML  (ICC 2022 classification)  Balsts 11% on aspirate smear and 15-20% based on CD 34 immunostaining approaching AML leukemia, hematooiesis is erythroid dominant with dysplasia in granulocytes and megakaryocytes.   FISH studies trisomy 8 17.2%  NGS panel DNMT3 aVAF 36%  U2AF1 VAF 32%       Acute myeloid leukemia in remission (Multi)   4/23/2024 Initial Diagnosis    Acute myeloid leukemia not having achieved remission (Multi)     5/13/2024 - 8/16/2024 Chemotherapy    Venetoclax / Decitabine, 28 Day Cycles - Induction / Consolidation     10/16/2024 - 10/16/2024 Bone Marrow Transplant    conditioning with Flu-Mariana-TBI, a single cord  stem cell transplant on 10/16/24 resulted in primary engraftment failure, confirmed by bone marrow biopsy on 10/15 showing 1% donor chimerism and hypocellularity without myeloid neoplasm.      11/6/2024 -  Bone Marrow Transplant    conditioned with Flu/Cy/TBI and aGVHD prophylaxis with post-transplant cyclophosphamide, tacrolimus, and mycophenolate. T=0, 11/6/24. ABO Donor/Recipient: O+, A+. CMV donor/recipient: both positive. Started Tacro and MMF on T+5 (11/11).         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      s/p 4 cycles of Decitabine/Venetoclax. BM Bx 6/17/24: Blasts cleared, FISH still positive for trisomy 8, still MDS changes   BMBx (9/5/24): 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 and no increase in blasts      History of allogeneic hematopoietic stem cell transplant  #1: Single-unit Cord, T0=9/19/24, Primary Engraftment Failure  - Conditioning: Flu-Mariana-TBI  - Donor: Single Cord, 6/8  = ABO Donor / Recipient: A+ / A+  = CMV Donor / Recipient: - / +  - aGVHD Prophylaxis: Tacrolimus + MMF  - engraftment failure, developed HLA antibody against class I of cord  - Repeat BMBx (10/15): hypocellular with no residual myeloid neoplasm. 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), T0=11/6/24  - Graft: Haploidentical sister  = ABO Donor / Recipient: O+ / A+ (ABO incompatibility +)  = CMV Donor / Recipient: + / +  - Conditioning: Flu/Cy/TBI/rATG  = GVHD prophylaxis -  rATG 1.5mg/kg T-5,-3,-1, PTCy (25mg/kg T+3, T+4), Tacro, MMF (T+5)     DATE DAY SOURCE MORPHOLOGY MRD WHOLE CHIMERISM   (% donor) CD3 CHIMERISM   (% donor) CD33 CHIMERISM   (% donor)   11/20/24 D+30 PB      100       12/11/24 D+30 PB       100 100   Deferred D+30 BM             1/9/25 D+60 BM  KRISTIE , flow negative      100  100     D+60 PB               D+100 PB                D+100 BM  Defer                 Monitor for post-transplant hemolysis   1/27/25  Haptoglobin 113 1/27/25  UPC ratio 0.10 1/27/25     Anemia  12/18 Epo 113.   12/16 Retic 7.2%  DARBY on hold.  Cont folic acid.     GVHD prophylaxis  GVHD  Anorexia/poor oral intake/weight loss. Added Mirtazapine. Continue Zofran. If persists, consider aGVHD.  12/9/24:  Currently taking tacrolimus 0.5 mg BID, held dose today prior to labs.  FK level 4.6 (12/9/24).  Advised patient on 12/10/24 to increase tacrolimus dose to 1 mg in the AM and 0.5 mg in the PM.    Wean MMF from 1 g TID to 500 mg TID 12/2/24 - present  Due to poor appetite will decrease MMF to 500 mg po tid.  Improvement to GI symptoms since decreasing MMF dose.    Stopped  MMF on T+35,      Stage I/Grade II Upper GI GVHD  Anorexia, taste changes and weight loss 171# (9/17/24).  Cont mirtazapine and ATC Zofran.   Add budesonide 3x day.   12/30/24:  Started on budesonide TID on 12/27/24.  Brandt reports that he has starting eating more since starting budesonide.  Reports no nausea, vomiting, or diarrhea at this time.    Appetite continuing to improve. Denies N/V/D. 1/13/25 Decreased budesonide bid per Dr Newsome; 1/27 Decrease to 3mg daily. Will continue on this dose for 1-2 weeks before discontinuing.     1/27/25 Tacro level 6.2 on 1.5 mg po bid   2/3/25 tacro level 15, patient states he has not been taking his posaconazole for about a month. One dose of tacro held and reduced to 0.5 mg in am and 1 mg in pm. Remains on budesonide 3 mg daily, consider switching to every other day next visit     Weights  Pre-transplant: 9/9 74.2kg   Upon SCT discharge: 11/25 60.9kg  Start of budesonide: 59.7kg (12/26/24).  40 pound weight loss since 9/17/24  Today's weight 65.1 kg     Infectious Disease & Immune Reconstitution      Prophylaxis  Antiviral prophylaxis: acyclovir, Letermovir   Antifungal: posaconazole  PCP prophylaxis: 10/26/24 - 11/28/24 Pentamidine; cont  Aria Cruz, PHarmD saw him today and completed a medication reconciliation      Hypogammaglobulinemia  IgG level. IVIG for level <400   1/20 IgG 628     Active Surveillance:     CMV detected 688 1/2/25, 167 1/6/25. Levels undetectable since 1/13/25.; most recently 1/27.  Started valgancicyclovir 900mg BID on 1/6/25,   Plan 1/20/25 decrease to 900 mg daily for 2 weeks (2/3), then transition back to letermovir/acyclovir.   Adeno ND 1/27/25  HHV6 ND 1/27/25  EBV ND 1/27/25  EBV Viremia during transplant admission  Treated despite low levels due to upcoming second SCT  Rituximab 600 mg x 1 (10/31/24)     Infectious Disease follow up evaluation: 1/10/25      Immunodeficiency panel 100 days, 6mos and 1 year.      Immunizations:   Covid vaccine series. Consider at T+ 3 months  Seasonal influenza vaccine. Consider at T+ 3 month  Will plan to begin immunizations at T+6mths (May 2025) depending on degree of immunosuppression     Cardiac:    Essential hypertension- was started on nifedipine 60mg daily on 1/20.   -BP today 160/93 and then repeat BP was 146/78. Advised him to take his BP twice a day over the next week. Will consider changing his BP medication if BP remains elevated.   Hx of STEMI (11/11/2020)  Cardiology  appt on 12/18/24  ECHO 12/23/24: LVEF 65%     Ascending aorta dilation   TTE (4/29/24): 3.8-4 cm dilation      Electrolyte disturbance   Continue oral magnesium 128 mg TID. Mg level today was 1.76.     Dry Skin hyperpigmentation  Instructed to use moisturizer such as cerave that are fragrance free and gentle. Will continue to monitor. Ederm consult feels may be due to bactrim which I prefer not to discontinue,  Has visit in May,  Advised about mineral based sun screen.      Lack of appetite, improved  Mirtazapine 15 mg at night as of 11/26/24     Vitamin D deficiency  12/18 Level 27.  Last dose of weekly supplement taken. 1/27 Repeat level was 28. Will begin another cycle of      Medication  reconciliation  Medications reviewed; no scripts needed.     IV Access  L PICC line     Psychosocial.    Caregiver Genevieve  [facility] in [place]    Subjective    History of Present Illness:    Mr Merritt presents to the clinic today 2/3/25 for follow up visit, accompanied by his wife.     He is s/p umbilical cord blood transplant 10/16/24 with graft rejection followed by haploidentical cord from his sister on 11/6/24 with flu/cy, ATG and modified dose cyclophosphamide with engraftment on day T+8.      Overall, he feels great. Eating good. He has ongoing hyperpigmentation of face and palms of both hands. He has occasional loose stools once or twice a day,  no cramps, denies mouth dryness.     Steady. No falls. Energy level very good. Activity continues to increase. He did hold his tacrolimus dose held.     Completed medication reconciliation today.   Review of Systems   Constitutional:  Negative for appetite change, chills, fatigue and fever.   HENT:   Negative for mouth sores, sore throat and tinnitus.    Respiratory:  Negative for cough, shortness of breath and wheezing.    Cardiovascular:  Negative for chest pain, leg swelling and palpitations.   Gastrointestinal:  Negative for abdominal pain, constipation, diarrhea, nausea and vomiting.   Genitourinary:  Negative for dysuria, frequency and hematuria.    Skin:  Negative for itching and rash.   Neurological:  Negative for dizziness, headaches and light-headedness.   Hematological:  Negative for adenopathy. Does not bruise/bleed easily.   Psychiatric/Behavioral:  Negative for confusion and depression. The patient is not nervous/anxious.             Objective        Physical Exam  Vitals reviewed.   Constitutional:       Appearance: Normal appearance.   HENT:      Head: Normocephalic and atraumatic.      Nose: Nose normal.      Mouth/Throat:      Mouth: Mucous membranes are moist.      Pharynx: Oropharynx is clear. No oropharyngeal exudate or posterior oropharyngeal  erythema.   Eyes:      Pupils: Pupils are equal, round, and reactive to light.   Cardiovascular:      Rate and Rhythm: Normal rate and regular rhythm.      Pulses: Normal pulses.      Heart sounds: Normal heart sounds.   Pulmonary:      Effort: Pulmonary effort is normal.      Breath sounds: Normal breath sounds.   Abdominal:      General: Abdomen is flat. There is no distension.      Palpations: Abdomen is soft. There is no mass.      Tenderness: There is no abdominal tenderness.   Musculoskeletal:         General: No swelling, tenderness or deformity. Normal range of motion.      Cervical back: Normal range of motion.      Right lower leg: No edema.      Left lower leg: No edema.   Skin:     General: Skin is warm and dry.      Comments: L PICC dressing dry and intact.    Neurological:      General: No focal deficit present.      Mental Status: He is alert and oriented to person, place, and time.   Psychiatric:         Mood and Affect: Mood normal.       RTC  2/3  2/10  2/17  2/24

## 2025-02-11 ENCOUNTER — INFUSION (OUTPATIENT)
Dept: OTHER | Facility: HOSPITAL | Age: 65
End: 2025-02-11
Payer: COMMERCIAL

## 2025-02-11 ENCOUNTER — HOSPITAL ENCOUNTER (INPATIENT)
Facility: HOSPITAL | Age: 65
LOS: 3 days | Discharge: HOME | DRG: 920 | End: 2025-02-14
Attending: INTERNAL MEDICINE
Payer: COMMERCIAL

## 2025-02-11 ENCOUNTER — NUTRITION (OUTPATIENT)
Dept: HEMATOLOGY/ONCOLOGY | Facility: HOSPITAL | Age: 65
End: 2025-02-11

## 2025-02-11 ENCOUNTER — NURSE TRIAGE (OUTPATIENT)
Dept: ADMISSION | Facility: HOSPITAL | Age: 65
End: 2025-02-11
Payer: COMMERCIAL

## 2025-02-11 ENCOUNTER — OFFICE VISIT (OUTPATIENT)
Dept: OTHER | Facility: HOSPITAL | Age: 65
End: 2025-02-11
Payer: COMMERCIAL

## 2025-02-11 VITALS — TEMPERATURE: 98.8 F

## 2025-02-11 VITALS
HEART RATE: 116 BPM | OXYGEN SATURATION: 100 % | RESPIRATION RATE: 18 BRPM | WEIGHT: 142.31 LBS | DIASTOLIC BLOOD PRESSURE: 64 MMHG | BODY MASS INDEX: 23.31 KG/M2 | TEMPERATURE: 100.2 F | SYSTOLIC BLOOD PRESSURE: 121 MMHG

## 2025-02-11 DIAGNOSIS — Z94.84 HISTORY OF ALLOGENEIC HEMATOPOIETIC STEM CELL TRANSPLANT: ICD-10-CM

## 2025-02-11 DIAGNOSIS — C92.01 ACUTE MYELOID LEUKEMIA IN REMISSION (MULTI): ICD-10-CM

## 2025-02-11 DIAGNOSIS — Z94.84 HISTORY OF ALLOGENEIC HEMATOPOIETIC STEM CELL TRANSPLANT: Primary | ICD-10-CM

## 2025-02-11 DIAGNOSIS — R19.7 DIARRHEA, UNSPECIFIED TYPE: Primary | ICD-10-CM

## 2025-02-11 DIAGNOSIS — C92.01 AML (ACUTE MYELOID LEUKEMIA) IN REMISSION (MULTI): Primary | ICD-10-CM

## 2025-02-11 PROBLEM — R11.14 BILIOUS VOMITING WITH NAUSEA: Status: ACTIVE | Noted: 2025-02-11

## 2025-02-11 PROBLEM — R50.9 FEVER: Status: ACTIVE | Noted: 2025-02-11

## 2025-02-11 LAB
ADENOVIRUS QPCR,PLASMA, VIRC: NOT DETECTED COPIES/ML
ALBUMIN SERPL BCP-MCNC: 4.7 G/DL (ref 3.4–5)
ALP SERPL-CCNC: 95 U/L (ref 33–136)
ALT SERPL W P-5'-P-CCNC: 23 U/L (ref 10–52)
ANION GAP SERPL CALC-SCNC: 19 MMOL/L (ref 10–20)
AST SERPL W P-5'-P-CCNC: 21 U/L (ref 9–39)
BASOPHILS # BLD MANUAL: 0 X10*3/UL (ref 0–0.1)
BASOPHILS NFR BLD MANUAL: 0 %
BILIRUB SERPL-MCNC: 0.8 MG/DL (ref 0–1.2)
BUN SERPL-MCNC: 25 MG/DL (ref 6–23)
BURR CELLS BLD QL SMEAR: ABNORMAL
CALCIUM SERPL-MCNC: 9.5 MG/DL (ref 8.6–10.3)
CHLORIDE SERPL-SCNC: 102 MMOL/L (ref 98–107)
CMV DNA SERPL NAA+PROBE-LOG IU: NORMAL {LOG_IU}/ML
CO2 SERPL-SCNC: 24 MMOL/L (ref 21–32)
CREAT SERPL-MCNC: 1.31 MG/DL (ref 0.5–1.3)
DACRYOCYTES BLD QL SMEAR: ABNORMAL
EGFRCR SERPLBLD CKD-EPI 2021: 60 ML/MIN/1.73M*2
EOSINOPHIL # BLD MANUAL: 0.33 X10*3/UL (ref 0–0.7)
EOSINOPHIL NFR BLD MANUAL: 7 %
ERYTHROCYTE [DISTWIDTH] IN BLOOD BY AUTOMATED COUNT: 16.3 % (ref 11.5–14.5)
FLUAV RNA RESP QL NAA+PROBE: NOT DETECTED
FLUBV RNA RESP QL NAA+PROBE: NOT DETECTED
GLUCOSE SERPL-MCNC: 145 MG/DL (ref 74–99)
HCT VFR BLD AUTO: 43.7 % (ref 41–52)
HGB BLD-MCNC: 14.3 G/DL (ref 13.5–17.5)
IMM GRANULOCYTES # BLD AUTO: 0.32 X10*3/UL (ref 0–0.7)
IMM GRANULOCYTES NFR BLD AUTO: 6.8 % (ref 0–0.9)
LABORATORY COMMENT REPORT: NOT DETECTED
LYMPHOCYTES # BLD MANUAL: 0.66 X10*3/UL (ref 1.2–4.8)
LYMPHOCYTES NFR BLD MANUAL: 14 %
MAGNESIUM SERPL-MCNC: 2.17 MG/DL (ref 1.6–2.4)
MCH RBC QN AUTO: 31.2 PG (ref 26–34)
MCHC RBC AUTO-ENTMCNC: 32.7 G/DL (ref 32–36)
MCV RBC AUTO: 95 FL (ref 80–100)
METAMYELOCYTES # BLD MANUAL: 0.09 X10*3/UL
METAMYELOCYTES NFR BLD MANUAL: 2 %
MONOCYTES # BLD MANUAL: 2.16 X10*3/UL (ref 0.1–1)
MONOCYTES NFR BLD MANUAL: 46 %
NEUTROPHILS # BLD MANUAL: 1.46 X10*3/UL (ref 1.2–7.7)
NEUTS BAND # BLD MANUAL: 0.33 X10*3/UL (ref 0–0.7)
NEUTS BAND NFR BLD MANUAL: 7 %
NEUTS SEG # BLD MANUAL: 1.13 X10*3/UL (ref 1.2–7)
NEUTS SEG NFR BLD MANUAL: 24 %
NRBC BLD-RTO: 0 /100 WBCS (ref 0–0)
OVALOCYTES BLD QL SMEAR: ABNORMAL
PLATELET # BLD AUTO: 199 X10*3/UL (ref 150–450)
PLATELET CLUMP BLD QL SMEAR: PRESENT
POLYCHROMASIA BLD QL SMEAR: ABNORMAL
POTASSIUM SERPL-SCNC: 3.8 MMOL/L (ref 3.5–5.3)
PROT SERPL-MCNC: 7.4 G/DL (ref 6.4–8.2)
RBC # BLD AUTO: 4.58 X10*6/UL (ref 4.5–5.9)
RBC MORPH BLD: ABNORMAL
SARS-COV-2 RNA RESP QL NAA+PROBE: NOT DETECTED
SCHISTOCYTES BLD QL SMEAR: ABNORMAL
SODIUM SERPL-SCNC: 141 MMOL/L (ref 136–145)
TOTAL CELLS COUNTED BLD: 100
WBC # BLD AUTO: 4.7 X10*3/UL (ref 4.4–11.3)

## 2025-02-11 PROCEDURE — 96361 HYDRATE IV INFUSION ADD-ON: CPT

## 2025-02-11 PROCEDURE — 83735 ASSAY OF MAGNESIUM: CPT

## 2025-02-11 PROCEDURE — 2500000004 HC RX 250 GENERAL PHARMACY W/ HCPCS (ALT 636 FOR OP/ED): Performed by: NURSE PRACTITIONER

## 2025-02-11 PROCEDURE — 99215 OFFICE O/P EST HI 40 MIN: CPT | Mod: 25 | Performed by: NURSE PRACTITIONER

## 2025-02-11 PROCEDURE — 87040 BLOOD CULTURE FOR BACTERIA: CPT

## 2025-02-11 PROCEDURE — 1157F ADVNC CARE PLAN IN RCRD: CPT | Performed by: NURSE PRACTITIONER

## 2025-02-11 PROCEDURE — 99223 1ST HOSP IP/OBS HIGH 75: CPT

## 2025-02-11 PROCEDURE — 85007 BL SMEAR W/DIFF WBC COUNT: CPT

## 2025-02-11 PROCEDURE — 2500000004 HC RX 250 GENERAL PHARMACY W/ HCPCS (ALT 636 FOR OP/ED): Performed by: INTERNAL MEDICINE

## 2025-02-11 PROCEDURE — 2500000001 HC RX 250 WO HCPCS SELF ADMINISTERED DRUGS (ALT 637 FOR MEDICARE OP)

## 2025-02-11 PROCEDURE — 96374 THER/PROPH/DIAG INJ IV PUSH: CPT

## 2025-02-11 PROCEDURE — 1170000001 HC PRIVATE ONCOLOGY ROOM DAILY

## 2025-02-11 PROCEDURE — 87636 SARSCOV2 & INF A&B AMP PRB: CPT

## 2025-02-11 PROCEDURE — 85027 COMPLETE CBC AUTOMATED: CPT

## 2025-02-11 PROCEDURE — 3074F SYST BP LT 130 MM HG: CPT | Performed by: NURSE PRACTITIONER

## 2025-02-11 PROCEDURE — 3078F DIAST BP <80 MM HG: CPT | Performed by: NURSE PRACTITIONER

## 2025-02-11 PROCEDURE — 99215 OFFICE O/P EST HI 40 MIN: CPT | Performed by: NURSE PRACTITIONER

## 2025-02-11 PROCEDURE — 84075 ASSAY ALKALINE PHOSPHATASE: CPT

## 2025-02-11 PROCEDURE — 2500000004 HC RX 250 GENERAL PHARMACY W/ HCPCS (ALT 636 FOR OP/ED)

## 2025-02-11 RX ORDER — ACETAMINOPHEN 325 MG/1
650 TABLET ORAL EVERY 6 HOURS PRN
Status: DISCONTINUED | OUTPATIENT
Start: 2025-02-11 | End: 2025-02-14 | Stop reason: HOSPADM

## 2025-02-11 RX ORDER — ONDANSETRON HYDROCHLORIDE 2 MG/ML
8 INJECTION, SOLUTION INTRAVENOUS ONCE
Status: COMPLETED | OUTPATIENT
Start: 2025-02-11 | End: 2025-02-11

## 2025-02-11 RX ORDER — ONDANSETRON HYDROCHLORIDE 2 MG/ML
4 INJECTION, SOLUTION INTRAVENOUS EVERY 8 HOURS PRN
Status: DISCONTINUED | OUTPATIENT
Start: 2025-02-11 | End: 2025-02-14 | Stop reason: HOSPADM

## 2025-02-11 RX ORDER — POSACONAZOLE 100 MG/1
300 TABLET, DELAYED RELEASE ORAL DAILY
Status: DISCONTINUED | OUTPATIENT
Start: 2025-02-12 | End: 2025-02-14 | Stop reason: HOSPADM

## 2025-02-11 RX ORDER — ONDANSETRON HYDROCHLORIDE 2 MG/ML
8 INJECTION, SOLUTION INTRAVENOUS ONCE
Status: CANCELLED | OUTPATIENT
Start: 2025-02-11

## 2025-02-11 RX ORDER — TACROLIMUS 0.5 MG/1
0.5 CAPSULE ORAL EVERY 12 HOURS
Qty: 60 CAPSULE | Refills: 2 | Status: SHIPPED | OUTPATIENT
Start: 2025-02-11

## 2025-02-11 RX ORDER — TACROLIMUS 0.5 MG/1
0.5 CAPSULE ORAL
Status: DISCONTINUED | OUTPATIENT
Start: 2025-02-11 | End: 2025-02-14 | Stop reason: HOSPADM

## 2025-02-11 RX ORDER — ACETAMINOPHEN 325 MG/1
650 TABLET ORAL EVERY 6 HOURS PRN
Status: CANCELLED | OUTPATIENT
Start: 2025-02-11

## 2025-02-11 RX ORDER — SODIUM CHLORIDE 9 MG/ML
75 INJECTION, SOLUTION INTRAVENOUS CONTINUOUS
Status: DISCONTINUED | OUTPATIENT
Start: 2025-02-11 | End: 2025-02-11

## 2025-02-11 RX ORDER — MIRTAZAPINE 15 MG/1
15 TABLET, FILM COATED ORAL NIGHTLY
Status: DISCONTINUED | OUTPATIENT
Start: 2025-02-11 | End: 2025-02-14 | Stop reason: HOSPADM

## 2025-02-11 RX ORDER — PROCHLORPERAZINE 25 MG/1
25 SUPPOSITORY RECTAL EVERY 12 HOURS PRN
Status: DISCONTINUED | OUTPATIENT
Start: 2025-02-11 | End: 2025-02-11

## 2025-02-11 RX ORDER — ONDANSETRON HYDROCHLORIDE 2 MG/ML
8 INJECTION, SOLUTION INTRAVENOUS ONCE
OUTPATIENT
Start: 2025-02-11

## 2025-02-11 RX ORDER — NIFEDIPINE 60 MG/1
60 TABLET, FILM COATED, EXTENDED RELEASE ORAL
Status: DISCONTINUED | OUTPATIENT
Start: 2025-02-12 | End: 2025-02-14 | Stop reason: HOSPADM

## 2025-02-11 RX ORDER — LISINOPRIL 5 MG/1
5 TABLET ORAL DAILY
Status: DISCONTINUED | OUTPATIENT
Start: 2025-02-12 | End: 2025-02-14 | Stop reason: HOSPADM

## 2025-02-11 RX ORDER — SODIUM CHLORIDE 9 MG/ML
75 INJECTION, SOLUTION INTRAVENOUS CONTINUOUS
Status: DISCONTINUED | OUTPATIENT
Start: 2025-02-11 | End: 2025-02-13

## 2025-02-11 RX ORDER — PROCHLORPERAZINE MALEATE 10 MG
10 TABLET ORAL EVERY 6 HOURS PRN
Status: DISCONTINUED | OUTPATIENT
Start: 2025-02-11 | End: 2025-02-14 | Stop reason: HOSPADM

## 2025-02-11 RX ORDER — ACYCLOVIR 400 MG/1
400 TABLET ORAL EVERY 12 HOURS
Status: DISCONTINUED | OUTPATIENT
Start: 2025-02-11 | End: 2025-02-14 | Stop reason: HOSPADM

## 2025-02-11 RX ORDER — ACETAMINOPHEN 160 MG/5ML
650 SOLUTION ORAL EVERY 6 HOURS PRN
Status: DISCONTINUED | OUTPATIENT
Start: 2025-02-11 | End: 2025-02-14 | Stop reason: HOSPADM

## 2025-02-11 RX ORDER — FOLIC ACID 1 MG/1
1 TABLET ORAL DAILY
Status: DISCONTINUED | OUTPATIENT
Start: 2025-02-12 | End: 2025-02-14 | Stop reason: HOSPADM

## 2025-02-11 RX ORDER — FAMOTIDINE 20 MG/1
20 TABLET, FILM COATED ORAL ONCE
Status: COMPLETED | OUTPATIENT
Start: 2025-02-11 | End: 2025-02-11

## 2025-02-11 RX ORDER — ONDANSETRON 4 MG/1
4 TABLET, FILM COATED ORAL EVERY 8 HOURS PRN
Status: DISCONTINUED | OUTPATIENT
Start: 2025-02-11 | End: 2025-02-14 | Stop reason: HOSPADM

## 2025-02-11 RX ORDER — SULFAMETHOXAZOLE AND TRIMETHOPRIM 800; 160 MG/1; MG/1
1 TABLET ORAL 3 TIMES WEEKLY
Status: DISCONTINUED | OUTPATIENT
Start: 2025-02-12 | End: 2025-02-14 | Stop reason: HOSPADM

## 2025-02-11 RX ORDER — PROCHLORPERAZINE EDISYLATE 5 MG/ML
10 INJECTION INTRAMUSCULAR; INTRAVENOUS EVERY 6 HOURS PRN
Status: DISCONTINUED | OUTPATIENT
Start: 2025-02-11 | End: 2025-02-14 | Stop reason: HOSPADM

## 2025-02-11 RX ORDER — PANTOPRAZOLE SODIUM 40 MG/1
40 TABLET, DELAYED RELEASE ORAL
Status: DISCONTINUED | OUTPATIENT
Start: 2025-02-12 | End: 2025-02-14 | Stop reason: HOSPADM

## 2025-02-11 RX ORDER — ACETAMINOPHEN 160 MG/5ML
650 SOLUTION ORAL EVERY 6 HOURS PRN
Status: CANCELLED | OUTPATIENT
Start: 2025-02-11

## 2025-02-11 RX ORDER — CALCIUM CARBONATE 500(1250)
1250 TABLET ORAL DAILY
Status: DISCONTINUED | OUTPATIENT
Start: 2025-02-12 | End: 2025-02-14 | Stop reason: HOSPADM

## 2025-02-11 RX ORDER — BUDESONIDE 3 MG/1
3 CAPSULE, COATED PELLETS ORAL EVERY MORNING
Status: DISCONTINUED | OUTPATIENT
Start: 2025-02-12 | End: 2025-02-12

## 2025-02-11 RX ORDER — SODIUM CHLORIDE 9 MG/ML
75 INJECTION, SOLUTION INTRAVENOUS CONTINUOUS
Status: CANCELLED | OUTPATIENT
Start: 2025-02-11 | End: 2025-02-12

## 2025-02-11 RX ADMIN — ACYCLOVIR 400 MG: 400 TABLET ORAL at 20:14

## 2025-02-11 RX ADMIN — MIRTAZAPINE 15 MG: 15 TABLET, FILM COATED ORAL at 20:14

## 2025-02-11 RX ADMIN — ONDANSETRON 8 MG: 2 INJECTION INTRAMUSCULAR; INTRAVENOUS at 11:19

## 2025-02-11 RX ADMIN — PIPERACILLIN SODIUM AND TAZOBACTAM SODIUM 3.38 G: 3; .375 INJECTION, SOLUTION INTRAVENOUS at 15:09

## 2025-02-11 RX ADMIN — SODIUM CHLORIDE 75 ML/HR: 9 INJECTION, SOLUTION INTRAVENOUS at 17:47

## 2025-02-11 RX ADMIN — ACETAMINOPHEN 650 MG: 325 TABLET ORAL at 16:36

## 2025-02-11 RX ADMIN — PIPERACILLIN SODIUM AND TAZOBACTAM SODIUM 3.38 G: 3; .375 INJECTION, SOLUTION INTRAVENOUS at 20:14

## 2025-02-11 RX ADMIN — FAMOTIDINE 20 MG: 20 TABLET, FILM COATED ORAL at 15:09

## 2025-02-11 RX ADMIN — SODIUM CHLORIDE 1000 ML: 9 INJECTION, SOLUTION INTRAVENOUS at 10:38

## 2025-02-11 RX ADMIN — TACROLIMUS 0.5 MG: 0.5 CAPSULE ORAL at 17:39

## 2025-02-11 SDOH — SOCIAL STABILITY: SOCIAL NETWORK
DO YOU BELONG TO ANY CLUBS OR ORGANIZATIONS SUCH AS CHURCH GROUPS, UNIONS, FRATERNAL OR ATHLETIC GROUPS, OR SCHOOL GROUPS?: NO

## 2025-02-11 SDOH — ECONOMIC STABILITY: FOOD INSECURITY: WITHIN THE PAST 12 MONTHS, YOU WORRIED THAT YOUR FOOD WOULD RUN OUT BEFORE YOU GOT THE MONEY TO BUY MORE.: NEVER TRUE

## 2025-02-11 SDOH — ECONOMIC STABILITY: HOUSING INSECURITY: IN THE LAST 12 MONTHS, WAS THERE A TIME WHEN YOU WERE NOT ABLE TO PAY THE MORTGAGE OR RENT ON TIME?: NO

## 2025-02-11 SDOH — SOCIAL STABILITY: SOCIAL NETWORK: HOW OFTEN DO YOU ATTEND MEETINGS OF THE CLUBS OR ORGANIZATIONS YOU BELONG TO?: NEVER

## 2025-02-11 SDOH — ECONOMIC STABILITY: HOUSING INSECURITY: AT ANY TIME IN THE PAST 12 MONTHS, WERE YOU HOMELESS OR LIVING IN A SHELTER (INCLUDING NOW)?: NO

## 2025-02-11 SDOH — SOCIAL STABILITY: SOCIAL INSECURITY: HAVE YOU HAD THOUGHTS OF HARMING ANYONE ELSE?: YES

## 2025-02-11 SDOH — SOCIAL STABILITY: SOCIAL INSECURITY: HAS ANYONE EVER THREATENED TO HURT YOUR FAMILY OR YOUR PETS?: NO

## 2025-02-11 SDOH — HEALTH STABILITY: MENTAL HEALTH: EXPERIENCED ANY OF THE FOLLOWING LIFE EVENTS: OTHER (COMMENT)

## 2025-02-11 SDOH — ECONOMIC STABILITY: FOOD INSECURITY: HOW HARD IS IT FOR YOU TO PAY FOR THE VERY BASICS LIKE FOOD, HOUSING, MEDICAL CARE, AND HEATING?: NOT VERY HARD

## 2025-02-11 SDOH — SOCIAL STABILITY: SOCIAL INSECURITY: DO YOU FEEL UNSAFE GOING BACK TO THE PLACE WHERE YOU ARE LIVING?: NO

## 2025-02-11 SDOH — ECONOMIC STABILITY: INCOME INSECURITY: IN THE PAST 12 MONTHS HAS THE ELECTRIC, GAS, OIL, OR WATER COMPANY THREATENED TO SHUT OFF SERVICES IN YOUR HOME?: NO

## 2025-02-11 SDOH — SOCIAL STABILITY: SOCIAL INSECURITY: WITHIN THE LAST YEAR, HAVE YOU BEEN AFRAID OF YOUR PARTNER OR EX-PARTNER?: NO

## 2025-02-11 SDOH — SOCIAL STABILITY: SOCIAL INSECURITY: ARE YOU OR HAVE YOU BEEN THREATENED OR ABUSED PHYSICALLY, EMOTIONALLY, OR SEXUALLY BY ANYONE?: NO

## 2025-02-11 SDOH — SOCIAL STABILITY: SOCIAL INSECURITY: WITHIN THE LAST YEAR, HAVE YOU BEEN HUMILIATED OR EMOTIONALLY ABUSED IN OTHER WAYS BY YOUR PARTNER OR EX-PARTNER?: NO

## 2025-02-11 SDOH — SOCIAL STABILITY: SOCIAL INSECURITY: DO YOU FEEL ANYONE HAS EXPLOITED OR TAKEN ADVANTAGE OF YOU FINANCIALLY OR OF YOUR PERSONAL PROPERTY?: NO

## 2025-02-11 SDOH — ECONOMIC STABILITY: TRANSPORTATION INSECURITY: IN THE PAST 12 MONTHS, HAS LACK OF TRANSPORTATION KEPT YOU FROM MEDICAL APPOINTMENTS OR FROM GETTING MEDICATIONS?: NO

## 2025-02-11 SDOH — SOCIAL STABILITY: SOCIAL NETWORK: IN A TYPICAL WEEK, HOW MANY TIMES DO YOU TALK ON THE PHONE WITH FAMILY, FRIENDS, OR NEIGHBORS?: TWICE A WEEK

## 2025-02-11 SDOH — SOCIAL STABILITY: SOCIAL NETWORK: HOW OFTEN DO YOU ATTEND CHURCH OR RELIGIOUS SERVICES?: 1 TO 4 TIMES PER YEAR

## 2025-02-11 SDOH — SOCIAL STABILITY: SOCIAL INSECURITY: POSSIBLE ABUSE REPORTED TO:: OTHER (COMMENT)

## 2025-02-11 SDOH — SOCIAL STABILITY: SOCIAL INSECURITY: DOES ANYONE TRY TO KEEP YOU FROM HAVING/CONTACTING OTHER FRIENDS OR DOING THINGS OUTSIDE YOUR HOME?: NO

## 2025-02-11 SDOH — SOCIAL STABILITY: SOCIAL INSECURITY: ARE YOU MARRIED, WIDOWED, DIVORCED, SEPARATED, NEVER MARRIED, OR LIVING WITH A PARTNER?: MARRIED

## 2025-02-11 SDOH — HEALTH STABILITY: MENTAL HEALTH
DO YOU FEEL STRESS - TENSE, RESTLESS, NERVOUS, OR ANXIOUS, OR UNABLE TO SLEEP AT NIGHT BECAUSE YOUR MIND IS TROUBLED ALL THE TIME - THESE DAYS?: NOT AT ALL

## 2025-02-11 SDOH — HEALTH STABILITY: PHYSICAL HEALTH: ON AVERAGE, HOW MANY DAYS PER WEEK DO YOU ENGAGE IN MODERATE TO STRENUOUS EXERCISE (LIKE A BRISK WALK)?: 1 DAY

## 2025-02-11 SDOH — SOCIAL STABILITY: SOCIAL INSECURITY: HAVE YOU HAD THOUGHTS OF HARMING ANYONE ELSE?: NO

## 2025-02-11 SDOH — ECONOMIC STABILITY: FOOD INSECURITY: WITHIN THE PAST 12 MONTHS, THE FOOD YOU BOUGHT JUST DIDN'T LAST AND YOU DIDN'T HAVE MONEY TO GET MORE.: NEVER TRUE

## 2025-02-11 SDOH — HEALTH STABILITY: PHYSICAL HEALTH
HOW OFTEN DO YOU NEED TO HAVE SOMEONE HELP YOU WHEN YOU READ INSTRUCTIONS, PAMPHLETS, OR OTHER WRITTEN MATERIAL FROM YOUR DOCTOR OR PHARMACY?: SOMETIMES

## 2025-02-11 SDOH — SOCIAL STABILITY: SOCIAL INSECURITY: WERE YOU ABLE TO COMPLETE ALL THE BEHAVIORAL HEALTH SCREENINGS?: YES

## 2025-02-11 SDOH — SOCIAL STABILITY: SOCIAL INSECURITY: ABUSE: ADULT

## 2025-02-11 SDOH — ECONOMIC STABILITY: HOUSING INSECURITY: IN THE PAST 12 MONTHS, HOW MANY TIMES HAVE YOU MOVED WHERE YOU WERE LIVING?: 0

## 2025-02-11 SDOH — SOCIAL STABILITY: SOCIAL NETWORK: HOW OFTEN DO YOU GET TOGETHER WITH FRIENDS OR RELATIVES?: ONCE A WEEK

## 2025-02-11 SDOH — SOCIAL STABILITY: SOCIAL INSECURITY: ARE THERE ANY APPARENT SIGNS OF INJURIES/BEHAVIORS THAT COULD BE RELATED TO ABUSE/NEGLECT?: NO

## 2025-02-11 SDOH — SOCIAL STABILITY: SOCIAL INSECURITY: HAVE YOU HAD ANY THOUGHTS OF HARMING ANYONE ELSE?: NO

## 2025-02-11 SDOH — HEALTH STABILITY: PHYSICAL HEALTH: ON AVERAGE, HOW MANY MINUTES DO YOU ENGAGE IN EXERCISE AT THIS LEVEL?: 10 MIN

## 2025-02-11 ASSESSMENT — ACTIVITIES OF DAILY LIVING (ADL)
LACK_OF_TRANSPORTATION: NO
FEEDING YOURSELF: INDEPENDENT
PATIENT'S MEMORY ADEQUATE TO SAFELY COMPLETE DAILY ACTIVITIES?: YES
BATHING: INDEPENDENT
GROOMING: INDEPENDENT
TOILETING: INDEPENDENT
DRESSING YOURSELF: INDEPENDENT
ADEQUATE_TO_COMPLETE_ADL: YES
HEARING - LEFT EAR: FUNCTIONAL
JUDGMENT_ADEQUATE_SAFELY_COMPLETE_DAILY_ACTIVITIES: YES
WALKS IN HOME: INDEPENDENT
HEARING - RIGHT EAR: FUNCTIONAL

## 2025-02-11 ASSESSMENT — COGNITIVE AND FUNCTIONAL STATUS - GENERAL
DAILY ACTIVITIY SCORE: 24
MOBILITY SCORE: 24
DAILY ACTIVITIY SCORE: 24
DAILY ACTIVITIY SCORE: 24
MOBILITY SCORE: 24
MOBILITY SCORE: 24
PATIENT BASELINE BEDBOUND: NO

## 2025-02-11 ASSESSMENT — ENCOUNTER SYMPTOMS
FATIGUE: 1
HEMATOLOGIC/LYMPHATIC NEGATIVE: 1
NEUROLOGICAL NEGATIVE: 1
DIARRHEA: 1
RESPIRATORY NEGATIVE: 1
MUSCULOSKELETAL NEGATIVE: 1
CARDIOVASCULAR NEGATIVE: 1
PSYCHIATRIC NEGATIVE: 1
NAUSEA: 1
EYES NEGATIVE: 1
COLOR CHANGE: 1

## 2025-02-11 ASSESSMENT — LIFESTYLE VARIABLES
PRESCIPTION_ABUSE_PAST_12_MONTHS: NO
HOW MANY STANDARD DRINKS CONTAINING ALCOHOL DO YOU HAVE ON A TYPICAL DAY: PATIENT DOES NOT DRINK
SUBSTANCE_ABUSE_PAST_12_MONTHS: NO
AUDIT-C TOTAL SCORE: 0
HOW OFTEN DO YOU HAVE 6 OR MORE DRINKS ON ONE OCCASION: NEVER
PRESCIPTION_ABUSE_PAST_12_MONTHS: NO
SUBSTANCE_ABUSE_PAST_12_MONTHS: NO
HOW MANY STANDARD DRINKS CONTAINING ALCOHOL DO YOU HAVE ON A TYPICAL DAY: PATIENT DOES NOT DRINK
AUDIT-C TOTAL SCORE: 0
HOW OFTEN DO YOU HAVE A DRINK CONTAINING ALCOHOL: NEVER
HOW OFTEN DO YOU HAVE 6 OR MORE DRINKS ON ONE OCCASION: NEVER
SKIP TO QUESTIONS 9-10: 1
SKIP TO QUESTIONS 9-10: 1
AUDIT-C TOTAL SCORE: 0
HOW OFTEN DO YOU HAVE A DRINK CONTAINING ALCOHOL: NEVER
AUDIT-C TOTAL SCORE: 0

## 2025-02-11 ASSESSMENT — PATIENT HEALTH QUESTIONNAIRE - PHQ9
1. LITTLE INTEREST OR PLEASURE IN DOING THINGS: NOT AT ALL
2. FEELING DOWN, DEPRESSED OR HOPELESS: NOT AT ALL
SUM OF ALL RESPONSES TO PHQ9 QUESTIONS 1 & 2: 0
2. FEELING DOWN, DEPRESSED OR HOPELESS: NOT AT ALL
SUM OF ALL RESPONSES TO PHQ9 QUESTIONS 1 & 2: 0
1. LITTLE INTEREST OR PLEASURE IN DOING THINGS: NOT AT ALL

## 2025-02-11 ASSESSMENT — PAIN - FUNCTIONAL ASSESSMENT: PAIN_FUNCTIONAL_ASSESSMENT: 0-10

## 2025-02-11 ASSESSMENT — PAIN SCALES - GENERAL
PAINLEVEL_OUTOF10: 0 - NO PAIN
PAINLEVEL_OUTOF10: 0 - NO PAIN

## 2025-02-11 NOTE — H&P
"History Of Present Illness  Brandt Merritt is a 65 y.o. 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 He is now s/p  allogeneic hematopoietic stem cell transplant. Patient presented to the [facility] for a sick visit today, he reports nausea that started around 2 am and lasted until around 6 am with multiple episodes of bilious emesis. Initially began as GERD-like symptoms that woke him up during the night. Described it as \"burning in throat.\" Has not eaten anything out of the ordinary yesterday, the only thing he ate yesterday was BBQ chicken, green beans, potatoes that was made at home. No abdominal cramping or pain, not taking anything for acid reflux at home. Reports that he was having diarrhea x3 days at home, however, he was only having 1 bowel movement per day. No current s/sx GVHD. Afebrile without chills at home and no sick contacts, however, patient was febrile upon admission to [place] [id number].      Past Medical History  Past Medical History:   Diagnosis Date    Chest pain 06/30/2021    HTN (hypertension) 10/05/2023    Hypertension     Personal history of other diseases of the circulatory system     History of hypertension       Surgical History  Past Surgical History:   Procedure Laterality Date    COLONOSCOPY  06/13/2013    Complete Colonoscopy    OTHER SURGICAL HISTORY  10/07/2015    Surgery Testis Reduction Of Torsion Of Testis Right        Social History  He reports that he quit smoking about 9 years ago. His smoking use included cigarettes. He started smoking about 37 years ago. He has a 7 pack-year smoking history. He has never used smokeless tobacco. He reports that he does not currently use alcohol. He reports current drug use. Drug: Marijuana.    Family History  Family History   Problem Relation Name Age of Onset    Other (diabetes mellitus) Mother      Prostate cancer Brother          Allergies  Lasix [furosemide], Meropenem, and Thiazides    Review of " "Systems   Constitutional:  Positive for fatigue.   HENT: Negative.     Eyes: Negative.    Respiratory: Negative.     Cardiovascular: Negative.    Gastrointestinal:  Positive for diarrhea and nausea.   Genitourinary: Negative.    Musculoskeletal: Negative.    Skin:  Positive for color change (hyperpigmentation of face and palms of both hands).   Allergic/Immunologic: Positive for immunocompromised state (on Tacrolimus).   Neurological: Negative.    Hematological: Negative.    Psychiatric/Behavioral: Negative.          Physical Exam  Vitals reviewed.   Constitutional:       Appearance: Normal appearance.   HENT:      Head: Normocephalic.      Mouth/Throat:      Mouth: Mucous membranes are moist.   Eyes:      Pupils: Pupils are equal, round, and reactive to light.   Cardiovascular:      Rate and Rhythm: Normal rate and regular rhythm.   Pulmonary:      Effort: Pulmonary effort is normal.      Breath sounds: Normal breath sounds.   Abdominal:      General: Abdomen is flat. Bowel sounds are normal.      Palpations: Abdomen is soft.   Musculoskeletal:         General: Normal range of motion.   Skin:     General: Skin is warm and dry.      Comments: Hyperpigmentation on face and hands    Neurological:      General: No focal deficit present.      Mental Status: He is alert and oriented to person, place, and time.   Psychiatric:         Mood and Affect: Mood normal.         Behavior: Behavior normal.          Last Recorded Vitals  Blood pressure 125/72, pulse 94, temperature (!) 38.1 °C (100.6 °F), temperature source Temporal, resp. rate 19, height 1.664 m (5' 5.51\"), weight 65.8 kg (145 lb 1 oz), SpO2 98%.    Relevant Results    Results for orders placed or performed in visit on 02/11/25 (from the past 24 hours)   CBC and Auto Differential   Result Value Ref Range    WBC 4.7 4.4 - 11.3 x10*3/uL    nRBC 0.0 0.0 - 0.0 /100 WBCs    RBC 4.58 4.50 - 5.90 x10*6/uL    Hemoglobin 14.3 13.5 - 17.5 g/dL    Hematocrit 43.7 41.0 - 52.0 " %    MCV 95 80 - 100 fL    MCH 31.2 26.0 - 34.0 pg    MCHC 32.7 32.0 - 36.0 g/dL    RDW 16.3 (H) 11.5 - 14.5 %    Platelets 199 150 - 450 x10*3/uL    Immature Granulocytes %, Automated 6.8 (H) 0.0 - 0.9 %    Immature Granulocytes Absolute, Automated 0.32 0.00 - 0.70 x10*3/uL   Comprehensive Metabolic Panel   Result Value Ref Range    Glucose 145 (H) 74 - 99 mg/dL    Sodium 141 136 - 145 mmol/L    Potassium 3.8 3.5 - 5.3 mmol/L    Chloride 102 98 - 107 mmol/L    Bicarbonate 24 21 - 32 mmol/L    Anion Gap 19 10 - 20 mmol/L    Urea Nitrogen 25 (H) 6 - 23 mg/dL    Creatinine 1.31 (H) 0.50 - 1.30 mg/dL    eGFR 60 (L) >60 mL/min/1.73m*2    Calcium 9.5 8.6 - 10.3 mg/dL    Albumin 4.7 3.4 - 5.0 g/dL    Alkaline Phosphatase 95 33 - 136 U/L    Total Protein 7.4 6.4 - 8.2 g/dL    AST 21 9 - 39 U/L    Bilirubin, Total 0.8 0.0 - 1.2 mg/dL    ALT 23 10 - 52 U/L   Magnesium   Result Value Ref Range    Magnesium 2.17 1.60 - 2.40 mg/dL   Manual Differential   Result Value Ref Range    Neutrophils %, Manual 24.0 40.0 - 80.0 %    Bands %, Manual 7.0 0.0 - 5.0 %    Lymphocytes %, Manual 14.0 13.0 - 44.0 %    Monocytes %, Manual 46.0 2.0 - 10.0 %    Eosinophils %, Manual 7.0 0.0 - 6.0 %    Basophils %, Manual 0.0 0.0 - 2.0 %    Metamyelocytes %, Manual 2.0 0.0 - 0.0 %    Seg Neutrophils Absolute, Manual 1.13 (L) 1.20 - 7.00 x10*3/uL    Bands Absolute, Manual 0.33 0.00 - 0.70 x10*3/uL    Lymphocytes Absolute, Manual 0.66 (L) 1.20 - 4.80 x10*3/uL    Monocytes Absolute, Manual 2.16 (H) 0.10 - 1.00 x10*3/uL    Eosinophils Absolute, Manual 0.33 0.00 - 0.70 x10*3/uL    Basophils Absolute, Manual 0.00 0.00 - 0.10 x10*3/uL    Metamyelocytes Absolute, Manual 0.09 0.00 - 0.00 x10*3/uL    Total Cells Counted 100     Neutrophils Absolute, Manual 1.46 1.20 - 7.70 x10*3/uL    RBC Morphology See Below     Polychromasia Mild     RBC Fragments Few     Ovalocytes Few     Teardrop Cells Few     Suri Cells Few     Clumped Platelets Present    Blood Culture     Specimen: Central Line/Catheter (Specify below); Blood culture   Result Value Ref Range    Blood Culture Loaded on Instrument - Culture in progress      *Note: Due to a large number of results and/or encounters for the requested time period, some results have not been displayed. A complete set of results can be found in Results Review.        Assessment/Plan   Assessment & Plan  Bilious vomiting with nausea  Presented to [facility] for sick visit (2/11/25) for nausea with multiple episodes of emesis earlier that morning; endorses GERD-like symptoms, not on a PPI at home.  - Pepcid 20 mg given on admission  - start Protonix qAM  - PRN Zofran/Compazine     AML (acute myeloid leukemia) in remission (Multi)  History of allogeneic hematopoietic stem cell transplant  #1: Single-unit Cord, T0=9/19/24, Primary Engraftment Failure  - Conditioning: Flu-Mariana-TBI  - Donor: Single Cord, 6/8  = ABO Donor / Recipient: A+ / A+  = CMV Donor / Recipient: - / +  - aGVHD Prophylaxis: Tacrolimus + MMF  - engraftment failure, developed HLA antibody against class I of cord  - Repeat BMBx (10/15): hypocellular with no residual myeloid neoplasm. 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), T0=11/6/24  - Graft: Haploidentical sister  = ABO Donor / Recipient: O+ / A+ (ABO incompatibility +)  = CMV Donor / Recipient: + / +  - Conditioning: Flu/Cy/TBI/rATG  = GVHD prophylaxis -  rATG 1.5mg/kg T-5,-3,-1, PTCy (25mg/kg T+3, T+4), Tacro, MMF (T+5)    Today is T+145 (Allogeneic BMT Evaluation - Planned 9/19/2024), 97 (Allogeneic PBSC Transplant Evaluation - Planned 11/6/2024).    Day T+60 BMbx negative for disease, day T+100 bone marrow deferred.     GVHD  - No current s/sx GVHD;  - Prior concern for upper GI GVHD and he was started on budesonide 12/30/24  - Tapering budesonide per Dr. Newsome, decreased to 3 mg daily (1/27)  - Continue to monitor for GI GVHD during admission, may need to increase budesonide  dose to BID or TID if patient continues to have GI symptoms    - current dose of tacrolimus 0.5 mg BID  - tacro dose decreased 2/10/25 because last trough level 13.2 ng/mL (2/11/25)    ID  - continue prophylaxis: acyclovir, letermovir, posaconazole, and smz-tmp DS  - continue viral monitoring per BMT protocol   Fever  Aferbile at home and outpatient prior to admission, however, he had a fever of 38.1 when he arrived to [facility].  - cultures done 2/11/24 outpatient, results PENDING  - no sick contacts, no other s/sx infection  - will obtain flu/COVID respiratory swabs   - given that he is post-BMT and on immunosuppression, started Zosyn 3.375 g on admission  Diarrhea  - x3 days, only 1 bowel movement per day at home  - no abd pain/cramping  - ordered stool pathogen, unable to obtain in the infusion center    DISPO:  - FULL CODE  - NOK: spouse Genevieve ([phone])  - Access: PIV  - Oncologist: Dr. Jerod Cheatham, APRN-CNP

## 2025-02-11 NOTE — ASSESSMENT & PLAN NOTE
- x3 days, only 1 bowel movement per day at home  - no abd pain/cramping  - ordered stool pathogen, unable to obtain in the infusion center

## 2025-02-11 NOTE — CARE PLAN
Problem: Pain - Adult  Goal: Verbalizes/displays adequate comfort level or baseline comfort level  Outcome: Progressing     Problem: Chronic Conditions and Co-morbidities  Goal: Patient's chronic conditions and co-morbidity symptoms are monitored and maintained or improved  Outcome: Progressing   The patient's goals for the shift include Remain HDS    The clinical goals for the shift include Remain HDS    Over the shift, the patient did not make progress toward the following goals.

## 2025-02-11 NOTE — ASSESSMENT & PLAN NOTE
History of allogeneic hematopoietic stem cell transplant  #1: Single-unit Cord, T0=9/19/24, Primary Engraftment Failure  - Conditioning: Flu-Mariana-TBI  - Donor: Single Cord, 6/8  = ABO Donor / Recipient: A+ / A+  = CMV Donor / Recipient: - / +  - aGVHD Prophylaxis: Tacrolimus + MMF  - engraftment failure, developed HLA antibody against class I of cord  - Repeat BMBx (10/15): hypocellular with no residual myeloid neoplasm. 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), T0=11/6/24  - Graft: Haploidentical sister  = ABO Donor / Recipient: O+ / A+ (ABO incompatibility +)  = CMV Donor / Recipient: + / +  - Conditioning: Flu/Cy/TBI/rATG  = GVHD prophylaxis -  rATG 1.5mg/kg T-5,-3,-1, PTCy (25mg/kg T+3, T+4), Tacro, MMF (T+5)    Today is T+145 (Allogeneic BMT Evaluation - Planned 9/19/2024), 97 (Allogeneic PBSC Transplant Evaluation - Planned 11/6/2024).    Day T+60 BMbx negative for disease, day T+100 bone marrow deferred.     GVHD  - No current s/sx GVHD;  - Prior concern for upper GI GVHD and he was started on budesonide 12/30/24  - Tapering budesonide per Dr. Newsome, decreased to 3 mg daily (1/27)  - Continue to monitor for GI GVHD during admission, may need to increase budesonide dose to BID or TID if patient continues to have GI symptoms    - current dose of tacrolimus 0.5 mg BID  - tacro dose decreased 2/10/25 because last trough level 13.2 ng/mL (2/11/25)    ID  - continue prophylaxis: acyclovir, letermovir, posaconazole, and smz-tmp DS  - continue viral monitoring per BMT protocol

## 2025-02-11 NOTE — PROGRESS NOTES
Pt ambulated to SCT unit without assistance. Spouse at bedside. Pt reports nausea, vomiting, and diarrhea. Pt denies fevers, blood in stool, and chills. Provider, Damaris Barber aware. Vitals obtained, blood drawn via right AC PIV and sent to lab. Blood Cultures x1 okay per Damaris Barber. Pt given 8 mg of IV Zofran and 1 L of NS. Pt tolerated well. Damaris Barber NP came to see patient. Pt will be admitted to [facility] for further evaluation. Stool specimen not obtained prior to transfer to [facility] due to patient inability to provide a sample. RAC PIV remained in place for admission. Pt ambulated to [facility] without complaints or assistance.

## 2025-02-11 NOTE — PROGRESS NOTES
NUTRITION FOLLOW UP NOTE    Reason for Visit:  Brandt Merritt is a 65 y.o. 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    BM bx (1/9):  KRISTIE    Pt and wife seen today in infusion.      Lab Results   Component Value Date/Time    GLUCOSE 106 (H) 02/10/2025 0915     02/10/2025 0915    K 3.8 02/10/2025 0915     02/10/2025 0915    CO2 23 02/10/2025 0915    ANIONGAP 13 02/10/2025 0915    BUN 17 02/10/2025 0915    CREATININE 1.04 02/10/2025 0915    EGFR 79 02/10/2025 0915    CALCIUM 8.7 02/10/2025 0915    ALBUMIN 4.0 02/10/2025 0915    ALKPHOS 87 02/10/2025 0915    PROT 6.1 (L) 02/10/2025 0915    AST 19 02/10/2025 0915    BILITOT 0.5 02/10/2025 0915    ALT 22 02/10/2025 0915    MG 1.21 (L) 02/10/2025 0915    PHOS 2.7 11/21/2024 0552     *On MgCl 2 tabs TID     Lab Results   Component Value Date    WBC 4.3 (L) 02/10/2025    HGB 13.5 02/10/2025    HCT 40.8 (L) 02/10/2025    MCV 96 02/10/2025     02/10/2025       Lab Results   Component Value Date/Time    VITD25 28 (L) 01/27/2025 1320   *Started Vit D2 50,000IU x 8 weeks on 12/4.    Anthropometrics:       *Wt at time of transplant admit: (9/13) 75.6 kg   *Wt at first post-transplant visit: (11/25): 60.9 kg   Overall, pt with ~15 kg (20%) wt loss x ~4 mos    *Wt stable/up x 1 month      Wt Readings from Last 10 Encounters:   02/11/25 64.5 kg (142 lb 4.9 oz)   02/10/25 65.3 kg (144 lb)   02/03/25 65.1 kg (143 lb 8.3 oz)   01/30/25 64.7 kg (142 lb 10.2 oz)   01/27/25 62.6 kg (137 lb 14.4 oz)   01/23/25 62.1 kg (136 lb 14.5 oz)   01/23/25 62.1 kg (137 lb)   01/20/25 62.9 kg (138 lb 10.7 oz)   01/16/25 62.4 kg (137 lb 9.1 oz)   01/16/25 62.4 kg (137 lb 9.1 oz)   12/30/24        58.8 kg   12/23/24        61.2 kg  12/04/24         60.9 kg  Admit 9/13-11/25 09/09/24        74.2 kg  08/16/24        75.3 kg   07/19/24        74.2 kg   06/24/24        71.0 kg  05/20/24        72.8 kg  04/23/24        75.5 kg     Food And Nutrient Intake:      Illness came on suddenly overnight  Diarrhea x 1-2 episodes a day since last Friday  Started vomiting this am--~6 episodes from 2-6 am this morning.  All stomach acids; no food.   Temp 98.9  No nausea preceeding vomiting   Sweating this am after finished vomiting; didn't check temp  No cold  Was eating okay up until this am--only took BP meds this am (stayed down)   Did not take any nausea meds   Feels okay now--no diarrhea so far today  Main c/o is feeling cold and fatigue            ---------------  Appetite and intakes remain good.   Eating main meals at breakfast and dinner; snacks t/o the afternoon as well as in the evening.   Fluid intakes good; drinking mainly plain water and flavored water.  Intermittently will have a milkshake.   Eating well; tastes pretty much back to baseline.   No N/V; no stomach upset. Remains on Budesonide once daily.   No diarrhea but does have soft stools 1x/day.  Feels good; energy continues to improve.     Excited because tomorrow is his birthday and he is planning on retiring.      Dislikes all ONS/supplements.   Started Remeron 15 mg on 11/27.                                                             Nutrition Focused Physical Exam Findings:                          Energy Needs           Nutrition Diagnosis        *Remains mildly malnourished, hoever, overall nutrition status had improved considerably since starting Budesonide--now tapering off.  Appetite, intakes and dysgeusia all improving.     Nutrition Interventions/Recommendations   Nutrition Prescription   High Protein, High Calorie  Food Safety     Nutrition Education   Pt aware of importance of protein intakes; include source with all meals and snacks.   Encouraged PO fluids; Goal: 60+ oz daily; prefer  calorie containing foods (ie milk, juices, etc) for  additional protein   Consider Milkshakes, smoothies or regular use of ice cream as snack in hopes of continuing to  maximize protein/stefany intakes (pt dislikes ONS).      Coordination of Care   NP/BMT team         Nutrition Monitoring and Evaluation      Will continue to f/up and monitor weight, labs. PO intakes and GI symptoms.

## 2025-02-11 NOTE — TELEPHONE ENCOUNTER
Discussed with team- pt advised to pack a bag and come straight to [facility]. They will see him in the SCT unit. Pt aware and is agreeable with the plan.

## 2025-02-11 NOTE — ASSESSMENT & PLAN NOTE
Presented to [facility] for sick visit (2/11/25) for nausea with multiple episodes of emesis earlier that morning; endorses GERD-like symptoms, not on a PPI at home.  - Pepcid 20 mg given on admission  - start Protonix qAM  - PRN Zofran/Compazine

## 2025-02-11 NOTE — PROGRESS NOTES
Pharmacy Medication History Review    Brandt Merritt is a 65 y.o. male admitted for Bilious vomiting with nausea. Pharmacy reviewed the patient's [id number] medications and allergies for accuracy.    Medications ADDED:  None   Medications CHANGED:  None   Medications REMOVED:   None     The list below reflects the updated PTA list.   Prior to Admission Medications   Prescriptions Last Dose Informant   NIFEdipine ER (NIFEdipine CC) 60 mg 24 hr tablet  Self   Sig: Take 1 tablet (60 mg) by mouth once daily in the morning. Take before meals. Do not crush, chew, or split.   acyclovir (Zovirax) 400 mg tablet  Self   Sig: Take 1 tablet (400 mg) by mouth every 12 hours.   budesonide EC (Entocort EC) 3 mg 24 hr capsule  Self   Sig: Take 1 capsule (3 mg) by mouth once daily in the morning.   calcium carbonate (Oscal) 500 mg calcium (1,250 mg) tablet  Self   Sig: Take 1 tablet (1,250 mg) by mouth once daily.   ergocalciferol (Vitamin D2) 1.25 MG (90143 UT) capsule  Self   Sig: Take 1 capsule (1,250 mcg) by mouth 1 (one) time per week.Patient takes on Sunday.    folic acid (Folvite) 1 mg tablet  Self   Sig: Take 1 tablet (1 mg) by mouth once daily.   letermovir (Prevymis) 480 mg tablet  Self   Sig: Take 1 tablet (480 mg) by mouth once daily.   lisinopril 5 mg tablet  Self   Sig: Take 1 tablet (5 mg) by mouth once daily.   magnesium chloride (MagDelay) 64 mg EC tablet  Self   Sig: Take 2 tablets (128 mg) by mouth 3 times a day. Or as instructed on your medication list.   mirtazapine (Remeron) 15 mg tablet  Self   Sig: Take 1 tablet (15 mg) by mouth once daily at bedtime.   ondansetron (Zofran) 8 mg tablet  Self   Sig: Take 1 tablet (8 mg) by mouth every 8 hours if needed for nausea or vomiting (1st line).   posaconazole (Noxafil) 100 mg DR tablet  Self   Sig: Take 3 tablets (300 mg) by mouth once daily. Do not crush, chew, or split. This is to prevent fungal infections.   prochlorperazine (Compazine) 10 mg tablet  Self  "  Sig: Take 1 tablet (10 mg) by mouth every 6 hours if needed for nausea or vomiting.   sennosides-docusate sodium (Karissa-Colace) 8.6-50 mg tablet  Self   Sig: Take 1 tablet by mouth 2 times a day as needed for constipation.   sulfamethoxazole-trimethoprim (Bactrim DS) 800-160 mg tablet  Self   Sig: Take 1 tablet by mouth 3 times a week. Take on Mondays, Wednesdays, and Fridays.   tacrolimus (Prograf) 0.5 mg capsule  Self   Sig: Take 1 capsule (0.5 mg) by mouth every 12 hours. Total AM dose: 1.5 mg and total PM dose: 1.5 mg. Use in combination with 1.0 mg capsules.      Facility-Administered Medications: None        The list below reflects the updated allergy list. Please review each documented allergy for additional clarification and justification.  Allergies  Reviewed by Shannon Hardwick on 2/11/2025        Severity Reactions Comments    Lasix [furosemide] High Other Extreme hypotension and extreme hypokalemia.    Meropenem Low Rash Unclear if rash due to meropenem or pre-engraftment syndrome    Thiazides Low Other Recurrent significant hypokalemia            Patient accepts M2B at discharge.     Sources:   [facility]  Pharmacy dispense history  Patient interview Moderate historian  Chart Review  Care Everywhere     Additional Comments:  Patient stated he is unsure if he takes Mirtazapine. Last filled 11/23/24 for 30 tabs/30 D      Shannon Hardwick  Pharmacy Technician  02/11/25     Secure Chat preferred   If no response call [id number] or [email or web address] \"Med Rec\"   "

## 2025-02-11 NOTE — ASSESSMENT & PLAN NOTE
Aferbile at home and outpatient prior to admission, however, he had a fever of 38.1 when he arrived to [facility].  - cultures done 2/11/24 outpatient, results PENDING  - no sick contacts, no other s/sx infection  - will obtain flu/COVID respiratory swabs   - given that he is post-BMT and on immunosuppression, started Zosyn 3.375 g on admission

## 2025-02-11 NOTE — TELEPHONE ENCOUNTER
Additional Information   If getting chemo, ask: do you have anti-nausea medicine at home?     Has not taken it yet    Protocols used: Nausea/Vomiting    Pt called reporting emesis that started around 2am- he has had 6 episodes of yellow emesis. No nausea, he said it comes on suddenly. Had 2 episodes of diarrhea yesterday. No chest pain, fever, or SOB. Temp was 98.5F. He does have dizziness during the emesis episodes. No HA or weakness. He is eating/drinking normally. His wife reports that he was sweating overnight and this AM. He has not been around anyone else who was sick. Secure chat sent to MD/CNP.

## 2025-02-12 ENCOUNTER — APPOINTMENT (OUTPATIENT)
Dept: RADIOLOGY | Facility: HOSPITAL | Age: 65
DRG: 920 | End: 2025-02-12
Payer: COMMERCIAL

## 2025-02-12 LAB
ALBUMIN SERPL BCP-MCNC: 3.5 G/DL (ref 3.4–5)
ALP SERPL-CCNC: 64 U/L (ref 33–136)
ALT SERPL W P-5'-P-CCNC: 13 U/L (ref 10–52)
AMYLASE SERPL-CCNC: 13 U/L (ref 29–103)
ANION GAP SERPL CALC-SCNC: 13 MMOL/L (ref 10–20)
AST SERPL W P-5'-P-CCNC: 12 U/L (ref 9–39)
BASOPHILS # BLD MANUAL: 0 X10*3/UL (ref 0–0.1)
BASOPHILS NFR BLD MANUAL: 0 %
BILIRUB DIRECT SERPL-MCNC: 0.1 MG/DL (ref 0–0.3)
BILIRUB SERPL-MCNC: 0.9 MG/DL (ref 0–1.2)
BUN SERPL-MCNC: 19 MG/DL (ref 6–23)
BURR CELLS BLD QL SMEAR: ABNORMAL
CALCIUM SERPL-MCNC: 8.5 MG/DL (ref 8.6–10.6)
CHLORIDE SERPL-SCNC: 106 MMOL/L (ref 98–107)
CO2 SERPL-SCNC: 24 MMOL/L (ref 21–32)
CREAT SERPL-MCNC: 1.04 MG/DL (ref 0.5–1.3)
DACRYOCYTES BLD QL SMEAR: ABNORMAL
EGFRCR SERPLBLD CKD-EPI 2021: 79 ML/MIN/1.73M*2
EOSINOPHIL # BLD MANUAL: 0 X10*3/UL (ref 0–0.7)
EOSINOPHIL NFR BLD MANUAL: 0 %
ERYTHROCYTE [DISTWIDTH] IN BLOOD BY AUTOMATED COUNT: 16.1 % (ref 11.5–14.5)
GLUCOSE SERPL-MCNC: 102 MG/DL (ref 74–99)
HCT VFR BLD AUTO: 35.5 % (ref 41–52)
HGB BLD-MCNC: 11.5 G/DL (ref 13.5–17.5)
IMM GRANULOCYTES # BLD AUTO: 0.13 X10*3/UL (ref 0–0.7)
IMM GRANULOCYTES NFR BLD AUTO: 2.9 % (ref 0–0.9)
LIPASE SERPL-CCNC: <3 U/L (ref 9–82)
LYMPHOCYTES # BLD MANUAL: 1.17 X10*3/UL (ref 1.2–4.8)
LYMPHOCYTES NFR BLD MANUAL: 26 %
MAGNESIUM SERPL-MCNC: 1.91 MG/DL (ref 1.6–2.4)
MCH RBC QN AUTO: 31.2 PG (ref 26–34)
MCHC RBC AUTO-ENTMCNC: 32.4 G/DL (ref 32–36)
MCV RBC AUTO: 96 FL (ref 80–100)
METAMYELOCYTES # BLD MANUAL: 0.05 X10*3/UL
METAMYELOCYTES NFR BLD MANUAL: 1 %
MONOCYTES # BLD MANUAL: 0.9 X10*3/UL (ref 0.1–1)
MONOCYTES NFR BLD MANUAL: 20 %
MYELOCYTES # BLD MANUAL: 0.05 X10*3/UL
MYELOCYTES NFR BLD MANUAL: 1 %
NEUTROPHILS # BLD MANUAL: 2.17 X10*3/UL (ref 1.2–7.7)
NEUTS BAND # BLD MANUAL: 0.5 X10*3/UL (ref 0–0.7)
NEUTS BAND NFR BLD MANUAL: 11 %
NEUTS SEG # BLD MANUAL: 1.67 X10*3/UL (ref 1.2–7)
NEUTS SEG NFR BLD MANUAL: 37 %
NRBC BLD-RTO: 0 /100 WBCS (ref 0–0)
PHOSPHATE SERPL-MCNC: 4 MG/DL (ref 2.5–4.9)
PLATELET # BLD AUTO: 151 X10*3/UL (ref 150–450)
POTASSIUM SERPL-SCNC: 3.8 MMOL/L (ref 3.5–5.3)
PROT SERPL-MCNC: 5.5 G/DL (ref 6.4–8.2)
RBC # BLD AUTO: 3.69 X10*6/UL (ref 4.5–5.9)
RBC MORPH BLD: ABNORMAL
SODIUM SERPL-SCNC: 139 MMOL/L (ref 136–145)
TOTAL CELLS COUNTED BLD: 100
VARIANT LYMPHS # BLD MANUAL: 0.18 X10*3/UL (ref 0–0.5)
VARIANT LYMPHS NFR BLD: 4 %
WBC # BLD AUTO: 4.5 X10*3/UL (ref 4.4–11.3)

## 2025-02-12 PROCEDURE — 36415 COLL VENOUS BLD VENIPUNCTURE: CPT

## 2025-02-12 PROCEDURE — 82150 ASSAY OF AMYLASE: CPT | Performed by: NURSE PRACTITIONER

## 2025-02-12 PROCEDURE — 84100 ASSAY OF PHOSPHORUS: CPT

## 2025-02-12 PROCEDURE — 85027 COMPLETE CBC AUTOMATED: CPT

## 2025-02-12 PROCEDURE — 2500000002 HC RX 250 W HCPCS SELF ADMINISTERED DRUGS (ALT 637 FOR MEDICARE OP, ALT 636 FOR OP/ED)

## 2025-02-12 PROCEDURE — 2500000001 HC RX 250 WO HCPCS SELF ADMINISTERED DRUGS (ALT 637 FOR MEDICARE OP): Performed by: NURSE PRACTITIONER

## 2025-02-12 PROCEDURE — 99233 SBSQ HOSP IP/OBS HIGH 50: CPT | Performed by: INTERNAL MEDICINE

## 2025-02-12 PROCEDURE — 99222 1ST HOSP IP/OBS MODERATE 55: CPT | Performed by: STUDENT IN AN ORGANIZED HEALTH CARE EDUCATION/TRAINING PROGRAM

## 2025-02-12 PROCEDURE — 85007 BL SMEAR W/DIFF WBC COUNT: CPT

## 2025-02-12 PROCEDURE — 82248 BILIRUBIN DIRECT: CPT

## 2025-02-12 PROCEDURE — 83690 ASSAY OF LIPASE: CPT | Performed by: NURSE PRACTITIONER

## 2025-02-12 PROCEDURE — 71250 CT THORAX DX C-: CPT | Performed by: RADIOLOGY

## 2025-02-12 PROCEDURE — 74176 CT ABD & PELVIS W/O CONTRAST: CPT | Performed by: RADIOLOGY

## 2025-02-12 PROCEDURE — 2500000004 HC RX 250 GENERAL PHARMACY W/ HCPCS (ALT 636 FOR OP/ED): Performed by: INTERNAL MEDICINE

## 2025-02-12 PROCEDURE — 71250 CT THORAX DX C-: CPT

## 2025-02-12 PROCEDURE — 83735 ASSAY OF MAGNESIUM: CPT

## 2025-02-12 PROCEDURE — 1170000001 HC PRIVATE ONCOLOGY ROOM DAILY

## 2025-02-12 PROCEDURE — 2500000001 HC RX 250 WO HCPCS SELF ADMINISTERED DRUGS (ALT 637 FOR MEDICARE OP)

## 2025-02-12 PROCEDURE — 2500000004 HC RX 250 GENERAL PHARMACY W/ HCPCS (ALT 636 FOR OP/ED)

## 2025-02-12 RX ORDER — BUDESONIDE 3 MG/1
3 CAPSULE, COATED PELLETS ORAL 2 TIMES DAILY
Status: DISCONTINUED | OUTPATIENT
Start: 2025-02-12 | End: 2025-02-14 | Stop reason: HOSPADM

## 2025-02-12 RX ADMIN — PANTOPRAZOLE SODIUM 40 MG: 40 TABLET, DELAYED RELEASE ORAL at 08:27

## 2025-02-12 RX ADMIN — ACYCLOVIR 400 MG: 400 TABLET ORAL at 08:27

## 2025-02-12 RX ADMIN — FOLIC ACID 1 MG: 1 TABLET ORAL at 08:28

## 2025-02-12 RX ADMIN — LISINOPRIL 5 MG: 5 TABLET ORAL at 08:27

## 2025-02-12 RX ADMIN — PIPERACILLIN SODIUM AND TAZOBACTAM SODIUM 3.38 G: 3; .375 INJECTION, SOLUTION INTRAVENOUS at 03:27

## 2025-02-12 RX ADMIN — LETERMOVIR 480 MG: 480 TABLET, FILM COATED ORAL at 08:27

## 2025-02-12 RX ADMIN — BUDESONIDE 3 MG: 3 CAPSULE, COATED PELLETS ORAL at 20:27

## 2025-02-12 RX ADMIN — PIPERACILLIN SODIUM AND TAZOBACTAM SODIUM 3.38 G: 3; .375 INJECTION, SOLUTION INTRAVENOUS at 16:22

## 2025-02-12 RX ADMIN — MIRTAZAPINE 15 MG: 15 TABLET, FILM COATED ORAL at 20:27

## 2025-02-12 RX ADMIN — POSACONAZOLE 300 MG: 100 TABLET, DELAYED RELEASE ORAL at 08:27

## 2025-02-12 RX ADMIN — CALCIUM 1250 MG: 500 TABLET ORAL at 08:28

## 2025-02-12 RX ADMIN — PIPERACILLIN SODIUM AND TAZOBACTAM SODIUM 3.38 G: 3; .375 INJECTION, SOLUTION INTRAVENOUS at 10:25

## 2025-02-12 RX ADMIN — PIPERACILLIN SODIUM AND TAZOBACTAM SODIUM 3.38 G: 3; .375 INJECTION, SOLUTION INTRAVENOUS at 20:27

## 2025-02-12 RX ADMIN — SULFAMETHOXAZOLE AND TRIMETHOPRIM 1 TABLET: 800; 160 TABLET ORAL at 08:27

## 2025-02-12 RX ADMIN — NIFEDIPINE 60 MG: 60 TABLET, FILM COATED, EXTENDED RELEASE ORAL at 08:27

## 2025-02-12 RX ADMIN — BUDESONIDE 3 MG: 3 CAPSULE, COATED PELLETS ORAL at 08:27

## 2025-02-12 RX ADMIN — TACROLIMUS 0.5 MG: 0.5 CAPSULE ORAL at 23:10

## 2025-02-12 RX ADMIN — ACYCLOVIR 400 MG: 400 TABLET ORAL at 20:27

## 2025-02-12 RX ADMIN — SODIUM CHLORIDE 75 ML/HR: 9 INJECTION, SOLUTION INTRAVENOUS at 10:25

## 2025-02-12 RX ADMIN — TACROLIMUS 0.5 MG: 0.5 CAPSULE ORAL at 10:25

## 2025-02-12 ASSESSMENT — COGNITIVE AND FUNCTIONAL STATUS - GENERAL
MOBILITY SCORE: 24
DAILY ACTIVITIY SCORE: 24

## 2025-02-12 ASSESSMENT — ACTIVITIES OF DAILY LIVING (ADL): LACK_OF_TRANSPORTATION: NO

## 2025-02-12 ASSESSMENT — PAIN SCALES - GENERAL
PAINLEVEL_OUTOF10: 0 - NO PAIN
PAINLEVEL_OUTOF10: 0 - NO PAIN

## 2025-02-12 NOTE — PROGRESS NOTES
02/12/25 1800   Discharge Planning   Living Arrangements Spouse/significant other   Support Systems Spouse/significant other   Assistance Needed none   Type of Residence Private residence   Who is requesting discharge planning? Other (Comment)   Home or Post Acute Services None   Expected Discharge Disposition Home   Does the patient need discharge transport arranged? No   Financial Resource Strain   How hard is it for you to pay for the very basics like food, housing, medical care, and heating? Not very   Housing Stability   In the last 12 months, was there a time when you were not able to pay the mortgage or rent on time? N   In the past 12 months, how many times have you moved where you were living? 0   At any time in the past 12 months, were you homeless or living in a shelter (including now)? N   Transportation Needs   In the past 12 months, has lack of transportation kept you from medical appointments or from getting medications? no   In the past 12 months, has lack of transportation kept you from meetings, work, or from getting things needed for daily living? No     LSW met with pt at bedside to complete assessment. Pt admitted for work up of diarrhea, c/f GVHD. Pt follows with Dr. Newsome for hx AML s/p Haplo SCT 11/2024. Pt plans to return home with wife, no need anticipated. Pt declines any DME/HHC use or need at this time. LSW to cont to follow.

## 2025-02-12 NOTE — ASSESSMENT & PLAN NOTE
Presented to [facility] for sick visit (2/11/25) for nausea with multiple episodes of emesis earlier that morning; endorses GERD-like symptoms, not on a PPI at home.  - Pepcid 20 mg given on admission  - start Protonix qAM  - PRN Zofran/Compazine   - Improved 2/12 and is tolerating po

## 2025-02-12 NOTE — ASSESSMENT & PLAN NOTE
Aferbile at home and outpatient prior to admission, however, he had a fever of 38.1 when he arrived to [facility].  - cultures done 2/11/24 outpatient, NGTD  - Stool pathogen pending   - no sick contacts, no other s/sx infection  - will obtain flu/COVID respiratory swabs   - given that he is post-BMT and on immunosuppression, started Zosyn 3.375 g on admission

## 2025-02-12 NOTE — CARE PLAN
The patient's goals for the shift include   Problem: Pain - Adult  Goal: Verbalizes/displays adequate comfort level or baseline comfort level  Outcome: Progressing     Problem: Safety - Adult  Goal: Free from fall injury  Outcome: Progressing     Problem: Discharge Planning  Goal: Discharge to home or other facility with appropriate resources  Outcome: Progressing     Problem: Chronic Conditions and Co-morbidities  Goal: Patient's chronic conditions and co-morbidity symptoms are monitored and maintained or improved  Outcome: Progressing     Problem: Nutrition  Goal: Nutrient intake appropriate for maintaining nutritional needs  Outcome: Progressing       The clinical goals for the shift include Pt will remain HDS

## 2025-02-12 NOTE — ASSESSMENT & PLAN NOTE
History of allogeneic hematopoietic stem cell transplant  #1: Single-unit Cord, T0=9/19/24, Primary Engraftment Failure  - Conditioning: Flu-Mariana-TBI  - Donor: Single Cord, 6/8  = ABO Donor / Recipient: A+ / A+  = CMV Donor / Recipient: - / +  - aGVHD Prophylaxis: Tacrolimus + MMF  - engraftment failure, developed HLA antibody against class I of cord  - Repeat BMBx (10/15): hypocellular with no residual myeloid neoplasm. 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), T0=11/6/24  - Graft: Haploidentical sister  = ABO Donor / Recipient: O+ / A+ (ABO incompatibility +)  = CMV Donor / Recipient: + / +  - Conditioning: Flu/Cy/TBI/rATG  = GVHD prophylaxis -  rATG 1.5mg/kg T-5,-3,-1, PTCy (25mg/kg T+3, T+4), Tacro, MMF (T+5)    Today is T+146 (Allogeneic BMT Evaluation - Planned 9/19/2024), 98 (Allogeneic PBSC Transplant Evaluation - Planned 11/6/2024).    Day T+60 BMbx negative for disease, day T+100 bone marrow deferred.     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 Newsome, decreased to 3 mg daily (1/27)  - Increased budesonide to 3mg BID 2/12  - Consulted GI, appreciate recs   - Planning for EGD tomorrow 2/13  - current dose of tacrolimus 0.5 mg BID  - tacro dose decreased 2/10/25 because last trough level 13.2 ng/mL (2/11/25), next tacro leve 2/13 am    ID  - continue prophylaxis: acyclovir, letermovir, posaconazole, and smz-tmp DS  - continue viral monitoring per BMT protocol

## 2025-02-12 NOTE — H&P (VIEW-ONLY)
[facility]   [facility]  INITIAL CONSULT NOTE       Reason For Consult  C/f upper GI GVHD    SUBJECTIVE     History Of Present Illness  Brandt Merritt is a 65 y.o. 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 GI is consulted for c/f upper GI GVHD    On chart review: Has been on [place] + MMF for GVHD prophylaxis. Started having anorexia/poor PO intake/weight loss documented at 12/2/2024 Oncology visit. MMF was decreased from 1g TID to 500mg TID and had slight improvement in GI symptoms on this. 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 He is now on budesonide 3mg once daily. He does not have any biopsy proven GVHD    On discussion with the patient today, he did not know he was being treated for GVHD. He does not know what GVHD is. He did not really know what he is on the budesonide for. We review his symptoms and he reports his lowest weight was 120 in Nov/December 2024, he had poor taste, poor appetite, and pill dysphagia at that time. He repots symptoms have resolved since starting budesonide and he has gained back 15-20 lbs, but not all of the weight he has lost. He denies any heartburn. Had several episodes of of N/V  yesterday and only very intermittent before then. No abdominal pain. He has had some looser stools 1-2 a day that range from mushy to watery. No blood    Review of Systems  As above       Past Medical History:    Past Medical History:   Diagnosis Date    Chest pain 06/30/2021    HTN (hypertension) 10/05/2023    Hypertension     Personal history of other diseases of the circulatory system     History of hypertension       Home  Medications  Medications Prior to Admission   Medication Sig Dispense Refill Last Dose/Taking    acyclovir (Zovirax) 400 mg tablet Take 1 tablet (400 mg) by mouth every 12 hours. 60 tablet 2 Taking    budesonide EC (Entocort EC) 3 mg 24 hr capsule Take 1 capsule (3 mg) by mouth once daily in the morning.       calcium carbonate (Oscal) 500 mg calcium (1,250 mg) tablet Take 1 tablet (1,250 mg) by mouth once daily. 30 tablet 3     ergocalciferol (Vitamin D2) 1.25 MG (87952 UT) capsule Take 1 capsule (1,250 mcg) by mouth 1 (one) time per week. 8 capsule 0     folic acid (Folvite) 1 mg tablet Take 1 tablet (1 mg) by mouth once daily. 30 tablet 3     letermovir (Prevymis) 480 mg tablet Take 1 tablet (480 mg) by mouth once daily. 28 tablet 5     lisinopril 5 mg tablet Take 1 tablet (5 mg) by mouth once daily. 30 tablet 11     magnesium chloride (MagDelay) 64 mg EC tablet Take 2 tablets (128 mg) by mouth 3 times a day. Or as instructed on your medication list. 180 tablet 1     mirtazapine (Remeron) 15 mg tablet Take 1 tablet (15 mg) by mouth once daily at bedtime. 30 tablet 3     NIFEdipine ER (NIFEdipine CC) 60 mg 24 hr tablet Take 1 tablet (60 mg) by mouth once daily in the morning. Take before meals. Do not crush, chew, or split. 30 tablet 11     ondansetron (Zofran) 8 mg tablet Take 1 tablet (8 mg) by mouth every 8 hours if needed for nausea or vomiting (1st line). 30 tablet 5     posaconazole (Noxafil) 100 mg DR tablet Take 3 tablets (300 mg) by mouth once daily. Do not crush, chew, or split. This is to prevent fungal infections. 90 tablet 2     prochlorperazine (Compazine) 10 mg tablet Take 1 tablet (10 mg) by mouth every 6 hours if needed for nausea or vomiting. 30 tablet 5     sennosides-docusate sodium (Karissa-Colace) 8.6-50 mg tablet Take 1 tablet by mouth 2 times a day as needed for constipation. 30 tablet 1     sulfamethoxazole-trimethoprim (Bactrim DS) 800-160 mg tablet Take 1 tablet by mouth 3 times a week.  Take on , , and . 12 tablet 2          Surgical History:    Past Surgical History:   Procedure Laterality Date    COLONOSCOPY  2013    Complete Colonoscopy    OTHER SURGICAL HISTORY  10/07/2015    Surgery Testis Reduction Of Torsion Of Testis Right       Allergies:    Allergies   Allergen Reactions    Lasix [Furosemide] Other     Extreme hypotension and extreme hypokalemia.    Meropenem Rash     Unclear if rash due to meropenem or pre-engraftment syndrome    Thiazides Other     Recurrent significant hypokalemia       Social History:    Social History     Socioeconomic History    Marital status:      Spouse name: Not on file    Number of children: Not on file    Years of education: Not on file    Highest education level: Not on file   Occupational History    Not on file   Tobacco Use    Smoking status: Former     Current packs/day: 0.00     Average packs/day: 0.3 packs/day for 28.0 years (7.0 ttl pk-yrs)     Types: Cigarettes     Start date: 1988     Quit date: 2016     Years since quittin.1    Smokeless tobacco: Never   Vaping Use    Vaping status: Never Used   Substance and Sexual Activity    Alcohol use: Not Currently     Comment: occansionally    Drug use: Yes     Types: Marijuana     Comment: quit when diagnosed    Sexual activity: Not on file   Other Topics Concern    Not on file   Social History Narrative    Not on file     Social Drivers of Health     Financial Resource Strain: Low Risk  (2025)    Overall Financial Resource Strain (CARDIA)     Difficulty of Paying Living Expenses: Not very hard   Food Insecurity: No Food Insecurity (2025)    Hunger Vital Sign     Worried About Running Out of Food in the Last Year: Never true     Ran Out of Food in the Last Year: Never true   Transportation Needs: No Transportation Needs (2025)    PRAPARE - Transportation     Lack of Transportation (Medical): No     Lack of Transportation (Non-Medical): No  "  Physical Activity: Insufficiently Active (2/11/2025)    Exercise Vital Sign     Days of Exercise per Week: 1 day     Minutes of Exercise per Session: 10 min   Stress: No Stress Concern Present (2/11/2025)    [employer] - Occupational Stress Questionnaire     Feeling of Stress : Not at all   Social Connections: Moderately Integrated (2/11/2025)    Social Connection and Isolation Panel [NHANES]     Frequency of Communication with Friends and Family: Twice a week     Frequency of Social Gatherings with Friends and Family: Once a week     Attends [employer] Services: 1 to 4 times per year     Active Member of Clubs or Organizations: No     Attends Club or Organization Meetings: Never     Marital Status:    Intimate Partner Violence: Not At Risk (2/11/2025)    Humiliation, Afraid, Rape, and Kick questionnaire     Fear of Current or Ex-Partner: No     Emotionally Abused: No     Physically Abused: No     Sexually Abused: No   Housing Stability: Low Risk  (2/11/2025)    Housing Stability Vital Sign     Unable to Pay for Housing in the Last Year: No     Number of Times Moved in the Last Year: 0     Homeless in the Last Year: No       Family History:    Family History   Problem Relation Name Age of Onset    Other (diabetes mellitus) Mother      Prostate cancer Brother         EXAM     Vitals:    Vitals:    02/11/25 1305 02/11/25 1532 02/12/25 0032 02/12/25 0329   BP: 125/72 122/81 112/71 117/73   BP Location: Left arm  Left arm Left arm   Patient Position: Lying  Lying Lying   Pulse: 94 95 92 90   Resp: 19 18 18 16   Temp: (!) 38.1 °C (100.6 °F) (!) 38 °C (100.4 °F) 36.3 °C (97.3 °F) 36.6 °C (97.9 °F)   TempSrc: Temporal Temporal Temporal Temporal   SpO2: 98% 96% 98% 98%   Weight: 65.8 kg (145 lb 1 oz)      Height: 1.664 m (5' 5.51\")        Failed to redirect to the Timeline version of the [employer].    Intake/Output Summary (Last 24 hours) at 2/12/2025 0804  Last data filed at " 2/12/2025 0713  Gross per 24 hour   Intake 1397.5 ml   Output --   Net 1397.5 ml         Physical Exam  General: well-nourished, no acute distress  HEENT: PERRLA, EOM intact, no scleral icterus, moist MM  Respiratory: CTA bilaterally, normal work of breathing  Cardiovascular: RRR, no murmurs/rubs/gallops  Abdomen: Soft, nontender, nondistended, bowel sounds present. No masses palpated  Extremities: no edema, no asterixis  Neuro: alert and oriented, CNII-XII grossly intact, moves all 4 extremities with no focal deficits    OBJECTIVE                                                                              Medications       Current Facility-Administered Medications:     acetaminophen (Tylenol) tablet 650 mg, 650 mg, oral, q6h PRN, 650 mg at 02/11/25 1636 **OR** acetaminophen (Tylenol) oral liquid 650 mg, 650 mg, oral, q6h PRN, Murali P Linden, APRN-CNP    acyclovir (Zovirax) tablet 400 mg, 400 mg, oral, q12h, Murali P Linden, APRN-CNP, 400 mg at 02/11/25 2014    budesonide EC (Entocort EC) 24 hr capsule 3 mg, 3 mg, oral, q AM, Murali P Linden, APRN-CNP    calcium carbonate (Oscal) tablet 1,250 mg, 1,250 mg, oral, Daily, Murali P Linden, APRN-CNP    folic acid (Folvite) tablet 1 mg, 1 mg, oral, Daily, Murali P Linden, APRN-CNP    letermovir (Prevymis) tablet 480 mg, 480 mg, oral, Daily, Murali P Linden, APRN-CNP    lisinopril tablet 5 mg, 5 mg, oral, Daily, Murali P Linden, APRN-CNP    mirtazapine (Remeron) tablet 15 mg, 15 mg, oral, Nightly, Murali P Linden, APRN-CNP, 15 mg at 02/11/25 2014    NIFEdipine ER (Adalat CC) 24 hr tablet 60 mg, 60 mg, oral, Daily before breakfast, Murali P Linden, APRN-CNP    ondansetron (Zofran) tablet 4 mg, 4 mg, oral, q8h PRN **OR** ondansetron (Zofran) injection 4 mg, 4 mg, intravenous, q8h PRN, GRICELDA Staples    pantoprazole (ProtoNix) EC tablet 40 mg, 40 mg, oral, Daily before breakfast, GRICELDA Staples    piperacillin-tazobactam (Zosyn) 3.375 g in dextrose (iso) IV 50 mL, 3.375 g,  intravenous, q6h, Murali P Linden, APRN-CNP, Stopped at 02/12/25 0357    posaconazole (Noxafil) DR tablet 300 mg, 300 mg, oral, Daily, Murali P Linden, APRN-CNP    prochlorperazine (Compazine) tablet 10 mg, 10 mg, oral, q6h PRN **OR** prochlorperazine (Compazine) injection 10 mg, 10 mg, intravenous, q6h PRN **OR** [DISCONTINUED] prochlorperazine (Compazine) suppository 25 mg, 25 mg, rectal, q12h PRN, Murali P Linden, APRN-CNP    sodium chloride 0.9% infusion, 75 mL/hr, intravenous, Continuous, Ervin Luciano MD, Last Rate: 75 mL/hr at 02/11/25 1747, 75 mL/hr at 02/11/25 1747    sulfamethoxazole-trimethoprim (Bactrim DS) 800-160 mg per tablet 1 tablet, 1 tablet, oral, Once per day on Monday Wednesday Friday, Murali Cheatham, APRN-CNP    tacrolimus (Prograf) capsule 0.5 mg, 0.5 mg, oral, q12h JALYN, Murali P Linden, APRN-CNP, 0.5 mg at 02/11/25 1739                                                                            Labs     Results for orders placed or performed during the hospital encounter of 02/11/25 (from the past 24 hours)   Sars-CoV-2 and Influenza A/B PCR   Result Value Ref Range    Flu A Result Not Detected Not Detected    Flu B Result Not Detected Not Detected    Coronavirus 2019, PCR Not Detected Not Detected     *Note: Due to a large number of results and/or encounters for the requested time period, some results have not been displayed. A complete set of results can be found in Results Review.                                                                              Imaging           CT chest abdomen pelvis 9/2024  IMPRESSION:  1. Fluid-filled small bowel and right colon likely related to  diarrhea. No evidence of bowel wall thickening or surrounding  stranding to suggest enteritis or colitis. Evidence of a  diverticulitis.  2. No other evidence of infectious process in the chest, abdomen and  pelvis.  3. Chronic and incidental findings as described above.    CT Chest 10/30/2024  IMPRESSION:  1. No focal  consolidation, pleural effusion, pneumothorax.  2. Trace pericardial effusion.  3. Redemonstration of type 1 sliding hiatal hernia with mild distal  esophageal wall thickening likely reflects sequela of  gastroesophageal reflux disease.  4. Additional findings as above.                                                                           GI Procedures     10/2023 EGD  Impression  The esophagus appeared normal.  The stomach appeared normal.  Performed forceps biopsies to rule out H. pylori  The duodenal bulb, 1st part of the duodenum and 2nd part of the duodenum appeared normal.        Findings  Regular Z-line 40 cm from the incisors  The esophagus appeared normal.  The stomach appeared normal.  Performed forceps biopsies to rule out H. pylori. Specimens placed into Jar 1.  The duodenal bulb, 1st part of the duodenum and 2nd part of the duodenum appeared normal.       FINAL DIAGNOSIS   STOMACH, ANTRUM AND BODY, BIOPSY:  - ANTRAL AND OXYNTIC MUCOSA WITH NO SIGNIFICANT ABNORMALITY.  - NEGATIVE FOR HELICOBACTER.     11/2021 Colonoscopy ([facility])  Findings:       The perianal and digital rectal examinations were normal.        Non-bleeding internal hemorrhoids were found during retroflexion.        The exam was otherwise without abnormality.   Impression:              - Non-bleeding internal hemorrhoids.                            - The examination was otherwise normal.                            - No specimens collected.     ASSESSMENT / PLAN                  ASSESSMENT/PLAN:  Brandt Merritt is a 65 y.o. 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 GI is consulted for c/f upper GI GVHD    Has already been started on tapering budesonide for concerns of upper GI GVHD, has not had endoscopic evaluation. We will plan for this tomorrow. Other ddx includes esophagitis (infectious  or reflux-related), PUD, gastritis, etc.    Recommendations:  -NPO at [facility]  -Plan for EGD tomorrow  -Rest of care per primary team    Patient was seen and discussed with Dr. Lowe    Thank you for this interesting consult. Gastroenterology will continue to follow.  -During weekday hours of 7am-5pm please do not hesitate to contact me on [employer] Chat or page [phone] if there are any further questions between the weekday hours of 7 AM - 5 PM.   -After hours, on weekends, and on holidays, please page [place] [phone]. Thank you.      Mary Grace Wiggins MD

## 2025-02-12 NOTE — CONSULTS
[facility]   [facility] [place]  INITIAL CONSULT NOTE       Reason For Consult  C/f upper GI GVHD    SUBJECTIVE     History Of Present Illness  Brandt Merritt is a 65 y.o. 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 GI is consulted for c/f upper GI GVHD    On chart review: Has been on Tacro + MMF for GVHD prophylaxis. Started having anorexia/poor PO intake/weight loss documented at 12/2/2024 Oncology visit. MMF was decreased from 1g TID to 500mg TID and had slight improvement in GI symptoms on this. 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 He is now on budesonide 3mg once daily. He does not have any biopsy proven GVHD    On discussion with the patient today, he did not know he was being treated for GVHD. He does not know what GVHD is. He did not really know what he is on the budesonide for. We review his symptoms and he reports his lowest weight was 120 in Nov/December 2024, he had poor taste, poor appetite, and pill dysphagia at that time. He repots symptoms have resolved since starting budesonide and he has gained back 15-20 lbs, but not all of the weight he has lost. He denies any heartburn. Had several episodes of of N/V  yesterday and only very intermittent before then. No abdominal pain. He has had some looser stools 1-2 a day that range from mushy to watery. No blood    Review of Systems  As above       Past Medical History:    Past Medical History:   Diagnosis Date    Chest pain 06/30/2021    HTN (hypertension) 10/05/2023    Hypertension     Personal history of other diseases of the circulatory system     History of hypertension       Home  Medications  Medications Prior to Admission   Medication Sig Dispense Refill Last Dose/Taking    acyclovir (Zovirax) 400 mg tablet Take 1 tablet (400 mg) by mouth every 12 hours. 60 tablet 2 Taking    budesonide EC (Entocort EC) 3 mg 24 hr capsule Take 1 capsule (3 mg) by mouth once daily in the morning.       calcium carbonate (Oscal) 500 mg calcium (1,250 mg) tablet Take 1 tablet (1,250 mg) by mouth once daily. 30 tablet 3     ergocalciferol (Vitamin D2) 1.25 MG (94758 UT) capsule Take 1 capsule (1,250 mcg) by mouth 1 (one) time per week. 8 capsule 0     folic acid (Folvite) 1 mg tablet Take 1 tablet (1 mg) by mouth once daily. 30 tablet 3     letermovir (Prevymis) 480 mg tablet Take 1 tablet (480 mg) by mouth once daily. 28 tablet 5     lisinopril 5 mg tablet Take 1 tablet (5 mg) by mouth once daily. 30 tablet 11     magnesium chloride (MagDelay) 64 mg EC tablet Take 2 tablets (128 mg) by mouth 3 times a day. Or as instructed on your medication list. 180 tablet 1     mirtazapine (Remeron) 15 mg tablet Take 1 tablet (15 mg) by mouth once daily at bedtime. 30 tablet 3     NIFEdipine ER (NIFEdipine CC) 60 mg 24 hr tablet Take 1 tablet (60 mg) by mouth once daily in the morning. Take before meals. Do not crush, chew, or split. 30 tablet 11     ondansetron (Zofran) 8 mg tablet Take 1 tablet (8 mg) by mouth every 8 hours if needed for nausea or vomiting (1st line). 30 tablet 5     posaconazole (Noxafil) 100 mg DR tablet Take 3 tablets (300 mg) by mouth once daily. Do not crush, chew, or split. This is to prevent fungal infections. 90 tablet 2     prochlorperazine (Compazine) 10 mg tablet Take 1 tablet (10 mg) by mouth every 6 hours if needed for nausea or vomiting. 30 tablet 5     sennosides-docusate sodium (Karissa-Colace) 8.6-50 mg tablet Take 1 tablet by mouth 2 times a day as needed for constipation. 30 tablet 1     sulfamethoxazole-trimethoprim (Bactrim DS) 800-160 mg tablet Take 1 tablet by mouth 3 times a week.  Take on , , and . 12 tablet 2          Surgical History:    Past Surgical History:   Procedure Laterality Date    COLONOSCOPY  2013    Complete Colonoscopy    OTHER SURGICAL HISTORY  10/07/2015    Surgery Testis Reduction Of Torsion Of Testis Right       Allergies:    Allergies   Allergen Reactions    Lasix [Furosemide] Other     Extreme hypotension and extreme hypokalemia.    Meropenem Rash     Unclear if rash due to meropenem or pre-engraftment syndrome    Thiazides Other     Recurrent significant hypokalemia       Social History:    Social History     Socioeconomic History    Marital status:      Spouse name: Not on file    Number of children: Not on file    Years of education: Not on file    Highest education level: Not on file   Occupational History    Not on file   Tobacco Use    Smoking status: Former     Current packs/day: 0.00     Average packs/day: 0.3 packs/day for 28.0 years (7.0 ttl pk-yrs)     Types: Cigarettes     Start date: 1988     Quit date: 2016     Years since quittin.1    Smokeless tobacco: Never   Vaping Use    Vaping status: Never Used   Substance and Sexual Activity    Alcohol use: Not Currently     Comment: occansionally    Drug use: Yes     Types: Marijuana     Comment: quit when diagnosed    Sexual activity: Not on file   Other Topics Concern    Not on file   Social History Narrative    Not on file     Social Drivers of Health     Financial Resource Strain: Low Risk  (2025)    Overall Financial Resource Strain (CARDIA)     Difficulty of Paying Living Expenses: Not very hard   Food Insecurity: No Food Insecurity (2025)    Hunger Vital Sign     Worried About Running Out of Food in the Last Year: Never true     Ran Out of Food in the Last Year: Never true   Transportation Needs: No Transportation Needs (2025)    PRAPARE - Transportation     Lack of Transportation (Medical): No     Lack of Transportation (Non-Medical): No  "  Physical Activity: Insufficiently Active (2/11/2025)    Exercise Vital Sign     Days of Exercise per Week: 1 day     Minutes of Exercise per Session: 10 min   Stress: No Stress Concern Present (2/11/2025)    [place] [employer] - Occupational Stress Questionnaire     Feeling of Stress : Not at all   Social Connections: Moderately Integrated (2/11/2025)    Social Connection and Isolation Panel [NHANES]     Frequency of Communication with Friends and Family: Twice a week     Frequency of Social Gatherings with Friends and Family: Once a week     Attends [employer] Services: 1 to 4 times per year     Active Member of Clubs or Organizations: No     Attends Club or Organization Meetings: Never     Marital Status:    Intimate Partner Violence: Not At Risk (2/11/2025)    Humiliation, Afraid, Rape, and Kick questionnaire     Fear of Current or Ex-Partner: No     Emotionally Abused: No     Physically Abused: No     Sexually Abused: No   Housing Stability: Low Risk  (2/11/2025)    Housing Stability Vital Sign     Unable to Pay for Housing in the Last Year: No     Number of Times Moved in the Last Year: 0     Homeless in the Last Year: No       Family History:    Family History   Problem Relation Name Age of Onset    Other (diabetes mellitus) Mother      Prostate cancer Brother         EXAM     Vitals:    Vitals:    02/11/25 1305 02/11/25 1532 02/12/25 0032 02/12/25 0329   BP: 125/72 122/81 112/71 117/73   BP Location: Left arm  Left arm Left arm   Patient Position: Lying  Lying Lying   Pulse: 94 95 92 90   Resp: 19 18 18 16   Temp: (!) 38.1 °C (100.6 °F) (!) 38 °C (100.4 °F) 36.3 °C (97.3 °F) 36.6 °C (97.9 °F)   TempSrc: Temporal Temporal Temporal Temporal   SpO2: 98% 96% 98% 98%   Weight: 65.8 kg (145 lb 1 oz)      Height: 1.664 m (5' 5.51\")        Failed to redirect to the Timeline version of the [employer] SmartLink.    Intake/Output Summary (Last 24 hours) at 2/12/2025 0804  Last data filed at " 2/12/2025 0713  Gross per 24 hour   Intake 1397.5 ml   Output --   Net 1397.5 ml         Physical Exam  General: well-nourished, no acute distress  HEENT: PERRLA, EOM intact, no scleral icterus, moist MM  Respiratory: CTA bilaterally, normal work of breathing  Cardiovascular: RRR, no murmurs/rubs/gallops  Abdomen: Soft, nontender, nondistended, bowel sounds present. No masses palpated  Extremities: no edema, no asterixis  Neuro: alert and oriented, CNII-XII grossly intact, moves all 4 extremities with no focal deficits    OBJECTIVE                                                                              Medications       Current Facility-Administered Medications:     acetaminophen (Tylenol) tablet 650 mg, 650 mg, oral, q6h PRN, 650 mg at 02/11/25 1636 **OR** acetaminophen (Tylenol) oral liquid 650 mg, 650 mg, oral, q6h PRN, Murali P Linden, APRN-CNP    acyclovir (Zovirax) tablet 400 mg, 400 mg, oral, q12h, Murali P Linden, APRN-CNP, 400 mg at 02/11/25 2014    budesonide EC (Entocort EC) 24 hr capsule 3 mg, 3 mg, oral, q AM, Murali P Linden, APRN-CNP    calcium carbonate (Oscal) tablet 1,250 mg, 1,250 mg, oral, Daily, Murali P Linden, APRN-CNP    folic acid (Folvite) tablet 1 mg, 1 mg, oral, Daily, Murali P Linden, APRN-CNP    letermovir (Prevymis) tablet 480 mg, 480 mg, oral, Daily, Murali P Linden, APRN-CNP    lisinopril tablet 5 mg, 5 mg, oral, Daily, Murali P Linden, APRN-CNP    mirtazapine (Remeron) tablet 15 mg, 15 mg, oral, Nightly, Murali P Linden, APRN-CNP, 15 mg at 02/11/25 2014    NIFEdipine ER (Adalat CC) 24 hr tablet 60 mg, 60 mg, oral, Daily before breakfast, Murali P Linden, APRN-CNP    ondansetron (Zofran) tablet 4 mg, 4 mg, oral, q8h PRN **OR** ondansetron (Zofran) injection 4 mg, 4 mg, intravenous, q8h PRN, GRICELDA Staples    pantoprazole (ProtoNix) EC tablet 40 mg, 40 mg, oral, Daily before breakfast, GRICELDA Staples    piperacillin-tazobactam (Zosyn) 3.375 g in dextrose (iso) IV 50 mL, 3.375 g,  intravenous, q6h, Murali P Linden, APRN-CNP, Stopped at 02/12/25 0357    posaconazole (Noxafil) DR tablet 300 mg, 300 mg, oral, Daily, Murali P Linden, APRN-CNP    prochlorperazine (Compazine) tablet 10 mg, 10 mg, oral, q6h PRN **OR** prochlorperazine (Compazine) injection 10 mg, 10 mg, intravenous, q6h PRN **OR** [DISCONTINUED] prochlorperazine (Compazine) suppository 25 mg, 25 mg, rectal, q12h PRN, Murali P Linden, APRN-CNP    sodium chloride 0.9% infusion, 75 mL/hr, intravenous, Continuous, Ervin Luciano MD, Last Rate: 75 mL/hr at 02/11/25 1747, 75 mL/hr at 02/11/25 1747    sulfamethoxazole-trimethoprim (Bactrim DS) 800-160 mg per tablet 1 tablet, 1 tablet, oral, Once per day on Monday Wednesday Friday, Murali Cheatham, APRN-CNP    tacrolimus (Prograf) capsule 0.5 mg, 0.5 mg, oral, q12h JALYN, Murali P Linden, APRN-CNP, 0.5 mg at 02/11/25 1739                                                                            Labs     Results for orders placed or performed during the hospital encounter of 02/11/25 (from the past 24 hours)   Sars-CoV-2 and Influenza A/B PCR   Result Value Ref Range    Flu A Result Not Detected Not Detected    Flu B Result Not Detected Not Detected    Coronavirus 2019, PCR Not Detected Not Detected     *Note: Due to a large number of results and/or encounters for the requested time period, some results have not been displayed. A complete set of results can be found in Results Review.                                                                              Imaging           CT chest abdomen pelvis 9/2024  IMPRESSION:  1. Fluid-filled small bowel and right colon likely related to  diarrhea. No evidence of bowel wall thickening or surrounding  stranding to suggest enteritis or colitis. Evidence of a  diverticulitis.  2. No other evidence of infectious process in the chest, abdomen and  pelvis.  3. Chronic and incidental findings as described above.    CT Chest 10/30/2024  IMPRESSION:  1. No focal  consolidation, pleural effusion, pneumothorax.  2. Trace pericardial effusion.  3. Redemonstration of type 1 sliding hiatal hernia with mild distal  esophageal wall thickening likely reflects sequela of  gastroesophageal reflux disease.  4. Additional findings as above.                                                                           GI Procedures     10/2023 EGD  Impression  The esophagus appeared normal.  The stomach appeared normal.  Performed forceps biopsies to rule out H. pylori  The duodenal bulb, 1st part of the duodenum and 2nd part of the duodenum appeared normal.        Findings  Regular Z-line 40 cm from the incisors  The esophagus appeared normal.  The stomach appeared normal.  Performed forceps biopsies to rule out H. pylori. Specimens placed into Jar 1.  The duodenal bulb, 1st part of the duodenum and 2nd part of the duodenum appeared normal.       FINAL DIAGNOSIS   STOMACH, ANTRUM AND BODY, BIOPSY:  - ANTRAL AND OXYNTIC MUCOSA WITH NO SIGNIFICANT ABNORMALITY.  - NEGATIVE FOR HELICOBACTER.     11/2021 Colonoscopy ([facility])  Findings:       The perianal and digital rectal examinations were normal.        Non-bleeding internal hemorrhoids were found during retroflexion.        The exam was otherwise without abnormality.   Impression:              - Non-bleeding internal hemorrhoids.                            - The examination was otherwise normal.                            - No specimens collected.     ASSESSMENT / PLAN                  ASSESSMENT/PLAN:  Brandt Merritt is a 65 y.o. 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 GI is consulted for c/f upper GI GVHD    Has already been started on tapering budesonide for concerns of upper GI GVHD, has not had endoscopic evaluation. We will plan for this tomorrow. Other ddx includes esophagitis (infectious  or reflux-related), PUD, gastritis, etc.    Recommendations:  -NPO at [facility]  -Plan for EGD tomorrow  -Rest of care per primary team    Patient was seen and discussed with Dr. Lowe    Thank you for this interesting consult. Gastroenterology will continue to follow.  -During weekday hours of 7am-5pm please do not hesitate to contact me on [employer] Chat or page [phone] if there are any further questions between the weekday hours of 7 AM - 5 PM.   -After hours, on weekends, and on holidays, please page the [place] [phone]. Thank you.      Mary Grace Wiggins MD

## 2025-02-12 NOTE — PROGRESS NOTES
"Brandt Merritt is a 65 y.o. male on day 1 of admission presenting with Bilious vomiting with nausea.    Subjective   No acute events over night. Afebrile. Reports that he feels better today. Has not thrown up all night. Tolerating po in small amounts. Slight abd tenderness. Otherwise, denies HA, CP, SOB. Remaining ROS unremarkable.          Objective     Physical Exam  Constitutional:       General: He is not in acute distress.     Appearance: Normal appearance.   HENT:      Head: Atraumatic.      Nose: Nose normal.      Mouth/Throat:      Pharynx: Oropharynx is clear.   Eyes:      Extraocular Movements: Extraocular movements intact.      Pupils: Pupils are equal, round, and reactive to light.   Cardiovascular:      Rate and Rhythm: Normal rate and regular rhythm.      Pulses: Normal pulses.      Heart sounds: Normal heart sounds.   Pulmonary:      Effort: No respiratory distress.      Breath sounds: Normal breath sounds. No wheezing or rhonchi.   Abdominal:      General: Bowel sounds are normal.      Palpations: Abdomen is soft.      Tenderness: There is abdominal tenderness.   Musculoskeletal:         General: Normal range of motion.      Cervical back: Neck supple.   Skin:     General: Skin is warm and dry.      Capillary Refill: Capillary refill takes less than 2 seconds.   Neurological:      Mental Status: He is alert and oriented to person, place, and time.         Last Recorded Vitals  Blood pressure 130/82, pulse 95, temperature 37.7 °C (99.8 °F), temperature source Temporal, resp. rate 18, height 1.664 m (5' 5.51\"), weight 65.8 kg (145 lb 1 oz), SpO2 97%.  Intake/Output last 3 Shifts:  I/O last 3 completed shifts:  In: 447.5 (6.8 mL/kg) [P.O.:240; I.V.:57.5 (0.9 mL/kg); IV Piggyback:150]  Out: - (0 mL/kg)   Weight: 65.8 kg     Relevant Results  Scheduled medications  acyclovir, 400 mg, oral, q12h  budesonide EC, 3 mg, oral, BID  calcium carbonate, 1,250 mg, oral, Daily  folic acid, 1 mg, oral, " Daily  letermovir, 480 mg, oral, Daily  lisinopril, 5 mg, oral, Daily  mirtazapine, 15 mg, oral, Nightly  NIFEdipine ER, 60 mg, oral, Daily before breakfast  pantoprazole, 40 mg, oral, Daily before breakfast  piperacillin-tazobactam, 3.375 g, intravenous, q6h  posaconazole, 300 mg, oral, Daily  sulfamethoxazole-trimethoprim, 1 tablet, oral, Once per day on Monday Wednesday Friday  tacrolimus, 0.5 mg, oral, q12h JALYN      Continuous medications  sodium chloride 0.9%, 75 mL/hr, Last Rate: 75 mL/hr (02/12/25 1025)      PRN medications  PRN medications: acetaminophen **OR** acetaminophen, ondansetron **OR** ondansetron, prochlorperazine **OR** prochlorperazine **OR** [DISCONTINUED] prochlorperazine    Results for orders placed or performed during the hospital encounter of 02/11/25 (from the past 24 hours)   Sars-CoV-2 and Influenza A/B PCR   Result Value Ref Range    Flu A Result Not Detected Not Detected    Flu B Result Not Detected Not Detected    Coronavirus 2019, PCR Not Detected Not Detected   CBC and Auto Differential   Result Value Ref Range    WBC 4.5 4.4 - 11.3 x10*3/uL    nRBC 0.0 0.0 - 0.0 /100 WBCs    RBC 3.69 (L) 4.50 - 5.90 x10*6/uL    Hemoglobin 11.5 (L) 13.5 - 17.5 g/dL    Hematocrit 35.5 (L) 41.0 - 52.0 %    MCV 96 80 - 100 fL    MCH 31.2 26.0 - 34.0 pg    MCHC 32.4 32.0 - 36.0 g/dL    RDW 16.1 (H) 11.5 - 14.5 %    Platelets 151 150 - 450 x10*3/uL    Immature Granulocytes %, Automated 2.9 (H) 0.0 - 0.9 %    Immature Granulocytes Absolute, Automated 0.13 0.00 - 0.70 x10*3/uL   Magnesium   Result Value Ref Range    Magnesium 1.91 1.60 - 2.40 mg/dL   Hepatic Function Panel   Result Value Ref Range    Albumin 3.5 3.4 - 5.0 g/dL    Bilirubin, Total 0.9 0.0 - 1.2 mg/dL    Bilirubin, Direct 0.1 0.0 - 0.3 mg/dL    Alkaline Phosphatase 64 33 - 136 U/L    ALT 13 10 - 52 U/L    AST 12 9 - 39 U/L    Total Protein 5.5 (L) 6.4 - 8.2 g/dL   Phosphorus   Result Value Ref Range    Phosphorus 4.0 2.5 - 4.9 mg/dL   Basic  Metabolic Panel   Result Value Ref Range    Glucose 102 (H) 74 - 99 mg/dL    Sodium 139 136 - 145 mmol/L    Potassium 3.8 3.5 - 5.3 mmol/L    Chloride 106 98 - 107 mmol/L    Bicarbonate 24 21 - 32 mmol/L    Anion Gap 13 10 - 20 mmol/L    Urea Nitrogen 19 6 - 23 mg/dL    Creatinine 1.04 0.50 - 1.30 mg/dL    eGFR 79 >60 mL/min/1.73m*2    Calcium 8.5 (L) 8.6 - 10.6 mg/dL   Manual Differential   Result Value Ref Range    Neutrophils %, Manual 37.0 40.0 - 80.0 %    Bands %, Manual 11.0 0.0 - 5.0 %    Lymphocytes %, Manual 26.0 13.0 - 44.0 %    Monocytes %, Manual 20.0 2.0 - 10.0 %    Eosinophils %, Manual 0.0 0.0 - 6.0 %    Basophils %, Manual 0.0 0.0 - 2.0 %    Atypical Lymphocytes %, Manual 4.0 0.0 - 2.0 %    Metamyelocytes %, Manual 1.0 0.0 - 0.0 %    Myelocytes %, Manual 1.0 0.0 - 0.0 %    Seg Neutrophils Absolute, Manual 1.67 1.20 - 7.00 x10*3/uL    Bands Absolute, Manual 0.50 0.00 - 0.70 x10*3/uL    Lymphocytes Absolute, Manual 1.17 (L) 1.20 - 4.80 x10*3/uL    Monocytes Absolute, Manual 0.90 0.10 - 1.00 x10*3/uL    Eosinophils Absolute, Manual 0.00 0.00 - 0.70 x10*3/uL    Basophils Absolute, Manual 0.00 0.00 - 0.10 x10*3/uL    Atypical Lymphs Absolute, Manual 0.18 0.00 - 0.50 x10*3/uL    Metamyelocytes Absolute, Manual 0.05 0.00 - 0.00 x10*3/uL    Myelocytes Absolute, Manual 0.05 0.00 - 0.00 x10*3/uL    Total Cells Counted 100     Neutrophils Absolute, Manual 2.17 1.20 - 7.70 x10*3/uL    RBC Morphology See Below     Teardrop Cells Few     Kimball Cells Few    Amylase   Result Value Ref Range    Amylase 13 (L) 29 - 103 U/L   Lipase   Result Value Ref Range    Lipase <3 (L) 9 - 82 U/L     *Note: Due to a large number of results and/or encounters for the requested time period, some results have not been displayed. A complete set of results can be found in Results Review.     No results found.      Assessment/Plan   Assessment & Plan  Bilious vomiting with nausea  Presented to [facility] for sick visit  (2/11/25) for nausea with multiple episodes of emesis earlier that morning; endorses GERD-like symptoms, not on a PPI at home.  - Pepcid 20 mg given on admission  - start Protonix qAM  - PRN Zofran/Compazine   - Improved 2/12 and is tolerating po     AML (acute myeloid leukemia) in remission (Multi)  History of allogeneic hematopoietic stem cell transplant  #1: Single-unit Cord, T0=9/19/24, Primary Engraftment Failure  - Conditioning: Flu-[place]-TBI  - Donor: Single Cord, 6/8  = ABO Donor / Recipient: A+ / A+  = CMV Donor / Recipient: - / +  - aGVHD Prophylaxis: Tacrolimus + MMF  - engraftment failure, developed HLA antibody against class I of cord  - Repeat BMBx (10/15): hypocellular with no residual myeloid neoplasm. 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), T0=11/6/24  - Graft: Haploidentical sister  = ABO Donor / Recipient: O+ / A+ (ABO incompatibility +)  = CMV Donor / Recipient: + / +  - Conditioning: Flu/Cy/TBI/rATG  = GVHD prophylaxis -  rATG 1.5mg/kg T-5,-3,-1, PTCy (25mg/kg T+3, T+4), Tacro, MMF (T+5)    Today is T+146 (Allogeneic BMT Evaluation - Planned 9/19/2024), 98 (Allogeneic PBSC Transplant Evaluation - Planned 11/6/2024).    Day T+60 BMbx negative for disease, day T+100 bone marrow deferred.     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 Newsome, decreased to 3 mg daily (1/27)  - Increased budesonide to 3mg BID 2/12  - Consulted GI, appreciate recs   - Planning for EGD tomorrow 2/13  - current dose of tacrolimus 0.5 mg BID  - tacro dose decreased 2/10/25 because last trough level 13.2 ng/mL (2/11/25), next tacro leve 2/13 am    ID  - continue prophylaxis: acyclovir, letermovir, posaconazole, and smz-tmp DS  - continue viral monitoring per BMT protocol   Fever  Aferbile at home and outpatient prior to admission, however, he had a  fever of 38.1 when he arrived to [facility].  - cultures done 2/11/24 outpatient, NGTD  - Stool pathogen pending   - no sick contacts, no other s/sx infection  - will obtain flu/COVID respiratory swabs   - given that he is post-BMT and on immunosuppression, started Zosyn 3.375 g on admission    Diarrhea  - x3 days, only 1 bowel movement per day at home  - no abd pain/cramping  - ordered stool pathogen, unable to obtain in the infusion center        Tianna San, HAYLEY-CNP

## 2025-02-13 ENCOUNTER — APPOINTMENT (OUTPATIENT)
Dept: GASTROENTEROLOGY | Facility: HOSPITAL | Age: 65
DRG: 920 | End: 2025-02-13
Payer: COMMERCIAL

## 2025-02-13 LAB
ALBUMIN SERPL BCP-MCNC: 3.7 G/DL (ref 3.4–5)
ANION GAP SERPL CALC-SCNC: 12 MMOL/L (ref 10–20)
BASOPHILS # BLD AUTO: 0.01 X10*3/UL (ref 0–0.1)
BASOPHILS NFR BLD AUTO: 0.2 %
BUN SERPL-MCNC: 13 MG/DL (ref 6–23)
CALCIUM SERPL-MCNC: 8.9 MG/DL (ref 8.6–10.6)
CHLORIDE SERPL-SCNC: 106 MMOL/L (ref 98–107)
CO2 SERPL-SCNC: 26 MMOL/L (ref 21–32)
CREAT SERPL-MCNC: 0.96 MG/DL (ref 0.5–1.3)
EGFRCR SERPLBLD CKD-EPI 2021: 87 ML/MIN/1.73M*2
EOSINOPHIL # BLD AUTO: 0.01 X10*3/UL (ref 0–0.7)
EOSINOPHIL NFR BLD AUTO: 0.2 %
ERYTHROCYTE [DISTWIDTH] IN BLOOD BY AUTOMATED COUNT: 15.7 % (ref 11.5–14.5)
GLUCOSE SERPL-MCNC: 99 MG/DL (ref 74–99)
HCT VFR BLD AUTO: 40.8 % (ref 41–52)
HGB BLD-MCNC: 13.3 G/DL (ref 13.5–17.5)
IMM GRANULOCYTES # BLD AUTO: 0.23 X10*3/UL (ref 0–0.7)
IMM GRANULOCYTES NFR BLD AUTO: 4 % (ref 0–0.9)
LYMPHOCYTES # BLD AUTO: 1.78 X10*3/UL (ref 1.2–4.8)
LYMPHOCYTES NFR BLD AUTO: 31.3 %
MAGNESIUM SERPL-MCNC: 1.67 MG/DL (ref 1.6–2.4)
MCH RBC QN AUTO: 30.9 PG (ref 26–34)
MCHC RBC AUTO-ENTMCNC: 32.6 G/DL (ref 32–36)
MCV RBC AUTO: 95 FL (ref 80–100)
MONOCYTES # BLD AUTO: 1.63 X10*3/UL (ref 0.1–1)
MONOCYTES NFR BLD AUTO: 28.6 %
NEUTROPHILS # BLD AUTO: 2.03 X10*3/UL (ref 1.2–7.7)
NEUTROPHILS NFR BLD AUTO: 35.7 %
NRBC BLD-RTO: 0 /100 WBCS (ref 0–0)
PHOSPHATE SERPL-MCNC: 3.6 MG/DL (ref 2.5–4.9)
PLATELET # BLD AUTO: 184 X10*3/UL (ref 150–450)
POTASSIUM SERPL-SCNC: 3.7 MMOL/L (ref 3.5–5.3)
RBC # BLD AUTO: 4.31 X10*6/UL (ref 4.5–5.9)
SODIUM SERPL-SCNC: 140 MMOL/L (ref 136–145)
TACROLIMUS BLD-MCNC: 12.4 NG/ML
WBC # BLD AUTO: 5.7 X10*3/UL (ref 4.4–11.3)

## 2025-02-13 PROCEDURE — 2500000004 HC RX 250 GENERAL PHARMACY W/ HCPCS (ALT 636 FOR OP/ED): Performed by: STUDENT IN AN ORGANIZED HEALTH CARE EDUCATION/TRAINING PROGRAM

## 2025-02-13 PROCEDURE — 99233 SBSQ HOSP IP/OBS HIGH 50: CPT | Performed by: INTERNAL MEDICINE

## 2025-02-13 PROCEDURE — 2500000002 HC RX 250 W HCPCS SELF ADMINISTERED DRUGS (ALT 637 FOR MEDICARE OP, ALT 636 FOR OP/ED)

## 2025-02-13 PROCEDURE — 88305 TISSUE EXAM BY PATHOLOGIST: CPT | Performed by: PATHOLOGY

## 2025-02-13 PROCEDURE — 80069 RENAL FUNCTION PANEL: CPT

## 2025-02-13 PROCEDURE — 2500000004 HC RX 250 GENERAL PHARMACY W/ HCPCS (ALT 636 FOR OP/ED): Performed by: NURSE PRACTITIONER

## 2025-02-13 PROCEDURE — 80197 ASSAY OF TACROLIMUS: CPT | Performed by: NURSE PRACTITIONER

## 2025-02-13 PROCEDURE — 1170000001 HC PRIVATE ONCOLOGY ROOM DAILY

## 2025-02-13 PROCEDURE — 2500000004 HC RX 250 GENERAL PHARMACY W/ HCPCS (ALT 636 FOR OP/ED)

## 2025-02-13 PROCEDURE — 7100000009 HC PHASE TWO TIME - INITIAL BASE CHARGE

## 2025-02-13 PROCEDURE — 36415 COLL VENOUS BLD VENIPUNCTURE: CPT

## 2025-02-13 PROCEDURE — 7100000010 HC PHASE TWO TIME - EACH INCREMENTAL 1 MINUTE

## 2025-02-13 PROCEDURE — 85025 COMPLETE CBC W/AUTO DIFF WBC: CPT

## 2025-02-13 PROCEDURE — 2500000001 HC RX 250 WO HCPCS SELF ADMINISTERED DRUGS (ALT 637 FOR MEDICARE OP): Performed by: NURSE PRACTITIONER

## 2025-02-13 PROCEDURE — 0753T DGTZ GLS MCRSCP SLD LEVEL IV: CPT | Mod: TC,SUR | Performed by: STUDENT IN AN ORGANIZED HEALTH CARE EDUCATION/TRAINING PROGRAM

## 2025-02-13 PROCEDURE — 0DB68ZX EXCISION OF STOMACH, VIA NATURAL OR ARTIFICIAL OPENING ENDOSCOPIC, DIAGNOSTIC: ICD-10-PCS | Performed by: STUDENT IN AN ORGANIZED HEALTH CARE EDUCATION/TRAINING PROGRAM

## 2025-02-13 PROCEDURE — 0DB58ZX EXCISION OF ESOPHAGUS, VIA NATURAL OR ARTIFICIAL OPENING ENDOSCOPIC, DIAGNOSTIC: ICD-10-PCS | Performed by: STUDENT IN AN ORGANIZED HEALTH CARE EDUCATION/TRAINING PROGRAM

## 2025-02-13 PROCEDURE — 3700000012 HC SEDATION LEVEL 5+ TIME - INITIAL 15 MINUTES 5/> YEARS

## 2025-02-13 PROCEDURE — 2500000001 HC RX 250 WO HCPCS SELF ADMINISTERED DRUGS (ALT 637 FOR MEDICARE OP)

## 2025-02-13 PROCEDURE — 0DB98ZX EXCISION OF DUODENUM, VIA NATURAL OR ARTIFICIAL OPENING ENDOSCOPIC, DIAGNOSTIC: ICD-10-PCS | Performed by: STUDENT IN AN ORGANIZED HEALTH CARE EDUCATION/TRAINING PROGRAM

## 2025-02-13 PROCEDURE — 43239 EGD BIOPSY SINGLE/MULTIPLE: CPT | Performed by: STUDENT IN AN ORGANIZED HEALTH CARE EDUCATION/TRAINING PROGRAM

## 2025-02-13 PROCEDURE — 99152 MOD SED SAME PHYS/QHP 5/>YRS: CPT | Performed by: STUDENT IN AN ORGANIZED HEALTH CARE EDUCATION/TRAINING PROGRAM

## 2025-02-13 PROCEDURE — 83735 ASSAY OF MAGNESIUM: CPT

## 2025-02-13 RX ORDER — MAGNESIUM SULFATE HEPTAHYDRATE 40 MG/ML
2 INJECTION, SOLUTION INTRAVENOUS ONCE
Status: COMPLETED | OUTPATIENT
Start: 2025-02-13 | End: 2025-02-13

## 2025-02-13 RX ORDER — MIDAZOLAM HYDROCHLORIDE 1 MG/ML
INJECTION, SOLUTION INTRAMUSCULAR; INTRAVENOUS AS NEEDED
Status: DISCONTINUED | OUTPATIENT
Start: 2025-02-13 | End: 2025-02-13 | Stop reason: HOSPADM

## 2025-02-13 RX ORDER — FENTANYL CITRATE 50 UG/ML
INJECTION, SOLUTION INTRAMUSCULAR; INTRAVENOUS AS NEEDED
Status: DISCONTINUED | OUTPATIENT
Start: 2025-02-13 | End: 2025-02-13 | Stop reason: HOSPADM

## 2025-02-13 RX ADMIN — BUDESONIDE 3 MG: 3 CAPSULE, COATED PELLETS ORAL at 09:03

## 2025-02-13 RX ADMIN — PANTOPRAZOLE SODIUM 40 MG: 40 TABLET, DELAYED RELEASE ORAL at 09:03

## 2025-02-13 RX ADMIN — BUDESONIDE 3 MG: 3 CAPSULE, COATED PELLETS ORAL at 20:34

## 2025-02-13 RX ADMIN — MIDAZOLAM 2 MG: 1 INJECTION INTRAMUSCULAR; INTRAVENOUS at 16:39

## 2025-02-13 RX ADMIN — LETERMOVIR 480 MG: 480 TABLET, FILM COATED ORAL at 09:03

## 2025-02-13 RX ADMIN — PIPERACILLIN SODIUM AND TAZOBACTAM SODIUM 3.38 G: 3; .375 INJECTION, SOLUTION INTRAVENOUS at 20:34

## 2025-02-13 RX ADMIN — MIDAZOLAM 1 MG: 1 INJECTION INTRAMUSCULAR; INTRAVENOUS at 16:47

## 2025-02-13 RX ADMIN — POSACONAZOLE 300 MG: 100 TABLET, DELAYED RELEASE ORAL at 09:03

## 2025-02-13 RX ADMIN — FENTANYL CITRATE 50 MCG: 50 INJECTION, SOLUTION INTRAMUSCULAR; INTRAVENOUS at 16:39

## 2025-02-13 RX ADMIN — MIRTAZAPINE 15 MG: 15 TABLET, FILM COATED ORAL at 20:34

## 2025-02-13 RX ADMIN — FENTANYL CITRATE 25 MCG: 50 INJECTION, SOLUTION INTRAMUSCULAR; INTRAVENOUS at 16:47

## 2025-02-13 RX ADMIN — FENTANYL CITRATE 25 MCG: 50 INJECTION, SOLUTION INTRAMUSCULAR; INTRAVENOUS at 16:42

## 2025-02-13 RX ADMIN — PIPERACILLIN SODIUM AND TAZOBACTAM SODIUM 3.38 G: 3; .375 INJECTION, SOLUTION INTRAVENOUS at 09:04

## 2025-02-13 RX ADMIN — MAGNESIUM SULFATE HEPTAHYDRATE 2 G: 40 INJECTION, SOLUTION INTRAVENOUS at 13:35

## 2025-02-13 RX ADMIN — ACYCLOVIR 400 MG: 400 TABLET ORAL at 09:03

## 2025-02-13 RX ADMIN — PIPERACILLIN SODIUM AND TAZOBACTAM SODIUM 3.38 G: 3; .375 INJECTION, SOLUTION INTRAVENOUS at 14:42

## 2025-02-13 RX ADMIN — FOLIC ACID 1 MG: 1 TABLET ORAL at 09:03

## 2025-02-13 RX ADMIN — NIFEDIPINE 60 MG: 60 TABLET, FILM COATED, EXTENDED RELEASE ORAL at 09:04

## 2025-02-13 RX ADMIN — PIPERACILLIN SODIUM AND TAZOBACTAM SODIUM 3.38 G: 3; .375 INJECTION, SOLUTION INTRAVENOUS at 03:37

## 2025-02-13 RX ADMIN — MIDAZOLAM 1 MG: 1 INJECTION INTRAMUSCULAR; INTRAVENOUS at 16:42

## 2025-02-13 RX ADMIN — TACROLIMUS 0.5 MG: 0.5 CAPSULE ORAL at 11:06

## 2025-02-13 RX ADMIN — ACYCLOVIR 400 MG: 400 TABLET ORAL at 20:34

## 2025-02-13 RX ADMIN — TACROLIMUS 0.5 MG: 0.5 CAPSULE ORAL at 20:34

## 2025-02-13 RX ADMIN — LISINOPRIL 5 MG: 5 TABLET ORAL at 09:03

## 2025-02-13 RX ADMIN — CALCIUM 1250 MG: 500 TABLET ORAL at 09:03

## 2025-02-13 ASSESSMENT — COGNITIVE AND FUNCTIONAL STATUS - GENERAL
DAILY ACTIVITIY SCORE: 24
MOBILITY SCORE: 24
MOBILITY SCORE: 24
DAILY ACTIVITIY SCORE: 24

## 2025-02-13 ASSESSMENT — PAIN - FUNCTIONAL ASSESSMENT
PAIN_FUNCTIONAL_ASSESSMENT: UNABLE TO SELF-REPORT
PAIN_FUNCTIONAL_ASSESSMENT: 0-10
PAIN_FUNCTIONAL_ASSESSMENT: 0-10
PAIN_FUNCTIONAL_ASSESSMENT: UNABLE TO SELF-REPORT
PAIN_FUNCTIONAL_ASSESSMENT: 0-10

## 2025-02-13 ASSESSMENT — PAIN SCALES - GENERAL
PAINLEVEL_OUTOF10: 0 - NO PAIN

## 2025-02-13 ASSESSMENT — ENCOUNTER SYMPTOMS
PSYCHIATRIC NEGATIVE: 1
CARDIOVASCULAR NEGATIVE: 1
RESPIRATORY NEGATIVE: 1
MUSCULOSKELETAL NEGATIVE: 1
GASTROINTESTINAL NEGATIVE: 1
CONSTITUTIONAL NEGATIVE: 1
EYES NEGATIVE: 1
HEMATOLOGIC/LYMPHATIC NEGATIVE: 1
NEUROLOGICAL NEGATIVE: 1
ENDOCRINE NEGATIVE: 1

## 2025-02-13 ASSESSMENT — COLUMBIA-SUICIDE SEVERITY RATING SCALE - C-SSRS
2. HAVE YOU ACTUALLY HAD ANY THOUGHTS OF KILLING YOURSELF?: NO
1. IN THE PAST MONTH, HAVE YOU WISHED YOU WERE DEAD OR WISHED YOU COULD GO TO SLEEP AND NOT WAKE UP?: NO

## 2025-02-13 NOTE — SIGNIFICANT EVENT
Brief GI Note:    S/p EGD today    Impression  Normal upper endoscopy. Random biopsies performed in the esophagus, stomach and duodenum as detailed below.        Findings  The upper third of the esophagus, middle third of the esophagus, lower third of the esophagus, fundus of the stomach, body of the stomach, prepyloric region, pylorus, duodenal bulb and 2nd part of the duodenum appeared normal. Z-line is 42 cm from the incisors. Performed random biopsy using biopsy forceps. Biopsy from D2 and duodenal bulb were placed in Jar 1 to rule out GVHD. Random stomach biopsies were placed in Jar 2 to rule out GVHD. Esophageal biopsies were placed in Jar 3 to rule out GVHD.    Plan:  -Await pathology results, GI will be in touch with patient if any abnormalities. Note biopsies for GVHD taken while patient has been on budesonide for the last 2 months.  -Rest of care per primary team  -GI will sign off at this time    Patient was discussed with Dr. Lowe    Thank you for this interesting consult. Gastroenterology will SIGN OFF  -During weekday hours of 7am-5pm please do not hesitate to contact me on [employer] Chat or page [phone] if there are any further questions between the weekday hours of 7 AM - 5 PM.   -After hours, on weekends, and on holidays, please [place] [phone]. Thank you.

## 2025-02-13 NOTE — INTERVAL H&P NOTE
H&P reviewed. The patient was examined and there are no changes to the H&P.    ASA 3  Mallampati 2

## 2025-02-13 NOTE — CARE PLAN
The patient's goals for the shift include Remain HDS      Problem: Pain - Adult  Goal: Verbalizes/displays adequate comfort level or baseline comfort level  Outcome: Progressing     Problem: Safety - Adult  Goal: Free from fall injury  Outcome: Progressing     Problem: Discharge Planning  Goal: Discharge to home or other facility with appropriate resources  Outcome: Progressing     Problem: Chronic Conditions and Co-morbidities  Goal: Patient's chronic conditions and co-morbidity symptoms are monitored and maintained or improved  Outcome: Progressing     Problem: Nutrition  Goal: Nutrient intake appropriate for maintaining nutritional needs  Outcome: Progressing

## 2025-02-13 NOTE — ASSESSMENT & PLAN NOTE
Aferbile at home and outpatient prior to admission, however, he had a fever of 38.1 when he arrived to [facility].  - cultures done 2/11/24 outpatient, NGTD  - Stool pathogen pending   - no sick contacts, no other s/sx infection  - will obtain flu/COVID respiratory swabs   - given that he is post-BMT and on immunosuppression, started Zosyn 3.375 g on admission  - plan to stop in am if he remains afebrile and cultures remain negative

## 2025-02-13 NOTE — ASSESSMENT & PLAN NOTE
History of allogeneic hematopoietic stem cell transplant  #1: Single-unit Cord, T0=9/19/24, Primary Engraftment Failure  - Conditioning: Flu-Mariana-TBI  - Donor: Single Cord, 6/8  = ABO Donor / Recipient: A+ / A+  = CMV Donor / Recipient: - / +  - aGVHD Prophylaxis: Tacrolimus + MMF  - engraftment failure, developed HLA antibody against class I of cord  - Repeat BMBx (10/15): hypocellular with no residual myeloid neoplasm. 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    Day T+60 BMbx negative for disease, day T+100 bone marrow deferred.     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 Newsome, decreased to 3 mg daily (1/27)  - Increased budesonide to 3mg BID 2/12  - Consulted GI, appreciate recs   - EGD today   - current dose of tacrolimus 0.5 mg BID  - tacro dose decreased 2/10/25 because last trough level 13.2 ng/mL (2/11/25), next tacro leve 2/14 am, continuing same dose     ID  - continue prophylaxis: acyclovir, letermovir, posaconazole, and smz-tmp DS  - continue viral monitoring per BMT protocol

## 2025-02-13 NOTE — ASSESSMENT & PLAN NOTE
Presented to [facility] for sick visit (2/11/25) for nausea with multiple episodes of emesis earlier that morning; endorses GERD-like symptoms, not on a PPI at home.  - Pepcid 20 mg given on admission  - start Protonix qAM  - PRN Zofran/Compazine   - 2/13 denies any more nausea and is tolerating a diet

## 2025-02-13 NOTE — PROGRESS NOTES
"Brandt Merritt is a 65 y.o. male on day 2 of admission presenting with Bilious vomiting with nausea.    Subjective   No acute events over night. Afebrile. Reports that he feels well and he's ready to go home. He is eating and having \"normal\" bowel movements. He denies any nausea. Denies HA, CP, SOB. Remaining ROS unremarkable.          Objective     Physical Exam  Constitutional:       General: He is not in acute distress.     Appearance: Normal appearance.   HENT:      Head: Atraumatic.      Nose: Nose normal.      Mouth/Throat:      Pharynx: Oropharynx is clear.   Eyes:      Extraocular Movements: Extraocular movements intact.      Pupils: Pupils are equal, round, and reactive to light.   Cardiovascular:      Rate and Rhythm: Normal rate and regular rhythm.      Pulses: Normal pulses.      Heart sounds: Normal heart sounds.   Pulmonary:      Effort: No respiratory distress.      Breath sounds: Normal breath sounds. No wheezing or rhonchi.   Abdominal:      General: Bowel sounds are normal.      Palpations: Abdomen is soft.      Tenderness: There is abdominal tenderness.   Musculoskeletal:         General: Normal range of motion.      Cervical back: Neck supple.   Skin:     General: Skin is warm and dry.      Capillary Refill: Capillary refill takes less than 2 seconds.   Neurological:      Mental Status: He is alert and oriented to person, place, and time.       Last Recorded Vitals  Blood pressure 143/88, pulse 85, temperature 36.2 °C (97.2 °F), temperature source Temporal, resp. rate 18, height 1.664 m (5' 5.51\"), weight 65.8 kg (145 lb 1 oz), SpO2 98%.  Intake/Output last 3 Shifts:  I/O last 3 completed shifts:  In: 2311.3 (35.1 mL/kg) [I.V.:2111.3 (32.1 mL/kg); IV Piggyback:200]  Out: - (0 mL/kg)   Weight: 65.8 kg     Relevant Results  Scheduled medications  acyclovir, 400 mg, oral, q12h  budesonide EC, 3 mg, oral, BID  calcium carbonate, 1,250 mg, oral, Daily  folic acid, 1 mg, oral, Daily  letermovir, 480 " mg, oral, Daily  lisinopril, 5 mg, oral, Daily  mirtazapine, 15 mg, oral, Nightly  NIFEdipine ER, 60 mg, oral, Daily before breakfast  pantoprazole, 40 mg, oral, Daily before breakfast  piperacillin-tazobactam, 3.375 g, intravenous, q6h  posaconazole, 300 mg, oral, Daily  sulfamethoxazole-trimethoprim, 1 tablet, oral, Once per day on Monday Wednesday Friday  tacrolimus, 0.5 mg, oral, q12h JALYN      Continuous medications  sodium chloride 0.9%, 75 mL/hr, Last Rate: 75 mL/hr (02/12/25 2242)      PRN medications  PRN medications: acetaminophen **OR** acetaminophen, ondansetron **OR** ondansetron, prochlorperazine **OR** prochlorperazine **OR** [DISCONTINUED] prochlorperazine    Results for orders placed or performed during the hospital encounter of 02/11/25 (from the past 24 hours)   CBC and Auto Differential   Result Value Ref Range    WBC 5.7 4.4 - 11.3 x10*3/uL    nRBC 0.0 0.0 - 0.0 /100 WBCs    RBC 4.31 (L) 4.50 - 5.90 x10*6/uL    Hemoglobin 13.3 (L) 13.5 - 17.5 g/dL    Hematocrit 40.8 (L) 41.0 - 52.0 %    MCV 95 80 - 100 fL    MCH 30.9 26.0 - 34.0 pg    MCHC 32.6 32.0 - 36.0 g/dL    RDW 15.7 (H) 11.5 - 14.5 %    Platelets 184 150 - 450 x10*3/uL    Neutrophils % 35.7 40.0 - 80.0 %    Immature Granulocytes %, Automated 4.0 (H) 0.0 - 0.9 %    Lymphocytes % 31.3 13.0 - 44.0 %    Monocytes % 28.6 2.0 - 10.0 %    Eosinophils % 0.2 0.0 - 6.0 %    Basophils % 0.2 0.0 - 2.0 %    Neutrophils Absolute 2.03 1.20 - 7.70 x10*3/uL    Immature Granulocytes Absolute, Automated 0.23 0.00 - 0.70 x10*3/uL    Lymphocytes Absolute 1.78 1.20 - 4.80 x10*3/uL    Monocytes Absolute 1.63 (H) 0.10 - 1.00 x10*3/uL    Eosinophils Absolute 0.01 0.00 - 0.70 x10*3/uL    Basophils Absolute 0.01 0.00 - 0.10 x10*3/uL   Renal Function Panel   Result Value Ref Range    Glucose 99 74 - 99 mg/dL    Sodium 140 136 - 145 mmol/L    Potassium 3.7 3.5 - 5.3 mmol/L    Chloride 106 98 - 107 mmol/L    Bicarbonate 26 21 - 32 mmol/L    Anion Gap 12 10 - 20 mmol/L     Urea Nitrogen 13 6 - 23 mg/dL    Creatinine 0.96 0.50 - 1.30 mg/dL    eGFR 87 >60 mL/min/1.73m*2    Calcium 8.9 8.6 - 10.6 mg/dL    Phosphorus 3.6 2.5 - 4.9 mg/dL    Albumin 3.7 3.4 - 5.0 g/dL   Magnesium   Result Value Ref Range    Magnesium 1.67 1.60 - 2.40 mg/dL   Tacrolimus level   Result Value Ref Range    Tacrolimus  12.4 <=15.0 ng/mL     *Note: Due to a large number of results and/or encounters for the requested time period, some results have not been displayed. A complete set of results can be found in Results Review.     No results found.      Assessment/Plan   Assessment & Plan  Bilious vomiting with nausea  Presented to [facility] for sick visit (2/11/25) for nausea with multiple episodes of emesis earlier that morning; endorses GERD-like symptoms, not on a PPI at home.  - Pepcid 20 mg given on admission  - start Protonix qAM  - PRN Zofran/Compazine   - 2/13 denies any more nausea and is tolerating a diet     AML (acute myeloid leukemia) in remission (Multi)  History of allogeneic hematopoietic stem cell transplant  #1: Single-unit Cord, T0=9/19/24, Primary Engraftment Failure  - Conditioning: Flu-Mariana-TBI  - Donor: Single Cord, 6/8  = ABO Donor / Recipient: A+ / A+  = CMV Donor / Recipient: - / +  - aGVHD Prophylaxis: Tacrolimus + MMF  - engraftment failure, developed HLA antibody against class I of cord  - Repeat BMBx (10/15): hypocellular with no residual myeloid neoplasm. 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    Day T+60 BMbx negative for disease, day T+100 bone marrow  deferred.     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 Newsome, decreased to 3 mg daily (1/27)  - Increased budesonide to 3mg BID 2/12  - Consulted GI, appreciate recs   - EGD today   - current dose of tacrolimus 0.5 mg BID  - tacro dose decreased 2/10/25 because last trough level 13.2 ng/mL (2/11/25), next tacro leve 2/14 am, continuing same dose     ID  - continue prophylaxis: acyclovir, letermovir, posaconazole, and smz-tmp DS  - continue viral monitoring per BMT protocol   Fever  Aferbile at home and outpatient prior to admission, however, he had a fever of 38.1 when he arrived to [facility].  - cultures done 2/11/24 outpatient, NGTD  - Stool pathogen pending   - no sick contacts, no other s/sx infection  - will obtain flu/COVID respiratory swabs   - given that he is post-BMT and on immunosuppression, started Zosyn 3.375 g on admission  - plan to stop in am if he remains afebrile and cultures remain negative     Diarrhea  - x3 days, only 1 bowel movement per day at home  - no abd pain/cramping  - ordered stool pathogen, unable to obtain in the infusion center    Pt seen, examined and discussed with MD Tianna Toro, APRN-CNP

## 2025-02-13 NOTE — CARE PLAN
The patient's goals for the shift include   Problem: Pain - Adult  Goal: Verbalizes/displays adequate comfort level or baseline comfort level  Outcome: Progressing     Problem: Safety - Adult  Goal: Free from fall injury  Outcome: Progressing     Problem: Discharge Planning  Goal: Discharge to home or other facility with appropriate resources  Outcome: Progressing     Problem: Chronic Conditions and Co-morbidities  Goal: Patient's chronic conditions and co-morbidity symptoms are monitored and maintained or improved  Outcome: Progressing     Problem: Nutrition  Goal: Nutrient intake appropriate for maintaining nutritional needs  Outcome: Progressing       The clinical goals for the shift include pt remain HDS

## 2025-02-14 ENCOUNTER — PHARMACY VISIT (OUTPATIENT)
Dept: PHARMACY | Facility: CLINIC | Age: 65
End: 2025-02-14
Payer: COMMERCIAL

## 2025-02-14 VITALS
HEIGHT: 66 IN | BODY MASS INDEX: 23.31 KG/M2 | SYSTOLIC BLOOD PRESSURE: 131 MMHG | OXYGEN SATURATION: 99 % | RESPIRATION RATE: 18 BRPM | WEIGHT: 145.06 LBS | DIASTOLIC BLOOD PRESSURE: 75 MMHG | TEMPERATURE: 99.1 F | HEART RATE: 77 BPM

## 2025-02-14 LAB
ALBUMIN SERPL BCP-MCNC: 3.4 G/DL (ref 3.4–5)
ALP SERPL-CCNC: 57 U/L (ref 33–136)
ALT SERPL W P-5'-P-CCNC: 10 U/L (ref 10–52)
ANION GAP SERPL CALC-SCNC: 13 MMOL/L (ref 10–20)
AST SERPL W P-5'-P-CCNC: 9 U/L (ref 9–39)
BASOPHILS # BLD AUTO: 0.04 X10*3/UL (ref 0–0.1)
BASOPHILS NFR BLD AUTO: 0.6 %
BILIRUB DIRECT SERPL-MCNC: 0.1 MG/DL (ref 0–0.3)
BILIRUB SERPL-MCNC: 0.4 MG/DL (ref 0–1.2)
BUN SERPL-MCNC: 16 MG/DL (ref 6–23)
BURR CELLS BLD QL SMEAR: NORMAL
CALCIUM SERPL-MCNC: 8.3 MG/DL (ref 8.6–10.6)
CHLORIDE SERPL-SCNC: 107 MMOL/L (ref 98–107)
CO2 SERPL-SCNC: 24 MMOL/L (ref 21–32)
CREAT SERPL-MCNC: 0.99 MG/DL (ref 0.5–1.3)
DACRYOCYTES BLD QL SMEAR: NORMAL
EGFRCR SERPLBLD CKD-EPI 2021: 83 ML/MIN/1.73M*2
EOSINOPHIL # BLD AUTO: 0.01 X10*3/UL (ref 0–0.7)
EOSINOPHIL NFR BLD AUTO: 0.2 %
ERYTHROCYTE [DISTWIDTH] IN BLOOD BY AUTOMATED COUNT: 15.4 % (ref 11.5–14.5)
GLUCOSE SERPL-MCNC: 115 MG/DL (ref 74–99)
HCT VFR BLD AUTO: 39.4 % (ref 41–52)
HGB BLD-MCNC: 12.9 G/DL (ref 13.5–17.5)
IMM GRANULOCYTES # BLD AUTO: 0.4 X10*3/UL (ref 0–0.7)
IMM GRANULOCYTES NFR BLD AUTO: 6.3 % (ref 0–0.9)
LYMPHOCYTES # BLD AUTO: 1.99 X10*3/UL (ref 1.2–4.8)
LYMPHOCYTES NFR BLD AUTO: 31.5 %
MAGNESIUM SERPL-MCNC: 1.86 MG/DL (ref 1.6–2.4)
MCH RBC QN AUTO: 31.1 PG (ref 26–34)
MCHC RBC AUTO-ENTMCNC: 32.7 G/DL (ref 32–36)
MCV RBC AUTO: 95 FL (ref 80–100)
MONOCYTES # BLD AUTO: 1.77 X10*3/UL (ref 0.1–1)
MONOCYTES NFR BLD AUTO: 28 %
NEUTROPHILS # BLD AUTO: 2.11 X10*3/UL (ref 1.2–7.7)
NEUTROPHILS NFR BLD AUTO: 33.4 %
NRBC BLD-RTO: 0 /100 WBCS (ref 0–0)
OVALOCYTES BLD QL SMEAR: NORMAL
PHOSPHATE SERPL-MCNC: 3.2 MG/DL (ref 2.5–4.9)
PLATELET # BLD AUTO: 203 X10*3/UL (ref 150–450)
POTASSIUM SERPL-SCNC: 3.7 MMOL/L (ref 3.5–5.3)
PROT SERPL-MCNC: 5.3 G/DL (ref 6.4–8.2)
RBC # BLD AUTO: 4.15 X10*6/UL (ref 4.5–5.9)
RBC MORPH BLD: NORMAL
SODIUM SERPL-SCNC: 140 MMOL/L (ref 136–145)
TACROLIMUS BLD-MCNC: 10.4 NG/ML
WBC # BLD AUTO: 6.3 X10*3/UL (ref 4.4–11.3)

## 2025-02-14 PROCEDURE — 2500000001 HC RX 250 WO HCPCS SELF ADMINISTERED DRUGS (ALT 637 FOR MEDICARE OP)

## 2025-02-14 PROCEDURE — 2500000004 HC RX 250 GENERAL PHARMACY W/ HCPCS (ALT 636 FOR OP/ED)

## 2025-02-14 PROCEDURE — 80197 ASSAY OF TACROLIMUS: CPT | Performed by: NURSE PRACTITIONER

## 2025-02-14 PROCEDURE — 36415 COLL VENOUS BLD VENIPUNCTURE: CPT | Performed by: NURSE PRACTITIONER

## 2025-02-14 PROCEDURE — 83735 ASSAY OF MAGNESIUM: CPT

## 2025-02-14 PROCEDURE — 2500000002 HC RX 250 W HCPCS SELF ADMINISTERED DRUGS (ALT 637 FOR MEDICARE OP, ALT 636 FOR OP/ED)

## 2025-02-14 PROCEDURE — 36415 COLL VENOUS BLD VENIPUNCTURE: CPT

## 2025-02-14 PROCEDURE — 84100 ASSAY OF PHOSPHORUS: CPT

## 2025-02-14 PROCEDURE — 85025 COMPLETE CBC W/AUTO DIFF WBC: CPT

## 2025-02-14 PROCEDURE — 80053 COMPREHEN METABOLIC PANEL: CPT

## 2025-02-14 PROCEDURE — 2500000001 HC RX 250 WO HCPCS SELF ADMINISTERED DRUGS (ALT 637 FOR MEDICARE OP): Performed by: NURSE PRACTITIONER

## 2025-02-14 PROCEDURE — RXMED WILLOW AMBULATORY MEDICATION CHARGE

## 2025-02-14 RX ORDER — BUDESONIDE 3 MG/1
3 CAPSULE, COATED PELLETS ORAL 2 TIMES DAILY
Qty: 60 CAPSULE | Refills: 0 | Status: SHIPPED | OUTPATIENT
Start: 2025-02-14 | End: 2025-03-16

## 2025-02-14 RX ORDER — PANTOPRAZOLE SODIUM 40 MG/1
40 TABLET, DELAYED RELEASE ORAL
Qty: 30 TABLET | Refills: 11 | Status: SHIPPED | OUTPATIENT
Start: 2025-02-15 | End: 2026-02-15

## 2025-02-14 RX ADMIN — LISINOPRIL 5 MG: 5 TABLET ORAL at 08:11

## 2025-02-14 RX ADMIN — BUDESONIDE 3 MG: 3 CAPSULE, COATED PELLETS ORAL at 08:10

## 2025-02-14 RX ADMIN — PIPERACILLIN SODIUM AND TAZOBACTAM SODIUM 3.38 G: 3; .375 INJECTION, SOLUTION INTRAVENOUS at 03:17

## 2025-02-14 RX ADMIN — SULFAMETHOXAZOLE AND TRIMETHOPRIM 1 TABLET: 800; 160 TABLET ORAL at 08:11

## 2025-02-14 RX ADMIN — TACROLIMUS 0.5 MG: 0.5 CAPSULE ORAL at 05:58

## 2025-02-14 RX ADMIN — LETERMOVIR 480 MG: 480 TABLET, FILM COATED ORAL at 08:11

## 2025-02-14 RX ADMIN — POSACONAZOLE 300 MG: 100 TABLET, DELAYED RELEASE ORAL at 08:10

## 2025-02-14 RX ADMIN — ACYCLOVIR 400 MG: 400 TABLET ORAL at 08:11

## 2025-02-14 RX ADMIN — PANTOPRAZOLE SODIUM 40 MG: 40 TABLET, DELAYED RELEASE ORAL at 05:58

## 2025-02-14 RX ADMIN — FOLIC ACID 1 MG: 1 TABLET ORAL at 08:11

## 2025-02-14 RX ADMIN — NIFEDIPINE 60 MG: 60 TABLET, FILM COATED, EXTENDED RELEASE ORAL at 08:10

## 2025-02-14 RX ADMIN — CALCIUM 1250 MG: 500 TABLET ORAL at 08:11

## 2025-02-14 ASSESSMENT — PAIN SCALES - GENERAL: PAINLEVEL_OUTOF10: 0 - NO PAIN

## 2025-02-14 NOTE — NURSING NOTE
Pt discharged from [id number] with wife on foot. VSS at time of discharge. PIV removed and discharged instructions reviewed with patient and wife. Pt directed to go to [facility] to   prescriptions. All questions answered.

## 2025-02-14 NOTE — CARE PLAN
Problem: Pain - Adult  Goal: Verbalizes/displays adequate comfort level or baseline comfort level  Outcome: Progressing     Problem: Safety - Adult  Goal: Free from fall injury  Outcome: Progressing     Problem: Discharge Planning  Goal: Discharge to home or other facility with appropriate resources  Outcome: Progressing     Problem: Chronic Conditions and Co-morbidities  Goal: Patient's chronic conditions and co-morbidity symptoms are monitored and maintained or improved  Outcome: Progressing     Problem: Nutrition  Goal: Nutrient intake appropriate for maintaining nutritional needs  Outcome: Progressing   The patient's goals for the shift include Remain HDS    The clinical goals for the shift include Will remain free from falls

## 2025-02-14 NOTE — DISCHARGE SUMMARY
"Discharge Diagnosis  Bilious vomiting with nausea    Issues Requiring Follow-Up  EGD biopsies     Test Results Pending At Discharge  Pending Labs       Order Current Status    CMV DNA, Quantitative, PCR In process    Surgical Pathology Exam In process            Hospital Course  Brandt Merritt is a 65 y.o. 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 He is now s/p  allogeneic hematopoietic stem cell transplant. He presented to [facility] for a sick visit 2/11, he reports nausea that started around 2 am and lasted until around 6 am with multiple episodes of bilious emesis. Initially began as GERD-like symptoms that woke him up during the night. Described it as \"burning in throat.\" Has not eaten anything out of the ordinary, the only thing he ate was BBQ chicken, green beans, potatoes that was made at home. No abdominal cramping or pain, not taking anything for acid reflux at home. Reports that he was having diarrhea x3 days at home, however, he was only having 1 bowel movement per day. He was admitted with a concern for acute GVHD.     Hospital Course:   - No further vomiting during admission   - EGD 2/13, biopsies pending   - Increased budesonide to 3mg BID during admission   - Tacro dose 0.5mg BID on discharge     Meds reviewed by PharmD prior to discharge   Follow up with Dr Newsome scheduled for Monday 2/17    Pertinent Physical Exam At Time of Discharge  Physical Exam  Constitutional:       General: He is not in acute distress.     Appearance: Normal appearance.   HENT:      Head: Atraumatic.      Nose: Nose normal.      Mouth/Throat:      Pharynx: Oropharynx is clear.   Eyes:      Extraocular Movements: Extraocular movements intact.      Pupils: Pupils are equal, round, and reactive to light.   Cardiovascular:      Rate and Rhythm: Normal rate and regular rhythm.      Pulses: Normal pulses.      Heart sounds: Normal heart sounds.   Pulmonary:      Effort: No " respiratory distress.      Breath sounds: Normal breath sounds. No wheezing or rhonchi.   Abdominal:      General: Bowel sounds are normal.      Palpations: Abdomen is soft.      Tenderness: There is no abdominal tenderness.   Musculoskeletal:         General: Normal range of motion.      Cervical back: Neck supple.   Skin:     General: Skin is warm and dry.      Capillary Refill: Capillary refill takes less than 2 seconds.   Neurological:      Mental Status: He is alert and oriented to person, place, and time.         Home Medications     Medication List      START taking these medications     pantoprazole 40 mg EC tablet; Commonly known as: ProtoNix; Take 1 tablet   (40 mg) by mouth once daily in the morning. Take before meals. Do not   crush, chew, or split.; Start taking on: February 15, 2025     CHANGE how you take these medications     budesonide EC 3 mg 24 hr capsule; Commonly known as: Entocort EC; Take 1   capsule (3 mg) by mouth 2 times a day.; What changed: when to take this   tacrolimus 0.5 mg capsule; Commonly known as: Prograf; Take 1 capsule   (0.5 mg) by mouth every 12 hours.; What changed: additional instructions     CONTINUE taking these medications     acyclovir 400 mg tablet; Commonly known as: Zovirax; Take 1 tablet (400   mg) by mouth every 12 hours.   calcium carbonate 500 mg calcium (1,250 mg) tablet; Commonly known as:   Oscal; Take 1 tablet (1,250 mg) by mouth once daily.   ergocalciferol 1.25 MG (97703 UT) capsule; Commonly known as: Vitamin   D2; Take 1 capsule (1,250 mcg) by mouth 1 (one) time per week.   folic acid 1 mg tablet; Commonly known as: Folvite; Take 1 tablet (1 mg)   by mouth once daily.   letermovir 480 mg tablet; Commonly known as: Prevymis; Take 1 tablet   (480 mg) by mouth once daily.   lisinopril 5 mg tablet; Take 1 tablet (5 mg) by mouth once daily.   magnesium chloride 64 mg EC tablet; Commonly known as: MagDelay; Take 2   tablets (128 mg) by mouth 3 times a day. Or as  instructed on your   medication list.   mirtazapine 15 mg tablet; Commonly known as: Remeron; Take 1 tablet (15   mg) by mouth once daily at bedtime.   NIFEdipine ER 60 mg 24 hr tablet; Commonly known as: Adalat CC; Take 1   tablet (60 mg) by mouth once daily in the morning. Take before meals. Do   not crush, chew, or split.   ondansetron 8 mg tablet; Commonly known as: Zofran; Take 1 tablet (8 mg)   by mouth every 8 hours if needed for nausea or vomiting (1st line).   posaconazole 100 mg DR tablet; Commonly known as: Noxafil; Take 3   tablets (300 mg) by mouth once daily. Do not crush, chew, or split. This   is to prevent fungal infections.   prochlorperazine 10 mg tablet; Commonly known as: Compazine; Take 1   tablet (10 mg) by mouth every 6 hours if needed for nausea or vomiting.   sennosides-docusate sodium 8.6-50 mg tablet; Commonly known as:   Karissa-Colace; Take 1 tablet by mouth 2 times a day as needed for   constipation.   sulfamethoxazole-trimethoprim 800-160 mg tablet; Commonly known as:   Bactrim DS; Take 1 tablet by mouth 3 times a week. Take on Mondays,   Wednesdays, and Fridays.       Outpatient Follow-Up  Future Appointments   Date Time Provider Department Center   2/17/2025  8:40 AM [facility] CENTRAL [address] [place] [address] Academic   2/17/2025  9:30 AM Malaika Newsome MD [place] [address] Academic   2/17/2025 10:30 AM INF 29 [facility] Academic   2/24/2025  8:20 AM [facility] CENTRAL LINE [address] [place] [address] Academic   2/24/2025  9:00 AM Karon Casas APRN-CNP [place] [address] Academic   2[facility]24/2025 10:00 [facility]   6/18/2025  2:15 PM Karon Medel MD [id number] Academic       Tianna San APRN-CNP

## 2025-02-14 NOTE — HOSPITAL COURSE
"Brandt Merritt is a 65 y.o. 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 He is now s/p  allogeneic hematopoietic stem cell transplant. He presented to [facility] for a sick visit 2/11, he reports nausea that started around 2 am and lasted until around 6 am with multiple episodes of bilious emesis. Initially began as GERD-like symptoms that woke him up during the night. Described it as \"burning in throat.\" Has not eaten anything out of the ordinary, the only thing he ate was BBQ chicken, green beans, potatoes that was made at home. No abdominal cramping or pain, not taking anything for acid reflux at home. Reports that he was having diarrhea x3 days at home, however, he was only having 1 bowel movement per day. He was admitted with a concern for acute GVHD.     Hospital Course:   - No further vomiting during admission   - EGD 2/13, biopsies pending   - Increased budesonide to 3mg BID during admission   - Tacro dose 0.5mg BID on discharge     Meds reviewed by PharmD prior to discharge   Follow up with Dr Newsome scheduled for Monday 2/17  "

## 2025-02-14 NOTE — CARE PLAN
The patient's goals for the shift include Remain HDS    The clinical goals for the shift include pt will have all questions answered prior to discharge      Problem: Pain - Adult  Goal: Verbalizes/displays adequate comfort level or baseline comfort level  Outcome: Progressing     Problem: Safety - Adult  Goal: Free from fall injury  Outcome: Progressing     Problem: Discharge Planning  Goal: Discharge to home or other facility with appropriate resources  Outcome: Progressing     Problem: Chronic Conditions and Co-morbidities  Goal: Patient's chronic conditions and co-morbidity symptoms are monitored and maintained or improved  Outcome: Progressing     Problem: Nutrition  Goal: Nutrient intake appropriate for maintaining nutritional needs  Outcome: Progressing

## 2025-02-15 LAB
BACTERIA BLD CULT: NORMAL
CMV DNA SERPL NAA+PROBE-LOG IU: NORMAL {LOG_IU}/ML
LABORATORY COMMENT REPORT: NOT DETECTED

## 2025-02-16 ASSESSMENT — ENCOUNTER SYMPTOMS
PALPITATIONS: 0
VOMITING: 0
WHEEZING: 0
WHEEZING: 0
DEPRESSION: 0
DIARRHEA: 1
DIZZINESS: 0
FREQUENCY: 0
PALPITATIONS: 0
ABDOMINAL PAIN: 0
CHILLS: 0
SORE THROAT: 0
FREQUENCY: 0
NAUSEA: 1
LIGHT-HEADEDNESS: 0
CONSTIPATION: 0
COUGH: 0
SORE THROAT: 0
FEVER: 0
HEMATURIA: 0
NAUSEA: 0
DYSURIA: 0
CHILLS: 0
FATIGUE: 0
ABDOMINAL PAIN: 0
FATIGUE: 0
APPETITE CHANGE: 0
ADENOPATHY: 0
CONSTIPATION: 0
HEADACHES: 0
CONFUSION: 0
DYSURIA: 0
BRUISES/BLEEDS EASILY: 0
FEVER: 0
LIGHT-HEADEDNESS: 0
SHORTNESS OF BREATH: 0
DEPRESSION: 0
ADENOPATHY: 0
DIARRHEA: 0
LEG SWELLING: 0
VOMITING: 1
BRUISES/BLEEDS EASILY: 0
CONFUSION: 0
DIZZINESS: 0
NERVOUS/ANXIOUS: 0
SHORTNESS OF BREATH: 0
NERVOUS/ANXIOUS: 0
COUGH: 0
HEMATURIA: 0
LEG SWELLING: 0
HEADACHES: 0
APPETITE CHANGE: 0

## 2025-02-16 NOTE — PROGRESS NOTES
Patient ID:  Brandt Merritt is a 65 y.o. male.  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 Presents to clinic today for a sick visit accompanied by his wife. At 2am he started to have frequent vomiting episodes until about 6am. Unable to keep food, fluids, or pills down. Has not taken antiemetics. Diarrhea 1x/day the last few days. Denies rash.     Oncology History Overview Note   4/2024  Myelodysplastic neoplasm with increased blasts-2 ( WHO)  Myelodysplastic neoplasm/AML  (ICC 2022 classification)    Presented in  10/2023 for pancytopenia, found to have hemolysis. Patient had normal CBC June 2020. Denied any hemorrhoidal bleeding . Has had C Scope and EGD , treated Hep C 2014 . 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      CBC 4/11/24 wbc 3.0, hgb 7.1, platelets 167K ANC 0.73    BM biopsy done on 4/11/24  as folllows:  BM histology  Myelodysplastic neoplasm with increased blasts-2 ( WHO)  Myelodysplastic neoplasm/AML  (ICC 2022 classification)  Balsts 11% on aspirate smear and 15-20% based on CD 34 immunostaining approaching AML leukemia, hematooiesis is erythroid dominant with dysplasia in granulocytes and megakaryocytes.   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      11/6/2024 -  Bone Marrow  Transplant    conditioned with Flu/Cy/TBI and aGVHD prophylaxis with post-transplant cyclophosphamide, tacrolimus, and mycophenolate. T=0, 11/6/24. ABO Donor/Recipient: O+, A+. CMV donor/recipient: both positive. Started Tacro and MMF on T+5 (11/11).         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      s/p 4 cycles of Decitabine/Venetoclax. BM Bx 6/17/24: Blasts cleared, FISH still positive for trisomy 8, still MDS changes   BMBx (9/5/24): 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 and no increase in blasts      History of allogeneic hematopoietic stem cell transplant  #1: Single-unit Cord, T0=9/19/24, Primary Engraftment Failure  - Conditioning: Flu-Mariana-TBI  - Donor: Single Cord, 6/8  = ABO Donor / Recipient: A+ / A+  = CMV Donor / Recipient: - / +  - aGVHD Prophylaxis: Tacrolimus + MMF  - engraftment failure, developed HLA antibody against class I of cord  - Repeat BMBx (10/15): hypocellular with no residual myeloid neoplasm. 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), T0=11/6/24  - Graft: Haploidentical sister  = ABO Donor / Recipient: O+ / A+ (ABO incompatibility +)  = CMV Donor / Recipient: + / +  - Conditioning: Flu/Cy/TBI/rATG  = GVHD prophylaxis -  rATG 1.5mg/kg T-5,-3,-1, PTCy (25mg/kg T+3, T+4), Tacro, MMF (T+5)     DATE DAY SOURCE MORPHOLOGY MRD WHOLE CHIMERISM   (% donor) CD3 CHIMERISM   (% donor) CD33 CHIMERISM   (% donor)   11/20/24 D+30 PB      100       12/11/24 D+30 PB       100 100   Deferred D+30 BM             1/9/25 D+60 BM  KRISTIE , flow negative      100  100     D+60 PB               D+100 PB               D+100 BM  Defer since he had day 60 marrow                 Monitor for post-transplant hemolysis   2/3/25  Haptoglobin 100 2/3/25  UPC ratio 0.10 2/3/25     Anemia  12/18 Epo 113.   12/16 Retic  7.2%  DARBY on hold.  Cont folic acid.    Nausea/Vomiting:  Likely viral in nature given sudden onset, but will admit to the hospital for further evaluation. Consider infectious vs GVHD.     Received 1L NS in clinic today as well as IV Zofran. Blood cutlures drawn x1 given temperature of 100.2 when arriving to clinic (temp normalized after removing his hat).      GVHD prophylaxis  GVHD  Stopped  MMF on T+35     Stage I/Grade II Upper GI GVHD  Anorexia, taste changes and weight loss 171# (9/17/24).  Cont mirtazapine and ATC Zofran.   Add budesonide 3x day.   12/30/24:  Started on budesonide TID on 12/27/24.  Brandt reports that he has starting eating more since starting budesonide.  Reports no nausea, vomiting, or diarrhea at this time.    Appetite continuing to improve. Denies N/V/D. 1/13/25 Decreased budesonide bid per Dr Newsome; 1/27 Decrease to 3mg daily. Will continue on this dose for 1-2 weeks before discontinuing.   1/27/25 Tacro level 6.2 on 1.5 mg po bid   2/3/25 tacro level 15, patient states he has not been taking his posaconazole for about a month. One dose of tacro held and reduced to 0.5 mg in am and 1 mg in pm. Remains on budesonide 3 mg daily, consider switching to every other day once current GI symptoms resolve.     Weights  Pre-transplant: 9/9 74.2kg   Upon SCT discharge: 11/25 60.9kg  Start of budesonide: 59.7kg (12/26/24).  40 pound weight loss since 9/17/24  Today's weight 65.8 kg     Infectious Disease & Immune Reconstitution      Prophylaxis  Antiviral prophylaxis: acyclovir, Letermovir   Antifungal: posaconazole  PCP prophylaxis: 10/26/24 - 11/28/24 Pentamidine; cont Bactrim     Hypogammaglobulinemia  IgG level. IVIG for level <400   2/10  IgG 559     Active Surveillance:     CMV detected 688 1/2/25, 167 1/6/25. Levels undetectable since 1/13/25.; most recently 2/10  Started valgancicyclovir 900mg BID on 1/6/25,   Decreased to 900 mg daily for 2 weeks, then on 2/3 transitioned back to  letermovir/acyclovir.   Adeno ND 2/10/25  HHV6 ND 2/3/25  EBV ND 2/3/25  EBV Viremia during transplant admission  Treated despite low levels due to upcoming second SCT  Rituximab 600 mg x 1 (10/31/24)     Infectious Disease follow up evaluation: 1/10/25      Immunodeficiency panel 100 days, 6mos and 1 year.      Immunizations:   Covid vaccine series. Consider at T+ 3 months  Seasonal influenza vaccine. Consider at T+ 3 month  Will plan to begin immunizations at T+6mths (May 2025) depending on degree of immunosuppression     Cardiac:    Essential hypertension- was started on nifedipine 60mg daily on 1/20.   Hx of STEMI (11/11/2020)  Cardiology  appt on 12/18/24  ECHO 12/23/24: LVEF 65%     Ascending aorta dilation   TTE (4/29/24): 3.8-4 cm dilation      Electrolyte disturbance   Continue oral magnesium 128 mg TID.      Dry Skin hyperpigmentation  Instructed to use moisturizer such as cerave that are fragrance free and gentle. Will continue to monitor. Ederm consult feels may be due to bactrim which I prefer not to discontinue,  Has visit in May,  Advised about mineral based sun screen.      Lack of appetite, improved  Mirtazapine 15 mg at night as of 11/26/24     Vitamin D deficiency  12/18 Level 27.  Last dose of weekly supplement taken. 1/27 Repeat level was 28. Will begin another cycle of      Medication reconciliation  Medications reviewed; no scripts needed.     IV Access  L PICC line     Psychosocial.    Caregiver Genevieve  [facility] in [place]    Subjective    History of Present Illness:    Mr Merritt presents to the clinic today 2/3/25 for follow up visit, accompanied by his wife.     He is s/p umbilical cord blood transplant 10/16/24 with graft rejection followed by haploidentical cord from his sister on 11/6/24 with flu/cy, ATG and modified dose cyclophosphamide with engraftment on day T+8.      Was feeling well until he started vomiting early this morning, which lasted about 4hrs. Diarrhea 1x/day the last few  days. Unable to keep food, fluids, or pills down.     Completed medication reconciliation today.   Review of Systems   Constitutional:  Negative for appetite change, chills, fatigue and fever.   HENT:   Negative for mouth sores, sore throat and tinnitus.    Respiratory:  Negative for cough, shortness of breath and wheezing.    Cardiovascular:  Negative for chest pain, leg swelling and palpitations.   Gastrointestinal:  Positive for diarrhea, nausea and vomiting. Negative for abdominal pain and constipation.   Genitourinary:  Negative for dysuria, frequency and hematuria.    Skin:  Negative for itching and rash.   Neurological:  Negative for dizziness, headaches and light-headedness.   Hematological:  Negative for adenopathy. Does not bruise/bleed easily.   Psychiatric/Behavioral:  Negative for confusion and depression. The patient is not nervous/anxious.             Objective        Physical Exam  Vitals reviewed.   Constitutional:       Appearance: Normal appearance.   HENT:      Head: Normocephalic and atraumatic.      Nose: Nose normal.      Mouth/Throat:      Mouth: Mucous membranes are moist.      Pharynx: Oropharynx is clear. No oropharyngeal exudate or posterior oropharyngeal erythema.   Eyes:      Pupils: Pupils are equal, round, and reactive to light.   Cardiovascular:      Rate and Rhythm: Normal rate and regular rhythm.      Pulses: Normal pulses.      Heart sounds: Normal heart sounds.   Pulmonary:      Effort: Pulmonary effort is normal.      Breath sounds: Normal breath sounds.   Abdominal:      General: Abdomen is flat. There is no distension.      Palpations: Abdomen is soft. There is no mass.      Tenderness: There is no abdominal tenderness.   Musculoskeletal:         General: No swelling, tenderness or deformity. Normal range of motion.      Cervical back: Normal range of motion.      Right lower leg: No edema.      Left lower leg: No edema.   Skin:     General: Skin is warm and dry.       Comments: L PICC dressing dry and intact.    Neurological:      General: No focal deficit present.      Mental Status: He is alert and oriented to person, place, and time.   Psychiatric:         Mood and Affect: Mood normal.       RTC  2/17 2/24  (Requested 3/3, 3/10, and 3/17 appts)

## 2025-02-17 ENCOUNTER — INFUSION (OUTPATIENT)
Dept: HEMATOLOGY/ONCOLOGY | Facility: HOSPITAL | Age: 65
End: 2025-02-17
Payer: COMMERCIAL

## 2025-02-17 ENCOUNTER — APPOINTMENT (OUTPATIENT)
Dept: HEMATOLOGY/ONCOLOGY | Facility: HOSPITAL | Age: 65
End: 2025-02-17
Payer: COMMERCIAL

## 2025-02-17 ENCOUNTER — LAB (OUTPATIENT)
Dept: LAB | Facility: HOSPITAL | Age: 65
End: 2025-02-17
Payer: COMMERCIAL

## 2025-02-17 ENCOUNTER — OFFICE VISIT (OUTPATIENT)
Dept: HEMATOLOGY/ONCOLOGY | Facility: HOSPITAL | Age: 65
End: 2025-02-17
Payer: COMMERCIAL

## 2025-02-17 VITALS
HEART RATE: 104 BPM | WEIGHT: 145.06 LBS | OXYGEN SATURATION: 100 % | DIASTOLIC BLOOD PRESSURE: 72 MMHG | BODY MASS INDEX: 23.76 KG/M2 | RESPIRATION RATE: 16 BRPM | TEMPERATURE: 98.1 F | SYSTOLIC BLOOD PRESSURE: 126 MMHG

## 2025-02-17 DIAGNOSIS — C92.01 AML (ACUTE MYELOID LEUKEMIA) IN REMISSION (MULTI): ICD-10-CM

## 2025-02-17 DIAGNOSIS — Z94.84 HISTORY OF ALLOGENEIC HEMATOPOIETIC STEM CELL TRANSPLANT: ICD-10-CM

## 2025-02-17 DIAGNOSIS — C92.01 ACUTE MYELOID LEUKEMIA IN REMISSION (MULTI): ICD-10-CM

## 2025-02-17 DIAGNOSIS — Z94.84 STEM CELLS TRANSPLANT STATUS (MULTI): ICD-10-CM

## 2025-02-17 DIAGNOSIS — C92.01 AML (ACUTE MYELOID LEUKEMIA) IN REMISSION (MULTI): Primary | ICD-10-CM

## 2025-02-17 LAB
ALBUMIN SERPL BCP-MCNC: 4.1 G/DL (ref 3.4–5)
ALP SERPL-CCNC: 65 U/L (ref 33–136)
ALT SERPL W P-5'-P-CCNC: 20 U/L (ref 10–52)
ANION GAP SERPL CALC-SCNC: 14 MMOL/L (ref 10–20)
APPEARANCE UR: CLEAR
AST SERPL W P-5'-P-CCNC: 18 U/L (ref 9–39)
BASOPHILS # BLD AUTO: 0.03 X10*3/UL (ref 0–0.1)
BASOPHILS NFR BLD AUTO: 0.4 %
BILIRUB SERPL-MCNC: 0.3 MG/DL (ref 0–1.2)
BILIRUB UR STRIP.AUTO-MCNC: NEGATIVE MG/DL
BUN SERPL-MCNC: 19 MG/DL (ref 6–23)
CALCIUM SERPL-MCNC: 9 MG/DL (ref 8.6–10.3)
CHLORIDE SERPL-SCNC: 105 MMOL/L (ref 98–107)
CO2 SERPL-SCNC: 26 MMOL/L (ref 21–32)
COLOR UR: NORMAL
CREAT SERPL-MCNC: 0.98 MG/DL (ref 0.5–1.3)
CREAT UR-MCNC: 136.3 MG/DL (ref 20–370)
EGFRCR SERPLBLD CKD-EPI 2021: 85 ML/MIN/1.73M*2
EOSINOPHIL # BLD AUTO: 0.02 X10*3/UL (ref 0–0.7)
EOSINOPHIL NFR BLD AUTO: 0.2 %
ERYTHROCYTE [DISTWIDTH] IN BLOOD BY AUTOMATED COUNT: 15.7 % (ref 11.5–14.5)
GLUCOSE SERPL-MCNC: 107 MG/DL (ref 74–99)
GLUCOSE UR STRIP.AUTO-MCNC: NORMAL MG/DL
HAPTOGLOB SERPL NEPH-MCNC: 146 MG/DL (ref 30–200)
HCT VFR BLD AUTO: 39.2 % (ref 41–52)
HGB BLD-MCNC: 12.6 G/DL (ref 13.5–17.5)
IGG SERPL-MCNC: 505 MG/DL (ref 700–1600)
IMM GRANULOCYTES # BLD AUTO: 0.38 X10*3/UL (ref 0–0.7)
IMM GRANULOCYTES NFR BLD AUTO: 4.5 % (ref 0–0.9)
KETONES UR STRIP.AUTO-MCNC: NEGATIVE MG/DL
LDH SERPL L TO P-CCNC: 354 U/L (ref 84–246)
LEUKOCYTE ESTERASE UR QL STRIP.AUTO: NEGATIVE
LYMPHOCYTES # BLD AUTO: 2.92 X10*3/UL (ref 1.2–4.8)
LYMPHOCYTES NFR BLD AUTO: 34.6 %
MAGNESIUM SERPL-MCNC: 1.49 MG/DL (ref 1.6–2.4)
MCH RBC QN AUTO: 31.2 PG (ref 26–34)
MCHC RBC AUTO-ENTMCNC: 32.1 G/DL (ref 32–36)
MCV RBC AUTO: 97 FL (ref 80–100)
MONOCYTES # BLD AUTO: 1.72 X10*3/UL (ref 0.1–1)
MONOCYTES NFR BLD AUTO: 20.4 %
NEUTROPHILS # BLD AUTO: 3.38 X10*3/UL (ref 1.2–7.7)
NEUTROPHILS NFR BLD AUTO: 39.9 %
NITRITE UR QL STRIP.AUTO: NEGATIVE
NRBC BLD-RTO: 0 /100 WBCS (ref 0–0)
PH UR STRIP.AUTO: 6 [PH]
PLATELET # BLD AUTO: 265 X10*3/UL (ref 150–450)
POTASSIUM SERPL-SCNC: 3.6 MMOL/L (ref 3.5–5.3)
PROT SERPL-MCNC: 6.1 G/DL (ref 6.4–8.2)
PROT UR STRIP.AUTO-MCNC: NEGATIVE MG/DL
PROT UR-ACNC: 15 MG/DL (ref 5–25)
PROT/CREAT UR: 0.11 MG/MG CREAT (ref 0–0.17)
RBC # BLD AUTO: 4.04 X10*6/UL (ref 4.5–5.9)
RBC # UR STRIP.AUTO: NEGATIVE MG/DL
SODIUM SERPL-SCNC: 141 MMOL/L (ref 136–145)
SP GR UR STRIP.AUTO: 1.02
TACROLIMUS BLD-MCNC: 7.9 NG/ML
URATE SERPL-MCNC: 2.8 MG/DL (ref 4–7.5)
UROBILINOGEN UR STRIP.AUTO-MCNC: NORMAL MG/DL
WBC # BLD AUTO: 8.5 X10*3/UL (ref 4.4–11.3)

## 2025-02-17 PROCEDURE — 1111F DSCHRG MED/CURRENT MED MERGE: CPT | Performed by: INTERNAL MEDICINE

## 2025-02-17 PROCEDURE — 80053 COMPREHEN METABOLIC PANEL: CPT

## 2025-02-17 PROCEDURE — 81003 URINALYSIS AUTO W/O SCOPE: CPT

## 2025-02-17 PROCEDURE — 87799 DETECT AGENT NOS DNA QUANT: CPT

## 2025-02-17 PROCEDURE — 99214 OFFICE O/P EST MOD 30 MIN: CPT | Mod: 25 | Performed by: INTERNAL MEDICINE

## 2025-02-17 PROCEDURE — 83735 ASSAY OF MAGNESIUM: CPT

## 2025-02-17 PROCEDURE — 87533 HHV-6 DNA QUANT: CPT

## 2025-02-17 PROCEDURE — 3078F DIAST BP <80 MM HG: CPT | Performed by: INTERNAL MEDICINE

## 2025-02-17 PROCEDURE — 82570 ASSAY OF URINE CREATININE: CPT

## 2025-02-17 PROCEDURE — 1126F AMNT PAIN NOTED NONE PRSNT: CPT | Performed by: INTERNAL MEDICINE

## 2025-02-17 PROCEDURE — 84550 ASSAY OF BLOOD/URIC ACID: CPT

## 2025-02-17 PROCEDURE — 80197 ASSAY OF TACROLIMUS: CPT

## 2025-02-17 PROCEDURE — 82784 ASSAY IGA/IGD/IGG/IGM EACH: CPT

## 2025-02-17 PROCEDURE — 83010 ASSAY OF HAPTOGLOBIN QUANT: CPT

## 2025-02-17 PROCEDURE — 81267 CHIMERISM ANAL NO CELL SELEC: CPT

## 2025-02-17 PROCEDURE — 1159F MED LIST DOCD IN RCRD: CPT | Performed by: INTERNAL MEDICINE

## 2025-02-17 PROCEDURE — 83615 LACTATE (LD) (LDH) ENZYME: CPT

## 2025-02-17 PROCEDURE — 99214 OFFICE O/P EST MOD 30 MIN: CPT | Performed by: INTERNAL MEDICINE

## 2025-02-17 PROCEDURE — 1157F ADVNC CARE PLAN IN RCRD: CPT | Performed by: INTERNAL MEDICINE

## 2025-02-17 PROCEDURE — 3074F SYST BP LT 130 MM HG: CPT | Performed by: INTERNAL MEDICINE

## 2025-02-17 PROCEDURE — 96365 THER/PROPH/DIAG IV INF INIT: CPT | Mod: INF

## 2025-02-17 PROCEDURE — 85025 COMPLETE CBC W/AUTO DIFF WBC: CPT | Performed by: STUDENT IN AN ORGANIZED HEALTH CARE EDUCATION/TRAINING PROGRAM

## 2025-02-17 PROCEDURE — 81268 CHIMERISM ANAL W/CELL SELECT: CPT

## 2025-02-17 PROCEDURE — 36415 COLL VENOUS BLD VENIPUNCTURE: CPT

## 2025-02-17 PROCEDURE — 2500000004 HC RX 250 GENERAL PHARMACY W/ HCPCS (ALT 636 FOR OP/ED): Performed by: INTERNAL MEDICINE

## 2025-02-17 RX ORDER — MAGNESIUM SULFATE HEPTAHYDRATE 40 MG/ML
2 INJECTION, SOLUTION INTRAVENOUS ONCE
Status: CANCELLED | OUTPATIENT
Start: 2025-02-17 | End: 2025-02-17

## 2025-02-17 RX ORDER — HEPARIN SODIUM,PORCINE/PF 10 UNIT/ML
50 SYRINGE (ML) INTRAVENOUS AS NEEDED
OUTPATIENT
Start: 2025-02-17

## 2025-02-17 RX ORDER — MAGNESIUM SULFATE HEPTAHYDRATE 40 MG/ML
2 INJECTION, SOLUTION INTRAVENOUS ONCE
Status: COMPLETED | OUTPATIENT
Start: 2025-02-17 | End: 2025-02-17

## 2025-02-17 RX ORDER — HEPARIN 100 UNIT/ML
500 SYRINGE INTRAVENOUS AS NEEDED
OUTPATIENT
Start: 2025-02-17

## 2025-02-17 RX ADMIN — MAGNESIUM SULFATE IN WATER 2 G: 40 INJECTION, SOLUTION INTRAVENOUS at 10:17

## 2025-02-17 ASSESSMENT — PAIN SCALES - GENERAL: PAINLEVEL_OUTOF10: 0-NO PAIN

## 2025-02-17 NOTE — PROGRESS NOTES
Patient ID:  Brandt Merritt is a 65 y.o. male.  Referring Physician:   BMT Dr Newsome  Primary Care Provider:  Bill Richmond MD    Assessment/Plan    2/17/25 T+102 and 150. No complaints. No signs/sx of active GVHD or infection. Budesonide tapered to 3mg daily. Valcyte tapered to 900mg daily on 1/20 until 2/3/25 and currently on acyclovir and letermovir. CMV not detected  since 1/13. Removal of PICC line done on Monday 2/3. Weekly appointments in February. BP remains elevated, have started lisinopril 5 mg daily on 2/3/25.  Will start another cycle of vitamin D (1 tablet one time per week) until 3/24/25 since his level was 28 from last visit. Will defer T + 100 bone marrow biopsy since he had one at T + 60.     Oncology History Overview Note   4/2024  Myelodysplastic neoplasm with increased blasts-2 ( WHO)  Myelodysplastic neoplasm/AML  (ICC 2022 classification)    Presented in  10/2023 for pancytopenia, found to have hemolysis. Patient had normal CBC June 2020. Denied any hemorrhoidal bleeding . Has had C Scope and EGD , treated Hep C 2014 . 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      CBC 4/11/24 wbc 3.0, hgb 7.1, platelets 167K ANC 0.73    BM biopsy done on 4/11/24  as folllows:  BM histology  Myelodysplastic neoplasm with increased blasts-2 ( WHO)  Myelodysplastic neoplasm/AML  (ICC 2022 classification)  Balsts 11% on aspirate smear and 15-20% based on CD 34 immunostaining approaching AML leukemia, hematooiesis is erythroid dominant with dysplasia in granulocytes and megakaryocytes.   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      11/6/2024 -  Bone Marrow Transplant    conditioned with Flu/Cy/TBI and aGVHD prophylaxis with post-transplant cyclophosphamide, tacrolimus, and mycophenolate. T=0, 11/6/24. ABO Donor/Recipient: O+, A+. CMV donor/recipient: both positive. Started Tacro and MMF on T+5 (11/11).         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      s/p 4 cycles of Decitabine/Venetoclax. BM Bx 6/17/24: Blasts cleared, FISH still positive for trisomy 8, still MDS changes   BMBx (9/5/24): 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 and no increase in blasts      History of allogeneic hematopoietic stem cell transplant  #1: Single-unit Cord, T0=9/19/24, Primary Engraftment Failure  - Conditioning: Flu-Mariana-TBI  - Donor: Single Cord, 6/8  = ABO Donor / Recipient: A+ / A+  = CMV Donor / Recipient: - / +  - aGVHD Prophylaxis: Tacrolimus + MMF  - engraftment failure, developed HLA antibody against class I of cord  - Repeat BMBx (10/15): hypocellular with no residual myeloid neoplasm. 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), T0=11/6/24  - Graft: Haploidentical sister  = ABO Donor / Recipient: O+ / A+ (ABO incompatibility +)  = CMV Donor / Recipient: + / +  - Conditioning: Flu/Cy/TBI/rATG  = GVHD prophylaxis -  rATG 1.5mg/kg T-5,-3,-1, PTCy (25mg/kg T+3, T+4), Tacro, MMF (T+5)     DATE DAY SOURCE MORPHOLOGY MRD WHOLE CHIMERISM   (% donor) CD3 CHIMERISM   (% donor) CD33 CHIMERISM   (% donor)   11/20/24 D+30 PB      100       12/11/24 D+30 PB       100 100   Deferred D+30 BM             1/9/25 D+60 BM  KRISTIE , flow negative      100  100     D+60 PB               D+100 PB                D+100 BM  Defer                 Monitor for post-transplant hemolysis   1/27/25  Haptoglobin 113 1/27/25  UPC ratio 0.10 1/27/25     Anemia  12/18 Epo 113.   12/16 Retic 7.2%  DARBY on hold.  Cont folic acid.     GVHD prophylaxis  GVHD  Anorexia/poor oral intake/weight loss. Added Mirtazapine. Continue Zofran. If persists, consider aGVHD.  12/9/24:  Currently taking tacrolimus 0.5 mg BID, held dose today prior to labs.  FK level 4.6 (12/9/24).  Advised patient on 12/10/24 to increase tacrolimus dose to 1 mg in the AM and 0.5 mg in the PM.    Wean MMF from 1 g TID to 500 mg TID 12/2/24 - present  Due to poor appetite will decrease MMF to 500 mg po tid.  Improvement to GI symptoms since decreasing MMF dose.    Stopped  MMF on T+35,      Stage I/Grade II Upper GI GVHD  Anorexia, taste changes and weight loss 171# (9/17/24).  Cont mirtazapine and ATC Zofran.   Add budesonide 3x day.   12/30/24:  Started on budesonide TID on 12/27/24.  Brandt reports that he has starting eating more since starting budesonide.  Reports no nausea, vomiting, or diarrhea at this time.    Appetite continuing to improve. Denies N/V/D.     1/13/25 Decreased budesonide bid per Dr Newsome; 1/27 Decrease to 3mg daily. Will continue on this dose for 1-2 weeks before discontinuing.     1/27/25 Tacro level 6.2 on 1.5 mg po bid   2/17/25 Recent admission for nausea, budesonide increased to 3 mg po bid, continues therapeutic on tacro 0.5 mg po bid. Esophageal biopsy 2/13/25 negative for GVHD. Consider decreasing budesonide to 3 mg daily next visit     Weights  Pre-transplant: 9/9 74.2kg   Upon SCT discharge: 11/25 60.9kg  Start of budesonide: 59.7kg (12/26/24).  40 pound weight loss since 9/17/24  Today's weight 65.8 kg     Infectious Disease & Immune Reconstitution      Prophylaxis  Antiviral prophylaxis: acyclovir, Letermovir   Antifungal: posaconazole  PCP prophylaxis: 10/26/24 - 11/28/24 Pentamidine; cont Bactrim        Hypogammaglobulinemia  IgG level. IVIG for level <400   2/18 IgG 628     Active Surveillance:     CMV detected 688 1/2/25, 167 1/6/25. Levels undetectable since 1/13/25.; most recently 2/18.  Started valgancicyclovir 900mg BID on 1/6/25,   Plan 1/20/25 decrease to 900 mg daily for 2 weeks (2/3), then transition back to letermovir/acyclovir.   Adeno ND 1/27/25  HHV6 ND 1/27/25  EBV ND 1/27/25  EBV Viremia during transplant admission  Treated despite low levels due to upcoming second SCT  Rituximab 600 mg x 1 (10/31/24)     Infectious Disease follow up evaluation: 1/10/25      Immunodeficiency panel 100 days, 6mos and 1 year.      Immunizations:   Covid vaccine series. Consider at T+ 3 months  Seasonal influenza vaccine. Consider at T+ 3 month  Will plan to begin immunizations at T+6mths (May 2025) depending on degree of immunosuppression     Cardiac:    Essential hypertension- was started on nifedipine 60mg daily on 1/20.   -BP today 160/93 and then repeat BP was 146/78. Advised him to take his BP twice a day over the next week. Will consider changing his BP medication if BP remains elevated.   Hx of STEMI (11/11/2020)  Cardiology  appt on 12/18/24  ECHO 12/23/24: LVEF 65%     Ascending aorta dilation   TTE (4/29/24): 3.8-4 cm dilation      Electrolyte disturbance   Continue oral magnesium 128 mg TID. Mg level today was 1.76.     Dry Skin hyperpigmentation  Instructed to use moisturizer such as cerave that are fragrance free and gentle. Will continue to monitor. Ederm consult feels may be due to bactrim which I prefer not to discontinue,  Has visit in May,  Advised about mineral based sun screen.      Lack of appetite, improved  Mirtazapine 15 mg at night as of 11/26/24     Vitamin D deficiency  12/18 Level 27.  Last dose of weekly supplement taken. 1/27 Repeat level was 28. Will begin another cycle of      Medication reconciliation  Medications reviewed; no scripts needed.     IV Access  L PICC line      Psychosocial.    Caregiver Genevieve  [facility] in [place]    Subjective    History of Present Illness:    Mr Merritt presents to the clinic today 2/17/25 for follow up visit, accompanied by his wife.     He is s/p umbilical cord blood transplant 10/16/24 with graft rejection followed by haploidentical cord from his sister on 11/6/24 with flu/cy, ATG and modified dose cyclophosphamide with engraftment on day T+8.      Patient admitted on 2/11-2/14/25 for intractable vomiting and some diarrhea, also with low grade fever. He received broad spectrum antibiotics and all cultures were negative.  He underwent an upper endoscopy which was grossly normal,  biopsies negative for GVHD, and budesonide was increased to 3 mg po bid. Remains on tacro 0.5 po bid, Posaconazole is costing 280 last month. Skin is lightening up with some cerave. Dermatology appointment in May.Appetite good nausea and diarrhea has resolved      Completed medication reconciliation today.   Review of Systems   Constitutional:  Negative for appetite change, chills, fatigue and fever.   HENT:   Negative for mouth sores, sore throat and tinnitus.    Respiratory:  Negative for cough, shortness of breath and wheezing.    Cardiovascular:  Negative for chest pain, leg swelling and palpitations.   Gastrointestinal:  Negative for abdominal pain, constipation, diarrhea, nausea and vomiting.   Genitourinary:  Negative for dysuria, frequency and hematuria.    Skin:  Negative for itching and rash.   Neurological:  Negative for dizziness, headaches and light-headedness.   Hematological:  Negative for adenopathy. Does not bruise/bleed easily.   Psychiatric/Behavioral:  Negative for confusion and depression. The patient is not nervous/anxious.             Objective        Physical Exam  Vitals reviewed.   Constitutional:       Appearance: Normal appearance.   HENT:      Head: Normocephalic and atraumatic.      Nose: Nose normal.      Mouth/Throat:      Mouth: Mucous  membranes are moist.      Pharynx: Oropharynx is clear. No oropharyngeal exudate or posterior oropharyngeal erythema.   Eyes:      Pupils: Pupils are equal, round, and reactive to light.   Cardiovascular:      Rate and Rhythm: Normal rate and regular rhythm.      Pulses: Normal pulses.      Heart sounds: Normal heart sounds.   Pulmonary:      Effort: Pulmonary effort is normal.      Breath sounds: Normal breath sounds.   Abdominal:      General: Abdomen is flat. There is no distension.      Palpations: Abdomen is soft. There is no mass.      Tenderness: There is no abdominal tenderness.   Musculoskeletal:         General: No swelling, tenderness or deformity. Normal range of motion.      Cervical back: Normal range of motion.      Right lower leg: No edema.      Left lower leg: No edema.   Skin:     General: Skin is warm and dry.      Comments: L PICC dressing dry and intact.    Neurological:      General: No focal deficit present.      Mental Status: He is alert and oriented to person, place, and time.   Psychiatric:         Mood and Affect: Mood normal.     RTC  2/3  2/10  2/17  2/24

## 2025-02-17 NOTE — PROGRESS NOTES
Pt present today for count check.  Pt saw provider prior to treatment.  See provider's note for full assessment.  Magnesium replacement infused with no issues.  Pt discharged to home in stable condition.

## 2025-02-18 LAB
ADENOVIRUS QPCR,PLASMA, VIRC: NOT DETECTED COPIES/ML
CMV DNA SERPL NAA+PROBE-LOG IU: NORMAL {LOG_IU}/ML
EBV DNA BLD NAA+PROBE-LOG IU: NORMAL {LOG_IU}/ML
EBV DNA SPEC NAA+PROBE-LOG#: NORMAL {LOG_COPIES}/ML
HUMAN HERPESVIRUS-6 PCR PLASMA: NOT DETECTED COPIES/ML
LABORATORY COMMENT REPORT: NORMAL
LABORATORY COMMENT REPORT: NOT DETECTED
PATH REPORT.FINAL DX SPEC: NORMAL
PATH REPORT.GROSS SPEC: NORMAL
PATH REPORT.TOTAL CANCER: NORMAL

## 2025-02-21 LAB
CHIMERISM INTERPRETATION: NORMAL
ELECTRONICALLY SIGNED BY: NORMAL

## 2025-02-23 ASSESSMENT — ENCOUNTER SYMPTOMS
UNEXPECTED WEIGHT CHANGE: 0
NEUROLOGICAL NEGATIVE: 1
RESPIRATORY NEGATIVE: 1
CARDIOVASCULAR NEGATIVE: 1
GASTROINTESTINAL NEGATIVE: 1
HEMATOLOGIC/LYMPHATIC NEGATIVE: 1
APPETITE CHANGE: 0
FEVER: 0
CHILLS: 0
MUSCULOSKELETAL NEGATIVE: 1
DIAPHORESIS: 0
FATIGUE: 0

## 2025-02-23 NOTE — PROGRESS NOTES
Patient ID:  Brandt Merritt is a 65 y.o. male.  Referring Physician:   BMT Dr Newsome  Primary Care Provider:  Bill Richmond MD      Oncology History Overview Note   4/2024  Myelodysplastic neoplasm with increased blasts-2 ( WHO)  Myelodysplastic neoplasm/AML  (ICC 2022 classification)    Presented in  10/2023 for pancytopenia, found to have hemolysis. Patient had normal CBC June 2020. Denied any hemorrhoidal bleeding . Has had C Scope and EGD , treated Hep C 2014 . 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      CBC 4/11/24 wbc 3.0, hgb 7.1, platelets 167K ANC 0.73    BM biopsy done on 4/11/24  as folllows:  BM histology  Myelodysplastic neoplasm with increased blasts-2 ( WHO)  Myelodysplastic neoplasm/AML  (ICC 2022 classification)  Balsts 11% on aspirate smear and 15-20% based on CD 34 immunostaining approaching AML leukemia, hematooiesis is erythroid dominant with dysplasia in granulocytes and megakaryocytes.   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      11/6/2024 -  Bone Marrow Transplant    conditioned with Flu/Cy/TBI and aGVHD prophylaxis with post-transplant cyclophosphamide, tacrolimus, and mycophenolate. T=0, 11/6/24. ABO Donor/Recipient: O+, A+. CMV donor/recipient: both positive. Started Tacro and MMF on T+5 (11/11).         Response:      Past Medical History:  HTN   STEMI 11/11/2020 after getting  a water pills.  Chronic hepatitis C  infection treated in  treated  with Dr. Lloyd  Past Medical History:   Diagnosis Date    Chest pain 2021    HTN (hypertension) 10/05/2023    Hypertension     Leukemia (Multi)     Personal history of other diseases of the circulatory system     History of hypertension      Surgical History:  Conosocopy 2021  Upper EGD 10/31/23  Surgical reduction torsion of testes      Past Surgical History:   Procedure Laterality Date    COLONOSCOPY  2013    Complete Colonoscopy    OTHER SURGICAL HISTORY  10/07/2015    Surgery Testis Reduction Of Torsion Of Testis Right      Family History:  CAD, DM in mother       Mother  of CVA at age 69  Brother with prostate CA, 1 sister with liver disease  2 children healthy  Family History   Problem Relation Name Age of Onset    Other (diabetes mellitus) Mother      Prostate cancer Brother        Social History:  Lives with wife of 30 years, works at [employer], now retired.   29 years, former smoker, smoked x 15 yrs quit 20+ years ago. Marijuana 3 x a week. Served in [employer] in [place] and [employer], .  ----------------------------------------------------------------------------------------------------  Subjective    History of Present Illness:    Brandt Merritt is a 65 year old male with history of myelodysplastic neoplasm/AML, s/p umbilical cord blood transplant 10/16/24 with graft rejection followed by haploidentical cord from his sister on 24 with flu/cy, ATG and modified dose cyclophosphamide with engraftment on day T+8.  Today he is T+ 158 of umbilical cord transfusion and is T+ 110 of his haplo sister.       Brandt was admitted on -25 for intractable vomiting and some diarrhea, also with low grade fever.  He received broad spectrum antibiotics and all cultures were negative.  He underwent an upper endoscopy which was grossly normal,  biopsies negative for GVHD, and  budesonide was increased to 3 mg po bid.     Brandt presents to the clinic today (2/24/25) with his wife Genevieve for follow up evaluation and count check.  Energy level is good.  Appetite is good, is getting better.  Currently eating 2 meals per day and eats snacks. Weight is down a few pounds since last visit.  Reports that he had two episodes of nausea, vomiting, and diarrhea on Tuesday and again on Saturday.  On Tuesday he had nausea, vomiting, and diarrhea about for 4-5 times.  Was okay Wednesday, Thursday, and Friday then again had nausea, vomiting, and diarrhea on Saturday with nausea, vomiting, and diarrhea 3-4 times.  No abdominal cramping.  Didn't take any antiemetics or antidiarrhea medications.  Brandt feels his symptoms were related to eating antipasta salad that was obtain from [email or web address].  He ate the antipasta salad on both Tuesday and again on Friday prior to these episodes of nausea and vomiting.  Didn't call oncology team after these nausea, vomiting, and diarrhea because he felt he it was similar to what happened for his prior hospital stay.  Was able to keep fluids and medications down.  Reports no fevers or chills.  Currently taking budesonide 3 mg daily.  Held AM dose of tacrolimus today prior to blood draws.  Taking tacrolimus 0.5 mg twice daily.  Continues to have hyperpigmented spots to face and hands, feels like hyperpigmentation is lightening up.  No itching.  Using cerave soap and sunscreen.      Review of Systems   Constitutional:  Negative for appetite change, chills, diaphoresis, fatigue, fever and unexpected weight change.   Respiratory: Negative.     Cardiovascular: Negative.    Gastrointestinal: Negative.    Genitourinary: Negative.     Musculoskeletal: Negative.    Skin:         Hyperpigmented skin to face   Neurological: Negative.    Hematological: Negative.     --------------------------------------------------------------------------------------------------------  Objective:    Visit Vitals  /82 (BP Location: Right arm, Patient Position: Sitting, BP Cuff Size: Adult)   Pulse 88   Temp 36.9 °C (98.4 °F) (Skin)   Resp 16      Vitals:    02/24/25 0913   Weight: 64.5 kg (142 lb 3.2 oz)      Physical Exam  Vitals reviewed.   Constitutional:       Appearance: Normal appearance.   Eyes:      Extraocular Movements: Extraocular movements intact.      Conjunctiva/sclera: Conjunctivae normal.      Pupils: Pupils are equal, round, and reactive to light.   Cardiovascular:      Rate and Rhythm: Normal rate and regular rhythm.      Pulses: Normal pulses.      Heart sounds: Normal heart sounds.   Pulmonary:      Effort: Pulmonary effort is normal.      Breath sounds: Normal breath sounds.   Abdominal:      General: Abdomen is flat. Bowel sounds are normal.      Palpations: Abdomen is soft. There is no mass.      Tenderness: There is no abdominal tenderness.   Musculoskeletal:         General: No swelling. Normal range of motion.   Lymphadenopathy:      Comments: No lymphadenopathy   Skin:     General: Skin is warm and dry.      Comments: Hyperpigmented skin to face   Neurological:      General: No focal deficit present.      Mental Status: He is alert and oriented to person, place, and time.   Psychiatric:         Mood and Affect: Mood normal.         Behavior: Behavior normal.     -----------------------------------------------------------------------------------------------------------  Assessment and Plan:    MDS/Acute myeloid leukemia in remission (Multi)  Diagnosed 4/2024: BM Bx with MDS in in transition to AML (>10% blast) w/ trisomy 8, DNMT alpha, and U2AF1 mutation indication adverse risk.      s/p 4 cycles of Decitabine/Venetoclax. BM Bx 6/17/24: Blasts cleared, FISH still positive for trisomy 8, still MDS changes   BMBx (9/5/24): hypocellular bone marrow (20%) with  granulocytic hypoplasia and no increase in blasts      History of allogeneic hematopoietic stem cell transplant  #1: Single-unit Cord, T0=9/19/24, Primary Engraftment Failure  - Conditioning: Flu-Mariana-TBI  - Donor: Single Cord, 6/8  = ABO Donor / Recipient: A+ / A+  = CMV Donor / Recipient: - / +  - aGVHD Prophylaxis: Tacrolimus + MMF  - engraftment failure, developed HLA antibody against class I of cord  - Repeat BMBx (10/15): hypocellular with no residual myeloid neoplasm. 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), T0=11/6/24  - Graft: Haploidentical sister  = ABO Donor / Recipient: O+ / A+ (ABO incompatibility +)  = CMV Donor / Recipient: + / +  - Conditioning: Flu/Cy/TBI/rATG  = GVHD prophylaxis -  rATG 1.5mg/kg T-5,-3,-1, PTCy (25mg/kg T+3, T+4), Tacro, MMF (T+5)     DATE DAY SOURCE MORPHOLOGY MRD WHOLE CHIMERISM   (% donor) CD3 CHIMERISM   (% donor) CD33 CHIMERISM   (% donor)   11/20/24 D+30 PB      100       12/11/24 D+30 PB       100 100   Deferred D+30 BM             1/9/25 D+60 BM  KRISTIE , flow negative      100  100     D+60 PB               D+100 PB               D+100 BM  Defer              Monitor for post-transplant hemolysis   (2/24/25)  Haptoglobin  146 (2/17/25).  UPC ratio 0.11 2/17/25     Anemia  12/18 Epo 113.   12/16 Retic 7.2%  DARBY on hold.  Cont folic acid.     GVHD prophylaxis  GVHD  Anorexia/poor oral intake/weight loss. Added Mirtazapine. Continue Zofran. If persists, consider aGVHD.  12/9/24:  Currently taking tacrolimus 0.5 mg BID, held dose today prior to labs.  FK level 4.6 (12/9/24).  Advised patient on 12/10/24 to increase tacrolimus dose to 1 mg in the AM and 0.5 mg in the PM.    Wean MMF from 1 g TID to 500 mg TID 12/2/24 - present  Due to poor appetite will decrease MMF to 500 mg po tid.  Improvement to GI symptoms since decreasing MMF dose.    Stopped  MMF on T+35,      Stage I/Grade II Upper GI GVHD  Anorexia, taste changes and weight loss 171#  (9/17/24).  Cont mirtazapine and ATC Zofran.   Add budesonide 3x day.   12/30/24:  Started on budesonide TID on 12/27/24.  Brandt reports that he has starting eating more since starting budesonide.  Reports no nausea, vomiting, or diarrhea at this time.    Appetite continuing to improve. Denies N/V/D.     1/13/25 Decreased budesonide bid per Dr Newsome; 1/27 Decrease to 3mg daily. Will continue on this dose for 1-2 weeks before discontinuing.     1/27/25 Tacro level 6.2 on 1.5 mg po bid   2/17/25 Recent admission for nausea, budesonide increased to 3 mg po bid, continues therapeutic on tacro 0.5 mg po bid. Esophageal biopsy 2/13/25 negative for GVHD. Consider decreasing budesonide to 3 mg daily next visit  2/24/25:  Had nausea, vomiting, and diarrhea multiple times on 2/18/25 and 2/22/25 that sound to be food related after eating antipasta salad from [employer].  GI symptoms have resolved and is currently having no nausea, vomiting, or diarrhea.  Reviewed food safety precautions and had nutritionist Blanca Morgan, TORI,LD come to speak with patient and wife today.  Continue budesonide 3 mg daily.  FK level 10.2 (2/24/25).  Continue ztacrolimus 0.5 mg BID.  Advised Brandt to contact oncology team if have GI symptoms like this again.      Weights  Pre-transplant: 9/9 74.2kg   Upon SCT discharge: 11/25 60.9kg  Start of budesonide: 59.7kg (12/26/24).  40 pound weight loss since 9/17/24 2/24/25:  Weight today 64.5 kg, which is down a few pounds from last visit.  Brandt had recent episodes of nausea, vomiting, and diarrhea and may be cause of weight loss.  Will monitoring weight closely.  Nutrition in so speak with patient today.      Wt Readings from Last 5 Encounters:   02/24/25 64.5 kg (142 lb 3.2 oz)   02/24/25 64.5 kg (142 lb 3.2 oz)   02/17/25 65.8 kg (145 lb 1 oz)   02/11/25 65.8 kg (145 lb 1 oz)   02/11/25 64.5 kg (142 lb 4.9 oz)      Infectious Disease & Immune Reconstitution      Prophylaxis  Antiviral  prophylaxis: acyclovir, Letermovir   Antifungal: posaconazole  PCP prophylaxis: 10/26/24 - 11/28/24 Pentamidine; cont Bactrim     Hypogammaglobulinemia  IgG level. IVIG for level <400   2/17/25: IgG 505     Active Surveillance:     CMV detected 688 1/2/25, 167 1/6/25. Levels undetectable since 1/13/25, CMV ND (2/17/25), level in process from 2/24/25.    Started valgancicyclovir 900mg BID on 1/6/25,   Plan 1/20/25 decrease to 900 mg daily for 2 weeks (2/3), then transition back to letermovir/acyclovir.    Adeno ND 2/17/25  HHV6 ND 2/17/25  EBV ND 2/17/25  EBV Viremia during transplant admission  Treated despite low levels due to upcoming second SCT  Rituximab 600 mg x 1 (10/31/24)     Infectious Disease follow up evaluation: 1/10/25      Immunodeficiency panel 100 days, 6mos and 1 year.      Immunizations:   Covid vaccine series. Consider at T+ 3 months  Seasonal influenza vaccine. Consider at T+ 3 month  Will plan to begin immunizations at T+6mths (May 2025) depending on degree of immunosuppression     Cardiac:    Essential hypertension- was started on nifedipine 60 mg daily on 1/20/25 and started lisinopril 5 mg daily on 2/3/25.   Hx of STEMI (11/11/2020)  Cardiology  appt on 12/18/24  ECHO 12/23/24: LVEF 65%     Ascending aorta dilation   TTE (4/29/24): 3.8-4 cm dilation      Electrolyte disturbance   Continue oral magnesium 128 mg TID. Mg level today was 1.71 (2/24/25)  Potassium level 3.3 (2/24/25), prescribed potassium chloride 40 meq daily for 2 days, then to take 20 meq daily for 5 days.  Will monitor.     Dry Skin hyperpigmentation:  Hyperpigmented skin to face.  Derm E-consult on 2/3/25 with Dr. Chavez, feels may be due to bactrim, which I prefer not to discontinue. May also be caused by prochlorperazine.  NPV with dermatology Dr. Medel on 6/18/25.    2/24/25:  Continues to have hyperpigmented skin to face and bilateral palms of hands.  Brandt feels his hyperpigmentation is lighted slightly.   Instructed to continue to use moisturizer such as cerave that are fragrance free and gentle.  Advised about mineral based sun screen.      Lack of appetite, improved  Mirtazapine 15 mg at night as of 11/26/24     Vitamin D deficiency  12/18 Level 27.  Last dose of weekly supplement taken. 1/27 Repeat level was 28.  Reordered ergocalciferol 50,000 UT weekly on 1/27/25 for 8 doses.  Monitoring.       Medication reconciliation  Medications reviewed; no scripts needed.    Social Work:  2/24/25:  Contacted social work Gary Bacon LCSW to speak with Brandt today regarding to see if eligible for medication assistance to help with cost of posaconazole ($280/month).  Gary reports that Brandt was approved for Copay assistance, $4,000/year).       IV Access  2/3/25:  PICC line removed.     Psychosocial.    Caregiver Genevieve  [facility] in [place]     RTC:  3/3/35:  Follow up visit with provider and infusion appointment for count check.  Advised to obtain blood work prior to visit.  Instructed to contact if have further GI symptoms.    ---------------------------------------------------------------------------------  HAYLEY Vallejo-ELDER

## 2025-02-24 ENCOUNTER — SOCIAL WORK (OUTPATIENT)
Dept: HEMATOLOGY/ONCOLOGY | Facility: HOSPITAL | Age: 65
End: 2025-02-24
Payer: COMMERCIAL

## 2025-02-24 ENCOUNTER — APPOINTMENT (OUTPATIENT)
Dept: HEMATOLOGY/ONCOLOGY | Facility: HOSPITAL | Age: 65
End: 2025-02-24
Payer: COMMERCIAL

## 2025-02-24 ENCOUNTER — LAB (OUTPATIENT)
Dept: LAB | Facility: HOSPITAL | Age: 65
End: 2025-02-24
Payer: COMMERCIAL

## 2025-02-24 ENCOUNTER — NUTRITION (OUTPATIENT)
Dept: HEMATOLOGY/ONCOLOGY | Facility: HOSPITAL | Age: 65
End: 2025-02-24

## 2025-02-24 ENCOUNTER — OFFICE VISIT (OUTPATIENT)
Dept: HEMATOLOGY/ONCOLOGY | Facility: HOSPITAL | Age: 65
End: 2025-02-24
Payer: COMMERCIAL

## 2025-02-24 VITALS
HEART RATE: 88 BPM | OXYGEN SATURATION: 98 % | SYSTOLIC BLOOD PRESSURE: 139 MMHG | TEMPERATURE: 98.4 F | DIASTOLIC BLOOD PRESSURE: 82 MMHG | WEIGHT: 142.2 LBS | RESPIRATION RATE: 16 BRPM | BODY MASS INDEX: 23.29 KG/M2

## 2025-02-24 VITALS — BODY MASS INDEX: 22.85 KG/M2 | HEIGHT: 66 IN | WEIGHT: 142.2 LBS

## 2025-02-24 DIAGNOSIS — R63.4 WEIGHT LOSS: ICD-10-CM

## 2025-02-24 DIAGNOSIS — D84.9 IMMUNOCOMPROMISED: ICD-10-CM

## 2025-02-24 DIAGNOSIS — C92.00 ACUTE MYELOID LEUKEMIA NOT HAVING ACHIEVED REMISSION (MULTI): ICD-10-CM

## 2025-02-24 DIAGNOSIS — L81.9 HYPERPIGMENTATION OF SKIN: ICD-10-CM

## 2025-02-24 DIAGNOSIS — I10 ESSENTIAL HYPERTENSION: ICD-10-CM

## 2025-02-24 DIAGNOSIS — C92.01 ACUTE MYELOID LEUKEMIA IN REMISSION (MULTI): Primary | ICD-10-CM

## 2025-02-24 DIAGNOSIS — Z94.84 HISTORY OF ALLOGENEIC HEMATOPOIETIC STEM CELL TRANSPLANT: ICD-10-CM

## 2025-02-24 DIAGNOSIS — E87.6 HYPOKALEMIA: ICD-10-CM

## 2025-02-24 DIAGNOSIS — Z94.84 STEM CELLS TRANSPLANT STATUS (MULTI): ICD-10-CM

## 2025-02-24 DIAGNOSIS — E55.9 VITAMIN D DEFICIENCY: ICD-10-CM

## 2025-02-24 DIAGNOSIS — C92.01 ACUTE MYELOID LEUKEMIA IN REMISSION (MULTI): ICD-10-CM

## 2025-02-24 LAB
ALBUMIN SERPL BCP-MCNC: 4 G/DL (ref 3.4–5)
ALP SERPL-CCNC: 59 U/L (ref 33–136)
ALT SERPL W P-5'-P-CCNC: 18 U/L (ref 10–52)
ANION GAP SERPL CALC-SCNC: 12 MMOL/L (ref 10–20)
APTT PPP: 28 SECONDS (ref 27–38)
AST SERPL W P-5'-P-CCNC: 14 U/L (ref 9–39)
BASOPHILS # BLD AUTO: 0.01 X10*3/UL (ref 0–0.1)
BASOPHILS NFR BLD AUTO: 0.1 %
BILIRUB SERPL-MCNC: 0.4 MG/DL (ref 0–1.2)
BUN SERPL-MCNC: 17 MG/DL (ref 6–23)
CALCIUM SERPL-MCNC: 9 MG/DL (ref 8.6–10.3)
CHLORIDE SERPL-SCNC: 102 MMOL/L (ref 98–107)
CHOLEST SERPL-MCNC: 182 MG/DL (ref 0–199)
CHOLESTEROL/HDL RATIO: 2.8
CO2 SERPL-SCNC: 30 MMOL/L (ref 21–32)
CREAT SERPL-MCNC: 0.87 MG/DL (ref 0.5–1.3)
EGFRCR SERPLBLD CKD-EPI 2021: >90 ML/MIN/1.73M*2
EOSINOPHIL # BLD AUTO: 0.04 X10*3/UL (ref 0–0.7)
EOSINOPHIL NFR BLD AUTO: 0.6 %
ERYTHROCYTE [DISTWIDTH] IN BLOOD BY AUTOMATED COUNT: 15.6 % (ref 11.5–14.5)
FIBRINOGEN PPP-MCNC: 244 MG/DL (ref 200–400)
GLUCOSE SERPL-MCNC: 115 MG/DL (ref 74–99)
HCT VFR BLD AUTO: 40.3 % (ref 41–52)
HDLC SERPL-MCNC: 65.6 MG/DL
HGB BLD-MCNC: 13.5 G/DL (ref 13.5–17.5)
IMM GRANULOCYTES # BLD AUTO: 0.28 X10*3/UL (ref 0–0.7)
IMM GRANULOCYTES NFR BLD AUTO: 4 % (ref 0–0.9)
INR PPP: 1 (ref 0.9–1.1)
LDH SERPL L TO P-CCNC: 228 U/L (ref 84–246)
LDLC SERPL CALC-MCNC: 97 MG/DL
LYMPHOCYTES # BLD AUTO: 2.31 X10*3/UL (ref 1.2–4.8)
LYMPHOCYTES NFR BLD AUTO: 32.8 %
MAGNESIUM SERPL-MCNC: 1.71 MG/DL (ref 1.6–2.4)
MCH RBC QN AUTO: 31.8 PG (ref 26–34)
MCHC RBC AUTO-ENTMCNC: 33.5 G/DL (ref 32–36)
MCV RBC AUTO: 95 FL (ref 80–100)
MONOCYTES # BLD AUTO: 1 X10*3/UL (ref 0.1–1)
MONOCYTES NFR BLD AUTO: 14.2 %
NEUTROPHILS # BLD AUTO: 3.41 X10*3/UL (ref 1.2–7.7)
NEUTROPHILS NFR BLD AUTO: 48.3 %
NON HDL CHOLESTEROL: 116 MG/DL (ref 0–149)
NRBC BLD-RTO: 0 /100 WBCS (ref 0–0)
PLATELET # BLD AUTO: 240 X10*3/UL (ref 150–450)
POTASSIUM SERPL-SCNC: 3.3 MMOL/L (ref 3.5–5.3)
PROT SERPL-MCNC: 6.3 G/DL (ref 6.4–8.2)
PROTHROMBIN TIME: 10.9 SECONDS (ref 9.8–12.8)
RBC # BLD AUTO: 4.24 X10*6/UL (ref 4.5–5.9)
SODIUM SERPL-SCNC: 141 MMOL/L (ref 136–145)
TACROLIMUS BLD-MCNC: 10.2 NG/ML
TRIGL SERPL-MCNC: 99 MG/DL (ref 0–149)
TSH SERPL-ACNC: 2.49 MIU/L (ref 0.44–3.98)
URATE SERPL-MCNC: 3.2 MG/DL (ref 4–7.5)
VLDL: 20 MG/DL (ref 0–40)
WBC # BLD AUTO: 7.1 X10*3/UL (ref 4.4–11.3)

## 2025-02-24 PROCEDURE — 84443 ASSAY THYROID STIM HORMONE: CPT

## 2025-02-24 PROCEDURE — 87533 HHV-6 DNA QUANT: CPT

## 2025-02-24 PROCEDURE — 85384 FIBRINOGEN ACTIVITY: CPT

## 2025-02-24 PROCEDURE — 1126F AMNT PAIN NOTED NONE PRSNT: CPT

## 2025-02-24 PROCEDURE — 1111F DSCHRG MED/CURRENT MED MERGE: CPT

## 2025-02-24 PROCEDURE — 83615 LACTATE (LD) (LDH) ENZYME: CPT

## 2025-02-24 PROCEDURE — 99215 OFFICE O/P EST HI 40 MIN: CPT

## 2025-02-24 PROCEDURE — 88185 FLOWCYTOMETRY/TC ADD-ON: CPT

## 2025-02-24 PROCEDURE — 84550 ASSAY OF BLOOD/URIC ACID: CPT

## 2025-02-24 PROCEDURE — 80197 ASSAY OF TACROLIMUS: CPT

## 2025-02-24 PROCEDURE — 3079F DIAST BP 80-89 MM HG: CPT

## 2025-02-24 PROCEDURE — 80061 LIPID PANEL: CPT

## 2025-02-24 PROCEDURE — 83735 ASSAY OF MAGNESIUM: CPT

## 2025-02-24 PROCEDURE — 1157F ADVNC CARE PLAN IN RCRD: CPT

## 2025-02-24 PROCEDURE — 1159F MED LIST DOCD IN RCRD: CPT

## 2025-02-24 PROCEDURE — 85025 COMPLETE CBC W/AUTO DIFF WBC: CPT

## 2025-02-24 PROCEDURE — 36415 COLL VENOUS BLD VENIPUNCTURE: CPT

## 2025-02-24 PROCEDURE — 1160F RVW MEDS BY RX/DR IN RCRD: CPT

## 2025-02-24 PROCEDURE — 87799 DETECT AGENT NOS DNA QUANT: CPT

## 2025-02-24 PROCEDURE — 3075F SYST BP GE 130 - 139MM HG: CPT

## 2025-02-24 PROCEDURE — 80053 COMPREHEN METABOLIC PANEL: CPT

## 2025-02-24 PROCEDURE — 85730 THROMBOPLASTIN TIME PARTIAL: CPT

## 2025-02-24 RX ORDER — POTASSIUM CHLORIDE 20 MEQ/1
20 TABLET, EXTENDED RELEASE ORAL SEE ADMIN INSTRUCTIONS
Qty: 9 TABLET | Refills: 0 | Status: SHIPPED | OUTPATIENT
Start: 2025-02-24

## 2025-02-24 RX ORDER — MAGNESIUM CHLORIDE 64 MG
128 TABLET, DELAYED RELEASE (ENTERIC COATED) ORAL 3 TIMES DAILY
Qty: 180 TABLET | Refills: 1 | Status: SHIPPED | OUTPATIENT
Start: 2025-02-24

## 2025-02-24 ASSESSMENT — PAIN SCALES - GENERAL: PAINLEVEL_OUTOF10: 0-NO PAIN

## 2025-02-24 NOTE — PROGRESS NOTES
Social Work Note    Referral Source: SHARRI Casas NP  Meeting Location: In-Person, clinic  Person(s) Present: Patient, spouse, SW  Identified Needs: Ricardo support  Impression and Plan: SW met with patient and spouse on this day to provide reinforcement of ricardo that patient was approved for from [facility] (Copay Assistance, $4000/year). Informed patient that because no claim was submitted every 90 days from time of approval, he will need to fax claim (medication copay) to [facility] to reinstate funding. Patient voices understanding of how to complete this task to regain access to funding.  Provided patient with direct contact information and encouraged him to reach out with any questions or concerns. Voiced understanding  Interventions Provided: Care Coordination, Education, and Empowerment/Coaching  Estimated Time Spent: 15 minutes    SW will continue to follow as needed.

## 2025-02-24 NOTE — PROGRESS NOTES
"NUTRITION FOLLOW UP NOTE    Reason for Visit:  Brandt Merritt is a 65 y.o. 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    PMH: HTN, Hep C    Currently T+158 (cord); T+110 (haplo)    *Pt with presumed Stage 1/Grade 2 upper GI GVHD; started Budesonide 12/26; decreased to BID on 1/13.    BM bx (1/9):  KRISTIE    Pt and wife seen today in infusion.      Lab Results   Component Value Date/Time    GLUCOSE 115 (H) 02/24/2025 0916     02/24/2025 0916    K 3.3 (L) 02/24/2025 0916     02/24/2025 0916    CO2 30 02/24/2025 0916    ANIONGAP 12 02/24/2025 0916    BUN 17 02/24/2025 0916    CREATININE 0.87 02/24/2025 0916    EGFR >90 02/24/2025 0916    CALCIUM 9.0 02/24/2025 0916    ALBUMIN 4.0 02/24/2025 0916    ALKPHOS 59 02/24/2025 0916    PROT 6.3 (L) 02/24/2025 0916    AST 14 02/24/2025 0916    BILITOT 0.4 02/24/2025 0916    ALT 18 02/24/2025 0916    MG 1.71 02/24/2025 0916    PHOS 3.2 02/14/2025 0754     *On MgCl 2 tabs TID     Lab Results   Component Value Date    WBC 7.1 02/24/2025    HGB 13.5 02/24/2025    HCT 40.3 (L) 02/24/2025    MCV 95 02/24/2025     02/24/2025       Lab Results   Component Value Date/Time    VITD25 28 (L) 01/27/2025 1320   *Started Vit D2 50,000IU x 8 weeks on 12/4.    Anthropometrics:  Anthropometrics  Height: 166.4 cm (5' 5.51\")  Weight: 64.5 kg (142 lb 3.2 oz)  BMI (Calculated): 23.29  IBW/kg (Dietitian Calculated): 63.2 kg  Percent of IBW: 102 %    *Wt at time of transplant admit: (9/13) 75.6 kg   *Wt at first post-transplant visit: (11/25): 60.9 kg   Overall, pt with ~15 kg (20%) wt loss x ~4 mos    *Wt stable b/t 64-65 kg x 1 month     Wt Readings from Last 10 Encounters:   02/24/25 64.5 kg (142 lb 3.2 oz)   02/24/25 64.5 kg (142 lb 3.2 oz)   02/17/25 65.8 kg (145 lb 1 oz)   02/11/25 65.8 kg (145 lb 1 oz)   02/11/25 64.5 kg (142 lb 4.9 oz)   02/10/25 65.3 kg (144 lb)   02/03/25 65.1 kg (143 lb 8.3 " oz)   01/30/25 64.7 kg (142 lb 10.2 oz)   01/27/25 62.6 kg (137 lb 14.4 oz)   01/23/25 62.1 kg (136 lb 14.5 oz)   12/30/24        58.8 kg   12/23/24        61.2 kg  12/04/24        60.9 kg  Admit 9/13-11/25 09/09/24        74.2 kg  08/16/24        75.3 kg   07/19/24        74.2 kg   06/24/24        71.0 kg  05/20/24        72.8 kg  04/23/24        75.5 kg     Food And Nutrient Intake:      Had antipasta salad each time he got sick   Last Monday night had it again and got N/V/D  This weekend had antipasta and got sick   Bought from behind the aden   Reviewed food safety guidelines in detail with rationale as to why need to avoid.              --------------------------  Illness came on suddenly overnight  Diarrhea x 1-2 episodes a day since last Friday  Started vomiting this am--~6 episodes from 2-6 am this morning.  All stomach acids; no food.   Temp 98.9  No nausea preceeding vomiting   Sweating this am after finished vomiting; didn't check temp  No cold  Was eating okay up until this am--only took BP meds this am (stayed down)   Did not take any nausea meds   Feels okay now--no diarrhea so far today  Main c/o is feeling cold and fatigue            ---------------  Appetite and intakes remain good.   Eating main meals at breakfast and dinner; snacks t/o the afternoon as well as in the evening.   Fluid intakes good; drinking mainly plain water and flavored water.  Intermittently will have a milkshake.   Eating well; tastes pretty much back to baseline.   No N/V; no stomach upset. Remains on Budesonide once daily.   No diarrhea but does have soft stools 1x/day.  Feels good; energy continues to improve.     Excited because tomorrow is his birthday and he is planning on retiring.      Dislikes all ONS/supplements.   Started Remeron 15 mg on 11/27.                                                             Nutrition Focused Physical Exam Findings:                          Energy Needs  Calculated Energy Needs Using  "Equations  Height: 166.4 cm (5' 5.51\")        Nutrition Diagnosis        *Remains mildly malnourished, hoever, overall nutrition status had improved considerably since starting Budesonide--now tapering off.  Appetite, intakes and dysgeusia all improving.     Nutrition Interventions/Recommendations   Nutrition Prescription   High Protein, High Calorie  Food Safety     Nutrition Education   Pt aware of importance of protein intakes; include source with all meals and snacks.   Encouraged PO fluids; Goal: 60+ oz daily; prefer calorie containing foods (ie milk, juices, etc) for  additional protein   Consider Milkshakes, smoothies or regular use of ice cream as snack in hopes of continuing to  maximize protein/stefany intakes (pt dislikes ONS).      Coordination of Care   NP/BMT team         Nutrition Monitoring and Evaluation      Will continue to f/up and monitor weight, labs. PO intakes and GI symptoms.              "

## 2025-02-26 ENCOUNTER — SOCIAL WORK (OUTPATIENT)
Dept: HEMATOLOGY/ONCOLOGY | Facility: HOSPITAL | Age: 65
End: 2025-02-26
Payer: COMMERCIAL

## 2025-02-26 LAB
ADENOVIRUS QPCR,PLASMA, VIRC: NOT DETECTED COPIES/ML
CD19 CELLS # BLD: 0 X10E9/L
CD19 CELLS NFR BLD: 0 %
CD3 CELLS # BLD: 1.8 X10E9/L
CD3 CELLS NFR SPEC: 78 %
CD3+CD4+ CELLS # BLD: 0.55 X10E9/L
CD3+CD4+ CELLS # BLD: 554 /MM3
CD3+CD4+ CELLS NFR BLD: 24 %
CD3+CD4+ CELLS/CD3+CD8+ CLL BLD: 0.44 %
CD3+CD4-CD8-CD45+ CELLS NFR BLD: 0.4 %
CD3+CD8+ CELLS # BLD: 1.25 X10E9/L
CD3+CD8+ CELLS NFR BLD: 54 %
CD3-CD16+CD56+ CELLS # BLD: 0.48 X10E9/L
CD3-CD16+CD56+ CELLS NFR BLD: 21 %
FLOW CYTOMETRY SPECIALIST REVIEW: ABNORMAL
HUMAN HERPESVIRUS-6 PCR PLASMA: NOT DETECTED COPIES/ML
LYMPHOCYTES # SPEC AUTO: 2.31 X10*3/UL
PATH REVIEW, IMMUNODEFICIENCY PROFILE: ABNORMAL

## 2025-02-26 NOTE — PROGRESS NOTES
Ricardo Application    Identified Need: Transportation  Ricardo Name: ANDREW Urgent Need  Application Submitted via: online portal on 02/26/25  Anticipated Ricardo Award Amount: $500/year, check  Patient's application returned as pending. Patient will need to submit income verification to gain approval for funding.    Pt agreeable to plan. SW will continue to follow.

## 2025-02-27 PROCEDURE — RXMED WILLOW AMBULATORY MEDICATION CHARGE

## 2025-02-28 ENCOUNTER — PHARMACY VISIT (OUTPATIENT)
Dept: PHARMACY | Facility: CLINIC | Age: 65
End: 2025-02-28
Payer: COMMERCIAL

## 2025-03-02 ASSESSMENT — ENCOUNTER SYMPTOMS
HEMATOLOGIC/LYMPHATIC NEGATIVE: 1
FATIGUE: 0
CARDIOVASCULAR NEGATIVE: 1
CHILLS: 0
UNEXPECTED WEIGHT CHANGE: 0
RESPIRATORY NEGATIVE: 1
NEUROLOGICAL NEGATIVE: 1
DIAPHORESIS: 0
APPETITE CHANGE: 0
MUSCULOSKELETAL NEGATIVE: 1
FEVER: 0
GASTROINTESTINAL NEGATIVE: 1

## 2025-03-03 ENCOUNTER — TELEPHONE (OUTPATIENT)
Dept: HEMATOLOGY/ONCOLOGY | Facility: HOSPITAL | Age: 65
End: 2025-03-03
Payer: COMMERCIAL

## 2025-03-03 ENCOUNTER — OFFICE VISIT (OUTPATIENT)
Dept: HEMATOLOGY/ONCOLOGY | Facility: HOSPITAL | Age: 65
End: 2025-03-03
Payer: COMMERCIAL

## 2025-03-03 ENCOUNTER — APPOINTMENT (OUTPATIENT)
Dept: HEMATOLOGY/ONCOLOGY | Facility: HOSPITAL | Age: 65
End: 2025-03-03
Payer: COMMERCIAL

## 2025-03-03 ENCOUNTER — LAB (OUTPATIENT)
Dept: LAB | Facility: HOSPITAL | Age: 65
End: 2025-03-03
Payer: COMMERCIAL

## 2025-03-03 VITALS
DIASTOLIC BLOOD PRESSURE: 72 MMHG | RESPIRATION RATE: 15 BRPM | WEIGHT: 147.5 LBS | HEART RATE: 93 BPM | SYSTOLIC BLOOD PRESSURE: 132 MMHG | BODY MASS INDEX: 24.16 KG/M2 | OXYGEN SATURATION: 100 % | TEMPERATURE: 98.2 F

## 2025-03-03 DIAGNOSIS — C92.01 ACUTE MYELOID LEUKEMIA IN REMISSION (MULTI): ICD-10-CM

## 2025-03-03 DIAGNOSIS — C92.01 AML (ACUTE MYELOID LEUKEMIA) IN REMISSION (MULTI): Primary | ICD-10-CM

## 2025-03-03 DIAGNOSIS — C92.00 ACUTE MYELOID LEUKEMIA NOT HAVING ACHIEVED REMISSION (MULTI): ICD-10-CM

## 2025-03-03 DIAGNOSIS — Z94.84 HISTORY OF ALLOGENEIC HEMATOPOIETIC STEM CELL TRANSPLANT: ICD-10-CM

## 2025-03-03 DIAGNOSIS — E87.6 HYPOKALEMIA: ICD-10-CM

## 2025-03-03 DIAGNOSIS — C92.01 AML (ACUTE MYELOID LEUKEMIA) IN REMISSION (MULTI): ICD-10-CM

## 2025-03-03 LAB
ALBUMIN SERPL BCP-MCNC: 4.1 G/DL (ref 3.4–5)
ALP SERPL-CCNC: 59 U/L (ref 33–136)
ALT SERPL W P-5'-P-CCNC: 17 U/L (ref 10–52)
ANION GAP SERPL CALC-SCNC: 15 MMOL/L (ref 10–20)
AST SERPL W P-5'-P-CCNC: 13 U/L (ref 9–39)
BASOPHILS # BLD MANUAL: 0.09 X10*3/UL (ref 0–0.1)
BASOPHILS NFR BLD MANUAL: 1 %
BILIRUB SERPL-MCNC: 0.3 MG/DL (ref 0–1.2)
BUN SERPL-MCNC: 21 MG/DL (ref 6–23)
BURR CELLS BLD QL SMEAR: NORMAL
CALCIUM SERPL-MCNC: 8.7 MG/DL (ref 8.6–10.3)
CHLORIDE SERPL-SCNC: 107 MMOL/L (ref 98–107)
CO2 SERPL-SCNC: 23 MMOL/L (ref 21–32)
CREAT SERPL-MCNC: 0.92 MG/DL (ref 0.5–1.3)
DACRYOCYTES BLD QL SMEAR: NORMAL
EGFRCR SERPLBLD CKD-EPI 2021: >90 ML/MIN/1.73M*2
EOSINOPHIL # BLD MANUAL: 0.09 X10*3/UL (ref 0–0.7)
EOSINOPHIL NFR BLD MANUAL: 1 %
ERYTHROCYTE [DISTWIDTH] IN BLOOD BY AUTOMATED COUNT: 15.9 % (ref 11.5–14.5)
GIANT PLATELETS BLD QL SMEAR: NORMAL
GLUCOSE SERPL-MCNC: 112 MG/DL (ref 74–99)
HCT VFR BLD AUTO: 40 % (ref 41–52)
HGB BLD-MCNC: 12.8 G/DL (ref 13.5–17.5)
IGG SERPL-MCNC: 435 MG/DL (ref 700–1600)
IMM GRANULOCYTES # BLD AUTO: 0.47 X10*3/UL (ref 0–0.7)
IMM GRANULOCYTES NFR BLD AUTO: 5.4 % (ref 0–0.9)
LYMPHOCYTES # BLD MANUAL: 2.35 X10*3/UL (ref 1.2–4.8)
LYMPHOCYTES NFR BLD MANUAL: 27 %
MAGNESIUM SERPL-MCNC: 1.62 MG/DL (ref 1.6–2.4)
MCH RBC QN AUTO: 31.1 PG (ref 26–34)
MCHC RBC AUTO-ENTMCNC: 32 G/DL (ref 32–36)
MCV RBC AUTO: 97 FL (ref 80–100)
MONOCYTES # BLD MANUAL: 0.7 X10*3/UL (ref 0.1–1)
MONOCYTES NFR BLD MANUAL: 8 %
NEUTROPHILS # BLD MANUAL: 5.49 X10*3/UL (ref 1.2–7.7)
NEUTS BAND # BLD MANUAL: 0.44 X10*3/UL (ref 0–0.7)
NEUTS BAND NFR BLD MANUAL: 5 %
NEUTS SEG # BLD MANUAL: 5.05 X10*3/UL (ref 1.2–7)
NEUTS SEG NFR BLD MANUAL: 58 %
NRBC BLD-RTO: 0 /100 WBCS (ref 0–0)
OVALOCYTES BLD QL SMEAR: NORMAL
PLATELET # BLD AUTO: 231 X10*3/UL (ref 150–450)
PLATELET CLUMP BLD QL SMEAR: PRESENT
POTASSIUM SERPL-SCNC: 3.9 MMOL/L (ref 3.5–5.3)
PROT SERPL-MCNC: 6.1 G/DL (ref 6.4–8.2)
RBC # BLD AUTO: 4.12 X10*6/UL (ref 4.5–5.9)
RBC MORPH BLD: NORMAL
SCHISTOCYTES BLD QL SMEAR: NORMAL
SODIUM SERPL-SCNC: 141 MMOL/L (ref 136–145)
TACROLIMUS BLD-MCNC: 5.6 NG/ML
TOTAL CELLS COUNTED BLD: 100
WBC # BLD AUTO: 8.7 X10*3/UL (ref 4.4–11.3)

## 2025-03-03 PROCEDURE — 87799 DETECT AGENT NOS DNA QUANT: CPT

## 2025-03-03 PROCEDURE — 85007 BL SMEAR W/DIFF WBC COUNT: CPT

## 2025-03-03 PROCEDURE — 36415 COLL VENOUS BLD VENIPUNCTURE: CPT

## 2025-03-03 PROCEDURE — 99215 OFFICE O/P EST HI 40 MIN: CPT | Performed by: INTERNAL MEDICINE

## 2025-03-03 PROCEDURE — 80197 ASSAY OF TACROLIMUS: CPT

## 2025-03-03 PROCEDURE — 1036F TOBACCO NON-USER: CPT | Performed by: INTERNAL MEDICINE

## 2025-03-03 PROCEDURE — 1126F AMNT PAIN NOTED NONE PRSNT: CPT | Performed by: INTERNAL MEDICINE

## 2025-03-03 PROCEDURE — 83735 ASSAY OF MAGNESIUM: CPT

## 2025-03-03 PROCEDURE — 1111F DSCHRG MED/CURRENT MED MERGE: CPT | Performed by: INTERNAL MEDICINE

## 2025-03-03 PROCEDURE — 1159F MED LIST DOCD IN RCRD: CPT | Performed by: INTERNAL MEDICINE

## 2025-03-03 PROCEDURE — 82784 ASSAY IGA/IGD/IGG/IGM EACH: CPT

## 2025-03-03 PROCEDURE — 1157F ADVNC CARE PLAN IN RCRD: CPT | Performed by: INTERNAL MEDICINE

## 2025-03-03 PROCEDURE — 3078F DIAST BP <80 MM HG: CPT | Performed by: INTERNAL MEDICINE

## 2025-03-03 PROCEDURE — 3075F SYST BP GE 130 - 139MM HG: CPT | Performed by: INTERNAL MEDICINE

## 2025-03-03 PROCEDURE — 85027 COMPLETE CBC AUTOMATED: CPT

## 2025-03-03 PROCEDURE — 84075 ASSAY ALKALINE PHOSPHATASE: CPT

## 2025-03-03 PROCEDURE — 87533 HHV-6 DNA QUANT: CPT

## 2025-03-03 RX ORDER — BUDESONIDE 3 MG/1
3 CAPSULE, COATED PELLETS ORAL DAILY
Qty: 30 CAPSULE | Refills: 0 | Status: SHIPPED | OUTPATIENT
Start: 2025-03-03 | End: 2025-04-02

## 2025-03-03 RX ORDER — TACROLIMUS 0.5 MG/1
0.5 CAPSULE ORAL EVERY 12 HOURS
Qty: 60 CAPSULE | Refills: 2 | Status: SHIPPED | OUTPATIENT
Start: 2025-03-03

## 2025-03-03 RX ORDER — POTASSIUM CHLORIDE 20 MEQ/1
20 TABLET, EXTENDED RELEASE ORAL SEE ADMIN INSTRUCTIONS
Qty: 30 TABLET | Refills: 1 | Status: SHIPPED | OUTPATIENT
Start: 2025-03-03 | End: 2025-04-02

## 2025-03-03 ASSESSMENT — PAIN SCALES - GENERAL: PAINLEVEL_OUTOF10: 0-NO PAIN

## 2025-03-03 NOTE — PROGRESS NOTES
Patient ID:  Brandt Merritt is a 65 y.o. male.  Referring Physician:   BMT Dr Newsome  Primary Care Provider:  Bill Richmond MD      Oncology History Overview Note   4/2024  Myelodysplastic neoplasm with increased blasts-2 ( WHO)  Myelodysplastic neoplasm/AML  (ICC 2022 classification)    Presented in  10/2023 for pancytopenia, found to have hemolysis. Patient had normal CBC June 2020. Denied any hemorrhoidal bleeding . Has had C Scope and EGD , treated Hep C 2014 . 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      CBC 4/11/24 wbc 3.0, hgb 7.1, platelets 167K ANC 0.73    BM biopsy done on 4/11/24  as folllows:  BM histology  Myelodysplastic neoplasm with increased blasts-2 ( WHO)  Myelodysplastic neoplasm/AML  (ICC 2022 classification)  Balsts 11% on aspirate smear and 15-20% based on CD 34 immunostaining approaching AML leukemia, hematooiesis is erythroid dominant with dysplasia in granulocytes and megakaryocytes.   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      11/6/2024 -  Bone Marrow Transplant    conditioned with Flu/Cy/TBI and aGVHD prophylaxis with post-transplant cyclophosphamide, tacrolimus, and mycophenolate. T=0, 11/6/24. ABO Donor/Recipient: O+, A+. CMV donor/recipient: both positive. Started Tacro and MMF on T+5 (11/11).         Response:      Past Medical History:  HTN   STEMI 11/11/2020 after getting  a water pills.  Chronic hepatitis C  infection treated in  treated  with Dr. Lloyd  Past Medical History:   Diagnosis Date    Chest pain 2021    HTN (hypertension) 10/05/2023    Hypertension     Leukemia (Multi)     Personal history of other diseases of the circulatory system     History of hypertension      Surgical History:  Conosocopy 2021  Upper EGD 10/31/23  Surgical reduction torsion of testes      Past Surgical History:   Procedure Laterality Date    COLONOSCOPY  2013    Complete Colonoscopy    OTHER SURGICAL HISTORY  10/07/2015    Surgery Testis Reduction Of Torsion Of Testis Right      Family History:  CAD, DM in mother       Mother  of CVA at age 69  Brother with prostate CA, 1 sister with liver disease  2 children healthy  Family History   Problem Relation Name Age of Onset    Other (diabetes mellitus) Mother      Prostate cancer Brother        Social History:  Lives with wife of 30 years, works at [employer], [employer]ician ultrasounds metals, now retired.   29 years, former smoker, smoked x 15 yrs quit 20+ years ago. Marijuana 3 x a week. Served in [employer] in [place] and [facility] [employer], .  ----------------------------------------------------------------------------------------------------  Subjective    History of Present Illness:    Brandt Merritt is a 65 year old male with history of myelodysplastic neoplasm/AML, s/p umbilical cord blood transplant 10/16/24 with graft rejection followed by haploidentical cord from his sister on 24 with flu/cy, ATG and modified dose cyclophosphamide with engraftment on day T+8.  Today he is T+ 158 of umbilical cord transfusion and is T+ 110 of his haplo sister.       Brandt was admitted on -25 for intractable vomiting and some diarrhea, also with low grade fever.  He received broad spectrum antibiotics and all cultures were negative.  He underwent an upper endoscopy which was grossly normal,  biopsies negative for GVHD, and  budesonide was increased to 3 mg po bid.     Brandt presents to the clinic today (3/3/25) with his wife Genevieve for follow up evaluation and count check.  Energy level is good.  Appetite is good, is getting better.  Currently eating 2 meals per day and eats snacks. Weight continues to improve.   Had one episode of diarrhea after eating cheerios with milk.  Currently taking budesonide 3 mg daily.  Held AM dose of tacrolimus today prior to blood draws.  Taking tacrolimus 0.5 mg twice daily.  Continues to have hyperpigmented spots to face and hands, feels like hyperpigmentation is lightening up.  No itching.  Using cerave soap and sunscreen.      Review of Systems   Constitutional:  Negative for appetite change, chills, diaphoresis, fatigue, fever and unexpected weight change.   Respiratory: Negative.     Cardiovascular: Negative.    Gastrointestinal: Negative.    Genitourinary: Negative.     Musculoskeletal: Negative.    Skin:         Hyperpigmented skin to face   Neurological: Negative.    Hematological: Negative.    --------------------------------------------------------------------------------------------------------  Objective:    Visit Vitals  /72 (BP Location: Left arm, Patient Position: Sitting, BP Cuff Size: Adult)   Pulse 93   Temp 36.8 °C (98.2 °F) (Skin)   Resp 15        Vitals:    03/03/25 1440   Weight: 66.9 kg (147 lb 8 oz)        Physical Exam  Vitals reviewed.   Constitutional:       Appearance: Normal appearance.   Eyes:      Extraocular Movements: Extraocular movements intact.      Conjunctiva/sclera: Conjunctivae normal.      Pupils: Pupils are equal, round, and reactive to light.   Cardiovascular:      Rate and Rhythm: Normal rate and regular rhythm.      Pulses: Normal pulses.      Heart sounds: Normal heart sounds.   Pulmonary:      Effort: Pulmonary effort is normal.      Breath sounds: Normal breath sounds.   Abdominal:      General: Abdomen is flat. Bowel sounds are normal.       Palpations: Abdomen is soft. There is no mass.      Tenderness: There is no abdominal tenderness.   Musculoskeletal:         General: No swelling. Normal range of motion.   Lymphadenopathy:      Comments: No lymphadenopathy   Skin:     General: Skin is warm and dry.      Comments: Hyperpigmented skin to face   Neurological:      General: No focal deficit present.      Mental Status: He is alert and oriented to person, place, and time.   Psychiatric:         Mood and Affect: Mood normal.         Behavior: Behavior normal.     -----------------------------------------------------------------------------------------------------------  Assessment and Plan:    MDS/Acute myeloid leukemia in remission (Multi)  Diagnosed 4/2024: BM Bx with MDS in in transition to AML (>10% blast) w/ trisomy 8, DNMT alpha, and U2AF1 mutation indication adverse risk.      s/p 4 cycles of Decitabine/Venetoclax. BM Bx 6/17/24: Blasts cleared, FISH still positive for trisomy 8, still MDS changes   BMBx (9/5/24): 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 and no increase in blasts      History of allogeneic hematopoietic stem cell transplant  #1: Single-unit Cord, T0=9/19/24, Primary Engraftment Failure  - Conditioning: Flu-Mariana-TBI  - Donor: Single Cord, 6/8  = ABO Donor / Recipient: A+ / A+  = CMV Donor / Recipient: - / +  - aGVHD Prophylaxis: Tacrolimus + MMF  - engraftment failure, developed HLA antibody against class I of cord  - Repeat BMBx (10/15): hypocellular with no residual myeloid neoplasm. 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), T0=11/6/24  - Graft: Haploidentical sister  = ABO Donor / Recipient: O+ / A+ (ABO incompatibility +)  = CMV Donor / Recipient: + / +  - Conditioning: Flu/Cy/TBI/rATG  = GVHD prophylaxis -  rATG 1.5mg/kg T-5,-3,-1, PTCy (25mg/kg T+3, T+4), Tacro, MMF (T+5)     DATE DAY SOURCE MORPHOLOGY MRD WHOLE CHIMERISM   (% donor) CD3 CHIMERISM   (% donor) CD33 CHIMERISM   (%  donor)   11/20/24 D+30 PB      100       12/11/24 D+30 PB       100 100   Deferred D+30 BM             1/9/25 D+60 BM  KRISTIE , flow negative      100  100     D+60 PB               D+100 PB               D+100 BM  Defer              3/3/25 will schedule day 100 abraham bone marrow biopsy, needs chimerism sent      Monitor for post-transplant hemolysis   (2/24/25)  Haptoglobin  146 (2/17/25).  UPC ratio 0.11 2/17/25     Anemia  12/18 Epo 113.   12/16 Retic 7.2%  DARBY on hold.  Cont folic acid.     GVHD prophylaxis  GVHD  Anorexia/poor oral intake/weight loss. Added Mirtazapine. Continue Zofran. If persists, consider aGVHD.  12/9/24:  Currently taking tacrolimus 0.5 mg BID, held dose today prior to labs.  FK level 4.6 (12/9/24).  Advised patient on 12/10/24 to increase tacrolimus dose to 1 mg in the AM and 0.5 mg in the PM.    Wean MMF from 1 g TID to 500 mg TID 12/2/24 - present  Due to poor appetite will decrease MMF to 500 mg po tid.  Improvement to GI symptoms since decreasing MMF dose.    Stopped  MMF on T+35,      Stage I/Grade II Upper GI GVHD  Anorexia, taste changes and weight loss 171# (9/17/24).  Cont mirtazapine and ATC Zofran.   Add budesonide 3x day.   12/30/24:  Started on budesonide TID on 12/27/24.  Brandt reports that he has starting eating more since starting budesonide.  Reports no nausea, vomiting, or diarrhea at this time.    Appetite continuing to improve. Denies N/V/D.     1/13/25 Decreased budesonide bid per Dr Newsome; 1/27 Decrease to 3mg daily. Will continue on this dose for 1-2 weeks before discontinuing.     2/17/25 Recent admission for nausea, budesonide increased to 3 mg po bid, continues therapeutic on tacro 0.5 mg po bid. Esophageal biopsy 2/13/25 negative for GVHD. Consider decreasing budesonide to 3 mg daily next visit  3/3/25:  Overall doing well.  Nausea has resolved. Reviewed food safety precautions and had nutritionist Blanca Morgan, RD,LD come to speak with patient and wife  today.  Continue budesonide 3 mg daily.  FK level 5.6.  Continue tacrolimus 0.5 mg BID.  Advised Brandt to contact oncology team if have GI symptoms worsen,  consider discontinuing budesonide next visit     Weights: see below    Wt Readings from Last 5 Encounters:   03/03/25 66.9 kg (147 lb 8 oz)   02/24/25 64.5 kg (142 lb 3.2 oz)   02/24/25 64.5 kg (142 lb 3.2 oz)   02/17/25 65.8 kg (145 lb 1 oz)   02/11/25 65.8 kg (145 lb 1 oz)      Infectious Disease & Immune Reconstitution      Prophylaxis  Antiviral prophylaxis: acyclovir, Letermovir   Antifungal: posaconazole  PCP prophylaxis: 10/26/24 - 11/28/24 Pentamidine; cont Bactrim     Hypogammaglobulinemia  IgG level. IVIG for level <400   2/17/25: IgG 505     Active Surveillance:     CMV detected 688 1/2/25, 167 1/6/25. Levels undetectable since 1/13/25, CMV ND (2/17/25), level in process from 2/24/25.    Started valgancicyclovir 900mg BID on 1/6/25,   Plan 1/20/25 decrease to 900 mg daily for 2 weeks (2/3), then transition back to letermovir/acyclovir.    Adeno ND 2/17/25  HHV6 ND 2/17/25  EBV ND 2/17/25  EBV Viremia during transplant admission  Treated despite low levels due to upcoming second SCT  Rituximab 600 mg x 1 (10/31/24)     Infectious Disease follow up evaluation: 1/10/25      Immunodeficiency panel 100 days, 6mos and 1 year.      Immunizations:   Covid vaccine series. Consider at T+ 3 months  Seasonal influenza vaccine. Consider at T+ 3 month  Will plan to begin immunizations at T+6mths (May 2025) depending on degree of immunosuppression  3/3/25 Advised to get influenza vaccine and covid vaccine.      Cardiac:    Essential hypertension- was started on nifedipine 60 mg daily on 1/20/25 and started lisinopril 5 mg daily on 2/3/25.   Hx of STEMI (11/11/2020)  Cardiology  appt on 12/18/24  ECHO 12/23/24: LVEF 65%     Ascending aorta dilation   TTE (4/29/24): 3.8-4 cm dilation      Electrolyte disturbance   Continue oral magnesium 128 mg TID. Mg level today  was 1.71 (2/24/25)  Potassium level 3.3 (2/24/25), prescribed potassium chloride 40 meq daily for 2 days, then to take 20 meq daily for 5 days.  Will monitor.     Dry Skin hyperpigmentation:  Hyperpigmented skin to face.  Derm E-consult on 2/3/25 with Dr. Chavez, feels may be due to bactrim, which I prefer not to discontinue. May also be caused by prochlorperazine.  NPV with dermatology Dr. Medel on 6/18/25.    2/24/25:  Continues to have hyperpigmented skin to face and bilateral palms of hands.  Brandt feels his hyperpigmentation is lighted slightly.  Instructed to continue to use moisturizer such as cerave that are fragrance free and gentle.  Advised about mineral based sun screen.      Lack of appetite, improved  Mirtazapine 15 mg at night as of 11/26/24     Vitamin D deficiency  12/18 Level 27.  Last dose of weekly supplement taken. 1/27 Repeat level was 28.  Reordered ergocalciferol 50,000 UT weekly on 1/27/25 for 8 doses.  Monitoring.       Medication reconciliation  Medications reviewed; no scripts needed.    Social Work:  2/24/25:  Contacted social work Gary Bacon LCSW to speak with Brandt today regarding to see if eligible for medication assistance to help with cost of posaconazole ($280/month).  Gary reports that Brandt was approved for Copay assistance, $4,000/year).       IV Access  2/3/25:  PICC line removed.     Psychosocial.    Caregiver Genevieve  [facility] in [place]     RTC: 3/17/25  Advised to obtain blood work prior to visit.  Instructed to contact if have further GI symptoms.    ---------------------------------------------------------------------------------  Malaika Newsome MD

## 2025-03-03 NOTE — TELEPHONE ENCOUNTER
I called Brandt back and relayed the information from Dr Newsome's team that in order for his labs to be resulted in time for today's appt he will need to come to a [facility]. Patient verbalized understanding and agreement with the plan.  Vianca SHAFERN, RN CMSRN

## 2025-03-03 NOTE — TELEPHONE ENCOUNTER
Brandt called in to see if he has his labs drawn this morning at [email or web address] lab would that be enough time for them to result for his clinic and treatment appts this afternoon or would he need to reschedule. Message sent to team to advise. Patient will require a call back with instructions.  Vianca SHAFERN, RN CMSRN

## 2025-03-04 LAB
CMV DNA SERPL NAA+PROBE-LOG IU: NORMAL {LOG_IU}/ML
EBV DNA BLD NAA+PROBE-LOG IU: NORMAL {LOG_IU}/ML
LABORATORY COMMENT REPORT: NOT DETECTED
LABORATORY COMMENT REPORT: NOT DETECTED

## 2025-03-10 ENCOUNTER — APPOINTMENT (OUTPATIENT)
Dept: HEMATOLOGY/ONCOLOGY | Facility: HOSPITAL | Age: 65
End: 2025-03-10
Payer: COMMERCIAL

## 2025-03-10 ENCOUNTER — LAB (OUTPATIENT)
Dept: LAB | Facility: HOSPITAL | Age: 65
End: 2025-03-10
Payer: COMMERCIAL

## 2025-03-10 DIAGNOSIS — Z94.84 STEM CELLS TRANSPLANT STATUS (MULTI): ICD-10-CM

## 2025-03-10 DIAGNOSIS — C92.01 ACUTE MYELOID LEUKEMIA IN REMISSION (MULTI): ICD-10-CM

## 2025-03-10 LAB
ALBUMIN SERPL BCP-MCNC: 4 G/DL (ref 3.4–5)
ALP SERPL-CCNC: 62 U/L (ref 33–136)
ALT SERPL W P-5'-P-CCNC: 16 U/L (ref 10–52)
ANION GAP SERPL CALC-SCNC: 13 MMOL/L (ref 10–20)
AST SERPL W P-5'-P-CCNC: 13 U/L (ref 9–39)
BASOPHILS # BLD AUTO: 0.01 X10*3/UL (ref 0–0.1)
BASOPHILS NFR BLD AUTO: 0.1 %
BILIRUB SERPL-MCNC: 0.3 MG/DL (ref 0–1.2)
BUN SERPL-MCNC: 22 MG/DL (ref 6–23)
CALCIUM SERPL-MCNC: 8.9 MG/DL (ref 8.6–10.3)
CHLORIDE SERPL-SCNC: 106 MMOL/L (ref 98–107)
CO2 SERPL-SCNC: 26 MMOL/L (ref 21–32)
CREAT SERPL-MCNC: 1.12 MG/DL (ref 0.5–1.3)
EGFRCR SERPLBLD CKD-EPI 2021: 72 ML/MIN/1.73M*2
EOSINOPHIL # BLD AUTO: 0.08 X10*3/UL (ref 0–0.7)
EOSINOPHIL NFR BLD AUTO: 0.9 %
ERYTHROCYTE [DISTWIDTH] IN BLOOD BY AUTOMATED COUNT: 16.4 % (ref 11.5–14.5)
GLUCOSE SERPL-MCNC: 97 MG/DL (ref 74–99)
HCT VFR BLD AUTO: 42.6 % (ref 41–52)
HGB BLD-MCNC: 13.8 G/DL (ref 13.5–17.5)
IGG SERPL-MCNC: 410 MG/DL (ref 700–1600)
IMM GRANULOCYTES # BLD AUTO: 0.42 X10*3/UL (ref 0–0.7)
IMM GRANULOCYTES NFR BLD AUTO: 4.7 % (ref 0–0.9)
LDH SERPL L TO P-CCNC: 247 U/L (ref 84–246)
LYMPHOCYTES # BLD AUTO: 2.49 X10*3/UL (ref 1.2–4.8)
LYMPHOCYTES NFR BLD AUTO: 27.8 %
MAGNESIUM SERPL-MCNC: 2.14 MG/DL (ref 1.6–2.4)
MCH RBC QN AUTO: 31.3 PG (ref 26–34)
MCHC RBC AUTO-ENTMCNC: 32.4 G/DL (ref 32–36)
MCV RBC AUTO: 97 FL (ref 80–100)
MONOCYTES # BLD AUTO: 1.28 X10*3/UL (ref 0.1–1)
MONOCYTES NFR BLD AUTO: 14.3 %
NEUTROPHILS # BLD AUTO: 4.67 X10*3/UL (ref 1.2–7.7)
NEUTROPHILS NFR BLD AUTO: 52.2 %
NRBC BLD-RTO: 0 /100 WBCS (ref 0–0)
PLATELET # BLD AUTO: 232 X10*3/UL (ref 150–450)
POTASSIUM SERPL-SCNC: 3.9 MMOL/L (ref 3.5–5.3)
PROT SERPL-MCNC: 6 G/DL (ref 6.4–8.2)
RBC # BLD AUTO: 4.41 X10*6/UL (ref 4.5–5.9)
SODIUM SERPL-SCNC: 141 MMOL/L (ref 136–145)
TACROLIMUS BLD-MCNC: 5.9 NG/ML
URATE SERPL-MCNC: 3.2 MG/DL (ref 4–7.5)
WBC # BLD AUTO: 9 X10*3/UL (ref 4.4–11.3)

## 2025-03-10 PROCEDURE — 80053 COMPREHEN METABOLIC PANEL: CPT

## 2025-03-10 PROCEDURE — 83615 LACTATE (LD) (LDH) ENZYME: CPT

## 2025-03-10 PROCEDURE — 87799 DETECT AGENT NOS DNA QUANT: CPT

## 2025-03-10 PROCEDURE — 87533 HHV-6 DNA QUANT: CPT

## 2025-03-10 PROCEDURE — 85025 COMPLETE CBC W/AUTO DIFF WBC: CPT

## 2025-03-10 PROCEDURE — 84550 ASSAY OF BLOOD/URIC ACID: CPT

## 2025-03-10 PROCEDURE — 80197 ASSAY OF TACROLIMUS: CPT

## 2025-03-10 PROCEDURE — 83735 ASSAY OF MAGNESIUM: CPT

## 2025-03-10 PROCEDURE — 36415 COLL VENOUS BLD VENIPUNCTURE: CPT

## 2025-03-10 PROCEDURE — 82784 ASSAY IGA/IGD/IGG/IGM EACH: CPT

## 2025-03-16 ASSESSMENT — ENCOUNTER SYMPTOMS
CARDIOVASCULAR NEGATIVE: 1
FEVER: 0
UNEXPECTED WEIGHT CHANGE: 0
DIAPHORESIS: 0
MUSCULOSKELETAL NEGATIVE: 1
NEUROLOGICAL NEGATIVE: 1
RESPIRATORY NEGATIVE: 1
CHILLS: 0
FATIGUE: 0
HEMATOLOGIC/LYMPHATIC NEGATIVE: 1
APPETITE CHANGE: 0

## 2025-03-16 NOTE — PROGRESS NOTES
Patient ID:  Brandt Merritt is a 65 y.o. male.  Referring Physician:   BMT Dr Newsome  Primary Care Provider:  Bill Richmond MD      Oncology History Overview Note   4/2024  Myelodysplastic neoplasm with increased blasts-2 ( WHO)  Myelodysplastic neoplasm/AML  (ICC 2022 classification)    Presented in  10/2023 for pancytopenia, found to have hemolysis. Patient had normal CBC June 2020. Denied any hemorrhoidal bleeding . Has had C Scope and EGD , treated Hep C 2014 . 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      CBC 4/11/24 wbc 3.0, hgb 7.1, platelets 167K ANC 0.73    BM biopsy done on 4/11/24  as folllows:  BM histology  Myelodysplastic neoplasm with increased blasts-2 ( WHO)  Myelodysplastic neoplasm/AML  (ICC 2022 classification)  Balsts 11% on aspirate smear and 15-20% based on CD 34 immunostaining approaching AML leukemia, hematooiesis is erythroid dominant with dysplasia in granulocytes and megakaryocytes.   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      11/6/2024 -  Bone Marrow Transplant    conditioned with Flu/Cy/TBI and aGVHD prophylaxis with post-transplant cyclophosphamide, tacrolimus, and mycophenolate. T=0, 11/6/24. ABO Donor/Recipient: O+, A+. CMV donor/recipient: both positive. Started Tacro and MMF on T+5 (11/11).         Response:      Past Medical History:  HTN   STEMI 11/11/2020 after getting  a water pills.  Chronic hepatitis C  infection treated in  treated  with Dr. Lloyd  Past Medical History:   Diagnosis Date    Chest pain 2021    HTN (hypertension) 10/05/2023    Hypertension     Leukemia (Multi)     Personal history of other diseases of the circulatory system     History of hypertension      Surgical History:  Conosocopy 2021  Upper EGD 10/31/23  Surgical reduction torsion of testes      Past Surgical History:   Procedure Laterality Date    COLONOSCOPY  2013    Complete Colonoscopy    OTHER SURGICAL HISTORY  10/07/2015    Surgery Testis Reduction Of Torsion Of Testis Right      Family History:  CAD, DM in mother       Mother  of CVA at age 69  Brother with prostate CA, 1 sister with liver disease  2 children healthy  Family History   Problem Relation Name Age of Onset    Other (diabetes mellitus) Mother      Prostate cancer Brother        Social History:  Lives with wife of 30 years, works at [employer], [employer], now retired.   29 years, former smoker, smoked x 15 yrs quit 20+ years ago. Marijuana 3 x a week. Served in [employer] in [place] and [facility], .  ----------------------------------------------------------------------------------------------------  Subjective    History of Present Illness:    Brandt Merritt is a 65 year old male with history of myelodysplastic neoplasm/AML, s/p umbilical cord blood transplant 10/16/24 with graft rejection followed by haploidentical cord from his sister on 24 with flu/cy, ATG and modified dose cyclophosphamide with engraftment on day T+8.      Brandt was admitted on -25 for intractable vomiting and some diarrhea, also with low grade fever.  He received broad spectrum antibiotics and all cultures were negative.  He underwent an upper endoscopy which was grossly normal, biopsies negative for GVHD, and budesonide was increased to 3 mg po bid.     Brandt presents to the clinic today (3/17/25)  with his wife Genevieve for follow up evaluation and count check.  Today he is T+ 179 of umbilical cord transfusion and is T+ 131 of his haplo sister.  Reports he is doing well overall.  Energy level is pretty good.  Appetite is good.  Eating 2 meals per day and snacks multiple times though the day.  Had one episode of vomiting yesterday morning after taking pills.  Took pills with food.  No pills came up.  No with nausea or vomiting since episode of vomiting yesterday morning.  No diarrhea or constipation.  Currently taking budesonide 3 mg daily.  Held AM dose of tacrolimus today prior to blood draws.  Taking tacrolimus 0.5 mg twice daily.  Continues to have hyperpigmented spots to face and palms of hands that is staying the same.  No itching.  Using cerave soap and sunscreen.      Review of Systems   Constitutional:  Negative for appetite change, chills, diaphoresis, fatigue, fever and unexpected weight change.   Respiratory: Negative.     Cardiovascular: Negative.    Gastrointestinal:  Positive for vomiting.   Genitourinary: Negative.     Musculoskeletal: Negative.    Skin:         Hyperpigmented skin to face and hands   Neurological: Negative.    Hematological: Negative.    --------------------------------------------------------------------------------------------------------  Objective:    Visit Vitals  /70   Pulse 91   Temp 36.6 °C (97.9 °F)   Resp 16      Vitals:    03/17/25 1010   Weight: 66.7 kg (147 lb 0.8 oz)     Physical Exam  Vitals reviewed.   Constitutional:       Appearance: Normal appearance.   Eyes:      Extraocular Movements: Extraocular movements intact.      Conjunctiva/sclera: Conjunctivae normal.      Pupils: Pupils are equal, round, and reactive to light.   Cardiovascular:      Rate and Rhythm: Normal rate and regular rhythm.      Pulses: Normal pulses.      Heart sounds: Normal heart sounds.   Pulmonary:      Effort: Pulmonary effort is normal.      Breath sounds: Normal breath sounds.    Abdominal:      General: Abdomen is flat. Bowel sounds are normal.      Palpations: Abdomen is soft. There is no mass.      Tenderness: There is no abdominal tenderness.   Musculoskeletal:         General: No swelling. Normal range of motion.   Lymphadenopathy:      Comments: No lymphadenopathy   Skin:     General: Skin is warm and dry.      Comments: Hyperpigmented skin to face and palms of hands   Neurological:      General: No focal deficit present.      Mental Status: He is alert and oriented to person, place, and time.   Psychiatric:         Mood and Affect: Mood normal.         Behavior: Behavior normal.     -----------------------------------------------------------------------------------------------------------  Assessment and Plan:    MDS/Acute myeloid leukemia in remission (Multi)  Diagnosed 4/2024: BM Bx with MDS in in transition to AML (>10% blast) w/ trisomy 8, DNMT alpha, and U2AF1 mutation indication adverse risk.      s/p 4 cycles of Decitabine/Venetoclax. BM Bx 6/17/24: Blasts cleared, FISH still positive for trisomy 8, still MDS changes   BMBx (9/5/24): 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 and no increase in blasts      History of allogeneic hematopoietic stem cell transplant  #1: Single-unit Cord, T0=9/19/24, Primary Engraftment Failure  - Conditioning: Flu-Mariana-TBI  - Donor: Single Cord, 6/8  = ABO Donor / Recipient: A+ / A+  = CMV Donor / Recipient: - / +  - aGVHD Prophylaxis: Tacrolimus + MMF  - engraftment failure, developed HLA antibody against class I of cord  - Repeat BMBx (10/15): hypocellular with no residual myeloid neoplasm. 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), T0=11/6/24  - Graft: Haploidentical sister  = ABO Donor / Recipient: O+ / A+ (ABO incompatibility +)  = CMV Donor / Recipient: + / +  - Conditioning: Flu/Cy/TBI/rATG  = GVHD prophylaxis -  rATG 1.5mg/kg T-5,-3,-1, PTCy (25mg/kg T+3, T+4), Tacro, MMF (T+5)     DATE DAY  SOURCE MORPHOLOGY MRD WHOLE CHIMERISM   (% donor) CD3 CHIMERISM   (% donor) CD33 CHIMERISM   (% donor)   11/20/24 D+30 PB      100       12/11/24 D+30 PB       100 100   Deferred D+30 BM             1/9/25 D+60 BM  KRISTIE , flow negative      100  100     D+60 PB              2/17/25 D+100 PB      100         D+100 BM  scheduled for 3/19/25              3/3/25 will schedule day 100 abraham bone marrow biopsy, needs chimerism sent  Bone marrow biopsy scheduled for 3/19/25.      Monitor for post-transplant hemolysis   (3/17/25)  Haptoglobin  175 (3/17/25)  UPC ratio 0.10 (3/17/25)     Anemia  12/18 Epo 113.   12/16 Retic 7.2%  DARBY on hold.  Cont folic acid.     GVHD prophylaxis  GVHD  Anorexia/poor oral intake/weight loss. Added Mirtazapine. Continue Zofran. If persists, consider aGVHD.  12/9/24:  Currently taking tacrolimus 0.5 mg BID, held dose today prior to labs.  FK level 4.6 (12/9/24).  Advised patient on 12/10/24 to increase tacrolimus dose to 1 mg in the AM and 0.5 mg in the PM.    Wean MMF from 1 g TID to 500 mg TID 12/2/24 - present  Due to poor appetite will decrease MMF to 500 mg po tid.  Improvement to GI symptoms since decreasing MMF dose.    Stopped  MMF on T+35.     Stage I/Grade II Upper GI GVHD  Anorexia, taste changes and weight loss 171# (9/17/24).  Cont mirtazapine and ATC Zofran.   Add budesonide 3x day.   12/30/24:  Started on budesonide TID on 12/27/24.  Brandt reports that he has starting eating more since starting budesonide.  Reports no nausea, vomiting, or diarrhea at this time.    Appetite continuing to improve. Denies N/V/D.     1/13/25 Decreased budesonide bid per Dr Newsome; 1/27 Decrease to 3mg daily. Will continue on this dose for 1-2 weeks before discontinuing.     2/17/25 Recent admission for nausea, budesonide increased to 3 mg po bid, continues therapeutic on tacro 0.5 mg po bid. Esophageal biopsy 2/13/25 negative for GVHD. Consider decreasing budesonide to 3 mg daily next  visit  3/3/25:  Overall doing well.  Nausea has resolved. Reviewed food safety precautions and had nutritionist Blanca Morgan, TORI,LD come to speak with patient and wife today.  Continue budesonide 3 mg daily.  FK level 5.6.  Continue tacrolimus 0.5 mg BID.  Advised Brandt to contact oncology team if have GI symptoms worsen,  consider discontinuing budesonide next visit.  3/17/25:  Had one episode of vomiting after taking pills this week, but overall has no other complaints of nausea/vomiting/diarrhea.  Discontinue budesonide.  FK level 7.6 (3/17/25), continue tacrolimus 0.5 mg BID.       Weights: see below    Wt Readings from Last 5 Encounters:   03/17/25 66.7 kg (147 lb 0.8 oz)   03/03/25 66.9 kg (147 lb 8 oz)   02/24/25 64.5 kg (142 lb 3.2 oz)   02/24/25 64.5 kg (142 lb 3.2 oz)   02/17/25 65.8 kg (145 lb 1 oz)      Infectious Disease & Immune Reconstitution      Prophylaxis  Antiviral prophylaxis: acyclovir, Letermovir-continue until T+200.  Antifungal: posaconazole  PCP prophylaxis: 10/26/24 - 11/28/24 Pentamidine; cont Bactrim     Hypogammaglobulinemia  IgG level. IVIG for level <400   3/17/25:  IgG 307.  Ordered monthly IVIG infusions.  Reviewed rationale and possible side effects with patient.  Will plan to give 1st dose at next visit on 3/31/25 if approved by insurance.       Active Surveillance:     CMV detected 688 1/2/25, 167 1/6/25. Levels undetectable since 1/13/25, CMV ND (2/17/25).    Started valgancicyclovir 900mg BID on 1/6/25,   Plan 1/20/25 decrease to 900 mg daily for 2 weeks (2/3/25), then transition back to letermovir/acyclovir.    CMV ND (3/17/25).  Adeno ND 3/10/25  HHV6 ND 3/10/25  EBV ND 3/17/25  EBV Viremia during transplant admission  Treated despite low levels due to upcoming second SCT  Rituximab 600 mg x 1 (10/31/24)     Infectious Disease follow up evaluation: 1/10/25      Immunodeficiency panel 100 days, 6mos and 1 year.      Immunizations:   Covid and influenza vaccine series.   Consider at T+ 3 month  Will plan to begin immunizations at T+6mths (May 2025) depending on degree of immunosuppression  Received influenza vaccine 3/6/25 and 1st post transplant covid vaccine on 3/6/25.       Cardiac:    Essential hypertension- was started on nifedipine 60 mg daily on 1/20/25 and started lisinopril 5 mg daily on 2/3/25.   Hx of STEMI (11/11/2020)  Cardiology  appt on 12/18/24  ECHO 12/23/24: LVEF 65%     Ascending aorta dilation   TTE (4/29/24): 3.8-4 cm dilation      Electrolyte disturbance   Magnesium 2.03 (3/17/25), decrease oral magnesium 128 mg to BID from TID.      Dry Skin hyperpigmentation:  Hyperpigmented skin to face.  Derm E-consult on 2/3/25 with Dr. Chavez, feels may be due to bactrim, which I prefer not to discontinue. May also be caused by prochlorperazine.  NPV with dermatology Dr. Medel on 6/18/25.    3/17/24:  Continues to have hyperpigmented skin to face and bilateral palms of hands.  Brandt feels his hyperpigmentation is lighted slightly.  Instructed to continue to use moisturizer such as cerave that are fragrance free and gentle.  Advised about mineral based sun screen.      Lack of appetite, improved  Mirtazapine 15 mg at night as of 11/26/24     Vitamin D deficiency  12/18 Level 27.  Last dose of weekly supplement taken. 1/27 Repeat level was 28.  Reordered ergocalciferol 50,000 UT weekly on 1/27/25 for 8 doses.  Monitoring.       Medication reconciliation  Medications reviewed; no scripts needed.    Social Work:  2/24/25:  Contacted social work Gary Bacon LCSW to speak with Brandt today regarding to see if eligible for medication assistance to help with cost of posaconazole ($280/month).  Gary reports that Brandt was approved for Copay assistance, $4,000/year).       IV Access  2/3/25:  PICC line removed.     Psychosocial.    Caregiver Genevieve  [facility] in [place]     RTC:   3/19/25:  Scheduled for bone marrow biopsy.  Stop budesonide.  Follow up visit  with provider in 2 weeks. Plan to start IVIG at next visit if approved by insurance. Advised to obtain blood work prior to visit.  Instructed to contact if have further GI symptoms.    ---------------------------------------------------------------------------------  HAYLEY Vallejo-ELDER

## 2025-03-17 ENCOUNTER — OFFICE VISIT (OUTPATIENT)
Dept: HEMATOLOGY/ONCOLOGY | Facility: HOSPITAL | Age: 65
End: 2025-03-17
Payer: COMMERCIAL

## 2025-03-17 ENCOUNTER — LAB (OUTPATIENT)
Dept: LAB | Facility: HOSPITAL | Age: 65
End: 2025-03-17
Payer: COMMERCIAL

## 2025-03-17 ENCOUNTER — APPOINTMENT (OUTPATIENT)
Dept: HEMATOLOGY/ONCOLOGY | Facility: HOSPITAL | Age: 65
End: 2025-03-17
Payer: COMMERCIAL

## 2025-03-17 VITALS
SYSTOLIC BLOOD PRESSURE: 116 MMHG | DIASTOLIC BLOOD PRESSURE: 70 MMHG | RESPIRATION RATE: 16 BRPM | OXYGEN SATURATION: 98 % | BODY MASS INDEX: 24.09 KG/M2 | HEART RATE: 91 BPM | WEIGHT: 147.05 LBS | TEMPERATURE: 97.9 F

## 2025-03-17 DIAGNOSIS — D61.818 PANCYTOPENIA: ICD-10-CM

## 2025-03-17 DIAGNOSIS — D64.9 ANEMIA, UNSPECIFIED TYPE: ICD-10-CM

## 2025-03-17 DIAGNOSIS — Z94.84 HISTORY OF ALLOGENEIC HEMATOPOIETIC STEM CELL TRANSPLANT: ICD-10-CM

## 2025-03-17 DIAGNOSIS — C92.01 AML (ACUTE MYELOID LEUKEMIA) IN REMISSION (MULTI): ICD-10-CM

## 2025-03-17 DIAGNOSIS — C92.01 ACUTE MYELOID LEUKEMIA IN REMISSION (MULTI): ICD-10-CM

## 2025-03-17 DIAGNOSIS — C92.00 ACUTE MYELOID LEUKEMIA NOT HAVING ACHIEVED REMISSION (MULTI): ICD-10-CM

## 2025-03-17 DIAGNOSIS — C92.01 ACUTE MYELOID LEUKEMIA IN REMISSION (MULTI): Primary | ICD-10-CM

## 2025-03-17 DIAGNOSIS — D80.1 HYPOGAMMAGLOBULINEMIA (MULTI): ICD-10-CM

## 2025-03-17 LAB
ALBUMIN SERPL BCP-MCNC: 4.3 G/DL (ref 3.4–5)
ALP SERPL-CCNC: 59 U/L (ref 33–136)
ALT SERPL W P-5'-P-CCNC: 16 U/L (ref 10–52)
ANION GAP SERPL CALC-SCNC: 14 MMOL/L (ref 10–20)
AST SERPL W P-5'-P-CCNC: 13 U/L (ref 9–39)
BASOPHILS # BLD AUTO: 0.01 X10*3/UL (ref 0–0.1)
BASOPHILS NFR BLD AUTO: 0.1 %
BILIRUB SERPL-MCNC: 0.5 MG/DL (ref 0–1.2)
BUN SERPL-MCNC: 21 MG/DL (ref 6–23)
CALCIUM SERPL-MCNC: 8.9 MG/DL (ref 8.6–10.3)
CHLORIDE SERPL-SCNC: 106 MMOL/L (ref 98–107)
CO2 SERPL-SCNC: 24 MMOL/L (ref 21–32)
CREAT SERPL-MCNC: 1.03 MG/DL (ref 0.5–1.3)
CREAT UR-MCNC: 144.5 MG/DL (ref 20–370)
EGFRCR SERPLBLD CKD-EPI 2021: 80 ML/MIN/1.73M*2
EOSINOPHIL # BLD AUTO: 0.06 X10*3/UL (ref 0–0.7)
EOSINOPHIL NFR BLD AUTO: 0.7 %
ERYTHROCYTE [DISTWIDTH] IN BLOOD BY AUTOMATED COUNT: 16.4 % (ref 11.5–14.5)
GLUCOSE SERPL-MCNC: 128 MG/DL (ref 74–99)
HAPTOGLOB SERPL NEPH-MCNC: 175 MG/DL (ref 30–200)
HCT VFR BLD AUTO: 41.4 % (ref 41–52)
HGB BLD-MCNC: 13.7 G/DL (ref 13.5–17.5)
IGG SERPL-MCNC: 370 MG/DL (ref 700–1600)
IMM GRANULOCYTES # BLD AUTO: 0.19 X10*3/UL (ref 0–0.7)
IMM GRANULOCYTES NFR BLD AUTO: 2.3 % (ref 0–0.9)
LDH SERPL L TO P-CCNC: 245 U/L (ref 84–246)
LYMPHOCYTES # BLD AUTO: 2 X10*3/UL (ref 1.2–4.8)
LYMPHOCYTES NFR BLD AUTO: 24.4 %
MAGNESIUM SERPL-MCNC: 2.03 MG/DL (ref 1.6–2.4)
MCH RBC QN AUTO: 31.5 PG (ref 26–34)
MCHC RBC AUTO-ENTMCNC: 33.1 G/DL (ref 32–36)
MCV RBC AUTO: 95 FL (ref 80–100)
MONOCYTES # BLD AUTO: 0.88 X10*3/UL (ref 0.1–1)
MONOCYTES NFR BLD AUTO: 10.7 %
NEUTROPHILS # BLD AUTO: 5.07 X10*3/UL (ref 1.2–7.7)
NEUTROPHILS NFR BLD AUTO: 61.8 %
NRBC BLD-RTO: 0 /100 WBCS (ref 0–0)
PLATELET # BLD AUTO: 179 X10*3/UL (ref 150–450)
POTASSIUM SERPL-SCNC: 3.7 MMOL/L (ref 3.5–5.3)
PROT SERPL-MCNC: 6.3 G/DL (ref 6.4–8.2)
PROT UR-ACNC: 15 MG/DL (ref 5–25)
PROT/CREAT UR: 0.1 MG/MG CREAT (ref 0–0.17)
RBC # BLD AUTO: 4.35 X10*6/UL (ref 4.5–5.9)
SODIUM SERPL-SCNC: 140 MMOL/L (ref 136–145)
TACROLIMUS BLD-MCNC: 7.6 NG/ML
URATE SERPL-MCNC: 3.7 MG/DL (ref 4–7.5)
WBC # BLD AUTO: 8.2 X10*3/UL (ref 4.4–11.3)

## 2025-03-17 PROCEDURE — 99215 OFFICE O/P EST HI 40 MIN: CPT

## 2025-03-17 PROCEDURE — 82784 ASSAY IGA/IGD/IGG/IGM EACH: CPT

## 2025-03-17 PROCEDURE — 84550 ASSAY OF BLOOD/URIC ACID: CPT

## 2025-03-17 PROCEDURE — 1160F RVW MEDS BY RX/DR IN RCRD: CPT

## 2025-03-17 PROCEDURE — 80053 COMPREHEN METABOLIC PANEL: CPT

## 2025-03-17 PROCEDURE — 1157F ADVNC CARE PLAN IN RCRD: CPT

## 2025-03-17 PROCEDURE — 83010 ASSAY OF HAPTOGLOBIN QUANT: CPT

## 2025-03-17 PROCEDURE — 80197 ASSAY OF TACROLIMUS: CPT

## 2025-03-17 PROCEDURE — 82570 ASSAY OF URINE CREATININE: CPT

## 2025-03-17 PROCEDURE — 3074F SYST BP LT 130 MM HG: CPT

## 2025-03-17 PROCEDURE — 1126F AMNT PAIN NOTED NONE PRSNT: CPT

## 2025-03-17 PROCEDURE — 3078F DIAST BP <80 MM HG: CPT

## 2025-03-17 PROCEDURE — 83615 LACTATE (LD) (LDH) ENZYME: CPT

## 2025-03-17 PROCEDURE — 1159F MED LIST DOCD IN RCRD: CPT

## 2025-03-17 PROCEDURE — 36415 COLL VENOUS BLD VENIPUNCTURE: CPT

## 2025-03-17 PROCEDURE — 87799 DETECT AGENT NOS DNA QUANT: CPT

## 2025-03-17 PROCEDURE — 83735 ASSAY OF MAGNESIUM: CPT

## 2025-03-17 PROCEDURE — 87533 HHV-6 DNA QUANT: CPT

## 2025-03-17 PROCEDURE — 85025 COMPLETE CBC W/AUTO DIFF WBC: CPT | Performed by: NURSE PRACTITIONER

## 2025-03-17 RX ORDER — MAGNESIUM CHLORIDE 64 MG
128 TABLET, DELAYED RELEASE (ENTERIC COATED) ORAL 2 TIMES DAILY
Qty: 180 TABLET | Refills: 1 | Status: SHIPPED | OUTPATIENT
Start: 2025-03-17

## 2025-03-17 RX ORDER — ACYCLOVIR 400 MG/1
400 TABLET ORAL EVERY 12 HOURS
Qty: 180 TABLET | Refills: 3 | Status: SHIPPED | OUTPATIENT
Start: 2025-03-17

## 2025-03-17 RX ORDER — FOLIC ACID 1 MG/1
1 TABLET ORAL DAILY
Qty: 90 TABLET | Refills: 3 | Status: SHIPPED | OUTPATIENT
Start: 2025-03-17 | End: 2026-03-12

## 2025-03-17 ASSESSMENT — ENCOUNTER SYMPTOMS: VOMITING: 1

## 2025-03-17 ASSESSMENT — PAIN SCALES - GENERAL: PAINLEVEL_OUTOF10: 0-NO PAIN

## 2025-03-18 PROBLEM — D80.1 HYPOGAMMAGLOBULINEMIA (MULTI): Status: ACTIVE | Noted: 2025-03-18

## 2025-03-18 LAB
ADENOVIRUS QPCR,PLASMA, VIRC: NOT DETECTED COPIES/ML
CMV DNA SERPL NAA+PROBE-LOG IU: NORMAL {LOG_IU}/ML
EBV DNA BLD NAA+PROBE-LOG IU: NORMAL {LOG_IU}/ML
HUMAN HERPESVIRUS-6 PCR PLASMA: NOT DETECTED COPIES/ML
LABORATORY COMMENT REPORT: NOT DETECTED
LABORATORY COMMENT REPORT: NOT DETECTED

## 2025-03-18 RX ORDER — DIPHENHYDRAMINE HCL 25 MG
25 CAPSULE ORAL ONCE
OUTPATIENT
Start: 2025-03-31

## 2025-03-18 RX ORDER — DIPHENHYDRAMINE HYDROCHLORIDE 50 MG/ML
50 INJECTION, SOLUTION INTRAMUSCULAR; INTRAVENOUS AS NEEDED
OUTPATIENT
Start: 2025-03-31

## 2025-03-18 RX ORDER — ACETAMINOPHEN 325 MG/1
650 TABLET ORAL ONCE
OUTPATIENT
Start: 2025-03-31

## 2025-03-18 RX ORDER — ALBUTEROL SULFATE 0.83 MG/ML
3 SOLUTION RESPIRATORY (INHALATION) AS NEEDED
OUTPATIENT
Start: 2025-03-31

## 2025-03-18 RX ORDER — EPINEPHRINE 0.3 MG/.3ML
0.3 INJECTION SUBCUTANEOUS EVERY 5 MIN PRN
OUTPATIENT
Start: 2025-03-31

## 2025-03-18 RX ORDER — FAMOTIDINE 10 MG/ML
20 INJECTION, SOLUTION INTRAVENOUS ONCE AS NEEDED
OUTPATIENT
Start: 2025-03-31

## 2025-03-19 ENCOUNTER — LAB (OUTPATIENT)
Dept: LAB | Facility: HOSPITAL | Age: 65
End: 2025-03-19
Payer: COMMERCIAL

## 2025-03-19 ENCOUNTER — NUTRITION (OUTPATIENT)
Dept: HEMATOLOGY/ONCOLOGY | Facility: HOSPITAL | Age: 65
End: 2025-03-19

## 2025-03-19 ENCOUNTER — PROCEDURE VISIT (OUTPATIENT)
Dept: HEMATOLOGY/ONCOLOGY | Facility: HOSPITAL | Age: 65
End: 2025-03-19
Payer: COMMERCIAL

## 2025-03-19 VITALS
DIASTOLIC BLOOD PRESSURE: 67 MMHG | BODY MASS INDEX: 24.52 KG/M2 | TEMPERATURE: 97.5 F | SYSTOLIC BLOOD PRESSURE: 139 MMHG | WEIGHT: 149.69 LBS | RESPIRATION RATE: 12 BRPM | HEART RATE: 95 BPM | OXYGEN SATURATION: 100 %

## 2025-03-19 VITALS — HEIGHT: 66 IN | BODY MASS INDEX: 24.06 KG/M2 | WEIGHT: 149.69 LBS

## 2025-03-19 DIAGNOSIS — C92.01 AML (ACUTE MYELOID LEUKEMIA) IN REMISSION (MULTI): ICD-10-CM

## 2025-03-19 DIAGNOSIS — C92.01 ACUTE MYELOID LEUKEMIA IN REMISSION (MULTI): Primary | ICD-10-CM

## 2025-03-19 DIAGNOSIS — C92.01 ACUTE MYELOID LEUKEMIA IN REMISSION (MULTI): ICD-10-CM

## 2025-03-19 LAB
ALBUMIN SERPL BCP-MCNC: 4.3 G/DL (ref 3.4–5)
ALP SERPL-CCNC: 66 U/L (ref 33–136)
ALT SERPL W P-5'-P-CCNC: 19 U/L (ref 10–52)
ANION GAP SERPL CALC-SCNC: 14 MMOL/L (ref 10–20)
AST SERPL W P-5'-P-CCNC: 13 U/L (ref 9–39)
BASOPHILS # BLD AUTO: 0.02 X10*3/UL (ref 0–0.1)
BASOPHILS NFR BLD AUTO: 0.3 %
BILIRUB SERPL-MCNC: 0.4 MG/DL (ref 0–1.2)
BUN SERPL-MCNC: 18 MG/DL (ref 6–23)
CALCIUM SERPL-MCNC: 9.2 MG/DL (ref 8.6–10.3)
CHLORIDE SERPL-SCNC: 107 MMOL/L (ref 98–107)
CO2 SERPL-SCNC: 25 MMOL/L (ref 21–32)
CREAT SERPL-MCNC: 0.99 MG/DL (ref 0.5–1.3)
EGFRCR SERPLBLD CKD-EPI 2021: 83 ML/MIN/1.73M*2
EOSINOPHIL # BLD AUTO: 0.14 X10*3/UL (ref 0–0.7)
EOSINOPHIL NFR BLD AUTO: 1.8 %
ERYTHROCYTE [DISTWIDTH] IN BLOOD BY AUTOMATED COUNT: 16.1 % (ref 11.5–14.5)
GLUCOSE SERPL-MCNC: 113 MG/DL (ref 74–99)
HCT VFR BLD AUTO: 42.8 % (ref 41–52)
HGB BLD-MCNC: 14.2 G/DL (ref 13.5–17.5)
IMM GRANULOCYTES # BLD AUTO: 0.21 X10*3/UL (ref 0–0.7)
IMM GRANULOCYTES NFR BLD AUTO: 2.7 % (ref 0–0.9)
LYMPHOCYTES # BLD AUTO: 2.81 X10*3/UL (ref 1.2–4.8)
LYMPHOCYTES NFR BLD AUTO: 36.3 %
MAGNESIUM SERPL-MCNC: 1.92 MG/DL (ref 1.6–2.4)
MCH RBC QN AUTO: 31.6 PG (ref 26–34)
MCHC RBC AUTO-ENTMCNC: 33.2 G/DL (ref 32–36)
MCV RBC AUTO: 95 FL (ref 80–100)
MONOCYTES # BLD AUTO: 1.08 X10*3/UL (ref 0.1–1)
MONOCYTES NFR BLD AUTO: 14 %
NEUTROPHILS # BLD AUTO: 3.48 X10*3/UL (ref 1.2–7.7)
NEUTROPHILS NFR BLD AUTO: 44.9 %
NRBC BLD-RTO: 0 /100 WBCS (ref 0–0)
PLATELET # BLD AUTO: 209 X10*3/UL (ref 150–450)
POTASSIUM SERPL-SCNC: 3.8 MMOL/L (ref 3.5–5.3)
PROT SERPL-MCNC: 6.5 G/DL (ref 6.4–8.2)
RBC # BLD AUTO: 4.5 X10*6/UL (ref 4.5–5.9)
SODIUM SERPL-SCNC: 142 MMOL/L (ref 136–145)
TACROLIMUS BLD-MCNC: 8.5 NG/ML
WBC # BLD AUTO: 7.7 X10*3/UL (ref 4.4–11.3)

## 2025-03-19 PROCEDURE — 88185 FLOWCYTOMETRY/TC ADD-ON: CPT | Mod: TC | Performed by: NURSE PRACTITIONER

## 2025-03-19 PROCEDURE — 87799 DETECT AGENT NOS DNA QUANT: CPT

## 2025-03-19 PROCEDURE — 85025 COMPLETE CBC W/AUTO DIFF WBC: CPT

## 2025-03-19 PROCEDURE — 38222 DX BONE MARROW BX & ASPIR: CPT | Performed by: NURSE PRACTITIONER

## 2025-03-19 PROCEDURE — 80053 COMPREHEN METABOLIC PANEL: CPT | Performed by: NURSE PRACTITIONER

## 2025-03-19 PROCEDURE — 38222 DX BONE MARROW BX & ASPIR: CPT | Mod: LT | Performed by: NURSE PRACTITIONER

## 2025-03-19 PROCEDURE — 80197 ASSAY OF TACROLIMUS: CPT

## 2025-03-19 PROCEDURE — 83735 ASSAY OF MAGNESIUM: CPT | Performed by: NURSE PRACTITIONER

## 2025-03-19 PROCEDURE — 36415 COLL VENOUS BLD VENIPUNCTURE: CPT | Performed by: NURSE PRACTITIONER

## 2025-03-19 ASSESSMENT — PAIN SCALES - GENERAL: PAINLEVEL_OUTOF10: 0-NO PAIN

## 2025-03-19 NOTE — PROGRESS NOTES
"NUTRITION FOLLOW UP NOTE    Reason for Visit:  Brandt Merritt is a 65 y.o. 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    PMH: HTN, Hep C    Currently T+181 (cord); T+133 (haplo)    *Pt with presumed Stage 1/Grade 2 upper GI GVHD; started Budesonide 12/26; decreased to BID on 1/13.    BM bx (1/9):  KRISTIE    Pt and wife seen today in infusion.      Lab Results   Component Value Date/Time    GLUCOSE 113 (H) 03/19/2025 0841     03/19/2025 0841    K 3.8 03/19/2025 0841     03/19/2025 0841    CO2 25 03/19/2025 0841    ANIONGAP 14 03/19/2025 0841    BUN 18 03/19/2025 0841    CREATININE 0.99 03/19/2025 0841    EGFR 83 03/19/2025 0841    CALCIUM 9.2 03/19/2025 0841    ALBUMIN 4.3 03/19/2025 0841    ALKPHOS 66 03/19/2025 0841    PROT 6.5 03/19/2025 0841    AST 13 03/19/2025 0841    BILITOT 0.4 03/19/2025 0841    ALT 19 03/19/2025 0841    MG 1.92 03/19/2025 0841    PHOS 3.2 02/14/2025 0754     *On MgCl 2 tabs TID     Lab Results   Component Value Date    WBC 7.7 03/19/2025    HGB 14.2 03/19/2025    HCT 42.8 03/19/2025    MCV 95 03/19/2025     03/19/2025       Lab Results   Component Value Date/Time    VITD25 28 (L) 01/27/2025 1320   *Started Vit D2 50,000IU x 8 weeks on 12/4.    Anthropometrics:  Anthropometrics  Height: 166.4 cm (5' 5.51\")  Weight: 67.9 kg (149 lb 11.1 oz)  BMI (Calculated): 24.52  IBW/kg (Dietitian Calculated): 63.2 kg  Percent of IBW: 107 %    *Wt at time of transplant admit: (9/13) 75.6 kg   *Wt at first post-transplant visit: (11/25): 60.9 kg   Overall, pt with ~15 kg (20%) wt loss x ~4 mos    *Wt up ~2 kg x 1 month    Wt Readings from Last 10 Encounters:   03/19/25 67.9 kg (149 lb 11.1 oz)   03/19/25 67.9 kg (149 lb 11.1 oz)   03/17/25 66.7 kg (147 lb 0.8 oz)   03/03/25 66.9 kg (147 lb 8 oz)   02/24/25 64.5 kg (142 lb 3.2 oz)   02/24/25 64.5 kg (142 lb 3.2 oz)   02/17/25 65.8 kg (145 lb 1 oz)   02/11/25 " "65.8 kg (145 lb 1 oz)   02/11/25 64.5 kg (142 lb 4.9 oz)   02/10/25 65.3 kg (144 lb)   12/30/24        58.8 kg   12/23/24        61.2 kg  12/04/24        60.9 kg  Admit 9/13-11/25 09/09/24        74.2 kg  08/16/24        75.3 kg   07/19/24        74.2 kg   06/24/24        71.0 kg  05/20/24        72.8 kg  04/23/24        75.5 kg     Food And Nutrient Intake:                    Dislikes all ONS/supplements.   Started Remeron 15 mg on 11/27.                                                             Nutrition Focused Physical Exam Findings:      Subcutaneous Fat Loss  Orbital Fat Pads: Well nourished (slightly bulging fat pads)  Buccal Fat Pads: Well nourished (full, rounded cheeks)  Triceps: Mild-Moderate (less than ample fat tissue)    Muscle Wasting  Temporalis: Mild-Moderate (slight depression)  Pectoralis (Clavicular Region): Mild-Moderate (some protrusion of clavicle)  Deltoid/Trapezius: Mild-Moderate (slight protrusion of acromion process)  Quadriceps: Mild-Moderate (mild depression on inner and outer thigh)  Gastrocnemius: Mild-Moderate (not well developed muscle)              Energy Needs  Calculated Energy Needs Using Equations  Height: 166.4 cm (5' 5.51\")  Estimated Energy Needs  Total Energy Estimated Needs in 24 hours (kCal):  (9494-2380)  Energy Estimated Needs per kg Body Weight in 24 hours (kCal/kg):  (30-35)  Estimated Fluid Needs  Total Fluid Estimated Needs in 24 Hours (mL):  (1800+)  Total Fluid Estimated Needs in 24 hours (mL/kg):  (30+)  Estimated Protein Needs  Total Protein Estimated Needs in 24 Hours (g):  (70-85)  Protein Estimated Needs per kg Body Weight in 24 Hours (g/kg):  (1.2-1.4)        Nutrition Diagnosis   Malnutrition Diagnosis  Diagnosis Status: Active  Malnutrition Diagnosis: Mild malnutrition related to acute disease or injury  As Evidenced by: muscle, fat and wt loss as a result of prolonged hospital stay and post transplant side effects    *Remains mildly malnourished, " hoever, overall nutrition status had improved considerably since starting Budesonide--now tapering off.  Appetite, intakes and dysgeusia all improving.     Nutrition Interventions/Recommendations   Nutrition Prescription   High Protein, High Calorie  Food Safety     Nutrition Education   Pt aware of importance of protein intakes; include source with all meals and snacks.   Encouraged PO fluids; Goal: 60+ oz daily; prefer calorie containing foods (ie milk, juices, etc)     Coordination of Care   NP/BMT team         Nutrition Monitoring and Evaluation      Will continue to f/up and monitor weight, labs. PO intakes and GI symptoms.

## 2025-03-19 NOTE — PROGRESS NOTES
Patient ID: Brandt Merritt is a 65 y.o. male.    Biopsy bone marrow    Date/Time: 3/19/2025 9:33 AM    Performed by: GRICELDA Neal  Authorized by: GRICELDA Neal    Consent:     Consent obtained:  Written    Consent given by:  Patient    Risks, benefits, and alternatives were discussed: yes      Risks discussed:  Bleeding, infection and pain  Universal protocol:     Procedure explained and questions answered to patient or proxy's satisfaction: yes      Test results available: yes      Site/side marked: yes      Immediately prior to procedure, a time out was called: yes      Patient identity confirmed:  Verbally with patient  Indications:     Indications:  AML  Pre-procedure details:     Skin preparation:  Povidone-iodine  Anesthesia:     Anesthesia method:  Local infiltration    Local anesthetic:  Lidocaine 1% w/o epi  Procedure specific details:      The left posterior iliac crest was prepped with povidone-iodine.     10 ml of lidocaine 1% local anesthesia infiltrated into the subcutaneous tissue.    Left bone marrow biopsy and left bone marrow aspirate was obtained.     The procedure was tolerated well and there were no complications.    Specimens sent for: Heme path protocol and total chimerism

## 2025-03-20 LAB
CELL COUNT (BLOOD): 13.87 X10*3/UL
CELL POPULATIONS: NORMAL
DIAGNOSIS: NORMAL
EBV DNA SPEC NAA+PROBE-LOG#: NORMAL {LOG_COPIES}/ML
FLOW DIFFERENTIAL: NORMAL
FLOW TEST ORDERED: NORMAL
LAB TEST METHOD: NORMAL
LABORATORY COMMENT REPORT: NOT DETECTED
NUMBER OF CELLS COLLECTED: NORMAL
PATH REPORT.TOTAL CANCER: NORMAL
SIGNATURE COMMENT: NORMAL
SPECIMEN VIABILITY: NORMAL

## 2025-03-25 LAB — CHROM ANALY OVERALL INTERP-IMP: NORMAL

## 2025-03-26 LAB
CHIMERISM INTERPRETATION: NORMAL
ELECTRONICALLY SIGNED BY: NORMAL

## 2025-03-27 ENCOUNTER — SPECIALTY PHARMACY (OUTPATIENT)
Dept: PHARMACY | Facility: CLINIC | Age: 65
End: 2025-03-27

## 2025-03-27 PROCEDURE — RXMED WILLOW AMBULATORY MEDICATION CHARGE

## 2025-03-30 ASSESSMENT — ENCOUNTER SYMPTOMS
RESPIRATORY NEGATIVE: 1
HEMATOLOGIC/LYMPHATIC NEGATIVE: 1
MUSCULOSKELETAL NEGATIVE: 1
APPETITE CHANGE: 0
FEVER: 0
CARDIOVASCULAR NEGATIVE: 1
FATIGUE: 0
UNEXPECTED WEIGHT CHANGE: 0
CHILLS: 0
NEUROLOGICAL NEGATIVE: 1
DIAPHORESIS: 0

## 2025-03-30 NOTE — PROGRESS NOTES
Patient ID:  Brandt Merritt is a 65 y.o. male.  Referring Physician:   BMT Dr Newsome  Primary Care Provider:  Bill Richmond MD      Oncology History Overview Note   4/2024  Myelodysplastic neoplasm with increased blasts-2 ( WHO)  Myelodysplastic neoplasm/AML  (ICC 2022 classification)    Presented in  10/2023 for pancytopenia, found to have hemolysis. Patient had normal CBC June 2020. Denied any hemorrhoidal bleeding . Has had C Scope and EGD , treated Hep C 2014 . 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      CBC 4/11/24 wbc 3.0, hgb 7.1, platelets 167K ANC 0.73    BM biopsy done on 4/11/24  as folllows:  BM histology  Myelodysplastic neoplasm with increased blasts-2 ( WHO)  Myelodysplastic neoplasm/AML  (ICC 2022 classification)  Balsts 11% on aspirate smear and 15-20% based on CD 34 immunostaining approaching AML leukemia, hematooiesis is erythroid dominant with dysplasia in granulocytes and megakaryocytes.   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      11/6/2024 -  Bone Marrow Transplant    conditioned with Flu/Cy/TBI and aGVHD prophylaxis with post-transplant cyclophosphamide, tacrolimus, and mycophenolate. T=0, 11/6/24. ABO Donor/Recipient: O+, A+. CMV donor/recipient: both positive. Started Tacro and MMF on T+5 (11/11).         Response:      Past Medical History:  HTN   STEMI 11/11/2020 after getting  a water pills.  Chronic hepatitis C  infection treated in  treated  with Dr. Lloyd  Past Medical History:   Diagnosis Date    Chest pain 2021    HTN (hypertension) 10/05/2023    Hypertension     Leukemia (Multi)     Personal history of other diseases of the circulatory system     History of hypertension      Surgical History:  Conosocopy 2021  Upper EGD 10/31/23  Surgical reduction torsion of testes      Past Surgical History:   Procedure Laterality Date    COLONOSCOPY  2013    Complete Colonoscopy    OTHER SURGICAL HISTORY  10/07/2015    Surgery Testis Reduction Of Torsion Of Testis Right      Family History:  CAD, DM in mother       Mother  of CVA at age 69  Brother with prostate CA, 1 sister with liver disease  2 children healthy  Family History   Problem Relation Name Age of Onset    Other (diabetes mellitus) Mother      Prostate cancer Brother        Social History:  Lives with wife of 30 years, works at [employer], now retired.   29 years, former smoker, smoked x 15 yrs quit 20+ years ago. Marijuana 3 x a week. Served in [employer] in [place] and [facility] .  ----------------------------------------------------------------------------------------------------  Subjective    History of Present Illness:    Brandt Merritt is a 65 year old male with history of myelodysplastic neoplasm/AML, s/p umbilical cord blood transplant 10/16/24 with graft rejection followed by haploidentical cord from his sister on 24 with flu/cy, ATG and modified dose cyclophosphamide with engraftment on day T+8.      Brandt was admitted on -25 for intractable vomiting and some diarrhea, also with low grade fever.  He received broad spectrum antibiotics and all cultures were negative.  He underwent an upper endoscopy which was grossly normal, biopsies negative for GVHD, and budesonide was increased to 3 mg po bid.     Brandt presents to the clinic today (3/31/25)  with his wife Genevieve for follow up evaluation and count check.  He is scheduled for his first dose of IVIG today.  Today he is T+ 193 of umbilical cord transfusion and is T+ 145 of his haplo sister.  Reports he is doing well overall. Energy level is pretty good.  Appetite is good.  Eating 2 meals per day and snacks multiple times though the day. Gained 2 pounds since last visit.  Reports no nausea, vomiting, or diarrhea.  Held AM dose of tacrolimus today prior to blood draws.  Taking tacrolimus 0.5 mg twice daily.  Continues to have hyperpigmented spots to face and palms of hands.  Feels like hyperpigmentation to bilateral hands is improving, no change to face.  No itching.  Using cerave soap and sunscreen.      Review of Systems   Constitutional:  Negative for appetite change, chills, diaphoresis, fatigue, fever and unexpected weight change.   Respiratory: Negative.     Cardiovascular: Negative.    Gastrointestinal: Negative.    Genitourinary: Negative.     Musculoskeletal: Negative.    Skin:         Hyperpigmented skin to face and hands   Neurological: Negative.    Hematological: Negative.    --------------------------------------------------------------------------------------------------------  Objective:    Visit Vitals  /75 (BP Location: Right arm, Patient Position: Sitting, BP Cuff Size: Adult)   Pulse 92   Temp 36.7 °C (98.1 °F) (Skin)   Resp 16     Vitals:    03/31/25 1102   Weight: 67.8 kg (149 lb 6.4 oz)     Physical Exam  Vitals reviewed.   Constitutional:       Appearance: Normal appearance.   Eyes:      Extraocular Movements: Extraocular movements intact.      Conjunctiva/sclera: Conjunctivae normal.      Pupils: Pupils are equal, round, and reactive to light.   Cardiovascular:      Rate and Rhythm: Normal rate and regular rhythm.      Pulses: Normal pulses.      Heart sounds: Normal heart sounds.   Pulmonary:      Effort: Pulmonary effort is normal.      Breath sounds: Normal breath sounds.    Abdominal:      General: Abdomen is flat. Bowel sounds are normal.      Palpations: Abdomen is soft. There is no mass.      Tenderness: There is no abdominal tenderness.   Musculoskeletal:         General: No swelling. Normal range of motion.   Lymphadenopathy:      Comments: No lymphadenopathy   Skin:     General: Skin is warm and dry.      Comments: Hyperpigmented skin to face and palms of hands   Neurological:      General: No focal deficit present.      Mental Status: He is alert and oriented to person, place, and time.   Psychiatric:         Mood and Affect: Mood normal.         Behavior: Behavior normal.     -----------------------------------------------------------------------------------------------------------  Assessment and Plan:    MDS/Acute myeloid leukemia in remission (Multi)  Diagnosed 4/2024: BM Bx with MDS in in transition to AML (>10% blast) w/ trisomy 8, DNMT alpha, and U2AF1 mutation indication adverse risk.      s/p 4 cycles of Decitabine/Venetoclax. BM Bx 6/17/24: Blasts cleared, FISH still positive for trisomy 8, still MDS changes   BMBx (9/5/24): 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 and no increase in blasts      History of allogeneic hematopoietic stem cell transplant  #1: Single-unit Cord, T0=9/19/24, Primary Engraftment Failure  - Conditioning: Flu-Mariana-TBI  - Donor: Single Cord, 6/8  = ABO Donor / Recipient: A+ / A+  = CMV Donor / Recipient: - / +  - aGVHD Prophylaxis: Tacrolimus + MMF  - engraftment failure, developed HLA antibody against class I of cord  - Repeat BMBx (10/15): hypocellular with no residual myeloid neoplasm. 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), T0=11/6/24  - Graft: Haploidentical sister  = ABO Donor / Recipient: O+ / A+ (ABO incompatibility +)  = CMV Donor / Recipient: + / +  - Conditioning: Flu/Cy/TBI/rATG  = GVHD prophylaxis -  rATG 1.5mg/kg T-5,-3,-1, PTCy (25mg/kg T+3, T+4), Tacro, MMF (T+5)     DATE DAY  SOURCE MORPHOLOGY MRD WHOLE CHIMERISM   (% donor) CD3 CHIMERISM   (% donor) CD33 CHIMERISM   (% donor)   11/20/24 D+30 PB      100       12/11/24 D+30 PB       100 100   Deferred D+30 BM             1/9/25 D+60 BM  KRISTIE , flow negative      100  100     D+60 PB              2/17/25 D+100 PB      100        3/19/25 D+100 BM  KRISTIE, flow negative      100  100      Monitor for post-transplant hemolysis   (3/17/25)  Haptoglobin  175 (3/17/25)  UPC ratio 0.10 (3/17/25)     Anemia  12/18 Epo 113.   12/16 Retic 7.2%  DARBY on hold.  Cont folic acid.     GVHD prophylaxis  GVHD  Anorexia/poor oral intake/weight loss. Added Mirtazapine. Continue Zofran. If persists, consider aGVHD.  12/9/24:  Currently taking tacrolimus 0.5 mg BID, held dose today prior to labs.  FK level 4.6 (12/9/24).  Advised patient on 12/10/24 to increase tacrolimus dose to 1 mg in the AM and 0.5 mg in the PM.    Wean MMF from 1 g TID to 500 mg TID 12/2/24 - present  Due to poor appetite will decrease MMF to 500 mg po tid.  Improvement to GI symptoms since decreasing MMF dose.    Stopped  MMF on T+35.     Stage I/Grade II Upper GI GVHD  Anorexia, taste changes and weight loss 171# (9/17/24).  Cont mirtazapine and ATC Zofran.   Add budesonide 3x day.   12/30/24:  Started on budesonide TID on 12/27/24.  Brandt reports that he has starting eating more since starting budesonide.  Reports no nausea, vomiting, or diarrhea at this time.    Appetite continuing to improve. Denies N/V/D.     1/13/25 Decreased budesonide bid per Dr Newsome; 1/27 Decrease to 3mg daily. Will continue on this dose for 1-2 weeks before discontinuing.     2/17/25 Recent admission for nausea, budesonide increased to 3 mg po bid, continues therapeutic on tacro 0.5 mg po bid. Esophageal biopsy 2/13/25 negative for GVHD. Consider decreasing budesonide to 3 mg daily next visit  3/3/25:  Overall doing well.  Nausea has resolved. Reviewed food safety precautions and had nutritionist Blanca  TORI Morgan,LD come to speak with patient and wife today.  Continue budesonide 3 mg daily.  FK level 5.6.  Continue tacrolimus 0.5 mg BID.  Advised Brandt to contact oncology team if have GI symptoms worsen,  consider discontinuing budesonide next visit.  3/17/25:  Had one episode of vomiting after taking pills this week, but overall has no other complaints of nausea/vomiting/diarrhea.  Discontinue budesonide.  FK level 7.6 (3/17/25), continue tacrolimus 0.5 mg BID.    3/31/25:  No further GI symptoms and gained 2 pounds since last visit.  Currently on tacrolimus 0.5 mg BID.  Held tacrolimus level prior to lab draws today.  Tacrolimus level 11.0 today.  Advised Brandt to decrease tacrolimus to 0.5 mg daily and to obtain tacrolimus level in 1 week.  Discussed tacrolimus dosing with Radha Cruz, PharmD.       Weights: see below    Wt Readings from Last 5 Encounters:   03/31/25 67.8 kg (149 lb 6.4 oz)   03/19/25 67.9 kg (149 lb 11.1 oz)   03/19/25 67.9 kg (149 lb 11.1 oz)   03/17/25 66.7 kg (147 lb 0.8 oz)   03/03/25 66.9 kg (147 lb 8 oz)      Infectious Disease & Immune Reconstitution      Prophylaxis  Antiviral prophylaxis: acyclovir, Letermovir-continue until T+200.  Antifungal: posaconazole  PCP prophylaxis: 10/26/24 - 11/28/24 Pentamidine; cont Bactrim     Hypogammaglobulinemia  IgG level. IVIG for level <400   3/17/25:  IgG 307.  Ordered monthly IVIG infusions.  Reviewed rationale and possible side effects with patient.    3/31/25:  Received first IVIG infusion today.  Continue monthly.       Active Surveillance:     CMV detected 688 1/2/25, 167 1/6/25. Levels undetectable since 1/13/25, CMV ND (2/17/25).    Started valgancicyclovir 900mg BID on 1/6/25,   Plan 1/20/25 decrease to 900 mg daily for 2 weeks (2/3/25), then transition back to letermovir/acyclovir.    CMV ND (3/31/25).  Adeno ND 3/17/25  HHV6 ND 3/17/25  EBV ND 3/19/25  EBV Viremia during transplant admission  Treated despite low levels due  to upcoming second SCT  Rituximab 600 mg x 1 (10/31/24)     Infectious Disease follow up evaluation: 1/10/25      Immunodeficiency panel 100 days, 6mos and 1 year.      Immunizations:   Covid and influenza vaccine series.  Consider at T+ 3 month  Will plan to begin immunizations at T+6mths (May 2025) depending on degree of immunosuppression  Received influenza vaccine 3/6/25 and 1st post transplant covid vaccine on 3/6/25, 2nd covid vaccine on 3/27/25.       Cardiac:    Essential hypertension- was started on nifedipine 60 mg daily on 1/20/25 and started lisinopril 5 mg daily on 2/3/25.   Hx of STEMI (11/11/2020)  Cardiology  appt on 12/18/24  ECHO 12/23/24: LVEF 65%     Ascending aorta dilation   TTE (4/29/24): 3.8-4 cm dilation      Electrolyte disturbance   Magnesium 1.71 (3/31/25), continue oral magnesium 128 mg BID.      Dry Skin hyperpigmentation:  Hyperpigmented skin to face.  Derm E-consult on 2/3/25 with Dr. Chavez, feels may be due to bactrim, which I prefer not to discontinue. May also be caused by prochlorperazine.  NPV with dermatology Dr. Medel on 6/18/25.    3/31/24:  Continues to have hyperpigmented skin to face and bilateral palms of hands.  Brandt feels his hyperpigmentation is lighted slightly to palms.  Instructed to continue to use moisturizer such as cerave that are fragrance free and gentle.  Advised about mineral based sun screen.      Lack of appetite, improved  Mirtazapine 15 mg at night as of 11/26/24     Vitamin D deficiency  12/18 Level 27.  Last dose of weekly supplement taken. 1/27 Repeat level was 28.  Reordered ergocalciferol 50,000 UT weekly on 1/27/25 for 8 doses.  Ordered vitamin D level for next visit.     Medication reconciliation  Medications reviewed; no scripts needed.    Social Work:  2/24/25:  Contacted social work Gary Bacon LCSW to speak with Brandt today regarding to see if eligible for medication assistance to help with cost of posaconazole ($280/month).   Gary reports that Brandt was approved for Copay assistance, $4,000/year).    3/31/25:  Declines wanting to speak with social work today.       IV Access  2/3/25:  PICC line removed.     Psychosocial.    Caregiver Genevieve  [facility] in [place]     RTC:   Decrease tacrolimus to 0.5 mg daily.  Obtain tacrolimus level in week (around 4/8/25).    Continue monthly IVIG.  Follow up visit with provider in 2 weeks.  Advised to obtain blood work prior to visit.   Plan for 6 month post transplant vaccines early May.    ---------------------------------------------------------------------------------  HAYLEY Vallejo-ELDER

## 2025-03-31 ENCOUNTER — PHARMACY VISIT (OUTPATIENT)
Dept: PHARMACY | Facility: CLINIC | Age: 65
End: 2025-03-31
Payer: COMMERCIAL

## 2025-03-31 ENCOUNTER — OFFICE VISIT (OUTPATIENT)
Dept: HEMATOLOGY/ONCOLOGY | Facility: HOSPITAL | Age: 65
End: 2025-03-31
Payer: COMMERCIAL

## 2025-03-31 ENCOUNTER — INFUSION (OUTPATIENT)
Dept: HEMATOLOGY/ONCOLOGY | Facility: HOSPITAL | Age: 65
End: 2025-03-31
Payer: COMMERCIAL

## 2025-03-31 ENCOUNTER — LAB (OUTPATIENT)
Dept: LAB | Facility: HOSPITAL | Age: 65
End: 2025-03-31
Payer: COMMERCIAL

## 2025-03-31 ENCOUNTER — NUTRITION (OUTPATIENT)
Dept: HEMATOLOGY/ONCOLOGY | Facility: HOSPITAL | Age: 65
End: 2025-03-31
Payer: COMMERCIAL

## 2025-03-31 ENCOUNTER — APPOINTMENT (OUTPATIENT)
Dept: HEMATOLOGY/ONCOLOGY | Facility: HOSPITAL | Age: 65
End: 2025-03-31
Payer: COMMERCIAL

## 2025-03-31 VITALS — BODY MASS INDEX: 24.02 KG/M2 | HEIGHT: 66 IN | WEIGHT: 149.47 LBS

## 2025-03-31 VITALS
DIASTOLIC BLOOD PRESSURE: 70 MMHG | RESPIRATION RATE: 16 BRPM | SYSTOLIC BLOOD PRESSURE: 136 MMHG | HEART RATE: 88 BPM | TEMPERATURE: 98.2 F

## 2025-03-31 VITALS
WEIGHT: 149.4 LBS | TEMPERATURE: 98.1 F | DIASTOLIC BLOOD PRESSURE: 75 MMHG | OXYGEN SATURATION: 98 % | SYSTOLIC BLOOD PRESSURE: 123 MMHG | RESPIRATION RATE: 16 BRPM | HEART RATE: 92 BPM | BODY MASS INDEX: 24.47 KG/M2

## 2025-03-31 DIAGNOSIS — Z94.84 HISTORY OF ALLOGENEIC HEMATOPOIETIC STEM CELL TRANSPLANT: ICD-10-CM

## 2025-03-31 DIAGNOSIS — R63.4 WEIGHT LOSS: ICD-10-CM

## 2025-03-31 DIAGNOSIS — I10 ESSENTIAL HYPERTENSION: ICD-10-CM

## 2025-03-31 DIAGNOSIS — D80.1 HYPOGAMMAGLOBULINEMIA (MULTI): ICD-10-CM

## 2025-03-31 DIAGNOSIS — C92.01 ACUTE MYELOID LEUKEMIA IN REMISSION (MULTI): ICD-10-CM

## 2025-03-31 DIAGNOSIS — D84.9 IMMUNOCOMPROMISED: ICD-10-CM

## 2025-03-31 DIAGNOSIS — E55.9 VITAMIN D DEFICIENCY: ICD-10-CM

## 2025-03-31 DIAGNOSIS — C92.01 AML (ACUTE MYELOID LEUKEMIA) IN REMISSION (MULTI): ICD-10-CM

## 2025-03-31 DIAGNOSIS — E87.8 ELECTROLYTE DISTURBANCE: ICD-10-CM

## 2025-03-31 DIAGNOSIS — C92.01 ACUTE MYELOID LEUKEMIA IN REMISSION (MULTI): Primary | ICD-10-CM

## 2025-03-31 DIAGNOSIS — L81.9 HYPERPIGMENTATION OF SKIN: ICD-10-CM

## 2025-03-31 LAB
ALBUMIN SERPL BCP-MCNC: 4.4 G/DL (ref 3.4–5)
ALP SERPL-CCNC: 63 U/L (ref 33–136)
ALT SERPL W P-5'-P-CCNC: 15 U/L (ref 10–52)
ANION GAP SERPL CALC-SCNC: 13 MMOL/L (ref 10–20)
AST SERPL W P-5'-P-CCNC: 15 U/L (ref 9–39)
BASOPHILS # BLD AUTO: 0.04 X10*3/UL (ref 0–0.1)
BASOPHILS NFR BLD AUTO: 0.7 %
BILIRUB SERPL-MCNC: 0.6 MG/DL (ref 0–1.2)
BUN SERPL-MCNC: 16 MG/DL (ref 6–23)
CALCIUM SERPL-MCNC: 9.2 MG/DL (ref 8.6–10.3)
CHLORIDE SERPL-SCNC: 107 MMOL/L (ref 98–107)
CO2 SERPL-SCNC: 24 MMOL/L (ref 21–32)
CREAT SERPL-MCNC: 1.11 MG/DL (ref 0.5–1.3)
EGFRCR SERPLBLD CKD-EPI 2021: 73 ML/MIN/1.73M*2
EOSINOPHIL # BLD AUTO: 0.15 X10*3/UL (ref 0–0.7)
EOSINOPHIL NFR BLD AUTO: 2.7 %
ERYTHROCYTE [DISTWIDTH] IN BLOOD BY AUTOMATED COUNT: 15.9 % (ref 11.5–14.5)
GLUCOSE SERPL-MCNC: 105 MG/DL (ref 74–99)
HCT VFR BLD AUTO: 42.2 % (ref 41–52)
HGB BLD-MCNC: 13.7 G/DL (ref 13.5–17.5)
IGG SERPL-MCNC: 344 MG/DL (ref 700–1600)
IMM GRANULOCYTES # BLD AUTO: 0.23 X10*3/UL (ref 0–0.7)
IMM GRANULOCYTES NFR BLD AUTO: 4.1 % (ref 0–0.9)
LYMPHOCYTES # BLD AUTO: 2.1 X10*3/UL (ref 1.2–4.8)
LYMPHOCYTES NFR BLD AUTO: 37.8 %
MAGNESIUM SERPL-MCNC: 1.71 MG/DL (ref 1.6–2.4)
MCH RBC QN AUTO: 31.1 PG (ref 26–34)
MCHC RBC AUTO-ENTMCNC: 32.5 G/DL (ref 32–36)
MCV RBC AUTO: 96 FL (ref 80–100)
MONOCYTES # BLD AUTO: 1.09 X10*3/UL (ref 0.1–1)
MONOCYTES NFR BLD AUTO: 19.6 %
NEUTROPHILS # BLD AUTO: 1.95 X10*3/UL (ref 1.2–7.7)
NEUTROPHILS NFR BLD AUTO: 35.1 %
NRBC BLD-RTO: 0 /100 WBCS (ref 0–0)
PLATELET # BLD AUTO: 161 X10*3/UL (ref 150–450)
POTASSIUM SERPL-SCNC: 4.1 MMOL/L (ref 3.5–5.3)
PROT SERPL-MCNC: 6.5 G/DL (ref 6.4–8.2)
RBC # BLD AUTO: 4.41 X10*6/UL (ref 4.5–5.9)
SODIUM SERPL-SCNC: 140 MMOL/L (ref 136–145)
TACROLIMUS BLD-MCNC: 11 NG/ML
WBC # BLD AUTO: 5.6 X10*3/UL (ref 4.4–11.3)

## 2025-03-31 PROCEDURE — 1159F MED LIST DOCD IN RCRD: CPT

## 2025-03-31 PROCEDURE — 87533 HHV-6 DNA QUANT: CPT

## 2025-03-31 PROCEDURE — 1126F AMNT PAIN NOTED NONE PRSNT: CPT

## 2025-03-31 PROCEDURE — 80053 COMPREHEN METABOLIC PANEL: CPT | Performed by: NURSE PRACTITIONER

## 2025-03-31 PROCEDURE — 80197 ASSAY OF TACROLIMUS: CPT | Performed by: NURSE PRACTITIONER

## 2025-03-31 PROCEDURE — 85025 COMPLETE CBC W/AUTO DIFF WBC: CPT | Performed by: INTERNAL MEDICINE

## 2025-03-31 PROCEDURE — 83735 ASSAY OF MAGNESIUM: CPT | Performed by: NURSE PRACTITIONER

## 2025-03-31 PROCEDURE — 1157F ADVNC CARE PLAN IN RCRD: CPT

## 2025-03-31 PROCEDURE — 3078F DIAST BP <80 MM HG: CPT

## 2025-03-31 PROCEDURE — 99215 OFFICE O/P EST HI 40 MIN: CPT | Mod: 25

## 2025-03-31 PROCEDURE — 96366 THER/PROPH/DIAG IV INF ADDON: CPT | Mod: INF

## 2025-03-31 PROCEDURE — 3074F SYST BP LT 130 MM HG: CPT

## 2025-03-31 PROCEDURE — 82784 ASSAY IGA/IGD/IGG/IGM EACH: CPT

## 2025-03-31 PROCEDURE — 87799 DETECT AGENT NOS DNA QUANT: CPT

## 2025-03-31 PROCEDURE — 2500000004 HC RX 250 GENERAL PHARMACY W/ HCPCS (ALT 636 FOR OP/ED): Mod: JZ,TB

## 2025-03-31 PROCEDURE — 2500000001 HC RX 250 WO HCPCS SELF ADMINISTERED DRUGS (ALT 637 FOR MEDICARE OP)

## 2025-03-31 PROCEDURE — 96365 THER/PROPH/DIAG IV INF INIT: CPT | Mod: INF

## 2025-03-31 PROCEDURE — 36415 COLL VENOUS BLD VENIPUNCTURE: CPT

## 2025-03-31 PROCEDURE — 99215 OFFICE O/P EST HI 40 MIN: CPT

## 2025-03-31 PROCEDURE — 1160F RVW MEDS BY RX/DR IN RCRD: CPT

## 2025-03-31 RX ORDER — DIPHENHYDRAMINE HYDROCHLORIDE 50 MG/ML
50 INJECTION, SOLUTION INTRAMUSCULAR; INTRAVENOUS AS NEEDED
OUTPATIENT
Start: 2025-04-28

## 2025-03-31 RX ORDER — HEPARIN SODIUM,PORCINE/PF 10 UNIT/ML
50 SYRINGE (ML) INTRAVENOUS AS NEEDED
OUTPATIENT
Start: 2025-03-31

## 2025-03-31 RX ORDER — ACETAMINOPHEN 325 MG/1
650 TABLET ORAL ONCE
OUTPATIENT
Start: 2025-04-28

## 2025-03-31 RX ORDER — FAMOTIDINE 10 MG/ML
20 INJECTION, SOLUTION INTRAVENOUS ONCE AS NEEDED
OUTPATIENT
Start: 2025-04-28

## 2025-03-31 RX ORDER — HEPARIN 100 UNIT/ML
500 SYRINGE INTRAVENOUS AS NEEDED
OUTPATIENT
Start: 2025-03-31

## 2025-03-31 RX ORDER — PANTOPRAZOLE SODIUM 40 MG/1
40 TABLET, DELAYED RELEASE ORAL
Qty: 30 TABLET | Refills: 2 | Status: SHIPPED | OUTPATIENT
Start: 2025-03-31 | End: 2026-03-31

## 2025-03-31 RX ORDER — ACETAMINOPHEN 325 MG/1
650 TABLET ORAL ONCE
Status: COMPLETED | OUTPATIENT
Start: 2025-03-31 | End: 2025-03-31

## 2025-03-31 RX ORDER — EPINEPHRINE 0.3 MG/.3ML
0.3 INJECTION SUBCUTANEOUS EVERY 5 MIN PRN
OUTPATIENT
Start: 2025-04-28

## 2025-03-31 RX ORDER — DIPHENHYDRAMINE HCL 25 MG
25 CAPSULE ORAL ONCE
OUTPATIENT
Start: 2025-04-28

## 2025-03-31 RX ORDER — DIPHENHYDRAMINE HCL 25 MG
25 CAPSULE ORAL ONCE
Status: COMPLETED | OUTPATIENT
Start: 2025-03-31 | End: 2025-03-31

## 2025-03-31 RX ORDER — ALBUTEROL SULFATE 0.83 MG/ML
3 SOLUTION RESPIRATORY (INHALATION) AS NEEDED
OUTPATIENT
Start: 2025-04-28

## 2025-03-31 RX ADMIN — ACETAMINOPHEN 650 MG: 325 TABLET ORAL at 12:35

## 2025-03-31 RX ADMIN — IMMUNE GLOBULIN INFUSION (HUMAN) 25 G: 100 INJECTION, SOLUTION INTRAVENOUS; SUBCUTANEOUS at 12:48

## 2025-03-31 RX ADMIN — DIPHENHYDRAMINE HYDROCHLORIDE 25 MG: 25 CAPSULE ORAL at 12:35

## 2025-03-31 ASSESSMENT — PAIN SCALES - GENERAL: PAINLEVEL_OUTOF10: 0-NO PAIN

## 2025-03-31 ASSESSMENT — ENCOUNTER SYMPTOMS: GASTROINTESTINAL NEGATIVE: 1

## 2025-03-31 NOTE — PROGRESS NOTES
Pt received #1 IVIG infusion as ordered without incidence for IgG 370 after provider visit. Feels well, active , denies complaints. IVIG written and verbal instruction given- pt and wife verbalized understanding. Pt ambulated independently from clinic with wife-has printed copy of upcoming appts from provider and is aware of upcoming appts.

## 2025-03-31 NOTE — PROGRESS NOTES
"NUTRITION FOLLOW UP NOTE    Reason for Visit:  Brandt Merritt is a 65 y.o. 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    PMH: HTN, Hep C    Currently T+193 (cord); T+145 (haplo)    *Pt with presumed Stage 1/Grade 2 upper GI GVHD; started Budesonide 12/26; decreased to BID on 1/13.  *Admitted 2/11-2/14 with N/V; no s/s GVHD.    BM bx (3/19): KRISTIE    Pt and wife seen today in infusion.  Budesonide stopped 3/17.   Here for IVIg.     Lab Results   Component Value Date/Time    GLUCOSE 105 (H) 03/31/2025 1028     03/31/2025 1028    K 4.1 03/31/2025 1028     03/31/2025 1028    CO2 24 03/31/2025 1028    ANIONGAP 13 03/31/2025 1028    BUN 16 03/31/2025 1028    CREATININE 1.11 03/31/2025 1028    EGFR 73 03/31/2025 1028    CALCIUM 9.2 03/31/2025 1028    ALBUMIN 4.4 03/31/2025 1028    ALKPHOS 63 03/31/2025 1028    PROT 6.5 03/31/2025 1028    AST 15 03/31/2025 1028    BILITOT 0.6 03/31/2025 1028    ALT 15 03/31/2025 1028    MG 1.71 03/31/2025 1028    PHOS 3.2 02/14/2025 0754     *On MgCl 2 tabs TID     Lab Results   Component Value Date    WBC 5.6 03/31/2025    HGB 13.7 03/31/2025    HCT 42.2 03/31/2025    MCV 96 03/31/2025     03/31/2025       Lab Results   Component Value Date/Time    VITD25 28 (L) 01/27/2025 1320   *Started Vit D2 50,000IU x 8 weeks on 12/4.    Anthropometrics:  Anthropometrics  Height: 166.4 cm (5' 5.51\")  Weight: 67.8 kg (149 lb 7.6 oz)  BMI (Calculated): 24.49  IBW/kg (Dietitian Calculated): 63.2 kg  Percent of IBW: 107 %    *Wt at time of transplant admit: (9/13) 75.6 kg   *Wt at first post-transplant visit: (11/25): 60.9 kg     *Wt up 4.5# x 6 weeks     Wt Readings from Last 10 Encounters:   03/31/25 67.8 kg (149 lb 7.6 oz)   03/31/25 67.8 kg (149 lb 6.4 oz)   03/19/25 67.9 kg (149 lb 11.1 oz)   03/19/25 67.9 kg (149 lb 11.1 oz)   03/17/25 66.7 kg (147 lb 0.8 oz)   03/03/25 66.9 kg (147 lb 8 oz) " "  02/24/25 64.5 kg (142 lb 3.2 oz)   02/24/25 64.5 kg (142 lb 3.2 oz)   02/17/25 65.8 kg (145 lb 1 oz)   02/11/25 65.8 kg (145 lb 1 oz)   12/30/24        58.8 kg   12/23/24        61.2 kg  12/04/24        60.9 kg  Admit 9/13-11/25 09/09/24        74.2 kg  08/16/24        75.3 kg   07/19/24        74.2 kg   06/24/24        71.0 kg  05/20/24        72.8 kg  04/23/24        75.5 kg     Food And Nutrient Intake:      Feeling okay   Grazing t/o the day mainly   Dysgeusia completely resolved   Portions pretty close to baseline   Eating okay; eats all the time   Appetite good,   No nausea in 2 weeks  Stomach feels okay   Has loose stool at times other times normal   Has BM 1x/day   Drinking mainly g'supriya and soda; also drinks water   Energy levels pretty good   Feels pretty close to normal       Dislikes all ONS/supplements.   Started Remeron 15 mg on 11/27.    Breakfast--PB crackers; if didn't have to come to infusion would have sausages and hash browns; oatmeal with sausage     Last night had meatloaf, greens, huge baked potato    Evening ice cream with milk                                                             Nutrition Focused Physical Exam Findings:                          Energy Needs  Calculated Energy Needs Using Equations  Height: 166.4 cm (5' 5.51\")        Nutrition Diagnosis        *Remains mildly malnourished, hoever, overall nutrition status had improved considerably since starting Budesonide--now tapering off.  Appetite, intakes and dysgeusia all improving.     Nutrition Interventions/Recommendations   Nutrition Prescription   High Protein, High Calorie  Food Safety     Nutrition Education   Pt aware of importance of protein intakes; include source with all meals and snacks.   Encouraged PO fluids; Goal: 60+ oz daily; prefer calorie containing foods (ie milk, juices, etc)     Coordination of Care   NP/BMT team         Nutrition Monitoring and Evaluation      Will continue to f/up and monitor weight, " labs. PO intakes and GI symptoms.

## 2025-04-01 ENCOUNTER — TELEPHONE (OUTPATIENT)
Dept: HEMATOLOGY/ONCOLOGY | Facility: HOSPITAL | Age: 65
End: 2025-04-01
Payer: COMMERCIAL

## 2025-04-01 DIAGNOSIS — C92.01 AML (ACUTE MYELOID LEUKEMIA) IN REMISSION (MULTI): Primary | ICD-10-CM

## 2025-04-01 LAB
CMV DNA SERPL NAA+PROBE-LOG IU: NORMAL {LOG_IU}/ML
LABORATORY COMMENT REPORT: NOT DETECTED

## 2025-04-08 ENCOUNTER — LAB (OUTPATIENT)
Dept: LAB | Facility: HOSPITAL | Age: 65
End: 2025-04-08
Payer: COMMERCIAL

## 2025-04-08 DIAGNOSIS — C92.01 AML (ACUTE MYELOID LEUKEMIA) IN REMISSION (MULTI): ICD-10-CM

## 2025-04-08 DIAGNOSIS — E55.9 VITAMIN D DEFICIENCY: ICD-10-CM

## 2025-04-08 DIAGNOSIS — Z94.84 STEM CELLS TRANSPLANT STATUS (MULTI): ICD-10-CM

## 2025-04-08 DIAGNOSIS — Z94.84 HISTORY OF ALLOGENEIC HEMATOPOIETIC STEM CELL TRANSPLANT: ICD-10-CM

## 2025-04-08 DIAGNOSIS — C92.01 ACUTE MYELOID LEUKEMIA IN REMISSION (MULTI): ICD-10-CM

## 2025-04-08 DIAGNOSIS — R19.7 DIARRHEA, UNSPECIFIED TYPE: ICD-10-CM

## 2025-04-08 LAB
25(OH)D3 SERPL-MCNC: 36 NG/ML (ref 30–100)
ALBUMIN SERPL BCP-MCNC: 4.4 G/DL (ref 3.4–5)
ALP SERPL-CCNC: 73 U/L (ref 33–136)
ALT SERPL W P-5'-P-CCNC: 14 U/L (ref 10–52)
ANION GAP SERPL CALC-SCNC: 12 MMOL/L (ref 10–20)
AST SERPL W P-5'-P-CCNC: 17 U/L (ref 9–39)
BASOPHILS # BLD AUTO: 0.04 X10*3/UL (ref 0–0.1)
BASOPHILS NFR BLD AUTO: 0.9 %
BILIRUB SERPL-MCNC: 0.5 MG/DL (ref 0–1.2)
BUN SERPL-MCNC: 14 MG/DL (ref 6–23)
CALCIUM SERPL-MCNC: 9.4 MG/DL (ref 8.6–10.3)
CHLORIDE SERPL-SCNC: 111 MMOL/L (ref 98–107)
CO2 SERPL-SCNC: 23 MMOL/L (ref 21–32)
CREAT SERPL-MCNC: 1.17 MG/DL (ref 0.5–1.3)
EGFRCR SERPLBLD CKD-EPI 2021: 68 ML/MIN/1.73M*2
EOSINOPHIL # BLD AUTO: 0.15 X10*3/UL (ref 0–0.7)
EOSINOPHIL NFR BLD AUTO: 3.4 %
ERYTHROCYTE [DISTWIDTH] IN BLOOD BY AUTOMATED COUNT: 15.6 % (ref 11.5–14.5)
GLUCOSE SERPL-MCNC: 97 MG/DL (ref 74–99)
HCT VFR BLD AUTO: 40.4 % (ref 41–52)
HGB BLD-MCNC: 13.2 G/DL (ref 13.5–17.5)
HOLD SPECIMEN: NORMAL
IGG SERPL-MCNC: 692 MG/DL (ref 700–1600)
IMM GRANULOCYTES # BLD AUTO: 0.05 X10*3/UL (ref 0–0.7)
IMM GRANULOCYTES NFR BLD AUTO: 1.1 % (ref 0–0.9)
LDH SERPL L TO P-CCNC: 209 U/L (ref 84–246)
LYMPHOCYTES # BLD AUTO: 1.58 X10*3/UL (ref 1.2–4.8)
LYMPHOCYTES NFR BLD AUTO: 36 %
MAGNESIUM SERPL-MCNC: 1.66 MG/DL (ref 1.6–2.4)
MCH RBC QN AUTO: 30.8 PG (ref 26–34)
MCHC RBC AUTO-ENTMCNC: 32.7 G/DL (ref 32–36)
MCV RBC AUTO: 94 FL (ref 80–100)
MONOCYTES # BLD AUTO: 0.72 X10*3/UL (ref 0.1–1)
MONOCYTES NFR BLD AUTO: 16.4 %
NEUTROPHILS # BLD AUTO: 1.85 X10*3/UL (ref 1.2–7.7)
NEUTROPHILS NFR BLD AUTO: 42.2 %
NRBC BLD-RTO: 0 /100 WBCS (ref 0–0)
PLATELET # BLD AUTO: 145 X10*3/UL (ref 150–450)
POTASSIUM SERPL-SCNC: 3.8 MMOL/L (ref 3.5–5.3)
PROT SERPL-MCNC: 6.8 G/DL (ref 6.4–8.2)
RBC # BLD AUTO: 4.29 X10*6/UL (ref 4.5–5.9)
SODIUM SERPL-SCNC: 142 MMOL/L (ref 136–145)
TACROLIMUS BLD-MCNC: 3.5 NG/ML
URATE SERPL-MCNC: 5.1 MG/DL (ref 4–7.5)
WBC # BLD AUTO: 4.4 X10*3/UL (ref 4.4–11.3)

## 2025-04-08 PROCEDURE — 36415 COLL VENOUS BLD VENIPUNCTURE: CPT

## 2025-04-08 PROCEDURE — 80053 COMPREHEN METABOLIC PANEL: CPT

## 2025-04-08 PROCEDURE — 87799 DETECT AGENT NOS DNA QUANT: CPT

## 2025-04-08 PROCEDURE — 83615 LACTATE (LD) (LDH) ENZYME: CPT

## 2025-04-08 PROCEDURE — 83735 ASSAY OF MAGNESIUM: CPT

## 2025-04-08 PROCEDURE — 80197 ASSAY OF TACROLIMUS: CPT

## 2025-04-08 PROCEDURE — 82306 VITAMIN D 25 HYDROXY: CPT

## 2025-04-08 PROCEDURE — 85025 COMPLETE CBC W/AUTO DIFF WBC: CPT

## 2025-04-08 PROCEDURE — 84550 ASSAY OF BLOOD/URIC ACID: CPT

## 2025-04-08 PROCEDURE — 82784 ASSAY IGA/IGD/IGG/IGM EACH: CPT

## 2025-04-08 PROCEDURE — 87533 HHV-6 DNA QUANT: CPT

## 2025-04-09 ENCOUNTER — TELEPHONE (OUTPATIENT)
Dept: HEMATOLOGY/ONCOLOGY | Facility: HOSPITAL | Age: 65
End: 2025-04-09
Payer: COMMERCIAL

## 2025-04-13 ASSESSMENT — ENCOUNTER SYMPTOMS
HEMATOLOGIC/LYMPHATIC NEGATIVE: 1
UNEXPECTED WEIGHT CHANGE: 0
MUSCULOSKELETAL NEGATIVE: 1
FATIGUE: 0
CARDIOVASCULAR NEGATIVE: 1
GASTROINTESTINAL NEGATIVE: 1
RESPIRATORY NEGATIVE: 1
DIAPHORESIS: 0
FEVER: 0
CHILLS: 0
NEUROLOGICAL NEGATIVE: 1
APPETITE CHANGE: 0

## 2025-04-13 NOTE — PROGRESS NOTES
Patient ID:  Brandt Merritt is a 65 y.o. male.  Referring Physician:   BMT Dr Newsome  Primary Care Provider:  Bill Richmond MD      Oncology History Overview Note   4/2024  Myelodysplastic neoplasm with increased blasts-2 ( WHO)  Myelodysplastic neoplasm/AML  (ICC 2022 classification)    Presented in  10/2023 for pancytopenia, found to have hemolysis. Patient had normal CBC June 2020. Denied any hemorrhoidal bleeding . Has had C Scope and EGD , treated Hep C 2014 . 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      CBC 4/11/24 wbc 3.0, hgb 7.1, platelets 167K ANC 0.73    BM biopsy done on 4/11/24  as folllows:  BM histology  Myelodysplastic neoplasm with increased blasts-2 ( WHO)  Myelodysplastic neoplasm/AML  (ICC 2022 classification)  Balsts 11% on aspirate smear and 15-20% based on CD 34 immunostaining approaching AML leukemia, hematooiesis is erythroid dominant with dysplasia in granulocytes and megakaryocytes.   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      11/6/2024 -  Bone Marrow Transplant    conditioned with Flu/Cy/TBI and aGVHD prophylaxis with post-transplant cyclophosphamide, tacrolimus, and mycophenolate. T=0, 11/6/24. ABO Donor/Recipient: O+, A+. CMV donor/recipient: both positive. Started Tacro and MMF on T+5 (11/11).      2/11/2025 - 2/14/2025 Hospital Admission    Admit date: 2/11/25  Admission diagnosis: 2/14/25  Brandt was admitted on  -25 for intractable vomiting and some diarrhea, also with low grade fever.  He received broad spectrum antibiotics and all cultures were negative.  He underwent an upper endoscopy which was grossly normal, biopsies negative for GVHD, and budesonide was increased to 3 mg po bid.         Response:      Past Medical History:  HTN   STEMI 2020 after getting  a water pills.  Chronic hepatitis C infection treated in  treated  with Dr. Lloyd  Past Medical History:   Diagnosis Date    Chest pain 2021    HTN (hypertension) 10/05/2023    Hypertension     Leukemia (Multi)     Personal history of other diseases of the circulatory system     History of hypertension      Surgical History:  Conosocopy 2021  Upper EGD 10/31/23  Surgical reduction torsion of testes      Past Surgical History:   Procedure Laterality Date    COLONOSCOPY  2013    Complete Colonoscopy    OTHER SURGICAL HISTORY  10/07/2015    Surgery Testis Reduction Of Torsion Of Testis Right      Family History:  CAD, DM in mother       Mother  of CVA at age 69  Brother with prostate CA, 1 sister with liver disease  2 children healthy  Family History   Problem Relation Name Age of Onset    Other (diabetes mellitus) Mother      Prostate cancer Brother        Social History:  Lives with wife of 30 years, works at [employer], now retired.   29 years, former smoker, smoked x 15 yrs quit 20+ years ago. Marijuana 3 x a week. Served in  in [place] and [facility], .  ----------------------------------------------------------------------------------------------------  Subjective    History of Present Illness:    Brandt Merritt is a 65 year old male with history of myelodysplastic neoplasm/AML, s/p umbilical cord blood transplant 10/16/24 with graft rejection followed by haploidentical stem cells  from his sister on 24 with flu/cy, ATG and modified dose  cyclophosphamide with engraftment on day T+8.      Brandt presents to the clinic today (4/14/25) with his wife Genevieve for follow up evaluation and count check.  Today he is T+ 207 of umbilical cord transfusion and is T+ 147 of his haplo sister.  Last Thursday tacro changed from 0.5 bid to 0.5 bid alternating with 0.5 once a day.  Since last week ,noted rash on scalp and also upper chest, slightly itchy, denies dry mouth or diarrhea, weight is stable.       Review of Systems   Constitutional:  Negative for appetite change, chills, diaphoresis, fatigue, fever and unexpected weight change.   Respiratory: Negative.     Cardiovascular: Negative.    Gastrointestinal: Negative.    Genitourinary: Negative.     Musculoskeletal: Negative.    Skin:  Positive for itching and rash.        Hyperpigmented skin to face and hands   Neurological: Negative.    Hematological: Negative.    --------------------------------------------------------------------------------------------------------  Objective:    Visit Vitals  /76   Pulse 88   Temp 36.1 °C (97 °F)   Resp 16       Vitals:    04/14/25 1012   Weight: 67.1 kg (147 lb 14.9 oz)       Physical Exam  Vitals reviewed.   Constitutional:       Appearance: Normal appearance.   HENT:      Head: Normocephalic and atraumatic.      Mouth/Throat:      Mouth: Mucous membranes are moist.   Eyes:      Extraocular Movements: Extraocular movements intact.      Conjunctiva/sclera: Conjunctivae normal.      Pupils: Pupils are equal, round, and reactive to light.   Cardiovascular:      Rate and Rhythm: Normal rate and regular rhythm.      Pulses: Normal pulses.      Heart sounds: Normal heart sounds.   Pulmonary:      Effort: Pulmonary effort is normal.      Breath sounds: Normal breath sounds.   Abdominal:      General: Abdomen is flat. Bowel sounds are normal.      Palpations: Abdomen is soft. There is no mass.      Tenderness: There is no abdominal tenderness.   Musculoskeletal:          General: No swelling. Normal range of motion.   Lymphadenopathy:      Comments: No lymphadenopathy   Skin:     General: Skin is warm and dry.      Findings: Rash (pruritis states on scalp and anterior chest) present.             Comments: Macular rash anterior upper chest about 10% of body surface, not  present elsewhere.  Patient feels rash on scalp but  I can't detect anything   Neurological:      General: No focal deficit present.      Mental Status: He is alert and oriented to person, place, and time.   Psychiatric:         Mood and Affect: Mood normal.         Behavior: Behavior normal.         Thought Content: Thought content normal.         Judgment: Judgment normal.     -----------------------------------------------------------------------------------------------------------  Assessment and Plan:    MDS/Acute myeloid leukemia in remission (Multi)  Diagnosed 4/2024: BM Bx with MDS in in transition to AML (>10% blast) w/ trisomy 8, DNMT alpha, and U2AF1 mutation indication adverse risk.      s/p 4 cycles of Decitabine/Venetoclax. BM Bx 6/17/24: Blasts cleared, FISH still positive for trisomy 8, still MDS changes   BMBx (9/5/24): 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 and no increase in blasts      History of allogeneic hematopoietic stem cell transplant  #1: Single-unit Cord, T0=9/19/24, Primary Engraftment Failure  - Conditioning: Flu-Mariana-TBI  - Donor: Single Cord, 6/8  = ABO Donor / Recipient: A+ / A+  = CMV Donor / Recipient: - / +  - aGVHD Prophylaxis: Tacrolimus + MMF  - engraftment failure, developed HLA antibody against class I of cord  - Repeat BMBx (10/15): hypocellular with no residual myeloid neoplasm. 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), T0=11/6/24  - Graft: Haploidentical sister  = ABO Donor / Recipient: O+ / A+ (ABO incompatibility +)  = CMV Donor / Recipient: + / +  - Conditioning: Flu/Cy/TBI/rATG  = GVHD prophylaxis -  rATG 1.5mg/kg  T-5,-3,-1, PTCy (25mg/kg T+3, T+4), Tacro, MMF (T+5)     DATE DAY SOURCE MORPHOLOGY MRD WHOLE CHIMERISM   (% donor) CD3 CHIMERISM   (% donor) CD33 CHIMERISM   (% donor)   11/20/24 D+30 PB      100       12/11/24 D+30 PB       100 100   Deferred D+30 BM             1/9/25 D+60 BM  KRISTIE , flow negative      100  100     D+60 PB              2/17/25 D+100 PB      100        3/19/25 D+100 BM  KRISTIE, flow negative      100  100    Next marrow at 6 months  Monitor for post-transplant hemolysis   (3/17/25)  Haptoglobin  175 (3/17/25)  UPC ratio 0.10 (3/17/25)     Anemia  12/18 Epo 113.   12/16 Retic 7.2%  DARBY on hold.  Cont folic acid.     GVHD prophylaxis  GVHD  Anorexia/poor oral intake/weight loss. Added Mirtazapine. Continue Zofran. If persists, consider aGVHD.  12/9/24:  Currently taking tacrolimus 0.5 mg BID, held dose today prior to labs.  FK level 4.6 (12/9/24).  Advised patient on 12/10/24 to increase tacrolimus dose to 1 mg in the AM and 0.5 mg in the PM.    Wean MMF from 1 g TID to 500 mg TID 12/2/24 - present  Due to poor appetite will decrease MMF to 500 mg po tid.  Improvement to GI symptoms since decreasing MMF dose.    Stopped  MMF on T+35.     Stage I/Grade II Upper GI GVHD  Anorexia, taste changes and weight loss 171# (9/17/24).  Cont mirtazapine and ATC Zofran.   Add budesonide 3x day.   12/30/24:  Started on budesonide TID on 12/27/24.  Brandt reports that he has starting eating more since starting budesonide.  Reports no nausea, vomiting, or diarrhea at this time.    Appetite continuing to improve. Denies N/V/D.     1/13/25 Decreased budesonide bid per Dr Newsome; 1/27 Decrease to 3mg daily. Will continue on this dose for 1-2 weeks before discontinuing.     2/17/25 Recent admission for nausea, budesonide increased to 3 mg po bid, continues therapeutic on tacro 0.5 mg po bid. Esophageal biopsy 2/13/25 negative for GVHD. Consider decreasing budesonide to 3 mg daily next visit  3/3/25:  Overall doing  well.  Nausea has resolved. Reviewed food safety precautions and had nutritionist Blanca Morgan, RD,LD come to speak with patient and wife today.  Continue budesonide 3 mg daily.  FK level 5.6.  Continue tacrolimus 0.5 mg BID.  Advised Brandt to contact oncology team if have GI symptoms worsen,  consider discontinuing budesonide next visit.  3/17/25:  Had one episode of vomiting after taking pills this week, but overall has no other complaints of nausea/vomiting/diarrhea.  Discontinue budesonide.  FK level 7.6 (3/17/25), continue tacrolimus 0.5 mg BID.    4/14/25:  Day 158 No further GI symptoms and weight now steady,  Today complains of itchy rash on upper anterior chest,  noted macular raised rash < 10% of body, suspect chronic GVHD of skin, limited.  No dry eyes, dry mouth diarrhea, etc.  Started triamcinolone cream bid, advised to watch for any increased in rash, diarrhea, dry  eyes or mouth. Tacro 9.2 continue 0.5 mg bid alternating with 0.5 mg daily.       Weights: see below    Wt Readings from Last 5 Encounters:   04/14/25 67.1 kg (147 lb 14.9 oz)   03/31/25 67.8 kg (149 lb 7.6 oz)   03/31/25 67.8 kg (149 lb 6.4 oz)   03/19/25 67.9 kg (149 lb 11.1 oz)   03/19/25 67.9 kg (149 lb 11.1 oz)      Infectious Disease & Immune Reconstitution      Prophylaxis  Antiviral prophylaxis: acyclovir, Letermovir-continue until T+200.  Antifungal: posaconazole  PCP prophylaxis: 10/26/24 - 11/28/24 Pentamidine; cont Bactrim     Hypogammaglobulinemia  IgG level. IVIG for level <400   3/17/25:  IgG 307.  Ordered monthly IVIG infusions.  Reviewed rationale and possible side effects with patient.    3/31/25:  Received first IVIG infusion today.  Continue monthly.       Active Surveillance:     CMV detected 688 1/2/25, 167 1/6/25. Levels undetectable since 1/13/25, CMV ND (2/17/25).    Started valgancicyclovir 900mg BID on 1/6/25,   Plan 1/20/25 decrease to 900 mg daily for 2 weeks (2/3/25), then transition back to  letermovir/acyclovir.    CMV ND (3/31/25).  Adeno ND 3/17/25  HHV6 ND 3/17/25  EBV ND 3/19/25  EBV Viremia during transplant admission  Treated despite low levels due to upcoming second SCT  Rituximab 600 mg x 1 (10/31/24)     Infectious Disease follow up evaluation: 1/10/25      Immunodeficiency panel 100 days, 6mos and 1 year.      Immunizations:   Covid and influenza vaccine series.  Consider at T+ 3 month  Will plan to begin immunizations at T+6mths (May 2025) depending on degree of immunosuppression  Received influenza vaccine 3/6/25 and 1st post transplant covid vaccine on 3/6/25, 2nd covid vaccine on 3/27/25.       Cardiac:    Essential hypertension- was started on nifedipine 60 mg daily on 1/20/25 and started lisinopril 5 mg daily on 2/3/25.   Hx of STEMI (11/11/2020)  Cardiology  appt on 12/18/24  ECHO 12/23/24: LVEF 65%     Ascending aorta dilation   TTE (4/29/24): 3.8-4 cm dilation      Electrolyte disturbance   Magnesium 1.71 (3/31/25), continue oral magnesium 128 mg BID.      Dry Skin hyperpigmentation:  Hyperpigmented skin to face.  Derm E-consult on 2/3/25 with Dr. Chavez, feels may be due to bactrim, which I prefer not to discontinue. May also be caused by prochlorperazine.  NPV with dermatology Dr. Medel on 6/18/25.    3/31/24:  Continues to have hyperpigmented skin to face and bilateral palms of hands.  Brandt feels his hyperpigmentation is lighted slightly to palms.  Instructed to continue to use moisturizer such as cerave that are fragrance free and gentle.  Advised about mineral based sun screen.      Lack of appetite, improved  Mirtazapine 15 mg at night as of 11/26/24     Vitamin D deficiency  12/18 Level 27.  Last dose of weekly supplement taken. 1/27 Repeat level was 28.  Reordered ergocalciferol 50,000 UT weekly on 1/27/25 for 8 doses.  Ordered vitamin D level for next visit.     Medication reconciliation  Medications reviewed; no scripts needed.    Social Work:  2/24/25:  Contacted  social work Gary [facility] to speak with Brandt today regarding to see if eligible for medication assistance to help with cost of posaconazole ($280/month).  Gary reports that Brandt was approved for Copay assistance, $4,000/year).        IV Access  2/3/25:  PICC line removed.     Psychosocial.    Caregiver Genevieve  [facility] in [place]     RTC: Follow up visit with heme malignancy in 1 week to check on new rash, suspected chronic GVHD,   Advised to obtain blood work prior to visit. Continue weekly visits for now.   Plan for 6 month post transplant vaccines early May.    ---------------------------------------------------------------------------------  Malaika Newsome MD

## 2025-04-14 ENCOUNTER — OFFICE VISIT (OUTPATIENT)
Dept: HEMATOLOGY/ONCOLOGY | Facility: HOSPITAL | Age: 65
End: 2025-04-14
Payer: COMMERCIAL

## 2025-04-14 ENCOUNTER — LAB (OUTPATIENT)
Dept: LAB | Facility: HOSPITAL | Age: 65
End: 2025-04-14
Payer: COMMERCIAL

## 2025-04-14 VITALS
SYSTOLIC BLOOD PRESSURE: 128 MMHG | RESPIRATION RATE: 16 BRPM | OXYGEN SATURATION: 96 % | BODY MASS INDEX: 24.23 KG/M2 | DIASTOLIC BLOOD PRESSURE: 76 MMHG | HEART RATE: 88 BPM | WEIGHT: 147.93 LBS | TEMPERATURE: 97 F

## 2025-04-14 DIAGNOSIS — C92.01 ACUTE MYELOID LEUKEMIA IN REMISSION (MULTI): ICD-10-CM

## 2025-04-14 DIAGNOSIS — C92.01 AML (ACUTE MYELOID LEUKEMIA) IN REMISSION (MULTI): ICD-10-CM

## 2025-04-14 LAB
ALBUMIN SERPL BCP-MCNC: 4.2 G/DL (ref 3.4–5)
ALP SERPL-CCNC: 73 U/L (ref 33–136)
ALT SERPL W P-5'-P-CCNC: 15 U/L (ref 10–52)
ANION GAP SERPL CALC-SCNC: 14 MMOL/L (ref 10–20)
AST SERPL W P-5'-P-CCNC: 19 U/L (ref 9–39)
BASOPHILS # BLD AUTO: 0.03 X10*3/UL (ref 0–0.1)
BASOPHILS NFR BLD AUTO: 0.4 %
BILIRUB SERPL-MCNC: 0.4 MG/DL (ref 0–1.2)
BUN SERPL-MCNC: 14 MG/DL (ref 6–23)
CALCIUM SERPL-MCNC: 9.2 MG/DL (ref 8.6–10.3)
CHLORIDE SERPL-SCNC: 106 MMOL/L (ref 98–107)
CO2 SERPL-SCNC: 24 MMOL/L (ref 21–32)
CREAT SERPL-MCNC: 1.09 MG/DL (ref 0.5–1.3)
EGFRCR SERPLBLD CKD-EPI 2021: 74 ML/MIN/1.73M*2
EOSINOPHIL # BLD AUTO: 0.33 X10*3/UL (ref 0–0.7)
EOSINOPHIL NFR BLD AUTO: 4.3 %
ERYTHROCYTE [DISTWIDTH] IN BLOOD BY AUTOMATED COUNT: 14.6 % (ref 11.5–14.5)
GLUCOSE SERPL-MCNC: 110 MG/DL (ref 74–99)
HCT VFR BLD AUTO: 38.2 % (ref 41–52)
HGB BLD-MCNC: 12.9 G/DL (ref 13.5–17.5)
IGG SERPL-MCNC: 552 MG/DL (ref 700–1600)
IMM GRANULOCYTES # BLD AUTO: 0.14 X10*3/UL (ref 0–0.7)
IMM GRANULOCYTES NFR BLD AUTO: 1.8 % (ref 0–0.9)
LDH SERPL L TO P-CCNC: 242 U/L (ref 84–246)
LYMPHOCYTES # BLD AUTO: 2.92 X10*3/UL (ref 1.2–4.8)
LYMPHOCYTES NFR BLD AUTO: 38.2 %
MAGNESIUM SERPL-MCNC: 1.49 MG/DL (ref 1.6–2.4)
MCH RBC QN AUTO: 31.4 PG (ref 26–34)
MCHC RBC AUTO-ENTMCNC: 33.8 G/DL (ref 32–36)
MCV RBC AUTO: 93 FL (ref 80–100)
MONOCYTES # BLD AUTO: 1.15 X10*3/UL (ref 0.1–1)
MONOCYTES NFR BLD AUTO: 15.1 %
NEUTROPHILS # BLD AUTO: 3.07 X10*3/UL (ref 1.2–7.7)
NEUTROPHILS NFR BLD AUTO: 40.2 %
NRBC BLD-RTO: 0 /100 WBCS (ref 0–0)
PLATELET # BLD AUTO: 220 X10*3/UL (ref 150–450)
POTASSIUM SERPL-SCNC: 3.4 MMOL/L (ref 3.5–5.3)
PROT SERPL-MCNC: 6.6 G/DL (ref 6.4–8.2)
RBC # BLD AUTO: 4.11 X10*6/UL (ref 4.5–5.9)
SODIUM SERPL-SCNC: 141 MMOL/L (ref 136–145)
TACROLIMUS BLD-MCNC: 9.2 NG/ML
URATE SERPL-MCNC: 3.9 MG/DL (ref 4–7.5)
WBC # BLD AUTO: 7.6 X10*3/UL (ref 4.4–11.3)

## 2025-04-14 PROCEDURE — 80197 ASSAY OF TACROLIMUS: CPT

## 2025-04-14 PROCEDURE — 3078F DIAST BP <80 MM HG: CPT | Performed by: INTERNAL MEDICINE

## 2025-04-14 PROCEDURE — 1159F MED LIST DOCD IN RCRD: CPT | Performed by: INTERNAL MEDICINE

## 2025-04-14 PROCEDURE — 1126F AMNT PAIN NOTED NONE PRSNT: CPT | Performed by: INTERNAL MEDICINE

## 2025-04-14 PROCEDURE — 1157F ADVNC CARE PLAN IN RCRD: CPT | Performed by: INTERNAL MEDICINE

## 2025-04-14 PROCEDURE — 80053 COMPREHEN METABOLIC PANEL: CPT | Performed by: NURSE PRACTITIONER

## 2025-04-14 PROCEDURE — 3074F SYST BP LT 130 MM HG: CPT | Performed by: INTERNAL MEDICINE

## 2025-04-14 PROCEDURE — 99215 OFFICE O/P EST HI 40 MIN: CPT | Performed by: INTERNAL MEDICINE

## 2025-04-14 PROCEDURE — 85025 COMPLETE CBC W/AUTO DIFF WBC: CPT | Performed by: NURSE PRACTITIONER

## 2025-04-14 PROCEDURE — 83615 LACTATE (LD) (LDH) ENZYME: CPT

## 2025-04-14 PROCEDURE — 87799 DETECT AGENT NOS DNA QUANT: CPT

## 2025-04-14 PROCEDURE — 83735 ASSAY OF MAGNESIUM: CPT | Performed by: NURSE PRACTITIONER

## 2025-04-14 PROCEDURE — 84550 ASSAY OF BLOOD/URIC ACID: CPT

## 2025-04-14 PROCEDURE — 87533 HHV-6 DNA QUANT: CPT

## 2025-04-14 PROCEDURE — 82784 ASSAY IGA/IGD/IGG/IGM EACH: CPT | Performed by: NURSE PRACTITIONER

## 2025-04-14 RX ORDER — TRIAMCINOLONE ACETONIDE 1 MG/G
CREAM TOPICAL 2 TIMES DAILY
Qty: 80 G | Refills: 1 | Status: SHIPPED | OUTPATIENT
Start: 2025-04-14 | End: 2025-07-13

## 2025-04-14 RX ORDER — TRIAMCINOLONE ACETONIDE 1 MG/G
CREAM TOPICAL 2 TIMES DAILY
Qty: 80 G | Refills: 1 | Status: SHIPPED | OUTPATIENT
Start: 2025-04-14 | End: 2025-04-14

## 2025-04-14 ASSESSMENT — PAIN SCALES - GENERAL: PAINLEVEL_OUTOF10: 0-NO PAIN

## 2025-04-15 LAB
CMV DNA SERPL NAA+PROBE-LOG IU: NORMAL {LOG_IU}/ML
LABORATORY COMMENT REPORT: NOT DETECTED

## 2025-04-17 DIAGNOSIS — Z94.84 HISTORY OF ALLOGENEIC HEMATOPOIETIC STEM CELL TRANSPLANT: ICD-10-CM

## 2025-04-21 ENCOUNTER — OFFICE VISIT (OUTPATIENT)
Dept: HEMATOLOGY/ONCOLOGY | Facility: HOSPITAL | Age: 65
End: 2025-04-21
Payer: COMMERCIAL

## 2025-04-21 ENCOUNTER — INFUSION (OUTPATIENT)
Dept: HEMATOLOGY/ONCOLOGY | Facility: HOSPITAL | Age: 65
End: 2025-04-21
Payer: COMMERCIAL

## 2025-04-21 ENCOUNTER — LAB (OUTPATIENT)
Dept: LAB | Facility: HOSPITAL | Age: 65
End: 2025-04-21
Payer: COMMERCIAL

## 2025-04-21 VITALS
BODY MASS INDEX: 23.53 KG/M2 | HEIGHT: 66 IN | RESPIRATION RATE: 20 BRPM | SYSTOLIC BLOOD PRESSURE: 139 MMHG | DIASTOLIC BLOOD PRESSURE: 74 MMHG | WEIGHT: 146.39 LBS | HEART RATE: 100 BPM | OXYGEN SATURATION: 98 % | TEMPERATURE: 97.5 F

## 2025-04-21 DIAGNOSIS — C92.01 ACUTE MYELOID LEUKEMIA IN REMISSION (MULTI): ICD-10-CM

## 2025-04-21 DIAGNOSIS — Z94.84 HISTORY OF ALLOGENEIC HEMATOPOIETIC STEM CELL TRANSPLANT: Primary | ICD-10-CM

## 2025-04-21 LAB
ALBUMIN SERPL BCP-MCNC: 4.4 G/DL (ref 3.4–5)
ALP SERPL-CCNC: 88 U/L (ref 33–136)
ALT SERPL W P-5'-P-CCNC: 15 U/L (ref 10–52)
ANION GAP SERPL CALC-SCNC: 14 MMOL/L (ref 10–20)
AST SERPL W P-5'-P-CCNC: 18 U/L (ref 9–39)
BASOPHILS # BLD AUTO: 0.06 X10*3/UL (ref 0–0.1)
BASOPHILS NFR BLD AUTO: 0.7 %
BILIRUB SERPL-MCNC: 0.4 MG/DL (ref 0–1.2)
BUN SERPL-MCNC: 19 MG/DL (ref 6–23)
CALCIUM SERPL-MCNC: 9.6 MG/DL (ref 8.6–10.3)
CHLORIDE SERPL-SCNC: 109 MMOL/L (ref 98–107)
CO2 SERPL-SCNC: 21 MMOL/L (ref 21–32)
CREAT SERPL-MCNC: 1.24 MG/DL (ref 0.5–1.3)
EGFRCR SERPLBLD CKD-EPI 2021: 64 ML/MIN/1.73M*2
EOSINOPHIL # BLD AUTO: 0.44 X10*3/UL (ref 0–0.7)
EOSINOPHIL NFR BLD AUTO: 5.1 %
ERYTHROCYTE [DISTWIDTH] IN BLOOD BY AUTOMATED COUNT: 15.1 % (ref 11.5–14.5)
GLUCOSE SERPL-MCNC: 111 MG/DL (ref 74–99)
HCT VFR BLD AUTO: 40 % (ref 41–52)
HGB BLD-MCNC: 13.1 G/DL (ref 13.5–17.5)
IMM GRANULOCYTES # BLD AUTO: 0.05 X10*3/UL (ref 0–0.7)
IMM GRANULOCYTES NFR BLD AUTO: 0.6 % (ref 0–0.9)
LYMPHOCYTES # BLD AUTO: 3.47 X10*3/UL (ref 1.2–4.8)
LYMPHOCYTES NFR BLD AUTO: 40.6 %
MAGNESIUM SERPL-MCNC: 1.66 MG/DL (ref 1.6–2.4)
MCH RBC QN AUTO: 31.3 PG (ref 26–34)
MCHC RBC AUTO-ENTMCNC: 32.8 G/DL (ref 32–36)
MCV RBC AUTO: 96 FL (ref 80–100)
MONOCYTES # BLD AUTO: 1.62 X10*3/UL (ref 0.1–1)
MONOCYTES NFR BLD AUTO: 18.9 %
NEUTROPHILS # BLD AUTO: 2.91 X10*3/UL (ref 1.2–7.7)
NEUTROPHILS NFR BLD AUTO: 34.1 %
NRBC BLD-RTO: 0 /100 WBCS (ref 0–0)
PLATELET # BLD AUTO: 256 X10*3/UL (ref 150–450)
POTASSIUM SERPL-SCNC: 4.2 MMOL/L (ref 3.5–5.3)
PROT SERPL-MCNC: 6.8 G/DL (ref 6.4–8.2)
RBC # BLD AUTO: 4.18 X10*6/UL (ref 4.5–5.9)
SODIUM SERPL-SCNC: 140 MMOL/L (ref 136–145)
TACROLIMUS BLD-MCNC: 11.4 NG/ML
WBC # BLD AUTO: 8.6 X10*3/UL (ref 4.4–11.3)

## 2025-04-21 PROCEDURE — 1157F ADVNC CARE PLAN IN RCRD: CPT

## 2025-04-21 PROCEDURE — 87799 DETECT AGENT NOS DNA QUANT: CPT

## 2025-04-21 PROCEDURE — 80053 COMPREHEN METABOLIC PANEL: CPT

## 2025-04-21 PROCEDURE — 99211 OFF/OP EST MAY X REQ PHY/QHP: CPT

## 2025-04-21 PROCEDURE — 36415 COLL VENOUS BLD VENIPUNCTURE: CPT

## 2025-04-21 PROCEDURE — 99215 OFFICE O/P EST HI 40 MIN: CPT

## 2025-04-21 PROCEDURE — 85025 COMPLETE CBC W/AUTO DIFF WBC: CPT

## 2025-04-21 PROCEDURE — 80197 ASSAY OF TACROLIMUS: CPT

## 2025-04-21 PROCEDURE — 83735 ASSAY OF MAGNESIUM: CPT

## 2025-04-21 RX ORDER — TACROLIMUS 0.5 MG/1
0.5 CAPSULE ORAL EVERY 12 HOURS
Qty: 60 CAPSULE | Refills: 2 | Status: SHIPPED | OUTPATIENT
Start: 2025-04-21

## 2025-04-21 ASSESSMENT — ENCOUNTER SYMPTOMS
APPETITE CHANGE: 0
MUSCULOSKELETAL NEGATIVE: 1
DIAPHORESIS: 0
CARDIOVASCULAR NEGATIVE: 1
UNEXPECTED WEIGHT CHANGE: 0
FEVER: 0
RESPIRATORY NEGATIVE: 1
CHILLS: 0
HEMATOLOGIC/LYMPHATIC NEGATIVE: 1
GASTROINTESTINAL NEGATIVE: 1
NEUROLOGICAL NEGATIVE: 1
FATIGUE: 0

## 2025-04-21 NOTE — PROGRESS NOTES
Pt present today for count check.  Pt saw provider during visit.  See provider's note for full assessment.  No treatment needed today.  Pt discharged to home in stable condition with copy of future appointments.

## 2025-04-21 NOTE — PROGRESS NOTES
Patient ID:  Brandt Merritt is a 65 y.o. male.  Referring Physician:   BMT Dr Newsome  Primary Care Provider:  Bill Richmond MD      Oncology History Overview Note   4/2024  Myelodysplastic neoplasm with increased blasts-2 ( WHO)  Myelodysplastic neoplasm/AML  (ICC 2022 classification)    Presented in  10/2023 for pancytopenia, found to have hemolysis. Patient had normal CBC June 2020. Denied any hemorrhoidal bleeding . Has had C Scope and EGD , treated Hep C 2014 . 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      CBC 4/11/24 wbc 3.0, hgb 7.1, platelets 167K ANC 0.73    BM biopsy done on 4/11/24  as folllows:  BM histology  Myelodysplastic neoplasm with increased blasts-2 ( WHO)  Myelodysplastic neoplasm/AML  (ICC 2022 classification)  Balsts 11% on aspirate smear and 15-20% based on CD 34 immunostaining approaching AML leukemia, hematooiesis is erythroid dominant with dysplasia in granulocytes and megakaryocytes.   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      11/6/2024 -  Bone Marrow Transplant    conditioned with Flu/Cy/TBI and aGVHD prophylaxis with post-transplant cyclophosphamide, tacrolimus, and mycophenolate. T=0, 11/6/24. ABO Donor/Recipient: O+, A+. CMV donor/recipient: both positive. Started Tacro and MMF on T+5 (11/11).      2/11/2025 - 2/14/2025 Hospital Admission    Admit date: 2/11/25  Admission diagnosis: 2/14/25  Brandt was admitted on  -25 for intractable vomiting and some diarrhea, also with low grade fever.  He received broad spectrum antibiotics and all cultures were negative.  He underwent an upper endoscopy which was grossly normal, biopsies negative for GVHD, and budesonide was increased to 3 mg po bid.         Response:      Past Medical History:  HTN   STEMI 2020 after getting  a water pills.  Chronic hepatitis C infection treated in  treated  with Dr. Lloyd  Past Medical History:   Diagnosis Date    Chest pain 2021    HTN (hypertension) 10/05/2023    Hypertension     Leukemia (Multi)     Personal history of other diseases of the circulatory system     History of hypertension      Surgical History:  Conosocopy 2021  Upper EGD 10/31/23  Surgical reduction torsion of testes      Past Surgical History:   Procedure Laterality Date    COLONOSCOPY  2013    Complete Colonoscopy    OTHER SURGICAL HISTORY  10/07/2015    Surgery Testis Reduction Of Torsion Of Testis Right      Family History:  CAD, DM in mother       Mother  of CVA at age 69  Brother with prostate CA, 1 sister with liver disease  2 children healthy  Family History   Problem Relation Name Age of Onset    Other (diabetes mellitus) Mother      Prostate cancer Brother        Social History:  Lives with wife of 30 years, works at [employer], now retired.   29 years, former smoker, smoked x 15 yrs quit 20+ years ago. Marijuana 3 x a week. Served in  in [place] and [facility], .  ----------------------------------------------------------------------------------------------------  Subjective    History of Present Illness:    Brandt Merritt is a 65 year old male with history of myelodysplastic neoplasm/AML, s/p umbilical cord blood transplant 10/16/24 with graft rejection followed by haploidentical stem cells  from his sister on 24 with flu/cy, ATG and modified dose  cyclophosphamide with engraftment on day T+8.      Brandt presents to the clinic today (4/21/25) with his wife Genevieve for follow up evaluation and count check.  Today he is T+ 214 of umbilical cord transfusion and is T+ 166 of his haplo sister.  Last Thursday tacro changed from 0.5 bid to 0.5 bid alternating with 0.5 once a day.  Since last week ,he noted a rash on scalp and also upper chest, slightly itchy, denies dry mouth or diarrhea, weight is stable. He has been using triamcinolone cream BID which has helped the rash since last visit on 4/14. No longer as itchy and states it has subsided.     He did hold his tacro dose this morning.     Review of Systems   Constitutional:  Negative for appetite change, chills, diaphoresis, fatigue, fever and unexpected weight change.   Respiratory: Negative.     Cardiovascular: Negative.    Gastrointestinal: Negative.    Genitourinary: Negative.     Musculoskeletal: Negative.    Skin:  Positive for itching and rash.        Hyperpigmented skin to face and hands   Neurological: Negative.    Hematological: Negative.    --------------------------------------------------------------------------------------------------------  Objective:    There were no vitals taken for this visit.      There were no vitals filed for this visit.      Physical Exam  Vitals reviewed.   Constitutional:       Appearance: Normal appearance.   HENT:      Head: Normocephalic and atraumatic.      Mouth/Throat:      Mouth: Mucous membranes are moist.   Eyes:      Extraocular Movements: Extraocular movements intact.      Conjunctiva/sclera: Conjunctivae normal.      Pupils: Pupils are equal, round, and reactive to light.   Cardiovascular:      Rate and Rhythm: Normal rate and regular rhythm.      Pulses: Normal pulses.      Heart sounds: Normal heart sounds.   Pulmonary:      Effort: Pulmonary effort is normal.      Breath sounds: Normal breath sounds.   Abdominal:      General: Abdomen is flat. Bowel sounds  are normal.      Palpations: Abdomen is soft. There is no mass.      Tenderness: There is no abdominal tenderness.   Musculoskeletal:         General: No swelling. Normal range of motion.   Lymphadenopathy:      Comments: No lymphadenopathy   Skin:     General: Skin is warm and dry.      Findings: Rash (pruritis states on scalp and anterior chest) present.             Comments: Macular rash anterior upper chest not  present elsewhere.  Lesions resolved on neck. Patient feels rash on scalp but cannot see rash on exam. Rash not present on back or shoulders.    Neurological:      General: No focal deficit present.      Mental Status: He is alert and oriented to person, place, and time.   Psychiatric:         Mood and Affect: Mood normal.         Behavior: Behavior normal.         Thought Content: Thought content normal.         Judgment: Judgment normal.   -----------------------------------------------------------------------------------------------------------  Assessment and Plan:    MDS/Acute myeloid leukemia in remission (Multi)  Diagnosed 4/2024: BM Bx with MDS in in transition to AML (>10% blast) w/ trisomy 8, DNMT alpha, and U2AF1 mutation indication adverse risk.      s/p 4 cycles of Decitabine/Venetoclax. BM Bx 6/17/24: Blasts cleared, FISH still positive for trisomy 8, still MDS changes   BMBx (9/5/24): 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 and no increase in blasts      History of allogeneic hematopoietic stem cell transplant  #1: Single-unit Cord, T0=9/19/24, Primary Engraftment Failure  - Conditioning: Flu-Mariana-TBI  - Donor: Single Cord, 6/8  = ABO Donor / Recipient: A+ / A+  = CMV Donor / Recipient: - / +  - aGVHD Prophylaxis: Tacrolimus + MMF  - engraftment failure, developed HLA antibody against class I of cord  - Repeat BMBx (10/15): hypocellular with no residual myeloid neoplasm. 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), T0=11/6/24  - Graft:  Haploidentical sister  = ABO Donor / Recipient: O+ / A+ (ABO incompatibility +)  = CMV Donor / Recipient: + / +  - Conditioning: Flu/Cy/TBI/rATG  = GVHD prophylaxis -  rATG 1.5mg/kg T-5,-3,-1, PTCy (25mg/kg T+3, T+4), Tacro, MMF (T+5)     DATE DAY SOURCE MORPHOLOGY MRD WHOLE CHIMERISM   (% donor) CD3 CHIMERISM   (% donor) CD33 CHIMERISM   (% donor)   11/20/24 D+30 PB      100       12/11/24 D+30 PB       100 100   Deferred D+30 BM             1/9/25 D+60 BM  KRISTIE , flow negative      100  100     D+60 PB              2/17/25 D+100 PB      100        3/19/25 D+100 BM  KRISTIE, flow negative      100  100    Next marrow at 6 months  Monitor for post-transplant hemolysis   (3/17/25)  Haptoglobin  175 (3/17/25)  UPC ratio 0.10 (3/17/25)     Anemia  12/18 Epo 113.   12/16 Retic 7.2%  DARBY on hold.  Cont folic acid.     GVHD prophylaxis  GVHD  Anorexia/poor oral intake/weight loss. Added Mirtazapine. Continue Zofran. If persists, consider aGVHD.  12/9/24:  Currently taking tacrolimus 0.5 mg BID, held dose today prior to labs.  FK level 4.6 (12/9/24).  Advised patient on 12/10/24 to increase tacrolimus dose to 1 mg in the AM and 0.5 mg in the PM.    Wean MMF from 1 g TID to 500 mg TID 12/2/24 - present  Due to poor appetite will decrease MMF to 500 mg po tid.  Improvement to GI symptoms since decreasing MMF dose.    Stopped  MMF on T+35.     Stage I/Grade II Upper GI GVHD  Anorexia, taste changes and weight loss 171# (9/17/24).  Cont mirtazapine and ATC Zofran.   Add budesonide 3x day.   12/30/24:  Started on budesonide TID on 12/27/24.  Brandt reports that he has starting eating more since starting budesonide.  Reports no nausea, vomiting, or diarrhea at this time.    Appetite continuing to improve. Denies N/V/D.     1/13/25 Decreased budesonide bid per Dr Newsome; 1/27 Decrease to 3mg daily. Will continue on this dose for 1-2 weeks before discontinuing.     2/17/25 Recent admission for nausea, budesonide increased to 3  mg po bid, continues therapeutic on tacro 0.5 mg po bid. Esophageal biopsy 2/13/25 negative for GVHD. Consider decreasing budesonide to 3 mg daily next visit  3/3/25:  Overall doing well.  Nausea has resolved. Reviewed food safety precautions and had nutritionist Blanca Morgan, TORI,LD come to speak with patient and wife today.  Continue budesonide 3 mg daily.  FK level 5.6.  Continue tacrolimus 0.5 mg BID.  Advised Brandt to contact oncology team if have GI symptoms worsen,  consider discontinuing budesonide next visit.  3/17/25:  Had one episode of vomiting after taking pills this week, but overall has no other complaints of nausea/vomiting/diarrhea.  Discontinue budesonide.  FK level 7.6 (3/17/25), continue tacrolimus 0.5 mg BID.    4/14/25:  Day 158 No further GI symptoms and weight now steady,  Today complains of itchy rash on upper anterior chest,  noted macular raised rash < 10% of body, suspect chronic GVHD of skin, limited.  No dry eyes, dry mouth diarrhea, etc.  Started triamcinolone cream bid, advised to watch for any increased in rash, diarrhea, dry  eyes or mouth. Tacro 9.2 continue 0.5 mg bid alternating with 0.5 mg daily.    4/21/25: Day 166. States he has looser stools about 2x per week. No diarrhea or blood in stool. Macular rash suspected chronic GVHD. No dry eyes, mouth, sclerotic changes. Triamcinolone cream BID has helped rash. Will continue to monitor. Will plan to taper from using twice daily to once daily. Tacro dosing 0.5mg BID alternating with 0.5mg daily level today came back as 11.4. Instructed patient to hold 0.5mg dose tonight. Instructed him to take 0.5mg daily. Advised him to get labs drawn in the morning on Thursday 4/24 for count check to check tacro level. Patient expressed understanding.     Weights: see below    Wt Readings from Last 5 Encounters:   04/21/25 66.4 kg (146 lb 6.2 oz)   04/14/25 67.1 kg (147 lb 14.9 oz)   03/31/25 67.8 kg (149 lb 7.6 oz)   03/31/25 67.8 kg (149  lb 6.4 oz)   03/19/25 67.9 kg (149 lb 11.1 oz)      Infectious Disease & Immune Reconstitution      Prophylaxis  Antiviral prophylaxis: acyclovir, Letermovir-continue until T+200.  Antifungal: posaconazole  PCP prophylaxis: 10/26/24 - 11/28/24 Pentamidine; cont Bactrim     Hypogammaglobulinemia  IgG level. IVIG for level <400   3/17/25:  IgG 307.  Ordered monthly IVIG infusions.  Reviewed rationale and possible side effects with patient.    3/31/25:  Received first IVIG infusion today.  Continue monthly.       Active Surveillance:     CMV detected 688 1/2/25, 167 1/6/25. Levels undetectable since 1/13/25, CMV ND (2/17/25).    Started valgancicyclovir 900mg BID on 1/6/25,   Plan 1/20/25 decrease to 900 mg daily for 2 weeks (2/3/25), then transition back to letermovir/acyclovir.    CMV ND (3/31/25). Level pending from today    Adeno ND 3/17/25. Level pending from today  HHV6 ND 3/17/25.   EBV ND 3/19/25. Level pending from today   EBV Viremia during transplant admission  Treated despite low levels due to upcoming second SCT  Rituximab 600 mg x 1 (10/31/24)     Infectious Disease follow up evaluation: 1/10/25      Immunodeficiency panel 100 days, 6mos and 1 year.      Immunizations:   Covid and influenza vaccine series.  Consider at T+ 3 month  Will plan to begin immunizations at T+6mths (May 2025) depending on degree of immunosuppression  Received influenza vaccine 3/6/25 and 1st post transplant covid vaccine on 3/6/25, 2nd covid vaccine on 3/27/25.       Cardiac:    Essential hypertension- was started on nifedipine 60 mg daily on 1/20/25 and started lisinopril 5 mg daily on 2/3/25.   Hx of STEMI (11/11/2020)  Cardiology  appt on 12/18/24  ECHO 12/23/24: LVEF 65%     Ascending aorta dilation   TTE (4/29/24): 3.8-4 cm dilation      Electrolyte disturbance   Magnesium 1.71 (3/31/25), continue oral magnesium 128 mg BID.      Dry Skin hyperpigmentation:  Hyperpigmented skin to face.  Derm E-consult on 2/3/25 with   Kathy, feels may be due to bactrim, which I prefer not to discontinue. May also be caused by prochlorperazine.  NPV with dermatology Dr. Medel on 6/18/25.    3/31/24:  Continues to have hyperpigmented skin to face and bilateral palms of hands.  Brandt feels his hyperpigmentation is lighted slightly to palms.  Instructed to continue to use moisturizer such as cerave that are fragrance free and gentle.  Advised about mineral based sun screen.      Lack of appetite, improved  Mirtazapine 15 mg at night as of 11/26/24     Vitamin D deficiency  12/18 Level 27.  Last dose of weekly supplement taken. 1/27 Repeat level was 28.  Reordered ergocalciferol 50,000 UT weekly on 1/27/25 for 8 doses.       Medication reconciliation  Medications reviewed; sent tacro refill today (4/210. Last time was refilled was sent to [facility] pharmacy so refilled prescription there.     Social Work:  2/24/25:  Contacted social work Gary Bacno LCSW to speak with Brandt today regarding to see if eligible for medication assistance to help with cost of posaconazole ($280/month).  Gary reports that Brandt was approved for Copay assistance, $4,000/year).      IV Access  2/3/25:  PICC line removed.     Psychosocial.    Caregiver Genevieve  [facility] in [place]     RTC: Follow up visit with heme malignancy in 1 week to check on new rash, suspected chronic GVHD,     Advised to obtain blood work prior to visit. Continue weekly visits for now.   -count check later this week to check tacro level   4/28 Karon Casas CNP  5/5 Karon Lord CNP  Plan for 6 month post transplant vaccines early May.    ---------------------------------------------------------------------------------  Laurel Weathers PA-C

## 2025-04-23 ENCOUNTER — APPOINTMENT (OUTPATIENT)
Dept: HEMATOLOGY/ONCOLOGY | Facility: HOSPITAL | Age: 65
End: 2025-04-23
Payer: COMMERCIAL

## 2025-04-23 LAB — ADENOVIRUS QPCR,PLASMA, VIRC: NOT DETECTED COPIES/ML

## 2025-04-24 ENCOUNTER — LAB (OUTPATIENT)
Dept: LAB | Facility: HOSPITAL | Age: 65
End: 2025-04-24
Payer: COMMERCIAL

## 2025-04-24 DIAGNOSIS — C92.01 ACUTE MYELOID LEUKEMIA IN REMISSION (MULTI): ICD-10-CM

## 2025-04-24 DIAGNOSIS — C92.00 ACUTE MYELOID LEUKEMIA NOT HAVING ACHIEVED REMISSION (MULTI): ICD-10-CM

## 2025-04-24 LAB
ALBUMIN SERPL BCP-MCNC: 4.4 G/DL (ref 3.4–5)
ALP SERPL-CCNC: 90 U/L (ref 33–136)
ALT SERPL W P-5'-P-CCNC: 13 U/L (ref 10–52)
ANION GAP SERPL CALC-SCNC: 14 MMOL/L (ref 10–20)
AST SERPL W P-5'-P-CCNC: 15 U/L (ref 9–39)
BASOPHILS # BLD AUTO: 0.04 X10*3/UL (ref 0–0.1)
BASOPHILS NFR BLD AUTO: 0.6 %
BILIRUB SERPL-MCNC: 0.5 MG/DL (ref 0–1.2)
BUN SERPL-MCNC: 16 MG/DL (ref 6–23)
CALCIUM SERPL-MCNC: 9.4 MG/DL (ref 8.6–10.3)
CHLORIDE SERPL-SCNC: 107 MMOL/L (ref 98–107)
CO2 SERPL-SCNC: 23 MMOL/L (ref 21–32)
CREAT SERPL-MCNC: 1.16 MG/DL (ref 0.5–1.3)
EGFRCR SERPLBLD CKD-EPI 2021: 69 ML/MIN/1.73M*2
EOSINOPHIL # BLD AUTO: 0.41 X10*3/UL (ref 0–0.7)
EOSINOPHIL NFR BLD AUTO: 6.2 %
ERYTHROCYTE [DISTWIDTH] IN BLOOD BY AUTOMATED COUNT: 14.8 % (ref 11.5–14.5)
GLUCOSE SERPL-MCNC: 107 MG/DL (ref 74–99)
HCT VFR BLD AUTO: 38.1 % (ref 41–52)
HGB BLD-MCNC: 12.5 G/DL (ref 13.5–17.5)
IGG SERPL-MCNC: 438 MG/DL (ref 700–1600)
IMM GRANULOCYTES # BLD AUTO: 0.13 X10*3/UL (ref 0–0.7)
IMM GRANULOCYTES NFR BLD AUTO: 2 % (ref 0–0.9)
LDH SERPL L TO P-CCNC: 221 U/L (ref 84–246)
LYMPHOCYTES # BLD AUTO: 2.41 X10*3/UL (ref 1.2–4.8)
LYMPHOCYTES NFR BLD AUTO: 36.3 %
MAGNESIUM SERPL-MCNC: 1.65 MG/DL (ref 1.6–2.4)
MCH RBC QN AUTO: 31.1 PG (ref 26–34)
MCHC RBC AUTO-ENTMCNC: 32.8 G/DL (ref 32–36)
MCV RBC AUTO: 95 FL (ref 80–100)
MONOCYTES # BLD AUTO: 1.31 X10*3/UL (ref 0.1–1)
MONOCYTES NFR BLD AUTO: 19.7 %
NEUTROPHILS # BLD AUTO: 2.34 X10*3/UL (ref 1.2–7.7)
NEUTROPHILS NFR BLD AUTO: 35.2 %
NRBC BLD-RTO: 0 /100 WBCS (ref 0–0)
PLATELET # BLD AUTO: 239 X10*3/UL (ref 150–450)
POTASSIUM SERPL-SCNC: 4.1 MMOL/L (ref 3.5–5.3)
PROT SERPL-MCNC: 6.7 G/DL (ref 6.4–8.2)
RBC # BLD AUTO: 4.02 X10*6/UL (ref 4.5–5.9)
SODIUM SERPL-SCNC: 140 MMOL/L (ref 136–145)
TACROLIMUS BLD-MCNC: 6.6 NG/ML
URATE SERPL-MCNC: 4 MG/DL (ref 4–7.5)
WBC # BLD AUTO: 6.6 X10*3/UL (ref 4.4–11.3)

## 2025-04-24 PROCEDURE — 87533 HHV-6 DNA QUANT: CPT

## 2025-04-24 PROCEDURE — 87799 DETECT AGENT NOS DNA QUANT: CPT

## 2025-04-24 PROCEDURE — 80053 COMPREHEN METABOLIC PANEL: CPT

## 2025-04-24 PROCEDURE — 83735 ASSAY OF MAGNESIUM: CPT

## 2025-04-24 PROCEDURE — 82784 ASSAY IGA/IGD/IGG/IGM EACH: CPT

## 2025-04-24 PROCEDURE — 83615 LACTATE (LD) (LDH) ENZYME: CPT

## 2025-04-24 PROCEDURE — 84550 ASSAY OF BLOOD/URIC ACID: CPT

## 2025-04-24 PROCEDURE — 85025 COMPLETE CBC W/AUTO DIFF WBC: CPT

## 2025-04-24 PROCEDURE — 80197 ASSAY OF TACROLIMUS: CPT

## 2025-04-24 PROCEDURE — 36415 COLL VENOUS BLD VENIPUNCTURE: CPT

## 2025-04-24 RX ORDER — POSACONAZOLE 100 MG/1
300 TABLET, DELAYED RELEASE ORAL DAILY
Qty: 90 TABLET | Refills: 0 | Status: SHIPPED | OUTPATIENT
Start: 2025-04-24

## 2025-04-25 ENCOUNTER — SPECIALTY PHARMACY (OUTPATIENT)
Dept: PHARMACY | Facility: CLINIC | Age: 65
End: 2025-04-25

## 2025-04-25 LAB
CMV DNA SERPL NAA+PROBE-LOG IU: NORMAL {LOG_IU}/ML
LABORATORY COMMENT REPORT: NOT DETECTED

## 2025-04-26 DIAGNOSIS — C92.01 ACUTE MYELOID LEUKEMIA IN REMISSION (MULTI): Primary | ICD-10-CM

## 2025-04-26 DIAGNOSIS — Z94.84 HISTORY OF ALLOGENEIC HEMATOPOIETIC STEM CELL TRANSPLANT: ICD-10-CM

## 2025-04-26 RX ORDER — EPINEPHRINE 0.3 MG/.3ML
0.3 INJECTION SUBCUTANEOUS EVERY 5 MIN PRN
OUTPATIENT
Start: 2025-05-05

## 2025-04-26 RX ORDER — DIPHENHYDRAMINE HYDROCHLORIDE 50 MG/ML
50 INJECTION, SOLUTION INTRAMUSCULAR; INTRAVENOUS AS NEEDED
OUTPATIENT
Start: 2025-05-05

## 2025-04-26 RX ORDER — ALBUTEROL SULFATE 0.83 MG/ML
3 SOLUTION RESPIRATORY (INHALATION) AS NEEDED
OUTPATIENT
Start: 2025-05-05

## 2025-04-26 RX ORDER — FAMOTIDINE 10 MG/ML
20 INJECTION, SOLUTION INTRAVENOUS ONCE AS NEEDED
OUTPATIENT
Start: 2025-05-05

## 2025-04-27 LAB
EBV DNA SPEC NAA+PROBE-LOG#: NORMAL {LOG_COPIES}/ML
LABORATORY COMMENT REPORT: NOT DETECTED

## 2025-04-27 ASSESSMENT — ENCOUNTER SYMPTOMS
CARDIOVASCULAR NEGATIVE: 1
NEUROLOGICAL NEGATIVE: 1
CHILLS: 0
GASTROINTESTINAL NEGATIVE: 1
HEMATOLOGIC/LYMPHATIC NEGATIVE: 1
FEVER: 0
APPETITE CHANGE: 0
RESPIRATORY NEGATIVE: 1
FATIGUE: 0
MUSCULOSKELETAL NEGATIVE: 1
UNEXPECTED WEIGHT CHANGE: 0
DIAPHORESIS: 0

## 2025-04-27 NOTE — PROGRESS NOTES
Patient ID:  Brandt Merritt is a 65 y.o. male.  Referring Physician:   BMT Dr Newsome  Primary Care Provider:  Bill Richmond MD      Oncology History Overview Note   4/2024  Myelodysplastic neoplasm with increased blasts-2 ( WHO)  Myelodysplastic neoplasm/AML  (ICC 2022 classification)    Presented in  10/2023 for pancytopenia, found to have hemolysis. Patient had normal CBC June 2020. Denied any hemorrhoidal bleeding . Has had C Scope and EGD , treated Hep C 2014 . 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      CBC 4/11/24 wbc 3.0, hgb 7.1, platelets 167K ANC 0.73    BM biopsy done on 4/11/24  as folllows:  BM histology  Myelodysplastic neoplasm with increased blasts-2 ( WHO)  Myelodysplastic neoplasm/AML  (ICC 2022 classification)  Balsts 11% on aspirate smear and 15-20% based on CD 34 immunostaining approaching AML leukemia, hematooiesis is erythroid dominant with dysplasia in granulocytes and megakaryocytes.   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      11/6/2024 -  Bone Marrow Transplant    conditioned with Flu/Cy/TBI and aGVHD prophylaxis with post-transplant cyclophosphamide, tacrolimus, and mycophenolate. T=0, 11/6/24. ABO Donor/Recipient: O+, A+. CMV donor/recipient: both positive. Started Tacro and MMF on T+5 (11/11).      2/11/2025 - 2/14/2025 Hospital Admission    Admit date: 2/11/25  Admission diagnosis: 2/14/25  Brandt was admitted on  -25 for intractable vomiting and some diarrhea, also with low grade fever.  He received broad spectrum antibiotics and all cultures were negative.  He underwent an upper endoscopy which was grossly normal, biopsies negative for GVHD, and budesonide was increased to 3 mg po bid.         Response:      Past Medical History:  HTN   STEMI 2020 after getting  a water pills.  Chronic hepatitis C infection treated in  treated  with Dr. Lloyd  Past Medical History:   Diagnosis Date    Chest pain 2021    HTN (hypertension) 10/05/2023    Hypertension     Leukemia (Multi)     Personal history of other diseases of the circulatory system     History of hypertension      Surgical History:  Conosocopy 2021  Upper EGD 10/31/23  Surgical reduction torsion of testes      Past Surgical History:   Procedure Laterality Date    COLONOSCOPY  2013    Complete Colonoscopy    OTHER SURGICAL HISTORY  10/07/2015    Surgery Testis Reduction Of Torsion Of Testis Right      Family History:  CAD, DM in mother       Mother  of CVA at age 69  Brother with prostate CA, 1 sister with liver disease  2 children healthy  Family History   Problem Relation Name Age of Onset    Other (diabetes mellitus) Mother      Prostate cancer Brother        Social History:  Lives with wife of 30 years, works at [employer], now retired.   29 years, former smoker, smoked x 15 yrs quit 20+ years ago. Marijuana 3 x a week. Served in  in [employer] and [employer], .  ----------------------------------------------------------------------------------------------------  Subjective    History of Present Illness:    Brandt Merritt is a 65 year old male with history of myelodysplastic neoplasm/AML, s/p umbilical cord blood transplant 10/16/24 with graft rejection followed by haploidentical stem cells  from his sister on 24 with flu/cy, ATG and modified dose  cyclophosphamide with engraftment on day T+8.      Brandt presents to the clinic today (4/28/25) with his wife Genevieve for follow up evaluation and count check.  Today he is T+ 221 of umbilical cord transfusion and is T+ 173 of his haplo sister.  Energy level is good.  Appetite is good and is eating well.  Weight is stable.  Currently taking tacrolimus 0.5 mg daily.  He held his tacrolimus dose prior to lab draws today.  Continues to have rash to face, neck, and upper chest.  Reports the rash is improved and is no longer itchy.  He has been using triamcinolone cream daily which has helped the rash.  Continues to have darker spots to hands and face is staying about the same.  Continues to have 1-2 loose stools per day, pudding consistency.  He has not been taking anything for stools.      Review of Systems   Constitutional:  Negative for appetite change, chills, diaphoresis, fatigue, fever and unexpected weight change.   Respiratory: Negative.     Cardiovascular: Negative.    Gastrointestinal: Negative.    Genitourinary: Negative.     Musculoskeletal: Negative.    Skin:  Positive for itching and rash.        Hyperpigmented skin to face and hands   Neurological: Negative.    Hematological: Negative.    --------------------------------------------------------------------------------------------------------  Objective:    Visit Vitals  /66 (BP Location: Right arm, Patient Position: Sitting, BP Cuff Size: Adult)   Pulse 101   Temp 36.2 °C (97.2 °F) (Skin)   Resp 16     Vitals:    04/28/25 0959   Weight: 66.4 kg (146 lb 4.8 oz)     Physical Exam  Vitals reviewed.   Constitutional:       Appearance: Normal appearance.   HENT:      Head: Normocephalic and atraumatic.      Mouth/Throat:      Mouth: Mucous membranes are moist.   Eyes:      Extraocular Movements: Extraocular movements intact.      Conjunctiva/sclera: Conjunctivae normal.      Pupils: Pupils are equal, round, and reactive to light.   Cardiovascular:       Rate and Rhythm: Normal rate and regular rhythm.      Pulses: Normal pulses.      Heart sounds: Normal heart sounds.   Pulmonary:      Effort: Pulmonary effort is normal.      Breath sounds: Normal breath sounds.   Abdominal:      General: Abdomen is flat. Bowel sounds are normal.      Palpations: Abdomen is soft. There is no mass.      Tenderness: There is no abdominal tenderness.   Musculoskeletal:         General: No swelling. Normal range of motion.   Lymphadenopathy:      Comments: No lymphadenopathy   Skin:     General: Skin is warm and dry.      Findings: Rash (pruritis states on scalp and anterior chest) present.             Comments: Macular rash anterior upper chest, neck, and face not present elsewhere. Rash not present on back or shoulders.    Neurological:      General: No focal deficit present.      Mental Status: He is alert and oriented to person, place, and time.   Psychiatric:         Mood and Affect: Mood normal.         Behavior: Behavior normal.         Thought Content: Thought content normal.         Judgment: Judgment normal.     -----------------------------------------------------------------------------------------------------------  Assessment and Plan:    MDS/Acute myeloid leukemia in remission (Multi)  Diagnosed 4/2024: BM Bx with MDS in in transition to AML (>10% blast) w/ trisomy 8, DNMT alpha, and U2AF1 mutation indication adverse risk.      s/p 4 cycles of Decitabine/Venetoclax. BM Bx 6/17/24: Blasts cleared, FISH still positive for trisomy 8, still MDS changes   BMBx (9/5/24): 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 and no increase in blasts      History of allogeneic hematopoietic stem cell transplant  #1: Single-unit Cord, T0=9/19/24, Primary Engraftment Failure  - Conditioning: Flu-Mariana-TBI  - Donor: Single Cord, 6/8  = ABO Donor / Recipient: A+ / A+  = CMV Donor / Recipient: - / +  - aGVHD Prophylaxis: Tacrolimus + MMF  - engraftment failure, developed HLA  antibody against class I of cord  - Repeat BMBx (10/15): hypocellular with no residual myeloid neoplasm. 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), T0=11/6/24  - Graft: Haploidentical sister  = ABO Donor / Recipient: O+ / A+ (ABO incompatibility +)  = CMV Donor / Recipient: + / +  - Conditioning: Flu/Cy/TBI/rATG  = GVHD prophylaxis - rATG 1.5mg/kg T-5,-3,-1, PTCy (25mg/kg T+3, T+4), Tacro, MMF (T+5)     DATE DAY SOURCE MORPHOLOGY MRD WHOLE CHIMERISM   (% donor) CD3 CHIMERISM   (% donor) CD33 CHIMERISM   (% donor)   11/20/24 D+30 PB      100       12/11/24 D+30 PB       100 100   Deferred D+30 BM             1/9/25 D+60 BM  KRISTIE , flow negative      100  100     D+60 PB              2/17/25 D+100 PB      100        3/19/25 D+100 BM  KRISTIE, flow negative      100  100   Ordered for 5/12/25 M+6         Next marrow at 6 months, ordered for around 5/12/25.      Monitor for post-transplant hemolysis   (4/24/25)  Haptoglobin  175 (3/17/25)  UPC ratio 0.10 (3/17/25)     Anemia  12/18 Epo 113.   12/16 Retic 7.2%  DARBY on hold.  Cont folic acid.     GVHD prophylaxis  GVHD  Anorexia/poor oral intake/weight loss. Added Mirtazapine. Continue Zofran. If persists, consider aGVHD.  12/9/24:  Currently taking tacrolimus 0.5 mg BID, held dose today prior to labs.  FK level 4.6 (12/9/24).  Advised patient on 12/10/24 to increase tacrolimus dose to 1 mg in the AM and 0.5 mg in the PM.    Wean MMF from 1 g TID to 500 mg TID 12/2/24 - present  Due to poor appetite will decrease MMF to 500 mg po tid.  Improvement to GI symptoms since decreasing MMF dose.    Stopped  MMF on T+35.     Stage I/Grade II Upper GI GVHD  Anorexia, taste changes and weight loss 171# (9/17/24).  Cont mirtazapine and ATC Zofran.   Add budesonide 3x day.   12/30/24:  Started on budesonide TID on 12/27/24.  Brandt reports that he has starting eating more since starting budesonide.  Reports no nausea, vomiting, or diarrhea at this  time.    Appetite continuing to improve. Denies N/V/D.     1/13/25 Decreased budesonide bid per Dr Newsome; 1/27 Decrease to 3mg daily. Will continue on this dose for 1-2 weeks before discontinuing.     2/17/25 Recent admission for nausea, budesonide increased to 3 mg po bid, continues therapeutic on tacro 0.5 mg po bid. Esophageal biopsy 2/13/25 negative for GVHD. Consider decreasing budesonide to 3 mg daily next visit  3/3/25:  Overall doing well.  Nausea has resolved. Reviewed food safety precautions and had nutritionist Blanca Morgan, TORI,LD come to speak with patient and wife today.  Continue budesonide 3 mg daily.  FK level 5.6.  Continue tacrolimus 0.5 mg BID.  Advised Brandt to contact oncology team if have GI symptoms worsen,  consider discontinuing budesonide next visit.  3/17/25:  Had one episode of vomiting after taking pills this week, but overall has no other complaints of nausea/vomiting/diarrhea.  Discontinue budesonide.  FK level 7.6 (3/17/25), continue tacrolimus 0.5 mg BID.    4/14/25:  Day 158 No further GI symptoms and weight now steady,  Today complains of itchy rash on upper anterior chest,  noted macular raised rash < 10% of body, suspect chronic GVHD of skin, limited.  No dry eyes, dry mouth diarrhea, etc.  Started triamcinolone cream bid, advised to watch for any increased in rash, diarrhea, dry  eyes or mouth. Tacro 9.2 continue 0.5 mg bid alternating with 0.5 mg daily.    4/21/25: Day 166. States he has looser stools about 2x per week. No diarrhea or blood in stool. Macular rash suspected chronic GVHD. No dry eyes, mouth, sclerotic changes. Triamcinolone cream BID has helped rash. Will continue to monitor. Will plan to taper from using twice daily to once daily. Tacro dosing 0.5mg BID alternating with 0.5mg daily level today came back as 11.4. Instructed patient to hold 0.5mg dose tonight. Instructed him to take 0.5mg daily. Advised him to get labs drawn in the morning on Thursday 4/24  for count check to check tacro level. Patient expressed understanding.    4/28/25:  Today is T+173.  Continues to have rash to face, neck, and upper chest.  Reports improvement to rash and is no longer having itching.  Continues triamcinolone cream daily.  Reports he continues to have unformed stools, pudding consistency about 1-2 times per day.  Currently on tacrolimus 0.5 mg daily.  FK level 6.6 (4/24/25), level in process from today.      Weights: see below    Wt Readings from Last 5 Encounters:   04/28/25 66.4 kg (146 lb 4.8 oz)   04/21/25 66.4 kg (146 lb 6.2 oz)   04/14/25 67.1 kg (147 lb 14.9 oz)   03/31/25 67.8 kg (149 lb 7.6 oz)   03/31/25 67.8 kg (149 lb 6.4 oz)      Infectious Disease & Immune Reconstitution      Prophylaxis  Antiviral prophylaxis: acyclovir, Letermovir-continue until T+200.  Antifungal: posaconazole  PCP prophylaxis: 10/26/24 - 11/28/24 Pentamidine; cont Bactrim     Hypogammaglobulinemia  IgG level. IVIG for level <400   3/17/25:  IgG 307.  Ordered monthly IVIG infusions.  Reviewed rationale and possible side effects with patient.    Received first IVIG infusion today 3/31/25.  IgG level 4/24/25, scheduled to receive IVIG 4/28/25.  Continue monthly.       Active Surveillance:     CMV detected 688 1/2/25, 167 1/6/25. Levels undetectable since 1/13/25, CMV ND (2/17/25).    Started valgancicyclovir 900mg BID on 1/6/25,   Plan 1/20/25 decrease to 900 mg daily for 2 weeks (2/3/25), then transition back to letermovir/acyclovir.    CMV ND (4/24/25).   Adeno ND 4/24/25.   HHV6 ND 4/24/25.   EBV ND 4/24/25.  EBV Viremia during transplant admission  Treated despite low levels due to upcoming second SCT  Rituximab 600 mg x 1 (10/31/24)     Infectious Disease follow up evaluation: 1/10/25      Immunodeficiency panel 100 days, 6mos and 1 year.      Immunizations:   Covid and influenza vaccine series.  Consider at T+ 3 month  Will plan to begin immunizations at T+6 mths around 5/5/25 depending on  degree of immunosuppression  Received influenza vaccine 3/6/25 and 1st post transplant covid vaccine on 3/6/25, 2nd covid vaccine on 3/27/25, and 3rd covid vaccine on 4/25/25.      Cardiac:    Essential hypertension- was started on nifedipine 60 mg daily on 1/20/25 and started lisinopril 5 mg daily on 2/3/25.   Hx of STEMI (11/11/2020)  Cardiology  appt on 12/18/24  ECHO 12/23/24: LVEF 65%     Ascending aorta dilation   TTE (4/29/24): 3.8-4 cm dilation      Electrolyte disturbance   Magnesium 1.65 (4/24/25), continue oral magnesium 128 mg BID.      Dry Skin hyperpigmentation:  Hyperpigmented skin to face.  Derm E-consult on 2/3/25 with Dr. Chavez, feels may be due to bactrim, which I prefer not to discontinue. May also be caused by prochlorperazine.  NPV with dermatology Dr. Medel on 6/18/25.    4/28/25:  Continues to have hyperpigmented skin to face and bilateral palms of hands.  Brnadt feels his hyperpigmentation is lighted slightly to palms.  Instructed to continue to use moisturizer such as cerave that are fragrance free and gentle.  Advised about mineral based sun screen.      Lack of appetite, improved  Mirtazapine 15 mg at night as of 11/26/24     Vitamin D deficiency  12/18 Level 27.  Last dose of weekly supplement taken. 1/27 Repeat level was 28.  Reordered ergocalciferol 50,000 UT weekly on 1/27/25 for 8 doses.  Repeat vitamin D 36 (4/8/25).  Start maintenance vitamin D.       Social Work:  2/24/25:  Contacted social work Gary Bacon LCSW to speak with Brandt today regarding to see if eligible for medication assistance to help with cost of posaconazole ($280/month).  Gary reports that Brandt was approved for Copay assistance, $4,000/year).      IV Access  2/3/25:  PICC line removed.     Psychosocial.    Caregiver Genevieve  [facility] in [place]     RTC:   Continue weekly visits for now.   5/5/25: Karon Casas CNP and 6 month post transplant vaccines.  5/12/25:  Karon Casas CNP, bone  marrow biopsy.  ---------------------------------------------------------------------------------  GRICELDA Vallejo

## 2025-04-28 ENCOUNTER — INFUSION (OUTPATIENT)
Dept: HEMATOLOGY/ONCOLOGY | Facility: HOSPITAL | Age: 65
End: 2025-04-28
Payer: COMMERCIAL

## 2025-04-28 ENCOUNTER — OFFICE VISIT (OUTPATIENT)
Dept: HEMATOLOGY/ONCOLOGY | Facility: HOSPITAL | Age: 65
End: 2025-04-28
Payer: COMMERCIAL

## 2025-04-28 ENCOUNTER — LAB (OUTPATIENT)
Dept: LAB | Facility: HOSPITAL | Age: 65
End: 2025-04-28
Payer: COMMERCIAL

## 2025-04-28 VITALS
RESPIRATION RATE: 16 BRPM | TEMPERATURE: 97.2 F | OXYGEN SATURATION: 98 % | DIASTOLIC BLOOD PRESSURE: 66 MMHG | WEIGHT: 146.3 LBS | SYSTOLIC BLOOD PRESSURE: 122 MMHG | HEART RATE: 101 BPM | BODY MASS INDEX: 23.97 KG/M2

## 2025-04-28 VITALS
HEART RATE: 87 BPM | RESPIRATION RATE: 18 BRPM | DIASTOLIC BLOOD PRESSURE: 71 MMHG | TEMPERATURE: 99.1 F | SYSTOLIC BLOOD PRESSURE: 123 MMHG

## 2025-04-28 DIAGNOSIS — E87.8 ELECTROLYTE DISTURBANCE: ICD-10-CM

## 2025-04-28 DIAGNOSIS — I10 ESSENTIAL HYPERTENSION: ICD-10-CM

## 2025-04-28 DIAGNOSIS — D80.1 HYPOGAMMAGLOBULINEMIA (MULTI): ICD-10-CM

## 2025-04-28 DIAGNOSIS — C92.01 ACUTE MYELOID LEUKEMIA IN REMISSION (MULTI): ICD-10-CM

## 2025-04-28 DIAGNOSIS — C92.01 AML (ACUTE MYELOID LEUKEMIA) IN REMISSION (MULTI): Primary | ICD-10-CM

## 2025-04-28 DIAGNOSIS — Z94.84 HISTORY OF ALLOGENEIC HEMATOPOIETIC STEM CELL TRANSPLANT: ICD-10-CM

## 2025-04-28 DIAGNOSIS — Z94.84 STEM CELLS TRANSPLANT STATUS (MULTI): ICD-10-CM

## 2025-04-28 DIAGNOSIS — D89.813 GVHD (GRAFT VERSUS HOST DISEASE) (MULTI): ICD-10-CM

## 2025-04-28 DIAGNOSIS — D84.9 IMMUNOCOMPROMISED: ICD-10-CM

## 2025-04-28 DIAGNOSIS — L81.9 HYPERPIGMENTATION OF SKIN: ICD-10-CM

## 2025-04-28 LAB
ALBUMIN SERPL BCP-MCNC: 4.5 G/DL (ref 3.4–5)
ALP SERPL-CCNC: 98 U/L (ref 33–136)
ALT SERPL W P-5'-P-CCNC: 12 U/L (ref 10–52)
ANION GAP SERPL CALC-SCNC: 16 MMOL/L (ref 10–20)
AST SERPL W P-5'-P-CCNC: 18 U/L (ref 9–39)
BASOPHILS # BLD AUTO: 0.06 X10*3/UL (ref 0–0.1)
BASOPHILS NFR BLD AUTO: 0.6 %
BILIRUB SERPL-MCNC: 0.4 MG/DL (ref 0–1.2)
BUN SERPL-MCNC: 13 MG/DL (ref 6–23)
CALCIUM SERPL-MCNC: 9.3 MG/DL (ref 8.6–10.3)
CHLORIDE SERPL-SCNC: 107 MMOL/L (ref 98–107)
CO2 SERPL-SCNC: 23 MMOL/L (ref 21–32)
CREAT SERPL-MCNC: 1.18 MG/DL (ref 0.5–1.3)
EGFRCR SERPLBLD CKD-EPI 2021: 68 ML/MIN/1.73M*2
EOSINOPHIL # BLD AUTO: 0.83 X10*3/UL (ref 0–0.7)
EOSINOPHIL NFR BLD AUTO: 8.8 %
ERYTHROCYTE [DISTWIDTH] IN BLOOD BY AUTOMATED COUNT: 14.6 % (ref 11.5–14.5)
GLUCOSE SERPL-MCNC: 109 MG/DL (ref 74–99)
HCT VFR BLD AUTO: 37.8 % (ref 41–52)
HGB BLD-MCNC: 12.7 G/DL (ref 13.5–17.5)
IGG SERPL-MCNC: 421 MG/DL (ref 700–1600)
IMM GRANULOCYTES # BLD AUTO: 0.1 X10*3/UL (ref 0–0.7)
IMM GRANULOCYTES NFR BLD AUTO: 1.1 % (ref 0–0.9)
LYMPHOCYTES # BLD AUTO: 2.17 X10*3/UL (ref 1.2–4.8)
LYMPHOCYTES NFR BLD AUTO: 23.1 %
MAGNESIUM SERPL-MCNC: 1.85 MG/DL (ref 1.6–2.4)
MCH RBC QN AUTO: 32 PG (ref 26–34)
MCHC RBC AUTO-ENTMCNC: 33.6 G/DL (ref 32–36)
MCV RBC AUTO: 95 FL (ref 80–100)
MONOCYTES # BLD AUTO: 2.05 X10*3/UL (ref 0.1–1)
MONOCYTES NFR BLD AUTO: 21.8 %
NEUTROPHILS # BLD AUTO: 4.18 X10*3/UL (ref 1.2–7.7)
NEUTROPHILS NFR BLD AUTO: 44.6 %
NRBC BLD-RTO: 0 /100 WBCS (ref 0–0)
PLATELET # BLD AUTO: 246 X10*3/UL (ref 150–450)
POTASSIUM SERPL-SCNC: 3.8 MMOL/L (ref 3.5–5.3)
PROT SERPL-MCNC: 6.7 G/DL (ref 6.4–8.2)
RBC # BLD AUTO: 3.97 X10*6/UL (ref 4.5–5.9)
SODIUM SERPL-SCNC: 142 MMOL/L (ref 136–145)
TACROLIMUS BLD-MCNC: 7.5 NG/ML
WBC # BLD AUTO: 9.4 X10*3/UL (ref 4.4–11.3)

## 2025-04-28 PROCEDURE — 1159F MED LIST DOCD IN RCRD: CPT

## 2025-04-28 PROCEDURE — 87799 DETECT AGENT NOS DNA QUANT: CPT

## 2025-04-28 PROCEDURE — 1126F AMNT PAIN NOTED NONE PRSNT: CPT

## 2025-04-28 PROCEDURE — 36415 COLL VENOUS BLD VENIPUNCTURE: CPT

## 2025-04-28 PROCEDURE — 96365 THER/PROPH/DIAG IV INF INIT: CPT | Mod: INF

## 2025-04-28 PROCEDURE — 3078F DIAST BP <80 MM HG: CPT

## 2025-04-28 PROCEDURE — 3074F SYST BP LT 130 MM HG: CPT

## 2025-04-28 PROCEDURE — 82784 ASSAY IGA/IGD/IGG/IGM EACH: CPT

## 2025-04-28 PROCEDURE — 87533 HHV-6 DNA QUANT: CPT

## 2025-04-28 PROCEDURE — 99215 OFFICE O/P EST HI 40 MIN: CPT

## 2025-04-28 PROCEDURE — 80053 COMPREHEN METABOLIC PANEL: CPT

## 2025-04-28 PROCEDURE — 96366 THER/PROPH/DIAG IV INF ADDON: CPT | Mod: INF

## 2025-04-28 PROCEDURE — 85025 COMPLETE CBC W/AUTO DIFF WBC: CPT

## 2025-04-28 PROCEDURE — 80197 ASSAY OF TACROLIMUS: CPT

## 2025-04-28 PROCEDURE — 83735 ASSAY OF MAGNESIUM: CPT

## 2025-04-28 PROCEDURE — 2500000004 HC RX 250 GENERAL PHARMACY W/ HCPCS (ALT 636 FOR OP/ED): Mod: JZ,TB

## 2025-04-28 PROCEDURE — 1160F RVW MEDS BY RX/DR IN RCRD: CPT

## 2025-04-28 PROCEDURE — 1157F ADVNC CARE PLAN IN RCRD: CPT

## 2025-04-28 PROCEDURE — 2500000001 HC RX 250 WO HCPCS SELF ADMINISTERED DRUGS (ALT 637 FOR MEDICARE OP)

## 2025-04-28 RX ORDER — FAMOTIDINE 10 MG/ML
20 INJECTION, SOLUTION INTRAVENOUS ONCE AS NEEDED
OUTPATIENT
Start: 2025-05-26

## 2025-04-28 RX ORDER — DIPHENHYDRAMINE HYDROCHLORIDE 50 MG/ML
50 INJECTION, SOLUTION INTRAMUSCULAR; INTRAVENOUS AS NEEDED
OUTPATIENT
Start: 2025-05-26

## 2025-04-28 RX ORDER — ALBUTEROL SULFATE 0.83 MG/ML
3 SOLUTION RESPIRATORY (INHALATION) AS NEEDED
OUTPATIENT
Start: 2025-05-26

## 2025-04-28 RX ORDER — HEPARIN 100 UNIT/ML
500 SYRINGE INTRAVENOUS AS NEEDED
OUTPATIENT
Start: 2025-04-28

## 2025-04-28 RX ORDER — DIPHENHYDRAMINE HCL 25 MG
25 CAPSULE ORAL ONCE
OUTPATIENT
Start: 2025-05-26

## 2025-04-28 RX ORDER — HEPARIN SODIUM,PORCINE/PF 10 UNIT/ML
50 SYRINGE (ML) INTRAVENOUS AS NEEDED
OUTPATIENT
Start: 2025-04-28

## 2025-04-28 RX ORDER — ACETAMINOPHEN 325 MG/1
650 TABLET ORAL ONCE
OUTPATIENT
Start: 2025-05-26

## 2025-04-28 RX ORDER — EPINEPHRINE 0.3 MG/.3ML
0.3 INJECTION SUBCUTANEOUS EVERY 5 MIN PRN
OUTPATIENT
Start: 2025-05-26

## 2025-04-28 RX ORDER — DIPHENHYDRAMINE HCL 25 MG
25 CAPSULE ORAL ONCE
Status: COMPLETED | OUTPATIENT
Start: 2025-04-28 | End: 2025-04-28

## 2025-04-28 RX ORDER — PANTOPRAZOLE SODIUM 40 MG/1
40 TABLET, DELAYED RELEASE ORAL
Qty: 30 TABLET | Refills: 2 | Status: SHIPPED | OUTPATIENT
Start: 2025-04-28 | End: 2026-04-28

## 2025-04-28 RX ORDER — ACETAMINOPHEN 325 MG/1
650 TABLET ORAL ONCE
Status: COMPLETED | OUTPATIENT
Start: 2025-04-28 | End: 2025-04-28

## 2025-04-28 RX ADMIN — ACETAMINOPHEN 650 MG: 325 TABLET ORAL at 11:33

## 2025-04-28 RX ADMIN — DIPHENHYDRAMINE HYDROCHLORIDE 25 MG: 25 CAPSULE ORAL at 11:33

## 2025-04-28 RX ADMIN — IMMUNE GLOBULIN INFUSION (HUMAN) 25 G: 100 INJECTION, SOLUTION INTRAVENOUS; SUBCUTANEOUS at 11:53

## 2025-04-28 ASSESSMENT — PAIN SCALES - GENERAL: PAINLEVEL_OUTOF10: 0-NO PAIN

## 2025-04-28 NOTE — PROGRESS NOTES
Pt received IVIG as ordered without incidence after provider visit. Ambulated from clinic with wife- uses MyChart and is aware of upcoming appts.

## 2025-05-04 ASSESSMENT — ENCOUNTER SYMPTOMS
UNEXPECTED WEIGHT CHANGE: 0
APPETITE CHANGE: 0
CARDIOVASCULAR NEGATIVE: 1
NEUROLOGICAL NEGATIVE: 1
RESPIRATORY NEGATIVE: 1
HEMATOLOGIC/LYMPHATIC NEGATIVE: 1
CHILLS: 0
MUSCULOSKELETAL NEGATIVE: 1
FEVER: 0
FATIGUE: 0
DIAPHORESIS: 0

## 2025-05-04 NOTE — PROGRESS NOTES
Patient ID:  Brandt Merritt is a 65 y.o. male.  Referring Physician:   BMT Dr Newsome  Primary Care Provider:  Bill Richmond MD      Oncology History Overview Note   4/2024  Myelodysplastic neoplasm with increased blasts-2 ( WHO)  Myelodysplastic neoplasm/AML  (ICC 2022 classification)    Presented in  10/2023 for pancytopenia, found to have hemolysis. Patient had normal CBC June 2020. Denied any hemorrhoidal bleeding . Has had C Scope and EGD , treated Hep C 2014 . 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      CBC 4/11/24 wbc 3.0, hgb 7.1, platelets 167K ANC 0.73    BM biopsy done on 4/11/24  as folllows:  BM histology  Myelodysplastic neoplasm with increased blasts-2 ( WHO)  Myelodysplastic neoplasm/AML  (ICC 2022 classification)  Balsts 11% on aspirate smear and 15-20% based on CD 34 immunostaining approaching AML leukemia, hematooiesis is erythroid dominant with dysplasia in granulocytes and megakaryocytes.   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      11/6/2024 -  Bone Marrow Transplant    conditioned with Flu/Cy/TBI and aGVHD prophylaxis with post-transplant cyclophosphamide, tacrolimus, and mycophenolate. T=0, 11/6/24. ABO Donor/Recipient: O+, A+. CMV donor/recipient: both positive. Started Tacro and MMF on T+5 (11/11).      2/11/2025 - 2/14/2025 Hospital Admission    Admit date: 2/11/25  Admission diagnosis: 2/14/25  Brandt was admitted on  -25 for intractable vomiting and some diarrhea, also with low grade fever.  He received broad spectrum antibiotics and all cultures were negative.  He underwent an upper endoscopy which was grossly normal, biopsies negative for GVHD, and budesonide was increased to 3 mg po bid.         Response:      Past Medical History:  HTN   STEMI 2020 after getting  a water pills.  Chronic hepatitis C infection treated in  treated  with Dr. Lloyd  Past Medical History:   Diagnosis Date    Chest pain 2021    HTN (hypertension) 10/05/2023    Hypertension     Leukemia (Multi)     Personal history of other diseases of the circulatory system     History of hypertension      Surgical History:  Conosocopy 2021  Upper EGD 10/31/23  Surgical reduction torsion of testes      Past Surgical History:   Procedure Laterality Date    COLONOSCOPY  2013    Complete Colonoscopy    OTHER SURGICAL HISTORY  10/07/2015    Surgery Testis Reduction Of Torsion Of Testis Right      Family History:  CAD, DM in mother       Mother  of CVA at age 69  Brother with prostate CA, 1 sister with liver disease  2 children healthy  Family History   Problem Relation Name Age of Onset    Other (diabetes mellitus) Mother      Prostate cancer Brother        Social History:  Lives with wife of 30 years, works at [employer], [employer], now retired.   29 years, former smoker, smoked x 15 yrs quit 20+ years ago. Marijuana 3 x a week. Served in  in [place] and [facility], .  ----------------------------------------------------------------------------------------------------  Subjective    History of Present Illness:    Brandt Merritt is a 65 year old male with history of myelodysplastic neoplasm/AML, s/p umbilical cord blood transplant 10/16/24 with graft rejection followed by haploidentical stem cells  from his sister on 24 with flu/cy, ATG and modified dose  cyclophosphamide with engraftment on day T+8.      Brandt presents to the clinic today (5/5/25) with his wife Genevieve for follow up evaluation and count check.  Today he is T+ 228 of umbilical cord transfusion and is T+ 180 of his haplo sister transplant.   Energy level is good, still gets tired going up and down steps, feels winded.  Appetite is good and he is eating well.  Weight is down a couple of pounds..  Currently taking tacrolimus 0.5 mg daily on Tuesday, Thursday and Saturday and 0.5 mg bid MWFSun.  Last night he took twice a day. Rash appears better is no longer itchy and he has continied using triamcinolone cream daily . Continues to have darker spots to hands and face is staying about the same.  Denies dry mouth,   Continues to have 1-3 loose stools per day, pudding consistency.  He has not been taking anything for stools.  He lost 2 kg over past few weeks.     Review of Systems   Constitutional:  Negative for appetite change, chills, diaphoresis, fatigue, fever and unexpected weight change.   Respiratory: Negative.     Cardiovascular: Negative.    Gastrointestinal:  Positive for diarrhea.   Genitourinary: Negative.     Musculoskeletal: Negative.    Skin:  Negative for itching and rash.        Hyperpigmented skin to face and hands   Neurological: Negative.    Hematological: Negative.    --------------------------------------------------------------------------------------------------------  Objective:    Visit Vitals  /77 (BP Location: Right arm, Patient Position: Sitting, BP Cuff Size: Adult)   Pulse 99   Temp 36.2 °C (97.2 °F) (Skin)   Resp 15       Vitals:    05/05/25 0847   Weight: 65.5 kg (144 lb 4.8 oz)       Physical Exam  Vitals reviewed.   Constitutional:       Appearance: Normal appearance.      Comments: Mild temporal wasting    HENT:      Head: Normocephalic and atraumatic.      Mouth/Throat:      Mouth: Mucous membranes are moist.   Eyes:      Extraocular Movements: Extraocular  movements intact.      Conjunctiva/sclera: Conjunctivae normal.      Pupils: Pupils are equal, round, and reactive to light.   Cardiovascular:      Rate and Rhythm: Normal rate and regular rhythm.      Pulses: Normal pulses.      Heart sounds: Normal heart sounds.   Pulmonary:      Effort: Pulmonary effort is normal.      Breath sounds: Normal breath sounds.   Abdominal:      General: Abdomen is flat. Bowel sounds are normal.      Palpations: Abdomen is soft. There is no mass.      Tenderness: There is no abdominal tenderness.   Musculoskeletal:         General: No swelling. Normal range of motion.   Lymphadenopathy:      Comments: No lymphadenopathy   Skin:     General: Skin is warm and dry.      Findings: Rash present.             Comments: Rash no longer evident on upper chest,  still mild lumpy bumpy rash of forehead, areas of hypopigmentation on face    Neurological:      General: No focal deficit present.      Mental Status: He is alert and oriented to person, place, and time.   Psychiatric:         Mood and Affect: Mood normal.         Behavior: Behavior normal.         Thought Content: Thought content normal.         Judgment: Judgment normal.     -----------------------------------------------------------------------------------------------------------  Assessment and Plan:    MDS/Acute myeloid leukemia in remission (Multi)  Diagnosed 4/2024: BM Bx with MDS in in transition to AML (>10% blast) w/ trisomy 8, DNMT alpha, and U2AF1 mutation indication adverse risk.      s/p 4 cycles of Decitabine/Venetoclax. BM Bx 6/17/24: Blasts cleared, FISH still positive for trisomy 8, still MDS changes   BMBx (9/5/24): 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 and no increase in blasts      History of allogeneic hematopoietic stem cell transplant  #1: Single-unit Cord, T0=9/19/24, Primary Engraftment Failure  - Conditioning: Flu-Mariana-TBI  - Donor: Single Cord, 6/8  = ABO Donor / Recipient: A+ / A+  = CMV  Donor / Recipient: - / +  - aGVHD Prophylaxis: Tacrolimus + MMF  - engraftment failure, developed HLA antibody against class I of cord  - Repeat BMBx (10/15): hypocellular with no residual myeloid neoplasm. 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), T0=11/6/24  - Graft: Haploidentical sister  = ABO Donor / Recipient: O+ / A+ (ABO incompatibility +)  = CMV Donor / Recipient: + / +  - Conditioning: Flu/Cy/TBI/rATG  = GVHD prophylaxis - rATG 1.5mg/kg T-5,-3,-1, PTCy (25mg/kg T+3, T+4), Tacro, MMF (T+5)     DATE DAY SOURCE MORPHOLOGY MRD WHOLE CHIMERISM   (% donor) CD3 CHIMERISM   (% donor) CD33 CHIMERISM   (% donor)   11/20/24 D+30 PB      100       12/11/24 D+30 PB       100 100   Deferred D+30 BM             1/9/25 D+60 BM  KRISTIE , flow negative      100  100     D+60 PB              2/17/25 D+100 PB      100        3/19/25 D+100 BM  KRISTIE, flow negative      100  100   Ordered for 5/12/25 M+6         Next marrow at 6 months, ordered for around 6/2/25.      Monitor for post-transplant hemolysis   (4/24/25)  Haptoglobin  175 (3/17/25)  UPC ratio 0.10 (3/17/25)     Anemia  12/18 Epo 113.   12/16 Retic 7.2%  DARBY on hold.  Cont folic acid.     GVHD prophylaxis  GVHD  Anorexia/poor oral intake/weight loss. Added Mirtazapine. Continue Zofran. If persists, consider aGVHD.  12/9/24:  Currently taking tacrolimus 0.5 mg BID, held dose today prior to labs.  FK level 4.6 (12/9/24).  Advised patient on 12/10/24 to increase tacrolimus dose to 1 mg in the AM and 0.5 mg in the PM.    Wean MMF from 1 g TID to 500 mg TID 12/2/24 - present  Due to poor appetite will decrease MMF to 500 mg po tid.  Improvement to GI symptoms since decreasing MMF dose.    Stopped  MMF on T+35.     Stage I/Grade II Upper GI GVHD  Anorexia, taste changes and weight loss 171# (9/17/24).  Cont mirtazapine and ATC Zofran.   Add budesonide 3x day.   12/30/24:  Started on budesonide TID on 12/27/24.  Brandt reports that he has  starting eating more since starting budesonide.  Reports no nausea, vomiting, or diarrhea at this time.    Appetite continuing to improve. Denies N/V/D.     1/13/25 Decreased budesonide bid per Dr Newsome; 1/27 Decrease to 3mg daily. Will continue on this dose for 1-2 weeks before discontinuing.     2/17/25 Recent admission for nausea, budesonide increased to 3 mg po bid, continues therapeutic on tacro 0.5 mg po bid. Esophageal biopsy 2/13/25 negative for GVHD.  3/3/25:  Overall doing well.  Nausea has resolved. Reviewed food safety precautions and had nutritionist Blanca Morgan, RD,LD come to speak with patient and wife today.  Continue budesonide 3 mg daily.  FK level 5.6.  Continue tacrolimus 0.5 mg BID.  Advised Brandt to contact oncology team if have GI symptoms worsen,  consider discontinuing budesonide next visit.    3/17/25:  Discontinue budesonide.  FK level 7.6 (3/17/25), continue tacrolimus 0.5 mg BID.      4/14/25:  Day 158 No further GI symptoms and weight now steady,  Today complains of itchy rash on upper anterior chest,  noted macular raised rash < 10% of body, suspect chronic GVHD of skin, limited.  No dry eyes, dry mouth diarrhea, etc.  Started triamcinolone cream bid, advised to watch for any increased in rash, diarrhea, dry  eyes or mouth. Tacro 9.2 continue 0.5 mg bid alternating with 0.5 mg daily.      5/5/25:  Today is T+180.  Continues to have rash to face, neck, and upper chest resolved.  Reports he continues to have unformed stools, pudding consistency about 1-2 times per day.  Currently on tacrolimus 0.5 mg bid alternating with daily.  FK level 11.4 but had evening dose last night so will  not adjust., Ordered screening PFTs today       Weights: see below    Wt Readings from Last 5 Encounters:   05/05/25 65.5 kg (144 lb 4.8 oz)   04/28/25 66.4 kg (146 lb 4.8 oz)   04/21/25 66.4 kg (146 lb 6.2 oz)   04/14/25 67.1 kg (147 lb 14.9 oz)   03/31/25 67.8 kg (149 lb 7.6 oz)      Infectious  Disease & Immune Reconstitution      Prophylaxis  Antiviral prophylaxis: acyclovir, Letermovir-continue until T+200.  Antifungal: posaconazole  PCP prophylaxis: 10/26/24 - 11/28/24 Pentamidine; cont Bactrim     Hypogammaglobulinemia  IgG level. IVIG for level <400   3/17/25:  IgG 307.  Ordered monthly IVIG infusions.  Reviewed rationale and possible side effects with patient.    Received first IVIG infusion today 3/31/25.  IgG level 4/24/25, scheduled to receive IVIG 4/28/25.  Continue monthly.       Active Surveillance:     CMV detected 688 1/2/25, 167 1/6/25. Levels undetectable since 1/13/25, CMV ND (2/17/25).    Started valgancicyclovir 900mg BID on 1/6/25,   Plan 1/20/25 decrease to 900 mg daily for 2 weeks (2/3/25), then transition back to letermovir/acyclovir.    CMV ND (4/24/25), if CMV continues to be non detected can stop letermovir at day 200.   Adeno ND 4/24/25.   HHV6 ND 4/24/25.   EBV ND 4/24/25.  EBV Viremia during transplant admission  Treated despite low levels due to upcoming second SCT  Rituximab 600 mg x 1 (10/31/24)     Infectious Disease follow up evaluation: 1/10/25      Immunodeficiency panel 100 days, 6mos and 1 year.      Immunizations:   Covid and influenza vaccine series.  Consider at T+ 3 month  Will plan to begin immunizations at T+6 mths around 5/5/25 depending on degree of immunosuppression  Received influenza vaccine 3/6/25 and 1st post transplant covid vaccine on 3/6/25, 2nd covid vaccine on 3/27/25, and 3rd covid vaccine on 4/25/25.   6 month vaccines today 5/5/25     Cardiac:    Essential hypertension- was started on nifedipine 60 mg daily on 1/20/25 and started lisinopril 5 mg daily on 2/3/25.   Hx of STEMI (11/11/2020)  Cardiology  appt on 12/18/24  ECHO 12/23/24: LVEF 65%     Ascending aorta dilation   TTE (4/29/24): 3.8-4 cm dilation      Electrolyte disturbance   Magnesium 1.65 (4/24/25), continue oral magnesium 128 mg BID.      Dry Skin hyperpigmentation:  Hyperpigmented  skin to face.  Derm E-consult on 2/3/25 with Dr. Chavez, feels may be due to bactrim, which I prefer not to discontinue. May also be caused by prochlorperazine.  NPV with dermatology Dr. Medel on 6/18/25.    4/28/25:  Continues to have hyperpigmented skin to face and bilateral palms of hands.  Brandt feels his hyperpigmentation is lighted slightly to palms.  Instructed to continue to use moisturizer such as cerave that are fragrance free and gentle.  Advised about mineral based sun screen.      Lack of appetite, improved  Mirtazapine 15 mg at night as of 11/26/24     Vitamin D deficiency  12/18 Level 27.  Last dose of weekly supplement taken. 1/27 Repeat level was 28.  Reordered ergocalciferol 50,000 UT weekly on 1/27/25 for 8 doses.  Repeat vitamin D 36 (4/8/25).  Start maintenance vitamin D.       IV Access  2/3/25:  PICC line removed.     Psychosocial.    Caregiver Genevieve  [facility] in [place]     RTC: 1 week for bone marrow biopsy and visit with Karon Casas,  keep tacro therapeutic for now, then switch to qo week visits. PFTs ordered 6 month post transplant vaccines 5/5/25    ---------------------------------------------------------------------------------  Malaika Newsome MD

## 2025-05-05 ENCOUNTER — LAB (OUTPATIENT)
Dept: LAB | Facility: HOSPITAL | Age: 65
End: 2025-05-05
Payer: COMMERCIAL

## 2025-05-05 ENCOUNTER — OFFICE VISIT (OUTPATIENT)
Dept: HEMATOLOGY/ONCOLOGY | Facility: HOSPITAL | Age: 65
End: 2025-05-05
Payer: COMMERCIAL

## 2025-05-05 ENCOUNTER — INFUSION (OUTPATIENT)
Dept: HEMATOLOGY/ONCOLOGY | Facility: HOSPITAL | Age: 65
End: 2025-05-05
Payer: COMMERCIAL

## 2025-05-05 VITALS
OXYGEN SATURATION: 100 % | TEMPERATURE: 97.2 F | SYSTOLIC BLOOD PRESSURE: 124 MMHG | WEIGHT: 144.3 LBS | RESPIRATION RATE: 15 BRPM | DIASTOLIC BLOOD PRESSURE: 77 MMHG | HEART RATE: 99 BPM | BODY MASS INDEX: 23.64 KG/M2

## 2025-05-05 DIAGNOSIS — Z94.84 HISTORY OF ALLOGENEIC HEMATOPOIETIC STEM CELL TRANSPLANT: ICD-10-CM

## 2025-05-05 DIAGNOSIS — C92.01 ACUTE MYELOID LEUKEMIA IN REMISSION (MULTI): ICD-10-CM

## 2025-05-05 DIAGNOSIS — C92.01 AML (ACUTE MYELOID LEUKEMIA) IN REMISSION (MULTI): ICD-10-CM

## 2025-05-05 DIAGNOSIS — Z94.84 STEM CELLS TRANSPLANT STATUS (MULTI): ICD-10-CM

## 2025-05-05 DIAGNOSIS — Z94.84 HISTORY OF ALLOGENEIC HEMATOPOIETIC STEM CELL TRANSPLANT: Primary | ICD-10-CM

## 2025-05-05 LAB
ALBUMIN SERPL BCP-MCNC: 4.4 G/DL (ref 3.4–5)
ALP SERPL-CCNC: 97 U/L (ref 33–136)
ALT SERPL W P-5'-P-CCNC: 22 U/L (ref 10–52)
ANION GAP SERPL CALC-SCNC: 15 MMOL/L (ref 10–20)
AST SERPL W P-5'-P-CCNC: 24 U/L (ref 9–39)
BASOPHILS # BLD AUTO: 0.06 X10*3/UL (ref 0–0.1)
BASOPHILS NFR BLD AUTO: 0.6 %
BILIRUB SERPL-MCNC: 0.5 MG/DL (ref 0–1.2)
BUN SERPL-MCNC: 12 MG/DL (ref 6–23)
CALCIUM SERPL-MCNC: 9.2 MG/DL (ref 8.6–10.3)
CHLORIDE SERPL-SCNC: 105 MMOL/L (ref 98–107)
CHOLEST SERPL-MCNC: 200 MG/DL (ref 0–199)
CHOLESTEROL/HDL RATIO: 4
CO2 SERPL-SCNC: 23 MMOL/L (ref 21–32)
CREAT SERPL-MCNC: 0.95 MG/DL (ref 0.5–1.3)
EGFRCR SERPLBLD CKD-EPI 2021: 88 ML/MIN/1.73M*2
EOSINOPHIL # BLD AUTO: 0.97 X10*3/UL (ref 0–0.7)
EOSINOPHIL NFR BLD AUTO: 10.2 %
ERYTHROCYTE [DISTWIDTH] IN BLOOD BY AUTOMATED COUNT: 14.4 % (ref 11.5–14.5)
GLUCOSE SERPL-MCNC: 98 MG/DL (ref 74–99)
HCT VFR BLD AUTO: 39.7 % (ref 41–52)
HDLC SERPL-MCNC: 49.9 MG/DL
HGB BLD-MCNC: 13.3 G/DL (ref 13.5–17.5)
IMM GRANULOCYTES # BLD AUTO: 0.11 X10*3/UL (ref 0–0.7)
IMM GRANULOCYTES NFR BLD AUTO: 1.2 % (ref 0–0.9)
LDH SERPL L TO P-CCNC: 269 U/L (ref 84–246)
LDLC SERPL CALC-MCNC: 120 MG/DL
LYMPHOCYTES # BLD AUTO: 3.65 X10*3/UL (ref 1.2–4.8)
LYMPHOCYTES NFR BLD AUTO: 38.4 %
MAGNESIUM SERPL-MCNC: 1.58 MG/DL (ref 1.6–2.4)
MCH RBC QN AUTO: 31.1 PG (ref 26–34)
MCHC RBC AUTO-ENTMCNC: 33.5 G/DL (ref 32–36)
MCV RBC AUTO: 93 FL (ref 80–100)
MONOCYTES # BLD AUTO: 1.31 X10*3/UL (ref 0.1–1)
MONOCYTES NFR BLD AUTO: 13.8 %
NEUTROPHILS # BLD AUTO: 3.4 X10*3/UL (ref 1.2–7.7)
NEUTROPHILS NFR BLD AUTO: 35.8 %
NON HDL CHOLESTEROL: 150 MG/DL (ref 0–149)
NRBC BLD-RTO: 0 /100 WBCS (ref 0–0)
PLATELET # BLD AUTO: 280 X10*3/UL (ref 150–450)
POTASSIUM SERPL-SCNC: 3.5 MMOL/L (ref 3.5–5.3)
PROT SERPL-MCNC: 7 G/DL (ref 6.4–8.2)
RBC # BLD AUTO: 4.27 X10*6/UL (ref 4.5–5.9)
SODIUM SERPL-SCNC: 139 MMOL/L (ref 136–145)
TACROLIMUS BLD-MCNC: 11.4 NG/ML
TRIGL SERPL-MCNC: 151 MG/DL (ref 0–149)
TSH SERPL-ACNC: 3.24 MIU/L (ref 0.44–3.98)
URATE SERPL-MCNC: 3.1 MG/DL (ref 4–7.5)
VLDL: 30 MG/DL (ref 0–40)
WBC # BLD AUTO: 9.5 X10*3/UL (ref 4.4–11.3)

## 2025-05-05 PROCEDURE — 2500000004 HC RX 250 GENERAL PHARMACY W/ HCPCS (ALT 636 FOR OP/ED): Mod: JZ

## 2025-05-05 PROCEDURE — 80053 COMPREHEN METABOLIC PANEL: CPT

## 2025-05-05 PROCEDURE — 90739 HEPB VACC 2/4 DOSE ADULT IM: CPT | Mod: JZ

## 2025-05-05 PROCEDURE — 36415 COLL VENOUS BLD VENIPUNCTURE: CPT

## 2025-05-05 PROCEDURE — 87533 HHV-6 DNA QUANT: CPT

## 2025-05-05 PROCEDURE — 85025 COMPLETE CBC W/AUTO DIFF WBC: CPT

## 2025-05-05 PROCEDURE — 84443 ASSAY THYROID STIM HORMONE: CPT

## 2025-05-05 PROCEDURE — 87799 DETECT AGENT NOS DNA QUANT: CPT

## 2025-05-05 PROCEDURE — 90648 HIB PRP-T VACCINE 4 DOSE IM: CPT | Mod: JZ

## 2025-05-05 PROCEDURE — RXMED WILLOW AMBULATORY MEDICATION CHARGE

## 2025-05-05 PROCEDURE — 99215 OFFICE O/P EST HI 40 MIN: CPT | Performed by: INTERNAL MEDICINE

## 2025-05-05 PROCEDURE — 80197 ASSAY OF TACROLIMUS: CPT

## 2025-05-05 PROCEDURE — 1159F MED LIST DOCD IN RCRD: CPT | Performed by: INTERNAL MEDICINE

## 2025-05-05 PROCEDURE — 83735 ASSAY OF MAGNESIUM: CPT

## 2025-05-05 PROCEDURE — 83615 LACTATE (LD) (LDH) ENZYME: CPT

## 2025-05-05 PROCEDURE — 90472 IMMUNIZATION ADMIN EACH ADD: CPT

## 2025-05-05 PROCEDURE — 90677 PCV20 VACCINE IM: CPT | Mod: JZ

## 2025-05-05 PROCEDURE — 88185 FLOWCYTOMETRY/TC ADD-ON: CPT

## 2025-05-05 PROCEDURE — 1157F ADVNC CARE PLAN IN RCRD: CPT | Performed by: INTERNAL MEDICINE

## 2025-05-05 PROCEDURE — 90471 IMMUNIZATION ADMIN: CPT

## 2025-05-05 PROCEDURE — 1126F AMNT PAIN NOTED NONE PRSNT: CPT | Performed by: INTERNAL MEDICINE

## 2025-05-05 PROCEDURE — 3078F DIAST BP <80 MM HG: CPT | Performed by: INTERNAL MEDICINE

## 2025-05-05 PROCEDURE — 1036F TOBACCO NON-USER: CPT | Performed by: INTERNAL MEDICINE

## 2025-05-05 PROCEDURE — 3074F SYST BP LT 130 MM HG: CPT | Performed by: INTERNAL MEDICINE

## 2025-05-05 PROCEDURE — 84550 ASSAY OF BLOOD/URIC ACID: CPT

## 2025-05-05 PROCEDURE — 80061 LIPID PANEL: CPT

## 2025-05-05 PROCEDURE — 90696 DTAP-IPV VACCINE 4-6 YRS IM: CPT | Mod: JZ

## 2025-05-05 PROCEDURE — 81268 CHIMERISM ANAL W/CELL SELECT: CPT

## 2025-05-05 PROCEDURE — 81267 CHIMERISM ANAL NO CELL SELEC: CPT

## 2025-05-05 RX ORDER — ALBUTEROL SULFATE 0.83 MG/ML
3 SOLUTION RESPIRATORY (INHALATION) ONCE
OUTPATIENT
Start: 2025-05-05 | End: 2025-05-05

## 2025-05-05 RX ORDER — EPINEPHRINE 0.3 MG/.3ML
0.3 INJECTION SUBCUTANEOUS EVERY 5 MIN PRN
OUTPATIENT
Start: 2025-06-30

## 2025-05-05 RX ORDER — DIPHENHYDRAMINE HYDROCHLORIDE 50 MG/ML
50 INJECTION, SOLUTION INTRAMUSCULAR; INTRAVENOUS AS NEEDED
OUTPATIENT
Start: 2025-06-30

## 2025-05-05 RX ORDER — ALBUTEROL SULFATE 90 UG/1
1 INHALANT RESPIRATORY (INHALATION) ONCE
OUTPATIENT
Start: 2025-05-05

## 2025-05-05 RX ORDER — FAMOTIDINE 10 MG/ML
20 INJECTION, SOLUTION INTRAVENOUS ONCE AS NEEDED
OUTPATIENT
Start: 2025-06-30

## 2025-05-05 RX ORDER — ALBUTEROL SULFATE 0.83 MG/ML
3 SOLUTION RESPIRATORY (INHALATION) AS NEEDED
OUTPATIENT
Start: 2025-06-30

## 2025-05-05 RX ADMIN — DIPHTHERIA AND TETANUS TOXOIDS AND ACELLULAR PERTUSSIS ADSORBED AND INACTIVATED POLIOVIRUS VACCINE 0.5 ML: 25; 10; 25; 8; 25; 40; 8; 32 INJECTION, SUSPENSION INTRAMUSCULAR at 10:35

## 2025-05-05 RX ADMIN — HEPATITIS B VACCINE (RECOMBINANT) ADJUVANTED 20 MCG: 20 INJECTION, SOLUTION INTRAMUSCULAR at 10:36

## 2025-05-05 RX ADMIN — PNEUMOCOCCAL 20-VALENT CONJUGATE VACCINE 0.5 ML
2.2; 2.2; 2.2; 2.2; 2.2; 2.2; 2.2; 2.2; 2.2; 2.2; 2.2; 2.2; 2.2; 2.2; 2.2; 2.2; 4.4; 2.2; 2.2; 2.2 INJECTION, SUSPENSION INTRAMUSCULAR at 10:34

## 2025-05-05 RX ADMIN — HAEMOPHILUS B POLYSACCHARIDE CONJUGATE VACCINE FOR INJ 0.5 ML: RECON SOLN at 10:38

## 2025-05-05 ASSESSMENT — ENCOUNTER SYMPTOMS: DIARRHEA: 1

## 2025-05-05 ASSESSMENT — PAIN SCALES - GENERAL: PAINLEVEL_OUTOF10: 0-NO PAIN

## 2025-05-05 NOTE — PROGRESS NOTES
Pt tolerated first series of post transplant vaccines without incident.  Discharged home in stable condition

## 2025-05-05 NOTE — ASSESSMENT & PLAN NOTE
Infectious Disease & Immune Reconstitution      Prophylaxis  Antiviral prophylaxis: acyclovir, Letermovir-continue until T+200.  Antifungal: posaconazole  PCP prophylaxis: 10/26/24 - 11/28/24 Pentamidine; cont Bactrim     Hypogammaglobulinemia  IgG level. IVIG for level <400   3/17/25:  IgG 307.  Ordered monthly IVIG infusions.  Reviewed rationale and possible side effects with patient.    Received first IVIG infusion today 3/31/25.  IgG level 4/24/25, scheduled to receive IVIG 4/28/25.  Continue monthly.       Active Surveillance:     CMV detected 688 1/2/25, 167 1/6/25. Levels undetectable since 1/13/25, CMV ND (2/17/25).     Started valgancicyclovir 900mg BID on 1/6/25,   Plan 1/20/25 decrease to 900 mg daily for 2 weeks (2/3/25), then transition back to letermovir/acyclovir.    CMV ND (4/24/25), if CMV continues to be non detected can stop letermovir at day 200.   Adeno ND 4/24/25.   HHV6 ND 4/24/25.   EBV ND 4/24/25.  EBV Viremia during transplant admission  Treated despite low levels due to upcoming second SCT  Rituximab 600 mg x 1 (10/31/24)     Infectious Disease follow up evaluation: 1/10/25      Immunodeficiency panel 100 days, 6mos and 1 year.      Immunizations:   Covid and influenza vaccine series.  Consider at T+ 3 month  Will plan to begin immunizations at T+6 mths around 5/5/25 depending on degree of immunosuppression  Received influenza vaccine 3/6/25 and 1st post transplant covid vaccine on 3/6/25, 2nd covid vaccine on 3/27/25, and 3rd covid vaccine on 4/25/25.   6 month vaccines today 5/5/25

## 2025-05-05 NOTE — ASSESSMENT & PLAN NOTE
Cardiac:    Essential hypertension- was started on nifedipine 60 mg daily on 1/20/25 and started lisinopril 5 mg daily on 2/3/25.   Hx of STEMI (11/11/2020)  Cardiology  appt on 12/18/24  ECHO 12/23/24: LVEF 65%     Ascending aorta dilation   TTE (4/29/24): 3.8-4 cm dilation

## 2025-05-05 NOTE — ASSESSMENT & PLAN NOTE
MDS/Acute myeloid leukemia in remission (Multi)  Diagnosed 4/2024: BM Bx with MDS in in transition to AML (>10% blast) w/ trisomy 8, DNMT alpha, and U2AF1 mutation indication adverse risk.      s/p 4 cycles of Decitabine/Venetoclax. BM Bx 6/17/24: Blasts cleared, FISH still positive for trisomy 8, still MDS changes   BMBx (9/5/24): 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 and no increase in blasts

## 2025-05-05 NOTE — ASSESSMENT & PLAN NOTE
GVHD prophylaxis  GVHD  Anorexia/poor oral intake/weight loss. Added Mirtazapine. Continue Zofran. If persists, consider aGVHD.  12/9/24:  Currently taking tacrolimus 0.5 mg BID, held dose today prior to labs.  FK level 4.6 (12/9/24).  Advised patient on 12/10/24 to increase tacrolimus dose to 1 mg in the AM and 0.5 mg in the PM.    Wean MMF from 1 g TID to 500 mg TID 12/2/24 - present  Due to poor appetite will decrease MMF to 500 mg po tid.  Improvement to GI symptoms since decreasing MMF dose.    Stopped  MMF on T+35.     Stage I/Grade II Upper GI GVHD  Anorexia, taste changes and weight loss 171# (9/17/24).  Cont mirtazapine and ATC Zofran.   Add budesonide 3x day.   12/30/24:  Started on budesonide TID on 12/27/24.  Brandt reports that he has starting eating more since starting budesonide.  Reports no nausea, vomiting, or diarrhea at this time.    Appetite continuing to improve. Denies N/V/D.      1/13/25 Decreased budesonide bid per Dr Newsome; 1/27 Decrease to 3mg daily. Will continue on this dose for 1-2 weeks before discontinuing.      2/17/25 Recent admission for nausea, budesonide increased to 3 mg po bid, continues therapeutic on tacro 0.5 mg po bid. Esophageal biopsy 2/13/25 negative for GVHD.  3/3/25:  Overall doing well.  Nausea has resolved. Reviewed food safety precautions and had nutritionist Blanca Morgan, RD,LD come to speak with patient and wife today.  Continue budesonide 3 mg daily.  FK level 5.6.  Continue tacrolimus 0.5 mg BID.  Advised Brandt to contact oncology team if have GI symptoms worsen,  consider discontinuing budesonide next visit.     3/17/25:  Discontinue budesonide.  FK level 7.6 (3/17/25), continue tacrolimus 0.5 mg BID.       4/14/25:  Day 158 No further GI symptoms and weight now steady,  Today complains of itchy rash on upper anterior chest,  noted macular raised rash < 10% of body, suspect chronic GVHD of skin, limited.  No dry eyes, dry mouth diarrhea, etc.  Started  triamcinolone cream bid, advised to watch for any increased in rash, diarrhea, dry  eyes or mouth. Tacro 9.2 continue 0.5 mg bid alternating with 0.5 mg daily.       5/5/25:  Today is T+180.  Continues to have rash to face, neck, and upper chest resolved.  Reports he continues to have unformed stools, pudding consistency about 1-2 times per day.  Currently on tacrolimus 0.5 mg bid alternating with daily.  FK level 11.4 but had evening dose last night so will  not adjust., Ordered screening PFTs today         Weights: see below         Wt Readings from Last 5 Encounters:   05/05/25 65.5 kg (144 lb 4.8 oz)   04/28/25 66.4 kg (146 lb 4.8 oz)   04/21/25 66.4 kg (146 lb 6.2 oz)   04/14/25 67.1 kg (147 lb 14.9 oz)   03/31/25 67.8 kg (149 lb 7.6 oz)

## 2025-05-06 ENCOUNTER — HOSPITAL ENCOUNTER (OUTPATIENT)
Dept: RESPIRATORY THERAPY | Facility: HOSPITAL | Age: 65
Discharge: HOME | End: 2025-05-06
Payer: COMMERCIAL

## 2025-05-06 DIAGNOSIS — R06.09 DYSPNEA ON EXERTION: Primary | ICD-10-CM

## 2025-05-06 DIAGNOSIS — Z94.84 HISTORY OF ALLOGENEIC HEMATOPOIETIC STEM CELL TRANSPLANT: ICD-10-CM

## 2025-05-06 LAB
CMV DNA SERPL NAA+PROBE-LOG IU: NORMAL {LOG_IU}/ML
LABORATORY COMMENT REPORT: NOT DETECTED
MGC ASCENT PFT - FEV1 - PRE: 2.73
MGC ASCENT PFT - FEV1 - PREDICTED: 2.62
MGC ASCENT PFT - FVC - PRE: 3.81
MGC ASCENT PFT - FVC - PREDICTED: 3.38

## 2025-05-06 PROCEDURE — 94010 BREATHING CAPACITY TEST: CPT | Performed by: INTERNAL MEDICINE

## 2025-05-06 PROCEDURE — 94729 DIFFUSING CAPACITY: CPT | Performed by: INTERNAL MEDICINE

## 2025-05-06 PROCEDURE — 94726 PLETHYSMOGRAPHY LUNG VOLUMES: CPT | Performed by: INTERNAL MEDICINE

## 2025-05-06 PROCEDURE — 94726 PLETHYSMOGRAPHY LUNG VOLUMES: CPT

## 2025-05-06 NOTE — PROGRESS NOTES
Phone note regarding PFTs.  Pfts done today to assess shortness of breath on climbing stairs.  Spirometry and FEV1 over FVC normal,  diffusion capacity however, 50% corrected for hgb which is down significantly from pre transplant when it was 100%.  High resolution chest CT ordered.

## 2025-05-07 LAB
ADENOVIRUS QPCR,PLASMA, VIRC: NOT DETECTED COPIES/ML
CD19 CELLS # BLD: <0.004 X10E9/L (ref 0.07–0.91)
CD19 CELLS NFR BLD: <0.1 % (ref 6–19)
CD3 CELLS # BLD: 2.85 X10E9/L (ref 0.71–4.18)
CD3 CELLS NFR SPEC: 78 % (ref 59–87)
CD3+CD4+ CELLS # BLD: 1.13 X10E9/L (ref 0.35–2.74)
CD3+CD4+ CELLS # BLD: 1132 /MM3 (ref 350–2740)
CD3+CD4+ CELLS NFR BLD: 31 % (ref 29–57)
CD3+CD4+ CELLS/CD3+CD8+ CLL BLD: 0.67 % (ref 1–3.5)
CD3+CD4-CD8-CD45+ CELLS NFR BLD: 1 % (ref 0–6)
CD3+CD8+ CELLS # BLD: 1.68 X10E9/L (ref 0.08–1.49)
CD3+CD8+ CELLS NFR BLD: 46 % (ref 7–31)
CD3-CD16+CD56+ CELLS # BLD: 0.77 X10E9/L (ref 0–0.86)
CD3-CD16+CD56+ CELLS NFR BLD: 21 % (ref 0–18)
EBV DNA SPEC NAA+PROBE-LOG#: NORMAL {LOG_COPIES}/ML
FLOW CYTOMETRY SPECIALIST REVIEW: ABNORMAL
HUMAN HERPESVIRUS-6 PCR PLASMA: NOT DETECTED COPIES/ML
LABORATORY COMMENT REPORT: NOT DETECTED
LYMPHOCYTES # SPEC AUTO: 3.65 X10*3/UL
PATH REVIEW, IMMUNODEFICIENCY PROFILE: ABNORMAL

## 2025-05-09 ENCOUNTER — PHARMACY VISIT (OUTPATIENT)
Dept: PHARMACY | Facility: CLINIC | Age: 65
End: 2025-05-09
Payer: COMMERCIAL

## 2025-05-09 LAB
CHIMERISM INTERPRETATION: NORMAL
ELECTRONICALLY SIGNED BY: NORMAL

## 2025-05-10 ASSESSMENT — ENCOUNTER SYMPTOMS
HEMATOLOGIC/LYMPHATIC NEGATIVE: 1
FEVER: 0
NEUROLOGICAL NEGATIVE: 1
APPETITE CHANGE: 0
MUSCULOSKELETAL NEGATIVE: 1
UNEXPECTED WEIGHT CHANGE: 0
CHILLS: 0
FATIGUE: 0
CARDIOVASCULAR NEGATIVE: 1
DIAPHORESIS: 0

## 2025-05-10 NOTE — PROGRESS NOTES
Patient ID:  Brandt Merritt is a 65 y.o. male.  Referring Physician:   BMT Dr Newsome  Primary Care Provider:  Bill Richmond MD    Oncology History Overview Note   4/2024  Myelodysplastic neoplasm with increased blasts-2 ( WHO)  Myelodysplastic neoplasm/AML  (ICC 2022 classification)    Presented in  10/2023 for pancytopenia, found to have hemolysis. Patient had normal CBC June 2020. Denied any hemorrhoidal bleeding . Has had C Scope and EGD , treated Hep C 2014 . 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      CBC 4/11/24 wbc 3.0, hgb 7.1, platelets 167K ANC 0.73    BM biopsy done on 4/11/24  as folllows:  BM histology  Myelodysplastic neoplasm with increased blasts-2 ( WHO)  Myelodysplastic neoplasm/AML  (ICC 2022 classification)  Balsts 11% on aspirate smear and 15-20% based on CD 34 immunostaining approaching AML leukemia, hematooiesis is erythroid dominant with dysplasia in granulocytes and megakaryocytes.   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      11/6/2024 -  Bone Marrow Transplant    conditioned with Flu/Cy/TBI and aGVHD prophylaxis with post-transplant cyclophosphamide, tacrolimus, and mycophenolate. T=0, 11/6/24. ABO Donor/Recipient: O+, A+. CMV donor/recipient: both positive. Started Tacro and MMF on T+5 (11/11).      2/11/2025 - 2/14/2025 Hospital Admission    Admit date: 2/11/25  Admission diagnosis: 2/14/25  Brandt was admitted on  -25 for intractable vomiting and some diarrhea, also with low grade fever.  He received broad spectrum antibiotics and all cultures were negative.  He underwent an upper endoscopy which was grossly normal, biopsies negative for GVHD, and budesonide was increased to 3 mg po bid.      AML (acute myeloid leukemia) in remission (Multi)   2025 Initial Diagnosis    AML (acute myeloid leukemia) in remission (Multi)        Response:      Past Medical History:  HTN   STEMI 2020 after getting  a water pills.  Chronic hepatitis C infection treated in  treated  with Dr. Lloyd  Past Medical History:   Diagnosis Date    Chest pain 2021    HTN (hypertension) 10/05/2023    Hypertension     Leukemia (Multi)     Personal history of other diseases of the circulatory system     History of hypertension      Surgical History:  Conosocopy 2021  Upper EGD 10/31/23  Surgical reduction torsion of testes      Past Surgical History:   Procedure Laterality Date    COLONOSCOPY  2013    Complete Colonoscopy    OTHER SURGICAL HISTORY  10/07/2015    Surgery Testis Reduction Of Torsion Of Testis Right      Family History:  CAD, DM in mother       Mother  of CVA at age 69  Brother with prostate CA, 1 sister with liver disease  2 children healthy  Family History   Problem Relation Name Age of Onset    Other (diabetes mellitus) Mother      Prostate cancer Brother        Social History:  Lives with wife of 30 years, works at [employer], now retired.   29 years, former smoker, smoked x 15 yrs quit 20+ years ago. Marijuana 3 x a week. Served in  in [place] and [employer], .  ----------------------------------------------------------------------------------------------------  Subjective    History of Present Illness:    Brandt Merirtt is a 65 year old male with history of myelodysplastic neoplasm/AML, s/p umbilical cord blood  transplant 10/16/24 with graft rejection followed by haploidentical stem cells  from his sister on 11/6/24 with flu/cy, ATG and modified dose cyclophosphamide with engraftment on day T+8.      Brandt presents to the clinic today (5/12/25) with his wife Genevieve for follow up evaluation, count check, and is scheduled to receive 6 month Bmbx today.  Today he is T+ 235 of umbilical cord transfusion and is T+ 187 of his haplo sister transplant.  Energy level is good.  Appetite is good and he is eating well for lunch and dinner, skips breakfast.  No further weight loss from last visit.  Currently taking tacrolimus 0.5 mg daily on Tuesday, Thursday and Saturday and 0.5 mg bid MWun.  States he held his tacrolimus dose today prior to lab draws.  Had about 3 episodes of nausea, vomiting, and diarrhea after eating 5 [place] burger and fries on Thursday.  Was sick Friday morning and then felt better Saturday.   He has had no further episodes of nausea, vomiting, or diarrhea and has been eating and drinking well.  Didn't take any medication for GI symptoms.  Continues to have shortness of breath after going up the stairs, staying the same.  PFTs completed.  Scheduled to have CT chest 5/23/25.  Skin is doing about the same to face, no itching.  Using triamcinolone to face daily.  Feels the hyperpigmentation to hands and face is better.  Has an appt to see dermatology in June.  Plans to fill out paper work for social security on June 2nd, won't be official until September.      Review of Systems   Constitutional:  Negative for appetite change, chills, diaphoresis, fatigue, fever and unexpected weight change.   Respiratory:  Positive for shortness of breath (on exertion).    Cardiovascular: Negative.    Gastrointestinal:  Negative for blood in stool, constipation, diarrhea, nausea and vomiting.   Genitourinary: Negative.     Musculoskeletal: Negative.    Skin:  Negative for itching and rash.        Hyperpigmented skin to face and  hands   Neurological: Negative.    Hematological: Negative.    --------------------------------------------------------------------------------------------------------  Objective:    There were no vitals taken for this visit.    There were no vitals filed for this visit.    Physical Exam  Vitals reviewed.   Constitutional:       Appearance: Normal appearance.      Comments: Mild temporal wasting    HENT:      Head: Normocephalic and atraumatic.      Mouth/Throat:      Mouth: Mucous membranes are moist.   Eyes:      Extraocular Movements: Extraocular movements intact.      Conjunctiva/sclera: Conjunctivae normal.      Pupils: Pupils are equal, round, and reactive to light.   Cardiovascular:      Rate and Rhythm: Normal rate and regular rhythm.      Pulses: Normal pulses.      Heart sounds: Normal heart sounds.   Pulmonary:      Effort: Pulmonary effort is normal.      Breath sounds: Normal breath sounds.   Abdominal:      General: Abdomen is flat. Bowel sounds are normal.      Palpations: Abdomen is soft. There is no mass.      Tenderness: There is no abdominal tenderness.   Musculoskeletal:         General: No swelling. Normal range of motion.   Lymphadenopathy:      Comments: No lymphadenopathy   Skin:     General: Skin is warm and dry.      Findings: Rash present.             Comments: Rash no longer evident on upper chest, continues with mild lumpy bumpy rash of forehead, areas of hypopigmentation on face and palms of bilateral hands near wrists.   Neurological:      General: No focal deficit present.      Mental Status: He is alert and oriented to person, place, and time.   Psychiatric:         Mood and Affect: Mood normal.         Behavior: Behavior normal.         Thought Content: Thought content normal.         Judgment: Judgment normal.     -----------------------------------------------------------------------------------------------------------  Assessment and Plan:    MDS/Acute myeloid leukemia in  remission (Multi)  Diagnosed 4/2024: BM Bx with MDS in in transition to AML (>10% blast) w/ trisomy 8, DNMT alpha, and U2AF1 mutation indication adverse risk.      s/p 4 cycles of Decitabine/Venetoclax. BM Bx 6/17/24: Blasts cleared, FISH still positive for trisomy 8, still MDS changes   BMBx (9/5/24): 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 and no increase in blasts      History of allogeneic hematopoietic stem cell transplant  #1: Single-unit Cord, T0=9/19/24, Primary Engraftment Failure  - Conditioning: Flu-Mariana-TBI  - Donor: Single Cord, 6/8  = ABO Donor / Recipient: A+ / A+  = CMV Donor / Recipient: - / +  - aGVHD Prophylaxis: Tacrolimus + MMF  - engraftment failure, developed HLA antibody against class I of cord  - Repeat BMBx (10/15): hypocellular with no residual myeloid neoplasm. 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), T0=11/6/24  - Graft: Haploidentical sister  = ABO Donor / Recipient: O+ / A+ (ABO incompatibility +)  = CMV Donor / Recipient: + / +  - Conditioning: Flu/Cy/TBI/rATG  = GVHD prophylaxis - rATG 1.5mg/kg T-5,-3,-1, PTCy (25mg/kg T+3, T+4), Tacro, MMF (T+5)     DATE DAY SOURCE MORPHOLOGY MRD WHOLE CHIMERISM   (% donor) CD3 CHIMERISM   (% donor) CD33 CHIMERISM   (% donor)   11/20/24 D+30 PB      100       12/11/24 D+30 PB       100 100   Deferred D+30 BM             1/9/25 D+60 BM  KRISTIE , flow negative      100  100     D+60 PB              2/17/25 D+100 PB      100        3/19/25 D+100 BM  KRISTIE, flow negative      100  100   Ordered for 5/12/25 M+6         Bmbx at 6 months completed 5/12/25-in process.      Monitor for post-transplant hemolysis   (4/24/25)  Haptoglobin  175 (3/17/25)  UPC ratio 0.10 (3/17/25)     Anemia  12/18 Epo 113.   12/16 Retic 7.2%  DARBY on hold.  Cont folic acid.     GVHD prophylaxis  GVHD  Anorexia/poor oral intake/weight loss. Added Mirtazapine. Continue Zofran. If persists, consider aGVHD.  12/9/24:  Currently  taking tacrolimus 0.5 mg BID, held dose today prior to labs.  FK level 4.6 (12/9/24).  Advised patient on 12/10/24 to increase tacrolimus dose to 1 mg in the AM and 0.5 mg in the PM.    Wean MMF from 1 g TID to 500 mg TID 12/2/24 - present  Due to poor appetite will decrease MMF to 500 mg po tid.  Improvement to GI symptoms since decreasing MMF dose.    Stopped  MMF on T+35.     Stage I/Grade II Upper GI GVHD  Anorexia, taste changes and weight loss 171# (9/17/24).  Cont mirtazapine and ATC Zofran.   Add budesonide 3x day.   12/30/24:  Started on budesonide TID on 12/27/24.  Brandt reports that he has starting eating more since starting budesonide.  Reports no nausea, vomiting, or diarrhea at this time.    Appetite continuing to improve. Denies N/V/D.     1/13/25 Decreased budesonide bid per Dr Newsome; 1/27 Decrease to 3mg daily. Will continue on this dose for 1-2 weeks before discontinuing.     2/17/25 Recent admission for nausea, budesonide increased to 3 mg po bid, continues therapeutic on tacro 0.5 mg po bid. Esophageal biopsy 2/13/25 negative for GVHD.  3/3/25:  Overall doing well.  Nausea has resolved. Reviewed food safety precautions and had nutritionist Blanca Morgan, TORI,LD come to speak with patient and wife today.  Continue budesonide 3 mg daily.  FK level 5.6.  Continue tacrolimus 0.5 mg BID.  Advised Brandt to contact oncology team if have GI symptoms worsen,  consider discontinuing budesonide next visit.    3/17/25:  Discontinue budesonide.  FK level 7.6 (3/17/25), continue tacrolimus 0.5 mg BID.      4/14/25:  Day 158 No further GI symptoms and weight now steady,  Today complains of itchy rash on upper anterior chest,  noted macular raised rash < 10% of body, suspect chronic GVHD of skin, limited.  No dry eyes, dry mouth diarrhea, etc.  Started triamcinolone cream bid, advised to watch for any increased in rash, diarrhea, dry  eyes or mouth. Tacro 9.2 continue 0.5 mg bid alternating with 0.5 mg  daily.      5/5/25:  Today is T+180.  Continues to have rash to face, neck, and upper chest resolved.  Reports he continues to have unformed stools, pudding consistency about 1-2 times per day.  Currently on tacrolimus 0.5 mg bid alternating with daily.  FK level 11.4 but had evening dose last night so will  not adjust., Ordered screening PFTs today      5/12/25:  Today is T+ 187.  Continues to have rash to face, no change.  Had 3 episodes of nausea, vomiting, and diarrhea last week after eating 5 [place] burger and fries.  GI symptoms resolved quickly and he has had no further GI distress.  No further weight loss since prior visit.  Completed PFTs on 5/6/25 with FVC 3.81, FEV1 2.73, FEV1/FVC 0.72, and DLOC 11.72.  Plan for chest CT-scheduled for 5/23/25.  Continues on tacrolimus 0.5 mg daily on Tuesday, Thursday and Saturday and 0.5 mg bid MWFSun.  FK level 11.0 (5/12/25).  Continue current tacrolimus dosing.      Weights: see below    Wt Readings from Last 5 Encounters:   05/12/25 65.4 kg (144 lb 1.6 oz)   05/05/25 65.5 kg (144 lb 4.8 oz)   04/28/25 66.4 kg (146 lb 4.8 oz)   04/21/25 66.4 kg (146 lb 6.2 oz)   04/14/25 67.1 kg (147 lb 14.9 oz)      Infectious Disease & Immune Reconstitution      Prophylaxis  Antiviral prophylaxis: acyclovir, Letermovir-continue until T+200.  Antifungal: posaconazole  PCP prophylaxis: 10/26/24 - 11/28/24 Pentamidine; cont Bactrim     Hypogammaglobulinemia  IgG level. IVIG for level <400   3/17/25:  IgG 307.  Ordered monthly IVIG infusions.  Reviewed rationale and possible side effects with patient.    Received first IVIG infusion today 3/31/25.  IgG level 4/24/25, scheduled to receive IVIG     Active Surveillance:     CMV detected 688 1/2/25, 167 1/6/25. Levels undetectable since 1/13/25, CMV ND (2/17/25).  IgG level 421 (4/28/25), last received IVIG on 4/28/25.  Continue monthly infusions.    Started valgancicyclovir 900mg BID on 1/6/25,   Plan 1/20/25 decrease to 900 mg daily for 2  weeks (2/3/25), then transition back to letermovir/acyclovir.    CMV ND (5/12/25), if CMV continues to be non detected can stop letermovir at day 200.   Adeno ND 5/5/25, level in process from 5/12/25  HHV6 ND 5/5/25, level in process from 5/12/25  EBV ND 5/12/25.  EBV Viremia during transplant admission  Treated despite low levels due to upcoming second SCT  Rituximab 600 mg x 1 (10/31/24)     Infectious Disease follow up evaluation: 1/10/25      Immunodeficiency panel 100 days, 6mos and 1 year.      Immunizations:   Covid and influenza vaccine series.  Consider at T+ 3 month  Will plan to begin immunizations at T+6 mths around 5/5/25 depending on degree of immunosuppression  Received influenza vaccine 3/6/25 and 1st post transplant covid vaccine on 3/6/25, 2nd covid vaccine on 3/27/25, and 3rd covid vaccine on 4/25/25.   Received 6 month vaccines 5/5/25  Plan 8 month vaccines July 2025.       Cardiac:    Essential hypertension- was started on nifedipine 60 mg daily on 1/20/25 and started lisinopril 5 mg daily on 2/3/25.   Hx of STEMI (11/11/2020)  Cardiology  appt on 12/18/24  ECHO 12/23/24: LVEF 65%     Ascending aorta dilation   TTE (4/29/24): 3.8-4 cm dilation      Electrolyte disturbance   Magnesium 1.59 (5/12/25), continue oral magnesium 128 mg BID.   Potassium 3.2 (5/12/25).  Advised to take potassium chloride 20 meq daily x 7 days.  Brandt reports he has potassium chloride prescription at home, advised to contact if doesn't have enough potassium at home.       Dry Skin hyperpigmentation:  Hyperpigmented skin to face.  Derm E-consult on 2/3/25 with Dr. Chavez, feels may be due to bactrim, which I prefer not to discontinue. May also be caused by prochlorperazine.  NPV with dermatology Dr. Medel on 6/18/25.    5/12/25:  Continues to have hyperpigmented skin to face and bilateral palms of hands.  Brandt feels his hyperpigmentation is lighted slightly to palms.  Instructed to continue to use moisturizer  such as cerave that are fragrance free and gentle.  Advised about mineral based sun screen.      Lack of appetite, improved  Mirtazapine 15 mg at night as of 11/26/24     Vitamin D deficiency  12/18 Level 27.  Last dose of weekly supplement taken. 1/27 Repeat level was 28.  Reordered ergocalciferol 50,000 UT weekly on 1/27/25 for 8 doses.  Repeat vitamin D 36 (4/8/25).  Start maintenance vitamin D.       IV Access  2/3/25:  PICC line removed.     Psychosocial.    Caregiver Genevieve  [facility] in [place]     RTC:   Continue monthly IVIG  5/23/25:  CT chest   5/19/25:  Plan for follow up visit in 1 week.  Keep tacro therapeutic for now, then switch to qo week visits. --------------------------------------------------------------------------------  HAYLEY Vallejo-ELDER

## 2025-05-12 ENCOUNTER — LAB (OUTPATIENT)
Dept: LAB | Facility: HOSPITAL | Age: 65
End: 2025-05-12
Payer: COMMERCIAL

## 2025-05-12 ENCOUNTER — PROCEDURE VISIT (OUTPATIENT)
Dept: HEMATOLOGY/ONCOLOGY | Facility: HOSPITAL | Age: 65
End: 2025-05-12
Payer: COMMERCIAL

## 2025-05-12 ENCOUNTER — OFFICE VISIT (OUTPATIENT)
Dept: HEMATOLOGY/ONCOLOGY | Facility: HOSPITAL | Age: 65
End: 2025-05-12
Payer: COMMERCIAL

## 2025-05-12 VITALS
WEIGHT: 144.1 LBS | DIASTOLIC BLOOD PRESSURE: 71 MMHG | SYSTOLIC BLOOD PRESSURE: 141 MMHG | TEMPERATURE: 97.5 F | BODY MASS INDEX: 23.61 KG/M2 | OXYGEN SATURATION: 100 % | RESPIRATION RATE: 18 BRPM | HEART RATE: 93 BPM

## 2025-05-12 DIAGNOSIS — D89.813 GVHD (GRAFT VERSUS HOST DISEASE) (MULTI): ICD-10-CM

## 2025-05-12 DIAGNOSIS — Z94.84 HISTORY OF ALLOGENEIC HEMATOPOIETIC STEM CELL TRANSPLANT: ICD-10-CM

## 2025-05-12 DIAGNOSIS — Z94.84 STEM CELLS TRANSPLANT STATUS (MULTI): ICD-10-CM

## 2025-05-12 DIAGNOSIS — E87.6 HYPOKALEMIA: ICD-10-CM

## 2025-05-12 DIAGNOSIS — L81.9 HYPERPIGMENTATION OF SKIN: ICD-10-CM

## 2025-05-12 DIAGNOSIS — C92.00 ACUTE MYELOID LEUKEMIA NOT HAVING ACHIEVED REMISSION (MULTI): ICD-10-CM

## 2025-05-12 DIAGNOSIS — E87.8 ELECTROLYTE DISTURBANCE: ICD-10-CM

## 2025-05-12 DIAGNOSIS — R63.4 WEIGHT LOSS: ICD-10-CM

## 2025-05-12 DIAGNOSIS — D84.9 IMMUNOCOMPROMISED: ICD-10-CM

## 2025-05-12 DIAGNOSIS — D80.1 HYPOGAMMAGLOBULINEMIA (MULTI): ICD-10-CM

## 2025-05-12 DIAGNOSIS — C92.01 AML (ACUTE MYELOID LEUKEMIA) IN REMISSION (MULTI): ICD-10-CM

## 2025-05-12 DIAGNOSIS — C92.01 ACUTE MYELOID LEUKEMIA IN REMISSION (MULTI): Primary | ICD-10-CM

## 2025-05-12 DIAGNOSIS — C92.01 ACUTE MYELOID LEUKEMIA IN REMISSION (MULTI): ICD-10-CM

## 2025-05-12 LAB
ALBUMIN SERPL BCP-MCNC: 4.2 G/DL (ref 3.4–5)
ALP SERPL-CCNC: 86 U/L (ref 33–136)
ALT SERPL W P-5'-P-CCNC: 12 U/L (ref 10–52)
ANION GAP SERPL CALC-SCNC: 14 MMOL/L (ref 10–20)
AST SERPL W P-5'-P-CCNC: 17 U/L (ref 9–39)
BASOPHILS # BLD AUTO: 0.05 X10*3/UL (ref 0–0.1)
BASOPHILS NFR BLD AUTO: 0.7 %
BILIRUB SERPL-MCNC: 0.6 MG/DL (ref 0–1.2)
BUN SERPL-MCNC: 13 MG/DL (ref 6–23)
CALCIUM SERPL-MCNC: 8.9 MG/DL (ref 8.6–10.3)
CHLORIDE SERPL-SCNC: 103 MMOL/L (ref 98–107)
CO2 SERPL-SCNC: 23 MMOL/L (ref 21–32)
CREAT SERPL-MCNC: 1.02 MG/DL (ref 0.5–1.3)
CREAT UR-MCNC: 183.9 MG/DL (ref 20–370)
EGFRCR SERPLBLD CKD-EPI 2021: 81 ML/MIN/1.73M*2
EOSINOPHIL # BLD AUTO: 0.55 X10*3/UL (ref 0–0.7)
EOSINOPHIL NFR BLD AUTO: 7.6 %
ERYTHROCYTE [DISTWIDTH] IN BLOOD BY AUTOMATED COUNT: 13.8 % (ref 11.5–14.5)
GLUCOSE SERPL-MCNC: 103 MG/DL (ref 74–99)
HCT VFR BLD AUTO: 37.1 % (ref 41–52)
HGB BLD-MCNC: 12.4 G/DL (ref 13.5–17.5)
IMM GRANULOCYTES # BLD AUTO: 0.16 X10*3/UL (ref 0–0.7)
IMM GRANULOCYTES NFR BLD AUTO: 2.2 % (ref 0–0.9)
LDH SERPL L TO P-CCNC: 269 U/L (ref 84–246)
LYMPHOCYTES # BLD AUTO: 2.48 X10*3/UL (ref 1.2–4.8)
LYMPHOCYTES NFR BLD AUTO: 34.4 %
MAGNESIUM SERPL-MCNC: 1.59 MG/DL (ref 1.6–2.4)
MCH RBC QN AUTO: 31.4 PG (ref 26–34)
MCHC RBC AUTO-ENTMCNC: 33.4 G/DL (ref 32–36)
MCV RBC AUTO: 94 FL (ref 80–100)
MONOCYTES # BLD AUTO: 1.02 X10*3/UL (ref 0.1–1)
MONOCYTES NFR BLD AUTO: 14.2 %
NEUTROPHILS # BLD AUTO: 2.94 X10*3/UL (ref 1.2–7.7)
NEUTROPHILS NFR BLD AUTO: 40.9 %
NRBC BLD-RTO: 0 /100 WBCS (ref 0–0)
PLATELET # BLD AUTO: 282 X10*3/UL (ref 150–450)
POTASSIUM SERPL-SCNC: 3.2 MMOL/L (ref 3.5–5.3)
PROT SERPL-MCNC: 6.8 G/DL (ref 6.4–8.2)
PROT UR-ACNC: 17 MG/DL (ref 5–25)
PROT/CREAT UR: 0.09 MG/MG CREAT (ref 0–0.17)
RBC # BLD AUTO: 3.95 X10*6/UL (ref 4.5–5.9)
SODIUM SERPL-SCNC: 137 MMOL/L (ref 136–145)
TACROLIMUS BLD-MCNC: 11 NG/ML
URATE SERPL-MCNC: 3.9 MG/DL (ref 4–7.5)
WBC # BLD AUTO: 7.2 X10*3/UL (ref 4.4–11.3)

## 2025-05-12 PROCEDURE — 80197 ASSAY OF TACROLIMUS: CPT

## 2025-05-12 PROCEDURE — 38222 DX BONE MARROW BX & ASPIR: CPT | Mod: LT | Performed by: NURSE PRACTITIONER

## 2025-05-12 PROCEDURE — 99215 OFFICE O/P EST HI 40 MIN: CPT

## 2025-05-12 PROCEDURE — 85025 COMPLETE CBC W/AUTO DIFF WBC: CPT

## 2025-05-12 PROCEDURE — 88185 FLOWCYTOMETRY/TC ADD-ON: CPT | Mod: TC

## 2025-05-12 PROCEDURE — 1160F RVW MEDS BY RX/DR IN RCRD: CPT

## 2025-05-12 PROCEDURE — 85097 BONE MARROW INTERPRETATION: CPT | Mod: TC,SUR

## 2025-05-12 PROCEDURE — 80053 COMPREHEN METABOLIC PANEL: CPT

## 2025-05-12 PROCEDURE — 82570 ASSAY OF URINE CREATININE: CPT

## 2025-05-12 PROCEDURE — 87799 DETECT AGENT NOS DNA QUANT: CPT

## 2025-05-12 PROCEDURE — 1159F MED LIST DOCD IN RCRD: CPT

## 2025-05-12 PROCEDURE — 83735 ASSAY OF MAGNESIUM: CPT

## 2025-05-12 PROCEDURE — 36415 COLL VENOUS BLD VENIPUNCTURE: CPT

## 2025-05-12 PROCEDURE — 87533 HHV-6 DNA QUANT: CPT

## 2025-05-12 PROCEDURE — 81267 CHIMERISM ANAL NO CELL SELEC: CPT

## 2025-05-12 PROCEDURE — 83615 LACTATE (LD) (LDH) ENZYME: CPT

## 2025-05-12 PROCEDURE — 84550 ASSAY OF BLOOD/URIC ACID: CPT

## 2025-05-12 PROCEDURE — 85097 BONE MARROW INTERPRETATION: CPT

## 2025-05-12 RX ORDER — SULFAMETHOXAZOLE AND TRIMETHOPRIM 800; 160 MG/1; MG/1
1 TABLET ORAL 3 TIMES WEEKLY
Qty: 12 TABLET | Refills: 2 | Status: SHIPPED | OUTPATIENT
Start: 2025-05-12

## 2025-05-12 RX ORDER — MAGNESIUM CHLORIDE 64 MG
128 TABLET, DELAYED RELEASE (ENTERIC COATED) ORAL 3 TIMES DAILY
Qty: 180 TABLET | Refills: 1 | Status: SHIPPED | OUTPATIENT
Start: 2025-05-12 | End: 2025-07-11

## 2025-05-12 ASSESSMENT — ENCOUNTER SYMPTOMS
BLOOD IN STOOL: 0
DIARRHEA: 0
VOMITING: 0
NAUSEA: 0
CONSTIPATION: 0
SHORTNESS OF BREATH: 1

## 2025-05-12 ASSESSMENT — PAIN SCALES - GENERAL: PAINLEVEL_OUTOF10: 0-NO PAIN

## 2025-05-12 NOTE — PROGRESS NOTES
Patient ID: Brandt Merritt is a 65 y.o. male.    Biopsy bone marrow    Date/Time: 5/12/2025 10:33 AM    Performed by: GRICELDA Topete  Authorized by: GRICELDA Topete    Consent:     Consent obtained:  Verbal and written    Consent given by:  Patient    Risks discussed:  Bleeding, infection and pain    Alternatives discussed:  Observation  Universal protocol:     Procedure explained and questions answered to patient or proxy's satisfaction: yes      Relevant documents present and verified: yes      Test results available: yes      Imaging studies available: no      Required blood products, implants, devices, and special equipment available: yes      Site/side marked: yes      Immediately prior to procedure, a time out was called: yes      Patient identity confirmed:  Verbally with patient and arm band  Indications:     Indications:  S/P MRD PBSCTT  Pre-procedure details:     Skin preparation:  Chlorhexidine    Preparation: Patient was prepped and draped in the usual sterile fashion    Sedation:     Sedation type:  None  Anesthesia:     Anesthesia method:  Local infiltration  Procedure specific details:      The patient was explained the risks and benefits of the bone marrow biopsy procedure.  Their questions were answered and consent was obtained.  Patient's most recent history and physical reviewed and relevant findings were noted.  Medications and allergies reviewed.  The patient was not premedicated.   Prior to the procedure the patient's identity was confirmed using their name, medical record number, and date of birth.  The patient lay in the prone position and their left  iliac crest was cleansed and draped in sterile fashion.  10ml of 1% plain lidocaine was injected locally.  Using a Jamshidi the core and aspirate samples were obtained without difficulty and sent for pathology, flow cytometry, testing per heme/path, cytogenetics,and chimerism.  A sterile dressing was applied once hemostasis  achieved.  The patient tolerated the procedure well with no immediate complications and less than 5ml of blood loss.  The patient was educated to leave the dressing on for 24hrs and they verbalized understanding.   Patient also instructed on the signs and symptoms of infection and to call the office if any are noted.    Post-procedure details:     Procedure completion:  Tolerated well, no immediate complications

## 2025-05-12 NOTE — PROGRESS NOTES
Brandt Merritt is a 65 y.o. male who presents in stable condition for bone marrow biopsy    Since his last visit, he has been doing well.  Overall, he states his energy level is stable.  His appetite has been good.  He reports no complaints.  He has no other concerns.    Bone marrow biopsy site was clean, dry and intact. Denies pain. Discharged in stable condition with wife to [facility].     Reviewed and approved by VALERIO RAY on 5/12/25 at 11:07 AM.

## 2025-05-13 LAB
CELL COUNT (BLOOD): 8.79 X10*3/UL
CELL POPULATIONS: NORMAL
CMV DNA SERPL NAA+PROBE-LOG IU: NORMAL {LOG_IU}/ML
DIAGNOSIS: NORMAL
EBV DNA BLD NAA+PROBE-LOG IU: NORMAL {LOG_IU}/ML
FLOW DIFFERENTIAL: NORMAL
FLOW TEST ORDERED: NORMAL
LAB TEST METHOD: NORMAL
LABORATORY COMMENT REPORT: NOT DETECTED
LABORATORY COMMENT REPORT: NOT DETECTED
NUMBER OF CELLS COLLECTED: NORMAL
PATH REPORT.TOTAL CANCER: NORMAL
SIGNATURE COMMENT: NORMAL
SPECIMEN VIABILITY: NORMAL

## 2025-05-16 LAB
CHIMERISM INTERPRETATION: NORMAL
ELECTRONICALLY SIGNED BY: NORMAL

## 2025-05-18 ASSESSMENT — ENCOUNTER SYMPTOMS
CONSTIPATION: 0
NAUSEA: 0
DIAPHORESIS: 0
CARDIOVASCULAR NEGATIVE: 1
UNEXPECTED WEIGHT CHANGE: 0
MUSCULOSKELETAL NEGATIVE: 1
FATIGUE: 0
HEMATOLOGIC/LYMPHATIC NEGATIVE: 1
NEUROLOGICAL NEGATIVE: 1
CHILLS: 0
DIARRHEA: 0
BLOOD IN STOOL: 0
APPETITE CHANGE: 0
SHORTNESS OF BREATH: 1
VOMITING: 0
FEVER: 0

## 2025-05-18 NOTE — PROGRESS NOTES
Patient ID:  Brandt Merritt is a 65 y.o. male.  Referring Physician:   BMT Dr Newsome  Primary Care Provider:  Bill Richmond MD    Oncology History Overview Note   4/2024  Myelodysplastic neoplasm with increased blasts-2 ( WHO)  Myelodysplastic neoplasm/AML  (ICC 2022 classification)    Presented in  10/2023 for pancytopenia, found to have hemolysis. Patient had normal CBC June 2020. Denied any hemorrhoidal bleeding . Has had C Scope and EGD , treated Hep C 2014 . 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      CBC 4/11/24 wbc 3.0, hgb 7.1, platelets 167K ANC 0.73    BM biopsy done on 4/11/24  as folllows:  BM histology  Myelodysplastic neoplasm with increased blasts-2 ( WHO)  Myelodysplastic neoplasm/AML  (ICC 2022 classification)  Balsts 11% on aspirate smear and 15-20% based on CD 34 immunostaining approaching AML leukemia, hematooiesis is erythroid dominant with dysplasia in granulocytes and megakaryocytes.   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      11/6/2024 -  Bone Marrow Transplant    conditioned with Flu/Cy/TBI and aGVHD prophylaxis with post-transplant cyclophosphamide, tacrolimus, and mycophenolate. T=0, 11/6/24. ABO Donor/Recipient: O+, A+. CMV donor/recipient: both positive. Started Tacro and MMF on T+5 (11/11).      2/11/2025 - 2/14/2025 Hospital Admission    Admit date: 2/11/25  Admission diagnosis: 2/14/25  Brandt was admitted on  -25 for intractable vomiting and some diarrhea, also with low grade fever.  He received broad spectrum antibiotics and all cultures were negative.  He underwent an upper endoscopy which was grossly normal, biopsies negative for GVHD, and budesonide was increased to 3 mg po bid.      3/19/2025 Biopsy    BM biopsy (3/19/25):   -- HYPOCELLULAR BONE MARROW (20%) WITH MATURING TRILINEAGE HEMATOPOIESIS AND 1% BLASTS.  No overt evidence of residual/relapsed acute leukemia.         2025 Biopsy    BM biopsy (25):   -- NORMOCELLULAR BONE MARROW (40%-50%) WITH MATURING TRILINEAGE HEMATOPOIESIS.  -- NO MORPHOLOGIC EVIDENCE OF ACUTE MYELOID LEUKEMIA.       AML (acute myeloid leukemia) in remission (Multi)   2025 Initial Diagnosis    AML (acute myeloid leukemia) in remission (Multi)        Response:      Past Medical History:  HTN   STEMI 2020 after getting  a water pills.  Chronic hepatitis C infection treated in  treated  with Dr. Lloyd  Past Medical History:   Diagnosis Date    Chest pain 2021    HTN (hypertension) 10/05/2023    Hypertension     Leukemia (Multi)     Personal history of other diseases of the circulatory system     History of hypertension      Surgical History:  Conosocopy 2021  Upper EGD 10/31/23  Surgical reduction torsion of testes      Past Surgical History:   Procedure Laterality Date    COLONOSCOPY  2013    Complete Colonoscopy    OTHER SURGICAL HISTORY  10/07/2015    Surgery Testis Reduction Of Torsion Of Testis Right      Family History:  CAD, DM in mother       Mother  of CVA at age 69  Brother with prostate CA, 1 sister with liver disease  2 children healthy  Family History   Problem Relation Name Age of Onset    Other (diabetes mellitus) Mother      Prostate cancer Brother        Social History:  Lives with wife of 30 years, works at [employer], now retired.   29 years, former smoker, smoked x 15 yrs quit 20+  years ago. Marijuana 3 x a week. Served in  in [place] and [employer] [facility] [employer] [facility] [employer], .  ----------------------------------------------------------------------------------------------------  Subjective    History of Present Illness:    Brandt Merritt is a 65 year old male with history of myelodysplastic neoplasm/AML, s/p umbilical cord blood transplant 10/16/24 with graft rejection followed by haploidentical stem cells  from his sister on 11/6/24 with flu/cy, ATG and modified dose cyclophosphamide with engraftment on day T+8.      Brandt presents to the clinic today (5/19/25) with his wife Genevieve for follow up evaluation and count check.  Today he is T+ 242 of umbilical cord transfusion and is T+ 194 of his haplo sister transplant.  Completed 6 month Bmbx on 5/12/25 showing normocelluar bone marrow (40-50%) with maturing trilineage hematopoiesis, no evidence of AML.  Currently taking tacrolimus taking 0.5 mg once daily on Tuesday, Thursday, and Saturday, and is taking 0.5 mg BID on Monday, Wednesday, Friday, and Sunday.  Held tacrolimus dose prior to lab draws today.      Energy level is good.  Appetite is good.  Eating about 2 meals per day and snacks. Gained about 1 pounds since last visit.  Has slight worsening rash to face and now rash has spread to middle back, shoulders, and upper arms with small bumps.  Continues to have rash to neck and upper check.  Rash is not itchy.  Putting kenalog cream on face and chest daily.  Hyperpigmentation is about the same to face and palms of hands.  Has dermatology appointment in June.  Continues to have shortness of breath with going up multiple flights of stairs, this is staying the same.  PFTs completed.  Scheduled to have CT chest 5/23/25. Some days have normal bowel movements and then some days has loose stool about twice daily. No liquid stools.  Plans to fill out paper work for social security on June 2nd, won't be official until September.       Review of Systems   Constitutional:  Negative for appetite change, chills, diaphoresis, fatigue, fever and unexpected weight change.   Respiratory:  Positive for shortness of breath (on exertion).    Cardiovascular: Negative.    Gastrointestinal:  Negative for blood in stool, constipation, diarrhea, nausea and vomiting.   Genitourinary: Negative.     Musculoskeletal: Negative.    Skin:  Positive for rash. Negative for itching.        Hyperpigmented skin to face and hands   Neurological: Negative.    Hematological: Negative.    --------------------------------------------------------------------------------------------------------  Objective:    Visit Vitals  /78   Pulse 88   Temp 36.4 °C (97.5 °F)   Resp 18     Vitals:    05/19/25 1013   Weight: 66 kg (145 lb 8.1 oz)     Physical Exam  Vitals reviewed.   Constitutional:       Appearance: Normal appearance.      Comments: Mild temporal wasting    HENT:      Head: Normocephalic and atraumatic.      Mouth/Throat:      Mouth: Mucous membranes are moist.   Eyes:      Extraocular Movements: Extraocular movements intact.      Conjunctiva/sclera: Conjunctivae normal.      Pupils: Pupils are equal, round, and reactive to light.   Cardiovascular:      Rate and Rhythm: Normal rate and regular rhythm.      Pulses: Normal pulses.      Heart sounds: Normal heart sounds.   Pulmonary:      Effort: Pulmonary effort is normal.      Breath sounds: Normal breath sounds.   Abdominal:      General: Abdomen is flat. Bowel sounds are normal.      Palpations: Abdomen is soft. There is no mass.      Tenderness: There is no abdominal tenderness.   Musculoskeletal:         General: No swelling. Normal range of motion.   Lymphadenopathy:      Comments: No lymphadenopathy   Skin:     General: Skin is warm and dry.      Findings: Rash present.             Comments: Slight worsening of rash with mild lumpy bumpy with some small circular spots of erythremia to face, upper back, bilateral  shoulder, upper chest.  Areas of hyperpigmentation on face and palms of bilateral hands near wrists.   Neurological:      General: No focal deficit present.      Mental Status: He is alert and oriented to person, place, and time.   Psychiatric:         Mood and Affect: Mood normal.         Behavior: Behavior normal.         Thought Content: Thought content normal.         Judgment: Judgment normal.     -----------------------------------------------------------------------------------------------------------  Assessment and Plan:    MDS/Acute myeloid leukemia in remission (Multi)  Diagnosed 4/2024: BM Bx with MDS in in transition to AML (>10% blast) w/ trisomy 8, DNMT alpha, and U2AF1 mutation indication adverse risk.      s/p 4 cycles of Decitabine/Venetoclax. BM Bx 6/17/24: Blasts cleared, FISH still positive for trisomy 8, still MDS changes   BMBx (9/5/24): 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 and no increase in blasts      History of allogeneic hematopoietic stem cell transplant  #1: Single-unit Cord, T0=9/19/24, Primary Engraftment Failure  - Conditioning: Flu-Mariana-TBI  - Donor: Single Cord, 6/8  = ABO Donor / Recipient: A+ / A+  = CMV Donor / Recipient: - / +  - aGVHD Prophylaxis: Tacrolimus + MMF  - engraftment failure, developed HLA antibody against class I of cord  - Repeat BMBx (10/15): hypocellular with no residual myeloid neoplasm. 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), T0=11/6/24  - Graft: Haploidentical sister  = ABO Donor / Recipient: O+ / A+ (ABO incompatibility +)  = CMV Donor / Recipient: + / +  - Conditioning: Flu/Cy/TBI/rATG  = GVHD prophylaxis - rATG 1.5mg/kg T-5,-3,-1, PTCy (25mg/kg T+3, T+4), Tacro, MMF (T+5)     DATE DAY SOURCE MORPHOLOGY MRD WHOLE CHIMERISM   (% donor) CD3 CHIMERISM   (% donor) CD33 CHIMERISM   (% donor)   11/20/24 D+30 PB      100       12/11/24 D+30 PB       100 100   Deferred D+30 BM             1/9/25 D+60 BM  KRISTIE ,  flow negative      100  100     D+60 PB              2/17/25 D+100 PB      100        3/19/25 D+100 BM  KRISTIE, flow negative      100  100   5/12/25 M+6 BM KRISTIE  Flow negative  100       Monitor for post-transplant hemolysis   (5/19/25)  Haptoglobin  175 (3/17/25)  UPC ratio 0.10 (3/17/25)     Anemia  12/18 Epo 113.   12/16 Retic 7.2%  DARBY on hold.  Cont folic acid.     GVHD prophylaxis  GVHD  Anorexia/poor oral intake/weight loss. Added Mirtazapine. Continue Zofran. If persists, consider aGVHD.  12/9/24:  Currently taking tacrolimus 0.5 mg BID, held dose today prior to labs.  FK level 4.6 (12/9/24).  Advised patient on 12/10/24 to increase tacrolimus dose to 1 mg in the AM and 0.5 mg in the PM.    Wean MMF from 1 g TID to 500 mg TID 12/2/24 - present  Due to poor appetite will decrease MMF to 500 mg po tid.  Improvement to GI symptoms since decreasing MMF dose.    Stopped  MMF on T+35.     Stage I/Grade II Upper GI GVHD  Anorexia, taste changes and weight loss 171# (9/17/24).  Cont mirtazapine and ATC Zofran.   Add budesonide 3x day.   12/30/24:  Started on budesonide TID on 12/27/24.  Brandt reports that he has starting eating more since starting budesonide.  Reports no nausea, vomiting, or diarrhea at this time.    Appetite continuing to improve. Denies N/V/D.     1/13/25 Decreased budesonide bid per Dr Newsome; 1/27 Decrease to 3mg daily. Will continue on this dose for 1-2 weeks before discontinuing.     2/17/25 Recent admission for nausea, budesonide increased to 3 mg po bid, continues therapeutic on tacro 0.5 mg po bid. Esophageal biopsy 2/13/25 negative for GVHD.  3/3/25:  Overall doing well.  Nausea has resolved. Reviewed food safety precautions and had nutritionist Blanca Morgan, RD,LD come to speak with patient and wife today.  Continue budesonide 3 mg daily.  FK level 5.6.  Continue tacrolimus 0.5 mg BID.  Advised Brandt to contact oncology team if have GI symptoms worsen,  consider discontinuing  budesonide next visit.    3/17/25:  Discontinue budesonide.  FK level 7.6 (3/17/25), continue tacrolimus 0.5 mg BID.      4/14/25:  Day 158 No further GI symptoms and weight now steady,  Today complains of itchy rash on upper anterior chest,  noted macular raised rash < 10% of body, suspect chronic GVHD of skin, limited.  No dry eyes, dry mouth diarrhea, etc.  Started triamcinolone cream bid, advised to watch for any increased in rash, diarrhea, dry  eyes or mouth. Tacro 9.2 continue 0.5 mg bid alternating with 0.5 mg daily.      5/5/25:  Today is T+180.  Continues to have rash to face, neck, and upper chest resolved.  Reports he continues to have unformed stools, pudding consistency about 1-2 times per day.  Currently on tacrolimus 0.5 mg bid alternating with daily.  FK level 11.4 but had evening dose last night so will  not adjust., Ordered screening PFTs today      5/12/25:  Today is T+ 187.  Continues to have rash to face, no change.  Had 3 episodes of nausea, vomiting, and diarrhea last week after eating 5 [place] burger and fries.  GI symptoms resolved quickly and he has had no further GI distress.  No further weight loss since prior visit.  Completed PFTs on 5/6/25 with FVC 3.81, FEV1 2.73, FEV1/FVC 0.72, and DLOC 11.72.  Plan for chest CT-scheduled for 5/23/25.  Continues on tacrolimus 0.5 mg daily on Tuesday, Thursday and Saturday and 0.5 mg bid MWFSun.  FK level 11.0 (5/12/25).  Continue current tacrolimus dosing.      5/29/25:  Today is T+194.  Noted slight worsening to rash with macular papular rash to face, and has spread to shoulders, upper back, and chest.  No itching.  Currently taking tacrolimus 0.5 mg daily on Tuesday, Thursday, and Saturday and 0.5 mg BID on MWF and Sunday.  FK level 7.1 today.  Continue current dosing.  No further weight loss, no n/v/d.  Prescribed desonisde cream to face BID and kenalog to rash on upper trunk/arms BID.  Stressed importance of calling oncology team with worsening  rash or concerning symptoms.  May need to start PO steroids with worsening rash.      Weights: see below    Wt Readings from Last 5 Encounters:   05/19/25 66 kg (145 lb 8.1 oz)   05/12/25 65.4 kg (144 lb 1.6 oz)   05/05/25 65.5 kg (144 lb 4.8 oz)   04/28/25 66.4 kg (146 lb 4.8 oz)   04/21/25 66.4 kg (146 lb 6.2 oz)      Infectious Disease & Immune Reconstitution      Prophylaxis  Antiviral prophylaxis: acyclovir, Letermovir-continue until T+200.  Antifungal: posaconazole  PCP prophylaxis: 10/26/24 - 11/28/24 Pentamidine; cont Bactrim     Hypogammaglobulinemia  IgG level. IVIG for level <400   3/17/25:  IgG 307.  Ordered monthly IVIG infusions.  Reviewed rationale and possible side effects with patient.    Received first IVIG infusion 3/31/25.  IgG level 421 (4/28/25), last IVIG infusion on 4/28/25, scheduled to receive IVIG next on 5/29/25.      Active Surveillance:     CMV detected 688 1/2/25, 167 1/6/25. Levels undetectable since 1/13/25, CMV ND (2/17/25).    Started valgancicyclovir 900mg BID on 1/6/25,   Plan 1/20/25 decrease to 900 mg daily for 2 weeks (2/3/25), then transition back to letermovir/acyclovir.    CMV ND (5/19/25), if CMV continues to be non detected can stop letermovir at day 200.   Adeno ND 5/12/25, level in process from 5/19/25  HHV6 ND 5/12/25, level in process from 5/19/25  EBV ND 5/19/25.  EBV Viremia during transplant admission  Treated despite low levels due to upcoming second SCT  Rituximab 600 mg x 1 (10/31/24)     Infectious Disease follow up evaluation: 1/10/25      Immunodeficiency panel 100 days, 6mos and 1 year.      Immunizations:   Covid and influenza vaccine series.  Consider at T+ 3 month  Will plan to begin immunizations at T+6 mths around 5/5/25 depending on degree of immunosuppression  Received influenza vaccine 3/6/25 and 1st post transplant covid vaccine on 3/6/25, 2nd covid vaccine on 3/27/25, and 3rd covid vaccine on 4/25/25.   Received 6 month vaccines 5/5/25  Plan 8  month vaccines July 2025.       Cardiac:    Essential hypertension- was started on nifedipine 60 mg daily on 1/20/25 and started lisinopril 5 mg daily on 2/3/25.   Hx of STEMI (11/11/2020)  Cardiology appt on 12/18/24  ECHO 12/23/24: LVEF 65%     Ascending aorta dilation   TTE (4/29/24): 3.8-4 cm dilation      Electrolyte disturbance   Magnesium 1.46 (5/19/25), continue oral magnesium 128 mg TID.      Dry Skin hyperpigmentation:  Hyperpigmented skin to face.  Derm E-consult on 2/3/25 with Dr. Chavez, feels may be due to bactrim, which I prefer not to discontinue. May also be caused by prochlorperazine.  NPV with dermatology Dr. Medel on 6/18/25.    5/19/25:  Continues to have hyperpigmented skin to face and bilateral palms of hands.  Brandt feels his hyperpigmentation is lighted slightly to palms.  Instructed to continue to use moisturizer such as cerave that are fragrance free and gentle.  Advised about mineral based sun screen.      Lack of appetite, improved  Mirtazapine 15 mg at night as of 11/26/24     Vitamin D deficiency  12/18 Level 27.  Last dose of weekly supplement taken. 1/27 Repeat level was 28.  Reordered ergocalciferol 50,000 UT weekly on 1/27/25 for 8 doses.  Repeat vitamin D 36 (4/8/25).  Start maintenance vitamin D.       IV Access  2/3/25:  PICC line removed.     Psychosocial.    Caregiver Genevieve  [facility] in [place]     RTC:   Continue monthly IVIG  5/23/25:  CT chest   5/29/25:  Plan for follow up visit in 1 week.  Advised to contact oncology team with worsening rash or other symptom concerns.  --------------------------------------------------------------------------------  GRICELDA Vallejo

## 2025-05-19 ENCOUNTER — LAB (OUTPATIENT)
Dept: LAB | Facility: HOSPITAL | Age: 65
End: 2025-05-19
Payer: COMMERCIAL

## 2025-05-19 ENCOUNTER — OFFICE VISIT (OUTPATIENT)
Dept: HEMATOLOGY/ONCOLOGY | Facility: HOSPITAL | Age: 65
End: 2025-05-19
Payer: COMMERCIAL

## 2025-05-19 VITALS
BODY MASS INDEX: 23.84 KG/M2 | RESPIRATION RATE: 18 BRPM | SYSTOLIC BLOOD PRESSURE: 124 MMHG | DIASTOLIC BLOOD PRESSURE: 78 MMHG | TEMPERATURE: 97.5 F | WEIGHT: 145.5 LBS | OXYGEN SATURATION: 100 % | HEART RATE: 88 BPM

## 2025-05-19 DIAGNOSIS — C92.01 AML (ACUTE MYELOID LEUKEMIA) IN REMISSION (MULTI): Primary | ICD-10-CM

## 2025-05-19 DIAGNOSIS — C92.01 ACUTE MYELOID LEUKEMIA IN REMISSION (MULTI): ICD-10-CM

## 2025-05-19 DIAGNOSIS — L81.9 HYPERPIGMENTATION OF SKIN: ICD-10-CM

## 2025-05-19 DIAGNOSIS — D80.1 HYPOGAMMAGLOBULINEMIA (MULTI): ICD-10-CM

## 2025-05-19 DIAGNOSIS — E87.8 ELECTROLYTE DISTURBANCE: ICD-10-CM

## 2025-05-19 DIAGNOSIS — R63.4 WEIGHT LOSS: ICD-10-CM

## 2025-05-19 DIAGNOSIS — Z94.84 STEM CELLS TRANSPLANT STATUS (MULTI): ICD-10-CM

## 2025-05-19 DIAGNOSIS — Z94.84 HISTORY OF ALLOGENEIC HEMATOPOIETIC STEM CELL TRANSPLANT: ICD-10-CM

## 2025-05-19 DIAGNOSIS — D89.813 GVHD (GRAFT VERSUS HOST DISEASE) (MULTI): ICD-10-CM

## 2025-05-19 DIAGNOSIS — D84.9 IMMUNOCOMPROMISED: ICD-10-CM

## 2025-05-19 DIAGNOSIS — E55.9 VITAMIN D DEFICIENCY: ICD-10-CM

## 2025-05-19 LAB
ALBUMIN SERPL BCP-MCNC: 4.2 G/DL (ref 3.4–5)
ALP SERPL-CCNC: 91 U/L (ref 33–136)
ALT SERPL W P-5'-P-CCNC: 13 U/L (ref 10–52)
ANION GAP SERPL CALC-SCNC: 15 MMOL/L (ref 10–20)
AST SERPL W P-5'-P-CCNC: 18 U/L (ref 9–39)
BASOPHILS # BLD AUTO: 0.05 X10*3/UL (ref 0–0.1)
BASOPHILS NFR BLD AUTO: 0.6 %
BILIRUB SERPL-MCNC: 0.5 MG/DL (ref 0–1.2)
BUN SERPL-MCNC: 14 MG/DL (ref 6–23)
CALCIUM SERPL-MCNC: 9.3 MG/DL (ref 8.6–10.3)
CHLORIDE SERPL-SCNC: 107 MMOL/L (ref 98–107)
CO2 SERPL-SCNC: 23 MMOL/L (ref 21–32)
CREAT SERPL-MCNC: 1.1 MG/DL (ref 0.5–1.3)
EGFRCR SERPLBLD CKD-EPI 2021: 74 ML/MIN/1.73M*2
EOSINOPHIL # BLD AUTO: 0.53 X10*3/UL (ref 0–0.7)
EOSINOPHIL NFR BLD AUTO: 6.4 %
ERYTHROCYTE [DISTWIDTH] IN BLOOD BY AUTOMATED COUNT: 14.2 % (ref 11.5–14.5)
GLUCOSE SERPL-MCNC: 99 MG/DL (ref 74–99)
HCT VFR BLD AUTO: 37.1 % (ref 41–52)
HGB BLD-MCNC: 12.1 G/DL (ref 13.5–17.5)
IMM GRANULOCYTES # BLD AUTO: 0.12 X10*3/UL (ref 0–0.7)
IMM GRANULOCYTES NFR BLD AUTO: 1.4 % (ref 0–0.9)
LDH SERPL L TO P-CCNC: 271 U/L (ref 84–246)
LYMPHOCYTES # BLD AUTO: 2.79 X10*3/UL (ref 1.2–4.8)
LYMPHOCYTES NFR BLD AUTO: 33.5 %
MAGNESIUM SERPL-MCNC: 1.46 MG/DL (ref 1.6–2.4)
MCH RBC QN AUTO: 31.3 PG (ref 26–34)
MCHC RBC AUTO-ENTMCNC: 32.6 G/DL (ref 32–36)
MCV RBC AUTO: 96 FL (ref 80–100)
MONOCYTES # BLD AUTO: 1.16 X10*3/UL (ref 0.1–1)
MONOCYTES NFR BLD AUTO: 13.9 %
NEUTROPHILS # BLD AUTO: 3.68 X10*3/UL (ref 1.2–7.7)
NEUTROPHILS NFR BLD AUTO: 44.2 %
NRBC BLD-RTO: 0 /100 WBCS (ref 0–0)
PLATELET # BLD AUTO: 263 X10*3/UL (ref 150–450)
POTASSIUM SERPL-SCNC: 3.8 MMOL/L (ref 3.5–5.3)
PROT SERPL-MCNC: 6.6 G/DL (ref 6.4–8.2)
RBC # BLD AUTO: 3.87 X10*6/UL (ref 4.5–5.9)
SODIUM SERPL-SCNC: 141 MMOL/L (ref 136–145)
TACROLIMUS BLD-MCNC: 7.1 NG/ML
URATE SERPL-MCNC: 3.4 MG/DL (ref 4–7.5)
WBC # BLD AUTO: 8.3 X10*3/UL (ref 4.4–11.3)

## 2025-05-19 PROCEDURE — 84550 ASSAY OF BLOOD/URIC ACID: CPT

## 2025-05-19 PROCEDURE — 80197 ASSAY OF TACROLIMUS: CPT

## 2025-05-19 PROCEDURE — 3078F DIAST BP <80 MM HG: CPT

## 2025-05-19 PROCEDURE — 87799 DETECT AGENT NOS DNA QUANT: CPT

## 2025-05-19 PROCEDURE — 3074F SYST BP LT 130 MM HG: CPT

## 2025-05-19 PROCEDURE — 99215 OFFICE O/P EST HI 40 MIN: CPT

## 2025-05-19 PROCEDURE — 83615 LACTATE (LD) (LDH) ENZYME: CPT

## 2025-05-19 PROCEDURE — 80053 COMPREHEN METABOLIC PANEL: CPT

## 2025-05-19 PROCEDURE — 1159F MED LIST DOCD IN RCRD: CPT

## 2025-05-19 PROCEDURE — 85025 COMPLETE CBC W/AUTO DIFF WBC: CPT

## 2025-05-19 PROCEDURE — 87533 HHV-6 DNA QUANT: CPT

## 2025-05-19 PROCEDURE — 83735 ASSAY OF MAGNESIUM: CPT

## 2025-05-19 PROCEDURE — 1126F AMNT PAIN NOTED NONE PRSNT: CPT

## 2025-05-19 PROCEDURE — 82784 ASSAY IGA/IGD/IGG/IGM EACH: CPT

## 2025-05-19 PROCEDURE — 36415 COLL VENOUS BLD VENIPUNCTURE: CPT

## 2025-05-19 RX ORDER — DESONIDE 0.5 MG/G
CREAM TOPICAL 2 TIMES DAILY
Qty: 15 G | Refills: 1 | Status: SHIPPED | OUTPATIENT
Start: 2025-05-19 | End: 2026-05-19

## 2025-05-19 ASSESSMENT — PAIN SCALES - GENERAL: PAINLEVEL_OUTOF10: 0-NO PAIN

## 2025-05-19 NOTE — PATIENT INSTRUCTIONS
Used desonide cream to face twice daily.  Use kenalog to upper chest, back, and arms twice daily.    Please call oncology team immediately with worsening rash or other concerning symptoms.

## 2025-05-20 LAB
CMV DNA SERPL NAA+PROBE-LOG IU: NORMAL {LOG_IU}/ML
EBV DNA SPEC NAA+PROBE-LOG#: NORMAL {LOG_COPIES}/ML
IGG SERPL-MCNC: 584 MG/DL (ref 700–1600)
LABORATORY COMMENT REPORT: NOT DETECTED
LABORATORY COMMENT REPORT: NOT DETECTED

## 2025-05-21 RX ORDER — HEPARIN 100 UNIT/ML
500 SYRINGE INTRAVENOUS AS NEEDED
OUTPATIENT
Start: 2025-05-21

## 2025-05-21 RX ORDER — HEPARIN SODIUM,PORCINE/PF 10 UNIT/ML
50 SYRINGE (ML) INTRAVENOUS AS NEEDED
OUTPATIENT
Start: 2025-05-21

## 2025-05-23 ENCOUNTER — HOSPITAL ENCOUNTER (OUTPATIENT)
Dept: RADIOLOGY | Facility: HOSPITAL | Age: 65
Discharge: HOME | End: 2025-05-23
Payer: COMMERCIAL

## 2025-05-23 DIAGNOSIS — R06.09 DYSPNEA ON EXERTION: ICD-10-CM

## 2025-05-23 LAB
CHROM ANALY OVERALL INTERP-IMP: NORMAL
ELECTRONICALLY COSIGNED BY CYTOGENETICS: NORMAL
ELECTRONICALLY SIGNED BY CYTOGENETICS: NORMAL
FISH ISCN RESULTS: NORMAL

## 2025-05-23 PROCEDURE — 71250 CT THORAX DX C-: CPT

## 2025-05-27 ENCOUNTER — APPOINTMENT (OUTPATIENT)
Dept: HEMATOLOGY/ONCOLOGY | Facility: HOSPITAL | Age: 65
End: 2025-05-27
Payer: COMMERCIAL

## 2025-05-27 ENCOUNTER — SPECIALTY PHARMACY (OUTPATIENT)
Dept: PHARMACY | Facility: CLINIC | Age: 65
End: 2025-05-27

## 2025-05-27 DIAGNOSIS — C92.00 ACUTE MYELOID LEUKEMIA NOT HAVING ACHIEVED REMISSION (MULTI): ICD-10-CM

## 2025-05-27 RX ORDER — POSACONAZOLE 100 MG/1
300 TABLET, DELAYED RELEASE ORAL DAILY
Qty: 90 TABLET | Refills: 0 | Status: SHIPPED | OUTPATIENT
Start: 2025-05-27

## 2025-05-28 ASSESSMENT — ENCOUNTER SYMPTOMS
SHORTNESS OF BREATH: 1
CARDIOVASCULAR NEGATIVE: 1
DIARRHEA: 0
FATIGUE: 0
DIAPHORESIS: 0
CONSTIPATION: 0
MUSCULOSKELETAL NEGATIVE: 1
HEMATOLOGIC/LYMPHATIC NEGATIVE: 1
NEUROLOGICAL NEGATIVE: 1
NAUSEA: 0
CHILLS: 0
VOMITING: 0
UNEXPECTED WEIGHT CHANGE: 0
APPETITE CHANGE: 0
FEVER: 0
BLOOD IN STOOL: 0

## 2025-05-29 ENCOUNTER — OFFICE VISIT (OUTPATIENT)
Dept: HEMATOLOGY/ONCOLOGY | Facility: HOSPITAL | Age: 65
End: 2025-05-29
Payer: COMMERCIAL

## 2025-05-29 ENCOUNTER — LAB (OUTPATIENT)
Dept: LAB | Facility: HOSPITAL | Age: 65
End: 2025-05-29
Payer: COMMERCIAL

## 2025-05-29 ENCOUNTER — INFUSION (OUTPATIENT)
Dept: HEMATOLOGY/ONCOLOGY | Facility: HOSPITAL | Age: 65
End: 2025-05-29
Payer: COMMERCIAL

## 2025-05-29 VITALS
DIASTOLIC BLOOD PRESSURE: 71 MMHG | HEART RATE: 87 BPM | TEMPERATURE: 98.6 F | OXYGEN SATURATION: 100 % | RESPIRATION RATE: 18 BRPM | SYSTOLIC BLOOD PRESSURE: 114 MMHG

## 2025-05-29 VITALS
BODY MASS INDEX: 23.73 KG/M2 | WEIGHT: 144.84 LBS | DIASTOLIC BLOOD PRESSURE: 78 MMHG | HEART RATE: 98 BPM | OXYGEN SATURATION: 100 % | TEMPERATURE: 97 F | SYSTOLIC BLOOD PRESSURE: 136 MMHG | RESPIRATION RATE: 16 BRPM

## 2025-05-29 DIAGNOSIS — C92.01 AML (ACUTE MYELOID LEUKEMIA) IN REMISSION (MULTI): ICD-10-CM

## 2025-05-29 DIAGNOSIS — D89.813 GVHD (GRAFT VERSUS HOST DISEASE) (MULTI): Primary | ICD-10-CM

## 2025-05-29 DIAGNOSIS — Z94.84 HISTORY OF ALLOGENEIC HEMATOPOIETIC STEM CELL TRANSPLANT: ICD-10-CM

## 2025-05-29 DIAGNOSIS — D80.1 HYPOGAMMAGLOBULINEMIA (MULTI): ICD-10-CM

## 2025-05-29 DIAGNOSIS — C92.01 ACUTE MYELOID LEUKEMIA IN REMISSION (MULTI): ICD-10-CM

## 2025-05-29 LAB
ALBUMIN SERPL BCP-MCNC: 4.4 G/DL (ref 3.4–5)
ALP SERPL-CCNC: 99 U/L (ref 33–136)
ALT SERPL W P-5'-P-CCNC: 10 U/L (ref 10–52)
ANION GAP SERPL CALC-SCNC: 15 MMOL/L (ref 10–20)
AST SERPL W P-5'-P-CCNC: 12 U/L (ref 9–39)
BASOPHILS # BLD AUTO: 0.04 X10*3/UL (ref 0–0.1)
BASOPHILS NFR BLD AUTO: 0.5 %
BILIRUB SERPL-MCNC: 0.7 MG/DL (ref 0–1.2)
BUN SERPL-MCNC: 14 MG/DL (ref 6–23)
CALCIUM SERPL-MCNC: 8.9 MG/DL (ref 8.6–10.3)
CHLORIDE SERPL-SCNC: 108 MMOL/L (ref 98–107)
CO2 SERPL-SCNC: 20 MMOL/L (ref 21–32)
CREAT SERPL-MCNC: 1.21 MG/DL (ref 0.5–1.3)
EGFRCR SERPLBLD CKD-EPI 2021: 66 ML/MIN/1.73M*2
EOSINOPHIL # BLD AUTO: 0.42 X10*3/UL (ref 0–0.7)
EOSINOPHIL NFR BLD AUTO: 5.1 %
ERYTHROCYTE [DISTWIDTH] IN BLOOD BY AUTOMATED COUNT: 14 % (ref 11.5–14.5)
GLUCOSE SERPL-MCNC: 100 MG/DL (ref 74–99)
HCT VFR BLD AUTO: 35.8 % (ref 41–52)
HGB BLD-MCNC: 11.8 G/DL (ref 13.5–17.5)
IGG SERPL-MCNC: 503 MG/DL (ref 700–1600)
IMM GRANULOCYTES # BLD AUTO: 0.07 X10*3/UL (ref 0–0.7)
IMM GRANULOCYTES NFR BLD AUTO: 0.9 % (ref 0–0.9)
LDH SERPL L TO P-CCNC: 183 U/L (ref 84–246)
LYMPHOCYTES # BLD AUTO: 2.72 X10*3/UL (ref 1.2–4.8)
LYMPHOCYTES NFR BLD AUTO: 33.3 %
MAGNESIUM SERPL-MCNC: 1.55 MG/DL (ref 1.6–2.4)
MCH RBC QN AUTO: 31.6 PG (ref 26–34)
MCHC RBC AUTO-ENTMCNC: 33 G/DL (ref 32–36)
MCV RBC AUTO: 96 FL (ref 80–100)
MONOCYTES # BLD AUTO: 1.36 X10*3/UL (ref 0.1–1)
MONOCYTES NFR BLD AUTO: 16.6 %
NEUTROPHILS # BLD AUTO: 3.56 X10*3/UL (ref 1.2–7.7)
NEUTROPHILS NFR BLD AUTO: 43.6 %
NRBC BLD-RTO: 0 /100 WBCS (ref 0–0)
PLATELET # BLD AUTO: 252 X10*3/UL (ref 150–450)
POTASSIUM SERPL-SCNC: 3.2 MMOL/L (ref 3.5–5.3)
PROT SERPL-MCNC: 6.6 G/DL (ref 6.4–8.2)
RBC # BLD AUTO: 3.73 X10*6/UL (ref 4.5–5.9)
SODIUM SERPL-SCNC: 140 MMOL/L (ref 136–145)
TACROLIMUS BLD-MCNC: 9.2 NG/ML
URATE SERPL-MCNC: 4.6 MG/DL (ref 4–7.5)
WBC # BLD AUTO: 8.2 X10*3/UL (ref 4.4–11.3)

## 2025-05-29 PROCEDURE — 1126F AMNT PAIN NOTED NONE PRSNT: CPT | Performed by: INTERNAL MEDICINE

## 2025-05-29 PROCEDURE — 85025 COMPLETE CBC W/AUTO DIFF WBC: CPT | Performed by: NURSE PRACTITIONER

## 2025-05-29 PROCEDURE — 87799 DETECT AGENT NOS DNA QUANT: CPT

## 2025-05-29 PROCEDURE — 84550 ASSAY OF BLOOD/URIC ACID: CPT

## 2025-05-29 PROCEDURE — 2500000001 HC RX 250 WO HCPCS SELF ADMINISTERED DRUGS (ALT 637 FOR MEDICARE OP)

## 2025-05-29 PROCEDURE — 83615 LACTATE (LD) (LDH) ENZYME: CPT

## 2025-05-29 PROCEDURE — 99215 OFFICE O/P EST HI 40 MIN: CPT | Performed by: INTERNAL MEDICINE

## 2025-05-29 PROCEDURE — 82784 ASSAY IGA/IGD/IGG/IGM EACH: CPT

## 2025-05-29 PROCEDURE — 83735 ASSAY OF MAGNESIUM: CPT | Performed by: NURSE PRACTITIONER

## 2025-05-29 PROCEDURE — 1159F MED LIST DOCD IN RCRD: CPT | Performed by: INTERNAL MEDICINE

## 2025-05-29 PROCEDURE — 3075F SYST BP GE 130 - 139MM HG: CPT | Performed by: INTERNAL MEDICINE

## 2025-05-29 PROCEDURE — 80197 ASSAY OF TACROLIMUS: CPT | Performed by: NURSE PRACTITIONER

## 2025-05-29 PROCEDURE — 96365 THER/PROPH/DIAG IV INF INIT: CPT | Mod: INF

## 2025-05-29 PROCEDURE — 3078F DIAST BP <80 MM HG: CPT | Performed by: INTERNAL MEDICINE

## 2025-05-29 PROCEDURE — 99215 OFFICE O/P EST HI 40 MIN: CPT | Mod: 25 | Performed by: INTERNAL MEDICINE

## 2025-05-29 PROCEDURE — 96366 THER/PROPH/DIAG IV INF ADDON: CPT | Mod: INF

## 2025-05-29 PROCEDURE — 80053 COMPREHEN METABOLIC PANEL: CPT | Performed by: NURSE PRACTITIONER

## 2025-05-29 PROCEDURE — 36415 COLL VENOUS BLD VENIPUNCTURE: CPT

## 2025-05-29 PROCEDURE — 2500000004 HC RX 250 GENERAL PHARMACY W/ HCPCS (ALT 636 FOR OP/ED): Mod: JZ,TB

## 2025-05-29 RX ORDER — EPINEPHRINE 0.3 MG/.3ML
0.3 INJECTION SUBCUTANEOUS EVERY 5 MIN PRN
OUTPATIENT
Start: 2025-06-23

## 2025-05-29 RX ORDER — ACETAMINOPHEN 325 MG/1
650 TABLET ORAL ONCE
Status: COMPLETED | OUTPATIENT
Start: 2025-05-29 | End: 2025-05-29

## 2025-05-29 RX ORDER — MONTELUKAST SODIUM 10 MG/1
10 TABLET ORAL NIGHTLY
Qty: 30 TABLET | Refills: 2 | Status: SHIPPED | OUTPATIENT
Start: 2025-05-29 | End: 2025-08-27

## 2025-05-29 RX ORDER — PREDNISONE 5 MG/1
TABLET ORAL
Qty: 286 TABLET | Refills: 0 | Status: SHIPPED | OUTPATIENT
Start: 2025-05-29 | End: 2025-05-29

## 2025-05-29 RX ORDER — HEPARIN 100 UNIT/ML
500 SYRINGE INTRAVENOUS AS NEEDED
OUTPATIENT
Start: 2025-05-29

## 2025-05-29 RX ORDER — FAMOTIDINE 10 MG/ML
20 INJECTION, SOLUTION INTRAVENOUS ONCE AS NEEDED
OUTPATIENT
Start: 2025-06-23

## 2025-05-29 RX ORDER — POTASSIUM CHLORIDE 20 MEQ/1
20 TABLET, EXTENDED RELEASE ORAL DAILY
COMMUNITY
Start: 2025-05-29 | End: 2025-05-29

## 2025-05-29 RX ORDER — ALBUTEROL SULFATE 0.83 MG/ML
3 SOLUTION RESPIRATORY (INHALATION) AS NEEDED
OUTPATIENT
Start: 2025-06-23

## 2025-05-29 RX ORDER — AZITHROMYCIN 250 MG/1
250 TABLET, FILM COATED ORAL
Qty: 12 TABLET | Refills: 2 | Status: SHIPPED | OUTPATIENT
Start: 2025-05-30 | End: 2025-08-22

## 2025-05-29 RX ORDER — DIPHENHYDRAMINE HCL 25 MG
25 CAPSULE ORAL ONCE
OUTPATIENT
Start: 2025-06-23

## 2025-05-29 RX ORDER — ACETAMINOPHEN 325 MG/1
650 TABLET ORAL ONCE
OUTPATIENT
Start: 2025-06-23

## 2025-05-29 RX ORDER — DIPHENHYDRAMINE HCL 25 MG
25 CAPSULE ORAL ONCE
Status: COMPLETED | OUTPATIENT
Start: 2025-05-29 | End: 2025-05-29

## 2025-05-29 RX ORDER — PREDNISONE 10 MG/1
TABLET ORAL
Qty: 137 TABLET | Refills: 0 | Status: SHIPPED | OUTPATIENT
Start: 2025-05-29 | End: 2025-07-19

## 2025-05-29 RX ORDER — POTASSIUM CHLORIDE 20 MEQ/1
20 TABLET, EXTENDED RELEASE ORAL DAILY
Qty: 30 TABLET | Refills: 0 | Status: SHIPPED | OUTPATIENT
Start: 2025-05-29 | End: 2025-06-28

## 2025-05-29 RX ORDER — HEPARIN SODIUM,PORCINE/PF 10 UNIT/ML
50 SYRINGE (ML) INTRAVENOUS AS NEEDED
OUTPATIENT
Start: 2025-05-29

## 2025-05-29 RX ORDER — FLUTICASONE PROPIONATE AND SALMETEROL 500; 50 UG/1; UG/1
1 POWDER RESPIRATORY (INHALATION)
Qty: 60 EACH | Refills: 2 | Status: SHIPPED | OUTPATIENT
Start: 2025-05-29 | End: 2025-08-27

## 2025-05-29 RX ORDER — DIPHENHYDRAMINE HYDROCHLORIDE 50 MG/ML
50 INJECTION, SOLUTION INTRAMUSCULAR; INTRAVENOUS AS NEEDED
OUTPATIENT
Start: 2025-06-23

## 2025-05-29 RX ADMIN — IMMUNE GLOBULIN INFUSION (HUMAN) 25 G: 100 INJECTION, SOLUTION INTRAVENOUS; SUBCUTANEOUS at 10:49

## 2025-05-29 RX ADMIN — DIPHENHYDRAMINE HYDROCHLORIDE 25 MG: 25 CAPSULE ORAL at 10:49

## 2025-05-29 RX ADMIN — ACETAMINOPHEN 650 MG: 325 TABLET ORAL at 10:49

## 2025-05-29 ASSESSMENT — PAIN SCALES - GENERAL: PAINLEVEL_OUTOF10: 0-NO PAIN

## 2025-05-29 NOTE — PATIENT INSTRUCTIONS
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  I will start you on prednisone 40 mg daily, and a triplet regimen for lung graft vs host disease with an inhaler called advair, an anti allergy pill called monteleukast and an antibiotic called azithromycin.  I will also set up pulmonary referral

## 2025-05-29 NOTE — PROGRESS NOTES
Patient ID:  Brandt Merritt is a 65 y.o. male.  Referring Physician:   BMT Dr Newsome  Primary Care Provider:  Bill Richmond MD    Oncology History Overview Note   4/2024  Myelodysplastic neoplasm with increased blasts-2 ( WHO)  Myelodysplastic neoplasm/AML  (ICC 2022 classification)    Presented in  10/2023 for pancytopenia, found to have hemolysis. Patient had normal CBC June 2020. Denied any hemorrhoidal bleeding . Has had C Scope and EGD , treated Hep C 2014 . 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      CBC 4/11/24 wbc 3.0, hgb 7.1, platelets 167K ANC 0.73    BM biopsy done on 4/11/24  as folllows:  BM histology  Myelodysplastic neoplasm with increased blasts-2 ( WHO)  Myelodysplastic neoplasm/AML  (ICC 2022 classification)  Balsts 11% on aspirate smear and 15-20% based on CD 34 immunostaining approaching AML leukemia, hematooiesis is erythroid dominant with dysplasia in granulocytes and megakaryocytes.   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      11/6/2024 -  Bone Marrow Transplant    conditioned with Flu/Cy/TBI and aGVHD prophylaxis with post-transplant cyclophosphamide, tacrolimus, and mycophenolate. T=0, 11/6/24. ABO Donor/Recipient: O+, A+. CMV donor/recipient: both positive. Started Tacro and MMF on T+5 (11/11).      2/11/2025 - 2/14/2025 Hospital Admission    Admit date: 2/11/25  Admission diagnosis: 2/14/25  Brandt was admitted on  -25 for intractable vomiting and some diarrhea, also with low grade fever.  He received broad spectrum antibiotics and all cultures were negative.  He underwent an upper endoscopy which was grossly normal, biopsies negative for GVHD, and budesonide was increased to 3 mg po bid.      3/19/2025 Biopsy    BM biopsy (3/19/25):   -- HYPOCELLULAR BONE MARROW (20%) WITH MATURING TRILINEAGE HEMATOPOIESIS AND 1% BLASTS.  No overt evidence of residual/relapsed acute leukemia.         2025 Biopsy    BM biopsy (25):   -- NORMOCELLULAR BONE MARROW (40%-50%) WITH MATURING TRILINEAGE HEMATOPOIESIS.  -- NO MORPHOLOGIC EVIDENCE OF ACUTE MYELOID LEUKEMIA.       AML (acute myeloid leukemia) in remission (Multi)   2025 Initial Diagnosis    AML (acute myeloid leukemia) in remission (Multi)        Response:      Past Medical History:  HTN   STEMI 2020 after getting  a water pills.  Chronic hepatitis C infection treated in  treated  with Dr. Lloyd  Past Medical History:   Diagnosis Date    Chest pain 2021    HTN (hypertension) 10/05/2023    Hypertension     Leukemia (Multi)     Personal history of other diseases of the circulatory system     History of hypertension      Surgical History:  Conosocopy 2021  Upper EGD 10/31/23  Surgical reduction torsion of testes      Past Surgical History:   Procedure Laterality Date    COLONOSCOPY  2013    Complete Colonoscopy    OTHER SURGICAL HISTORY  10/07/2015    Surgery Testis Reduction Of Torsion Of Testis Right      Family History:  CAD, DM in mother       Mother  of CVA at age 69  Brother with prostate CA, 1 sister with liver disease  2 children healthy  Family History   Problem Relation Name Age of Onset    Other (diabetes mellitus) Mother      Prostate cancer Brother        Social History:  Lives with wife of 30 years, works at [employer], [employer], now retired.   29 years, former smoker, smoked x 15 yrs quit 20+  years ago. Marijuana 3 x a week. Served in  in [place] and [facility], .  ----------------------------------------------------------------------------------------------------  Subjective    History of Present Illness:    Brandt Merritt is a 65 year old male with history of myelodysplastic neoplasm/AML, s/p umbilical cord blood transplant 10/16/24 with graft rejection followed by haploidentical stem cells  from his sister on 11/6/24 with flu/cy, ATG and modified dose cyclophosphamide with engraftment on day T+8.      Brandt presents to the clinic today (5/29/25) with his wife Genevieve for follow up evaluation and count check and chronic GVHD.  Today he is T+ 251 of umbilical cord transfusion and is T+ 204 of his haplo sister transplant.  Completed 6 month Bmbx on 5/12/25 showing normocelluar bone marrow (40-50%) with maturing trilineage hematopoiesis, no evidence of AML, 100% chimeric.  Currently taking tacrolimus taking 0.5 mg once daily on Tuesday, Thursday, and Saturday, and is taking 0.5 mg BID on Monday, Wednesday, Friday, and Sunday.   He has been having diarrhea, had 5 episodes Tuesday, the whole day, yesterday 2 episodes without cramps, as a consequence has been curtailing his eating.  . Weight is stable, appetite is good, energy level is good.  Feels that shortness of breath climbing stairs is better. Recall that recent PFTs showed corrected DLCO of about 50%.  CT of chest done 5/23/25 shows mild air trapping.  Feels shortness of breath is better able to go upstairs.     Review of Systems   Constitutional:  Negative for appetite change, chills, diaphoresis, fatigue, fever and unexpected weight change.   Respiratory:  Positive for shortness of breath (on exertion).    Cardiovascular: Negative.    Gastrointestinal:  Negative for blood in stool, constipation, diarrhea, nausea and vomiting.   Genitourinary: Negative.     Musculoskeletal: Negative.    Skin:  Positive for  rash. Negative for itching.        Hyperpigmented skin to face and hands   Neurological: Negative.    Hematological: Negative.    --------------------------------------------------------------------------------------------------------  Objective:    There were no vitals taken for this visit.    There were no vitals filed for this visit.    Physical Exam  Vitals reviewed.   Constitutional:       Appearance: Normal appearance.      Comments: Mild temporal wasting    HENT:      Head: Normocephalic and atraumatic.      Mouth/Throat:      Mouth: Mucous membranes are moist.   Eyes:      Extraocular Movements: Extraocular movements intact.      Conjunctiva/sclera: Conjunctivae normal.      Pupils: Pupils are equal, round, and reactive to light.   Cardiovascular:      Rate and Rhythm: Normal rate and regular rhythm.      Pulses: Normal pulses.      Heart sounds: Normal heart sounds.   Pulmonary:      Effort: Pulmonary effort is normal.      Breath sounds: Normal breath sounds.   Abdominal:      General: Abdomen is flat. Bowel sounds are normal.      Palpations: Abdomen is soft. There is no mass.      Tenderness: There is no abdominal tenderness.   Musculoskeletal:         General: No swelling. Normal range of motion.   Lymphadenopathy:      Comments: No lymphadenopathy   Skin:     General: Skin is warm and dry.      Findings: Rash present.             Comments: Slight worsening of rash with mild lumpy bumpy with some small circular spots of erythremia to face, upper back, bilateral shoulder, upper chest.  Areas of hyperpigmentation on face and palms of bilateral hands near wrists.   Neurological:      General: No focal deficit present.      Mental Status: He is alert and oriented to person, place, and time.   Psychiatric:         Mood and Affect: Mood normal.         Behavior: Behavior normal.         Thought Content: Thought content normal.         Judgment: Judgment normal.    -----------------------------------------------------------------------------------------------------------  Assessment and Plan:    MDS/Acute myeloid leukemia in remission (Multi)  Diagnosed 4/2024: BM Bx with MDS in in transition to AML (>10% blast) w/ trisomy 8, DNMT alpha, and U2AF1 mutation indication adverse risk.      s/p 4 cycles of Decitabine/Venetoclax. BM Bx 6/17/24: Blasts cleared, FISH still positive for trisomy 8, still MDS changes   BMBx (9/5/24): 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 and no increase in blasts      History of allogeneic hematopoietic stem cell transplant  #1: Single-unit Cord, T0=9/19/24, Primary Engraftment Failure  - Conditioning: Flu-Mariana-TBI  - Donor: Single Cord, 6/8  = ABO Donor / Recipient: A+ / A+  = CMV Donor / Recipient: - / +  - aGVHD Prophylaxis: Tacrolimus + MMF  - engraftment failure, developed HLA antibody against class I of cord  - Repeat BMBx (10/15): hypocellular with no residual myeloid neoplasm. 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), T0=11/6/24  - Graft: Haploidentical sister  = ABO Donor / Recipient: O+ / A+ (ABO incompatibility +)  = CMV Donor / Recipient: + / +  - Conditioning: Flu/Cy/TBI/rATG  = GVHD prophylaxis - rATG 1.5mg/kg T-5,-3,-1, PTCy (25mg/kg T+3, T+4), Tacro, MMF (T+5)     DATE DAY SOURCE MORPHOLOGY MRD WHOLE CHIMERISM   (% donor) CD3 CHIMERISM   (% donor) CD33 CHIMERISM   (% donor)   11/20/24 D+30 PB      100       12/11/24 D+30 PB       100 100   Deferred D+30 BM             1/9/25 D+60 BM  KRISTIE , flow negative      100  100     D+60 PB              2/17/25 D+100 PB      100        3/19/25 D+100 BM  KRISTIE, flow negative      100  100   5/12/25 M+6 BM KRISTIE  Flow negative  100       Monitor for post-transplant hemolysis   (5/19/25)  Haptoglobin  175 (3/17/25)  UPC ratio 0.10 (3/17/25)     Anemia  12/18 Epo 113.   12/16 Retic 7.2%  DARBY on hold.  Cont folic acid.     GVHD  prophylaxis  GVHD  Anorexia/poor oral intake/weight loss. Added Mirtazapine. Continue Zofran. If persists, consider aGVHD.  12/9/24:  Currently taking tacrolimus 0.5 mg BID, held dose today prior to labs.  FK level 4.6 (12/9/24).  Advised patient on 12/10/24 to increase tacrolimus dose to 1 mg in the AM and 0.5 mg in the PM.    Wean MMF from 1 g TID to 500 mg TID 12/2/24 - present  Due to poor appetite will decrease MMF to 500 mg po tid.  Improvement to GI symptoms since decreasing MMF dose.    Stopped  MMF on T+35.     Stage I/Grade II Upper GI GVHD  Anorexia, taste changes and weight loss 171# (9/17/24).  Cont mirtazapine and ATC Zofran.   Add budesonide 3x day.   12/30/24:  Started on budesonide TID on 12/27/24.  Brandt reports that he has starting eating more since starting budesonide.  Reports no nausea, vomiting, or diarrhea at this time.    Appetite continuing to improve. Denies N/V/D.     1/13/25 Decreased budesonide bid per Dr Newsome; 1/27 Decrease to 3mg daily. Will continue on this dose for 1-2 weeks before discontinuing.     2/17/25 Recent admission for nausea, budesonide increased to 3 mg po bid, continues therapeutic on tacro 0.5 mg po bid. Esophageal biopsy 2/13/25 negative for GVHD.  3/3/25:  Overall doing well.  Nausea has resolved. Reviewed food safety precautions and had nutritionist Blanca Morgan, RD,LD come to speak with patient and wife today.  Continue budesonide 3 mg daily.  FK level 5.6.  Continue tacrolimus 0.5 mg BID.  Advised Brandt to contact oncology team if have GI symptoms worsen,  consider discontinuing budesonide next visit.    3/17/25:  Discontinue budesonide.  FK level 7.6 (3/17/25), continue tacrolimus 0.5 mg BID.      4/14/25:  Day 158 No further GI symptoms and weight now steady,  Today complains of itchy rash on upper anterior chest,  noted macular raised rash < 10% of body, suspect chronic GVHD of skin, limited.  No dry eyes, dry mouth diarrhea, etc.  Started  triamcinolone cream bid, advised to watch for any increased in rash, diarrhea, dry  eyes or mouth. Tacro 9.2 continue 0.5 mg bid alternating with 0.5 mg daily.      5/5/25:  Today is T+180.  Continues to have rash to face, neck, and upper chest resolved.  Reports he continues to have unformed stools, pudding consistency about 1-2 times per day.  Currently on tacrolimus 0.5 mg bid alternating with daily.  FK level 11.4 but had evening dose last night so will  not adjust., Ordered screening PFTs today      5/12/25:  Today is T+ 187.  Continues to have rash to face, no change.  Had 3 episodes of nausea, vomiting, and diarrhea last week after eating 5 [place] burger and fries.  GI symptoms resolved quickly and he has had no further GI distress.  No further weight loss since prior visit.  Completed PFTs on 5/6/25 with FVC 3.81, FEV1 2.73, FEV1/FVC 0.72, and DLCO/VA 49%.   Continues on tacrolimus 0.5 mg daily on Tuesday, Thursday and Saturday and 0.5 mg bid MWFSun.  FK level 11.0 (5/12/25).  Continue current tacrolimus dosing.      5/29/25:  Today is T+204.  Continues on tacrolimus 0.5 mg daily on Tuesday, Thursday and Saturday and 0.5 mg bid MWFSun.  FK level 9.2.  CT scan of chest  5/23/25 shows mild air trapping but no other abnormalities of chest.   Rash pretty much stable < 10% of BSA mild papules and hyperpigmentation of face.  Diarrhea and air trapping of concern for more extensive cGVHD. Prescribed desonisde cream to face BID and kenalog to rash on upper trunk/arms BID.  Stressed importance of calling oncology team with worsening rash or concerning symptoms.  Started po prednisone 40 mg daily with taper, advair, azithromycin and monteleukast ( FAM regimen)    Weights: see below    Wt Readings from Last 5 Encounters:   05/19/25 66 kg (145 lb 8.1 oz)   05/12/25 65.4 kg (144 lb 1.6 oz)   05/05/25 65.5 kg (144 lb 4.8 oz)   04/28/25 66.4 kg (146 lb 4.8 oz)   04/21/25 66.4 kg (146 lb 6.2 oz)      Infectious Disease &  Immune Reconstitution      Prophylaxis  Antiviral prophylaxis: acyclovir, Letermovir-continue until T+200.  Antifungal: posaconazole  PCP prophylaxis: 10/26/24 - 11/28/24 Pentamidine; cont Bactrim     Hypogammaglobulinemia  IgG level. IVIG for level <400   3/17/25:  IgG 307.  Ordered monthly IVIG infusions.  Reviewed rationale and possible side effects with patient.    Received first IVIG infusion 3/31/25.  IgG level 421 (4/28/25), last IVIG infusion on 4/28/25, scheduled to receive IVIG next on 5/29/25.      Active Surveillance:     CMV detected 688 1/2/25, 167 1/6/25. Levels undetectable since 1/13/25, CMV ND (2/17/25).    Started valgancicyclovir 900mg BID on 1/6/25,   Plan 1/20/25 decrease to 900 mg daily for 2 weeks (2/3/25), then transition back to letermovir/acyclovir.    CMV ND (5/19/25), if CMV continues to be non detected can stop letermovir at day 200.   Adeno ND 5/12/25, level in process from 5/19/25  HHV6 ND 5/12/25, level in process from 5/19/25  EBV ND 5/19/25.  EBV Viremia during transplant admission  Treated despite low levels due to upcoming second SCT  Rituximab 600 mg x 1 (10/31/24)     Infectious Disease follow up evaluation: 1/10/25      Immunodeficiency panel 100 days, 6mos and 1 year.      Immunizations:   Covid and influenza vaccine series.  Consider at T+ 3 month  Will plan to begin immunizations at T+6 mths around 5/5/25 depending on degree of immunosuppression  Received influenza vaccine 3/6/25 and 1st post transplant covid vaccine on 3/6/25, 2nd covid vaccine on 3/27/25, and 3rd covid vaccine on 4/25/25.   Received 6 month vaccines 5/5/25  Plan 8 month vaccines July 2025.       Cardiac:    Essential hypertension- was started on nifedipine 60 mg daily on 1/20/25 and started lisinopril 5 mg daily on 2/3/25.   Hx of STEMI (11/11/2020)  Cardiology appt on 12/18/24  ECHO 12/23/24: LVEF 65%     Ascending aorta dilation   TTE (4/29/24): 3.8-4 cm dilation      Electrolyte disturbance   Magnesium  1.46 (5/19/25), continue oral magnesium 128 mg TID.      Dry Skin hyperpigmentation:  Hyperpigmented skin to face.  Derm E-consult on 2/3/25 with Dr. Chavez, feels may be due to bactrim, which I prefer not to discontinue. May also be caused by prochlorperazine.  NPV with dermatology Dr. Medel on 6/18/25.    5/19/25:  Continues to have hyperpigmented skin to face and bilateral palms of hands.  Brandt feels his hyperpigmentation is lighted slightly to palms.  Instructed to continue to use moisturizer such as cerave that are fragrance free and gentle.  Advised about mineral based sun screen.      Lack of appetite, improved  Mirtazapine 15 mg at night as of 11/26/24     Vitamin D deficiency  12/18 Level 27.  Last dose of weekly supplement taken. 1/27 Repeat level was 28.  Reordered ergocalciferol 50,000 UT weekly on 1/27/25 for 8 doses.  Repeat vitamin D 36 (4/8/25).  Start maintenance vitamin D.       IV Access  2/3/25:  PICC line removed.     Psychosocial.    Caregiver Genevieve  [facility] in [place]     RTC: Suspicion for GVHD of lungs,  increased immunosuppression as per above, continue IVIG, request for pulmonary consult,  all information reviewed with patient and wife in detail  6/12/25:   follow up visit Advised to contact oncology team with worsening rash or other symptom concerns.  --------------------------------------------------------------------------------  Malaika Newsome MD

## 2025-05-29 NOTE — PROGRESS NOTES
Pt received monthly IVIG as ordered without incidence after provider visit. Feels well overall, but notes that raised rash on face, arms, and chest is worsening despite Kenalog cream- pt says Dr Newsome is ordering steroids. Next IVIG appt is 3 days early due to today's appt being late due to Monday holiday. Per Dr Newsome, ok to give vaccines with next tx at 7 weeks instead of 8 to coordinate with scheduled treatment. Pt ambulated from clinic with wife- has printed copy of upcoming appts and verbalized understanding.

## 2025-05-30 LAB
CMV DNA SERPL NAA+PROBE-LOG IU: NORMAL {LOG_IU}/ML
LABORATORY COMMENT REPORT: NOT DETECTED

## 2025-05-30 NOTE — ASSESSMENT & PLAN NOTE
GVHD  Anorexia/poor oral intake/weight loss. Added Mirtazapine. Continue Zofran. If persists, consider aGVHD.  12/9/24:  Currently taking tacrolimus 0.5 mg BID, held dose today prior to labs.  FK level 4.6 (12/9/24).  Advised patient on 12/10/24 to increase tacrolimus dose to 1 mg in the AM and 0.5 mg in the PM.    Wean MMF from 1 g TID to 500 mg TID 12/2/24 - present  Due to poor appetite will decrease MMF to 500 mg po tid.  Improvement to GI symptoms since decreasing MMF dose.    Stopped  MMF on T+35.     Stage I/Grade II Upper GI GVHD  Anorexia, taste changes and weight loss 171# (9/17/24).  Cont mirtazapine and ATC Zofran.   Add budesonide 3x day.   12/30/24:  Started on budesonide TID on 12/27/24.  Brandt reports that he has starting eating more since starting budesonide.  Reports no nausea, vomiting, or diarrhea at this time.    Appetite continuing to improve. Denies N/V/D.     1/13/25 Decreased budesonide bid per Dr Newsome; 1/27 Decrease to 3mg daily. Will continue on this dose for 1-2 weeks before discontinuing.     2/17/25 Recent admission for nausea, budesonide increased to 3 mg po bid, continues therapeutic on tacro 0.5 mg po bid. Esophageal biopsy 2/13/25 negative for GVHD.  3/3/25:  Overall doing well.  Nausea has resolved. Reviewed food safety precautions and had nutritionist Blanca Morgan, RD,LD come to speak with patient and wife today.  Continue budesonide 3 mg daily.  FK level 5.6.  Continue tacrolimus 0.5 mg BID.  Advised Brandt to contact oncology team if have GI symptoms worsen,  consider discontinuing budesonide next visit.    3/17/25:  Discontinue budesonide.  FK level 7.6 (3/17/25), continue tacrolimus 0.5 mg BID.      4/14/25:  Day 158 No further GI symptoms and weight now steady,  Today complains of itchy rash on upper anterior chest,  noted macular raised rash < 10% of body, suspect chronic GVHD of skin, limited.  No dry eyes, dry mouth diarrhea, etc.  Started triamcinolone cream  bid, advised to watch for any increased in rash, diarrhea, dry  eyes or mouth. Tacro 9.2 continue 0.5 mg bid alternating with 0.5 mg daily.      5/5/25:  Today is T+180.  Continues to have rash to face, neck, and upper chest resolved.  Reports he continues to have unformed stools, pudding consistency about 1-2 times per day.  Currently on tacrolimus 0.5 mg bid alternating with daily.  FK level 11.4 but had evening dose last night so will  not adjust., Ordered screening PFTs today      5/12/25:  Today is T+ 187.  Continues to have rash to face, no change.  Had 3 episodes of nausea, vomiting, and diarrhea last week after eating 5 [place] burger and fries.  GI symptoms resolved quickly and he has had no further GI distress.  No further weight loss since prior visit.  Completed PFTs on 5/6/25 with FVC 3.81, FEV1 2.73, FEV1/FVC 0.72, and DLOC 11.72.  Plan for chest CT-scheduled for 5/23/25.  Continues on tacrolimus 0.5 mg daily on Tuesday, Thursday and Saturday and 0.5 mg bid MWFSun.  FK level 11.0 (5/12/25).  Continue current tacrolimus dosing.      5/29/25:  Today is T+204.  Noted slight worsening to rash with macular papular rash to face, and has spread to shoulders, upper back, and chest.  No itching.  Currently taking tacrolimus 0.5 mg daily on Tuesday, Thursday, and Saturday and 0.5 mg BID on MWF and Sunday.  FK level 7.1 today.  Continue current dosing.  No further weight loss, no n/v/d.  Prescribed desonisde cream to face BID and kenalog to rash on upper trunk/arms BID.  Stressed importance of calling oncology team with worsening rash or concerning symptoms.  May need to start PO steroids with worsening rash.  Needs advair, azithromycin and monteleukast ( FAM regimen)

## 2025-05-30 NOTE — ASSESSMENT & PLAN NOTE
Diagnosed 4/2024: BM Bx with MDS in in transition to AML (>10% blast) w/ trisomy 8, DNMT alpha, and U2AF1 mutation indication adverse risk.      s/p 4 cycles of Decitabine/Venetoclax. BM Bx 6/17/24: Blasts cleared, FISH still positive for trisomy 8, still MDS changes   BMBx (9/5/24): 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 and no increase in blasts      History of allogeneic hematopoietic stem cell transplant  #1: Single-unit Cord, T0=9/19/24, Primary Engraftment Failure  - Conditioning: Flu-Mariana-TBI  - Donor: Single Cord, 6/8  = ABO Donor / Recipient: A+ / A+  = CMV Donor / Recipient: - / +  - aGVHD Prophylaxis: Tacrolimus + MMF  - engraftment failure, developed HLA antibody against class I of cord  - Repeat BMBx (10/15): hypocellular with no residual myeloid neoplasm. 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), T0=11/6/24  - Graft: Haploidentical sister  = ABO Donor / Recipient: O+ / A+ (ABO incompatibility +)  = CMV Donor / Recipient: + / +  - Conditioning: Flu/Cy/TBI/rATG  = GVHD prophylaxis - rATG 1.5mg/kg T-5,-3,-1, PTCy (25mg/kg T+3, T+4), Tacro, MMF (T+5)     DATE DAY SOURCE MORPHOLOGY MRD WHOLE CHIMERISM   (% donor) CD3 CHIMERISM   (% donor) CD33 CHIMERISM   (% donor)   11/20/24 D+30 PB      100       12/11/24 D+30 PB       100 100   Deferred D+30 BM             1/9/25 D+60 BM  KRISTIE , flow negative      100  100     D+60 PB              2/17/25 D+100 PB      100        3/19/25 D+100 BM  KRISTIE, flow negative      100  100   5/12/25 M+6 BM KRISTIE  Flow negative  100       Monitor for post-transplant hemolysis   (5/19/25)  Haptoglobin  175 (3/17/25)  UPC ratio 0.10 (3/17/25)     Anemia  12/18 Epo 113.   12/16 Retic 7.2%  DARBY on hold.  Cont folic acid.

## 2025-05-31 PROCEDURE — RXMED WILLOW AMBULATORY MEDICATION CHARGE

## 2025-06-01 LAB
EBV DNA SPEC NAA+PROBE-LOG#: NORMAL {LOG_COPIES}/ML
LABORATORY COMMENT REPORT: NOT DETECTED

## 2025-06-02 DIAGNOSIS — D89.813 GVHD (GRAFT VERSUS HOST DISEASE) (MULTI): Primary | ICD-10-CM

## 2025-06-02 RX ORDER — FLUTICASONE PROPIONATE AND SALMETEROL XINAFOATE 230; 21 UG/1; UG/1
2 AEROSOL, METERED RESPIRATORY (INHALATION)
Qty: 12 G | Refills: 2 | Status: SHIPPED | OUTPATIENT
Start: 2025-06-02 | End: 2025-08-31

## 2025-06-10 ENCOUNTER — PHARMACY VISIT (OUTPATIENT)
Dept: PHARMACY | Facility: CLINIC | Age: 65
End: 2025-06-10
Payer: COMMERCIAL

## 2025-06-11 ASSESSMENT — ENCOUNTER SYMPTOMS
DIARRHEA: 0
NAUSEA: 0
FATIGUE: 0
CONSTIPATION: 0
HEMATOLOGIC/LYMPHATIC NEGATIVE: 1
APPETITE CHANGE: 0
BLOOD IN STOOL: 0
SHORTNESS OF BREATH: 1
CARDIOVASCULAR NEGATIVE: 1
VOMITING: 0
FEVER: 0
UNEXPECTED WEIGHT CHANGE: 0
CHILLS: 0
NEUROLOGICAL NEGATIVE: 1
MUSCULOSKELETAL NEGATIVE: 1
DIAPHORESIS: 0

## 2025-06-11 NOTE — PROGRESS NOTES
Patient ID:  Brandt Merritt is a 65 y.o. male.  Referring Physician:   BMT Dr Newsome  Primary Care Provider:  Bill Richmond MD    Oncology History Overview Note   4/2024  Myelodysplastic neoplasm with increased blasts-2 ( WHO)  Myelodysplastic neoplasm/AML  (ICC 2022 classification)    Presented in  10/2023 for pancytopenia, found to have hemolysis. Patient had normal CBC June 2020. Denied any hemorrhoidal bleeding . Has had C Scope and EGD , treated Hep C 2014 . 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      CBC 4/11/24 wbc 3.0, hgb 7.1, platelets 167K ANC 0.73    BM biopsy done on 4/11/24  as folllows:  BM histology  Myelodysplastic neoplasm with increased blasts-2 ( WHO)  Myelodysplastic neoplasm/AML  (ICC 2022 classification)  Balsts 11% on aspirate smear and 15-20% based on CD 34 immunostaining approaching AML leukemia, hematooiesis is erythroid dominant with dysplasia in granulocytes and megakaryocytes.   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      11/6/2024 -  Bone Marrow Transplant    conditioned with Flu/Cy/TBI and aGVHD prophylaxis with post-transplant cyclophosphamide, tacrolimus, and mycophenolate. T=0, 11/6/24. ABO Donor/Recipient: O+, A+. CMV donor/recipient: both positive. Started Tacro and MMF on T+5 (11/11).      2/11/2025 - 2/14/2025 Hospital Admission    Admit date: 2/11/25  Admission diagnosis: 2/14/25  Brandt was admitted on  -25 for intractable vomiting and some diarrhea, also with low grade fever.  He received broad spectrum antibiotics and all cultures were negative.  He underwent an upper endoscopy which was grossly normal, biopsies negative for GVHD, and budesonide was increased to 3 mg po bid.      3/19/2025 Biopsy    BM biopsy (3/19/25):   -- HYPOCELLULAR BONE MARROW (20%) WITH MATURING TRILINEAGE HEMATOPOIESIS AND 1% BLASTS.  No overt evidence of residual/relapsed acute leukemia.         2025 Biopsy    BM biopsy (25):   -- NORMOCELLULAR BONE MARROW (40%-50%) WITH MATURING TRILINEAGE HEMATOPOIESIS.  -- NO MORPHOLOGIC EVIDENCE OF ACUTE MYELOID LEUKEMIA.       AML (acute myeloid leukemia) in remission (Multi)   2025 Initial Diagnosis    AML (acute myeloid leukemia) in remission (Multi)        Response:      Past Medical History:  HTN   STEMI 2020 after getting  a water pills.  Chronic hepatitis C infection treated in  treated  with Dr. Lloyd  Past Medical History:   Diagnosis Date    Chest pain 2021    HTN (hypertension) 10/05/2023    Hypertension     Leukemia (Multi)     Personal history of other diseases of the circulatory system     History of hypertension      Surgical History:  Conosocopy 2021  Upper EGD 10/31/23  Surgical reduction torsion of testes      Past Surgical History:   Procedure Laterality Date    COLONOSCOPY  2013    Complete Colonoscopy    OTHER SURGICAL HISTORY  10/07/2015    Surgery Testis Reduction Of Torsion Of Testis Right      Family History:  CAD, DM in mother       Mother  of CVA at age 69  Brother with prostate CA, 1 sister with liver disease  2 children healthy  Family History   Problem Relation Name Age of Onset    Other (diabetes mellitus) Mother      Prostate cancer Brother        Social History:  Lives with wife of 30 years, works at [employer], now retired.   29 years, former smoker, smoked x 15 yrs quit 20+  years ago. Marijuana 3 x a week. Served in  in [place] and [facility], .  ----------------------------------------------------------------------------------------------------  Subjective    History of Present Illness:    Brandt Merritt is a 65 year old male with history of myelodysplastic neoplasm/AML, s/p umbilical cord blood transplant 10/16/24 with graft rejection followed by haploidentical stem cells  from his sister on 11/6/24 with flu/cy, ATG and modified dose cyclophosphamide with engraftment on day T+8.      Brandt presents to the clinic today (6/12/25) with his wife Genevieve for follow up evaluation and count check and chronic GVHD.  Today he is T+ 266 of umbilical cord transfusion and is T+ 218 of his haplo sister transplant.  Completed 6 month Bmbx on 5/12/25 showing normocelluar bone marrow (40-50%) with maturing trilineage hematopoiesis, no evidence of AML, 100% chimeric.  Currently taking tacrolimus taking 0.5 mg once daily on Tuesday, Thursday, and Saturday, and is taking 0.5 mg BID on Monday, Wednesday, Friday, and Sunday.  Held tacrolimus dose prior to lab draws.    Continues monthly IVIG, next dose due 6/23/25.      Energy level is good.  Appetite is good.  Weight is up a pound since last visit.  Is staying active, cut grass yesterday and didn't have to rest, going up the stairs is easier.  Now having mild shortness of breath on exertion like multiple flights of stairs.  Recall that recent PFTs showed corrected DLCO of about 50%.  CT of chest done 5/23/25 shows mild air trapping.  Was started on FAM protocol 5/29/25.  Is currently on prednisone 40 mg daily, has a few more days of 40 mg and then plans to go to 30 mg daily for 7 days then will taper to 20 mg daily.  Just got advair inhaler from pharmacy.  Is scheduled to see pulmonary 7/18/25.  Rash has resolved to shoulders, chest, and back.  Continues to have small bumps to face.  No itching.  Using desonide  cream to face twice daily.  Having intermittent softer stools a few times per week, no nausea.      Review of Systems   Constitutional:  Negative for appetite change, chills, diaphoresis, fatigue, fever and unexpected weight change.   Respiratory:  Positive for shortness of breath (on exertion).    Cardiovascular: Negative.    Gastrointestinal:  Negative for blood in stool, constipation, diarrhea, nausea and vomiting.   Genitourinary: Negative.     Musculoskeletal: Negative.    Skin:  Positive for rash. Negative for itching.        Hyperpigmented skin to face and hands   Neurological: Negative.    Hematological: Negative.    --------------------------------------------------------------------------------------------------------  Objective:    Visit Vitals  /73   Pulse 83   Temp 36.2 °C (97.2 °F)   Resp 16     Vitals:    06/12/25 1023   Weight: 66.3 kg (146 lb 2.6 oz)     Physical Exam  Vitals reviewed.   Constitutional:       Appearance: Normal appearance.      Comments: Mild temporal wasting    HENT:      Head: Normocephalic and atraumatic.      Mouth/Throat:      Mouth: Mucous membranes are moist.   Eyes:      Extraocular Movements: Extraocular movements intact.      Conjunctiva/sclera: Conjunctivae normal.      Pupils: Pupils are equal, round, and reactive to light.   Cardiovascular:      Rate and Rhythm: Normal rate and regular rhythm.      Pulses: Normal pulses.      Heart sounds: Normal heart sounds.   Pulmonary:      Effort: Pulmonary effort is normal.      Breath sounds: Normal breath sounds.   Abdominal:      General: Abdomen is flat. Bowel sounds are normal.      Palpations: Abdomen is soft. There is no mass.      Tenderness: There is no abdominal tenderness.   Musculoskeletal:         General: No swelling. Normal range of motion.   Lymphadenopathy:      Comments: No lymphadenopathy   Skin:     General: Skin is warm and dry.      Findings: Rash present.             Comments: Improvement to rash with  mild lumpy bumpy with some small circular spots to face, rash resolved to upper back, bilateral shoulder, upper chest.  Areas of hyperpigmentation on face and palms of bilateral hands near wrists remains the same.   Neurological:      General: No focal deficit present.      Mental Status: He is alert and oriented to person, place, and time.   Psychiatric:         Mood and Affect: Mood normal.         Behavior: Behavior normal.         Thought Content: Thought content normal.         Judgment: Judgment normal.     -----------------------------------------------------------------------------------------------------------  Assessment and Plan:    MDS/Acute myeloid leukemia in remission (Multi)  Diagnosed 4/2024: BM Bx with MDS in in transition to AML (>10% blast) w/ trisomy 8, DNMT alpha, and U2AF1 mutation indication adverse risk.      s/p 4 cycles of Decitabine/Venetoclax. BM Bx 6/17/24: Blasts cleared, FISH still positive for trisomy 8, still MDS changes   BMBx (9/5/24): 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 and no increase in blasts      History of allogeneic hematopoietic stem cell transplant  #1: Single-unit Cord, T0=9/19/24, Primary Engraftment Failure  - Conditioning: Flu-Mariana-TBI  - Donor: Single Cord, 6/8  = ABO Donor / Recipient: A+ / A+  = CMV Donor / Recipient: - / +  - aGVHD Prophylaxis: Tacrolimus + MMF  - engraftment failure, developed HLA antibody against class I of cord  - Repeat BMBx (10/15): hypocellular with no residual myeloid neoplasm. 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), T0=11/6/24  - Graft: Haploidentical sister  = ABO Donor / Recipient: O+ / A+ (ABO incompatibility +)  = CMV Donor / Recipient: + / +  - Conditioning: Flu/Cy/TBI/rATG  = GVHD prophylaxis - rATG 1.5mg/kg T-5,-3,-1, PTCy (25mg/kg T+3, T+4), Tacro, MMF (T+5)     DATE DAY SOURCE MORPHOLOGY MRD WHOLE CHIMERISM   (% donor) CD3 CHIMERISM   (% donor) CD33 CHIMERISM   (% donor)    11/20/24 D+30 PB      100       12/11/24 D+30 PB       100 100   Deferred D+30 BM             1/9/25 D+60 BM  KRISTIE , flow negative      100  100     D+60 PB              2/17/25 D+100 PB      100        3/19/25 D+100 BM  KRISTIE, flow negative      100  100   5/12/25 M+6 BM KRISTIE  Flow negative  100       Monitor for post-transplant hemolysis   (6/12/25)  Haptoglobin  175 (3/17/25)  UPC ratio 0.10 (3/17/25)     Anemia  12/18 Epo 113.   12/16 Retic 7.2%  DARBY on hold.  Cont folic acid.     GVHD prophylaxis  GVHD  Anorexia/poor oral intake/weight loss. Added Mirtazapine. Continue Zofran. If persists, consider aGVHD.  12/9/24:  Currently taking tacrolimus 0.5 mg BID, held dose today prior to labs.  FK level 4.6 (12/9/24).  Advised patient on 12/10/24 to increase tacrolimus dose to 1 mg in the AM and 0.5 mg in the PM.    Wean MMF from 1 g TID to 500 mg TID 12/2/24 - present  Due to poor appetite will decrease MMF to 500 mg po tid.  Improvement to GI symptoms since decreasing MMF dose.    Stopped  MMF on T+35.     Stage I/Grade II Upper GI GVHD  Anorexia, taste changes and weight loss 171# (9/17/24).  Cont mirtazapine and ATC Zofran.   Add budesonide 3x day.   12/30/24:  Started on budesonide TID on 12/27/24.  Brandt reports that he has starting eating more since starting budesonide.  Reports no nausea, vomiting, or diarrhea at this time.    Appetite continuing to improve. Denies N/V/D.     1/13/25 Decreased budesonide bid per Dr Newsome; 1/27 Decrease to 3mg daily. Will continue on this dose for 1-2 weeks before discontinuing.     2/17/25 Recent admission for nausea, budesonide increased to 3 mg po bid, continues therapeutic on tacro 0.5 mg po bid. Esophageal biopsy 2/13/25 negative for GVHD.  3/3/25:  Overall doing well.  Nausea has resolved. Reviewed food safety precautions and had nutritionist Blanca Noernberg, RD,LD come to speak with patient and wife today.  Continue budesonide 3 mg daily.  FK level 5.6.   Continue tacrolimus 0.5 mg BID.  Advised Brandt to contact oncology team if have GI symptoms worsen,  consider discontinuing budesonide next visit.    3/17/25:  Discontinue budesonide.  FK level 7.6 (3/17/25), continue tacrolimus 0.5 mg BID.      4/14/25:  Day 158 No further GI symptoms and weight now steady,  Today complains of itchy rash on upper anterior chest,  noted macular raised rash < 10% of body, suspect chronic GVHD of skin, limited.  No dry eyes, dry mouth diarrhea, etc.  Started triamcinolone cream bid, advised to watch for any increased in rash, diarrhea, dry  eyes or mouth. Tacro 9.2 continue 0.5 mg bid alternating with 0.5 mg daily.      5/5/25:  Today is T+180.  Continues to have rash to face, neck, and upper chest resolved.  Reports he continues to have unformed stools, pudding consistency about 1-2 times per day.  Currently on tacrolimus 0.5 mg bid alternating with daily.  FK level 11.4 but had evening dose last night so will  not adjust., Ordered screening PFTs today      5/12/25:  Today is T+ 187.  Continues to have rash to face, no change.  Had 3 episodes of nausea, vomiting, and diarrhea last week after eating 5 [employer] burger and fries.  GI symptoms resolved quickly and he has had no further GI distress.  No further weight loss since prior visit.  Completed PFTs on 5/6/25 with FVC 3.81, FEV1 2.73, FEV1/FVC 0.72, and DLCO/VA 49%.   Continues on tacrolimus 0.5 mg daily on Tuesday, Thursday and Saturday and 0.5 mg bid MWFSun.  FK level 11.0 (5/12/25).  Continue current tacrolimus dosing.      5/29/25:  Today is T+204.  Continues on tacrolimus 0.5 mg daily on Tuesday, Thursday and Saturday and 0.5 mg bid MWFSun.  FK level 9.2.  CT scan of chest  5/23/25 shows mild air trapping but no other abnormalities of chest.   Rash pretty much stable < 10% of BSA mild papules and hyperpigmentation of face.  Diarrhea and air trapping of concern for more extensive cGVHD. Prescribed desonisde cream to face BID and  kenalog to rash on upper trunk/arms BID.  Stressed importance of calling oncology team with worsening rash or concerning symptoms.  Started po prednisone 40 mg daily with taper, advair, azithromycin and monteleukast ( FAM regimen)     6/12/25:  Today is T+ 218.  Continues tacrolimus 0.5 mg daily on Tuesday, Thursday and Saturday and 0.5 mg bid MWFSun.  Held tacrolimus today prior to lab draws, FK level in process.  Currently on prednisone 40 mg daily, in a few days Brandt will plan to taper to 30 mg daily for 7 days, then will taper to 20 mg daily.  Rash has resolved to back, shoulders, and chest,  Continues to have small bumps to face, no redness, no itching.  Continues desonide BID.  Continues FAM regimen with advair BID, azithromycin MWF, and monteluekast daily.  Reports improvement in respiratory symptoms.  Scheduled with pulmonary 7/18/25.      Weights: see below    Wt Readings from Last 5 Encounters:   06/12/25 66.3 kg (146 lb 2.6 oz)   05/29/25 65.7 kg (144 lb 13.5 oz)   05/19/25 66 kg (145 lb 8.1 oz)   05/12/25 65.4 kg (144 lb 1.6 oz)   05/05/25 65.5 kg (144 lb 4.8 oz)      Infectious Disease & Immune Reconstitution      Prophylaxis  Antiviral prophylaxis: acyclovir, Letermovir-continue until off of immunosuppression.    Antifungal: posaconazole  PCP prophylaxis: 10/26/24 - 11/28/24 Pentamidine; cont Bactrim.     Hypogammaglobulinemia  IgG level. IVIG for level <400   3/17/25:  IgG 307.  Ordered monthly IVIG infusions.  Reviewed rationale and possible side effects with patient.    Received first IVIG infusion 3/31/25.  IgG level 503 (5/29/25).  Last IVIG infusion on 5/29/25, scheduled to receive IVIG next on 6/23/25.    Active Surveillance:     CMV detected 688 1/2/25, 167 1/6/25. Levels undetectable since 1/13/25, CMV ND (2/17/25).    Started valgancicyclovir 900mg BID on 1/6/25,   Plan 1/20/25 decrease to 900 mg daily for 2 weeks (2/3/25), then transition back to letermovir/acyclovir.    CMV ND (5/19/25),  if CMV continues to be non detected can stop letermovir at day 200.  Plan to continue letermovir due to ongoing immunosuppression.    Adeno ND 5/19/25, level in process from 6/12/25  HHV6 ND 5/19/25, level in process from 6/12/25.  EBV ND 5/29/25  EBV Viremia during transplant admission  Treated despite low levels due to upcoming second SCT  Rituximab 600 mg x 1 (10/31/24)     Infectious Disease follow up evaluation: 1/10/25      Immunodeficiency panel 100 days, 6mos and 1 year.      Immunizations:   Covid and influenza vaccine series.  Consider at T+ 3 month  Will plan to begin immunizations at T+6 mths around 5/5/25 depending on degree of immunosuppression  Received influenza vaccine 3/6/25 and 1st post transplant covid vaccine on 3/6/25, 2nd covid vaccine on 3/27/25, and 3rd covid vaccine on 4/25/25.   Received 6 month vaccines 5/5/25  Plan 8 month vaccines July 2025.       Cardiac:    Essential hypertension- was started on nifedipine 60 mg daily on 1/20/25 and started lisinopril 5 mg daily on 2/3/25.   Hx of STEMI (11/11/2020)  Cardiology appt on 12/18/24  ECHO 12/23/24: LVEF 65%     Ascending aorta dilation   TTE (4/29/24): 3.8-4 cm dilation      Electrolyte disturbance   Magnesium level 2.13 (6/12/25).  Decrease oral magnesium 128 mg to BID.   Potassium 4.9 (6/12/25).  Stop potassium chloride.       Dry Skin hyperpigmentation:  Hyperpigmented skin to face.  Derm E-consult on 2/3/25 with Dr. Chavez, feels may be due to bactrim, which I prefer not to discontinue. May also be caused by prochlorperazine.  NPV with dermatology Dr. Medel on 6/18/25.    6/12/25:  Continues to have hyperpigmented skin to face and bilateral palms of hands.  Brandt feels his hyperpigmentation is lighted slightly to palms.  Instructed to continue to use moisturizer such as cerave that are fragrance free and gentle.  Advised about mineral based sun screen.      Vitamin D deficiency  12/18 Level 27.  Last dose of weekly supplement  taken. 1/27 Repeat level was 28.  Reordered ergocalciferol 50,000 UT weekly on 1/27/25 for 8 doses.  Repeat vitamin D 36 (4/8/25).  Continue maintenance vitamin D.       IV Access  2/3/25:  PICC line removed.     Psychosocial.    Caregiver Genev[facility] [place]     RTC:   Continue prednisone taper (reviewed taper schedule).  6/23/25:  Follow up visit with provider and IVIG.    7/17/25:  Pulmonary consult.    --------------------------------------------------------------------------------  GRICELDA Vallejo

## 2025-06-12 ENCOUNTER — LAB (OUTPATIENT)
Dept: LAB | Facility: HOSPITAL | Age: 65
End: 2025-06-12
Payer: COMMERCIAL

## 2025-06-12 ENCOUNTER — OFFICE VISIT (OUTPATIENT)
Dept: HEMATOLOGY/ONCOLOGY | Facility: HOSPITAL | Age: 65
End: 2025-06-12
Payer: COMMERCIAL

## 2025-06-12 VITALS
TEMPERATURE: 97.2 F | HEART RATE: 83 BPM | RESPIRATION RATE: 16 BRPM | OXYGEN SATURATION: 99 % | WEIGHT: 146.16 LBS | BODY MASS INDEX: 23.94 KG/M2 | DIASTOLIC BLOOD PRESSURE: 73 MMHG | SYSTOLIC BLOOD PRESSURE: 129 MMHG

## 2025-06-12 DIAGNOSIS — Z94.84 HISTORY OF ALLOGENEIC HEMATOPOIETIC STEM CELL TRANSPLANT: ICD-10-CM

## 2025-06-12 DIAGNOSIS — C92.01 AML (ACUTE MYELOID LEUKEMIA) IN REMISSION (MULTI): ICD-10-CM

## 2025-06-12 DIAGNOSIS — Z94.84 STEM CELLS TRANSPLANT STATUS (MULTI): ICD-10-CM

## 2025-06-12 DIAGNOSIS — C92.00 ACUTE MYELOID LEUKEMIA NOT HAVING ACHIEVED REMISSION (MULTI): ICD-10-CM

## 2025-06-12 DIAGNOSIS — D80.1 HYPOGAMMAGLOBULINEMIA (MULTI): ICD-10-CM

## 2025-06-12 DIAGNOSIS — C92.01 ACUTE MYELOID LEUKEMIA IN REMISSION (MULTI): ICD-10-CM

## 2025-06-12 DIAGNOSIS — D89.813 GVHD (GRAFT VERSUS HOST DISEASE) (MULTI): ICD-10-CM

## 2025-06-12 DIAGNOSIS — I10 ESSENTIAL HYPERTENSION: ICD-10-CM

## 2025-06-12 DIAGNOSIS — D84.9 IMMUNOCOMPROMISED: ICD-10-CM

## 2025-06-12 DIAGNOSIS — C92.01 ACUTE MYELOID LEUKEMIA IN REMISSION (MULTI): Primary | ICD-10-CM

## 2025-06-12 DIAGNOSIS — L81.9 HYPERPIGMENTATION OF SKIN: ICD-10-CM

## 2025-06-12 DIAGNOSIS — E87.8 ELECTROLYTE DISTURBANCE: ICD-10-CM

## 2025-06-12 LAB
ALBUMIN SERPL BCP-MCNC: 4.4 G/DL (ref 3.4–5)
ALP SERPL-CCNC: 80 U/L (ref 33–136)
ALT SERPL W P-5'-P-CCNC: 13 U/L (ref 10–52)
ANION GAP SERPL CALC-SCNC: 16 MMOL/L (ref 10–20)
AST SERPL W P-5'-P-CCNC: 13 U/L (ref 9–39)
BASOPHILS # BLD AUTO: 0.03 X10*3/UL (ref 0–0.1)
BASOPHILS NFR BLD AUTO: 0.2 %
BILIRUB SERPL-MCNC: 0.6 MG/DL (ref 0–1.2)
BUN SERPL-MCNC: 20 MG/DL (ref 6–23)
CALCIUM SERPL-MCNC: 9.5 MG/DL (ref 8.6–10.3)
CHLORIDE SERPL-SCNC: 103 MMOL/L (ref 98–107)
CO2 SERPL-SCNC: 23 MMOL/L (ref 21–32)
CREAT SERPL-MCNC: 1.3 MG/DL (ref 0.5–1.3)
EGFRCR SERPLBLD CKD-EPI 2021: 60 ML/MIN/1.73M*2
EOSINOPHIL # BLD AUTO: 0.04 X10*3/UL (ref 0–0.7)
EOSINOPHIL NFR BLD AUTO: 0.3 %
ERYTHROCYTE [DISTWIDTH] IN BLOOD BY AUTOMATED COUNT: 14.4 % (ref 11.5–14.5)
GLUCOSE SERPL-MCNC: 106 MG/DL (ref 74–99)
HCT VFR BLD AUTO: 39.7 % (ref 41–52)
HGB BLD-MCNC: 12.8 G/DL (ref 13.5–17.5)
IMM GRANULOCYTES # BLD AUTO: 0.12 X10*3/UL (ref 0–0.7)
IMM GRANULOCYTES NFR BLD AUTO: 0.8 % (ref 0–0.9)
LDH SERPL L TO P-CCNC: 146 U/L (ref 84–246)
LYMPHOCYTES # BLD AUTO: 4.62 X10*3/UL (ref 1.2–4.8)
LYMPHOCYTES NFR BLD AUTO: 29.8 %
MAGNESIUM SERPL-MCNC: 2.13 MG/DL (ref 1.6–2.4)
MCH RBC QN AUTO: 31.1 PG (ref 26–34)
MCHC RBC AUTO-ENTMCNC: 32.2 G/DL (ref 32–36)
MCV RBC AUTO: 96 FL (ref 80–100)
MONOCYTES # BLD AUTO: 1.8 X10*3/UL (ref 0.1–1)
MONOCYTES NFR BLD AUTO: 11.6 %
NEUTROPHILS # BLD AUTO: 8.87 X10*3/UL (ref 1.2–7.7)
NEUTROPHILS NFR BLD AUTO: 57.3 %
NRBC BLD-RTO: 0 /100 WBCS (ref 0–0)
PLATELET # BLD AUTO: 246 X10*3/UL (ref 150–450)
POTASSIUM SERPL-SCNC: 4.9 MMOL/L (ref 3.5–5.3)
PROT SERPL-MCNC: 6.9 G/DL (ref 6.4–8.2)
RBC # BLD AUTO: 4.12 X10*6/UL (ref 4.5–5.9)
SODIUM SERPL-SCNC: 137 MMOL/L (ref 136–145)
TACROLIMUS BLD-MCNC: 7.4 NG/ML
URATE SERPL-MCNC: 4.1 MG/DL (ref 4–7.5)
WBC # BLD AUTO: 15.5 X10*3/UL (ref 4.4–11.3)

## 2025-06-12 PROCEDURE — 1126F AMNT PAIN NOTED NONE PRSNT: CPT

## 2025-06-12 PROCEDURE — 1036F TOBACCO NON-USER: CPT

## 2025-06-12 PROCEDURE — 87533 HHV-6 DNA QUANT: CPT

## 2025-06-12 PROCEDURE — 3074F SYST BP LT 130 MM HG: CPT

## 2025-06-12 PROCEDURE — 85025 COMPLETE CBC W/AUTO DIFF WBC: CPT

## 2025-06-12 PROCEDURE — 87799 DETECT AGENT NOS DNA QUANT: CPT

## 2025-06-12 PROCEDURE — 1159F MED LIST DOCD IN RCRD: CPT

## 2025-06-12 PROCEDURE — 84075 ASSAY ALKALINE PHOSPHATASE: CPT

## 2025-06-12 PROCEDURE — 36415 COLL VENOUS BLD VENIPUNCTURE: CPT

## 2025-06-12 PROCEDURE — 99215 OFFICE O/P EST HI 40 MIN: CPT

## 2025-06-12 PROCEDURE — 84550 ASSAY OF BLOOD/URIC ACID: CPT

## 2025-06-12 PROCEDURE — 3078F DIAST BP <80 MM HG: CPT

## 2025-06-12 PROCEDURE — 1160F RVW MEDS BY RX/DR IN RCRD: CPT

## 2025-06-12 PROCEDURE — 83615 LACTATE (LD) (LDH) ENZYME: CPT

## 2025-06-12 PROCEDURE — 80197 ASSAY OF TACROLIMUS: CPT

## 2025-06-12 PROCEDURE — 83735 ASSAY OF MAGNESIUM: CPT

## 2025-06-12 RX ORDER — MAGNESIUM CHLORIDE 64 MG
64 TABLET, DELAYED RELEASE (ENTERIC COATED) ORAL 2 TIMES DAILY
Qty: 180 TABLET | Refills: 1 | Status: SHIPPED | OUTPATIENT
Start: 2025-06-12 | End: 2025-08-11

## 2025-06-12 ASSESSMENT — PAIN SCALES - GENERAL: PAINLEVEL_OUTOF10: 0-NO PAIN

## 2025-06-18 ENCOUNTER — APPOINTMENT (OUTPATIENT)
Dept: DERMATOLOGY | Facility: CLINIC | Age: 65
End: 2025-06-18
Payer: COMMERCIAL

## 2025-06-18 DIAGNOSIS — D48.5 NEOPLASM OF UNCERTAIN BEHAVIOR OF SKIN: Primary | ICD-10-CM

## 2025-06-18 PROCEDURE — 1159F MED LIST DOCD IN RCRD: CPT | Performed by: STUDENT IN AN ORGANIZED HEALTH CARE EDUCATION/TRAINING PROGRAM

## 2025-06-18 PROCEDURE — 11104 PUNCH BX SKIN SINGLE LESION: CPT | Performed by: STUDENT IN AN ORGANIZED HEALTH CARE EDUCATION/TRAINING PROGRAM

## 2025-06-18 PROCEDURE — 99203 OFFICE O/P NEW LOW 30 MIN: CPT | Performed by: STUDENT IN AN ORGANIZED HEALTH CARE EDUCATION/TRAINING PROGRAM

## 2025-06-18 NOTE — PROGRESS NOTES
Subjective     Brandt Merritt is a 65 y.o. male who presents for the following: Skin Check (Patient would like only face examined today. He is concerned with texture of skin which flared after transplant. He is currently treating with triamcinolone. No Hx of skin cancer. History of stem cell transplant. Acute myeloid leukemia in remission.).          Review of Systems:  No other skin or systemic complaints other than what is documented elsewhere in the note.    The following portions of the chart were reviewed this encounter and updated as appropriate:          Skin Cancer History  Biopsy Log Book  No skin cancers from Specimen Tracking.    Additional History      Specialty Problems          Dermatology Problems    Hyperpigmentation of skin        Objective   Well appearing patient in no apparent distress; mood and affect are within normal limits.    A focused skin examination was performed. All findings within normal limits unless otherwise noted below.    Assessment/Plan   Skin Exam  1. NEOPLASM OF UNCERTAIN BEHAVIOR OF SKIN  Left Buccal Cheek  Waxy skin colored papules on mid face    Lesion biopsy  Type of biopsy: punch    Informed consent: discussed and consent obtained    Timeout: patient name, date of birth, surgical site, and procedure verified    Procedure prep:  Patient was prepped and draped  Anesthesia: the lesion was anesthetized in a standard fashion    Anesthetic:  1% lidocaine w/ epinephrine 1-100,000 local infiltration  Punch size:  4 mm  Suture size:  4-0  Suture type: Prolene (polypropylene)    Suture removal (days):  14  Hemostasis achieved with: suture    Outcome: patient tolerated procedure well    Post-procedure details: sterile dressing applied and wound care instructions given    Dressing type: bandage and petrolatum      Staff Communication: Dermatology Local Anesthesia: 1 % Lidocaine / Epinephrine - Amount: 3 ml  Specimen 1 - Dermatopathology- DERM LAB  Differential Diagnosis: hx stem cell  transplant 2024 for AML, now with numerous waxy firm skin colored papules on central face. Ddx sebaceous hyperplasia vs scleromyxedema vs GVHD vs other  Check Margins Yes/No?:    Comments:    Dermpath Lab: Routine Histopathology (formalin-fixed tissue)  Started following stem cell transplantation last November  Multiple bumps on face, relatively asymptomatic  Photo on phone from last fall shows completely clear face  Appears similar to sebaceous gland overgrowth  Infiltrative disorders such as scleromyxedema or sarcoidosis in ddx  Biopsy today to aid in diagnosis  Treatment pending biopsy results

## 2025-06-18 NOTE — Clinical Note
Started following stem cell transplantation last November  Multiple bumps on face, relatively asymptomatic  Photo on phone from last fall shows completely clear face  Appears similar to sebaceous gland overgrowth  Infiltrative disorders such as scleromyxedema or sarcoidosis in ddx  Biopsy today to aid in diagnosis  Treatment pending biopsy results

## 2025-06-20 LAB
LABORATORY COMMENT REPORT: NORMAL
PATH REPORT.FINAL DX SPEC: NORMAL
PATH REPORT.GROSS SPEC: NORMAL
PATH REPORT.RELEVANT HX SPEC: NORMAL
PATH REPORT.TOTAL CANCER: NORMAL

## 2025-06-20 ASSESSMENT — ENCOUNTER SYMPTOMS
BLOOD IN STOOL: 0
NAUSEA: 0
SHORTNESS OF BREATH: 1
FATIGUE: 0
DIARRHEA: 0
APPETITE CHANGE: 0
NEUROLOGICAL NEGATIVE: 1
FEVER: 0
CARDIOVASCULAR NEGATIVE: 1
UNEXPECTED WEIGHT CHANGE: 0
CONSTIPATION: 0
VOMITING: 0
MUSCULOSKELETAL NEGATIVE: 1
HEMATOLOGIC/LYMPHATIC NEGATIVE: 1
CHILLS: 0
DIAPHORESIS: 0

## 2025-06-20 NOTE — PROGRESS NOTES
Patient ID:  Brandt Merritt is a 65 y.o. male.  Referring Physician:   BMT Dr Newsome  Primary Care Provider:  Bill Richmond MD    Oncology History Overview Note   4/2024  Myelodysplastic neoplasm with increased blasts-2 ( WHO)  Myelodysplastic neoplasm/AML  (ICC 2022 classification)    Presented in  10/2023 for pancytopenia, found to have hemolysis. Patient had normal CBC June 2020. Denied any hemorrhoidal bleeding . Has had C Scope and EGD , treated Hep C 2014 . 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      CBC 4/11/24 wbc 3.0, hgb 7.1, platelets 167K ANC 0.73    BM biopsy done on 4/11/24  as folllows:  BM histology  Myelodysplastic neoplasm with increased blasts-2 ( WHO)  Myelodysplastic neoplasm/AML  (ICC 2022 classification)  Balsts 11% on aspirate smear and 15-20% based on CD 34 immunostaining approaching AML leukemia, hematooiesis is erythroid dominant with dysplasia in granulocytes and megakaryocytes.   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      11/6/2024 -  Bone Marrow Transplant    conditioned with Flu/Cy/TBI and aGVHD prophylaxis with post-transplant cyclophosphamide, tacrolimus, and mycophenolate. T=0, 11/6/24. ABO Donor/Recipient: O+, A+. CMV donor/recipient: both positive. Started Tacro and MMF on T+5 (11/11).      2/11/2025 - 2/14/2025 Hospital Admission    Admit date: 2/11/25  Admission diagnosis: 2/14/25  Brandt was admitted on  -25 for intractable vomiting and some diarrhea, also with low grade fever.  He received broad spectrum antibiotics and all cultures were negative.  He underwent an upper endoscopy which was grossly normal, biopsies negative for GVHD, and budesonide was increased to 3 mg po bid.      3/19/2025 Biopsy    BM biopsy (3/19/25):   -- HYPOCELLULAR BONE MARROW (20%) WITH MATURING TRILINEAGE HEMATOPOIESIS AND 1% BLASTS.  No overt evidence of residual/relapsed acute leukemia.         2025 Biopsy    BM biopsy (25):   -- NORMOCELLULAR BONE MARROW (40%-50%) WITH MATURING TRILINEAGE HEMATOPOIESIS.  -- NO MORPHOLOGIC EVIDENCE OF ACUTE MYELOID LEUKEMIA.       AML (acute myeloid leukemia) in remission (Multi)   2025 Initial Diagnosis    AML (acute myeloid leukemia) in remission (Multi)        Response:      Past Medical History:  HTN   STEMI 2020 after getting  a water pills.  Chronic hepatitis C infection treated in  treated  with Dr. Lloyd  Past Medical History:   Diagnosis Date    Chest pain 2021    HTN (hypertension) 10/05/2023    Hypertension     Leukemia (Multi)     Personal history of other diseases of the circulatory system     History of hypertension      Surgical History:  Conosocopy 2021  Upper EGD 10/31/23  Surgical reduction torsion of testes      Past Surgical History:   Procedure Laterality Date    COLONOSCOPY  2013    Complete Colonoscopy    OTHER SURGICAL HISTORY  10/07/2015    Surgery Testis Reduction Of Torsion Of Testis Right      Family History:  CAD, DM in mother       Mother  of CVA at age 69  Brother with prostate CA, 1 sister with liver disease  2 children healthy  Family History   Problem Relation Name Age of Onset    Other (diabetes mellitus) Mother      Prostate cancer Brother        Social History:  Lives with wife of 30 years, works at [employer], [employer], now retired.   29 years, former smoker, smoked x 15 yrs quit 20+  years ago. Marijuana 3 x a week. Served in  in [place] and [facility], .  ----------------------------------------------------------------------------------------------------  Subjective    History of Present Illness:    Brandt Merritt is a 65 year old male with history of myelodysplastic neoplasm/AML, s/p umbilical cord blood transplant 10/16/24 with graft rejection followed by haploidentical stem cells  from his sister on 11/6/24 with flu/cy, ATG and modified dose cyclophosphamide with engraftment on day T+8.      Brandt presents to the clinic today (6/23/25) with his wife Genevieve for follow up evaluation and count check and chronic GVHD.  Today he is T+ 277 of umbilical cord transfusion and is T+ 229 of his haplo sister transplant.  Completed 6 month Bmbx on 5/12/25 showing normocelluar bone marrow (40-50%) with maturing trilineage hematopoiesis, no evidence of AML, 100% chimeric.  Currently taking tacrolimus taking 0.5 mg once daily on Tuesday, Thursday, and Saturday, and is taking 0.5 mg BID on Monday, Wednesday, Friday, and Sunday.  Held tacrolimus dose prior to lab draws today.  Continues monthly IVIG with dose due 6/23/25.      Energy level is good.  Able to cut grass.  Appetitie is good.  Gained a few pounds since last visit.  Is staying active, cut grass and doing yard work.  Going up the stairs is easier.  Now having mild shortness of breath on exertion like multiple flights of stairs.  Recall that recent PFTs showed corrected DLCO of about 50%.  CT of chest done 5/23/25 shows mild air trapping.  Was started on FAM protocol 5/29/25.  Is currently on prednisone 20 mg daily.  Is scheduled to see pulmonary 7/18/25.  Rash has resolved to shoulders, chest, and back.  Continues to have small bumps to checks but has improved to forehead.  Reports no itching.  Recently saw dermatology and had skin biopsy to left cheek on 6/18/25, has dissolvable sutures. Using desonide  cream to face twice daily.  Having intermittent softer stools a few times per week, no nausea.  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      Review of Systems   Constitutional:  Negative for appetite change, chills, diaphoresis, fatigue, fever and unexpected weight change.   Respiratory:  Positive for shortness of breath (on exertion).    Cardiovascular: Negative.    Gastrointestinal:  Negative for blood in stool, constipation, diarrhea, nausea and vomiting.   Genitourinary: Negative.     Musculoskeletal: Negative.    Skin:  Positive for rash. Negative for itching.        Hyperpigmented skin to face and hands   Neurological: Negative.    Hematological: Negative.    --------------------------------------------------------------------------------------------------------  Objective:    There were no vitals taken for this visit.    There were no vitals filed for this visit.    Physical Exam  Vitals reviewed.   Constitutional:       Appearance: Normal appearance.   HENT:      Head: Normocephalic and atraumatic.      Mouth/Throat:      Mouth: Mucous membranes are moist.   Eyes:      Extraocular Movements: Extraocular movements intact.      Conjunctiva/sclera: Conjunctivae normal.      Pupils: Pupils are equal, round, and reactive to light.   Cardiovascular:      Rate and Rhythm: Normal rate and regular rhythm.      Pulses: Normal pulses.      Heart sounds: Normal heart sounds.   Pulmonary:      Effort: Pulmonary effort is normal.      Breath sounds: Normal breath sounds.   Abdominal:      General: Abdomen is flat. Bowel sounds are normal.      Palpations: Abdomen is soft. There is no mass.      Tenderness: There is no abdominal tenderness.   Musculoskeletal:         General: No swelling. Normal range of motion.   Lymphadenopathy:      Comments: No lymphadenopathy   Skin:     General: Skin is warm and dry.      Findings: Rash present.             Comments: Improvement to rash with  mild lumpy bumpy with some small circular spots to face, rash resolved to upper back, bilateral shoulder, upper chest.  Areas of hyperpigmentation on face and palms of bilateral hands near wrists remains the same.  Skin biopsy to left cheek, suture intact.     Neurological:      General: No focal deficit present.      Mental Status: He is alert and oriented to person, place, and time.   Psychiatric:         Mood and Affect: Mood normal.         Behavior: Behavior normal.         Thought Content: Thought content normal.         Judgment: Judgment normal.     -----------------------------------------------------------------------------------------------------------  Assessment and Plan:    MDS/Acute myeloid leukemia in remission (Multi)  Diagnosed 4/2024: BM Bx with MDS in in transition to AML (>10% blast) w/ trisomy 8, DNMT alpha, and U2AF1 mutation indication adverse risk.      s/p 4 cycles of Decitabine/Venetoclax. BM Bx 6/17/24: Blasts cleared, FISH still positive for trisomy 8, still MDS changes   BMBx (9/5/24): 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 and no increase in blasts.     History of allogeneic hematopoietic stem cell transplant  #1: Single-unit Cord, T0=9/19/24, Primary Engraftment Failure  - Conditioning: Flu-Mariana-TBI  - Donor: Single Cord, 6/8  = ABO Donor / Recipient: A+ / A+  = CMV Donor / Recipient: - / +  - aGVHD Prophylaxis: Tacrolimus + MMF  - engraftment failure, developed HLA antibody against class I of cord  - Repeat BMBx (10/15): hypocellular with no residual myeloid neoplasm. 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), T0=11/6/24  - Graft: Haploidentical sister  = ABO Donor / Recipient: O+ / A+ (ABO incompatibility +)  = CMV Donor / Recipient: + / +  - Conditioning: Flu/Cy/TBI/rATG  = GVHD prophylaxis - rATG 1.5mg/kg T-5,-3,-1, PTCy (25mg/kg T+3, T+4), Tacro, MMF (T+5)     DATE DAY SOURCE MORPHOLOGY MRD WHOLE CHIMERISM   (% donor) CD3 CHIMERISM    (% donor) CD33 CHIMERISM   (% donor)   11/20/24 D+30 PB      100       12/11/24 D+30 PB       100 100   Deferred D+30 BM             1/9/25 D+60 BM  KRISTIE , flow negative      100  100     D+60 PB              2/17/25 D+100 PB      100        3/19/25 D+100 BM  KRISTIE, flow negative      100  100   5/12/25 M+6 BM KRISTIE  Flow negative  100       Monitor for post-transplant hemolysis   (6/12/25)  Haptoglobin  175 (3/17/25)  UPC ratio 0.10 (3/17/25)     Anemia  12/18 Epo 113.   12/16 Retic 7.2%  DARBY on hold.  Cont folic acid.     GVHD prophylaxis  GVHD  Anorexia/poor oral intake/weight loss. Added Mirtazapine. Continue Zofran. If persists, consider aGVHD.  12/9/24:  Currently taking tacrolimus 0.5 mg BID, held dose today prior to labs.  FK level 4.6 (12/9/24).  Advised patient on 12/10/24 to increase tacrolimus dose to 1 mg in the AM and 0.5 mg in the PM.    Wean MMF from 1 g TID to 500 mg TID 12/2/24 - present  Due to poor appetite will decrease MMF to 500 mg po tid.  Improvement to GI symptoms since decreasing MMF dose.    Stopped  MMF on T+35.     Stage I/Grade II Upper GI GVHD  Anorexia, taste changes and weight loss 171# (9/17/24).  Cont mirtazapine and ATC Zofran.   Add budesonide 3x day.   12/30/24:  Started on budesonide TID on 12/27/24.  Brandt reports that he has starting eating more since starting budesonide.  Reports no nausea, vomiting, or diarrhea at this time.    Appetite continuing to improve. Denies N/V/D.     1/13/25 Decreased budesonide bid per Dr Newsome; 1/27 Decrease to 3mg daily. Will continue on this dose for 1-2 weeks before discontinuing.     2/17/25 Recent admission for nausea, budesonide increased to 3 mg po bid, continues therapeutic on tacro 0.5 mg po bid. Esophageal biopsy 2/13/25 negative for GVHD.  3/3/25:  Overall doing well.  Nausea has resolved. Reviewed food safety precautions and had nutritionist Blanca Morgan, TORI,LD come to speak with patient and wife today.  Continue  budesonide 3 mg daily.  FK level 5.6.  Continue tacrolimus 0.5 mg BID.  Advised Brandt to contact oncology team if have GI symptoms worsen,  consider discontinuing budesonide next visit.  3/17/25:  Discontinue budesonide.  FK level 7.6 (3/17/25), continue tacrolimus 0.5 mg BID.    4/14/25:  Day 158 No further GI symptoms and weight now steady,  Today complains of itchy rash on upper anterior chest,  noted macular raised rash < 10% of body, suspect chronic GVHD of skin, limited.  No dry eyes, dry mouth diarrhea, etc.  Started triamcinolone cream bid, advised to watch for any increased in rash, diarrhea, dry  eyes or mouth. Tacro 9.2 continue 0.5 mg bid alternating with 0.5 mg daily.    5/5/25:  Today is T+180.  Continues to have rash to face, neck, and upper chest resolved.  Reports he continues to have unformed stools, pudding consistency about 1-2 times per day.  Currently on tacrolimus 0.5 mg bid alternating with daily.  FK level 11.4 but had evening dose last night so will  not adjust., Ordered screening PFTs today   5/12/25:  Today is T+ 187.  Continues to have rash to face, no change.  Had 3 episodes of nausea, vomiting, and diarrhea last week after eating 5 [employer] burger and fries.  GI symptoms resolved quickly and he has had no further GI distress.  No further weight loss since prior visit.  Completed PFTs on 5/6/25 with FVC 3.81, FEV1 2.73, FEV1/FVC 0.72, and DLCO/VA 49%.   Continues on tacrolimus 0.5 mg daily on Tuesday, Thursday and Saturday and 0.5 mg bid MWFSun.  FK level 11.0 (5/12/25).  Continue current tacrolimus dosing.    5/29/25:  Today is T+204.  Continues on tacrolimus 0.5 mg daily on Tuesday, Thursday and Saturday and 0.5 mg bid MWFSun.  FK level 9.2.  CT scan of chest  5/23/25 shows mild air trapping but no other abnormalities of chest.   Rash pretty much stable < 10% of BSA mild papules and hyperpigmentation of face.  Diarrhea and air trapping of concern for more extensive cGVHD. Prescribed  desonisde cream to face BID and kenalog to rash on upper trunk/arms BID.  Stressed importance of calling oncology team with worsening rash or concerning symptoms.  Started po prednisone 40 mg daily with taper, advair, azithromycin and monteleukast ( FAM regimen)  6/23/25:  Today is T+ 229.  Continues tacrolimus 0.5 mg daily on Tuesday, Thursday and Saturday and 0.5 mg bid MWFSun.  Held tacrolimus today prior to lab draws, FK level in process.  Currently on prednisone 20 mg daily.  Rash has resolved to back, shoulders, and chest,  Continues to have small bumps to checks/nose, resolved to forehead, no redness, no itching.  Skin biopsy (6/18/25) showing moderately basal layer melanin pigmentation with pigment incontinence, mild acanthosis and a superficial perivascular lymphocytic infiltrate with enlarged sebaceous glands.  The specimen has some features of sebaceous hyperplasia. Postinflammatory hyperpigmentation cannot be excluded. The findings of active Qgyfh-Cyimpk-Mpgl disease or scleromyxedema are not identified. Continues desonide BID.  Continues FAM regimen with advair BID, azithromycin MWF, and monteluekast daily.  Reports improvement in respiratory symptoms.  Scheduled with pulmonary 7/18/25.      Weights: see below    Wt Readings from Last 5 Encounters:   06/12/25 66.3 kg (146 lb 2.6 oz)   05/29/25 65.7 kg (144 lb 13.5 oz)   05/19/25 66 kg (145 lb 8.1 oz)   05/12/25 65.4 kg (144 lb 1.6 oz)   05/05/25 65.5 kg (144 lb 4.8 oz)      Infectious Disease & Immune Reconstitution      Prophylaxis  Antiviral prophylaxis: acyclovir, Letermovir-continue until off of immunosuppression.    Antifungal: posaconazole  PCP prophylaxis: 10/26/24 - 11/28/24 Pentamidine; cont Bactrim.     Hypogammaglobulinemia  IgG level. IVIG for level <400   3/17/25:  IgG 307.  Ordered monthly IVIG infusions.  Reviewed rationale and possible side effects with patient.    Received first IVIG infusion 3/31/25.  IgG level 503 (5/29/25).   Receiving IVIG 6/23/25.    Active Surveillance:     CMV detected 688 1/2/25, 167 1/6/25. Levels undetectable since 1/13/25, CMV ND (2/17/25).    Started valgancicyclovir 900mg BID on 1/6/25,   Plan 1/20/25 decrease to 900 mg daily for 2 weeks (2/3/25), then transition back to letermovir/acyclovir.    CMV ND (6/12/25). Plan to continue letermovir due to ongoing immunosuppression.    Adeno ND 6/12/25, level in process from 6/23/25.  HHV6 ND 6/12/25, level in process from 6/23/25.  EBV ND 6/12/25, in process from 6/23/25.  EBV Viremia during transplant admission  Treated despite low levels due to upcoming second SCT  Rituximab 600 mg x 1 (10/31/24)     Infectious Disease follow up evaluation: 1/10/25      Immunodeficiency panel 100 days, 6mos and 1 year.      Immunizations:   Covid and influenza vaccine series.  Consider at T+ 3 month  Will plan to begin immunizations at T+6 mths around 5/5/25 depending on degree of immunosuppression  Received influenza vaccine 3/6/25 and 1st post transplant covid vaccine on 3/6/25, 2nd covid vaccine on 3/27/25, and 3rd covid vaccine on 4/25/25.   Received 6 month vaccines 5/5/25.  Received 8 month vaccines 6/23/25.       Cardiac:    Essential hypertension- was started on nifedipine 60 mg daily on 1/20/25 and started lisinopril 5 mg daily on 2/3/25.   Hx of STEMI (11/11/2020)  Cardiology appt on 12/18/24  ECHO 12/23/24: LVEF 65%     Ascending aorta dilation   TTE (4/29/24): 3.8-4 cm dilation      Electrolyte disturbance   Magnesium level 2.06 (6/23/25).  Continue magnesium 128 mg to BID.      Dry Skin hyperpigmentation:  Hyperpigmented skin to face.  Derm E-consult on 2/3/25 with Dr. Chavez, feels may be due to bactrim, which I prefer not to discontinue. May also be caused by prochlorperazine.  NPV with dermatology Dr. Medel on 6/18/25.    6/23/25:  Continues to have hyperpigmented skin to face and bilateral palms of hands.  Brandt feels his hyperpigmentation is lighted  slightly to palms.  Instructed to continue to use moisturizer such as cerave that are fragrance free and gentle.  Advised about mineral based sun screen.      Vitamin D deficiency  12/18 Level 27.  Last dose of weekly supplement taken. 1/27 Repeat level was 28.  Reordered ergocalciferol 50,000 UT weekly on 1/27/25 for 8 doses.  Repeat vitamin D 36 (4/8/25).  Continue maintenance vitamin D.       IV Access  2/3/25:  PICC line removed.     Psychosocial.    Caregiver Genevieve  [facility] [place]     RTC:   Continue prednisone 20 mg daily.    Continue monthly IVIG.  Follow up provider visit in 2 weeks.    7/17/25:  Pulmonary consult.    --------------------------------------------------------------------------------  GRICELDA Vallejo

## 2025-06-23 ENCOUNTER — LAB (OUTPATIENT)
Dept: LAB | Facility: HOSPITAL | Age: 65
End: 2025-06-23
Payer: COMMERCIAL

## 2025-06-23 ENCOUNTER — OFFICE VISIT (OUTPATIENT)
Dept: HEMATOLOGY/ONCOLOGY | Facility: HOSPITAL | Age: 65
End: 2025-06-23
Payer: COMMERCIAL

## 2025-06-23 ENCOUNTER — INFUSION (OUTPATIENT)
Dept: HEMATOLOGY/ONCOLOGY | Facility: HOSPITAL | Age: 65
End: 2025-06-23
Payer: COMMERCIAL

## 2025-06-23 ENCOUNTER — APPOINTMENT (OUTPATIENT)
Dept: HEMATOLOGY/ONCOLOGY | Facility: HOSPITAL | Age: 65
End: 2025-06-23
Payer: COMMERCIAL

## 2025-06-23 VITALS
DIASTOLIC BLOOD PRESSURE: 71 MMHG | RESPIRATION RATE: 16 BRPM | TEMPERATURE: 97 F | BODY MASS INDEX: 25.5 KG/M2 | OXYGEN SATURATION: 98 % | HEART RATE: 91 BPM | SYSTOLIC BLOOD PRESSURE: 123 MMHG | WEIGHT: 155.65 LBS

## 2025-06-23 DIAGNOSIS — C92.01 ACUTE MYELOID LEUKEMIA IN REMISSION (MULTI): ICD-10-CM

## 2025-06-23 DIAGNOSIS — Z94.84 HISTORY OF ALLOGENEIC HEMATOPOIETIC STEM CELL TRANSPLANT: ICD-10-CM

## 2025-06-23 DIAGNOSIS — D80.1 HYPOGAMMAGLOBULINEMIA (MULTI): ICD-10-CM

## 2025-06-23 DIAGNOSIS — D89.813 GVHD (GRAFT VERSUS HOST DISEASE) (MULTI): ICD-10-CM

## 2025-06-23 DIAGNOSIS — L81.9 HYPERPIGMENTATION OF SKIN: ICD-10-CM

## 2025-06-23 DIAGNOSIS — E87.8 ELECTROLYTE DISTURBANCE: ICD-10-CM

## 2025-06-23 DIAGNOSIS — I10 ESSENTIAL HYPERTENSION: ICD-10-CM

## 2025-06-23 DIAGNOSIS — C92.01 ACUTE MYELOID LEUKEMIA IN REMISSION (MULTI): Primary | ICD-10-CM

## 2025-06-23 DIAGNOSIS — D84.9 IMMUNOCOMPROMISED: ICD-10-CM

## 2025-06-23 LAB
ALBUMIN SERPL BCP-MCNC: 4 G/DL (ref 3.4–5)
ALP SERPL-CCNC: 79 U/L (ref 33–136)
ALT SERPL W P-5'-P-CCNC: 18 U/L (ref 10–52)
ANION GAP SERPL CALC-SCNC: 11 MMOL/L (ref 10–20)
AST SERPL W P-5'-P-CCNC: 19 U/L (ref 9–39)
BASOPHILS # BLD AUTO: 0.03 X10*3/UL (ref 0–0.1)
BASOPHILS NFR BLD AUTO: 0.2 %
BILIRUB SERPL-MCNC: 0.5 MG/DL (ref 0–1.2)
BUN SERPL-MCNC: 18 MG/DL (ref 6–23)
CALCIUM SERPL-MCNC: 8.8 MG/DL (ref 8.6–10.3)
CHLORIDE SERPL-SCNC: 107 MMOL/L (ref 98–107)
CO2 SERPL-SCNC: 25 MMOL/L (ref 21–32)
CREAT SERPL-MCNC: 1.12 MG/DL (ref 0.5–1.3)
EGFRCR SERPLBLD CKD-EPI 2021: 72 ML/MIN/1.73M*2
EOSINOPHIL # BLD AUTO: 0.18 X10*3/UL (ref 0–0.7)
EOSINOPHIL NFR BLD AUTO: 1.3 %
ERYTHROCYTE [DISTWIDTH] IN BLOOD BY AUTOMATED COUNT: 14.6 % (ref 11.5–14.5)
GLUCOSE SERPL-MCNC: 113 MG/DL (ref 74–99)
HCT VFR BLD AUTO: 41.4 % (ref 41–52)
HGB BLD-MCNC: 13.4 G/DL (ref 13.5–17.5)
IGG SERPL-MCNC: 716 MG/DL (ref 700–1600)
IMM GRANULOCYTES # BLD AUTO: 0.14 X10*3/UL (ref 0–0.7)
IMM GRANULOCYTES NFR BLD AUTO: 1 % (ref 0–0.9)
LDH SERPL L TO P-CCNC: 186 U/L (ref 84–246)
LYMPHOCYTES # BLD AUTO: 3.69 X10*3/UL (ref 1.2–4.8)
LYMPHOCYTES NFR BLD AUTO: 26.5 %
MAGNESIUM SERPL-MCNC: 2.06 MG/DL (ref 1.6–2.4)
MCH RBC QN AUTO: 32.3 PG (ref 26–34)
MCHC RBC AUTO-ENTMCNC: 32.4 G/DL (ref 32–36)
MCV RBC AUTO: 100 FL (ref 80–100)
MONOCYTES # BLD AUTO: 1.41 X10*3/UL (ref 0.1–1)
MONOCYTES NFR BLD AUTO: 10.1 %
NEUTROPHILS # BLD AUTO: 8.5 X10*3/UL (ref 1.2–7.7)
NEUTROPHILS NFR BLD AUTO: 60.9 %
NRBC BLD-RTO: 0 /100 WBCS (ref 0–0)
PLATELET # BLD AUTO: 209 X10*3/UL (ref 150–450)
POTASSIUM SERPL-SCNC: 4.2 MMOL/L (ref 3.5–5.3)
PROT SERPL-MCNC: 6.2 G/DL (ref 6.4–8.2)
RBC # BLD AUTO: 4.15 X10*6/UL (ref 4.5–5.9)
SODIUM SERPL-SCNC: 139 MMOL/L (ref 136–145)
TACROLIMUS BLD-MCNC: 9.2 NG/ML
URATE SERPL-MCNC: 3.9 MG/DL (ref 4–7.5)
WBC # BLD AUTO: 14 X10*3/UL (ref 4.4–11.3)

## 2025-06-23 PROCEDURE — 99215 OFFICE O/P EST HI 40 MIN: CPT | Mod: 25

## 2025-06-23 PROCEDURE — 96365 THER/PROPH/DIAG IV INF INIT: CPT | Mod: INF

## 2025-06-23 PROCEDURE — 96366 THER/PROPH/DIAG IV INF ADDON: CPT | Mod: INF

## 2025-06-23 PROCEDURE — 80053 COMPREHEN METABOLIC PANEL: CPT

## 2025-06-23 PROCEDURE — 90739 HEPB VACC 2/4 DOSE ADULT IM: CPT

## 2025-06-23 PROCEDURE — 85025 COMPLETE CBC W/AUTO DIFF WBC: CPT

## 2025-06-23 PROCEDURE — 83735 ASSAY OF MAGNESIUM: CPT

## 2025-06-23 PROCEDURE — 96372 THER/PROPH/DIAG INJ SC/IM: CPT | Mod: 59

## 2025-06-23 PROCEDURE — 36415 COLL VENOUS BLD VENIPUNCTURE: CPT

## 2025-06-23 PROCEDURE — 80197 ASSAY OF TACROLIMUS: CPT

## 2025-06-23 PROCEDURE — 90696 DTAP-IPV VACCINE 4-6 YRS IM: CPT

## 2025-06-23 PROCEDURE — 83615 LACTATE (LD) (LDH) ENZYME: CPT

## 2025-06-23 PROCEDURE — 90471 IMMUNIZATION ADMIN: CPT

## 2025-06-23 PROCEDURE — 2500000004 HC RX 250 GENERAL PHARMACY W/ HCPCS (ALT 636 FOR OP/ED): Mod: JZ,TB

## 2025-06-23 PROCEDURE — 87799 DETECT AGENT NOS DNA QUANT: CPT

## 2025-06-23 PROCEDURE — 90648 HIB PRP-T VACCINE 4 DOSE IM: CPT

## 2025-06-23 PROCEDURE — 2500000004 HC RX 250 GENERAL PHARMACY W/ HCPCS (ALT 636 FOR OP/ED)

## 2025-06-23 PROCEDURE — 90472 IMMUNIZATION ADMIN EACH ADD: CPT | Mod: 59

## 2025-06-23 PROCEDURE — 84550 ASSAY OF BLOOD/URIC ACID: CPT

## 2025-06-23 PROCEDURE — 82784 ASSAY IGA/IGD/IGG/IGM EACH: CPT

## 2025-06-23 PROCEDURE — 99215 OFFICE O/P EST HI 40 MIN: CPT

## 2025-06-23 PROCEDURE — 2500000001 HC RX 250 WO HCPCS SELF ADMINISTERED DRUGS (ALT 637 FOR MEDICARE OP)

## 2025-06-23 PROCEDURE — 90677 PCV20 VACCINE IM: CPT

## 2025-06-23 PROCEDURE — 87533 HHV-6 DNA QUANT: CPT

## 2025-06-23 RX ORDER — ALBUTEROL SULFATE 0.83 MG/ML
3 SOLUTION RESPIRATORY (INHALATION) AS NEEDED
OUTPATIENT
Start: 2025-06-30

## 2025-06-23 RX ORDER — FAMOTIDINE 10 MG/ML
20 INJECTION, SOLUTION INTRAVENOUS ONCE AS NEEDED
OUTPATIENT
Start: 2025-06-26

## 2025-06-23 RX ORDER — ALBUTEROL SULFATE 0.83 MG/ML
3 SOLUTION RESPIRATORY (INHALATION) AS NEEDED
Status: DISCONTINUED | OUTPATIENT
Start: 2025-06-23 | End: 2025-06-23 | Stop reason: HOSPADM

## 2025-06-23 RX ORDER — DIPHENHYDRAMINE HCL 25 MG
25 CAPSULE ORAL ONCE
Status: COMPLETED | OUTPATIENT
Start: 2025-06-23 | End: 2025-06-23

## 2025-06-23 RX ORDER — EPINEPHRINE 0.3 MG/.3ML
0.3 INJECTION SUBCUTANEOUS EVERY 5 MIN PRN
OUTPATIENT
Start: 2025-06-30

## 2025-06-23 RX ORDER — DIPHENHYDRAMINE HYDROCHLORIDE 50 MG/ML
50 INJECTION, SOLUTION INTRAMUSCULAR; INTRAVENOUS AS NEEDED
OUTPATIENT
Start: 2025-06-30

## 2025-06-23 RX ORDER — EPINEPHRINE 0.3 MG/.3ML
0.3 INJECTION SUBCUTANEOUS EVERY 5 MIN PRN
OUTPATIENT
Start: 2025-06-26

## 2025-06-23 RX ORDER — ACETAMINOPHEN 325 MG/1
650 TABLET ORAL ONCE
OUTPATIENT
Start: 2025-06-26

## 2025-06-23 RX ORDER — EPINEPHRINE 0.3 MG/.3ML
0.3 INJECTION SUBCUTANEOUS EVERY 5 MIN PRN
Status: DISCONTINUED | OUTPATIENT
Start: 2025-06-23 | End: 2025-06-23 | Stop reason: HOSPADM

## 2025-06-23 RX ORDER — ACETAMINOPHEN 325 MG/1
650 TABLET ORAL ONCE
Status: COMPLETED | OUTPATIENT
Start: 2025-06-23 | End: 2025-06-23

## 2025-06-23 RX ORDER — DIPHENHYDRAMINE HYDROCHLORIDE 50 MG/ML
50 INJECTION, SOLUTION INTRAMUSCULAR; INTRAVENOUS AS NEEDED
OUTPATIENT
Start: 2025-06-26

## 2025-06-23 RX ORDER — FAMOTIDINE 10 MG/ML
20 INJECTION, SOLUTION INTRAVENOUS ONCE AS NEEDED
OUTPATIENT
Start: 2025-06-30

## 2025-06-23 RX ORDER — ALBUTEROL SULFATE 0.83 MG/ML
3 SOLUTION RESPIRATORY (INHALATION) AS NEEDED
OUTPATIENT
Start: 2025-06-26

## 2025-06-23 RX ORDER — DIPHENHYDRAMINE HYDROCHLORIDE 50 MG/ML
50 INJECTION, SOLUTION INTRAMUSCULAR; INTRAVENOUS AS NEEDED
Status: DISCONTINUED | OUTPATIENT
Start: 2025-06-23 | End: 2025-06-23 | Stop reason: HOSPADM

## 2025-06-23 RX ORDER — FAMOTIDINE 10 MG/ML
20 INJECTION, SOLUTION INTRAVENOUS ONCE AS NEEDED
Status: DISCONTINUED | OUTPATIENT
Start: 2025-06-23 | End: 2025-06-23 | Stop reason: HOSPADM

## 2025-06-23 RX ORDER — DIPHENHYDRAMINE HCL 25 MG
25 CAPSULE ORAL ONCE
OUTPATIENT
Start: 2025-06-26

## 2025-06-23 RX ADMIN — PNEUMOCOCCAL 20-VALENT CONJUGATE VACCINE 0.5 ML
2.2; 2.2; 2.2; 2.2; 2.2; 2.2; 2.2; 2.2; 2.2; 2.2; 2.2; 2.2; 2.2; 2.2; 2.2; 2.2; 4.4; 2.2; 2.2; 2.2 INJECTION, SUSPENSION INTRAMUSCULAR at 10:54

## 2025-06-23 RX ADMIN — HEPATITIS B VACCINE (RECOMBINANT) ADJUVANTED 20 MCG: 20 INJECTION, SOLUTION INTRAMUSCULAR at 10:51

## 2025-06-23 RX ADMIN — DIPHENHYDRAMINE HYDROCHLORIDE 25 MG: 25 CAPSULE ORAL at 09:18

## 2025-06-23 RX ADMIN — ACETAMINOPHEN 650 MG: 325 TABLET ORAL at 09:18

## 2025-06-23 RX ADMIN — HAEMOPHILUS B POLYSACCHARIDE CONJUGATE VACCINE FOR INJ 0.5 ML: RECON SOLN at 10:50

## 2025-06-23 RX ADMIN — IMMUNE GLOBULIN INFUSION (HUMAN) 25 G: 100 INJECTION, SOLUTION INTRAVENOUS; SUBCUTANEOUS at 10:16

## 2025-06-23 RX ADMIN — DIPHTHERIA AND TETANUS TOXOIDS AND ACELLULAR PERTUSSIS ADSORBED AND INACTIVATED POLIOVIRUS VACCINE 0.5 ML: 25; 10; 25; 8; 25; 40; 8; 32 INJECTION, SUSPENSION INTRAMUSCULAR at 10:54

## 2025-06-23 ASSESSMENT — PAIN SCALES - GENERAL: PAINLEVEL_OUTOF10: 0-NO PAIN

## 2025-06-23 NOTE — PROGRESS NOTES
[facility] Infusion Nursing Note  06/23/25    Brandt Merritt is a 65 y.o. year old male patient presenting to outpatient infusion for IVIG and vaccines.    Administrations This Visit       acetaminophen (Tylenol) tablet 650 mg       Admin Date  06/23/2025 Action  Given Dose  650 mg Route  oral Documented By  Carol Dave RN              diphenhydrAMINE (BENADryl) capsule 25 mg       Admin Date  06/23/2025 Action  Given Dose  25 mg Route  oral Documented By  Carol Dave RN              DTaP-IPV (Kinrix) 25 Lf-58 mcg-10 Lf/0.5 mL vaccine 0.5 mL       Admin Date  06/23/2025 Action  Given Dose  0.5 mL Route  intramuscular Documented By  Carol Dave RN              haemophilus b conjugate (ActHIB) 10 mcg/0.5 mL vaccine 0.5 mL       Admin Date  06/23/2025 Action  Given Dose  0.5 mL Route  intramuscular Documented By  Carol Dave RN              hepatitis B (Heplisav-B) vaccine 20 mcg       Admin Date  06/23/2025 Action  Given Dose  20 mcg Route  intramuscular Documented By  Carol Dave RN              immune globulin (human) (Gammagard Liquid 10%) infusion 25 g       Admin Date  06/23/2025 Action  New Bag Dose  25 g Rate  35 mL/hr Route  intravenous Documented By  Carol Dave RN               Admin Date  06/23/2025 Action  Rate/Dose Change Dose   Rate  71 mL/hr Route  intravenous Documented By  Carol Dave RN               Admin Date  06/23/2025 Action  Rate/Dose Change Dose   Rate  141 mL/hr Route  intravenous Documented By  Carol Dave RN               Admin Date  06/23/2025 Action  Rate/Dose Change Dose   Rate  282 mL/hr Route  intravenous Documented By  Carol Dave RN              pneumococcal conjugate 20-valent (PREVNAR 20) vaccine       Admin Date  06/23/2025 Action  Given Dose  0.5 mL Route  intramuscular Documented By  Carol Dave RN                  Hypersensitivity reaction noted: No.  Patient tolerated treatment well. PIV removed. Pt discharged in stable condition and ambulated off unit  independently.    Follow-up Plan: 7/24    DONALD GUERRERO RN

## 2025-06-25 LAB — ADENOVIRUS QPCR,PLASMA, VIRC: NOT DETECTED COPIES/ML

## 2025-06-26 ENCOUNTER — APPOINTMENT (OUTPATIENT)
Dept: HEMATOLOGY/ONCOLOGY | Facility: HOSPITAL | Age: 65
End: 2025-06-26
Payer: COMMERCIAL

## 2025-06-26 LAB — HUMAN HERPESVIRUS-6 PCR PLASMA: NOT DETECTED COPIES/ML

## 2025-06-27 DIAGNOSIS — C92.00 ACUTE MYELOID LEUKEMIA NOT HAVING ACHIEVED REMISSION (MULTI): ICD-10-CM

## 2025-06-27 RX ORDER — POSACONAZOLE 100 MG/1
300 TABLET, DELAYED RELEASE ORAL DAILY
Qty: 90 TABLET | Refills: 0 | Status: SHIPPED | OUTPATIENT
Start: 2025-06-27

## 2025-06-30 ENCOUNTER — SPECIALTY PHARMACY (OUTPATIENT)
Dept: PHARMACY | Facility: CLINIC | Age: 65
End: 2025-06-30

## 2025-06-30 ENCOUNTER — APPOINTMENT (OUTPATIENT)
Dept: HEMATOLOGY/ONCOLOGY | Facility: HOSPITAL | Age: 65
End: 2025-06-30
Payer: COMMERCIAL

## 2025-07-06 ASSESSMENT — ENCOUNTER SYMPTOMS
BLOOD IN STOOL: 0
CARDIOVASCULAR NEGATIVE: 1
APPETITE CHANGE: 0
FATIGUE: 0
HEMATOLOGIC/LYMPHATIC NEGATIVE: 1
DIARRHEA: 0
NEUROLOGICAL NEGATIVE: 1
VOMITING: 0
UNEXPECTED WEIGHT CHANGE: 0
FEVER: 0
NAUSEA: 0
DIAPHORESIS: 0
CONSTIPATION: 0
CHILLS: 0
MUSCULOSKELETAL NEGATIVE: 1

## 2025-07-06 NOTE — PROGRESS NOTES
Patient ID:  Brandt Merritt is a 65 y.o. male.  Referring Physician:   BMT Dr Newsome  Primary Care Provider:  Bill Richmond MD    Oncology History Overview Note   4/2024  Myelodysplastic neoplasm with increased blasts-2 ( WHO)  Myelodysplastic neoplasm/AML  (ICC 2022 classification)    Presented in  10/2023 for pancytopenia, found to have hemolysis. Patient had normal CBC June 2020. Denied any hemorrhoidal bleeding . Has had C Scope and EGD , treated Hep C 2014 . 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      CBC 4/11/24 wbc 3.0, hgb 7.1, platelets 167K ANC 0.73    BM biopsy done on 4/11/24  as folllows:  BM histology  Myelodysplastic neoplasm with increased blasts-2 ( WHO)  Myelodysplastic neoplasm/AML  (ICC 2022 classification)  Balsts 11% on aspirate smear and 15-20% based on CD 34 immunostaining approaching AML leukemia, hematooiesis is erythroid dominant with dysplasia in granulocytes and megakaryocytes.   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      11/6/2024 -  Bone Marrow Transplant    conditioned with Flu/Cy/TBI and aGVHD prophylaxis with post-transplant cyclophosphamide, tacrolimus, and mycophenolate. T=0, 11/6/24. ABO Donor/Recipient: O+, A+. CMV donor/recipient: both positive. Started Tacro and MMF on T+5 (11/11).      2/11/2025 - 2/14/2025 Hospital Admission    Admit date: 2/11/25  Admission diagnosis: 2/14/25  Brandt was admitted on  -25 for intractable vomiting and some diarrhea, also with low grade fever.  He received broad spectrum antibiotics and all cultures were negative.  He underwent an upper endoscopy which was grossly normal, biopsies negative for GVHD, and budesonide was increased to 3 mg po bid.      3/19/2025 Biopsy    BM biopsy (3/19/25):   -- HYPOCELLULAR BONE MARROW (20%) WITH MATURING TRILINEAGE HEMATOPOIESIS AND 1% BLASTS.  No overt evidence of residual/relapsed acute leukemia.         2025 Biopsy    BM biopsy (25):   -- NORMOCELLULAR BONE MARROW (40%-50%) WITH MATURING TRILINEAGE HEMATOPOIESIS.  -- NO MORPHOLOGIC EVIDENCE OF ACUTE MYELOID LEUKEMIA.       AML (acute myeloid leukemia) in remission (Multi)   2025 Initial Diagnosis    AML (acute myeloid leukemia) in remission (Multi)        Response:      Past Medical History:  HTN   STEMI 2020 after getting  a water pills.  Chronic hepatitis C infection treated in  treated  with Dr. Lloyd  Past Medical History:   Diagnosis Date    Chest pain 2021    HTN (hypertension) 10/05/2023    Hypertension     Leukemia (Multi)     Personal history of other diseases of the circulatory system     History of hypertension      Surgical History:  Conosocopy 2021  Upper EGD 10/31/23  Surgical reduction torsion of testes      Past Surgical History:   Procedure Laterality Date    COLONOSCOPY  2013    Complete Colonoscopy    OTHER SURGICAL HISTORY  10/07/2015    Surgery Testis Reduction Of Torsion Of Testis Right      Family History:  CAD, DM in mother       Mother  of CVA at age 69  Brother with prostate CA, 1 sister with liver disease  2 children healthy  Family History   Problem Relation Name Age of Onset    Other (diabetes mellitus) Mother      Prostate cancer Brother        Social History:  Lives with wife of 30 years, works at [employer], Admitlyician ultrasounds metals, now retired.   29 years, former smoker, smoked x 15 yrs quit 20+  years ago. Marijuana 3 x a week. Served in  in [place] and [employer], .  ----------------------------------------------------------------------------------------------------  Subjective    History of Present Illness:    Brandt Merritt is a 65 year old male with history of myelodysplastic neoplasm/AML, s/p umbilical cord blood transplant 10/16/24 with graft rejection followed by haploidentical stem cells  from his sister on 11/6/24 with flu/cy, ATG and modified dose cyclophosphamide with engraftment on day T+8.      Brandt presents to the clinic today (7/7/25) with his wife Genevieve for follow up evaluation and count check and chronic GVHD.  Today he is T+ 291 of umbilical cord transfusion and is T+ 243 of his haplo sister transplant.  Completed 6 month Bmbx on 5/12/25 showing normocelluar bone marrow (40-50%) with maturing trilineage hematopoiesis, no evidence of AML, 100% chimeric.  Currently taking tacrolimus taking 0.5 mg once daily on Tuesday, Thursday, and Saturday, and is taking 0.5 mg BID on Monday, Wednesday, Friday, and Sunday.  Held tacrolimus dose prior to lab draws today.  Continues monthly IVIG with last dose due 6/23/25.      Energy level is good.  Appetite is good.  Gained a few pounds since last visit.  Is staying active, cut grass and doing yard work.  Going up the stairs is easier and is no longer having shortness of breath.  Recall that recent PFTs showed corrected DLCO of about 50%.  CT of chest done 5/23/25 shows mild air trapping.  Was started on FAM protocol 5/29/25.  Is currently on prednisone 20 mg daily.  Is scheduled to see pulmonary 7/18/25.  Rash has resolved to shoulders, chest, and back.  Continues to have small bumps to checks but has improved to forehead.  Reports no itching.  Recently saw dermatology and had skin biopsy to left cheek on 6/18/25.  Using desonide cream to face twice daily.  Having intermittent softer stools a few times per  week, no nausea.  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  Plans to go back to work around working full time then plans to retire January 2026.      Review of Systems   Constitutional:  Negative for appetite change, chills, diaphoresis, fatigue, fever and unexpected weight change.   Respiratory:  Negative for shortness of breath.    Cardiovascular: Negative.    Gastrointestinal:  Negative for blood in stool, constipation, diarrhea, nausea and vomiting.   Genitourinary: Negative.     Musculoskeletal: Negative.    Skin:  Positive for rash. Negative for itching.        Hyperpigmented skin to face and hands   Neurological: Negative.    Hematological: Negative.    --------------------------------------------------------------------------------------------------------  Objective:    Visit Vitals  /87   Pulse 84   Temp 36.3 °C (97.3 °F)   Resp 16     Vitals:    07/07/25 1029   Weight: 72.6 kg (160 lb 0.9 oz)       Physical Exam  Vitals reviewed.   Constitutional:       Appearance: Normal appearance.   HENT:      Head: Normocephalic and atraumatic.      Mouth/Throat:      Mouth: Mucous membranes are moist.   Eyes:      Extraocular Movements: Extraocular movements intact.      Conjunctiva/sclera: Conjunctivae normal.      Pupils: Pupils are equal, round, and reactive to light.   Cardiovascular:      Rate and Rhythm: Normal rate and regular rhythm.      Pulses: Normal pulses.      Heart sounds: Normal heart sounds.   Pulmonary:      Effort: Pulmonary effort is normal.      Breath sounds: Normal breath sounds.   Abdominal:      General: Abdomen is flat. Bowel sounds are normal.      Palpations: Abdomen is soft. There is no mass.      Tenderness: There is no abdominal tenderness.   Musculoskeletal:         General: No swelling. Normal range of motion.   Lymphadenopathy:      Comments: No lymphadenopathy   Skin:     General: Skin is warm and dry.      Findings: Rash  present.             Comments: Improvement to rash with mild lumpy bumpy with some small circular spots to face, rash resolved to upper back, bilateral shoulder, upper chest.  Areas of hyperpigmentation on face and palms of bilateral hands near wrists remains the same.    Neurological:      General: No focal deficit present.      Mental Status: He is alert and oriented to person, place, and time.   Psychiatric:         Mood and Affect: Mood normal.         Behavior: Behavior normal.         Thought Content: Thought content normal.         Judgment: Judgment normal.     -----------------------------------------------------------------------------------------------------------  Assessment and Plan:    MDS/Acute myeloid leukemia in remission (Multi)  Diagnosed 4/2024: BM Bx with MDS in in transition to AML (>10% blast) w/ trisomy 8, DNMT alpha, and U2AF1 mutation indication adverse risk.      s/p 4 cycles of Decitabine/Venetoclax. BM Bx 6/17/24: Blasts cleared, FISH still positive for trisomy 8, still MDS changes   BMBx (9/5/24): 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 and no increase in blasts.     History of allogeneic hematopoietic stem cell transplant  #1: Single-unit Cord, T0=9/19/24, Primary Engraftment Failure  - Conditioning: Flu-Mariana-TBI  - Donor: Single Cord, 6/8  = ABO Donor / Recipient: A+ / A+  = CMV Donor / Recipient: - / +  - aGVHD Prophylaxis: Tacrolimus + MMF  - engraftment failure, developed HLA antibody against class I of cord  - Repeat BMBx (10/15): hypocellular with no residual myeloid neoplasm. 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), T0=11/6/24  - Graft: Haploidentical sister  = ABO Donor / Recipient: O+ / A+ (ABO incompatibility +)  = CMV Donor / Recipient: + / +  - Conditioning: Flu/Cy/TBI/rATG  = GVHD prophylaxis - rATG 1.5mg/kg T-5,-3,-1, PTCy (25mg/kg T+3, T+4), Tacro, MMF (T+5)     DATE DAY SOURCE MORPHOLOGY MRD WHOLE CHIMERISM   (% donor)  CD3 CHIMERISM   (% donor) CD33 CHIMERISM   (% donor)   11/20/24 D+30 PB      100       12/11/24 D+30 PB       100 100   Deferred D+30 BM             1/9/25 D+60 BM  KRISTIE , flow negative      100  100     D+60 PB              2/17/25 D+100 PB      100        3/19/25 D+100 BM  KRISTIE, flow negative      100  100   5/12/25 M+6 BM KRISTIE  Flow negative  100       Monitor for post-transplant hemolysis   (7/7/25)  Haptoglobin  175 (3/17/25)  UPC ratio 0.10 (3/17/25)     Anemia  12/18 Epo 113.   12/16 Retic 7.2%  DARBY on hold.  Cont folic acid.     GVHD prophylaxis  GVHD  Anorexia/poor oral intake/weight loss. Added Mirtazapine. Continue Zofran. If persists, consider aGVHD.  12/9/24:  Currently taking tacrolimus 0.5 mg BID, held dose today prior to labs.  FK level 4.6 (12/9/24).  Advised patient on 12/10/24 to increase tacrolimus dose to 1 mg in the AM and 0.5 mg in the PM.    Wean MMF from 1 g TID to 500 mg TID 12/2/24 - present  Due to poor appetite will decrease MMF to 500 mg po tid.  Improvement to GI symptoms since decreasing MMF dose.    Stopped  MMF on T+35.     Stage I/Grade II Upper GI GVHD  Anorexia, taste changes and weight loss 171# (9/17/24).  Cont mirtazapine and ATC Zofran.   Add budesonide 3x day.   12/30/24:  Started on budesonide TID on 12/27/24.  Brandt reports that he has starting eating more since starting budesonide.  Reports no nausea, vomiting, or diarrhea at this time.    Appetite continuing to improve. Denies N/V/D.     1/13/25 Decreased budesonide bid per Dr Newsome; 1/27 Decrease to 3mg daily. Will continue on this dose for 1-2 weeks before discontinuing.     2/17/25 Recent admission for nausea, budesonide increased to 3 mg po bid, continues therapeutic on tacro 0.5 mg po bid. Esophageal biopsy 2/13/25 negative for GVHD.  3/3/25:  Overall doing well.  Nausea has resolved. Reviewed food safety precautions and had nutritionist Blanca Noernberg, TORI,LD come to speak with patient and wife today.   Continue budesonide 3 mg daily.  FK level 5.6.  Continue tacrolimus 0.5 mg BID.  Advised Brandt to contact oncology team if have GI symptoms worsen,  consider discontinuing budesonide next visit.  3/17/25:  Discontinue budesonide.  FK level 7.6 (3/17/25), continue tacrolimus 0.5 mg BID.    4/14/25:  Day 158 No further GI symptoms and weight now steady,  Today complains of itchy rash on upper anterior chest,  noted macular raised rash < 10% of body, suspect chronic GVHD of skin, limited.  No dry eyes, dry mouth diarrhea, etc.  Started triamcinolone cream bid, advised to watch for any increased in rash, diarrhea, dry  eyes or mouth. Tacro 9.2 continue 0.5 mg bid alternating with 0.5 mg daily.    5/5/25:  Today is T+180.  Continues to have rash to face, neck, and upper chest resolved.  Reports he continues to have unformed stools, pudding consistency about 1-2 times per day.  Currently on tacrolimus 0.5 mg bid alternating with daily.  FK level 11.4 but had evening dose last night so will  not adjust., Ordered screening PFTs today   5/12/25:  Today is T+ 187.  Continues to have rash to face, no change.  Had 3 episodes of nausea, vomiting, and diarrhea last week after eating 5 [place] burger and fries.  GI symptoms resolved quickly and he has had no further GI distress.  No further weight loss since prior visit.  Completed PFTs on 5/6/25 with FVC 3.81, FEV1 2.73, FEV1/FVC 0.72, and DLCO/VA 49%.   Continues on tacrolimus 0.5 mg daily on Tuesday, Thursday and Saturday and 0.5 mg bid MWFSun.  FK level 11.0 (5/12/25).  Continue current tacrolimus dosing.    5/29/25:  Today is T+204.  Continues on tacrolimus 0.5 mg daily on Tuesday, Thursday and Saturday and 0.5 mg bid MWFSun.  FK level 9.2.  CT scan of chest  5/23/25 shows mild air trapping but no other abnormalities of chest.   Rash pretty much stable < 10% of BSA mild papules and hyperpigmentation of face.  Diarrhea and air trapping of concern for more extensive cGVHD.  Prescribed desonisde cream to face BID and kenalog to rash on upper trunk/arms BID.  Stressed importance of calling oncology team with worsening rash or concerning symptoms.  Started po prednisone 40 mg daily with taper, advair, azithromycin and monteleukast ( FAM regimen)  7/7/25:  Today is T+ 243.  Continues tacrolimus 0.5 mg daily on Tuesday, Thursday and Saturday and 0.5 mg bid MWFSun.  Held tacrolimus today prior to lab draws, FK level in process.  Currently on prednisone 20 mg daily.  Rash has resolved to back, shoulders, and chest,  Continues to have small bumps to checks/nose, resolved to forehead, no redness, no itching.  Skin biopsy (6/18/25) showing moderately basal layer melanin pigmentation with pigment incontinence, mild acanthosis and a superficial perivascular lymphocytic infiltrate with enlarged sebaceous glands.  The specimen has some features of sebaceous hyperplasia. Postinflammatory hyperpigmentation cannot be excluded. The findings of active Cqaqf-[id number]-[id number] disease or scleromyxedema are not identified. Continues desonide BID.  Continues FAM regimen with advair BID, azithromycin MWF, and monteluekast daily.  Reports improvement in respiratory symptoms.  Scheduled with pulmonary 7/18/25.      Weights: see below    Wt Readings from Last 5 Encounters:   07/07/25 72.6 kg (160 lb 0.9 oz)   06/23/25 70.6 kg (155 lb 10.3 oz)   06/12/25 66.3 kg (146 lb 2.6 oz)   05/29/25 65.7 kg (144 lb 13.5 oz)   05/19/25 66 kg (145 lb 8.1 oz)      Infectious Disease & Immune Reconstitution      Prophylaxis  Antiviral prophylaxis: acyclovir, Letermovir-continue until off of immunosuppression.    Antifungal: posaconazole  PCP prophylaxis: 10/26/24 - 11/28/24 Pentamidine; cont Bactrim.     Hypogammaglobulinemia  IgG level. IVIG for level <400   3/17/25:  IgG 307.  Ordered monthly IVIG infusions.  Reviewed rationale and possible side effects with patient.    Received first IVIG infusion 3/31/25.  IgG level 716  (6/23/25).  Received IVIG 6/23/25, next dose due 7/24/25.    Active Surveillance:     CMV detected 688 1/2/25, 167 1/6/25. Levels undetectable since 1/13/25, CMV ND (2/17/25).    Started valgancicyclovir 900mg BID on 1/6/25,   Plan 1/20/25 decrease to 900 mg daily for 2 weeks (2/3/25), then transition back to letermovir/acyclovir.    CMV ND (6/23/25). Plan to continue letermovir due to ongoing immunosuppression.    Adeno ND 6/23/25.  HHV6 ND 6/23/25.  EBV ND 6/23/25.  EBV Viremia during transplant admission  Treated despite low levels due to upcoming second SCT  Rituximab 600 mg x 1 (10/31/24)     Infectious Disease follow up evaluation: 1/10/25      Immunodeficiency panel 100 days, 6mos and 1 year.      Immunizations:   Covid and influenza vaccine series.  Consider at T+ 3 month  Will plan to begin immunizations at T+6 mths around 5/5/25 depending on degree of immunosuppression  Received influenza vaccine 3/6/25 and 1st post transplant covid vaccine on 3/6/25, 2nd covid vaccine on 3/27/25, and 3rd covid vaccine on 4/25/25.   Received 6 month vaccines 5/5/25.  Received 8 month vaccines 6/23/25.    Due to 10 month vaccines around 8/18/25.     Cardiac:    Essential hypertension- was started on nifedipine 60 mg daily on 1/20/25 and started lisinopril 5 mg daily on 2/3/25.   Hx of STEMI (11/11/2020)  Cardiology appt on 12/18/24  ECHO 12/23/24: LVEF 65%     Ascending aorta dilation   TTE (4/29/24): 3.8-4 cm dilation      Electrolyte disturbance   Magnesium level 1.84 (7/7/25).  Continue magnesium 128 mg to BID.      Dry Skin hyperpigmentation:  Hyperpigmented skin to face.  Derm E-consult on 2/3/25 with Dr. Chavez, feels may be due to bactrim, which I prefer not to discontinue. May also be caused by prochlorperazine.  NPV with dermatology Dr. Medel on 6/18/25.    6/23/25:  Continues to have hyperpigmented skin to face and bilateral palms of hands.  Brandt feels his hyperpigmentation is lighted slightly to palms.   Instructed to continue to use moisturizer such as cerave that are fragrance free and gentle.  Advised about mineral based sun screen.      Vitamin D deficiency  12/18 Level 27.  Last dose of weekly supplement taken. 1/27 Repeat level was 28.  Reordered ergocalciferol 50,000 UT weekly on 1/27/25 for 8 doses.  Repeat vitamin D 36 (4/8/25).  Continue maintenance vitamin D.          Psychosocial.    Caregiver Genevieve  [facility] in [place]     RTC:   Continue prednisone 20 mg daily, plan to taper to 15 mg on 7/19/25.  Continue monthly IVIG.  Follow up provider visit in 2 weeks.    7/17/25:  Pulmonary consult.    --------------------------------------------------------------------------------  GRICELDA Vallejo

## 2025-07-07 ENCOUNTER — SPECIALTY PHARMACY (OUTPATIENT)
Dept: PHARMACY | Facility: CLINIC | Age: 65
End: 2025-07-07

## 2025-07-07 ENCOUNTER — LAB (OUTPATIENT)
Dept: LAB | Facility: HOSPITAL | Age: 65
End: 2025-07-07
Payer: COMMERCIAL

## 2025-07-07 ENCOUNTER — OFFICE VISIT (OUTPATIENT)
Dept: HEMATOLOGY/ONCOLOGY | Facility: HOSPITAL | Age: 65
End: 2025-07-07
Payer: COMMERCIAL

## 2025-07-07 VITALS
RESPIRATION RATE: 16 BRPM | BODY MASS INDEX: 27.33 KG/M2 | SYSTOLIC BLOOD PRESSURE: 132 MMHG | HEART RATE: 84 BPM | HEIGHT: 64 IN | WEIGHT: 160.05 LBS | TEMPERATURE: 97.3 F | DIASTOLIC BLOOD PRESSURE: 87 MMHG | OXYGEN SATURATION: 100 %

## 2025-07-07 DIAGNOSIS — D84.9 IMMUNOCOMPROMISED: ICD-10-CM

## 2025-07-07 DIAGNOSIS — E87.8 ELECTROLYTE DISTURBANCE: ICD-10-CM

## 2025-07-07 DIAGNOSIS — Z94.84 HISTORY OF ALLOGENEIC HEMATOPOIETIC STEM CELL TRANSPLANT: ICD-10-CM

## 2025-07-07 DIAGNOSIS — D89.813 GVHD (GRAFT VERSUS HOST DISEASE) (MULTI): ICD-10-CM

## 2025-07-07 DIAGNOSIS — L81.9 HYPERPIGMENTATION OF SKIN: ICD-10-CM

## 2025-07-07 DIAGNOSIS — D80.1 HYPOGAMMAGLOBULINEMIA (MULTI): ICD-10-CM

## 2025-07-07 DIAGNOSIS — C92.01 ACUTE MYELOID LEUKEMIA IN REMISSION (MULTI): Primary | ICD-10-CM

## 2025-07-07 DIAGNOSIS — Z94.84 STEM CELLS TRANSPLANT STATUS (MULTI): ICD-10-CM

## 2025-07-07 DIAGNOSIS — C92.00 ACUTE MYELOID LEUKEMIA NOT HAVING ACHIEVED REMISSION (MULTI): ICD-10-CM

## 2025-07-07 DIAGNOSIS — C92.01 ACUTE MYELOID LEUKEMIA IN REMISSION (MULTI): ICD-10-CM

## 2025-07-07 LAB
ALBUMIN SERPL BCP-MCNC: 4 G/DL (ref 3.4–5)
ALP SERPL-CCNC: 64 U/L (ref 33–136)
ALT SERPL W P-5'-P-CCNC: 18 U/L (ref 10–52)
ANION GAP SERPL CALC-SCNC: 11 MMOL/L (ref 10–20)
AST SERPL W P-5'-P-CCNC: 17 U/L (ref 9–39)
BASOPHILS # BLD AUTO: 0.04 X10*3/UL (ref 0–0.1)
BASOPHILS NFR BLD AUTO: 0.5 %
BILIRUB SERPL-MCNC: 0.4 MG/DL (ref 0–1.2)
BUN SERPL-MCNC: 15 MG/DL (ref 6–23)
CALCIUM SERPL-MCNC: 9.3 MG/DL (ref 8.6–10.3)
CHLORIDE SERPL-SCNC: 106 MMOL/L (ref 98–107)
CO2 SERPL-SCNC: 25 MMOL/L (ref 21–32)
CREAT SERPL-MCNC: 1.05 MG/DL (ref 0.5–1.3)
EGFRCR SERPLBLD CKD-EPI 2021: 78 ML/MIN/1.73M*2
EOSINOPHIL # BLD AUTO: 0.11 X10*3/UL (ref 0–0.7)
EOSINOPHIL NFR BLD AUTO: 1.2 %
ERYTHROCYTE [DISTWIDTH] IN BLOOD BY AUTOMATED COUNT: 14.2 % (ref 11.5–14.5)
GLUCOSE SERPL-MCNC: 92 MG/DL (ref 74–99)
HCT VFR BLD AUTO: 41.1 % (ref 41–52)
HGB BLD-MCNC: 13.3 G/DL (ref 13.5–17.5)
IMM GRANULOCYTES # BLD AUTO: 0.08 X10*3/UL (ref 0–0.7)
IMM GRANULOCYTES NFR BLD AUTO: 0.9 % (ref 0–0.9)
LDH SERPL L TO P-CCNC: 175 U/L (ref 84–246)
LYMPHOCYTES # BLD AUTO: 2.74 X10*3/UL (ref 1.2–4.8)
LYMPHOCYTES NFR BLD AUTO: 31.1 %
MAGNESIUM SERPL-MCNC: 1.84 MG/DL (ref 1.6–2.4)
MCH RBC QN AUTO: 31.8 PG (ref 26–34)
MCHC RBC AUTO-ENTMCNC: 32.4 G/DL (ref 32–36)
MCV RBC AUTO: 98 FL (ref 80–100)
MONOCYTES # BLD AUTO: 0.93 X10*3/UL (ref 0.1–1)
MONOCYTES NFR BLD AUTO: 10.6 %
NEUTROPHILS # BLD AUTO: 4.91 X10*3/UL (ref 1.2–7.7)
NEUTROPHILS NFR BLD AUTO: 55.7 %
NRBC BLD-RTO: 0 /100 WBCS (ref 0–0)
PLATELET # BLD AUTO: 147 X10*3/UL (ref 150–450)
POTASSIUM SERPL-SCNC: 4.3 MMOL/L (ref 3.5–5.3)
PROT SERPL-MCNC: 6.8 G/DL (ref 6.4–8.2)
RBC # BLD AUTO: 4.18 X10*6/UL (ref 4.5–5.9)
SODIUM SERPL-SCNC: 138 MMOL/L (ref 136–145)
TACROLIMUS BLD-MCNC: 7.9 NG/ML
URATE SERPL-MCNC: 4 MG/DL (ref 4–7.5)
WBC # BLD AUTO: 8.8 X10*3/UL (ref 4.4–11.3)

## 2025-07-07 PROCEDURE — 87799 DETECT AGENT NOS DNA QUANT: CPT

## 2025-07-07 PROCEDURE — 3075F SYST BP GE 130 - 139MM HG: CPT

## 2025-07-07 PROCEDURE — 1160F RVW MEDS BY RX/DR IN RCRD: CPT

## 2025-07-07 PROCEDURE — 87533 HHV-6 DNA QUANT: CPT

## 2025-07-07 PROCEDURE — 85025 COMPLETE CBC W/AUTO DIFF WBC: CPT

## 2025-07-07 PROCEDURE — 99215 OFFICE O/P EST HI 40 MIN: CPT

## 2025-07-07 PROCEDURE — 1159F MED LIST DOCD IN RCRD: CPT

## 2025-07-07 PROCEDURE — 83615 LACTATE (LD) (LDH) ENZYME: CPT

## 2025-07-07 PROCEDURE — 84550 ASSAY OF BLOOD/URIC ACID: CPT

## 2025-07-07 PROCEDURE — 36415 COLL VENOUS BLD VENIPUNCTURE: CPT

## 2025-07-07 PROCEDURE — 3079F DIAST BP 80-89 MM HG: CPT

## 2025-07-07 PROCEDURE — 1126F AMNT PAIN NOTED NONE PRSNT: CPT

## 2025-07-07 PROCEDURE — 83735 ASSAY OF MAGNESIUM: CPT

## 2025-07-07 PROCEDURE — 80053 COMPREHEN METABOLIC PANEL: CPT

## 2025-07-07 PROCEDURE — 3008F BODY MASS INDEX DOCD: CPT

## 2025-07-07 PROCEDURE — 1036F TOBACCO NON-USER: CPT

## 2025-07-07 PROCEDURE — 80197 ASSAY OF TACROLIMUS: CPT

## 2025-07-07 PROCEDURE — RXMED WILLOW AMBULATORY MEDICATION CHARGE

## 2025-07-07 RX ORDER — EPINEPHRINE 0.3 MG/.3ML
0.3 INJECTION SUBCUTANEOUS EVERY 5 MIN PRN
OUTPATIENT
Start: 2025-07-24

## 2025-07-07 RX ORDER — POSACONAZOLE 100 MG/1
300 TABLET, DELAYED RELEASE ORAL DAILY
Qty: 90 TABLET | Refills: 1 | Status: SHIPPED | OUTPATIENT
Start: 2025-07-07

## 2025-07-07 RX ORDER — ACETAMINOPHEN 325 MG/1
650 TABLET ORAL ONCE
OUTPATIENT
Start: 2025-07-24

## 2025-07-07 RX ORDER — DIPHENHYDRAMINE HCL 25 MG
25 CAPSULE ORAL ONCE
OUTPATIENT
Start: 2025-07-24

## 2025-07-07 RX ORDER — ALBUTEROL SULFATE 0.83 MG/ML
3 SOLUTION RESPIRATORY (INHALATION) AS NEEDED
OUTPATIENT
Start: 2025-07-24

## 2025-07-07 RX ORDER — FAMOTIDINE 10 MG/ML
20 INJECTION, SOLUTION INTRAVENOUS ONCE AS NEEDED
OUTPATIENT
Start: 2025-07-24

## 2025-07-07 RX ORDER — DIPHENHYDRAMINE HYDROCHLORIDE 50 MG/ML
50 INJECTION, SOLUTION INTRAMUSCULAR; INTRAVENOUS AS NEEDED
OUTPATIENT
Start: 2025-07-24

## 2025-07-07 ASSESSMENT — ENCOUNTER SYMPTOMS: SHORTNESS OF BREATH: 0

## 2025-07-07 ASSESSMENT — PAIN SCALES - GENERAL: PAINLEVEL_OUTOF10: 0-NO PAIN

## 2025-07-08 ENCOUNTER — PHARMACY VISIT (OUTPATIENT)
Dept: PHARMACY | Facility: CLINIC | Age: 65
End: 2025-07-08
Payer: COMMERCIAL

## 2025-07-08 LAB
CMV DNA SERPL NAA+PROBE-LOG IU: NORMAL {LOG_IU}/ML
LABORATORY COMMENT REPORT: NOT DETECTED

## 2025-07-09 DIAGNOSIS — D89.813 GVHD (GRAFT VERSUS HOST DISEASE) (MULTI): Primary | ICD-10-CM

## 2025-07-09 LAB
ADENOVIRUS QPCR,PLASMA, VIRC: NOT DETECTED COPIES/ML
EBV DNA BLD NAA+PROBE-LOG IU: NORMAL {LOG_IU}/ML
HUMAN HERPESVIRUS-6 PCR PLASMA: NOT DETECTED COPIES/ML
LABORATORY COMMENT REPORT: NOT DETECTED

## 2025-07-09 RX ORDER — PREDNISONE 5 MG/1
15 TABLET ORAL DAILY
Qty: 42 TABLET | Refills: 0 | Status: SHIPPED | OUTPATIENT
Start: 2025-07-09 | End: 2025-07-23

## 2025-07-09 RX ORDER — PREDNISONE 5 MG/1
15 TABLET ORAL DAILY
Qty: 42 TABLET | Refills: 0 | Status: SHIPPED | OUTPATIENT
Start: 2025-07-09 | End: 2025-07-09 | Stop reason: ENTERED-IN-ERROR

## 2025-07-10 ENCOUNTER — TELEPHONE (OUTPATIENT)
Dept: HEMATOLOGY/ONCOLOGY | Facility: HOSPITAL | Age: 65
End: 2025-07-10
Payer: COMMERCIAL

## 2025-07-17 ENCOUNTER — EPISODE CHANGES (OUTPATIENT)
Dept: HEMATOLOGY/ONCOLOGY | Facility: HOSPITAL | Age: 65
End: 2025-07-17

## 2025-07-18 ENCOUNTER — OFFICE VISIT (OUTPATIENT)
Dept: PULMONOLOGY | Facility: HOSPITAL | Age: 65
End: 2025-07-18
Payer: COMMERCIAL

## 2025-07-18 VITALS
WEIGHT: 163 LBS | OXYGEN SATURATION: 96 % | DIASTOLIC BLOOD PRESSURE: 68 MMHG | TEMPERATURE: 98 F | HEART RATE: 82 BPM | SYSTOLIC BLOOD PRESSURE: 101 MMHG | BODY MASS INDEX: 27.56 KG/M2

## 2025-07-18 DIAGNOSIS — D89.813 GVHD (GRAFT VERSUS HOST DISEASE) (MULTI): ICD-10-CM

## 2025-07-18 DIAGNOSIS — J84.9 ILD (INTERSTITIAL LUNG DISEASE) (MULTI): Primary | ICD-10-CM

## 2025-07-18 PROCEDURE — 1126F AMNT PAIN NOTED NONE PRSNT: CPT | Performed by: INTERNAL MEDICINE

## 2025-07-18 PROCEDURE — 3074F SYST BP LT 130 MM HG: CPT | Performed by: INTERNAL MEDICINE

## 2025-07-18 PROCEDURE — 99214 OFFICE O/P EST MOD 30 MIN: CPT | Performed by: INTERNAL MEDICINE

## 2025-07-18 PROCEDURE — 1159F MED LIST DOCD IN RCRD: CPT | Performed by: INTERNAL MEDICINE

## 2025-07-18 PROCEDURE — 3078F DIAST BP <80 MM HG: CPT | Performed by: INTERNAL MEDICINE

## 2025-07-18 PROCEDURE — 99213 OFFICE O/P EST LOW 20 MIN: CPT | Performed by: INTERNAL MEDICINE

## 2025-07-18 RX ORDER — ALBUTEROL SULFATE 90 UG/1
4 INHALANT RESPIRATORY (INHALATION) ONCE
OUTPATIENT
Start: 2025-07-18

## 2025-07-18 ASSESSMENT — PAIN SCALES - GENERAL: PAINLEVEL_OUTOF10: 0-NO PAIN

## 2025-07-18 NOTE — PATIENT INSTRUCTIONS
Brandt Merritt it was pleasure seeing you in clinic today. We discussed the following:    The CT scan of your lungs and your pulmonary function test are within normal limits   The echo of your heart is also normal   Your dyspnea may be related to deconditioning following your prolonged hospital stay  You will try to accumulate 30 minutes of moderate physical activity almost every day.     We will see you back in clinic in 3 months with repeat spirometry and a 6 minute walking test      For scheduling purposes:    Call [phone] to schedule a breathing or a walking test     Call [phone] to schedule  EKG's, Echocardiograms and Cardiopulmonary Stress Tests.    Call [phone] to schedule Radiology tests such as Nuclear Medicine Stress Tests, CT Scans, and MRI's.    Should you have any questions Please Call our pulmonary nurse Karon Bal at [phone] or my assistant Keira Estrella at [phone]

## 2025-07-18 NOTE — PROGRESS NOTES
Department of Medicine  Division of Pulmonary, Critical Care, and Sleep Medicine  Consultation  [address] [facility]    Patient was referred by Malaika Newsome MD to evaluate for dyspnea, ILD or GVHD. I have independently interviewed and examined the patient in the office and reviewed available records.    Physician HPI :  (7/18/2025) At baseline, patient has dyspnea on exertion, but none at rest. The dyspnea on exertion started in November following a 3 months hospital stay for a stem cell transplant. Since his discharge, he feels his symptoms have slowly improving. Patient active in everyday life goes on walks, climbs stairs. Patient is short of breath when hurrying on level ground or walking up a slight hill. Denies orthopnea, pnd, or mirtha. Weight has been mostly stable.  Patient also denies chronic cough, wheezing, and sputum. hemoptysis. No fever or shivering chills. No runny nose, or a tingling sensation in the back of the throat. Denies chest pain or heartburn.    Prior Pulmonary History:  Patient has no history of recurrent infections, or lung disease as a child.  No previous lung hx, never on oxygen or inhaler therapy.     Chest imaging history (I have personally reviewed the images below and this is my interpretation)  05/23/2025 HRCT with no evidence of ILD. Minimal amounts of air trapping on CT (most likely physiological).     PFTs:  05/06/2025-> Ratio of 0.72/FEV1 2.73L (104%) (no BD response)/FVC 3.81L (112%)/TLC 92%/RVtoTLC ratio 0.29/DLCO 51%    6MWTs:  None on record.     Echocardiogram:  12/23/2024 -> Normal EF, no diastolic dysfunction, with normal LA, RV size and function     Lung biopsy:  None done    Relevant labs:  NA    Hospitalization History:  Has not been hospitalized over the last year for breathing related problem.    Past Medical History:  HTN   STEMI 11/11/2020   Chronic hepatitis C infection treated in 2015 treated  with Dr. Prema Gupta  History:  Social History[1]    Occupational exposure:  Worked as an US tech for 30 years. Brief work with fiberglass in the 1980s.  No known exposure to asbestos silica or beryllium.    Evaluated for below and negative except highlighted in red.  Occupational exposure (longest held job): Epoxies; isocyanates; pesticides; hay/silage; wheat flour; wood dust or natural fibres; animal products (hair, fur, dander, waste); birds (including feathers, down); insect cultivation; sea shells; water humidification systems (including water features, swamp coolers); mouldy/water-damaged workplace; metal cooling fluids; metal dust or fumes; sand/stone/concrete dust  Home based exposures (last 5 years): Water-damaged or mouldy environment; water humidification systems (including water features, swamp coolers, desert coolers); hot tub or sauna; feather bedding; domestic animals (including birds, mammals, fish tanks, insects)  Hobby exposures or avocations (last 5 years): Hunting; fly fishing; jewellery polishing; working with shells; woodworking; weaving, working with fibres; gardening, composting.    CTD evaluation: No hx of joint pain/swelling, skin rashes, Raynaud's, sicca syndrome, eye redness, muscle pain and weakness, difficulty swallowing.     Family History:  No family history of lung diseases or cancer.    Immunization History:  Immunization History   Administered Date(s) Administered    COVID-19, mRNA, LNP-S, PF, 30 mcg/0.3 mL dose 03/13/2021, 04/03/2021, 12/13/2021    DTaP IPV combined vaccine (KINRIX, QUADRACEL) 05/05/2025, 06/23/2025    Hepatitis B vaccine, 18yrs and older(HEPLISAV) 05/05/2025, 06/23/2025    HiB PRP-T conjugate vaccine (HIBERIX, ACTHIB) 05/05/2025, 06/23/2025    Influenza, injectable, quadrivalent 09/06/2022    Influenza, seasonal, injectable 10/13/2020    Pfizer COVID-19 vaccine, 12 years and older, (30mcg/0.3mL) (Comirnaty) 03/06/2025, 03/27/2025, 04/25/2025    Pfizer COVID-19 vaccine, bivalent, age  12 years and older (30 mcg/0.3 mL) 09/17/2022    Pfizer Gray Cap SARS-CoV-2 04/12/2022    Pneumococcal conjugate vaccine, 20-valent (PREVNAR 20) 05/05/2025, 06/23/2025    Tdap vaccine, age 7 year and older (BOOSTRIX, ADACEL) 09/06/2022       Current Medications:  Current Outpatient Medications   Medication Instructions    acyclovir (ZOVIRAX) 400 mg, oral, Every 12 hours    azithromycin (ZITHROMAX) 250 mg, oral, Every Mon/Wed/Fri    desonide (DesOwen) 0.05 % cream Topical, 2 times daily, Apply to face twice daily.    fluticasone propion-salmeteroL (Advair HFA) 230-21 mcg/actuation inhaler 2 puffs, inhalation, 2 times daily RT, Rinse mouth with water after use to reduce aftertaste and incidence of candidiasis. Do not swallow.    folic acid (FOLVITE) 1 mg, oral, Daily    letermovir (PREVYMIS) 480 mg, oral, Daily    lisinopril 5 mg, oral, Daily    magnesium chloride (MagDelay) 535 mg (64 mg elemental) EC tablet 1 tablet, oral, 2 times daily, Or as instructed on your medication list.    montelukast (SINGULAIR) 10 mg, oral, Nightly    NIFEdipine ER (ADALAT CC) 60 mg, oral, Daily before breakfast, Do not crush, chew, or split.    ondansetron (ZOFRAN) 8 mg, oral, Every 8 hours PRN    pantoprazole (PROTONIX) 40 mg, oral, Daily before breakfast, Do not crush, chew, or split.    posaconazole (NOXAFIL) 300 mg, oral, Daily, Do not crush, chew, or split. This is to prevent fungal infections.    predniSONE (Deltasone) 10 mg tablet Take 4 tablets (40 mg) by mouth once daily for 14 days, THEN 3 tablets (30 mg) once daily for 7 days, THEN 2 tablets (20 mg) once daily. Take in the morning with food.    predniSONE (DELTASONE) 15 mg, oral, Daily, Please start 15 mg daily once completing prior prednisone prescription.    prochlorperazine (COMPAZINE) 10 mg, oral, Every 6 hours PRN    sennosides-docusate sodium (Karissa-Colace) 8.6-50 mg tablet 1 tablet, oral, 2 times daily PRN    sulfamethoxazole-trimethoprim (Bactrim DS) 800-160 mg tablet 1  tablet, oral, 3 times weekly, Take on Mondays, Wednesdays, and Fridays.    tacrolimus (PROGRAF) 0.5 mg, oral, Every 12 hours        Drug Allergies/Intolerances:  RX Allergies[2]     Review of Systems:  All other review of systems are negative and/or non-contributory.    Physical Examination:  /68   Pulse 82   Temp 36.7 °C (98 °F) (Temporal)   Wt 73.9 kg (163 lb)   SpO2 96%   BMI 27.56 kg/m²      Constitutional: Alert and oriented. In no acute distress. Well developed, well nourished.  Head and Face: Normal. Palpation of the face and sinuses: normal.  Ear, Nose and throat: External inspection of the ear and nose: normal. Oropharynx: normal.  Neck: Supple. No neck mass observed.  Pulmonary: Chest is normal to inspection. No increased work of breathing or signs of respiratory distress. Normal auscultation.   CV: Heart rate and rhythm were normal. Normal S1 and S2, no gallops, no murmurs and no pericardial rub. No peripheral edema.  Abdomen: soft, nontender, no abdominal mass palpated.  Lymphatic: no cervical lad.  MSK: normal gait and station. No clubbing or cyanosis of the fingernails.  Skin: Normal skin color and pigmentation, normal skin turgor, and no rash.  Neurologic: Cranial nerves intact. Moving all 4 extremities.  Psychiatric: Intact judgement and insight.      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     # Dyspnea on exertion:  -started following hospital stay. Possibly related to deconditioning. Still highly functional and able to climb 3 flights of stairs before feeling short of breath.  -HRCT with very mild airtrapping that can be physiological and does not correlate with airway obstruction on PFTs. No other GVHD sx on PFTs  -Isolated DLCO decrease noted on  PFTs. Most recent echo from December without concern for PAH. Will follow up on repeat testing  -will obtain 6MWT with follow up  -would continue FAM protocol for now  -can taper prednisone off if not needed otherwise    Follow-up: 2-3 months with repeat iram/dlco and 6MWT. Sooner if needed    Serafin Mann MD  2025        [1]   Social History  Socioeconomic History    Marital status:    Tobacco Use    Smoking status: Former     Current packs/day: 0.00     Average packs/day: 0.3 packs/day for 28.0 years (7.0 ttl pk-yrs)     Types: Cigarettes     Start date: 1988     Quit date: 2016     Years since quittin.5    Smokeless tobacco: Never   Vaping Use    Vaping status: Never Used   Substance and Sexual Activity    Alcohol use: Not Currently     Comment: occansionally    Drug use: Not Currently     Types: Marijuana     Comment: quit when diagnosed     Social Drivers of Health     Financial Resource Strain: Low Risk  (2025)    Overall Financial Resource Strain (CARDIA)     Difficulty of Paying Living Expenses: Not very hard   Food Insecurity: No Food Insecurity (2025)    Hunger Vital Sign     Worried About Running Out of Food in the Last Year: Never true     Ran Out of Food in the Last Year: Never true   Transportation Needs: No Transportation Needs (2025)    PRAPARE - Transportation     Lack of Transportation (Medical): No     Lack of Transportation (Non-Medical): No   Physical Activity: Insufficiently Active (2025)    Exercise Vital Sign     Days of Exercise per Week: 1 day     Minutes of Exercise per Session: 10 min   Stress: No Stress Concern Present (2025)    [employer] - Occupational Stress Questionnaire     Feeling of Stress : Not at all   Social Connections: Moderately Integrated (2025)    Social Connection and Isolation Panel     Frequency of Communication with Friends and Family: Twice a week     Frequency of Social Gatherings  with Friends and Family: Once a week     Attends Confucianist Services: 1 to 4 times per year     Active Member of Clubs or Organizations: No     Attends Club or Organization Meetings: Never     Marital Status:    Intimate Partner Violence: Not At Risk (2/11/2025)    Humiliation, Afraid, Rape, and Kick questionnaire     Fear of Current or Ex-Partner: No     Emotionally Abused: No     Physically Abused: No     Sexually Abused: No   Housing Stability: Low Risk  (2/12/2025)    Housing Stability Vital Sign     Unable to Pay for Housing in the Last Year: No     Number of Times Moved in the Last Year: 0     Homeless in the Last Year: No   [2]   Allergies  Allergen Reactions    Lasix [Furosemide] Other     Extreme hypotension and extreme hypokalemia.    Meropenem Rash     Unclear if rash due to meropenem or pre-engraftment syndrome    Thiazides Other     Recurrent significant hypokalemia

## 2025-07-21 ENCOUNTER — APPOINTMENT (OUTPATIENT)
Dept: HEMATOLOGY/ONCOLOGY | Facility: HOSPITAL | Age: 65
End: 2025-07-21
Payer: COMMERCIAL

## 2025-07-23 ASSESSMENT — ENCOUNTER SYMPTOMS
DIARRHEA: 0
MUSCULOSKELETAL NEGATIVE: 1
SHORTNESS OF BREATH: 0
CONSTIPATION: 0
NEUROLOGICAL NEGATIVE: 1
HEMATOLOGIC/LYMPHATIC NEGATIVE: 1
DIAPHORESIS: 0
BLOOD IN STOOL: 0
CHILLS: 0
UNEXPECTED WEIGHT CHANGE: 0
APPETITE CHANGE: 0
CARDIOVASCULAR NEGATIVE: 1
VOMITING: 0
NAUSEA: 0
FEVER: 0
FATIGUE: 0

## 2025-07-24 ENCOUNTER — APPOINTMENT (OUTPATIENT)
Dept: HEMATOLOGY/ONCOLOGY | Facility: HOSPITAL | Age: 65
End: 2025-07-24
Payer: COMMERCIAL

## 2025-07-24 ENCOUNTER — LAB (OUTPATIENT)
Dept: LAB | Facility: HOSPITAL | Age: 65
End: 2025-07-24
Payer: COMMERCIAL

## 2025-07-24 ENCOUNTER — INFUSION (OUTPATIENT)
Dept: HEMATOLOGY/ONCOLOGY | Facility: HOSPITAL | Age: 65
End: 2025-07-24
Payer: COMMERCIAL

## 2025-07-24 ENCOUNTER — OFFICE VISIT (OUTPATIENT)
Dept: HEMATOLOGY/ONCOLOGY | Facility: HOSPITAL | Age: 65
End: 2025-07-24
Payer: COMMERCIAL

## 2025-07-24 VITALS
RESPIRATION RATE: 18 BRPM | SYSTOLIC BLOOD PRESSURE: 125 MMHG | TEMPERATURE: 97.7 F | OXYGEN SATURATION: 97 % | HEART RATE: 80 BPM | BODY MASS INDEX: 27.39 KG/M2 | WEIGHT: 162 LBS | DIASTOLIC BLOOD PRESSURE: 65 MMHG

## 2025-07-24 DIAGNOSIS — D80.1 HYPOGAMMAGLOBULINEMIA (MULTI): ICD-10-CM

## 2025-07-24 DIAGNOSIS — Z94.84 HISTORY OF ALLOGENEIC HEMATOPOIETIC STEM CELL TRANSPLANT: ICD-10-CM

## 2025-07-24 DIAGNOSIS — C92.01 ACUTE MYELOID LEUKEMIA IN REMISSION (MULTI): ICD-10-CM

## 2025-07-24 LAB
ALBUMIN SERPL BCP-MCNC: 4.2 G/DL (ref 3.4–5)
ALP SERPL-CCNC: 70 U/L (ref 33–136)
ALT SERPL W P-5'-P-CCNC: 15 U/L (ref 10–52)
ANION GAP SERPL CALC-SCNC: 13 MMOL/L (ref 10–20)
AST SERPL W P-5'-P-CCNC: 16 U/L (ref 9–39)
BASOPHILS # BLD AUTO: 0.03 X10*3/UL (ref 0–0.1)
BASOPHILS NFR BLD AUTO: 0.3 %
BILIRUB SERPL-MCNC: 0.5 MG/DL (ref 0–1.2)
BUN SERPL-MCNC: 13 MG/DL (ref 6–23)
CALCIUM SERPL-MCNC: 9.1 MG/DL (ref 8.6–10.3)
CHLORIDE SERPL-SCNC: 106 MMOL/L (ref 98–107)
CO2 SERPL-SCNC: 24 MMOL/L (ref 21–32)
CREAT SERPL-MCNC: 1.1 MG/DL (ref 0.5–1.3)
EGFRCR SERPLBLD CKD-EPI 2021: 74 ML/MIN/1.73M*2
EOSINOPHIL # BLD AUTO: 0.06 X10*3/UL (ref 0–0.7)
EOSINOPHIL NFR BLD AUTO: 0.5 %
ERYTHROCYTE [DISTWIDTH] IN BLOOD BY AUTOMATED COUNT: 13.9 % (ref 11.5–14.5)
GLUCOSE SERPL-MCNC: 96 MG/DL (ref 74–99)
HCT VFR BLD AUTO: 43.9 % (ref 41–52)
HGB BLD-MCNC: 14.3 G/DL (ref 13.5–17.5)
IGG SERPL-MCNC: 668 MG/DL (ref 700–1600)
IMM GRANULOCYTES # BLD AUTO: 0.11 X10*3/UL (ref 0–0.7)
IMM GRANULOCYTES NFR BLD AUTO: 1 % (ref 0–0.9)
LDH SERPL L TO P-CCNC: 167 U/L (ref 84–246)
LYMPHOCYTES # BLD AUTO: 3.23 X10*3/UL (ref 1.2–4.8)
LYMPHOCYTES NFR BLD AUTO: 28.9 %
MAGNESIUM SERPL-MCNC: 1.96 MG/DL (ref 1.6–2.4)
MCH RBC QN AUTO: 31.8 PG (ref 26–34)
MCHC RBC AUTO-ENTMCNC: 32.6 G/DL (ref 32–36)
MCV RBC AUTO: 98 FL (ref 80–100)
MONOCYTES # BLD AUTO: 1.27 X10*3/UL (ref 0.1–1)
MONOCYTES NFR BLD AUTO: 11.4 %
NEUTROPHILS # BLD AUTO: 6.48 X10*3/UL (ref 1.2–7.7)
NEUTROPHILS NFR BLD AUTO: 57.9 %
NRBC BLD-RTO: 0 /100 WBCS (ref 0–0)
PLATELET # BLD AUTO: 210 X10*3/UL (ref 150–450)
POTASSIUM SERPL-SCNC: 3.6 MMOL/L (ref 3.5–5.3)
PROT SERPL-MCNC: 6.9 G/DL (ref 6.4–8.2)
RBC # BLD AUTO: 4.5 X10*6/UL (ref 4.5–5.9)
SODIUM SERPL-SCNC: 139 MMOL/L (ref 136–145)
TACROLIMUS BLD-MCNC: 5.1 NG/ML
WBC # BLD AUTO: 11.2 X10*3/UL (ref 4.4–11.3)

## 2025-07-24 PROCEDURE — 99215 OFFICE O/P EST HI 40 MIN: CPT | Performed by: INTERNAL MEDICINE

## 2025-07-24 PROCEDURE — 80053 COMPREHEN METABOLIC PANEL: CPT

## 2025-07-24 PROCEDURE — 83735 ASSAY OF MAGNESIUM: CPT

## 2025-07-24 PROCEDURE — 2500000001 HC RX 250 WO HCPCS SELF ADMINISTERED DRUGS (ALT 637 FOR MEDICARE OP)

## 2025-07-24 PROCEDURE — 96366 THER/PROPH/DIAG IV INF ADDON: CPT | Mod: INF

## 2025-07-24 PROCEDURE — 85025 COMPLETE CBC W/AUTO DIFF WBC: CPT

## 2025-07-24 PROCEDURE — 99215 OFFICE O/P EST HI 40 MIN: CPT | Mod: 25 | Performed by: INTERNAL MEDICINE

## 2025-07-24 PROCEDURE — 96365 THER/PROPH/DIAG IV INF INIT: CPT | Mod: INF

## 2025-07-24 PROCEDURE — 82784 ASSAY IGA/IGD/IGG/IGM EACH: CPT

## 2025-07-24 PROCEDURE — 2500000004 HC RX 250 GENERAL PHARMACY W/ HCPCS (ALT 636 FOR OP/ED): Mod: JZ,TB

## 2025-07-24 PROCEDURE — 80197 ASSAY OF TACROLIMUS: CPT

## 2025-07-24 PROCEDURE — 87533 HHV-6 DNA QUANT: CPT

## 2025-07-24 PROCEDURE — 87799 DETECT AGENT NOS DNA QUANT: CPT

## 2025-07-24 PROCEDURE — 1159F MED LIST DOCD IN RCRD: CPT | Performed by: INTERNAL MEDICINE

## 2025-07-24 PROCEDURE — 36415 COLL VENOUS BLD VENIPUNCTURE: CPT

## 2025-07-24 PROCEDURE — 83615 LACTATE (LD) (LDH) ENZYME: CPT

## 2025-07-24 RX ORDER — ACETAMINOPHEN 325 MG/1
650 TABLET ORAL ONCE
OUTPATIENT
Start: 2025-08-21

## 2025-07-24 RX ORDER — ALBUTEROL SULFATE 0.83 MG/ML
3 SOLUTION RESPIRATORY (INHALATION) AS NEEDED
OUTPATIENT
Start: 2025-08-21

## 2025-07-24 RX ORDER — EPINEPHRINE 0.3 MG/.3ML
0.3 INJECTION SUBCUTANEOUS EVERY 5 MIN PRN
Status: DISCONTINUED | OUTPATIENT
Start: 2025-07-24 | End: 2025-07-24 | Stop reason: HOSPADM

## 2025-07-24 RX ORDER — ALBUTEROL SULFATE 0.83 MG/ML
3 SOLUTION RESPIRATORY (INHALATION) AS NEEDED
Status: DISCONTINUED | OUTPATIENT
Start: 2025-07-24 | End: 2025-07-24 | Stop reason: HOSPADM

## 2025-07-24 RX ORDER — ACETAMINOPHEN 325 MG/1
650 TABLET ORAL ONCE
Status: COMPLETED | OUTPATIENT
Start: 2025-07-24 | End: 2025-07-24

## 2025-07-24 RX ORDER — DIPHENHYDRAMINE HCL 25 MG
25 CAPSULE ORAL ONCE
Status: COMPLETED | OUTPATIENT
Start: 2025-07-24 | End: 2025-07-24

## 2025-07-24 RX ORDER — DIPHENHYDRAMINE HCL 25 MG
25 CAPSULE ORAL ONCE
OUTPATIENT
Start: 2025-08-21

## 2025-07-24 RX ORDER — FAMOTIDINE 10 MG/ML
20 INJECTION, SOLUTION INTRAVENOUS ONCE AS NEEDED
OUTPATIENT
Start: 2025-08-21

## 2025-07-24 RX ORDER — DIPHENHYDRAMINE HYDROCHLORIDE 50 MG/ML
50 INJECTION, SOLUTION INTRAMUSCULAR; INTRAVENOUS AS NEEDED
OUTPATIENT
Start: 2025-08-21

## 2025-07-24 RX ORDER — DIPHENHYDRAMINE HYDROCHLORIDE 50 MG/ML
50 INJECTION, SOLUTION INTRAMUSCULAR; INTRAVENOUS AS NEEDED
Status: DISCONTINUED | OUTPATIENT
Start: 2025-07-24 | End: 2025-07-24 | Stop reason: HOSPADM

## 2025-07-24 RX ORDER — FAMOTIDINE 10 MG/ML
20 INJECTION, SOLUTION INTRAVENOUS ONCE AS NEEDED
Status: DISCONTINUED | OUTPATIENT
Start: 2025-07-24 | End: 2025-07-24 | Stop reason: HOSPADM

## 2025-07-24 RX ORDER — EPINEPHRINE 0.3 MG/.3ML
0.3 INJECTION SUBCUTANEOUS EVERY 5 MIN PRN
OUTPATIENT
Start: 2025-08-21

## 2025-07-24 RX ADMIN — IMMUNE GLOBULIN INFUSION (HUMAN) 25 G: 100 INJECTION, SOLUTION INTRAVENOUS; SUBCUTANEOUS at 10:41

## 2025-07-24 RX ADMIN — DIPHENHYDRAMINE HYDROCHLORIDE 25 MG: 25 CAPSULE ORAL at 09:26

## 2025-07-24 RX ADMIN — ACETAMINOPHEN 650 MG: 325 TABLET ORAL at 09:26

## 2025-07-24 ASSESSMENT — PAIN SCALES - GENERAL: PAINLEVEL_OUTOF10: 0-NO PAIN

## 2025-07-24 NOTE — PROGRESS NOTES
Patient ID:  Brandt Merritt is a 65 y.o. male.  Referring Physician:   BMT Dr Newsome  Primary Care Provider:  Bill Richmond MD    Oncology History Overview Note   4/2024  Myelodysplastic neoplasm with increased blasts-2 ( WHO)  Myelodysplastic neoplasm/AML  (ICC 2022 classification)    Presented in  10/2023 for pancytopenia, found to have hemolysis. Patient had normal CBC June 2020. Denied any hemorrhoidal bleeding . Has had C Scope and EGD , treated Hep C 2014 . 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      CBC 4/11/24 wbc 3.0, hgb 7.1, platelets 167K ANC 0.73    BM biopsy done on 4/11/24  as folllows:  BM histology  Myelodysplastic neoplasm with increased blasts-2 ( WHO)  Myelodysplastic neoplasm/AML  (ICC 2022 classification)  Balsts 11% on aspirate smear and 15-20% based on CD 34 immunostaining approaching AML leukemia, hematooiesis is erythroid dominant with dysplasia in granulocytes and megakaryocytes.   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      11/6/2024 -  Bone Marrow Transplant    conditioned with Flu/Cy/TBI and aGVHD prophylaxis with post-transplant cyclophosphamide, tacrolimus, and mycophenolate. T=0, 11/6/24. ABO Donor/Recipient: O+, A+. CMV donor/recipient: both positive. Started Tacro and MMF on T+5 (11/11).      2/11/2025 - 2/14/2025 Hospital Admission    Admit date: 2/11/25  Admission diagnosis: 2/14/25  Brandt was admitted on  -25 for intractable vomiting and some diarrhea, also with low grade fever.  He received broad spectrum antibiotics and all cultures were negative.  He underwent an upper endoscopy which was grossly normal, biopsies negative for GVHD, and budesonide was increased to 3 mg po bid.      3/19/2025 Biopsy    BM biopsy (3/19/25):   -- HYPOCELLULAR BONE MARROW (20%) WITH MATURING TRILINEAGE HEMATOPOIESIS AND 1% BLASTS.  No overt evidence of residual/relapsed acute leukemia.         2025 Biopsy    BM biopsy (25):   -- NORMOCELLULAR BONE MARROW (40%-50%) WITH MATURING TRILINEAGE HEMATOPOIESIS.  -- NO MORPHOLOGIC EVIDENCE OF ACUTE MYELOID LEUKEMIA.       AML (acute myeloid leukemia) in remission (Multi)   2025 Initial Diagnosis    AML (acute myeloid leukemia) in remission (Multi)        Response:      Past Medical History:  HTN   STEMI 2020 after getting  a water pills.  Chronic hepatitis C infection treated in  treated  with Dr. Lloyd  Past Medical History:   Diagnosis Date    Chest pain 2021    HTN (hypertension) 10/05/2023    Hypertension     Leukemia (Multi)     Personal history of other diseases of the circulatory system     History of hypertension      Surgical History:  Conosocopy 2021  Upper EGD 10/31/23  Surgical reduction torsion of testes      Past Surgical History:   Procedure Laterality Date    COLONOSCOPY  2013    Complete Colonoscopy    OTHER SURGICAL HISTORY  10/07/2015    Surgery Testis Reduction Of Torsion Of Testis Right      Family History:  CAD, DM in mother       Mother  of CVA at age 69  Brother with prostate CA, 1 sister with liver disease  2 children healthy  Family History   Problem Relation Name Age of Onset    Other (diabetes mellitus) Mother      Prostate cancer Brother        Social History:  Lives with wife of 30 years, works at [employer], now retired.   29 years, former smoker, smoked x 15 yrs quit 20+  years ago. Marijuana 3 x a week. Served in  in [place] and [facility], .  ----------------------------------------------------------------------------------------------------  Subjective    History of Present Illness:    Brandt Merritt is a 65 year old male with history of myelodysplastic neoplasm/AML, s/p umbilical cord blood transplant 10/16/24 with graft rejection followed by haploidentical stem cells  from his sister on 11/6/24 with flu/cy, ATG and modified dose cyclophosphamide with engraftment on day T+8.      Around day 180 patient was experiencing PRICE.  Recall that recent PFTs showed corrected DLCO of about 50%.  CT of chest done 5/23/25 shows mild air trapping.  Was started on FAM protocol 5/29/25.      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  Today he is T+ 308 of umbilical cord transfusion and is T+ 260 of his haplo sister transplant.  Completed 6 month Bmbx on 5/12/25 showing normocelluar bone marrow (40-50%) with maturing trilineage hematopoiesis, no evidence of AML, 100% chimeric.  Currently taking tacrolimus taking 0.5 mg once daily on Tuesday, Thursday, and Saturday, and is taking 0.5 mg BID on Monday, Wednesday, Friday, and Sunday. He is also on the FAM regimen and prednisone has been tapered to 15 mg daily. Continues monthly IVIG . Patient saw Dr. Betancourt from pulmonary on 7/18/25 who will do a 6 minute walk test and felt air trapping pulmonary findings on CT were physiologic. Followup planned in 3 months, recommended taper of prednisone. Has  loose stools, going twice a day, states shortness of breath is pretty much resolved and he is looking forward to going back to work on 8/1/25.    Energy level is good.  Appetite is good.  Gained a few pounds since last visit.  Is staying active, cut grass and doing yard work.  Going up the stairs is  easier and is no longer having shortness of breath..  Rash has resolved to shoulders, chest, and back.  Continues to have small bumps to checks but has improved to forehead.  Reports no itching.   Using desonide cream to face twice daily.      Review of Systems   Constitutional:  Negative for appetite change, chills, diaphoresis, fatigue, fever and unexpected weight change.   Respiratory:  Negative for shortness of breath.    Cardiovascular: Negative.    Gastrointestinal:  Negative for blood in stool, constipation, diarrhea, nausea and vomiting.   Genitourinary: Negative.     Musculoskeletal: Negative.    Skin:  Positive for rash (face and upper chest). Negative for itching.        Hyperpigmented skin to face and hands   Neurological: Negative.    Hematological: Negative.    --------------------------------------------------------------------------------------------------------  Objective:    There were no vitals taken for this visit.    There were no vitals filed for this visit.      Physical Exam  Vitals reviewed.   Constitutional:       Appearance: Normal appearance.   HENT:      Head: Normocephalic and atraumatic.      Mouth/Throat:      Mouth: Mucous membranes are moist.     Eyes:      Extraocular Movements: Extraocular movements intact.      Conjunctiva/sclera: Conjunctivae normal.      Pupils: Pupils are equal, round, and reactive to light.       Cardiovascular:      Rate and Rhythm: Normal rate and regular rhythm.      Pulses: Normal pulses.      Heart sounds: Normal heart sounds.   Pulmonary:      Effort: Pulmonary effort is normal.      Breath sounds: Normal breath sounds.   Abdominal:      General: Abdomen is flat. Bowel sounds are normal.      Palpations: Abdomen is soft. There is no mass.      Tenderness: There is no abdominal tenderness.     Musculoskeletal:         General: No swelling. Normal range of motion.   Lymphadenopathy:      Comments: No lymphadenopathy     Skin:     General: Skin is warm and  dry.      Findings: Rash present.           Comments: Improvement to rash with mild lumpy bumpy with some small circular spots to face, rash resolved to upper back, bilateral shoulder, upper chest.  Areas of hyperpigmentation on face and palms of bilateral hands near wrists remains the same.      Neurological:      General: No focal deficit present.      Mental Status: He is alert and oriented to person, place, and time.     Psychiatric:         Mood and Affect: Mood normal.         Behavior: Behavior normal.         Thought Content: Thought content normal.         Judgment: Judgment normal.     -----------------------------------------------------------------------------------------------------------  Assessment and Plan:    MDS/Acute myeloid leukemia in remission (Multi)  Diagnosed 4/2024: BM Bx with MDS in in transition to AML (>10% blast) w/ trisomy 8, DNMT alpha, and U2AF1 mutation indication adverse risk.      s/p 4 cycles of Decitabine/Venetoclax. BM Bx 6/17/24: Blasts cleared, FISH still positive for trisomy 8, still MDS changes   BMBx (9/5/24): 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 and no increase in blasts.     History of allogeneic hematopoietic stem cell transplant  #1: Single-unit Cord, T0=9/19/24, Primary Engraftment Failure  - Conditioning: Flu-Mariana-TBI  - Donor: Single Cord, 6/8  = ABO Donor / Recipient: A+ / A+  = CMV Donor / Recipient: - / +  - aGVHD Prophylaxis: Tacrolimus + MMF  - engraftment failure, developed HLA antibody against class I of cord  - Repeat BMBx (10/15): hypocellular with no residual myeloid neoplasm. 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), T0=11/6/24  - Graft: Haploidentical sister  = ABO Donor / Recipient: O+ / A+ (ABO incompatibility +)  = CMV Donor / Recipient: + / +  - Conditioning: Flu/Cy/TBI/rATG  = GVHD prophylaxis - rATG 1.5mg/kg T-5,-3,-1, PTCy (25mg/kg T+3, T+4), Tacro, MMF (T+5)     DATE DAY SOURCE MORPHOLOGY MRD  WHOLE CHIMERISM   (% donor) CD3 CHIMERISM   (% donor) CD33 CHIMERISM   (% donor)   11/20/24 D+30 PB      100       12/11/24 D+30 PB       100 100   Deferred D+30 BM             1/9/25 D+60 BM  KRISTIE , flow negative      100  100     D+60 PB              2/17/25 D+100 PB      100        3/19/25 D+100 BM  KRISTIE, flow negative      100  100   5/12/25 M+6 BM KRISTIE  Flow negative  100     Next marrow at 12 months.    Monitor for post-transplant hemolysis   (7/7/25)  Haptoglobin  175 (3/17/25)  UPC ratio 0.10 (3/17/25)          GVHD prophylaxis  GVHD  Anorexia/poor oral intake/weight loss. Added Mirtazapine. Continue Zofran. If persists, consider aGVHD.  12/9/24:  Currently taking tacrolimus 0.5 mg BID, held dose today prior to labs.  FK level 4.6 (12/9/24).  Advised patient on 12/10/24 to increase tacrolimus dose to 1 mg in the AM and 0.5 mg in the PM.    Wean MMF from 1 g TID to 500 mg TID 12/2/24 - present  Due to poor appetite will decrease MMF to 500 mg po tid.  Improvement to GI symptoms since decreasing MMF dose.    Stopped  MMF on T+35.     Stage I/Grade II Upper GI GVHD  Anorexia, taste changes and weight loss 171# (9/17/24).  Cont mirtazapine and ATC Zofran.   Add budesonide 3x day.   12/30/24:  Started on budesonide TID on 12/27/24.  Brandt reports that he has starting eating more since starting budesonide.  Reports no nausea, vomiting, or diarrhea at this time.    Appetite continuing to improve. Denies N/V/D.     1/13/25 Decreased budesonide bid per Dr Newsome; 1/27 Decrease to 3mg daily. Will continue on this dose for 1-2 weeks before discontinuing.     2/17/25 Recent admission for nausea, budesonide increased to 3 mg po bid, continues therapeutic on tacro 0.5 mg po bid. Esophageal biopsy 2/13/25 negative for GVHD.  3/3/25:  Overall doing well.  Nausea has resolved. Reviewed food safety precautions and had nutritionist Blancaiza Morgan RD,LD come to speak with patient and wife today.  Continue budesonide 3  mg daily.  FK level 5.6.  Continue tacrolimus 0.5 mg BID.  Advised Brandt to contact oncology team if have GI symptoms worsen,  consider discontinuing budesonide next visit.  3/17/25:  Discontinue budesonide.  FK level 7.6 (3/17/25), continue tacrolimus 0.5 mg BID.    4/14/25:  Day 158 No further GI symptoms and weight now steady,  Today complains of itchy rash on upper anterior chest,  noted macular raised rash < 10% of body, suspect chronic GVHD of skin, limited.  No dry eyes, dry mouth diarrhea, etc.  Started triamcinolone cream bid, advised to watch for any increased in rash, diarrhea, dry  eyes or mouth. Tacro 9.2 continue 0.5 mg bid alternating with 0.5 mg daily.    5/5/25:  Today is T+180.  Continues to have rash to face, neck, and upper chest resolved.  Reports he continues to have unformed stools, pudding consistency about 1-2 times per day.  Currently on tacrolimus 0.5 mg bid alternating with daily.  FK level 11.4 but had evening dose last night so will  not adjust., Ordered screening PFTs today   5/12/25:  Today is T+ 187.  Continues to have rash to face, no change.  Had 3 episodes of nausea, vomiting, and diarrhea last week after eating 5 [place] burger and fries.  GI symptoms resolved quickly and he has had no further GI distress.  No further weight loss since prior visit.  Completed PFTs on 5/6/25 with FVC 3.81, FEV1 2.73, FEV1/FVC 0.72, and DLCO/VA 49%.   Continues on tacrolimus 0.5 mg daily on Tuesday, Thursday and Saturday and 0.5 mg bid MWFSun.  FK level 11.0 (5/12/25).  Continue current tacrolimus dosing.    5/29/25:  Today is T+204.  Continues on tacrolimus 0.5 mg daily on Tuesday, Thursday and Saturday and 0.5 mg bid MWFSun.  FK level 9.2.  CT scan of chest  5/23/25 shows mild air trapping but no other abnormalities of chest.   Rash pretty much stable < 10% of BSA mild papules and hyperpigmentation of face.  Diarrhea and air trapping of concern for more extensive cGVHD. Prescribed desonisde cream  to face BID and kenalog to rash on upper trunk/arms BID.  Started po prednisone 40 mg daily with taper, advair, azithromycin and monteleukast ( FAM regimen)  7/7/25:  Today is T+ 243.  Continues tacrolimus 0.5 mg daily on Tuesday, Thursday and Saturday and 0.5 mg bid MWFSun. Currently on prednisone 20 mg daily.  Rash has resolved to back, shoulders, and chest,  Continues to have small bumps to checks/nose, resolved to forehead, no redness, no itching.    Skin biopsy (6/18/25) showing moderately basal layer melanin pigmentation with pigment incontinence, mild acanthosis and a superficial perivascular lymphocytic infiltrate with enlarged sebaceous glands and findings of active Wuskr-Xaxnrm-Gzyc disease or scleromyxedema are not identified. Continues desonide BID.  Continues FAM regimen with advair BID, azithromycin MWF, and monteluekast daily.  Reports improvement in respiratory symptoms.      7/24/25: Seen for followup, respiratory sx continue to improve no evidence of cGVHD elsewhere.  Currently on prednisone 15 mg daily, will decrease to 15 mg alternating with 10 mg every other day and then 10 mg daily till next visit, continue current dosing of tacro for now  tacrolimus 0.5 mg daily on Tuesday, Thursday and Saturday and 0.5 mg bid MWFSun.    Weights: see below    Wt Readings from Last 5 Encounters:   07/18/25 73.9 kg (163 lb)   07/07/25 72.6 kg (160 lb 0.9 oz)   06/23/25 70.6 kg (155 lb 10.3 oz)   06/12/25 66.3 kg (146 lb 2.6 oz)   05/29/25 65.7 kg (144 lb 13.5 oz)      Infectious Disease & Immune Reconstitution      Prophylaxis  Antiviral prophylaxis: acyclovir, Letermovir-continue until off of immunosuppression.    Antifungal: posaconazole  PCP prophylaxis: 10/26/24 - 11/28/24 Pentamidine; cont Bactrim.     Hypogammaglobulinemia  IgG level. IVIG for level <400   3/17/25:  IgG 307.  Ordered monthly IVIG infusions.  Reviewed rationale and possible side effects with patient.    Received first IVIG infusion 3/31/25.   IgG level 716 (6/23/25).  Received IVIG 6/23/25, next dose due 7/24/25.    Active Surveillance:     CMV detected 688 1/2/25, 167 1/6/25. Levels undetectable since 1/13/25, CMV ND (2/17/25).    Started valgancicyclovir 900mg BID on 1/6/25,   Plan 1/20/25 decrease to 900 mg daily for 2 weeks (2/3/25), then transition back to letermovir/acyclovir.    CMV ND (6/23/25). Plan to continue letermovir due to ongoing immunosuppression.    Adeno ND 6/23/25.  HHV6 ND 6/23/25.  EBV ND 6/23/25.  EBV Viremia during transplant admission  Treated despite low levels due to upcoming second SCT  Rituximab 600 mg x 1 (10/31/24)     Infectious Disease follow up evaluation: 1/10/25      Immunodeficiency panel 100 days, 6mos and 1 year.      Immunizations:   Covid and influenza vaccine series.  Consider at T+ 3 month  Will plan to begin immunizations at T+6 mths around 5/5/25 depending on degree of immunosuppression  Received influenza vaccine 3/6/25 and 1st post transplant covid vaccine on 3/6/25, 2nd covid vaccine on 3/27/25, and 3rd covid vaccine on 4/25/25.   Received 6 month vaccines 5/5/25.  Received 8 month vaccines 6/23/25.    Due to 10 month vaccines around 8/18/25.     Cardiac:    Essential hypertension- was started on nifedipine 60 mg daily on 1/20/25 and started lisinopril 5 mg daily on 2/3/25.   Hx of STEMI (11/11/2020)  Cardiology appt on 12/18/24  ECHO 12/23/24: LVEF 65%     Ascending aorta dilation   TTE (4/29/24): 3.8-4 cm dilation      Dry Skin hyperpigmentation:    Skin biopsy (6/18/25) showing moderately basal layer melanin pigmentation with pigment incontinence, mild acanthosis and a superficial perivascular lymphocytic infiltrate with enlarged sebaceous glands and findings of active Gxovf-Efiukg-Znru disease or scleromyxedema are not identified.    7/24/25:  Continues to have hyperpigmented skin to face and bilateral palms of hands.  Brandt feels his hyperpigmentation is lighted slightly to palms.  Instructed to  continue to use moisturizer such as cerave that are fragrance free and gentle.  Advised about mineral based sun screen.      Vitamin D deficiency  12/18 Level 27.  Last dose of weekly supplement taken. 1/27 Repeat level was 28.  Reordered ergocalciferol 50,000 UT weekly on 1/27/25 for 8 doses.  Repeat vitamin D 36 (4/8/25).  Continue maintenance vitamin D.          Psychosocial.    Caregiver Genevieve  [facility] in [place]     RTC:   Continue prednisone  taper to 15 mg alternating with 10 in 2 weeks decrease to 10 mg daily.  Reminded to schedule 6 minute walk test requested by pulmonary  Continue monthly IVIG.  Follow up provider visit August 21, 2025 and10 mons vaccines check if influenza vaccine is out!,       --------------------------------------------------------------------------------  Malaika Newsome MD

## 2025-07-24 NOTE — PATIENT INSTRUCTIONS
Continue prednisone  taper to 15 mg alternating with 10 for  2 weeks then go down to 10 mg daily, check to make sure that your walk test is scheduled.

## 2025-07-24 NOTE — PROGRESS NOTES
Patient seen in outpatient infusion for IVIG and tolerated well. PIV well maintained with blood return noted and removed before discharge. Labs and schedule reviewed with patient. Patient left in stable condition with wife.

## 2025-07-26 DIAGNOSIS — C92.00 ACUTE MYELOID LEUKEMIA NOT HAVING ACHIEVED REMISSION (MULTI): ICD-10-CM

## 2025-07-30 RX ORDER — SULFAMETHOXAZOLE AND TRIMETHOPRIM 800; 160 MG/1; MG/1
1 TABLET ORAL 3 TIMES WEEKLY
Qty: 12 TABLET | Refills: 2 | Status: SHIPPED | OUTPATIENT
Start: 2025-07-30

## 2025-08-03 DIAGNOSIS — C92.01 AML (ACUTE MYELOID LEUKEMIA) IN REMISSION (MULTI): ICD-10-CM

## 2025-08-04 ENCOUNTER — SPECIALTY PHARMACY (OUTPATIENT)
Dept: PHARMACY | Facility: CLINIC | Age: 65
End: 2025-08-04

## 2025-08-04 PROCEDURE — RXMED WILLOW AMBULATORY MEDICATION CHARGE

## 2025-08-04 RX ORDER — PANTOPRAZOLE SODIUM 40 MG/1
40 TABLET, DELAYED RELEASE ORAL
Qty: 30 TABLET | Refills: 2 | Status: SHIPPED | OUTPATIENT
Start: 2025-08-04 | End: 2025-11-02

## 2025-08-05 ENCOUNTER — PHARMACY VISIT (OUTPATIENT)
Dept: PHARMACY | Facility: CLINIC | Age: 65
End: 2025-08-05
Payer: COMMERCIAL

## 2025-08-07 ENCOUNTER — NURSE TRIAGE (OUTPATIENT)
Dept: HEMATOLOGY/ONCOLOGY | Facility: HOSPITAL | Age: 65
End: 2025-08-07
Payer: COMMERCIAL

## 2025-08-07 NOTE — TELEPHONE ENCOUNTER
Spoke to patients wife, advised to use compression socks, watch salt intake. Will give a couple of lasix pilss

## 2025-08-07 NOTE — TELEPHONE ENCOUNTER
Brandt calls to state that his legs and feet have been swollen bilaterally since Monday, 8/4. They are swollen equally without redness or warmth from the top of both legs to his feet. No pain while ambulating. He is able to get shoes and socks on. No hx of cardiac issues other than HTN which he takes medication.     Denies fever/chills, SOB, chest pain or any other worrisome symptoms.    Preferred pharmacy is [facility] on [address] in [place].    Message sent to team.

## 2025-08-18 ENCOUNTER — APPOINTMENT (OUTPATIENT)
Dept: HEMATOLOGY/ONCOLOGY | Facility: HOSPITAL | Age: 65
End: 2025-08-18
Payer: COMMERCIAL

## 2025-08-20 DIAGNOSIS — C92.00 ACUTE MYELOID LEUKEMIA NOT HAVING ACHIEVED REMISSION (MULTI): ICD-10-CM

## 2025-08-20 RX ORDER — POSACONAZOLE 100 MG/1
300 TABLET, DELAYED RELEASE ORAL DAILY
Qty: 90 TABLET | Refills: 1 | Status: SHIPPED | OUTPATIENT
Start: 2025-08-20

## 2025-08-20 ASSESSMENT — ENCOUNTER SYMPTOMS
VOMITING: 0
CONSTIPATION: 0
FEVER: 0
HEMATOLOGIC/LYMPHATIC NEGATIVE: 1
DIAPHORESIS: 0
NAUSEA: 0
NEUROLOGICAL NEGATIVE: 1
APPETITE CHANGE: 0
BLOOD IN STOOL: 0
CHILLS: 0
FATIGUE: 0
SHORTNESS OF BREATH: 0
MUSCULOSKELETAL NEGATIVE: 1
DIARRHEA: 0
UNEXPECTED WEIGHT CHANGE: 0

## 2025-08-21 ENCOUNTER — APPOINTMENT (OUTPATIENT)
Dept: HEMATOLOGY/ONCOLOGY | Facility: HOSPITAL | Age: 65
End: 2025-08-21
Payer: COMMERCIAL

## 2025-08-21 ENCOUNTER — LAB (OUTPATIENT)
Dept: LAB | Facility: HOSPITAL | Age: 65
End: 2025-08-21
Payer: COMMERCIAL

## 2025-08-21 ENCOUNTER — OFFICE VISIT (OUTPATIENT)
Dept: HEMATOLOGY/ONCOLOGY | Facility: HOSPITAL | Age: 65
End: 2025-08-21
Payer: COMMERCIAL

## 2025-08-21 ENCOUNTER — INFUSION (OUTPATIENT)
Dept: HEMATOLOGY/ONCOLOGY | Facility: HOSPITAL | Age: 65
End: 2025-08-21
Payer: COMMERCIAL

## 2025-08-21 VITALS
WEIGHT: 168.65 LBS | DIASTOLIC BLOOD PRESSURE: 70 MMHG | TEMPERATURE: 97.5 F | BODY MASS INDEX: 28.51 KG/M2 | HEART RATE: 87 BPM | SYSTOLIC BLOOD PRESSURE: 131 MMHG | OXYGEN SATURATION: 96 % | RESPIRATION RATE: 16 BRPM

## 2025-08-21 VITALS
DIASTOLIC BLOOD PRESSURE: 86 MMHG | OXYGEN SATURATION: 97 % | RESPIRATION RATE: 16 BRPM | TEMPERATURE: 98.2 F | SYSTOLIC BLOOD PRESSURE: 123 MMHG | HEART RATE: 76 BPM

## 2025-08-21 DIAGNOSIS — D80.1 HYPOGAMMAGLOBULINEMIA (MULTI): ICD-10-CM

## 2025-08-21 DIAGNOSIS — D84.9 IMMUNOCOMPROMISED: ICD-10-CM

## 2025-08-21 DIAGNOSIS — C92.01 ACUTE MYELOID LEUKEMIA IN REMISSION (MULTI): ICD-10-CM

## 2025-08-21 DIAGNOSIS — I10 ESSENTIAL HYPERTENSION: ICD-10-CM

## 2025-08-21 DIAGNOSIS — R60.9 EDEMA, UNSPECIFIED TYPE: ICD-10-CM

## 2025-08-21 DIAGNOSIS — C92.01 ACUTE MYELOID LEUKEMIA IN REMISSION (MULTI): Primary | ICD-10-CM

## 2025-08-21 DIAGNOSIS — D89.813 GVHD (GRAFT VERSUS HOST DISEASE) (MULTI): ICD-10-CM

## 2025-08-21 DIAGNOSIS — Z94.84 STEM CELLS TRANSPLANT STATUS (MULTI): ICD-10-CM

## 2025-08-21 DIAGNOSIS — L81.9 HYPERPIGMENTATION OF SKIN: ICD-10-CM

## 2025-08-21 DIAGNOSIS — Z94.84 HISTORY OF ALLOGENEIC HEMATOPOIETIC STEM CELL TRANSPLANT: ICD-10-CM

## 2025-08-21 DIAGNOSIS — I15.9 SECONDARY HYPERTENSION: ICD-10-CM

## 2025-08-21 LAB
ALBUMIN SERPL BCP-MCNC: 4.4 G/DL (ref 3.4–5)
ALP SERPL-CCNC: 93 U/L (ref 33–136)
ALT SERPL W P-5'-P-CCNC: 17 U/L (ref 10–52)
ANION GAP SERPL CALC-SCNC: 13 MMOL/L (ref 10–20)
AST SERPL W P-5'-P-CCNC: 19 U/L (ref 9–39)
BASOPHILS # BLD AUTO: 0.06 X10*3/UL (ref 0–0.1)
BASOPHILS NFR BLD AUTO: 0.6 %
BILIRUB SERPL-MCNC: 0.4 MG/DL (ref 0–1.2)
BUN SERPL-MCNC: 11 MG/DL (ref 6–23)
CALCIUM SERPL-MCNC: 9.7 MG/DL (ref 8.6–10.3)
CHLORIDE SERPL-SCNC: 106 MMOL/L (ref 98–107)
CO2 SERPL-SCNC: 27 MMOL/L (ref 21–32)
CREAT SERPL-MCNC: 1.1 MG/DL (ref 0.5–1.3)
EGFRCR SERPLBLD CKD-EPI 2021: 74 ML/MIN/1.73M*2
EOSINOPHIL # BLD AUTO: 0.18 X10*3/UL (ref 0–0.7)
EOSINOPHIL NFR BLD AUTO: 1.9 %
ERYTHROCYTE [DISTWIDTH] IN BLOOD BY AUTOMATED COUNT: 14.3 % (ref 11.5–14.5)
GLUCOSE SERPL-MCNC: 105 MG/DL (ref 74–99)
HCT VFR BLD AUTO: 43.1 % (ref 41–52)
HGB BLD-MCNC: 14.5 G/DL (ref 13.5–17.5)
IGG SERPL-MCNC: 767 MG/DL (ref 700–1600)
IMM GRANULOCYTES # BLD AUTO: 0.05 X10*3/UL (ref 0–0.7)
IMM GRANULOCYTES NFR BLD AUTO: 0.5 % (ref 0–0.9)
LDH SERPL L TO P-CCNC: 177 U/L (ref 84–246)
LYMPHOCYTES # BLD AUTO: 2.94 X10*3/UL (ref 1.2–4.8)
LYMPHOCYTES NFR BLD AUTO: 31.7 %
MAGNESIUM SERPL-MCNC: 2.02 MG/DL (ref 1.6–2.4)
MCH RBC QN AUTO: 32.1 PG (ref 26–34)
MCHC RBC AUTO-ENTMCNC: 33.6 G/DL (ref 32–36)
MCV RBC AUTO: 95 FL (ref 80–100)
MONOCYTES # BLD AUTO: 1.14 X10*3/UL (ref 0.1–1)
MONOCYTES NFR BLD AUTO: 12.3 %
NEUTROPHILS # BLD AUTO: 4.91 X10*3/UL (ref 1.2–7.7)
NEUTROPHILS NFR BLD AUTO: 53 %
NRBC BLD-RTO: 0 /100 WBCS (ref 0–0)
PLATELET # BLD AUTO: 223 X10*3/UL (ref 150–450)
POTASSIUM SERPL-SCNC: 4.3 MMOL/L (ref 3.5–5.3)
PROT SERPL-MCNC: 7 G/DL (ref 6.4–8.2)
RBC # BLD AUTO: 4.52 X10*6/UL (ref 4.5–5.9)
SODIUM SERPL-SCNC: 142 MMOL/L (ref 136–145)
TACROLIMUS BLD-MCNC: 6.3 NG/ML
URATE SERPL-MCNC: 4.4 MG/DL (ref 4–7.5)
WBC # BLD AUTO: 9.3 X10*3/UL (ref 4.4–11.3)

## 2025-08-21 PROCEDURE — 90648 HIB PRP-T VACCINE 4 DOSE IM: CPT

## 2025-08-21 PROCEDURE — 84550 ASSAY OF BLOOD/URIC ACID: CPT

## 2025-08-21 PROCEDURE — 36415 COLL VENOUS BLD VENIPUNCTURE: CPT

## 2025-08-21 PROCEDURE — 96372 THER/PROPH/DIAG INJ SC/IM: CPT

## 2025-08-21 PROCEDURE — 2500000004 HC RX 250 GENERAL PHARMACY W/ HCPCS (ALT 636 FOR OP/ED)

## 2025-08-21 PROCEDURE — 80053 COMPREHEN METABOLIC PANEL: CPT

## 2025-08-21 PROCEDURE — 99215 OFFICE O/P EST HI 40 MIN: CPT

## 2025-08-21 PROCEDURE — 83615 LACTATE (LD) (LDH) ENZYME: CPT

## 2025-08-21 PROCEDURE — 96366 THER/PROPH/DIAG IV INF ADDON: CPT | Mod: INF

## 2025-08-21 PROCEDURE — 90472 IMMUNIZATION ADMIN EACH ADD: CPT

## 2025-08-21 PROCEDURE — 82784 ASSAY IGA/IGD/IGG/IGM EACH: CPT

## 2025-08-21 PROCEDURE — 80197 ASSAY OF TACROLIMUS: CPT

## 2025-08-21 PROCEDURE — 90696 DTAP-IPV VACCINE 4-6 YRS IM: CPT

## 2025-08-21 PROCEDURE — 2500000004 HC RX 250 GENERAL PHARMACY W/ HCPCS (ALT 636 FOR OP/ED): Mod: JZ,TB

## 2025-08-21 PROCEDURE — 99215 OFFICE O/P EST HI 40 MIN: CPT | Mod: 25

## 2025-08-21 PROCEDURE — 2500000001 HC RX 250 WO HCPCS SELF ADMINISTERED DRUGS (ALT 637 FOR MEDICARE OP)

## 2025-08-21 PROCEDURE — 3075F SYST BP GE 130 - 139MM HG: CPT

## 2025-08-21 PROCEDURE — 1126F AMNT PAIN NOTED NONE PRSNT: CPT

## 2025-08-21 PROCEDURE — 3078F DIAST BP <80 MM HG: CPT

## 2025-08-21 PROCEDURE — 96365 THER/PROPH/DIAG IV INF INIT: CPT | Mod: INF

## 2025-08-21 PROCEDURE — 1159F MED LIST DOCD IN RCRD: CPT

## 2025-08-21 PROCEDURE — 85025 COMPLETE CBC W/AUTO DIFF WBC: CPT

## 2025-08-21 PROCEDURE — 87799 DETECT AGENT NOS DNA QUANT: CPT

## 2025-08-21 PROCEDURE — 90471 IMMUNIZATION ADMIN: CPT

## 2025-08-21 PROCEDURE — 90677 PCV20 VACCINE IM: CPT

## 2025-08-21 PROCEDURE — 83735 ASSAY OF MAGNESIUM: CPT

## 2025-08-21 RX ORDER — HEPARIN 100 UNIT/ML
500 SYRINGE INTRAVENOUS AS NEEDED
OUTPATIENT
Start: 2025-08-21

## 2025-08-21 RX ORDER — TRIAMTERENE AND HYDROCHLOROTHIAZIDE 37.5; 25 MG/1; MG/1
1 TABLET ORAL DAILY
Qty: 30 TABLET | Refills: 0 | Status: SHIPPED | OUTPATIENT
Start: 2025-08-21 | End: 2026-08-21

## 2025-08-21 RX ORDER — EPINEPHRINE 0.3 MG/.3ML
0.3 INJECTION SUBCUTANEOUS EVERY 5 MIN PRN
OUTPATIENT
Start: 2025-10-16

## 2025-08-21 RX ORDER — FAMOTIDINE 10 MG/ML
20 INJECTION, SOLUTION INTRAVENOUS ONCE AS NEEDED
OUTPATIENT
Start: 2025-09-18

## 2025-08-21 RX ORDER — TACROLIMUS 0.5 MG/1
0.5 CAPSULE ORAL DAILY
Status: SHIPPED
Start: 2025-08-21

## 2025-08-21 RX ORDER — DIPHENHYDRAMINE HYDROCHLORIDE 50 MG/ML
50 INJECTION, SOLUTION INTRAMUSCULAR; INTRAVENOUS AS NEEDED
OUTPATIENT
Start: 2025-09-18

## 2025-08-21 RX ORDER — DIPHENHYDRAMINE HYDROCHLORIDE 50 MG/ML
50 INJECTION, SOLUTION INTRAMUSCULAR; INTRAVENOUS AS NEEDED
Status: DISCONTINUED | OUTPATIENT
Start: 2025-08-21 | End: 2025-08-21 | Stop reason: HOSPADM

## 2025-08-21 RX ORDER — ALBUTEROL SULFATE 0.83 MG/ML
3 SOLUTION RESPIRATORY (INHALATION) AS NEEDED
OUTPATIENT
Start: 2025-09-18

## 2025-08-21 RX ORDER — PREDNISONE 10 MG/1
10 TABLET ORAL DAILY
Status: SHIPPED
Start: 2025-08-28 | End: 2025-09-27

## 2025-08-21 RX ORDER — DIPHENHYDRAMINE HYDROCHLORIDE 50 MG/ML
50 INJECTION, SOLUTION INTRAMUSCULAR; INTRAVENOUS AS NEEDED
OUTPATIENT
Start: 2025-10-16

## 2025-08-21 RX ORDER — ALBUTEROL SULFATE 0.83 MG/ML
3 SOLUTION RESPIRATORY (INHALATION) AS NEEDED
OUTPATIENT
Start: 2025-10-16

## 2025-08-21 RX ORDER — HYDROCHLOROTHIAZIDE 12.5 MG/1
12.5 TABLET ORAL DAILY
Qty: 30 TABLET | Refills: 0 | Status: SHIPPED | OUTPATIENT
Start: 2025-08-21 | End: 2025-08-21 | Stop reason: ENTERED-IN-ERROR

## 2025-08-21 RX ORDER — EPINEPHRINE 0.3 MG/.3ML
0.3 INJECTION SUBCUTANEOUS EVERY 5 MIN PRN
Status: DISCONTINUED | OUTPATIENT
Start: 2025-08-21 | End: 2025-08-21 | Stop reason: HOSPADM

## 2025-08-21 RX ORDER — HEPARIN 100 UNIT/ML
500 SYRINGE INTRAVENOUS AS NEEDED
Status: DISCONTINUED | OUTPATIENT
Start: 2025-08-21 | End: 2025-08-21 | Stop reason: HOSPADM

## 2025-08-21 RX ORDER — MONTELUKAST SODIUM 10 MG/1
10 TABLET ORAL NIGHTLY
Qty: 30 TABLET | Refills: 2 | Status: SHIPPED | OUTPATIENT
Start: 2025-08-21 | End: 2025-11-19

## 2025-08-21 RX ORDER — PREDNISONE 10 MG/1
10 TABLET ORAL EVERY OTHER DAY
Qty: 10 TABLET | Refills: 1 | Status: SHIPPED | OUTPATIENT
Start: 2025-08-28 | End: 2025-08-21 | Stop reason: DRUGHIGH

## 2025-08-21 RX ORDER — DIPHENHYDRAMINE HCL 25 MG
25 CAPSULE ORAL ONCE
OUTPATIENT
Start: 2025-09-18

## 2025-08-21 RX ORDER — EPINEPHRINE 0.3 MG/.3ML
0.3 INJECTION SUBCUTANEOUS EVERY 5 MIN PRN
OUTPATIENT
Start: 2025-09-18

## 2025-08-21 RX ORDER — ACETAMINOPHEN 325 MG/1
650 TABLET ORAL ONCE
OUTPATIENT
Start: 2025-09-18

## 2025-08-21 RX ORDER — DIPHENHYDRAMINE HCL 25 MG
25 CAPSULE ORAL ONCE
Status: COMPLETED | OUTPATIENT
Start: 2025-08-21 | End: 2025-08-21

## 2025-08-21 RX ORDER — FAMOTIDINE 10 MG/ML
20 INJECTION, SOLUTION INTRAVENOUS ONCE AS NEEDED
Status: DISCONTINUED | OUTPATIENT
Start: 2025-08-21 | End: 2025-08-21 | Stop reason: HOSPADM

## 2025-08-21 RX ORDER — ALBUTEROL SULFATE 0.83 MG/ML
3 SOLUTION RESPIRATORY (INHALATION) AS NEEDED
Status: DISCONTINUED | OUTPATIENT
Start: 2025-08-21 | End: 2025-08-21 | Stop reason: HOSPADM

## 2025-08-21 RX ORDER — HEPARIN SODIUM,PORCINE/PF 10 UNIT/ML
50 SYRINGE (ML) INTRAVENOUS AS NEEDED
Status: DISCONTINUED | OUTPATIENT
Start: 2025-08-21 | End: 2025-08-21 | Stop reason: HOSPADM

## 2025-08-21 RX ORDER — HEPARIN SODIUM,PORCINE/PF 10 UNIT/ML
50 SYRINGE (ML) INTRAVENOUS AS NEEDED
OUTPATIENT
Start: 2025-08-21

## 2025-08-21 RX ORDER — FAMOTIDINE 10 MG/ML
20 INJECTION, SOLUTION INTRAVENOUS ONCE AS NEEDED
OUTPATIENT
Start: 2025-10-16

## 2025-08-21 RX ORDER — ACETAMINOPHEN 325 MG/1
650 TABLET ORAL ONCE
Status: COMPLETED | OUTPATIENT
Start: 2025-08-21 | End: 2025-08-21

## 2025-08-21 RX ADMIN — DIPHTHERIA AND TETANUS TOXOIDS AND ACELLULAR PERTUSSIS ADSORBED AND INACTIVATED POLIOVIRUS VACCINE 0.5 ML: 25; 10; 25; 8; 25; 40; 8; 32 INJECTION, SUSPENSION INTRAMUSCULAR at 12:39

## 2025-08-21 RX ADMIN — HAEMOPHILUS B POLYSACCHARIDE CONJUGATE VACCINE FOR INJ 0.5 ML: RECON SOLN at 12:39

## 2025-08-21 RX ADMIN — ACETAMINOPHEN 650 MG: 325 TABLET ORAL at 12:38

## 2025-08-21 RX ADMIN — IMMUNE GLOBULIN INFUSION (HUMAN) 25 G: 100 INJECTION, SOLUTION INTRAVENOUS; SUBCUTANEOUS at 13:11

## 2025-08-21 RX ADMIN — PNEUMOCOCCAL 20-VALENT CONJUGATE VACCINE 0.5 ML
2.2; 2.2; 2.2; 2.2; 2.2; 2.2; 2.2; 2.2; 2.2; 2.2; 2.2; 2.2; 2.2; 2.2; 2.2; 2.2; 4.4; 2.2; 2.2; 2.2 INJECTION, SUSPENSION INTRAMUSCULAR at 12:38

## 2025-08-21 RX ADMIN — DIPHENHYDRAMINE HYDROCHLORIDE 25 MG: 25 CAPSULE ORAL at 12:38

## 2025-08-21 ASSESSMENT — PAIN SCALES - GENERAL
PAINLEVEL_OUTOF10: 0-NO PAIN

## 2025-08-21 ASSESSMENT — ENCOUNTER SYMPTOMS: LEG SWELLING: 1

## 2025-08-22 LAB
EBV DNA BLD NAA+PROBE-LOG IU: NORMAL {LOG_IU}/ML
LABORATORY COMMENT REPORT: NOT DETECTED

## 2025-08-23 LAB
ADENOVIRUS QPCR,PLASMA, VIRC: NOT DETECTED COPIES/ML
CMV DNA SERPL NAA+PROBE-LOG IU: NORMAL {LOG_IU}/ML
EBV DNA SPEC NAA+PROBE-LOG#: NORMAL {LOG_COPIES}/ML
LABORATORY COMMENT REPORT: NOT DETECTED
LABORATORY COMMENT REPORT: NOT DETECTED

## 2025-09-03 ENCOUNTER — SPECIALTY PHARMACY (OUTPATIENT)
Dept: PHARMACY | Facility: CLINIC | Age: 65
End: 2025-09-03

## 2025-09-18 ENCOUNTER — APPOINTMENT (OUTPATIENT)
Dept: HEMATOLOGY/ONCOLOGY | Facility: HOSPITAL | Age: 65
End: 2025-09-18
Payer: COMMERCIAL